# Patient Record
Sex: FEMALE | Race: WHITE | Employment: PART TIME | URBAN - METROPOLITAN AREA
[De-identification: names, ages, dates, MRNs, and addresses within clinical notes are randomized per-mention and may not be internally consistent; named-entity substitution may affect disease eponyms.]

---

## 2017-01-01 ENCOUNTER — HOSPITAL ENCOUNTER (INPATIENT)
Age: 47
LOS: 1 days | Discharge: HOME | DRG: 198 | End: 2017-01-03
Attending: EMERGENCY MEDICINE | Admitting: INTERNAL MEDICINE
Payer: COMMERCIAL

## 2017-01-01 DIAGNOSIS — R07.9 CARDIAC CHEST PAIN: Primary | ICD-10-CM

## 2017-01-01 PROCEDURE — 96375 TX/PRO/DX INJ NEW DRUG ADDON: CPT

## 2017-01-01 PROCEDURE — 93005 ELECTROCARDIOGRAM TRACING: CPT

## 2017-01-01 PROCEDURE — 96374 THER/PROPH/DIAG INJ IV PUSH: CPT

## 2017-01-01 PROCEDURE — 99285 EMERGENCY DEPT VISIT HI MDM: CPT

## 2017-01-01 ASSESSMENT — PAIN SCALES - GENERAL: PAINLEVEL_OUTOF10: 6

## 2017-01-01 ASSESSMENT — PAIN DESCRIPTION - PAIN TYPE: TYPE: ACUTE PAIN

## 2017-01-01 ASSESSMENT — PAIN DESCRIPTION - DESCRIPTORS: DESCRIPTORS: SHARP

## 2017-01-01 ASSESSMENT — PAIN DESCRIPTION - LOCATION: LOCATION: CHEST

## 2017-01-02 ENCOUNTER — APPOINTMENT (OUTPATIENT)
Dept: GENERAL RADIOLOGY | Age: 47
DRG: 198 | End: 2017-01-02
Payer: COMMERCIAL

## 2017-01-02 LAB
ALBUMIN SERPL-MCNC: 4.3 G/DL (ref 3.9–4.9)
ALP BLD-CCNC: 121 U/L (ref 40–130)
ALT SERPL-CCNC: 26 U/L (ref 0–33)
AMPHETAMINE SCREEN, URINE: NORMAL
ANION GAP SERPL CALCULATED.3IONS-SCNC: 12 MEQ/L (ref 7–13)
AST SERPL-CCNC: 15 U/L (ref 0–35)
BARBITURATE SCREEN URINE: NORMAL
BASOPHILS ABSOLUTE: 0.1 K/UL (ref 0–0.2)
BASOPHILS RELATIVE PERCENT: 0.7 %
BENZODIAZEPINE SCREEN, URINE: NORMAL
BILIRUB SERPL-MCNC: 0.2 MG/DL (ref 0–1.2)
BILIRUBIN URINE: NEGATIVE
BLOOD, URINE: NEGATIVE
BUN BLDV-MCNC: 9 MG/DL (ref 6–20)
CALCIUM SERPL-MCNC: 9.3 MG/DL (ref 8.6–10.2)
CANNABINOID SCREEN URINE: NORMAL
CHLORIDE BLD-SCNC: 104 MEQ/L (ref 98–107)
CLARITY: CLEAR
CO2: 23 MEQ/L (ref 22–29)
COCAINE METABOLITE SCREEN URINE: NORMAL
COLOR: YELLOW
CREAT SERPL-MCNC: 0.83 MG/DL (ref 0.5–0.9)
D DIMER: 0.58 MG/L FEU (ref 0–0.5)
EOSINOPHILS ABSOLUTE: 0.2 K/UL (ref 0–0.7)
EOSINOPHILS RELATIVE PERCENT: 1.6 %
GFR AFRICAN AMERICAN: >60
GFR NON-AFRICAN AMERICAN: >60
GLOBULIN: 1.8 G/DL (ref 2.3–3.5)
GLUCOSE BLD-MCNC: 133 MG/DL (ref 74–109)
GLUCOSE URINE: NEGATIVE MG/DL
HCT VFR BLD CALC: 43.2 % (ref 37–47)
HEMOGLOBIN: 14.8 G/DL (ref 12–16)
KETONES, URINE: NEGATIVE MG/DL
LEUKOCYTE ESTERASE, URINE: NEGATIVE
LYMPHOCYTES ABSOLUTE: 2.6 K/UL (ref 1–4.8)
LYMPHOCYTES RELATIVE PERCENT: 25.8 %
Lab: NORMAL
MCH RBC QN AUTO: 31.8 PG (ref 27–31.3)
MCHC RBC AUTO-ENTMCNC: 34.2 % (ref 33–37)
MCV RBC AUTO: 93.2 FL (ref 82–100)
MONOCYTES ABSOLUTE: 0.8 K/UL (ref 0.2–0.8)
MONOCYTES RELATIVE PERCENT: 8.2 %
NEUTROPHILS ABSOLUTE: 6.4 K/UL (ref 1.4–6.5)
NEUTROPHILS RELATIVE PERCENT: 63.7 %
NITRITE, URINE: NEGATIVE
OPIATE SCREEN URINE: NORMAL
PDW BLD-RTO: 14 % (ref 11.5–14.5)
PH UA: 6 (ref 5–9)
PHENCYCLIDINE SCREEN URINE: NORMAL
PLATELET # BLD: 143 K/UL (ref 130–400)
POTASSIUM SERPL-SCNC: 3.6 MEQ/L (ref 3.5–5.1)
PROTEIN UA: NEGATIVE MG/DL
RBC # BLD: 4.63 M/UL (ref 4.2–5.4)
SODIUM BLD-SCNC: 139 MEQ/L (ref 132–144)
SPECIFIC GRAVITY UA: 1.02 (ref 1–1.03)
TOTAL PROTEIN: 6.1 G/DL (ref 6.4–8.1)
TROPONIN: <0.01 NG/ML (ref 0–0.01)
URINE REFLEX TO CULTURE: NORMAL
UROBILINOGEN, URINE: 1 E.U./DL
WBC # BLD: 10 K/UL (ref 4.8–10.8)

## 2017-01-02 PROCEDURE — 2580000003 HC RX 258: Performed by: INTERNAL MEDICINE

## 2017-01-02 PROCEDURE — 71020 XR CHEST STANDARD TWO VW: CPT

## 2017-01-02 PROCEDURE — 85025 COMPLETE CBC W/AUTO DIFF WBC: CPT

## 2017-01-02 PROCEDURE — 93005 ELECTROCARDIOGRAM TRACING: CPT

## 2017-01-02 PROCEDURE — 80307 DRUG TEST PRSMV CHEM ANLYZR: CPT

## 2017-01-02 PROCEDURE — 6370000000 HC RX 637 (ALT 250 FOR IP): Performed by: INTERNAL MEDICINE

## 2017-01-02 PROCEDURE — 6360000002 HC RX W HCPCS: Performed by: EMERGENCY MEDICINE

## 2017-01-02 PROCEDURE — 94664 DEMO&/EVAL PT USE INHALER: CPT

## 2017-01-02 PROCEDURE — 85379 FIBRIN DEGRADATION QUANT: CPT

## 2017-01-02 PROCEDURE — 81003 URINALYSIS AUTO W/O SCOPE: CPT

## 2017-01-02 PROCEDURE — 36415 COLL VENOUS BLD VENIPUNCTURE: CPT

## 2017-01-02 PROCEDURE — 2060000000 HC ICU INTERMEDIATE R&B

## 2017-01-02 PROCEDURE — 6360000002 HC RX W HCPCS: Performed by: PERSONAL EMERGENCY RESPONSE ATTENDANT

## 2017-01-02 PROCEDURE — 94640 AIRWAY INHALATION TREATMENT: CPT

## 2017-01-02 PROCEDURE — 1210000000 HC MED SURG R&B

## 2017-01-02 PROCEDURE — 6370000000 HC RX 637 (ALT 250 FOR IP): Performed by: EMERGENCY MEDICINE

## 2017-01-02 PROCEDURE — 84484 ASSAY OF TROPONIN QUANT: CPT

## 2017-01-02 PROCEDURE — 80053 COMPREHEN METABOLIC PANEL: CPT

## 2017-01-02 PROCEDURE — 6360000002 HC RX W HCPCS: Performed by: INTERNAL MEDICINE

## 2017-01-02 RX ORDER — METOCLOPRAMIDE HYDROCHLORIDE 5 MG/ML
10 INJECTION INTRAMUSCULAR; INTRAVENOUS ONCE
Status: COMPLETED | OUTPATIENT
Start: 2017-01-02 | End: 2017-01-02

## 2017-01-02 RX ORDER — CLONAZEPAM 1 MG/1
1 TABLET ORAL 3 TIMES DAILY PRN
Status: DISCONTINUED | OUTPATIENT
Start: 2017-01-02 | End: 2017-01-03 | Stop reason: HOSPADM

## 2017-01-02 RX ORDER — SODIUM CHLORIDE 0.9 % (FLUSH) 0.9 %
10 SYRINGE (ML) INJECTION PRN
Status: DISCONTINUED | OUTPATIENT
Start: 2017-01-02 | End: 2017-01-03 | Stop reason: HOSPADM

## 2017-01-02 RX ORDER — LISINOPRIL 20 MG/1
40 TABLET ORAL DAILY
Status: DISCONTINUED | OUTPATIENT
Start: 2017-01-02 | End: 2017-01-03 | Stop reason: HOSPADM

## 2017-01-02 RX ORDER — BUSPIRONE HYDROCHLORIDE 5 MG/1
5 TABLET ORAL 3 TIMES DAILY
Status: DISCONTINUED | OUTPATIENT
Start: 2017-01-02 | End: 2017-01-03 | Stop reason: HOSPADM

## 2017-01-02 RX ORDER — FOLIC ACID 1 MG/1
1 TABLET ORAL DAILY
Status: DISCONTINUED | OUTPATIENT
Start: 2017-01-02 | End: 2017-01-03 | Stop reason: HOSPADM

## 2017-01-02 RX ORDER — ACETAMINOPHEN 325 MG/1
650 TABLET ORAL EVERY 4 HOURS PRN
Status: DISCONTINUED | OUTPATIENT
Start: 2017-01-02 | End: 2017-01-03 | Stop reason: HOSPADM

## 2017-01-02 RX ORDER — ALBUTEROL SULFATE 2.5 MG/3ML
2.5 SOLUTION RESPIRATORY (INHALATION) 3 TIMES DAILY
Status: DISCONTINUED | OUTPATIENT
Start: 2017-01-02 | End: 2017-01-03 | Stop reason: HOSPADM

## 2017-01-02 RX ORDER — CYCLOBENZAPRINE HCL 10 MG
10 TABLET ORAL 3 TIMES DAILY
Status: DISCONTINUED | OUTPATIENT
Start: 2017-01-02 | End: 2017-01-03 | Stop reason: HOSPADM

## 2017-01-02 RX ORDER — IBUPROFEN 800 MG/1
800 TABLET ORAL ONCE
Status: DISCONTINUED | OUTPATIENT
Start: 2017-01-02 | End: 2017-01-02

## 2017-01-02 RX ORDER — KETOROLAC TROMETHAMINE 30 MG/ML
30 INJECTION, SOLUTION INTRAMUSCULAR; INTRAVENOUS ONCE
Status: COMPLETED | OUTPATIENT
Start: 2017-01-02 | End: 2017-01-02

## 2017-01-02 RX ORDER — PANTOPRAZOLE SODIUM 40 MG/1
40 TABLET, DELAYED RELEASE ORAL
Status: DISCONTINUED | OUTPATIENT
Start: 2017-01-03 | End: 2017-01-02 | Stop reason: SDUPTHER

## 2017-01-02 RX ORDER — FOLIC ACID 1 MG/1
1 TABLET ORAL DAILY
Status: DISCONTINUED | OUTPATIENT
Start: 2017-01-02 | End: 2017-01-02 | Stop reason: SDUPTHER

## 2017-01-02 RX ORDER — GABAPENTIN 400 MG/1
800 CAPSULE ORAL 3 TIMES DAILY
Status: DISCONTINUED | OUTPATIENT
Start: 2017-01-02 | End: 2017-01-03 | Stop reason: HOSPADM

## 2017-01-02 RX ORDER — NITROGLYCERIN 0.4 MG/1
0.4 TABLET SUBLINGUAL EVERY 5 MIN PRN
Status: DISCONTINUED | OUTPATIENT
Start: 2017-01-02 | End: 2017-01-03 | Stop reason: HOSPADM

## 2017-01-02 RX ORDER — PANTOPRAZOLE SODIUM 40 MG/1
40 TABLET, DELAYED RELEASE ORAL
Status: DISCONTINUED | OUTPATIENT
Start: 2017-01-03 | End: 2017-01-03 | Stop reason: HOSPADM

## 2017-01-02 RX ORDER — ALBUTEROL SULFATE 2.5 MG/3ML
2.5 SOLUTION RESPIRATORY (INHALATION) EVERY 6 HOURS PRN
Status: DISCONTINUED | OUTPATIENT
Start: 2017-01-02 | End: 2017-01-02

## 2017-01-02 RX ORDER — ERGOCALCIFEROL 1.25 MG/1
50000 CAPSULE ORAL WEEKLY
Status: DISCONTINUED | OUTPATIENT
Start: 2017-01-02 | End: 2017-01-03 | Stop reason: HOSPADM

## 2017-01-02 RX ORDER — SODIUM CHLORIDE 0.9 % (FLUSH) 0.9 %
10 SYRINGE (ML) INJECTION EVERY 12 HOURS SCHEDULED
Status: DISCONTINUED | OUTPATIENT
Start: 2017-01-02 | End: 2017-01-03 | Stop reason: HOSPADM

## 2017-01-02 RX ORDER — RANOLAZINE 500 MG/1
500 TABLET, EXTENDED RELEASE ORAL 2 TIMES DAILY
Status: DISCONTINUED | OUTPATIENT
Start: 2017-01-02 | End: 2017-01-03

## 2017-01-02 RX ORDER — TRAMADOL HYDROCHLORIDE 50 MG/1
50 TABLET ORAL EVERY 6 HOURS PRN
Status: DISCONTINUED | OUTPATIENT
Start: 2017-01-02 | End: 2017-01-03 | Stop reason: HOSPADM

## 2017-01-02 RX ORDER — DIPHENHYDRAMINE HYDROCHLORIDE 50 MG/ML
25 INJECTION INTRAMUSCULAR; INTRAVENOUS ONCE
Status: COMPLETED | OUTPATIENT
Start: 2017-01-02 | End: 2017-01-02

## 2017-01-02 RX ORDER — ONDANSETRON 2 MG/ML
4 INJECTION INTRAMUSCULAR; INTRAVENOUS EVERY 6 HOURS PRN
Status: DISCONTINUED | OUTPATIENT
Start: 2017-01-02 | End: 2017-01-03 | Stop reason: HOSPADM

## 2017-01-02 RX ORDER — NAPROXEN SODIUM 550 MG/1
550 TABLET ORAL 2 TIMES DAILY WITH MEALS
Status: DISCONTINUED | OUTPATIENT
Start: 2017-01-02 | End: 2017-01-02

## 2017-01-02 RX ORDER — CHLORZOXAZONE 500 MG/1
500 TABLET ORAL 3 TIMES DAILY
Status: DISCONTINUED | OUTPATIENT
Start: 2017-01-02 | End: 2017-01-02

## 2017-01-02 RX ORDER — NICOTINE 21 MG/24HR
1 PATCH, TRANSDERMAL 24 HOURS TRANSDERMAL EVERY 24 HOURS
Status: DISCONTINUED | OUTPATIENT
Start: 2017-01-02 | End: 2017-01-03 | Stop reason: HOSPADM

## 2017-01-02 RX ORDER — ALBUTEROL SULFATE 90 UG/1
2 AEROSOL, METERED RESPIRATORY (INHALATION) EVERY 6 HOURS PRN
Status: DISCONTINUED | OUTPATIENT
Start: 2017-01-02 | End: 2017-01-02

## 2017-01-02 RX ORDER — ASPIRIN 81 MG/1
324 TABLET, CHEWABLE ORAL ONCE
Status: COMPLETED | OUTPATIENT
Start: 2017-01-02 | End: 2017-01-02

## 2017-01-02 RX ORDER — METHOCARBAMOL 500 MG/1
500 TABLET, FILM COATED ORAL 3 TIMES DAILY
Status: DISCONTINUED | OUTPATIENT
Start: 2017-01-02 | End: 2017-01-02

## 2017-01-02 RX ORDER — PANTOPRAZOLE SODIUM 40 MG/1
40 TABLET, DELAYED RELEASE ORAL DAILY
Status: DISCONTINUED | OUTPATIENT
Start: 2017-01-02 | End: 2017-01-02 | Stop reason: SDUPTHER

## 2017-01-02 RX ORDER — IBUPROFEN 800 MG/1
800 TABLET ORAL ONCE
Status: COMPLETED | OUTPATIENT
Start: 2017-01-02 | End: 2017-01-02

## 2017-01-02 RX ORDER — ALBUTEROL SULFATE 2.5 MG/3ML
2.5 SOLUTION RESPIRATORY (INHALATION)
Status: DISCONTINUED | OUTPATIENT
Start: 2017-01-02 | End: 2017-01-02

## 2017-01-02 RX ORDER — MELOXICAM 7.5 MG/1
7.5 TABLET ORAL DAILY
Status: DISCONTINUED | OUTPATIENT
Start: 2017-01-02 | End: 2017-01-03 | Stop reason: HOSPADM

## 2017-01-02 RX ORDER — ALBUTEROL SULFATE 2.5 MG/3ML
2.5 SOLUTION RESPIRATORY (INHALATION)
Status: DISCONTINUED | OUTPATIENT
Start: 2017-01-02 | End: 2017-01-03 | Stop reason: HOSPADM

## 2017-01-02 RX ORDER — CYANOCOBALAMIN 1000 UG/ML
1000 INJECTION INTRAMUSCULAR; SUBCUTANEOUS
Status: DISCONTINUED | OUTPATIENT
Start: 2017-01-02 | End: 2017-01-03 | Stop reason: HOSPADM

## 2017-01-02 RX ORDER — ASPIRIN 81 MG/1
81 TABLET, CHEWABLE ORAL DAILY
Status: DISCONTINUED | OUTPATIENT
Start: 2017-01-03 | End: 2017-01-03 | Stop reason: HOSPADM

## 2017-01-02 RX ORDER — METOPROLOL TARTRATE 50 MG/1
50 TABLET, FILM COATED ORAL 2 TIMES DAILY
Status: DISCONTINUED | OUTPATIENT
Start: 2017-01-02 | End: 2017-01-03 | Stop reason: HOSPADM

## 2017-01-02 RX ADMIN — METOCLOPRAMIDE 10 MG: 5 INJECTION, SOLUTION INTRAMUSCULAR; INTRAVENOUS at 05:28

## 2017-01-02 RX ADMIN — OLODATEROL RESPIMAT INHALATION SPRAY 2 PUFF: 2.5 SPRAY, METERED RESPIRATORY (INHALATION) at 16:54

## 2017-01-02 RX ADMIN — ASPIRIN 81 MG 324 MG: 81 TABLET ORAL at 01:14

## 2017-01-02 RX ADMIN — SODIUM CHLORIDE, PRESERVATIVE FREE 10 ML: 5 INJECTION INTRAVENOUS at 10:14

## 2017-01-02 RX ADMIN — TIOTROPIUM BROMIDE 18 MCG: 18 CAPSULE ORAL; RESPIRATORY (INHALATION) at 19:36

## 2017-01-02 RX ADMIN — BUSPIRONE HYDROCHLORIDE 5 MG: 5 TABLET ORAL at 13:54

## 2017-01-02 RX ADMIN — MELOXICAM 7.5 MG: 7.5 TABLET ORAL at 16:12

## 2017-01-02 RX ADMIN — ALBUTEROL SULFATE 2.5 MG: 2.5 SOLUTION RESPIRATORY (INHALATION) at 19:28

## 2017-01-02 RX ADMIN — ERGOCALCIFEROL 50000 UNITS: 1.25 CAPSULE ORAL at 13:53

## 2017-01-02 RX ADMIN — METOPROLOL TARTRATE 50 MG: 50 TABLET ORAL at 13:54

## 2017-01-02 RX ADMIN — NITROGLYCERIN 0.4 MG: 0.4 TABLET SUBLINGUAL at 00:17

## 2017-01-02 RX ADMIN — LISINOPRIL 40 MG: 20 TABLET ORAL at 13:54

## 2017-01-02 RX ADMIN — GABAPENTIN 800 MG: 400 CAPSULE ORAL at 22:44

## 2017-01-02 RX ADMIN — BUSPIRONE HYDROCHLORIDE 5 MG: 5 TABLET ORAL at 22:44

## 2017-01-02 RX ADMIN — CYCLOBENZAPRINE HYDROCHLORIDE 10 MG: 10 TABLET, FILM COATED ORAL at 16:12

## 2017-01-02 RX ADMIN — NITROGLYCERIN 0.4 MG: 0.4 TABLET SUBLINGUAL at 00:26

## 2017-01-02 RX ADMIN — CYANOCOBALAMIN 1000 MCG: 1000 INJECTION, SOLUTION INTRAMUSCULAR at 13:52

## 2017-01-02 RX ADMIN — RANOLAZINE 500 MG: 500 TABLET, FILM COATED, EXTENDED RELEASE ORAL at 22:44

## 2017-01-02 RX ADMIN — GABAPENTIN 800 MG: 400 CAPSULE ORAL at 13:53

## 2017-01-02 RX ADMIN — RANOLAZINE 500 MG: 500 TABLET, FILM COATED, EXTENDED RELEASE ORAL at 13:53

## 2017-01-02 RX ADMIN — ONDANSETRON 4 MG: 2 INJECTION INTRAMUSCULAR; INTRAVENOUS at 18:47

## 2017-01-02 RX ADMIN — FOLIC ACID 1 MG: 1 TABLET ORAL at 13:53

## 2017-01-02 RX ADMIN — CYCLOBENZAPRINE HYDROCHLORIDE 10 MG: 10 TABLET, FILM COATED ORAL at 22:44

## 2017-01-02 RX ADMIN — METOPROLOL TARTRATE 50 MG: 50 TABLET ORAL at 22:44

## 2017-01-02 RX ADMIN — KETOROLAC TROMETHAMINE 30 MG: 30 INJECTION, SOLUTION INTRAMUSCULAR at 01:14

## 2017-01-02 RX ADMIN — DIPHENHYDRAMINE HYDROCHLORIDE 25 MG: 50 INJECTION INTRAMUSCULAR; INTRAVENOUS at 05:28

## 2017-01-02 RX ADMIN — TRAMADOL HYDROCHLORIDE 50 MG: 50 TABLET, FILM COATED ORAL at 12:49

## 2017-01-02 RX ADMIN — TRAMADOL HYDROCHLORIDE 50 MG: 50 TABLET, FILM COATED ORAL at 18:48

## 2017-01-02 RX ADMIN — IBUPROFEN 800 MG: 800 TABLET, FILM COATED ORAL at 00:27

## 2017-01-02 RX ADMIN — SODIUM CHLORIDE, PRESERVATIVE FREE 10 ML: 5 INJECTION INTRAVENOUS at 22:45

## 2017-01-02 RX ADMIN — ENOXAPARIN SODIUM 40 MG: 40 INJECTION SUBCUTANEOUS at 10:14

## 2017-01-02 ASSESSMENT — ENCOUNTER SYMPTOMS
PHOTOPHOBIA: 0
NAUSEA: 0
VOMITING: 0
ANAL BLEEDING: 0
CHOKING: 0
EYE DISCHARGE: 0
BLOOD IN STOOL: 0
COLOR CHANGE: 0
SHORTNESS OF BREATH: 1
CHEST TIGHTNESS: 0
DIARRHEA: 0
FACIAL SWELLING: 0
EYE PAIN: 0
CONSTIPATION: 0
STRIDOR: 0
WHEEZING: 0
ABDOMINAL PAIN: 0
SINUS PRESSURE: 0
BACK PAIN: 0
COUGH: 0
RHINORRHEA: 0
EYE ITCHING: 0
SORE THROAT: 0
VOICE CHANGE: 0
TROUBLE SWALLOWING: 0
ABDOMINAL DISTENTION: 0
EYE REDNESS: 0

## 2017-01-02 ASSESSMENT — PAIN SCALES - GENERAL
PAINLEVEL_OUTOF10: 6
PAINLEVEL_OUTOF10: 8
PAINLEVEL_OUTOF10: 8
PAINLEVEL_OUTOF10: 6
PAINLEVEL_OUTOF10: 9
PAINLEVEL_OUTOF10: 9
PAINLEVEL_OUTOF10: 8
PAINLEVEL_OUTOF10: 0
PAINLEVEL_OUTOF10: 7
PAINLEVEL_OUTOF10: 8

## 2017-01-02 ASSESSMENT — PAIN DESCRIPTION - LOCATION
LOCATION: HEAD

## 2017-01-02 ASSESSMENT — PAIN DESCRIPTION - PAIN TYPE
TYPE: ACUTE PAIN
TYPE: CHRONIC PAIN
TYPE: ACUTE PAIN
TYPE: OTHER (COMMENT)

## 2017-01-02 ASSESSMENT — PAIN DESCRIPTION - FREQUENCY
FREQUENCY: CONTINUOUS
FREQUENCY: CONTINUOUS
FREQUENCY: INTERMITTENT

## 2017-01-02 ASSESSMENT — PAIN DESCRIPTION - DESCRIPTORS
DESCRIPTORS: HEADACHE

## 2017-01-03 VITALS
WEIGHT: 187.17 LBS | DIASTOLIC BLOOD PRESSURE: 61 MMHG | HEART RATE: 71 BPM | RESPIRATION RATE: 18 BRPM | OXYGEN SATURATION: 99 % | SYSTOLIC BLOOD PRESSURE: 124 MMHG | HEIGHT: 67 IN | BODY MASS INDEX: 29.38 KG/M2 | TEMPERATURE: 98.6 F

## 2017-01-03 LAB
LV EF: 65 %
LVEF MODALITY: NORMAL

## 2017-01-03 PROCEDURE — 93005 ELECTROCARDIOGRAM TRACING: CPT

## 2017-01-03 PROCEDURE — 6370000000 HC RX 637 (ALT 250 FOR IP): Performed by: PHYSICIAN ASSISTANT

## 2017-01-03 PROCEDURE — 6370000000 HC RX 637 (ALT 250 FOR IP): Performed by: INTERNAL MEDICINE

## 2017-01-03 PROCEDURE — 2580000003 HC RX 258: Performed by: INTERNAL MEDICINE

## 2017-01-03 PROCEDURE — 99222 1ST HOSP IP/OBS MODERATE 55: CPT | Performed by: INTERNAL MEDICINE

## 2017-01-03 PROCEDURE — 94640 AIRWAY INHALATION TREATMENT: CPT

## 2017-01-03 PROCEDURE — 6360000002 HC RX W HCPCS: Performed by: INTERNAL MEDICINE

## 2017-01-03 PROCEDURE — 94760 N-INVAS EAR/PLS OXIMETRY 1: CPT

## 2017-01-03 PROCEDURE — 6360000002 HC RX W HCPCS: Performed by: PHYSICIAN ASSISTANT

## 2017-01-03 PROCEDURE — 93306 TTE W/DOPPLER COMPLETE: CPT

## 2017-01-03 PROCEDURE — 2700000000 HC OXYGEN THERAPY PER DAY

## 2017-01-03 RX ORDER — AMLODIPINE BESYLATE 5 MG/1
5 TABLET ORAL DAILY
Status: DISCONTINUED | OUTPATIENT
Start: 2017-01-03 | End: 2017-01-03 | Stop reason: HOSPADM

## 2017-01-03 RX ORDER — ISOSORBIDE MONONITRATE 30 MG/1
30 TABLET, EXTENDED RELEASE ORAL DAILY
Status: DISCONTINUED | OUTPATIENT
Start: 2017-01-03 | End: 2017-01-03 | Stop reason: HOSPADM

## 2017-01-03 RX ORDER — RANOLAZINE 500 MG/1
1000 TABLET, EXTENDED RELEASE ORAL 2 TIMES DAILY
Status: DISCONTINUED | OUTPATIENT
Start: 2017-01-03 | End: 2017-01-03 | Stop reason: HOSPADM

## 2017-01-03 RX ORDER — MORPHINE SULFATE 2 MG/ML
2 INJECTION, SOLUTION INTRAMUSCULAR; INTRAVENOUS EVERY 8 HOURS PRN
Status: DISCONTINUED | OUTPATIENT
Start: 2017-01-03 | End: 2017-01-03 | Stop reason: HOSPADM

## 2017-01-03 RX ORDER — NITROGLYCERIN 0.4 MG/1
TABLET SUBLINGUAL
Qty: 25 TABLET | Refills: 3 | Status: ON HOLD | OUTPATIENT
Start: 2017-01-03 | End: 2018-11-12 | Stop reason: HOSPADM

## 2017-01-03 RX ORDER — RANOLAZINE 1000 MG/1
1000 TABLET, EXTENDED RELEASE ORAL 2 TIMES DAILY
Qty: 60 TABLET | Refills: 3 | Status: ON HOLD | OUTPATIENT
Start: 2017-01-03 | End: 2017-06-19 | Stop reason: HOSPADM

## 2017-01-03 RX ORDER — ISOSORBIDE MONONITRATE 30 MG/1
30 TABLET, EXTENDED RELEASE ORAL DAILY
Qty: 30 TABLET | Refills: 3 | Status: ON HOLD | OUTPATIENT
Start: 2017-01-03 | End: 2017-06-19

## 2017-01-03 RX ORDER — AMLODIPINE BESYLATE 5 MG/1
5 TABLET ORAL DAILY
Qty: 30 TABLET | Refills: 3 | Status: ON HOLD | OUTPATIENT
Start: 2017-01-03 | End: 2017-06-19 | Stop reason: HOSPADM

## 2017-01-03 RX ORDER — ASPIRIN 81 MG/1
81 TABLET, CHEWABLE ORAL DAILY
Qty: 30 TABLET | Refills: 3 | Status: ON HOLD | OUTPATIENT
Start: 2017-01-03 | End: 2017-06-19 | Stop reason: HOSPADM

## 2017-01-03 RX ADMIN — TRAMADOL HYDROCHLORIDE 50 MG: 50 TABLET, FILM COATED ORAL at 15:24

## 2017-01-03 RX ADMIN — ENOXAPARIN SODIUM 40 MG: 40 INJECTION SUBCUTANEOUS at 08:54

## 2017-01-03 RX ADMIN — AMLODIPINE BESYLATE 5 MG: 5 TABLET ORAL at 11:45

## 2017-01-03 RX ADMIN — FOLIC ACID 1 MG: 1 TABLET ORAL at 08:54

## 2017-01-03 RX ADMIN — SODIUM CHLORIDE, PRESERVATIVE FREE 10 ML: 5 INJECTION INTRAVENOUS at 08:54

## 2017-01-03 RX ADMIN — TIOTROPIUM BROMIDE 18 MCG: 18 CAPSULE ORAL; RESPIRATORY (INHALATION) at 07:02

## 2017-01-03 RX ADMIN — TRAMADOL HYDROCHLORIDE 50 MG: 50 TABLET, FILM COATED ORAL at 06:46

## 2017-01-03 RX ADMIN — PANTOPRAZOLE SODIUM 40 MG: 40 TABLET, DELAYED RELEASE ORAL at 06:42

## 2017-01-03 RX ADMIN — LISINOPRIL 40 MG: 20 TABLET ORAL at 08:54

## 2017-01-03 RX ADMIN — CYCLOBENZAPRINE HYDROCHLORIDE 10 MG: 10 TABLET, FILM COATED ORAL at 08:53

## 2017-01-03 RX ADMIN — GABAPENTIN 800 MG: 400 CAPSULE ORAL at 08:54

## 2017-01-03 RX ADMIN — GABAPENTIN 800 MG: 400 CAPSULE ORAL at 15:21

## 2017-01-03 RX ADMIN — RANOLAZINE 500 MG: 500 TABLET, FILM COATED, EXTENDED RELEASE ORAL at 08:53

## 2017-01-03 RX ADMIN — ALBUTEROL SULFATE 2.5 MG: 2.5 SOLUTION RESPIRATORY (INHALATION) at 12:43

## 2017-01-03 RX ADMIN — OLODATEROL RESPIMAT INHALATION SPRAY 2 PUFF: 2.5 SPRAY, METERED RESPIRATORY (INHALATION) at 07:02

## 2017-01-03 RX ADMIN — ASPIRIN 81 MG 81 MG: 81 TABLET ORAL at 08:54

## 2017-01-03 RX ADMIN — ISOSORBIDE MONONITRATE 30 MG: 30 TABLET, EXTENDED RELEASE ORAL at 11:45

## 2017-01-03 RX ADMIN — MORPHINE SULFATE 2 MG: 2 INJECTION, SOLUTION INTRAMUSCULAR; INTRAVENOUS at 11:45

## 2017-01-03 RX ADMIN — ALBUTEROL SULFATE 2.5 MG: 2.5 SOLUTION RESPIRATORY (INHALATION) at 07:02

## 2017-01-03 RX ADMIN — MELOXICAM 7.5 MG: 7.5 TABLET ORAL at 08:54

## 2017-01-03 RX ADMIN — METOPROLOL TARTRATE 50 MG: 50 TABLET ORAL at 08:54

## 2017-01-03 RX ADMIN — BUSPIRONE HYDROCHLORIDE 5 MG: 5 TABLET ORAL at 08:54

## 2017-01-03 RX ADMIN — BUSPIRONE HYDROCHLORIDE 5 MG: 5 TABLET ORAL at 15:21

## 2017-01-03 RX ADMIN — CYCLOBENZAPRINE HYDROCHLORIDE 10 MG: 10 TABLET, FILM COATED ORAL at 15:21

## 2017-01-03 ASSESSMENT — PAIN SCALES - GENERAL
PAINLEVEL_OUTOF10: 2
PAINLEVEL_OUTOF10: 6
PAINLEVEL_OUTOF10: 0
PAINLEVEL_OUTOF10: 2
PAINLEVEL_OUTOF10: 6
PAINLEVEL_OUTOF10: 6
PAINLEVEL_OUTOF10: 9
PAINLEVEL_OUTOF10: 8
PAINLEVEL_OUTOF10: 7

## 2017-01-03 ASSESSMENT — COPD QUESTIONNAIRES: COPD: 1

## 2017-01-03 ASSESSMENT — ENCOUNTER SYMPTOMS
COUGH: 0
WHEEZING: 0
BACK PAIN: 1
CONSTIPATION: 0
ABDOMINAL DISTENTION: 0
ABDOMINAL PAIN: 0
NAUSEA: 0
CHEST TIGHTNESS: 0
VOMITING: 0
APNEA: 0
SHORTNESS OF BREATH: 0
DIARRHEA: 0

## 2017-01-04 ENCOUNTER — OFFICE VISIT (OUTPATIENT)
Dept: PHYSICAL MEDICINE AND REHAB | Age: 47
End: 2017-01-04

## 2017-01-04 VITALS
HEIGHT: 67 IN | WEIGHT: 178 LBS | BODY MASS INDEX: 27.94 KG/M2 | SYSTOLIC BLOOD PRESSURE: 130 MMHG | DIASTOLIC BLOOD PRESSURE: 88 MMHG

## 2017-01-04 DIAGNOSIS — G32.0 NEUROMYELOPATHY DUE TO VITAMIN B12 DEFICIENCY (HCC): Primary | ICD-10-CM

## 2017-01-04 DIAGNOSIS — M54.81 BILATERAL OCCIPITAL NEURALGIA: ICD-10-CM

## 2017-01-04 DIAGNOSIS — E53.8 NEUROMYELOPATHY DUE TO VITAMIN B12 DEFICIENCY (HCC): Primary | ICD-10-CM

## 2017-01-04 DIAGNOSIS — G89.4 CHRONIC PAIN SYNDROME: ICD-10-CM

## 2017-01-04 DIAGNOSIS — E53.8 VITAMIN B12 DEFICIENCY: ICD-10-CM

## 2017-01-04 DIAGNOSIS — M25.561 ARTHRALGIA OF BOTH LOWER LEGS: ICD-10-CM

## 2017-01-04 DIAGNOSIS — G62.9 SENSORY NEUROPATHY: ICD-10-CM

## 2017-01-04 DIAGNOSIS — M25.562 ARTHRALGIA OF BOTH LOWER LEGS: ICD-10-CM

## 2017-01-04 DIAGNOSIS — M79.7 FIBROMYALGIA MUSCLE PAIN: ICD-10-CM

## 2017-01-04 DIAGNOSIS — M54.17 LUMBOSACRAL RADICULOPATHY AT S1: ICD-10-CM

## 2017-01-04 DIAGNOSIS — F31.9 BIPOLAR 1 DISORDER (HCC): ICD-10-CM

## 2017-01-04 DIAGNOSIS — E53.8 VITAMIN B 12 DEFICIENCY: ICD-10-CM

## 2017-01-04 DIAGNOSIS — H90.3 SENSORY HEARING LOSS, BILATERAL: ICD-10-CM

## 2017-01-04 DIAGNOSIS — Z78.9 IMPAIRED MOBILITY AND ACTIVITIES OF DAILY LIVING: ICD-10-CM

## 2017-01-04 DIAGNOSIS — Z74.09 IMPAIRED MOBILITY AND ACTIVITIES OF DAILY LIVING: ICD-10-CM

## 2017-01-04 PROCEDURE — 99215 OFFICE O/P EST HI 40 MIN: CPT | Performed by: PHYSICAL MEDICINE & REHABILITATION

## 2017-01-04 RX ORDER — CYANOCOBALAMIN 1000 UG/ML
1000 INJECTION INTRAMUSCULAR; SUBCUTANEOUS
Qty: 10 ML | Refills: 1 | Status: SHIPPED | OUTPATIENT
Start: 2017-01-04 | End: 2017-11-29 | Stop reason: SDUPTHER

## 2017-01-04 RX ORDER — TRAMADOL HYDROCHLORIDE 50 MG/1
50 TABLET ORAL EVERY 6 HOURS PRN
Qty: 60 TABLET | Refills: 1 | Status: SHIPPED | OUTPATIENT
Start: 2017-01-04 | End: 2017-02-07 | Stop reason: SDUPTHER

## 2017-01-04 ASSESSMENT — ENCOUNTER SYMPTOMS
ABDOMINAL PAIN: 1
DIARRHEA: 1
RHINORRHEA: 0
RECTAL PAIN: 1
ABDOMINAL DISTENTION: 1
VOICE CHANGE: 0
CHEST TIGHTNESS: 0
BOWEL INCONTINENCE: 0
CHOKING: 0
PHOTOPHOBIA: 1
SORE THROAT: 0
FACIAL SWELLING: 0
TROUBLE SWALLOWING: 0
SHORTNESS OF BREATH: 1
VOMITING: 0
COLOR CHANGE: 1
NAUSEA: 0
CONSTIPATION: 0
WHEEZING: 0
EYE REDNESS: 0
BACK PAIN: 1
EYE ITCHING: 1
APNEA: 0
CHANGE IN BOWEL HABIT: 0
STRIDOR: 0
ANAL BLEEDING: 0
SINUS PRESSURE: 0
EYE DISCHARGE: 0
COUGH: 1
BLOOD IN STOOL: 0
EYE PAIN: 1

## 2017-01-05 LAB
EKG ATRIAL RATE: 67 BPM
EKG ATRIAL RATE: 71 BPM
EKG ATRIAL RATE: 79 BPM
EKG P AXIS: 44 DEGREES
EKG P AXIS: 53 DEGREES
EKG P AXIS: 55 DEGREES
EKG P-R INTERVAL: 160 MS
EKG P-R INTERVAL: 170 MS
EKG P-R INTERVAL: 174 MS
EKG Q-T INTERVAL: 384 MS
EKG Q-T INTERVAL: 400 MS
EKG Q-T INTERVAL: 412 MS
EKG QRS DURATION: 90 MS
EKG QRS DURATION: 90 MS
EKG QRS DURATION: 94 MS
EKG QTC CALCULATION (BAZETT): 434 MS
EKG QTC CALCULATION (BAZETT): 435 MS
EKG QTC CALCULATION (BAZETT): 440 MS
EKG R AXIS: 47 DEGREES
EKG R AXIS: 53 DEGREES
EKG R AXIS: 56 DEGREES
EKG T AXIS: 15 DEGREES
EKG T AXIS: 46 DEGREES
EKG T AXIS: 48 DEGREES
EKG VENTRICULAR RATE: 67 BPM
EKG VENTRICULAR RATE: 71 BPM
EKG VENTRICULAR RATE: 79 BPM

## 2017-01-10 ENCOUNTER — PROCEDURE VISIT (OUTPATIENT)
Dept: PHYSICAL MEDICINE AND REHAB | Age: 47
End: 2017-01-10

## 2017-01-10 DIAGNOSIS — M54.81 BILATERAL OCCIPITAL NEURALGIA: Primary | ICD-10-CM

## 2017-01-10 PROCEDURE — 64405 NJX AA&/STRD GR OCPL NRV: CPT | Performed by: PHYSICAL MEDICINE & REHABILITATION

## 2017-01-13 ENCOUNTER — TELEPHONE (OUTPATIENT)
Dept: PHYSICAL MEDICINE AND REHAB | Age: 47
End: 2017-01-13

## 2017-01-20 DIAGNOSIS — K21.9 GASTROESOPHAGEAL REFLUX DISEASE WITHOUT ESOPHAGITIS: ICD-10-CM

## 2017-01-20 RX ORDER — PANTOPRAZOLE SODIUM 40 MG/1
40 TABLET, DELAYED RELEASE ORAL DAILY
Qty: 30 TABLET | Refills: 11 | Status: ON HOLD | OUTPATIENT
Start: 2017-01-20 | End: 2017-06-19 | Stop reason: HOSPADM

## 2017-01-24 ENCOUNTER — PROCEDURE VISIT (OUTPATIENT)
Dept: PHYSICAL MEDICINE AND REHAB | Age: 47
End: 2017-01-24

## 2017-01-24 DIAGNOSIS — E53.8 NEUROMYELOPATHY DUE TO VITAMIN B12 DEFICIENCY (HCC): ICD-10-CM

## 2017-01-24 DIAGNOSIS — G32.0 NEUROMYELOPATHY DUE TO VITAMIN B12 DEFICIENCY (HCC): ICD-10-CM

## 2017-01-24 DIAGNOSIS — M79.7 FIBROMYALGIA MUSCLE PAIN: ICD-10-CM

## 2017-01-24 DIAGNOSIS — M79.10 MYALGIA: Primary | ICD-10-CM

## 2017-01-24 PROCEDURE — 20553 NJX 1/MLT TRIGGER POINTS 3/>: CPT | Performed by: PHYSICAL MEDICINE & REHABILITATION

## 2017-01-26 DIAGNOSIS — J43.8 OTHER EMPHYSEMA (HCC): ICD-10-CM

## 2017-02-07 ENCOUNTER — PROCEDURE VISIT (OUTPATIENT)
Dept: PHYSICAL MEDICINE AND REHAB | Age: 47
End: 2017-02-07

## 2017-02-07 DIAGNOSIS — M54.17 LUMBOSACRAL RADICULOPATHY AT S1: ICD-10-CM

## 2017-02-07 DIAGNOSIS — Z78.9 IMPAIRED MOBILITY AND ACTIVITIES OF DAILY LIVING: ICD-10-CM

## 2017-02-07 DIAGNOSIS — G32.0 NEUROMYELOPATHY DUE TO VITAMIN B12 DEFICIENCY (HCC): ICD-10-CM

## 2017-02-07 DIAGNOSIS — G89.4 CHRONIC PAIN SYNDROME: ICD-10-CM

## 2017-02-07 DIAGNOSIS — G62.9 SENSORY NEUROPATHY: ICD-10-CM

## 2017-02-07 DIAGNOSIS — F31.9 BIPOLAR 1 DISORDER (HCC): ICD-10-CM

## 2017-02-07 DIAGNOSIS — Z74.09 IMPAIRED MOBILITY AND ACTIVITIES OF DAILY LIVING: ICD-10-CM

## 2017-02-07 DIAGNOSIS — M79.7 FIBROMYALGIA MUSCLE PAIN: ICD-10-CM

## 2017-02-07 DIAGNOSIS — E53.8 NEUROMYELOPATHY DUE TO VITAMIN B12 DEFICIENCY (HCC): ICD-10-CM

## 2017-02-07 DIAGNOSIS — M79.10 MYALGIA: Primary | ICD-10-CM

## 2017-02-07 PROCEDURE — 20553 NJX 1/MLT TRIGGER POINTS 3/>: CPT | Performed by: PHYSICAL MEDICINE & REHABILITATION

## 2017-02-07 RX ORDER — TRAMADOL HYDROCHLORIDE 50 MG/1
50 TABLET ORAL EVERY 6 HOURS PRN
Qty: 60 TABLET | Refills: 1 | Status: SHIPPED | OUTPATIENT
Start: 2017-02-07 | End: 2017-02-14 | Stop reason: SDUPTHER

## 2017-02-14 ENCOUNTER — OFFICE VISIT (OUTPATIENT)
Dept: PHYSICAL MEDICINE AND REHAB | Age: 47
End: 2017-02-14

## 2017-02-14 VITALS
BODY MASS INDEX: 28.72 KG/M2 | HEIGHT: 67 IN | WEIGHT: 183 LBS | DIASTOLIC BLOOD PRESSURE: 88 MMHG | SYSTOLIC BLOOD PRESSURE: 130 MMHG

## 2017-02-14 DIAGNOSIS — M79.7 FIBROMYALGIA MUSCLE PAIN: ICD-10-CM

## 2017-02-14 DIAGNOSIS — G89.4 CHRONIC PAIN SYNDROME: ICD-10-CM

## 2017-02-14 DIAGNOSIS — Z74.09 IMPAIRED MOBILITY AND ACTIVITIES OF DAILY LIVING: Primary | ICD-10-CM

## 2017-02-14 DIAGNOSIS — G62.9 SENSORY NEUROPATHY: ICD-10-CM

## 2017-02-14 DIAGNOSIS — G81.94 LEFT HEMIPARESIS (HCC): ICD-10-CM

## 2017-02-14 DIAGNOSIS — M54.81 BILATERAL OCCIPITAL NEURALGIA: ICD-10-CM

## 2017-02-14 DIAGNOSIS — Z78.9 IMPAIRED MOBILITY AND ACTIVITIES OF DAILY LIVING: Primary | ICD-10-CM

## 2017-02-14 DIAGNOSIS — G32.0 NEUROMYELOPATHY DUE TO VITAMIN B12 DEFICIENCY (HCC): ICD-10-CM

## 2017-02-14 DIAGNOSIS — M54.17 LUMBOSACRAL RADICULOPATHY AT S1: ICD-10-CM

## 2017-02-14 DIAGNOSIS — E53.8 VITAMIN B12 DEFICIENCY: ICD-10-CM

## 2017-02-14 DIAGNOSIS — E53.8 NEUROMYELOPATHY DUE TO VITAMIN B12 DEFICIENCY (HCC): ICD-10-CM

## 2017-02-14 DIAGNOSIS — F31.9 BIPOLAR 1 DISORDER (HCC): ICD-10-CM

## 2017-02-14 PROCEDURE — 99214 OFFICE O/P EST MOD 30 MIN: CPT | Performed by: PHYSICAL MEDICINE & REHABILITATION

## 2017-02-14 RX ORDER — TRAMADOL HYDROCHLORIDE 50 MG/1
TABLET ORAL
Qty: 90 TABLET | Refills: 0 | Status: SHIPPED | OUTPATIENT
Start: 2017-02-14 | End: 2017-03-15 | Stop reason: SDUPTHER

## 2017-02-14 ASSESSMENT — ENCOUNTER SYMPTOMS
STRIDOR: 0
BACK PAIN: 1
SORE THROAT: 0
COLOR CHANGE: 1
RECTAL PAIN: 1
BOWEL INCONTINENCE: 0
RHINORRHEA: 0
NAUSEA: 0
CHEST TIGHTNESS: 0
CHOKING: 0
DIARRHEA: 1
TROUBLE SWALLOWING: 0
ABDOMINAL DISTENTION: 1
EYE REDNESS: 0
FACIAL SWELLING: 0
COUGH: 1
PHOTOPHOBIA: 1
APNEA: 0
EYE DISCHARGE: 0
BLOOD IN STOOL: 0
EYE PAIN: 0
ANAL BLEEDING: 0
VOICE CHANGE: 0
SHORTNESS OF BREATH: 1
VOMITING: 0
CONSTIPATION: 0
SINUS PRESSURE: 0
ABDOMINAL PAIN: 0
CHANGE IN BOWEL HABIT: 0
WHEEZING: 0
EYE ITCHING: 1

## 2017-02-28 DIAGNOSIS — M25.512 CHRONIC PAIN OF BOTH SHOULDERS: Primary | ICD-10-CM

## 2017-02-28 DIAGNOSIS — M25.511 CHRONIC PAIN OF BOTH SHOULDERS: Primary | ICD-10-CM

## 2017-02-28 DIAGNOSIS — G89.29 CHRONIC PAIN OF BOTH SHOULDERS: Primary | ICD-10-CM

## 2017-03-05 ENCOUNTER — APPOINTMENT (OUTPATIENT)
Dept: CT IMAGING | Age: 47
End: 2017-03-05
Payer: COMMERCIAL

## 2017-03-05 ENCOUNTER — HOSPITAL ENCOUNTER (EMERGENCY)
Age: 47
Discharge: HOME | End: 2017-03-05
Attending: EMERGENCY MEDICINE
Payer: COMMERCIAL

## 2017-03-05 VITALS
BODY MASS INDEX: 28.98 KG/M2 | RESPIRATION RATE: 16 BRPM | HEART RATE: 71 BPM | DIASTOLIC BLOOD PRESSURE: 72 MMHG | SYSTOLIC BLOOD PRESSURE: 127 MMHG | OXYGEN SATURATION: 97 % | TEMPERATURE: 98.6 F | WEIGHT: 185 LBS

## 2017-03-05 DIAGNOSIS — N39.0 URINARY TRACT INFECTION, SITE UNSPECIFIED: ICD-10-CM

## 2017-03-05 DIAGNOSIS — R51.9 HEADACHE, UNSPECIFIED HEADACHE TYPE: Primary | ICD-10-CM

## 2017-03-05 LAB
ALBUMIN SERPL-MCNC: 4.2 G/DL (ref 3.9–4.9)
ALP BLD-CCNC: 111 U/L (ref 40–130)
ALT SERPL-CCNC: 22 U/L (ref 0–33)
AMPHETAMINE SCREEN, URINE: NORMAL
ANION GAP SERPL CALCULATED.3IONS-SCNC: 13 MEQ/L (ref 7–13)
AST SERPL-CCNC: 14 U/L (ref 0–35)
BACTERIA: ABNORMAL /HPF
BARBITURATE SCREEN URINE: NORMAL
BASOPHILS ABSOLUTE: 0.1 K/UL (ref 0–0.2)
BASOPHILS RELATIVE PERCENT: 0.9 %
BENZODIAZEPINE SCREEN, URINE: NORMAL
BILIRUB SERPL-MCNC: 0.2 MG/DL (ref 0–1.2)
BILIRUBIN URINE: NEGATIVE
BLOOD, URINE: ABNORMAL
BUN BLDV-MCNC: 7 MG/DL (ref 6–20)
CALCIUM SERPL-MCNC: 9.1 MG/DL (ref 8.6–10.2)
CANNABINOID SCREEN URINE: NORMAL
CHLORIDE BLD-SCNC: 105 MEQ/L (ref 98–107)
CLARITY: ABNORMAL
CO2: 24 MEQ/L (ref 22–29)
COCAINE METABOLITE SCREEN URINE: NORMAL
COLOR: YELLOW
CREAT SERPL-MCNC: 0.71 MG/DL (ref 0.5–0.9)
EOSINOPHILS ABSOLUTE: 0.1 K/UL (ref 0–0.7)
EOSINOPHILS RELATIVE PERCENT: 0.5 %
EPITHELIAL CELLS, UA: ABNORMAL /HPF
GFR AFRICAN AMERICAN: >60
GFR NON-AFRICAN AMERICAN: >60
GLOBULIN: 2.1 G/DL (ref 2.3–3.5)
GLUCOSE BLD-MCNC: 113 MG/DL (ref 74–109)
GLUCOSE URINE: NEGATIVE MG/DL
HCT VFR BLD CALC: 41.7 % (ref 37–47)
HEMOGLOBIN: 14.4 G/DL (ref 12–16)
KETONES, URINE: NEGATIVE MG/DL
LEUKOCYTE ESTERASE, URINE: ABNORMAL
LYMPHOCYTES ABSOLUTE: 2.2 K/UL (ref 1–4.8)
LYMPHOCYTES RELATIVE PERCENT: 20.6 %
Lab: NORMAL
MCH RBC QN AUTO: 31.6 PG (ref 27–31.3)
MCHC RBC AUTO-ENTMCNC: 34.5 % (ref 33–37)
MCV RBC AUTO: 91.8 FL (ref 82–100)
MONOCYTES ABSOLUTE: 0.6 K/UL (ref 0.2–0.8)
MONOCYTES RELATIVE PERCENT: 5.5 %
NEUTROPHILS ABSOLUTE: 7.8 K/UL (ref 1.4–6.5)
NEUTROPHILS RELATIVE PERCENT: 72.5 %
NITRITE, URINE: NEGATIVE
OPIATE SCREEN URINE: NORMAL
PDW BLD-RTO: 13.3 % (ref 11.5–14.5)
PH UA: 6 (ref 5–9)
PHENCYCLIDINE SCREEN URINE: NORMAL
PLATELET # BLD: 176 K/UL (ref 130–400)
POTASSIUM SERPL-SCNC: 3.3 MEQ/L (ref 3.5–5.1)
PROTEIN UA: NEGATIVE MG/DL
RBC # BLD: 4.55 M/UL (ref 4.2–5.4)
RBC UA: ABNORMAL /HPF (ref 0–2)
SODIUM BLD-SCNC: 142 MEQ/L (ref 132–144)
SPECIFIC GRAVITY UA: 1.01 (ref 1–1.03)
TOTAL PROTEIN: 6.3 G/DL (ref 6.4–8.1)
TROPONIN: <0.01 NG/ML (ref 0–0.01)
URINE REFLEX TO CULTURE: YES
UROBILINOGEN, URINE: 0.2 E.U./DL
WBC # BLD: 10.7 K/UL (ref 4.8–10.8)
WBC UA: ABNORMAL /HPF (ref 0–5)

## 2017-03-05 PROCEDURE — 99285 EMERGENCY DEPT VISIT HI MDM: CPT

## 2017-03-05 PROCEDURE — 84484 ASSAY OF TROPONIN QUANT: CPT

## 2017-03-05 PROCEDURE — 96374 THER/PROPH/DIAG INJ IV PUSH: CPT

## 2017-03-05 PROCEDURE — 80307 DRUG TEST PRSMV CHEM ANLYZR: CPT

## 2017-03-05 PROCEDURE — 80053 COMPREHEN METABOLIC PANEL: CPT

## 2017-03-05 PROCEDURE — 87086 URINE CULTURE/COLONY COUNT: CPT

## 2017-03-05 PROCEDURE — 36415 COLL VENOUS BLD VENIPUNCTURE: CPT

## 2017-03-05 PROCEDURE — 70450 CT HEAD/BRAIN W/O DYE: CPT

## 2017-03-05 PROCEDURE — 81001 URINALYSIS AUTO W/SCOPE: CPT

## 2017-03-05 PROCEDURE — 96375 TX/PRO/DX INJ NEW DRUG ADDON: CPT

## 2017-03-05 PROCEDURE — 6360000002 HC RX W HCPCS: Performed by: EMERGENCY MEDICINE

## 2017-03-05 PROCEDURE — 93005 ELECTROCARDIOGRAM TRACING: CPT

## 2017-03-05 PROCEDURE — 6370000000 HC RX 637 (ALT 250 FOR IP): Performed by: EMERGENCY MEDICINE

## 2017-03-05 PROCEDURE — 85025 COMPLETE CBC W/AUTO DIFF WBC: CPT

## 2017-03-05 RX ORDER — ONDANSETRON 2 MG/ML
4 INJECTION INTRAMUSCULAR; INTRAVENOUS ONCE
Status: COMPLETED | OUTPATIENT
Start: 2017-03-05 | End: 2017-03-05

## 2017-03-05 RX ORDER — SULFAMETHOXAZOLE AND TRIMETHOPRIM 800; 160 MG/1; MG/1
1 TABLET ORAL 2 TIMES DAILY
Qty: 20 TABLET | Refills: 0 | Status: SHIPPED | OUTPATIENT
Start: 2017-03-05 | End: 2017-03-15

## 2017-03-05 RX ORDER — HALOPERIDOL 5 MG/ML
2 INJECTION INTRAMUSCULAR ONCE
Status: COMPLETED | OUTPATIENT
Start: 2017-03-05 | End: 2017-03-05

## 2017-03-05 RX ORDER — KETOROLAC TROMETHAMINE 30 MG/ML
30 INJECTION, SOLUTION INTRAMUSCULAR; INTRAVENOUS ONCE
Status: COMPLETED | OUTPATIENT
Start: 2017-03-05 | End: 2017-03-05

## 2017-03-05 RX ORDER — CLONIDINE HYDROCHLORIDE 0.1 MG/1
0.2 TABLET ORAL ONCE
Status: COMPLETED | OUTPATIENT
Start: 2017-03-05 | End: 2017-03-05

## 2017-03-05 RX ORDER — BUTALBITAL, ASPIRIN, AND CAFFEINE 325; 50; 40 MG/1; MG/1; MG/1
1 CAPSULE ORAL EVERY 4 HOURS PRN
Qty: 12 CAPSULE | Refills: 0 | Status: ON HOLD | OUTPATIENT
Start: 2017-03-05 | End: 2018-11-08 | Stop reason: HOSPADM

## 2017-03-05 RX ADMIN — HALOPERIDOL LACTATE 2 MG: 5 INJECTION, SOLUTION INTRAMUSCULAR at 01:33

## 2017-03-05 RX ADMIN — ONDANSETRON 4 MG: 2 INJECTION, SOLUTION INTRAMUSCULAR; INTRAVENOUS at 02:03

## 2017-03-05 RX ADMIN — CLONIDINE HYDROCHLORIDE 0.2 MG: 0.1 TABLET ORAL at 01:30

## 2017-03-05 RX ADMIN — KETOROLAC TROMETHAMINE 30 MG: 30 INJECTION, SOLUTION INTRAMUSCULAR at 02:03

## 2017-03-05 ASSESSMENT — ENCOUNTER SYMPTOMS
EYE DISCHARGE: 0
BACK PAIN: 0
VOMITING: 0
SHORTNESS OF BREATH: 0
WHEEZING: 0
VOICE CHANGE: 0
COUGH: 0
EYE ITCHING: 0
CHEST TIGHTNESS: 0
RHINORRHEA: 0
SINUS PRESSURE: 0
NAUSEA: 0
TROUBLE SWALLOWING: 0
SORE THROAT: 0
EYE REDNESS: 0
ABDOMINAL DISTENTION: 0
STRIDOR: 0
EYE PAIN: 0
ABDOMINAL PAIN: 0
ANAL BLEEDING: 0
CONSTIPATION: 0
PHOTOPHOBIA: 0
BLOOD IN STOOL: 0
COLOR CHANGE: 0
CHOKING: 0
DIARRHEA: 0
FACIAL SWELLING: 0

## 2017-03-05 ASSESSMENT — PAIN DESCRIPTION - DESCRIPTORS
DESCRIPTORS_2: TINGLING
DESCRIPTORS: SHARP

## 2017-03-05 ASSESSMENT — PAIN DESCRIPTION - LOCATION
LOCATION_2: LEG
LOCATION: JAW;HEAD;NECK

## 2017-03-05 ASSESSMENT — PAIN SCALES - GENERAL
PAINLEVEL_OUTOF10: 7
PAINLEVEL_OUTOF10: 8
PAINLEVEL_OUTOF10: 4
PAINLEVEL_OUTOF10: 6

## 2017-03-05 ASSESSMENT — PAIN DESCRIPTION - INTENSITY
RATING_2: 8
RATING_2: 6

## 2017-03-05 ASSESSMENT — PAIN DESCRIPTION - ORIENTATION
ORIENTATION: LEFT
ORIENTATION_2: LEFT;RIGHT

## 2017-03-05 ASSESSMENT — PAIN DESCRIPTION - FREQUENCY: FREQUENCY: CONTINUOUS

## 2017-03-05 ASSESSMENT — PAIN DESCRIPTION - PAIN TYPE: TYPE: ACUTE PAIN

## 2017-03-06 LAB — URINE CULTURE, ROUTINE: NORMAL

## 2017-03-09 LAB
EKG ATRIAL RATE: 74 BPM
EKG P AXIS: 40 DEGREES
EKG P-R INTERVAL: 176 MS
EKG Q-T INTERVAL: 430 MS
EKG QRS DURATION: 92 MS
EKG QTC CALCULATION (BAZETT): 477 MS
EKG R AXIS: 41 DEGREES
EKG T AXIS: 34 DEGREES
EKG VENTRICULAR RATE: 74 BPM

## 2017-03-14 ENCOUNTER — HOSPITAL ENCOUNTER (OUTPATIENT)
Dept: INTERVENTIONAL RADIOLOGY/VASCULAR | Age: 47
Discharge: HOME | End: 2017-03-14
Payer: COMMERCIAL

## 2017-03-14 VITALS
DIASTOLIC BLOOD PRESSURE: 77 MMHG | WEIGHT: 180 LBS | RESPIRATION RATE: 18 BRPM | HEIGHT: 67 IN | OXYGEN SATURATION: 97 % | SYSTOLIC BLOOD PRESSURE: 130 MMHG | HEART RATE: 78 BPM | BODY MASS INDEX: 28.25 KG/M2

## 2017-03-14 DIAGNOSIS — M54.16 LUMBAR RADICULOPATHY: ICD-10-CM

## 2017-03-14 PROCEDURE — 6360000002 HC RX W HCPCS: Performed by: PHYSICAL MEDICINE & REHABILITATION

## 2017-03-14 PROCEDURE — 62323 NJX INTERLAMINAR LMBR/SAC: CPT | Performed by: PHYSICAL MEDICINE & REHABILITATION

## 2017-03-14 PROCEDURE — 2500000003 HC RX 250 WO HCPCS: Performed by: PHYSICAL MEDICINE & REHABILITATION

## 2017-03-14 RX ORDER — LIDOCAINE HYDROCHLORIDE 5 MG/ML
50 INJECTION, SOLUTION INFILTRATION; INTRAVENOUS ONCE
Status: COMPLETED | OUTPATIENT
Start: 2017-03-14 | End: 2017-03-14

## 2017-03-14 RX ORDER — METHYLPREDNISOLONE ACETATE 40 MG/ML
40 INJECTION, SUSPENSION INTRA-ARTICULAR; INTRALESIONAL; INTRAMUSCULAR; SOFT TISSUE ONCE
Status: COMPLETED | OUTPATIENT
Start: 2017-03-14 | End: 2017-03-14

## 2017-03-14 RX ADMIN — LIDOCAINE HYDROCHLORIDE 3 ML: 5 INJECTION, SOLUTION INFILTRATION; INTRAVENOUS at 11:42

## 2017-03-14 RX ADMIN — METHYLPREDNISOLONE ACETATE 40 MG: 40 INJECTION, SUSPENSION INTRA-ARTICULAR; INTRALESIONAL; INTRAMUSCULAR; SOFT TISSUE at 11:45

## 2017-03-15 DIAGNOSIS — G62.9 SENSORY NEUROPATHY: ICD-10-CM

## 2017-03-15 DIAGNOSIS — E53.8 NEUROMYELOPATHY DUE TO VITAMIN B12 DEFICIENCY (HCC): ICD-10-CM

## 2017-03-15 DIAGNOSIS — G89.4 CHRONIC PAIN SYNDROME: ICD-10-CM

## 2017-03-15 DIAGNOSIS — Z78.9 IMPAIRED MOBILITY AND ACTIVITIES OF DAILY LIVING: ICD-10-CM

## 2017-03-15 DIAGNOSIS — M79.7 FIBROMYALGIA MUSCLE PAIN: ICD-10-CM

## 2017-03-15 DIAGNOSIS — Z74.09 IMPAIRED MOBILITY AND ACTIVITIES OF DAILY LIVING: ICD-10-CM

## 2017-03-15 DIAGNOSIS — G32.0 NEUROMYELOPATHY DUE TO VITAMIN B12 DEFICIENCY (HCC): ICD-10-CM

## 2017-03-15 DIAGNOSIS — M54.17 LUMBOSACRAL RADICULOPATHY AT S1: ICD-10-CM

## 2017-03-15 DIAGNOSIS — F31.9 BIPOLAR 1 DISORDER (HCC): ICD-10-CM

## 2017-03-15 RX ORDER — TRAMADOL HYDROCHLORIDE 50 MG/1
TABLET ORAL
Qty: 90 TABLET | Refills: 0 | OUTPATIENT
Start: 2017-03-15 | End: 2017-06-28

## 2017-03-21 ENCOUNTER — HOSPITAL ENCOUNTER (OUTPATIENT)
Dept: INTERVENTIONAL RADIOLOGY/VASCULAR | Age: 47
Discharge: HOME | End: 2017-03-21

## 2017-03-21 RX ORDER — METHYLPREDNISOLONE ACETATE 40 MG/ML
40 INJECTION, SUSPENSION INTRA-ARTICULAR; INTRALESIONAL; INTRAMUSCULAR; SOFT TISSUE ONCE
Status: DISCONTINUED | OUTPATIENT
Start: 2017-03-21 | End: 2017-03-24 | Stop reason: HOSPADM

## 2017-03-21 RX ORDER — LIDOCAINE HYDROCHLORIDE 5 MG/ML
50 INJECTION, SOLUTION INFILTRATION; INTRAVENOUS ONCE
Status: DISCONTINUED | OUTPATIENT
Start: 2017-03-21 | End: 2017-03-24 | Stop reason: HOSPADM

## 2017-04-12 PROBLEM — Z79.899 HIGH RISK MEDICATION USE: Status: ACTIVE | Noted: 2017-04-12

## 2017-04-26 ENCOUNTER — OFFICE VISIT (OUTPATIENT)
Dept: PHYSICAL MEDICINE AND REHAB | Age: 47
End: 2017-04-26

## 2017-04-26 VITALS
SYSTOLIC BLOOD PRESSURE: 130 MMHG | WEIGHT: 180 LBS | DIASTOLIC BLOOD PRESSURE: 88 MMHG | HEIGHT: 67 IN | BODY MASS INDEX: 28.25 KG/M2

## 2017-04-26 DIAGNOSIS — Z74.09 IMPAIRED MOBILITY AND ACTIVITIES OF DAILY LIVING: ICD-10-CM

## 2017-04-26 DIAGNOSIS — M54.17 LUMBOSACRAL RADICULOPATHY AT S1: ICD-10-CM

## 2017-04-26 DIAGNOSIS — Z78.9 IMPAIRED MOBILITY AND ACTIVITIES OF DAILY LIVING: ICD-10-CM

## 2017-04-26 DIAGNOSIS — G89.4 CHRONIC PAIN SYNDROME: ICD-10-CM

## 2017-04-26 DIAGNOSIS — G32.0 NEUROMYELOPATHY DUE TO VITAMIN B12 DEFICIENCY (HCC): ICD-10-CM

## 2017-04-26 DIAGNOSIS — F31.9 BIPOLAR 1 DISORDER (HCC): ICD-10-CM

## 2017-04-26 DIAGNOSIS — E53.8 NEUROMYELOPATHY DUE TO VITAMIN B12 DEFICIENCY (HCC): ICD-10-CM

## 2017-04-26 DIAGNOSIS — G62.9 SENSORY NEUROPATHY: ICD-10-CM

## 2017-04-26 DIAGNOSIS — M79.7 FIBROMYALGIA MUSCLE PAIN: ICD-10-CM

## 2017-04-26 PROCEDURE — 99214 OFFICE O/P EST MOD 30 MIN: CPT | Performed by: PHYSICAL MEDICINE & REHABILITATION

## 2017-04-26 RX ORDER — TRAMADOL HYDROCHLORIDE 50 MG/1
TABLET ORAL
Qty: 90 TABLET | Refills: 0 | Status: CANCELLED | OUTPATIENT
Start: 2017-04-26

## 2017-04-26 RX ORDER — GABAPENTIN 800 MG/1
800 TABLET ORAL 3 TIMES DAILY
Qty: 90 TABLET | Refills: 3 | Status: SHIPPED | OUTPATIENT
Start: 2017-04-26 | End: 2017-10-31 | Stop reason: SINTOL

## 2017-04-26 RX ORDER — MELOXICAM 7.5 MG/1
7.5 TABLET ORAL DAILY
Qty: 30 TABLET | Refills: 3 | Status: SHIPPED | OUTPATIENT
Start: 2017-04-26 | End: 2017-10-31

## 2017-04-26 ASSESSMENT — ENCOUNTER SYMPTOMS
CHOKING: 0
EYE DISCHARGE: 0
VOMITING: 1
BLOOD IN STOOL: 0
CONSTIPATION: 1
EYE PAIN: 0
RECTAL PAIN: 1
ABDOMINAL PAIN: 0
ANAL BLEEDING: 0
CHANGE IN BOWEL HABIT: 0
EYE REDNESS: 0
NAUSEA: 1
WHEEZING: 0
DIARRHEA: 1
COLOR CHANGE: 1
SORE THROAT: 0
RHINORRHEA: 0
SINUS PRESSURE: 0
PHOTOPHOBIA: 1
FACIAL SWELLING: 0
BOWEL INCONTINENCE: 0
APNEA: 0
ABDOMINAL DISTENTION: 0
VOICE CHANGE: 0
COUGH: 1
CHEST TIGHTNESS: 0
STRIDOR: 0
TROUBLE SWALLOWING: 0
EYE ITCHING: 1
SHORTNESS OF BREATH: 0
BACK PAIN: 1

## 2017-04-30 ENCOUNTER — HOSPITAL ENCOUNTER (EMERGENCY)
Age: 47
Discharge: HOME | End: 2017-04-30
Payer: COMMERCIAL

## 2017-04-30 ENCOUNTER — APPOINTMENT (OUTPATIENT)
Dept: GENERAL RADIOLOGY | Age: 47
End: 2017-04-30
Payer: COMMERCIAL

## 2017-04-30 VITALS
SYSTOLIC BLOOD PRESSURE: 161 MMHG | HEIGHT: 67 IN | HEART RATE: 95 BPM | OXYGEN SATURATION: 99 % | DIASTOLIC BLOOD PRESSURE: 72 MMHG | RESPIRATION RATE: 18 BRPM | BODY MASS INDEX: 28.25 KG/M2 | TEMPERATURE: 98.2 F | WEIGHT: 180 LBS

## 2017-04-30 DIAGNOSIS — S93.422A SPRAIN OF DELTOID LIGAMENT OF LEFT ANKLE, INITIAL ENCOUNTER: Primary | ICD-10-CM

## 2017-04-30 PROCEDURE — 73610 X-RAY EXAM OF ANKLE: CPT

## 2017-04-30 PROCEDURE — 6360000002 HC RX W HCPCS: Performed by: PHYSICIAN ASSISTANT

## 2017-04-30 PROCEDURE — 99283 EMERGENCY DEPT VISIT LOW MDM: CPT

## 2017-04-30 PROCEDURE — 96372 THER/PROPH/DIAG INJ SC/IM: CPT

## 2017-04-30 RX ORDER — KETOROLAC TROMETHAMINE 30 MG/ML
30 INJECTION, SOLUTION INTRAMUSCULAR; INTRAVENOUS ONCE
Status: COMPLETED | OUTPATIENT
Start: 2017-04-30 | End: 2017-04-30

## 2017-04-30 RX ADMIN — KETOROLAC TROMETHAMINE 30 MG: 30 INJECTION, SOLUTION INTRAMUSCULAR; INTRAVENOUS at 21:34

## 2017-04-30 ASSESSMENT — PAIN DESCRIPTION - DESCRIPTORS: DESCRIPTORS: BURNING;THROBBING;SHARP

## 2017-04-30 ASSESSMENT — PAIN DESCRIPTION - FREQUENCY: FREQUENCY: CONTINUOUS

## 2017-04-30 ASSESSMENT — PAIN SCALES - GENERAL
PAINLEVEL_OUTOF10: 9
PAINLEVEL_OUTOF10: 9

## 2017-04-30 ASSESSMENT — ENCOUNTER SYMPTOMS
SHORTNESS OF BREATH: 0
APNEA: 0
ABDOMINAL PAIN: 0
TROUBLE SWALLOWING: 0
EYE PAIN: 0
ALLERGIC/IMMUNOLOGIC NEGATIVE: 1
COLOR CHANGE: 0

## 2017-04-30 ASSESSMENT — PAIN DESCRIPTION - ORIENTATION: ORIENTATION: LEFT

## 2017-04-30 ASSESSMENT — PAIN DESCRIPTION - LOCATION: LOCATION: ANKLE

## 2017-04-30 ASSESSMENT — PAIN DESCRIPTION - PAIN TYPE: TYPE: ACUTE PAIN

## 2017-05-02 ENCOUNTER — HOSPITAL ENCOUNTER (OUTPATIENT)
Dept: INTERVENTIONAL RADIOLOGY/VASCULAR | Age: 47
Discharge: HOME | End: 2017-05-02

## 2017-05-02 RX ORDER — METHYLPREDNISOLONE ACETATE 40 MG/ML
40 INJECTION, SUSPENSION INTRA-ARTICULAR; INTRALESIONAL; INTRAMUSCULAR; SOFT TISSUE ONCE
Status: DISCONTINUED | OUTPATIENT
Start: 2017-05-02 | End: 2017-05-05 | Stop reason: HOSPADM

## 2017-05-02 RX ORDER — LIDOCAINE HYDROCHLORIDE 5 MG/ML
50 INJECTION, SOLUTION INFILTRATION; INTRAVENOUS ONCE
Status: DISCONTINUED | OUTPATIENT
Start: 2017-05-02 | End: 2017-05-05 | Stop reason: HOSPADM

## 2017-06-18 ENCOUNTER — APPOINTMENT (OUTPATIENT)
Dept: GENERAL RADIOLOGY | Age: 47
End: 2017-06-18

## 2017-06-18 ENCOUNTER — HOSPITAL ENCOUNTER (OUTPATIENT)
Age: 47
Setting detail: OBSERVATION
Discharge: HOME | End: 2017-06-19
Attending: INTERNAL MEDICINE | Admitting: INTERNAL MEDICINE

## 2017-06-18 ENCOUNTER — APPOINTMENT (OUTPATIENT)
Dept: CT IMAGING | Age: 47
End: 2017-06-18

## 2017-06-18 DIAGNOSIS — G35 MULTIPLE SCLEROSIS (HCC): ICD-10-CM

## 2017-06-18 DIAGNOSIS — R07.9 CHEST PAIN, UNSPECIFIED TYPE: Primary | ICD-10-CM

## 2017-06-18 DIAGNOSIS — I10 ESSENTIAL HYPERTENSION: ICD-10-CM

## 2017-06-18 DIAGNOSIS — E87.6 HYPOKALEMIA: ICD-10-CM

## 2017-06-18 LAB
ALBUMIN SERPL-MCNC: 4.3 G/DL (ref 3.9–4.9)
ALP BLD-CCNC: 119 U/L (ref 40–130)
ALT SERPL-CCNC: 20 U/L (ref 0–33)
ANION GAP SERPL CALCULATED.3IONS-SCNC: 15 MEQ/L (ref 7–13)
AST SERPL-CCNC: 18 U/L (ref 0–35)
BASOPHILS ABSOLUTE: 0 K/UL (ref 0–0.2)
BASOPHILS RELATIVE PERCENT: 0.4 %
BILIRUB SERPL-MCNC: 0.4 MG/DL (ref 0–1.2)
BILIRUBIN URINE: ABNORMAL
BLOOD, URINE: NEGATIVE
BUN BLDV-MCNC: 7 MG/DL (ref 6–20)
CALCIUM SERPL-MCNC: 9.3 MG/DL (ref 8.6–10.2)
CHLORIDE BLD-SCNC: 100 MEQ/L (ref 98–107)
CLARITY: ABNORMAL
CO2: 23 MEQ/L (ref 22–29)
COLOR: ABNORMAL
CREAT SERPL-MCNC: 0.88 MG/DL (ref 0.5–0.9)
EOSINOPHILS ABSOLUTE: 0.1 K/UL (ref 0–0.7)
EOSINOPHILS RELATIVE PERCENT: 0.8 %
EPITHELIAL CELLS, UA: NORMAL /HPF
GFR AFRICAN AMERICAN: >60
GFR NON-AFRICAN AMERICAN: >60
GLOBULIN: 2.2 G/DL (ref 2.3–3.5)
GLUCOSE BLD-MCNC: 113 MG/DL (ref 74–109)
GLUCOSE URINE: NEGATIVE MG/DL
HCT VFR BLD CALC: 43.2 % (ref 37–47)
HEMOGLOBIN: 14.7 G/DL (ref 12–16)
KETONES, URINE: NEGATIVE MG/DL
LACTIC ACID: 1 MMOL/L (ref 0.5–2.2)
LEUKOCYTE ESTERASE, URINE: ABNORMAL
LYMPHOCYTES ABSOLUTE: 2 K/UL (ref 1–4.8)
LYMPHOCYTES RELATIVE PERCENT: 19.6 %
MCH RBC QN AUTO: 31.4 PG (ref 27–31.3)
MCHC RBC AUTO-ENTMCNC: 34.2 % (ref 33–37)
MCV RBC AUTO: 91.9 FL (ref 82–100)
MONOCYTES ABSOLUTE: 0.6 K/UL (ref 0.2–0.8)
MONOCYTES RELATIVE PERCENT: 6.3 %
MUCUS: PRESENT
NEUTROPHILS ABSOLUTE: 7.5 K/UL (ref 1.4–6.5)
NEUTROPHILS RELATIVE PERCENT: 72.9 %
NITRITE, URINE: NEGATIVE
PDW BLD-RTO: 12.9 % (ref 11.5–14.5)
PH UA: 5.5 (ref 5–9)
PLATELET # BLD: 159 K/UL (ref 130–400)
POTASSIUM SERPL-SCNC: 2.7 MEQ/L (ref 3.5–5.1)
POTASSIUM SERPL-SCNC: 3.1 MEQ/L (ref 3.5–5.1)
PRO-BNP: 148 PG/ML
PROTEIN UA: 30 MG/DL
RBC # BLD: 4.69 M/UL (ref 4.2–5.4)
RBC UA: NORMAL /HPF (ref 0–2)
SODIUM BLD-SCNC: 138 MEQ/L (ref 132–144)
SPECIFIC GRAVITY UA: 1.03 (ref 1–1.03)
TOTAL PROTEIN: 6.5 G/DL (ref 6.4–8.1)
TROPONIN: <0.01 NG/ML (ref 0–0.01)
UROBILINOGEN, URINE: 1 E.U./DL
WBC # BLD: 10.3 K/UL (ref 4.8–10.8)
WBC UA: NORMAL /HPF (ref 0–5)

## 2017-06-18 PROCEDURE — 6360000002 HC RX W HCPCS: Performed by: INTERNAL MEDICINE

## 2017-06-18 PROCEDURE — 6370000000 HC RX 637 (ALT 250 FOR IP): Performed by: PHYSICIAN ASSISTANT

## 2017-06-18 PROCEDURE — 71010 XR CHEST PORTABLE: CPT

## 2017-06-18 PROCEDURE — 83605 ASSAY OF LACTIC ACID: CPT

## 2017-06-18 PROCEDURE — 70450 CT HEAD/BRAIN W/O DYE: CPT

## 2017-06-18 PROCEDURE — 96366 THER/PROPH/DIAG IV INF ADDON: CPT

## 2017-06-18 PROCEDURE — 96365 THER/PROPH/DIAG IV INF INIT: CPT

## 2017-06-18 PROCEDURE — 6360000002 HC RX W HCPCS: Performed by: PHYSICIAN ASSISTANT

## 2017-06-18 PROCEDURE — 94664 DEMO&/EVAL PT USE INHALER: CPT

## 2017-06-18 PROCEDURE — 99285 EMERGENCY DEPT VISIT HI MDM: CPT

## 2017-06-18 PROCEDURE — 2580000003 HC RX 258: Performed by: EMERGENCY MEDICINE

## 2017-06-18 PROCEDURE — G0378 HOSPITAL OBSERVATION PER HR: HCPCS

## 2017-06-18 PROCEDURE — 96372 THER/PROPH/DIAG INJ SC/IM: CPT

## 2017-06-18 PROCEDURE — 83880 ASSAY OF NATRIURETIC PEPTIDE: CPT

## 2017-06-18 PROCEDURE — 93005 ELECTROCARDIOGRAM TRACING: CPT

## 2017-06-18 PROCEDURE — 74177 CT ABD & PELVIS W/CONTRAST: CPT

## 2017-06-18 PROCEDURE — 2580000003 HC RX 258: Performed by: INTERNAL MEDICINE

## 2017-06-18 PROCEDURE — 82105 ALPHA-FETOPROTEIN SERUM: CPT

## 2017-06-18 PROCEDURE — 96376 TX/PRO/DX INJ SAME DRUG ADON: CPT

## 2017-06-18 PROCEDURE — 81001 URINALYSIS AUTO W/SCOPE: CPT

## 2017-06-18 PROCEDURE — 84132 ASSAY OF SERUM POTASSIUM: CPT

## 2017-06-18 PROCEDURE — 96375 TX/PRO/DX INJ NEW DRUG ADDON: CPT

## 2017-06-18 PROCEDURE — 84484 ASSAY OF TROPONIN QUANT: CPT

## 2017-06-18 PROCEDURE — 85025 COMPLETE CBC W/AUTO DIFF WBC: CPT

## 2017-06-18 PROCEDURE — 2580000003 HC RX 258

## 2017-06-18 PROCEDURE — 80053 COMPREHEN METABOLIC PANEL: CPT

## 2017-06-18 PROCEDURE — 6370000000 HC RX 637 (ALT 250 FOR IP): Performed by: INTERNAL MEDICINE

## 2017-06-18 PROCEDURE — 6360000004 HC RX CONTRAST MEDICATION: Performed by: RADIOLOGY

## 2017-06-18 PROCEDURE — 36415 COLL VENOUS BLD VENIPUNCTURE: CPT

## 2017-06-18 RX ORDER — SODIUM CHLORIDE 9 MG/ML
INJECTION, SOLUTION INTRAVENOUS CONTINUOUS
Status: DISCONTINUED | OUTPATIENT
Start: 2017-06-18 | End: 2017-06-19 | Stop reason: HOSPADM

## 2017-06-18 RX ORDER — SODIUM CHLORIDE 9 MG/ML
INJECTION, SOLUTION INTRAVENOUS
Status: COMPLETED
Start: 2017-06-18 | End: 2017-06-18

## 2017-06-18 RX ORDER — 0.9 % SODIUM CHLORIDE 0.9 %
500 INTRAVENOUS SOLUTION INTRAVENOUS ONCE
Status: COMPLETED | OUTPATIENT
Start: 2017-06-18 | End: 2017-06-18

## 2017-06-18 RX ORDER — ONDANSETRON 2 MG/ML
4 INJECTION INTRAMUSCULAR; INTRAVENOUS ONCE
Status: COMPLETED | OUTPATIENT
Start: 2017-06-18 | End: 2017-06-18

## 2017-06-18 RX ORDER — ASPIRIN 81 MG/1
324 TABLET, CHEWABLE ORAL ONCE
Status: COMPLETED | OUTPATIENT
Start: 2017-06-18 | End: 2017-06-18

## 2017-06-18 RX ORDER — POTASSIUM CHLORIDE 7.45 MG/ML
10 INJECTION INTRAVENOUS
Status: DISCONTINUED | OUTPATIENT
Start: 2017-06-18 | End: 2017-06-18 | Stop reason: SDUPTHER

## 2017-06-18 RX ORDER — KETOROLAC TROMETHAMINE 30 MG/ML
30 INJECTION, SOLUTION INTRAMUSCULAR; INTRAVENOUS ONCE
Status: COMPLETED | OUTPATIENT
Start: 2017-06-18 | End: 2017-06-18

## 2017-06-18 RX ORDER — POTASSIUM CHLORIDE 7.45 MG/ML
10 INJECTION INTRAVENOUS ONCE
Status: COMPLETED | OUTPATIENT
Start: 2017-06-18 | End: 2017-06-18

## 2017-06-18 RX ORDER — ASPIRIN 81 MG/1
324 TABLET, CHEWABLE ORAL DAILY
Status: DISCONTINUED | OUTPATIENT
Start: 2017-06-18 | End: 2017-06-19

## 2017-06-18 RX ORDER — NICOTINE 21 MG/24HR
1 PATCH, TRANSDERMAL 24 HOURS TRANSDERMAL DAILY
Status: DISCONTINUED | OUTPATIENT
Start: 2017-06-18 | End: 2017-06-19 | Stop reason: HOSPADM

## 2017-06-18 RX ORDER — POTASSIUM CHLORIDE 20 MEQ/1
20 TABLET, EXTENDED RELEASE ORAL ONCE
Status: DISCONTINUED | OUTPATIENT
Start: 2017-06-18 | End: 2017-06-18

## 2017-06-18 RX ORDER — ATORVASTATIN CALCIUM 40 MG/1
40 TABLET, FILM COATED ORAL NIGHTLY
Status: DISCONTINUED | OUTPATIENT
Start: 2017-06-18 | End: 2017-06-19 | Stop reason: HOSPADM

## 2017-06-18 RX ORDER — MORPHINE SULFATE 4 MG/ML
4 INJECTION, SOLUTION INTRAMUSCULAR; INTRAVENOUS EVERY 4 HOURS PRN
Status: DISCONTINUED | OUTPATIENT
Start: 2017-06-18 | End: 2017-06-18

## 2017-06-18 RX ORDER — RANOLAZINE 500 MG/1
1000 TABLET, EXTENDED RELEASE ORAL 2 TIMES DAILY
Status: DISCONTINUED | OUTPATIENT
Start: 2017-06-18 | End: 2017-06-19

## 2017-06-18 RX ORDER — POTASSIUM CHLORIDE 7.45 MG/ML
10 INJECTION INTRAVENOUS
Status: COMPLETED | OUTPATIENT
Start: 2017-06-18 | End: 2017-06-19

## 2017-06-18 RX ORDER — NITROGLYCERIN 0.4 MG/1
0.4 TABLET SUBLINGUAL EVERY 5 MIN PRN
Status: DISCONTINUED | OUTPATIENT
Start: 2017-06-18 | End: 2017-06-19 | Stop reason: HOSPADM

## 2017-06-18 RX ORDER — POTASSIUM CHLORIDE 7.45 MG/ML
40 INJECTION INTRAVENOUS ONCE
Status: DISCONTINUED | OUTPATIENT
Start: 2017-06-18 | End: 2017-06-18

## 2017-06-18 RX ORDER — ACETAMINOPHEN 500 MG
1000 TABLET ORAL EVERY 8 HOURS PRN
Status: DISCONTINUED | OUTPATIENT
Start: 2017-06-18 | End: 2017-06-19 | Stop reason: HOSPADM

## 2017-06-18 RX ORDER — IPRATROPIUM BROMIDE AND ALBUTEROL SULFATE 2.5; .5 MG/3ML; MG/3ML
1 SOLUTION RESPIRATORY (INHALATION) EVERY 4 HOURS PRN
Status: DISCONTINUED | OUTPATIENT
Start: 2017-06-18 | End: 2017-06-19 | Stop reason: HOSPADM

## 2017-06-18 RX ORDER — NITROGLYCERIN 0.4 MG/1
0.4 TABLET SUBLINGUAL EVERY 5 MIN PRN
Status: DISCONTINUED | OUTPATIENT
Start: 2017-06-18 | End: 2017-06-18

## 2017-06-18 RX ORDER — METOPROLOL TARTRATE 50 MG/1
50 TABLET, FILM COATED ORAL 2 TIMES DAILY
Status: DISCONTINUED | OUTPATIENT
Start: 2017-06-18 | End: 2017-06-19 | Stop reason: HOSPADM

## 2017-06-18 RX ORDER — ONDANSETRON 2 MG/ML
4 INJECTION INTRAMUSCULAR; INTRAVENOUS EVERY 6 HOURS PRN
Status: DISCONTINUED | OUTPATIENT
Start: 2017-06-18 | End: 2017-06-19 | Stop reason: HOSPADM

## 2017-06-18 RX ORDER — MORPHINE SULFATE 2 MG/ML
2 INJECTION, SOLUTION INTRAMUSCULAR; INTRAVENOUS EVERY 4 HOURS PRN
Status: DISCONTINUED | OUTPATIENT
Start: 2017-06-18 | End: 2017-06-19 | Stop reason: HOSPADM

## 2017-06-18 RX ORDER — POTASSIUM CHLORIDE 7.45 MG/ML
10 INJECTION INTRAVENOUS
Status: DISCONTINUED | OUTPATIENT
Start: 2017-06-18 | End: 2017-06-18 | Stop reason: RX

## 2017-06-18 RX ADMIN — MORPHINE SULFATE 2 MG: 2 INJECTION, SOLUTION INTRAMUSCULAR; INTRAVENOUS at 20:59

## 2017-06-18 RX ADMIN — ASPIRIN 81 MG 324 MG: 81 TABLET ORAL at 11:36

## 2017-06-18 RX ADMIN — ASPIRIN 81 MG 324 MG: 81 TABLET ORAL at 03:55

## 2017-06-18 RX ADMIN — ENOXAPARIN SODIUM 40 MG: 40 INJECTION, SOLUTION INTRAVENOUS; SUBCUTANEOUS at 11:38

## 2017-06-18 RX ADMIN — NITROGLYCERIN 0.4 MG: 0.4 TABLET, ORALLY DISINTEGRATING SUBLINGUAL at 04:07

## 2017-06-18 RX ADMIN — IOPAMIDOL 100 ML: 755 INJECTION, SOLUTION INTRAVENOUS at 07:53

## 2017-06-18 RX ADMIN — KETOROLAC TROMETHAMINE 30 MG: 30 INJECTION INTRAMUSCULAR; INTRAVENOUS at 05:32

## 2017-06-18 RX ADMIN — POTASSIUM CHLORIDE 10 MEQ: 7.46 INJECTION, SOLUTION INTRAVENOUS at 23:25

## 2017-06-18 RX ADMIN — MORPHINE SULFATE 4 MG: 4 INJECTION, SOLUTION INTRAMUSCULAR; INTRAVENOUS at 15:47

## 2017-06-18 RX ADMIN — POTASSIUM CHLORIDE 10 MEQ: 7.46 INJECTION, SOLUTION INTRAVENOUS at 21:04

## 2017-06-18 RX ADMIN — POTASSIUM CHLORIDE 10 MEQ: 7.46 INJECTION, SOLUTION INTRAVENOUS at 15:03

## 2017-06-18 RX ADMIN — METOPROLOL TARTRATE 50 MG: 50 TABLET ORAL at 21:00

## 2017-06-18 RX ADMIN — POTASSIUM CHLORIDE 10 MEQ: 7.46 INJECTION, SOLUTION INTRAVENOUS at 18:27

## 2017-06-18 RX ADMIN — RANOLAZINE 1000 MG: 500 TABLET, FILM COATED, EXTENDED RELEASE ORAL at 21:13

## 2017-06-18 RX ADMIN — SODIUM CHLORIDE: 9 INJECTION, SOLUTION INTRAVENOUS at 18:32

## 2017-06-18 RX ADMIN — POTASSIUM CHLORIDE 10 MEQ: 7.46 INJECTION, SOLUTION INTRAVENOUS at 05:42

## 2017-06-18 RX ADMIN — ATORVASTATIN CALCIUM 40 MG: 40 TABLET, FILM COATED ORAL at 21:00

## 2017-06-18 RX ADMIN — POTASSIUM CHLORIDE 10 MEQ: 7.46 INJECTION, SOLUTION INTRAVENOUS at 16:03

## 2017-06-18 RX ADMIN — SODIUM CHLORIDE 500 ML: 9 INJECTION, SOLUTION INTRAVENOUS at 06:00

## 2017-06-18 RX ADMIN — RANOLAZINE 1000 MG: 500 TABLET, FILM COATED, EXTENDED RELEASE ORAL at 11:37

## 2017-06-18 RX ADMIN — MORPHINE SULFATE 4 MG: 4 INJECTION, SOLUTION INTRAMUSCULAR; INTRAVENOUS at 11:38

## 2017-06-18 RX ADMIN — POTASSIUM CHLORIDE 10 MEQ: 7.46 INJECTION, SOLUTION INTRAVENOUS at 22:19

## 2017-06-18 RX ADMIN — POTASSIUM CHLORIDE 10 MEQ: 7.46 INJECTION, SOLUTION INTRAVENOUS at 17:02

## 2017-06-18 RX ADMIN — METOPROLOL TARTRATE 50 MG: 50 TABLET ORAL at 11:36

## 2017-06-18 RX ADMIN — NITROGLYCERIN 0.4 MG: 0.4 TABLET, ORALLY DISINTEGRATING SUBLINGUAL at 03:55

## 2017-06-18 RX ADMIN — SODIUM CHLORIDE: 9 INJECTION, SOLUTION INTRAVENOUS at 08:42

## 2017-06-18 RX ADMIN — ONDANSETRON 4 MG: 2 INJECTION INTRAMUSCULAR; INTRAVENOUS at 03:55

## 2017-06-18 ASSESSMENT — ENCOUNTER SYMPTOMS
DIARRHEA: 0
HEARTBURN: 0
ABDOMINAL PAIN: 0
ORTHOPNEA: 0
COUGH: 1
ABDOMINAL PAIN: 1
BLOOD IN STOOL: 0
SHORTNESS OF BREATH: 0
NAUSEA: 1
VOMITING: 0
DIARRHEA: 1
EYES NEGATIVE: 1
RESPIRATORY NEGATIVE: 1

## 2017-06-18 ASSESSMENT — PAIN DESCRIPTION - LOCATION
LOCATION: CHEST
LOCATION: HEAD
LOCATION: CHEST
LOCATION: CHEST;HEAD

## 2017-06-18 ASSESSMENT — PAIN DESCRIPTION - PAIN TYPE
TYPE: ACUTE PAIN

## 2017-06-18 ASSESSMENT — PAIN DESCRIPTION - ORIENTATION
ORIENTATION: LEFT;ANTERIOR;UPPER
ORIENTATION: LEFT;UPPER

## 2017-06-18 ASSESSMENT — PAIN DESCRIPTION - DIRECTION: RADIATING_TOWARDS: NONRADIATING

## 2017-06-18 ASSESSMENT — PAIN DESCRIPTION - FREQUENCY
FREQUENCY: CONTINUOUS
FREQUENCY: CONTINUOUS

## 2017-06-18 ASSESSMENT — PAIN SCALES - GENERAL
PAINLEVEL_OUTOF10: 9
PAINLEVEL_OUTOF10: 5
PAINLEVEL_OUTOF10: 9
PAINLEVEL_OUTOF10: 5
PAINLEVEL_OUTOF10: 8
PAINLEVEL_OUTOF10: 0
PAINLEVEL_OUTOF10: 9
PAINLEVEL_OUTOF10: 9
PAINLEVEL_OUTOF10: 7

## 2017-06-18 ASSESSMENT — PAIN DESCRIPTION - PROGRESSION
CLINICAL_PROGRESSION: NOT CHANGED
CLINICAL_PROGRESSION: NOT CHANGED

## 2017-06-18 ASSESSMENT — PAIN DESCRIPTION - DESCRIPTORS
DESCRIPTORS: SHARP
DESCRIPTORS: CONSTANT;PINS AND NEEDLES
DESCRIPTORS: SHARP;ACHING;PINS AND NEEDLES

## 2017-06-18 ASSESSMENT — PAIN DESCRIPTION - ONSET
ONSET: ON-GOING
ONSET: ON-GOING

## 2017-06-19 VITALS
TEMPERATURE: 98.2 F | OXYGEN SATURATION: 99 % | RESPIRATION RATE: 18 BRPM | SYSTOLIC BLOOD PRESSURE: 124 MMHG | WEIGHT: 171.08 LBS | BODY MASS INDEX: 26.85 KG/M2 | HEIGHT: 67 IN | HEART RATE: 60 BPM | DIASTOLIC BLOOD PRESSURE: 60 MMHG

## 2017-06-19 LAB
ALBUMIN SERPL-MCNC: 3.3 G/DL (ref 3.9–4.9)
ALP BLD-CCNC: 93 U/L (ref 40–130)
ALT SERPL-CCNC: 32 U/L (ref 0–33)
ANION GAP SERPL CALCULATED.3IONS-SCNC: 11 MEQ/L (ref 7–13)
AST SERPL-CCNC: 37 U/L (ref 0–35)
BILIRUB SERPL-MCNC: 0.2 MG/DL (ref 0–1.2)
BUN BLDV-MCNC: 10 MG/DL (ref 6–20)
CALCIUM SERPL-MCNC: 8.2 MG/DL (ref 8.6–10.2)
CHLORIDE BLD-SCNC: 111 MEQ/L (ref 98–107)
CO2: 20 MEQ/L (ref 22–29)
CREAT SERPL-MCNC: 0.69 MG/DL (ref 0.5–0.9)
GFR AFRICAN AMERICAN: >60
GFR NON-AFRICAN AMERICAN: >60
GLOBULIN: 1.7 G/DL (ref 2.3–3.5)
GLUCOSE BLD-MCNC: 93 MG/DL (ref 74–109)
HCT VFR BLD CALC: 37.9 % (ref 37–47)
HEMOGLOBIN: 12.8 G/DL (ref 12–16)
MCH RBC QN AUTO: 31.3 PG (ref 27–31.3)
MCHC RBC AUTO-ENTMCNC: 33.6 % (ref 33–37)
MCV RBC AUTO: 93.2 FL (ref 82–100)
PDW BLD-RTO: 13.3 % (ref 11.5–14.5)
PLATELET # BLD: 122 K/UL (ref 130–400)
POTASSIUM SERPL-SCNC: 3.7 MEQ/L (ref 3.5–5.1)
RBC # BLD: 4.07 M/UL (ref 4.2–5.4)
SODIUM BLD-SCNC: 142 MEQ/L (ref 132–144)
TOTAL PROTEIN: 5 G/DL (ref 6.4–8.1)
WBC # BLD: 5.8 K/UL (ref 4.8–10.8)

## 2017-06-19 PROCEDURE — 85027 COMPLETE CBC AUTOMATED: CPT

## 2017-06-19 PROCEDURE — 36415 COLL VENOUS BLD VENIPUNCTURE: CPT

## 2017-06-19 PROCEDURE — 6370000000 HC RX 637 (ALT 250 FOR IP): Performed by: NURSE PRACTITIONER

## 2017-06-19 PROCEDURE — 80053 COMPREHEN METABOLIC PANEL: CPT

## 2017-06-19 PROCEDURE — 96366 THER/PROPH/DIAG IV INF ADDON: CPT

## 2017-06-19 PROCEDURE — 96372 THER/PROPH/DIAG INJ SC/IM: CPT

## 2017-06-19 PROCEDURE — 99231 SBSQ HOSP IP/OBS SF/LOW 25: CPT | Performed by: INTERNAL MEDICINE

## 2017-06-19 PROCEDURE — 6360000002 HC RX W HCPCS: Performed by: INTERNAL MEDICINE

## 2017-06-19 PROCEDURE — 96376 TX/PRO/DX INJ SAME DRUG ADON: CPT

## 2017-06-19 PROCEDURE — G0378 HOSPITAL OBSERVATION PER HR: HCPCS

## 2017-06-19 PROCEDURE — 6370000000 HC RX 637 (ALT 250 FOR IP): Performed by: INTERNAL MEDICINE

## 2017-06-19 PROCEDURE — 2580000003 HC RX 258: Performed by: INTERNAL MEDICINE

## 2017-06-19 RX ORDER — METOPROLOL TARTRATE 50 MG/1
50 TABLET, FILM COATED ORAL 2 TIMES DAILY
Qty: 60 TABLET | Refills: 3 | Status: ON HOLD | OUTPATIENT
Start: 2017-06-19 | End: 2017-11-17 | Stop reason: HOSPADM

## 2017-06-19 RX ORDER — ASPIRIN 81 MG/1
81 TABLET, CHEWABLE ORAL DAILY
Status: DISCONTINUED | OUTPATIENT
Start: 2017-06-19 | End: 2017-06-19 | Stop reason: HOSPADM

## 2017-06-19 RX ORDER — ASPIRIN 81 MG/1
81 TABLET, CHEWABLE ORAL DAILY
Qty: 30 TABLET | Refills: 3 | Status: ON HOLD | OUTPATIENT
Start: 2017-06-19 | End: 2017-11-17 | Stop reason: HOSPADM

## 2017-06-19 RX ORDER — PANTOPRAZOLE SODIUM 40 MG/1
40 TABLET, DELAYED RELEASE ORAL
Status: DISCONTINUED | OUTPATIENT
Start: 2017-06-19 | End: 2017-06-19 | Stop reason: HOSPADM

## 2017-06-19 RX ORDER — ATORVASTATIN CALCIUM 40 MG/1
40 TABLET, FILM COATED ORAL NIGHTLY
Qty: 30 TABLET | Refills: 3 | Status: ON HOLD | OUTPATIENT
Start: 2017-06-19 | End: 2017-11-17 | Stop reason: HOSPADM

## 2017-06-19 RX ORDER — PANTOPRAZOLE SODIUM 40 MG/1
40 TABLET, DELAYED RELEASE ORAL
Qty: 30 TABLET | Refills: 3 | Status: SHIPPED | OUTPATIENT
Start: 2017-06-19 | End: 2018-12-18 | Stop reason: SDUPTHER

## 2017-06-19 RX ORDER — LISINOPRIL 40 MG/1
TABLET ORAL
Qty: 30 TABLET | Refills: 5 | Status: ON HOLD | OUTPATIENT
Start: 2017-06-19 | End: 2017-11-17 | Stop reason: HOSPADM

## 2017-06-19 RX ORDER — ISOSORBIDE MONONITRATE 30 MG/1
30 TABLET, EXTENDED RELEASE ORAL DAILY
Qty: 30 TABLET | Refills: 3 | Status: ON HOLD | OUTPATIENT
Start: 2017-06-19 | End: 2017-11-17 | Stop reason: HOSPADM

## 2017-06-19 RX ADMIN — ONDANSETRON 4 MG: 2 INJECTION INTRAMUSCULAR; INTRAVENOUS at 12:22

## 2017-06-19 RX ADMIN — MORPHINE SULFATE 2 MG: 2 INJECTION, SOLUTION INTRAMUSCULAR; INTRAVENOUS at 08:46

## 2017-06-19 RX ADMIN — ENOXAPARIN SODIUM 40 MG: 40 INJECTION, SOLUTION INTRAVENOUS; SUBCUTANEOUS at 08:46

## 2017-06-19 RX ADMIN — POTASSIUM CHLORIDE 10 MEQ: 7.46 INJECTION, SOLUTION INTRAVENOUS at 01:21

## 2017-06-19 RX ADMIN — SODIUM CHLORIDE: 9 INJECTION, SOLUTION INTRAVENOUS at 13:13

## 2017-06-19 RX ADMIN — Medication 30 ML: at 08:53

## 2017-06-19 RX ADMIN — MORPHINE SULFATE 2 MG: 2 INJECTION, SOLUTION INTRAMUSCULAR; INTRAVENOUS at 03:15

## 2017-06-19 RX ADMIN — PANTOPRAZOLE SODIUM 40 MG: 40 TABLET, DELAYED RELEASE ORAL at 08:46

## 2017-06-19 RX ADMIN — SODIUM CHLORIDE: 9 INJECTION, SOLUTION INTRAVENOUS at 03:12

## 2017-06-19 RX ADMIN — METOPROLOL TARTRATE 50 MG: 50 TABLET ORAL at 08:46

## 2017-06-19 RX ADMIN — ASPIRIN 81 MG 81 MG: 81 TABLET ORAL at 08:46

## 2017-06-19 ASSESSMENT — PAIN DESCRIPTION - LOCATION: LOCATION: GENERALIZED

## 2017-06-19 ASSESSMENT — PAIN SCALES - GENERAL
PAINLEVEL_OUTOF10: 8
PAINLEVEL_OUTOF10: 6
PAINLEVEL_OUTOF10: 2
PAINLEVEL_OUTOF10: 6

## 2017-06-19 ASSESSMENT — ENCOUNTER SYMPTOMS
CHEST TIGHTNESS: 0
STRIDOR: 0
COUGH: 0
NAUSEA: 1
DIARRHEA: 1
ABDOMINAL PAIN: 1
ALLERGIC/IMMUNOLOGIC NEGATIVE: 1
APNEA: 0
CHOKING: 0
EYES NEGATIVE: 1
SHORTNESS OF BREATH: 0
WHEEZING: 0

## 2017-06-19 ASSESSMENT — PAIN DESCRIPTION - PAIN TYPE: TYPE: ACUTE PAIN

## 2017-06-20 ENCOUNTER — TELEPHONE (OUTPATIENT)
Dept: FAMILY MEDICINE CLINIC | Age: 47
End: 2017-06-20

## 2017-06-20 LAB
AFP-TUMOR MARKER: 6 NG/ML (ref 0–9)
EKG ATRIAL RATE: 90 BPM
EKG P AXIS: 56 DEGREES
EKG P-R INTERVAL: 170 MS
EKG Q-T INTERVAL: 388 MS
EKG QRS DURATION: 96 MS
EKG QTC CALCULATION (BAZETT): 474 MS
EKG R AXIS: 49 DEGREES
EKG T AXIS: 16 DEGREES
EKG VENTRICULAR RATE: 90 BPM

## 2017-06-21 ENCOUNTER — TELEPHONE (OUTPATIENT)
Dept: FAMILY MEDICINE CLINIC | Age: 47
End: 2017-06-21

## 2017-06-22 DIAGNOSIS — G62.9 SENSORY NEUROPATHY: ICD-10-CM

## 2017-06-22 DIAGNOSIS — M79.7 FIBROMYALGIA MUSCLE PAIN: ICD-10-CM

## 2017-06-22 DIAGNOSIS — F31.9 BIPOLAR 1 DISORDER (HCC): ICD-10-CM

## 2017-06-22 DIAGNOSIS — E53.8 NEUROMYELOPATHY DUE TO VITAMIN B12 DEFICIENCY (HCC): ICD-10-CM

## 2017-06-22 DIAGNOSIS — M54.17 LUMBOSACRAL RADICULOPATHY AT S1: ICD-10-CM

## 2017-06-22 DIAGNOSIS — Z74.09 IMPAIRED MOBILITY AND ACTIVITIES OF DAILY LIVING: ICD-10-CM

## 2017-06-22 DIAGNOSIS — Z78.9 IMPAIRED MOBILITY AND ACTIVITIES OF DAILY LIVING: ICD-10-CM

## 2017-06-22 DIAGNOSIS — G89.4 CHRONIC PAIN SYNDROME: ICD-10-CM

## 2017-06-22 DIAGNOSIS — G32.0 NEUROMYELOPATHY DUE TO VITAMIN B12 DEFICIENCY (HCC): ICD-10-CM

## 2017-06-22 RX ORDER — TRAMADOL HYDROCHLORIDE 50 MG/1
TABLET ORAL
Qty: 90 TABLET | Refills: 0 | Status: SHIPPED | OUTPATIENT
Start: 2017-06-22 | End: 2017-06-28 | Stop reason: SDDI

## 2017-06-28 PROBLEM — Z91.14 NONCOMPLIANCE WITH MEDICATIONS: Status: ACTIVE | Noted: 2017-06-28

## 2017-06-28 PROBLEM — Z91.199 PATIENT NONCOMPLIANCE: Status: ACTIVE | Noted: 2017-06-28

## 2017-06-28 PROBLEM — Z91.148 NONCOMPLIANCE WITH MEDICATIONS: Status: ACTIVE | Noted: 2017-06-28

## 2017-06-29 ENCOUNTER — TELEPHONE (OUTPATIENT)
Dept: PHYSICAL MEDICINE AND REHAB | Age: 47
End: 2017-06-29

## 2017-07-11 ENCOUNTER — OFFICE VISIT (OUTPATIENT)
Dept: PHYSICAL MEDICINE AND REHAB | Age: 47
End: 2017-07-11

## 2017-07-11 VITALS
BODY MASS INDEX: 27.31 KG/M2 | WEIGHT: 174 LBS | DIASTOLIC BLOOD PRESSURE: 94 MMHG | SYSTOLIC BLOOD PRESSURE: 150 MMHG | HEIGHT: 67 IN

## 2017-07-11 DIAGNOSIS — Z74.09 IMPAIRED MOBILITY AND ACTIVITIES OF DAILY LIVING: ICD-10-CM

## 2017-07-11 DIAGNOSIS — G81.94 LEFT HEMIPARESIS (HCC): ICD-10-CM

## 2017-07-11 DIAGNOSIS — E53.8 NEUROMYELOPATHY DUE TO VITAMIN B12 DEFICIENCY (HCC): ICD-10-CM

## 2017-07-11 DIAGNOSIS — G32.0 NEUROMYELOPATHY DUE TO VITAMIN B12 DEFICIENCY (HCC): ICD-10-CM

## 2017-07-11 DIAGNOSIS — G89.4 CHRONIC PAIN SYNDROME: ICD-10-CM

## 2017-07-11 DIAGNOSIS — Z78.9 IMPAIRED MOBILITY AND ACTIVITIES OF DAILY LIVING: ICD-10-CM

## 2017-07-11 DIAGNOSIS — M54.17 LUMBOSACRAL RADICULOPATHY AT S1: Primary | ICD-10-CM

## 2017-07-11 DIAGNOSIS — G62.9 SENSORY NEUROPATHY: ICD-10-CM

## 2017-07-11 DIAGNOSIS — M79.7 FIBROMYALGIA MUSCLE PAIN: ICD-10-CM

## 2017-07-11 DIAGNOSIS — Z91.14 NONCOMPLIANCE WITH MEDICATIONS: ICD-10-CM

## 2017-07-11 DIAGNOSIS — F31.9 BIPOLAR 1 DISORDER (HCC): ICD-10-CM

## 2017-07-11 DIAGNOSIS — Z79.899 HIGH RISK MEDICATION USE: ICD-10-CM

## 2017-07-11 DIAGNOSIS — M51.36 DDD (DEGENERATIVE DISC DISEASE), LUMBAR: ICD-10-CM

## 2017-07-11 PROCEDURE — 99214 OFFICE O/P EST MOD 30 MIN: CPT | Performed by: PHYSICAL MEDICINE & REHABILITATION

## 2017-07-11 RX ORDER — TRAMADOL HYDROCHLORIDE 50 MG/1
TABLET ORAL
Qty: 90 TABLET | Refills: 0 | Status: SHIPPED | OUTPATIENT
Start: 2017-07-11 | End: 2017-08-21 | Stop reason: SDUPTHER

## 2017-07-11 ASSESSMENT — ENCOUNTER SYMPTOMS
DIARRHEA: 1
EYE PAIN: 0
NAUSEA: 1
SINUS PRESSURE: 0
BACK PAIN: 1
BOWEL INCONTINENCE: 0
STRIDOR: 0
CHOKING: 0
COUGH: 1
WHEEZING: 0
RHINORRHEA: 0
VOMITING: 0
ABDOMINAL PAIN: 0
VOICE CHANGE: 0
TROUBLE SWALLOWING: 0
CONSTIPATION: 0
APNEA: 0
EYE DISCHARGE: 0
CHANGE IN BOWEL HABIT: 0
CHEST TIGHTNESS: 0
FACIAL SWELLING: 0
EYE REDNESS: 0
COLOR CHANGE: 1
EYE ITCHING: 1
PHOTOPHOBIA: 1
ABDOMINAL DISTENTION: 0
SORE THROAT: 0
RECTAL PAIN: 1
SHORTNESS OF BREATH: 0

## 2017-07-18 ENCOUNTER — NURSE ONLY (OUTPATIENT)
Dept: PHYSICAL MEDICINE AND REHAB | Age: 47
End: 2017-07-18

## 2017-07-18 DIAGNOSIS — M54.5 CHRONIC RIGHT-SIDED LOW BACK PAIN, WITH SCIATICA PRESENCE UNSPECIFIED: Primary | ICD-10-CM

## 2017-07-18 DIAGNOSIS — G89.29 CHRONIC RIGHT-SIDED LOW BACK PAIN, WITH SCIATICA PRESENCE UNSPECIFIED: Primary | ICD-10-CM

## 2017-07-18 PROCEDURE — 96372 THER/PROPH/DIAG INJ SC/IM: CPT | Performed by: PHYSICAL MEDICINE & REHABILITATION

## 2017-07-18 RX ORDER — KETOROLAC TROMETHAMINE 30 MG/ML
30 INJECTION, SOLUTION INTRAMUSCULAR; INTRAVENOUS ONCE
Status: COMPLETED | OUTPATIENT
Start: 2017-07-18 | End: 2017-07-18

## 2017-07-18 RX ADMIN — KETOROLAC TROMETHAMINE 30 MG: 30 INJECTION, SOLUTION INTRAMUSCULAR; INTRAVENOUS at 14:33

## 2017-08-21 DIAGNOSIS — Z74.09 IMPAIRED MOBILITY AND ACTIVITIES OF DAILY LIVING: ICD-10-CM

## 2017-08-21 DIAGNOSIS — Z78.9 IMPAIRED MOBILITY AND ACTIVITIES OF DAILY LIVING: ICD-10-CM

## 2017-08-21 DIAGNOSIS — F31.9 BIPOLAR 1 DISORDER (HCC): ICD-10-CM

## 2017-08-21 DIAGNOSIS — G32.0 NEUROMYELOPATHY DUE TO VITAMIN B12 DEFICIENCY (HCC): ICD-10-CM

## 2017-08-21 DIAGNOSIS — M79.7 FIBROMYALGIA MUSCLE PAIN: ICD-10-CM

## 2017-08-21 DIAGNOSIS — G89.4 CHRONIC PAIN SYNDROME: ICD-10-CM

## 2017-08-21 DIAGNOSIS — G62.9 SENSORY NEUROPATHY: ICD-10-CM

## 2017-08-21 DIAGNOSIS — M54.17 LUMBOSACRAL RADICULOPATHY AT S1: ICD-10-CM

## 2017-08-21 DIAGNOSIS — E53.8 NEUROMYELOPATHY DUE TO VITAMIN B12 DEFICIENCY (HCC): ICD-10-CM

## 2017-08-22 RX ORDER — TRAMADOL HYDROCHLORIDE 50 MG/1
TABLET ORAL
Qty: 90 TABLET | Refills: 0 | Status: SHIPPED | OUTPATIENT
Start: 2017-08-22 | End: 2017-10-05 | Stop reason: SDUPTHER

## 2017-09-06 ENCOUNTER — APPOINTMENT (OUTPATIENT)
Dept: CT IMAGING | Age: 47
End: 2017-09-06
Payer: MEDICAID

## 2017-09-06 ENCOUNTER — APPOINTMENT (OUTPATIENT)
Dept: GENERAL RADIOLOGY | Age: 47
End: 2017-09-06
Payer: MEDICAID

## 2017-09-06 ENCOUNTER — HOSPITAL ENCOUNTER (EMERGENCY)
Age: 47
Discharge: HOME | End: 2017-09-06
Attending: EMERGENCY MEDICINE
Payer: MEDICAID

## 2017-09-06 ENCOUNTER — OFFICE VISIT (OUTPATIENT)
Dept: PHYSICAL MEDICINE AND REHAB | Age: 47
End: 2017-09-06

## 2017-09-06 VITALS
WEIGHT: 165 LBS | DIASTOLIC BLOOD PRESSURE: 96 MMHG | BODY MASS INDEX: 25.9 KG/M2 | SYSTOLIC BLOOD PRESSURE: 162 MMHG | HEIGHT: 67 IN

## 2017-09-06 VITALS
OXYGEN SATURATION: 97 % | RESPIRATION RATE: 18 BRPM | DIASTOLIC BLOOD PRESSURE: 93 MMHG | BODY MASS INDEX: 25.9 KG/M2 | TEMPERATURE: 97.6 F | SYSTOLIC BLOOD PRESSURE: 135 MMHG | HEIGHT: 67 IN | HEART RATE: 73 BPM | WEIGHT: 165 LBS

## 2017-09-06 DIAGNOSIS — G35 MULTIPLE SCLEROSIS (HCC): ICD-10-CM

## 2017-09-06 DIAGNOSIS — M54.81 BILATERAL OCCIPITAL NEURALGIA: ICD-10-CM

## 2017-09-06 DIAGNOSIS — Z91.199 PATIENT NONCOMPLIANCE: ICD-10-CM

## 2017-09-06 DIAGNOSIS — E53.8 VITAMIN B12 DEFICIENCY: ICD-10-CM

## 2017-09-06 DIAGNOSIS — M51.26 DISPLACEMENT OF LUMBAR INTERVERTEBRAL DISC WITHOUT MYELOPATHY: ICD-10-CM

## 2017-09-06 DIAGNOSIS — F31.9 BIPOLAR 1 DISORDER (HCC): ICD-10-CM

## 2017-09-06 DIAGNOSIS — Z72.0 CURRENT TOBACCO USE: ICD-10-CM

## 2017-09-06 DIAGNOSIS — G81.94 LEFT HEMIPARESIS (HCC): Primary | ICD-10-CM

## 2017-09-06 DIAGNOSIS — E66.3 OVER WEIGHT: ICD-10-CM

## 2017-09-06 DIAGNOSIS — G43.101 MIGRAINE WITH AURA AND WITH STATUS MIGRAINOSUS, NOT INTRACTABLE: Primary | ICD-10-CM

## 2017-09-06 DIAGNOSIS — M51.36 DDD (DEGENERATIVE DISC DISEASE), LUMBAR: ICD-10-CM

## 2017-09-06 DIAGNOSIS — Z79.899 HIGH RISK MEDICATION USE: ICD-10-CM

## 2017-09-06 LAB
ALBUMIN SERPL-MCNC: 4 G/DL (ref 3.9–4.9)
ALP BLD-CCNC: 112 U/L (ref 40–130)
ALT SERPL-CCNC: 20 U/L (ref 0–33)
AMORPHOUS: NORMAL
AMPHETAMINE SCREEN, URINE: NORMAL
ANION GAP SERPL CALCULATED.3IONS-SCNC: 13 MEQ/L (ref 7–13)
APTT: 24.8 SEC (ref 21.6–35.4)
AST SERPL-CCNC: 16 U/L (ref 0–35)
BACTERIA: NORMAL /HPF
BARBITURATE SCREEN URINE: NORMAL
BASOPHILS ABSOLUTE: 0 K/UL (ref 0–0.2)
BASOPHILS RELATIVE PERCENT: 0.6 %
BENZODIAZEPINE SCREEN, URINE: NORMAL
BILIRUB SERPL-MCNC: 0.2 MG/DL (ref 0–1.2)
BILIRUBIN URINE: NEGATIVE
BLOOD, URINE: NEGATIVE
BUN BLDV-MCNC: 5 MG/DL (ref 6–20)
CALCIUM SERPL-MCNC: 9 MG/DL (ref 8.6–10.2)
CANNABINOID SCREEN URINE: NORMAL
CHLORIDE BLD-SCNC: 105 MEQ/L (ref 98–107)
CLARITY: ABNORMAL
CO2: 25 MEQ/L (ref 22–29)
COCAINE METABOLITE SCREEN URINE: NORMAL
COLOR: YELLOW
CREAT SERPL-MCNC: 0.65 MG/DL (ref 0.5–0.9)
EOSINOPHILS ABSOLUTE: 0.1 K/UL (ref 0–0.7)
EOSINOPHILS RELATIVE PERCENT: 1.3 %
ETHANOL PERCENT: NORMAL G/DL
ETHANOL: <10 MG/DL (ref 0–0.08)
GFR AFRICAN AMERICAN: >60
GFR NON-AFRICAN AMERICAN: >60
GLOBULIN: 2.3 G/DL (ref 2.3–3.5)
GLUCOSE BLD-MCNC: 102 MG/DL (ref 74–109)
GLUCOSE URINE: NEGATIVE MG/DL
HCT VFR BLD CALC: 45.3 % (ref 37–47)
HEMOGLOBIN: 15.1 G/DL (ref 12–16)
INR BLD: 0.9
KETONES, URINE: NEGATIVE MG/DL
LEUKOCYTE ESTERASE, URINE: ABNORMAL
LYMPHOCYTES ABSOLUTE: 1.6 K/UL (ref 1–4.8)
LYMPHOCYTES RELATIVE PERCENT: 20.4 %
Lab: NORMAL
MCH RBC QN AUTO: 30.6 PG (ref 27–31.3)
MCHC RBC AUTO-ENTMCNC: 33.4 % (ref 33–37)
MCV RBC AUTO: 91.6 FL (ref 82–100)
MONOCYTES ABSOLUTE: 0.5 K/UL (ref 0.2–0.8)
MONOCYTES RELATIVE PERCENT: 6.5 %
NEUTROPHILS ABSOLUTE: 5.7 K/UL (ref 1.4–6.5)
NEUTROPHILS RELATIVE PERCENT: 71.2 %
NITRITE, URINE: NEGATIVE
OPIATE SCREEN URINE: NORMAL
PDW BLD-RTO: 13.7 % (ref 11.5–14.5)
PH UA: 7 (ref 5–9)
PHENCYCLIDINE SCREEN URINE: NORMAL
PLATELET # BLD: 138 K/UL (ref 130–400)
POTASSIUM SERPL-SCNC: 4 MEQ/L (ref 3.5–5.1)
PRO-BNP: 354 PG/ML
PROTEIN UA: NEGATIVE MG/DL
PROTHROMBIN TIME: 9.5 SEC (ref 8.1–13.7)
RBC # BLD: 4.94 M/UL (ref 4.2–5.4)
RBC UA: NORMAL /HPF (ref 0–2)
SODIUM BLD-SCNC: 143 MEQ/L (ref 132–144)
SPECIFIC GRAVITY UA: 1.01 (ref 1–1.03)
TOTAL PROTEIN: 6.3 G/DL (ref 6.4–8.1)
TROPONIN: <0.01 NG/ML (ref 0–0.01)
UROBILINOGEN, URINE: 0.2 E.U./DL
WBC # BLD: 8 K/UL (ref 4.8–10.8)
WBC UA: NORMAL /HPF (ref 0–5)

## 2017-09-06 PROCEDURE — G0480 DRUG TEST DEF 1-7 CLASSES: HCPCS

## 2017-09-06 PROCEDURE — 99284 EMERGENCY DEPT VISIT MOD MDM: CPT

## 2017-09-06 PROCEDURE — 84484 ASSAY OF TROPONIN QUANT: CPT

## 2017-09-06 PROCEDURE — 85730 THROMBOPLASTIN TIME PARTIAL: CPT

## 2017-09-06 PROCEDURE — 80053 COMPREHEN METABOLIC PANEL: CPT

## 2017-09-06 PROCEDURE — 96374 THER/PROPH/DIAG INJ IV PUSH: CPT

## 2017-09-06 PROCEDURE — 70450 CT HEAD/BRAIN W/O DYE: CPT

## 2017-09-06 PROCEDURE — 93005 ELECTROCARDIOGRAM TRACING: CPT

## 2017-09-06 PROCEDURE — 6360000002 HC RX W HCPCS: Performed by: EMERGENCY MEDICINE

## 2017-09-06 PROCEDURE — 83880 ASSAY OF NATRIURETIC PEPTIDE: CPT

## 2017-09-06 PROCEDURE — 71010 XR CHEST PORTABLE: CPT

## 2017-09-06 PROCEDURE — 99214 OFFICE O/P EST MOD 30 MIN: CPT | Performed by: PHYSICAL MEDICINE & REHABILITATION

## 2017-09-06 PROCEDURE — 85610 PROTHROMBIN TIME: CPT

## 2017-09-06 PROCEDURE — 85025 COMPLETE CBC W/AUTO DIFF WBC: CPT

## 2017-09-06 PROCEDURE — 81001 URINALYSIS AUTO W/SCOPE: CPT

## 2017-09-06 PROCEDURE — 36415 COLL VENOUS BLD VENIPUNCTURE: CPT

## 2017-09-06 PROCEDURE — 80307 DRUG TEST PRSMV CHEM ANLYZR: CPT

## 2017-09-06 RX ORDER — KETOROLAC TROMETHAMINE 30 MG/ML
30 INJECTION, SOLUTION INTRAMUSCULAR; INTRAVENOUS ONCE
Status: COMPLETED | OUTPATIENT
Start: 2017-09-06 | End: 2017-09-06

## 2017-09-06 RX ADMIN — PROCHLORPERAZINE EDISYLATE 10 MG: 5 INJECTION INTRAMUSCULAR; INTRAVENOUS at 17:15

## 2017-09-06 RX ADMIN — KETOROLAC TROMETHAMINE 30 MG: 30 INJECTION, SOLUTION INTRAMUSCULAR at 17:17

## 2017-09-06 ASSESSMENT — ENCOUNTER SYMPTOMS
APNEA: 0
CONSTIPATION: 0
SHORTNESS OF BREATH: 1
CHANGE IN BOWEL HABIT: 0
DIARRHEA: 0
WHEEZING: 0
COLOR CHANGE: 0
BOWEL INCONTINENCE: 0
COLOR CHANGE: 1
SHORTNESS OF BREATH: 0
FACIAL SWELLING: 0
EYE DISCHARGE: 0
EYE DISCHARGE: 0
EYE REDNESS: 0
ABDOMINAL PAIN: 0
RHINORRHEA: 0
ABDOMINAL DISTENTION: 0
COUGH: 1
EYE PAIN: 1
PHOTOPHOBIA: 1
SINUS PRESSURE: 0
FACIAL SWELLING: 0
PHOTOPHOBIA: 0
VOMITING: 0
NAUSEA: 0
VOICE CHANGE: 0
TROUBLE SWALLOWING: 0
BACK PAIN: 1
STRIDOR: 0
ABDOMINAL PAIN: 0
SORE THROAT: 0
VOMITING: 0
EYE ITCHING: 1
WHEEZING: 0
RHINORRHEA: 0
CHEST TIGHTNESS: 1

## 2017-09-06 ASSESSMENT — PAIN SCALES - GENERAL
PAINLEVEL_OUTOF10: 5
PAINLEVEL_OUTOF10: 8
PAINLEVEL_OUTOF10: 5

## 2017-09-07 LAB
EKG ATRIAL RATE: 61 BPM
EKG P AXIS: 50 DEGREES
EKG P-R INTERVAL: 150 MS
EKG Q-T INTERVAL: 454 MS
EKG QRS DURATION: 90 MS
EKG QTC CALCULATION (BAZETT): 457 MS
EKG R AXIS: 49 DEGREES
EKG T AXIS: 32 DEGREES
EKG VENTRICULAR RATE: 61 BPM

## 2017-09-07 PROCEDURE — 93010 ELECTROCARDIOGRAM REPORT: CPT | Performed by: INTERNAL MEDICINE

## 2017-09-15 ENCOUNTER — OFFICE VISIT (OUTPATIENT)
Dept: PRIMARY CARE CLINIC | Age: 47
End: 2017-09-15

## 2017-09-15 VITALS
SYSTOLIC BLOOD PRESSURE: 136 MMHG | HEART RATE: 96 BPM | WEIGHT: 171.1 LBS | TEMPERATURE: 98.8 F | RESPIRATION RATE: 16 BRPM | OXYGEN SATURATION: 100 % | BODY MASS INDEX: 26.85 KG/M2 | HEIGHT: 67 IN | DIASTOLIC BLOOD PRESSURE: 80 MMHG

## 2017-09-15 DIAGNOSIS — M79.675 TOE PAIN, LEFT: ICD-10-CM

## 2017-09-15 DIAGNOSIS — F41.9 ANXIETY: ICD-10-CM

## 2017-09-15 DIAGNOSIS — F32.A DEPRESSION, UNSPECIFIED DEPRESSION TYPE: Primary | ICD-10-CM

## 2017-09-15 DIAGNOSIS — G47.00 INSOMNIA, UNSPECIFIED TYPE: ICD-10-CM

## 2017-09-15 PROCEDURE — 99214 OFFICE O/P EST MOD 30 MIN: CPT | Performed by: INTERNAL MEDICINE

## 2017-09-15 RX ORDER — ALPRAZOLAM 0.5 MG/1
0.5 TABLET ORAL NIGHTLY PRN
Qty: 30 TABLET | Refills: 2 | Status: SHIPPED | OUTPATIENT
Start: 2017-09-15 | End: 2017-10-15

## 2017-09-15 RX ORDER — BUPROPION HYDROCHLORIDE 150 MG/1
150 TABLET, EXTENDED RELEASE ORAL 2 TIMES DAILY
Qty: 60 TABLET | Refills: 3 | Status: ON HOLD | OUTPATIENT
Start: 2017-09-15 | End: 2017-11-23 | Stop reason: HOSPADM

## 2017-09-15 ASSESSMENT — PATIENT HEALTH QUESTIONNAIRE - PHQ9
SUM OF ALL RESPONSES TO PHQ QUESTIONS 1-9: 0
2. FEELING DOWN, DEPRESSED OR HOPELESS: 0
SUM OF ALL RESPONSES TO PHQ9 QUESTIONS 1 & 2: 0
1. LITTLE INTEREST OR PLEASURE IN DOING THINGS: 0

## 2017-09-18 ASSESSMENT — ENCOUNTER SYMPTOMS
BLOOD IN STOOL: 0
CHOKING: 0
PHOTOPHOBIA: 0
ABDOMINAL DISTENTION: 0
ABDOMINAL PAIN: 0
FACIAL SWELLING: 0
APNEA: 0

## 2017-10-05 DIAGNOSIS — Z74.09 IMPAIRED MOBILITY AND ACTIVITIES OF DAILY LIVING: ICD-10-CM

## 2017-10-05 DIAGNOSIS — M54.17 LUMBOSACRAL RADICULOPATHY AT S1: ICD-10-CM

## 2017-10-05 DIAGNOSIS — F31.9 BIPOLAR 1 DISORDER (HCC): ICD-10-CM

## 2017-10-05 DIAGNOSIS — G62.9 SENSORY NEUROPATHY: ICD-10-CM

## 2017-10-05 DIAGNOSIS — G32.0 NEUROMYELOPATHY DUE TO VITAMIN B12 DEFICIENCY (HCC): ICD-10-CM

## 2017-10-05 DIAGNOSIS — E53.8 NEUROMYELOPATHY DUE TO VITAMIN B12 DEFICIENCY (HCC): ICD-10-CM

## 2017-10-05 DIAGNOSIS — M79.7 FIBROMYALGIA MUSCLE PAIN: ICD-10-CM

## 2017-10-05 DIAGNOSIS — Z78.9 IMPAIRED MOBILITY AND ACTIVITIES OF DAILY LIVING: ICD-10-CM

## 2017-10-05 DIAGNOSIS — G89.4 CHRONIC PAIN SYNDROME: ICD-10-CM

## 2017-10-06 RX ORDER — TRAMADOL HYDROCHLORIDE 50 MG/1
TABLET ORAL
Qty: 90 TABLET | Refills: 0 | Status: SHIPPED | OUTPATIENT
Start: 2017-10-06 | End: 2017-10-31 | Stop reason: SDUPTHER

## 2017-10-31 ENCOUNTER — OFFICE VISIT (OUTPATIENT)
Dept: PHYSICAL MEDICINE AND REHAB | Age: 47
End: 2017-10-31

## 2017-10-31 VITALS
BODY MASS INDEX: 26.53 KG/M2 | WEIGHT: 169 LBS | DIASTOLIC BLOOD PRESSURE: 76 MMHG | SYSTOLIC BLOOD PRESSURE: 136 MMHG | HEIGHT: 67 IN

## 2017-10-31 DIAGNOSIS — F31.9 BIPOLAR 1 DISORDER (HCC): ICD-10-CM

## 2017-10-31 DIAGNOSIS — Z78.9 IMPAIRED MOBILITY AND ACTIVITIES OF DAILY LIVING: ICD-10-CM

## 2017-10-31 DIAGNOSIS — E53.8 NEUROMYELOPATHY DUE TO VITAMIN B12 DEFICIENCY (HCC): ICD-10-CM

## 2017-10-31 DIAGNOSIS — G35 MULTIPLE SCLEROSIS (HCC): ICD-10-CM

## 2017-10-31 DIAGNOSIS — K52.9 COLITIS: ICD-10-CM

## 2017-10-31 DIAGNOSIS — Z74.09 IMPAIRED MOBILITY AND ACTIVITIES OF DAILY LIVING: ICD-10-CM

## 2017-10-31 DIAGNOSIS — M54.17 LUMBOSACRAL RADICULOPATHY AT S1: ICD-10-CM

## 2017-10-31 DIAGNOSIS — Z79.899 HIGH RISK MEDICATION USE: ICD-10-CM

## 2017-10-31 DIAGNOSIS — M79.7 FIBROMYALGIA MUSCLE PAIN: ICD-10-CM

## 2017-10-31 DIAGNOSIS — G81.94 LEFT HEMIPARESIS (HCC): Primary | ICD-10-CM

## 2017-10-31 DIAGNOSIS — G89.4 CHRONIC PAIN SYNDROME: ICD-10-CM

## 2017-10-31 DIAGNOSIS — G32.0 NEUROMYELOPATHY DUE TO VITAMIN B12 DEFICIENCY (HCC): ICD-10-CM

## 2017-10-31 DIAGNOSIS — G62.9 SENSORY NEUROPATHY: ICD-10-CM

## 2017-10-31 PROCEDURE — 99215 OFFICE O/P EST HI 40 MIN: CPT | Performed by: PHYSICAL MEDICINE & REHABILITATION

## 2017-10-31 RX ORDER — ALPRAZOLAM 0.5 MG/1
TABLET ORAL
Refills: 2 | COMMUNITY
Start: 2017-10-17 | End: 2017-10-31

## 2017-10-31 RX ORDER — TRAMADOL HYDROCHLORIDE 50 MG/1
TABLET ORAL
Qty: 90 TABLET | Refills: 0 | Status: SHIPPED | OUTPATIENT
Start: 2017-10-31 | End: 2017-10-31

## 2017-10-31 ASSESSMENT — ENCOUNTER SYMPTOMS
SORE THROAT: 0
VOICE CHANGE: 0
BACK PAIN: 1
RHINORRHEA: 0
EYE ITCHING: 1
DIARRHEA: 1
TROUBLE SWALLOWING: 0
COUGH: 1
BOWEL INCONTINENCE: 0
SHORTNESS OF BREATH: 1
EYE PAIN: 1
CHANGE IN BOWEL HABIT: 0
ABDOMINAL PAIN: 0
CONSTIPATION: 0
SINUS PRESSURE: 0
WHEEZING: 0
PHOTOPHOBIA: 1
EYE DISCHARGE: 0
APNEA: 0
FACIAL SWELLING: 0
EYE REDNESS: 0
COLOR CHANGE: 1
NAUSEA: 0
CHEST TIGHTNESS: 0
STRIDOR: 0
VOMITING: 0

## 2017-10-31 NOTE — PROGRESS NOTES
Subjective  Philipp Campuzanoal, 41 y.o. female presents today with:       Back Pain      Hip Pain bilateral    Leg Pain bilateral    Eye Pain right eye    Blurred Vision within the last 6 months she has noticed her eyesight become blurry when she moves her head a certain way. Recap:  Cassandra Godoy is a 47-year-old female who was on disability because of pain and bipolar disorder. There are signs signs of abuse or overuse of her meds-she got Xanax from her family doctor without telling us and has been taking that with her pain medication in violation her behavior contract but has frequent no shows. Neurologic Problem   The patient's primary symptoms include an altered mental status, clumsiness, focal sensory loss, focal weakness and weakness. The patient's pertinent negatives include no syncope. Primary symptoms comment: Bipolar DO. This is a recurrent problem. The current episode started in the past 7 days. The neurological problem developed insidiously. The problem has been gradually improving since onset. There was left-sided, lower extremity and upper extremity focality noted. Associated symptoms include back pain, chest pain, dizziness, fatigue, headaches, light-headedness, neck pain, palpitations and shortness of breath. Pertinent negatives include no abdominal pain, auditory change, aura, bladder incontinence, bowel incontinence, confusion, diaphoresis, fever, nausea or vomiting. The treatment provided mild relief. There is no history of a bleeding disorder, a clotting disorder or a CVA. (MS)   Mental Health Problem   The primary symptoms include dysphoric mood, disorganized speech, negative symptoms and somatic symptoms. The primary symptoms do not include delusions, hallucinations or bizarre behavior. Primary symptoms comment: Bipolar DO. The current episode started more than 1 month ago. This is a chronic problem. The somatic symptoms began more than 1 month ago.  The somatic symptoms have been unchanged since their onset. Somatic symptoms include fatigue, headaches, back pain and myalgias. Somatic symptoms do not include abdominal pain or constipation. The onset of the illness is precipitated by a stressful event. The degree of incapacity that she is experiencing as a consequence of her illness is severe. Sequelae of the illness include an inability to work. Additional symptoms of the illness include anhedonia, insomnia, fatigue, agitation, attention impairment, flight of ideas, poor judgment and headaches. Additional symptoms of the illness do not include hypersomnia, appetite change, unexpected weight change, abdominal pain or seizures. She does not admit to suicidal ideas. She does not have a plan to commit suicide. She has not already injured self. She does not contemplate injuring another person. She has not already  injured another person. Risk factors that are present for mental illness include a history of mental illness and a family history of mental illness. Fatigue   This is a recurrent problem. The current episode started in the past 7 days. The problem occurs constantly. The problem has been gradually worsening. Associated symptoms include arthralgias, chest pain, coughing, fatigue, headaches, joint swelling, myalgias, neck pain, numbness and weakness. Pertinent negatives include no abdominal pain, anorexia, change in bowel habit, chills, congestion, diaphoresis, fever, nausea, rash, sore throat or vomiting. The symptoms are aggravated by walking and exertion. She has tried rest for the symptoms. The treatment provided mild relief. Back Pain   This is a recurrent problem. The current episode started more than 1 year ago. The problem occurs constantly. The problem has been gradually worsening since onset. The pain is present in the gluteal, lumbar spine and sacro-iliac. The pain is at a severity of 7/10. The pain is severe. The pain is worse during the day.  The symptoms are vomiting. Endocrine: Positive for cold intolerance and heat intolerance. Genitourinary: Positive for pelvic pain. Negative for bladder incontinence and dysuria. Musculoskeletal: Positive for arthralgias, back pain, joint swelling, myalgias, neck pain and neck stiffness. Skin: Positive for color change. Negative for pallor, rash and wound. Allergic/Immunologic: Positive for environmental allergies. Neurological: Positive for dizziness, tremors, focal weakness, speech difficulty, weakness, light-headedness, numbness and headaches. Negative for seizures and syncope. Hematological: Positive for adenopathy. Bruises/bleeds easily. Psychiatric/Behavioral: Positive for agitation, dysphoric mood and sleep disturbance. Negative for behavioral problems, confusion, hallucinations, self-injury and suicidal ideas. The patient is nervous/anxious and has insomnia. The patient is not hyperactive. Objective    Vitals:    10/31/17 1523   BP: 136/76   Weight: 169 lb (76.7 kg)   Height: 5' 7\" (1.702 m)     Pain Score: EIGHT     Physical Exam   Constitutional: She is oriented to person, place, and time. Vital signs are normal. She appears well-developed. Non-toxic appearance. She does not have a sickly appearance. She does not appear ill. No distress. HENT:   Head: Normocephalic and atraumatic. Right Ear: Hearing normal.   Left Ear: Hearing normal.   Nose: Nose normal.   Mouth/Throat: Oropharynx is clear and moist and mucous membranes are normal. No oral lesions. Normal dentition. No oropharyngeal exudate. No lession   Eyes: Conjunctivae and EOM are normal. Pupils are equal, round, and reactive to light. Right eye exhibits no chemosis, no discharge and no exudate. Left eye exhibits no chemosis, no discharge and no exudate. No scleral icterus. Neck: Normal range of motion. Neck supple. No JVD present. No neck rigidity. No tracheal deviation and no edema present. No thyromegaly present. Cardiovascular: Intact distal pulses. Exam reveals no decreased pulses. Pulmonary/Chest: Effort normal. No accessory muscle usage. No apnea, no tachypnea and no bradypnea. No respiratory distress. She has decreased breath sounds. She has no wheezes. She exhibits no tenderness. Abdominal: Soft. Bowel sounds are normal. She exhibits no distension and no mass. There is no tenderness. There is no rebound and no guarding. Musculoskeletal: She exhibits tenderness. She exhibits no edema. Right shoulder: She exhibits decreased range of motion, tenderness and bony tenderness. Left shoulder: She exhibits decreased range of motion, tenderness, bony tenderness, pain and spasm. Right elbow: Normal.       Left elbow: Normal.        Right wrist: Normal.        Left wrist: Normal.        Right hip: Normal.        Left hip: Normal.        Right knee: Normal.        Left knee: Normal.        Right ankle: She exhibits decreased range of motion. Achilles tendon normal.        Left ankle: Normal. Achilles tendon normal.        Cervical back: She exhibits decreased range of motion, tenderness, bony tenderness and swelling. Thoracic back: Normal.        Lumbar back: She exhibits decreased range of motion, tenderness, bony tenderness and pain. She exhibits no swelling, no edema, no deformity, no laceration and normal pulse. Back:         Right upper arm: Normal.        Left upper arm: Normal.        Right forearm: Normal.        Left forearm: Normal.        Arms:       Right hand: She exhibits decreased range of motion, tenderness and bony tenderness. Left hand: She exhibits decreased range of motion, tenderness and bony tenderness. Right upper leg: Normal.        Left upper leg: Normal.        Right lower leg: Normal.        Left lower leg: Normal.        Legs:       Right foot: There is decreased range of motion, tenderness and bony tenderness.         Left foot: There is decreased range of motion, tenderness and bony tenderness. Tender areas are indicated by numbered spot         Neurological: She is alert and oriented to person, place, and time. She displays abnormal reflex. She displays no atrophy and no tremor. A sensory deficit is present. No cranial nerve deficit. She exhibits normal muscle tone. Coordination normal. She displays no Babinski's sign on the right side. She displays no Babinski's sign on the left side. Reflex Scores:       Achilles reflexes are 0 on the right side and 0 on the left side. Skin: Skin is warm, dry and intact. No abrasion, no bruising, no ecchymosis, no laceration, no petechiae and no rash noted. Rash is not macular, not pustular and not urticarial. She is not diaphoretic. No cyanosis or erythema. No pallor. Nails show no clubbing. Psychiatric: Her behavior is normal. Judgment and thought content normal. Her mood appears not anxious. Her affect is not angry, not blunt, not labile and not inappropriate. Her speech is not rapid and/or pressured, not delayed, not tangential and not slurred. She is not agitated, not aggressive, not hyperactive, not slowed, not withdrawn, not actively hallucinating and not combative. Thought content is not paranoid and not delusional. Cognition and memory are not impaired. She does not express impulsivity or inappropriate judgment. She exhibits a depressed mood. She expresses no homicidal and no suicidal ideation. She expresses no suicidal plans and no homicidal plans. She is communicative. She exhibits normal recent memory and normal remote memory. She is attentive. Vitals reviewed. Ortho Exam  Neurologic Exam     Mental Status   Oriented to person, place, and time. Speech: not slurred     Cranial Nerves     CN III, IV, VI   Pupils are equal, round, and reactive to light.   Extraocular motions are normal.     Gait, Coordination, and Reflexes     Reflexes   Right achilles: 0  Left achilles: 0        After a thorough review and discussion of the previous medical records, patient comprehensive medical, surgical, and family and social history, Review of Systems, their OARRS, their Screener and Opioid Assessment for Patients with Pain (SOAPP®-R), recent diagnostics, and symptomatic results to previous treatment, it is my impression that the patients is suffering with progressive and severe:    1. Left hemiparesis ([facility] due to TIA, exacerbation of MS with impaired mobility, [facility] admit 4/19/16, Dr. Sharyn Chamberlain     2. High risk medication use  OARRS PM&R 4/25/17, 06/27/17 OARRS PM&R, 02/14/17 Med Contract PM&R     3. Multiple sclerosis ([facility]     4. Colitis     5. Neuromyelopathy due to vitamin B12 deficiency ([facility])  DISCONTINUED: traMADol (ULTRAM) 50 MG tablet   6. Bipolar 1 disorder ([facility])  DISCONTINUED: traMADol (ULTRAM) 50 MG tablet   7. Sensory neuropathy ([facility])  DISCONTINUED: traMADol (ULTRAM) 50 MG tablet   8. Lumbosacral radiculopathy at S1  DISCONTINUED: traMADol (ULTRAM) 50 MG tablet   9. Chronic pain syndrome OARRS PM&R 11/17/16, OARRS 1/3/17 PM&R  DISCONTINUED: traMADol (ULTRAM) 50 MG tablet   10. Impaired mobility and activities of daily living with increased Lt hemiparesis due to TIA, exacerbation of MS, [facility] admit 4/19/16, Dr. Sharyn Chamberlain  DISCONTINUED: traMADol (ULTRAM) 50 MG tablet   11.  Fibromyalgia muscle pain  DISCONTINUED: traMADol (ULTRAM) 50 MG tablet       I am also concerned by lifestyle and mood issues including:    Past Medical History:   Diagnosis Date    Anxiety     Back pain     back surgery x 4    Bipolar disorder ([facility]     CAD (coronary artery disease)     CAFL (chronic airflow limitation) ([facility]) 11/3/2016    Chronic pain     Colitis     DDD (degenerative disc disease), lumbar 12/22/2016    Depression     Endometriosis     Gastritis     GERD (gastroesophageal reflux disease)     History of myocardial infarction     HTN (hypertension), benign 2/19/2016    Hypokalemia     Impaired mobility and activities of daily living     Left hemiparesis (HCC)     Multiple sclerosis ([facility]     Neck pain     surgery x 1    Over weight     PTSD (post-traumatic stress disorder)     Sensory neuropathy ([facility] [facility] 12/22/2016    TIA (transient ischemic attack)     Tobacco abuse     Vasospastic angina ([facility] 11/11/2016           Given their medication, chronic pain and lifestyle and medications they are at risk for :    Falls, constipation, addiction  Loss of livelyhood due to severe pain, debility, weight gain and  vitamin D deficiency    The patient was educated regarding proper diet, fitness routine, and regulatory restrictions concerning pain medications. Previous notes, comprehensive past medical, surgical, family history, and diagnostics were reviewed. Patient education and councelling were provided regarding off label use,treatment options and medication and injection risks. Current and old OARRS ([place] Automated Prescription Reporting System) records reviewed, all refills reviewed since last visit,  Behavioral agreement/[place] regulations   and Toxicology screen was reviewed with patient and is up to date. There are current red flags. They are making good progress regarding pain relief, they are performing at a functional level regarding activities of daily living and psychological functioning, they're not having any adverse effects or side effects from the current medications, and I see findings of aberrant drug taking her addiction related behaviors. She got Xanax from her family doctor without clearing with transfers. She is not to do that and she will be taken off of her controlled medications for pain. Attestation: The Prescription Monitoring Report for this patient was reviewed today. (Idania Del Valle, )  Documentation: Existing medication contract. , Potential drug abuse or diversion identified, see note documentation.  (Idania Del Valle, )       Patient is currently taking:       I have discontinued Ms. Mac's clonazePAM, methocarbamol, meloxicam, gabapentin, ALPRAZolam, and traMADol. I am also having her maintain her vitamin D, nicotine, Magnesium Hydroxide (MAGNESIA PO), busPIRone, folic acid, chlorzoxazone, folic acid, naproxen sodium, albuterol, VENTOLIN HFA, nitroGLYCERIN, cyanocobalamin, SYRINGE-NEEDLE (DISP) 3 ML, Cyanocobalamin, tiotropium, Tiotropium Bromide-Olodaterol, butalbital-aspirin-caffeine, aspirin, isosorbide mononitrate, atorvastatin, metoprolol tartrate, lisinopril, pantoprazole, and buPROPion. I also recommend the following Medications:    Orders Placed This Encounter   Medications    DISCONTD: traMADol (ULTRAM) 50 MG tablet     Sig: Take one tablet TID PRN. Max #3 daily. Max #90/mthly. DO NOT GET NARCOTIC MEDICATIONS FROM ANY OTHER PROVIDER. Dispense:  90 tablet     Refill:  0        -which helps with pain and function. Otherwise, continue the current pain medications that I have prescibed. Radiologic:   Old films reviewed    CT HEAD WO CONTRAST       CLINICAL HISTORY:   CONFUSION/DELIRIUM, ALTERED LOC, UNEXPLAINED        COMPARISONS:  June 18, 2017       TECHNIQUE:  Spiral axial unenhanced images of the brain were obtained. Planar two-dimensional reformatting was performed.       FINDINGS: Rosales Dines is no mass. There is no edema. There is no hematoma. There is no hydrocephalus.           Impression   NO ACUTE PROCESS           All CT scans at this facility use dose modulation, iterative reconstruction, and/or weight based dosing when appropriate to reduce radiation dose to as low as reasonably achievable.          ,  CHEST PORTABLE VIEW       CLINICAL HISTORY: Change in mental status       COMPARISONS: June 18, 2017       FINDINGS:       Single  views of the chest is submitted.  The cardiac silhouette is of normal size configuration.  The mediastinum is unremarkable. Pulmonary vascular unremarkable. Right sided trachea.    No focal infiltrates.  No Pneumothoraces.                                                                                        Impression   NO ACUTE ACTIVE CARDIOPULMONARY PROCESS          I discussed results with patients. see Follow up plans below  For any new studies. Care Everywhere Updates:  requested and reviewed. No new issues noted. Labs:  Previous labs reviewed     Lab Results   Component Value Date     09/06/2017    K 4.0 09/06/2017     09/06/2017    CO2 25 09/06/2017    BUN 5 09/06/2017    CREATININE 0.65 09/06/2017    CALCIUM 9.0 09/06/2017    LABALBU 4.0 09/06/2017    BILITOT 0.2 09/06/2017    ALKPHOS 112 09/06/2017    AST 16 09/06/2017    ALT 20 09/06/2017     Lab Results   Component Value Date    WBC 8.0 09/06/2017    RBC 4.94 09/06/2017    HGB 15.1 09/06/2017    HCT 45.3 09/06/2017    MCV 91.6 09/06/2017    MCH 30.6 09/06/2017    MCHC 33.4 09/06/2017    RDW 13.7 09/06/2017     09/06/2017    MPV 9.6 09/20/2015       Lab Results   Component Value Date    LABAMPH Neg 09/06/2017    BARBSCNU Neg 09/06/2017    LABBENZ Neg 09/06/2017    LABBENZ NotDTCD 01/26/2013    CANSU Neg 09/06/2017    COCAIMETSCRU Neg 09/06/2017    PHENCYCLIDINESCREENURINE Neg 43/53/2726    TRICYCLIC Neg 00/84/9447    DSCOMMENT see below 09/06/2017       No results found for: CODEINE, MORPHINE, ACETYLMORPHI, OXYCODONE, NOROXYCODONE, NOROXYMU, HYDRCO, NORHYDU, HYDROMO, BUPREN, NORBUPRNOR, FENTA, NORFENT, MEPERIDINE, TAPENU, TAPOSULFUR, METHADONE, LABPROP, TRAM, AMPH, METHAMP, MDMA, ECMDA    Lab Results   Component Value Date    NAHOMY NotDTCD 01/26/2013    PCP NotDTCD 01/26/2013         , I discussed results with patient. See follow-up plans for new studies. Therapies:  HEP-gentle stretching and relaxation techniques-demonstrated with patient-they are to do them twice a day.   They are also advised to make the following lifestyle changes:  Goals      SOAPP-R GOAL LESS THAN 9            01/04/17

## 2017-11-15 ENCOUNTER — HOSPITAL ENCOUNTER (INPATIENT)
Age: 47
LOS: 8 days | Discharge: HOME | DRG: 753 | End: 2017-11-23
Attending: STUDENT IN AN ORGANIZED HEALTH CARE EDUCATION/TRAINING PROGRAM | Admitting: PSYCHIATRY & NEUROLOGY
Payer: MEDICAID

## 2017-11-15 DIAGNOSIS — F31.9 BIPOLAR 1 DISORDER (HCC): Primary | ICD-10-CM

## 2017-11-15 LAB
ALBUMIN SERPL-MCNC: 4.6 G/DL (ref 3.9–4.9)
ALP BLD-CCNC: 119 U/L (ref 40–130)
ALT SERPL-CCNC: 29 U/L (ref 0–33)
AMPHETAMINE SCREEN, URINE: NORMAL
ANION GAP SERPL CALCULATED.3IONS-SCNC: 12 MEQ/L (ref 7–13)
AST SERPL-CCNC: 21 U/L (ref 0–35)
BACTERIA: NORMAL /HPF
BARBITURATE SCREEN URINE: NORMAL
BASOPHILS ABSOLUTE: 0 K/UL (ref 0–0.2)
BASOPHILS RELATIVE PERCENT: 0.4 %
BENZODIAZEPINE SCREEN, URINE: NORMAL
BILIRUB SERPL-MCNC: 0.4 MG/DL (ref 0–1.2)
BILIRUBIN URINE: NEGATIVE
BLOOD, URINE: ABNORMAL
BUN BLDV-MCNC: 6 MG/DL (ref 6–20)
CALCIUM SERPL-MCNC: 9.2 MG/DL (ref 8.6–10.2)
CANNABINOID SCREEN URINE: NORMAL
CHLORIDE BLD-SCNC: 102 MEQ/L (ref 98–107)
CLARITY: ABNORMAL
CO2: 27 MEQ/L (ref 22–29)
COCAINE METABOLITE SCREEN URINE: NORMAL
COLOR: YELLOW
CREAT SERPL-MCNC: 0.65 MG/DL (ref 0.5–0.9)
EOSINOPHILS ABSOLUTE: 0.1 K/UL (ref 0–0.7)
EOSINOPHILS RELATIVE PERCENT: 0.7 %
EPITHELIAL CELLS, UA: NORMAL /HPF
ETHANOL PERCENT: NORMAL G/DL
ETHANOL: <10 MG/DL (ref 0–0.08)
GFR AFRICAN AMERICAN: >60
GFR NON-AFRICAN AMERICAN: >60
GLOBULIN: 2.5 G/DL (ref 2.3–3.5)
GLUCOSE BLD-MCNC: 136 MG/DL (ref 74–109)
GLUCOSE URINE: NEGATIVE MG/DL
HCG(URINE) PREGNANCY TEST: NEGATIVE
HCT VFR BLD CALC: 46.5 % (ref 37–47)
HEMOGLOBIN: 15.5 G/DL (ref 12–16)
KETONES, URINE: NEGATIVE MG/DL
LEUKOCYTE ESTERASE, URINE: ABNORMAL
LYMPHOCYTES ABSOLUTE: 1.9 K/UL (ref 1–4.8)
LYMPHOCYTES RELATIVE PERCENT: 22.3 %
Lab: NORMAL
MCH RBC QN AUTO: 31.3 PG (ref 27–31.3)
MCHC RBC AUTO-ENTMCNC: 33.3 % (ref 33–37)
MCV RBC AUTO: 94.1 FL (ref 82–100)
MONOCYTES ABSOLUTE: 0.4 K/UL (ref 0.2–0.8)
MONOCYTES RELATIVE PERCENT: 4.7 %
NEUTROPHILS ABSOLUTE: 6.2 K/UL (ref 1.4–6.5)
NEUTROPHILS RELATIVE PERCENT: 71.9 %
NITRITE, URINE: NEGATIVE
OPIATE SCREEN URINE: NORMAL
PDW BLD-RTO: 13.8 % (ref 11.5–14.5)
PH UA: 5.5 (ref 5–9)
PHENCYCLIDINE SCREEN URINE: NORMAL
PLATELET # BLD: 166 K/UL (ref 130–400)
POTASSIUM SERPL-SCNC: 3.8 MEQ/L (ref 3.5–5.1)
PROTEIN UA: NEGATIVE MG/DL
RBC # BLD: 4.95 M/UL (ref 4.2–5.4)
RBC UA: NORMAL /HPF (ref 0–2)
SODIUM BLD-SCNC: 141 MEQ/L (ref 132–144)
SPECIFIC GRAVITY UA: 1.02 (ref 1–1.03)
TOTAL CK: 87 U/L (ref 0–170)
TOTAL PROTEIN: 7.1 G/DL (ref 6.4–8.1)
TSH SERPL DL<=0.05 MIU/L-ACNC: 2.96 UIU/ML (ref 0.27–4.2)
URINE REFLEX TO CULTURE: YES
UROBILINOGEN, URINE: 0.2 E.U./DL
WBC # BLD: 8.7 K/UL (ref 4.8–10.8)
WBC UA: NORMAL /HPF (ref 0–5)

## 2017-11-15 PROCEDURE — 6370000000 HC RX 637 (ALT 250 FOR IP): Performed by: PSYCHIATRY & NEUROLOGY

## 2017-11-15 PROCEDURE — 36415 COLL VENOUS BLD VENIPUNCTURE: CPT

## 2017-11-15 PROCEDURE — 80053 COMPREHEN METABOLIC PANEL: CPT

## 2017-11-15 PROCEDURE — 85025 COMPLETE CBC W/AUTO DIFF WBC: CPT

## 2017-11-15 PROCEDURE — 1240000000 HC EMOTIONAL WELLNESS R&B

## 2017-11-15 PROCEDURE — 99285 EMERGENCY DEPT VISIT HI MDM: CPT

## 2017-11-15 PROCEDURE — 84703 CHORIONIC GONADOTROPIN ASSAY: CPT

## 2017-11-15 PROCEDURE — G0480 DRUG TEST DEF 1-7 CLASSES: HCPCS

## 2017-11-15 PROCEDURE — 87086 URINE CULTURE/COLONY COUNT: CPT

## 2017-11-15 PROCEDURE — 80307 DRUG TEST PRSMV CHEM ANLYZR: CPT

## 2017-11-15 PROCEDURE — 81001 URINALYSIS AUTO W/SCOPE: CPT

## 2017-11-15 PROCEDURE — 82550 ASSAY OF CK (CPK): CPT

## 2017-11-15 PROCEDURE — 84443 ASSAY THYROID STIM HORMONE: CPT

## 2017-11-15 RX ORDER — BENZTROPINE MESYLATE 1 MG/ML
2 INJECTION INTRAMUSCULAR; INTRAVENOUS 2 TIMES DAILY PRN
Status: DISCONTINUED | OUTPATIENT
Start: 2017-11-15 | End: 2017-11-23 | Stop reason: HOSPADM

## 2017-11-15 RX ORDER — TRAZODONE HYDROCHLORIDE 50 MG/1
50 TABLET ORAL NIGHTLY PRN
Status: DISCONTINUED | OUTPATIENT
Start: 2017-11-15 | End: 2017-11-23 | Stop reason: HOSPADM

## 2017-11-15 RX ORDER — MAGNESIUM HYDROXIDE/ALUMINUM HYDROXICE/SIMETHICONE 120; 1200; 1200 MG/30ML; MG/30ML; MG/30ML
30 SUSPENSION ORAL PRN
Status: DISCONTINUED | OUTPATIENT
Start: 2017-11-15 | End: 2017-11-23 | Stop reason: HOSPADM

## 2017-11-15 RX ORDER — ACETAMINOPHEN 325 MG/1
650 TABLET ORAL EVERY 4 HOURS PRN
Status: DISCONTINUED | OUTPATIENT
Start: 2017-11-15 | End: 2017-11-23 | Stop reason: HOSPADM

## 2017-11-15 RX ORDER — IPRATROPIUM BROMIDE AND ALBUTEROL SULFATE 2.5; .5 MG/3ML; MG/3ML
1 SOLUTION RESPIRATORY (INHALATION) EVERY 8 HOURS
Status: DISCONTINUED | OUTPATIENT
Start: 2017-11-16 | End: 2017-11-16

## 2017-11-15 RX ORDER — HALOPERIDOL 5 MG/ML
5 INJECTION INTRAMUSCULAR EVERY 4 HOURS PRN
Status: DISCONTINUED | OUTPATIENT
Start: 2017-11-15 | End: 2017-11-23 | Stop reason: HOSPADM

## 2017-11-15 RX ORDER — TRAZODONE HYDROCHLORIDE 50 MG/1
50 TABLET ORAL NIGHTLY PRN
Status: DISCONTINUED | OUTPATIENT
Start: 2017-11-16 | End: 2017-11-15

## 2017-11-15 RX ORDER — HYDROXYZINE PAMOATE 50 MG/1
50 CAPSULE ORAL EVERY 6 HOURS PRN
Status: DISCONTINUED | OUTPATIENT
Start: 2017-11-15 | End: 2017-11-23 | Stop reason: HOSPADM

## 2017-11-15 RX ADMIN — ACETAMINOPHEN 650 MG: 325 TABLET ORAL at 23:54

## 2017-11-15 RX ADMIN — TRAZODONE HYDROCHLORIDE 50 MG: 50 TABLET ORAL at 23:53

## 2017-11-15 RX ADMIN — HYDROXYZINE PAMOATE 50 MG: 50 CAPSULE ORAL at 23:22

## 2017-11-15 ASSESSMENT — SLEEP AND FATIGUE QUESTIONNAIRES
RESTFUL SLEEP: NO
AVERAGE NUMBER OF SLEEP HOURS: 3
DIFFICULTY ARISING: YES
SLEEP PATTERN: DIFFICULTY FALLING ASLEEP;DISTURBED/INTERRUPTED SLEEP;NIGHTMARES/TERRORS
DIFFICULTY FALLING ASLEEP: YES
DO YOU USE A SLEEP AID: YES
DIFFICULTY STAYING ASLEEP: YES
DO YOU HAVE DIFFICULTY SLEEPING: YES

## 2017-11-15 ASSESSMENT — ENCOUNTER SYMPTOMS
CHEST TIGHTNESS: 0
TROUBLE SWALLOWING: 0
BACK PAIN: 0
SHORTNESS OF BREATH: 0
DIARRHEA: 0
SINUS PRESSURE: 0
COUGH: 0
VOMITING: 0
ABDOMINAL PAIN: 0

## 2017-11-15 ASSESSMENT — PATIENT HEALTH QUESTIONNAIRE - PHQ9: SUM OF ALL RESPONSES TO PHQ QUESTIONS 1-9: 25

## 2017-11-15 ASSESSMENT — PAIN SCALES - GENERAL: PAINLEVEL_OUTOF10: 7

## 2017-11-15 NOTE — ED PROVIDER NOTES
[address] [facility] 3[facility]  eMERGENCY dEPARTMENT eNCOUnter      Pt Name: Ginna Harvey  MRN: [id number]  [place] 1970  Date of evaluation: 11/15/2017  Provider: Sandra Morales DO    CHIEF COMPLAINT       Chief Complaint   Patient presents with    Mental Health Problem         HISTORY OF PRESENT ILLNESS   (Location/Symptom, Timing/Onset, Context/Setting, Quality, Duration, Modifying Factors, Severity)  Note limiting factors. Ginna Harvye is a 41 y.o. female who presents to the emergency department With the complaint of extreme depression. Patient states that her mother  recently. She had lost her  4 years ago and its near the anniversary date and her son had also . Patient states that she is suicidal with plan to record her car into a bridge. HPI    Nursing Notes were reviewed. REVIEW OF SYSTEMS    (2-9 systems for level 4, 10 or more for level 5)     Review of Systems   Constitutional: Negative for activity change, appetite change, chills, fever and unexpected weight change. HENT: Negative for drooling, ear pain, nosebleeds, sinus pressure and trouble swallowing. Respiratory: Negative for cough, chest tightness and shortness of breath. Cardiovascular: Negative for chest pain and leg swelling. Gastrointestinal: Negative for abdominal pain, diarrhea and vomiting. Endocrine: Negative for polydipsia and polyphagia. Genitourinary: Negative for dysuria, flank pain and frequency. Musculoskeletal: Negative for back pain and myalgias. Skin: Negative for pallor and rash. Neurological: Negative for syncope, weakness and headaches. Hematological: Does not bruise/bleed easily. Psychiatric/Behavioral: Positive for dysphoric mood and suicidal ideas. All other systems reviewed and are negative. Except as noted above the remainder of the review of systems was reviewed and negative.        PAST MEDICAL HISTORY     Past Medical History:   Diagnosis Date    Anxiety     Back pain back surgery x 4    B[facility] disorder [facility]     CAD (coronary artery disease)     CAFL (chronic airflow limitation) ([facility]) 11/3/2016    Chronic pain     Colitis     DDD (degenerative disc disease), lumbar 2016    Depression     Endometriosis     Gastritis     GERD (gastroesophageal reflux disease)     History of myocardial infarction     HTN (hypertension), benign 2016    Hypokalemia     Impaired mobility and activities of daily living     Left hemiparesis ([facility])     Multiple sclerosis ([facility]     Neck pain     surgery x 1    Over weight     PTSD (post-traumatic stress disorder)     Sensory neuropathy ([facility] 2016    TIA (transient ischemic attack)     Tobacco abuse     Vasospastic angina ([facility] 2016         SURGICAL HISTORY       Past Surgical History:   Procedure Laterality Date    BACK SURGERY      x2     SECTION      x2    CHOLECYSTECTOMY  13    Lapchole    CORONARY ANGIOPLASTY      CYST REMOVAL  3/7/16    Dr. Traci Rios Left     UPPER GASTROINTESTINAL ENDOSCOPY  2014    ANIBAL HOUSE M.D.          CURRENT MEDICATIONS       Current Discharge Medication List      CONTINUE these medications which have NOT CHANGED    Details   buPROPion (WELLBUTRIN SR) 150 MG extended release tablet Take 1 tablet by mouth 2 times daily  Qty: 60 tablet, Refills: 3    Associated Diagnoses: Depression, unspecified depression type      pantoprazole (PROTONIX) 40 MG tablet Take 1 tablet by mouth every morning (before breakfast)  Qty: 30 tablet, Refills: 3      butalbital-aspirin-caffeine (FIORINAL) -40 MG capsule Take 1 capsule by mouth every 4 hours as needed for Headaches  Qty: 12 capsule, Refills: 0      tiotropium (SPIRIVA HANDIHALER) 18 MCG inhalation capsule Inhale 1 capsule into the lungs daily  Qty: 30 capsule, Refills: 5      Tiotropium Bromide-Olodaterol (STIOLTO Tobacco abuse; Cardiac LV ejection fraction 21-40%      Cholecalciferol (VITAMIN D) 2000 UNITS TABS Take 1 tablet by mouth daily  Qty: 30 tablet, Refills: 5       !! - Potential duplicate medications found. Please discuss with provider. ALLERGIES     Influenza vaccines; Eggs or egg-derived products; Gabapentin; Pneumococcal vaccines; Povidone iodine; and Varicella virus vaccine live    FAMILY HISTORY       Family History   Problem Relation Age of Onset    High Blood Pressure Mother     Kidney Disease Mother     Lupus Mother     Bipolar Disorder Son           SOCIAL HISTORY       Social History     Social History    Marital status:      Spouse name: N/A    Number of children: N/A    Years of education: N/A     Occupational History    unemployed      Social History Main Topics    Smoking status: Current Some Day Smoker     Packs/day: 1.50     Years: 10.00     Types: Cigarettes     Last attempt to quit: 1/3/2017    Smokeless tobacco: Never Used    Alcohol use No    Drug use:      Types: Marijuana      Comment: last used one month ago     Sexual activity: Not Asked     Other Topics Concern    None     Social History Narrative    Corina Alanis is a 57-year-old female who is on disability because of pain and bipolar disorder. She's also had a presumed diagnosis of possible multiple sclerosis. She's been seeing Dr. Buddy Cisse for that and she says that the diagnosis is sometimes in question and it's clear neuropathy vitamin B12 deficiency as well as generalized bodyaches from the severe vitamin D deficiency have caused this scenario that looks like multiple sclerosis. She recently tried to go back to work but was not able because of her pain.        SCREENINGS    Kacie Coma Scale  Eye Opening: Spontaneous  Best Verbal Response: Oriented  Best Motor Response: Obeys commands  Ensign Coma Scale Score: 15        PHYSICAL EXAM    (up to 7 for level 4, 8 or more for level 5)     ED Triage Vitals BP Temp Temp src Pulse Resp SpO2 Height Weight   -- -- -- -- -- -- -- --       Physical Exam   Constitutional: She is oriented to person, place, and time. She appears well-developed and well-nourished. No distress. HENT:   Head: Normocephalic and atraumatic. Head is without Sanchez's sign. Right Ear: External ear normal.   Left Ear: External ear normal.   Nose: Nose normal.   Mouth/Throat: Oropharynx is clear and moist. No oropharyngeal exudate. Eyes: Conjunctivae and EOM are normal. Pupils are equal, round, and reactive to light. No foreign body present in the right eye. Left eye exhibits no exudate. No scleral icterus. Neck: Normal range of motion. Neck supple. No JVD present. No neck rigidity. No tracheal deviation present. Cardiovascular: Normal rate, regular rhythm, normal heart sounds and intact distal pulses. Exam reveals no gallop, no distant heart sounds and no friction rub. No murmur heard. Pulmonary/Chest: Effort normal and breath sounds normal. No stridor. No respiratory distress. She has no wheezes. Abdominal: Soft. Bowel sounds are normal. She exhibits no distension, no pulsatile liver and no ascites. There is no hepatosplenomegaly. There is no tenderness. There is no rebound and no guarding. Musculoskeletal: Normal range of motion. She exhibits no edema or tenderness. Lymphadenopathy:        Head (right side): No submental adenopathy present. Head (left side): No submental adenopathy present. Neurological: She is alert and oriented to person, place, and time. No cranial nerve deficit. She exhibits normal muscle tone. Coordination normal.   Skin: Skin is warm and dry. No rash noted. She is not diaphoretic. No erythema. Psychiatric: Her speech is normal and behavior is normal. Judgment normal. Cognition and memory are normal. She exhibits a depressed mood. She expresses suicidal ideation. She expresses suicidal plans. Nursing note and vitals reviewed.       DIAGNOSTIC RESULTS     EKG: All EKG's are interpreted by the Emergency Department Physician who either signs or Co-signs this chart in the absence of a cardiologist.        RADIOLOGY:   Non-plain film images such as CT, Ultrasound and MRI are read by the radiologist. Plain radiographic images are visualized and preliminarily interpreted by the emergency physician with the below findings:        Interpretation per the Radiologist below, if available at the time of this note:    MRI C/ Caron 29   Final Result   NEGATIVE MRI BRAIN WITHOUT AND WITH IV CONTRAST.             ED BEDSIDE ULTRASOUND:   Performed by ED Physician - none    LABS:  Labs Reviewed   URINE RT REFLEX TO CULTURE - Abnormal; Notable for the following:        Result Value    Clarity, UA CLOUDY (*)     Blood, Urine TRACE (*)     Leukocyte Esterase, Urine TRACE (*)     All other components within normal limits   COMPREHENSIVE METABOLIC PANEL - Abnormal; Notable for the following:     Glucose 136 (*)     All other components within normal limits   LIPID PANEL - Abnormal; Notable for the following:     HDL 29 (*)     All other components within normal limits   LITHIUM LEVEL - Abnormal; Notable for the following:     Lithium Lvl 0.5 (*)     All other components within normal limits   URINE CULTURE    Narrative:     ORDER#: [id number]                          ORDERED BY: MARIA A ALBERT  SOURCE: Urine Clean Catch                  COLLECTED:  11/15/17 19:07  ANTIBIOTICS AT QUIQUE.:                      RECEIVED :  11/15/17 19:07   RAPID STREP SCREEN   URINE DRUG SCREEN   TSH WITHOUT REFLEX   PREGNANCY, URINE   CK   CBC WITH AUTO DIFFERENTIAL   ETHANOL   MICROSCOPIC URINALYSIS   CK-MB INDEX   TROPONIN   CULTURE BETA STREP CONFIRM PLATE    Narrative:     ORDER#: [id number]                          ORDERED BY: Nettie Nelson  SOURCE: Throat                             COLLECTED:  11/18/17 14:53  ANTIBIOTICS AT QUIQUE.:                      RECEIVED :  11/18/17 14:53 All other labs were within normal range or not returned as of this dictation. EMERGENCY DEPARTMENT COURSE and DIFFERENTIAL DIAGNOSIS/MDM:   Vitals:    Vitals:    11/20/17 2119 11/20/17 2144 11/21/17 0740 11/21/17 0741   BP: 132/75  137/85 121/75   Pulse: 69  64 71   Resp:   18    Temp:   97 °F (36.1 °C)    TempSrc:       SpO2:  99% 94% 94%   Weight:       Height:             MDM  Patient is medically cleared for psychiatric services. CONSULTS:  IP CONSULT TO HOSPITALIST  IP CONSULT TO SOCIAL WORK  IP CONSULT TO NEUROLOGY  IP CONSULT TO CARDIOLOGY  IP CONSULT TO PAIN MANAGEMENT    PROCEDURES:  Unless otherwise noted below, none     Procedures    FINAL IMPRESSION      1. Bipolar 1 disorder [facility])          DISPOSITION/PLAN   DISPOSITION Admitted    PATIENT REFERRED TO:  Dheeraj Lindsey MD  [address] [place] [address]          Avery Perez MD  [address]  [address] [place] [address]  [phone]    In 4 weeks        DISCHARGE MEDICATIONS:  Current Discharge Medication List      START taking these medications    Details   cyclobenzaprine (FLEXERIL) 10 MG tablet Take 1 tablet by mouth 3 times daily as needed for Muscle spasms  Qty: 30 tablet, Refills: 0      lidocaine (LIDODERM) 5 % Place 3 patches onto the skin daily 18 hours on, 6 hours off.   Qty: 90 patch, Refills: 0      meloxicam (MOBIC) 7.5 MG tablet Take 1 tablet by mouth every 12 hours  Qty: 60 tablet, Refills: 1                (Please note that portions of this note were completed with a voice recognition program.  Efforts were made to edit the dictations but occasionally words are mis-transcribed.)    Carolyne Harkins DO (electronically signed)  Attending Emergency Physician          Carolyne Harkins DO  11/21/17 1137

## 2017-11-16 LAB
CK MB: 1.1 NG/ML (ref 0–3.8)
CREATINE KINASE-MB INDEX: 1.7 % (ref 0–3.5)
EKG ATRIAL RATE: 73 BPM
EKG P AXIS: 63 DEGREES
EKG P-R INTERVAL: 174 MS
EKG Q-T INTERVAL: 410 MS
EKG QRS DURATION: 96 MS
EKG QTC CALCULATION (BAZETT): 451 MS
EKG R AXIS: 51 DEGREES
EKG T AXIS: 32 DEGREES
EKG VENTRICULAR RATE: 73 BPM
LV EF: 60 %
LVEF MODALITY: NORMAL
TOTAL CK: 63 U/L (ref 0–170)
TROPONIN: <0.01 NG/ML (ref 0–0.01)

## 2017-11-16 PROCEDURE — 93010 ELECTROCARDIOGRAM REPORT: CPT | Performed by: INTERNAL MEDICINE

## 2017-11-16 PROCEDURE — 99223 1ST HOSP IP/OBS HIGH 75: CPT | Performed by: PSYCHIATRY & NEUROLOGY

## 2017-11-16 PROCEDURE — 82550 ASSAY OF CK (CPK): CPT

## 2017-11-16 PROCEDURE — 36415 COLL VENOUS BLD VENIPUNCTURE: CPT

## 2017-11-16 PROCEDURE — 93306 TTE W/DOPPLER COMPLETE: CPT

## 2017-11-16 PROCEDURE — 1240000000 HC EMOTIONAL WELLNESS R&B

## 2017-11-16 PROCEDURE — 84484 ASSAY OF TROPONIN QUANT: CPT

## 2017-11-16 PROCEDURE — 94664 DEMO&/EVAL PT USE INHALER: CPT

## 2017-11-16 PROCEDURE — 6370000000 HC RX 637 (ALT 250 FOR IP): Performed by: PSYCHIATRY & NEUROLOGY

## 2017-11-16 PROCEDURE — 93005 ELECTROCARDIOGRAM TRACING: CPT

## 2017-11-16 PROCEDURE — 6370000000 HC RX 637 (ALT 250 FOR IP): Performed by: NURSE PRACTITIONER

## 2017-11-16 PROCEDURE — 82553 CREATINE MB FRACTION: CPT

## 2017-11-16 RX ORDER — LITHIUM CARBONATE 300 MG/1
300 CAPSULE ORAL 2 TIMES DAILY
Status: DISCONTINUED | OUTPATIENT
Start: 2017-11-16 | End: 2017-11-23 | Stop reason: HOSPADM

## 2017-11-16 RX ORDER — NICOTINE 21 MG/24HR
1 PATCH, TRANSDERMAL 24 HOURS TRANSDERMAL DAILY
Status: DISCONTINUED | OUTPATIENT
Start: 2017-11-16 | End: 2017-11-23 | Stop reason: HOSPADM

## 2017-11-16 RX ORDER — IPRATROPIUM BROMIDE AND ALBUTEROL SULFATE 2.5; .5 MG/3ML; MG/3ML
1 SOLUTION RESPIRATORY (INHALATION) EVERY 4 HOURS PRN
Status: DISCONTINUED | OUTPATIENT
Start: 2017-11-16 | End: 2017-11-19

## 2017-11-16 RX ORDER — ASPIRIN 81 MG/1
81 TABLET, CHEWABLE ORAL DAILY
Status: DISCONTINUED | OUTPATIENT
Start: 2017-11-16 | End: 2017-11-23 | Stop reason: HOSPADM

## 2017-11-16 RX ORDER — ATORVASTATIN CALCIUM 20 MG/1
20 TABLET, FILM COATED ORAL NIGHTLY
Status: DISCONTINUED | OUTPATIENT
Start: 2017-11-16 | End: 2017-11-17

## 2017-11-16 RX ORDER — AMITRIPTYLINE HYDROCHLORIDE 50 MG/1
50 TABLET, FILM COATED ORAL NIGHTLY
Status: DISCONTINUED | OUTPATIENT
Start: 2017-11-16 | End: 2017-11-23 | Stop reason: HOSPADM

## 2017-11-16 RX ADMIN — ATORVASTATIN CALCIUM 20 MG: 20 TABLET, FILM COATED ORAL at 21:56

## 2017-11-16 RX ADMIN — AMITRIPTYLINE HYDROCHLORIDE 50 MG: 50 TABLET, FILM COATED ORAL at 21:56

## 2017-11-16 RX ADMIN — METOPROLOL TARTRATE 25 MG: 25 TABLET, FILM COATED ORAL at 21:56

## 2017-11-16 RX ADMIN — TRAZODONE HYDROCHLORIDE 50 MG: 50 TABLET ORAL at 21:56

## 2017-11-16 RX ADMIN — HYDROXYZINE PAMOATE 50 MG: 50 CAPSULE ORAL at 21:56

## 2017-11-16 RX ADMIN — LITHIUM CARBONATE 300 MG: 300 CAPSULE, GELATIN COATED ORAL at 21:56

## 2017-11-16 RX ADMIN — LITHIUM CARBONATE 300 MG: 300 CAPSULE, GELATIN COATED ORAL at 12:11

## 2017-11-16 RX ADMIN — ACETAMINOPHEN 650 MG: 325 TABLET ORAL at 10:00

## 2017-11-16 RX ADMIN — ASPIRIN 81 MG 81 MG: 81 TABLET ORAL at 16:37

## 2017-11-16 ASSESSMENT — ENCOUNTER SYMPTOMS
GASTROINTESTINAL NEGATIVE: 1
APNEA: 0
STRIDOR: 0
COUGH: 0
WHEEZING: 0
CHEST TIGHTNESS: 0
EYES NEGATIVE: 1
ALLERGIC/IMMUNOLOGIC NEGATIVE: 1

## 2017-11-16 ASSESSMENT — PAIN SCALES - GENERAL
PAINLEVEL_OUTOF10: 8
PAINLEVEL_OUTOF10: 5

## 2017-11-16 NOTE — ED NOTES
Pt report given to Jaren Leal, formerly [facility]  11/15/17 8256

## 2017-11-16 NOTE — CARE COORDINATION
Pt did not attend group despite staff encouragement.    Electronically signed by Darya Conrad on 11/16/2017 at 1:48 PM

## 2017-11-16 NOTE — H&P
Emeka Alcaraz MD   650 mg at 17 1000    hydrOXYzine (VISTARIL) capsule 50 mg  50 mg Oral Q6H PRN Sasha Mayes MD   50 mg at 11/15/17 2322    haloperidol lactate (HALDOL) injection 5 mg  5 mg Intramuscular Q4H PRN Sasha Mayes MD        benztropine mesylate (COGENTIN) injection 2 mg  2 mg Intramuscular BID PRN Sasha Mayes MD        magnesium hydroxide (MILK OF MAGNESIA) 400 MG/5ML suspension 30 mL  30 mL Oral Daily PRN Emeka Alcaraz MD        aluminum & magnesium hydroxide-simethicone (MAALOX) 200-200-20 MG/5ML suspension 30 mL  30 mL Oral PRN Emeka Alcaraz MD        traZODone (DESYREL) tablet 50 mg  50 mg Oral Nightly PRN Emeka Alcaraz MD   50 mg at 11/15/17 1553     Allergies - Influenza vaccines; Eggs or egg-derived products; Gabapentin; Pneumococcal vaccines; Povidone iodine; and Varicella virus vaccine live  Past Surgical History:   Procedure Laterality Date    BACK SURGERY      x2     SECTION      x2    CHOLECYSTECTOMY  13    Lapchole    CORONARY ANGIOPLASTY      CYST REMOVAL  3/7/16    Dr. Barrie Fuentes Left     UPPER GASTROINTESTINAL ENDOSCOPY  2014    ANIBAL HOUSE M.D. Family History   Problem Relation Age of Onset    High Blood Pressure Mother     Kidney Disease Mother     Lupus Mother     Bipolar Disorder Son      Social History:  reports that she has been smoking Cigarettes. She has a 15.00 pack-year smoking history. She has never used smokeless tobacco. She reports that she uses drugs, including Marijuana. She reports that she does not drink alcohol. Review of Systems   Respiratory: Negative. Cardiovascular: Negative. Skin: Negative. Psychiatric/Behavioral: Positive for dysphoric mood and suicidal ideas. All other systems reviewed and are negative.       Objective:   /78   Pulse

## 2017-11-16 NOTE — H&P
Department of Psychiatry  History and Physical - Adult     CHIEF COMPLAINT:  Depression, SI    History obtained from:  patient    Patient was seen after discussing with the treatment team and reviewing the chart    HISTORY OF PRESENT ILLNESS:    The patient is a 41 y.o. female live alone with significant past history of bipolar disorder and medical issues  Pt has been feeling depressed since her  and son  but was able to function with her mom support  Last month her mom  which made her more sad and depressed  Patient report depressive symptoms, rating mood to be around 2/10 (10- good)  Pt has hopeless, worthless and helpless feeling  Suicidal thoughts - want to drive her car from a bridge or underpass, her body get excited - spurt of energy wanting to do. Will not do that for her son. Anxious about ongoing stressor, still ruminating about it  Pt has poor concentration, anhedonia, decrease motivation  Poor sleep and tired. Poor appetite  Psych ROS:  Manic symptoms- not recently, but h/o dylan in the past  Auditory hallucination - no  Visual hallucination - no  Paranoid thoughts- no  PTSD -no  Stressors:lost her  2013 from cancer, lost son 2014 (accidental fentanyl overdose) and her mom a month ago  Multiple medical problems - MS, neuropathy  Not taking meds for few months for bipolar disorder  Not taking any meds for MS, last saw dr Noelle Nuñez - 3 years ago  Recently placed her mom under hospice care and she is terminally ill  The patient is not currently receiving care for the above psychiatric illness.     Medications Prior to Admission:   Prescriptions Prior to Admission: buPROPion (WELLBUTRIN SR) 150 MG extended release tablet, Take 1 tablet by mouth 2 times daily  aspirin 81 MG chewable tablet, Take 1 tablet by mouth daily  isosorbide mononitrate (IMDUR) 30 MG extended release tablet, Take 1 tablet by mouth daily  atorvastatin (LIPITOR) 40 MG tablet, Take 1 tablet by mouth nightly  metoprolol tartrate (LOPRESSOR) 50 MG tablet, Take 1 tablet by mouth 2 times daily  lisinopril (PRINIVIL;ZESTRIL) 40 MG tablet, TAKE 1 TABLET BY MOUTH EVERY DAY  pantoprazole (PROTONIX) 40 MG tablet, Take 1 tablet by mouth every morning (before breakfast)  butalbital-aspirin-caffeine (FIORINAL) -40 MG capsule, Take 1 capsule by mouth every 4 hours as needed for Headaches  tiotropium (SPIRIVA HANDIHALER) 18 MCG inhalation capsule, Inhale 1 capsule into the lungs daily  Tiotropium Bromide-Olodaterol (STIOLTO RESPIMAT) 2.5-2.5 MCG/ACT AERS, Inhale 1 puff into the lungs daily  cyanocobalamin 1000 MCG/ML injection, Inject 1 mL into the muscle every 30 days 1 ml each month  SYRINGE-NEEDLE, DISP, 3 ML 23G X 1\" 3 ML MISC, Inject 1 mL into the muscle every 30 days  Cyanocobalamin (B-12 COMPLIANCE INJECTION) 1000 MCG/ML KIT, Inject 1,000 mcg as directed every 14 days  nitroGLYCERIN (NITROSTAT) 0.4 MG SL tablet, up to max of 3 total doses.  If no relief after 1 dose, call 911.  naproxen sodium (ANAPROX) 550 MG tablet, Take 1 tablet by mouth 2 times daily (with meals)  albuterol (PROVENTIL) (2.5 MG/3ML) 0.083% nebulizer solution, Take 3 mLs by nebulization every 6 hours as needed for Wheezing  VENTOLIN  (90 BASE) MCG/ACT inhaler, Inhale 2 puffs into the lungs every 6 hours as needed for Wheezing  folic acid (FOLVITE) 1 MG tablet, Take 1 tablet by mouth daily  PARAFON FORTE (PARAFON FORTE) 417 MG tablet,   folic acid (FOLVITE) 1 MG tablet, daily   busPIRone (BUSPAR) 5 MG tablet, Take 1 tablet by mouth 3 times daily  Magnesium Hydroxide (MAGNESIA PO), Take by mouth  nicotine (NICODERM CQ) 21 MG/24HR, Place 1 patch onto the skin every 24 hours  Cholecalciferol (VITAMIN D) 2000 UNITS TABS, Take 1 tablet by mouth daily    Compliance:not taking any psych meds    Substance Abuse History:  ETOH: last one month - binging once  Marijuana: last one month  Opiates: no  Other Drugs: no    Past Psychiatric single  Children: 1 alive  Current Support: romantic partner   son  Legal Hx: none  Access to weapons?:  No        REVIEW OF SYSTEMS:    ROS:  [x] All negative/unchanged except if checked. Explain positive(checked items) below:  [] Constitutional  [] Eyes  [] Ear/Nose/Mouth/Throat  [] Respiratory  [] CV  [] GI  []   [] Musculoskeletal  [] Skin/Breast  [x] Neurological - balance off, falling more, feet no sensation and painful  [] Endocrine  [] Heme/Lymph  [] Allergic/Immunologic    Explanation:       PHYSICAL EXAM:  Vitals:  /78   Pulse 87   Temp 97 °F (36.1 °C)   Resp 20   Ht 5' 7\" (1.702 m)   Wt 160 lb (72.6 kg)   SpO2 96%   BMI 25.06 kg/m²      Neurologic Exam:   Muscle Strength & Tone: full ROM, normal  Gait: normal gait   Involuntary Movements: No    Mental Status Examination:    Level of consciousness:  within normal limits   Appearance:  ill-appearing  Behavior/Motor:  psychomotor retardation  Attitude toward examiner:  cooperative  Speech:  slow   Mood: constricted, decreased range and depressed  Affect:  mood congruent  Thought processes:  linear   Thought content:  Suicidal Ideation:  passive  Delusions:  no evidence of delusions  Perceptual Disturbance:  denies any perceptual disturbance  Cognition:  oriented to person, place, and time   Concentration distractible  Memory intact  Mini Mental Status 30/30  Insight fair   Judgement poor   Fund of Knowledge limited      DIAGNOSIS:     (Axis I):        Bipolar I disorder; depressed episode and severe  Substance disorders:  Alcohol abuse  MS - last saw Dr Liza Boothe - 3 years ago    RISK ASSESSMENT:    SUICIDE: high   HOMICIDE: lwo  AGITATION/VIOLENCE: low  ELOPEMENT: low    LABS:  Recent Labs      11/15/17   1817   WBC  8.7   HGB  15.5   PLT  166     Recent Labs      11/15/17   1817   NA  141   K  3.8   CL  102   CO2  27   BUN  6   CREATININE  0.65   GLUCOSE  136*     Recent Labs      11/15/17   1817   BILITOT  0.4   ALKPHOS  119   AST  21   ALT  29 Lab Results   Component Value Date    LABAMPH Neg 11/15/2017    BARBSCNU Neg 11/15/2017    LABBENZ Neg 11/15/2017    LABBENZ NotDTCD 01/26/2013    OPIATESCREENURINE Neg 11/15/2017    PHENCYCLIDINESCREENURINE Neg 11/15/2017    ETOH <10 11/15/2017     Lab Results   Component Value Date    TSH 2.960 11/15/2017     No results found for: LITHIUM  No results found for: VALPROATE, CBMZ  No results found for: LITHIUM, VALPROATE    Radiology  No results found. TREATMENT PLAN:    Risk Management:  close watch and suicide risk    Collateral Information:  Will obtain collateral information from the family or friends. Will obtain medical records as appropriate from out patient providers  Will consult the hospitalist for a physical exam to rule out any co-morbid physical condition. Medications:    See orders  Neuro consulted  Prn Haldol 5mg and Vistaril 50mg q6hr for extreme agitation. Discussed with the patient risk, benefit, alternative and common side effects for the  proposed medication treatment. Patient is consenting to the treatment. Psychotherapy:   Encourage participation in milieu and group therapy  Individual therapy as needed      GENERAL PATIENT/FAMILY EDUCATION    Goals:    Patient will understand basic signs and symptoms  Patient will understand their role in recovery          Behavioral Services  Medicare Certification      Admission Day 1  I certify that this patient's inpatient psychiatric hospital admission is medically necessary for:     (1) treatment which could reasonably be expected to improve this patient's condition, or     (2) diagnostic study or its equivalent.        Electronically signed by Dana Marrero MD on 11/16/2017 at 10:30 AM

## 2017-11-16 NOTE — ED TRIAGE NOTES
Pt was transported by [address] [place] [address],[address] from [facility]. Per [facility] pt has suicidal ideation with plans and vague intent. Per [facility] clinician pt appetite poor lost 10 lbs, only 3 hrs of sleep last night. Pt's mother passed a month ago and it is her  and son's death anniversary.  Pt states she is not coping well
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
## 2017-11-16 NOTE — PLAN OF CARE
Problem: Depressive Behavior with or without Suicide precautions  Goal: LTG-Able to verbalize acceptance of life and situations over which he or she has no control  Outcome: Ongoing    Goal: LTG-Able to verbalize and/or display a decrease in depressive symptoms  Outcome: Ongoing    Goal: STG-Able to verbalize support system  Outcome: Ongoing    Goal: STG-Absence of Self Harm  Outcome: Met This Shift    Goal: STG-Knowledge of positive coping patterns  Outcome: Ongoing    Goal: STG-Participation in care planning  Outcome: Ongoing

## 2017-11-16 NOTE — PROGRESS NOTES
Pt. refused to attend the 1000 skills group, despite staff encouragement. Electronically signed by Crescencio Perez, [address] on 11/16/2017 at 10:56 AM

## 2017-11-16 NOTE — BH NOTE
Group Therapy Note    Date: 11/16/2017  Start Time: 1330  End Time:  4282  Number of Participants: 5    Type of Group: Psychoeducation    Wellness Binder Information  Module Name:  Stress management    Patient's Goal:  What is stress    Notes:  Identified what stress means to her. Completed stress inventory and recognizing need to balance stress in her life. Identified her stress warning signs, stressors and what she could do as a positive coping skill such as doodling. Status After Intervention:  Unchanged    Participation Level:  Active Listener and Interactive    Participation Quality: Appropriate, Attentive and Sharing      Speech:  normal      Thought Process/Content: Logical      Affective Functioning: Congruent      Mood: depressed      Level of consciousness:  Alert, Oriented x4 and Attentive      Response to Learning: Able to verbalize current knowledge/experience, Able to verbalize/acknowledge new learning and Able to retain information      Endings: None Reported    Modes of Intervention: Education, Support, Exploration, Clarifying and Problem-solving      Discipline Responsible: Registered Nurse      Signature:  Roosevelt Brooks RN

## 2017-11-16 NOTE — PROGRESS NOTES
[facility] AT [place] Respiratory Therapy Evaluation   Current Order:  Batsheva Marshall Q8      Home Regimen: PRN      Ordering Physician: Martha  Re-evaluation Date:  N/A     Diagnosis: Depression      Patient Status: Stable / Unstable + Physician notified    The following MDI Criteria must be met in order to convert aerosol to MDI with spacer. If unable to meet, MDI will be converted to aerosol:  [x]  Patient able to demonstrate the ability to use MDI effectively  [x]  Patient alert and cooperative  [x]  Patient able to take deep breath with 5-10 second hold  [x]  Medication(s) available in this delivery method   [x]  Peak flow greater than or equal to 200 ml/min            Current Order Substituted To  (same drug, same frequency)   Aerosol to MDI [] Albuterol Sulfate 0.083% unit dose by aerosol Albuterol Sulfate MDI 2 puffs by inhalation with spacer    [] Levalbuterol 1.25 mg unit dose by aerosol Levalbuterol MDI 2 puffs by inhalation with spacer    [] Levalbuterol 0.63 mg unit dose by aerosol Levalbuterol MDI 2 puffs by inhalation with spacer    [] Ipratropium Bromide 0.02% unit dose by aerosol Ipratropium Bromide MDI 2 puffs by inhalation with spacer    [x] Duoneb (Ipratropium + Albuterol) unit dose by aerosol Ipratropium MDI + Albuterol MDI 2 puffs by inhalation w/spacer   MDI to Aerosol [] Albuterol Sulfate MDI Albuterol Sulfate 0.083% unit dose by aerosol    [] Levalbuterol MDI 2 puffs by inhalation Levalbuterol 1.25 mg unit dose by aerosol    [] Ipratropium Bromide MDI by inhalation Ipratropium Bromide 0.02% unit dose by aerosol    [] Combivent (Ipratropium + Albuterol) MDI by inhalation Duoneb (Ipratropium + Albuterol) unit dose by aerosol   Treatment Assessment [Frequency/Schedule]:  Change frequency to: ____Duoneb Q4 PRn______________________________________________per Protocol, P&T, MEC      Points 0 1 2 3 4   Pulmonary Status  Non-Smoker  []   Smoking history   < 20 pack years  [x]   Smoking history  ?  20 pack years  []

## 2017-11-16 NOTE — CONSULTS
Hx:  Social History     Social History    Marital status:      Spouse name: N/A    Number of children: N/A    Years of education: N/A     Occupational History    unemployed      Social History Main Topics    Smoking status: Current Some Day Smoker     Packs/day: 1.50     Years: 10.00     Types: Cigarettes     Last attempt to quit: 1/3/2017    Smokeless tobacco: Never Used    Alcohol use No    Drug use:      Types: Marijuana      Comment: last used one month ago     Sexual activity: Not Asked     Other Topics Concern    None     Social History Narrative    Shonda Stevens is a 63-year-old female who is on disability because of pain and bipolar disorder. She's also had a presumed diagnosis of possible multiple sclerosis. She's been seeing Dr. Clemens Early for that and she says that the diagnosis is sometimes in question and it's clear neuropathy vitamin B12 deficiency as well as generalized bodyaches from the severe vitamin D deficiency have caused this scenario that looks like multiple sclerosis. She recently tried to go back to work but was not able because of her pain. Family Hx:  Family History   Problem Relation Age of Onset    High Blood Pressure Mother     Kidney Disease Mother     Lupus Mother     Bipolar Disorder Son        Review of Systems:   Review of Systems   Constitutional: Negative for appetite change, chills, diaphoresis, fatigue and fever. HENT: Negative. Eyes: Negative. Respiratory: Negative for apnea, cough, chest tightness, wheezing and stridor. Cardiovascular: Positive for chest pain. Negative for palpitations and leg swelling. Gastrointestinal: Negative. Endocrine: Negative. Genitourinary: Negative. Musculoskeletal: Negative. Allergic/Immunologic: Negative. Neurological: Negative. Hematological: Negative. Psychiatric/Behavioral: Positive for suicidal ideas.          Physical Examination:    /78   Pulse 87   Temp 97 °F (36.1 °C)

## 2017-11-16 NOTE — PROGRESS NOTES
Pt arrived to unit with [facility] AN AFFILIATE OF [facility] staff. Pt taken to room 370-02, (moved to 365) searched in presence of Tho Margaux. No contraband was found.

## 2017-11-16 NOTE — PROGRESS NOTES
Pt. declined to attend the 0900 community meeting, despite staff encouragement. Electronically signed by Case Krishnan, [address] on 11/16/2017 at 9:52 AM

## 2017-11-16 NOTE — PROGRESS NOTES
Report called by Vladimir Simons RN - KIRBY. Pt is 48y/o white female. Involuntary admit of Dr. Massiel Escobar with diagnosis  of Bipolar 1 depressed Pt reports increased depression. Pt states current SI with vague thoughts to drive into a bridge. No medications x 3 mons. Pt not open with anyone. Was assessed at [facility] who sent pinked to ER. Pt reports disturbed sleep and unintentional weight loss of 10# recently. Pt has recent loss of mother 1 mo ago, son 2 yo and  3 yo. .     Med history = Charted   Surgical History = charted  Psych History =unstated  [address] =  none

## 2017-11-17 ENCOUNTER — APPOINTMENT (OUTPATIENT)
Dept: MRI IMAGING | Age: 47
DRG: 753 | End: 2017-11-17
Payer: MEDICAID

## 2017-11-17 LAB
CHOLESTEROL, TOTAL: 136 MG/DL (ref 0–199)
EKG ATRIAL RATE: 67 BPM
EKG P AXIS: 67 DEGREES
EKG P-R INTERVAL: 182 MS
EKG Q-T INTERVAL: 416 MS
EKG QRS DURATION: 92 MS
EKG QTC CALCULATION (BAZETT): 439 MS
EKG R AXIS: 71 DEGREES
EKG T AXIS: 61 DEGREES
EKG VENTRICULAR RATE: 67 BPM
HDLC SERPL-MCNC: 29 MG/DL (ref 40–59)
LDL CHOLESTEROL CALCULATED: 87 MG/DL (ref 0–129)
TRIGL SERPL-MCNC: 101 MG/DL (ref 0–200)
URINE CULTURE, ROUTINE: NORMAL

## 2017-11-17 PROCEDURE — 97165 OT EVAL LOW COMPLEX 30 MIN: CPT

## 2017-11-17 PROCEDURE — 6370000000 HC RX 637 (ALT 250 FOR IP): Performed by: PSYCHIATRY & NEUROLOGY

## 2017-11-17 PROCEDURE — 70553 MRI BRAIN STEM W/O & W/DYE: CPT

## 2017-11-17 PROCEDURE — 93005 ELECTROCARDIOGRAM TRACING: CPT

## 2017-11-17 PROCEDURE — 36415 COLL VENOUS BLD VENIPUNCTURE: CPT

## 2017-11-17 PROCEDURE — G8988 SELF CARE GOAL STATUS: HCPCS

## 2017-11-17 PROCEDURE — G8978 MOBILITY CURRENT STATUS: HCPCS

## 2017-11-17 PROCEDURE — G8987 SELF CARE CURRENT STATUS: HCPCS

## 2017-11-17 PROCEDURE — 80061 LIPID PANEL: CPT

## 2017-11-17 PROCEDURE — A9579 GAD-BASE MR CONTRAST NOS,1ML: HCPCS | Performed by: PSYCHIATRY & NEUROLOGY

## 2017-11-17 PROCEDURE — 1240000000 HC EMOTIONAL WELLNESS R&B

## 2017-11-17 PROCEDURE — 97163 PT EVAL HIGH COMPLEX 45 MIN: CPT

## 2017-11-17 PROCEDURE — 97162 PT EVAL MOD COMPLEX 30 MIN: CPT

## 2017-11-17 PROCEDURE — 6370000000 HC RX 637 (ALT 250 FOR IP): Performed by: NURSE PRACTITIONER

## 2017-11-17 PROCEDURE — G8979 MOBILITY GOAL STATUS: HCPCS

## 2017-11-17 PROCEDURE — 6360000004 HC RX CONTRAST MEDICATION: Performed by: PSYCHIATRY & NEUROLOGY

## 2017-11-17 PROCEDURE — 6370000000 HC RX 637 (ALT 250 FOR IP): Performed by: ANESTHESIOLOGY

## 2017-11-17 PROCEDURE — 99231 SBSQ HOSP IP/OBS SF/LOW 25: CPT | Performed by: NURSE PRACTITIONER

## 2017-11-17 PROCEDURE — 99232 SBSQ HOSP IP/OBS MODERATE 35: CPT | Performed by: PSYCHIATRY & NEUROLOGY

## 2017-11-17 RX ORDER — TRAMADOL HYDROCHLORIDE 50 MG/1
50 TABLET ORAL EVERY 6 HOURS PRN
Status: DISCONTINUED | OUTPATIENT
Start: 2017-11-17 | End: 2017-11-23 | Stop reason: HOSPADM

## 2017-11-17 RX ORDER — CYCLOBENZAPRINE HCL 10 MG
10 TABLET ORAL 3 TIMES DAILY PRN
Qty: 30 TABLET | Refills: 0 | Status: SHIPPED | OUTPATIENT
Start: 2017-11-17 | End: 2017-11-27

## 2017-11-17 RX ORDER — ATORVASTATIN CALCIUM 10 MG/1
10 TABLET, FILM COATED ORAL NIGHTLY
Status: DISCONTINUED | OUTPATIENT
Start: 2017-11-17 | End: 2017-11-23 | Stop reason: HOSPADM

## 2017-11-17 RX ORDER — LIDOCAINE 50 MG/G
3 PATCH TOPICAL DAILY
Qty: 90 PATCH | Refills: 0 | Status: ON HOLD | OUTPATIENT
Start: 2017-11-17 | End: 2018-11-08

## 2017-11-17 RX ORDER — SODIUM CHLORIDE 0.9 % (FLUSH) 0.9 %
10 SYRINGE (ML) INJECTION 2 TIMES DAILY
Status: DISCONTINUED | OUTPATIENT
Start: 2017-11-17 | End: 2017-11-21

## 2017-11-17 RX ORDER — ATORVASTATIN CALCIUM 10 MG/1
10 TABLET, FILM COATED ORAL NIGHTLY
Qty: 30 TABLET | Refills: 3 | Status: SHIPPED | OUTPATIENT
Start: 2017-11-17 | End: 2018-12-18 | Stop reason: SDUPTHER

## 2017-11-17 RX ORDER — CYCLOBENZAPRINE HCL 10 MG
10 TABLET ORAL 3 TIMES DAILY PRN
Status: DISCONTINUED | OUTPATIENT
Start: 2017-11-17 | End: 2017-11-23 | Stop reason: HOSPADM

## 2017-11-17 RX ORDER — MELOXICAM 7.5 MG/1
7.5 TABLET ORAL EVERY 12 HOURS SCHEDULED
Qty: 60 TABLET | Refills: 1 | Status: ON HOLD | OUTPATIENT
Start: 2017-11-17 | End: 2018-11-08 | Stop reason: HOSPADM

## 2017-11-17 RX ORDER — DIAZEPAM 5 MG/1
5 TABLET ORAL ONCE
Status: COMPLETED | OUTPATIENT
Start: 2017-11-17 | End: 2017-11-17

## 2017-11-17 RX ORDER — LIDOCAINE 50 MG/G
3 PATCH TOPICAL DAILY
Status: DISCONTINUED | OUTPATIENT
Start: 2017-11-17 | End: 2017-11-23 | Stop reason: HOSPADM

## 2017-11-17 RX ORDER — LISINOPRIL 10 MG/1
10 TABLET ORAL DAILY
Qty: 30 TABLET | Refills: 3 | Status: SHIPPED | OUTPATIENT
Start: 2017-11-17 | End: 2018-04-06

## 2017-11-17 RX ORDER — LISINOPRIL 10 MG/1
10 TABLET ORAL DAILY
Status: DISCONTINUED | OUTPATIENT
Start: 2017-11-17 | End: 2017-11-23 | Stop reason: HOSPADM

## 2017-11-17 RX ORDER — ASPIRIN 81 MG/1
81 TABLET, CHEWABLE ORAL DAILY
Qty: 30 TABLET | Refills: 3 | Status: SHIPPED | OUTPATIENT
Start: 2017-11-18 | End: 2018-12-18 | Stop reason: SDUPTHER

## 2017-11-17 RX ORDER — MELOXICAM 7.5 MG/1
7.5 TABLET ORAL EVERY 12 HOURS SCHEDULED
Status: DISCONTINUED | OUTPATIENT
Start: 2017-11-17 | End: 2017-11-23 | Stop reason: HOSPADM

## 2017-11-17 RX ADMIN — LITHIUM CARBONATE 300 MG: 300 CAPSULE, GELATIN COATED ORAL at 08:56

## 2017-11-17 RX ADMIN — METOPROLOL TARTRATE 25 MG: 25 TABLET, FILM COATED ORAL at 23:00

## 2017-11-17 RX ADMIN — METOPROLOL TARTRATE 25 MG: 25 TABLET, FILM COATED ORAL at 08:57

## 2017-11-17 RX ADMIN — TRAMADOL HYDROCHLORIDE 50 MG: 50 TABLET, FILM COATED ORAL at 14:48

## 2017-11-17 RX ADMIN — MELOXICAM 7.5 MG: 7.5 TABLET ORAL at 23:01

## 2017-11-17 RX ADMIN — DIAZEPAM 5 MG: 5 TABLET ORAL at 20:21

## 2017-11-17 RX ADMIN — ASPIRIN 81 MG 81 MG: 81 TABLET ORAL at 08:56

## 2017-11-17 RX ADMIN — AMITRIPTYLINE HYDROCHLORIDE 50 MG: 50 TABLET, FILM COATED ORAL at 23:02

## 2017-11-17 RX ADMIN — LITHIUM CARBONATE 300 MG: 300 CAPSULE, GELATIN COATED ORAL at 23:00

## 2017-11-17 RX ADMIN — GADOTERIDOL 15 ML: 279.3 INJECTION, SOLUTION INTRAVENOUS at 22:41

## 2017-11-17 RX ADMIN — CYCLOBENZAPRINE HYDROCHLORIDE 10 MG: 10 TABLET, FILM COATED ORAL at 14:48

## 2017-11-17 RX ADMIN — LISINOPRIL 10 MG: 10 TABLET ORAL at 11:01

## 2017-11-17 RX ADMIN — ATORVASTATIN CALCIUM 10 MG: 10 TABLET, FILM COATED ORAL at 23:01

## 2017-11-17 ASSESSMENT — ENCOUNTER SYMPTOMS
APNEA: 0
STRIDOR: 0
COUGH: 0
WHEEZING: 0
EYES NEGATIVE: 1
ALLERGIC/IMMUNOLOGIC NEGATIVE: 1
GASTROINTESTINAL NEGATIVE: 1
CHEST TIGHTNESS: 0

## 2017-11-17 ASSESSMENT — PAIN SCALES - GENERAL
PAINLEVEL_OUTOF10: 8
PAINLEVEL_OUTOF10: 8
PAINLEVEL_OUTOF10: 7

## 2017-11-17 ASSESSMENT — PAIN DESCRIPTION - LOCATION: LOCATION: BACK;HIP;LEG;FOOT

## 2017-11-17 ASSESSMENT — PAIN DESCRIPTION - DESCRIPTORS: DESCRIPTORS: CONSTANT

## 2017-11-17 ASSESSMENT — PAIN DESCRIPTION - ORIENTATION: ORIENTATION: RIGHT;LEFT

## 2017-11-17 ASSESSMENT — PAIN DESCRIPTION - PAIN TYPE: TYPE: CHRONIC PAIN

## 2017-11-17 NOTE — CONSULTS
[address]                       [address], [address] [facility]                                   CONSULTATION    PATIENT NAME: Aneta Johnson                   :        1970  MED REC NO:   [id number]                            ROOM:       W365  ACCOUNT NO:   [de-identified]                           ADMIT DATE: 11/15/2017  PROVIDER:     Osito Savage MD    CONSULT DATE:  2017    ATTENDING:  Rob Chapin MD    HISTORY OF PRESENT ILLNESS:  This is a 53-year-old right-handed female who  we asked to see for multiple sclerosis. The patient reports that she  carries a diagnosis of multiple sclerosis, though this is not what we have  said in our notes. She reports that she was told by her primary and/or the  doctor that she has multiple sclerosis. We had seen her in 2016 here for  some left-sided weakness. States she has a subcentimeter small T2 signal  intensity in the segmentum. This one spot does not define MS by MRI  criteria. She has not had any other symptoms of clinical relapse. She has  some paresthesias but she does not have any focal symptoms. We had seen  her in my office on 2017, for what appeared to be a possibility of  autonomic dysfunction. Given her findings, I recommended that she be seen  by autonomic lab for autonomic testing, she has not had done this yet. She  has chronic low back pain and had _____ she had radiofrequency treatment at  that time. The patient also seen in the hospital for the symptoms. She  was recommended by her primary to see [facility], which she has not  done. She has multiple nonspecific symptoms and she calls this peripheral  neuropathy but she has paresthesia on her leg. She has some difficulty  with walking and some bladder dysfunction, but none of this appears to be  significant. She denies any recent falls, injuries, trauma, nocturnal  bleeding, bruising, choking, drooling.     PAST MEDICAL cerebral peduncle. This is only a  single spot and therefore does not appear to be within the criteria of  demyelination as seen in multiple sclerosis. The patient does not have the  modified and Otoole criteria either. She does not have any other  relapses. The patient had a lumbar puncture, which shows negative  oligoclonal bands. Her IgG index is normal.  Given these findings with the  normal CSF findings without any further symptoms of relapse, courses or  other findings, these findings are not consistent with demyelination as  seen in multiple sclerosis. The patient will have a repeat MRI and if she  shows any other white spots which means relapsing remitting multiple  sclerosis, then we may consider this diagnosis. This one lesion does not  appear to be suggestive of demyelination seen in multiple sclerosis. Thank you Dr. Danielle Corey, for asking us to assist in the care of the  patient.         Tamiko Salcedo MD    D: 11/17/2017 14:35:46       T: 11/17/2017 17:06:11     [email or web address]  Job#: [id number]     Doc#: [id number]

## 2017-11-17 NOTE — CARE COORDINATION
Group Therapy Note    Date: 11/17/2017  Start Time: 1100  End Time:  1200  Number of Participants: 6    Type of Group: Psychotherapy    Wellness Binder Information  Module Name:  x  Session Number:  x    Patient's Goal:  Stop my brain from racing    Notes:  Patient was open and sharing with staff and other members.     Status After Intervention:  Unchanged    Participation Level: Interactive    Participation Quality: Appropriate      Speech:  normal      Thought Process/Content: Logical      Affective Functioning: Blunted      Mood: depressed      Level of consciousness:  Alert      Response to Learning: Able to verbalize current knowledge/experience      Endings: None Reported    Modes of Intervention: Support      Discipline Responsible: /Counselor   Electronically signed by RED Tovar on 11/17/2017 at 12:32 PM      Signature:  Tori Barrera [facility]

## 2017-11-17 NOTE — PROGRESS NOTES
[facility] FOLLOW-UP NOTE     11/17/2017     Patient was seen and examined in person, Chart reviewed   Patient's case discussed with staff/team    Chief Complaint: depression    Interim History:     Pt slept better last night but feel the hangover in the morning  Still feeling depressed, racing thoughts   Took lithium last night and this morning  Waiting for Dr Suhas Clinton and pain management consult  Passive death wishes  Appetite:   [x] Normal/Unchanged  [] Increased  [] Decreased      Sleep:       [] Normal/Unchanged  [x] Fair       [] Poor              Energy:    [] Normal/Unchanged  [] Increased  [x] Decreased        SI [x] Present  [] Absent    HI  []Present  [x] Absent     Aggression:  [] yes  [x] no    Patient is [] able  [x] unable to CONTRACT FOR SAFETY     PAST MEDICAL/PSYCHIATRIC HISTORY:   Past Medical History:   Diagnosis Date    Anxiety     Back pain     back surgery x 4    Bipolar disorder ([address]     CAD (coronary artery disease)     CAFL (chronic airflow limitation) ([address] 11/3/2016    Chronic pain     Colitis     DDD (degenerative disc disease), lumbar 12/22/2016    Depression     Endometriosis     Gastritis     GERD (gastroesophageal reflux disease)     History of myocardial infarction     HTN (hypertension), benign 2/19/2016    Hypokalemia     Impaired mobility and activities of daily living     Left hemiparesis ([address]     Multiple sclerosis ([address]     Neck pain     surgery x 1    Over weight     PTSD (post-traumatic stress disorder)     Sensory neuropathy ([address] 12/22/2016    TIA (transient ischemic attack)     Tobacco abuse     Vasospastic angina ([address] 11/11/2016       FAMILY/SOCIAL HISTORY:  Family History   Problem Relation Age of Onset    High Blood Pressure Mother     Kidney Disease Mother     Lupus Mother     Bipolar Disorder Son      Social History     Social History    Marital status:       Spouse name: N/A    Number of children: N/A    Years of education: N/A     Occupational History    unemployed      Social History Main Topics    Smoking status: Current Some Day Smoker     Packs/day: 1.50     Years: 10.00     Types: Cigarettes     Last attempt to quit: 1/3/2017    Smokeless tobacco: Never Used    Alcohol use No    Drug use:      Types: Marijuana      Comment: last used one month ago     Sexual activity: Not on file     Other Topics Concern    Not on file     Social History Narrative    Marshall Kee is a 28-year-old female who is on disability because of pain and bipolar disorder. She's also had a presumed diagnosis of possible multiple sclerosis. She's been seeing Dr. Felipe Tate for that and she says that the diagnosis is sometimes in question and it's clear neuropathy vitamin B12 deficiency as well as generalized bodyaches from the severe vitamin D deficiency have caused this scenario that looks like multiple sclerosis. She recently tried to go back to work but was not able because of her pain. ROS:  [x] All negative/unchanged except if checked.  Explain positive(checked items) below:  [] Constitutional  [] Eyes  [] Ear/Nose/Mouth/Throat  [] Respiratory  [] CV  [] GI  []   [] Musculoskeletal  [] Skin/Breast  [] Neurological  [] Endocrine  [] Heme/Lymph  [] Allergic/Immunologic    Explanation:     MEDICATIONS:    Current Facility-Administered Medications:     atorvastatin (LIPITOR) tablet 10 mg, 10 mg, Oral, Nightly, Concha Olivera NP    lisinopril (PRINIVIL;ZESTRIL) tablet 10 mg, 10 mg, Oral, Daily, Concha Olivera NP, 10 mg at 11/17/17 1101    ipratropium-albuterol (DUONEB) nebulizer solution 1 ampule, 1 ampule, Inhalation, Q4H PRN, Shabnam Herrera MD    nicotine (NICODERM CQ) 21 MG/24HR 1 patch, 1 patch, Transdermal, Daily, Shabnam Herrera MD, 1 patch at 11/17/17 0858    lithium capsule 300 mg, 300 mg, Oral, BID, Shabnam Herrera MD, 300 mg at 11/17/17 0856    amitriptyline (ELAVIL) tablet 50 mg, 50 mg, Oral,

## 2017-11-17 NOTE — PLAN OF CARE
Problem: Depressive Behavior with or without Suicide precautions  Goal: LTG-Able to verbalize acceptance of life and situations over which he or she has no control  Outcome: Ongoing    Goal: LTG-Able to verbalize and/or display a decrease in depressive symptoms  Outcome: Ongoing    Goal: STG-Able to verbalize support system  Outcome: Ongoing    Goal: STG-Absence of Self Harm  Outcome: Ongoing    Goal: STG-Participation in care planning  Outcome: Ongoing

## 2017-11-17 NOTE — PROGRESS NOTES
Progress Note  Patient: Guillermo Medellin  Unit/Bed: T567/D289-26  YOB: 1970  MRN: [id number]  Acct: [de-identified]   Admitting Diagnosis: Bipolar 1 disorder, depressed ([facility] [F31.9]  Bipolar 1 disorder, depressed ([facility] [F31.9]  Admit Date:  11/15/2017  Hospital Day: 2    Chief Complaint:  Chest pain    Subjective  47y female well known to our cardiac service admitted to [address] [place] [address] for depression and suicidal thought. Complained of chest pain atypical in nature and is reproducible. Patient had normal heart catherization 3/2016. Pt denies  shortness of breath, nausea, vomiting, diarrhea, constipation, motor weakness, insomnia, weight loss, PND, orthopnea, or claudication. 11/17/17  Patient awake and alert. Chest pain reproducible. Pt denies  shortness of breath, nausea, vomiting, diarrhea, constipation, motor weakness, insomnia, weight loss, PND, orthopnea, or claudication. Okay to discharge from cardiology    Review of Systems:   Review of Systems   Constitutional: Negative for appetite change, chills, diaphoresis, fatigue and fever. HENT: Negative. Eyes: Negative. Respiratory: Negative for apnea, cough, chest tightness, wheezing and stridor. Cardiovascular: Positive for chest pain. Negative for palpitations and leg swelling. Gastrointestinal: Negative. Endocrine: Negative. Genitourinary: Negative. Musculoskeletal: Negative. Allergic/Immunologic: Negative. Neurological: Negative. Hematological: Negative. Psychiatric/Behavioral: Positive for suicidal ideas. Physical Examination:    /84   Pulse 80   Temp 97 °F (36.1 °C) (Oral)   Resp 18   Ht 5' 7\" (1.702 m)   Wt 160 lb (72.6 kg)   SpO2 97%   BMI 25.06 kg/m²    Physical Exam   Constitutional: She is oriented to person, place, and time. She appears well-developed and well-nourished. HENT:   Head: Normocephalic. Eyes: Pupils are equal, round, and reactive to light. Neck: No JVD present.  No tracheal deviation present. No thyromegaly present. Cardiovascular: Normal rate, regular rhythm, normal heart sounds and intact distal pulses. Exam reveals no gallop and no friction rub. No murmur heard. Pulmonary/Chest: Effort normal and breath sounds normal. No respiratory distress. She has no wheezes. She has no rales. She exhibits tenderness. Abdominal: Soft. Bowel sounds are normal.   Musculoskeletal: Normal range of motion. She exhibits no edema or tenderness. Lymphadenopathy:     She has no cervical adenopathy. Neurological: She is alert and oriented to person, place, and time. Skin: Skin is warm and dry. Psychiatric: She exhibits a depressed mood.        LABS:  CBC:   Lab Results   Component Value Date    WBC 8.7 11/15/2017    RBC 4.95 11/15/2017    HGB 15.5 11/15/2017    HCT 46.5 11/15/2017    MCV 94.1 11/15/2017    MCH 31.3 11/15/2017    MCHC 33.3 11/15/2017    RDW 13.8 11/15/2017     11/15/2017    MPV 9.6 09/20/2015     CBC with Differential:    Lab Results   Component Value Date    WBC 8.7 11/15/2017    RBC 4.95 11/15/2017    HGB 15.5 11/15/2017    HCT 46.5 11/15/2017     11/15/2017    MCV 94.1 11/15/2017    MCH 31.3 11/15/2017    MCHC 33.3 11/15/2017    RDW 13.8 11/15/2017    LYMPHOPCT 22.3 11/15/2017    MONOPCT 4.7 11/15/2017    BASOPCT 0.4 11/15/2017    MONOSABS 0.4 11/15/2017    LYMPHSABS 1.9 11/15/2017    EOSABS 0.1 11/15/2017    BASOSABS 0.0 11/15/2017     CMP:    Lab Results   Component Value Date     11/15/2017    K 3.8 11/15/2017     11/15/2017    CO2 27 11/15/2017    BUN 6 11/15/2017    CREATININE 0.65 11/15/2017    GFRAA >60.0 11/15/2017    LABGLOM >60.0 11/15/2017    GLUCOSE 136 11/15/2017    PROT 7.1 11/15/2017    LABALBU 4.6 11/15/2017    CALCIUM 9.2 11/15/2017    BILITOT 0.4 11/15/2017    ALKPHOS 119 11/15/2017    AST 21 11/15/2017    ALT 29 11/15/2017     BMP:    Lab Results   Component Value Date     11/15/2017    K 3.8 11/15/2017     11/15/2017    CO2 27 11/15/2017    BUN 6 11/15/2017    LABALBU 4.6 11/15/2017    CREATININE 0.65 11/15/2017    CALCIUM 9.2 11/15/2017    GFRAA >60.0 11/15/2017    LABGLOM >60.0 11/15/2017    GLUCOSE 136 11/15/2017     Magnesium:    Lab Results   Component Value Date    MG 2.3 04/15/2016     Troponin:    Lab Results   Component Value Date    TROPONINI <0.010 11/16/2017       EKG:  NSR no acute changes    Assessment:    Active Hospital Problems    Diagnosis Date Noted    Bipolar 1 disorder, depressed ([facility] [F31.9] 11/15/2017     Priority: Low      echo   Left Ventricle  Normal left ventricle structure and function. LVEF is normal 82%  Normal Diastolic FXN  Right Ventricle  Normal right ventricle structure and function. Normal right ventricle systolic pressure. Left Atrium  Normal left atrium. Right Atrium  Normal right atrium. Mitral Valve  Normal mitral valve structure and function. Tricuspid Valve  Normal tricuspid valve structure and function. Aortic Valve  Normal aortic valve structure and function. Mild to Mod AR  Pulmonic Valve  Normal pulmonic valve structure and function. Pericardial Effusion  No evidence of pericardial effusion. Pleural Effusion  No evidence of pleural effusion.   Aorta \ Miscellaneous  Ao root is normal  Plan:  -okay to discharge home from cardiology chest pain atypical  -normal heart cath 2016  -will follow as outpatient  -meds as prescribed  Electronically signed by Jhonatan Farrell NP on 11/17/2017 at 10:15 AM

## 2017-11-17 NOTE — PLAN OF CARE
Problem: Depressive Behavior with or without Suicide precautions  Goal: LTG-Able to verbalize acceptance of life and situations over which he or she has no control  Outcome: Ongoing    Goal: LTG-Able to verbalize and/or display a decrease in depressive symptoms  Outcome: Ongoing    Goal: STG-Able to verbalize support system  Outcome: Ongoing    Goal: STG-Absence of Self Harm  Outcome: Ongoing    Goal: STG-Knowledge of positive coping patterns  Outcome: Ongoing    Goal: STG-Participation in care planning  Outcome: Ongoing

## 2017-11-17 NOTE — CONSULTS
Family History   Problem Relation Age of Onset    High Blood Pressure Mother     Kidney Disease Mother     Lupus Mother     Bipolar Disorder Son      SOCIAL HISTORY:    Social History     Social History    Marital status:      Spouse name: N/A    Number of children: N/A    Years of education: N/A     Occupational History    unemployed      Social History Main Topics    Smoking status: Current Some Day Smoker     Packs/day: 1.50     Years: 10.00     Types: Cigarettes     Last attempt to quit: 1/3/2017    Smokeless tobacco: Never Used    Alcohol use No    Drug use:      Types: Marijuana      Comment: last used one month ago     Sexual activity: Not on file     Other Topics Concern    Not on file     Social History Narrative    Lopez Frazier is a 44-year-old female who is on disability because of pain and bipolar disorder. She's also had a presumed diagnosis of possible multiple sclerosis. She's been seeing Dr. Pritesh Hernandez for that and she says that the diagnosis is sometimes in question and it's clear neuropathy vitamin B12 deficiency as well as generalized bodyaches from the severe vitamin D deficiency have caused this scenario that looks like multiple sclerosis. She recently tried to go back to work but was not able because of her pain.      PSYCHOLOGICAL HISTORY: depression, Bipolar      MEDICATIONS:  Prescriptions Prior to Admission: buPROPion (WELLBUTRIN SR) 150 MG extended release tablet, Take 1 tablet by mouth 2 times daily  pantoprazole (PROTONIX) 40 MG tablet, Take 1 tablet by mouth every morning (before breakfast)  [DISCONTINUED] aspirin 81 MG chewable tablet, Take 1 tablet by mouth daily  [DISCONTINUED] isosorbide mononitrate (IMDUR) 30 MG extended release tablet, Take 1 tablet by mouth daily  [DISCONTINUED] atorvastatin (LIPITOR) 40 MG tablet, Take 1 tablet by mouth nightly  [DISCONTINUED] metoprolol tartrate (LOPRESSOR) 50 MG tablet, Take 1 tablet by mouth 2 times daily  [DISCONTINUED] ORDERS    Start on Mobic 7.5 mg BID   Start on Flexeril 10 mg TID PRN leg spasm   Refused Neurontin and can not afford Lyrica for her Neuropathy  PT/ OT  No imaging is indicated   Ultram PRN only in hospital         SIGNATURE: Meg Spence MD PATIENT NAME: Otilio Cleveland   DATE: November 17, 2017 MRN: [id number]   TIME: 1:31 PM PAGER/CONTACT #: [phone]

## 2017-11-17 NOTE — PROGRESS NOTES
[facility] OCCUPATIONAL THERAPY EVALUATION - ACUTE   Room: S428/F729-76  Date: 2017  Patient Name: Skip Boone        MRN: [id number]  Account: [de-identified]   : 1970  (41 y.o.)    Chart Review:  Diagnosis:  Depression, MS  Past Medical History:   Diagnosis Date    Anxiety     Back pain     back surgery x 4    Bipolar disorder ([address]     CAD (coronary artery disease)     CAFL (chronic airflow limitation) ([address] 11/3/2016    Chronic pain     Colitis     DDD (degenerative disc disease), lumbar 2016    Depression     Endometriosis     Gastritis     GERD (gastroesophageal reflux disease)     History of myocardial infarction     HTN (hypertension), benign 2016    Hypokalemia     Impaired mobility and activities of daily living     Left hemiparesis ([address]     Multiple sclerosis ([address]     Neck pain     surgery x 1    Over weight     PTSD (post-traumatic stress disorder)     Sensory neuropathy ([address] 2016    TIA (transient ischemic attack)     Tobacco abuse     Vasospastic angina ([address] 2016     Past Surgical History:   Procedure Laterality Date    BACK SURGERY      x2     SECTION      x2    CHOLECYSTECTOMY  13    Lapchole    CORONARY ANGIOPLASTY      CYST REMOVAL  3/7/16    Dr. Jenna Duke Left     UPPER GASTROINTESTINAL ENDOSCOPY  2014    ANIBAL HOUSE M.D.      Precautions:  falls  Restrictions/Precautions: Fall Risk    Evaluation and Pt. rights have been reviewed: [x]Yes   [] No   If no why not:   Falls safety interventions in place  [x]Yes   [] No    Comments:     Subjective: \"I have so much pain I just can't do things\"    Prior living arrangement:    Support contact: BF  Pt lives: [x] Alone   [] With spouse   [] Other   Comment:    Home: [x] Single level   []  Two level   []  Split level     [x]  Apartment:     Entrance:  Stairs: 17 Hand rails 2, Inside: Stairs: 0 Hand rails: 0  Bathroom: [] Bath tub   [x] Tub/Shower combo   []  Shower stall    Location: up    DME: [] W/W   [] Cane   [] Rollator   [x]  W/C   [] Grab Bars   [] Shower Chair   [] [facility]  [] Dressing  AE  [] Other:      Previous Functional Status:  Ind ADLs uses W/C or furniture for mobility, drives    Pain:   Start of session: 8/10  Location: back and LEs  Description: shoots  Action: [] No Action Necessary    [x] Patient reports pain at acceptable level for treatment  [] Nursing notified    [] Other      Objective:  Observation:  Alert, cooperative    Orientation: Oriented to  [x] Person   [x] Place  [x]Time    Vision:   []  WFL   [x] Impaired  Comments:  glasses    Hearing:  [] WFL   [x] Impaired  Comments:  Mild [place]  Sensation:   [] WFL   [x]  Impaired   Comments:    Neuropathy in B LEs  Cognition:   [x] WFL   [] Impaired  Comments:    Communication:   [x] WFL   [] Impaired  Comments:    Range of Motion:  R UE AROM/PROM: []  WFL [x] Impaired  Comments: B shld flex ~ 90 deg  L UE AROM/PROM:  []  WFL [x] Impaired  Comments:     Strength:   R UE Strength: []1    [] 2   [] 2+   [x] 3   [] 3+   [] 4   [] 4+  [] 5  Comments:   L UE Strength:  []1    [] 2   [x] 2+   [] 3   [] 3+  [] 4   [] 4+   [] 5  Comments:     Quality of Movement:  [] Good   [x] Fair   [] Poor     Coordination:L UEGross motor: [] WFL   [x] Impaired   Fine motor: [] WFL   [x] Impaired     Functional Mobility:  Toilet Transfers:  SBA with [employer] and wall grab bar  Bed Transfer:SBA   Sit to stand: SBA  Bathroom to/from Bed:  SBA with [employer]     Seated Balance:      Static: [x] Good  [] Fair   [] Poor   Dynamic: []  Good  [x] Fair+   [] Poor     Standing Balance:     Static: [] Good   [x] Fair + [] Poor   Dynamic: [] Good   [x] Fair   [] Poor     Functional Endurance: [] Good  [x] Fair  [] Poor     ADLs  Feeding:  Set up  UE Dressing:  Set up  LB Dressing:  CGA/Min A with balance pulling pants over buttocks  Bathing:  Set up Simulated Goal Status ():  At least 1 percent but less than 20 percent impaired, limited or restricted    Time in:  10:30  Time out:  11:00  Total minutes:  30  Timed treatment minutes:  30      Electronically signed by:    JODEE Resendiz  11/17/2017, 11:06 AM

## 2017-11-17 NOTE — PROGRESS NOTES
Pt. refused to attend the 0900 community meeting due to resting in room, despite staff encouragement.  Electronically signed by Lurdes Comer on 11/17/2017 at 9:40 AM

## 2017-11-17 NOTE — CONSULTS
Inpatient consult to Neurology  Consult performed by: Reggie Aragon  Consult ordered by: Wes Lopez      Abnormal MRI  One spot - tegmental , 2016  LP done - negative LP - Oligoclonal band absent  With single lesion and negative Oligoclonal bands MS very unlikely  But if repeat MRI shows more spots then will consider diagnosis  One spot could be a vascular event from her risk for CVA

## 2017-11-17 NOTE — PLAN OF CARE
Problem: IP BALANCE  Goal: LTG - patient will maintain standing balance to allow for completion of daily activities  Outcome: Ongoing  Min A

## 2017-11-17 NOTE — PROGRESS NOTES
Pt in psychotherapy today revealed that she has obsessive compulsive thinking. Clothes are color coordinated in closet, pt knows how many t-shirts she has, jeans, socks, underwear, etc.  Patient has to have everything in its place or it makes her very anxious. Pt states she drives the people around her nuts because if they leave things out she gets very upset. If  People do not put things back or clean up after themselves it drives her nuts and she gets upset. Patient also has counted the bolts, etc in her room. Patient has to keep her mind active doing something, counting, etc at all times she cannot relax. Pt also states she does not like to be around a lot of people and sits strategically in the room so that she can escape and does not like to be closed in.    Electronically signed by Leydi Mcdonnell LPN on 52/58/7649 at 1:04 PM

## 2017-11-17 NOTE — PROGRESS NOTES
Patient visible on unit. Slow to get up for breakfast, Affect is reactive. She rates her depression and anxiety both 9/10. Content of conversation if focused on pain and her sleep problems. Pt reports \"nothing\" works for her. Dr. Nuria Cardoza declining pain management consult. Will put in for different pain consult. EKG completed. Pt was compliant with all AM medications, she denies active SI.  PT reports her the problem is not her falling asleep but rather \"staying asleep\"

## 2017-11-17 NOTE — PROGRESS NOTES
Dr. Isrrael Figueroa to unit to see patient. Orders received. Prescriptions in chart and Pain Management signing off.

## 2017-11-17 NOTE — PROGRESS NOTES
2015    CAD (coronary artery disease) 2014    Hypopotassemia 2013    Syncope 2012    H/O cardiac arrest 2012    Bipolar 1 disorder ([address] 2012    Current tobacco use 2012    FH: CAD (coronary artery disease) 2012    SAMAYOA (dyspnea on exertion) 2012    Chest pain 2012    Abnormal ECG 2012    Displacement of lumbar intervertebral disc without myelopathy 2012    Fitting and adjustment of orthopedic device 2011    Sensory hearing loss, bilateral 2011    Amyotrophia 10/09/2008    Arthralgia of lower leg 10/09/2008    Acid reflux 2008    Backhand tennis elbow 2007    Arthralgia of upper arm 2007        Past Medical History:   Diagnosis Date    Anxiety     Back pain     back surgery x 4    Bipolar disorder ([address]     CAD (coronary artery disease)     [facility]FL [facility]flow limitation) [facility] 11/3/2016    Chronic pain     Colitis     DDD (degenerative disc disease), lumbar 2016    Depression     Endometriosis     Gastritis     GERD (gastroesophageal reflux disease)     History of myocardial infarction     HTN (hypertension), benign 2016    Hypokalemia     Impaired mobility and activities of daily living     Left hemiparesis ([facility])     Multiple sclerosis ([address]     Neck pain     surgery x 1    Over weight     PTSD (post-traumatic stress disorder)     Sensory neuropathy ([address] 2016    TIA (transient ischemic attack)     Tobacco abuse     Vasospastic angina ([address] 2016     Past Surgical History:   Procedure Laterality Date    BACK SURGERY      x2     SECTION      x2    CHOLECYSTECTOMY  13    Lapchole    CORONARY ANGIOPLASTY      CYST REMOVAL  3/7/16    Dr. Ina Simmons Left     UPPER GASTROINTESTINAL ENDOSCOPY  2014    ANIBAL HOUSE M.D.        Chart Reviewed: Yes  Patient assessed for rehabilitation services?: Yes  General Comment  Comments: Pt agreeable to PT evaluation. Restrictions:  Restrictions/Precautions: Fall Risk  Body mass index is 25.06 kg/m². SUBJECTIVE: Subjective: \"I have so much pain in my body making it hard to get up. \"     Post Treatment Pain Screening:   Pain Screening  Patient Currently in Pain: Yes  Pain Assessment  Pain Assessment: 0-10  Pain Level: 8  Pain Type: Chronic pain  Pain Location: Back;Hip;Leg;Foot  Pain Orientation: Right;Left  Pain Descriptors: Constant    Prior Level of Function:  Social/Functional History  Lives With: Alone  Type of Home: Apartment  Home Layout: One level  Home Access: Stairs to enter with rails  Entrance Stairs - Number of Steps: 17  Home Equipment: [employer]  ADL Assistance: Independent  Homemaking Assistance: Independent  Ambulation Assistance: Independent (without AD)  Transfer Assistance: Independent    OBJECTIVE:   Vision/Hearing:  Vision: Within Functional Limits  Hearing: Within functional limits    Cognition:  Overall Orientation Status: Within Normal Limits    Observation/Palpation  Posture: Fair (rounded shoulders)    ROM:  RLE AROM: WFL  LLE AROM : WFL  RUE AROM : WFL  LUE General AROM: limited internal rotation in shoulder on L, remainder WFL    Strength:  Strength RLE  Comment: 3+/5 hip, knee and ankle  Strength LLE  Comment: 3/5 hip, knee and ankle    Neuro:  Balance  Sitting - Static: Good  Sitting - Dynamic: Fair;+  Standing - Static: Fair;+  Standing - Dynamic: Fair        Sensation  Overall Sensation Status: Impaired  Additional Comments: neuropathy in bilateral LEs    Bed mobility  Supine to Sit: Stand by assistance  Sit to Supine: Stand by assistance    Transfers  Sit to Stand: Stand by assistance  Stand to sit: Stand by assistance    Ambulation  Ambulation?: Yes  Ambulation 1  Surface: level tile  Device: Rolling Walker  Assistance: Stand by assistance  Quality of Gait: decreased steps x 3 with supervision.     Therapy Time:   Individual   Time In 1030   Time Out 1100   Minutes [address] [place], [place], 11/17/17 at 11:06 AM

## 2017-11-18 LAB — S PYO AG THROAT QL: NEGATIVE

## 2017-11-18 PROCEDURE — 6370000000 HC RX 637 (ALT 250 FOR IP): Performed by: PSYCHIATRY & NEUROLOGY

## 2017-11-18 PROCEDURE — 94640 AIRWAY INHALATION TREATMENT: CPT

## 2017-11-18 PROCEDURE — 6370000000 HC RX 637 (ALT 250 FOR IP): Performed by: PHYSICIAN ASSISTANT

## 2017-11-18 PROCEDURE — 1240000000 HC EMOTIONAL WELLNESS R&B

## 2017-11-18 PROCEDURE — 6370000000 HC RX 637 (ALT 250 FOR IP): Performed by: NURSE PRACTITIONER

## 2017-11-18 PROCEDURE — 87880 STREP A ASSAY W/OPTIC: CPT

## 2017-11-18 PROCEDURE — 6370000000 HC RX 637 (ALT 250 FOR IP): Performed by: ANESTHESIOLOGY

## 2017-11-18 PROCEDURE — 87081 CULTURE SCREEN ONLY: CPT

## 2017-11-18 RX ORDER — ALBUTEROL SULFATE 90 UG/1
2 AEROSOL, METERED RESPIRATORY (INHALATION) EVERY 6 HOURS PRN
Status: DISCONTINUED | OUTPATIENT
Start: 2017-11-18 | End: 2017-11-23 | Stop reason: HOSPADM

## 2017-11-18 RX ORDER — FOLIC ACID 1 MG/1
1 TABLET ORAL DAILY
Status: DISCONTINUED | OUTPATIENT
Start: 2017-11-19 | End: 2017-11-23 | Stop reason: HOSPADM

## 2017-11-18 RX ORDER — PANTOPRAZOLE SODIUM 40 MG/1
40 TABLET, DELAYED RELEASE ORAL
Status: DISCONTINUED | OUTPATIENT
Start: 2017-11-19 | End: 2017-11-23 | Stop reason: HOSPADM

## 2017-11-18 RX ADMIN — TRAMADOL HYDROCHLORIDE 50 MG: 50 TABLET, FILM COATED ORAL at 00:14

## 2017-11-18 RX ADMIN — LITHIUM CARBONATE 300 MG: 300 CAPSULE, GELATIN COATED ORAL at 09:35

## 2017-11-18 RX ADMIN — CYCLOBENZAPRINE HYDROCHLORIDE 10 MG: 10 TABLET, FILM COATED ORAL at 00:14

## 2017-11-18 RX ADMIN — AMITRIPTYLINE HYDROCHLORIDE 50 MG: 50 TABLET, FILM COATED ORAL at 21:43

## 2017-11-18 RX ADMIN — METOPROLOL TARTRATE 25 MG: 25 TABLET, FILM COATED ORAL at 21:52

## 2017-11-18 RX ADMIN — ATORVASTATIN CALCIUM 10 MG: 10 TABLET, FILM COATED ORAL at 21:43

## 2017-11-18 RX ADMIN — MELOXICAM 7.5 MG: 7.5 TABLET ORAL at 09:35

## 2017-11-18 RX ADMIN — CYCLOBENZAPRINE HYDROCHLORIDE 10 MG: 10 TABLET, FILM COATED ORAL at 09:35

## 2017-11-18 RX ADMIN — HYDROXYZINE PAMOATE 50 MG: 50 CAPSULE ORAL at 21:01

## 2017-11-18 RX ADMIN — CYCLOBENZAPRINE HYDROCHLORIDE 10 MG: 10 TABLET, FILM COATED ORAL at 21:50

## 2017-11-18 RX ADMIN — ASPIRIN 81 MG 81 MG: 81 TABLET ORAL at 09:35

## 2017-11-18 RX ADMIN — LISINOPRIL 10 MG: 10 TABLET ORAL at 09:35

## 2017-11-18 RX ADMIN — LITHIUM CARBONATE 300 MG: 300 CAPSULE, GELATIN COATED ORAL at 21:45

## 2017-11-18 RX ADMIN — METOPROLOL TARTRATE 25 MG: 25 TABLET, FILM COATED ORAL at 09:35

## 2017-11-18 RX ADMIN — MELOXICAM 7.5 MG: 7.5 TABLET ORAL at 21:43

## 2017-11-18 RX ADMIN — TRAZODONE HYDROCHLORIDE 50 MG: 50 TABLET ORAL at 21:50

## 2017-11-18 RX ADMIN — TRAMADOL HYDROCHLORIDE 50 MG: 50 TABLET, FILM COATED ORAL at 09:35

## 2017-11-18 RX ADMIN — TRAMADOL HYDROCHLORIDE 50 MG: 50 TABLET, FILM COATED ORAL at 21:50

## 2017-11-18 RX ADMIN — IPRATROPIUM BROMIDE AND ALBUTEROL 1 PUFF: 20; 100 SPRAY, METERED RESPIRATORY (INHALATION) at 15:13

## 2017-11-18 ASSESSMENT — PAIN SCALES - GENERAL
PAINLEVEL_OUTOF10: 7
PAINLEVEL_OUTOF10: 8
PAINLEVEL_OUTOF10: 5
PAINLEVEL_OUTOF10: 8
PAINLEVEL_OUTOF10: 7

## 2017-11-18 ASSESSMENT — ENCOUNTER SYMPTOMS: RESPIRATORY NEGATIVE: 1

## 2017-11-18 NOTE — PROGRESS NOTES
Pt. declined to attend the 0900 community meeting, despite staff encouragement. Electronically signed by Wesley Hester, [address] on 11/18/2017 at 9:45 AM

## 2017-11-18 NOTE — PLAN OF CARE
Problem: Depressive Behavior with or without Suicide precautions  Goal: LTG-Able to verbalize acceptance of life and situations over which he or she has no control  Outcome: Ongoing    Goal: LTG-Able to verbalize and/or display a decrease in depressive symptoms  Outcome: Ongoing    Goal: STG-Able to verbalize support system  Outcome: Met This Shift    Goal: STG-Knowledge of positive coping patterns  Outcome: Ongoing      Problem: Pain:  Goal: Pain level will decrease  Pain level will decrease   Outcome: Ongoing    Goal: Control of acute pain  Control of acute pain   Outcome: Ongoing    Goal: Control of chronic pain  Control of chronic pain   Outcome: Ongoing

## 2017-11-18 NOTE — PROGRESS NOTES
Pt. refused to attend the 1000 skills group, due to resting in room despite staff encouragement. Electronically signed by Criss Davey, [address] on 11/18/2017 at 1:00 PM

## 2017-11-19 LAB — LITHIUM LEVEL: 0.5 MEQ/L (ref 0.6–1.2)

## 2017-11-19 PROCEDURE — 80178 ASSAY OF LITHIUM: CPT

## 2017-11-19 PROCEDURE — 94640 AIRWAY INHALATION TREATMENT: CPT

## 2017-11-19 PROCEDURE — 6370000000 HC RX 637 (ALT 250 FOR IP): Performed by: PSYCHIATRY & NEUROLOGY

## 2017-11-19 PROCEDURE — 6370000000 HC RX 637 (ALT 250 FOR IP): Performed by: ANESTHESIOLOGY

## 2017-11-19 PROCEDURE — 6370000000 HC RX 637 (ALT 250 FOR IP): Performed by: NURSE PRACTITIONER

## 2017-11-19 PROCEDURE — 6370000000 HC RX 637 (ALT 250 FOR IP): Performed by: PHYSICIAN ASSISTANT

## 2017-11-19 PROCEDURE — 36415 COLL VENOUS BLD VENIPUNCTURE: CPT

## 2017-11-19 PROCEDURE — 1240000000 HC EMOTIONAL WELLNESS R&B

## 2017-11-19 RX ADMIN — HYDROXYZINE PAMOATE 50 MG: 50 CAPSULE ORAL at 21:23

## 2017-11-19 RX ADMIN — LITHIUM CARBONATE 300 MG: 300 CAPSULE, GELATIN COATED ORAL at 09:41

## 2017-11-19 RX ADMIN — VITAMIN D, TAB 1000IU (100/BT) 2000 UNITS: 25 TAB at 09:41

## 2017-11-19 RX ADMIN — ASPIRIN 81 MG 81 MG: 81 TABLET ORAL at 09:41

## 2017-11-19 RX ADMIN — LITHIUM CARBONATE 300 MG: 300 CAPSULE, GELATIN COATED ORAL at 21:23

## 2017-11-19 RX ADMIN — METOPROLOL TARTRATE 25 MG: 25 TABLET, FILM COATED ORAL at 21:23

## 2017-11-19 RX ADMIN — CYCLOBENZAPRINE HYDROCHLORIDE 10 MG: 10 TABLET, FILM COATED ORAL at 09:41

## 2017-11-19 RX ADMIN — CYCLOBENZAPRINE HYDROCHLORIDE 10 MG: 10 TABLET, FILM COATED ORAL at 16:45

## 2017-11-19 RX ADMIN — TRAZODONE HYDROCHLORIDE 50 MG: 50 TABLET ORAL at 21:23

## 2017-11-19 RX ADMIN — FOLIC ACID 1 MG: 1 TABLET ORAL at 09:41

## 2017-11-19 RX ADMIN — LISINOPRIL 10 MG: 10 TABLET ORAL at 09:41

## 2017-11-19 RX ADMIN — IPRATROPIUM BROMIDE AND ALBUTEROL 1 PUFF: 20; 100 SPRAY, METERED RESPIRATORY (INHALATION) at 18:56

## 2017-11-19 RX ADMIN — CYCLOBENZAPRINE HYDROCHLORIDE 10 MG: 10 TABLET, FILM COATED ORAL at 21:25

## 2017-11-19 RX ADMIN — PANTOPRAZOLE SODIUM 40 MG: 40 TABLET, DELAYED RELEASE ORAL at 05:52

## 2017-11-19 RX ADMIN — MELOXICAM 7.5 MG: 7.5 TABLET ORAL at 09:41

## 2017-11-19 RX ADMIN — TRAMADOL HYDROCHLORIDE 50 MG: 50 TABLET, FILM COATED ORAL at 16:45

## 2017-11-19 RX ADMIN — IPRATROPIUM BROMIDE AND ALBUTEROL 1 PUFF: 20; 100 SPRAY, METERED RESPIRATORY (INHALATION) at 09:03

## 2017-11-19 RX ADMIN — ATORVASTATIN CALCIUM 10 MG: 10 TABLET, FILM COATED ORAL at 21:23

## 2017-11-19 RX ADMIN — TRAMADOL HYDROCHLORIDE 50 MG: 50 TABLET, FILM COATED ORAL at 09:41

## 2017-11-19 RX ADMIN — AMITRIPTYLINE HYDROCHLORIDE 50 MG: 50 TABLET, FILM COATED ORAL at 21:23

## 2017-11-19 RX ADMIN — METOPROLOL TARTRATE 25 MG: 25 TABLET, FILM COATED ORAL at 09:41

## 2017-11-19 RX ADMIN — MELOXICAM 7.5 MG: 7.5 TABLET ORAL at 21:23

## 2017-11-19 ASSESSMENT — PAIN SCALES - GENERAL
PAINLEVEL_OUTOF10: 7
PAINLEVEL_OUTOF10: 8
PAINLEVEL_OUTOF10: 8
PAINLEVEL_OUTOF10: 5
PAINLEVEL_OUTOF10: 6

## 2017-11-19 NOTE — BH NOTE
Group Therapy Note    Date: 11/19/2017  Start Time: 1100  End Time:  8609  Number of Participants: 8    Type of Group: Healthy Living/Wellness    Wellness Binder Information  Module Name:  Anger ca. Status After Intervention:  Unchanged    Participation Level: Active Listener    Participation Quality: Appropriate      Speech:  normal      Thought Process/Content: Logical      Affective Functioning: Congruent      Mood: angry      Level of consciousness:  Alert      Response to Learning: Able to verbalize current knowledge/experience      Endings: None Reported    Modes of Intervention: Education      Discipline Responsible: Licensed Practical Nurse      Signature:   Sharon Kirk LPN

## 2017-11-19 NOTE — PROGRESS NOTES
Neurology Follow up    SUBJECTIVE:  NO Headache, double vision,blurry vision,difficulty with speech,difficulty with swallowing,weakness,numbness,pain,nausea,vomitting,chocking,neck pain,dizziness,    PHYSICAL EXAM:    /63   Pulse 76   Temp 98 °F (36.7 °C) (Oral)   Resp 18   Ht 5' 7\" (1.702 m)   Wt 160 lb (72.6 kg)   SpO2 98%   BMI 25.06 kg/m²   General Appearance:      Mental Status Exam:             Level of Alertness:   awake            Orientation:   person, place, time            Memory:   normal            Fund of Knowledge:  normal            Attention/Concentration:  normal            Language:  normal      Funduscopic Exam:     Cranial Nerves        Cranial nerve II           Visual acuity:  normal           Visual fields:  normal      Cranial nerve III           Pupils:  equal, round, reactive to light      Cranial nerves III, IV, VI           Extraocular Movements: intact      Cranial nerve V           Facial sensation:  intact      Cranial nerve VII           Facial strength: intact      Cranial nerve VIII           Hearing:  intact      Cranial nerve IX           Palate:  intact      Cranial nerve XI         Shoulder shrug:  intact      Cranial nerve XII          Tongue movement:  normal    Motor:    Drift:  absent  Motor exam is symmetrical 5 out of 5 all extremities bilaterally  Tone:  normal  Abnormal Movements:  absent            Sensory:        Pinprick             Right Upper Extremity:  normal             Left Upper Extremity:  normal             Right Lower Extremity:  normal             Left Lower Extremity:  normal           Vibration                         Touch            Proprioception                 Coordination:           Finger/Nose   Right:  normal              Left:  normal          Heel-Knee-Shin                Right:  normal              Left:  normal          Rapid Alternating Movements              Right:  normal              Left:  normal          Gait: Casual:  normal                         Romberg:  normal            Reflexes:             Deep Tendon Reflexes:             Reflexes are 2 +             Plantar response:                Right:  downgoing               Left:  downgoing    Vascular:  Cardiac Exam:  normal         No results found. No results for input(s): WBC, HGB, PLT in the last 72 hours. No results for input(s): NA, K, CL, CO2, BUN, CREATININE, GLUCOSE in the last 72 hours. No results for input(s): BILITOT, ALKPHOS, AST, ALT in the last 72 hours.   Lab Results   Component Value Date    PROTIME 9.5 09/06/2017    INR 0.9 09/06/2017     Lab Results   Component Value Date    LITHIUM 0.5 11/19/2017       ASSESSMENT AND PLAN  Abnormal MRI in the past  New MRI normal  Had LP in the past, negative oligoclonal bands  OK d/c from neurolgy

## 2017-11-19 NOTE — PLAN OF CARE
Problem: Depressive Behavior with or without Suicide precautions  Goal: LTG-Able to verbalize acceptance of life and situations over which he or she has no control  Outcome: Ongoing

## 2017-11-19 NOTE — BH NOTE
Group Therapy Note    Date: 11/18/2017  Start Time: 1930  End Time:  2000  Number of Participants: 5    Type of Group: Recreational    Notes:  Pt participated in group activity appropriately.     Status After Intervention:  Improved    Participation Level: Interactive    Participation Quality: Appropriate      Speech:  normal      Thought Process/Content: Logical      Affective Functioning: Congruent      Mood: euthymic      Level of consciousness:  Alert      Response to Learning: Able to verbalize current knowledge/experience      Endings: None Reported    Modes of Intervention: Activity      Discipline Responsible: [address] [place] [address]    Electronically signed by Chelo Daniel on 11/18/2017 at 10:42 PM

## 2017-11-19 NOTE — PROGRESS NOTES
Pt. refused to attend the 0900 community meeting due to resting in room, despite staff encouragement. Electronically signed by Marti Ortiz [address] on 11/19/2017 at 9:45 AM

## 2017-11-19 NOTE — BH NOTE
Pt did not attend Wellness group at 1630 or Wrap Up group at 2030.     Electronically signed by Nancy Alfaro on 11/18/2017 at 10:40 PM

## 2017-11-19 NOTE — CARE COORDINATION
FAMILY COLLATERAL NOTE    Family/Support Name: Deacon Flood  Contact #: [phone]  Relationship to Pt[de-identified] best firiend        Family/Support contact aware of hospitalization: Yes    Presenting Symptoms/Current Concerns:  Per informant, patient was depressed and presenting with suicidal thinking. Patient was posting dark thoughts on social media. Informant does not believe that patient will make an attempt. She does believe that patient needs support for her bipolar and her chronic medical condition. Top 3 Life Stressors: Mother's passing in 2017  Patient was left in charge of mothers' affairs  Patient recently lost her job and health insurance    Background History Relevant to Current Hospitalization:  Patient is living with her boyfriend. Informant states that patient is not heard or respected by the boyfriend. Boyfriend does not believe patient has a mental health or medical concern. This is a worry for patient and informant. Family Mental Health/Substance Use History:   Unknown    Support Network's Goal for Hospitalization:   Informant wants patient to get back on insurance and begin the process for gaining disability benefits. Informant believes patients' safety and security needs should be met in order for patient to experience stability. Discharge Plan:   Informant has a plan to take patient into her home. She lives in [place]. Patient has to wait 3 or 4 months until her lease has . At that point, she will move in with informant. For now, patient will return to her boyfriend's home and follow the recommendations of the treatment team.    Support Network Supportive of Discharge Plan: Yes    Support can confirm Safety of Location and Security of Weapons:   Informant believes there are no weapons in boyfriend's home.      Support agreeable to Safeguard and Monitor Medications (including Prescription and OTC): Informant cannot monitor medication due to the fact she lives in [place]. Identified Barriers to Compliance with Discharge Plan:   Informant states that boyfriend believes that patient does not have a medical/mental condition and will not support patient getting better. Recommendations for Support Network:   Please call with questions.       SHERIN Prasad

## 2017-11-19 NOTE — PROGRESS NOTES
BEHAVIORAL HEALTH FOLLOW-UP NOTE     11/18/2017     Patient was seen and examined in person, Chart reviewed   Patient's case discussed with staff/team    Chief Complaint:  My mind cant stop racing  I lost too many people and I feel like I have nothing to live for    Interim History:   Patient was restrated on her medications        Appetite:  [] Normal/Unchanged  [] Increased  [x] Decreased      Sleep:       [] Normal/Unchanged  [] Fair       [x] Poor              Energy:    [] Normal/Unchanged  [] Increased  [x] Decreased        SI [x] Present  [] Absent    HI  []Present  [x] Absent    Patient is [x] able  [] unable to CONTRACT FOR SAFETY   Aggression:  [] yes  [x] no  Medication side effects(SE):  [] None(Psych. Meds.) [] Other      PAST MEDICAL/PSYCHIATRIC HISTORY:   Past Medical History:   Diagnosis Date    Anxiety     Back pain     back surgery x 4    Bipolar disorder ([address]     CAD (coronary artery disease)     [facility] 11/3/2016    Chronic pain     Colitis     DDD (degenerative disc disease), lumbar 12/22/2016    Depression     Endometriosis     Gastritis     GERD (gastroesophageal reflux disease)     History of myocardial infarction     HTN (hypertension), benign 2/19/2016    Hypokalemia     Impaired mobility and activities of daily living     Left hemiparesis (HCC)     Multiple sclerosis (HCC)     Neck pain     surgery x 1    Over weight     PTSD (post-traumatic stress disorder)     Sensory neuropathy ([address] 12/22/2016    TIA (transient ischemic attack)     Tobacco abuse     Vasospastic angina ([address] 11/11/2016       FAMILY/SOCIAL HISTORY:  Family History   Problem Relation Age of Onset    High Blood Pressure Mother     Kidney Disease Mother     Lupus Mother     Bipolar Disorder Son      Social History     Social History    Marital status:       Spouse name: N/A    Number of children: N/A    Years of education: N/A     Occupational History

## 2017-11-20 LAB — S PYO THROAT QL CULT: NORMAL

## 2017-11-20 PROCEDURE — 97116 GAIT TRAINING THERAPY: CPT

## 2017-11-20 PROCEDURE — 99232 SBSQ HOSP IP/OBS MODERATE 35: CPT | Performed by: PSYCHIATRY & NEUROLOGY

## 2017-11-20 PROCEDURE — 6370000000 HC RX 637 (ALT 250 FOR IP): Performed by: PSYCHIATRY & NEUROLOGY

## 2017-11-20 PROCEDURE — 94640 AIRWAY INHALATION TREATMENT: CPT

## 2017-11-20 PROCEDURE — 6370000000 HC RX 637 (ALT 250 FOR IP): Performed by: PHYSICIAN ASSISTANT

## 2017-11-20 PROCEDURE — 1240000000 HC EMOTIONAL WELLNESS R&B

## 2017-11-20 PROCEDURE — 6370000000 HC RX 637 (ALT 250 FOR IP): Performed by: NURSE PRACTITIONER

## 2017-11-20 PROCEDURE — 6370000000 HC RX 637 (ALT 250 FOR IP): Performed by: ANESTHESIOLOGY

## 2017-11-20 RX ADMIN — AMITRIPTYLINE HYDROCHLORIDE 50 MG: 50 TABLET, FILM COATED ORAL at 21:19

## 2017-11-20 RX ADMIN — CYCLOBENZAPRINE HYDROCHLORIDE 10 MG: 10 TABLET, FILM COATED ORAL at 09:19

## 2017-11-20 RX ADMIN — Medication 2 PUFF: at 21:44

## 2017-11-20 RX ADMIN — FOLIC ACID 1 MG: 1 TABLET ORAL at 09:19

## 2017-11-20 RX ADMIN — ASPIRIN 81 MG 81 MG: 81 TABLET ORAL at 09:19

## 2017-11-20 RX ADMIN — PANTOPRAZOLE SODIUM 40 MG: 40 TABLET, DELAYED RELEASE ORAL at 06:02

## 2017-11-20 RX ADMIN — LITHIUM CARBONATE 300 MG: 300 CAPSULE, GELATIN COATED ORAL at 21:19

## 2017-11-20 RX ADMIN — METOPROLOL TARTRATE 25 MG: 25 TABLET, FILM COATED ORAL at 21:19

## 2017-11-20 RX ADMIN — ATORVASTATIN CALCIUM 10 MG: 10 TABLET, FILM COATED ORAL at 21:20

## 2017-11-20 RX ADMIN — LISINOPRIL 10 MG: 10 TABLET ORAL at 09:19

## 2017-11-20 RX ADMIN — LITHIUM CARBONATE 300 MG: 300 CAPSULE, GELATIN COATED ORAL at 09:18

## 2017-11-20 RX ADMIN — TIOTROPIUM BROMIDE 18 MCG: 18 CAPSULE ORAL; RESPIRATORY (INHALATION) at 08:10

## 2017-11-20 RX ADMIN — LURASIDONE HYDROCHLORIDE 20 MG: 20 TABLET, FILM COATED ORAL at 09:19

## 2017-11-20 RX ADMIN — CYCLOBENZAPRINE HYDROCHLORIDE 10 MG: 10 TABLET, FILM COATED ORAL at 22:05

## 2017-11-20 RX ADMIN — MELOXICAM 7.5 MG: 7.5 TABLET ORAL at 09:19

## 2017-11-20 RX ADMIN — HYDROXYZINE PAMOATE 50 MG: 50 CAPSULE ORAL at 16:52

## 2017-11-20 RX ADMIN — METOPROLOL TARTRATE 25 MG: 25 TABLET, FILM COATED ORAL at 09:19

## 2017-11-20 RX ADMIN — TRAMADOL HYDROCHLORIDE 50 MG: 50 TABLET, FILM COATED ORAL at 09:19

## 2017-11-20 RX ADMIN — TRAZODONE HYDROCHLORIDE 50 MG: 50 TABLET ORAL at 21:19

## 2017-11-20 RX ADMIN — VITAMIN D, TAB 1000IU (100/BT) 2000 UNITS: 25 TAB at 09:19

## 2017-11-20 RX ADMIN — MELOXICAM 7.5 MG: 7.5 TABLET ORAL at 21:19

## 2017-11-20 ASSESSMENT — PAIN SCALES - GENERAL
PAINLEVEL_OUTOF10: 5
PAINLEVEL_OUTOF10: 7
PAINLEVEL_OUTOF10: 7

## 2017-11-20 NOTE — PROGRESS NOTES
Pt. refused to attend the 1000 skills group, due to resting in room despite staff encouragement.  Electronically signed by Julianna Espino on 11/20/2017 at 10:55 AM

## 2017-11-20 NOTE — PROGRESS NOTES
education: N/A     Occupational History    unemployed      Social History Main Topics    Smoking status: Current Some Day Smoker     Packs/day: 1.50     Years: 10.00     Types: Cigarettes     Last attempt to quit: 1/3/2017    Smokeless tobacco: Never Used    Alcohol use No    Drug use:      Types: Marijuana      Comment: last used one month ago     Sexual activity: Not on file     Other Topics Concern    Not on file     Social History Narrative    Victorino is a 42-year-old female who is on disability because of pain and bipolar disorder. She's also had a presumed diagnosis of possible multiple sclerosis. She's been seeing Dr. Jose Villegas for that and she says that the diagnosis is sometimes in question and it's clear neuropathy vitamin B12 deficiency as well as generalized bodyaches from the severe vitamin D deficiency have caused this scenario that looks like multiple sclerosis. She recently tried to go back to work but was not able because of her pain. ROS:  [x] All negative/unchanged except if checked.  Explain positive(checked items) below:  [] Constitutional  [] Eyes  [] Ear/Nose/Mouth/Throat  [] Respiratory  [] CV  [] GI  []   [] Musculoskeletal  [] Skin/Breast  [] Neurological  [] Endocrine  [] Heme/Lymph  [] Allergic/Immunologic    Explanation:     MEDICATIONS:    Current Facility-Administered Medications:     lurasidone (LATUDA) tablet 20 mg, 20 mg, Oral, Daily, Sasha Mayes MD, 20 mg at 11/20/17 0919    benzocaine-menthol (CEPACOL SORE THROAT) lozenge 1 lozenge, 1 lozenge, Oral, Q2H PRN, Alondra Bucio CNP    melatonin tablet 5 mg, 5 mg, Oral, Nightly PRN, Noelle Mayes MD    vitamin D (CHOLECALCIFEROL) tablet 2,000 Units, 2,000 Units, Oral, Daily, Pietro Sanchez PA-C, 2,000 Units at 33/25/61 7447    folic acid (FOLVITE) tablet 1 mg, 1 mg, Oral, Daily, Yolanda Ruelas PA-C, 1 mg at 11/20/17 0919    albuterol sulfate  (90 Base) MCG/ACT inhaler 2

## 2017-11-20 NOTE — PROGRESS NOTES
Pt complaining of constipation. Given prune juice. Pt also given medication education on newly prescribed Latuda.

## 2017-11-20 NOTE — PROGRESS NOTES
Pt asks for Ultram and flexeril with morning meds.  Patient is isolating to room this am. Electronically signed by Gilda Ayala LPN on 46/54/1277 at 11:03 AM

## 2017-11-20 NOTE — PROGRESS NOTES
education: N/A     Occupational History    unemployed      Social History Main Topics    Smoking status: Current Some Day Smoker     Packs/day: 1.50     Years: 10.00     Types: Cigarettes     Last attempt to quit: 1/3/2017    Smokeless tobacco: Never Used    Alcohol use No    Drug use:      Types: Marijuana      Comment: last used one month ago     Sexual activity: Not on file     Other Topics Concern    Not on file     Social History Narrative    Marshall Kee is a 28-year-old female who is on disability because of pain and bipolar disorder. She's also had a presumed diagnosis of possible multiple sclerosis. She's been seeing Dr. Felipe Tate for that and she says that the diagnosis is sometimes in question and it's clear neuropathy vitamin B12 deficiency as well as generalized bodyaches from the severe vitamin D deficiency have caused this scenario that looks like multiple sclerosis. She recently tried to go back to work but was not able because of her pain. ROS:  [x] All negative/unchanged except if checked/or indicated in the HPI.  Explain positive(checked items) below:  [] Constitutional  [] Eyes  [] Ear/Nose/Mouth/Throat  [] Respiratory  [] CV  [] GI  []   [] Musculoskeletal  [] Skin/Breast  [] Neurological  [] Endocrine  [] Heme/Lymph  [] Allergic/Immunologic    Explanation:     MEDICATIONS:    Current Facility-Administered Medications:     benzocaine-menthol (CEPACOL SORE THROAT) lozenge 1 lozenge, 1 lozenge, Oral, Q2H PRN, Aashish Kennedy CNP    melatonin tablet 5 mg, 5 mg, Oral, Nightly PRN, Adelina Mayes MD    vitamin D (CHOLECALCIFEROL) tablet 2,000 Units, 2,000 Units, Oral, Daily, Aroldo Rodriguez PA-C, 2,000 Units at 67/14/96 1121    folic acid (FOLVITE) tablet 1 mg, 1 mg, Oral, Daily, Yolanda Ruelas PA-C, 1 mg at 11/19/17 0941    albuterol sulfate  (90 Base) MCG/ACT inhaler 2 puff, 2 puff, Inhalation, Q6H PRN, Aroldo Rodriguez PA-C    tiotropium (SPIRIVA) inhalation capsule 18 mcg, 18 mcg, Inhalation, Daily, Yolanda Ruelas PA-C, Stopped at 11/19/17 0905    pantoprazole (PROTONIX) tablet 40 mg, 40 mg, Oral, QAM AC, Yolanda Ruelas PA-C, 40 mg at 11/19/17 3261    atorvastatin (LIPITOR) tablet 10 mg, 10 mg, Oral, Nightly, Michail Maxon, NP, 10 mg at 11/19/17 2123    lisinopril (PRINIVIL;ZESTRIL) tablet 10 mg, 10 mg, Oral, Daily, Michail Maxon, NP, 10 mg at 11/19/17 0941    lidocaine (LIDODERM) 5 % 3 patch, 3 patch, Transdermal, Daily, Jose Neville MD, 3 patch at 11/19/17 1816    cyclobenzaprine (FLEXERIL) tablet 10 mg, 10 mg, Oral, TID PRN, Jose Neville MD, 10 mg at 11/19/17 2125    meloxicam (MOBIC) tablet 7.5 mg, 7.5 mg, Oral, 2 times per day, Jose Neville MD, 7.5 mg at 11/19/17 2123    traMADol (ULTRAM) tablet 50 mg, 50 mg, Oral, Q6H PRN, Jose Neville MD, 50 mg at 11/19/17 1645    lidocaine (LIDODERM) 5 % patch removal, 3 patch, Transdermal, Daily, Jose Neville MD    sodium chloride flush 0.9 % injection 10 mL, 10 mL, Intravenous, BID, Lee Pena MD    nicotine (NICODERM CQ) 21 MG/24HR 1 patch, 1 patch, Transdermal, Daily, Mónica Squires MD, 1 patch at 11/19/17 0946    lithium capsule 300 mg, 300 mg, Oral, BID, Mónica Squires MD, 300 mg at 11/19/17 2123    amitriptyline (ELAVIL) tablet 50 mg, 50 mg, Oral, Nightly, Mónica Squires MD, 50 mg at 11/19/17 2123    aspirin chewable tablet 81 mg, 81 mg, Oral, Daily, Michail Maxon, NP, 81 mg at 11/19/17 0941    metoprolol tartrate (LOPRESSOR) tablet 25 mg, 25 mg, Oral, BID, Anamika Peguero NP, 25 mg at 11/19/17 2123    acetaminophen (TYLENOL) tablet 650 mg, 650 mg, Oral, Q4H PRN, Sasha Mayes MD, 650 mg at 11/16/17 1000    hydrOXYzine (VISTARIL) capsule 50 mg, 50 mg, Oral, Q6H PRN, Ssaha Mayes MD, 50 mg at 11/19/17 2123    haloperidol lactate (HALDOL) injection 5 mg, 5 mg, Intramuscular, Q4H PRN, Morales Vu MD    benztropine [] Flight of Ideas  [] Loose  [] Other    Thought Contents:   [x] Hopelessness  [] Worthlessness  [] Hypochondriasis  [] Delusions  [] Paranoia  [x] Ruminations  [] Obsessions/Compulsions  [] Confused [] Hopeful   [] Future Oriented [] Other    Perception:  [x] Normal  [] Hallucinations  [] Auditory  [] Visual  [] Olfactory  [] Tactile    [] Dissociation  [] Flashbacks  [] Other    Attention/Concentration:  [] Intact  [] Poor  [x] Distractible  [] Other    Cognition:  [] Intact  [x] Impaired   Insight: [] Intact  [] Fair  [x] Limited   Judgement:  [] Intact  [] Fair  [x] Limited       ASSESSMENT: dx: bipolar depressed    Patient symptoms are:  [] Well controlled  [] Improving  [] Worsening  [x] No change      Diagnosis:  Principal Problem:    Bipolar 1 disorder, depressed (HCC)  Active Problems:    Displacement of lumbar intervertebral disc without myelopathy    Multiple sclerosis (HCC)    Chronic pain syndrome     DDD (degenerative disc disease), lumbar    Sensory neuropathy (HCC)    Lumbosacral radiculopathy at S1    Neuromyelopathy due to vitamin B12 deficiency ([facility]      LABS:    No results for input(s): WBC, HGB, PLT in the last 72 hours. No results for input(s): NA, K, CL, CO2, BUN, CREATININE, GLUCOSE in the last 72 hours. No results for input(s): BILITOT, ALKPHOS, AST, ALT in the last 72 hours. Lab Results   Component Value Date    LABAMPH Neg 11/15/2017    BARBSCNU Neg 11/15/2017    LABBENZ Neg 11/15/2017    LABBENZ NotDTCD 01/26/2013    OPIATESCREENURINE Neg 11/15/2017    PHENCYCLIDINESCREENURINE Neg 11/15/2017    ETOH <10 11/15/2017     Lab Results   Component Value Date    TSH 2.960 11/15/2017     Lab Results   Component Value Date    LITHIUM 0.5 (L) 11/19/2017     No results found for: VALPROATE, CBMZ    RISK ASSESSMENT: moderate    Treatment Plan:  Reviewed current Medications with the patient.  yes  Risks, benefits, side effects, drug-to-drug interactions and alternatives to treatment were discussed.   Collateral information: still to be done  Discharge planning discussed with the patient and treatment team.    PSYCHOTHERAPY/COUNSELING:  [x] Therapeutic interview  [x] Supportive  [] CBT  [] Ongoing  [] Other      Continue medications  Anxiety remains significant  Start gypsyuda      Electronically signed by Sivan Lanier MD on 11/20/2017 at 12:52 AM

## 2017-11-20 NOTE — PROGRESS NOTES
Pts lidoderm patches were unable to be found. Patient looks on her body and she cannot explain where they are. Pt states she does weird stuff in her sleep - was her explaination. It was noted that the patches were put on at 1800.

## 2017-11-20 NOTE — PROGRESS NOTES
Ct reports increase in anxiety and depression today. Also, poor appetite. Has spent time in room in bed, low motivation. Can not attribute cause of confusion reported this morning.

## 2017-11-20 NOTE — PROGRESS NOTES
Patient remains isolative, withdrawn and irritable. Pt meeting with Dr. Becerra Hopes and does not believe she doesn't have MS. Patient reports she is going to [facility] to get a second opinion. Pt denies SI, HI, and AVH. Pt reports no change in level of depression or anxiety.

## 2017-11-20 NOTE — PROGRESS NOTES
Body Wt: 135 lb (61.2 kg), % Ideal Body > 100%   · BMI Classification: BMI 25.0 - 29.9 Overweight  · Comparative Standards (Estimated Nutrition Needs):  · Estimated Daily Total Kcal: 3011-2102  · Estimated Daily Protein (g): 58-73    Estimated Intake vs Estimated Needs: Insufficient Data    Nutrition Risk Level: Moderate    Nutrition Interventions:   Continue current diet, Start ONS (Continue with General diet , Add Clear Oral supplement ( Ensure Clear) x2, Obtain current weight)  Continued Inpatient Monitoring, Education not appropriate at this time    Nutrition Evaluation:   · Evaluation: Goals set   · Goals: po > 75%, wt ~ 160#    · Monitoring: Meal Intake, Weight    See Adult Nutrition Doc Flowsheet for more detail.      Electronically signed by Serge Rodriguez RD, LD on 11/20/17 at 4:14 PM

## 2017-11-20 NOTE — PROGRESS NOTES
disorder ([facility] 12/19/2012    Current tobacco use 12/19/2012    FH: CAD (coronary artery disease) 12/19/2012    SAMAYOA (dyspnea on exertion) 12/19/2012    Chest pain 12/19/2012    Abnormal ECG 12/19/2012    Displacement of lumbar intervertebral disc without myelopathy 06/01/2012    Fitting and adjustment of orthopedic device 09/13/2011    Sensory hearing loss, bilateral 04/19/2011    Amyotrophia 10/09/2008    Arthralgia of lower leg 10/09/2008    Acid reflux 02/12/2008    Backhand tennis elbow 05/25/2007    Arthralgia of upper arm 05/25/2007       Precautions/Weight Bearing Restrictions:   Restrictions/Precautions: Fall Risk  Falls Safety Armband Present:  [] Yes  [x] No    SUBJECTIVE: Pt resting bed upon arrival, and agreeable to tx. Pain:   Initial Pain level: mild   Location: Back, B LE   Description: aching  Action:  []  Pt declined intervention  [] Nursing notified     [x] Pain acceptable level for treatment  [] Other:      Reassessment of Pain: Same as above     OBJECTIVE:     Bed Mobility:   Rolling: - Modified Independent  Supine to Sit: -  Independent   Sit to Supine: -  Independent    Bed flat and use of bed rails   Steady    Transfers:   Sit to Stand: - Independent   Stand to Sit: - Independent   vc's for technique   Good carryover     Gait/Ambulation:   Assistive Device: None  Assist Level: Supervision  Distance: 125'  Surface: linoleum/hardwood  Gait Deviations: reciprocal gait with slow ruby, downward gaze; steady, no LOB; vc's for increased arm swing and environment scanning.      Plan   Plan  Times per week: 3-6  Current Treatment Recommendations: Strengthening, Balance Training, Functional Mobility Training, Transfer Training, Neuromuscular Re-education, Endurance Training, Gait Training, Stair training, Home Exercise Program, Safety Education & Training, Patient/Caregiver Education & Training, Equipment Evaluation, Education, & procurement    Goals  Short term goals  Short term goal 1: Patient will be independent with bed mobility. Short term goal 2: Patient will be independent with transfers. Short term goal 3: Patient will be independent with 100ft of gait using LRD. Short term goal 4: Patient will demonstrate good balance using LRD. Long term goals  Long term goal 1: Patient will ascend/descend 4-6 inchs steps x 3 with supervision. Comments: Unable to do stairs as we are in a protected area. Pt needs her balance tested. Treatment shortened d/t telephone call.      Therapy Time   Individual   Time In 1450   Time Out 1500   Minutes 10     Gait - 10 mins    Electronically signed by Araceli Jaimes PTA on 11/20/2017 at 3:00 PM

## 2017-11-21 PROCEDURE — 6370000000 HC RX 637 (ALT 250 FOR IP): Performed by: ANESTHESIOLOGY

## 2017-11-21 PROCEDURE — 6370000000 HC RX 637 (ALT 250 FOR IP): Performed by: NURSE PRACTITIONER

## 2017-11-21 PROCEDURE — 6370000000 HC RX 637 (ALT 250 FOR IP): Performed by: PHYSICIAN ASSISTANT

## 2017-11-21 PROCEDURE — 6370000000 HC RX 637 (ALT 250 FOR IP): Performed by: PSYCHIATRY & NEUROLOGY

## 2017-11-21 PROCEDURE — 97112 NEUROMUSCULAR REEDUCATION: CPT

## 2017-11-21 PROCEDURE — 94640 AIRWAY INHALATION TREATMENT: CPT

## 2017-11-21 PROCEDURE — 1240000000 HC EMOTIONAL WELLNESS R&B

## 2017-11-21 PROCEDURE — 99232 SBSQ HOSP IP/OBS MODERATE 35: CPT | Performed by: PSYCHIATRY & NEUROLOGY

## 2017-11-21 RX ADMIN — TRAMADOL HYDROCHLORIDE 50 MG: 50 TABLET, FILM COATED ORAL at 16:19

## 2017-11-21 RX ADMIN — LITHIUM CARBONATE 300 MG: 300 CAPSULE, GELATIN COATED ORAL at 09:05

## 2017-11-21 RX ADMIN — LITHIUM CARBONATE 300 MG: 300 CAPSULE, GELATIN COATED ORAL at 21:44

## 2017-11-21 RX ADMIN — METOPROLOL TARTRATE 25 MG: 25 TABLET, FILM COATED ORAL at 21:44

## 2017-11-21 RX ADMIN — CYCLOBENZAPRINE HYDROCHLORIDE 10 MG: 10 TABLET, FILM COATED ORAL at 22:23

## 2017-11-21 RX ADMIN — TRAZODONE HYDROCHLORIDE 50 MG: 50 TABLET ORAL at 21:43

## 2017-11-21 RX ADMIN — VITAMIN D, TAB 1000IU (100/BT) 2000 UNITS: 25 TAB at 09:05

## 2017-11-21 RX ADMIN — LISINOPRIL 10 MG: 10 TABLET ORAL at 09:05

## 2017-11-21 RX ADMIN — CYCLOBENZAPRINE HYDROCHLORIDE 10 MG: 10 TABLET, FILM COATED ORAL at 16:19

## 2017-11-21 RX ADMIN — PANTOPRAZOLE SODIUM 40 MG: 40 TABLET, DELAYED RELEASE ORAL at 06:24

## 2017-11-21 RX ADMIN — FOLIC ACID 1 MG: 1 TABLET ORAL at 09:05

## 2017-11-21 RX ADMIN — LURASIDONE HYDROCHLORIDE 40 MG: 40 TABLET, FILM COATED ORAL at 09:05

## 2017-11-21 RX ADMIN — CYCLOBENZAPRINE HYDROCHLORIDE 10 MG: 10 TABLET, FILM COATED ORAL at 05:29

## 2017-11-21 RX ADMIN — AMITRIPTYLINE HYDROCHLORIDE 50 MG: 50 TABLET, FILM COATED ORAL at 21:44

## 2017-11-21 RX ADMIN — Medication 2 PUFF: at 10:10

## 2017-11-21 RX ADMIN — MELOXICAM 7.5 MG: 7.5 TABLET ORAL at 09:05

## 2017-11-21 RX ADMIN — TIOTROPIUM BROMIDE 18 MCG: 18 CAPSULE ORAL; RESPIRATORY (INHALATION) at 10:08

## 2017-11-21 RX ADMIN — ATORVASTATIN CALCIUM 10 MG: 10 TABLET, FILM COATED ORAL at 21:44

## 2017-11-21 RX ADMIN — TRAMADOL HYDROCHLORIDE 50 MG: 50 TABLET, FILM COATED ORAL at 22:22

## 2017-11-21 RX ADMIN — METOPROLOL TARTRATE 25 MG: 25 TABLET, FILM COATED ORAL at 09:05

## 2017-11-21 RX ADMIN — MAGNESIUM HYDROXIDE 30 ML: 400 SUSPENSION ORAL at 22:25

## 2017-11-21 RX ADMIN — HYDROXYZINE PAMOATE 50 MG: 50 CAPSULE ORAL at 12:33

## 2017-11-21 RX ADMIN — ASPIRIN 81 MG 81 MG: 81 TABLET ORAL at 09:05

## 2017-11-21 RX ADMIN — TRAMADOL HYDROCHLORIDE 50 MG: 50 TABLET, FILM COATED ORAL at 05:29

## 2017-11-21 RX ADMIN — MELOXICAM 7.5 MG: 7.5 TABLET ORAL at 21:43

## 2017-11-21 ASSESSMENT — PAIN SCALES - GENERAL
PAINLEVEL_OUTOF10: 8
PAINLEVEL_OUTOF10: 8
PAINLEVEL_OUTOF10: 2
PAINLEVEL_OUTOF10: 7
PAINLEVEL_OUTOF10: 8
PAINLEVEL_OUTOF10: 7

## 2017-11-21 NOTE — PLAN OF CARE
Problem: Depressive Behavior with or without Suicide precautions  Goal: LTG-Able to verbalize acceptance of life and situations over which he or she has no control  Outcome: Met This Shift    Goal: LTG-Able to verbalize and/or display a decrease in depressive symptoms  Outcome: Met This Shift    Goal: STG-Able to verbalize support system  Outcome: Met This Shift    Goal: STG-Absence of Self Harm  Outcome: Met This Shift    Goal: STG-Knowledge of positive coping patterns  Outcome: Met This Shift    Goal: STG-Participation in care planning  Outcome: Ongoing      Problem: IP BALANCE  Goal: LTG - patient will maintain standing balance to allow for completion of daily activities  Outcome: Met This Shift      Problem: Pain:  Goal: Pain level will decrease  Pain level will decrease   Outcome: Ongoing    Goal: Control of acute pain  Control of acute pain   Outcome: Not Met This Shift    Goal: Control of chronic pain  Control of chronic pain   Outcome: Ongoing

## 2017-11-21 NOTE — BH NOTE
Group Therapy Note    Date: 11/20/2017  Start Time: 1600  End Time:  0612  Number of Participants: 9    Type of Group: Healthy Living/Wellness    Notes:  Patient participated appropriately in group discussion. Status After Intervention:  Improved    Participation Level:  Active Listener and Interactive    Participation Quality: Attentive and Sharing      Speech:  normal      Thought Process/Content: Logical      Affective Functioning: Congruent      Mood: euthymic      Level of consciousness:  Alert and Oriented x4      Response to Learning: Able to verbalize current knowledge/experience      Endings: None Reported    Modes of Intervention: Support and Socialization      Discipline Responsible: Behavorial Health Tech      Signature:  Candace Langford  Electronically signed by Candace Langford on 11/20/2017 at 11:12 PM

## 2017-11-21 NOTE — PROGRESS NOTES
BEHAVIORAL HEALTH FOLLOW-UP NOTE     11/21/2017     Patient was seen and examined in person, Chart reviewed   Patient's case discussed with staff/team    Chief Complaint: depression    Interim History:   Pt ruminating about the anniversary of her son and  coming up  Still feel depressed and hopeless  It varies from day to day  Pt is having racing thoughts  Arlene Matos increased today to 40 mg    Appetite:   [x] Normal/Unchanged  [] Increased  [] Decreased      Sleep:       [] Normal/Unchanged  [x] Fair       [] Poor              Energy:    [] Normal/Unchanged  [] Increased  [x] Decreased        SI [] Present  [x] Absent    HI  []Present  [x] Absent     Aggression:  [] yes  [x] no    Patient is [] able  [x] unable to CONTRACT FOR SAFETY     PAST MEDICAL/PSYCHIATRIC HISTORY:   Past Medical History:   Diagnosis Date    Anxiety     Back pain     back surgery x 4    Bipolar disorder ([address]     CAD (coronary artery disease)     CAFL (chronic airflow limitation) ([address] 11/3/2016    Chronic pain     Colitis     DDD (degenerative disc disease), lumbar 12/22/2016    Depression     Endometriosis     Gastritis     GERD (gastroesophageal reflux disease)     History of myocardial infarction     HTN (hypertension), benign 2/19/2016    Hypokalemia     Impaired mobility and activities of daily living     Left hemiparesis ([address]     Multiple sclerosis ([address]     Neck pain     surgery x 1    Over weight     PTSD (post-traumatic stress disorder)     Sensory neuropathy ([address] 12/22/2016    TIA (transient ischemic attack)     Tobacco abuse     Vasospastic angina ([address] 11/11/2016       FAMILY/SOCIAL HISTORY:  Family History   Problem Relation Age of Onset    High Blood Pressure Mother     Kidney Disease Mother     Lupus Mother     Bipolar Disorder Son      Social History     Social History    Marital status:       Spouse name: N/A    Number of children: N/A    Years of education: N/A     Occupational History    unemployed      Social History Main Topics    Smoking status: Current Some Day Smoker     Packs/day: 1.50     Years: 10.00     Types: Cigarettes     Last attempt to quit: 1/3/2017    Smokeless tobacco: Never Used    Alcohol use No    Drug use:      Types: Marijuana      Comment: last used one month ago     Sexual activity: Not on file     Other Topics Concern    Not on file     Social History Narrative    Wing Lee is a 53-year-old female who is on disability because of pain and bipolar disorder. She's also had a presumed diagnosis of possible multiple sclerosis. She's been seeing Dr. Brea Dye for that and she says that the diagnosis is sometimes in question and it's clear neuropathy vitamin B12 deficiency as well as generalized bodyaches from the severe vitamin D deficiency have caused this scenario that looks like multiple sclerosis. She recently tried to go back to work but was not able because of her pain. ROS:  [x] All negative/unchanged except if checked.  Explain positive(checked items) below:  [] Constitutional  [] Eyes  [] Ear/Nose/Mouth/Throat  [] Respiratory  [] CV  [] GI  []   [] Musculoskeletal  [] Skin/Breast  [] Neurological  [] Endocrine  [] Heme/Lymph  [] Allergic/Immunologic    Explanation:     MEDICATIONS:    Current Facility-Administered Medications:     lurasidone (LATUDA) tablet 40 mg, 40 mg, Oral, Daily, Alejandra Gardner MD, 40 mg at 11/21/17 0905    benzocaine-menthol (CEPACOL SORE THROAT) lozenge 1 lozenge, 1 lozenge, Oral, Q2H PRN, Aquilino Yousif CNP    melatonin tablet 5 mg, 5 mg, Oral, Nightly PRN, Darci Mayes MD    vitamin D (CHOLECALCIFEROL) tablet 2,000 Units, 2,000 Units, Oral, Daily, Olinda Vidal PA-C, 2,000 Units at 54/20/20 1148    folic acid (FOLVITE) tablet 1 mg, 1 mg, Oral, Daily, Yolanda Ruelas PA-C, 1 mg at 11/21/17 0905    albuterol sulfate  (90 Base) MCG/ACT inhaler 2 puff, 2 puff, Inhalation, Q6H PRN, injection 2 mg, 2 mg, Intramuscular, BID PRN, Shree Mayes MD    magnesium hydroxide (MILK OF MAGNESIA) 400 MG/5ML suspension 30 mL, 30 mL, Oral, Daily PRN, Shree Mayes MD    aluminum & magnesium hydroxide-simethicone (MAALOX) 486-032-47 MG/5ML suspension 30 mL, 30 mL, Oral, PRN, Shree Mayes MD    traZODone (DESYREL) tablet 50 mg, 50 mg, Oral, Nightly PRN, Shree Mayes MD, 50 mg at 11/20/17 2119      Examination:  /75   Pulse 71   Temp 97 °F (36.1 °C)   Resp 18   Ht 5' 7\" (1.702 m)   Wt 160 lb (72.6 kg)   SpO2 94%   BMI 25.06 kg/m²   Gait - steady  Medication side effects(SE): no    Mental Status Examination:    Level of consciousness:  within normal limits   Appearance:  fair grooming and fair hygiene  Behavior/Motor:  psychomotor retardation  Attitude toward examiner:  cooperative  Speech:  slow   Mood: depressed  Affect:  mood congruent  Thought processes:  linear   Thought content:  Suicidal Ideation:  passive  Delusions:  no evidence of delusions  Perceptual Disturbance:  denies any perceptual disturbance  Cognition:  oriented to person, place, and time   Concentration distractible  Insight fair   Judgement fair     ASSESSMENT:   Patient symptoms are:  [] Well controlled  [] Improving  [] Worsening  [x] No change      Diagnosis:   Principal Problem:    Bipolar 1 disorder, depressed ([facility]  Active Problems:    Displacement of lumbar intervertebral disc without myelopathy    Multiple sclerosis (HCC)    Chronic pain syndrome     DDD (degenerative disc disease), lumbar    Sensory neuropathy (HCC)    Lumbosacral radiculopathy at S1    Neuromyelopathy due to vitamin B12 deficiency ([facility]      LABS:    No results for input(s): WBC, HGB, PLT in the last 72 hours. No results for input(s): NA, K, CL, CO2, BUN, CREATININE, GLUCOSE in the last 72 hours. No results for input(s): BILITOT, ALKPHOS, AST, ALT in the last 72 hours.   Lab Results   Component Value Date

## 2017-11-21 NOTE — PROGRESS NOTES
Group Therapy Note    Date: 11/21/2017  Start Time: 1000  End Time:  0655  Number of Participants: 8    Type of Group: Psychoeducation    Wellness Binder Information  Module Name:    Session Number:      Patient's Goal:  \"Be more active on the unit\"    Notes:  Pt attended the group late but she work actively and independently on her project. She was more talkative and more relax. Status After Intervention:  Improved    Participation Level: Active Listener    Participation Quality: Appropriate and Attentive      Speech:  normal      Thought Process/Content: Logical  Linear      Affective Functioning: Flat      Mood: More relax.       Level of consciousness:  Alert and Oriented x4      Response to Learning: Able to verbalize current knowledge/experience      Endings: None Reported    Modes of Intervention: Education, Socialization and Activity      Discipline Responsible: Psychoeducational Specialist      Signature:  Javon Horan

## 2017-11-21 NOTE — PLAN OF CARE
Problem: Depressive Behavior with or without Suicide precautions  Goal: LTG-Able to verbalize acceptance of life and situations over which he or she has no control  Outcome: Met This Shift    Goal: LTG-Able to verbalize and/or display a decrease in depressive symptoms  Outcome: Met This Shift    Goal: STG-Able to verbalize support system  Outcome: Ongoing    Goal: STG-Absence of Self Harm  Outcome: Met This Shift    Goal: STG-Knowledge of positive coping patterns  Outcome: Ongoing    Goal: STG-Participation in care planning  Outcome: Met This Shift      Problem: Pain:  Goal: Pain level will decrease  Pain level will decrease   Outcome: Met This Shift    Goal: Control of acute pain  Control of acute pain   Outcome: Met This Shift    Goal: Control of chronic pain  Control of chronic pain   Outcome: Ongoing

## 2017-11-21 NOTE — PROGRESS NOTES
[facility] 12/19/2012    Current tobacco use 12/19/2012    FH: CAD (coronary artery disease) 12/19/2012    SAMAYOA (dyspnea on exertion) 12/19/2012    Chest pain 12/19/2012    Abnormal ECG 12/19/2012    Displacement of lumbar intervertebral disc without myelopathy 06/01/2012    Fitting and adjustment of orthopedic device 09/13/2011    Sensory hearing loss, bilateral 04/19/2011    Amyotrophia 10/09/2008    Arthralgia of lower leg 10/09/2008    Acid reflux 02/12/2008    Backhand tennis elbow 05/25/2007    Arthralgia of upper arm 05/25/2007       Precautions/Weight Bearing Restrictions:   Restrictions/Precautions: Fall Risk  Falls Safety Armband Present:  [x] Yes  [] No    SUBJECTIVE: Pt resting in bed upon arrival and agreeable to tx. Pain:   Initial Pain level: mild   Location: All over, my joints   Description: aching  Action:  []  Pt declined intervention  [] Nursing notified     [x] Pain acceptable level for treatment  [] Other:      Reassessment of Pain: Same as above       OBJECTIVE:     Bed Mobility:   Rolling: - Independent  Supine to Sit: -  Independent   Sit to Supine: -  Independent    Slow movements     Transfers:   Sit to Stand: - Independent   Stand to Sit: - Independent   Slow movements    Gait/Ambulation:   Assistive Device: None  Assist Level: Supervision  Distance: 200'  Surface: linoleum/hardwood  Gait Deviations: reciprocal gait with decreased step length; R foot everts, rigid pattern and slow ruby - vc's to increase knee flexion and increased pace.     Neuromuscular Re-Education/Balance:  6\" toe taps with close SBA  Retro walking ~10 - SBA  DGI activity - head turns - moderate gait disturbance changes pace and stops    Plan   Plan  Times per week: 3-6  Current Treatment Recommendations: Strengthening, Balance Training, Functional Mobility Training, Transfer Training, Neuromuscular Re-education, Endurance Training, Gait Training, Stair training, Home Exercise Program, Safety

## 2017-11-21 NOTE — PROGRESS NOTES
Pt. attended the 0900 community meeting.  Electronically signed by Vannesa Harding on 11/21/2017 at 10:46 AM

## 2017-11-22 PROCEDURE — 94640 AIRWAY INHALATION TREATMENT: CPT

## 2017-11-22 PROCEDURE — 6370000000 HC RX 637 (ALT 250 FOR IP): Performed by: PSYCHIATRY & NEUROLOGY

## 2017-11-22 PROCEDURE — 6370000000 HC RX 637 (ALT 250 FOR IP): Performed by: NURSE PRACTITIONER

## 2017-11-22 PROCEDURE — 6370000000 HC RX 637 (ALT 250 FOR IP): Performed by: ANESTHESIOLOGY

## 2017-11-22 PROCEDURE — 90833 PSYTX W PT W E/M 30 MIN: CPT | Performed by: PSYCHIATRY & NEUROLOGY

## 2017-11-22 PROCEDURE — 97116 GAIT TRAINING THERAPY: CPT

## 2017-11-22 PROCEDURE — 6370000000 HC RX 637 (ALT 250 FOR IP): Performed by: PHYSICIAN ASSISTANT

## 2017-11-22 PROCEDURE — 99232 SBSQ HOSP IP/OBS MODERATE 35: CPT | Performed by: PSYCHIATRY & NEUROLOGY

## 2017-11-22 PROCEDURE — 1240000000 HC EMOTIONAL WELLNESS R&B

## 2017-11-22 RX ORDER — DOCUSATE SODIUM 100 MG/1
100 CAPSULE, LIQUID FILLED ORAL DAILY
Status: DISCONTINUED | OUTPATIENT
Start: 2017-11-22 | End: 2017-11-23 | Stop reason: HOSPADM

## 2017-11-22 RX ADMIN — METOPROLOL TARTRATE 25 MG: 25 TABLET, FILM COATED ORAL at 08:39

## 2017-11-22 RX ADMIN — MELOXICAM 7.5 MG: 7.5 TABLET ORAL at 08:38

## 2017-11-22 RX ADMIN — LITHIUM CARBONATE 300 MG: 300 CAPSULE, GELATIN COATED ORAL at 08:38

## 2017-11-22 RX ADMIN — TIOTROPIUM BROMIDE 18 MCG: 18 CAPSULE ORAL; RESPIRATORY (INHALATION) at 08:51

## 2017-11-22 RX ADMIN — LITHIUM CARBONATE 300 MG: 300 CAPSULE, GELATIN COATED ORAL at 21:01

## 2017-11-22 RX ADMIN — CYCLOBENZAPRINE HYDROCHLORIDE 10 MG: 10 TABLET, FILM COATED ORAL at 17:24

## 2017-11-22 RX ADMIN — MAGNESIUM HYDROXIDE 30 ML: 400 SUSPENSION ORAL at 21:46

## 2017-11-22 RX ADMIN — VITAMIN D, TAB 1000IU (100/BT) 2000 UNITS: 25 TAB at 08:38

## 2017-11-22 RX ADMIN — BENZOCAINE AND MENTHOL 1 LOZENGE: 15; 3.6 LOZENGE ORAL at 05:46

## 2017-11-22 RX ADMIN — ASPIRIN 81 MG 81 MG: 81 TABLET ORAL at 08:39

## 2017-11-22 RX ADMIN — ATORVASTATIN CALCIUM 10 MG: 10 TABLET, FILM COATED ORAL at 21:01

## 2017-11-22 RX ADMIN — HYDROXYZINE PAMOATE 50 MG: 50 CAPSULE ORAL at 09:26

## 2017-11-22 RX ADMIN — DOCUSATE SODIUM 100 MG: 100 CAPSULE, LIQUID FILLED ORAL at 21:46

## 2017-11-22 RX ADMIN — PANTOPRAZOLE SODIUM 40 MG: 40 TABLET, DELAYED RELEASE ORAL at 06:19

## 2017-11-22 RX ADMIN — TRAMADOL HYDROCHLORIDE 50 MG: 50 TABLET, FILM COATED ORAL at 17:24

## 2017-11-22 RX ADMIN — AMITRIPTYLINE HYDROCHLORIDE 50 MG: 50 TABLET, FILM COATED ORAL at 21:01

## 2017-11-22 RX ADMIN — TRAMADOL HYDROCHLORIDE 50 MG: 50 TABLET, FILM COATED ORAL at 08:38

## 2017-11-22 RX ADMIN — CYCLOBENZAPRINE HYDROCHLORIDE 10 MG: 10 TABLET, FILM COATED ORAL at 08:38

## 2017-11-22 RX ADMIN — LISINOPRIL 10 MG: 10 TABLET ORAL at 08:39

## 2017-11-22 RX ADMIN — LURASIDONE HYDROCHLORIDE 40 MG: 40 TABLET, FILM COATED ORAL at 08:38

## 2017-11-22 RX ADMIN — MELOXICAM 7.5 MG: 7.5 TABLET ORAL at 21:01

## 2017-11-22 RX ADMIN — FOLIC ACID 1 MG: 1 TABLET ORAL at 08:39

## 2017-11-22 RX ADMIN — METOPROLOL TARTRATE 25 MG: 25 TABLET, FILM COATED ORAL at 21:01

## 2017-11-22 ASSESSMENT — PAIN SCALES - GENERAL
PAINLEVEL_OUTOF10: 8
PAINLEVEL_OUTOF10: 6

## 2017-11-22 NOTE — BH NOTE
Group Therapy Note    Date: 11/21/2017  Start Time: 7486  End Time:  5595  Number of Participants: 6    Type of Group: Healthy Living/Wellness    Notes:  Patient participated appropriately in activity. Status After Intervention:  Improved    Participation Level:  Active Listener    Participation Quality: Appropriate      Speech:  normal      Thought Process/Content: Linear      Affective Functioning: Congruent      Mood: depressed      Level of consciousness:  Attentive      Response to Learning: Able to verbalize current knowledge/experience      Endings: None Reported    Modes of Intervention: Support and Socialization      Discipline Responsible: Behavorial Health Tech      Signature:  Melanie Langford  Electronically signed by Melanie Langford on 11/21/2017 at 9:51 PM

## 2017-11-22 NOTE — PLAN OF CARE
Problem: Depressive Behavior with or without Suicide precautions  Goal: LTG-Able to verbalize acceptance of life and situations over which he or she has no control  Outcome: Met This Shift    Goal: LTG-Able to verbalize and/or display a decrease in depressive symptoms  Outcome: Met This Shift    Goal: STG-Able to verbalize support system  Outcome: Ongoing    Goal: STG-Absence of Self Harm  Outcome: Met This Shift    Goal: STG-Knowledge of positive coping patterns  Outcome: Met This Shift    Goal: STG-Participation in care planning  Outcome: Met This Shift      Problem: Pain:  Goal: Control of chronic pain  Control of chronic pain   Outcome: Ongoing

## 2017-11-22 NOTE — PROGRESS NOTES
Physical Therapy Med Surg Treatment and Discharge   Facility/Department: [facility]  [id number]       NAME: Shruti Mar  : 1970 (41 y.o.)  MRN: [id number]  CODE STATUS: Full Code    Date of Service: 2017    Patient Diagnosis(es): Bipolar 1 disorder, depressed ([facility] [F31.9]  Bipolar 1 disorder, depressed ([facility] [F31.9]   Chief Complaint   Patient presents with   [address] Problem     Patient Active Problem List    Diagnosis Date Noted    High risk medication use - 17 OARRS PM&R, 10/30/17 OARRS PM&R, 17 Med Contract PM&R 2017     Priority: High    Left hemiparesis ([facility] due to TIA, exacerbation of MS with impaired mobility, [facility] admit 16, Dr. Gris Weems      Priority: Medium    TIA (transient ischemic attack)      Priority: Medium    Bipolar 1 disorder, depressed ([facility] 11/15/2017    Noncompliance with medications 2017    Patient noncompliance, multiple appt cancellations/no shows PM&R 2017    Neuromyelopathy due to vitamin B12 deficiency ([facility] 2017    Bilateral occipital neuralgia 2017    Tobacco abuse     DDD (degenerative disc disease), lumbar 2016    Sensory neuropathy ([facility] 2016    Lumbosacral radiculopathy at S1 2016    [facility] 2016    CAFL (chronic airflow limitation) ([facility] 2016    Chronic pain syndrome  2016    Vitamin B12 deficiency 2016    MI (myocardial infarction) ([facility] 10/2016 2016    Grief at loss of child 2016    Finger sprain 10/27/2016    Multiple sclerosis ([facility]     Colitis     Over weight     Anxiety     PTSD (post-traumatic stress disorder)     Impaired mobility and activities of daily living with increased Lt hemiparesis due to TIA, exacerbation of MS, [facility] admit 16, Dr. Araceli Peterson HTN (hypertension), benign 2016    Sprain of thumb 2016    Cobalamin deficiency 2015    Vitamin D deficiency Restrictions:  Restrictions/Precautions: Fall Risk  Body mass index is 25.06 kg/m². SUBJECTIVE: Subjective: \"I am a little shakey at times. My doctors don't do anything about it. \"       Post Treatment Pain Screening:   Pain Screening  Patient Currently in Pain: Denies    Prior Level of Function:       OBJECTIVE:     Neuro:  Balance  Sitting - Static: Good  Sitting - Dynamic: Good  Standing - Static: Good  Standing - Dynamic: Good;-  Comments: alt marching in place x8 reps with no UE support to simulate safet stair negotiation       Bed mobility  Supine to Sit: Independent  Sit to Supine: Independent    Transfers  Sit to Stand: Independent  Stand to sit: Independent    Ambulation  Ambulation?: Yes  Ambulation 1  Surface: level tile  Device: No Device  Assistance: Independent  Quality of Gait: reciprocal, toe out, no LOB noted  Distance: >200ft  Comments: no LOB, occasionally reaches for wall, pt reports that she feels more safe with wall near by. VC for pacing and upward gaze and scanning the environment to improve safety with task. Pt ed in energy conservation, and gait stabilization during mobility to improve sensation of dizziness. Pt was not receptive to education. Activity Tolerance  Activity Tolerance: Patient Tolerated treatment well    Exercises  Comments: instucted in standing hip series to improve B LE strength and improve balance     ASSESSMENT:      DISCHARGE RECOMMENDATIONS:  Discharge Recommendations: home    Assessment: Patient is ambulating indep without AD. Up ad tessie. No skilled acute care PT needs identified at this time. Pt ed in HEP. Pt stated understanding. Pt d/c'd from PT POC at this time d/t indep with functional mobility  REQUIRES PT FOLLOW UP: No (d/c'd 11/22 indep with all mobility)      PLAN OF CARE:  Plan: D/C from PT POC on this date, 11/22/2017. Goals:  Short term goals  Short term goal 1: Patient will be independent with bed mobility.   Short term goal 2: Patient will be

## 2017-11-22 NOTE — CARE COORDINATION
Group Therapy Note    Date: 11/22/2017  Start Time: 1100  End Time:  7411  Number of Participants: 6    Type of Group: Psychotherapy    Wellness Binder Information  Module Name:  x  Session Number:  x    Patient's Goal:  To stay on the right track    Notes:  Patient stated she has not slept well and stayed for most of the group and then excused herself to try to get some sleep. Status After Intervention:  Improved    Participation Level:  Active Listener    Participation Quality: Appropriate      Speech:  normal      Thought Process/Content: Logical      Affective Functioning: Congruent      Mood: anxious      Level of consciousness:  Alert      Response to Learning: Able to verbalize current knowledge/experience      Endings: None Reported    Modes of Intervention: Support      Discipline Responsible: /Counselor   Electronically signed by RED Rizo on 11/22/2017 at 12:12 PM      Signature:  Joan Sanchez, [facility]

## 2017-11-22 NOTE — BH NOTE
Group Therapy Note    Date: 2017  Start Time: 1440  End Time:  1530  Number of Participants: 7    Type of Group: Cognitive Skills    Wellness Binder Information  Module Name:  feelings  Session Number:  na    Patient's Goal:  Identify feelings    Notes:  Verbal about christel she felt when her son visited, and struggles with loss and missing her son who .     Status After Intervention:  Unchanged    Participation Level: Interactive    Participation Quality: Attentive and Sharing      Speech:  normal      Thought Process/Content: Logical      Affective Functioning: Congruent      Mood: anxious and depressed      Level of consciousness:  Alert and Attentive      Response to Learning: Able to verbalize current knowledge/experience, Able to verbalize/acknowledge new learning and Able to retain information      Endings: None Reported    Modes of Intervention: Education, Support and Socialization      Discipline Responsible: Registered Nurse      Signature:  Desiree Corado NP

## 2017-11-22 NOTE — BH NOTE
Group Therapy Note    Date: 11/21/2017  Start Time: 2015  End Time:  2045  Number of Participants: 12    Type of Group: Wrap-Up    Patient's Goal:  \"to keep myself together\"    Notes:  Patient participated appropriately in goal setting exercise. Status After Intervention:  Unchanged    Participation Level:  Active Listener and Interactive    Participation Quality: Attentive and Sharing      Speech:  normal      Thought Process/Content: Linear      Affective Functioning: Congruent      Mood: depressed      Level of consciousness:  Alert and Oriented x4      Response to Learning: Able to verbalize current knowledge/experience      Endings: None Reported    Modes of Intervention: Support and Socialization      Discipline Responsible: Behavorial Health Tech      Signature:  Melanie Langford  Electronically signed by Melanie Langford on 11/21/2017 at 9:54 PM

## 2017-11-22 NOTE — PROGRESS NOTES
mesylate (COGENTIN) injection 2 mg, 2 mg, Intramuscular, BID PRN, Gracie Mayes MD    magnesium hydroxide (MILK OF MAGNESIA) 400 MG/5ML suspension 30 mL, 30 mL, Oral, Daily PRN, Sasha Mayes MD, 30 mL at 11/21/17 2225    aluminum & magnesium hydroxide-simethicone (MAALOX) 200-200-20 MG/5ML suspension 30 mL, 30 mL, Oral, PRN, Gracie Mayes MD    traZODone (DESYREL) tablet 50 mg, 50 mg, Oral, Nightly PRN, Gracie Mayes MD, 50 mg at 11/21/17 2143      Examination:  BP (!) 91/54 Comment: August Gaxiola RN notified  Pulse 65   Temp 97 °F (36.1 °C) (Oral)   Resp 16   Ht 5' 7\" (1.702 m)   Wt 160 lb (72.6 kg)   SpO2 98%   BMI 25.06 kg/m²   Gait - steady  Medication side effects(SE): no    Mental Status Examination:    Level of consciousness:  within normal limits   Appearance:  fair grooming and fair hygiene  Behavior/Motor:  psychomotor retardation  Attitude toward examiner:  cooperative  Speech:  slow   Mood: depressed  Affect:  mood congruent  Thought processes:  linear   Thought content:  Suicidal Ideation:  passive  Delusions:  no evidence of delusions  Perceptual Disturbance:  denies any perceptual disturbance  Cognition:  oriented to person, place, and time   Concentration distractible  Insight fair   Judgement fair     ASSESSMENT:   Patient symptoms are:  [] Well controlled  [] Improving  [] Worsening  [x] No change      Diagnosis:   Principal Problem:    Bipolar 1 disorder, depressed ([facility]  Active Problems:    Displacement of lumbar intervertebral disc without myelopathy    Multiple sclerosis (HCC)    Chronic pain syndrome     DDD (degenerative disc disease), lumbar    Sensory neuropathy (HCC)    Lumbosacral radiculopathy at S1    Neuromyelopathy due to vitamin B12 deficiency ([facility]      LABS:    No results for input(s): WBC, HGB, PLT in the last 72 hours. No results for input(s): NA, K, CL, CO2, BUN, CREATININE, GLUCOSE in the last 72 hours.   No results for input(s): BILITOT, ALKPHOS, AST, ALT in the last 72 hours. Lab Results   Component Value Date    LABAMPH Neg 11/15/2017    BARBSCNU Neg 11/15/2017    LABBENZ Neg 11/15/2017    LABBENZ NotDTCD 01/26/2013    OPIATESCREENURINE Neg 11/15/2017    PHENCYCLIDINESCREENURINE Neg 11/15/2017    ETOH <10 11/15/2017     Lab Results   Component Value Date    TSH 2.960 11/15/2017     Lab Results   Component Value Date    LITHIUM 0.5 (L) 11/19/2017     No results found for: VALPROATE, CBMZ    Show images for EKG 12 Lead   11/17/2017 12:16 PM - Manuel, Chpo Incoming Scans From Hospitals in [place]     Component Results     Component Value Ref Range & Units Status Collected Lab   Ventricular Rate 67  BPM Preliminary 11/17/2017  9:45 AM Unknown   Atrial Rate 67  BPM Preliminary 11/17/2017  9:45 AM Unknown   P-R Interval 182  ms Preliminary 11/17/2017  9:45 AM Unknown   QRS Duration 92  ms Preliminary 11/17/2017  9:45 AM Unknown   Q-T Interval 416  ms Preliminary 11/17/2017  9:45 AM Unknown   QTc Calculation (Bazett) 439  ms Preliminary 11/17/2017  9:45 AM Unknown   P Axis 67  degrees Preliminary 11/17/2017  9:45 AM Unknown   R Axis 71  degrees Preliminary 11/17/2017  9:45 AM Unknown   T Axis 61  degrees Preliminary 11/17/2017  9:45 AM Unknown   Narrative     Normal sinus rhythm  Nonspecific T wave abnormality  Abnormal ECG  When compared with ECG of 16-NOV-2017 10:59,  Nonspecific T wave abnormality no longer evident in Inferior leads       Treatment Plan:  Reviewed current Medications with the patient. Lithium level is WNL  Increased the dose of Latuda to 40 mg   Elavil 75 mg po qhs from tonight  Risks, benefits, side effects, drug-to-drug interactions and alternatives to treatment were discussed. Collateral information: pending  CD evaluation  Encourage patient to attend group and other milieu activities.   Discharge planning discussed with the patient and treatment team.    PSYCHOTHERAPY/COUNSELING:  [x] Therapeutic interview  [x] Supportive  [] CBT  []

## 2017-11-23 VITALS
OXYGEN SATURATION: 97 % | TEMPERATURE: 98 F | HEART RATE: 81 BPM | SYSTOLIC BLOOD PRESSURE: 94 MMHG | RESPIRATION RATE: 18 BRPM | BODY MASS INDEX: 25.11 KG/M2 | WEIGHT: 160 LBS | HEIGHT: 67 IN | DIASTOLIC BLOOD PRESSURE: 58 MMHG

## 2017-11-23 PROCEDURE — 6370000000 HC RX 637 (ALT 250 FOR IP): Performed by: NURSE PRACTITIONER

## 2017-11-23 PROCEDURE — 6370000000 HC RX 637 (ALT 250 FOR IP): Performed by: PHYSICIAN ASSISTANT

## 2017-11-23 PROCEDURE — 6370000000 HC RX 637 (ALT 250 FOR IP): Performed by: PSYCHIATRY & NEUROLOGY

## 2017-11-23 PROCEDURE — 6370000000 HC RX 637 (ALT 250 FOR IP): Performed by: ANESTHESIOLOGY

## 2017-11-23 RX ORDER — TRAZODONE HYDROCHLORIDE 50 MG/1
50 TABLET ORAL NIGHTLY PRN
Qty: 7 TABLET | Refills: 0 | Status: SHIPPED | OUTPATIENT
Start: 2017-11-23 | End: 2017-11-29 | Stop reason: SDUPTHER

## 2017-11-23 RX ORDER — LITHIUM CARBONATE 300 MG/1
300 CAPSULE ORAL 2 TIMES DAILY
Qty: 28 CAPSULE | Refills: 0 | Status: SHIPPED | OUTPATIENT
Start: 2017-11-23 | End: 2018-01-16 | Stop reason: SDUPTHER

## 2017-11-23 RX ORDER — AMITRIPTYLINE HYDROCHLORIDE 50 MG/1
50 TABLET, FILM COATED ORAL NIGHTLY
Qty: 14 TABLET | Refills: 0 | Status: SHIPPED | OUTPATIENT
Start: 2017-11-23 | End: 2018-12-18 | Stop reason: SDUPTHER

## 2017-11-23 RX ORDER — AMITRIPTYLINE HYDROCHLORIDE 50 MG/1
50 TABLET, FILM COATED ORAL NIGHTLY
Qty: 7 TABLET | Refills: 0 | Status: SHIPPED | OUTPATIENT
Start: 2017-11-23 | End: 2017-11-23

## 2017-11-23 RX ORDER — LITHIUM CARBONATE 300 MG/1
300 CAPSULE ORAL 2 TIMES DAILY
Qty: 14 CAPSULE | Refills: 0 | Status: SHIPPED | OUTPATIENT
Start: 2017-11-23 | End: 2017-11-23

## 2017-11-23 RX ADMIN — LURASIDONE HYDROCHLORIDE 40 MG: 40 TABLET, FILM COATED ORAL at 08:23

## 2017-11-23 RX ADMIN — DOCUSATE SODIUM 100 MG: 100 CAPSULE, LIQUID FILLED ORAL at 08:23

## 2017-11-23 RX ADMIN — VITAMIN D, TAB 1000IU (100/BT) 2000 UNITS: 25 TAB at 08:23

## 2017-11-23 RX ADMIN — FOLIC ACID 1 MG: 1 TABLET ORAL at 08:24

## 2017-11-23 RX ADMIN — LITHIUM CARBONATE 300 MG: 300 CAPSULE, GELATIN COATED ORAL at 08:23

## 2017-11-23 RX ADMIN — CYCLOBENZAPRINE HYDROCHLORIDE 10 MG: 10 TABLET, FILM COATED ORAL at 08:23

## 2017-11-23 RX ADMIN — ASPIRIN 81 MG 81 MG: 81 TABLET ORAL at 08:24

## 2017-11-23 RX ADMIN — MAGESIUM CITRATE 296 ML: 1.75 LIQUID ORAL at 10:59

## 2017-11-23 RX ADMIN — TRAMADOL HYDROCHLORIDE 50 MG: 50 TABLET, FILM COATED ORAL at 08:23

## 2017-11-23 RX ADMIN — METOPROLOL TARTRATE 25 MG: 25 TABLET, FILM COATED ORAL at 08:28

## 2017-11-23 RX ADMIN — PANTOPRAZOLE SODIUM 40 MG: 40 TABLET, DELAYED RELEASE ORAL at 06:24

## 2017-11-23 RX ADMIN — MELOXICAM 7.5 MG: 7.5 TABLET ORAL at 08:23

## 2017-11-23 ASSESSMENT — PAIN SCALES - GENERAL
PAINLEVEL_OUTOF10: 6
PAINLEVEL_OUTOF10: 4

## 2017-11-23 NOTE — PROGRESS NOTES
Pt. attended the 0900 community meeting. Electronically signed by Michael Vargas, [address] on 11/23/2017 at 9:43 AM

## 2017-11-23 NOTE — PROGRESS NOTES
Told rn tamilia she did not have a bm for 7 days. On admission reported hx of colitis, with frequent diarrhea stools. Pt had mom on 11-21, another dose given tonite at 2146 and new order for colace started.

## 2017-11-23 NOTE — PROGRESS NOTES
Group Therapy Note    Date: 11/23/2017  Start Time: 1000  End Time:  1100  Number of Participants: 9    Type of Group: Psychoeducation    Wellness Binder Information  Module Name:    Session Number:      Patient's Goal:  \"To go home and stay healthy\"    Notes:  Patient was talkative and shared openly on a 1:1. Patient stated she will explore grief classes and keep working on herself to stay healthy. Status After Intervention:  Improved    Participation Level:  Active Listener    Participation Quality: Appropriate, Attentive and Sharing      Speech:  normal      Thought Process/Content: Logical  Linear      Affective Functioning: Congruent      Mood: calm      Level of consciousness:  Alert, Oriented x4 and Attentive      Response to Learning: Able to verbalize current knowledge/experience      Endings: Suicidality    Modes of Intervention: Reality-testing      Discipline Responsible: Psychoeducational Specialist      Signature:  Keisha Del Rio

## 2017-11-23 NOTE — PROGRESS NOTES
Pt left unit with friend for discharge to home. Belongings given to pt. Pt denies any current suicidal ideation, homicidal ideation or hallucinations.    Mood and affect stable

## 2017-11-23 NOTE — PLAN OF CARE
Problem: Depressive Behavior with or without Suicide precautions  Goal: LTG-Able to verbalize acceptance of life and situations over which he or she has no control  Outcome: Ongoing    Goal: LTG-Able to verbalize and/or display a decrease in depressive symptoms  Outcome: Ongoing    Goal: STG-Able to verbalize support system  Outcome: Ongoing    Goal: STG-Absence of Self Harm  Outcome: Completed Date Met: 11/22/17    Goal: STG-Knowledge of positive coping patterns  Outcome: Ongoing    Goal: STG-Participation in care planning  Outcome: Ongoing

## 2017-11-23 NOTE — CARE COORDINATION
11/23/17 @ 8001 -  called best friend as directed by doctor to confirm safety plan and discharge location. Per best friend, patient will be discharged to the best friend's mother's home. Per best friend states there are no weapons in the home. Best friend will also monitor and safeguard patient's medication until 12/1/17. Best friend will return to [place] at that time and patient will remain in best friend's mothers home. Patient will move to [place] and reside with best friend when patient's lease is up in about 5 months. Best friend identified a doctor for patient to receive follow-up care, when she relocates. In addition, patient has a pending social security claim for potential income and medicaid for health insurance. Patient and best friend are working hard to make sure there is no gap in patient's mental health care when patient arrives in [place].     Vladimir PATIÑO

## 2017-11-24 NOTE — PROGRESS NOTES
Physical Therapy  Facility/Department: [facility] MED SURG [id number]  Physical Therapy Discharge      NAME: Susu Neff    : 1970 (41 y.o.)  MRN: [id number]    Account: [de-identified]  Gender: female      Patient has been discharged from acute care hospital. DC patient from current PT program.      Electronically signed by Sherita Corado PT on 17 at 3:52 PM

## 2017-11-24 NOTE — PROGRESS NOTES
BEHAVIORAL HEALTH FOLLOW-UP NOTE   This note was actually written on 11/23/2017 11/19/2017   Patient was seen and examined in person, Chart reviewed   Patient's case discussed with staff/team    Chief Complaint: depressed and suicidal after losing her son,  and mother  Interim History:   Patient denies sxs. States she is sleeping  She is not having any racing thoughts and best friend hasbeen visiting      Appetite:  [] Normal/Unchanged  [] Increased  [x] Decreased      Sleep:       [] Normal/Unchanged  [] Fair       [x] Poor              Energy:    [] Normal/Unchanged  [] Increased  [x] Decreased        SI [] Present  [x] Absent    HI  []Present  [x] Absent    Patient is [x] able  [] unable to CONTRACT FOR SAFETY   Aggression:  [] yes  [x] no  Medication side effects(SE):  [] None(Psych. Meds.) [] Other      PAST MEDICAL/PSYCHIATRIC HISTORY:   Past Medical History:   Diagnosis Date    Anxiety     Back pain     back surgery x 4    [facility]     CAD (coronary artery disease)     CAFL (chronic airflow limitation) ([facility]) 11/3/2016    Chronic pain     Colitis     DDD (degenerative disc disease), lumbar 12/22/2016    Depression     Endometriosis     Gastritis     GERD (gastroesophageal reflux disease)     History of myocardial infarction     HTN (hypertension), benign 2/19/2016    Hypokalemia     Impaired mobility and activities of daily living     Left hemiparesis ([facility])     Multiple sclerosis ([facility])     Neck pain     surgery x 1    Over weight     PTSD (post-traumatic stress disorder)     Sensory neuropathy ([facility] 12/22/2016    TIA (transient ischemic attack)     Tobacco abuse     Vasospastic angina ([facility] 11/11/2016       FAMILY/SOCIAL HISTORY:  Family History   Problem Relation Age of Onset    High Blood Pressure Mother     Kidney Disease Mother     Lupus Mother     Bipolar Disorder Son      Social History     Social History    Marital status:       Spouse name: N/A tablet, Take 1 tablet by mouth 2 times daily, Disp: 60 tablet, Rfl: 3    aspirin 81 MG chewable tablet, Take 1 tablet by mouth daily, Disp: 30 tablet, Rfl: 3    atorvastatin (LIPITOR) 10 MG tablet, Take 1 tablet by mouth nightly, Disp: 30 tablet, Rfl: 3    lisinopril (PRINIVIL;ZESTRIL) 10 MG tablet, Take 1 tablet by mouth daily, Disp: 30 tablet, Rfl: 3    cyclobenzaprine (FLEXERIL) 10 MG tablet, Take 1 tablet by mouth 3 times daily as needed for Muscle spasms, Disp: 30 tablet, Rfl: 0    lidocaine (LIDODERM) 5 %, Place 3 patches onto the skin daily 18 hours on, 6 hours off., Disp: 90 patch, Rfl: 0    meloxicam (MOBIC) 7.5 MG tablet, Take 1 tablet by mouth every 12 hours, Disp: 60 tablet, Rfl: 1    pantoprazole (PROTONIX) 40 MG tablet, Take 1 tablet by mouth every morning (before breakfast), Disp: 30 tablet, Rfl: 3    butalbital-aspirin-caffeine (FIORINAL) -40 MG capsule, Take 1 capsule by mouth every 4 hours as needed for Headaches, Disp: 12 capsule, Rfl: 0    tiotropium (SPIRIVA HANDIHALER) 18 MCG inhalation capsule, Inhale 1 capsule into the lungs daily, Disp: 30 capsule, Rfl: 5    Tiotropium Bromide-Olodaterol (STIOLTO RESPIMAT) 2.5-2.5 MCG/ACT AERS, Inhale 1 puff into the lungs daily, Disp: 1 Inhaler, Rfl: 5    cyanocobalamin 1000 MCG/ML injection, Inject 1 mL into the muscle every 30 days 1 ml each month, Disp: 10 mL, Rfl: 1    SYRINGE-NEEDLE, DISP, 3 ML 23G X 1\" 3 ML MISC, Inject 1 mL into the muscle every 30 days, Disp: 25 each, Rfl: 1    nitroGLYCERIN (NITROSTAT) 0.4 MG SL tablet, up to max of 3 total doses.  If no relief after 1 dose, call 911., Disp: 25 tablet, Rfl: 3    albuterol (PROVENTIL) (2.5 MG/3ML) 0.083% nebulizer solution, Take 3 mLs by nebulization every 6 hours as needed for Wheezing, Disp: 120 each, Rfl: 11    VENTOLIN  (90 BASE) MCG/ACT inhaler, Inhale 2 puffs into the lungs every 6 hours as needed for Wheezing, Disp: 1 Inhaler, Rfl: 5    folic acid (FOLVITE) 1 MG Disorganized  [x] Racing Thoughts  [] Flight of Ideas  [] Loose  [] Other    Thought Contents:   [] Hopelessness  [] Worthlessness  [] Hypochondriasis  [] Delusions  [] Paranoia  [] Ruminations  [] Obsessions/Compulsions  [] Confused [] Hopeful   [] Future Oriented [] Other    Perception:  [] Normal  [] Hallucinations  [] Auditory  [] Visual  [] Olfactory  [] Tactile    [] Dissociation  [] Flashbacks  [] Other    Attention/Concentration:  [] Intact  [] Poor  [x] Distractible  [] Other    Cognition:  [] Intact  [x] Impaired   Insight: [] Intact  [] Fair  [x] Limited   Judgement:  [] Intact  [] Fair  [x] Limited       ASSESSMENT: dx: bipolar depressed    Patient symptoms are:  [] Well controlled  [] Improving  [] Worsening  [x] No change      Diagnosis:  Principal Problem:    Bipolar 1 disorder, depressed (HCC)  Active Problems:    Displacement of lumbar intervertebral disc without myelopathy    Multiple sclerosis (HCC)    Chronic pain syndrome     DDD (degenerative disc disease), lumbar    Sensory neuropathy (HCC)    Lumbosacral radiculopathy at S1    Neuromyelopathy due to vitamin B12 deficiency ([facility]      LABS:    No results for input(s): WBC, HGB, PLT in the last 72 hours. No results for input(s): NA, K, CL, CO2, BUN, CREATININE, GLUCOSE in the last 72 hours. No results for input(s): BILITOT, ALKPHOS, AST, ALT in the last 72 hours. Lab Results   Component Value Date    LABAMPH Neg 11/15/2017    BARBSCNU Neg 11/15/2017    LABBENZ Neg 11/15/2017    LABBENZ NotDTCD 01/26/2013    OPIATESCREENURINE Neg 11/15/2017    PHENCYCLIDINESCREENURINE Neg 11/15/2017    ETOH <10 11/15/2017     Lab Results   Component Value Date    TSH 2.960 11/15/2017     Lab Results   Component Value Date    LITHIUM 0.5 (L) 11/19/2017     No results found for: VALPROATE, CBMZ    RISK ASSESSMENT: moderate    Treatment Plan:  Reviewed current Medications with the patient.  yes  Risks, benefits, side effects, drug-to-drug interactions and alternatives to treatment were discussed.   Collateral information: still to be done  Discharge planning discussed with the patient and treatment team.    PSYCHOTHERAPY/COUNSELING:  [x] Therapeutic interview  [x] Supportive  [] CBT  [] Ongoing  [] Other      Continue medications  Denies suicidal thoughts  Per collateral patient doing better and will be staying with her at discharge  Electronically signed by Lizandro Callejas MD on 11/23/2017 at 11:47 PM

## 2017-11-27 ENCOUNTER — TELEPHONE (OUTPATIENT)
Dept: PRIMARY CARE CLINIC | Age: 47
End: 2017-11-27

## 2017-11-27 NOTE — TELEPHONE ENCOUNTER
[address] IP Discharge Follow up Call    Date of discharge: 11-23-17  Facility: [facility]   Non-face-to-face services provided:  Scheduled appointment with PCP-Dr Pawel Ross and reviewed discharge summary and/or continuity of care documents    Reason for Hospital Visit:  Bipolar 1 disorder-depressed  Discharged with [address]?:  No    Date of first visit after discharge:  11-29-17  Dr Sofia Farmer  Status:     improved    Did you receive a discharge summary with list of medication from the hospital? Yes  Review of Instructions:     Understands what to report/when to return?:  Yes   Understands discharge instructions?:  Yes   Following discharge instructions?:  Yes   If not why? Is there any lingering symptoms? No  Are you eating and drinking OK? Yes  Any other problems i.e. Constipation, other symptoms? No  Are there any new complaints of pain? No  If you have a wound is the dressing clean, dry, and intact? N/A- none per AVS  Understands wound care regimen? N/A  Are there any other complaints/concerns that you wish to tell your provider? Patient states she is feeling better since discharge. Patient denies SI/HI. Patient states she is not letting things bother her as much as she used to and she is thinking positive thoughts. Patient also states she is \"shaky\" all over whic is new for her. She will call PCP if worsens before 11-29-17 appt. She has follow up appt with the [facility] on 11-30-17. FU appts/Provider:    Future Appointments  Date Time Provider Vangie Nuñez   11/29/2017 1:30 PM Deann Bernal  Alessio Cunningham,Second [facility]   12/15/2017 3:30 PM Deann Bernal  Alessio Cunningham,Second Floor [facility] Medications at discharge?:     Yes                      Medication Reconciliation by phone -     Each medication was reviewed (both pre and post hospitalization)  Yes    Were there discrepancies in medications? No  If YES, were discrepancies addressed?  Not

## 2017-11-29 ENCOUNTER — OFFICE VISIT (OUTPATIENT)
Dept: PRIMARY CARE CLINIC | Age: 47
End: 2017-11-29

## 2017-11-29 VITALS
OXYGEN SATURATION: 97 % | TEMPERATURE: 98.5 F | RESPIRATION RATE: 16 BRPM | DIASTOLIC BLOOD PRESSURE: 80 MMHG | WEIGHT: 176.5 LBS | HEART RATE: 74 BPM | HEIGHT: 67 IN | BODY MASS INDEX: 27.7 KG/M2 | SYSTOLIC BLOOD PRESSURE: 118 MMHG

## 2017-11-29 DIAGNOSIS — G47.09 OTHER INSOMNIA: ICD-10-CM

## 2017-11-29 DIAGNOSIS — E53.8 FOLATE DEFICIENCY: ICD-10-CM

## 2017-11-29 DIAGNOSIS — F41.9 ANXIETY: Primary | ICD-10-CM

## 2017-11-29 DIAGNOSIS — E53.8 VITAMIN B12 DEFICIENCY: ICD-10-CM

## 2017-11-29 DIAGNOSIS — E53.8 VITAMIN B 12 DEFICIENCY: ICD-10-CM

## 2017-11-29 DIAGNOSIS — E55.9 VITAMIN D DEFICIENCY: ICD-10-CM

## 2017-11-29 DIAGNOSIS — F43.21 GRIEF: ICD-10-CM

## 2017-11-29 LAB
FOLATE: >20 NG/ML (ref 7.3–26.1)
VITAMIN B-12: 208 PG/ML (ref 211–946)
VITAMIN D 25-HYDROXY: 22.9 NG/ML (ref 30–100)

## 2017-11-29 PROCEDURE — 99212 OFFICE O/P EST SF 10 MIN: CPT | Performed by: INTERNAL MEDICINE

## 2017-11-29 PROCEDURE — 96372 THER/PROPH/DIAG INJ SC/IM: CPT | Performed by: INTERNAL MEDICINE

## 2017-11-29 RX ORDER — CYANOCOBALAMIN 1000 UG/ML
1000 INJECTION INTRAMUSCULAR; SUBCUTANEOUS
Qty: 10 ML | Refills: 1 | Status: SHIPPED | OUTPATIENT
Start: 2017-11-29 | End: 2018-05-22

## 2017-11-29 RX ORDER — CYANOCOBALAMIN 1000 UG/ML
1000 INJECTION INTRAMUSCULAR; SUBCUTANEOUS ONCE
Status: COMPLETED | OUTPATIENT
Start: 2017-11-29 | End: 2017-11-29

## 2017-11-29 RX ORDER — TRAZODONE HYDROCHLORIDE 100 MG/1
100 TABLET ORAL NIGHTLY PRN
Qty: 30 TABLET | Refills: 2 | Status: SHIPPED | OUTPATIENT
Start: 2017-11-29 | End: 2018-04-04

## 2017-11-29 RX ADMIN — CYANOCOBALAMIN 1000 MCG: 1000 INJECTION INTRAMUSCULAR; SUBCUTANEOUS at 14:11

## 2017-11-29 NOTE — PROGRESS NOTES
[facility] TRANSITION OF CARE    Patient:Pooja Echevarria  : 1970    Lea Taveras is a 41 y.o. female who was recently admitted to [facility] for bi-polar, depression. Patient was admitted on 11/15/2017 and discharged on 2017 to Home. Discharge instructions have been reviewed with the patient and patient verbalized understanding of instructions: yes    Medications and Allergies have been reviewed and updated as necessary: Yes    Patient's pain level has been assessed and discussed: Yes    Symptoms have been assessed and discussed: Yes    Current symptoms include; shakiness. Patient did see a new provider during their hospitalization, Dr. Sharri Narayan. Care Team has been updated as appropriate. Follow up appointment(s) have been scheduled with miah. Lea Taveras had the following pertinent tests performed during her hospitalization labs    Pending tests include: labs      Community referrals include: psych    Follow up appointment made for  now    Next Regular Scheduled Appointment: 1 months  Bryan Rascon 41 y.o. female presents today with   Chief Complaint   Patient presents with    Depression     patient [facility] [address] [facility]2017 for depression. patient is scheduled to follow up with [facility] 2017. patient states she was prescribed latuda and was adjusted 3 times during ER visit. Mental Health Problem   The primary symptoms include dysphoric mood. The primary symptoms do not include hallucinations. The current episode started more than 1 month ago. This is a recurrent problem. The onset of the illness is precipitated by emotional stress and a stressful event. The degree of incapacity that she is experiencing as a consequence of her illness is mild. Additional symptoms of the illness include insomnia and distractible.        Past Medical History:   Diagnosis Date    Anxiety     Back pain     back surgery x 4    Bipolar disorder TempSrc: Tympanic   SpO2: 97%   Weight: 176 lb 8 oz (80.1 kg)   Height: 5' 7\" (1.702 m)       Physical Exam   Constitutional: She appears well-developed. HENT:   Head: Normocephalic. Eyes: Conjunctivae and EOM are normal.   Neck: Normal range of motion. No tracheal deviation present. Cardiovascular: Normal rate and regular rhythm. Pulmonary/Chest: Effort normal and breath sounds normal. No respiratory distress. She has no wheezes. Abdominal: She exhibits no distension. Musculoskeletal: Normal range of motion. Neurological: She is alert. Skin: Skin is warm. Psychiatric: She has a normal mood and affect. Assessment/Plan  Gisella Art was seen today for depression. Diagnoses and all orders for this visit:    Anxiety    Other insomnia  -     traZODone (DESYREL) 100 MG tablet; Take 1 tablet by mouth nightly as needed for Sleep    Grief    Vitamin B 12 deficiency  -     cyanocobalamin 1000 MCG/ML injection; Inject 1 mL into the muscle every 30 days 1 ml each month  -     Vitamin B12; Future    Vitamin B12 deficiency  -     cyanocobalamin 1000 MCG/ML injection; Inject 1 mL into the muscle every 30 days 1 ml each month  -     Vitamin B12; Future  -     cyanocobalamin injection 1,000 mcg; Inject 1 mL into the muscle once    Vitamin D deficiency  -     Vitamin D 25 Hydroxy; Future    Folate deficiency  -     Folate; Future        No Follow-up on file.     Sujatha Szymanski MD

## 2017-12-03 ASSESSMENT — ENCOUNTER SYMPTOMS
PHOTOPHOBIA: 0
FACIAL SWELLING: 0
BLOOD IN STOOL: 0
APNEA: 0
ABDOMINAL DISTENTION: 0
CHOKING: 0

## 2017-12-26 NOTE — DISCHARGE SUMMARY
Patient is a 40 yo female who was admitted on 11/15 with suicidal thoughts to drive her car into a wall or of an overapass. Stressors: lost her son,  and mother in the last 2 years.   Admitted very depressed, anhedonic, with no sleep, no motivation and suicidal.  Historical diagnosis: Bipolar disorder  Medical problems/diagnosis:  Multiple Sclerosis  Past Medical History:   Diagnosis Date    Anxiety     Back pain     back surgery x 4    [facility] [address]     CAD (coronary artery disease)     CAFL [facility]chronic airflow limitation) ([facility]) 11/3/2016    Chronic pain     Colitis     DDD (degenerative disc disease), lumbar 12/22/2016    Depression     Endometriosis     Gastritis     GERD (gastroesophageal reflux disease)     History of myocardial infarction     HTN (hypertension), benign 2/19/2016    Hypokalemia     Impaired mobility and activities of daily living     Left hemiparesis ([facility])     Multiple sclerosis ([facility] [address]     Neck pain     surgery x 1    Over weight     PTSD (post-traumatic stress disorder)     Sensory neuropathy ([facility] [address] 12/22/2016    TIA (transient ischemic attack)     Tobacco abuse     Vasospastic angina ([facility] [address] 11/11/2016     MSE on discharge: pleasant, denies racing thoughts  Denies suicidal thoughts  Mood improved  Affect stable  Future oriented  Denied psychotic symptoms and I could not elicit any  No psychomotor tone disturbance  No anxiety appreciated  Speech reflects a logical thought process  Insight and judgement fair  Cognition overall intact   Aman Fell   Home Medication Instructions [facility]:[id number]    Printed on:12/25/17 2333   Medication Information                      albuterol (PROVENTIL) (2.5 MG/3ML) 0.083% nebulizer solution  Take 3 mLs by nebulization every 6 hours as needed for Wheezing             amitriptyline (ELAVIL) 50 MG tablet  Take 1 tablet by mouth nightly             aspirin 81 MG chewable tablet  Take 1 tablet by mouth daily atorvastatin (LIPITOR) 10 MG tablet  Take 1 tablet by mouth nightly             butalbital-aspirin-caffeine (FIORINAL) -40 MG capsule  Take 1 capsule by mouth every 4 hours as needed for Headaches             Cholecalciferol (VITAMIN D) 2000 UNITS TABS  Take 1 tablet by mouth daily             folic acid (FOLVITE) 1 MG tablet  daily              folic acid (FOLVITE) 1 MG tablet  Take 1 tablet by mouth daily             lidocaine (LIDODERM) 5 %  Place 3 patches onto the skin daily 18 hours on, 6 hours off.             lisinopril (PRINIVIL;ZESTRIL) 10 MG tablet  Take 1 tablet by mouth daily             lithium 300 MG capsule  Take 1 capsule by mouth 2 times daily             lurasidone (LATUDA) 40 MG TABS tablet  Take 1 tablet by mouth daily             Magnesium Hydroxide (MAGNESIA PO)  Take by mouth             meloxicam (MOBIC) 7.5 MG tablet  Take 1 tablet by mouth every 12 hours             metoprolol tartrate (LOPRESSOR) 25 MG tablet  Take 1 tablet by mouth 2 times daily             nicotine (NICODERM CQ) 21 MG/24HR  Place 1 patch onto the skin every 24 hours             nitroGLYCERIN (NITROSTAT) 0.4 MG SL tablet  up to max of 3 total doses. If no relief after 1 dose, call 911.              pantoprazole (PROTONIX) 40 MG tablet  Take 1 tablet by mouth every morning (before breakfast)             PARAFON FORTE (PARAFON FORTE) 500 MG tablet               SYRINGE-NEEDLE, DISP, 3 ML 23G X 1\" 3 ML MISC  Inject 1 mL into the muscle every 30 days             tiotropium (SPIRIVA HANDIHALER) 18 MCG inhalation capsule  Inhale 1 capsule into the lungs daily             Tiotropium Bromide-Olodaterol (STIOLTO RESPIMAT) 2.5-2.5 MCG/ACT AERS  Inhale 1 puff into the lungs daily             VENTOLIN  (90 BASE) MCG/ACT inhaler  Inhale 2 puffs into the lungs every 6 hours as needed for Wheezing               Discharge diagnosis: bipolar depressed in remission  Discharge to home with friend  followup and medications. Les Munoz MD

## 2018-01-16 RX ORDER — LITHIUM CARBONATE 300 MG/1
300 CAPSULE ORAL 2 TIMES DAILY
Qty: 28 CAPSULE | Refills: 0 | Status: SHIPPED | OUTPATIENT
Start: 2018-01-16 | End: 2018-04-04

## 2018-01-16 RX ORDER — ALPRAZOLAM 0.5 MG/1
0.5 TABLET ORAL 3 TIMES DAILY PRN
Qty: 30 TABLET | Refills: 0 | Status: SHIPPED | OUTPATIENT
Start: 2018-01-16 | End: 2018-02-15

## 2018-01-31 ENCOUNTER — HOSPITAL ENCOUNTER (EMERGENCY)
Age: 48
Discharge: HOME | End: 2018-01-31
Payer: MEDICAID

## 2018-01-31 VITALS
TEMPERATURE: 97.6 F | WEIGHT: 180 LBS | RESPIRATION RATE: 16 BRPM | DIASTOLIC BLOOD PRESSURE: 90 MMHG | HEART RATE: 78 BPM | SYSTOLIC BLOOD PRESSURE: 172 MMHG | BODY MASS INDEX: 28.19 KG/M2 | OXYGEN SATURATION: 99 %

## 2018-01-31 DIAGNOSIS — J06.9 ACUTE UPPER RESPIRATORY INFECTION: Primary | ICD-10-CM

## 2018-01-31 LAB
RAPID INFLUENZA  B AGN: NEGATIVE
RAPID INFLUENZA A AGN: NEGATIVE

## 2018-01-31 PROCEDURE — 6370000000 HC RX 637 (ALT 250 FOR IP): Performed by: NURSE PRACTITIONER

## 2018-01-31 PROCEDURE — 96372 THER/PROPH/DIAG INJ SC/IM: CPT

## 2018-01-31 PROCEDURE — 99284 EMERGENCY DEPT VISIT MOD MDM: CPT

## 2018-01-31 PROCEDURE — 6360000002 HC RX W HCPCS: Performed by: NURSE PRACTITIONER

## 2018-01-31 PROCEDURE — 86403 PARTICLE AGGLUT ANTBDY SCRN: CPT

## 2018-01-31 RX ORDER — BENZONATATE 100 MG/1
100 CAPSULE ORAL 3 TIMES DAILY PRN
Qty: 20 CAPSULE | Refills: 0 | Status: SHIPPED | OUTPATIENT
Start: 2018-01-31 | End: 2018-05-22

## 2018-01-31 RX ORDER — METOCLOPRAMIDE HYDROCHLORIDE 5 MG/ML
10 INJECTION INTRAMUSCULAR; INTRAVENOUS ONCE
Status: COMPLETED | OUTPATIENT
Start: 2018-01-31 | End: 2018-01-31

## 2018-01-31 RX ORDER — DIPHENHYDRAMINE HYDROCHLORIDE 50 MG/ML
25 INJECTION INTRAMUSCULAR; INTRAVENOUS ONCE
Status: COMPLETED | OUTPATIENT
Start: 2018-01-31 | End: 2018-01-31

## 2018-01-31 RX ORDER — IBUPROFEN 800 MG/1
800 TABLET ORAL EVERY 8 HOURS PRN
Qty: 20 TABLET | Refills: 0 | Status: SHIPPED | OUTPATIENT
Start: 2018-01-31 | End: 2018-05-25

## 2018-01-31 RX ORDER — IBUPROFEN 800 MG/1
800 TABLET ORAL ONCE
Status: COMPLETED | OUTPATIENT
Start: 2018-01-31 | End: 2018-01-31

## 2018-01-31 RX ORDER — LORATADINE 10 MG/1
10 TABLET ORAL DAILY
Qty: 10 TABLET | Refills: 0 | Status: SHIPPED | OUTPATIENT
Start: 2018-01-31 | End: 2018-05-25

## 2018-01-31 RX ADMIN — IBUPROFEN 800 MG: 800 TABLET, FILM COATED ORAL at 08:25

## 2018-01-31 RX ADMIN — DIPHENHYDRAMINE HYDROCHLORIDE 25 MG: 50 INJECTION, SOLUTION INTRAMUSCULAR; INTRAVENOUS at 09:04

## 2018-01-31 RX ADMIN — METOCLOPRAMIDE 10 MG: 5 INJECTION, SOLUTION INTRAMUSCULAR; INTRAVENOUS at 09:04

## 2018-01-31 ASSESSMENT — PAIN DESCRIPTION - LOCATION
LOCATION: GENERALIZED
LOCATION: HEAD

## 2018-01-31 ASSESSMENT — ENCOUNTER SYMPTOMS
DIARRHEA: 1
NAUSEA: 0
ABDOMINAL PAIN: 0
WHEEZING: 0
SHORTNESS OF BREATH: 0
SORE THROAT: 0
VOMITING: 0
VOICE CHANGE: 0
COUGH: 1
RHINORRHEA: 1
TROUBLE SWALLOWING: 0

## 2018-01-31 ASSESSMENT — PAIN DESCRIPTION - PAIN TYPE: TYPE: ACUTE PAIN

## 2018-01-31 ASSESSMENT — PAIN SCALES - GENERAL
PAINLEVEL_OUTOF10: 5
PAINLEVEL_OUTOF10: 5
PAINLEVEL_OUTOF10: 7

## 2018-01-31 ASSESSMENT — PAIN DESCRIPTION - FREQUENCY: FREQUENCY: CONTINUOUS

## 2018-01-31 ASSESSMENT — PAIN DESCRIPTION - DESCRIPTORS: DESCRIPTORS: ACHING;THROBBING

## 2018-01-31 ASSESSMENT — PAIN DESCRIPTION - ONSET: ONSET: PROGRESSIVE

## 2018-01-31 NOTE — ED PROVIDER NOTES
Bipolar disorder ([facility] [address]     CAD (coronary artery disease)     CAFL (chronic airflow limitation) ([facility]) 11/3/2016    Chronic pain     Colitis     DDD (degenerative disc disease), lumbar 2016    Depression     Endometriosis     Gastritis     GERD (gastroesophageal reflux disease)     History of myocardial infarction     HTN (hypertension), benign 2016    Hypokalemia     Impaired mobility and activities of daily living     Left hemiparesis ([facility])     Multiple sclerosis ([facility] [address]     Neck pain     surgery x 1    Over weight     PTSD (post-traumatic stress disorder)     Sensory neuropathy ([facility] [address] 2016    TIA (transient ischemic attack)     Tobacco abuse     Vasospastic angina ([facility] [address] 2016     Past Surgical History:   Procedure Laterality Date    BACK SURGERY      x2     SECTION      x2    CHOLECYSTECTOMY  15    Lapchole    CORONARY ANGIOPLASTY      CYST REMOVAL  3/7/16    Dr. Falguni Chapin Left     UPPER GASTROINTESTINAL ENDOSCOPY  2014    ANIBAL HOUSE M.D. Social History     Social History    Marital status:      Spouse name: N/A    Number of children: N/A    Years of education: N/A     Occupational History    unemployed      Social History Main Topics    Smoking status: Current Some Day Smoker     Packs/day: 1.50     Years: 10.00     Types: Cigarettes     Last attempt to quit: 1/3/2017    Smokeless tobacco: Never Used    Alcohol use No    Drug use: Yes     Types: Marijuana      Comment: last used one month ago     Sexual activity: Not Asked     Other Topics Concern    None     Social History Narrative    Jocelyne Peres is a 65-year-old female who is on disability because of pain and bipolar disorder. She's also had a presumed diagnosis of possible multiple sclerosis.   She's been seeing Dr. Krystal Ellsworth for that and she says that the diagnosis is sometimes in

## 2018-03-24 ENCOUNTER — HOSPITAL ENCOUNTER (OUTPATIENT)
Age: 48
Setting detail: OBSERVATION
Discharge: HOME | End: 2018-03-25
Attending: INTERNAL MEDICINE | Admitting: INTERNAL MEDICINE
Payer: COMMERCIAL

## 2018-03-24 ENCOUNTER — APPOINTMENT (OUTPATIENT)
Dept: GENERAL RADIOLOGY | Age: 48
End: 2018-03-24
Payer: COMMERCIAL

## 2018-03-24 ENCOUNTER — APPOINTMENT (OUTPATIENT)
Dept: CT IMAGING | Age: 48
End: 2018-03-24
Payer: COMMERCIAL

## 2018-03-24 DIAGNOSIS — R77.8 ELEVATED TROPONIN: ICD-10-CM

## 2018-03-24 DIAGNOSIS — R07.9 CHEST PAIN, UNSPECIFIED TYPE: Primary | ICD-10-CM

## 2018-03-24 LAB
ALBUMIN SERPL-MCNC: 4.1 G/DL (ref 3.9–4.9)
ALP BLD-CCNC: 95 U/L (ref 40–130)
ALT SERPL-CCNC: 30 U/L (ref 0–33)
ANION GAP SERPL CALCULATED.3IONS-SCNC: 12 MEQ/L (ref 7–13)
AST SERPL-CCNC: 18 U/L (ref 0–35)
BASOPHILS ABSOLUTE: 0.1 K/UL (ref 0–0.2)
BASOPHILS RELATIVE PERCENT: 0.7 %
BILIRUB SERPL-MCNC: 0.3 MG/DL (ref 0–1.2)
BILIRUBIN URINE: NEGATIVE
BLOOD, URINE: NEGATIVE
BUN BLDV-MCNC: 16 MG/DL (ref 6–20)
CALCIUM SERPL-MCNC: 8.8 MG/DL (ref 8.6–10.2)
CHLORIDE BLD-SCNC: 100 MEQ/L (ref 98–107)
CLARITY: ABNORMAL
CO2: 25 MEQ/L (ref 22–29)
COLOR: YELLOW
CREAT SERPL-MCNC: 0.82 MG/DL (ref 0.5–0.9)
EKG ATRIAL RATE: 82 BPM
EKG P AXIS: 66 DEGREES
EKG P-R INTERVAL: 168 MS
EKG Q-T INTERVAL: 386 MS
EKG QRS DURATION: 90 MS
EKG QTC CALCULATION (BAZETT): 450 MS
EKG R AXIS: 66 DEGREES
EKG T AXIS: 81 DEGREES
EKG VENTRICULAR RATE: 82 BPM
EOSINOPHILS ABSOLUTE: 0.1 K/UL (ref 0–0.7)
EOSINOPHILS RELATIVE PERCENT: 0.8 %
ETHANOL PERCENT: NORMAL G/DL
ETHANOL: <10 MG/DL (ref 0–0.08)
GFR AFRICAN AMERICAN: >60
GFR NON-AFRICAN AMERICAN: >60
GLOBULIN: 2.1 G/DL (ref 2.3–3.5)
GLUCOSE BLD-MCNC: 98 MG/DL (ref 74–109)
GLUCOSE URINE: NEGATIVE MG/DL
HCT VFR BLD CALC: 43 % (ref 37–47)
HEMOGLOBIN: 14.9 G/DL (ref 12–16)
KETONES, URINE: NEGATIVE MG/DL
LEUKOCYTE ESTERASE, URINE: ABNORMAL
LIPASE: 52 U/L (ref 13–60)
LYMPHOCYTES ABSOLUTE: 2.9 K/UL (ref 1–4.8)
LYMPHOCYTES RELATIVE PERCENT: 29.2 %
MCH RBC QN AUTO: 32.3 PG (ref 27–31.3)
MCHC RBC AUTO-ENTMCNC: 34.7 % (ref 33–37)
MCV RBC AUTO: 93.2 FL (ref 82–100)
MONOCYTES ABSOLUTE: 0.7 K/UL (ref 0.2–0.8)
MONOCYTES RELATIVE PERCENT: 7.1 %
NEUTROPHILS ABSOLUTE: 6.1 K/UL (ref 1.4–6.5)
NEUTROPHILS RELATIVE PERCENT: 62.2 %
NITRITE, URINE: NEGATIVE
PDW BLD-RTO: 13.3 % (ref 11.5–14.5)
PH UA: 6 (ref 5–9)
PLATELET # BLD: 144 K/UL (ref 130–400)
POTASSIUM SERPL-SCNC: 3.6 MEQ/L (ref 3.5–5.1)
PROTEIN UA: NEGATIVE MG/DL
RBC # BLD: 4.61 M/UL (ref 4.2–5.4)
SODIUM BLD-SCNC: 137 MEQ/L (ref 132–144)
SPECIFIC GRAVITY UA: 1.01 (ref 1–1.03)
TOTAL PROTEIN: 6.2 G/DL (ref 6.4–8.1)
URINE REFLEX TO CULTURE: YES
UROBILINOGEN, URINE: 0.2 E.U./DL
WBC # BLD: 9.9 K/UL (ref 4.8–10.8)

## 2018-03-24 PROCEDURE — 87086 URINE CULTURE/COLONY COUNT: CPT

## 2018-03-24 PROCEDURE — 99285 EMERGENCY DEPT VISIT HI MDM: CPT

## 2018-03-24 PROCEDURE — 71045 X-RAY EXAM CHEST 1 VIEW: CPT

## 2018-03-24 PROCEDURE — 81001 URINALYSIS AUTO W/SCOPE: CPT

## 2018-03-24 PROCEDURE — 96374 THER/PROPH/DIAG INJ IV PUSH: CPT

## 2018-03-24 PROCEDURE — 85025 COMPLETE CBC W/AUTO DIFF WBC: CPT

## 2018-03-24 PROCEDURE — 84484 ASSAY OF TROPONIN QUANT: CPT

## 2018-03-24 PROCEDURE — 80307 DRUG TEST PRSMV CHEM ANLYZR: CPT

## 2018-03-24 PROCEDURE — 93005 ELECTROCARDIOGRAM TRACING: CPT

## 2018-03-24 PROCEDURE — 36415 COLL VENOUS BLD VENIPUNCTURE: CPT

## 2018-03-24 PROCEDURE — 80053 COMPREHEN METABOLIC PANEL: CPT

## 2018-03-24 PROCEDURE — 6360000002 HC RX W HCPCS: Performed by: PERSONAL EMERGENCY RESPONSE ATTENDANT

## 2018-03-24 PROCEDURE — 70450 CT HEAD/BRAIN W/O DYE: CPT

## 2018-03-24 PROCEDURE — 83690 ASSAY OF LIPASE: CPT

## 2018-03-24 PROCEDURE — 2580000003 HC RX 258: Performed by: PERSONAL EMERGENCY RESPONSE ATTENDANT

## 2018-03-24 PROCEDURE — 6370000000 HC RX 637 (ALT 250 FOR IP): Performed by: PERSONAL EMERGENCY RESPONSE ATTENDANT

## 2018-03-24 PROCEDURE — G0480 DRUG TEST DEF 1-7 CLASSES: HCPCS

## 2018-03-24 RX ORDER — NITROGLYCERIN 0.4 MG/1
0.4 TABLET SUBLINGUAL EVERY 5 MIN PRN
Status: DISCONTINUED | OUTPATIENT
Start: 2018-03-24 | End: 2018-03-25 | Stop reason: HOSPADM

## 2018-03-24 RX ORDER — 0.9 % SODIUM CHLORIDE 0.9 %
500 INTRAVENOUS SOLUTION INTRAVENOUS ONCE
Status: COMPLETED | OUTPATIENT
Start: 2018-03-24 | End: 2018-03-25

## 2018-03-24 RX ORDER — LORAZEPAM 2 MG/ML
1 INJECTION INTRAMUSCULAR ONCE
Status: COMPLETED | OUTPATIENT
Start: 2018-03-24 | End: 2018-03-24

## 2018-03-24 RX ORDER — ASPIRIN 81 MG/1
324 TABLET, CHEWABLE ORAL ONCE
Status: COMPLETED | OUTPATIENT
Start: 2018-03-24 | End: 2018-03-24

## 2018-03-24 RX ADMIN — SODIUM CHLORIDE 500 ML: 9 INJECTION, SOLUTION INTRAVENOUS at 23:38

## 2018-03-24 RX ADMIN — NITROGLYCERIN 0.4 MG: 0.4 TABLET SUBLINGUAL at 23:45

## 2018-03-24 RX ADMIN — ASPIRIN 81 MG 324 MG: 81 TABLET ORAL at 23:38

## 2018-03-24 RX ADMIN — NITROGLYCERIN 0.4 MG: 0.4 TABLET SUBLINGUAL at 23:50

## 2018-03-24 RX ADMIN — LORAZEPAM 1 MG: 2 INJECTION INTRAMUSCULAR; INTRAVENOUS at 23:38

## 2018-03-24 RX ADMIN — NITROGLYCERIN 0.4 MG: 0.4 TABLET SUBLINGUAL at 23:38

## 2018-03-24 ASSESSMENT — ENCOUNTER SYMPTOMS
SHORTNESS OF BREATH: 1
VOMITING: 0
COUGH: 0
SORE THROAT: 0
DIARRHEA: 0
COLOR CHANGE: 0
BLOOD IN STOOL: 0
NAUSEA: 1
RHINORRHEA: 0
ABDOMINAL PAIN: 0

## 2018-03-25 VITALS
HEIGHT: 67 IN | BODY MASS INDEX: 29.83 KG/M2 | TEMPERATURE: 98.8 F | RESPIRATION RATE: 16 BRPM | WEIGHT: 190.04 LBS | OXYGEN SATURATION: 100 % | SYSTOLIC BLOOD PRESSURE: 154 MMHG | DIASTOLIC BLOOD PRESSURE: 71 MMHG | HEART RATE: 69 BPM

## 2018-03-25 LAB
AMPHETAMINE SCREEN, URINE: NORMAL
BACTERIA: ABNORMAL /HPF
BARBITURATE SCREEN URINE: NORMAL
BENZODIAZEPINE SCREEN, URINE: NORMAL
CANNABINOID SCREEN URINE: NORMAL
COCAINE METABOLITE SCREEN URINE: NORMAL
EPITHELIAL CELLS, UA: ABNORMAL /HPF
Lab: NORMAL
OPIATE SCREEN URINE: NORMAL
PHENCYCLIDINE SCREEN URINE: NORMAL
RBC UA: ABNORMAL /HPF (ref 0–2)
TROPONIN: 0.02 NG/ML (ref 0–0.01)
TROPONIN: <0.01 NG/ML (ref 0–0.01)
WBC UA: ABNORMAL /HPF (ref 0–5)

## 2018-03-25 PROCEDURE — 84484 ASSAY OF TROPONIN QUANT: CPT

## 2018-03-25 PROCEDURE — 2700000000 HC OXYGEN THERAPY PER DAY

## 2018-03-25 PROCEDURE — G0378 HOSPITAL OBSERVATION PER HR: HCPCS

## 2018-03-25 PROCEDURE — 6360000002 HC RX W HCPCS: Performed by: PSYCHIATRY & NEUROLOGY

## 2018-03-25 PROCEDURE — 96372 THER/PROPH/DIAG INJ SC/IM: CPT

## 2018-03-25 PROCEDURE — 96375 TX/PRO/DX INJ NEW DRUG ADDON: CPT

## 2018-03-25 PROCEDURE — 99219 PR INITIAL OBSERVATION CARE/DAY 50 MINUTES: CPT | Performed by: INTERNAL MEDICINE

## 2018-03-25 PROCEDURE — 6370000000 HC RX 637 (ALT 250 FOR IP): Performed by: PERSONAL EMERGENCY RESPONSE ATTENDANT

## 2018-03-25 PROCEDURE — 36415 COLL VENOUS BLD VENIPUNCTURE: CPT

## 2018-03-25 PROCEDURE — 6360000002 HC RX W HCPCS: Performed by: PERSONAL EMERGENCY RESPONSE ATTENDANT

## 2018-03-25 PROCEDURE — 2580000003 HC RX 258: Performed by: PERSONAL EMERGENCY RESPONSE ATTENDANT

## 2018-03-25 RX ORDER — AMITRIPTYLINE HYDROCHLORIDE 50 MG/1
50 TABLET, FILM COATED ORAL NIGHTLY
Qty: 30 TABLET | Refills: 1 | Status: SHIPPED | OUTPATIENT
Start: 2018-03-25 | End: 2018-04-04

## 2018-03-25 RX ORDER — AMITRIPTYLINE HYDROCHLORIDE 50 MG/1
50 TABLET, FILM COATED ORAL NIGHTLY
Status: DISCONTINUED | OUTPATIENT
Start: 2018-03-25 | End: 2018-03-25 | Stop reason: HOSPADM

## 2018-03-25 RX ORDER — MORPHINE SULFATE 4 MG/ML
4 INJECTION, SOLUTION INTRAMUSCULAR; INTRAVENOUS ONCE
Status: COMPLETED | OUTPATIENT
Start: 2018-03-25 | End: 2018-03-25

## 2018-03-25 RX ORDER — KETOROLAC TROMETHAMINE 30 MG/ML
10 INJECTION, SOLUTION INTRAMUSCULAR; INTRAVENOUS EVERY 8 HOURS PRN
Status: DISCONTINUED | OUTPATIENT
Start: 2018-03-25 | End: 2018-03-25 | Stop reason: HOSPADM

## 2018-03-25 RX ORDER — ONDANSETRON 2 MG/ML
4 INJECTION INTRAMUSCULAR; INTRAVENOUS ONCE
Status: COMPLETED | OUTPATIENT
Start: 2018-03-25 | End: 2018-03-25

## 2018-03-25 RX ORDER — KETOROLAC TROMETHAMINE 15 MG/ML
10 INJECTION, SOLUTION INTRAMUSCULAR; INTRAVENOUS EVERY 8 HOURS PRN
Status: DISCONTINUED | OUTPATIENT
Start: 2018-03-25 | End: 2018-03-25 | Stop reason: RX

## 2018-03-25 RX ORDER — ACETAMINOPHEN 325 MG/1
650 TABLET ORAL EVERY 4 HOURS PRN
Status: DISCONTINUED | OUTPATIENT
Start: 2018-03-25 | End: 2018-03-25 | Stop reason: HOSPADM

## 2018-03-25 RX ORDER — SULFAMETHOXAZOLE AND TRIMETHOPRIM 800; 160 MG/1; MG/1
1 TABLET ORAL EVERY 12 HOURS SCHEDULED
Status: DISCONTINUED | OUTPATIENT
Start: 2018-03-25 | End: 2018-03-25

## 2018-03-25 RX ORDER — ASPIRIN 81 MG/1
81 TABLET, CHEWABLE ORAL DAILY
Status: DISCONTINUED | OUTPATIENT
Start: 2018-03-25 | End: 2018-03-25 | Stop reason: HOSPADM

## 2018-03-25 RX ORDER — ONDANSETRON 2 MG/ML
4 INJECTION INTRAMUSCULAR; INTRAVENOUS EVERY 6 HOURS PRN
Status: DISCONTINUED | OUTPATIENT
Start: 2018-03-25 | End: 2018-03-25 | Stop reason: HOSPADM

## 2018-03-25 RX ORDER — TOPIRAMATE 25 MG/1
25 TABLET ORAL 2 TIMES DAILY
Status: DISCONTINUED | OUTPATIENT
Start: 2018-03-25 | End: 2018-03-25 | Stop reason: HOSPADM

## 2018-03-25 RX ORDER — LISINOPRIL 10 MG/1
10 TABLET ORAL DAILY
Status: DISCONTINUED | OUTPATIENT
Start: 2018-03-25 | End: 2018-03-25 | Stop reason: HOSPADM

## 2018-03-25 RX ORDER — TOPIRAMATE 25 MG/1
25 TABLET ORAL 2 TIMES DAILY
Qty: 60 TABLET | Refills: 1 | Status: SHIPPED | OUTPATIENT
Start: 2018-03-25 | End: 2018-04-06 | Stop reason: SDUPTHER

## 2018-03-25 RX ORDER — SODIUM CHLORIDE 0.9 % (FLUSH) 0.9 %
10 SYRINGE (ML) INJECTION PRN
Status: DISCONTINUED | OUTPATIENT
Start: 2018-03-25 | End: 2018-03-25 | Stop reason: HOSPADM

## 2018-03-25 RX ORDER — SODIUM CHLORIDE 0.9 % (FLUSH) 0.9 %
10 SYRINGE (ML) INJECTION EVERY 12 HOURS SCHEDULED
Status: DISCONTINUED | OUTPATIENT
Start: 2018-03-25 | End: 2018-03-25 | Stop reason: HOSPADM

## 2018-03-25 RX ADMIN — KETOROLAC TROMETHAMINE 9.9 MG: 30 INJECTION, SOLUTION INTRAMUSCULAR; INTRAVENOUS at 13:46

## 2018-03-25 RX ADMIN — LISINOPRIL 10 MG: 10 TABLET ORAL at 07:39

## 2018-03-25 RX ADMIN — MORPHINE SULFATE 4 MG: 4 INJECTION, SOLUTION INTRAMUSCULAR; INTRAVENOUS at 01:33

## 2018-03-25 RX ADMIN — METOPROLOL TARTRATE 25 MG: 25 TABLET ORAL at 07:39

## 2018-03-25 RX ADMIN — ASPIRIN 81 MG 81 MG: 81 TABLET ORAL at 07:39

## 2018-03-25 RX ADMIN — ENOXAPARIN SODIUM 90 MG: 100 INJECTION SUBCUTANEOUS at 02:09

## 2018-03-25 RX ADMIN — Medication 10 ML: at 07:39

## 2018-03-25 RX ADMIN — ONDANSETRON 4 MG: 2 INJECTION INTRAMUSCULAR; INTRAVENOUS at 01:33

## 2018-03-25 RX ADMIN — NITROGLYCERIN 0.5 INCH: 20 OINTMENT TOPICAL at 01:33

## 2018-03-25 ASSESSMENT — ENCOUNTER SYMPTOMS
WHEEZING: 0
BACK PAIN: 1
COUGH: 0
EYES NEGATIVE: 1
SHORTNESS OF BREATH: 0
GASTROINTESTINAL NEGATIVE: 1
CHEST TIGHTNESS: 0
RESPIRATORY NEGATIVE: 1
NAUSEA: 0
STRIDOR: 0
BLOOD IN STOOL: 0

## 2018-03-25 ASSESSMENT — PAIN SCALES - GENERAL
PAINLEVEL_OUTOF10: 6
PAINLEVEL_OUTOF10: 7
PAINLEVEL_OUTOF10: 4
PAINLEVEL_OUTOF10: 8
PAINLEVEL_OUTOF10: 9

## 2018-03-25 ASSESSMENT — PAIN DESCRIPTION - PAIN TYPE
TYPE: ACUTE PAIN

## 2018-03-25 ASSESSMENT — PAIN DESCRIPTION - ONSET: ONSET: ON-GOING

## 2018-03-25 ASSESSMENT — PAIN DESCRIPTION - PROGRESSION: CLINICAL_PROGRESSION: NOT CHANGED

## 2018-03-25 ASSESSMENT — PAIN DESCRIPTION - LOCATION
LOCATION: CHEST

## 2018-03-25 ASSESSMENT — PAIN DESCRIPTION - ORIENTATION
ORIENTATION: LEFT
ORIENTATION: LEFT
ORIENTATION: MID

## 2018-03-25 ASSESSMENT — PAIN DESCRIPTION - DESCRIPTORS: DESCRIPTORS: SHARP

## 2018-03-25 ASSESSMENT — PAIN DESCRIPTION - FREQUENCY: FREQUENCY: CONTINUOUS

## 2018-03-25 NOTE — PROGRESS NOTES
Pt awakens easily. C/O nausea. . VSS. Tele SR. Respirations even and nonlabored. Call light in reach.

## 2018-03-25 NOTE — DISCHARGE INSTR - DIET

## 2018-03-25 NOTE — H&P
Priority: Low        Assessment/Plan:  1. Headache- ? Migraine- Neuro eval  2. CP- will run serial trops. Keep on tele.  Follow labs and ecg     Electronically signed by Ana Hare MD on 3/25/2018 at 8:42 AM

## 2018-03-25 NOTE — ED NOTES
Patient ambulated with steady gait to bathroom. Urine obtained, labeled, sent to lab.      Yaneth Zepeda RN  03/24/18 [id number]

## 2018-03-25 NOTE — ED PROVIDER NOTES
[address] [facility] ED  eMERGENCY dEPARTMENT eNCOUnter      Pt Name: oRhini Au  MRN: [id number]  [place] 1970  Date of evaluation: 3/24/2018  Provider: Pennelope Paget, PA      HISTORY OF PRESENT ILLNESS    Rohini Au is a 41 y.o. female with a history of MS presents to the emergency department with multiple complaints. Patient states for 1 month she has had a headache on top of her head. Patient does also have nasal congestion and thinks her sinuses are abscessed because she can smell pus. She feels like there is a numbness sensation in the left side of her face. For the past couple days patient has had intermittent chest pains. They come and go and last for 20 minutes. It eases up after she relaxes. Chest pain is located in the left anterior chest.  It is described as a pressure and sharp sensation. At times it does radiate to the jaw. It is also worse with exertion and shortness of breath is present with exertion. Today she started with heart palpitations. She has also been nauseous with no vomiting. She has been around multiple ill contacts. She denies fevers, changes in her vision, vomiting, diarrhea, urinary complaints. Dr. Gricelda Catalan is her cardiologist.  She has taken Aleve at home for her headache with no relief. She denies any anxiety. She states she is always under a large amount of stress. HPI    Nursing Notes were reviewed. REVIEW OF SYSTEMS       Review of Systems   Constitutional: Negative for appetite change, chills and fever. HENT: Positive for congestion. Negative for rhinorrhea and sore throat. Eyes: Negative for visual disturbance. Respiratory: Positive for shortness of breath. Negative for cough. Cardiovascular: Positive for chest pain. Gastrointestinal: Positive for nausea. Negative for abdominal pain, blood in stool, diarrhea and vomiting. Genitourinary: Negative for difficulty urinating. Musculoskeletal: Negative for neck stiffness. Normocephalic and atraumatic. Right Ear: External ear normal.   Left Ear: External ear normal.   Mouth/Throat: Oropharynx is clear and moist.   Eyes: Conjunctivae and EOM are normal. Pupils are equal, round, and reactive to light. Neck: Normal range of motion. Neck supple. No tracheal deviation present. Cardiovascular: Normal heart sounds and intact distal pulses. Pulmonary/Chest: Effort normal and breath sounds normal. No stridor. No respiratory distress. She exhibits tenderness. Patient is midly tender to palpate in left anterior chest.  No ecchymosis, no crepitus, no rash   Abdominal: Soft. Bowel sounds are normal. She exhibits no distension and no mass. There is no tenderness. There is no rebound and no guarding. Musculoskeletal: Normal range of motion. Neurological: She is alert and oriented to person, place, and time. She has normal reflexes. Slightly decreased sensation in trigeminal nerve distribution, V3 on left side. No other neurological deficits   Skin: Skin is warm and dry. No rash noted. Psychiatric: She has a normal mood and affect. Her behavior is normal. Judgment and thought content normal.       DIAGNOSTIC RESULTS     EKG: All EKG's are interpreted by the Emergency Department Physician who either signs or Co-signs this chart in the absence of a cardiologist.    EKG is normal sinus rhythm, heart rate 82, normal intervals, normal axis, no ST segment changes. No change from previous EKG    RADIOLOGY:   Non-plain film images such as CT, Ultrasound and MRI are read by the radiologist. Plain radiographic images are visualized and preliminarily interpreted by the emergency physician with the below findings:    Interpretation per the Radiologist below, if available at the time of this note:    XR CHEST PORTABLE    (Results Pending)   CT Head WO Contrast    (Results Pending)           LABS:  Labs Reviewed   COMPREHENSIVE METABOLIC PANEL - Abnormal; Notable for the following:        Result minutes, excluding separately reportable procedures. There was a high probability of clinically significant/life threatening deterioration in the patient's condition which required my urgent intervention. Procedures    FINAL IMPRESSION      1. Chest pain, unspecified type    2. Elevated troponin          DISPOSITION/PLAN   DISPOSITION Decision To Admit 03/25/2018 01:00:30 AM      PATIENT REFERRED TO:  No follow-up provider specified. DISCHARGE MEDICATIONS:  New Prescriptions    No medications on file          (Please note that portions of this note were completed with a voice recognition program.  Efforts were made to edit the dictations but occasionally words are mis-transcribed. )    NORBERTO Goldberg (electronically signed)  Emergency Physician [address], [place]  03/25/18 [facility] [address] [place]  03/25/18 [facility] [address] [place]  03/25/18 [id number]

## 2018-03-26 ENCOUNTER — TELEPHONE (OUTPATIENT)
Dept: PRIMARY CARE CLINIC | Age: 48
End: 2018-03-26

## 2018-03-26 LAB — URINE CULTURE, ROUTINE: NORMAL

## 2018-03-26 PROCEDURE — 93010 ELECTROCARDIOGRAM REPORT: CPT | Performed by: INTERNAL MEDICINE

## 2018-03-27 ENCOUNTER — TELEPHONE (OUTPATIENT)
Dept: PRIMARY CARE CLINIC | Age: 48
End: 2018-03-27

## 2018-04-04 ENCOUNTER — TELEPHONE (OUTPATIENT)
Dept: PRIMARY CARE CLINIC | Age: 48
End: 2018-04-04

## 2018-04-04 ENCOUNTER — OFFICE VISIT (OUTPATIENT)
Dept: SURGERY | Age: 48
End: 2018-04-04
Payer: COMMERCIAL

## 2018-04-04 VITALS
SYSTOLIC BLOOD PRESSURE: 180 MMHG | TEMPERATURE: 97.7 F | BODY MASS INDEX: 30.29 KG/M2 | HEIGHT: 67 IN | DIASTOLIC BLOOD PRESSURE: 108 MMHG | WEIGHT: 193 LBS

## 2018-04-04 DIAGNOSIS — R22.0 SCALP MASS: Primary | ICD-10-CM

## 2018-04-04 PROCEDURE — 99213 OFFICE O/P EST LOW 20 MIN: CPT | Performed by: SURGERY

## 2018-04-04 PROCEDURE — G8427 DOCREV CUR MEDS BY ELIG CLIN: HCPCS | Performed by: SURGERY

## 2018-04-04 PROCEDURE — 4004F PT TOBACCO SCREEN RCVD TLK: CPT | Performed by: SURGERY

## 2018-04-04 PROCEDURE — G8417 CALC BMI ABV UP PARAM F/U: HCPCS | Performed by: SURGERY

## 2018-04-04 PROCEDURE — G8598 ASA/ANTIPLAT THER USED: HCPCS | Performed by: SURGERY

## 2018-04-04 RX ORDER — ALPRAZOLAM 0.5 MG/1
TABLET ORAL
Refills: 0 | COMMUNITY
Start: 2018-03-02 | End: 2018-05-22 | Stop reason: SDUPTHER

## 2018-04-04 RX ORDER — DIVALPROEX SODIUM 250 MG/1
TABLET, DELAYED RELEASE ORAL
Refills: 0 | COMMUNITY
Start: 2018-03-01 | End: 2018-05-22

## 2018-04-04 RX ORDER — METOPROLOL TARTRATE 50 MG/1
50 TABLET, FILM COATED ORAL 2 TIMES DAILY
Qty: 60 TABLET | Refills: 0 | Status: SHIPPED | OUTPATIENT
Start: 2018-04-04 | End: 2018-05-04 | Stop reason: SDUPTHER

## 2018-04-06 ENCOUNTER — OFFICE VISIT (OUTPATIENT)
Dept: PRIMARY CARE CLINIC | Age: 48
End: 2018-04-06
Payer: COMMERCIAL

## 2018-04-06 VITALS
DIASTOLIC BLOOD PRESSURE: 100 MMHG | RESPIRATION RATE: 14 BRPM | TEMPERATURE: 96.3 F | OXYGEN SATURATION: 98 % | WEIGHT: 190 LBS | BODY MASS INDEX: 29.82 KG/M2 | HEART RATE: 73 BPM | HEIGHT: 67 IN | SYSTOLIC BLOOD PRESSURE: 185 MMHG

## 2018-04-06 DIAGNOSIS — G89.29 CHRONIC RIGHT-SIDED LOW BACK PAIN WITH BILATERAL SCIATICA: ICD-10-CM

## 2018-04-06 DIAGNOSIS — M54.41 CHRONIC RIGHT-SIDED LOW BACK PAIN WITH BILATERAL SCIATICA: ICD-10-CM

## 2018-04-06 DIAGNOSIS — M54.42 CHRONIC RIGHT-SIDED LOW BACK PAIN WITH BILATERAL SCIATICA: ICD-10-CM

## 2018-04-06 DIAGNOSIS — I10 ESSENTIAL HYPERTENSION: Primary | ICD-10-CM

## 2018-04-06 DIAGNOSIS — L73.9 NASAL FOLLICULITIS: ICD-10-CM

## 2018-04-06 DIAGNOSIS — G43.001 MIGRAINE WITHOUT AURA AND WITH STATUS MIGRAINOSUS, NOT INTRACTABLE: ICD-10-CM

## 2018-04-06 DIAGNOSIS — L30.9 DERMATITIS: ICD-10-CM

## 2018-04-06 PROCEDURE — 4004F PT TOBACCO SCREEN RCVD TLK: CPT | Performed by: INTERNAL MEDICINE

## 2018-04-06 PROCEDURE — G8417 CALC BMI ABV UP PARAM F/U: HCPCS | Performed by: INTERNAL MEDICINE

## 2018-04-06 PROCEDURE — G8427 DOCREV CUR MEDS BY ELIG CLIN: HCPCS | Performed by: INTERNAL MEDICINE

## 2018-04-06 PROCEDURE — 99214 OFFICE O/P EST MOD 30 MIN: CPT | Performed by: INTERNAL MEDICINE

## 2018-04-06 PROCEDURE — G8598 ASA/ANTIPLAT THER USED: HCPCS | Performed by: INTERNAL MEDICINE

## 2018-04-06 RX ORDER — TOPIRAMATE 50 MG/1
50 TABLET, FILM COATED ORAL 2 TIMES DAILY
Qty: 60 TABLET | Refills: 2 | Status: SHIPPED | OUTPATIENT
Start: 2018-04-06 | End: 2018-06-11 | Stop reason: SDUPTHER

## 2018-04-06 RX ORDER — HYDROCHLOROTHIAZIDE 25 MG/1
25 TABLET ORAL DAILY
Qty: 30 TABLET | Refills: 3 | Status: SHIPPED | OUTPATIENT
Start: 2018-04-06 | End: 2018-07-08 | Stop reason: SDUPTHER

## 2018-04-06 RX ORDER — CYCLOBENZAPRINE HCL 10 MG
10 TABLET ORAL 2 TIMES DAILY PRN
Qty: 60 TABLET | Refills: 0 | Status: SHIPPED | OUTPATIENT
Start: 2018-04-06 | End: 2018-05-04 | Stop reason: SDUPTHER

## 2018-04-06 RX ORDER — MOMETASONE FUROATE 1 MG/G
CREAM TOPICAL
Qty: 50 G | Refills: 3 | Status: SHIPPED | OUTPATIENT
Start: 2018-04-06 | End: 2018-07-08 | Stop reason: SDUPTHER

## 2018-04-06 RX ORDER — LOSARTAN POTASSIUM 100 MG/1
100 TABLET ORAL DAILY
Qty: 30 TABLET | Refills: 3 | Status: SHIPPED | OUTPATIENT
Start: 2018-04-06 | End: 2018-07-08 | Stop reason: SDUPTHER

## 2018-04-09 ENCOUNTER — TELEPHONE (OUTPATIENT)
Dept: PRIMARY CARE CLINIC | Age: 48
End: 2018-04-09

## 2018-04-09 RX ORDER — CIPROFLOXACIN 500 MG/1
500 TABLET, FILM COATED ORAL 2 TIMES DAILY
Qty: 20 TABLET | Refills: 0 | Status: SHIPPED | OUTPATIENT
Start: 2018-04-09 | End: 2018-04-19

## 2018-04-10 ASSESSMENT — ENCOUNTER SYMPTOMS
ABDOMINAL DISTENTION: 0
BLURRED VISION: 0
FACIAL SWELLING: 0
CHOKING: 0
BLOOD IN STOOL: 0
PHOTOPHOBIA: 0
BACK PAIN: 1
APNEA: 0
ABDOMINAL PAIN: 0

## 2018-04-11 ENCOUNTER — TELEPHONE (OUTPATIENT)
Dept: PRIMARY CARE CLINIC | Age: 48
End: 2018-04-11

## 2018-04-11 DIAGNOSIS — I10 ESSENTIAL HYPERTENSION: Primary | ICD-10-CM

## 2018-04-11 RX ORDER — CLONIDINE HYDROCHLORIDE 0.1 MG/1
0.1 TABLET ORAL 2 TIMES DAILY
Qty: 60 TABLET | Refills: 3 | Status: SHIPPED | OUTPATIENT
Start: 2018-04-11 | End: 2018-05-22 | Stop reason: SDUPTHER

## 2018-04-12 DIAGNOSIS — J43.0 UNILATERAL EMPHYSEMA (HCC): Primary | ICD-10-CM

## 2018-04-13 ENCOUNTER — PROCEDURE VISIT (OUTPATIENT)
Dept: SURGERY | Age: 48
End: 2018-04-13
Payer: COMMERCIAL

## 2018-04-13 VITALS
HEIGHT: 67 IN | WEIGHT: 187 LBS | BODY MASS INDEX: 29.35 KG/M2 | SYSTOLIC BLOOD PRESSURE: 124 MMHG | DIASTOLIC BLOOD PRESSURE: 80 MMHG | TEMPERATURE: 97.3 F

## 2018-04-13 DIAGNOSIS — R22.0 SCALP MASS: Primary | ICD-10-CM

## 2018-04-13 PROCEDURE — 11420 EXC H-F-NK-SP B9+MARG 0.5/<: CPT | Performed by: SURGERY

## 2018-04-13 RX ORDER — BLOOD PRESSURE TEST KIT
KIT MISCELLANEOUS
Refills: 0 | Status: ON HOLD | COMMUNITY
Start: 2018-04-09 | End: 2018-11-12 | Stop reason: HOSPADM

## 2018-04-17 ENCOUNTER — HOSPITAL ENCOUNTER (EMERGENCY)
Age: 48
Discharge: HOME | End: 2018-04-17
Attending: EMERGENCY MEDICINE
Payer: COMMERCIAL

## 2018-04-17 ENCOUNTER — APPOINTMENT (OUTPATIENT)
Dept: CT IMAGING | Age: 48
End: 2018-04-17
Payer: COMMERCIAL

## 2018-04-17 VITALS
HEART RATE: 76 BPM | BODY MASS INDEX: 29.35 KG/M2 | WEIGHT: 187 LBS | DIASTOLIC BLOOD PRESSURE: 76 MMHG | TEMPERATURE: 98.2 F | OXYGEN SATURATION: 96 % | RESPIRATION RATE: 18 BRPM | SYSTOLIC BLOOD PRESSURE: 131 MMHG | HEIGHT: 67 IN

## 2018-04-17 DIAGNOSIS — M54.12 CERVICAL RADICULITIS: Primary | ICD-10-CM

## 2018-04-17 LAB
ALBUMIN SERPL-MCNC: 4.4 G/DL (ref 3.9–4.9)
ALP BLD-CCNC: 121 U/L (ref 40–130)
ALT SERPL-CCNC: 28 U/L (ref 0–33)
ANION GAP SERPL CALCULATED.3IONS-SCNC: 13 MEQ/L (ref 7–13)
AST SERPL-CCNC: 18 U/L (ref 0–35)
BASOPHILS ABSOLUTE: 0.1 K/UL (ref 0–0.2)
BASOPHILS RELATIVE PERCENT: 0.9 %
BILIRUB SERPL-MCNC: 0.3 MG/DL (ref 0–1.2)
BUN BLDV-MCNC: 14 MG/DL (ref 6–20)
CALCIUM SERPL-MCNC: 9.3 MG/DL (ref 8.6–10.2)
CHLORIDE BLD-SCNC: 104 MEQ/L (ref 98–107)
CO2: 23 MEQ/L (ref 22–29)
CREAT SERPL-MCNC: 0.89 MG/DL (ref 0.5–0.9)
EKG ATRIAL RATE: 89 BPM
EKG P AXIS: 52 DEGREES
EKG P-R INTERVAL: 190 MS
EKG Q-T INTERVAL: 384 MS
EKG QRS DURATION: 96 MS
EKG QTC CALCULATION (BAZETT): 467 MS
EKG R AXIS: 52 DEGREES
EKG T AXIS: 46 DEGREES
EKG VENTRICULAR RATE: 89 BPM
EOSINOPHILS ABSOLUTE: 0.1 K/UL (ref 0–0.7)
EOSINOPHILS RELATIVE PERCENT: 1 %
GFR AFRICAN AMERICAN: >60
GFR NON-AFRICAN AMERICAN: >60
GLOBULIN: 2.1 G/DL (ref 2.3–3.5)
GLUCOSE BLD-MCNC: 138 MG/DL (ref 74–109)
HCT VFR BLD CALC: 44.1 % (ref 37–47)
HEMOGLOBIN: 15.7 G/DL (ref 12–16)
INR BLD: 0.9
LYMPHOCYTES ABSOLUTE: 1.9 K/UL (ref 1–4.8)
LYMPHOCYTES RELATIVE PERCENT: 21.4 %
MCH RBC QN AUTO: 32.2 PG (ref 27–31.3)
MCHC RBC AUTO-ENTMCNC: 35.6 % (ref 33–37)
MCV RBC AUTO: 90.5 FL (ref 82–100)
MONOCYTES ABSOLUTE: 0.5 K/UL (ref 0.2–0.8)
MONOCYTES RELATIVE PERCENT: 5.8 %
NEUTROPHILS ABSOLUTE: 6.4 K/UL (ref 1.4–6.5)
NEUTROPHILS RELATIVE PERCENT: 70.9 %
PDW BLD-RTO: 12.9 % (ref 11.5–14.5)
PLATELET # BLD: 154 K/UL (ref 130–400)
POTASSIUM SERPL-SCNC: 3.2 MEQ/L (ref 3.5–5.1)
PROTHROMBIN TIME: 9.4 SEC (ref 9.6–12.3)
RBC # BLD: 4.88 M/UL (ref 4.2–5.4)
SODIUM BLD-SCNC: 140 MEQ/L (ref 132–144)
TOTAL PROTEIN: 6.5 G/DL (ref 6.4–8.1)
WBC # BLD: 9.1 K/UL (ref 4.8–10.8)

## 2018-04-17 PROCEDURE — 93005 ELECTROCARDIOGRAM TRACING: CPT

## 2018-04-17 PROCEDURE — 6360000002 HC RX W HCPCS: Performed by: EMERGENCY MEDICINE

## 2018-04-17 PROCEDURE — 96375 TX/PRO/DX INJ NEW DRUG ADDON: CPT

## 2018-04-17 PROCEDURE — 85025 COMPLETE CBC W/AUTO DIFF WBC: CPT

## 2018-04-17 PROCEDURE — 36415 COLL VENOUS BLD VENIPUNCTURE: CPT

## 2018-04-17 PROCEDURE — 96374 THER/PROPH/DIAG INJ IV PUSH: CPT

## 2018-04-17 PROCEDURE — 93010 ELECTROCARDIOGRAM REPORT: CPT | Performed by: INTERNAL MEDICINE

## 2018-04-17 PROCEDURE — 99284 EMERGENCY DEPT VISIT MOD MDM: CPT

## 2018-04-17 PROCEDURE — 85610 PROTHROMBIN TIME: CPT

## 2018-04-17 PROCEDURE — 80053 COMPREHEN METABOLIC PANEL: CPT

## 2018-04-17 PROCEDURE — 70450 CT HEAD/BRAIN W/O DYE: CPT

## 2018-04-17 RX ORDER — KETOROLAC TROMETHAMINE 30 MG/ML
30 INJECTION, SOLUTION INTRAMUSCULAR; INTRAVENOUS ONCE
Status: COMPLETED | OUTPATIENT
Start: 2018-04-17 | End: 2018-04-17

## 2018-04-17 RX ORDER — PREDNISONE 10 MG/1
TABLET ORAL
Qty: 30 TABLET | Refills: 0 | Status: SHIPPED | OUTPATIENT
Start: 2018-04-17 | End: 2019-03-11 | Stop reason: SDUPTHER

## 2018-04-17 RX ORDER — METHYLPREDNISOLONE SODIUM SUCCINATE 125 MG/2ML
125 INJECTION, POWDER, LYOPHILIZED, FOR SOLUTION INTRAMUSCULAR; INTRAVENOUS ONCE
Status: COMPLETED | OUTPATIENT
Start: 2018-04-17 | End: 2018-04-17

## 2018-04-17 RX ADMIN — METHYLPREDNISOLONE SODIUM SUCCINATE 125 MG: 125 INJECTION, POWDER, FOR SOLUTION INTRAMUSCULAR; INTRAVENOUS at 05:56

## 2018-04-17 RX ADMIN — KETOROLAC TROMETHAMINE 30 MG: 30 INJECTION, SOLUTION INTRAMUSCULAR at 05:56

## 2018-04-17 ASSESSMENT — PAIN SCALES - GENERAL
PAINLEVEL_OUTOF10: 10
PAINLEVEL_OUTOF10: 10

## 2018-04-17 ASSESSMENT — ENCOUNTER SYMPTOMS
WHEEZING: 0
PHOTOPHOBIA: 0
ABDOMINAL PAIN: 0
VOMITING: 0
SHORTNESS OF BREATH: 0
EYE DISCHARGE: 0
SORE THROAT: 0
CHEST TIGHTNESS: 0
COUGH: 0
ABDOMINAL DISTENTION: 0

## 2018-04-17 ASSESSMENT — PAIN DESCRIPTION - PAIN TYPE: TYPE: ACUTE PAIN

## 2018-04-17 ASSESSMENT — PAIN DESCRIPTION - DESCRIPTORS: DESCRIPTORS: NUMBNESS

## 2018-04-17 ASSESSMENT — PAIN DESCRIPTION - ORIENTATION: ORIENTATION: LEFT

## 2018-04-17 ASSESSMENT — PAIN DESCRIPTION - LOCATION: LOCATION: ARM

## 2018-04-25 ENCOUNTER — OFFICE VISIT (OUTPATIENT)
Dept: SURGERY | Age: 48
End: 2018-04-25

## 2018-04-25 VITALS
WEIGHT: 185 LBS | SYSTOLIC BLOOD PRESSURE: 122 MMHG | OXYGEN SATURATION: 98 % | HEART RATE: 82 BPM | DIASTOLIC BLOOD PRESSURE: 90 MMHG | BODY MASS INDEX: 28.98 KG/M2

## 2018-04-25 DIAGNOSIS — R22.0 MASS OF SCALP: Primary | ICD-10-CM

## 2018-04-25 DIAGNOSIS — Z09 SURGICAL FOLLOWUP: ICD-10-CM

## 2018-04-25 PROCEDURE — 99024 POSTOP FOLLOW-UP VISIT: CPT | Performed by: SURGERY

## 2018-04-25 RX ORDER — CEPHALEXIN 500 MG/1
500 CAPSULE ORAL 3 TIMES DAILY
Qty: 21 CAPSULE | Refills: 0 | Status: SHIPPED | OUTPATIENT
Start: 2018-04-25 | End: 2018-05-02

## 2018-04-27 ENCOUNTER — APPOINTMENT (OUTPATIENT)
Dept: GENERAL RADIOLOGY | Age: 48
End: 2018-04-27
Payer: COMMERCIAL

## 2018-04-27 ENCOUNTER — HOSPITAL ENCOUNTER (EMERGENCY)
Age: 48
Discharge: HOME | End: 2018-04-27
Attending: EMERGENCY MEDICINE
Payer: COMMERCIAL

## 2018-04-27 VITALS
DIASTOLIC BLOOD PRESSURE: 76 MMHG | SYSTOLIC BLOOD PRESSURE: 125 MMHG | TEMPERATURE: 97.8 F | HEART RATE: 77 BPM | OXYGEN SATURATION: 96 % | WEIGHT: 184 LBS | RESPIRATION RATE: 18 BRPM | HEIGHT: 67 IN | BODY MASS INDEX: 28.88 KG/M2

## 2018-04-27 DIAGNOSIS — J11.1 INFLUENZA-LIKE SYNDROME: Primary | ICD-10-CM

## 2018-04-27 LAB
ANION GAP SERPL CALCULATED.3IONS-SCNC: 16 MEQ/L (ref 7–13)
BACTERIA: NORMAL /HPF
BASOPHILS ABSOLUTE: 0.1 K/UL (ref 0–0.2)
BASOPHILS RELATIVE PERCENT: 0.7 %
BILIRUBIN URINE: NEGATIVE
BLOOD, URINE: NEGATIVE
BUN BLDV-MCNC: 20 MG/DL (ref 6–20)
CALCIUM SERPL-MCNC: 10.2 MG/DL (ref 8.6–10.2)
CHLORIDE BLD-SCNC: 96 MEQ/L (ref 98–107)
CLARITY: ABNORMAL
CO2: 24 MEQ/L (ref 22–29)
COLOR: YELLOW
CREAT SERPL-MCNC: 1.12 MG/DL (ref 0.5–0.9)
EOSINOPHILS ABSOLUTE: 0.1 K/UL (ref 0–0.7)
EOSINOPHILS RELATIVE PERCENT: 0.3 %
EPITHELIAL CELLS, UA: NORMAL /HPF
GFR AFRICAN AMERICAN: >60
GFR NON-AFRICAN AMERICAN: 52.4
GLUCOSE BLD-MCNC: 83 MG/DL (ref 74–109)
GLUCOSE URINE: NEGATIVE MG/DL
HCT VFR BLD CALC: 49.4 % (ref 37–47)
HEMOGLOBIN: 17.4 G/DL (ref 12–16)
KETONES, URINE: NEGATIVE MG/DL
LEUKOCYTE ESTERASE, URINE: ABNORMAL
LYMPHOCYTES ABSOLUTE: 3.8 K/UL (ref 1–4.8)
LYMPHOCYTES RELATIVE PERCENT: 19.9 %
MAGNESIUM: 2.7 MG/DL (ref 1.7–2.3)
MCH RBC QN AUTO: 31.9 PG (ref 27–31.3)
MCHC RBC AUTO-ENTMCNC: 35.2 % (ref 33–37)
MCV RBC AUTO: 90.7 FL (ref 82–100)
MONO TEST: NEGATIVE
MONO, POC: NORMAL
MONOCYTES ABSOLUTE: 1.1 K/UL (ref 0.2–0.8)
MONOCYTES RELATIVE PERCENT: 5.6 %
NEUTROPHILS ABSOLUTE: 14.2 K/UL (ref 1.4–6.5)
NEUTROPHILS RELATIVE PERCENT: 73.5 %
NITRITE, URINE: NEGATIVE
PDW BLD-RTO: 13.4 % (ref 11.5–14.5)
PH UA: 6.5 (ref 5–9)
PLATELET # BLD: 198 K/UL (ref 130–400)
POTASSIUM SERPL-SCNC: 3.8 MEQ/L (ref 3.5–5.1)
PROTEIN UA: NEGATIVE MG/DL
RAPID INFLUENZA  B AGN: NEGATIVE
RAPID INFLUENZA A AGN: NEGATIVE
RBC # BLD: 5.44 M/UL (ref 4.2–5.4)
RBC UA: NORMAL /HPF (ref 0–2)
SODIUM BLD-SCNC: 136 MEQ/L (ref 132–144)
SPECIFIC GRAVITY UA: 1.01 (ref 1–1.03)
URINE REFLEX TO CULTURE: YES
UROBILINOGEN, URINE: 0.2 E.U./DL
WBC # BLD: 19.3 K/UL (ref 4.8–10.8)
WBC UA: NORMAL /HPF (ref 0–5)

## 2018-04-27 PROCEDURE — 83735 ASSAY OF MAGNESIUM: CPT

## 2018-04-27 PROCEDURE — 71045 X-RAY EXAM CHEST 1 VIEW: CPT

## 2018-04-27 PROCEDURE — 81001 URINALYSIS AUTO W/SCOPE: CPT

## 2018-04-27 PROCEDURE — 36415 COLL VENOUS BLD VENIPUNCTURE: CPT

## 2018-04-27 PROCEDURE — 80048 BASIC METABOLIC PNL TOTAL CA: CPT

## 2018-04-27 PROCEDURE — 96372 THER/PROPH/DIAG INJ SC/IM: CPT

## 2018-04-27 PROCEDURE — 86403 PARTICLE AGGLUT ANTBDY SCRN: CPT

## 2018-04-27 PROCEDURE — 86308 HETEROPHILE ANTIBODY SCREEN: CPT

## 2018-04-27 PROCEDURE — 6360000002 HC RX W HCPCS: Performed by: EMERGENCY MEDICINE

## 2018-04-27 PROCEDURE — 96374 THER/PROPH/DIAG INJ IV PUSH: CPT

## 2018-04-27 PROCEDURE — 85025 COMPLETE CBC W/AUTO DIFF WBC: CPT

## 2018-04-27 PROCEDURE — 87086 URINE CULTURE/COLONY COUNT: CPT

## 2018-04-27 PROCEDURE — 96375 TX/PRO/DX INJ NEW DRUG ADDON: CPT

## 2018-04-27 PROCEDURE — 2580000003 HC RX 258: Performed by: EMERGENCY MEDICINE

## 2018-04-27 PROCEDURE — 99284 EMERGENCY DEPT VISIT MOD MDM: CPT

## 2018-04-27 RX ORDER — KETOROLAC TROMETHAMINE 15 MG/ML
15 INJECTION, SOLUTION INTRAMUSCULAR; INTRAVENOUS ONCE
Status: COMPLETED | OUTPATIENT
Start: 2018-04-27 | End: 2018-04-27

## 2018-04-27 RX ORDER — DEXAMETHASONE SODIUM PHOSPHATE 10 MG/ML
8 INJECTION INTRAMUSCULAR; INTRAVENOUS ONCE
Status: COMPLETED | OUTPATIENT
Start: 2018-04-27 | End: 2018-04-27

## 2018-04-27 RX ORDER — 0.9 % SODIUM CHLORIDE 0.9 %
1000 INTRAVENOUS SOLUTION INTRAVENOUS ONCE
Status: COMPLETED | OUTPATIENT
Start: 2018-04-27 | End: 2018-04-27

## 2018-04-27 RX ORDER — LORAZEPAM 2 MG/ML
1 INJECTION INTRAMUSCULAR ONCE
Status: COMPLETED | OUTPATIENT
Start: 2018-04-27 | End: 2018-04-27

## 2018-04-27 RX ADMIN — LORAZEPAM 1 MG: 2 INJECTION INTRAMUSCULAR; INTRAVENOUS at 03:35

## 2018-04-27 RX ADMIN — KETOROLAC TROMETHAMINE 15 MG: 15 INJECTION, SOLUTION INTRAMUSCULAR; INTRAVENOUS at 03:35

## 2018-04-27 RX ADMIN — DEXAMETHASONE SODIUM PHOSPHATE 8 MG: 10 INJECTION INTRAMUSCULAR; INTRAVENOUS at 05:05

## 2018-04-27 RX ADMIN — SODIUM CHLORIDE 1000 ML: 9 INJECTION, SOLUTION INTRAVENOUS at 03:35

## 2018-04-27 ASSESSMENT — PAIN DESCRIPTION - LOCATION
LOCATION: LEG

## 2018-04-27 ASSESSMENT — PAIN SCALES - GENERAL
PAINLEVEL_OUTOF10: 5
PAINLEVEL_OUTOF10: 10
PAINLEVEL_OUTOF10: 9
PAINLEVEL_OUTOF10: 5
PAINLEVEL_OUTOF10: 3

## 2018-04-27 ASSESSMENT — PAIN DESCRIPTION - DESCRIPTORS
DESCRIPTORS: CRAMPING

## 2018-04-27 ASSESSMENT — PAIN DESCRIPTION - ORIENTATION
ORIENTATION: RIGHT;LEFT
ORIENTATION: LEFT;RIGHT

## 2018-04-28 LAB — URINE CULTURE, ROUTINE: NORMAL

## 2018-05-04 DIAGNOSIS — G89.29 CHRONIC RIGHT-SIDED LOW BACK PAIN WITH BILATERAL SCIATICA: ICD-10-CM

## 2018-05-04 DIAGNOSIS — M54.41 CHRONIC RIGHT-SIDED LOW BACK PAIN WITH BILATERAL SCIATICA: ICD-10-CM

## 2018-05-04 DIAGNOSIS — M54.42 CHRONIC RIGHT-SIDED LOW BACK PAIN WITH BILATERAL SCIATICA: ICD-10-CM

## 2018-05-06 RX ORDER — CYCLOBENZAPRINE HCL 10 MG
TABLET ORAL
Qty: 60 TABLET | Refills: 0 | Status: SHIPPED | OUTPATIENT
Start: 2018-05-06 | End: 2018-12-18 | Stop reason: SDUPTHER

## 2018-05-06 RX ORDER — METOPROLOL TARTRATE 50 MG/1
50 TABLET, FILM COATED ORAL 2 TIMES DAILY
Qty: 60 TABLET | Refills: 0 | Status: SHIPPED | OUTPATIENT
Start: 2018-05-06 | End: 2018-06-29 | Stop reason: SDUPTHER

## 2018-05-18 ENCOUNTER — PROCEDURE VISIT (OUTPATIENT)
Dept: SURGERY | Age: 48
End: 2018-05-18
Payer: COMMERCIAL

## 2018-05-18 VITALS
WEIGHT: 182 LBS | BODY MASS INDEX: 28.56 KG/M2 | TEMPERATURE: 98 F | DIASTOLIC BLOOD PRESSURE: 86 MMHG | SYSTOLIC BLOOD PRESSURE: 138 MMHG | HEIGHT: 67 IN

## 2018-05-18 DIAGNOSIS — R22.0 MASS OF SCALP: Primary | ICD-10-CM

## 2018-05-18 PROCEDURE — 11420 EXC H-F-NK-SP B9+MARG 0.5/<: CPT | Performed by: SURGERY

## 2018-05-21 ENCOUNTER — OFFICE VISIT (OUTPATIENT)
Dept: PULMONOLOGY | Age: 48
End: 2018-05-21
Payer: COMMERCIAL

## 2018-05-21 VITALS
TEMPERATURE: 97.1 F | OXYGEN SATURATION: 99 % | HEART RATE: 93 BPM | WEIGHT: 183.2 LBS | DIASTOLIC BLOOD PRESSURE: 90 MMHG | BODY MASS INDEX: 28.75 KG/M2 | SYSTOLIC BLOOD PRESSURE: 168 MMHG | HEIGHT: 67 IN

## 2018-05-21 DIAGNOSIS — Z72.0 TOBACCO ABUSE: ICD-10-CM

## 2018-05-21 DIAGNOSIS — J43.8 OTHER EMPHYSEMA (HCC): ICD-10-CM

## 2018-05-21 DIAGNOSIS — R06.09 DOE (DYSPNEA ON EXERTION): ICD-10-CM

## 2018-05-21 DIAGNOSIS — J41.0 SIMPLE CHRONIC BRONCHITIS (HCC): ICD-10-CM

## 2018-05-21 DIAGNOSIS — J44.9 CHRONIC OBSTRUCTIVE PULMONARY DISEASE, UNSPECIFIED COPD TYPE (HCC): Primary | ICD-10-CM

## 2018-05-21 PROCEDURE — G8427 DOCREV CUR MEDS BY ELIG CLIN: HCPCS | Performed by: INTERNAL MEDICINE

## 2018-05-21 PROCEDURE — 99204 OFFICE O/P NEW MOD 45 MIN: CPT | Performed by: INTERNAL MEDICINE

## 2018-05-21 PROCEDURE — G8926 SPIRO NO PERF OR DOC: HCPCS | Performed by: INTERNAL MEDICINE

## 2018-05-21 PROCEDURE — 3023F SPIROM DOC REV: CPT | Performed by: INTERNAL MEDICINE

## 2018-05-21 PROCEDURE — G8417 CALC BMI ABV UP PARAM F/U: HCPCS | Performed by: INTERNAL MEDICINE

## 2018-05-21 PROCEDURE — G8598 ASA/ANTIPLAT THER USED: HCPCS | Performed by: INTERNAL MEDICINE

## 2018-05-21 PROCEDURE — 4004F PT TOBACCO SCREEN RCVD TLK: CPT | Performed by: INTERNAL MEDICINE

## 2018-05-21 RX ORDER — BUDESONIDE AND FORMOTEROL FUMARATE DIHYDRATE 160; 4.5 UG/1; UG/1
2 AEROSOL RESPIRATORY (INHALATION) 2 TIMES DAILY
Qty: 1 INHALER | Refills: 5 | Status: SHIPPED | OUTPATIENT
Start: 2018-05-21 | End: 2018-09-28 | Stop reason: SDUPTHER

## 2018-05-21 RX ORDER — ALBUTEROL SULFATE 2.5 MG/3ML
2.5 SOLUTION RESPIRATORY (INHALATION) EVERY 6 HOURS PRN
Qty: 120 EACH | Refills: 5 | Status: SHIPPED | OUTPATIENT
Start: 2018-05-21 | End: 2018-12-18

## 2018-05-21 RX ORDER — BUDESONIDE AND FORMOTEROL FUMARATE DIHYDRATE 160; 4.5 UG/1; UG/1
2 AEROSOL RESPIRATORY (INHALATION) 2 TIMES DAILY
COMMUNITY
End: 2018-05-21 | Stop reason: SDUPTHER

## 2018-05-21 ASSESSMENT — ENCOUNTER SYMPTOMS
VOMITING: 0
EYE ITCHING: 0
EYE DISCHARGE: 0
COUGH: 1
CHEST TIGHTNESS: 0
ABDOMINAL PAIN: 0
STRIDOR: 0
SINUS PRESSURE: 0
SHORTNESS OF BREATH: 1
WHEEZING: 1
VOICE CHANGE: 0
NAUSEA: 0
DIARRHEA: 0
TROUBLE SWALLOWING: 0
SORE THROAT: 0
RHINORRHEA: 0

## 2018-05-22 ENCOUNTER — OFFICE VISIT (OUTPATIENT)
Dept: PRIMARY CARE CLINIC | Age: 48
End: 2018-05-22
Payer: COMMERCIAL

## 2018-05-22 VITALS
HEIGHT: 67 IN | RESPIRATION RATE: 14 BRPM | TEMPERATURE: 97.8 F | WEIGHT: 186.4 LBS | OXYGEN SATURATION: 97 % | DIASTOLIC BLOOD PRESSURE: 80 MMHG | BODY MASS INDEX: 29.26 KG/M2 | SYSTOLIC BLOOD PRESSURE: 138 MMHG | HEART RATE: 90 BPM

## 2018-05-22 DIAGNOSIS — R73.9 HYPERGLYCEMIA: ICD-10-CM

## 2018-05-22 DIAGNOSIS — E87.6 HYPOKALEMIA: ICD-10-CM

## 2018-05-22 DIAGNOSIS — K13.0 ANGULAR CHEILITIS: ICD-10-CM

## 2018-05-22 DIAGNOSIS — E53.8 VITAMIN B 12 DEFICIENCY: ICD-10-CM

## 2018-05-22 DIAGNOSIS — E53.8 FOLIC ACID DEFICIENCY: ICD-10-CM

## 2018-05-22 DIAGNOSIS — E55.9 VITAMIN D DEFICIENCY: ICD-10-CM

## 2018-05-22 DIAGNOSIS — F41.9 ANXIETY: ICD-10-CM

## 2018-05-22 DIAGNOSIS — I10 ESSENTIAL HYPERTENSION: Primary | ICD-10-CM

## 2018-05-22 LAB
ALBUMIN SERPL-MCNC: 3.9 G/DL (ref 3.9–4.9)
ALP BLD-CCNC: 115 U/L (ref 40–130)
ALT SERPL-CCNC: 20 U/L (ref 0–33)
ANION GAP SERPL CALCULATED.3IONS-SCNC: 12 MEQ/L (ref 7–13)
AST SERPL-CCNC: 14 U/L (ref 0–35)
BILIRUB SERPL-MCNC: <0.2 MG/DL (ref 0–1.2)
BUN BLDV-MCNC: 11 MG/DL (ref 6–20)
CALCIUM SERPL-MCNC: 9 MG/DL (ref 8.6–10.2)
CHLORIDE BLD-SCNC: 105 MEQ/L (ref 98–107)
CO2: 25 MEQ/L (ref 22–29)
CREAT SERPL-MCNC: 0.82 MG/DL (ref 0.5–0.9)
FOLATE: 5.6 NG/ML (ref 7.3–26.1)
GFR AFRICAN AMERICAN: >60
GFR NON-AFRICAN AMERICAN: >60
GLOBULIN: 2 G/DL (ref 2.3–3.5)
GLUCOSE BLD-MCNC: 105 MG/DL (ref 74–109)
HBA1C MFR BLD: 5.6 % (ref 4.8–5.9)
POTASSIUM SERPL-SCNC: 3.7 MEQ/L (ref 3.5–5.1)
SODIUM BLD-SCNC: 142 MEQ/L (ref 132–144)
TOTAL PROTEIN: 5.9 G/DL (ref 6.4–8.1)
VITAMIN B-12: 219 PG/ML (ref 232–1245)
VITAMIN D 25-HYDROXY: 19 NG/ML (ref 30–100)

## 2018-05-22 PROCEDURE — G8427 DOCREV CUR MEDS BY ELIG CLIN: HCPCS | Performed by: INTERNAL MEDICINE

## 2018-05-22 PROCEDURE — G8417 CALC BMI ABV UP PARAM F/U: HCPCS | Performed by: INTERNAL MEDICINE

## 2018-05-22 PROCEDURE — 4004F PT TOBACCO SCREEN RCVD TLK: CPT | Performed by: INTERNAL MEDICINE

## 2018-05-22 PROCEDURE — 99214 OFFICE O/P EST MOD 30 MIN: CPT | Performed by: INTERNAL MEDICINE

## 2018-05-22 PROCEDURE — G8598 ASA/ANTIPLAT THER USED: HCPCS | Performed by: INTERNAL MEDICINE

## 2018-05-22 RX ORDER — LURASIDONE HYDROCHLORIDE 40 MG/1
TABLET, FILM COATED ORAL
Refills: 0 | COMMUNITY
Start: 2018-03-06 | End: 2018-05-25

## 2018-05-22 RX ORDER — CLONIDINE HYDROCHLORIDE 0.1 MG/1
0.1 TABLET ORAL 2 TIMES DAILY
Qty: 60 TABLET | Refills: 3 | Status: ON HOLD | OUTPATIENT
Start: 2018-05-22 | End: 2018-11-12 | Stop reason: HOSPADM

## 2018-05-22 RX ORDER — CYANOCOBALAMIN 1000 UG/ML
1000 INJECTION INTRAMUSCULAR; SUBCUTANEOUS ONCE
Qty: 10 ML | Refills: 0 | Status: ON HOLD | OUTPATIENT
Start: 2018-05-22 | End: 2018-11-12 | Stop reason: HOSPADM

## 2018-05-22 RX ORDER — CHOLECALCIFEROL (VITAMIN D3) 50 MCG
2000 TABLET ORAL DAILY
Qty: 30 TABLET | Refills: 5 | Status: SHIPPED | OUTPATIENT
Start: 2018-05-22 | End: 2018-10-17 | Stop reason: SDUPTHER

## 2018-05-22 RX ORDER — CLOTRIMAZOLE 1 %
CREAM (GRAM) TOPICAL
Qty: 12 G | Refills: 1 | Status: SHIPPED | OUTPATIENT
Start: 2018-05-22 | End: 2018-07-08 | Stop reason: SDUPTHER

## 2018-05-22 RX ORDER — ALPRAZOLAM 0.5 MG/1
TABLET ORAL
Qty: 30 TABLET | Refills: 0 | Status: SHIPPED | OUTPATIENT
Start: 2018-05-22 | End: 2018-06-22

## 2018-05-22 RX ORDER — FOLIC ACID 1 MG/1
1 TABLET ORAL DAILY
Qty: 30 TABLET | Refills: 3 | Status: SHIPPED | OUTPATIENT
Start: 2018-05-22 | End: 2018-09-02 | Stop reason: SDUPTHER

## 2018-05-23 ENCOUNTER — TELEPHONE (OUTPATIENT)
Dept: PRIMARY CARE CLINIC | Age: 48
End: 2018-05-23

## 2018-05-25 ENCOUNTER — OFFICE VISIT (OUTPATIENT)
Dept: SURGERY | Age: 48
End: 2018-05-25

## 2018-05-25 VITALS
DIASTOLIC BLOOD PRESSURE: 88 MMHG | TEMPERATURE: 98.6 F | WEIGHT: 184 LBS | SYSTOLIC BLOOD PRESSURE: 138 MMHG | HEIGHT: 67 IN | BODY MASS INDEX: 28.88 KG/M2

## 2018-05-25 DIAGNOSIS — Z09 SURGICAL FOLLOWUP: Primary | ICD-10-CM

## 2018-05-25 PROCEDURE — 99024 POSTOP FOLLOW-UP VISIT: CPT | Performed by: SURGERY

## 2018-05-27 ASSESSMENT — ENCOUNTER SYMPTOMS
FACIAL SWELLING: 0
CHOKING: 0
ABDOMINAL DISTENTION: 0
APNEA: 0
PHOTOPHOBIA: 0
BLOOD IN STOOL: 0

## 2018-05-30 DIAGNOSIS — E55.9 VITAMIN D DEFICIENCY: Primary | ICD-10-CM

## 2018-05-30 RX ORDER — CHOLECALCIFEROL (VITAMIN D3) 1250 MCG
1 CAPSULE ORAL WEEKLY
Qty: 4 CAPSULE | Refills: 2 | Status: ON HOLD | OUTPATIENT
Start: 2018-05-30 | End: 2018-11-08 | Stop reason: HOSPADM

## 2018-06-01 ENCOUNTER — TELEPHONE (OUTPATIENT)
Dept: PRIMARY CARE CLINIC | Age: 48
End: 2018-06-01

## 2018-06-01 DIAGNOSIS — E83.42 HYPOMAGNESEMIA: Primary | ICD-10-CM

## 2018-06-01 DIAGNOSIS — E83.51 HYPOCALCEMIA: ICD-10-CM

## 2018-06-01 RX ORDER — ANTACID TABLETS 648 MG/1
1 TABLET, CHEWABLE ORAL 2 TIMES DAILY
Qty: 60 TABLET | Refills: 5 | Status: ON HOLD | OUTPATIENT
Start: 2018-06-01 | End: 2018-11-08 | Stop reason: HOSPADM

## 2018-06-01 RX ORDER — LANOLIN ALCOHOL/MO/W.PET/CERES
400 CREAM (GRAM) TOPICAL DAILY
Qty: 30 TABLET | Refills: 5 | Status: SHIPPED | OUTPATIENT
Start: 2018-06-01 | End: 2018-12-18 | Stop reason: SDUPTHER

## 2018-06-04 ENCOUNTER — APPOINTMENT (OUTPATIENT)
Dept: GENERAL RADIOLOGY | Age: 48
End: 2018-06-04
Payer: COMMERCIAL

## 2018-06-04 ENCOUNTER — HOSPITAL ENCOUNTER (EMERGENCY)
Age: 48
Discharge: HOME | End: 2018-06-04
Payer: COMMERCIAL

## 2018-06-04 ENCOUNTER — TELEPHONE (OUTPATIENT)
Dept: PRIMARY CARE CLINIC | Age: 48
End: 2018-06-04

## 2018-06-04 VITALS
HEART RATE: 98 BPM | HEIGHT: 67 IN | TEMPERATURE: 98 F | BODY MASS INDEX: 29.82 KG/M2 | DIASTOLIC BLOOD PRESSURE: 98 MMHG | OXYGEN SATURATION: 96 % | WEIGHT: 190 LBS | SYSTOLIC BLOOD PRESSURE: 142 MMHG | RESPIRATION RATE: 18 BRPM

## 2018-06-04 DIAGNOSIS — R07.9 CHEST PAIN, UNSPECIFIED TYPE: Primary | ICD-10-CM

## 2018-06-04 DIAGNOSIS — R11.0 NAUSEA: ICD-10-CM

## 2018-06-04 LAB
ALBUMIN SERPL-MCNC: 4.5 G/DL (ref 3.9–4.9)
ALP BLD-CCNC: 137 U/L (ref 40–130)
ALT SERPL-CCNC: 27 U/L (ref 0–33)
ANION GAP SERPL CALCULATED.3IONS-SCNC: 14 MEQ/L (ref 7–13)
AST SERPL-CCNC: 26 U/L (ref 0–35)
BASOPHILS ABSOLUTE: 0.1 K/UL (ref 0–0.2)
BASOPHILS RELATIVE PERCENT: 0.7 %
BILIRUB SERPL-MCNC: 0.4 MG/DL (ref 0–1.2)
BUN BLDV-MCNC: 16 MG/DL (ref 6–20)
CALCIUM SERPL-MCNC: 9.9 MG/DL (ref 8.6–10.2)
CHLORIDE BLD-SCNC: 98 MEQ/L (ref 98–107)
CO2: 27 MEQ/L (ref 22–29)
CREAT SERPL-MCNC: 0.92 MG/DL (ref 0.5–0.9)
EKG ATRIAL RATE: 91 BPM
EKG P AXIS: 59 DEGREES
EKG P-R INTERVAL: 158 MS
EKG Q-T INTERVAL: 374 MS
EKG QRS DURATION: 92 MS
EKG QTC CALCULATION (BAZETT): 460 MS
EKG R AXIS: 63 DEGREES
EKG T AXIS: 56 DEGREES
EKG VENTRICULAR RATE: 91 BPM
EOSINOPHILS ABSOLUTE: 0 K/UL (ref 0–0.7)
EOSINOPHILS RELATIVE PERCENT: 0.4 %
GFR AFRICAN AMERICAN: >60
GFR NON-AFRICAN AMERICAN: >60
GLOBULIN: 3.2 G/DL (ref 2.3–3.5)
GLUCOSE BLD-MCNC: 91 MG/DL (ref 74–109)
HCT VFR BLD CALC: 48.3 % (ref 37–47)
HEMOGLOBIN: 16.7 G/DL (ref 12–16)
LYMPHOCYTES ABSOLUTE: 2.2 K/UL (ref 1–4.8)
LYMPHOCYTES RELATIVE PERCENT: 18.7 %
MCH RBC QN AUTO: 32.1 PG (ref 27–31.3)
MCHC RBC AUTO-ENTMCNC: 34.5 % (ref 33–37)
MCV RBC AUTO: 93.2 FL (ref 82–100)
MONOCYTES ABSOLUTE: 0.7 K/UL (ref 0.2–0.8)
MONOCYTES RELATIVE PERCENT: 5.9 %
NEUTROPHILS ABSOLUTE: 8.6 K/UL (ref 1.4–6.5)
NEUTROPHILS RELATIVE PERCENT: 74.3 %
PDW BLD-RTO: 14.4 % (ref 11.5–14.5)
PLATELET # BLD: 174 K/UL (ref 130–400)
POTASSIUM SERPL-SCNC: 4.4 MEQ/L (ref 3.5–5.1)
RBC # BLD: 5.19 M/UL (ref 4.2–5.4)
SODIUM BLD-SCNC: 139 MEQ/L (ref 132–144)
TOTAL PROTEIN: 7.7 G/DL (ref 6.4–8.1)
TROPONIN: <0.01 NG/ML (ref 0–0.01)
WBC # BLD: 11.5 K/UL (ref 4.8–10.8)

## 2018-06-04 PROCEDURE — 6360000002 HC RX W HCPCS: Performed by: NURSE PRACTITIONER

## 2018-06-04 PROCEDURE — 6370000000 HC RX 637 (ALT 250 FOR IP): Performed by: NURSE PRACTITIONER

## 2018-06-04 PROCEDURE — 36415 COLL VENOUS BLD VENIPUNCTURE: CPT

## 2018-06-04 PROCEDURE — 99285 EMERGENCY DEPT VISIT HI MDM: CPT

## 2018-06-04 PROCEDURE — 84484 ASSAY OF TROPONIN QUANT: CPT

## 2018-06-04 PROCEDURE — 85025 COMPLETE CBC W/AUTO DIFF WBC: CPT

## 2018-06-04 PROCEDURE — 80053 COMPREHEN METABOLIC PANEL: CPT

## 2018-06-04 PROCEDURE — 96374 THER/PROPH/DIAG INJ IV PUSH: CPT

## 2018-06-04 PROCEDURE — 71046 X-RAY EXAM CHEST 2 VIEWS: CPT

## 2018-06-04 PROCEDURE — 93005 ELECTROCARDIOGRAM TRACING: CPT

## 2018-06-04 RX ORDER — THERMOMETER, ELECTRONIC,ORAL
EACH MISCELLANEOUS
Refills: 1 | COMMUNITY
Start: 2018-05-22 | End: 2018-09-28 | Stop reason: SDUPTHER

## 2018-06-04 RX ORDER — ONDANSETRON 4 MG/1
4 TABLET, ORALLY DISINTEGRATING ORAL EVERY 8 HOURS PRN
Qty: 12 TABLET | Refills: 0 | Status: SHIPPED | OUTPATIENT
Start: 2018-06-04 | End: 2018-12-18

## 2018-06-04 RX ORDER — SYRINGE WITH NEEDLE, 1 ML 25GX5/8"
SYRINGE, EMPTY DISPOSABLE MISCELLANEOUS
Refills: 1 | Status: ON HOLD | COMMUNITY
Start: 2018-05-25 | End: 2018-11-12 | Stop reason: HOSPADM

## 2018-06-04 RX ORDER — ONDANSETRON 2 MG/ML
4 INJECTION INTRAMUSCULAR; INTRAVENOUS ONCE
Status: COMPLETED | OUTPATIENT
Start: 2018-06-04 | End: 2018-06-04

## 2018-06-04 RX ORDER — IBUPROFEN 600 MG/1
600 TABLET ORAL EVERY 6 HOURS PRN
Qty: 20 TABLET | Refills: 0 | Status: SHIPPED | OUTPATIENT
Start: 2018-06-04 | End: 2018-07-06 | Stop reason: DRUGHIGH

## 2018-06-04 RX ORDER — ASPIRIN 81 MG/1
324 TABLET, CHEWABLE ORAL ONCE
Status: COMPLETED | OUTPATIENT
Start: 2018-06-04 | End: 2018-06-04

## 2018-06-04 RX ORDER — NITROGLYCERIN 0.4 MG/1
0.4 TABLET SUBLINGUAL EVERY 5 MIN PRN
Status: DISCONTINUED | OUTPATIENT
Start: 2018-06-04 | End: 2018-06-04 | Stop reason: HOSPADM

## 2018-06-04 RX ORDER — CALCIUM CARBONATE 200(500)MG
1 TABLET,CHEWABLE ORAL PRN
Status: ON HOLD | COMMUNITY
Start: 2018-06-04 | End: 2020-12-24

## 2018-06-04 RX ADMIN — ONDANSETRON 4 MG: 2 INJECTION INTRAMUSCULAR; INTRAVENOUS at 18:05

## 2018-06-04 RX ADMIN — ASPIRIN 81 MG 324 MG: 81 TABLET ORAL at 18:08

## 2018-06-04 ASSESSMENT — PAIN SCALES - GENERAL: PAINLEVEL_OUTOF10: 5

## 2018-06-04 ASSESSMENT — PAIN DESCRIPTION - LOCATION: LOCATION: CHEST

## 2018-06-04 ASSESSMENT — HEART SCORE: ECG: 0

## 2018-06-04 ASSESSMENT — PAIN DESCRIPTION - ORIENTATION: ORIENTATION: LEFT

## 2018-06-05 ENCOUNTER — HOSPITAL ENCOUNTER (OUTPATIENT)
Dept: PULMONOLOGY | Age: 48
Discharge: HOME | End: 2018-06-05
Payer: COMMERCIAL

## 2018-06-05 DIAGNOSIS — J44.9 CHRONIC OBSTRUCTIVE PULMONARY DISEASE, UNSPECIFIED COPD TYPE (HCC): ICD-10-CM

## 2018-06-05 PROCEDURE — 94060 EVALUATION OF WHEEZING: CPT | Performed by: INTERNAL MEDICINE

## 2018-06-05 PROCEDURE — 94729 DIFFUSING CAPACITY: CPT

## 2018-06-05 PROCEDURE — 94010 BREATHING CAPACITY TEST: CPT

## 2018-06-05 PROCEDURE — 94726 PLETHYSMOGRAPHY LUNG VOLUMES: CPT

## 2018-06-05 PROCEDURE — 94726 PLETHYSMOGRAPHY LUNG VOLUMES: CPT | Performed by: INTERNAL MEDICINE

## 2018-06-05 PROCEDURE — 94729 DIFFUSING CAPACITY: CPT | Performed by: INTERNAL MEDICINE

## 2018-06-05 ASSESSMENT — ENCOUNTER SYMPTOMS
TROUBLE SWALLOWING: 0
DIARRHEA: 0
ABDOMINAL PAIN: 0
NAUSEA: 1
CONSTIPATION: 0
SHORTNESS OF BREATH: 0
COLOR CHANGE: 0
BACK PAIN: 0
VOMITING: 0
COUGH: 0
SORE THROAT: 0
VOICE CHANGE: 0

## 2018-06-06 PROCEDURE — 93010 ELECTROCARDIOGRAM REPORT: CPT | Performed by: INTERNAL MEDICINE

## 2018-06-11 ENCOUNTER — TELEPHONE (OUTPATIENT)
Dept: PULMONOLOGY | Age: 48
End: 2018-06-11

## 2018-06-11 DIAGNOSIS — G43.001 MIGRAINE WITHOUT AURA AND WITH STATUS MIGRAINOSUS, NOT INTRACTABLE: ICD-10-CM

## 2018-06-12 RX ORDER — TOPIRAMATE 50 MG/1
TABLET, FILM COATED ORAL
Qty: 60 TABLET | Refills: 3 | Status: ON HOLD | OUTPATIENT
Start: 2018-06-12 | End: 2018-11-12 | Stop reason: HOSPADM

## 2018-06-25 RX ORDER — IBUPROFEN 800 MG/1
800 TABLET ORAL EVERY 6 HOURS PRN
Qty: 120 TABLET | Refills: 3 | Status: SHIPPED | OUTPATIENT
Start: 2018-06-25 | End: 2018-09-24 | Stop reason: SDUPTHER

## 2018-06-29 ENCOUNTER — PROCEDURE VISIT (OUTPATIENT)
Dept: SURGERY | Age: 48
End: 2018-06-29
Payer: COMMERCIAL

## 2018-06-29 VITALS
SYSTOLIC BLOOD PRESSURE: 160 MMHG | WEIGHT: 185.4 LBS | BODY MASS INDEX: 29.1 KG/M2 | DIASTOLIC BLOOD PRESSURE: 84 MMHG | TEMPERATURE: 97.6 F | HEIGHT: 67 IN

## 2018-06-29 DIAGNOSIS — R22.0 MASS OF SCALP: Primary | ICD-10-CM

## 2018-06-29 PROCEDURE — 11420 EXC H-F-NK-SP B9+MARG 0.5/<: CPT | Performed by: SURGERY

## 2018-07-03 RX ORDER — METOPROLOL TARTRATE 50 MG/1
50 TABLET, FILM COATED ORAL 2 TIMES DAILY
Qty: 180 TABLET | Refills: 3 | Status: SHIPPED | OUTPATIENT
Start: 2018-07-03 | End: 2018-08-03 | Stop reason: SDUPTHER

## 2018-07-04 NOTE — PROGRESS NOTES
Surgery Progress Note    She is here today for surgical follow up. She is here today for suture removal. She is s/p excision of three scalp masses one week ago. She bumped her head on metal at work and struck the middle incision. It has been sore since then. She appears well. The three suture lines are intact without cellulitis, bruising or drainage. The ethilon sutures were removed uneventfully and she tolerated this well. She will recheck with me as needed.

## 2018-07-06 ENCOUNTER — OFFICE VISIT (OUTPATIENT)
Dept: SURGERY | Age: 48
End: 2018-07-06

## 2018-07-06 ENCOUNTER — OFFICE VISIT (OUTPATIENT)
Dept: CARDIOLOGY CLINIC | Age: 48
End: 2018-07-06
Payer: COMMERCIAL

## 2018-07-06 VITALS
DIASTOLIC BLOOD PRESSURE: 74 MMHG | SYSTOLIC BLOOD PRESSURE: 122 MMHG | RESPIRATION RATE: 20 BRPM | HEIGHT: 67 IN | OXYGEN SATURATION: 92 % | TEMPERATURE: 97.5 F | BODY MASS INDEX: 29.44 KG/M2 | WEIGHT: 187.6 LBS | HEART RATE: 75 BPM

## 2018-07-06 VITALS
TEMPERATURE: 97.6 F | HEIGHT: 67 IN | WEIGHT: 187.6 LBS | DIASTOLIC BLOOD PRESSURE: 80 MMHG | SYSTOLIC BLOOD PRESSURE: 132 MMHG | BODY MASS INDEX: 29.44 KG/M2

## 2018-07-06 DIAGNOSIS — R00.0 TACHYCARDIA: Primary | ICD-10-CM

## 2018-07-06 DIAGNOSIS — I10 HTN (HYPERTENSION), BENIGN: ICD-10-CM

## 2018-07-06 DIAGNOSIS — F15.10 EXCESSIVE CAFFEINE ABUSE, CONTINUOUS (HCC): ICD-10-CM

## 2018-07-06 DIAGNOSIS — Z09 SURGICAL FOLLOWUP: Primary | ICD-10-CM

## 2018-07-06 DIAGNOSIS — I20.1 VASOSPASTIC ANGINA (HCC): ICD-10-CM

## 2018-07-06 DIAGNOSIS — Z72.0 TOBACCO ABUSE: ICD-10-CM

## 2018-07-06 PROCEDURE — G8427 DOCREV CUR MEDS BY ELIG CLIN: HCPCS | Performed by: INTERNAL MEDICINE

## 2018-07-06 PROCEDURE — 4004F PT TOBACCO SCREEN RCVD TLK: CPT | Performed by: INTERNAL MEDICINE

## 2018-07-06 PROCEDURE — 99214 OFFICE O/P EST MOD 30 MIN: CPT | Performed by: INTERNAL MEDICINE

## 2018-07-06 PROCEDURE — 93000 ELECTROCARDIOGRAM COMPLETE: CPT | Performed by: INTERNAL MEDICINE

## 2018-07-06 PROCEDURE — G8598 ASA/ANTIPLAT THER USED: HCPCS | Performed by: INTERNAL MEDICINE

## 2018-07-06 PROCEDURE — G8417 CALC BMI ABV UP PARAM F/U: HCPCS | Performed by: INTERNAL MEDICINE

## 2018-07-06 PROCEDURE — 99024 POSTOP FOLLOW-UP VISIT: CPT | Performed by: SURGERY

## 2018-07-06 RX ORDER — VARENICLINE TARTRATE 1 MG/1
1 TABLET, FILM COATED ORAL 2 TIMES DAILY
Qty: 60 TABLET | Refills: 3 | Status: SHIPPED | OUTPATIENT
Start: 2018-07-06 | End: 2018-09-28 | Stop reason: SDUPTHER

## 2018-07-06 RX ORDER — VARENICLINE TARTRATE 25 MG
KIT ORAL
Qty: 1 EACH | Refills: 0 | Status: SHIPPED | OUTPATIENT
Start: 2018-07-06 | End: 2018-09-28 | Stop reason: SDUPTHER

## 2018-07-07 ENCOUNTER — TELEPHONE (OUTPATIENT)
Dept: PRIMARY CARE CLINIC | Age: 48
End: 2018-07-07

## 2018-07-07 ENCOUNTER — HOSPITAL ENCOUNTER (EMERGENCY)
Age: 48
Discharge: HOME | End: 2018-07-08
Payer: COMMERCIAL

## 2018-07-07 DIAGNOSIS — R51.9 ACUTE NONINTRACTABLE HEADACHE, UNSPECIFIED HEADACHE TYPE: Primary | ICD-10-CM

## 2018-07-07 DIAGNOSIS — J44.1 COPD EXACERBATION (HCC): ICD-10-CM

## 2018-07-07 PROCEDURE — 99284 EMERGENCY DEPT VISIT MOD MDM: CPT

## 2018-07-08 ENCOUNTER — APPOINTMENT (OUTPATIENT)
Dept: GENERAL RADIOLOGY | Age: 48
End: 2018-07-08
Payer: COMMERCIAL

## 2018-07-08 VITALS
BODY MASS INDEX: 29.82 KG/M2 | DIASTOLIC BLOOD PRESSURE: 82 MMHG | RESPIRATION RATE: 16 BRPM | SYSTOLIC BLOOD PRESSURE: 159 MMHG | WEIGHT: 190 LBS | HEIGHT: 67 IN | HEART RATE: 70 BPM | TEMPERATURE: 98 F | OXYGEN SATURATION: 99 %

## 2018-07-08 DIAGNOSIS — L30.9 DERMATITIS: ICD-10-CM

## 2018-07-08 DIAGNOSIS — I10 ESSENTIAL HYPERTENSION: ICD-10-CM

## 2018-07-08 DIAGNOSIS — K13.0 ANGULAR CHEILITIS: ICD-10-CM

## 2018-07-08 LAB
ALBUMIN SERPL-MCNC: 4 G/DL (ref 3.9–4.9)
ALP BLD-CCNC: 125 U/L (ref 40–130)
ALT SERPL-CCNC: 37 U/L (ref 0–33)
ANION GAP SERPL CALCULATED.3IONS-SCNC: 9 MEQ/L (ref 7–13)
AST SERPL-CCNC: 23 U/L (ref 0–35)
BASOPHILS ABSOLUTE: 0.1 K/UL (ref 0–0.2)
BASOPHILS RELATIVE PERCENT: 0.5 %
BILIRUB SERPL-MCNC: 0.3 MG/DL (ref 0–1.2)
BUN BLDV-MCNC: 7 MG/DL (ref 6–20)
CALCIUM SERPL-MCNC: 8.9 MG/DL (ref 8.6–10.2)
CHLORIDE BLD-SCNC: 105 MEQ/L (ref 98–107)
CO2: 28 MEQ/L (ref 22–29)
CREAT SERPL-MCNC: 0.74 MG/DL (ref 0.5–0.9)
EOSINOPHILS ABSOLUTE: 0.1 K/UL (ref 0–0.7)
EOSINOPHILS RELATIVE PERCENT: 0.7 %
GFR AFRICAN AMERICAN: >60
GFR NON-AFRICAN AMERICAN: >60
GLOBULIN: 2.4 G/DL (ref 2.3–3.5)
GLUCOSE BLD-MCNC: 112 MG/DL (ref 74–109)
HCT VFR BLD CALC: 42.4 % (ref 37–47)
HEMOGLOBIN: 15 G/DL (ref 12–16)
LYMPHOCYTES ABSOLUTE: 1.9 K/UL (ref 1–4.8)
LYMPHOCYTES RELATIVE PERCENT: 16.9 %
MCH RBC QN AUTO: 33.5 PG (ref 27–31.3)
MCHC RBC AUTO-ENTMCNC: 35.5 % (ref 33–37)
MCV RBC AUTO: 94.4 FL (ref 82–100)
MONOCYTES ABSOLUTE: 0.7 K/UL (ref 0.2–0.8)
MONOCYTES RELATIVE PERCENT: 5.9 %
NEUTROPHILS ABSOLUTE: 8.6 K/UL (ref 1.4–6.5)
NEUTROPHILS RELATIVE PERCENT: 76 %
PDW BLD-RTO: 13.9 % (ref 11.5–14.5)
PLATELET # BLD: 157 K/UL (ref 130–400)
POTASSIUM SERPL-SCNC: 3.7 MEQ/L (ref 3.5–5.1)
RBC # BLD: 4.49 M/UL (ref 4.2–5.4)
SODIUM BLD-SCNC: 142 MEQ/L (ref 132–144)
TOTAL PROTEIN: 6.4 G/DL (ref 6.4–8.1)
WBC # BLD: 11.3 K/UL (ref 4.8–10.8)

## 2018-07-08 PROCEDURE — 6360000002 HC RX W HCPCS: Performed by: NURSE PRACTITIONER

## 2018-07-08 PROCEDURE — 80053 COMPREHEN METABOLIC PANEL: CPT

## 2018-07-08 PROCEDURE — 2580000003 HC RX 258: Performed by: NURSE PRACTITIONER

## 2018-07-08 PROCEDURE — 71046 X-RAY EXAM CHEST 2 VIEWS: CPT

## 2018-07-08 PROCEDURE — 36415 COLL VENOUS BLD VENIPUNCTURE: CPT

## 2018-07-08 PROCEDURE — 94640 AIRWAY INHALATION TREATMENT: CPT

## 2018-07-08 PROCEDURE — 85025 COMPLETE CBC W/AUTO DIFF WBC: CPT

## 2018-07-08 PROCEDURE — 96375 TX/PRO/DX INJ NEW DRUG ADDON: CPT

## 2018-07-08 PROCEDURE — 96374 THER/PROPH/DIAG INJ IV PUSH: CPT

## 2018-07-08 PROCEDURE — 6370000000 HC RX 637 (ALT 250 FOR IP): Performed by: NURSE PRACTITIONER

## 2018-07-08 RX ORDER — 0.9 % SODIUM CHLORIDE 0.9 %
1000 INTRAVENOUS SOLUTION INTRAVENOUS ONCE
Status: COMPLETED | OUTPATIENT
Start: 2018-07-08 | End: 2018-07-08

## 2018-07-08 RX ORDER — KETOROLAC TROMETHAMINE 30 MG/ML
30 INJECTION, SOLUTION INTRAMUSCULAR; INTRAVENOUS ONCE
Status: COMPLETED | OUTPATIENT
Start: 2018-07-08 | End: 2018-07-08

## 2018-07-08 RX ORDER — AZITHROMYCIN 250 MG/1
TABLET, FILM COATED ORAL
Qty: 1 PACKET | Refills: 0 | Status: SHIPPED | OUTPATIENT
Start: 2018-07-08 | End: 2018-07-12

## 2018-07-08 RX ORDER — IPRATROPIUM BROMIDE AND ALBUTEROL SULFATE 2.5; .5 MG/3ML; MG/3ML
1 SOLUTION RESPIRATORY (INHALATION) ONCE
Status: COMPLETED | OUTPATIENT
Start: 2018-07-08 | End: 2018-07-08

## 2018-07-08 RX ORDER — PREDNISONE 50 MG/1
50 TABLET ORAL DAILY
Qty: 7 TABLET | Refills: 0 | Status: SHIPPED | OUTPATIENT
Start: 2018-07-08 | End: 2018-07-15

## 2018-07-08 RX ORDER — METOCLOPRAMIDE HYDROCHLORIDE 5 MG/ML
10 INJECTION INTRAMUSCULAR; INTRAVENOUS ONCE
Status: COMPLETED | OUTPATIENT
Start: 2018-07-08 | End: 2018-07-08

## 2018-07-08 RX ORDER — DIPHENHYDRAMINE HYDROCHLORIDE 50 MG/ML
50 INJECTION INTRAMUSCULAR; INTRAVENOUS ONCE
Status: COMPLETED | OUTPATIENT
Start: 2018-07-08 | End: 2018-07-08

## 2018-07-08 RX ADMIN — DIPHENHYDRAMINE HYDROCHLORIDE 50 MG: 50 INJECTION, SOLUTION INTRAMUSCULAR; INTRAVENOUS at 00:36

## 2018-07-08 RX ADMIN — METOCLOPRAMIDE 10 MG: 5 INJECTION, SOLUTION INTRAMUSCULAR; INTRAVENOUS at 00:36

## 2018-07-08 RX ADMIN — SODIUM CHLORIDE 1000 ML: 9 INJECTION, SOLUTION INTRAVENOUS at 00:36

## 2018-07-08 RX ADMIN — KETOROLAC TROMETHAMINE 30 MG: 30 INJECTION, SOLUTION INTRAMUSCULAR; INTRAVENOUS at 00:36

## 2018-07-08 RX ADMIN — IPRATROPIUM BROMIDE AND ALBUTEROL SULFATE 1 AMPULE: .5; 3 SOLUTION RESPIRATORY (INHALATION) at 01:23

## 2018-07-08 ASSESSMENT — ENCOUNTER SYMPTOMS
SORE THROAT: 0
CONSTIPATION: 0
COUGH: 1
TROUBLE SWALLOWING: 0
VOMITING: 0
DIARRHEA: 0
ABDOMINAL PAIN: 0
PHOTOPHOBIA: 1
NAUSEA: 1
COLOR CHANGE: 0
SHORTNESS OF BREATH: 0
BACK PAIN: 0
VOICE CHANGE: 0

## 2018-07-08 ASSESSMENT — PAIN SCALES - GENERAL
PAINLEVEL_OUTOF10: 10
PAINLEVEL_OUTOF10: 10

## 2018-07-08 NOTE — ED NOTES
Patient returned from xray. Medicated per MAR. Family at bedside. Provided patient with blanket and updated with plan of care. Adjusted patient and IV in a position that she felt most comfortable. Patient denied any additional needs or questions at this time. Call light in reach. Zain Sample  Luis Diaz RN  07/08/18 [id number]

## 2018-07-08 NOTE — ED PROVIDER NOTES
(NITROSTAT) 0.4 MG SL TABLET    up to max of 3 total doses. If no relief after 1 dose, call 911. ONDANSETRON (ZOFRAN ODT) 4 MG DISINTEGRATING TABLET    Take 1 tablet by mouth every 8 hours as needed for Nausea or Vomiting    PANTOPRAZOLE (PROTONIX) 40 MG TABLET    Take 1 tablet by mouth every morning (before breakfast)    PARAFON FORTE (PARAFON FORTE) 500 MG TABLET        SYRINGE-NEEDLE, DISP, 3 ML 23G X 1\" 3 ML MISC    Inject 1 mL into the muscle every 30 days    TOPIRAMATE (TOPAMAX) 50 MG TABLET    TAKE 1 TABLET BY MOUTH TWICE DAILY    VARENICLINE (CHANTIX CONTINUING MONTH PAK) 1 MG TABLET    Take 1 tablet by mouth 2 times daily    VARENICLINE (CHANTIX STARTING MONTH PAK) 0.5 MG X 11 & 1 MG X 42 TABLET    Take by mouth. VENTOLIN  (90 BASE) MCG/ACT INHALER    Inhale 2 puffs into the lungs every 6 hours as needed for Wheezing       ALLERGIES     Latex; Influenza vaccines; Eggs or egg-derived products; Gabapentin; Pneumococcal vaccines; Povidone iodine; and Varicella virus vaccine live    FAMILY HISTORY       Family History   Problem Relation Age of Onset    High Blood Pressure Mother     Kidney Disease Mother     Lupus Mother     Bipolar Disorder Son           SOCIAL HISTORY       Social History     Social History    Marital status:      Spouse name: N/A    Number of children: N/A    Years of education: N/A     Occupational History    unemployed      Social History Main Topics    Smoking status: Current Some Day Smoker     Packs/day: 1.50     Years: 10.00     Types: Cigarettes    Smokeless tobacco: Never Used    Alcohol use No    Drug use: Yes     Types: Marijuana    Sexual activity: Not Asked     Other Topics Concern    None     Social History Narrative    Tomas Gunn is a 53-year-old female who is on disability because of pain and bipolar disorder. She's also had a presumed diagnosis of possible multiple sclerosis.   She's been seeing Dr. Aleta Pate for that and she says that the consultants, reviewing laboratory studies and images independently, arranging disposition, and speaking with patient/family    CONSULTS:  None    PROCEDURES:  Unless otherwise noted below, none     Procedures    FINAL IMPRESSION      1. Acute nonintractable headache, unspecified headache type    2. COPD exacerbation [facility])          DISPOSITION/PLAN   DISPOSITION Decision To Discharge 07/08/2018 01:39:39 AM      PATIENT REFERRED TO:  Surjit Gallardo MD  [address] [place] [address]  [phone]    Schedule an appointment as soon as possible for a visit in 1 day        DISCHARGE MEDICATIONS:  New Prescriptions    AZITHROMYCIN (ZITHROMAX Z-SANDIP) 250 MG TABLET    Take 2 tablets (500 mg) on Day 1, and then take 1 tablet (250 mg) on days 2 through 5.     PREDNISONE (DELTASONE) 50 MG TABLET    Take 1 tablet by mouth daily for 7 days          (Please note that portions of this note were completed with a voice recognition program.  Efforts were made to edit the dictations but occasionally words and phrases are mis-transcribed.)    NIKI Rothman CNP  (electronically signed)                 NIKI Rothman CNP  07/08/18 0858

## 2018-07-09 RX ORDER — THERMOMETER, ELECTRONIC,ORAL
EACH MISCELLANEOUS
Qty: 14.17 G | Refills: 3 | Status: ON HOLD | OUTPATIENT
Start: 2018-07-09 | End: 2018-11-12 | Stop reason: HOSPADM

## 2018-07-09 RX ORDER — HYDROCHLOROTHIAZIDE 25 MG/1
25 TABLET ORAL DAILY
Qty: 30 TABLET | Refills: 3 | Status: ON HOLD | OUTPATIENT
Start: 2018-07-09 | End: 2018-11-12 | Stop reason: HOSPADM

## 2018-07-09 RX ORDER — LOSARTAN POTASSIUM 100 MG/1
100 TABLET ORAL DAILY
Qty: 30 TABLET | Refills: 3 | Status: SHIPPED | OUTPATIENT
Start: 2018-07-09 | End: 2018-10-31 | Stop reason: SDUPTHER

## 2018-07-09 RX ORDER — MOMETASONE FUROATE 1 MG/G
CREAM TOPICAL
Qty: 45 G | Refills: 3 | Status: ON HOLD | OUTPATIENT
Start: 2018-07-09 | End: 2018-11-12 | Stop reason: HOSPADM

## 2018-07-11 ENCOUNTER — HOSPITAL ENCOUNTER (OUTPATIENT)
Dept: NON INVASIVE DIAGNOSTICS | Age: 48
Discharge: HOME | End: 2018-07-11
Payer: COMMERCIAL

## 2018-07-12 ENCOUNTER — OFFICE VISIT (OUTPATIENT)
Dept: PULMONOLOGY | Age: 48
End: 2018-07-12
Payer: COMMERCIAL

## 2018-07-12 VITALS
DIASTOLIC BLOOD PRESSURE: 76 MMHG | HEIGHT: 67 IN | RESPIRATION RATE: 16 BRPM | TEMPERATURE: 98.2 F | OXYGEN SATURATION: 94 % | BODY MASS INDEX: 28.72 KG/M2 | SYSTOLIC BLOOD PRESSURE: 138 MMHG | WEIGHT: 183 LBS | HEART RATE: 82 BPM

## 2018-07-12 DIAGNOSIS — R06.02 SHORTNESS OF BREATH: Primary | ICD-10-CM

## 2018-07-12 DIAGNOSIS — Z72.0 TOBACCO ABUSE: ICD-10-CM

## 2018-07-12 DIAGNOSIS — J41.0 SIMPLE CHRONIC BRONCHITIS (HCC): ICD-10-CM

## 2018-07-12 PROCEDURE — 99214 OFFICE O/P EST MOD 30 MIN: CPT | Performed by: INTERNAL MEDICINE

## 2018-07-12 PROCEDURE — G8427 DOCREV CUR MEDS BY ELIG CLIN: HCPCS | Performed by: INTERNAL MEDICINE

## 2018-07-12 PROCEDURE — G8926 SPIRO NO PERF OR DOC: HCPCS | Performed by: INTERNAL MEDICINE

## 2018-07-12 PROCEDURE — G8598 ASA/ANTIPLAT THER USED: HCPCS | Performed by: INTERNAL MEDICINE

## 2018-07-12 PROCEDURE — 3023F SPIROM DOC REV: CPT | Performed by: INTERNAL MEDICINE

## 2018-07-12 PROCEDURE — 4004F PT TOBACCO SCREEN RCVD TLK: CPT | Performed by: INTERNAL MEDICINE

## 2018-07-12 PROCEDURE — G8417 CALC BMI ABV UP PARAM F/U: HCPCS | Performed by: INTERNAL MEDICINE

## 2018-07-12 ASSESSMENT — ENCOUNTER SYMPTOMS
CHEST TIGHTNESS: 0
NAUSEA: 0
RHINORRHEA: 1
ABDOMINAL PAIN: 0
SHORTNESS OF BREATH: 1
SINUS PRESSURE: 0
DIARRHEA: 0
WHEEZING: 1
TROUBLE SWALLOWING: 0
COUGH: 0
VOICE CHANGE: 0
VOMITING: 0
SORE THROAT: 0
EYE DISCHARGE: 0
EYE ITCHING: 0

## 2018-07-12 NOTE — PROGRESS NOTES
SHOULDER SURGERY Left     UPPER GASTROINTESTINAL ENDOSCOPY  11/24/2014    ANIBAL HOUSE M.D. Family History   Problem Relation Age of Onset    High Blood Pressure Mother     Kidney Disease Mother     Lupus Mother     Bipolar Disorder Son      Social History     Social History    Marital status:      Spouse name: N/A    Number of children: N/A    Years of education: N/A     Occupational History    unemployed      Social History Main Topics    Smoking status: Current Some Day Smoker     Packs/day: 1.50     Years: 10.00     Types: Cigarettes    Smokeless tobacco: Never Used    Alcohol use No    Drug use: Yes     Types: Marijuana    Sexual activity: Not on file     Other Topics Concern    Not on file     Social History Narrative    Pascual Hayward is a 82-year-old female who is on disability because of pain and bipolar disorder. She's also had a presumed diagnosis of possible multiple sclerosis. She's been seeing Dr. Maribeth Araiza for that and she says that the diagnosis is sometimes in question and it's clear neuropathy vitamin B12 deficiency as well as generalized bodyaches from the severe vitamin D deficiency have caused this scenario that looks like multiple sclerosis. She recently tried to go back to work but was not able because of her pain. Review of Systems   Constitutional: Negative for chills, diaphoresis, fatigue and fever. HENT: Positive for postnasal drip and rhinorrhea. Negative for congestion, mouth sores, nosebleeds, sinus pressure, sneezing, sore throat, trouble swallowing and voice change. Eyes: Negative for discharge, itching and visual disturbance. Respiratory: Positive for shortness of breath and wheezing. Negative for cough and chest tightness. Cardiovascular: Negative for chest pain, palpitations and leg swelling. Gastrointestinal: Negative for abdominal pain, diarrhea, nausea and vomiting. Genitourinary: Negative for difficulty urinating and hematuria. infiltrates. No effusions. No Pneumothoraces. Impression NO ACUTE ACTIVE CARDIOPULMONARY PROCESS   ]  Results for orders placed during the hospital encounter of 18   XR CHEST PORTABLE    Narrative Chest X-ray, 1 view    Clinical information:  Weakness    Comparison:  2018, 2017. Findings     Osseous structures intact. Cardiopericardial silhouette normal. Low lung volumes. Lungs clear. Pulmonary vasculature normal.      Impression     No acute cardiopulmonary disease. ]PULMONARY FUNCTION     PATIENT NAME: Lonny Voss                   :        1970  MED REC NO:   [id number]                            ROOM:  ACCOUNT NO:   [de-identified]                           ADMIT DATE: 2018  PROVIDER:     Miko Clifford MD     DATE OF PROCEDURE:  2018     PFTs were done on this 51-year-old patient who is 5 feet 7 inches, weighs  190 pounds with 14-year smoking history, presenting with dyspnea and cough.     Spirometry showed a forced vital capacity of 3.49 L which is 87% of  predicted. FEV1 was 2.98 L which is 93% of predicted. FEV1/FVC ratio was  normal at 86%. FEF 25-75% was normal at 3.2 L per second which is 103% of  predicted. MVV was normal.     Lung volumes done by body plethysmography showed a normal total lung  capacity at 5.29 L which is 96% of predicted. Residual volume was 1.78 L  which is 96% of predicted. RV/TLC ratio was normal at 34%.    Diffusion capacity was normal at 23.4 which is 112% of predicted.     Airway resistance was normal.  Specific airway conductance was normal.     Overall, this study was completely normal.  No obstructive, restrictive, or  diffusion impairment noted on this study.        Shira Medellin MD    Assessment/Plan:     1. Shortness of breath  Patient is having short of breath with exertion and climbing stairs and when she gets anxious.   Chest x-ray shows no active disease. PFT shows normal spirometry and lung volume study and diffusion capacity. She is on Ventolin HFA and albuterol nebulizer when necessary. Continue same. 2. Tobacco abuse  Patient advised try to quit smoking. Risks related to smoking explained to the patient. Different ways to help him quit smoking were discussed today. She smoked 1-1/2-2 pack per day    3. Simple chronic bronchitis ([address]  Patient is having cough with white to yellow mucus mucus which is chronic daily in a.m. due to bronchitis due to chronic smoking. F/u with PCP  No Follow-up on file.     Lindsay De MD

## 2018-07-25 ENCOUNTER — TELEPHONE (OUTPATIENT)
Dept: CARDIOLOGY CLINIC | Age: 48
End: 2018-07-25

## 2018-07-25 NOTE — TELEPHONE ENCOUNTER
Patient is scheduled 7/27/18 to follow up on holter results.  Spoke with Yolanda Rivera in [place] and states patient has not returned device and unable to reach patient Left message on machine to call office to return monitor

## 2018-07-26 NOTE — TELEPHONE ENCOUNTER
LMOM FOR PAT[facility] TO MAKE [facility]RANG[facility] TO RETURN THE HOLTER MONITOR ASAP    APPT FOR 7/27/18 WAS CANCELED - PATIENT ADVISED THAT WHEN SHE RETURNS THE HOLTER MONITOR, CALL OFFICE TO RESCHEDULE APPOINTMENT TO DISCUSS RESULTS.

## 2018-08-03 ENCOUNTER — OFFICE VISIT (OUTPATIENT)
Dept: CARDIOLOGY CLINIC | Age: 48
End: 2018-08-03
Payer: COMMERCIAL

## 2018-08-03 VITALS
OXYGEN SATURATION: 98 % | HEART RATE: 75 BPM | BODY MASS INDEX: 29.04 KG/M2 | WEIGHT: 185.4 LBS | SYSTOLIC BLOOD PRESSURE: 178 MMHG | DIASTOLIC BLOOD PRESSURE: 106 MMHG

## 2018-08-03 DIAGNOSIS — Z72.0 TOBACCO ABUSE: ICD-10-CM

## 2018-08-03 DIAGNOSIS — R07.9 CHEST PAIN, UNSPECIFIED TYPE: Primary | ICD-10-CM

## 2018-08-03 DIAGNOSIS — R55 VASOVAGAL SYNCOPE: ICD-10-CM

## 2018-08-03 DIAGNOSIS — I20.1 VASOSPASTIC ANGINA (HCC): ICD-10-CM

## 2018-08-03 DIAGNOSIS — I10 HTN (HYPERTENSION), BENIGN: ICD-10-CM

## 2018-08-03 DIAGNOSIS — I25.10 CORONARY ARTERY DISEASE INVOLVING NATIVE CORONARY ARTERY OF NATIVE HEART WITHOUT ANGINA PECTORIS: ICD-10-CM

## 2018-08-03 PROCEDURE — 4004F PT TOBACCO SCREEN RCVD TLK: CPT | Performed by: INTERNAL MEDICINE

## 2018-08-03 PROCEDURE — G8427 DOCREV CUR MEDS BY ELIG CLIN: HCPCS | Performed by: INTERNAL MEDICINE

## 2018-08-03 PROCEDURE — G8417 CALC BMI ABV UP PARAM F/U: HCPCS | Performed by: INTERNAL MEDICINE

## 2018-08-03 PROCEDURE — 99214 OFFICE O/P EST MOD 30 MIN: CPT | Performed by: INTERNAL MEDICINE

## 2018-08-03 PROCEDURE — G8598 ASA/ANTIPLAT THER USED: HCPCS | Performed by: INTERNAL MEDICINE

## 2018-08-03 RX ORDER — METOPROLOL TARTRATE 100 MG/1
100 TABLET ORAL 2 TIMES DAILY
Qty: 60 TABLET | Refills: 6 | Status: ON HOLD | OUTPATIENT
Start: 2018-08-03 | End: 2018-11-12 | Stop reason: HOSPADM

## 2018-08-03 NOTE — PROGRESS NOTES
blood pressure at home or at a pharmacy, maintain a logbook, and also call us back if blood pressure are above the target ranges or if it is low. Patient clearly understands and agrees to the instructions. We will need to continue to monitor muscle and liver enzymes, BUN, CR, and electrolytes. Thank you for allowing me to participate in the care of your patient, please don't hesitate to contact me if you have any further questions.

## 2018-08-07 ENCOUNTER — HOSPITAL ENCOUNTER (EMERGENCY)
Age: 48
Discharge: HOME HEALTH | End: 2018-08-07
Payer: COMMERCIAL

## 2018-08-07 ENCOUNTER — APPOINTMENT (OUTPATIENT)
Dept: GENERAL RADIOLOGY | Age: 48
End: 2018-08-07
Payer: COMMERCIAL

## 2018-08-07 VITALS
TEMPERATURE: 98.2 F | WEIGHT: 190 LBS | OXYGEN SATURATION: 100 % | SYSTOLIC BLOOD PRESSURE: 169 MMHG | HEART RATE: 70 BPM | BODY MASS INDEX: 29.82 KG/M2 | DIASTOLIC BLOOD PRESSURE: 99 MMHG | RESPIRATION RATE: 14 BRPM | HEIGHT: 67 IN

## 2018-08-07 DIAGNOSIS — E87.6 HYPOKALEMIA: ICD-10-CM

## 2018-08-07 DIAGNOSIS — R07.9 CHEST PAIN, UNSPECIFIED TYPE: Primary | ICD-10-CM

## 2018-08-07 LAB
ALBUMIN SERPL-MCNC: 4 G/DL (ref 3.9–4.9)
ALP BLD-CCNC: 117 U/L (ref 40–130)
ALT SERPL-CCNC: 48 U/L (ref 0–33)
ANION GAP SERPL CALCULATED.3IONS-SCNC: 11 MEQ/L (ref 7–13)
AST SERPL-CCNC: 28 U/L (ref 0–35)
BASOPHILS ABSOLUTE: 0.1 K/UL (ref 0–0.2)
BASOPHILS RELATIVE PERCENT: 0.7 %
BILIRUB SERPL-MCNC: 0.4 MG/DL (ref 0–1.2)
BUN BLDV-MCNC: 8 MG/DL (ref 6–20)
CALCIUM SERPL-MCNC: 8.7 MG/DL (ref 8.6–10.2)
CHLORIDE BLD-SCNC: 90 MEQ/L (ref 98–107)
CO2: 25 MEQ/L (ref 22–29)
CREAT SERPL-MCNC: 0.75 MG/DL (ref 0.5–0.9)
EKG ATRIAL RATE: 89 BPM
EKG P AXIS: 55 DEGREES
EKG P-R INTERVAL: 178 MS
EKG Q-T INTERVAL: 368 MS
EKG QRS DURATION: 88 MS
EKG QTC CALCULATION (BAZETT): 447 MS
EKG R AXIS: 58 DEGREES
EKG T AXIS: 52 DEGREES
EKG VENTRICULAR RATE: 89 BPM
EOSINOPHILS ABSOLUTE: 0.1 K/UL (ref 0–0.7)
EOSINOPHILS RELATIVE PERCENT: 0.6 %
GFR AFRICAN AMERICAN: >60
GFR NON-AFRICAN AMERICAN: >60
GLOBULIN: 2.3 G/DL (ref 2.3–3.5)
GLUCOSE BLD-MCNC: 140 MG/DL (ref 74–109)
HCT VFR BLD CALC: 42.9 % (ref 37–47)
HEMOGLOBIN: 15 G/DL (ref 12–16)
LYMPHOCYTES ABSOLUTE: 1.9 K/UL (ref 1–4.8)
LYMPHOCYTES RELATIVE PERCENT: 18.6 %
MCH RBC QN AUTO: 33.6 PG (ref 27–31.3)
MCHC RBC AUTO-ENTMCNC: 35 % (ref 33–37)
MCV RBC AUTO: 96 FL (ref 82–100)
MONOCYTES ABSOLUTE: 0.7 K/UL (ref 0.2–0.8)
MONOCYTES RELATIVE PERCENT: 6.7 %
NEUTROPHILS ABSOLUTE: 7.4 K/UL (ref 1.4–6.5)
NEUTROPHILS RELATIVE PERCENT: 73.4 %
PDW BLD-RTO: 13.4 % (ref 11.5–14.5)
PLATELET # BLD: 137 K/UL (ref 130–400)
POTASSIUM SERPL-SCNC: 2.9 MEQ/L (ref 3.5–5.1)
RBC # BLD: 4.47 M/UL (ref 4.2–5.4)
SODIUM BLD-SCNC: 126 MEQ/L (ref 132–144)
TOTAL CK: 60 U/L (ref 0–170)
TOTAL PROTEIN: 6.3 G/DL (ref 6.4–8.1)
TROPONIN: <0.01 NG/ML (ref 0–0.01)
WBC # BLD: 10.1 K/UL (ref 4.8–10.8)

## 2018-08-07 PROCEDURE — 82550 ASSAY OF CK (CPK): CPT

## 2018-08-07 PROCEDURE — 99285 EMERGENCY DEPT VISIT HI MDM: CPT

## 2018-08-07 PROCEDURE — 96374 THER/PROPH/DIAG INJ IV PUSH: CPT

## 2018-08-07 PROCEDURE — 71045 X-RAY EXAM CHEST 1 VIEW: CPT

## 2018-08-07 PROCEDURE — 6370000000 HC RX 637 (ALT 250 FOR IP): Performed by: NURSE PRACTITIONER

## 2018-08-07 PROCEDURE — 6360000002 HC RX W HCPCS: Performed by: NURSE PRACTITIONER

## 2018-08-07 PROCEDURE — 84484 ASSAY OF TROPONIN QUANT: CPT

## 2018-08-07 PROCEDURE — 85025 COMPLETE CBC W/AUTO DIFF WBC: CPT

## 2018-08-07 PROCEDURE — 2580000003 HC RX 258: Performed by: NURSE PRACTITIONER

## 2018-08-07 PROCEDURE — 93005 ELECTROCARDIOGRAM TRACING: CPT

## 2018-08-07 PROCEDURE — 36415 COLL VENOUS BLD VENIPUNCTURE: CPT

## 2018-08-07 PROCEDURE — 80053 COMPREHEN METABOLIC PANEL: CPT

## 2018-08-07 RX ORDER — KETOROLAC TROMETHAMINE 30 MG/ML
30 INJECTION, SOLUTION INTRAMUSCULAR; INTRAVENOUS ONCE
Status: COMPLETED | OUTPATIENT
Start: 2018-08-07 | End: 2018-08-07

## 2018-08-07 RX ORDER — 0.9 % SODIUM CHLORIDE 0.9 %
1000 INTRAVENOUS SOLUTION INTRAVENOUS ONCE
Status: COMPLETED | OUTPATIENT
Start: 2018-08-07 | End: 2018-08-07

## 2018-08-07 RX ORDER — POTASSIUM BICARBONATE 25 MEQ/1
50 TABLET, EFFERVESCENT ORAL ONCE
Status: COMPLETED | OUTPATIENT
Start: 2018-08-07 | End: 2018-08-07

## 2018-08-07 RX ADMIN — POTASSIUM BICARBONATE 50 MEQ: 25 TABLET, EFFERVESCENT ORAL at 18:08

## 2018-08-07 RX ADMIN — KETOROLAC TROMETHAMINE 30 MG: 30 INJECTION, SOLUTION INTRAMUSCULAR at 16:54

## 2018-08-07 RX ADMIN — SODIUM CHLORIDE 1000 ML: 9 INJECTION, SOLUTION INTRAVENOUS at 18:08

## 2018-08-07 ASSESSMENT — ENCOUNTER SYMPTOMS
SORE THROAT: 0
ABDOMINAL PAIN: 0
VOMITING: 0
RHINORRHEA: 0
EYE PAIN: 0
SHORTNESS OF BREATH: 0
NAUSEA: 0
PHOTOPHOBIA: 0
COUGH: 0
DIARRHEA: 0
BACK PAIN: 0

## 2018-08-07 ASSESSMENT — PAIN SCALES - GENERAL
PAINLEVEL_OUTOF10: 8
PAINLEVEL_OUTOF10: 8

## 2018-08-07 ASSESSMENT — PAIN DESCRIPTION - LOCATION
LOCATION: CHEST
LOCATION: CHEST

## 2018-08-07 ASSESSMENT — PAIN DESCRIPTION - FREQUENCY
FREQUENCY: CONTINUOUS
FREQUENCY: CONTINUOUS

## 2018-08-07 ASSESSMENT — PAIN DESCRIPTION - PAIN TYPE: TYPE: ACUTE PAIN

## 2018-08-07 ASSESSMENT — PAIN DESCRIPTION - DESCRIPTORS: DESCRIPTORS: SHARP

## 2018-08-07 ASSESSMENT — PAIN DESCRIPTION - ORIENTATION: ORIENTATION: RIGHT;LEFT;ANTERIOR

## 2018-08-07 ASSESSMENT — PAIN DESCRIPTION - DIRECTION: RADIATING_TOWARDS: BACK

## 2018-08-07 ASSESSMENT — PAIN DESCRIPTION - PROGRESSION: CLINICAL_PROGRESSION: NOT CHANGED

## 2018-08-07 ASSESSMENT — HEART SCORE: ECG: 0

## 2018-08-07 ASSESSMENT — PAIN DESCRIPTION - ONSET: ONSET: ON-GOING

## 2018-08-07 NOTE — ED PROVIDER NOTES
cough and shortness of breath. Cardiovascular: Positive for chest pain. Negative for palpitations. Gastrointestinal: Negative for abdominal pain, diarrhea, nausea and vomiting. Genitourinary: Negative for dysuria and flank pain. Musculoskeletal: Positive for arthralgias (resolved) and neck pain (resolved). Negative for back pain. Skin: Negative for rash. Neurological: Negative for dizziness, light-headedness and headaches. Except as noted above the remainder of the review of systems was reviewed and negative. PAST MEDICAL HISTORY     Past Medical History:   Diagnosis Date    Anxiety     Back pain     back surgery x 4    Bipolar disorder ([address]     CAD (coronary artery disease)     [facility]FL [facility]ch[facility]flow limitation) [facility] 11/3/2016    Chronic bronchitis (HCC)     Chronic pain     Colitis     DDD (degenerative disc disease), lumbar 2016    Depression     Endometriosis     Excessive caffeine abuse, continuous 2018    Gastritis     GERD (gastroesophageal reflux disease)     History of myocardial infarction     HTN (hypertension), benign 2016    Hypokalemia     Impaired mobility and activities of daily living     Left hemiparesis ([address]     Multiple sclerosis ([address]     Neck pain     surgery x 1    Over weight     PTSD (post-traumatic stress disorder)     Sensory neuropathy 2016    TIA (transient ischemic attack)     Tobacco abuse     Vasospastic angina ([address] 2016     Past Surgical History:   Procedure Laterality Date    BACK SURGERY      x2     SECTION      x2    CHOLECYSTECTOMY  13    Lapchole    CORONARY ANGIOPLASTY      CYST REMOVAL  3/7/16    Dr. Alex Jewell Left     UPPER GASTROINTESTINAL ENDOSCOPY  2014    ANIBAL HOUSE M.D. Social History     Social History    Marital status:       Spouse name: N/A    Number of Abdominal: Soft. Normal appearance and bowel sounds are normal. There is no tenderness. Neurological: She is alert and oriented to person, place, and time. She has normal strength. Skin: Skin is warm, dry and intact. No rash noted. She is not diaphoretic. Nursing note and vitals reviewed. DIAGNOSTIC RESULTS     EKG: All EKG's are interpreted by the Emergency Department Physician who either signs or Co-signs this chart in the absence of a cardiologist.    Sinus rhythm at 89. No acute ST segment changes. RADIOLOGY:   Non-plain film images such as CT, Ultrasound and MRI are read by the radiologist. Plain radiographic images are visualized and preliminarily interpreted by the emergency physician with the below findings:    Chest X-Ray demonstrates no acute infiltrate, effusion or pneumothorax. Interpretation per the Radiologist below, if available at the time of this note:    XR CHEST PORTABLE    (Results Pending)         ED BEDSIDE ULTRASOUND:   Performed by ED Physician - none    LABS:  Labs Reviewed   CBC WITH AUTO DIFFERENTIAL - Abnormal; Notable for the following:        Result Value    MCH 33.6 (*)     Neutrophils # 7.4 (*)     All other components within normal limits   TROPONIN   COMPREHENSIVE METABOLIC PANEL   CK       All other labs were within normal range or not returned as of this dictation. EMERGENCY DEPARTMENT COURSE and DIFFERENTIAL DIAGNOSIS/MDM:   Vitals:    Vitals:    08/07/18 1621   BP: (!) 188/94   Pulse: 92   Resp: 18   Temp: 98.2 °F (36.8 °C)   TempSrc: Oral   SpO2: 97%   Weight: 190 lb (86.2 kg)   Height: 5' 7\" (1.702 m)            MDM on exam patient is nontoxic in no distress. Recent cardiology note was reviewed. She has normal coronaries and a good EF at 65%. Last echo was normal.  I will obtain laboratory and radiology studies for evaluation of acute pathology. She provided Toradol as her pain is reproducible currently on exam.  She will be reevaluated.   She is

## 2018-08-08 PROCEDURE — 93010 ELECTROCARDIOGRAM REPORT: CPT | Performed by: INTERNAL MEDICINE

## 2018-08-08 NOTE — ED NOTES
Per NORBERTO Angela 900 ml is plenty for NS infusion, pt may be D/C home. Upon entering room pt states, \"this is why I don't come here, you never find anything\" Pt requesting to speak with provider. NORBERTO Angela aware and to bedside. Pt IV removed, dressing applied, D/C instructions reviewed and phone number for [employer] given to pt.       Idalia Delgado RN  08/07/18 [id number]

## 2018-08-29 LAB
ANION GAP SERPL CALCULATED.3IONS-SCNC: 17 MEQ/L (ref 7–13)
BUN BLDV-MCNC: 6 MG/DL (ref 6–20)
CALCIUM SERPL-MCNC: 8.9 MG/DL (ref 8.6–10.2)
CHLORIDE BLD-SCNC: 104 MEQ/L (ref 98–107)
CO2: 22 MEQ/L (ref 22–29)
CREAT SERPL-MCNC: 0.78 MG/DL (ref 0.5–0.9)
GFR AFRICAN AMERICAN: >60
GFR NON-AFRICAN AMERICAN: >60
GLUCOSE BLD-MCNC: 95 MG/DL (ref 74–109)
POTASSIUM SERPL-SCNC: 3.7 MEQ/L (ref 3.5–5.1)
SODIUM BLD-SCNC: 143 MEQ/L (ref 132–144)

## 2018-09-02 DIAGNOSIS — E53.8 FOLIC ACID DEFICIENCY: ICD-10-CM

## 2018-09-04 RX ORDER — FOLIC ACID 1 MG/1
1 TABLET ORAL DAILY
Qty: 30 TABLET | Refills: 5 | Status: SHIPPED | OUTPATIENT
Start: 2018-09-04 | End: 2018-12-18 | Stop reason: SDUPTHER

## 2018-09-04 NOTE — TELEPHONE ENCOUNTER
Patient was last seen on 5-22-18  Last Prescribed 5-22-18    Medication is pending  Please approve or deny this request

## 2018-09-17 ENCOUNTER — TELEPHONE (OUTPATIENT)
Dept: CARDIOLOGY CLINIC | Age: 48
End: 2018-09-17

## 2018-09-24 RX ORDER — IBUPROFEN 800 MG/1
800 TABLET ORAL 2 TIMES DAILY PRN
Qty: 60 TABLET | Refills: 5 | Status: SHIPPED | OUTPATIENT
Start: 2018-09-24 | End: 2018-12-18 | Stop reason: SDUPTHER

## 2018-09-24 NOTE — TELEPHONE ENCOUNTER
Ibuprofen 800 MG    Patient was last seen on 05/22/2018  Last Prescribed 06/25/2018    Medication is pending  Please approve or deny this request

## 2018-09-25 ENCOUNTER — TELEPHONE (OUTPATIENT)
Dept: PRIMARY CARE CLINIC | Age: 48
End: 2018-09-25

## 2018-09-25 NOTE — TELEPHONE ENCOUNTER
She said she felt dizzy and lightheaded so she checked her sugars. It was 213. She had eaten mashed potatoes appox 45mins to 1hr prior to testing.

## 2018-09-27 ENCOUNTER — OFFICE VISIT (OUTPATIENT)
Dept: PRIMARY CARE CLINIC | Age: 48
End: 2018-09-27
Payer: COMMERCIAL

## 2018-09-27 VITALS
TEMPERATURE: 98 F | BODY MASS INDEX: 30.61 KG/M2 | DIASTOLIC BLOOD PRESSURE: 100 MMHG | SYSTOLIC BLOOD PRESSURE: 158 MMHG | RESPIRATION RATE: 14 BRPM | HEART RATE: 86 BPM | OXYGEN SATURATION: 95 % | WEIGHT: 195 LBS | HEIGHT: 67 IN

## 2018-09-27 DIAGNOSIS — E78.00 PURE HYPERCHOLESTEROLEMIA: ICD-10-CM

## 2018-09-27 DIAGNOSIS — R60.1 GENERALIZED EDEMA: ICD-10-CM

## 2018-09-27 DIAGNOSIS — E03.8 OTHER SPECIFIED HYPOTHYROIDISM: ICD-10-CM

## 2018-09-27 DIAGNOSIS — E11.9 DIABETES MELLITUS, NEW ONSET (HCC): ICD-10-CM

## 2018-09-27 DIAGNOSIS — E78.1 PURE HYPERGLYCERIDEMIA: ICD-10-CM

## 2018-09-27 DIAGNOSIS — E53.8 VITAMIN B 12 DEFICIENCY: ICD-10-CM

## 2018-09-27 DIAGNOSIS — E53.8 FOLIC ACID DEFICIENCY: ICD-10-CM

## 2018-09-27 DIAGNOSIS — K13.0 ANGULAR CHEILITIS: ICD-10-CM

## 2018-09-27 DIAGNOSIS — E11.9 DIABETES MELLITUS, NEW ONSET (HCC): Primary | ICD-10-CM

## 2018-09-27 LAB
ALBUMIN SERPL-MCNC: 4.3 G/DL (ref 3.9–4.9)
ALP BLD-CCNC: 110 U/L (ref 40–130)
ALT SERPL-CCNC: 54 U/L (ref 0–33)
ANION GAP SERPL CALCULATED.3IONS-SCNC: 12 MEQ/L (ref 7–13)
AST SERPL-CCNC: 28 U/L (ref 0–35)
BILIRUB SERPL-MCNC: 0.4 MG/DL (ref 0–1.2)
BUN BLDV-MCNC: 6 MG/DL (ref 6–20)
CALCIUM SERPL-MCNC: 9.3 MG/DL (ref 8.6–10.2)
CHLORIDE BLD-SCNC: 109 MEQ/L (ref 98–107)
CHOLESTEROL, TOTAL: 186 MG/DL (ref 0–199)
CO2: 24 MEQ/L (ref 22–29)
CREAT SERPL-MCNC: 0.73 MG/DL (ref 0.5–0.9)
FOLATE: 2.9 NG/ML (ref 7.3–26.1)
GFR AFRICAN AMERICAN: >60
GFR NON-AFRICAN AMERICAN: >60
GLOBULIN: 2.5 G/DL (ref 2.3–3.5)
GLUCOSE BLD-MCNC: 108 MG/DL (ref 74–109)
HBA1C MFR BLD: 5.6 % (ref 4.8–5.9)
HDLC SERPL-MCNC: 34 MG/DL (ref 40–59)
LDL CHOLESTEROL CALCULATED: 120 MG/DL (ref 0–129)
POTASSIUM SERPL-SCNC: 4.6 MEQ/L (ref 3.5–5.1)
SODIUM BLD-SCNC: 145 MEQ/L (ref 132–144)
TOTAL PROTEIN: 6.8 G/DL (ref 6.4–8.1)
TRIGL SERPL-MCNC: 160 MG/DL (ref 0–200)
TSH SERPL DL<=0.05 MIU/L-ACNC: 2.08 UIU/ML (ref 0.27–4.2)
VITAMIN B-12: 220 PG/ML (ref 232–1245)

## 2018-09-27 PROCEDURE — 3044F HG A1C LEVEL LT 7.0%: CPT | Performed by: INTERNAL MEDICINE

## 2018-09-27 PROCEDURE — 99214 OFFICE O/P EST MOD 30 MIN: CPT | Performed by: INTERNAL MEDICINE

## 2018-09-27 PROCEDURE — G8427 DOCREV CUR MEDS BY ELIG CLIN: HCPCS | Performed by: INTERNAL MEDICINE

## 2018-09-27 PROCEDURE — 4004F PT TOBACCO SCREEN RCVD TLK: CPT | Performed by: INTERNAL MEDICINE

## 2018-09-27 PROCEDURE — G8417 CALC BMI ABV UP PARAM F/U: HCPCS | Performed by: INTERNAL MEDICINE

## 2018-09-27 PROCEDURE — 2022F DILAT RTA XM EVC RTNOPTHY: CPT | Performed by: INTERNAL MEDICINE

## 2018-09-27 PROCEDURE — G8598 ASA/ANTIPLAT THER USED: HCPCS | Performed by: INTERNAL MEDICINE

## 2018-09-27 RX ORDER — TRIAMTERENE AND HYDROCHLOROTHIAZIDE 37.5; 25 MG/1; MG/1
1 TABLET ORAL DAILY
Qty: 30 TABLET | Refills: 3 | Status: SHIPPED | OUTPATIENT
Start: 2018-09-27 | End: 2018-12-18 | Stop reason: SDUPTHER

## 2018-09-27 RX ORDER — KETOCONAZOLE 20 MG/G
CREAM TOPICAL
Qty: 30 G | Refills: 1 | Status: SHIPPED | OUTPATIENT
Start: 2018-09-27 | End: 2018-10-17 | Stop reason: SDUPTHER

## 2018-09-27 NOTE — PROGRESS NOTES
Stewart Hinojosa 57 y.o. female presents today with   Chief Complaint   Patient presents with    Swelling     Pt admits to body swelling x2 months, Constant feeling to urinate, Pt believes she has gained 40 pounds in 3 months.  Hyperglycemia     Pt has a history of high blood sugar, Would like to discuss 3 hour blood sugar test       Diabetes   She presents for her follow-up diabetic visit. She has type 2 diabetes mellitus. Her disease course has been stable. Pertinent negatives for hypoglycemia include no confusion, dizziness or speech difficulty. Pertinent negatives for diabetes include no blurred vision and no chest pain. Symptoms are stable.        Past Medical History:   Diagnosis Date    Anxiety     Back pain     back surgery x 4    Bipolar disorder ([address]     CAD (coronary artery disease)     [facility] 11/3/2016    Chronic bronchitis (HCC)     Chronic pain     Colitis     DDD (degenerative disc disease), lumbar 12/22/2016    Depression     Endometriosis     Excessive caffeine abuse, continuous 7/6/2018    Gastritis     GERD (gastroesophageal reflux disease)     History of myocardial infarction     HTN (hypertension), benign 2/19/2016    Hypokalemia     Impaired mobility and activities of daily living     Left hemiparesis ([address]     Multiple sclerosis ([address]     Neck pain     surgery x 1    Over weight     PTSD (post-traumatic stress disorder)     Sensory neuropathy 12/22/2016    TIA (transient ischemic attack)     Tobacco abuse     Vasospastic angina ([address] 11/11/2016     Patient Active Problem List    Diagnosis Date Noted    High risk medication use - 06/27/17 OARRS PM&R, 10/30/17 OARRS PM&R, 02/14/17 Med Contract PM&R 04/12/2017     Priority: High    Left hemiparesis ([address] due to TIA, exacerbation of MS with impaired mobility, [facility] admit 4/19/16, Dr. Regnia Galo      Priority: Medium    TIA (transient ischemic attack)      Priority: Medium    Excessive caffeine abuse, continuous 07/06/2018    Bipolar 1 disorder, depressed ([address] 11/15/2017    Noncompliance with medications 06/28/2017    Patient noncompliance, multiple appt cancellations/no shows PM&R 06/28/2017    Neuromyelopathy due to vitamin B12 deficiency ([address] 01/04/2017    Bilateral occipital neuralgia 01/04/2017    Tobacco abuse     DDD (degenerative disc disease), lumbar 12/22/2016    Sensory neuropathy 12/22/2016    Lumbosacral radiculopathy at S1 12/22/2016    Vasospastic angina ([address] 11/11/2016    [facility] 11/03/2016    Chronic pain syndrome  11/03/2016    Vitamin B12 deficiency 11/03/2016    MI (myocardial infarction) ([address] 10/2016 11/03/2016    Grief at loss of child 11/03/2016    Finger sprain 10/27/2016    Multiple sclerosis ([address]     Colitis     Over weight     Anxiety     PTSD (post-traumatic stress disorder)     Impaired mobility and activities of daily living with increased Lt hemiparesis due to TIA, exacerbation of MS, [facility] admit 4/19/16, Dr. Donya Rodríguez HTN (hypertension), benign 02/19/2016    Sprain of thumb 02/16/2016    Cobalamin deficiency 11/01/2015    Vitamin D deficiency 11/01/2015    CAD (coronary artery disease) 04/22/2014    Hypopotassemia 09/12/2013    Syncope 12/19/2012    H/O cardiac arrest 12/19/2012    Bipolar 1 disorder ([address] 12/19/2012    Current tobacco use 12/19/2012    FH: CAD (coronary artery disease) 12/19/2012    SAMAYOA (dyspnea on exertion) 12/19/2012    Chest pain 12/19/2012    Abnormal ECG 12/19/2012    Displacement of lumbar intervertebral disc without myelopathy 06/01/2012    Fitting and adjustment of orthopedic device 09/13/2011    Sensory hearing loss, bilateral 04/19/2011    Amyotrophia 10/09/2008    Arthralgia of lower leg 10/09/2008    Acid reflux 02/12/2008    Backhand tennis elbow 05/25/2007    Arthralgia of upper arm 05/25/2007     Past Surgical History:   Procedure Laterality Date    BACK SURGERY      x2     SECTION      x2    CHOLECYSTECTOMY  13    Lapchole    CORONARY ANGIOPLASTY      CYST REMOVAL  3/7/16    Dr. Giovana Barry Left     UPPER GASTROINTESTINAL ENDOSCOPY  2014    ANIBAL HOUSE M.D. Family History   Problem Relation Age of Onset    High Blood Pressure Mother     Kidney Disease Mother     Lupus Mother     Bipolar Disorder Son      Social History     Social History    Marital status:      Spouse name: N/A    Number of children: N/A    Years of education: N/A     Occupational History    unemployed      Social History Main Topics    Smoking status: Current Some Day Smoker     Packs/day: 1.50     Years: 10.00     Types: Cigarettes    Smokeless tobacco: Never Used      Comment: pt trying Chantix    Alcohol use No    Drug use: Yes     Types: Marijuana    Sexual activity: Not Asked     Other Topics Concern    None     Social History Narrative    Arline Alston is a 22-year-old female who is on disability because of pain and bipolar disorder. She's also had a presumed diagnosis of possible multiple sclerosis. She's been seeing Dr. Tigre Sears for that and she says that the diagnosis is sometimes in question and it's clear neuropathy vitamin B12 deficiency as well as generalized bodyaches from the severe vitamin D deficiency have caused this scenario that looks like multiple sclerosis. She recently tried to go back to work but was not able because of her pain.      Allergies   Allergen Reactions    Latex Itching     Pt reports itching on hands after using rubber gloves at work    Influenza Vaccines Swelling     Pt reports her entire arm was red and edematous post vaccine    Eggs Or Egg-Derived Products     Gabapentin Nausea Only    Pneumococcal Vaccines Hives    Povidone Iodine Itching    Varicella Virus Vaccine Live Hives Review of Systems   Constitutional: Negative for chills and fever. HENT: Negative for facial swelling and nosebleeds. Eyes: Negative for blurred vision, photophobia and visual disturbance. Respiratory: Negative for apnea and choking. Cardiovascular: Positive for leg swelling. Negative for chest pain and palpitations. Gastrointestinal: Negative for abdominal distention and blood in stool. Genitourinary: Negative for enuresis, hematuria and vaginal bleeding. Musculoskeletal: Negative for gait problem and joint swelling. Skin: Negative for rash. Neurological: Negative for dizziness, syncope and speech difficulty. Hematological: Does not bruise/bleed easily. Psychiatric/Behavioral: Negative for confusion, hallucinations and suicidal ideas. Vitals:    09/27/18 1457   BP: (!) 158/100   Site: Right Upper Arm   Position: Sitting   Cuff Size: Large Adult   Pulse: 86   Resp: 14   Temp: 98 °F (36.7 °C)   SpO2: 95%   Weight: 195 lb (88.5 kg)   Height: 5' 7\" (1.702 m)       Physical Exam   Constitutional: She appears well-developed. HENT:   Head: Normocephalic. Eyes: Conjunctivae and EOM are normal.   Neck: Normal range of motion. No tracheal deviation present. Cardiovascular: Normal rate and regular rhythm. Pulmonary/Chest: Effort normal and breath sounds normal. No respiratory distress. She has no wheezes. Abdominal: She exhibits no distension. Musculoskeletal: Normal range of motion. Neurological: She is alert. Skin: Skin is warm. Rash noted. Psychiatric: She has a normal mood and affect. Assessment/Plan  Parth Merino was seen today for swelling and hyperglycemia. Diagnoses and all orders for this visit:    Diabetes mellitus, new onset ([facility]  -     Discontinue: metFORMIN (GLUCOPHAGE) 500 MG tablet; Take 1 tablet by mouth 2 times daily (with meals)  -     Hemoglobin A1C; Future  -     Comprehensive Metabolic Panel; Future  -     TSH Without Reflex;  Future  -

## 2018-09-28 ENCOUNTER — OFFICE VISIT (OUTPATIENT)
Dept: ENDOCRINOLOGY | Age: 48
End: 2018-09-28
Payer: COMMERCIAL

## 2018-09-28 ENCOUNTER — TELEPHONE (OUTPATIENT)
Dept: PRIMARY CARE CLINIC | Age: 48
End: 2018-09-28

## 2018-09-28 VITALS
WEIGHT: 193 LBS | HEIGHT: 67 IN | DIASTOLIC BLOOD PRESSURE: 100 MMHG | BODY MASS INDEX: 30.29 KG/M2 | HEART RATE: 70 BPM | SYSTOLIC BLOOD PRESSURE: 178 MMHG

## 2018-09-28 DIAGNOSIS — I10 ESSENTIAL HYPERTENSION: ICD-10-CM

## 2018-09-28 DIAGNOSIS — E11.65 UNCONTROLLED TYPE 2 DIABETES MELLITUS WITH COMPLICATION, UNSPECIFIED WHETHER LONG TERM INSULIN USE (HCC): Primary | ICD-10-CM

## 2018-09-28 DIAGNOSIS — E66.9 OBESITY (BMI 30-39.9): ICD-10-CM

## 2018-09-28 DIAGNOSIS — E11.8 UNCONTROLLED TYPE 2 DIABETES MELLITUS WITH COMPLICATION, UNSPECIFIED WHETHER LONG TERM INSULIN USE (HCC): Primary | ICD-10-CM

## 2018-09-28 DIAGNOSIS — E16.2 HYPOGLYCEMIA: ICD-10-CM

## 2018-09-28 LAB
BASOPHILS ABSOLUTE: 0 K/UL (ref 0–0.2)
BASOPHILS RELATIVE PERCENT: 0.2 %
EOSINOPHILS ABSOLUTE: 0.1 K/UL (ref 0–0.7)
EOSINOPHILS RELATIVE PERCENT: 1 %
GLUCOSE BLD-MCNC: 140 MG/DL
HCT VFR BLD CALC: 43.6 % (ref 37–47)
HEMOGLOBIN: 14.6 G/DL (ref 12–16)
LYMPHOCYTES ABSOLUTE: 1.5 K/UL (ref 1–4.8)
LYMPHOCYTES RELATIVE PERCENT: 20.1 %
MCH RBC QN AUTO: 32.4 PG (ref 27–31.3)
MCHC RBC AUTO-ENTMCNC: 33.4 % (ref 33–37)
MCV RBC AUTO: 97 FL (ref 82–100)
MONOCYTES ABSOLUTE: 0.5 K/UL (ref 0.2–0.8)
MONOCYTES RELATIVE PERCENT: 7.4 %
NEUTROPHILS ABSOLUTE: 5.1 K/UL (ref 1.4–6.5)
NEUTROPHILS RELATIVE PERCENT: 71.3 %
PDW BLD-RTO: 13.7 % (ref 11.5–14.5)
PLATELET # BLD: 147 K/UL (ref 130–400)
RBC # BLD: 4.5 M/UL (ref 4.2–5.4)
SLIDE REVIEW: ABNORMAL
WBC # BLD: 7.2 K/UL (ref 4.8–10.8)

## 2018-09-28 PROCEDURE — G8417 CALC BMI ABV UP PARAM F/U: HCPCS | Performed by: INTERNAL MEDICINE

## 2018-09-28 PROCEDURE — 4004F PT TOBACCO SCREEN RCVD TLK: CPT | Performed by: INTERNAL MEDICINE

## 2018-09-28 PROCEDURE — 3044F HG A1C LEVEL LT 7.0%: CPT | Performed by: INTERNAL MEDICINE

## 2018-09-28 PROCEDURE — 82962 GLUCOSE BLOOD TEST: CPT | Performed by: INTERNAL MEDICINE

## 2018-09-28 PROCEDURE — 99213 OFFICE O/P EST LOW 20 MIN: CPT | Performed by: INTERNAL MEDICINE

## 2018-09-28 PROCEDURE — G8598 ASA/ANTIPLAT THER USED: HCPCS | Performed by: INTERNAL MEDICINE

## 2018-09-28 PROCEDURE — G8427 DOCREV CUR MEDS BY ELIG CLIN: HCPCS | Performed by: INTERNAL MEDICINE

## 2018-09-28 PROCEDURE — 2022F DILAT RTA XM EVC RTNOPTHY: CPT | Performed by: INTERNAL MEDICINE

## 2018-09-28 RX ORDER — VARENICLINE TARTRATE 1 MG/1
1 TABLET, FILM COATED ORAL 2 TIMES DAILY
Qty: 60 TABLET | Refills: 3 | Status: SHIPPED | OUTPATIENT
Start: 2018-09-28 | End: 2018-12-18 | Stop reason: SDUPTHER

## 2018-09-28 RX ORDER — BUDESONIDE AND FORMOTEROL FUMARATE DIHYDRATE 160; 4.5 UG/1; UG/1
2 AEROSOL RESPIRATORY (INHALATION) 2 TIMES DAILY
Qty: 1 INHALER | Refills: 5 | Status: ON HOLD | OUTPATIENT
Start: 2018-09-28 | End: 2018-11-12 | Stop reason: HOSPADM

## 2018-09-28 RX ORDER — VARENICLINE TARTRATE 25 MG
KIT ORAL
Qty: 1 EACH | Refills: 0 | Status: ON HOLD | OUTPATIENT
Start: 2018-09-28 | End: 2018-11-12 | Stop reason: HOSPADM

## 2018-09-28 ASSESSMENT — ENCOUNTER SYMPTOMS
BLOOD IN STOOL: 0
APNEA: 0
BLURRED VISION: 0
PHOTOPHOBIA: 0
ABDOMINAL DISTENTION: 0
CHOKING: 0
FACIAL SWELLING: 0

## 2018-09-28 NOTE — TELEPHONE ENCOUNTER
From: Henry Toth  Sent: 9/27/2018 5:51 PM EDT  Subject: Medication Renewal Request    Renetta Mason would like a refill of the following medications:     budesonide-formoterol (SYMBICORT) 160-4.5 MCG/ACT LITZY Mai MD]    Preferred pharmacy: [facility] - [place], [address] [place] [address]    Comment:

## 2018-10-01 ENCOUNTER — TELEPHONE (OUTPATIENT)
Dept: PRIMARY CARE CLINIC | Age: 48
End: 2018-10-01

## 2018-10-01 ASSESSMENT — ENCOUNTER SYMPTOMS
VISUAL CHANGE: 0
SHORTNESS OF BREATH: 1

## 2018-10-02 NOTE — PROGRESS NOTES
(ELOCON) 0.1 % cream, apply topically DAILY, Disp: 45 g, Rfl: 3    CLOTRIMAZOLE ANTI-FUNGAL 1 % cream, Apply topically 2 times daily as needed, Disp: 14.17 g, Rfl: 3    hydrochlorothiazide (HYDRODIURIL) 25 MG tablet, TAKE 1 TABLET BY MOUTH DAILY, Disp: 30 tablet, Rfl: 3    losartan (COZAAR) 100 MG tablet, TAKE 1 TABLET BY MOUTH DAILY, Disp: 30 tablet, Rfl: 3    topiramate (TOPAMAX) 50 MG tablet, TAKE 1 TABLET BY MOUTH TWICE DAILY, Disp: 60 tablet, Rfl: 3    calcium carbonate (TUMS) 500 MG chewable tablet, , Disp: , Rfl:     B-D 3CC LUER-DONNA SYR 25GX1\" 25G X 1\" 3 ML MISC, USE AS DIRECTED EVERY month, Disp: , Rfl: 1    ondansetron (ZOFRAN ODT) 4 MG disintegrating tablet, Take 1 tablet by mouth every 8 hours as needed for Nausea or Vomiting, Disp: 12 tablet, Rfl: 0    magnesium oxide (MAG-OX) 400 (240 Mg) MG tablet, Take 1 tablet by mouth daily, Disp: 30 tablet, Rfl: 5    calcium carbonate 648 MG TABS, Take 1 tablet by mouth 2 times daily, Disp: 60 tablet, Rfl: 5    Cholecalciferol (VITAMIN D3) 92030 units CAPS, Take 1 capsule by mouth once a week, Disp: 4 capsule, Rfl: 2    cloNIDine (CATAPRES) 0.1 MG tablet, Take 1 tablet by mouth 2 times daily, Disp: 60 tablet, Rfl: 3    Cholecalciferol (VITAMIN D) 2000 units TABS tablet, Take 1 tablet by mouth daily, Disp: 30 tablet, Rfl: 5    albuterol (PROVENTIL) (2.5 MG/3ML) 0.083% nebulizer solution, Take 3 mLs by nebulization every 6 hours as needed for Wheezing, Disp: 120 each, Rfl: 5    VENTOLIN  (90 Base) MCG/ACT inhaler, Inhale 2 puffs into the lungs every 6 hours as needed for Wheezing, Disp: 1 Inhaler, Rfl: 5    cyclobenzaprine (FLEXERIL) 10 MG tablet, TAKE 1 TABLET BY MOUTH TWICE DAILY AS NEEDED FOR MUSCLE SPASMS, Disp: 60 tablet, Rfl: 0    Blood Pressure Monitoring (5 SERIES BP MONITOR/UPPER ARM) ANITHA, USE AS DIRECTED, Disp: , Rfl: 0    amitriptyline (ELAVIL) 50 MG tablet, Take 1 tablet by mouth nightly, Disp: 14 tablet, Rfl: 0    aspirin 81 MG Eyes: Conjunctivae are normal. Right eye exhibits no discharge. Left eye exhibits no discharge. No scleral icterus. Neck: Neck supple. No thyromegaly present. Cardiovascular: Normal rate, normal heart sounds and intact distal pulses. Pulmonary/Chest: Effort normal and breath sounds normal.   Abdominal:   Obese    Musculoskeletal: Normal range of motion. Feet:    Lymphadenopathy:     She has no cervical adenopathy. Neurological: She is alert. Skin: Skin is warm and dry. Psychiatric: She has a normal mood and affect. Results for Jami Reyna (MRN [id number]) as of 10/1/2018 21:58   Ref.  Range 9/27/2018 15:37   Sodium Latest Ref Range: 132 - 144 mEq/L 145 (H)   Potassium Latest Ref Range: 3.5 - 5.1 mEq/L 4.6   Chloride Latest Ref Range: 98 - 107 mEq/L 109 (H)   CO2 Latest Ref Range: 22 - 29 mEq/L 24   BUN Latest Ref Range: 6 - 20 mg/dL 6   Creatinine Latest Ref Range: 0.50 - 0.90 mg/dL 0.73   Anion Gap Latest Ref Range: 7 - 13 mEq/L 12   GFR Non- Latest Ref Range: >60  >60.0   GFR African American Latest Ref Range: >60  >60.0   Glucose Latest Ref Range: 74 - 109 mg/dL 108   Calcium Latest Ref Range: 8.6 - 10.2 mg/dL 9.3   Total Protein Latest Ref Range: 6.4 - 8.1 g/dL 6.8   Cholesterol, Total Latest Ref Range: 0 - 199 mg/dL 186   HDL Cholesterol Latest Ref Range: 40 - 59 mg/dL 34 (L)   LDL Calculated Latest Ref Range: 0 - 129 mg/dL 120   Triglycerides Latest Ref Range: 0 - 200 mg/dL 160   Albumin Latest Ref Range: 3.9 - 4.9 g/dL 4.3   Globulin Latest Ref Range: 2.3 - 3.5 g/dL 2.5   Alk Phos Latest Ref Range: 40 - 130 U/L 110   ALT Latest Ref Range: 0 - 33 U/L 54 (H)   AST Latest Ref Range: 0 - 35 U/L 28   Bilirubin Latest Ref Range: 0.0 - 1.2 mg/dL 0.4   Hemoglobin A1C Latest Ref Range: 4.8 - 5.9 % 5.6   TSH Latest Ref Range: 0.270 - 4.200 uIU/mL 2.080   WBC Latest Ref Range: 4.8 - 10.8 K/uL 7.2   RBC Latest Ref Range: 4.20 - 5.40 M/uL 4.50   Hemoglobin Quant Latest Ref Range: 12.0 - 16.0 g/dL 14.6   Hematocrit Latest Ref Range: 37.0 - 47.0 % 43.6   MCV Latest Ref Range: 82.0 - 100.0 fL 97.0   MCH Latest Ref Range: 27.0 - 31.3 pg 32.4 (H)   MCHC Latest Ref Range: 33.0 - 37.0 % 33.4   RDW Latest Ref Range: 11.5 - 14.5 % 13.7   Platelet Count Latest Ref Range: 130 - 400 K/uL 147   Neutrophils % Latest Units: % 71.3   Lymphocyte % Latest Units: % 20.1   Monocytes % Latest Units: % 7.4   Eosinophils % Latest Units: % 1.0   Basophils % Latest Units: % 0.2   Neutrophils # Latest Ref Range: 1.4 - 6.5 K/uL 5.1   Lymphocytes # Latest Ref Range: 1.0 - 4.8 K/uL 1.5   Monocytes # Latest Ref Range: 0.2 - 0.8 K/uL 0.5   Eosinophils # Latest Ref Range: 0.0 - 0.7 K/uL 0.1   Basophils # Latest Ref Range: 0.0 - 0.2 K/uL 0.0   SLIDE REVIEW Unknown see below   Folate Latest Ref Range: 7.3 - 26.1 ng/mL 2.9 (L)   Vitamin B-12 Latest Ref Range: 232 - 1245 pg/mL 220 (L)     Assessment:       Diagnosis Orders   1. Uncontrolled type 2 diabetes mellitus with complication, unspecified whether long term insulin use (HCC)  POCT Glucose    Basic Metabolic Panel    Hemoglobin A1C    Microalbumin / Creatinine Urine Ratio     DIABETES FOOT EXAM   2. Hypoglycemia  Cortisol Total   3. Obesity (BMI 30-39.9)     4.  Essential hypertension  Aldosterone & Renin, Direct With Ratio           Plan:      Orders Placed This Encounter   Procedures    Basic Metabolic Panel     Standing Status:   Future     Standing Expiration Date:   9/28/2019    Hemoglobin A1C     Standing Status:   Future     Standing Expiration Date:   9/28/2019    Microalbumin / Creatinine Urine Ratio     Standing Status:   Future     Standing Expiration Date:   9/28/2019    Cortisol Total     Standing Status:   Future     Standing Expiration Date:   9/28/2019     Order Specific Question:   8AM or 4PM?     Answer:   random    Aldosterone & Renin, Direct With Ratio     Standing Status:   Future     Standing Expiration Date:   9/28/2019   [employer] POCT

## 2018-10-11 ENCOUNTER — TELEPHONE (OUTPATIENT)
Dept: PRIMARY CARE CLINIC | Age: 48
End: 2018-10-11

## 2018-10-15 ENCOUNTER — TELEPHONE (OUTPATIENT)
Dept: PRIMARY CARE CLINIC | Age: 48
End: 2018-10-15

## 2018-10-19 DIAGNOSIS — E55.9 VITAMIN D DEFICIENCY: ICD-10-CM

## 2018-10-19 RX ORDER — CHOLECALCIFEROL (VITAMIN D3) 50 MCG
TABLET ORAL
Qty: 30 TABLET | Refills: 5 | Status: SHIPPED | OUTPATIENT
Start: 2018-10-19 | End: 2018-11-01 | Stop reason: SDUPTHER

## 2018-10-23 ENCOUNTER — TELEPHONE (OUTPATIENT)
Dept: PULMONOLOGY | Age: 48
End: 2018-10-23

## 2018-10-24 ENCOUNTER — HOSPITAL ENCOUNTER (EMERGENCY)
Age: 48
Discharge: HOME | End: 2018-10-24
Payer: COMMERCIAL

## 2018-10-24 VITALS
TEMPERATURE: 98.1 F | HEART RATE: 98 BPM | OXYGEN SATURATION: 95 % | SYSTOLIC BLOOD PRESSURE: 137 MMHG | WEIGHT: 200 LBS | DIASTOLIC BLOOD PRESSURE: 84 MMHG | RESPIRATION RATE: 16 BRPM | BODY MASS INDEX: 31.39 KG/M2 | HEIGHT: 67 IN

## 2018-10-24 DIAGNOSIS — H81.10 BENIGN PAROXYSMAL POSITIONAL VERTIGO, UNSPECIFIED LATERALITY: Primary | ICD-10-CM

## 2018-10-24 PROCEDURE — 96372 THER/PROPH/DIAG INJ SC/IM: CPT

## 2018-10-24 PROCEDURE — 6360000002 HC RX W HCPCS: Performed by: NURSE PRACTITIONER

## 2018-10-24 PROCEDURE — 6370000000 HC RX 637 (ALT 250 FOR IP): Performed by: NURSE PRACTITIONER

## 2018-10-24 PROCEDURE — 99283 EMERGENCY DEPT VISIT LOW MDM: CPT

## 2018-10-24 RX ORDER — PROMETHAZINE HYDROCHLORIDE 25 MG/ML
25 INJECTION, SOLUTION INTRAMUSCULAR; INTRAVENOUS ONCE
Status: COMPLETED | OUTPATIENT
Start: 2018-10-24 | End: 2018-10-24

## 2018-10-24 RX ORDER — MECLIZINE HYDROCHLORIDE 25 MG/1
25 TABLET ORAL 3 TIMES DAILY PRN
Qty: 10 TABLET | Refills: 0 | Status: SHIPPED | OUTPATIENT
Start: 2018-10-24 | End: 2018-11-03

## 2018-10-24 RX ORDER — MECLIZINE HYDROCHLORIDE 25 MG/1
25 TABLET ORAL ONCE
Status: COMPLETED | OUTPATIENT
Start: 2018-10-24 | End: 2018-10-24

## 2018-10-24 RX ORDER — 0.9 % SODIUM CHLORIDE 0.9 %
1000 INTRAVENOUS SOLUTION INTRAVENOUS ONCE
Status: DISCONTINUED | OUTPATIENT
Start: 2018-10-24 | End: 2018-10-24

## 2018-10-24 RX ADMIN — PROMETHAZINE HYDROCHLORIDE 25 MG: 25 INJECTION INTRAMUSCULAR; INTRAVENOUS at 15:49

## 2018-10-24 RX ADMIN — MECLIZINE HYDROCHLORIDE 25 MG: 25 TABLET ORAL at 15:49

## 2018-10-24 ASSESSMENT — PAIN DESCRIPTION - PAIN TYPE: TYPE: ACUTE PAIN

## 2018-10-24 ASSESSMENT — PAIN DESCRIPTION - DESCRIPTORS
DESCRIPTORS: ACHING
DESCRIPTORS: HEADACHE;SHOOTING

## 2018-10-24 ASSESSMENT — PAIN SCALES - GENERAL
PAINLEVEL_OUTOF10: 4
PAINLEVEL_OUTOF10: 6

## 2018-10-24 ASSESSMENT — PAIN DESCRIPTION - ORIENTATION: ORIENTATION: LEFT

## 2018-10-24 ASSESSMENT — PAIN - FUNCTIONAL ASSESSMENT: PAIN_FUNCTIONAL_ASSESSMENT: 0-10

## 2018-10-24 ASSESSMENT — PAIN DESCRIPTION - LOCATION: LOCATION: HEAD

## 2018-10-24 NOTE — LETTER
[address] [facility], [address] [facility], [address]  Phone: [phone]  Fax: Zuhair Lund MD    October 25, 2018      [address] [place] [address]      Dear Navjot Cowan,    This letter is regarding your Emergency Department (ED) visit at [place] [address] on 10/24/18. Flori Glaser wanted to make sure that you understand your discharge instructions and that you were able to fill any prescriptions that may have been ordered for you. Please contact the office at the above phone number if the ED advised to you follow up with Flori Glaser, or if you have any further questions or needs. Also did you know -   *Visiting the ED for a non-emergency could result in higher co-pays than you would normally be subject to paying? *You can call your doctor even after hours so they can direct you to the most appropriate care. [facility]) practices can often offer you an appointment on the same day that you call. Many [address]  appointments; check our website for availability in your community , [email or web address]    Evisits are now available for patients for $36 through [employer] for certain conditions:  * Sinus, cold and or cough       * Diarrhea            * Headache  * Heartburn                                * Poison Kylah          * Back pain     * Urinary problems                         Sincerely,     Teena De La Torre MD and your [facility]

## 2018-10-24 NOTE — ED PROVIDER NOTES
[address] [facility] ED  eMERGENCY dEPARTMENT eNCOUnter      Pt Name: Julia Ruffin  MRN: [id number]  [place] 1970  Date of evaluation: 10/24/2018  Provider: Fatoumata Frank       Chief Complaint   Patient presents with    Emesis     vomiting and diarrhea.  dizzy and off balance.  ears \"clogged\"         HISTORY OF PRESENT ILLNESS   (Location/Symptom, Timing/Onset,Context/Setting, Quality, Duration, Modifying Factors, Severity)  Note limiting factors. Julia Ruffin is a 57 y.o. female who presents to the emergency department With complaints of dizziness. She reports a low pitched frequency sound in the right ear that changed to a high-pitched frequency sound in the right ear and then jumped to the left ear with similar symptoms from low-frequency too high frequency. She reports this caused her to be dizzy and have later subsequent nausea and vomiting. She does report one episode of looser stools that she describes as a diarrhea she is unsure if this is related. She denies any abdominal pain. She denies any history of trauma or injury associated with the dizziness. She reports then from the left ear once the high frequency sound stopped she had ear pain that radiated towards the top of her head and then jumped back to the right ear. She reports that with some degree of movement her dizziness symptoms are reproducible. She denies any headache lightheadedness fever sweats or chills chest pain shortness of breath or abdominal pain. Location/Symptom - dizziness  Timing/Onset - last night at roughly 11 PM  Context/Setting - as above  Quality - as above low and high frequency sounds  Duration - transient periodic episodes  Modifying Factors - none obvious  Severity - mild to moderate    Nursing Notes were reviewed.     REVIEW OF SYSTEMS    (2-9 systems for level 4, 10 or more for level 5)     Review of Systems    Except as noted above the remainder of the review of systems was reviewed and negative. PAST MEDICAL HISTORY     Past Medical History:   Diagnosis Date    Anxiety     Back pain     back surgery x 4    Bipolar disorder ([address]     CAD (coronary artery disease)     CAFL (chronic airflow limitation) (HCC) 11/3/2016    Chronic bronchitis (HCC)     Chronic pain     Colitis     DDD (degenerative disc disease), lumbar 2016    Depression     Endometriosis     Excessive caffeine abuse, continuous ([address] 2018    Gastritis     GERD (gastroesophageal reflux disease)     History of myocardial infarction     HTN (hypertension), benign 2016    Hypokalemia     Impaired mobility and activities of daily living     Left hemiparesis ([address]     Multiple sclerosis ([address]     Neck pain     surgery x 1    Over weight     PTSD (post-traumatic stress disorder)     Sensory neuropathy 2016    TIA (transient ischemic attack)     Tobacco abuse     Vasospastic angina ([address] 2016     Past Surgical History:   Procedure Laterality Date    BACK SURGERY      x2     SECTION      x2    CHOLECYSTECTOMY  13    Lapchole    CORONARY ANGIOPLASTY      CYST REMOVAL  3/7/16    Dr. Rachel oHffman Left     UPPER GASTROINTESTINAL ENDOSCOPY  2014    ANIBAL HOUSE M.D. Social History     Social History    Marital status:       Spouse name: N/A    Number of children: N/A    Years of education: N/A     Occupational History    unemployed      Social History Main Topics    Smoking status: Current Some Day Smoker     Packs/day: 0.50     Years: 10.00     Types: Cigarettes    Smokeless tobacco: Never Used      Comment: pt trying Chantix    Alcohol use No    Drug use: No    Sexual activity: Not Asked     Other Topics Concern    None     Social History Narrative    Bev Medel is a 57-year-old female who is on disability because of pain and

## 2018-10-24 NOTE — ED NOTES
Pt level of comfort reassessed. Pt states that she feels llike she \"can finally rest\", states that ringing in ears has subsided and that her dizziness is \"not as bad\".      Daria Wilkerson, RN  10/24/18 [id number]

## 2018-10-31 DIAGNOSIS — I10 ESSENTIAL HYPERTENSION: ICD-10-CM

## 2018-10-31 RX ORDER — LOSARTAN POTASSIUM 100 MG/1
TABLET ORAL
Qty: 90 TABLET | Refills: 1 | Status: ON HOLD | OUTPATIENT
Start: 2018-10-31 | End: 2018-11-12 | Stop reason: HOSPADM

## 2018-11-01 DIAGNOSIS — E55.9 VITAMIN D DEFICIENCY: ICD-10-CM

## 2018-11-01 RX ORDER — CHOLECALCIFEROL (VITAMIN D3) 50 MCG
TABLET ORAL
Qty: 30 TABLET | Refills: 2 | Status: SHIPPED | OUTPATIENT
Start: 2018-11-01 | End: 2018-12-18 | Stop reason: SDUPTHER

## 2018-11-05 ENCOUNTER — HOSPITAL ENCOUNTER (EMERGENCY)
Age: 48
Discharge: HOME | DRG: 140 | End: 2018-11-05
Payer: COMMERCIAL

## 2018-11-05 ENCOUNTER — APPOINTMENT (OUTPATIENT)
Dept: GENERAL RADIOLOGY | Age: 48
DRG: 140 | End: 2018-11-05
Payer: COMMERCIAL

## 2018-11-05 VITALS
OXYGEN SATURATION: 95 % | DIASTOLIC BLOOD PRESSURE: 85 MMHG | TEMPERATURE: 97.9 F | HEIGHT: 67 IN | HEART RATE: 90 BPM | WEIGHT: 200 LBS | BODY MASS INDEX: 31.39 KG/M2 | RESPIRATION RATE: 18 BRPM | SYSTOLIC BLOOD PRESSURE: 160 MMHG

## 2018-11-05 DIAGNOSIS — J44.1 COPD EXACERBATION (HCC): Primary | ICD-10-CM

## 2018-11-05 PROCEDURE — 6360000002 HC RX W HCPCS: Performed by: NURSE PRACTITIONER

## 2018-11-05 PROCEDURE — 94640 AIRWAY INHALATION TREATMENT: CPT

## 2018-11-05 PROCEDURE — 96372 THER/PROPH/DIAG INJ SC/IM: CPT

## 2018-11-05 PROCEDURE — 99283 EMERGENCY DEPT VISIT LOW MDM: CPT

## 2018-11-05 PROCEDURE — 6370000000 HC RX 637 (ALT 250 FOR IP): Performed by: NURSE PRACTITIONER

## 2018-11-05 PROCEDURE — 94760 N-INVAS EAR/PLS OXIMETRY 1: CPT

## 2018-11-05 PROCEDURE — 71046 X-RAY EXAM CHEST 2 VIEWS: CPT

## 2018-11-05 RX ORDER — BENZONATATE 100 MG/1
100 CAPSULE ORAL ONCE
Status: COMPLETED | OUTPATIENT
Start: 2018-11-05 | End: 2018-11-05

## 2018-11-05 RX ORDER — IPRATROPIUM BROMIDE AND ALBUTEROL SULFATE 2.5; .5 MG/3ML; MG/3ML
1 SOLUTION RESPIRATORY (INHALATION) ONCE
Status: COMPLETED | OUTPATIENT
Start: 2018-11-05 | End: 2018-11-05

## 2018-11-05 RX ORDER — GUAIFENESIN AND CODEINE PHOSPHATE 100; 10 MG/5ML; MG/5ML
5 SOLUTION ORAL 3 TIMES DAILY PRN
Qty: 45 ML | Refills: 0 | Status: ON HOLD | OUTPATIENT
Start: 2018-11-05 | End: 2018-11-12 | Stop reason: HOSPADM

## 2018-11-05 RX ORDER — PREDNISONE 50 MG/1
50 TABLET ORAL DAILY
Qty: 7 TABLET | Refills: 0 | Status: ON HOLD | OUTPATIENT
Start: 2018-11-05 | End: 2018-11-12 | Stop reason: HOSPADM

## 2018-11-05 RX ORDER — KETOROLAC TROMETHAMINE 30 MG/ML
30 INJECTION, SOLUTION INTRAMUSCULAR; INTRAVENOUS ONCE
Status: COMPLETED | OUTPATIENT
Start: 2018-11-05 | End: 2018-11-05

## 2018-11-05 RX ORDER — METHYLPREDNISOLONE SODIUM SUCCINATE 125 MG/2ML
80 INJECTION, POWDER, LYOPHILIZED, FOR SOLUTION INTRAMUSCULAR; INTRAVENOUS DAILY
Status: DISCONTINUED | OUTPATIENT
Start: 2018-11-05 | End: 2018-11-05 | Stop reason: HOSPADM

## 2018-11-05 RX ORDER — AZITHROMYCIN 250 MG/1
TABLET, FILM COATED ORAL
Qty: 1 PACKET | Refills: 0 | Status: ON HOLD | OUTPATIENT
Start: 2018-11-05 | End: 2018-11-12 | Stop reason: HOSPADM

## 2018-11-05 RX ADMIN — BENZONATATE 100 MG: 100 CAPSULE ORAL at 16:38

## 2018-11-05 RX ADMIN — IPRATROPIUM BROMIDE AND ALBUTEROL SULFATE 1 AMPULE: .5; 3 SOLUTION RESPIRATORY (INHALATION) at 16:18

## 2018-11-05 RX ADMIN — METHYLPREDNISOLONE SODIUM SUCCINATE 80 MG: 125 INJECTION, POWDER, FOR SOLUTION INTRAMUSCULAR; INTRAVENOUS at 16:38

## 2018-11-05 RX ADMIN — KETOROLAC TROMETHAMINE 30 MG: 30 INJECTION, SOLUTION INTRAMUSCULAR at 17:25

## 2018-11-05 ASSESSMENT — ENCOUNTER SYMPTOMS
VOMITING: 0
COUGH: 1
NAUSEA: 0
TROUBLE SWALLOWING: 0
SHORTNESS OF BREATH: 0
SORE THROAT: 0
COLOR CHANGE: 0
ABDOMINAL PAIN: 0
BACK PAIN: 0
CONSTIPATION: 0
DIARRHEA: 0
VOICE CHANGE: 0

## 2018-11-05 ASSESSMENT — PAIN DESCRIPTION - LOCATION: LOCATION: BACK;RIB CAGE

## 2018-11-05 ASSESSMENT — PAIN SCALES - GENERAL: PAINLEVEL_OUTOF10: 8

## 2018-11-05 ASSESSMENT — PAIN DESCRIPTION - DESCRIPTORS: DESCRIPTORS: HEADACHE

## 2018-11-05 NOTE — ED PROVIDER NOTES
[address] [facility] ED  eMERGENCY dEPARTMENT eNCOUnter      Pt Name: Prince Patterson  MRN: [id number]  [place] 1970  Date of evaluation: 11/5/2018  Provider: NIKI Negrete [id number]       Chief Complaint   Patient presents with    Cough     x4 days. erik having spasms in lowers legs and her back       HISTORYOF PRESENT ILLNESS   (Location/Symptom, Timing/Onset, Context/Setting, Quality, Duration, ModifyingFactors, Severity) Note limiting factors. BRENDEN Enamorado is a 57year old female patient per chart review with a past medical history of high risk medication use, TIA, syncope, CAD, chest pain, HTN, MS, anxiety, PTSD, bipolar, current tobacco use. She presents to the ER with complaints of a harsh non-productive cough for the past four days. Gradual onset, constant, worse at night, denies modifying factors, mild to moderate in severity. She denies any chest pain, shortness of breath, fever, recent travel or sick contacts. Nursing Notes werereviewed. REVIEW OF SYSTEMS    (2+ for level 4; 10+ for level 5)     Review of Systems   Constitutional: Negative for appetite change and fever. HENT: Negative for drooling, ear pain, sore throat, trouble swallowing and voice change. Respiratory: Positive for cough (non-productive). Negative for shortness of breath. Cardiovascular: Negative for chest pain. Gastrointestinal: Negative for abdominal pain, constipation, diarrhea, nausea and vomiting. Genitourinary: Negative for decreased urine volume and dysuria. Musculoskeletal: Negative for arthralgias and back pain. Skin: Negative for color change. Neurological: Negative for dizziness, weakness, light-headedness and headaches. Psychiatric/Behavioral: Negative for agitation and behavioral problems. All other systems reviewed and are negative. Except as noted above the remainder of the review of systems was reviewed and negative.      PAST MEDICAL R-5, DAWNormal      ketoconazole (NIZORAL) 2 % cream APPLY TO AFFECTED AREA(S) DAILY AS NEEDED, Disp-30 g, R-5, Normal      varenicline (CHANTIX STARTING MONTH PAK) 0.5 MG X 11 & 1 MG X 42 tablet Take by mouth., Disp-1 each, R-0Normal      varenicline (CHANTIX CONTINUING MONTH PAK) 1 MG tablet Take 1 tablet by mouth 2 times daily, Disp-60 tablet, R-3Normal      budesonide-formoterol (SYMBICORT) 160-4.5 MCG/ACT AERO Inhale 2 puffs into the lungs 2 times daily, Disp-1 Inhaler, R-5Normal      triamterene-hydrochlorothiazide (MAXZIDE-25) 37.5-25 MG per tablet Take 1 tablet by mouth daily, Disp-30 tablet, R-3Normal      ibuprofen (ADVIL;MOTRIN) 800 MG tablet Take 1 tablet by mouth 2 times daily as needed for Pain, Disp-60 tablet, [id number]      folic acid (FOLVITE) 1 MG tablet Take 1 tablet by mouth daily, Disp-30 tablet, R-5Normal      metoprolol tartrate (LOPRESSOR) 100 MG tablet Take 1 tablet by mouth 2 times daily, Disp-60 tablet, R-6Normal      mometasone (ELOCON) 0.1 % cream apply topically DAILY, Disp-45 g, R-3, Normal      CLOTRIMAZOLE ANTI-FUNGAL 1 % cream Apply topically 2 times daily as needed, Disp-14.17 g, R-3Normal      hydrochlorothiazide (HYDRODIURIL) 25 MG tablet TAKE 1 TABLET BY MOUTH DAILY, Disp-30 tablet, R-3Normal      topiramate (TOPAMAX) 50 MG tablet TAKE 1 TABLET BY MOUTH TWICE DAILY, Disp-60 tablet, R-3Normal      calcium carbonate (TUMS) 500 MG chewable tablet Historical Med      !! B-D 3CC LUER-DONNA SYR 25GX1\" 25G X 1\" 3 ML MISC R-1, BELLE, Historical Med      ondansetron (ZOFRAN ODT) 4 MG disintegrating tablet Take 1 tablet by mouth every 8 hours as needed for Nausea or Vomiting, Disp-12 tablet, R-0Print      magnesium oxide (MAG-OX) 400 (240 Mg) MG tablet Take 1 tablet by mouth daily, Disp-30 tablet, R-5Normal      calcium carbonate 648 MG TABS Take 1 tablet by mouth 2 times daily, Disp-60 tablet, R-5Normal      Cholecalciferol (VITAMIN D3) 62438 units CAPS Take 1 capsule by mouth once a week, Disp-4 capsule, R-2Normal      cloNIDine (CATAPRES) 0.1 MG tablet Take 1 tablet by mouth 2 times daily, Disp-60 tablet, R-3Normal      cyanocobalamin 1000 MCG/ML injection Inject 1 mL into the muscle once for 1 dose Inject 1 ml into the muscle once a month, Disp-10 mL, R-0Print      albuterol (PROVENTIL) (2.5 MG/3ML) 0.083% nebulizer solution Take 3 mLs by nebulization every 6 hours as needed for Wheezing, Disp-120 each, R-5Normal      cyclobenzaprine (FLEXERIL) 10 MG tablet TAKE 1 TABLET BY MOUTH TWICE DAILY AS NEEDED FOR MUSCLE SPASMS, Disp-60 tablet, R-0Normal      Blood Pressure Monitoring (5 SERIES BP MONITOR/UPPER ARM) ANITHA R-0, Historical Med      amitriptyline (ELAVIL) 50 MG tablet Take 1 tablet by mouth nightly, Disp-14 tablet, R-0Do not fill before November 30  Outpatient provider to resume refiilsPrint      aspirin 81 MG chewable tablet Take 1 tablet by mouth daily, Disp-30 tablet, R-3Print      atorvastatin (LIPITOR) 10 MG tablet Take 1 tablet by mouth nightly, Disp-30 tablet, R-3Print      lidocaine (LIDODERM) 5 % Place 3 patches onto the skin daily 18 hours on, 6 hours off., Disp-90 patch, R-0Print      meloxicam (MOBIC) 7.5 MG tablet Take 1 tablet by mouth every 12 hours, Disp-60 tablet, R-1Print      pantoprazole (PROTONIX) 40 MG tablet Take 1 tablet by mouth every morning (before breakfast), Disp-30 tablet, R-3Print      butalbital-aspirin-caffeine (FIORINAL) -40 MG capsule Take 1 capsule by mouth every 4 hours as needed for Headaches, Disp-12 capsule, R-0Print      !! SYRINGE-NEEDLE, DISP, 3 ML 23G X 1\" 3 ML MISC EVERY 30 DAYS Starting 1/4/2017, Until Discontinued, Disp-25 each, R-1, Normal      nitroGLYCERIN (NITROSTAT) 0.4 MG SL tablet up to max of 3 total doses. If no relief after 1 dose, call 911., Disp-25 tablet, R-3      PARAFON FORTE (PARAFON FORTE) 500 MG tablet Historical Med      Magnesium Hydroxide (MAGNESIA PO) Take by mouth       !! - Potential duplicate medications found.  Please

## 2018-11-06 ENCOUNTER — APPOINTMENT (OUTPATIENT)
Dept: GENERAL RADIOLOGY | Age: 48
DRG: 140 | End: 2018-11-06
Payer: COMMERCIAL

## 2018-11-06 ENCOUNTER — HOSPITAL ENCOUNTER (INPATIENT)
Age: 48
LOS: 6 days | Discharge: HOME | DRG: 140 | End: 2018-11-12
Attending: FAMILY MEDICINE | Admitting: INTERNAL MEDICINE
Payer: COMMERCIAL

## 2018-11-06 DIAGNOSIS — J44.1 COPD WITH ACUTE EXACERBATION (HCC): Primary | ICD-10-CM

## 2018-11-06 DIAGNOSIS — M50.320 DEGENERATION OF INTERVERTEBRAL DISC OF MID-CERVICAL REGION, UNSPECIFIED SPINAL LEVEL: ICD-10-CM

## 2018-11-06 DIAGNOSIS — M50.120 CERVICAL DISC DISORDER WITH RADICULOPATHY OF MID-CERVICAL REGION: ICD-10-CM

## 2018-11-06 DIAGNOSIS — J44.1 COPD EXACERBATION (HCC): ICD-10-CM

## 2018-11-06 DIAGNOSIS — M54.12 RADICULOPATHY, CERVICAL REGION: ICD-10-CM

## 2018-11-06 DIAGNOSIS — M50.220 HERNIATION OF INTERVERTEBRAL DISC OF MID-CERVICAL REGION, UNSPECIFIED SPINAL LEVEL: ICD-10-CM

## 2018-11-06 LAB
ALBUMIN SERPL-MCNC: 4.6 G/DL (ref 3.9–4.9)
ALP BLD-CCNC: 126 U/L (ref 40–130)
ALT SERPL-CCNC: 55 U/L (ref 0–33)
ANION GAP SERPL CALCULATED.3IONS-SCNC: 15 MEQ/L (ref 7–13)
AST SERPL-CCNC: 29 U/L (ref 0–35)
BILIRUB SERPL-MCNC: 0.3 MG/DL (ref 0–1.2)
BUN BLDV-MCNC: 7 MG/DL (ref 6–20)
CALCIUM SERPL-MCNC: 10 MG/DL (ref 8.6–10.2)
CHLORIDE BLD-SCNC: 105 MEQ/L (ref 98–107)
CO2: 20 MEQ/L (ref 22–29)
CREAT SERPL-MCNC: 0.9 MG/DL (ref 0.5–0.9)
GFR AFRICAN AMERICAN: >60
GFR NON-AFRICAN AMERICAN: >60
GLOBULIN: 2.8 G/DL (ref 2.3–3.5)
GLUCOSE BLD-MCNC: 235 MG/DL (ref 74–109)
HBA1C MFR BLD: 5.4 % (ref 4.8–5.9)
HCT VFR BLD CALC: 42.2 % (ref 37–47)
HEMOGLOBIN: 14.6 G/DL (ref 12–16)
LACTIC ACID, SEPSIS: 3.4 MMOL/L (ref 0.5–1.9)
MCH RBC QN AUTO: 32.7 PG (ref 27–31.3)
MCHC RBC AUTO-ENTMCNC: 34.5 % (ref 33–37)
MCV RBC AUTO: 94.7 FL (ref 82–100)
PDW BLD-RTO: 14 % (ref 11.5–14.5)
PLATELET # BLD: 178 K/UL (ref 130–400)
POTASSIUM SERPL-SCNC: 3.7 MEQ/L (ref 3.5–5.1)
RBC # BLD: 4.45 M/UL (ref 4.2–5.4)
SODIUM BLD-SCNC: 140 MEQ/L (ref 132–144)
TOTAL PROTEIN: 7.4 G/DL (ref 6.4–8.1)
WBC # BLD: 23.1 K/UL (ref 4.8–10.8)

## 2018-11-06 PROCEDURE — 94761 N-INVAS EAR/PLS OXIMETRY MLT: CPT

## 2018-11-06 PROCEDURE — 99285 EMERGENCY DEPT VISIT HI MDM: CPT

## 2018-11-06 PROCEDURE — 85027 COMPLETE CBC AUTOMATED: CPT

## 2018-11-06 PROCEDURE — 36415 COLL VENOUS BLD VENIPUNCTURE: CPT

## 2018-11-06 PROCEDURE — 2580000003 HC RX 258: Performed by: FAMILY MEDICINE

## 2018-11-06 PROCEDURE — 6370000000 HC RX 637 (ALT 250 FOR IP): Performed by: INTERNAL MEDICINE

## 2018-11-06 PROCEDURE — 6370000000 HC RX 637 (ALT 250 FOR IP): Performed by: FAMILY MEDICINE

## 2018-11-06 PROCEDURE — 6360000002 HC RX W HCPCS: Performed by: PHYSICIAN ASSISTANT

## 2018-11-06 PROCEDURE — 80053 COMPREHEN METABOLIC PANEL: CPT

## 2018-11-06 PROCEDURE — 71046 X-RAY EXAM CHEST 2 VIEWS: CPT

## 2018-11-06 PROCEDURE — 96368 THER/DIAG CONCURRENT INF: CPT

## 2018-11-06 PROCEDURE — 6370000000 HC RX 637 (ALT 250 FOR IP): Performed by: PHYSICIAN ASSISTANT

## 2018-11-06 PROCEDURE — 94640 AIRWAY INHALATION TREATMENT: CPT

## 2018-11-06 PROCEDURE — 2580000003 HC RX 258: Performed by: PHYSICIAN ASSISTANT

## 2018-11-06 PROCEDURE — 94664 DEMO&/EVAL PT USE INHALER: CPT

## 2018-11-06 PROCEDURE — 83036 HEMOGLOBIN GLYCOSYLATED A1C: CPT

## 2018-11-06 PROCEDURE — 6360000002 HC RX W HCPCS: Performed by: FAMILY MEDICINE

## 2018-11-06 PROCEDURE — 1210000000 HC MED SURG R&B

## 2018-11-06 PROCEDURE — 87040 BLOOD CULTURE FOR BACTERIA: CPT

## 2018-11-06 PROCEDURE — 83605 ASSAY OF LACTIC ACID: CPT

## 2018-11-06 PROCEDURE — 6360000002 HC RX W HCPCS: Performed by: INTERNAL MEDICINE

## 2018-11-06 PROCEDURE — 96365 THER/PROPH/DIAG IV INF INIT: CPT

## 2018-11-06 PROCEDURE — 96367 TX/PROPH/DG ADDL SEQ IV INF: CPT

## 2018-11-06 PROCEDURE — 96375 TX/PRO/DX INJ NEW DRUG ADDON: CPT

## 2018-11-06 RX ORDER — LEVOFLOXACIN 5 MG/ML
750 INJECTION, SOLUTION INTRAVENOUS ONCE
Status: COMPLETED | OUTPATIENT
Start: 2018-11-06 | End: 2018-11-06

## 2018-11-06 RX ORDER — IPRATROPIUM BROMIDE AND ALBUTEROL SULFATE 2.5; .5 MG/3ML; MG/3ML
1 SOLUTION RESPIRATORY (INHALATION)
Status: DISCONTINUED | OUTPATIENT
Start: 2018-11-06 | End: 2018-11-06

## 2018-11-06 RX ORDER — ONDANSETRON 2 MG/ML
4 INJECTION INTRAMUSCULAR; INTRAVENOUS EVERY 6 HOURS PRN
Status: DISCONTINUED | OUTPATIENT
Start: 2018-11-06 | End: 2018-11-12 | Stop reason: HOSPADM

## 2018-11-06 RX ORDER — ALBUTEROL SULFATE 2.5 MG/3ML
2.5 SOLUTION RESPIRATORY (INHALATION)
Status: DISCONTINUED | OUTPATIENT
Start: 2018-11-26 | End: 2018-11-06

## 2018-11-06 RX ORDER — ALBUTEROL SULFATE 2.5 MG/3ML
2.5 SOLUTION RESPIRATORY (INHALATION) ONCE
Status: COMPLETED | OUTPATIENT
Start: 2018-11-06 | End: 2018-11-06

## 2018-11-06 RX ORDER — SODIUM CHLORIDE 0.9 % (FLUSH) 0.9 %
10 SYRINGE (ML) INJECTION EVERY 12 HOURS SCHEDULED
Status: DISCONTINUED | OUTPATIENT
Start: 2018-11-06 | End: 2018-11-12 | Stop reason: HOSPADM

## 2018-11-06 RX ORDER — SODIUM CHLORIDE 0.9 % (FLUSH) 0.9 %
10 SYRINGE (ML) INJECTION PRN
Status: DISCONTINUED | OUTPATIENT
Start: 2018-11-06 | End: 2018-11-08

## 2018-11-06 RX ORDER — SODIUM CHLORIDE 0.9 % (FLUSH) 0.9 %
10 SYRINGE (ML) INJECTION EVERY 12 HOURS SCHEDULED
Status: DISCONTINUED | OUTPATIENT
Start: 2018-11-06 | End: 2018-11-08

## 2018-11-06 RX ORDER — SODIUM CHLORIDE 9 MG/ML
INJECTION, SOLUTION INTRAVENOUS CONTINUOUS
Status: DISCONTINUED | OUTPATIENT
Start: 2018-11-06 | End: 2018-11-07

## 2018-11-06 RX ORDER — SODIUM CHLORIDE 0.9 % (FLUSH) 0.9 %
10 SYRINGE (ML) INJECTION PRN
Status: DISCONTINUED | OUTPATIENT
Start: 2018-11-06 | End: 2018-11-12 | Stop reason: HOSPADM

## 2018-11-06 RX ORDER — ACETAMINOPHEN 325 MG/1
650 TABLET ORAL EVERY 4 HOURS PRN
Status: DISCONTINUED | OUTPATIENT
Start: 2018-11-06 | End: 2018-11-12 | Stop reason: HOSPADM

## 2018-11-06 RX ORDER — METHYLPREDNISOLONE SODIUM SUCCINATE 125 MG/2ML
125 INJECTION, POWDER, LYOPHILIZED, FOR SOLUTION INTRAMUSCULAR; INTRAVENOUS ONCE
Status: COMPLETED | OUTPATIENT
Start: 2018-11-06 | End: 2018-11-06

## 2018-11-06 RX ORDER — MAGNESIUM SULFATE IN WATER 40 MG/ML
2 INJECTION, SOLUTION INTRAVENOUS ONCE
Status: COMPLETED | OUTPATIENT
Start: 2018-11-06 | End: 2018-11-06

## 2018-11-06 RX ORDER — ALBUTEROL SULFATE 2.5 MG/3ML
2.5 SOLUTION RESPIRATORY (INHALATION)
Status: DISCONTINUED | OUTPATIENT
Start: 2018-11-06 | End: 2018-11-12 | Stop reason: HOSPADM

## 2018-11-06 RX ORDER — IPRATROPIUM BROMIDE AND ALBUTEROL SULFATE 2.5; .5 MG/3ML; MG/3ML
1 SOLUTION RESPIRATORY (INHALATION) ONCE
Status: COMPLETED | OUTPATIENT
Start: 2018-11-06 | End: 2018-11-06

## 2018-11-06 RX ORDER — METHYLPREDNISOLONE SODIUM SUCCINATE 40 MG/ML
40 INJECTION, POWDER, LYOPHILIZED, FOR SOLUTION INTRAMUSCULAR; INTRAVENOUS EVERY 8 HOURS
Status: DISCONTINUED | OUTPATIENT
Start: 2018-11-06 | End: 2018-11-07

## 2018-11-06 RX ORDER — KETOROLAC TROMETHAMINE 15 MG/ML
15 INJECTION, SOLUTION INTRAMUSCULAR; INTRAVENOUS EVERY 6 HOURS PRN
Status: DISCONTINUED | OUTPATIENT
Start: 2018-11-06 | End: 2018-11-09

## 2018-11-06 RX ORDER — IPRATROPIUM BROMIDE AND ALBUTEROL SULFATE 2.5; .5 MG/3ML; MG/3ML
1 SOLUTION RESPIRATORY (INHALATION) 3 TIMES DAILY
Status: DISCONTINUED | OUTPATIENT
Start: 2018-11-07 | End: 2018-11-07

## 2018-11-06 RX ADMIN — LEVOFLOXACIN 750 MG: 5 INJECTION, SOLUTION INTRAVENOUS at 17:27

## 2018-11-06 RX ADMIN — KETOROLAC TROMETHAMINE 15 MG: 15 INJECTION, SOLUTION INTRAMUSCULAR; INTRAVENOUS at 22:00

## 2018-11-06 RX ADMIN — IPRATROPIUM BROMIDE AND ALBUTEROL SULFATE 1 AMPULE: .5; 3 SOLUTION RESPIRATORY (INHALATION) at 14:15

## 2018-11-06 RX ADMIN — METHYLPREDNISOLONE SODIUM SUCCINATE 125 MG: 125 INJECTION, POWDER, FOR SOLUTION INTRAMUSCULAR; INTRAVENOUS at 14:27

## 2018-11-06 RX ADMIN — METHYLPREDNISOLONE SODIUM SUCCINATE 40 MG: 40 INJECTION, POWDER, FOR SOLUTION INTRAMUSCULAR; INTRAVENOUS at 20:54

## 2018-11-06 RX ADMIN — Medication 10 ML: at 20:55

## 2018-11-06 RX ADMIN — ALBUTEROL SULFATE 2.5 MG: 2.5 SOLUTION RESPIRATORY (INHALATION) at 14:15

## 2018-11-06 RX ADMIN — IPRATROPIUM BROMIDE AND ALBUTEROL SULFATE 1 AMPULE: .5; 3 SOLUTION RESPIRATORY (INHALATION) at 21:07

## 2018-11-06 RX ADMIN — CEFTRIAXONE SODIUM 1 G: 1 INJECTION, POWDER, FOR SOLUTION INTRAMUSCULAR; INTRAVENOUS at 15:57

## 2018-11-06 RX ADMIN — Medication 10 ML: at 20:54

## 2018-11-06 RX ADMIN — ACETAMINOPHEN 650 MG: 325 TABLET ORAL at 22:00

## 2018-11-06 RX ADMIN — MAGNESIUM SULFATE HEPTAHYDRATE 2 G: 40 INJECTION, SOLUTION INTRAVENOUS at 14:40

## 2018-11-06 RX ADMIN — AZITHROMYCIN MONOHYDRATE 500 MG: 500 INJECTION, POWDER, LYOPHILIZED, FOR SOLUTION INTRAVENOUS at 15:09

## 2018-11-06 RX ADMIN — SODIUM CHLORIDE: 9 INJECTION, SOLUTION INTRAVENOUS at 20:54

## 2018-11-06 RX ADMIN — ENOXAPARIN SODIUM 40 MG: 40 INJECTION SUBCUTANEOUS at 20:54

## 2018-11-06 RX ADMIN — ALBUTEROL SULFATE 2.5 MG: 2.5 SOLUTION RESPIRATORY (INHALATION) at 14:33

## 2018-11-06 ASSESSMENT — PAIN DESCRIPTION - DESCRIPTORS: DESCRIPTORS: PRESSURE;PINS AND NEEDLES

## 2018-11-06 ASSESSMENT — PAIN DESCRIPTION - PAIN TYPE: TYPE: ACUTE PAIN

## 2018-11-06 ASSESSMENT — ENCOUNTER SYMPTOMS
SHORTNESS OF BREATH: 1
GASTROINTESTINAL NEGATIVE: 1
EYES NEGATIVE: 1
ALLERGIC/IMMUNOLOGIC NEGATIVE: 1

## 2018-11-06 ASSESSMENT — PAIN DESCRIPTION - ORIENTATION: ORIENTATION: RIGHT;LEFT

## 2018-11-06 ASSESSMENT — PAIN SCALES - GENERAL
PAINLEVEL_OUTOF10: 9
PAINLEVEL_OUTOF10: 8

## 2018-11-06 ASSESSMENT — PAIN DESCRIPTION - LOCATION: LOCATION: CHEST

## 2018-11-06 NOTE — ED TRIAGE NOTES
A & Ox4. Skin pink warm and dry. States has a nonproductive cough with clear drainage from sinuses. Pt unable to speak complete sentences for triage. Trying to find patient a bed. Pt has harsh barky cough. Lungs with expiratory wheezing bilat.

## 2018-11-06 NOTE — ED PROVIDER NOTES
SYR 25GX1\" 25G X 1\" 3 ML MISC    USE AS DIRECTED EVERY month    BLOOD PRESSURE MONITORING (5 SERIES BP MONITOR/UPPER ARM) ANITHA    USE AS DIRECTED    BUDESONIDE-FORMOTEROL (SYMBICORT) 160-4.5 MCG/ACT AERO    Inhale 2 puffs into the lungs 2 times daily    BUTALBITAL-ASPIRIN-CAFFEINE (FIORINAL) -40 MG CAPSULE    Take 1 capsule by mouth every 4 hours as needed for Headaches    CALCIUM CARBONATE (TUMS) 500 MG CHEWABLE TABLET        CALCIUM CARBONATE 648 MG TABS    Take 1 tablet by mouth 2 times daily    CHOLECALCIFEROL (VITAMIN D) 2000 UNITS TABS TABLET    TAKE 1 TABLET BY MOUTH ONCE DAILY    CHOLECALCIFEROL (VITAMIN D3) 72899 UNITS CAPS    Take 1 capsule by mouth once a week    CLONIDINE (CATAPRES) 0.1 MG TABLET    Take 1 tablet by mouth 2 times daily    CLOTRIMAZOLE ANTI-FUNGAL 1 % CREAM    Apply topically 2 times daily as needed    CYANOCOBALAMIN 1000 MCG/ML INJECTION    Inject 1 mL into the muscle once for 1 dose Inject 1 ml into the muscle once a month    CYCLOBENZAPRINE (FLEXERIL) 10 MG TABLET    TAKE 1 TABLET BY MOUTH TWICE DAILY AS NEEDED FOR MUSCLE SPASMS    FOLIC ACID (FOLVITE) 1 MG TABLET    Take 1 tablet by mouth daily    GUAIFENESIN-CODEINE (TUSSI-ORGANIDIN NR) 100-10 MG/5ML SYRUP    Take 5 mLs by mouth 3 times daily as needed for Cough for up to 3 days. Bee Fofana HYDROCHLOROTHIAZIDE (HYDRODIURIL) 25 MG TABLET    TAKE 1 TABLET BY MOUTH DAILY    IBUPROFEN (ADVIL;MOTRIN) 800 MG TABLET    Take 1 tablet by mouth 2 times daily as needed for Pain    KETOCONAZOLE (NIZORAL) 2 % CREAM    APPLY TO AFFECTED AREA(S) DAILY AS NEEDED    LIDOCAINE (LIDODERM) 5 %    Place 3 patches onto the skin daily 18 hours on, 6 hours off.     LOSARTAN (COZAAR) 100 MG TABLET    TAKE 1 TABLET BY MOUTH ONCE DAILY    MAGNESIUM HYDROXIDE (MAGNESIA PO)    Take by mouth    MAGNESIUM OXIDE (MAG-OX) 400 (240 MG) MG TABLET    Take 1 tablet by mouth daily    MELOXICAM (MOBIC) 7.5 MG TABLET    Take 1 tablet by mouth every 12 hours    METOPROLOL Procedures    FINAL IMPRESSION      1. COPD with acute exacerbation ([facility]          DISPOSITION/PLAN   DISPOSITION Decision To Admit 11/06/2018 04:21:27 PM      PATIENT REFERRED TO:  No follow-up provider specified.     DISCHARGE MEDICATIONS:  New Prescriptions    No medications on file          (Please note thatportions of this note were completed with a voice recognition program.  Efforts were made to edit the dictations but occasionally words are mis-transcribed.)    Bethany Valladares MD (electronically signed)  Attending Emergency Physician          Marcus Murphy MD  11/06/18 5759

## 2018-11-06 NOTE — H&P
Medications Prior to Admission:      Prior to Admission medications    Medication Sig Start Date End Date Taking? Authorizing Provider   predniSONE (DELTASONE) 50 MG tablet Take 1 tablet by mouth daily for 7 days 11/5/18 11/12/18 Yes NIKI Solis CNP   guaiFENesin-codeine (TUSSI-ORGANIDIN NR) 100-10 MG/5ML syrup Take 5 mLs by mouth 3 times daily as needed for Cough for up to 3 days. . 11/5/18 11/8/18 Yes NIKI Solis CNP   Cholecalciferol (VITAMIN D) 2000 units TABS tablet TAKE 1 TABLET BY MOUTH ONCE DAILY 11/1/18  Yes Alayna Brower MD   losartan (COZAAR) 100 MG tablet TAKE 1 TABLET BY MOUTH ONCE DAILY 10/31/18  Yes Alayna Brower MD   ketoconazole (NIZORAL) 2 % cream APPLY TO AFFECTED AREA(S) DAILY AS NEEDED 10/17/18  Yes Alayna Brower MD   triamterene-hydrochlorothiazide [facility]) 37.5-25 MG per tablet Take 1 tablet by mouth daily 9/27/18  Yes Alayna Brower MD   ibuprofen (ADVIL;MOTRIN) 800 MG tablet Take 1 tablet by mouth 2 times daily as needed for Pain 9/24/18  Yes Alayna Brower MD   folic acid (FOLVITE) 1 MG tablet Take 1 tablet by mouth daily 9/4/18  Yes Alayna Brower MD   metoprolol tartrate (LOPRESSOR) 100 MG tablet Take 1 tablet by mouth 2 times daily 8/3/18  Yes Anand Sherman,    mometasone (ELOCON) 0.1 % cream apply topically DAILY 7/9/18  Yes Alayna Brower MD   CLOTRIMAZOLE ANTI-FUNGAL 1 % cream Apply topically 2 times daily as needed 7/9/18  Yes Alayna Brower MD   hydrochlorothiazide (HYDRODIURIL) 25 MG tablet TAKE 1 TABLET BY MOUTH DAILY 7/9/18  Yes Alayna Brower MD   topiramate (TOPAMAX) 50 MG tablet TAKE 1 TABLET BY MOUTH TWICE DAILY 6/12/18  Yes Alayna Brower MD   calcium carbonate (TUMS) 500 MG chewable tablet  6/4/18  Yes Historical Provider, MD   magnesium oxide (MAG-OX) 400 (240 Mg) MG tablet Take 1 tablet by mouth daily 6/1/18  Yes Alayna Brower MD   Cholecalciferol (VITAMIN D3) 53852 units CAPS Take 1 capsule by mouth once a week 5/30/18  Yes Lina Alonzo 42 tablet Take by mouth. 9/28/18   Anand Sherman DO   varenicline (CHANTIX CONTINUING MONTH SANDIP) 1 MG tablet Take 1 tablet by mouth 2 times daily 9/28/18   Anand Sherman DO   budesonide-formoterol (SYMBICORT) 160-4.5 MCG/ACT AERO Inhale 2 puffs into the lungs 2 times daily 9/28/18   Chavez Sweeney MD   BJosé MiguelD 3CC LUER-DONNA SYR 25GX1\" 25G X 1\" 3 ML MISC USE AS DIRECTED EVERY month 5/25/18   Historical Provider, MD   ondansetron (ZOFRAN ODT) 4 MG disintegrating tablet Take 1 tablet by mouth every 8 hours as needed for Nausea or Vomiting 6/4/18   NIKI Hall CNP   calcium carbonate 648 MG TABS Take 1 tablet by mouth 2 times daily 6/1/18   Gabe Pagan MD   cyanocobalamin 1000 MCG/ML injection Inject 1 mL into the muscle once for 1 dose Inject 1 ml into the muscle once a month 5/22/18 7/6/18  Gabe Pagan MD   Blood Pressure Monitoring (5 SERIES BP MONITOR/UPPER ARM) ANITHA USE AS DIRECTED 4/9/18   Historical Provider, MD   pantoprazole (PROTONIX) 40 MG tablet Take 1 tablet by mouth every morning (before breakfast) 6/19/17   NIKI Almanza CNP   SYRINGE-NEEDLE, DISP, 3 ML 23G X 1\" 3 ML MISC Inject 1 mL into the muscle every 30 days 1/4/17   Silva Justin DO       Allergies:  Latex; Influenza vaccines; Eggs or egg-derived products; Gabapentin; Pneumococcal vaccines; Povidone iodine; and Varicella virus vaccine live    Social History:      The patient currently lives home    TOBACCO:   reports that she has been smoking Cigarettes. She has a 5.00 pack-year smoking history. She has never used smokeless tobacco.  ETOH:   reports that she does not drink alcohol. Family History:       Positive as follows:    Family History   Problem Relation Age of Onset    High Blood Pressure Mother     Kidney Disease Mother     Lupus Mother     Bipolar Disorder Son        REVIEW OF SYSTEMS:   Pertinent positives as noted in the HPI. All other systems reviewed and negative.     PHYSICAL EXAM:    BP

## 2018-11-07 ENCOUNTER — TELEPHONE (OUTPATIENT)
Dept: PRIMARY CARE CLINIC | Age: 48
End: 2018-11-07

## 2018-11-07 ENCOUNTER — APPOINTMENT (OUTPATIENT)
Dept: MRI IMAGING | Age: 48
DRG: 140 | End: 2018-11-07
Payer: COMMERCIAL

## 2018-11-07 PROBLEM — M50.320 DEGENERATION OF INTERVERTEBRAL DISC OF MID-CERVICAL REGION: Status: ACTIVE | Noted: 2018-11-07

## 2018-11-07 PROBLEM — M50.220 DISPLACEMENT OF INTERVERTEBRAL DISC OF MID-CERVICAL REGION: Status: ACTIVE | Noted: 2018-11-07

## 2018-11-07 PROBLEM — M54.12 RADICULOPATHY, CERVICAL REGION: Status: ACTIVE | Noted: 2018-11-07

## 2018-11-07 PROBLEM — M50.120 CERVICAL DISC DISORDER WITH RADICULOPATHY OF MID-CERVICAL REGION: Status: ACTIVE | Noted: 2018-11-07

## 2018-11-07 LAB
ANION GAP SERPL CALCULATED.3IONS-SCNC: 11 MEQ/L (ref 7–13)
BASOPHILS ABSOLUTE: 0 K/UL (ref 0–0.2)
BASOPHILS RELATIVE PERCENT: 0.1 %
BUN BLDV-MCNC: 12 MG/DL (ref 6–20)
CALCIUM SERPL-MCNC: 8.9 MG/DL (ref 8.6–10.2)
CHLORIDE BLD-SCNC: 112 MEQ/L (ref 98–107)
CO2: 21 MEQ/L (ref 22–29)
CREAT SERPL-MCNC: 0.87 MG/DL (ref 0.5–0.9)
EOSINOPHILS ABSOLUTE: 0 K/UL (ref 0–0.7)
EOSINOPHILS RELATIVE PERCENT: 0 %
GFR AFRICAN AMERICAN: >60
GFR NON-AFRICAN AMERICAN: >60
GLUCOSE BLD-MCNC: 196 MG/DL (ref 74–109)
HCT VFR BLD CALC: 37.4 % (ref 37–47)
HEMOGLOBIN: 12.9 G/DL (ref 12–16)
LACTIC ACID: 2 MMOL/L (ref 0.5–2.2)
LYMPHOCYTES ABSOLUTE: 0.7 K/UL (ref 1–4.8)
LYMPHOCYTES RELATIVE PERCENT: 3.6 %
MCH RBC QN AUTO: 32.8 PG (ref 27–31.3)
MCHC RBC AUTO-ENTMCNC: 34.4 % (ref 33–37)
MCV RBC AUTO: 95.2 FL (ref 82–100)
MONOCYTES ABSOLUTE: 0.8 K/UL (ref 0.2–0.8)
MONOCYTES RELATIVE PERCENT: 4.3 %
NEUTROPHILS ABSOLUTE: 16.9 K/UL (ref 1.4–6.5)
NEUTROPHILS RELATIVE PERCENT: 92 %
PDW BLD-RTO: 14.1 % (ref 11.5–14.5)
PLATELET # BLD: 155 K/UL (ref 130–400)
POTASSIUM REFLEX MAGNESIUM: 3.9 MEQ/L (ref 3.5–5.1)
RBC # BLD: 3.93 M/UL (ref 4.2–5.4)
SODIUM BLD-SCNC: 144 MEQ/L (ref 132–144)
WBC # BLD: 18.3 K/UL (ref 4.8–10.8)

## 2018-11-07 PROCEDURE — 2580000003 HC RX 258: Performed by: INTERNAL MEDICINE

## 2018-11-07 PROCEDURE — 80048 BASIC METABOLIC PNL TOTAL CA: CPT

## 2018-11-07 PROCEDURE — 6360000002 HC RX W HCPCS: Performed by: INTERNAL MEDICINE

## 2018-11-07 PROCEDURE — 6370000000 HC RX 637 (ALT 250 FOR IP): Performed by: ANESTHESIOLOGY

## 2018-11-07 PROCEDURE — 72141 MRI NECK SPINE W/O DYE: CPT

## 2018-11-07 PROCEDURE — 99223 1ST HOSP IP/OBS HIGH 75: CPT | Performed by: INTERNAL MEDICINE

## 2018-11-07 PROCEDURE — 2580000003 HC RX 258: Performed by: PHYSICIAN ASSISTANT

## 2018-11-07 PROCEDURE — 83605 ASSAY OF LACTIC ACID: CPT

## 2018-11-07 PROCEDURE — 85025 COMPLETE CBC W/AUTO DIFF WBC: CPT

## 2018-11-07 PROCEDURE — 6370000000 HC RX 637 (ALT 250 FOR IP): Performed by: INTERNAL MEDICINE

## 2018-11-07 PROCEDURE — 94761 N-INVAS EAR/PLS OXIMETRY MLT: CPT

## 2018-11-07 PROCEDURE — 36415 COLL VENOUS BLD VENIPUNCTURE: CPT

## 2018-11-07 PROCEDURE — 94640 AIRWAY INHALATION TREATMENT: CPT

## 2018-11-07 PROCEDURE — 1210000000 HC MED SURG R&B

## 2018-11-07 PROCEDURE — 6360000002 HC RX W HCPCS: Performed by: PHYSICIAN ASSISTANT

## 2018-11-07 PROCEDURE — 2580000003 HC RX 258: Performed by: FAMILY MEDICINE

## 2018-11-07 PROCEDURE — 70551 MRI BRAIN STEM W/O DYE: CPT

## 2018-11-07 RX ORDER — FOLIC ACID 1 MG/1
1 TABLET ORAL DAILY
Status: DISCONTINUED | OUTPATIENT
Start: 2018-11-07 | End: 2018-11-12 | Stop reason: HOSPADM

## 2018-11-07 RX ORDER — FLUTICASONE PROPIONATE 50 MCG
2 SPRAY, SUSPENSION (ML) NASAL DAILY
Status: DISCONTINUED | OUTPATIENT
Start: 2018-11-07 | End: 2018-11-12 | Stop reason: HOSPADM

## 2018-11-07 RX ORDER — AMITRIPTYLINE HYDROCHLORIDE 50 MG/1
50 TABLET, FILM COATED ORAL NIGHTLY
Status: DISCONTINUED | OUTPATIENT
Start: 2018-11-07 | End: 2018-11-12 | Stop reason: HOSPADM

## 2018-11-07 RX ORDER — CYCLOBENZAPRINE HCL 5 MG
10 TABLET ORAL 3 TIMES DAILY PRN
Status: DISCONTINUED | OUTPATIENT
Start: 2018-11-07 | End: 2018-11-12 | Stop reason: HOSPADM

## 2018-11-07 RX ORDER — ONDANSETRON 4 MG/1
4 TABLET, ORALLY DISINTEGRATING ORAL EVERY 8 HOURS PRN
Status: DISCONTINUED | OUTPATIENT
Start: 2018-11-07 | End: 2018-11-12 | Stop reason: HOSPADM

## 2018-11-07 RX ORDER — ATORVASTATIN CALCIUM 10 MG/1
10 TABLET, FILM COATED ORAL NIGHTLY
Status: DISCONTINUED | OUTPATIENT
Start: 2018-11-07 | End: 2018-11-12 | Stop reason: HOSPADM

## 2018-11-07 RX ORDER — DEXAMETHASONE SODIUM PHOSPHATE 4 MG/ML
4 INJECTION, SOLUTION INTRA-ARTICULAR; INTRALESIONAL; INTRAMUSCULAR; INTRAVENOUS; SOFT TISSUE ONCE
Status: DISCONTINUED | OUTPATIENT
Start: 2018-11-07 | End: 2018-11-07

## 2018-11-07 RX ORDER — BENZONATATE 100 MG/1
100 CAPSULE ORAL 3 TIMES DAILY
Status: DISCONTINUED | OUTPATIENT
Start: 2018-11-07 | End: 2018-11-12 | Stop reason: HOSPADM

## 2018-11-07 RX ORDER — IPRATROPIUM BROMIDE AND ALBUTEROL SULFATE 2.5; .5 MG/3ML; MG/3ML
1 SOLUTION RESPIRATORY (INHALATION) 4 TIMES DAILY
Status: DISCONTINUED | OUTPATIENT
Start: 2018-11-07 | End: 2018-11-12

## 2018-11-07 RX ORDER — CLONIDINE HYDROCHLORIDE 0.1 MG/1
0.1 TABLET ORAL 2 TIMES DAILY
Status: DISCONTINUED | OUTPATIENT
Start: 2018-11-07 | End: 2018-11-08

## 2018-11-07 RX ORDER — LIDOCAINE 4 G/G
2 PATCH TOPICAL DAILY
Status: DISCONTINUED | OUTPATIENT
Start: 2018-11-07 | End: 2018-11-12 | Stop reason: HOSPADM

## 2018-11-07 RX ORDER — HYDROCHLOROTHIAZIDE 25 MG/1
1 TABLET ORAL DAILY
Status: DISCONTINUED | OUTPATIENT
Start: 2018-11-07 | End: 2018-11-07

## 2018-11-07 RX ORDER — ASPIRIN 81 MG/1
81 TABLET, CHEWABLE ORAL DAILY
Status: DISCONTINUED | OUTPATIENT
Start: 2018-11-07 | End: 2018-11-12 | Stop reason: HOSPADM

## 2018-11-07 RX ORDER — METOPROLOL TARTRATE 50 MG/1
100 TABLET, FILM COATED ORAL 2 TIMES DAILY
Status: DISCONTINUED | OUTPATIENT
Start: 2018-11-07 | End: 2018-11-10

## 2018-11-07 RX ORDER — VARENICLINE TARTRATE 1 MG/1
1 TABLET, FILM COATED ORAL 2 TIMES DAILY
Status: DISCONTINUED | OUTPATIENT
Start: 2018-11-07 | End: 2018-11-12 | Stop reason: HOSPADM

## 2018-11-07 RX ORDER — BUTALBITAL, ASPIRIN, AND CAFFEINE 325; 50; 40 MG/1; MG/1; MG/1
1 CAPSULE ORAL EVERY 4 HOURS PRN
Status: DISCONTINUED | OUTPATIENT
Start: 2018-11-07 | End: 2018-11-12 | Stop reason: HOSPADM

## 2018-11-07 RX ORDER — PREGABALIN 50 MG/1
50 CAPSULE ORAL 2 TIMES DAILY
Status: DISCONTINUED | OUTPATIENT
Start: 2018-11-07 | End: 2018-11-12 | Stop reason: HOSPADM

## 2018-11-07 RX ORDER — CODEINE PHOSPHATE AND GUAIFENESIN 10; 100 MG/5ML; MG/5ML
5 SOLUTION ORAL EVERY 4 HOURS PRN
Status: DISCONTINUED | OUTPATIENT
Start: 2018-11-07 | End: 2018-11-12 | Stop reason: HOSPADM

## 2018-11-07 RX ORDER — IBUPROFEN 600 MG/1
600 TABLET ORAL EVERY 8 HOURS PRN
Status: DISCONTINUED | OUTPATIENT
Start: 2018-11-07 | End: 2018-11-12 | Stop reason: HOSPADM

## 2018-11-07 RX ORDER — METHYLPREDNISOLONE SODIUM SUCCINATE 40 MG/ML
40 INJECTION, POWDER, LYOPHILIZED, FOR SOLUTION INTRAMUSCULAR; INTRAVENOUS 2 TIMES DAILY
Status: DISCONTINUED | OUTPATIENT
Start: 2018-11-07 | End: 2018-11-10

## 2018-11-07 RX ORDER — TRIAMTERENE AND HYDROCHLOROTHIAZIDE 37.5; 25 MG/1; MG/1
1 TABLET ORAL DAILY
Status: DISCONTINUED | OUTPATIENT
Start: 2018-11-07 | End: 2018-11-12 | Stop reason: HOSPADM

## 2018-11-07 RX ORDER — PANTOPRAZOLE SODIUM 40 MG/1
40 TABLET, DELAYED RELEASE ORAL
Status: DISCONTINUED | OUTPATIENT
Start: 2018-11-08 | End: 2018-11-12 | Stop reason: HOSPADM

## 2018-11-07 RX ORDER — LOSARTAN POTASSIUM 50 MG/1
100 TABLET ORAL DAILY
Status: DISCONTINUED | OUTPATIENT
Start: 2018-11-07 | End: 2018-11-12 | Stop reason: HOSPADM

## 2018-11-07 RX ORDER — TRAMADOL HYDROCHLORIDE 50 MG/1
50 TABLET ORAL EVERY 6 HOURS PRN
Status: DISCONTINUED | OUTPATIENT
Start: 2018-11-07 | End: 2018-11-12 | Stop reason: HOSPADM

## 2018-11-07 RX ADMIN — METOPROLOL TARTRATE 100 MG: 50 TABLET ORAL at 20:48

## 2018-11-07 RX ADMIN — TRAMADOL HYDROCHLORIDE 50 MG: 50 TABLET, FILM COATED ORAL at 20:48

## 2018-11-07 RX ADMIN — Medication 10 ML: at 08:28

## 2018-11-07 RX ADMIN — METHYLPREDNISOLONE SODIUM SUCCINATE 40 MG: 40 INJECTION, POWDER, FOR SOLUTION INTRAMUSCULAR; INTRAVENOUS at 04:47

## 2018-11-07 RX ADMIN — KETOROLAC TROMETHAMINE 15 MG: 15 INJECTION, SOLUTION INTRAMUSCULAR; INTRAVENOUS at 04:46

## 2018-11-07 RX ADMIN — ENOXAPARIN SODIUM 40 MG: 40 INJECTION SUBCUTANEOUS at 20:47

## 2018-11-07 RX ADMIN — FLUTICASONE PROPIONATE 2 SPRAY: 50 SPRAY, METERED NASAL at 12:38

## 2018-11-07 RX ADMIN — METHYLPREDNISOLONE SODIUM SUCCINATE 40 MG: 40 INJECTION, POWDER, FOR SOLUTION INTRAMUSCULAR; INTRAVENOUS at 20:47

## 2018-11-07 RX ADMIN — ATORVASTATIN CALCIUM 10 MG: 10 TABLET, FILM COATED ORAL at 20:48

## 2018-11-07 RX ADMIN — BENZONATATE 100 MG: 100 CAPSULE ORAL at 16:48

## 2018-11-07 RX ADMIN — VARENICLINE TARTRATE 1 MG: 1 TABLET, FILM COATED ORAL at 21:28

## 2018-11-07 RX ADMIN — IPRATROPIUM BROMIDE AND ALBUTEROL SULFATE 1 AMPULE: .5; 3 SOLUTION RESPIRATORY (INHALATION) at 20:15

## 2018-11-07 RX ADMIN — CLONIDINE HYDROCHLORIDE 0.1 MG: 0.1 TABLET ORAL at 20:48

## 2018-11-07 RX ADMIN — BENZONATATE 100 MG: 100 CAPSULE ORAL at 20:48

## 2018-11-07 RX ADMIN — IPRATROPIUM BROMIDE AND ALBUTEROL SULFATE 1 AMPULE: .5; 3 SOLUTION RESPIRATORY (INHALATION) at 16:04

## 2018-11-07 RX ADMIN — AZITHROMYCIN MONOHYDRATE 500 MG: 500 INJECTION, POWDER, LYOPHILIZED, FOR SOLUTION INTRAVENOUS at 10:55

## 2018-11-07 RX ADMIN — ACETAMINOPHEN 650 MG: 325 TABLET ORAL at 04:46

## 2018-11-07 RX ADMIN — ASPIRIN 81 MG: 81 TABLET, CHEWABLE ORAL at 16:48

## 2018-11-07 RX ADMIN — IPRATROPIUM BROMIDE AND ALBUTEROL SULFATE 1 AMPULE: .5; 3 SOLUTION RESPIRATORY (INHALATION) at 11:30

## 2018-11-07 RX ADMIN — AMITRIPTYLINE HYDROCHLORIDE 50 MG: 50 TABLET, FILM COATED ORAL at 20:48

## 2018-11-07 RX ADMIN — IPRATROPIUM BROMIDE AND ALBUTEROL SULFATE 1 AMPULE: .5; 3 SOLUTION RESPIRATORY (INHALATION) at 07:30

## 2018-11-07 RX ADMIN — FOLIC ACID 1 MG: 1 TABLET ORAL at 20:47

## 2018-11-07 RX ADMIN — GUAIFENESIN AND CODEINE PHOSPHATE 5 ML: 10; 100 LIQUID ORAL at 12:38

## 2018-11-07 RX ADMIN — SODIUM CHLORIDE: 9 INJECTION, SOLUTION INTRAVENOUS at 04:47

## 2018-11-07 RX ADMIN — GUAIFENESIN AND CODEINE PHOSPHATE 5 ML: 10; 100 LIQUID ORAL at 21:01

## 2018-11-07 RX ADMIN — PREGABALIN 50 MG: 50 CAPSULE ORAL at 20:48

## 2018-11-07 ASSESSMENT — PAIN SCALES - GENERAL
PAINLEVEL_OUTOF10: 9
PAINLEVEL_OUTOF10: 8

## 2018-11-07 NOTE — CARE COORDINATION
MET WITH PATIENT, FREEDOM OF CHOICE OFFERED, STATES INDEPENDENT , FROM HOME , HAS NEBULIZER AND MEDICATION, PLAN IS TO RETURN HOME , AGREES TO PULMONARY REHAB IF APPROPRIATE. WILL NEED ORDER.

## 2018-11-07 NOTE — PROGRESS NOTES
Hospitalist  Follow-up      Date of Service: 11/7/2018    Subjective:    Still complaining of hacking cough, still wheezing. No productive cough. Having nasal congestion with yellow thick secretions. Afebrile today. Complains of chest pain when she has severe cough. Still short of breath. Past Medical History:   Diagnosis Date    Anxiety     Back pain     back surgery x 4    B[facility] disorder ([facility]     CAD (coronary artery disease)     CAFL [facility]ch[facility]) [facility] 11/3/2016    Chronic bronchitis ([facility]     Chronic pain     Colitis     DDD (degenerative disc disease), lumbar 12/22/2016    Depression     Endometriosis     Excessive caffeine abuse, continuous ([facility]) 7/6/2018    Gastritis     GERD (gastroesophageal reflux disease)     History of myocardial infarction     HTN (hypertension), benign 2/19/2016    Hypokalemia     Impaired mobility and activities of daily living     Left hemiparesis ([facility]     Multiple sclerosis ([facility]     Neck pain     surgery x 1    Over weight     PTSD (post-traumatic stress disorder)     Sensory neuropathy 12/22/2016    TIA (transient ischemic attack)     Tobacco abuse     [facility] 11/11/2016     family history includes Bipolar Disorder in her son; High Blood Pressure in her mother; Kidney Disease in her mother; Lupus in her mother. reports that she has been smoking Cigarettes. She has a 5.00 pack-year smoking history. She has never used smokeless tobacco. She reports that she does not drink alcohol or use drugs. Case DW RN       Objective:  Exam:  BP (!) 162/90   Pulse 95   Temp 98.1 °F (36.7 °C) (Oral)   Resp 18   Ht 5' 7\" (1.702 m)   Wt 199 lb (90.3 kg)   SpO2 95%   BMI 31.17 kg/m²     Physical Exam:  General appearance:  mild distress  HEENT:  Normal cephalic, atraumatic without obvious deformity. Pupils equal, round, and reactive to light. Extra ocular muscles intact.  Conjunctivae/corneas 30 mL Oral Daily PRN Sanjiv Beebe        ondansetron [facility]) injection 4 mg  4 mg Intravenous Q6H PRN Sanjiv Love        enoxaparin (LOVENOX) injection 40 mg  40 mg Subcutaneous Daily Annabel Jones [place]   40 mg at 11/06/18 2054    0.9 % sodium chloride infusion   Intravenous Continuous Akhil Sanjiv Hartmann 75 mL/hr at 11/07/18 0447      albuterol (PROVENTIL) nebulizer solution 2.5 mg  2.5 mg Nebulization Q2H PRN Manjula Mead MD        acetaminophen (TYLENOL) tablet 650 mg  650 mg Oral Q4H PRN Batsheva Kim MD   650 mg at 11/07/18 0446    ketorolac (TORADOL) injection 15 mg  15 mg Intravenous Q6H PRN Batsheva Kim MD   15 mg at 11/07/18 0446       Data:  LABS:   Lab Results   Component Value Date     11/07/2018    K 3.9 11/07/2018     11/07/2018    CO2 21 11/07/2018    BUN 12 11/07/2018    CREATININE 0.87 11/07/2018    GFRAA >60.0 11/07/2018    LABGLOM >60.0 11/07/2018    GLUCOSE 196 11/07/2018    MG 2.1 08/29/2018    CALCIUM 8.9 11/07/2018     Lab Results   Component Value Date    WBC 18.3 11/07/2018    RBC 3.93 11/07/2018    HGB 12.9 11/07/2018    HCT 37.4 11/07/2018    MCV 95.2 11/07/2018    RDW 14.1 11/07/2018     11/07/2018     Lab Results   Component Value Date    INR 0.9 04/17/2018    PROTIME 9.4 (L) 04/17/2018     No results found for: ORG    RADIOLOGY:  Xr Chest Standard (2 Vw)    Result Date: 11/7/2018  XR CHEST (2 VW): 11/6/2018 CLINICAL HISTORY:  sob . COMPARISON: 11/5/2018 and 4/15/2016. Upright PA and lateral radiographs of the chest were obtained. FINDINGS: Mild diffuse coarsening of the bronchovascular structures may be bronchitis and/or COPD, which is best evaluated clinically. There are no focal infiltrates, pleural effusion, cardiomegaly, vascular congestion, pneumothorax, or displaced fractures identified. Lower cervical fusion hardware is noted.      FINDINGS SUGGESTIVE OF BRONCHITIS AND/OR COPD, WHICH IS BEST EVALUATED CLINICALLY. NO FOCAL PNEUMONIA, OR OTHER SIGNIFICANT CHANGE FROM PRIOR STUDIES IDENTIFIED. Asesment and Plan :  . Severe copd with acute exacerbation and acute bronchitis  . Acute laryngitis  Acute sinutsitis  . Tobacco use  . Recommendations:  C/w systematic steroids, c/w azithromycin  Add cough syrup  acapella valve  Counseled smoking cessation  May need 1-2 days to improve clinically    Update:  Was called by the nurse because the patient is complaining of right upper extremity numbness that started this morning but the patient did not tell me about it. She said she had history of TIAs before and she also had cervical disc disease and she had cervical surgery. There is noted tongue diffusion. On clinical exam, the speech is fluent. No facial droop. No tongue diffusion. Muscle strength is weaker in the left upper and left lower extremities but it's fluctuating when I also patient to increase her muscle strength so I don't think there is objective neuromuscular weakness. She stated that this sensation to light skin touch on the right arm right face and right leg is less than the left side. She had MRI of the brain in November 2017 which was normal.  She also had echo and carotid Doppler in 2016 which was negative    We get MRI of the brain and cervical MRI. She complains of cervicalgia and posterior headache. I think this is more cervical radiculopathy.   We get cervical MRI as well as an consult with her pain management doctor    Giacomo Condon MD    Electronically signed by Giacomo Condon MD on 11/7/18 at 10:49 AM

## 2018-11-07 NOTE — CONSULTS
moderately painful lumbar spine range of motion  LUNGS:  Slight wheezingtachycardic with regular rhythm and tachypneic  CARDIOVASCULAR:  tachycardic with regular rhythm  ABDOMEN:  No scars, normal bowel sounds, soft, non-distended, non-tender, no masses palpated, no hepatosplenomegally  MUSCULOSKELETAL:  Normal muscle tone, muscle strength appears to be symmetrical bilateral upper and lower extremity   NEUROLOGIC:  Mental Status Exam:  Level of Alertness:   awake  Orientation:   person, place, time  Memory:   normal  Cranial Nerves:  cranial nerves II-XII are grossly intact  Motor Exam:  Motor exam is symmetrical 5 out of 5 all extremities bilaterally  Sensory:  Sensory intact  SKIN:  no bruising or bleeding    DATA:   Diagnostic tests reviewed for today's visit:    All labs and imaging results reviewed. Lab Results   Component Value Date    WBC 18.3 11/07/2018    HGB 12.9 11/07/2018    HCT 37.4 11/07/2018    MCV 95.2 11/07/2018     11/07/2018     11/07/2018    K 3.9 11/07/2018     11/07/2018    CO2 21 11/07/2018    BUN 12 11/07/2018    CREATININE 0.87 11/07/2018    CALCIUM 8.9 11/07/2018    BNP 72 12/07/2013    ALKPHOS 126 11/06/2018    ALT 55 11/06/2018    AST 29 11/06/2018    BILITOT 0.3 11/06/2018    LABALBU 4.6 11/06/2018       Xr Chest Standard (2 Vw)    Result Date: 11/7/2018  XR CHEST (2 VW): 11/6/2018 CLINICAL HISTORY:  sob . COMPARISON: 11/5/2018 and 4/15/2016. Upright PA and lateral radiographs of the chest were obtained. FINDINGS: Mild diffuse coarsening of the bronchovascular structures may be bronchitis and/or COPD, which is best evaluated clinically. There are no focal infiltrates, pleural effusion, cardiomegaly, vascular congestion, pneumothorax, or displaced fractures identified. Lower cervical fusion hardware is noted. FINDINGS SUGGESTIVE OF BRONCHITIS AND/OR COPD, WHICH IS BEST EVALUATED CLINICALLY.  NO FOCAL PNEUMONIA, OR OTHER SIGNIFICANT CHANGE FROM PRIOR STUDIES IDENTIFIED. Xr Chest Standard (2 Vw)    Result Date: 11/5/2018  EXAMINATION: XR CHEST (2 VW)  CLINICAL HISTORY:  cough COMPARISONS: August 7, 2018  FINDINGS: Two views of the chest are submitted. The cardiac silhouette is of normal size configuration. The mediastinum is unremarkable. Pulmonary vascular unremarkable. Right sided trachea. No focal infiltrates. No effusions. No Pneumothoraces. NO ACUTE ACTIVE CARDIOPULMONARY PROCESS        ASSESSMENT & PLAN  Active Hospital Problems    Diagnosis Date Noted    Radiculopathy, cervical region [M54.12] 11/07/2018    Degeneration of intervertebral disc of mid-cervical region [M50.320] 11/07/2018    Cervical disc disorder with radiculopathy of mid-cervical region [M50.120] 11/07/2018    Displacement of intervertebral disc of mid-cervical region [M50.220] 11/07/2018    COPD exacerbation ([facility] [address] [J44.1] 11/06/2018    Lumbosacral radiculopathy at S1 [M54.17] 12/22/2016    DDD (degenerative disc disease), lumbar [M51.36] 12/22/2016       Giving the patient progressive increasing pain and symptoms involving upper extremities I recommended for MRI of the cervical spine to evaluate for any nerve compression or other soft tissue issues. RECOMMENDATION:  SEE ORDERS    Await final MRI report  Start Ultram 50 mg q 6 hrs for pain  Start Lyrica 50 mg BID   Decadron 4 mg x 1 IV     Will monitor, after MRI Neuro-check.     SIGNATURE: Hill Craig MD PATIENT NAME: Cuca Duckworth   DATE: November 7, 2018 MRN: [id number]   TIME: 6:59 PM PAGER/CONTACT #: [phone]

## 2018-11-07 NOTE — PROGRESS NOTES
pack years  []   Pulmonary Disorder  (acute or chronic)  []   Severe or Chronic w/ Exacerbation  [x]     Surgical Status No [x]   Surgeries     General []   Surgery Lower []   Abdominal Thoracic or []   Upper Abdominal Thoracic with  PulmonaryDisorder  []     Chest X-ray Clear/Not  Ordered     []  Chronic Changes  Results Pending  [x]  Infiltrates, atelectasis, pleural effusion, or edema  []  Infiltrates in more than one lobe []  Infiltrate + Atelectasis, &/or pleural effusion  []    Respiratory Pattern Regular,  RR = 12-20 [x]  Increased,  RR = 21-25 []  SAMAYOA, irregular,  or RR = 26-30 []  Decreased FEV1  or RR = 31-35 []  Severe SOB, use  of accessory muscles, or RR ? 35  []    Mental Status Alert, oriented,  Cooperative [x]  Confused but Follows commands []  Lethargic or unable to follow commands []  Obtunded  []  Comatose  []    Breath Sounds Clear to  auscultation  []  Decreased unilaterally or  in bases only []  Decreased  bilaterally  []  Crackles or intermittent wheezes []  Wheezes []    Cough Strong, Spontan., & nonproductive [x]  Strong,  spontaneous, &  productive []  Weak,  Nonproductive []  Weak, productive or  with wheezes []  No spontaneous  cough or may require suctioning []    Level of Activity Ambulatory []  Ambulatory w/ Assist  [x]  Non-ambulatory []  Paraplegic []  Quadriplegic []    Total    Score:___10____     Triage Score:_4_______      Tri       Triage:     1. (>20) Freq: Q3    2. (16-20) Freq: Q4   3. (11-15) Freq: QID & Albuterol Q2 PRN    4. (6-10) Freq: TID & Albuterol Q2 PRN    5. (0-5) Freq Q4prn

## 2018-11-07 NOTE — PLAN OF CARE
Problem: Falls - Risk of:  Goal: Will remain free from falls  Will remain free from falls   Outcome: Ongoing  Will use bed alarm and hourly rounding

## 2018-11-08 ENCOUNTER — APPOINTMENT (OUTPATIENT)
Dept: ULTRASOUND IMAGING | Age: 48
DRG: 140 | End: 2018-11-08
Payer: COMMERCIAL

## 2018-11-08 LAB
ANION GAP SERPL CALCULATED.3IONS-SCNC: 12 MEQ/L (ref 7–13)
BASOPHILS ABSOLUTE: 0 K/UL (ref 0–0.2)
BASOPHILS RELATIVE PERCENT: 0.2 %
BUN BLDV-MCNC: 14 MG/DL (ref 6–20)
CALCIUM SERPL-MCNC: 8.3 MG/DL (ref 8.6–10.2)
CHLORIDE BLD-SCNC: 112 MEQ/L (ref 98–107)
CO2: 22 MEQ/L (ref 22–29)
CREAT SERPL-MCNC: 0.84 MG/DL (ref 0.5–0.9)
EOSINOPHILS ABSOLUTE: 0 K/UL (ref 0–0.7)
EOSINOPHILS RELATIVE PERCENT: 0 %
GFR AFRICAN AMERICAN: >60
GFR NON-AFRICAN AMERICAN: >60
GLUCOSE BLD-MCNC: 138 MG/DL (ref 74–109)
HCT VFR BLD CALC: 36.4 % (ref 37–47)
HEMOGLOBIN: 12.3 G/DL (ref 12–16)
LACTIC ACID: 1.4 MMOL/L (ref 0.5–2.2)
LYMPHOCYTES ABSOLUTE: 1.1 K/UL (ref 1–4.8)
LYMPHOCYTES RELATIVE PERCENT: 11.1 %
MCH RBC QN AUTO: 32.3 PG (ref 27–31.3)
MCHC RBC AUTO-ENTMCNC: 33.7 % (ref 33–37)
MCV RBC AUTO: 95.9 FL (ref 82–100)
MONOCYTES ABSOLUTE: 0.4 K/UL (ref 0.2–0.8)
MONOCYTES RELATIVE PERCENT: 4.1 %
NEUTROPHILS ABSOLUTE: 8.4 K/UL (ref 1.4–6.5)
NEUTROPHILS RELATIVE PERCENT: 84.6 %
PDW BLD-RTO: 14.1 % (ref 11.5–14.5)
PLATELET # BLD: 127 K/UL (ref 130–400)
POTASSIUM REFLEX MAGNESIUM: 4.1 MEQ/L (ref 3.5–5.1)
RBC # BLD: 3.8 M/UL (ref 4.2–5.4)
SODIUM BLD-SCNC: 146 MEQ/L (ref 132–144)
WBC # BLD: 10 K/UL (ref 4.8–10.8)

## 2018-11-08 PROCEDURE — 80048 BASIC METABOLIC PNL TOTAL CA: CPT

## 2018-11-08 PROCEDURE — 2580000003 HC RX 258: Performed by: PHYSICIAN ASSISTANT

## 2018-11-08 PROCEDURE — 6360000002 HC RX W HCPCS: Performed by: INTERNAL MEDICINE

## 2018-11-08 PROCEDURE — 6370000000 HC RX 637 (ALT 250 FOR IP): Performed by: INTERNAL MEDICINE

## 2018-11-08 PROCEDURE — 36415 COLL VENOUS BLD VENIPUNCTURE: CPT

## 2018-11-08 PROCEDURE — 94667 MNPJ CHEST WALL 1ST: CPT

## 2018-11-08 PROCEDURE — 94640 AIRWAY INHALATION TREATMENT: CPT

## 2018-11-08 PROCEDURE — 85025 COMPLETE CBC W/AUTO DIFF WBC: CPT

## 2018-11-08 PROCEDURE — 99232 SBSQ HOSP IP/OBS MODERATE 35: CPT | Performed by: INTERNAL MEDICINE

## 2018-11-08 PROCEDURE — 83605 ASSAY OF LACTIC ACID: CPT

## 2018-11-08 PROCEDURE — 2580000003 HC RX 258: Performed by: INTERNAL MEDICINE

## 2018-11-08 PROCEDURE — 6370000000 HC RX 637 (ALT 250 FOR IP): Performed by: ANESTHESIOLOGY

## 2018-11-08 PROCEDURE — 1210000000 HC MED SURG R&B

## 2018-11-08 PROCEDURE — 6360000002 HC RX W HCPCS: Performed by: PHYSICIAN ASSISTANT

## 2018-11-08 PROCEDURE — 94669 MECHANICAL CHEST WALL OSCILL: CPT

## 2018-11-08 PROCEDURE — 93880 EXTRACRANIAL BILAT STUDY: CPT

## 2018-11-08 PROCEDURE — APPSS15 APP SPLIT SHARED TIME 0-15 MINUTES: Performed by: PHYSICIAN ASSISTANT

## 2018-11-08 PROCEDURE — 94668 MNPJ CHEST WALL SBSQ: CPT

## 2018-11-08 RX ORDER — PREGABALIN 50 MG/1
50 CAPSULE ORAL 2 TIMES DAILY
Qty: 30 CAPSULE | Refills: 1 | Status: ON HOLD | OUTPATIENT
Start: 2018-11-08 | End: 2020-12-24

## 2018-11-08 RX ORDER — AMLODIPINE BESYLATE 5 MG/1
5 TABLET ORAL DAILY
Status: DISCONTINUED | OUTPATIENT
Start: 2018-11-08 | End: 2018-11-09

## 2018-11-08 RX ORDER — BUDESONIDE 0.5 MG/2ML
0.5 INHALANT ORAL 2 TIMES DAILY
Status: DISCONTINUED | OUTPATIENT
Start: 2018-11-08 | End: 2018-11-09

## 2018-11-08 RX ORDER — TRAMADOL HYDROCHLORIDE 50 MG/1
50 TABLET ORAL EVERY 6 HOURS PRN
Qty: 10 TABLET | Refills: 0 | Status: SHIPPED | OUTPATIENT
Start: 2018-11-08 | End: 2018-11-13

## 2018-11-08 RX ORDER — LIDOCAINE 50 MG/G
3 PATCH TOPICAL DAILY
Qty: 90 PATCH | Refills: 1 | Status: SHIPPED | OUTPATIENT
Start: 2018-11-08 | End: 2018-12-18 | Stop reason: SDUPTHER

## 2018-11-08 RX ADMIN — LOSARTAN POTASSIUM 100 MG: 50 TABLET ORAL at 10:09

## 2018-11-08 RX ADMIN — GUAIFENESIN AND CODEINE PHOSPHATE 5 ML: 10; 100 LIQUID ORAL at 10:10

## 2018-11-08 RX ADMIN — TRIAMTERENE AND HYDROCHLOROTHIAZIDE 1 TABLET: 37.5; 25 TABLET ORAL at 09:57

## 2018-11-08 RX ADMIN — GUAIFENESIN AND CODEINE PHOSPHATE 5 ML: 10; 100 LIQUID ORAL at 18:49

## 2018-11-08 RX ADMIN — AZITHROMYCIN MONOHYDRATE 500 MG: 500 INJECTION, POWDER, LYOPHILIZED, FOR SOLUTION INTRAVENOUS at 09:55

## 2018-11-08 RX ADMIN — AMITRIPTYLINE HYDROCHLORIDE 50 MG: 50 TABLET, FILM COATED ORAL at 20:48

## 2018-11-08 RX ADMIN — Medication 10 ML: at 23:31

## 2018-11-08 RX ADMIN — METHYLPREDNISOLONE SODIUM SUCCINATE 40 MG: 40 INJECTION, POWDER, FOR SOLUTION INTRAMUSCULAR; INTRAVENOUS at 20:48

## 2018-11-08 RX ADMIN — PREGABALIN 50 MG: 50 CAPSULE ORAL at 20:48

## 2018-11-08 RX ADMIN — VARENICLINE TARTRATE 1 MG: 1 TABLET, FILM COATED ORAL at 20:48

## 2018-11-08 RX ADMIN — FLUTICASONE PROPIONATE 2 SPRAY: 50 SPRAY, METERED NASAL at 10:03

## 2018-11-08 RX ADMIN — ENOXAPARIN SODIUM 40 MG: 40 INJECTION SUBCUTANEOUS at 09:57

## 2018-11-08 RX ADMIN — BUDESONIDE 500 MCG: 0.5 SUSPENSION RESPIRATORY (INHALATION) at 19:00

## 2018-11-08 RX ADMIN — PREGABALIN 50 MG: 50 CAPSULE ORAL at 09:57

## 2018-11-08 RX ADMIN — ASPIRIN 81 MG: 81 TABLET, CHEWABLE ORAL at 09:57

## 2018-11-08 RX ADMIN — IPRATROPIUM BROMIDE AND ALBUTEROL SULFATE 1 AMPULE: .5; 3 SOLUTION RESPIRATORY (INHALATION) at 11:17

## 2018-11-08 RX ADMIN — METOPROLOL TARTRATE 100 MG: 50 TABLET ORAL at 20:48

## 2018-11-08 RX ADMIN — IPRATROPIUM BROMIDE AND ALBUTEROL SULFATE 1 AMPULE: .5; 3 SOLUTION RESPIRATORY (INHALATION) at 19:00

## 2018-11-08 RX ADMIN — METOPROLOL TARTRATE 100 MG: 50 TABLET ORAL at 09:57

## 2018-11-08 RX ADMIN — PANTOPRAZOLE SODIUM 40 MG: 40 TABLET, DELAYED RELEASE ORAL at 05:47

## 2018-11-08 RX ADMIN — AMLODIPINE BESYLATE 5 MG: 5 TABLET ORAL at 12:12

## 2018-11-08 RX ADMIN — KETOROLAC TROMETHAMINE 15 MG: 15 INJECTION, SOLUTION INTRAMUSCULAR; INTRAVENOUS at 21:03

## 2018-11-08 RX ADMIN — TRAMADOL HYDROCHLORIDE 50 MG: 50 TABLET, FILM COATED ORAL at 17:31

## 2018-11-08 RX ADMIN — BENZONATATE 100 MG: 100 CAPSULE ORAL at 15:36

## 2018-11-08 RX ADMIN — IPRATROPIUM BROMIDE AND ALBUTEROL SULFATE 1 AMPULE: .5; 3 SOLUTION RESPIRATORY (INHALATION) at 15:45

## 2018-11-08 RX ADMIN — ATORVASTATIN CALCIUM 10 MG: 10 TABLET, FILM COATED ORAL at 20:48

## 2018-11-08 RX ADMIN — VARENICLINE TARTRATE 1 MG: 1 TABLET, FILM COATED ORAL at 09:57

## 2018-11-08 RX ADMIN — BENZONATATE 100 MG: 100 CAPSULE ORAL at 09:57

## 2018-11-08 RX ADMIN — IPRATROPIUM BROMIDE AND ALBUTEROL SULFATE 1 AMPULE: .5; 3 SOLUTION RESPIRATORY (INHALATION) at 07:20

## 2018-11-08 RX ADMIN — METHYLPREDNISOLONE SODIUM SUCCINATE 40 MG: 40 INJECTION, POWDER, FOR SOLUTION INTRAMUSCULAR; INTRAVENOUS at 09:56

## 2018-11-08 RX ADMIN — ACETAMINOPHEN 650 MG: 325 TABLET ORAL at 21:02

## 2018-11-08 RX ADMIN — BENZONATATE 100 MG: 100 CAPSULE ORAL at 20:48

## 2018-11-08 RX ADMIN — TRAMADOL HYDROCHLORIDE 50 MG: 50 TABLET, FILM COATED ORAL at 05:47

## 2018-11-08 RX ADMIN — CLONIDINE HYDROCHLORIDE 0.1 MG: 0.1 TABLET ORAL at 09:57

## 2018-11-08 RX ADMIN — FOLIC ACID 1 MG: 1 TABLET ORAL at 09:57

## 2018-11-08 ASSESSMENT — PAIN SCALES - GENERAL
PAINLEVEL_OUTOF10: 8
PAINLEVEL_OUTOF10: 8
PAINLEVEL_OUTOF10: 9
PAINLEVEL_OUTOF10: 4
PAINLEVEL_OUTOF10: 8

## 2018-11-08 ASSESSMENT — PAIN DESCRIPTION - DESCRIPTORS: DESCRIPTORS: CONSTANT

## 2018-11-08 ASSESSMENT — PAIN DESCRIPTION - FREQUENCY: FREQUENCY: CONTINUOUS

## 2018-11-08 ASSESSMENT — PAIN DESCRIPTION - LOCATION
LOCATION: BACK;NECK
LOCATION: GENERALIZED

## 2018-11-08 ASSESSMENT — PAIN DESCRIPTION - PAIN TYPE
TYPE: CHRONIC PAIN
TYPE: ACUTE PAIN

## 2018-11-08 ASSESSMENT — PAIN DESCRIPTION - PROGRESSION: CLINICAL_PROGRESSION: NOT CHANGED

## 2018-11-08 ASSESSMENT — PAIN DESCRIPTION - ONSET: ONSET: ON-GOING

## 2018-11-08 NOTE — CARE COORDINATION
Discharge plan remains home. Note left requesting pulmonary rehab if the physician feels she is appropriate. No other discharge needs noted. Will follow.

## 2018-11-08 NOTE — PROGRESS NOTES
Keisha Wilkerson MD   50 mg at 11/07/18 2048    aspirin chewable tablet 81 mg  81 mg Oral Daily Keisha Wilkerson MD   81 mg at 11/08/18 0957    atorvastatin (LIPITOR) tablet 10 mg  10 mg Oral Nightly Keisha Wilkerson MD   10 mg at 11/07/18 2048    butalbital-aspirin-caffeine (FIORINAL) capsule 1 capsule  1 capsule Oral Q4H PRN Keisha Wilkerson MD        cyclobenzaprine (FLEXERIL) tablet 10 mg  10 mg Oral TID PRN Keisha Wilkerson MD        folic acid (FOLVITE) tablet 1 mg  1 mg Oral Daily Keisha Wilkerson MD   1 mg at 11/08/18 0957    ibuprofen (ADVIL;MOTRIN) tablet 600 mg  600 mg Oral Q8H PRN Keisha Wilkerson MD        losartan (COZAAR) tablet 100 mg  100 mg Oral Daily Keisha Wilkerson MD   100 mg at 11/08/18 1009    metoprolol tartrate (LOPRESSOR) tablet 100 mg  100 mg Oral BID Keisha Wilkerson MD   100 mg at 11/08/18 0957    triamterene-hydrochlorothiazide (MAXZIDE-25) 37.5-25 MG per tablet 1 tablet  1 tablet Oral Daily Keisha Wilkerson MD   1 tablet at 11/08/18 0957    traMADol (ULTRAM) tablet 50 mg  50 mg Oral Q6H PRN Camila Floyd MD   50 mg at 11/08/18 1731    pregabalin (LYRICA) capsule 50 mg  50 mg Oral BID Camila Floyd MD   50 mg at 11/08/18 0957    lidocaine 4 % external patch 2 patch  2 patch Transdermal Daily Keisha Wilkerson MD   2 patch at 11/08/18 9133    sodium chloride flush 0.9 % injection 10 mL  10 mL Intravenous 2 times per day NORBERTO Herrera   10 mL at 11/07/18 0828    sodium chloride flush 0.9 % injection 10 mL  10 mL Intravenous PRN NORBERTO Rowland        magnesium hydroxide (MILK OF MAGNESIA) 400 MG/5ML suspension 30 mL  30 mL Oral Daily PRN NORBERTO Rowland        ondansetron [facility]) injection 4 mg  4 mg Intravenous Q6H PRN NORBERTO Rowland        enoxaparin (LOVENOX) injection 40 mg  40 mg Subcutaneous Daily NORBERTO Rowland   40 mg at 11/08/18 0957    albuterol (PROVENTIL) nebulizer solution 2.5 mg  2.5 mg Nebulization unremarkable. There is no disc herniation. There is no significant spinal canal stenosis. There is no myelomalacia. There is no spinal cord edema. Vertebral alignment is normal. Pedicles and posterior elements are intact. There is no paravertebral abnormality. There is no sign of intrinsic spinal cord disease. CONCLUSION: UNREMARKABLE OPERATIVE SITE AT C4-5. NO SPINAL CANAL STENOSIS. NO DISC HERNIATION. NO INTRINSIC SPINAL CORD DISEASE. NO PARAVERTEBRAL ABNORMALITY. NO RECENT INJURIES. NO UNDERLYING PATHOLOGY.          [address] Problems    Diagnosis Date Noted    Radiculopathy, cervical region [M54.12] 11/07/2018    Degeneration of intervertebral disc of mid-cervical region [M50.320] 11/07/2018    Cervical disc disorder with radiculopathy of mid-cervical region [M50.120] 11/07/2018    Displacement of intervertebral disc of mid-cervical region [M50.220] 11/07/2018    COPD with acute exacerbation ([facility] [address] [J44.1] 11/06/2018    Lumbosacral radiculopathy at S1 [M54.17] 12/22/2016    DDD (degenerative disc disease), lumbar [M51.36] 12/22/2016      Reviewed imaging as above there is no signs of nerve compression, findings similar to prior imaging was cervical degenerative changes suspect more muscular skeletal component to her pain      RECOMMENDATION:  SEE ORDERS    Recommend to continue on physical therapy  I will continue on non-opioid alternative including Tylenol and nonsteroidal anti-inflammatory was low-dose muscle relaxant  I would prescribed short course of tramadol to manage patient severe pain while limiting the use of nonsteroidal anti-inflammatory which causes a patient some GI discomfort and to help patient progress with therapy      Assurance provided as it is no neurological red flags of concerns      Onset to follow-up as an outpatient to continue pain management if necessary    SIGNATURE: Mally Posada MD PATIENT NAME: Ortiz Pavon   DATE: November 8, 2018 MRN:

## 2018-11-09 LAB
ANION GAP SERPL CALCULATED.3IONS-SCNC: 10 MEQ/L (ref 7–13)
BASOPHILS ABSOLUTE: 0 K/UL (ref 0–0.2)
BASOPHILS RELATIVE PERCENT: 0.2 %
BUN BLDV-MCNC: 17 MG/DL (ref 6–20)
CALCIUM SERPL-MCNC: 8.8 MG/DL (ref 8.6–10.2)
CHLORIDE BLD-SCNC: 105 MEQ/L (ref 98–107)
CO2: 26 MEQ/L (ref 22–29)
CREAT SERPL-MCNC: 0.81 MG/DL (ref 0.5–0.9)
EOSINOPHILS ABSOLUTE: 0 K/UL (ref 0–0.7)
EOSINOPHILS RELATIVE PERCENT: 0 %
GFR AFRICAN AMERICAN: >60
GFR NON-AFRICAN AMERICAN: >60
GLUCOSE BLD-MCNC: 153 MG/DL (ref 74–109)
HCT VFR BLD CALC: 37.4 % (ref 37–47)
HEMOGLOBIN: 12.8 G/DL (ref 12–16)
LACTIC ACID: 1.5 MMOL/L (ref 0.5–2.2)
LYMPHOCYTES ABSOLUTE: 1.3 K/UL (ref 1–4.8)
LYMPHOCYTES RELATIVE PERCENT: 12 %
MCH RBC QN AUTO: 32.2 PG (ref 27–31.3)
MCHC RBC AUTO-ENTMCNC: 34.3 % (ref 33–37)
MCV RBC AUTO: 94 FL (ref 82–100)
MONOCYTES ABSOLUTE: 0.2 K/UL (ref 0.2–0.8)
MONOCYTES RELATIVE PERCENT: 1.9 %
NEUTROPHILS ABSOLUTE: 9.7 K/UL (ref 1.4–6.5)
NEUTROPHILS RELATIVE PERCENT: 87 %
NUCLEATED RED BLOOD CELLS: 1 /100 WBC
PDW BLD-RTO: 13.5 % (ref 11.5–14.5)
PLATELET # BLD: 132 K/UL (ref 130–400)
PLATELET SLIDE REVIEW: ABNORMAL
POTASSIUM REFLEX MAGNESIUM: 3.6 MEQ/L (ref 3.5–5.1)
RBC # BLD: 3.98 M/UL (ref 4.2–5.4)
SODIUM BLD-SCNC: 141 MEQ/L (ref 132–144)
WBC # BLD: 11.2 K/UL (ref 4.8–10.8)

## 2018-11-09 PROCEDURE — 36415 COLL VENOUS BLD VENIPUNCTURE: CPT

## 2018-11-09 PROCEDURE — 94669 MECHANICAL CHEST WALL OSCILL: CPT

## 2018-11-09 PROCEDURE — 94760 N-INVAS EAR/PLS OXIMETRY 1: CPT

## 2018-11-09 PROCEDURE — 6360000002 HC RX W HCPCS: Performed by: INTERNAL MEDICINE

## 2018-11-09 PROCEDURE — 85025 COMPLETE CBC W/AUTO DIFF WBC: CPT

## 2018-11-09 PROCEDURE — 2580000003 HC RX 258: Performed by: INTERNAL MEDICINE

## 2018-11-09 PROCEDURE — 6370000000 HC RX 637 (ALT 250 FOR IP): Performed by: ANESTHESIOLOGY

## 2018-11-09 PROCEDURE — 94664 DEMO&/EVAL PT USE INHALER: CPT

## 2018-11-09 PROCEDURE — 99232 SBSQ HOSP IP/OBS MODERATE 35: CPT | Performed by: INTERNAL MEDICINE

## 2018-11-09 PROCEDURE — 2580000003 HC RX 258: Performed by: PHYSICIAN ASSISTANT

## 2018-11-09 PROCEDURE — 80048 BASIC METABOLIC PNL TOTAL CA: CPT

## 2018-11-09 PROCEDURE — 6360000002 HC RX W HCPCS: Performed by: PHYSICIAN ASSISTANT

## 2018-11-09 PROCEDURE — 6370000000 HC RX 637 (ALT 250 FOR IP): Performed by: INTERNAL MEDICINE

## 2018-11-09 PROCEDURE — 94640 AIRWAY INHALATION TREATMENT: CPT

## 2018-11-09 PROCEDURE — 1210000000 HC MED SURG R&B

## 2018-11-09 PROCEDURE — 83605 ASSAY OF LACTIC ACID: CPT

## 2018-11-09 PROCEDURE — 94762 N-INVAS EAR/PLS OXIMTRY CONT: CPT

## 2018-11-09 RX ORDER — NICOTINE 21 MG/24HR
1 PATCH, TRANSDERMAL 24 HOURS TRANSDERMAL DAILY
Status: DISCONTINUED | OUTPATIENT
Start: 2018-11-09 | End: 2018-11-12 | Stop reason: HOSPADM

## 2018-11-09 RX ORDER — AMLODIPINE BESYLATE 5 MG/1
5 TABLET ORAL 2 TIMES DAILY
Status: DISCONTINUED | OUTPATIENT
Start: 2018-11-09 | End: 2018-11-10

## 2018-11-09 RX ORDER — BUDESONIDE 0.5 MG/2ML
0.5 INHALANT ORAL 3 TIMES DAILY
Status: DISCONTINUED | OUTPATIENT
Start: 2018-11-09 | End: 2018-11-09

## 2018-11-09 RX ORDER — BUDESONIDE 0.5 MG/2ML
0.5 INHALANT ORAL 2 TIMES DAILY
Status: DISCONTINUED | OUTPATIENT
Start: 2018-11-09 | End: 2018-11-12 | Stop reason: HOSPADM

## 2018-11-09 RX ADMIN — ASPIRIN 81 MG: 81 TABLET, CHEWABLE ORAL at 10:13

## 2018-11-09 RX ADMIN — IPRATROPIUM BROMIDE AND ALBUTEROL SULFATE 1 AMPULE: .5; 3 SOLUTION RESPIRATORY (INHALATION) at 07:42

## 2018-11-09 RX ADMIN — TRAMADOL HYDROCHLORIDE 50 MG: 50 TABLET, FILM COATED ORAL at 06:16

## 2018-11-09 RX ADMIN — IPRATROPIUM BROMIDE AND ALBUTEROL SULFATE 1 AMPULE: .5; 3 SOLUTION RESPIRATORY (INHALATION) at 19:24

## 2018-11-09 RX ADMIN — BUDESONIDE 500 MCG: 0.5 SUSPENSION RESPIRATORY (INHALATION) at 19:24

## 2018-11-09 RX ADMIN — AMITRIPTYLINE HYDROCHLORIDE 50 MG: 50 TABLET, FILM COATED ORAL at 20:17

## 2018-11-09 RX ADMIN — AMLODIPINE BESYLATE 5 MG: 5 TABLET ORAL at 10:13

## 2018-11-09 RX ADMIN — ATORVASTATIN CALCIUM 10 MG: 10 TABLET, FILM COATED ORAL at 20:16

## 2018-11-09 RX ADMIN — BUDESONIDE 500 MCG: 0.5 SUSPENSION RESPIRATORY (INHALATION) at 07:42

## 2018-11-09 RX ADMIN — IPRATROPIUM BROMIDE AND ALBUTEROL SULFATE 1 AMPULE: .5; 3 SOLUTION RESPIRATORY (INHALATION) at 13:04

## 2018-11-09 RX ADMIN — VARENICLINE TARTRATE 1 MG: 1 TABLET, FILM COATED ORAL at 20:16

## 2018-11-09 RX ADMIN — BENZONATATE 100 MG: 100 CAPSULE ORAL at 20:16

## 2018-11-09 RX ADMIN — FLUTICASONE PROPIONATE 2 SPRAY: 50 SPRAY, METERED NASAL at 10:12

## 2018-11-09 RX ADMIN — TRIAMTERENE AND HYDROCHLOROTHIAZIDE 1 TABLET: 37.5; 25 TABLET ORAL at 10:12

## 2018-11-09 RX ADMIN — METOPROLOL TARTRATE 100 MG: 50 TABLET ORAL at 10:12

## 2018-11-09 RX ADMIN — NYSTATIN 500000 UNITS: 500000 SUSPENSION ORAL at 17:28

## 2018-11-09 RX ADMIN — PREGABALIN 50 MG: 50 CAPSULE ORAL at 20:16

## 2018-11-09 RX ADMIN — TRAMADOL HYDROCHLORIDE 50 MG: 50 TABLET, FILM COATED ORAL at 21:19

## 2018-11-09 RX ADMIN — BENZOCAINE, MENTHOL 1 LOZENGE: 15; 3.6 LOZENGE ORAL at 16:13

## 2018-11-09 RX ADMIN — NYSTATIN 500000 UNITS: 500000 SUSPENSION ORAL at 20:16

## 2018-11-09 RX ADMIN — FOLIC ACID 1 MG: 1 TABLET ORAL at 10:18

## 2018-11-09 RX ADMIN — Medication 10 ML: at 20:17

## 2018-11-09 RX ADMIN — PREGABALIN 50 MG: 50 CAPSULE ORAL at 10:13

## 2018-11-09 RX ADMIN — Medication 10 ML: at 10:17

## 2018-11-09 RX ADMIN — METHYLPREDNISOLONE SODIUM SUCCINATE 40 MG: 40 INJECTION, POWDER, FOR SOLUTION INTRAMUSCULAR; INTRAVENOUS at 20:16

## 2018-11-09 RX ADMIN — BENZONATATE 100 MG: 100 CAPSULE ORAL at 14:28

## 2018-11-09 RX ADMIN — LOSARTAN POTASSIUM 100 MG: 50 TABLET ORAL at 10:13

## 2018-11-09 RX ADMIN — BENZONATATE 100 MG: 100 CAPSULE ORAL at 10:13

## 2018-11-09 RX ADMIN — VARENICLINE TARTRATE 1 MG: 1 TABLET, FILM COATED ORAL at 10:13

## 2018-11-09 RX ADMIN — GUAIFENESIN AND CODEINE PHOSPHATE 5 ML: 10; 100 LIQUID ORAL at 16:13

## 2018-11-09 RX ADMIN — METOPROLOL TARTRATE 100 MG: 50 TABLET ORAL at 20:16

## 2018-11-09 RX ADMIN — PANTOPRAZOLE SODIUM 40 MG: 40 TABLET, DELAYED RELEASE ORAL at 06:16

## 2018-11-09 RX ADMIN — ENOXAPARIN SODIUM 40 MG: 40 INJECTION SUBCUTANEOUS at 10:13

## 2018-11-09 RX ADMIN — AMLODIPINE BESYLATE 5 MG: 5 TABLET ORAL at 20:16

## 2018-11-09 RX ADMIN — GUAIFENESIN AND CODEINE PHOSPHATE 5 ML: 10; 100 LIQUID ORAL at 06:16

## 2018-11-09 RX ADMIN — METHYLPREDNISOLONE SODIUM SUCCINATE 40 MG: 40 INJECTION, POWDER, FOR SOLUTION INTRAMUSCULAR; INTRAVENOUS at 10:15

## 2018-11-09 RX ADMIN — AZITHROMYCIN MONOHYDRATE 500 MG: 500 INJECTION, POWDER, LYOPHILIZED, FOR SOLUTION INTRAVENOUS at 11:30

## 2018-11-09 ASSESSMENT — PAIN DESCRIPTION - DESCRIPTORS: DESCRIPTORS: CONSTANT;DISCOMFORT;HEADACHE

## 2018-11-09 ASSESSMENT — PAIN DESCRIPTION - LOCATION: LOCATION: GENERALIZED

## 2018-11-09 ASSESSMENT — PAIN SCALES - GENERAL
PAINLEVEL_OUTOF10: 10
PAINLEVEL_OUTOF10: 9

## 2018-11-09 ASSESSMENT — PAIN DESCRIPTION - ONSET: ONSET: ON-GOING

## 2018-11-09 ASSESSMENT — PAIN DESCRIPTION - PAIN TYPE: TYPE: CHRONIC PAIN

## 2018-11-09 ASSESSMENT — PAIN DESCRIPTION - PROGRESSION: CLINICAL_PROGRESSION: NOT CHANGED

## 2018-11-09 ASSESSMENT — PAIN DESCRIPTION - FREQUENCY: FREQUENCY: CONTINUOUS

## 2018-11-09 NOTE — PROGRESS NOTES
DISC HERNIATION. NO INTRINSIC SPINAL CORD DISEASE. NO PARAVERTEBRAL ABNORMALITY. NO RECENT INJURIES. NO UNDERLYING PATHOLOGY. Mri Brain Wo Contrast    Result Date: 11/7/2018  EXAMINATION: MRI BRAIN WO CONTRAST CLINICAL HISTORY:  rt sided numbness and left sided weakness Patient c/o weakness rt sided numbness sob COMPARISONS: CT brain without contrast enhancement, April 17, 2018 FINDINGS: Multisequence, multiplanar MRI of the brain was obtained without contrast enhancement. There is no mass. There is no edema. There is no hematoma. There is no hydrocephalus. There is subtle T2 hyperintense, approximately 3 mm white matter lesion  in the right brachium pontis, best shown on axial image 11 of series 9. Age of this lesion is uncertain. There is no sign of associated abnormal restricted diffusion. This would be an unusual location for a solitary demyelinating lesion. There are no worrisome supratentorial T2 hyperintense white matter lesions. There is no abnormal restricted diffusion. There is no abnormal paramagnetic signal. The brainstem, cerebellum and proximal cervical spinal cord are morphologically normal. There is no cerebellar ectopia. The sella and its contents are unremarkable. Orbits are grossly normal. Paranasal sinuses are unremarkable. Temporal bones are grossly normal. Intrahepatic axial and extra-axial CSF spaces are unremarkable. CONCLUSION: SOLITARY, APPROXIMATELY 3 MM NONSPECIFIC T2 HYPERINTENSE LESION IN THE RIGHT BRACHIUM PONTIS. OTHERWISE NO WORRISOME T2 HYPERINTENSE WHITE MATTER LESIONS. THERE IS NO SIGN OF ACUTE ISCHEMIC INFARCTION. THERE IS NO EVIDENCE OF HEMORRHAGE. THERE IS NO MASS. Us Carotid Artery Bilateral    Result Date: 11/8/2018  BILATERAL DUPLEX CAROTID ULTRASOUND CLINICAL INFORMATION: COMPARISON:  NONE AVAILABLE FINDINGS:  The bilateral carotid duplex ultrasound study demonstrates the following:  No significant plaque. RIGHT            LEFT Proximal common carotid artery           96 cm/s            109 cm/s  Mid common carotid artery                   88 cm/s            100 cm/s  Distal common carotid artery                62 cm/s           87 cm/s Proximal internal carotid artery             70 cm/s            84 cm/s Mid internal carotid artery                      82 cm/s           71 cm/s Distal internal carotid artery                  76 cm/s             87 cm/s External carotid artery                            83 cm/s           100 cm/s    ICA/CCA ratio                                         0.9                0.9   Vertebral arteries antegrade flow         Yes                     Yes      LESS THAN 50% STENOSIS OF THE BILATERAL INTERNAL CAROTID ARTERIES. Validated velocity measurements with angiographic measurements, velocity criteria are extrapolated from diameter data as defined by the Society of Radiologist in [address] [place] [address] Drive Radiology 2003; [id number]. IMPRESSION AND SUGGESTION:  1. COPD exacerbation secondary to bronchitis with retained airway secretions, patient continuing with active symptoms and very little response to aggressive therapy so far. Will continue same and continue observation  2. Hypoxia secondary to bronchospasm  3.  Tobacco abuse

## 2018-11-09 NOTE — CONSULTS
Pulmonary Rehab info given and discussed. Patient would like to start outpatient program, to call and schedule after discharge.

## 2018-11-09 NOTE — PROGRESS NOTES
midline. Respiratory:   bilateral wheezing with fewcrackles  Cardiovascular:  Regular rate and rhythm with normal S1/S2 without murmurs, rubs or gallops. Abdomen: Soft, non-tender, non-distended with normal bowel sounds. Musculoskeletal:  No clubbing, cyanosis or edema bilaterally.  Full range of motion without deformity. Skin: Skin color, texture, turgor normal.  No rashes or lesions. Neurologic:  Neurovascularly intact without any focal sensory/motor deficits.  Cranial nerves: II-XII intact, grossly non-focal.  Psychiatric:  Alert and oriented, thought content appropriate, normal insight  Capillary Refill: Brisk,< 3 seconds   Peripheral Pulses: +2 palpable, equal bilaterally     Medications:  Current Facility-Administered Medications   Medication Dose Route Frequency Provider Last Rate Last Dose    budesonide (PULMICORT) nebulizer suspension 500 mcg  0.5 mg Nebulization TID Marylu Hernandez MD        amLODIPine (NORVASC) tablet 5 mg  5 mg Oral BID Marylu Hernandez MD        nicotine (NICODERM CQ) 21 MG/24HR 1 patch  1 patch Transdermal Daily Marylu Hernandez MD        nystatin (MYCOSTATIN) 835736 UNIT/ML suspension 500,000 Units  5 mL Oral 4x Daily Marylu Hernandez MD        benzocaine-menthol (CEPACOL SORE THROAT) lozenge 1 lozenge  1 lozenge Oral Q2H PRN Marylu Hernandez MD        azithromycin (ZITHROMAX) 500 mg in D5W 250ml addavial  500 mg Intravenous Q24H Marylu Hernandez MD   Stopped at 11/09/18 1300    guaiFENesin-codeine (GUAIFENESIN AC) 100-10 MG/5ML liquid 5 mL  5 mL Oral Q4H PRN Marylu Hernandez MD   5 mL at 11/09/18 0616    benzonatate (TESSALON) capsule 100 mg  100 mg Oral TID Marylu Hernandez MD   100 mg at 11/09/18 1428    methylPREDNISolone sodium (SOLU-MEDROL) injection 40 mg  40 mg Intravenous BID Marylu Hernandez MD   40 mg at 11/09/18 1015    ipratropium-albuterol (DUONEB) nebulizer solution 1 ampule  1 ampule Inhalation 4x Daily Marylu Hernandez MD   1 ampule at 11/09/18 1304   

## 2018-11-10 PROBLEM — G89.4 CHRONIC PAIN DISORDER: Status: ACTIVE | Noted: 2018-11-10

## 2018-11-10 PROBLEM — F41.9 ANXIETY: Status: ACTIVE | Noted: 2018-11-10

## 2018-11-10 LAB
EKG ATRIAL RATE: 64 BPM
EKG P AXIS: 45 DEGREES
EKG P-R INTERVAL: 142 MS
EKG Q-T INTERVAL: 494 MS
EKG QRS DURATION: 100 MS
EKG QTC CALCULATION (BAZETT): 509 MS
EKG R AXIS: 49 DEGREES
EKG T AXIS: 67 DEGREES
EKG VENTRICULAR RATE: 64 BPM

## 2018-11-10 PROCEDURE — 2580000003 HC RX 258: Performed by: INTERNAL MEDICINE

## 2018-11-10 PROCEDURE — 94760 N-INVAS EAR/PLS OXIMETRY 1: CPT

## 2018-11-10 PROCEDURE — 6370000000 HC RX 637 (ALT 250 FOR IP): Performed by: PSYCHIATRY & NEUROLOGY

## 2018-11-10 PROCEDURE — 2580000003 HC RX 258: Performed by: PHYSICIAN ASSISTANT

## 2018-11-10 PROCEDURE — 6360000002 HC RX W HCPCS: Performed by: INTERNAL MEDICINE

## 2018-11-10 PROCEDURE — 99232 SBSQ HOSP IP/OBS MODERATE 35: CPT | Performed by: INTERNAL MEDICINE

## 2018-11-10 PROCEDURE — 1210000000 HC MED SURG R&B

## 2018-11-10 PROCEDURE — 93005 ELECTROCARDIOGRAM TRACING: CPT

## 2018-11-10 PROCEDURE — 6370000000 HC RX 637 (ALT 250 FOR IP): Performed by: INTERNAL MEDICINE

## 2018-11-10 PROCEDURE — 94761 N-INVAS EAR/PLS OXIMETRY MLT: CPT

## 2018-11-10 PROCEDURE — 94640 AIRWAY INHALATION TREATMENT: CPT

## 2018-11-10 PROCEDURE — 6370000000 HC RX 637 (ALT 250 FOR IP): Performed by: ANESTHESIOLOGY

## 2018-11-10 PROCEDURE — 94669 MECHANICAL CHEST WALL OSCILL: CPT

## 2018-11-10 PROCEDURE — 6360000002 HC RX W HCPCS: Performed by: PHYSICIAN ASSISTANT

## 2018-11-10 RX ORDER — METHYLPREDNISOLONE SODIUM SUCCINATE 40 MG/ML
40 INJECTION, POWDER, LYOPHILIZED, FOR SOLUTION INTRAMUSCULAR; INTRAVENOUS DAILY
Status: DISCONTINUED | OUTPATIENT
Start: 2018-11-11 | End: 2018-11-11

## 2018-11-10 RX ORDER — DILTIAZEM HYDROCHLORIDE 120 MG/1
120 CAPSULE, COATED, EXTENDED RELEASE ORAL 2 TIMES DAILY
Status: DISCONTINUED | OUTPATIENT
Start: 2018-11-10 | End: 2018-11-11

## 2018-11-10 RX ORDER — AZITHROMYCIN 250 MG/1
250 TABLET, FILM COATED ORAL DAILY
Status: COMPLETED | OUTPATIENT
Start: 2018-11-11 | End: 2018-11-12

## 2018-11-10 RX ORDER — LITHIUM CARBONATE 150 MG/1
300 CAPSULE ORAL DAILY
Status: DISCONTINUED | OUTPATIENT
Start: 2018-11-10 | End: 2018-11-12 | Stop reason: HOSPADM

## 2018-11-10 RX ADMIN — ASPIRIN 81 MG: 81 TABLET, CHEWABLE ORAL at 08:53

## 2018-11-10 RX ADMIN — ATORVASTATIN CALCIUM 10 MG: 10 TABLET, FILM COATED ORAL at 20:53

## 2018-11-10 RX ADMIN — BENZONATATE 100 MG: 100 CAPSULE ORAL at 08:53

## 2018-11-10 RX ADMIN — PREGABALIN 50 MG: 50 CAPSULE ORAL at 21:43

## 2018-11-10 RX ADMIN — NYSTATIN 500000 UNITS: 500000 SUSPENSION ORAL at 16:55

## 2018-11-10 RX ADMIN — AMITRIPTYLINE HYDROCHLORIDE 50 MG: 50 TABLET, FILM COATED ORAL at 20:53

## 2018-11-10 RX ADMIN — Medication 10 ML: at 08:31

## 2018-11-10 RX ADMIN — LURASIDONE HYDROCHLORIDE 20 MG: 20 TABLET, FILM COATED ORAL at 20:53

## 2018-11-10 RX ADMIN — BUDESONIDE 500 MCG: 0.5 SUSPENSION RESPIRATORY (INHALATION) at 21:28

## 2018-11-10 RX ADMIN — IPRATROPIUM BROMIDE AND ALBUTEROL SULFATE 1 AMPULE: .5; 3 SOLUTION RESPIRATORY (INHALATION) at 17:08

## 2018-11-10 RX ADMIN — FOLIC ACID 1 MG: 1 TABLET ORAL at 08:53

## 2018-11-10 RX ADMIN — VARENICLINE TARTRATE 1 MG: 1 TABLET, FILM COATED ORAL at 20:53

## 2018-11-10 RX ADMIN — METOPROLOL TARTRATE 25 MG: 25 TABLET ORAL at 20:53

## 2018-11-10 RX ADMIN — AZITHROMYCIN MONOHYDRATE 500 MG: 500 INJECTION, POWDER, LYOPHILIZED, FOR SOLUTION INTRAVENOUS at 09:40

## 2018-11-10 RX ADMIN — IPRATROPIUM BROMIDE AND ALBUTEROL SULFATE 1 AMPULE: .5; 3 SOLUTION RESPIRATORY (INHALATION) at 07:10

## 2018-11-10 RX ADMIN — IBUPROFEN 600 MG: 600 TABLET ORAL at 03:05

## 2018-11-10 RX ADMIN — VARENICLINE TARTRATE 1 MG: 1 TABLET, FILM COATED ORAL at 08:53

## 2018-11-10 RX ADMIN — Medication 10 ML: at 21:43

## 2018-11-10 RX ADMIN — DILTIAZEM HYDROCHLORIDE 120 MG: 120 CAPSULE, COATED, EXTENDED RELEASE ORAL at 20:53

## 2018-11-10 RX ADMIN — LITHIUM CARBONATE 300 MG: 150 CAPSULE, GELATIN COATED ORAL at 20:53

## 2018-11-10 RX ADMIN — AMLODIPINE BESYLATE 5 MG: 5 TABLET ORAL at 08:53

## 2018-11-10 RX ADMIN — DILTIAZEM HYDROCHLORIDE 120 MG: 120 CAPSULE, COATED, EXTENDED RELEASE ORAL at 14:00

## 2018-11-10 RX ADMIN — LOSARTAN POTASSIUM 100 MG: 50 TABLET ORAL at 08:53

## 2018-11-10 RX ADMIN — BUDESONIDE 500 MCG: 0.5 SUSPENSION RESPIRATORY (INHALATION) at 07:10

## 2018-11-10 RX ADMIN — TRAMADOL HYDROCHLORIDE 50 MG: 50 TABLET, FILM COATED ORAL at 16:55

## 2018-11-10 RX ADMIN — PANTOPRAZOLE SODIUM 40 MG: 40 TABLET, DELAYED RELEASE ORAL at 05:34

## 2018-11-10 RX ADMIN — TRIAMTERENE AND HYDROCHLOROTHIAZIDE 1 TABLET: 37.5; 25 TABLET ORAL at 08:53

## 2018-11-10 RX ADMIN — METHYLPREDNISOLONE SODIUM SUCCINATE 40 MG: 40 INJECTION, POWDER, FOR SOLUTION INTRAMUSCULAR; INTRAVENOUS at 08:31

## 2018-11-10 RX ADMIN — PREGABALIN 50 MG: 50 CAPSULE ORAL at 08:53

## 2018-11-10 RX ADMIN — NYSTATIN 500000 UNITS: 500000 SUSPENSION ORAL at 20:53

## 2018-11-10 RX ADMIN — BENZONATATE 100 MG: 100 CAPSULE ORAL at 20:53

## 2018-11-10 RX ADMIN — NYSTATIN 500000 UNITS: 500000 SUSPENSION ORAL at 08:53

## 2018-11-10 RX ADMIN — IPRATROPIUM BROMIDE AND ALBUTEROL SULFATE 1 AMPULE: .5; 3 SOLUTION RESPIRATORY (INHALATION) at 12:16

## 2018-11-10 RX ADMIN — BENZONATATE 100 MG: 100 CAPSULE ORAL at 14:00

## 2018-11-10 RX ADMIN — ENOXAPARIN SODIUM 40 MG: 40 INJECTION SUBCUTANEOUS at 08:53

## 2018-11-10 RX ADMIN — IPRATROPIUM BROMIDE AND ALBUTEROL SULFATE 1 AMPULE: .5; 3 SOLUTION RESPIRATORY (INHALATION) at 21:28

## 2018-11-10 RX ADMIN — METOPROLOL TARTRATE 100 MG: 50 TABLET ORAL at 08:53

## 2018-11-10 RX ADMIN — TRAMADOL HYDROCHLORIDE 50 MG: 50 TABLET, FILM COATED ORAL at 05:34

## 2018-11-10 RX ADMIN — GUAIFENESIN AND CODEINE PHOSPHATE 5 ML: 10; 100 LIQUID ORAL at 16:54

## 2018-11-10 RX ADMIN — NYSTATIN 500000 UNITS: 500000 SUSPENSION ORAL at 14:00

## 2018-11-10 ASSESSMENT — PAIN DESCRIPTION - FREQUENCY: FREQUENCY: CONTINUOUS

## 2018-11-10 ASSESSMENT — PAIN DESCRIPTION - PAIN TYPE: TYPE: CHRONIC PAIN

## 2018-11-10 ASSESSMENT — PAIN SCALES - GENERAL
PAINLEVEL_OUTOF10: 8

## 2018-11-10 ASSESSMENT — PAIN DESCRIPTION - ONSET: ONSET: ON-GOING

## 2018-11-10 ASSESSMENT — PAIN DESCRIPTION - LOCATION: LOCATION: GENERALIZED

## 2018-11-10 ASSESSMENT — PAIN DESCRIPTION - DESCRIPTORS: DESCRIPTORS: CONSTANT;DISCOMFORT;HEADACHE

## 2018-11-10 ASSESSMENT — PAIN DESCRIPTION - PROGRESSION: CLINICAL_PROGRESSION: NOT CHANGED

## 2018-11-10 NOTE — PROGRESS NOTES
Hospitalist  Follow-up      Date of Service: 11/10/2018    Subjective:   She is still complaining of shortness of breath and cough. Deny chest pain. She gets dizzy sometimes when she goes to the bathroom along with shortness of breath. Deny chest pain. No productive cough. Afebrile. Past Medical History:   Diagnosis Date    Anxiety     Back pain     back surgery x 4    [facility] [address]     CAD (coronary artery disease)     [facility]FL [facility]ch[facility]flow limitation) [facility] 11/3/2016    Chronic bronchitis ([facility])     Chronic pain     Colitis     DDD (degenerative disc disease), lumbar 12/22/2016    Depression     Endometriosis     Excessive caffeine abuse, continuous ([facility]) 7/6/2018    Gastritis     GERD (gastroesophageal reflux disease)     History of myocardial infarction     HTN (hypertension), benign 2/19/2016    Hypokalemia     Impaired mobility and activities of daily living     Left hemiparesis ([facility])     Multiple sclerosis ([facility] [address]     Neck pain     surgery x 1    Over weight     PTSD (post-traumatic stress disorder)     Sensory neuropathy 12/22/2016    TIA (transient ischemic attack)     Tobacco abuse     Vasospastic angina ([facility] [address] 11/11/2016     family history includes Bipolar Disorder in her son; High Blood Pressure in her mother; Kidney Disease in her mother; Lupus in her mother. reports that she has been smoking Cigarettes. She has a 5.00 pack-year smoking history. She has never used smokeless tobacco. She reports that she does not drink alcohol or use drugs. Case DW RN       Objective:  Exam:  BP (!) 163/79   Pulse 67   Temp 97.7 °F (36.5 °C) (Oral)   Resp 18   Ht 5' 7\" (1.702 m)   Wt 199 lb (90.3 kg)   SpO2 95%   BMI 31.17 kg/m²     Physical Exam:  General appearance:  No distress. HEENT:  Normal cephalic, atraumatic without obvious deformity. Pupils equal, round, and reactive to light.  Extra ocular muscles intact.  Conjunctivae/corneas Daily Rubi Hamilton MD   2 patch at 11/10/18 7789    sodium chloride flush 0.9 % injection 10 mL  10 mL Intravenous 2 times per day NORBERTO Roman   10 mL at 11/10/18 0831    sodium chloride flush 0.9 % injection 10 mL  10 mL Intravenous PRN Sanjiv Roman        magnesium hydroxide (MILK OF MAGNESIA) 400 MG/5ML suspension 30 mL  30 mL Oral Daily PRN NORBERTO Roman        [facility] injection 4 mg  4 mg Intravenous Q6H PRN NORBERTO Roman        enoxaparin (LOVENOX) injection 40 mg  40 mg Subcutaneous Daily Sanjiv Downs   40 mg at 11/10/18 6843    albuterol (PROVENTIL) nebulizer solution 2.5 mg  2.5 mg Nebulization Q2H PRN Ammy Rae MD        acetaminophen (TYLENOL) tablet 650 mg  650 mg Oral Q4H PRN Lico Ulrich MD   650 mg at 11/08/18 2102       Data:  LABS:   Lab Results   Component Value Date     11/09/2018    K 3.6 11/09/2018     11/09/2018    CO2 26 11/09/2018    BUN 17 11/09/2018    CREATININE 0.81 11/09/2018    GFRAA >60.0 11/09/2018    LABGLOM >60.0 11/09/2018    GLUCOSE 153 11/09/2018    MG 2.1 08/29/2018    CALCIUM 8.8 11/09/2018     Lab Results   Component Value Date    WBC 11.2 11/09/2018    RBC 3.98 11/09/2018    HGB 12.8 11/09/2018    HCT 37.4 11/09/2018    MCV 94.0 11/09/2018    RDW 13.5 11/09/2018     11/09/2018     Lab Results   Component Value Date    INR 0.9 04/17/2018    PROTIME 9.4 (L) 04/17/2018     No results found for: ORG    RADIOLOGY:  Xr Chest Standard (2 Vw)    Result Date: 11/7/2018  XR CHEST (2 VW): 11/6/2018 CLINICAL HISTORY:  sob . COMPARISON: 11/5/2018 and 4/15/2016. Upright PA and lateral radiographs of the chest were obtained. FINDINGS: Mild diffuse coarsening of the bronchovascular structures may be bronchitis and/or COPD, which is best evaluated clinically.  There are no focal infiltrates, pleural effusion, cardiomegaly, vascular congestion, pneumothorax, or displaced

## 2018-11-10 NOTE — PROGRESS NOTES
There is no hematoma. There is no hydrocephalus. There is subtle T2 hyperintense, approximately 3 mm white matter lesion  in the right brachium pontis, best shown on axial image 11 of series 9. Age of this lesion is uncertain. There is no sign of associated abnormal restricted diffusion. This would be an unusual location for a solitary demyelinating lesion. There are no worrisome supratentorial T2 hyperintense white matter lesions. There is no abnormal restricted diffusion. There is no abnormal paramagnetic signal. The brainstem, cerebellum and proximal cervical spinal cord are morphologically normal. There is no cerebellar ectopia. The sella and its contents are unremarkable. Orbits are grossly normal. Paranasal sinuses are unremarkable. Temporal bones are grossly normal. Intrahepatic axial and extra-axial CSF spaces are unremarkable. CONCLUSION: SOLITARY, APPROXIMATELY 3 MM NONSPECIFIC T2 HYPERINTENSE LESION IN THE RIGHT BRACHIUM PONTIS. OTHERWISE NO WORRISOME T2 HYPERINTENSE WHITE MATTER LESIONS. THERE IS NO SIGN OF ACUTE ISCHEMIC INFARCTION. THERE IS NO EVIDENCE OF HEMORRHAGE. THERE IS NO MASS. Recent Labs      11/07/18   0643  11/08/18   0643  11/09/18   0707   WBC  18.3*  10.0  11.2*   HGB  12.9  12.3  12.8   PLT  155  127*  132     Recent Labs      11/07/18   0643  11/08/18   0643  11/09/18   0707   NA  144  146*  141   K  3.9  4.1  3.6   CL  112*  112*  105   CO2  21*  22  26   BUN  12  14  17   CREATININE  0.87  0.84  0.81   GLUCOSE  196*  138*  153*     No results for input(s): BILITOT, ALKPHOS, AST, ALT in the last 72 hours. Lab Results   Component Value Date    PROTIME 9.4 04/17/2018    INR 0.9 04/17/2018     Lab Results   Component Value Date    LITHIUM 0.5 11/19/2017       ASSESSMENT AND PLAN  Intractable chronic daily headaches  Not responsive to any medications  Need f/u op for botox or aimovig  SOB better  Ok d/ cfrom neuro    Steven MAIKOL Gómez MD, [address], [employer] &

## 2018-11-10 NOTE — PROGRESS NOTES
CL  112*  105   CO2  22  26   BUN  14  17   CREATININE  0.84  0.81   GLUCOSE  138*  153*   CALCIUM  8.3*  8.8   LABGLOM  >60.0  >60.0   GFRAA  >60.0  >60.0       MV Settings:          No results for input(s): PHART, GZW4OVL, PO2ART, KHL7KLC, BEART, G8AOICNL in the last 72 hours. O2 Device: None (Room air)  O2 Flow Rate (L/min): 2 L/min    DIET CARB CONTROL;     MEDICATIONS during current hospitalization:    Continuous Infusions:    Scheduled Meds:   amLODIPine  5 mg Oral BID    nicotine  1 patch Transdermal Daily    nystatin  5 mL Oral 4x Daily    azithromycin  500 mg Intravenous Q24H    budesonide  0.5 mg Nebulization BID    benzonatate  100 mg Oral TID    methylPREDNISolone  40 mg Intravenous BID    ipratropium-albuterol  1 ampule Inhalation 4x Daily    fluticasone  2 spray Each Nare Daily    pantoprazole  40 mg Oral QAM AC    varenicline  1 mg Oral BID    amitriptyline  50 mg Oral Nightly    aspirin  81 mg Oral Daily    atorvastatin  10 mg Oral Nightly    folic acid  1 mg Oral Daily    losartan  100 mg Oral Daily    metoprolol  100 mg Oral BID    triamterene-hydrochlorothiazide  1 tablet Oral Daily    pregabalin  50 mg Oral BID    lidocaine  2 patch Transdermal Daily    sodium chloride flush  10 mL Intravenous 2 times per day    enoxaparin  40 mg Subcutaneous Daily       PRN Meds:benzocaine-menthol, guaiFENesin-codeine, ondansetron, butalbital-aspirin-caffeine, cyclobenzaprine, ibuprofen, traMADol, sodium chloride flush, magnesium hydroxide, ondansetron, albuterol, acetaminophen    Radiology  Xr Chest Standard (2 Vw)    Result Date: 11/7/2018  XR CHEST (2 VW): 11/6/2018 CLINICAL HISTORY:  sob . COMPARISON: 11/5/2018 and 4/15/2016. Upright PA and lateral radiographs of the chest were obtained. FINDINGS: Mild diffuse coarsening of the bronchovascular structures may be bronchitis and/or COPD, which is best evaluated clinically.  There are no focal infiltrates, pleural effusion,

## 2018-11-11 LAB
BLOOD CULTURE, ROUTINE: NORMAL
CULTURE, BLOOD 2: NORMAL

## 2018-11-11 PROCEDURE — 6370000000 HC RX 637 (ALT 250 FOR IP): Performed by: INTERNAL MEDICINE

## 2018-11-11 PROCEDURE — 94640 AIRWAY INHALATION TREATMENT: CPT

## 2018-11-11 PROCEDURE — 6370000000 HC RX 637 (ALT 250 FOR IP): Performed by: ANESTHESIOLOGY

## 2018-11-11 PROCEDURE — 94669 MECHANICAL CHEST WALL OSCILL: CPT

## 2018-11-11 PROCEDURE — 1210000000 HC MED SURG R&B

## 2018-11-11 PROCEDURE — 6360000002 HC RX W HCPCS: Performed by: INTERNAL MEDICINE

## 2018-11-11 PROCEDURE — 2580000003 HC RX 258: Performed by: PHYSICIAN ASSISTANT

## 2018-11-11 PROCEDURE — 6360000002 HC RX W HCPCS: Performed by: PHYSICIAN ASSISTANT

## 2018-11-11 PROCEDURE — 94761 N-INVAS EAR/PLS OXIMETRY MLT: CPT

## 2018-11-11 PROCEDURE — 6370000000 HC RX 637 (ALT 250 FOR IP): Performed by: PSYCHIATRY & NEUROLOGY

## 2018-11-11 PROCEDURE — 99232 SBSQ HOSP IP/OBS MODERATE 35: CPT | Performed by: INTERNAL MEDICINE

## 2018-11-11 RX ORDER — DILTIAZEM HYDROCHLORIDE 180 MG/1
180 CAPSULE, COATED, EXTENDED RELEASE ORAL 2 TIMES DAILY
Status: DISCONTINUED | OUTPATIENT
Start: 2018-11-12 | End: 2018-11-12

## 2018-11-11 RX ORDER — PREDNISONE 20 MG/1
40 TABLET ORAL DAILY
Status: DISCONTINUED | OUTPATIENT
Start: 2018-11-12 | End: 2018-11-12 | Stop reason: HOSPADM

## 2018-11-11 RX ADMIN — IPRATROPIUM BROMIDE AND ALBUTEROL SULFATE 1 AMPULE: .5; 3 SOLUTION RESPIRATORY (INHALATION) at 10:54

## 2018-11-11 RX ADMIN — ENOXAPARIN SODIUM 40 MG: 40 INJECTION SUBCUTANEOUS at 09:12

## 2018-11-11 RX ADMIN — LURASIDONE HYDROCHLORIDE 20 MG: 20 TABLET, FILM COATED ORAL at 09:13

## 2018-11-11 RX ADMIN — PREGABALIN 50 MG: 50 CAPSULE ORAL at 09:14

## 2018-11-11 RX ADMIN — BUDESONIDE 500 MCG: 0.5 SUSPENSION RESPIRATORY (INHALATION) at 10:50

## 2018-11-11 RX ADMIN — PANTOPRAZOLE SODIUM 40 MG: 40 TABLET, DELAYED RELEASE ORAL at 06:22

## 2018-11-11 RX ADMIN — ATORVASTATIN CALCIUM 10 MG: 10 TABLET, FILM COATED ORAL at 21:23

## 2018-11-11 RX ADMIN — LOSARTAN POTASSIUM 100 MG: 50 TABLET ORAL at 09:13

## 2018-11-11 RX ADMIN — TRAMADOL HYDROCHLORIDE 50 MG: 50 TABLET, FILM COATED ORAL at 17:08

## 2018-11-11 RX ADMIN — BENZONATATE 100 MG: 100 CAPSULE ORAL at 21:22

## 2018-11-11 RX ADMIN — AMITRIPTYLINE HYDROCHLORIDE 50 MG: 50 TABLET, FILM COATED ORAL at 21:22

## 2018-11-11 RX ADMIN — AZITHROMYCIN 250 MG: 250 TABLET, FILM COATED ORAL at 09:13

## 2018-11-11 RX ADMIN — GUAIFENESIN AND CODEINE PHOSPHATE 5 ML: 10; 100 LIQUID ORAL at 17:49

## 2018-11-11 RX ADMIN — LITHIUM CARBONATE 300 MG: 150 CAPSULE, GELATIN COATED ORAL at 09:13

## 2018-11-11 RX ADMIN — IPRATROPIUM BROMIDE AND ALBUTEROL SULFATE 1 AMPULE: .5; 3 SOLUTION RESPIRATORY (INHALATION) at 20:03

## 2018-11-11 RX ADMIN — Medication 10 ML: at 21:30

## 2018-11-11 RX ADMIN — VARENICLINE TARTRATE 1 MG: 1 TABLET, FILM COATED ORAL at 09:13

## 2018-11-11 RX ADMIN — ASPIRIN 81 MG: 81 TABLET, CHEWABLE ORAL at 09:13

## 2018-11-11 RX ADMIN — NYSTATIN 500000 UNITS: 500000 SUSPENSION ORAL at 21:22

## 2018-11-11 RX ADMIN — METOPROLOL TARTRATE 25 MG: 25 TABLET ORAL at 09:13

## 2018-11-11 RX ADMIN — TRAMADOL HYDROCHLORIDE 50 MG: 50 TABLET, FILM COATED ORAL at 06:21

## 2018-11-11 RX ADMIN — TRIAMTERENE AND HYDROCHLOROTHIAZIDE 1 TABLET: 37.5; 25 TABLET ORAL at 09:13

## 2018-11-11 RX ADMIN — TRAMADOL HYDROCHLORIDE 50 MG: 50 TABLET, FILM COATED ORAL at 00:19

## 2018-11-11 RX ADMIN — BENZONATATE 100 MG: 100 CAPSULE ORAL at 12:50

## 2018-11-11 RX ADMIN — FOLIC ACID 1 MG: 1 TABLET ORAL at 09:13

## 2018-11-11 RX ADMIN — IPRATROPIUM BROMIDE AND ALBUTEROL SULFATE 1 AMPULE: .5; 3 SOLUTION RESPIRATORY (INHALATION) at 15:05

## 2018-11-11 RX ADMIN — METHYLPREDNISOLONE SODIUM SUCCINATE 40 MG: 40 INJECTION, POWDER, FOR SOLUTION INTRAMUSCULAR; INTRAVENOUS at 09:27

## 2018-11-11 RX ADMIN — BUDESONIDE 500 MCG: 0.5 SUSPENSION RESPIRATORY (INHALATION) at 20:03

## 2018-11-11 RX ADMIN — BENZONATATE 100 MG: 100 CAPSULE ORAL at 09:13

## 2018-11-11 RX ADMIN — NYSTATIN 500000 UNITS: 500000 SUSPENSION ORAL at 12:49

## 2018-11-11 RX ADMIN — DILTIAZEM HYDROCHLORIDE 120 MG: 120 CAPSULE, COATED, EXTENDED RELEASE ORAL at 09:13

## 2018-11-11 RX ADMIN — VARENICLINE TARTRATE 1 MG: 1 TABLET, FILM COATED ORAL at 21:22

## 2018-11-11 RX ADMIN — Medication 10 ML: at 09:19

## 2018-11-11 RX ADMIN — NYSTATIN 500000 UNITS: 500000 SUSPENSION ORAL at 09:12

## 2018-11-11 RX ADMIN — NYSTATIN 500000 UNITS: 500000 SUSPENSION ORAL at 17:10

## 2018-11-11 RX ADMIN — PREGABALIN 50 MG: 50 CAPSULE ORAL at 21:23

## 2018-11-11 ASSESSMENT — PAIN SCALES - GENERAL
PAINLEVEL_OUTOF10: 9

## 2018-11-11 NOTE — PROGRESS NOTES
cervical fusion hardware is noted. FINDINGS SUGGESTIVE OF BRONCHITIS AND/OR COPD, WHICH IS BEST EVALUATED CLINICALLY. NO FOCAL PNEUMONIA, OR OTHER SIGNIFICANT CHANGE FROM PRIOR STUDIES IDENTIFIED. Xr Chest Standard (2 Vw)    Result Date: 11/5/2018  EXAMINATION: XR CHEST (2 VW)  CLINICAL HISTORY:  cough COMPARISONS: August 7, 2018  FINDINGS: Two views of the chest are submitted. The cardiac silhouette is of normal size configuration. The mediastinum is unremarkable. Pulmonary vascular unremarkable. Right sided trachea. No focal infiltrates. No effusions. No Pneumothoraces. NO ACUTE ACTIVE CARDIOPULMONARY PROCESS    Mri Cervical Spine Wo Contrast    Result Date: 11/7/2018  EXAMINATION: MRI CERVICAL SPINE WO CONTRAST CLINICAL HISTORY:  cervicalgia and rt hand numbness COMPARISONS: None available. FINDINGS: Multisequence, multiplanar MRI of the cervical spine was obtained. There is a small amount of metal artifact originating from an anterior plate and vertebral body screws at the site of anterior intervertebral fusion at C4-5. The intervertebral fusion site is unremarkable. Intervertebral discs above C4 and below C5 are unremarkable. There is no disc herniation. There is no significant spinal canal stenosis. There is no myelomalacia. There is no spinal cord edema. Vertebral alignment is normal. Pedicles and posterior elements are intact. There is no paravertebral abnormality. There is no sign of intrinsic spinal cord disease. CONCLUSION: UNREMARKABLE OPERATIVE SITE AT C4-5. NO SPINAL CANAL STENOSIS. NO DISC HERNIATION. NO INTRINSIC SPINAL CORD DISEASE. NO PARAVERTEBRAL ABNORMALITY. NO RECENT INJURIES. NO UNDERLYING PATHOLOGY.     Mri Brain Wo Contrast    Result Date: 11/7/2018  EXAMINATION: MRI BRAIN WO CONTRAST CLINICAL HISTORY:  rt sided numbness and left sided weakness Patient c/o weakness rt sided numbness sob COMPARISONS: artery                      82 cm/s           71 cm/s Distal internal carotid artery                  76 cm/s             87 cm/s External carotid artery                            83 cm/s           100 cm/s    ICA/CCA ratio                                         0.9                0.9   Vertebral arteries antegrade flow         Yes                     Yes      LESS THAN 50% STENOSIS OF THE BILATERAL INTERNAL CAROTID ARTERIES. Validated velocity measurements with angiographic measurements, velocity criteria are extrapolated from diameter data as defined by the Society of Radiologist in [address] [place] [address] Drive Radiology 2003; 463;962-140. CXR: Increased bronchial marking, no infiltrate.       IMPRESSION AND SUGGESTION:  Patient is at risk due to   · COPD exacerbation with   · Acute bronchitis   · Smoking   · Obesity   · Dizziness, management per primary team    Plan   · Continue prednisone  · DuoNeb's every 6 hours  · Budesonide nebs  · Azithromycin complete  5 days  · Vest   · IS and flutter   · Target sat 88-90%   · Possible home tomorrwo    DW [facility]     Electronically signed by Ashia Campbell MD,  FCCP   on 11/11/2018 at 1:21 PM

## 2018-11-12 VITALS
RESPIRATION RATE: 18 BRPM | SYSTOLIC BLOOD PRESSURE: 145 MMHG | HEIGHT: 67 IN | OXYGEN SATURATION: 98 % | DIASTOLIC BLOOD PRESSURE: 81 MMHG | BODY MASS INDEX: 31.23 KG/M2 | TEMPERATURE: 97.5 F | WEIGHT: 199 LBS | HEART RATE: 75 BPM

## 2018-11-12 PROBLEM — F31.9 BIPOLAR 1 DISORDER (HCC): Status: ACTIVE | Noted: 2018-11-12

## 2018-11-12 PROCEDURE — 94640 AIRWAY INHALATION TREATMENT: CPT

## 2018-11-12 PROCEDURE — 6370000000 HC RX 637 (ALT 250 FOR IP): Performed by: INTERNAL MEDICINE

## 2018-11-12 PROCEDURE — 94669 MECHANICAL CHEST WALL OSCILL: CPT

## 2018-11-12 PROCEDURE — 94664 DEMO&/EVAL PT USE INHALER: CPT

## 2018-11-12 PROCEDURE — 6370000000 HC RX 637 (ALT 250 FOR IP): Performed by: ANESTHESIOLOGY

## 2018-11-12 PROCEDURE — 93010 ELECTROCARDIOGRAM REPORT: CPT | Performed by: INTERNAL MEDICINE

## 2018-11-12 PROCEDURE — 6370000000 HC RX 637 (ALT 250 FOR IP): Performed by: PSYCHIATRY & NEUROLOGY

## 2018-11-12 PROCEDURE — 6360000002 HC RX W HCPCS: Performed by: INTERNAL MEDICINE

## 2018-11-12 PROCEDURE — 94618 PULMONARY STRESS TESTING: CPT

## 2018-11-12 PROCEDURE — 6360000002 HC RX W HCPCS: Performed by: PHYSICIAN ASSISTANT

## 2018-11-12 PROCEDURE — 99232 SBSQ HOSP IP/OBS MODERATE 35: CPT | Performed by: INTERNAL MEDICINE

## 2018-11-12 PROCEDURE — 2580000003 HC RX 258: Performed by: PHYSICIAN ASSISTANT

## 2018-11-12 RX ORDER — IPRATROPIUM BROMIDE AND ALBUTEROL SULFATE 2.5; .5 MG/3ML; MG/3ML
1 SOLUTION RESPIRATORY (INHALATION) 3 TIMES DAILY
Status: DISCONTINUED | OUTPATIENT
Start: 2018-11-12 | End: 2018-11-12 | Stop reason: HOSPADM

## 2018-11-12 RX ORDER — NICOTINE 21 MG/24HR
1 PATCH, TRANSDERMAL 24 HOURS TRANSDERMAL SEE ADMIN INSTRUCTIONS
Qty: 30 PATCH | Refills: 3 | Status: SHIPPED | OUTPATIENT
Start: 2018-11-12 | End: 2018-12-18 | Stop reason: SDUPTHER

## 2018-11-12 RX ORDER — DILTIAZEM HYDROCHLORIDE 240 MG/1
240 CAPSULE, COATED, EXTENDED RELEASE ORAL DAILY
Status: DISCONTINUED | OUTPATIENT
Start: 2018-11-13 | End: 2018-11-12 | Stop reason: HOSPADM

## 2018-11-12 RX ORDER — FLUTICASONE PROPIONATE 50 MCG
2 SPRAY, SUSPENSION (ML) NASAL DAILY
Qty: 1 BOTTLE | Refills: 3 | Status: SHIPPED | OUTPATIENT
Start: 2018-11-13 | End: 2019-03-08 | Stop reason: ALTCHOICE

## 2018-11-12 RX ORDER — LITHIUM CARBONATE 300 MG/1
300 CAPSULE ORAL DAILY
Qty: 15 CAPSULE | Refills: 2 | Status: SHIPPED | OUTPATIENT
Start: 2018-11-13 | End: 2018-12-18 | Stop reason: SDUPTHER

## 2018-11-12 RX ORDER — PREDNISONE 10 MG/1
10 TABLET ORAL SEE ADMIN INSTRUCTIONS
Qty: 30 TABLET | Refills: 0 | Status: SHIPPED | OUTPATIENT
Start: 2018-11-12 | End: 2018-11-22

## 2018-11-12 RX ORDER — LOSARTAN POTASSIUM 100 MG/1
100 TABLET ORAL DAILY
Qty: 30 TABLET | Refills: 3 | Status: SHIPPED | OUTPATIENT
Start: 2018-11-13 | End: 2018-12-18 | Stop reason: SDUPTHER

## 2018-11-12 RX ORDER — IPRATROPIUM BROMIDE AND ALBUTEROL SULFATE 2.5; .5 MG/3ML; MG/3ML
3 SOLUTION RESPIRATORY (INHALATION) 3 TIMES DAILY
Qty: 360 ML | Refills: 3 | Status: SHIPPED | OUTPATIENT
Start: 2018-11-12 | End: 2018-12-18 | Stop reason: SDUPTHER

## 2018-11-12 RX ORDER — DILTIAZEM HYDROCHLORIDE 240 MG/1
240 CAPSULE, COATED, EXTENDED RELEASE ORAL DAILY
Qty: 30 CAPSULE | Refills: 3 | Status: SHIPPED | OUTPATIENT
Start: 2018-11-12 | End: 2018-12-18 | Stop reason: SDUPTHER

## 2018-11-12 RX ORDER — BUDESONIDE 0.5 MG/2ML
0.5 INHALANT ORAL 2 TIMES DAILY
Qty: 60 AMPULE | Refills: 3 | Status: SHIPPED | OUTPATIENT
Start: 2018-11-12 | End: 2018-12-18 | Stop reason: SDUPTHER

## 2018-11-12 RX ORDER — CODEINE PHOSPHATE AND GUAIFENESIN 10; 100 MG/5ML; MG/5ML
5 SOLUTION ORAL 4 TIMES DAILY PRN
Qty: 250 ML | Refills: 0 | Status: SHIPPED | OUTPATIENT
Start: 2018-11-12 | End: 2018-11-17

## 2018-11-12 RX ADMIN — LITHIUM CARBONATE 300 MG: 150 CAPSULE, GELATIN COATED ORAL at 08:25

## 2018-11-12 RX ADMIN — DILTIAZEM HYDROCHLORIDE 180 MG: 180 CAPSULE, COATED, EXTENDED RELEASE ORAL at 08:25

## 2018-11-12 RX ADMIN — PREGABALIN 50 MG: 50 CAPSULE ORAL at 08:25

## 2018-11-12 RX ADMIN — TRAMADOL HYDROCHLORIDE 50 MG: 50 TABLET, FILM COATED ORAL at 05:50

## 2018-11-12 RX ADMIN — LURASIDONE HYDROCHLORIDE 20 MG: 20 TABLET, FILM COATED ORAL at 08:24

## 2018-11-12 RX ADMIN — BENZONATATE 100 MG: 100 CAPSULE ORAL at 08:25

## 2018-11-12 RX ADMIN — IPRATROPIUM BROMIDE AND ALBUTEROL SULFATE 1 AMPULE: .5; 3 SOLUTION RESPIRATORY (INHALATION) at 11:15

## 2018-11-12 RX ADMIN — VARENICLINE TARTRATE 1 MG: 1 TABLET, FILM COATED ORAL at 08:24

## 2018-11-12 RX ADMIN — TRAMADOL HYDROCHLORIDE 50 MG: 50 TABLET, FILM COATED ORAL at 13:20

## 2018-11-12 RX ADMIN — PANTOPRAZOLE SODIUM 40 MG: 40 TABLET, DELAYED RELEASE ORAL at 05:50

## 2018-11-12 RX ADMIN — ENOXAPARIN SODIUM 40 MG: 40 INJECTION SUBCUTANEOUS at 08:26

## 2018-11-12 RX ADMIN — LOSARTAN POTASSIUM 100 MG: 50 TABLET ORAL at 08:26

## 2018-11-12 RX ADMIN — FOLIC ACID 1 MG: 1 TABLET ORAL at 08:25

## 2018-11-12 RX ADMIN — Medication 10 ML: at 08:27

## 2018-11-12 RX ADMIN — BENZONATATE 100 MG: 100 CAPSULE ORAL at 13:19

## 2018-11-12 RX ADMIN — PREDNISONE 40 MG: 20 TABLET ORAL at 08:25

## 2018-11-12 RX ADMIN — IPRATROPIUM BROMIDE AND ALBUTEROL SULFATE 1 AMPULE: .5; 3 SOLUTION RESPIRATORY (INHALATION) at 07:13

## 2018-11-12 RX ADMIN — BUDESONIDE 500 MCG: 0.5 SUSPENSION RESPIRATORY (INHALATION) at 07:13

## 2018-11-12 RX ADMIN — ASPIRIN 81 MG: 81 TABLET, CHEWABLE ORAL at 08:24

## 2018-11-12 RX ADMIN — TRIAMTERENE AND HYDROCHLOROTHIAZIDE 1 TABLET: 37.5; 25 TABLET ORAL at 08:24

## 2018-11-12 RX ADMIN — NYSTATIN 500000 UNITS: 500000 SUSPENSION ORAL at 08:26

## 2018-11-12 RX ADMIN — NYSTATIN 500000 UNITS: 500000 SUSPENSION ORAL at 13:19

## 2018-11-12 RX ADMIN — AZITHROMYCIN 250 MG: 250 TABLET, FILM COATED ORAL at 08:24

## 2018-11-12 ASSESSMENT — PAIN SCALES - GENERAL
PAINLEVEL_OUTOF10: 8
PAINLEVEL_OUTOF10: 6
PAINLEVEL_OUTOF10: 9

## 2018-11-12 ASSESSMENT — ENCOUNTER SYMPTOMS
EYES NEGATIVE: 1
COUGH: 0
CHEST TIGHTNESS: 1
WHEEZING: 1
ALLERGIC/IMMUNOLOGIC NEGATIVE: 1
CHOKING: 0
STRIDOR: 0
BACK PAIN: 1
APNEA: 0
GASTROINTESTINAL NEGATIVE: 1
RESPIRATORY NEGATIVE: 1
SHORTNESS OF BREATH: 1

## 2018-11-12 ASSESSMENT — PAIN DESCRIPTION - PAIN TYPE: TYPE: CHRONIC PAIN

## 2018-11-12 ASSESSMENT — PAIN DESCRIPTION - ORIENTATION: ORIENTATION: RIGHT;LEFT

## 2018-11-12 ASSESSMENT — PAIN DESCRIPTION - LOCATION: LOCATION: GENERALIZED

## 2018-11-12 NOTE — PROGRESS NOTES
INPATIENT PROGRESS NOTES    PATIENT NAME: Jalil Pop  MRN: [id number]  SERVICE DATE:  November 12, 2018   SERVICE TIME:  2:59 PM      PRIMARY SERVICE: Pulmonary Disease    CHIEF COMPLAIN: shortness of breath       INTERVAL HPI: Patient seen and examined at bedside, Interval Notes, orders reviewed. Nursing notes noted  Patient having much less shortness of breath and cough. She is feeling better. She says no fever or chills no chest pain. No nausea vomiting or diarrhea. She is going home. OBJECTIVE    Body mass index is 31.17 kg/m². PHYSICAL EXAM:  Vitals:  BP (!) 145/81   Pulse 75   Temp 97.5 °F (36.4 °C) (Oral)   Resp 18   Ht 5' 7\" (1.702 m)   Wt 199 lb (90.3 kg)   SpO2 98%   BMI 31.17 kg/m²   General: Alert, awake . comfortable in bed, No distress. appears stated age and cooperative  Head: Atraumatic , Normocephalic   Eyes: PERRL. No sclera icterus. No conjunctival injection. No discharge   ENT: No nasal  discharge. Pharynx clear. lips, teeth, mucosa and gums are normal, tongue protrudes in the midline  Neck:  Trachea midline. No thyromegaly, no JVD, No cervical adenopathy. Chest : Bilaterally symmetrical ,Normal effort,  No accessory muscle use  Lung : . Fair BS bilateral, decreased BS at bases. No Rales. Few bilateral scattered wheezing. No rhonchi. No dullness on percussion. Heart[de-identified] Normal  rate. Regular rhythm. No mumur ,  Rub or gallop  ABD: Non-tender. Non-distended. No masses. No organmegaly. Normal bowel sounds. No hernia. Ext : No Pitting both leg , No Cyanosis No clubbing  Neuro: no focal weakness          DATA:   No results for input(s): WBC, HGB, HCT, MCV, PLT in the last 72 hours. No results for input(s): NA, K, CL, CO2, BUN, CREATININE, GLUCOSE, CALCIUM, PROT, LABALBU, BILITOT, ALKPHOS, AST, ALT, LABGLOM, GFRAA, AGRATIO, GLOB in the last 72 hours. MV Settings:          No results for input(s): PHART, QWC1TEL, PO2ART, FOM5KXH, BEART, V4DTNWJY in the last 72 hours.     O2 Device: None (Room air)  O2 Flow Rate (L/min): 2 L/min    DIET CARB CONTROL;     MEDICATIONS during current hospitalization:    Continuous Infusions:    Scheduled Meds:   ipratropium-albuterol  1 ampule Inhalation TID    [START ON 11/13/2018] diltiazem  240 mg Oral Daily    predniSONE  40 mg Oral Daily    lurasidone  20 mg Oral Daily    lithium  300 mg Oral Daily    nicotine  1 patch Transdermal Daily    nystatin  5 mL Oral 4x Daily    budesonide  0.5 mg Nebulization BID    benzonatate  100 mg Oral TID    fluticasone  2 spray Each Nare Daily    pantoprazole  40 mg Oral QAM AC    varenicline  1 mg Oral BID    amitriptyline  50 mg Oral Nightly    aspirin  81 mg Oral Daily    atorvastatin  10 mg Oral Nightly    folic acid  1 mg Oral Daily    losartan  100 mg Oral Daily    triamterene-hydrochlorothiazide  1 tablet Oral Daily    pregabalin  50 mg Oral BID    lidocaine  2 patch Transdermal Daily    sodium chloride flush  10 mL Intravenous 2 times per day    enoxaparin  40 mg Subcutaneous Daily       PRN Meds:benzocaine-menthol, guaiFENesin-codeine, ondansetron, butalbital-aspirin-caffeine, cyclobenzaprine, ibuprofen, traMADol, sodium chloride flush, magnesium hydroxide, ondansetron, albuterol, acetaminophen    Radiology  Xr Chest Standard (2 Vw)    Result Date: 11/7/2018  XR CHEST (2 VW): 11/6/2018 CLINICAL HISTORY:  sob . COMPARISON: 11/5/2018 and 4/15/2016. Upright PA and lateral radiographs of the chest were obtained. FINDINGS: Mild diffuse coarsening of the bronchovascular structures may be bronchitis and/or COPD, which is best evaluated clinically. There are no focal infiltrates, pleural effusion, cardiomegaly, vascular congestion, pneumothorax, or displaced fractures identified. Lower cervical fusion hardware is noted. FINDINGS SUGGESTIVE OF BRONCHITIS AND/OR COPD, WHICH IS BEST EVALUATED CLINICALLY. NO FOCAL PNEUMONIA, OR OTHER SIGNIFICANT CHANGE FROM PRIOR STUDIES IDENTIFIED.      Xr Chest

## 2018-11-12 NOTE — DISCHARGE SUMMARY
11/8/2018  BILATERAL DUPLEX CAROTID ULTRASOUND CLINICAL INFORMATION: COMPARISON:  NONE AVAILABLE FINDINGS:  The bilateral carotid duplex ultrasound study demonstrates the following:  No significant plaque. RIGHT            LEFT Proximal common carotid artery           96 cm/s            109 cm/s  Mid common carotid artery                   88 cm/s            100 cm/s  Distal common carotid artery                62 cm/s           87 cm/s Proximal internal carotid artery             70 cm/s            84 cm/s Mid internal carotid artery                      82 cm/s           71 cm/s Distal internal carotid artery                  76 cm/s             87 cm/s External carotid artery                            83 cm/s           100 cm/s    ICA/CCA ratio                                         0.9                0.9   Vertebral arteries antegrade flow         Yes                     Yes      LESS THAN 50% STENOSIS OF THE BILATERAL INTERNAL CAROTID ARTERIES. Validated velocity measurements with angiographic measurements, velocity criteria are extrapolated from diameter data as defined by the Society of Radiologist in [address] [place] [address] Drive Radiology 2003; 901;[phone]. Consults:     IP CONSULT TO PULMONOLOGY  IP CONSULT TO PAIN MANAGEMENT  IP CONSULT TO NEUROLOGY  PULMONARY REHAB EVALUATION  IP CONSULT TO PSYCHIATRY    Labs:  For convenience and continuity at follow-up the following most recent labs are provided:    Lab Results   Component Value Date    WBC 11.2 11/09/2018    HGB 12.8 11/09/2018    HCT 37.4 11/09/2018    MCV 94.0 11/09/2018     11/09/2018     11/09/2018    K 3.6 11/09/2018     11/09/2018    CO2 26 11/09/2018    BUN 17 11/09/2018    CREATININE 0.81 11/09/2018    CALCIUM 8.8 11/09/2018    BNP 72 12/07/2013    ALKPHOS 126 11/06/2018    ALT 55 11/06/2018    AST 29 11/06/2018    BILITOT 0.3 11/06/2018    LABALBU 4.6

## 2018-11-13 ENCOUNTER — CARE COORDINATION (OUTPATIENT)
Dept: CASE MANAGEMENT | Age: 48
End: 2018-11-13

## 2018-11-13 RX ORDER — CLONIDINE HYDROCHLORIDE 0.1 MG/1
TABLET ORAL
Qty: 60 TABLET | Refills: 5 | Status: SHIPPED | OUTPATIENT
Start: 2018-11-13 | End: 2018-12-18 | Stop reason: SDUPTHER

## 2018-11-13 NOTE — CARE COORDINATION
Reconciliation by phone -    Yes    Were there discrepancies in medications? No  If YES, were discrepancies addressed? Not Applicable    Understands Medications? Yes   Questions about medications from pt or caregiver? No   Questions addressed? Not Applicable                Has all of medication and/or prescriptions   No, see above note  Taking Medications? Yes  Can you swallow your pills?   Yes    Disease specific zones sheet reviewed with patient/caregiver- Yes  Education mailed to patient's home- No

## 2018-11-14 ENCOUNTER — TELEPHONE (OUTPATIENT)
Dept: PULMONOLOGY | Age: 48
End: 2018-11-14

## 2018-11-14 ENCOUNTER — TELEPHONE (OUTPATIENT)
Dept: PRIMARY CARE CLINIC | Age: 48
End: 2018-11-14

## 2018-11-30 RX ORDER — LANCETS 28 GAUGE
EACH MISCELLANEOUS
Qty: 100 EACH | Refills: 5 | Status: SHIPPED | OUTPATIENT
Start: 2018-11-30 | End: 2018-12-18 | Stop reason: SDUPTHER

## 2018-12-07 PROBLEM — M54.41 CHRONIC BILATERAL LOW BACK PAIN WITH BILATERAL SCIATICA: Status: ACTIVE | Noted: 2018-08-02

## 2018-12-07 PROBLEM — M48.061 LUMBAR SPINAL STENOSIS: Status: ACTIVE | Noted: 2018-08-01

## 2018-12-07 PROBLEM — M54.42 CHRONIC BILATERAL LOW BACK PAIN WITH BILATERAL SCIATICA: Status: ACTIVE | Noted: 2018-08-02

## 2018-12-07 PROBLEM — M54.16 LUMBAR RADICULOPATHY: Status: ACTIVE | Noted: 2018-08-01

## 2018-12-07 PROBLEM — G89.29 CHRONIC BILATERAL LOW BACK PAIN WITH BILATERAL SCIATICA: Status: ACTIVE | Noted: 2018-08-02

## 2018-12-12 ENCOUNTER — TELEPHONE (OUTPATIENT)
Dept: PRIMARY CARE CLINIC | Age: 48
End: 2018-12-12

## 2018-12-12 NOTE — TELEPHONE ENCOUNTER
Left message on machine for patient to return our call in regards to a fax received from [facility] in [facility]. [facility] for Fluocinonide cream.    Does the patient want this?

## 2018-12-18 ENCOUNTER — OFFICE VISIT (OUTPATIENT)
Dept: PRIMARY CARE CLINIC | Age: 48
End: 2018-12-18
Payer: COMMERCIAL

## 2018-12-18 VITALS
SYSTOLIC BLOOD PRESSURE: 136 MMHG | HEART RATE: 74 BPM | DIASTOLIC BLOOD PRESSURE: 88 MMHG | RESPIRATION RATE: 16 BRPM | TEMPERATURE: 97.9 F | WEIGHT: 200 LBS | OXYGEN SATURATION: 98 % | HEIGHT: 67 IN | BODY MASS INDEX: 31.39 KG/M2

## 2018-12-18 DIAGNOSIS — E55.9 VITAMIN D DEFICIENCY: ICD-10-CM

## 2018-12-18 DIAGNOSIS — G89.29 CHRONIC RIGHT-SIDED LOW BACK PAIN WITH BILATERAL SCIATICA: ICD-10-CM

## 2018-12-18 DIAGNOSIS — J41.0 SIMPLE CHRONIC BRONCHITIS (HCC): Primary | ICD-10-CM

## 2018-12-18 DIAGNOSIS — R73.9 HYPERGLYCEMIA: ICD-10-CM

## 2018-12-18 DIAGNOSIS — M50.320 DEGENERATION OF INTERVERTEBRAL DISC OF MID-CERVICAL REGION, UNSPECIFIED SPINAL LEVEL: ICD-10-CM

## 2018-12-18 DIAGNOSIS — M54.42 CHRONIC RIGHT-SIDED LOW BACK PAIN WITH BILATERAL SCIATICA: ICD-10-CM

## 2018-12-18 DIAGNOSIS — Z00.00 HEALTH CARE MAINTENANCE: ICD-10-CM

## 2018-12-18 DIAGNOSIS — K21.9 GASTROESOPHAGEAL REFLUX DISEASE WITHOUT ESOPHAGITIS: ICD-10-CM

## 2018-12-18 DIAGNOSIS — M50.220 HERNIATION OF INTERVERTEBRAL DISC OF MID-CERVICAL REGION, UNSPECIFIED SPINAL LEVEL: ICD-10-CM

## 2018-12-18 DIAGNOSIS — R63.5 WEIGHT GAIN: ICD-10-CM

## 2018-12-18 DIAGNOSIS — I10 ESSENTIAL HYPERTENSION: ICD-10-CM

## 2018-12-18 DIAGNOSIS — M54.41 CHRONIC RIGHT-SIDED LOW BACK PAIN WITH BILATERAL SCIATICA: ICD-10-CM

## 2018-12-18 DIAGNOSIS — R60.1 GENERALIZED EDEMA: ICD-10-CM

## 2018-12-18 DIAGNOSIS — F31.32 BIPOLAR 1 DISORDER, DEPRESSED, MODERATE (HCC): ICD-10-CM

## 2018-12-18 DIAGNOSIS — E53.8 FOLIC ACID DEFICIENCY: ICD-10-CM

## 2018-12-18 DIAGNOSIS — Z00.00 PREVENTATIVE HEALTH CARE: ICD-10-CM

## 2018-12-18 DIAGNOSIS — F17.200 SMOKER: ICD-10-CM

## 2018-12-18 DIAGNOSIS — E78.00 PURE HYPERCHOLESTEROLEMIA: ICD-10-CM

## 2018-12-18 DIAGNOSIS — G47.00 INSOMNIA, UNSPECIFIED TYPE: ICD-10-CM

## 2018-12-18 DIAGNOSIS — E66.09 CLASS 1 OBESITY DUE TO EXCESS CALORIES WITH SERIOUS COMORBIDITY AND BODY MASS INDEX (BMI) OF 31.0 TO 31.9 IN ADULT: ICD-10-CM

## 2018-12-18 DIAGNOSIS — E83.42 HYPOMAGNESEMIA: ICD-10-CM

## 2018-12-18 LAB
CREATININE URINE: 50.6 MG/DL
MICROALBUMIN UR-MCNC: <1.2 MG/DL
MICROALBUMIN/CREAT UR-RTO: NORMAL MG/G (ref 0–30)

## 2018-12-18 PROCEDURE — 4004F PT TOBACCO SCREEN RCVD TLK: CPT | Performed by: INTERNAL MEDICINE

## 2018-12-18 PROCEDURE — 3023F SPIROM DOC REV: CPT | Performed by: INTERNAL MEDICINE

## 2018-12-18 PROCEDURE — G8598 ASA/ANTIPLAT THER USED: HCPCS | Performed by: INTERNAL MEDICINE

## 2018-12-18 PROCEDURE — G8484 FLU IMMUNIZE NO ADMIN: HCPCS | Performed by: INTERNAL MEDICINE

## 2018-12-18 PROCEDURE — G8427 DOCREV CUR MEDS BY ELIG CLIN: HCPCS | Performed by: INTERNAL MEDICINE

## 2018-12-18 PROCEDURE — 99214 OFFICE O/P EST MOD 30 MIN: CPT | Performed by: INTERNAL MEDICINE

## 2018-12-18 PROCEDURE — G8417 CALC BMI ABV UP PARAM F/U: HCPCS | Performed by: INTERNAL MEDICINE

## 2018-12-18 PROCEDURE — G8926 SPIRO NO PERF OR DOC: HCPCS | Performed by: INTERNAL MEDICINE

## 2018-12-18 RX ORDER — CLONIDINE HYDROCHLORIDE 0.1 MG/1
TABLET ORAL
Qty: 60 TABLET | Refills: 5 | Status: SHIPPED | OUTPATIENT
Start: 2018-12-18 | End: 2019-03-08

## 2018-12-18 RX ORDER — IBUPROFEN 800 MG/1
800 TABLET ORAL 2 TIMES DAILY PRN
Qty: 60 TABLET | Refills: 5 | Status: SHIPPED | OUTPATIENT
Start: 2018-12-18 | End: 2019-04-10 | Stop reason: ALTCHOICE

## 2018-12-18 RX ORDER — PREGABALIN 50 MG/1
50 CAPSULE ORAL 2 TIMES DAILY
Qty: 30 CAPSULE | Refills: 1 | Status: CANCELLED | OUTPATIENT
Start: 2018-12-18 | End: 2019-01-02

## 2018-12-18 RX ORDER — NICOTINE 21 MG/24HR
1 PATCH, TRANSDERMAL 24 HOURS TRANSDERMAL SEE ADMIN INSTRUCTIONS
Qty: 30 PATCH | Refills: 0 | Status: SHIPPED | OUTPATIENT
Start: 2018-12-18 | End: 2018-12-27 | Stop reason: SDUPTHER

## 2018-12-18 RX ORDER — PANTOPRAZOLE SODIUM 40 MG/1
40 TABLET, DELAYED RELEASE ORAL
Qty: 30 TABLET | Refills: 11 | Status: SHIPPED | OUTPATIENT
Start: 2018-12-18 | End: 2019-04-10 | Stop reason: SDUPTHER

## 2018-12-18 RX ORDER — ASPIRIN 81 MG/1
81 TABLET, CHEWABLE ORAL DAILY
Qty: 30 TABLET | Refills: 5 | Status: SHIPPED | OUTPATIENT
Start: 2018-12-18 | End: 2019-04-10 | Stop reason: SDUPTHER

## 2018-12-18 RX ORDER — LANOLIN ALCOHOL/MO/W.PET/CERES
400 CREAM (GRAM) TOPICAL DAILY
Qty: 30 TABLET | Refills: 5 | Status: SHIPPED | OUTPATIENT
Start: 2018-12-18 | End: 2019-04-10 | Stop reason: SDUPTHER

## 2018-12-18 RX ORDER — LIDOCAINE 50 MG/G
3 PATCH TOPICAL DAILY
Qty: 90 PATCH | Refills: 1 | Status: SHIPPED | OUTPATIENT
Start: 2018-12-18 | End: 2019-03-08 | Stop reason: ALTCHOICE

## 2018-12-18 RX ORDER — AMITRIPTYLINE HYDROCHLORIDE 50 MG/1
50 TABLET, FILM COATED ORAL NIGHTLY
Qty: 30 TABLET | Refills: 5 | Status: SHIPPED | OUTPATIENT
Start: 2018-12-18 | End: 2019-04-10 | Stop reason: SDUPTHER

## 2018-12-18 RX ORDER — TRIAMTERENE AND HYDROCHLOROTHIAZIDE 37.5; 25 MG/1; MG/1
1 TABLET ORAL DAILY
Qty: 30 TABLET | Refills: 3 | Status: SHIPPED | OUTPATIENT
Start: 2018-12-18 | End: 2019-04-10 | Stop reason: SDUPTHER

## 2018-12-18 RX ORDER — LITHIUM CARBONATE 300 MG/1
300 CAPSULE ORAL DAILY
Qty: 30 CAPSULE | Refills: 1 | Status: SHIPPED | OUTPATIENT
Start: 2018-12-18 | End: 2019-03-05 | Stop reason: SDUPTHER

## 2018-12-18 RX ORDER — ATORVASTATIN CALCIUM 10 MG/1
10 TABLET, FILM COATED ORAL NIGHTLY
Qty: 30 TABLET | Refills: 5 | Status: SHIPPED | OUTPATIENT
Start: 2018-12-18 | End: 2019-03-08 | Stop reason: SDUPTHER

## 2018-12-18 RX ORDER — FLUTICASONE PROPIONATE 50 MCG
2 SPRAY, SUSPENSION (ML) NASAL DAILY
Qty: 1 BOTTLE | Refills: 3 | Status: CANCELLED | OUTPATIENT
Start: 2018-12-18

## 2018-12-18 RX ORDER — BUDESONIDE 0.5 MG/2ML
0.5 INHALANT ORAL 2 TIMES DAILY
Qty: 60 AMPULE | Refills: 5 | Status: SHIPPED | OUTPATIENT
Start: 2018-12-18 | End: 2019-01-22 | Stop reason: CLARIF

## 2018-12-18 RX ORDER — IPRATROPIUM BROMIDE AND ALBUTEROL SULFATE 2.5; .5 MG/3ML; MG/3ML
3 SOLUTION RESPIRATORY (INHALATION) 3 TIMES DAILY
Qty: 360 ML | Refills: 5 | Status: SHIPPED | OUTPATIENT
Start: 2018-12-18

## 2018-12-18 RX ORDER — CHOLECALCIFEROL (VITAMIN D3) 50 MCG
2000 TABLET ORAL DAILY
Qty: 30 TABLET | Refills: 5 | Status: SHIPPED | OUTPATIENT
Start: 2018-12-18 | End: 2019-04-10 | Stop reason: SDUPTHER

## 2018-12-18 RX ORDER — FOLIC ACID 1 MG/1
1 TABLET ORAL DAILY
Qty: 30 TABLET | Refills: 5 | Status: SHIPPED | OUTPATIENT
Start: 2018-12-18 | End: 2019-04-10 | Stop reason: SDUPTHER

## 2018-12-18 RX ORDER — VARENICLINE TARTRATE 1 MG/1
1 TABLET, FILM COATED ORAL 2 TIMES DAILY
Qty: 60 TABLET | Refills: 3 | Status: SHIPPED | OUTPATIENT
Start: 2018-12-18 | End: 2019-03-08

## 2018-12-18 RX ORDER — LANCETS 28 GAUGE
1 EACH MISCELLANEOUS DAILY
Qty: 100 EACH | Refills: 5 | Status: SHIPPED | OUTPATIENT
Start: 2018-12-18 | End: 2019-08-21 | Stop reason: SDUPTHER

## 2018-12-18 RX ORDER — CALCIUM CARBONATE 200(500)MG
TABLET,CHEWABLE ORAL
Status: CANCELLED | OUTPATIENT
Start: 2018-12-18

## 2018-12-18 RX ORDER — DILTIAZEM HYDROCHLORIDE 240 MG/1
240 CAPSULE, COATED, EXTENDED RELEASE ORAL DAILY
Qty: 30 CAPSULE | Refills: 5 | Status: SHIPPED | OUTPATIENT
Start: 2018-12-18 | End: 2019-03-08 | Stop reason: SDUPTHER

## 2018-12-18 RX ORDER — LOSARTAN POTASSIUM 100 MG/1
100 TABLET ORAL DAILY
Qty: 30 TABLET | Refills: 5 | Status: SHIPPED | OUTPATIENT
Start: 2018-12-18 | End: 2019-03-08 | Stop reason: SDUPTHER

## 2018-12-18 RX ORDER — CYCLOBENZAPRINE HCL 10 MG
10 TABLET ORAL 2 TIMES DAILY PRN
Qty: 60 TABLET | Refills: 2 | Status: SHIPPED | OUTPATIENT
Start: 2018-12-18 | End: 2019-03-11 | Stop reason: SDUPTHER

## 2018-12-18 NOTE — PROGRESS NOTES
H/O cardiac arrest 2012    Bipolar 1 disorder ([address] 2012    Current tobacco use 2012    FH: CAD (coronary artery disease) 2012    SAMAYOA (dyspnea on exertion) 2012    Chest pain 2012    Abnormal ECG 2012    Displacement of lumbar intervertebral disc without myelopathy 2012    Fitting and adjustment of orthopedic device 2011    Sensory hearing loss, bilateral 2011    Amyotrophia 10/09/2008    Arthralgia of lower leg 10/09/2008    Acid reflux 2008    Backhand tennis elbow 2007    Arthralgia of upper arm 2007     Past Surgical History:   Procedure Laterality Date    BACK SURGERY      x2     SECTION      x2    CHOLECYSTECTOMY  13    Lapchole    CORONARY ANGIOPLASTY      CYST REMOVAL  3/7/16    Dr. Daquan Golden Left     UPPER GASTROINTESTINAL ENDOSCOPY  2014    ANIBAL HOUSE M.D. Family History   Problem Relation Age of Onset    High Blood Pressure Mother     Kidney Disease Mother     Lupus Mother     Bipolar Disorder Son      Social History     Social History    Marital status:      Spouse name: N/A    Number of children: N/A    Years of education: N/A     Occupational History    unemployed      Social History Main Topics    Smoking status: Current Some Day Smoker     Packs/day: 0.50     Years: 10.00     Types: Cigarettes    Smokeless tobacco: Never Used      Comment: pt trying Chantix    Alcohol use No    Drug use: No    Sexual activity: Not Asked     Other Topics Concern    None     Social History Narrative    Lonnie Buckner is a 82-year-old female who is on disability because of pain and bipolar disorder. She's also had a presumed diagnosis of possible multiple sclerosis.   She's been seeing Dr. Arleen Banks for that and she says that the diagnosis is sometimes in question and it's clear neuropathy vitamin B12 deficiency as well as generalized bodyaches from the severe vitamin D deficiency have caused this scenario that looks like multiple sclerosis. She recently tried to go back to work but was not able because of her pain. Allergies   Allergen Reactions    Latex Itching     Pt reports itching on hands after using rubber gloves at work    Influenza Vaccines Swelling     Pt reports her entire arm was red and edematous post vaccine    Eggs Or Egg-Derived Products     Gabapentin Nausea Only    Pneumococcal Vaccines Hives    Povidone Iodine Itching    Varicella Virus Vaccine Live Hives       Review of Systems   Constitutional: Positive for unexpected weight change. Negative for appetite change, chills and fever. HENT: Negative for facial swelling and nosebleeds. Eyes: Negative for photophobia and visual disturbance. Respiratory: Positive for wheezing. Negative for apnea and choking. Cardiovascular: Negative for chest pain and palpitations. Gastrointestinal: Positive for heartburn. Negative for abdominal distention and blood in stool. Genitourinary: Negative for enuresis, hematuria and vaginal bleeding. Musculoskeletal: Positive for back pain and neck pain. Negative for gait problem and joint swelling. Skin: Negative for rash. Neurological: Negative for syncope and speech difficulty. Hematological: Does not bruise/bleed easily. Psychiatric/Behavioral: Positive for agitation and dysphoric mood. Negative for hallucinations and suicidal ideas. The patient has insomnia. Vitals:    12/18/18 1001   BP: 136/88   Site: Left Upper Arm   Position: Sitting   Cuff Size: Large Adult   Pulse: 74   Resp: 16   Temp: 97.9 °F (36.6 °C)   SpO2: 98%   Weight: 200 lb (90.7 kg)   Height: 5' 7\" (1.702 m)       Physical Exam   Constitutional: She is oriented to person, place, and time. She appears well-developed and well-nourished. HENT:   Head: Normocephalic and atraumatic.    Eyes: Pupils Take 1 capsule by mouth daily  -     lurasidone (LATUDA) 20 MG TABS tablet; Take 1 tablet by mouth daily    Generalized edema  -     triamterene-hydrochlorothiazide (MAXZIDE-25) 37.5-25 MG per tablet; Take 1 tablet by mouth daily    Gastroesophageal reflux disease without esophagitis  -     pantoprazole (PROTONIX) 40 MG tablet; Take 1 tablet by mouth every morning (before breakfast)    Pure hypercholesterolemia  -     atorvastatin (LIPITOR) 10 MG tablet; Take 1 tablet by mouth nightly    Chronic right-sided low back pain with bilateral sciatica  -     cyclobenzaprine (FLEXERIL) 10 MG tablet; Take 1 tablet by mouth 2 times daily as needed for Muscle spasms    Insomnia, unspecified type  -     amitriptyline (ELAVIL) 50 MG tablet; Take 1 tablet by mouth nightly    Vitamin D deficiency  -     Cholecalciferol (VITAMIN D) 2000 units TABS tablet; Take 1 tablet by mouth daily    Folic acid deficiency  -     folic acid (FOLVITE) 1 MG tablet; Take 1 tablet by mouth daily    Hypomagnesemia  -     magnesium oxide (MAG-OX) 400 (240 Mg) MG tablet; Take 1 tablet by mouth daily    Hyperglycemia  -     FREESTYLE LANCETS MISC; Inject 1 each into the skin daily    Smoker  -     nicotine (NICODERM CQ) 21 MG/24HR; Place 1 patch onto the skin See Admin Instructions Follow instructions on the kit  -     varenicline (CHANTIX CONTINUING MONTH SANDIP) 1 MG tablet; Take 1 tablet by mouth 2 times daily    Preventative health care  -     aspirin 81 MG chewable tablet; Take 1 tablet by mouth daily    Weight gain  -     Amb Referral to Nutrition Services    Class 1 obesity due to excess calories with serious comorbidity and body mass index (BMI) of 31.0 to 31.9 in adult  -     Amb Referral to Nutrition Services    Health care maintenance  -     Microalbumin / Creatinine Urine Ratio;  Future    Other orders  -     Cancel: fluticasone (FLONASE) 50 MCG/ACT nasal spray; 2 sprays by Each Nare route daily  -     Cancel: pregabalin (LYRICA) 50 MG capsule; Take 1 capsule by mouth 2 times daily for 15 days. .  -     Cancel: calcium carbonate (TUMS) 500 MG chewable tablet;         Return in about 3 months (around 3/18/2019), or if symptoms worsen or fail to improve.     Teena De La Torre MD

## 2018-12-20 ENCOUNTER — TELEPHONE (OUTPATIENT)
Dept: PRIMARY CARE CLINIC | Age: 48
End: 2018-12-20

## 2018-12-20 NOTE — TELEPHONE ENCOUNTER
Called patient and left message for her to return call to make her aware of results. Albumin was good. Please advise when she returns call.

## 2018-12-23 ASSESSMENT — ENCOUNTER SYMPTOMS
PHOTOPHOBIA: 0
CHOKING: 0
FACIAL SWELLING: 0
BACK PAIN: 1
HEARTBURN: 1
CHEST TIGHTNESS: 1
ABDOMINAL DISTENTION: 0
APNEA: 0
BLOOD IN STOOL: 0
WHEEZING: 1

## 2018-12-23 ASSESSMENT — COPD QUESTIONNAIRES: COPD: 1

## 2018-12-28 ENCOUNTER — HOSPITAL ENCOUNTER (EMERGENCY)
Age: 48
Discharge: HOME | End: 2018-12-28
Attending: EMERGENCY MEDICINE
Payer: COMMERCIAL

## 2018-12-28 ENCOUNTER — APPOINTMENT (OUTPATIENT)
Dept: CT IMAGING | Age: 48
End: 2018-12-28
Payer: COMMERCIAL

## 2018-12-28 VITALS
WEIGHT: 210 LBS | RESPIRATION RATE: 18 BRPM | DIASTOLIC BLOOD PRESSURE: 68 MMHG | HEIGHT: 67 IN | SYSTOLIC BLOOD PRESSURE: 134 MMHG | OXYGEN SATURATION: 100 % | BODY MASS INDEX: 32.96 KG/M2 | TEMPERATURE: 98 F | HEART RATE: 71 BPM

## 2018-12-28 DIAGNOSIS — R11.2 NON-INTRACTABLE VOMITING WITH NAUSEA, UNSPECIFIED VOMITING TYPE: Primary | ICD-10-CM

## 2018-12-28 DIAGNOSIS — H81.10 BENIGN PAROXYSMAL POSITIONAL VERTIGO, UNSPECIFIED LATERALITY: ICD-10-CM

## 2018-12-28 LAB
ALBUMIN SERPL-MCNC: 4.3 G/DL (ref 3.9–4.9)
ALP BLD-CCNC: 105 U/L (ref 40–130)
ALT SERPL-CCNC: 100 U/L (ref 0–33)
ANION GAP SERPL CALCULATED.3IONS-SCNC: 13 MEQ/L (ref 7–13)
AST SERPL-CCNC: 72 U/L (ref 0–35)
BASOPHILS ABSOLUTE: 0 K/UL (ref 0–0.2)
BASOPHILS RELATIVE PERCENT: 0.4 %
BILIRUB SERPL-MCNC: 0.5 MG/DL (ref 0–1.2)
BUN BLDV-MCNC: 11 MG/DL (ref 6–20)
CALCIUM SERPL-MCNC: 9.2 MG/DL (ref 8.6–10.2)
CHLORIDE BLD-SCNC: 106 MEQ/L (ref 98–107)
CO2: 23 MEQ/L (ref 22–29)
CREAT SERPL-MCNC: 0.64 MG/DL (ref 0.5–0.9)
EOSINOPHILS ABSOLUTE: 0 K/UL (ref 0–0.7)
EOSINOPHILS RELATIVE PERCENT: 0.1 %
GFR AFRICAN AMERICAN: >60
GFR NON-AFRICAN AMERICAN: >60
GLOBULIN: 2.6 G/DL (ref 2.3–3.5)
GLUCOSE BLD-MCNC: 143 MG/DL (ref 74–109)
HCT VFR BLD CALC: 43.6 % (ref 37–47)
HEMOGLOBIN: 14.6 G/DL (ref 12–16)
LACTIC ACID: 1.3 MMOL/L (ref 0.5–2.2)
LIPASE: 31 U/L (ref 13–60)
LYMPHOCYTES ABSOLUTE: 1 K/UL (ref 1–4.8)
LYMPHOCYTES RELATIVE PERCENT: 11.1 %
MCH RBC QN AUTO: 31.5 PG (ref 27–31.3)
MCHC RBC AUTO-ENTMCNC: 33.4 % (ref 33–37)
MCV RBC AUTO: 94.3 FL (ref 82–100)
MONOCYTES ABSOLUTE: 0.4 K/UL (ref 0.2–0.8)
MONOCYTES RELATIVE PERCENT: 4.3 %
NEUTROPHILS ABSOLUTE: 7.6 K/UL (ref 1.4–6.5)
NEUTROPHILS RELATIVE PERCENT: 84.1 %
PDW BLD-RTO: 13.8 % (ref 11.5–14.5)
PLATELET # BLD: 158 K/UL (ref 130–400)
POTASSIUM SERPL-SCNC: 3.7 MEQ/L (ref 3.5–5.1)
RBC # BLD: 4.63 M/UL (ref 4.2–5.4)
SODIUM BLD-SCNC: 142 MEQ/L (ref 132–144)
TOTAL PROTEIN: 6.9 G/DL (ref 6.4–8.1)
WBC # BLD: 9.1 K/UL (ref 4.8–10.8)

## 2018-12-28 PROCEDURE — 99284 EMERGENCY DEPT VISIT MOD MDM: CPT

## 2018-12-28 PROCEDURE — 6370000000 HC RX 637 (ALT 250 FOR IP): Performed by: EMERGENCY MEDICINE

## 2018-12-28 PROCEDURE — 96375 TX/PRO/DX INJ NEW DRUG ADDON: CPT

## 2018-12-28 PROCEDURE — 83605 ASSAY OF LACTIC ACID: CPT

## 2018-12-28 PROCEDURE — 85025 COMPLETE CBC W/AUTO DIFF WBC: CPT

## 2018-12-28 PROCEDURE — 83690 ASSAY OF LIPASE: CPT

## 2018-12-28 PROCEDURE — 70450 CT HEAD/BRAIN W/O DYE: CPT

## 2018-12-28 PROCEDURE — 80053 COMPREHEN METABOLIC PANEL: CPT

## 2018-12-28 PROCEDURE — 2580000003 HC RX 258: Performed by: EMERGENCY MEDICINE

## 2018-12-28 PROCEDURE — 6360000002 HC RX W HCPCS: Performed by: EMERGENCY MEDICINE

## 2018-12-28 PROCEDURE — 96374 THER/PROPH/DIAG INJ IV PUSH: CPT

## 2018-12-28 RX ORDER — ONDANSETRON 4 MG/1
4 TABLET, ORALLY DISINTEGRATING ORAL EVERY 8 HOURS PRN
Qty: 20 TABLET | Refills: 0 | Status: SHIPPED | OUTPATIENT
Start: 2018-12-28 | End: 2019-03-08 | Stop reason: ALTCHOICE

## 2018-12-28 RX ORDER — ONDANSETRON 2 MG/ML
4 INJECTION INTRAMUSCULAR; INTRAVENOUS ONCE
Status: COMPLETED | OUTPATIENT
Start: 2018-12-28 | End: 2018-12-28

## 2018-12-28 RX ORDER — SODIUM CHLORIDE 9 MG/ML
INJECTION, SOLUTION INTRAVENOUS CONTINUOUS
Status: DISCONTINUED | OUTPATIENT
Start: 2018-12-28 | End: 2018-12-28 | Stop reason: HOSPADM

## 2018-12-28 RX ORDER — MECLIZINE HYDROCHLORIDE 25 MG/1
25 TABLET ORAL 3 TIMES DAILY PRN
Qty: 30 TABLET | Refills: 0 | Status: SHIPPED | OUTPATIENT
Start: 2018-12-28 | End: 2019-01-07

## 2018-12-28 RX ORDER — LORAZEPAM 2 MG/ML
0.5 INJECTION INTRAMUSCULAR ONCE
Status: COMPLETED | OUTPATIENT
Start: 2018-12-28 | End: 2018-12-28

## 2018-12-28 RX ORDER — MECLIZINE HYDROCHLORIDE 25 MG/1
25 TABLET ORAL ONCE
Status: COMPLETED | OUTPATIENT
Start: 2018-12-28 | End: 2018-12-28

## 2018-12-28 RX ORDER — 0.9 % SODIUM CHLORIDE 0.9 %
1000 INTRAVENOUS SOLUTION INTRAVENOUS ONCE
Status: COMPLETED | OUTPATIENT
Start: 2018-12-28 | End: 2018-12-28

## 2018-12-28 RX ADMIN — LORAZEPAM 0.5 MG: 2 INJECTION, SOLUTION INTRAMUSCULAR; INTRAVENOUS at 07:49

## 2018-12-28 RX ADMIN — ONDANSETRON 4 MG: 2 INJECTION INTRAMUSCULAR; INTRAVENOUS at 07:50

## 2018-12-28 RX ADMIN — MECLIZINE HYDROCHLORIDE 25 MG: 25 TABLET ORAL at 08:15

## 2018-12-28 RX ADMIN — SODIUM CHLORIDE 1000 ML: 9 INJECTION, SOLUTION INTRAVENOUS at 07:49

## 2018-12-28 ASSESSMENT — ENCOUNTER SYMPTOMS
WHEEZING: 0
SHORTNESS OF BREATH: 0
EYES NEGATIVE: 1
ABDOMINAL PAIN: 0
ALLERGIC/IMMUNOLOGIC NEGATIVE: 1
VOMITING: 1
TROUBLE SWALLOWING: 0
RHINORRHEA: 0
NAUSEA: 1

## 2018-12-28 ASSESSMENT — PAIN DESCRIPTION - LOCATION: LOCATION: BACK;ABDOMEN

## 2018-12-28 ASSESSMENT — PAIN SCALES - GENERAL: PAINLEVEL_OUTOF10: 7

## 2018-12-29 ENCOUNTER — HOSPITAL ENCOUNTER (EMERGENCY)
Age: 48
Discharge: HOME | End: 2018-12-29
Attending: EMERGENCY MEDICINE
Payer: COMMERCIAL

## 2018-12-29 VITALS
OXYGEN SATURATION: 96 % | TEMPERATURE: 97.9 F | DIASTOLIC BLOOD PRESSURE: 78 MMHG | SYSTOLIC BLOOD PRESSURE: 167 MMHG | RESPIRATION RATE: 18 BRPM | HEART RATE: 72 BPM

## 2018-12-29 DIAGNOSIS — R09.81 SINUS CONGESTION: Primary | ICD-10-CM

## 2018-12-29 DIAGNOSIS — H81.10 BENIGN PAROXYSMAL POSITIONAL VERTIGO, UNSPECIFIED LATERALITY: ICD-10-CM

## 2018-12-29 PROCEDURE — 6360000002 HC RX W HCPCS: Performed by: EMERGENCY MEDICINE

## 2018-12-29 PROCEDURE — 96374 THER/PROPH/DIAG INJ IV PUSH: CPT

## 2018-12-29 PROCEDURE — 99283 EMERGENCY DEPT VISIT LOW MDM: CPT

## 2018-12-29 RX ORDER — CETIRIZINE HYDROCHLORIDE, PSEUDOEPHEDRINE HYDROCHLORIDE 5; 120 MG/1; MG/1
1 TABLET, FILM COATED, EXTENDED RELEASE ORAL 2 TIMES DAILY
Qty: 180 TABLET | Refills: 1 | Status: SHIPPED | OUTPATIENT
Start: 2018-12-29 | End: 2019-01-28

## 2018-12-29 RX ORDER — LORAZEPAM 2 MG/ML
1 INJECTION INTRAMUSCULAR ONCE
Status: COMPLETED | OUTPATIENT
Start: 2018-12-29 | End: 2018-12-29

## 2018-12-29 RX ADMIN — PROCHLORPERAZINE EDISYLATE 10 MG: 5 INJECTION INTRAMUSCULAR; INTRAVENOUS at 15:52

## 2018-12-29 RX ADMIN — LORAZEPAM 1 MG: 2 INJECTION, SOLUTION INTRAMUSCULAR; INTRAVENOUS at 15:52

## 2018-12-29 ASSESSMENT — ENCOUNTER SYMPTOMS
ABDOMINAL PAIN: 0
COLOR CHANGE: 0
VOMITING: 0
PHOTOPHOBIA: 0
FACIAL SWELLING: 0
WHEEZING: 0
EYE DISCHARGE: 0
ABDOMINAL DISTENTION: 0
RHINORRHEA: 0
SHORTNESS OF BREATH: 0

## 2019-01-01 NOTE — TELEPHONE ENCOUNTER
[facility] Trans[facility] Initial Follow Up Call    Outreach made within 2 business days of discharge: Yes    Patient: Casey De La Cruz Patient : 1970   MRN: [id number]  Reason for Admission: severe COPD exacerbation and acute bronchitis  Discharge Date: 18       Spoke with: patient    Discharge department/facility: [facility] Interactive Patient Contact:  Was patient able to fill all prescriptions: Yes  Was patient instructed to bring all medications to the follow-up visit: Yes  Is patient taking all medications as directed in the discharge summary?  Yes  Does patient understand their discharge instructions: Yes  Does patient have questions or concerns that need addressed prior to 7-14 day follow up office visit: no    Scheduled appointment with PCP within 7-14 days    Follow Up  Future Appointments  Date Time Provider Vangie Nuñez   11/15/2018 10:45 AM Jeniffer Rivas   2018 2:30 PM [id number] [employer]   2019 11:30 AM DO ESTEVAN Brown CARD [address] [facility], [facility] Statement Selected

## 2019-01-10 ENCOUNTER — OFFICE VISIT (OUTPATIENT)
Dept: PRIMARY CARE CLINIC | Age: 49
End: 2019-01-10
Payer: COMMERCIAL

## 2019-01-10 VITALS
HEIGHT: 67 IN | HEART RATE: 85 BPM | WEIGHT: 200 LBS | BODY MASS INDEX: 31.39 KG/M2 | SYSTOLIC BLOOD PRESSURE: 138 MMHG | TEMPERATURE: 97 F | DIASTOLIC BLOOD PRESSURE: 88 MMHG | RESPIRATION RATE: 16 BRPM | OXYGEN SATURATION: 98 %

## 2019-01-10 DIAGNOSIS — H91.93 HEARING DECREASED, BILATERAL: Primary | ICD-10-CM

## 2019-01-10 DIAGNOSIS — R42 VERTIGO: ICD-10-CM

## 2019-01-10 DIAGNOSIS — F31.9 BIPOLAR 1 DISORDER, DEPRESSED (HCC): ICD-10-CM

## 2019-01-10 DIAGNOSIS — J44.9 CAFL (CHRONIC AIRFLOW LIMITATION) (HCC): ICD-10-CM

## 2019-01-10 DIAGNOSIS — G35 MULTIPLE SCLEROSIS (HCC): ICD-10-CM

## 2019-01-10 DIAGNOSIS — G32.0 NEUROMYELOPATHY DUE TO VITAMIN B12 DEFICIENCY (HCC): ICD-10-CM

## 2019-01-10 DIAGNOSIS — F31.9 BIPOLAR 1 DISORDER (HCC): ICD-10-CM

## 2019-01-10 DIAGNOSIS — G81.94 LEFT HEMIPARESIS (HCC): ICD-10-CM

## 2019-01-10 DIAGNOSIS — E53.8 NEUROMYELOPATHY DUE TO VITAMIN B12 DEFICIENCY (HCC): ICD-10-CM

## 2019-01-10 PROCEDURE — G8484 FLU IMMUNIZE NO ADMIN: HCPCS | Performed by: INTERNAL MEDICINE

## 2019-01-10 PROCEDURE — 3023F SPIROM DOC REV: CPT | Performed by: INTERNAL MEDICINE

## 2019-01-10 PROCEDURE — G8427 DOCREV CUR MEDS BY ELIG CLIN: HCPCS | Performed by: INTERNAL MEDICINE

## 2019-01-10 PROCEDURE — 99214 OFFICE O/P EST MOD 30 MIN: CPT | Performed by: INTERNAL MEDICINE

## 2019-01-10 PROCEDURE — G8926 SPIRO NO PERF OR DOC: HCPCS | Performed by: INTERNAL MEDICINE

## 2019-01-10 PROCEDURE — 4004F PT TOBACCO SCREEN RCVD TLK: CPT | Performed by: INTERNAL MEDICINE

## 2019-01-10 PROCEDURE — G8598 ASA/ANTIPLAT THER USED: HCPCS | Performed by: INTERNAL MEDICINE

## 2019-01-10 PROCEDURE — G8417 CALC BMI ABV UP PARAM F/U: HCPCS | Performed by: INTERNAL MEDICINE

## 2019-01-10 RX ORDER — MECLIZINE HYDROCHLORIDE 25 MG/1
25 TABLET ORAL 3 TIMES DAILY PRN
Status: ON HOLD | COMMUNITY
End: 2020-12-24

## 2019-01-15 PROBLEM — G89.4 CHRONIC PAIN DISORDER: Status: RESOLVED | Noted: 2018-11-10 | Resolved: 2019-01-15

## 2019-01-15 PROBLEM — M48.061 LUMBAR SPINAL STENOSIS: Status: RESOLVED | Noted: 2018-08-01 | Resolved: 2019-01-15

## 2019-01-15 PROBLEM — Z79.899 HIGH RISK MEDICATION USE: Status: RESOLVED | Noted: 2017-04-12 | Resolved: 2019-01-15

## 2019-01-15 PROBLEM — J44.1 COPD WITH ACUTE EXACERBATION (HCC): Status: RESOLVED | Noted: 2018-11-06 | Resolved: 2019-01-15

## 2019-01-15 PROBLEM — M54.42 CHRONIC BILATERAL LOW BACK PAIN WITH BILATERAL SCIATICA: Status: RESOLVED | Noted: 2018-08-02 | Resolved: 2019-01-15

## 2019-01-15 PROBLEM — M50.220 DISPLACEMENT OF INTERVERTEBRAL DISC OF MID-CERVICAL REGION: Status: RESOLVED | Noted: 2018-11-07 | Resolved: 2019-01-15

## 2019-01-15 PROBLEM — Z91.14 NONCOMPLIANCE WITH MEDICATIONS: Status: RESOLVED | Noted: 2017-06-28 | Resolved: 2019-01-15

## 2019-01-15 PROBLEM — M50.320 DEGENERATION OF INTERVERTEBRAL DISC OF MID-CERVICAL REGION: Status: RESOLVED | Noted: 2018-11-07 | Resolved: 2019-01-15

## 2019-01-15 PROBLEM — M54.41 CHRONIC BILATERAL LOW BACK PAIN WITH BILATERAL SCIATICA: Status: RESOLVED | Noted: 2018-08-02 | Resolved: 2019-01-15

## 2019-01-15 PROBLEM — F41.9 ANXIETY: Status: RESOLVED | Noted: 2018-11-10 | Resolved: 2019-01-15

## 2019-01-15 PROBLEM — F15.10 EXCESSIVE CAFFEINE ABUSE, CONTINUOUS (HCC): Status: RESOLVED | Noted: 2018-07-06 | Resolved: 2019-01-15

## 2019-01-15 PROBLEM — G89.29 CHRONIC BILATERAL LOW BACK PAIN WITH BILATERAL SCIATICA: Status: RESOLVED | Noted: 2018-08-02 | Resolved: 2019-01-15

## 2019-01-15 PROBLEM — M54.81 BILATERAL OCCIPITAL NEURALGIA: Status: RESOLVED | Noted: 2017-01-04 | Resolved: 2019-01-15

## 2019-01-15 PROBLEM — Z91.199 PATIENT NONCOMPLIANCE: Status: RESOLVED | Noted: 2017-06-28 | Resolved: 2019-01-15

## 2019-01-15 PROBLEM — M54.12 RADICULOPATHY, CERVICAL REGION: Status: RESOLVED | Noted: 2018-11-07 | Resolved: 2019-01-15

## 2019-01-15 PROBLEM — M54.16 LUMBAR RADICULOPATHY: Status: RESOLVED | Noted: 2018-08-01 | Resolved: 2019-01-15

## 2019-01-15 PROBLEM — M50.120 CERVICAL DISC DISORDER WITH RADICULOPATHY OF MID-CERVICAL REGION: Status: RESOLVED | Noted: 2018-11-07 | Resolved: 2019-01-15

## 2019-01-15 PROBLEM — F31.9 BIPOLAR 1 DISORDER, DEPRESSED (HCC): Status: RESOLVED | Noted: 2017-11-15 | Resolved: 2019-01-15

## 2019-01-15 PROBLEM — Z91.148 NONCOMPLIANCE WITH MEDICATIONS: Status: RESOLVED | Noted: 2017-06-28 | Resolved: 2019-01-15

## 2019-01-15 PROBLEM — F31.9 BIPOLAR 1 DISORDER (HCC): Status: RESOLVED | Noted: 2018-11-12 | Resolved: 2019-01-15

## 2019-01-15 ASSESSMENT — ENCOUNTER SYMPTOMS
APNEA: 0
PHOTOPHOBIA: 0
CHOKING: 0
BLOOD IN STOOL: 0
FACIAL SWELLING: 0
ABDOMINAL DISTENTION: 0

## 2019-01-18 ENCOUNTER — HOSPITAL ENCOUNTER (EMERGENCY)
Age: 49
Discharge: HOME | End: 2019-01-19
Attending: EMERGENCY MEDICINE
Payer: COMMERCIAL

## 2019-01-18 DIAGNOSIS — J44.1 COPD EXACERBATION (HCC): Primary | ICD-10-CM

## 2019-01-18 DIAGNOSIS — J20.9 ACUTE BRONCHITIS, UNSPECIFIED ORGANISM: ICD-10-CM

## 2019-01-18 PROCEDURE — 6370000000 HC RX 637 (ALT 250 FOR IP): Performed by: EMERGENCY MEDICINE

## 2019-01-18 PROCEDURE — 6360000002 HC RX W HCPCS: Performed by: EMERGENCY MEDICINE

## 2019-01-18 PROCEDURE — 6370000000 HC RX 637 (ALT 250 FOR IP): Performed by: PERSONAL EMERGENCY RESPONSE ATTENDANT

## 2019-01-18 PROCEDURE — 99284 EMERGENCY DEPT VISIT MOD MDM: CPT

## 2019-01-18 PROCEDURE — 94640 AIRWAY INHALATION TREATMENT: CPT

## 2019-01-18 PROCEDURE — 96374 THER/PROPH/DIAG INJ IV PUSH: CPT

## 2019-01-18 RX ORDER — IPRATROPIUM BROMIDE AND ALBUTEROL SULFATE 2.5; .5 MG/3ML; MG/3ML
1 SOLUTION RESPIRATORY (INHALATION) CONTINUOUS PRN
Status: DISCONTINUED | OUTPATIENT
Start: 2019-01-18 | End: 2019-01-19 | Stop reason: HOSPADM

## 2019-01-18 RX ORDER — AZITHROMYCIN 250 MG/1
TABLET, FILM COATED ORAL
Qty: 1 PACKET | Refills: 0 | Status: SHIPPED | OUTPATIENT
Start: 2019-01-18 | End: 2019-03-08 | Stop reason: ALTCHOICE

## 2019-01-18 RX ORDER — AZITHROMYCIN 500 MG/1
500 TABLET, FILM COATED ORAL ONCE
Status: COMPLETED | OUTPATIENT
Start: 2019-01-18 | End: 2019-01-18

## 2019-01-18 RX ORDER — SODIUM CHLORIDE 0.9 % (FLUSH) 0.9 %
3 SYRINGE (ML) INJECTION EVERY 8 HOURS
Status: DISCONTINUED | OUTPATIENT
Start: 2019-01-18 | End: 2019-01-19 | Stop reason: HOSPADM

## 2019-01-18 RX ORDER — METHYLPREDNISOLONE SODIUM SUCCINATE 125 MG/2ML
125 INJECTION, POWDER, LYOPHILIZED, FOR SOLUTION INTRAMUSCULAR; INTRAVENOUS ONCE
Status: COMPLETED | OUTPATIENT
Start: 2019-01-18 | End: 2019-01-18

## 2019-01-18 RX ORDER — METHYLPREDNISOLONE 4 MG/1
TABLET ORAL
Qty: 1 KIT | Refills: 0 | Status: SHIPPED | OUTPATIENT
Start: 2019-01-18 | End: 2019-03-08 | Stop reason: ALTCHOICE

## 2019-01-18 RX ORDER — ALBUTEROL SULFATE 90 UG/1
2 AEROSOL, METERED RESPIRATORY (INHALATION) EVERY 6 HOURS PRN
Qty: 1 INHALER | Refills: 3 | Status: ON HOLD | OUTPATIENT
Start: 2019-01-18 | End: 2019-08-10 | Stop reason: HOSPADM

## 2019-01-18 RX ORDER — CLONIDINE HYDROCHLORIDE 0.1 MG/1
0.2 TABLET ORAL ONCE
Status: COMPLETED | OUTPATIENT
Start: 2019-01-18 | End: 2019-01-18

## 2019-01-18 RX ADMIN — IPRATROPIUM BROMIDE AND ALBUTEROL SULFATE 1 AMPULE: .5; 3 SOLUTION RESPIRATORY (INHALATION) at 22:51

## 2019-01-18 RX ADMIN — METHYLPREDNISOLONE SODIUM SUCCINATE 125 MG: 125 INJECTION, POWDER, FOR SOLUTION INTRAMUSCULAR; INTRAVENOUS at 23:03

## 2019-01-18 RX ADMIN — ALBUTEROL SULFATE 2.5 MG: 2.5 SOLUTION RESPIRATORY (INHALATION) at 23:16

## 2019-01-18 RX ADMIN — ALBUTEROL SULFATE 2.5 MG: 2.5 SOLUTION RESPIRATORY (INHALATION) at 23:18

## 2019-01-18 RX ADMIN — CLONIDINE HYDROCHLORIDE 0.2 MG: 0.1 TABLET ORAL at 23:14

## 2019-01-18 RX ADMIN — AZITHROMYCIN MONOHYDRATE 500 MG: 500 TABLET ORAL at 23:03

## 2019-01-18 ASSESSMENT — PAIN DESCRIPTION - LOCATION: LOCATION: CHEST

## 2019-01-18 ASSESSMENT — PAIN DESCRIPTION - DESCRIPTORS: DESCRIPTORS: SHARP;CONSTANT

## 2019-01-19 VITALS
DIASTOLIC BLOOD PRESSURE: 81 MMHG | BODY MASS INDEX: 31.39 KG/M2 | SYSTOLIC BLOOD PRESSURE: 164 MMHG | WEIGHT: 200 LBS | TEMPERATURE: 98.4 F | HEART RATE: 98 BPM | RESPIRATION RATE: 20 BRPM | HEIGHT: 67 IN | OXYGEN SATURATION: 96 %

## 2019-01-22 ENCOUNTER — OFFICE VISIT (OUTPATIENT)
Dept: PULMONOLOGY | Age: 49
End: 2019-01-22
Payer: COMMERCIAL

## 2019-01-22 VITALS
OXYGEN SATURATION: 97 % | HEIGHT: 67 IN | BODY MASS INDEX: 30.76 KG/M2 | TEMPERATURE: 96.4 F | WEIGHT: 196 LBS | DIASTOLIC BLOOD PRESSURE: 64 MMHG | HEART RATE: 78 BPM | SYSTOLIC BLOOD PRESSURE: 136 MMHG | RESPIRATION RATE: 16 BRPM

## 2019-01-22 DIAGNOSIS — F17.200 SMOKING: ICD-10-CM

## 2019-01-22 DIAGNOSIS — J44.1 COPD EXACERBATION (HCC): ICD-10-CM

## 2019-01-22 DIAGNOSIS — J20.9 ACUTE BRONCHITIS, UNSPECIFIED ORGANISM: Primary | ICD-10-CM

## 2019-01-22 PROCEDURE — 3023F SPIROM DOC REV: CPT | Performed by: INTERNAL MEDICINE

## 2019-01-22 PROCEDURE — 99214 OFFICE O/P EST MOD 30 MIN: CPT | Performed by: INTERNAL MEDICINE

## 2019-01-22 PROCEDURE — G8417 CALC BMI ABV UP PARAM F/U: HCPCS | Performed by: INTERNAL MEDICINE

## 2019-01-22 PROCEDURE — G8926 SPIRO NO PERF OR DOC: HCPCS | Performed by: INTERNAL MEDICINE

## 2019-01-22 PROCEDURE — 4004F PT TOBACCO SCREEN RCVD TLK: CPT | Performed by: INTERNAL MEDICINE

## 2019-01-22 PROCEDURE — G8427 DOCREV CUR MEDS BY ELIG CLIN: HCPCS | Performed by: INTERNAL MEDICINE

## 2019-01-22 PROCEDURE — G8598 ASA/ANTIPLAT THER USED: HCPCS | Performed by: INTERNAL MEDICINE

## 2019-01-22 PROCEDURE — G8484 FLU IMMUNIZE NO ADMIN: HCPCS | Performed by: INTERNAL MEDICINE

## 2019-01-22 RX ORDER — FLUTICASONE FUROATE AND VILANTEROL 200; 25 UG/1; UG/1
POWDER RESPIRATORY (INHALATION)
Qty: 30 EACH | Refills: 3 | Status: SHIPPED | OUTPATIENT
Start: 2019-01-22 | End: 2019-06-20 | Stop reason: SDUPTHER

## 2019-01-22 RX ORDER — AMOXICILLIN AND CLAVULANATE POTASSIUM 875; 125 MG/1; MG/1
1 TABLET, FILM COATED ORAL 2 TIMES DAILY
Qty: 20 TABLET | Refills: 0 | Status: SHIPPED | OUTPATIENT
Start: 2019-01-22 | End: 2019-02-01

## 2019-01-22 RX ORDER — PREDNISONE 10 MG/1
TABLET ORAL
Qty: 40 TABLET | Refills: 0 | Status: SHIPPED | OUTPATIENT
Start: 2019-01-22 | End: 2019-03-08 | Stop reason: ALTCHOICE

## 2019-01-22 ASSESSMENT — ENCOUNTER SYMPTOMS
ABDOMINAL PAIN: 0
SINUS PRESSURE: 0
NAUSEA: 0
RHINORRHEA: 0
DIARRHEA: 0
EYE ITCHING: 0
CHEST TIGHTNESS: 0
WHEEZING: 1
SORE THROAT: 0
VOMITING: 0
COUGH: 1
TROUBLE SWALLOWING: 0
VOICE CHANGE: 0
SHORTNESS OF BREATH: 1
EYE DISCHARGE: 0

## 2019-01-29 DIAGNOSIS — F17.200 SMOKER: ICD-10-CM

## 2019-01-30 RX ORDER — NICOTINE 21 MG/24HR
PATCH, TRANSDERMAL 24 HOURS TRANSDERMAL
Qty: 30 PATCH | Refills: 2 | Status: SHIPPED | OUTPATIENT
Start: 2019-01-30 | End: 2019-03-08

## 2019-02-12 DIAGNOSIS — R73.9 HYPERGLYCEMIA: ICD-10-CM

## 2019-02-12 DIAGNOSIS — E53.8 VITAMIN B 12 DEFICIENCY: ICD-10-CM

## 2019-02-12 RX ORDER — CYANOCOBALAMIN 1000 UG/ML
1000 INJECTION INTRAMUSCULAR; SUBCUTANEOUS ONCE
Qty: 10 ML | Refills: 0 | Status: SHIPPED | OUTPATIENT
Start: 2019-02-12 | End: 2019-04-10 | Stop reason: ALTCHOICE

## 2019-02-12 RX ORDER — METOPROLOL TARTRATE 100 MG/1
TABLET ORAL
Qty: 60 TABLET | Refills: 11 | Status: SHIPPED | OUTPATIENT
Start: 2019-02-12 | End: 2019-03-08 | Stop reason: ALTCHOICE

## 2019-02-14 RX ORDER — BLOOD-GLUCOSE METER
KIT MISCELLANEOUS
Qty: 50 STRIP | Refills: 11 | Status: SHIPPED | OUTPATIENT
Start: 2019-02-14

## 2019-02-14 RX ORDER — CYANOCOBALAMIN 1000 UG/ML
INJECTION INTRAMUSCULAR; SUBCUTANEOUS
Qty: 1 ML | Refills: 11 | Status: ON HOLD | OUTPATIENT
Start: 2019-02-14 | End: 2019-11-18

## 2019-02-19 ENCOUNTER — HOSPITAL ENCOUNTER (EMERGENCY)
Age: 49
Discharge: HOME | End: 2019-02-19
Payer: COMMERCIAL

## 2019-02-19 ENCOUNTER — APPOINTMENT (OUTPATIENT)
Dept: GENERAL RADIOLOGY | Age: 49
End: 2019-02-19
Payer: COMMERCIAL

## 2019-02-19 ENCOUNTER — APPOINTMENT (OUTPATIENT)
Dept: CT IMAGING | Age: 49
End: 2019-02-19
Payer: COMMERCIAL

## 2019-02-19 VITALS
SYSTOLIC BLOOD PRESSURE: 181 MMHG | WEIGHT: 197 LBS | DIASTOLIC BLOOD PRESSURE: 93 MMHG | TEMPERATURE: 98.1 F | BODY MASS INDEX: 30.92 KG/M2 | RESPIRATION RATE: 18 BRPM | HEIGHT: 67 IN | HEART RATE: 79 BPM | OXYGEN SATURATION: 97 %

## 2019-02-19 DIAGNOSIS — S16.1XXA NECK STRAIN, INITIAL ENCOUNTER: ICD-10-CM

## 2019-02-19 DIAGNOSIS — S09.90XA CLOSED HEAD INJURY, INITIAL ENCOUNTER: Primary | ICD-10-CM

## 2019-02-19 DIAGNOSIS — S50.02XA CONTUSION OF LEFT ELBOW, INITIAL ENCOUNTER: ICD-10-CM

## 2019-02-19 PROCEDURE — 73080 X-RAY EXAM OF ELBOW: CPT

## 2019-02-19 PROCEDURE — 99284 EMERGENCY DEPT VISIT MOD MDM: CPT

## 2019-02-19 PROCEDURE — 96372 THER/PROPH/DIAG INJ SC/IM: CPT

## 2019-02-19 PROCEDURE — 70450 CT HEAD/BRAIN W/O DYE: CPT

## 2019-02-19 PROCEDURE — 72125 CT NECK SPINE W/O DYE: CPT

## 2019-02-19 PROCEDURE — 6360000002 HC RX W HCPCS: Performed by: PHYSICIAN ASSISTANT

## 2019-02-19 RX ORDER — CYCLOBENZAPRINE HCL 10 MG
10 TABLET ORAL 3 TIMES DAILY PRN
Qty: 21 TABLET | Refills: 0 | Status: SHIPPED | OUTPATIENT
Start: 2019-02-19 | End: 2019-02-26

## 2019-02-19 RX ORDER — NAPROXEN 500 MG/1
500 TABLET ORAL 2 TIMES DAILY WITH MEALS
Qty: 14 TABLET | Refills: 0 | Status: SHIPPED | OUTPATIENT
Start: 2019-02-19 | End: 2019-03-08 | Stop reason: ALTCHOICE

## 2019-02-19 RX ORDER — KETOROLAC TROMETHAMINE 30 MG/ML
30 INJECTION, SOLUTION INTRAMUSCULAR; INTRAVENOUS ONCE
Status: COMPLETED | OUTPATIENT
Start: 2019-02-19 | End: 2019-02-19

## 2019-02-19 RX ADMIN — KETOROLAC TROMETHAMINE 30 MG: 30 INJECTION, SOLUTION INTRAMUSCULAR; INTRAVENOUS at 09:41

## 2019-02-19 ASSESSMENT — ENCOUNTER SYMPTOMS
ABDOMINAL DISTENTION: 0
EYE DISCHARGE: 0
RHINORRHEA: 0
CONSTIPATION: 0
SORE THROAT: 0
COLOR CHANGE: 0
COUGH: 0
SHORTNESS OF BREATH: 0
ABDOMINAL PAIN: 0

## 2019-02-19 ASSESSMENT — PAIN DESCRIPTION - LOCATION
LOCATION_2: NECK
LOCATION: HEAD

## 2019-02-19 ASSESSMENT — PAIN DESCRIPTION - FREQUENCY: FREQUENCY: CONTINUOUS

## 2019-02-19 ASSESSMENT — PAIN SCALES - GENERAL
PAINLEVEL_OUTOF10: 7
PAINLEVEL_OUTOF10: 8

## 2019-02-19 ASSESSMENT — PAIN DESCRIPTION - INTENSITY: RATING_2: 7

## 2019-02-19 ASSESSMENT — PAIN DESCRIPTION - DESCRIPTORS
DESCRIPTORS: THROBBING
DESCRIPTORS_2: DISCOMFORT

## 2019-02-22 ENCOUNTER — CARE COORDINATION (OUTPATIENT)
Dept: CARE COORDINATION | Age: 49
End: 2019-02-22

## 2019-02-25 ENCOUNTER — CARE COORDINATION (OUTPATIENT)
Dept: CARE COORDINATION | Age: 49
End: 2019-02-25

## 2019-03-05 DIAGNOSIS — F31.32 BIPOLAR 1 DISORDER, DEPRESSED, MODERATE (HCC): ICD-10-CM

## 2019-03-06 ENCOUNTER — TELEPHONE (OUTPATIENT)
Dept: PHARMACY | Facility: CLINIC | Age: 49
End: 2019-03-06

## 2019-03-07 RX ORDER — LURASIDONE HYDROCHLORIDE 20 MG/1
TABLET, FILM COATED ORAL
Qty: 30 TABLET | Refills: 5 | Status: SHIPPED | OUTPATIENT
Start: 2019-03-07 | End: 2019-04-10 | Stop reason: SDUPTHER

## 2019-03-07 RX ORDER — LITHIUM CARBONATE 300 MG/1
300 CAPSULE ORAL DAILY
Qty: 30 CAPSULE | Refills: 5 | Status: SHIPPED | OUTPATIENT
Start: 2019-03-07 | End: 2019-04-10 | Stop reason: SDUPTHER

## 2019-03-08 ENCOUNTER — OFFICE VISIT (OUTPATIENT)
Dept: CARDIOLOGY CLINIC | Age: 49
End: 2019-03-08
Payer: COMMERCIAL

## 2019-03-08 VITALS
DIASTOLIC BLOOD PRESSURE: 100 MMHG | BODY MASS INDEX: 30.54 KG/M2 | SYSTOLIC BLOOD PRESSURE: 160 MMHG | WEIGHT: 194.6 LBS | HEIGHT: 67 IN | OXYGEN SATURATION: 98 % | HEART RATE: 74 BPM

## 2019-03-08 DIAGNOSIS — F17.200 SMOKING: ICD-10-CM

## 2019-03-08 DIAGNOSIS — Z72.0 CURRENT TOBACCO USE: ICD-10-CM

## 2019-03-08 DIAGNOSIS — Z86.79 HISTORY OF AORTIC REGURGITATION: ICD-10-CM

## 2019-03-08 DIAGNOSIS — I10 ESSENTIAL HYPERTENSION: ICD-10-CM

## 2019-03-08 DIAGNOSIS — I10 HTN (HYPERTENSION), BENIGN: ICD-10-CM

## 2019-03-08 DIAGNOSIS — Z86.74 H/O CARDIAC ARREST: Primary | ICD-10-CM

## 2019-03-08 DIAGNOSIS — E78.00 PURE HYPERCHOLESTEROLEMIA: ICD-10-CM

## 2019-03-08 PROCEDURE — G8598 ASA/ANTIPLAT THER USED: HCPCS | Performed by: INTERNAL MEDICINE

## 2019-03-08 PROCEDURE — G8484 FLU IMMUNIZE NO ADMIN: HCPCS | Performed by: INTERNAL MEDICINE

## 2019-03-08 PROCEDURE — G8417 CALC BMI ABV UP PARAM F/U: HCPCS | Performed by: INTERNAL MEDICINE

## 2019-03-08 PROCEDURE — 99214 OFFICE O/P EST MOD 30 MIN: CPT | Performed by: INTERNAL MEDICINE

## 2019-03-08 PROCEDURE — 4004F PT TOBACCO SCREEN RCVD TLK: CPT | Performed by: INTERNAL MEDICINE

## 2019-03-08 PROCEDURE — G8427 DOCREV CUR MEDS BY ELIG CLIN: HCPCS | Performed by: INTERNAL MEDICINE

## 2019-03-08 RX ORDER — ISOSORBIDE MONONITRATE 30 MG/1
30 TABLET, EXTENDED RELEASE ORAL DAILY
Qty: 30 TABLET | Refills: 3 | Status: SHIPPED | OUTPATIENT
Start: 2019-03-08 | End: 2019-04-10 | Stop reason: SDUPTHER

## 2019-03-08 RX ORDER — DILTIAZEM HYDROCHLORIDE 240 MG/1
240 CAPSULE, COATED, EXTENDED RELEASE ORAL DAILY
Qty: 30 CAPSULE | Refills: 5 | Status: SHIPPED | OUTPATIENT
Start: 2019-03-08 | End: 2019-04-10 | Stop reason: SDUPTHER

## 2019-03-08 RX ORDER — ATORVASTATIN CALCIUM 10 MG/1
10 TABLET, FILM COATED ORAL NIGHTLY
Qty: 30 TABLET | Refills: 5 | Status: SHIPPED | OUTPATIENT
Start: 2019-03-08 | End: 2019-04-10 | Stop reason: SDUPTHER

## 2019-03-08 RX ORDER — LOSARTAN POTASSIUM 100 MG/1
100 TABLET ORAL DAILY
Qty: 30 TABLET | Refills: 5 | Status: SHIPPED | OUTPATIENT
Start: 2019-03-08 | End: 2019-04-10 | Stop reason: SDUPTHER

## 2019-03-11 DIAGNOSIS — M54.42 CHRONIC RIGHT-SIDED LOW BACK PAIN WITH BILATERAL SCIATICA: ICD-10-CM

## 2019-03-11 DIAGNOSIS — G89.29 CHRONIC RIGHT-SIDED LOW BACK PAIN WITH BILATERAL SCIATICA: ICD-10-CM

## 2019-03-11 DIAGNOSIS — M54.41 CHRONIC RIGHT-SIDED LOW BACK PAIN WITH BILATERAL SCIATICA: ICD-10-CM

## 2019-03-11 RX ORDER — PREDNISONE 10 MG/1
TABLET ORAL
Qty: 30 TABLET | Refills: 0 | Status: SHIPPED | OUTPATIENT
Start: 2019-03-11 | End: 2019-03-21

## 2019-03-11 RX ORDER — CYCLOBENZAPRINE HCL 10 MG
10 TABLET ORAL 2 TIMES DAILY PRN
Qty: 60 TABLET | Refills: 0 | Status: SHIPPED | OUTPATIENT
Start: 2019-03-11 | End: 2019-04-10 | Stop reason: SDUPTHER

## 2019-03-14 ENCOUNTER — TELEPHONE (OUTPATIENT)
Dept: CARDIOLOGY CLINIC | Age: 49
End: 2019-03-14

## 2019-03-14 ENCOUNTER — SCHEDULED TELEPHONE ENCOUNTER (OUTPATIENT)
Dept: PHARMACY | Facility: CLINIC | Age: 49
End: 2019-03-14

## 2019-03-14 NOTE — TELEPHONE ENCOUNTER
CLINICAL PHARMACY NOTE - Medication Review  Patient outreach to review medications - Spoke with patient. SUBJECTIVE/OBJECTIVE:   Crystal Santoro is a 57 y.o. female referred to a clinical pharmacy specialist given their history of COPD. Medications:  Current Outpatient Medications   Medication Sig Dispense Refill    cyclobenzaprine (FLEXERIL) 10 MG tablet Take 1 tablet by mouth 2 times daily as needed for Muscle spasms 60 tablet 0    predniSONE (DELTASONE) 10 MG tablet 5 tabs po qam for 2 days then 4,3,2,1 tabs qam for 2 days each total of 10 days 30 tablet 0    diltiazem (CARDIZEM CD) 240 MG extended release capsule Take 1 capsule by mouth daily 30 capsule 5    losartan (COZAAR) 100 MG tablet Take 1 tablet by mouth daily 30 tablet 5    atorvastatin (LIPITOR) 10 MG tablet Take 1 tablet by mouth nightly 30 tablet 5    isosorbide mononitrate (IMDUR) 30 MG extended release tablet Take 1 tablet by mouth daily 30 tablet 3    lithium 300 MG capsule TAKE 1 CAPSULE BY MOUTH DAILY 30 capsule 5    LATUDA 20 MG TABS tablet TAKE 1 TABLET BY MOUTH EVERY DAY 30 tablet 5    FREESTYLE LITE strip USE DAILY AS DIRECTED 50 strip 11    cyanocobalamin 1000 MCG/ML injection INJECT 1ML INTO THE MUSCLE ONCE A MONTH 1 mL 11    cyanocobalamin 1000 MCG/ML injection Inject 1 mL into the muscle once for 1 dose Inject 1 ml into the muscle once a month 10 mL 0    Fluticasone Furoate-Vilanterol (BREO ELLIPTA) 200-25 MCG/INH AEPB 1 puff in AM daily.  30 each 3    albuterol sulfate HFA (PROVENTIL HFA) 108 (90 Base) MCG/ACT inhaler Inhale 2 puffs into the lungs every 6 hours as needed for Wheezing 1 Inhaler 3    albuterol (PROVENTIL) (5 MG/ML) 0.5% nebulizer solution Take 0.5 mLs by nebulization every 6 hours as needed for Wheezing 120 each 0    meclizine (ANTIVERT) 25 MG tablet Take 25 mg by mouth 3 times daily as needed      FREESTYLE LANCETS MISC Inject 1 each into the skin daily 100 each 5    ipratropium-albuterol (DUONEB) 0.5-2.5 (3) MG/3ML SOLN nebulizer solution Inhale 3 mLs into the lungs three times daily 360 mL 5    VENTOLIN  (90 Base) MCG/ACT inhaler Inhale 2 puffs into the lungs every 6 hours as needed for Wheezing 1 Inhaler 5    Cholecalciferol (VITAMIN D) 2000 units TABS tablet Take 1 tablet by mouth daily 30 tablet 5    triamterene-hydrochlorothiazide (MAXZIDE-25) 37.5-25 MG per tablet Take 1 tablet by mouth daily 30 tablet 3    ibuprofen (ADVIL;MOTRIN) 800 MG tablet Take 1 tablet by mouth 2 times daily as needed for Pain 60 tablet 5    folic acid (FOLVITE) 1 MG tablet Take 1 tablet by mouth daily 30 tablet 5    magnesium oxide (MAG-OX) 400 (240 Mg) MG tablet Take 1 tablet by mouth daily 30 tablet 5    amitriptyline (ELAVIL) 50 MG tablet Take 1 tablet by mouth nightly 30 tablet 5    aspirin 81 MG chewable tablet Take 1 tablet by mouth daily 30 tablet 5    pantoprazole (PROTONIX) 40 MG tablet Take 1 tablet by mouth every morning (before breakfast) 30 tablet 11    pregabalin (LYRICA) 50 MG capsule Take 1 capsule by mouth 2 times daily for 15 days. . 30 capsule 1    calcium carbonate (TUMS) 500 MG chewable tablet        No current facility-administered medications for this visit. Allergies:    Allergen Reactions    Latex Itching     Pt reports itching on hands after using rubber gloves at work    Influenza Vaccines Swelling     Pt reports her entire arm was red and edematous post vaccine    Eggs Or Egg-Derived Products     Gabapentin Nausea Only    Pneumococcal Vaccines Hives    Povidone Iodine Itching    Varicella Virus Vaccine Live Hives     Pertinent Labs/Vitals:  BP Readings from Last 3 Encounters:   03/08/19 (!) 160/100   02/19/19 (!) 181/93   01/22/19 136/64     Lipids   Component Value Date    CHOL 186 09/27/2018    TRIG 160 09/27/2018    HDL 34 (L) 09/27/2018    LDLCALC 120 09/27/2018     ALT   Date Value Ref Range Status   12/28/2018 100 (H) 0 - 33 U/L Final     AST   Date Value Ref Range Status   12/28/2018 72 (H) 0 - 35 U/L Final     The 10-year ASCVD risk score (Tres Diaz, et al., 2013) is: 10.9%    Values used to calculate the score:      Age: 57 years      Sex: Female      Is Non- : No      Diabetic: No      Tobacco smoker: Yes      Systolic Blood Pressure: 347 mmHg      Is BP treated: Yes      HDL Cholesterol: 34 mg/dL      Total Cholesterol: 186 mg/dL     SCr   Component Value Date    CREATININE 0.64 12/28/2018     CrCL ~127 mL/min (calculated using sCr 0.64 mg/dL, Ht 1.702 m, Adj. BW 72.3 kg, using C-G equation)   eGFR: >60 mL/min/1.73 m^2    Social History:   Tobacco Use    Smoking status: Current Some Day Smoker     Packs/day: 0.50     Years: 10.00     Pack years: 5.00     Types: Cigarettes    Smokeless tobacco: Never Used    Tobacco comment: pt trying Chantix   Substance Use Topics    Alcohol use: No     Immunizations:   Administered Date(s) Administered    Influenza Vaccine, unspecified formulation 11/18/2011    Pneumococcal Polysaccharide ([id number]) 11/18/2011     Spirometry/PFT results:  06/05/2018: \"Overall, this study was completely normal. No obstructive, restrictive, or diffusion impairment noted on this study. \"    ASSESSMENT/PLAN:   - General Assessment:   · Adherence/cost: no concerns  · Immunizations: recommend flu vaccine  · Smoking status: Did not discuss    - COPD:  · Spirometry on file: Normal  · Pulmonologist: Carissa Winn MD    - Primary Care 6 Gaps:  · A1c not <9 (18-76yo; A1c in current year): n/a (11/06/2018 5.4%)  · BP not <140/90; if >/=59yo and not diabetic, BP <150/90 (18-76yo; most recent BP recording): 03/08/2019 160/100  · Pneumonia vaccination (>/= 64yo with any PPSV23 or PCV13 documented in Health Maintenance tab):  Up-to-date (not needed again until 64yo)  · Breast cancer screening (43-67IS with mammogram within 27 months of end of current year): n/a (09BT)  · Colorectal cancer screening (50-76yo with FOBT in past 12 months, flex sig in past 5 years or colonoscopy in last 10 years): n/a (79HQ)  · Nephropathy screening (18-76yo with diabetes; either on ACE-I/ARB or annual microalbumin): n/a (no DM dx)    - Upcoming appointments:   Date Time Provider Vangie Nuñez   3/20/2019  7:45 AM MLOZ ECHO  S. Hortensia   5/23/2019  2:45 PM Sobia Forde MD [facility]   9/13/2019  9:30 AM Anand Hand SenderDO BARRETO [facility] [place]     Josue Guthrie, PharmD, [facility], [address]  Clinical Pharmacy Specialist  O: [phone]  C: [address]  [phone], [address]

## 2019-04-08 ENCOUNTER — HOSPITAL ENCOUNTER (EMERGENCY)
Age: 49
Discharge: HOME | End: 2019-04-08
Attending: EMERGENCY MEDICINE
Payer: COMMERCIAL

## 2019-04-08 VITALS
BODY MASS INDEX: 31.39 KG/M2 | HEART RATE: 79 BPM | OXYGEN SATURATION: 96 % | DIASTOLIC BLOOD PRESSURE: 85 MMHG | HEIGHT: 67 IN | WEIGHT: 200 LBS | SYSTOLIC BLOOD PRESSURE: 172 MMHG | RESPIRATION RATE: 16 BRPM | TEMPERATURE: 98.5 F

## 2019-04-08 DIAGNOSIS — I10 ESSENTIAL HYPERTENSION: Primary | ICD-10-CM

## 2019-04-08 PROCEDURE — 6370000000 HC RX 637 (ALT 250 FOR IP): Performed by: EMERGENCY MEDICINE

## 2019-04-08 PROCEDURE — 99282 EMERGENCY DEPT VISIT SF MDM: CPT

## 2019-04-08 RX ORDER — DILTIAZEM HYDROCHLORIDE 240 MG/1
240 CAPSULE, COATED, EXTENDED RELEASE ORAL ONCE
Status: COMPLETED | OUTPATIENT
Start: 2019-04-08 | End: 2019-04-08

## 2019-04-08 RX ORDER — LOSARTAN POTASSIUM 50 MG/1
100 TABLET ORAL ONCE
Status: COMPLETED | OUTPATIENT
Start: 2019-04-08 | End: 2019-04-08

## 2019-04-08 RX ADMIN — LOSARTAN POTASSIUM 100 MG: 50 TABLET ORAL at 19:34

## 2019-04-08 RX ADMIN — DILTIAZEM HYDROCHLORIDE 240 MG: 240 CAPSULE, COATED, EXTENDED RELEASE ORAL at 19:34

## 2019-04-08 ASSESSMENT — PAIN DESCRIPTION - DESCRIPTORS: DESCRIPTORS: SHARP

## 2019-04-08 ASSESSMENT — ENCOUNTER SYMPTOMS
PHOTOPHOBIA: 0
EYE DISCHARGE: 0
ABDOMINAL DISTENTION: 0
VOMITING: 0
ABDOMINAL PAIN: 0
RHINORRHEA: 0
WHEEZING: 0
FACIAL SWELLING: 0
SHORTNESS OF BREATH: 0
COLOR CHANGE: 0

## 2019-04-08 ASSESSMENT — PAIN DESCRIPTION - LOCATION: LOCATION: HEAD

## 2019-04-08 ASSESSMENT — PAIN DESCRIPTION - FREQUENCY: FREQUENCY: CONTINUOUS

## 2019-04-08 ASSESSMENT — PAIN DESCRIPTION - PAIN TYPE: TYPE: ACUTE PAIN

## 2019-04-08 ASSESSMENT — PAIN SCALES - GENERAL: PAINLEVEL_OUTOF10: 7

## 2019-04-08 ASSESSMENT — PAIN DESCRIPTION - ORIENTATION: ORIENTATION: POSTERIOR

## 2019-04-08 NOTE — ED NOTES
Patient medicated per orders  Patient tolerated well  Family at bedside        Dom Peterson [place]  04/08/19 1936

## 2019-04-08 NOTE — ED TRIAGE NOTES
Pt to ER from [facility] due to high blood pressure and sharp pain in the back of head. Pt currently not taking any medications. Pt states \"everything is screwed up\" through [facility] due to meds being added/discontinued/changed. Pt rates pain in head 6/10. Pt resting in bed with no signs of distress.

## 2019-04-08 NOTE — ED PROVIDER NOTES
[address] [facility] ED  eMERGENCY dEPARTMENT eNCOUnter      Pt Name: Matteo Souza  MRN: [id number]  Pamgfjustin 1970  Date of evaluation: 4/8/2019  Provider: Bud [address]       Chief Complaint   Patient presents with    Hypertension     180/90 at [facility] appt about [phone] today. Has HA         HISTORY OF PRESENT ILLNESS   (Location/Symptom, Timing/Onset,Context/Setting, Quality, Duration, Modifying Factors, Severity)  Note limiting factors. Matteo Souza is a 57 y.o. female who presents to the emergency department with a chief complaint of being told that her blood pressure is elevated. Patient had just pulled a double shift and has been up for longer than a day and is caffeinating. She had a hospitalization in November during which her medications were adjusted and she has had problems since as she thought they had been sent to her send away pharmacy and was waiting on them and then they didn't show up. The meds she has are in packages and not bottles and she doesn't know what is what. She had been working on sorting this out with a care coordinator through [employer]. She has some medicines but as previously stated she didn't know what was what and therefore hasn't been taking most things since January. She is complaining of a mild all over headache but feels they have more to do with her working so much and being up for so long. She otherwise denies any chest pain, shortness of breath, vision changes, or concerns regarding symptoms related to her blood pressure elevation. She had gone in tonight for a routine physical.      HPI    NursingNotes were reviewed. REVIEW OF SYSTEMS    (2-9 systems for level 4, 10 or more for level 5)     Review of Systems   Constitutional: Negative for activity change and appetite change. HENT: Negative for congestion, facial swelling and rhinorrhea. Eyes: Negative for photophobia and discharge.    Respiratory: Negative for shortness of breath and wheezing. Cardiovascular: Negative for chest pain. Gastrointestinal: Negative for abdominal distention, abdominal pain and vomiting. Endocrine: Negative for polydipsia and polyphagia. Genitourinary: Negative for difficulty urinating, frequency, vaginal bleeding and vaginal discharge. Musculoskeletal: Negative for gait problem. Skin: Negative for color change. Allergic/Immunologic: Negative for immunocompromised state. Neurological: Positive for headaches. Negative for dizziness, weakness and light-headedness. Hematological: Negative for adenopathy. Psychiatric/Behavioral: Negative for behavioral problems. Except as noted above the remainder of the review of systems was reviewed and negative.        PAST MEDICAL HISTORY     Past Medical History:   Diagnosis Date    Anxiety     Back pain     back surgery x 4    Bipolar disorder ([address]     CAD (coronary artery disease)     CAFL (chronic airflow limitation) ([address] 11/3/2016    Cerebral artery occlusion with cerebral infarction (HCC)     tia    Chronic bronchitis (HCC)     Chronic pain     Colitis     DDD (degenerative disc disease), lumbar 2016    Depression     Endometriosis     Excessive caffeine abuse, continuous ([address] 2018    Gastritis     GERD (gastroesophageal reflux disease)     History of myocardial infarction     HTN (hypertension), benign 2016    Hypokalemia     Impaired mobility and activities of daily living     Left hemiparesis ([address]     Multiple sclerosis ([address]     Neck pain     surgery x 1    Over weight     PTSD (post-traumatic stress disorder)     Sensory neuropathy 2016    TIA (transient ischemic attack)     Tobacco abuse     Vasospastic angina ([address] 2016         SURGICALHISTORY       Past Surgical History:   Procedure Laterality Date    BACK SURGERY      x2     SECTION      x2    CHOLECYSTECTOMY  13    Lapchole    CORONARY ANGIOPLASTY      CYST REMOVAL 3/7/16    Dr. Jailene Kam GASTROINTESTINAL ENDOSCOPY  11/24/2014    ANIBAL HOUSE M.D. CURRENT MEDICATIONS       Previous Medications    ALBUTEROL (PROVENTIL) (5 MG/ML) 0.5% NEBULIZER SOLUTION    Take 0.5 mLs by nebulization every 6 hours as needed for Wheezing    ALBUTEROL SULFATE HFA (PROVENTIL HFA) 108 (90 BASE) MCG/ACT INHALER    Inhale 2 puffs into the lungs every 6 hours as needed for Wheezing    AMITRIPTYLINE (ELAVIL) 50 MG TABLET    Take 1 tablet by mouth nightly    ASPIRIN 81 MG CHEWABLE TABLET    Take 1 tablet by mouth daily    ATORVASTATIN (LIPITOR) 10 MG TABLET    Take 1 tablet by mouth nightly    CALCIUM CARBONATE (TUMS) 500 MG CHEWABLE TABLET        CHOLECALCIFEROL (VITAMIN D) 2000 UNITS TABS TABLET    Take 1 tablet by mouth daily    CYANOCOBALAMIN 1000 MCG/ML INJECTION    INJECT 1ML INTO THE MUSCLE ONCE A MONTH    CYANOCOBALAMIN 1000 MCG/ML INJECTION    Inject 1 mL into the muscle once for 1 dose Inject 1 ml into the muscle once a month    CYCLOBENZAPRINE (FLEXERIL) 10 MG TABLET    Take 1 tablet by mouth 2 times daily as needed for Muscle spasms    DILTIAZEM (CARDIZEM CD) 240 MG EXTENDED RELEASE CAPSULE    Take 1 capsule by mouth daily    FLUTICASONE FUROATE-VILANTEROL (BREO ELLIPTA) 200-25 MCG/INH AEPB    1 puff in AM daily.     FOLIC ACID (FOLVITE) 1 MG TABLET    Take 1 tablet by mouth daily    FREESTYLE LANCETS MISC    Inject 1 each into the skin daily    FREESTYLE LITE STRIP    USE DAILY AS DIRECTED    IBUPROFEN (ADVIL;MOTRIN) 800 MG TABLET    Take 1 tablet by mouth 2 times daily as needed for Pain    IPRATROPIUM-ALBUTEROL (DUONEB) 0.5-2.5 (3) MG/3ML SOLN NEBULIZER SOLUTION    Inhale 3 mLs into the lungs three times daily    ISOSORBIDE MONONITRATE (IMDUR) 30 MG EXTENDED RELEASE TABLET    Take 1 tablet by mouth daily    LATUDA 20 MG TABS TABLET    TAKE 1 TABLET BY MOUTH EVERY DAY LITHIUM 300 MG CAPSULE    TAKE 1 CAPSULE BY MOUTH DAILY    LOSARTAN (COZAAR) 100 MG TABLET    Take 1 tablet by mouth daily    MAGNESIUM OXIDE (MAG-OX) 400 (240 MG) MG TABLET    Take 1 tablet by mouth daily    MECLIZINE (ANTIVERT) 25 MG TABLET    Take 25 mg by mouth 3 times daily as needed    PANTOPRAZOLE (PROTONIX) 40 MG TABLET    Take 1 tablet by mouth every morning (before breakfast)    PREGABALIN (LYRICA) 50 MG CAPSULE    Take 1 capsule by mouth 2 times daily for 15 days. .    TRIAMTERENE-HYDROCHLOROTHIAZIDE (MAXZIDE-25) 37.5-25 MG PER TABLET    Take 1 tablet by mouth daily    VENTOLIN  (90 BASE) MCG/ACT INHALER    Inhale 2 puffs into the lungs every 6 hours as needed for Wheezing       ALLERGIES     Latex; Influenza vaccines; Eggs or egg-derived products; Gabapentin; Pneumococcal vaccines; Povidone iodine; and Varicella virus vaccine live    FAMILY HISTORY       Family History   Problem Relation Age of Onset    High Blood Pressure Mother     Kidney Disease Mother     Lupus Mother     Bipolar Disorder Son           SOCIAL HISTORY       Social History     Socioeconomic History    Marital status:       Spouse name: Not on file    Number of children: Not on file    Years of education: Not on file    Highest education level: Not on file   Occupational History    Occupation: unemployed   Social Needs    Financial resource strain: Not on file    Food insecurity:     Worry: Not on file     Inability: Not on file   [employer] needs:     Medical: Not on file     Non-medical: Not on file   Tobacco Use    Smoking status: Current Some Day Smoker     Packs/day: 0.50     Years: 10.00     Pack years: 5.00     Types: Cigarettes    Smokeless tobacco: Never Used    Tobacco comment: pt trying Chantix   Substance and Sexual Activity    Alcohol use: No    Drug use: No    Sexual activity: Not on file   Lifestyle    Physical activity:     Days per week: Not on file     Minutes per session: Not on is warm and dry. Psychiatric: She has a normal mood and affect. DIAGNOSTIC RESULTS     EKG: All EKG's are interpreted by the Emergency Department Physician who either signs or Co-signsthis chart in the absence of a cardiologist.        RADIOLOGY:   Yoanna Round such as CT, Ultrasound and MRI are read by the radiologist. Plain radiographic images are visualized and preliminarily interpreted by the emergency physician with the below findings:        Interpretation per the Radiologist below, if available at the time ofthis note:    No orders to display         ED BEDSIDE ULTRASOUND:   Performed by ED Physician - none    LABS:  Labs Reviewed - No data to display    All other labs were within normal range or not returned as of this dictation. EMERGENCY DEPARTMENT COURSE and DIFFERENTIAL DIAGNOSIS/MDM:   Vitals:    Vitals:    04/08/19 1823   BP: (!) 172/85   Pulse: 79   Resp: 16   Temp: 98.5 °F (36.9 °C)   TempSrc: Oral   SpO2: 96%   Weight: 200 lb (90.7 kg)   Height: 5' 7\" (1.702 m)       Patient is symptom free and comfortable here in the emergency department. She is shown computer images for each of her high blood pressure pills that she may have at home and is able to take a picture of these on her phone so that she can check at home for the tablets that she has and be able to take them. He continues to work on sorting out getting her prescriptions and feels that this will happen soon. She is given her blood pressure medicines that she should be on her last follow-up with Dr. carrera according to his office from March 8 in the computer. She is discharged home in stable condition. MDM    CRITICAL CARE TIME   Total Critical Care time was 0 minutes, excluding separately reportableprocedures. There was a high probability of clinicallysignificant/life threatening deterioration in the patient's condition which required my urgent intervention.       CONSULTS:  None    PROCEDURES:  Unless otherwise noted below, none     Procedures    FINAL IMPRESSION      1.  Essential hypertension          DISPOSITION/PLAN   DISPOSITION Discharge - Pending Orders Complete 04/08/2019 07:37:16 PM      PATIENT REFERRED TO:  Goldy Barnhart MD  [address] [address] [phone]      for blood pressure recheck in three days      DISCHARGE MEDICATIONS:  New Prescriptions    No medications on file          (Please note that portions of this note were completed with a voice recognition program.  Efforts were made to edit the dictations but occasionally words are mis-transcribed.)    Karl Huerta DO (electronically signed)  Attending Emergency Physician          Karl Huerta DO  04/08/19 194

## 2019-04-09 NOTE — CARE COORDINATION
RECEIVED A CALL FROM PT. SHE STATES HER MEDICATIONS ARE NOT CORRECT AND WAS PLACED ON HOLD AT [facility] PER ? \"GANESH RODRIGUEZ\" A PHARMACIST [phone]. C3 CALLED ? GANESH'S PHONE NUMBER AND GOES STRAIGHT TO VOICE MAIL. VOICE MAIL STATES SHE IS WITH [facility]. C3 SPOKE WITH [facility]PT PHARMACY AND THEY STATED NO ONE BY THAT NAME WORKS THERE. C3 CALLED [facility] AND SPOKE WITH CLEMENTE. HE STATES THERE IS A PROGRAM THROUGH [facility] WITH [facility] FOR FOLLOW WITH PATIENTS AFTER DISCHARGE. HE STATES HE WILL DO SOME DIGGING AND GET BACK TO ME. CLEMENTE CALLED BACK STATING HE SPOKE WITH ALIE AT [facility]. SHE STATES THAT THE GANESH RODRIGUEZ DID CALL WITH PT AND PT IS THE ONE THAT PUT MEDS ON HOLD UNTIL THE CORRECT MEDS WERE GIVEN. PER CLEMENTE THE PT CAN CALL ALIE AND RESUME ANY MEDS THEY CURRENTLY HAD AND STATED MOST OF THE MEDS WERE MANAGED BY DR HASSAN. C3 CALLED PT BACK [phone]. PT STILL STATES SHE IS NOT THE ONE WHO PUT THE MEDS ON HOLD. PT INSTRUCTED TO CALL [facility] [phone] AND ASK FOR ALIE, EXPLAINED TO HER THAT SHE CAN RESUME ANY MED SHE NEEDED. PT STATES SHE IS NOT SURE WHAT MEDS TO TAKE. PT INSTRUCTED TO CALL [facility]'S OFFICE AND MAKE AN APPOINTMENT TO CLARIFY MEDICATIONS.

## 2019-04-10 ENCOUNTER — TELEPHONE (OUTPATIENT)
Dept: FAMILY MEDICINE CLINIC | Age: 49
End: 2019-04-10

## 2019-04-10 ENCOUNTER — OFFICE VISIT (OUTPATIENT)
Dept: FAMILY MEDICINE CLINIC | Age: 49
End: 2019-04-10
Payer: COMMERCIAL

## 2019-04-10 VITALS
SYSTOLIC BLOOD PRESSURE: 188 MMHG | HEART RATE: 82 BPM | DIASTOLIC BLOOD PRESSURE: 98 MMHG | HEIGHT: 67 IN | TEMPERATURE: 97.3 F | BODY MASS INDEX: 30.64 KG/M2 | OXYGEN SATURATION: 97 % | WEIGHT: 195.2 LBS

## 2019-04-10 DIAGNOSIS — I10 HTN (HYPERTENSION), BENIGN: Primary | ICD-10-CM

## 2019-04-10 DIAGNOSIS — G35 MULTIPLE SCLEROSIS (HCC): ICD-10-CM

## 2019-04-10 DIAGNOSIS — F31.32 BIPOLAR 1 DISORDER, DEPRESSED, MODERATE (HCC): ICD-10-CM

## 2019-04-10 DIAGNOSIS — K21.9 GASTROESOPHAGEAL REFLUX DISEASE WITHOUT ESOPHAGITIS: ICD-10-CM

## 2019-04-10 DIAGNOSIS — I10 ESSENTIAL HYPERTENSION: ICD-10-CM

## 2019-04-10 DIAGNOSIS — E55.9 VITAMIN D DEFICIENCY: ICD-10-CM

## 2019-04-10 DIAGNOSIS — M50.120 CERVICAL DISC DISORDER WITH RADICULOPATHY OF MID-CERVICAL REGION: ICD-10-CM

## 2019-04-10 DIAGNOSIS — Z00.00 PREVENTATIVE HEALTH CARE: ICD-10-CM

## 2019-04-10 DIAGNOSIS — E53.8 VITAMIN B12 DEFICIENCY: ICD-10-CM

## 2019-04-10 DIAGNOSIS — G47.00 INSOMNIA, UNSPECIFIED TYPE: ICD-10-CM

## 2019-04-10 DIAGNOSIS — M50.220 HERNIATION OF INTERVERTEBRAL DISC OF MID-CERVICAL REGION, UNSPECIFIED SPINAL LEVEL: ICD-10-CM

## 2019-04-10 DIAGNOSIS — M50.320 DEGENERATION OF INTERVERTEBRAL DISC OF MID-CERVICAL REGION, UNSPECIFIED SPINAL LEVEL: ICD-10-CM

## 2019-04-10 DIAGNOSIS — R73.9 HYPERGLYCEMIA: ICD-10-CM

## 2019-04-10 DIAGNOSIS — I10 HTN (HYPERTENSION), BENIGN: ICD-10-CM

## 2019-04-10 DIAGNOSIS — R60.1 GENERALIZED EDEMA: ICD-10-CM

## 2019-04-10 DIAGNOSIS — E83.42 HYPOMAGNESEMIA: ICD-10-CM

## 2019-04-10 DIAGNOSIS — Z91.14 NONCOMPLIANCE WITH MEDICATIONS: ICD-10-CM

## 2019-04-10 DIAGNOSIS — G89.29 CHRONIC RIGHT-SIDED LOW BACK PAIN WITH BILATERAL SCIATICA: ICD-10-CM

## 2019-04-10 DIAGNOSIS — E78.00 PURE HYPERCHOLESTEROLEMIA: ICD-10-CM

## 2019-04-10 DIAGNOSIS — M51.26 DISPLACEMENT OF LUMBAR INTERVERTEBRAL DISC WITHOUT MYELOPATHY: ICD-10-CM

## 2019-04-10 DIAGNOSIS — M54.41 CHRONIC RIGHT-SIDED LOW BACK PAIN WITH BILATERAL SCIATICA: ICD-10-CM

## 2019-04-10 DIAGNOSIS — M54.12 RADICULOPATHY, CERVICAL REGION: ICD-10-CM

## 2019-04-10 DIAGNOSIS — M54.42 CHRONIC RIGHT-SIDED LOW BACK PAIN WITH BILATERAL SCIATICA: ICD-10-CM

## 2019-04-10 DIAGNOSIS — E53.8 FOLIC ACID DEFICIENCY: ICD-10-CM

## 2019-04-10 LAB
ALBUMIN SERPL-MCNC: 4.4 G/DL (ref 3.5–4.6)
ALP BLD-CCNC: 137 U/L (ref 40–130)
ALT SERPL-CCNC: 61 U/L (ref 0–33)
ANION GAP SERPL CALCULATED.3IONS-SCNC: 13 MEQ/L (ref 9–15)
AST SERPL-CCNC: 37 U/L (ref 0–35)
BASOPHILS ABSOLUTE: 0 K/UL (ref 0–0.2)
BASOPHILS RELATIVE PERCENT: 0.2 %
BILIRUB SERPL-MCNC: <0.2 MG/DL (ref 0.2–0.7)
BUN BLDV-MCNC: 6 MG/DL (ref 6–20)
CALCIUM SERPL-MCNC: 9.9 MG/DL (ref 8.5–9.9)
CHLORIDE BLD-SCNC: 102 MEQ/L (ref 95–107)
CHOLESTEROL, TOTAL: 148 MG/DL (ref 0–199)
CO2: 26 MEQ/L (ref 20–31)
CREAT SERPL-MCNC: 0.86 MG/DL (ref 0.5–0.9)
EOSINOPHILS ABSOLUTE: 0.1 K/UL (ref 0–0.7)
EOSINOPHILS RELATIVE PERCENT: 0.6 %
GFR AFRICAN AMERICAN: >60
GFR NON-AFRICAN AMERICAN: >60
GLOBULIN: 2.7 G/DL (ref 2.3–3.5)
GLUCOSE BLD-MCNC: 97 MG/DL (ref 70–99)
HBA1C MFR BLD: 5.5 % (ref 4.8–5.9)
HCT VFR BLD CALC: 45.2 % (ref 37–47)
HDLC SERPL-MCNC: 31 MG/DL (ref 40–59)
HEMOGLOBIN: 15.5 G/DL (ref 12–16)
LDL CHOLESTEROL CALCULATED: 68 MG/DL (ref 0–129)
LITHIUM LEVEL: <0.1 MEQ/L (ref 0.6–1.2)
LYMPHOCYTES ABSOLUTE: 2.1 K/UL (ref 1–4.8)
LYMPHOCYTES RELATIVE PERCENT: 21.7 %
MAGNESIUM: 2.4 MG/DL (ref 1.7–2.4)
MCH RBC QN AUTO: 32 PG (ref 27–31.3)
MCHC RBC AUTO-ENTMCNC: 34.3 % (ref 33–37)
MCV RBC AUTO: 93.1 FL (ref 82–100)
MONOCYTES ABSOLUTE: 0.7 K/UL (ref 0.2–0.8)
MONOCYTES RELATIVE PERCENT: 6.9 %
NEUTROPHILS ABSOLUTE: 6.7 K/UL (ref 1.4–6.5)
NEUTROPHILS RELATIVE PERCENT: 70.6 %
PDW BLD-RTO: 13.7 % (ref 11.5–14.5)
PLATELET # BLD: 159 K/UL (ref 130–400)
POTASSIUM SERPL-SCNC: 4.6 MEQ/L (ref 3.4–4.9)
RBC # BLD: 4.86 M/UL (ref 4.2–5.4)
SODIUM BLD-SCNC: 141 MEQ/L (ref 135–144)
TOTAL PROTEIN: 7.1 G/DL (ref 6.3–8)
TRIGL SERPL-MCNC: 245 MG/DL (ref 0–150)
TSH SERPL DL<=0.05 MIU/L-ACNC: 4.07 UIU/ML (ref 0.44–3.86)
VITAMIN D 25-HYDROXY: 18.5 NG/ML (ref 30–100)
WBC # BLD: 9.5 K/UL (ref 4.8–10.8)

## 2019-04-10 PROCEDURE — 4004F PT TOBACCO SCREEN RCVD TLK: CPT | Performed by: NURSE PRACTITIONER

## 2019-04-10 PROCEDURE — G8598 ASA/ANTIPLAT THER USED: HCPCS | Performed by: NURSE PRACTITIONER

## 2019-04-10 PROCEDURE — G8427 DOCREV CUR MEDS BY ELIG CLIN: HCPCS | Performed by: NURSE PRACTITIONER

## 2019-04-10 PROCEDURE — 99214 OFFICE O/P EST MOD 30 MIN: CPT | Performed by: NURSE PRACTITIONER

## 2019-04-10 PROCEDURE — G8417 CALC BMI ABV UP PARAM F/U: HCPCS | Performed by: NURSE PRACTITIONER

## 2019-04-10 RX ORDER — ASPIRIN 81 MG/1
81 TABLET, CHEWABLE ORAL DAILY
Qty: 30 TABLET | Refills: 5 | Status: SHIPPED | OUTPATIENT
Start: 2019-04-10

## 2019-04-10 RX ORDER — LOSARTAN POTASSIUM 100 MG/1
100 TABLET ORAL DAILY
Qty: 30 TABLET | Refills: 5 | Status: ON HOLD | OUTPATIENT
Start: 2019-04-10 | End: 2020-01-09 | Stop reason: SDUPTHER

## 2019-04-10 RX ORDER — ISOSORBIDE MONONITRATE 30 MG/1
30 TABLET, EXTENDED RELEASE ORAL DAILY
Qty: 30 TABLET | Refills: 3 | Status: ON HOLD | OUTPATIENT
Start: 2019-04-10 | End: 2020-01-09 | Stop reason: SDUPTHER

## 2019-04-10 RX ORDER — CYCLOBENZAPRINE HCL 10 MG
10 TABLET ORAL 2 TIMES DAILY PRN
Qty: 60 TABLET | Refills: 0 | Status: SHIPPED | OUTPATIENT
Start: 2019-04-10 | End: 2019-11-26 | Stop reason: SDUPTHER

## 2019-04-10 RX ORDER — ATORVASTATIN CALCIUM 10 MG/1
10 TABLET, FILM COATED ORAL NIGHTLY
Qty: 30 TABLET | Refills: 5 | Status: SHIPPED | OUTPATIENT
Start: 2019-04-10 | End: 2019-08-06 | Stop reason: ALTCHOICE

## 2019-04-10 RX ORDER — CHOLECALCIFEROL (VITAMIN D3) 50 MCG
2000 TABLET ORAL DAILY
Qty: 30 TABLET | Refills: 5 | Status: SHIPPED | OUTPATIENT
Start: 2019-04-10 | End: 2019-08-28 | Stop reason: SDUPTHER

## 2019-04-10 RX ORDER — LITHIUM CARBONATE 300 MG/1
300 CAPSULE ORAL DAILY
Qty: 30 CAPSULE | Refills: 5 | Status: ON HOLD | OUTPATIENT
Start: 2019-04-10 | End: 2021-01-08 | Stop reason: HOSPADM

## 2019-04-10 RX ORDER — DILTIAZEM HYDROCHLORIDE 240 MG/1
240 CAPSULE, COATED, EXTENDED RELEASE ORAL DAILY
Qty: 30 CAPSULE | Refills: 5 | Status: SHIPPED | OUTPATIENT
Start: 2019-04-10 | End: 2019-08-06

## 2019-04-10 RX ORDER — CYANOCOBALAMIN 1000 UG/ML
1000 INJECTION INTRAMUSCULAR; SUBCUTANEOUS ONCE
Status: DISCONTINUED | OUTPATIENT
Start: 2019-04-10 | End: 2019-04-10

## 2019-04-10 RX ORDER — FOLIC ACID 1 MG/1
1 TABLET ORAL DAILY
Qty: 30 TABLET | Refills: 5 | Status: ON HOLD | OUTPATIENT
Start: 2019-04-10 | End: 2020-12-24

## 2019-04-10 RX ORDER — AMITRIPTYLINE HYDROCHLORIDE 50 MG/1
50 TABLET, FILM COATED ORAL NIGHTLY
Qty: 30 TABLET | Refills: 5 | Status: ON HOLD | OUTPATIENT
Start: 2019-04-10 | End: 2020-12-24

## 2019-04-10 RX ORDER — PANTOPRAZOLE SODIUM 40 MG/1
40 TABLET, DELAYED RELEASE ORAL
Qty: 30 TABLET | Refills: 11 | Status: ON HOLD | OUTPATIENT
Start: 2019-04-10 | End: 2021-01-06 | Stop reason: HOSPADM

## 2019-04-10 RX ORDER — TRIAMTERENE AND HYDROCHLOROTHIAZIDE 37.5; 25 MG/1; MG/1
1 TABLET ORAL DAILY
Qty: 30 TABLET | Refills: 3 | Status: ON HOLD | OUTPATIENT
Start: 2019-04-10 | End: 2020-01-09 | Stop reason: HOSPADM

## 2019-04-10 RX ORDER — LANOLIN ALCOHOL/MO/W.PET/CERES
400 CREAM (GRAM) TOPICAL DAILY
Qty: 30 TABLET | Refills: 5 | Status: ON HOLD | OUTPATIENT
Start: 2019-04-10 | End: 2019-11-18

## 2019-04-10 ASSESSMENT — ENCOUNTER SYMPTOMS
ORTHOPNEA: 0
SINUS PRESSURE: 0
SORE THROAT: 0
WHEEZING: 0
SHORTNESS OF BREATH: 0
BLURRED VISION: 0

## 2019-04-10 NOTE — PROGRESS NOTES
Subjective:      Patient ID: Joao Willingham is a 57 y.o. female who presents today for:     Chief Complaint   Patient presents with    Follow-Up from Hospital     Pt presents today to f/u on BP. Pt was seen 04/08/2019 at [facility] [facility].  Discuss Medications     Pt would like to discuss mediations. Pt not sure what she should be taking. Hypertension   This is a chronic problem. The current episode started more than 1 year ago. The problem has been waxing and waning since onset. The problem is uncontrolled. Associated symptoms include chest pain (intermittent pressure over the last few months). Pertinent negatives include no anxiety, blurred vision, headaches, malaise/fatigue, neck pain, orthopnea, palpitations, peripheral edema, PND, shortness of breath or sweats. Risk factors for coronary artery disease include obesity. Past treatments include nothing. Compliance problems: not following up to address discrepency. Pt was in the hospital in November and some medication had been changed. Since then her meds were being sent from [facility] and she wasn't sure if they were correct so around December/January she stopped taking everything. She is currently only taking inhalers (breo, ventolin, and duoneb)  She has continued to get lyrica from pain management doctor. Pt did have a visit with Dr. Vince Trevino last month. She has still not started any of the medication that he ordered.        Past Medical History:   Diagnosis Date    Anxiety     Back pain     back surgery x 4    Bipolar disorder ([address]     CAD (coronary artery disease)     CAFL (chronic airflow limitation) [address] 11/3/2016    Cerebral artery occlusion with cerebral infarction (HCC)     tia    Chronic bronchitis (HCC)     Chronic pain     Colitis     DDD (degenerative disc disease), lumbar 12/22/2016    Depression     Endometriosis     Excessive caffeine abuse, continuous ([address] 7/6/2018    Gastritis     GERD (gastroesophageal Talks on phone: Not on file     Gets together: Not on file     Attends [employer] service: Not on file     Active member of club or organization: Not on file     Attends meetings of clubs or organizations: Not on file     Relationship status: Not on file    Intimate partner violence:     Fear of current or ex partner: Not on file     Emotionally abused: Not on file     Physically abused: Not on file     Forced sexual activity: Not on file   Other Topics Concern    Not on file   Social History Narrative    Brittney Davis is a 53-year-old female who is on disability because of pain and bipolar disorder. She's also had a presumed diagnosis of possible multiple sclerosis. She's been seeing Dr. Kayla Mehta for that and she says that the diagnosis is sometimes in question and it's clear neuropathy vitamin B12 deficiency as well as generalized bodyaches from the severe vitamin D deficiency have caused this scenario that looks like multiple sclerosis. She recently tried to go back to work but was not able because of her pain. Current Outpatient Medications on File Prior to Visit   Medication Sig Dispense Refill    FREESTYLE LITE strip USE DAILY AS DIRECTED 50 strip 11    cyanocobalamin 1000 MCG/ML injection INJECT 1ML INTO THE MUSCLE ONCE A MONTH 1 mL 11    Fluticasone Furoate-Vilanterol (BREO ELLIPTA) 200-25 MCG/INH AEPB 1 puff in AM daily.  30 each 3    albuterol sulfate HFA (PROVENTIL HFA) 108 (90 Base) MCG/ACT inhaler Inhale 2 puffs into the lungs every 6 hours as needed for Wheezing 1 Inhaler 3    albuterol (PROVENTIL) (5 MG/ML) 0.5% nebulizer solution Take 0.5 mLs by nebulization every 6 hours as needed for Wheezing 120 each 0    meclizine (ANTIVERT) 25 MG tablet Take 25 mg by mouth 3 times daily as needed      FREESTYLE LANCETS MISC Inject 1 each into the skin daily 100 each 5    ipratropium-albuterol (DUONEB) 0.5-2.5 (3) MG/3ML SOLN nebulizer solution Inhale 3 mLs into the lungs three times daily 360 mL 5  VENTOLIN  (90 Base) MCG/ACT inhaler Inhale 2 puffs into the lungs every 6 hours as needed for Wheezing 1 Inhaler 5    calcium carbonate (TUMS) 500 MG chewable tablet       pregabalin (LYRICA) 50 MG capsule Take 1 capsule by mouth 2 times daily for 15 days. . 30 capsule 1     No current facility-administered medications on file prior to visit. Allergies:  Latex; Influenza vaccines; Eggs or egg-derived products; Gabapentin; Pneumococcal vaccines; Povidone iodine; and Varicella virus vaccine live    Review of Systems   Constitutional: Negative for chills, fatigue, fever and malaise/fatigue. HENT: Negative for congestion, ear pain, sinus pressure and sore throat. Eyes: Negative for blurred vision. Respiratory: Negative for shortness of breath and wheezing. Cardiovascular: Positive for chest pain (intermittent pressure over the last few months). Negative for palpitations, orthopnea and PND. Musculoskeletal: Negative for neck pain. Neurological: Positive for dizziness (intermittent she is having testing doen for vertigo). Negative for syncope, weakness, numbness and headaches. Objective:   BP (!) 188/98 (Site: Right Upper Arm, Position: Sitting, Cuff Size: Medium Adult)   Pulse 82   Temp 97.3 °F (36.3 °C) (Temporal)   Ht 5' 7\" (1.702 m)   Wt 195 lb 3.2 oz (88.5 kg)   SpO2 97%   Breastfeeding? No   BMI 30.57 kg/m²     Physical Exam   Constitutional: She is oriented to person, place, and time. She appears well-developed and well-nourished. HENT:   Head: Normocephalic. Right Ear: Tympanic membrane, external ear and ear canal normal.   Left Ear: Tympanic membrane, external ear and ear canal normal.   Nose: Nose normal.   Mouth/Throat: Uvula is midline, oropharynx is clear and moist and mucous membranes are normal.   Eyes: Conjunctivae are normal. Right eye exhibits no discharge. Left eye exhibits no discharge. Neck: Normal range of motion.    Cardiovascular: Normal rate, regular rhythm and normal heart sounds. Pulmonary/Chest: Effort normal and breath sounds normal. No respiratory distress. Lymphadenopathy:     She has no cervical adenopathy. Neurological: She is alert and oriented to person, place, and time. Skin: Skin is warm and dry. Psychiatric: She has a normal mood and affect. Her behavior is normal.       Assessment:          Diagnosis Orders   1. HTN (hypertension), benign  Lipid Panel    Comprehensive Metabolic Panel    CBC Auto Differential   2. Vitamin B12 deficiency  cyanocobalamin injection 1,000 mcg   3. Gastroesophageal reflux disease without esophagitis  Comprehensive Metabolic Panel    CBC Auto Differential    pantoprazole (PROTONIX) 40 MG tablet   4. Multiple sclerosis (HCC)  Comprehensive Metabolic Panel   5. Displacement of lumbar intervertebral disc without myelopathy  Comprehensive Metabolic Panel   6. Insomnia, unspecified type  Comprehensive Metabolic Panel    amitriptyline (ELAVIL) 50 MG tablet   7. Preventative health care  Comprehensive Metabolic Panel    aspirin 81 MG chewable tablet    Magnesium    TSH without Reflex   8. Pure hypercholesterolemia  Lipid Panel    Comprehensive Metabolic Panel    CBC Auto Differential    atorvastatin (LIPITOR) 10 MG tablet   9. Vitamin D deficiency  Cholecalciferol (VITAMIN D) 2000 units TABS tablet    Vitamin D 25 Hydroxy   10. Chronic right-sided low back pain with bilateral sciatica  cyclobenzaprine (FLEXERIL) 10 MG tablet   11. Essential hypertension  diltiazem (CARDIZEM CD) 240 MG extended release capsule    losartan (COZAAR) 100 MG tablet    TSH without Reflex   12. Folic acid deficiency  folic acid (FOLVITE) 1 MG tablet   13. Bipolar 1 disorder, depressed, moderate (HCC)  Lithium Level    lithium 300 MG capsule    lurasidone (LATUDA) 20 MG TABS tablet   14. Hypomagnesemia  magnesium oxide (MAG-OX) 400 (240 Mg) MG tablet   15. Radiculopathy, cervical region     16.  Degeneration of intervertebral disc of mid-cervical region, unspecified spinal level     17. Cervical disc disorder with radiculopathy of mid-cervical region     18. Herniation of intervertebral disc of mid-cervical region, unspecified spinal level     19. Generalized edema  triamterene-hydrochlorothiazide (MAXZIDE-25) 37.5-25 MG per tablet   20. Hyperglycemia  Hemoglobin A1C       Plan:      Orders Placed This Encounter   Procedures    Lipid Panel     Standing Status:   Future     Standing Expiration Date:   4/9/2020     Order Specific Question:   Is Patient Fasting?/# of Hours     Answer:   10    Comprehensive Metabolic Panel     Standing Status:   Future     Standing Expiration Date:   4/9/2020    CBC Auto Differential     Standing Status:   Future     Standing Expiration Date:   4/9/2020    Lithium Level     Standing Status:   Future     Standing Expiration Date:   4/10/2020     Order Specific Question:   Time of Last Dose? Answer:   3 months?     Hemoglobin A1C     Standing Status:   Future     Standing Expiration Date:   4/9/2020    Vitamin D 25 Hydroxy     Standing Status:   Future     Standing Expiration Date:   4/9/2020    Magnesium     Standing Status:   Future     Standing Expiration Date:   4/9/2020    TSH without Reflex     Standing Status:   Future     Standing Expiration Date:   4/9/2020          Orders Placed This Encounter   Medications    cyanocobalamin injection 1,000 mcg    amitriptyline (ELAVIL) 50 MG tablet     Sig: Take 1 tablet by mouth nightly     Dispense:  30 tablet     Refill:  5     Do not fill before November 30  Outpatient provider to resume refiils    aspirin 81 MG chewable tablet     Sig: Take 1 tablet by mouth daily     Dispense:  30 tablet     Refill:  5    atorvastatin (LIPITOR) 10 MG tablet     Sig: Take 1 tablet by mouth nightly     Dispense:  30 tablet     Refill:  5    Cholecalciferol (VITAMIN D) 2000 units TABS tablet     Sig: Take 1 tablet by mouth daily     Dispense:  30 tablet     Refill:  5    cyclobenzaprine (FLEXERIL) 10 MG tablet     Sig: Take 1 tablet by mouth 2 times daily as needed for Muscle spasms     Dispense:  60 tablet     Refill:  0    diltiazem (CARDIZEM CD) 240 MG extended release capsule     Sig: Take 1 capsule by mouth daily     Dispense:  30 capsule     Refill:  5    folic acid (FOLVITE) 1 MG tablet     Sig: Take 1 tablet by mouth daily     Dispense:  30 tablet     Refill:  5    lithium 300 MG capsule     Sig: Take 1 capsule by mouth daily     Dispense:  30 capsule     Refill:  5    isosorbide mononitrate (IMDUR) 30 MG extended release tablet     Sig: Take 1 tablet by mouth daily     Dispense:  30 tablet     Refill:  3    lurasidone (LATUDA) 20 MG TABS tablet     Sig: TAKE 1 TABLET BY MOUTH EVERY DAY     Dispense:  30 tablet     Refill:  5    losartan (COZAAR) 100 MG tablet     Sig: Take 1 tablet by mouth daily     Dispense:  30 tablet     Refill:  5    magnesium oxide (MAG-OX) 400 (240 Mg) MG tablet     Sig: Take 1 tablet by mouth daily     Dispense:  30 tablet     Refill:  5    pantoprazole (PROTONIX) 40 MG tablet     Sig: Take 1 tablet by mouth every morning (before breakfast)     Dispense:  30 tablet     Refill:  11    triamterene-hydrochlorothiazide (MAXZIDE-25) 37.5-25 MG per tablet     Sig: Take 1 tablet by mouth daily     Dispense:  30 tablet     Refill:  3       Return in about 2 weeks (around 4/24/2019). Discussed with Dr. Marietta Peres who was agreeable that she should restart all medications. Reviewed notes from Dr. Fernando Epp office visit. Will send in losartan and cardizem that he wanted her to start. Will also send over refills on all other medications. Will obtain lab work although previously lab work in December was stable. Pt appears noncompliant with treatment plan and inconsistent with follow up. Plan to follow up in 2 weeks to recheck BP or sooner if needed. Any severe symptoms pt should go to ER. Pt was agreeable.      Continue regular follow up with current

## 2019-04-10 NOTE — PATIENT INSTRUCTIONS
Patient Education        Low Sodium Diet (2,000 Milligram): Care Instructions  Your Care Instructions    Too much sodium causes your body to hold on to extra water. This can raise your blood pressure and force your heart and kidneys to work harder. In very serious cases, this could cause you to be put in the hospital. It might even be life-threatening. By limiting sodium, you will feel better and lower your risk of serious problems. The most common source of sodium is salt. People get most of the salt in their diet from canned, prepared, and packaged foods. Fast food and restaurant meals also are very high in sodium. Your doctor will probably limit your sodium to less than 2,000 milligrams (mg) a day. This limit counts all the sodium in prepared and packaged foods and any salt you add to your food. Follow-up care is a key part of your treatment and safety. Be sure to make and go to all appointments, and call your doctor if you are having problems. It's also a good idea to know your test results and keep a list of the medicines you take. How can you care for yourself at home? Read food labels  · Read labels on cans and food packages. The labels tell you how much sodium is in each serving. Make sure that you look at the serving size. If you eat more than the serving size, you have eaten more sodium. · Food labels also tell you the Percent Daily Value for sodium. Choose products with low Percent Daily Values for sodium. · Be aware that sodium can come in forms other than salt, including monosodium glutamate (MSG), sodium citrate, and sodium bicarbonate (baking soda). MSG is often added to Asian food. When you eat out, you can sometimes ask for food without MSG or added salt. Buy low-sodium foods  · Buy foods that are labeled \"unsalted\" (no salt added), \"sodium-free\" (less than 5 mg of sodium per serving), or \"low-sodium\" (less than 140 mg of sodium per serving).  Foods labeled \"reduced-sodium\" and \"light sodium\" may still have too much sodium. Be sure to read the label to see how much sodium you are getting. · Buy fresh vegetables, or frozen vegetables without added sauces. Buy low-sodium versions of canned vegetables, soups, and other canned goods. Prepare low-sodium meals  · Cut back on the amount of salt you use in cooking. This will help you adjust to the taste. Do not add salt after cooking. One teaspoon of salt has about 2,300 mg of sodium. · Take the salt shaker off the table. · Flavor your food with garlic, lemon juice, onion, vinegar, herbs, and spices. Do not use soy sauce, lite soy sauce, steak sauce, onion salt, garlic salt, celery salt, mustard, or ketchup on your food. · Use low-sodium salad dressings, sauces, and ketchup. Or make your own salad dressings and sauces without adding salt. · Use less salt (or none) when recipes call for it. You can often use half the salt a recipe calls for without losing flavor. Other foods such as rice, pasta, and grains do not need added salt. · Rinse canned vegetables, and cook them in fresh water. This removes some--but not all--of the salt. · Avoid water that is naturally high in sodium or that has been treated with water softeners, which add sodium. Call your local water company to find out the sodium content of your water supply. If you buy bottled water, read the label and choose a sodium-free brand. Avoid high-sodium foods  · Avoid eating:  ? Smoked, cured, salted, and canned meat, fish, and poultry. ? Ham, tamayo, hot dogs, and luncheon meats. ? Regular, hard, and processed cheese and regular peanut butter. ? Crackers with salted tops, and other salted snack foods such as pretzels, chips, and salted popcorn. ? Frozen prepared meals, unless labeled low-sodium. ? Canned and dried soups, broths, and bouillon, unless labeled sodium-free or low-sodium. ? Canned vegetables, unless labeled sodium-free or low-sodium. ?  [place] fries, pizza, tacos, and other fast foods. ? Pickles, olives, ketchup, and other condiments, especially soy sauce, unless labeled sodium-free or low-sodium. Where can you learn more? Go to https://[employer]. org and sign in to your [facility] account. Enter X883 in the [facility] box to learn more about \"Low Sodium Diet (2,000 Milligram): Care Instructions. \"     If you do not have an account, please click on the \"Sign Up Now\" link. Current as of: March 28, 2018  Content Version: 11.9  © 1533-5017 [employer]. Care instructions adapted under license by [facility] ([facility]). If you have questions about a medical condition or this instruction, always ask your healthcare professional. [address] any warranty or liability for your use of this information.

## 2019-04-16 ENCOUNTER — HOSPITAL ENCOUNTER (OUTPATIENT)
Dept: GENERAL RADIOLOGY | Age: 49
Discharge: HOME | End: 2019-04-18
Payer: COMMERCIAL

## 2019-04-16 ENCOUNTER — TELEPHONE (OUTPATIENT)
Dept: FAMILY MEDICINE CLINIC | Age: 49
End: 2019-04-16

## 2019-04-16 ENCOUNTER — HOSPITAL ENCOUNTER (OUTPATIENT)
Dept: NON INVASIVE DIAGNOSTICS | Age: 49
Discharge: HOME | End: 2019-04-16
Payer: COMMERCIAL

## 2019-04-16 DIAGNOSIS — E03.9 HYPOTHYROIDISM, UNSPECIFIED TYPE: Primary | ICD-10-CM

## 2019-04-16 DIAGNOSIS — G35 MULTIPLE SCLEROSIS (HCC): ICD-10-CM

## 2019-04-16 DIAGNOSIS — Z86.79 HISTORY OF AORTIC REGURGITATION: ICD-10-CM

## 2019-04-16 DIAGNOSIS — R52 PAIN: ICD-10-CM

## 2019-04-16 LAB
LV EF: 55 %
LVEF MODALITY: NORMAL

## 2019-04-16 PROCEDURE — 93306 TTE W/DOPPLER COMPLETE: CPT

## 2019-04-16 PROCEDURE — 73030 X-RAY EXAM OF SHOULDER: CPT

## 2019-04-16 PROCEDURE — 72050 X-RAY EXAM NECK SPINE 4/5VWS: CPT

## 2019-04-16 PROCEDURE — 72110 X-RAY EXAM L-2 SPINE 4/>VWS: CPT

## 2019-04-16 PROCEDURE — 73564 X-RAY EXAM KNEE 4 OR MORE: CPT

## 2019-04-16 RX ORDER — DICLOFENAC SODIUM 75 MG/1
75 TABLET, DELAYED RELEASE ORAL 2 TIMES DAILY
Qty: 60 TABLET | Refills: 3 | Status: SHIPPED | OUTPATIENT
Start: 2019-04-16 | End: 2019-04-24 | Stop reason: SDUPTHER

## 2019-04-16 RX ORDER — LEVOTHYROXINE SODIUM 25 MCG
25 TABLET ORAL DAILY
Qty: 30 TABLET | Refills: 1 | Status: SHIPPED | OUTPATIENT
Start: 2019-04-16 | End: 2019-04-24 | Stop reason: SDUPTHER

## 2019-04-16 NOTE — TELEPHONE ENCOUNTER
Pt states she was taking motrin 800 mg (unable to find on med list), and is out of refills, but she is requesting either a stronger dose of motrin or something else that is stronger for pain. Pt states she was also told that she would be getting a prescription for synthroid, but there is nothing at her pharmacy yet. Pt would like it to go to [facility]. Please advise.

## 2019-04-18 DIAGNOSIS — E03.9 HYPOTHYROIDISM, UNSPECIFIED TYPE: ICD-10-CM

## 2019-04-19 ENCOUNTER — TELEPHONE (OUTPATIENT)
Dept: FAMILY MEDICINE CLINIC | Age: 49
End: 2019-04-19

## 2019-04-24 DIAGNOSIS — G35 MULTIPLE SCLEROSIS (HCC): ICD-10-CM

## 2019-04-24 DIAGNOSIS — E03.9 HYPOTHYROIDISM, UNSPECIFIED TYPE: ICD-10-CM

## 2019-04-24 RX ORDER — DICLOFENAC SODIUM 75 MG/1
75 TABLET, DELAYED RELEASE ORAL 2 TIMES DAILY
Qty: 60 TABLET | Refills: 3 | Status: ON HOLD | OUTPATIENT
Start: 2019-04-24 | End: 2020-12-24

## 2019-04-24 RX ORDER — LEVOTHYROXINE SODIUM 25 MCG
25 TABLET ORAL DAILY
Qty: 30 TABLET | Refills: 1 | Status: SHIPPED | OUTPATIENT
Start: 2019-04-24 | End: 2019-07-22 | Stop reason: SDUPTHER

## 2019-04-25 ENCOUNTER — TELEPHONE (OUTPATIENT)
Dept: FAMILY MEDICINE CLINIC | Age: 49
End: 2019-04-25

## 2019-04-25 NOTE — TELEPHONE ENCOUNTER
Pt called back, states her prescriptions need to be sent to [facility] - , [place], [place].

## 2019-05-10 NOTE — TELEPHONE ENCOUNTER
Patient was last seen on 5/1/2019  Last Prescribed 2/12/2019    Medication is pending  Please approve or deny this request

## 2019-05-16 DIAGNOSIS — R60.1 GENERALIZED EDEMA: ICD-10-CM

## 2019-05-16 RX ORDER — TRIAMTERENE AND HYDROCHLOROTHIAZIDE 37.5; 25 MG/1; MG/1
1 TABLET ORAL DAILY
Qty: 30 TABLET | Refills: 10 | Status: ON HOLD | OUTPATIENT
Start: 2019-05-16 | End: 2019-08-10 | Stop reason: HOSPADM

## 2019-05-16 NOTE — TELEPHONE ENCOUNTER
Pharmacy is requesting medication refill.  Please approve or deny this request.    Rx requested:  Requested Prescriptions     Pending Prescriptions Disp Refills    triamterene-hydrochlorothiazide (WNNUFFR-21) 37.5-25 MG per tablet [Pharmacy Med Name: Casandra Chávez 37.5/25MG 37.5-25 TAB] 30 tablet 10     Sig: TAKE 1 TABLET BY MOUTH DAILY         Last Office Visit:   1/10/2019      Next Visit Date:  Future Appointments   Date Time Provider Vangie Nuñez   5/23/2019  2:45 PM Janene Lopez MD [address]   9/13/2019  9:30 AM Anand Levine, [address]

## 2019-05-23 ENCOUNTER — OFFICE VISIT (OUTPATIENT)
Dept: PULMONOLOGY | Age: 49
End: 2019-05-23
Payer: COMMERCIAL

## 2019-05-23 VITALS
HEIGHT: 67 IN | WEIGHT: 201 LBS | TEMPERATURE: 97.2 F | BODY MASS INDEX: 31.55 KG/M2 | RESPIRATION RATE: 16 BRPM | HEART RATE: 79 BPM | SYSTOLIC BLOOD PRESSURE: 136 MMHG | DIASTOLIC BLOOD PRESSURE: 82 MMHG | OXYGEN SATURATION: 95 %

## 2019-05-23 DIAGNOSIS — J44.9 CHRONIC OBSTRUCTIVE PULMONARY DISEASE, UNSPECIFIED COPD TYPE (HCC): Primary | ICD-10-CM

## 2019-05-23 DIAGNOSIS — E66.9 OBESITY (BMI 30-39.9): ICD-10-CM

## 2019-05-23 DIAGNOSIS — G47.30 SLEEP APNEA, UNSPECIFIED TYPE: ICD-10-CM

## 2019-05-23 DIAGNOSIS — R06.09 DOE (DYSPNEA ON EXERTION): ICD-10-CM

## 2019-05-23 DIAGNOSIS — F17.200 SMOKING: ICD-10-CM

## 2019-05-23 DIAGNOSIS — Z72.0 TOBACCO ABUSE: ICD-10-CM

## 2019-05-23 PROCEDURE — 99214 OFFICE O/P EST MOD 30 MIN: CPT | Performed by: INTERNAL MEDICINE

## 2019-05-23 PROCEDURE — 3023F SPIROM DOC REV: CPT | Performed by: INTERNAL MEDICINE

## 2019-05-23 PROCEDURE — G8598 ASA/ANTIPLAT THER USED: HCPCS | Performed by: INTERNAL MEDICINE

## 2019-05-23 PROCEDURE — G8926 SPIRO NO PERF OR DOC: HCPCS | Performed by: INTERNAL MEDICINE

## 2019-05-23 PROCEDURE — G8417 CALC BMI ABV UP PARAM F/U: HCPCS | Performed by: INTERNAL MEDICINE

## 2019-05-23 PROCEDURE — 4004F PT TOBACCO SCREEN RCVD TLK: CPT | Performed by: INTERNAL MEDICINE

## 2019-05-23 PROCEDURE — G8427 DOCREV CUR MEDS BY ELIG CLIN: HCPCS | Performed by: INTERNAL MEDICINE

## 2019-05-23 ASSESSMENT — ENCOUNTER SYMPTOMS
VOICE CHANGE: 0
SHORTNESS OF BREATH: 1
DIARRHEA: 0
NAUSEA: 0
SORE THROAT: 0
VOMITING: 0
EYE ITCHING: 0
CHEST TIGHTNESS: 0
EYE DISCHARGE: 0
WHEEZING: 1
COUGH: 1
SINUS PRESSURE: 0
RHINORRHEA: 0
ABDOMINAL PAIN: 0
TROUBLE SWALLOWING: 0

## 2019-05-23 NOTE — PROGRESS NOTES
Subjective:     Danielle De Leon is a 57 y.o. female who complains today of:     Chief Complaint   Patient presents with    Follow-up     four month f/u for COPD. HPI  Patient is using Breo ellipta, nebulizer  With duo neb and albuterol HFA  C/o shortness of breath , worse with exertion,climbing stairs. Occasional Wheezing   C/o Cough with white to yellow  Sputum  No Hemoptysis  No Chest tightness   No Chest pain with radiation  or pleuritic pain  No Fever or chills. No Rhinorrhea and postnasal drip. She is smoking 8-10 cigarette a day. She said she is not sleeping well , she is also snoring and always feels tired and going to have sleep study done     Allergies:  Latex;  Influenza vaccines; Eggs or egg-derived products; Gabapentin; Pneumococcal vaccines; Povidone iodine; and Varicella virus vaccine live  Past Medical History:   Diagnosis Date    Anxiety     Back pain     back surgery x 4    Bipolar disorder ([address]     CAD (coronary artery disease)     CAFL (chronic airflow limitation) ([address] 11/3/2016    Cerebral artery occlusion with cerebral infarction (HCC)     tia    Chronic bronchitis (HCC)     Chronic pain     Colitis     DDD (degenerative disc disease), lumbar 2016    Depression     Endometriosis     Excessive caffeine abuse, continuous (HCC) 2018    Gastritis     GERD (gastroesophageal reflux disease)     History of myocardial infarction     HTN (hypertension), benign 2016    Hypokalemia     Impaired mobility and activities of daily living     Left hemiparesis ([address]     Multiple sclerosis ([address]     Neck pain     surgery x 1    Over weight     PTSD (post-traumatic stress disorder)     Sensory neuropathy 2016    TIA (transient ischemic attack)     Tobacco abuse     Vasospastic angina ([address] 2016     Past Surgical History:   Procedure Laterality Date    BACK SURGERY      x2     SECTION      x2    CHOLECYSTECTOMY  15 bipolar disorder. She's also had a presumed diagnosis of possible multiple sclerosis. She's been seeing Dr. Burke Bernheim for that and she says that the diagnosis is sometimes in question and it's clear neuropathy vitamin B12 deficiency as well as generalized bodyaches from the severe vitamin D deficiency have caused this scenario that looks like multiple sclerosis. She recently tried to go back to work but was not able because of her pain. Review of Systems   Constitutional: Negative for chills, diaphoresis, fatigue and fever. HENT: Negative for congestion, mouth sores, nosebleeds, postnasal drip, rhinorrhea, sinus pressure, sneezing, sore throat, trouble swallowing and voice change. Eyes: Negative for discharge, itching and visual disturbance. Respiratory: Positive for cough, shortness of breath and wheezing. Negative for chest tightness. Cardiovascular: Negative for chest pain, palpitations and leg swelling. Gastrointestinal: Negative for abdominal pain, diarrhea, nausea and vomiting. Genitourinary: Negative for difficulty urinating and hematuria. Musculoskeletal: Negative for arthralgias, joint swelling and myalgias. Skin: Negative for rash. Allergic/Immunologic: Negative for environmental allergies and food allergies. Neurological: Negative for dizziness, tremors, weakness and headaches. Psychiatric/Behavioral: Positive for sleep disturbance. Negative for behavioral problems. Objective:     Vitals:    05/23/19 1444   BP: 136/82   Pulse: 79   Resp: 16   Temp: 97.2 °F (36.2 °C)   TempSrc: Tympanic   SpO2: 95%   Weight: 201 lb (91.2 kg)   Height: 5' 7\" (1.702 m)         Physical Exam   Constitutional: She is oriented to person, place, and time. She appears well-developed and well-nourished. No distress. HENT:   Head: Normocephalic and atraumatic. Nose: Nose normal.   Mouth/Throat: Oropharynx is clear and moist.   Eyes: Pupils are equal, round, and reactive to light.  EOM isosorbide mononitrate (IMDUR) 30 MG extended release tablet Take 1 tablet by mouth daily 30 tablet 3    lurasidone (LATUDA) 20 MG TABS tablet TAKE 1 TABLET BY MOUTH EVERY DAY 30 tablet 5    losartan (COZAAR) 100 MG tablet Take 1 tablet by mouth daily 30 tablet 5    magnesium oxide (MAG-OX) 400 (240 Mg) MG tablet Take 1 tablet by mouth daily 30 tablet 5    pantoprazole (PROTONIX) 40 MG tablet Take 1 tablet by mouth every morning (before breakfast) 30 tablet 11    triamterene-hydrochlorothiazide (MAXZIDE-25) 37.5-25 MG per tablet Take 1 tablet by mouth daily 30 tablet 3    FREESTYLE LITE strip USE DAILY AS DIRECTED 50 strip 11    cyanocobalamin 1000 MCG/ML injection INJECT 1ML INTO THE MUSCLE ONCE A MONTH 1 mL 11    Fluticasone Furoate-Vilanterol (BREO ELLIPTA) 200-25 MCG/INH AEPB 1 puff in AM daily. 30 each 3    albuterol sulfate HFA (PROVENTIL HFA) 108 (90 Base) MCG/ACT inhaler Inhale 2 puffs into the lungs every 6 hours as needed for Wheezing 1 Inhaler 3    albuterol (PROVENTIL) (5 MG/ML) 0.5% nebulizer solution Take 0.5 mLs by nebulization every 6 hours as needed for Wheezing 120 each 0    meclizine (ANTIVERT) 25 MG tablet Take 25 mg by mouth 3 times daily as needed      FREESTYLE LANCETS MISC Inject 1 each into the skin daily 100 each 5    ipratropium-albuterol (DUONEB) 0.5-2.5 (3) MG/3ML SOLN nebulizer solution Inhale 3 mLs into the lungs three times daily 360 mL 5    VENTOLIN  (90 Base) MCG/ACT inhaler Inhale 2 puffs into the lungs every 6 hours as needed for Wheezing 1 Inhaler 5    calcium carbonate (TUMS) 500 MG chewable tablet       pregabalin (LYRICA) 50 MG capsule Take 1 capsule by mouth 2 times daily for 15 days. . 30 capsule 1     No current facility-administered medications for this visit. Results for orders placed during the hospital encounter of 11/06/18   XR CHEST STANDARD (2 VW)    Narrative XR CHEST (2 VW): 11/6/2018    CLINICAL HISTORY:  sob .     COMPARISON: 11/5/2018 and 4/15/2016. Upright PA and lateral radiographs of the chest were obtained. FINDINGS:    Mild diffuse coarsening of the bronchovascular structures may be bronchitis and/or COPD, which is best evaluated clinically. There are no focal infiltrates, pleural effusion, cardiomegaly, vascular congestion, pneumothorax, or displaced fractures identified. Lower cervical fusion hardware is noted. Impression FINDINGS SUGGESTIVE OF BRONCHITIS AND/OR COPD, WHICH IS BEST EVALUATED CLINICALLY. NO FOCAL PNEUMONIA, OR OTHER SIGNIFICANT CHANGE FROM PRIOR STUDIES IDENTIFIED.           ]  Results for orders placed during the hospital encounter of 08/07/18   XR CHEST PORTABLE    Narrative EXAMINATION: Portable AP ERECT view of the chest.    CLINICAL HISTORY:  cardiopulmonary evaluation . Chest pain, worse on right side. DATE: 8/7/2018    COMPARISONS: 7/8/2018    FINDINGS: Normal cardiac silhouette. Lungs are clear without consolidation. No pleural effusion or pneumothorax. There are mild degenerative changes in spine. Impression NO ACUTE CARDIOPULMONARY ABNORMALITY. NO CHANGE. Assessment/Plan:     1. Chronic obstructive pulmonary disease, unspecified COPD type ([address]  Patient is using Breo ellipta, nebulizer  With duo neb and albuterol HFAC/o shortness of breath , worse with exertion,climbing stairs. Occasional Wheezing . Continue bronchodilator as before. 2. Tobacco abuse  Patient advised try to quit smoking. Risks related to smoking explained to the patient. Different ways to help him quit smoking were discussed today. 3. Obesity (BMI 30-39. 9)  Patient patient is advised try to lose weight. obesity related risk explained to the patient ,  Current weight:  201 lb (91.2 kg) Lbs. BMI:  Body mass index is 31.48 kg/m².     4. SAMAYOA (dyspnea on exertion)  Patient is having  Chronic shortness of breath which is likely due to COPD, continue  Bronchodilator, O2 as before. keep  Spo2 90% or above.    5. Smoking  Patient advised try to quit smoking. Risks related to smoking explained to the patient. Different ways to help him quit smoking were discussed today. 6.  Sleep apnea, unspecified type  she is not sleeping well and going to have sleep study done , she is snoring and always feels tired. She is going for sleep study. Return in about 4 months (around 9/23/2019) for COPD.       Manuel Lucas MD

## 2019-05-28 RX ORDER — METOPROLOL TARTRATE 100 MG/1
TABLET ORAL
Qty: 60 TABLET | Refills: 11 | Status: SHIPPED | OUTPATIENT
Start: 2019-05-28 | End: 2019-08-06 | Stop reason: ALTCHOICE

## 2019-05-28 RX ORDER — NEEDLES, FILTER 19GX1 1/2"
NEEDLE, DISPOSABLE MISCELLANEOUS
Qty: 25 EACH | Refills: 5 | Status: SHIPPED | OUTPATIENT
Start: 2019-05-28

## 2019-05-28 NOTE — TELEPHONE ENCOUNTER
Pharmacy is requesting medication refill.  Please approve or deny this request.    Rx requested:  Requested Prescriptions     Pending Prescriptions Disp Refills    BD INTEGRA SYRINGE 25G X 1\" 3 ML MISC [Pharmacy Med Name: [address] 3 ML 25GX1\" 25GX1\" SYRG] 25 each 5     Sig: USE AS DIRECTED EVERY MONTH         Last Office Visit:   1/10/2019      Next Visit Date:  Future Appointments   Date Time Provider Vangie Nuñez   9/13/2019  9:30 AM DO ESTEVAN Yanez [facility] S[address] Bocanegra   9/26/2019  3:45 PM [id number] Jennifer Zapien MD [facility]

## 2019-06-30 RX ORDER — FLUTICASONE FUROATE AND VILANTEROL 200; 25 UG/1; UG/1
POWDER RESPIRATORY (INHALATION)
Qty: 30 EACH | Refills: 10 | Status: SHIPPED | OUTPATIENT
Start: 2019-06-30 | End: 2020-01-09 | Stop reason: ALTCHOICE

## 2019-07-12 RX ORDER — FLUTICASONE FUROATE AND VILANTEROL 200; 25 UG/1; UG/1
POWDER RESPIRATORY (INHALATION)
Qty: 30 EACH | Refills: 3 | Status: ON HOLD | OUTPATIENT
Start: 2019-07-12 | End: 2019-08-10 | Stop reason: HOSPADM

## 2019-07-22 DIAGNOSIS — E03.9 HYPOTHYROIDISM, UNSPECIFIED TYPE: ICD-10-CM

## 2019-07-22 RX ORDER — LEVOTHYROXINE SODIUM 0.03 MG/1
TABLET ORAL
Qty: 30 TABLET | Refills: 10 | Status: ON HOLD | OUTPATIENT
Start: 2019-07-22 | End: 2020-12-24

## 2019-07-26 RX ORDER — ISOSORBIDE MONONITRATE 30 MG/1
TABLET, EXTENDED RELEASE ORAL
Qty: 30 TABLET | Refills: 10 | Status: ON HOLD | OUTPATIENT
Start: 2019-07-26 | End: 2019-08-10 | Stop reason: HOSPADM

## 2019-07-31 ENCOUNTER — APPOINTMENT (OUTPATIENT)
Dept: CT IMAGING | Age: 49
End: 2019-07-31
Payer: COMMERCIAL

## 2019-07-31 ENCOUNTER — HOSPITAL ENCOUNTER (EMERGENCY)
Age: 49
Discharge: HOME | End: 2019-07-31
Payer: COMMERCIAL

## 2019-07-31 VITALS
DIASTOLIC BLOOD PRESSURE: 77 MMHG | HEART RATE: 76 BPM | OXYGEN SATURATION: 96 % | TEMPERATURE: 97.7 F | RESPIRATION RATE: 18 BRPM | BODY MASS INDEX: 32.14 KG/M2 | HEIGHT: 66 IN | WEIGHT: 200 LBS | SYSTOLIC BLOOD PRESSURE: 136 MMHG

## 2019-07-31 DIAGNOSIS — R11.2 NAUSEA VOMITING AND DIARRHEA: ICD-10-CM

## 2019-07-31 DIAGNOSIS — R19.7 NAUSEA VOMITING AND DIARRHEA: ICD-10-CM

## 2019-07-31 DIAGNOSIS — N30.00 ACUTE CYSTITIS WITHOUT HEMATURIA: Primary | ICD-10-CM

## 2019-07-31 LAB
ALBUMIN SERPL-MCNC: 4.2 G/DL (ref 3.5–4.6)
ALP BLD-CCNC: 124 U/L (ref 40–130)
ALT SERPL-CCNC: 51 U/L (ref 0–33)
ANION GAP SERPL CALCULATED.3IONS-SCNC: 13 MEQ/L (ref 9–15)
AST SERPL-CCNC: 28 U/L (ref 0–35)
BACTERIA: ABNORMAL /HPF
BASOPHILS ABSOLUTE: 0 K/UL (ref 0–0.2)
BASOPHILS RELATIVE PERCENT: 0.4 %
BILIRUB SERPL-MCNC: 0.5 MG/DL (ref 0.2–0.7)
BILIRUBIN URINE: NEGATIVE
BLOOD, URINE: NEGATIVE
BUN BLDV-MCNC: 7 MG/DL (ref 6–20)
CALCIUM SERPL-MCNC: 9.1 MG/DL (ref 8.5–9.9)
CHLORIDE BLD-SCNC: 107 MEQ/L (ref 95–107)
CLARITY: ABNORMAL
CO2: 22 MEQ/L (ref 20–31)
COLOR: YELLOW
CREAT SERPL-MCNC: 1 MG/DL (ref 0.5–0.9)
EOSINOPHILS ABSOLUTE: 0.1 K/UL (ref 0–0.7)
EOSINOPHILS RELATIVE PERCENT: 0.8 %
EPITHELIAL CELLS, UA: ABNORMAL /HPF (ref 0–5)
GFR AFRICAN AMERICAN: >60
GFR NON-AFRICAN AMERICAN: 59.4
GLOBULIN: 2.8 G/DL (ref 2.3–3.5)
GLUCOSE BLD-MCNC: 167 MG/DL (ref 70–99)
GLUCOSE URINE: NEGATIVE MG/DL
HCT VFR BLD CALC: 42.9 % (ref 37–47)
HEMOGLOBIN: 15.4 G/DL (ref 12–16)
HYALINE CASTS: ABNORMAL /HPF (ref 0–5)
KETONES, URINE: NEGATIVE MG/DL
LACTIC ACID: 2.3 MMOL/L (ref 0.5–2.2)
LEUKOCYTE ESTERASE, URINE: ABNORMAL
LIPASE: 39 U/L (ref 12–95)
LYMPHOCYTES ABSOLUTE: 1.6 K/UL (ref 1–4.8)
LYMPHOCYTES RELATIVE PERCENT: 17 %
MCH RBC QN AUTO: 33.2 PG (ref 27–31.3)
MCHC RBC AUTO-ENTMCNC: 35.8 % (ref 33–37)
MCV RBC AUTO: 92.8 FL (ref 82–100)
MONOCYTES ABSOLUTE: 0.6 K/UL (ref 0.2–0.8)
MONOCYTES RELATIVE PERCENT: 6.3 %
NEUTROPHILS ABSOLUTE: 7 K/UL (ref 1.4–6.5)
NEUTROPHILS RELATIVE PERCENT: 75.5 %
NITRITE, URINE: POSITIVE
PDW BLD-RTO: 13.7 % (ref 11.5–14.5)
PH UA: 5.5 (ref 5–9)
PLATELET # BLD: 161 K/UL (ref 130–400)
POTASSIUM SERPL-SCNC: 3 MEQ/L (ref 3.4–4.9)
PROTEIN UA: NEGATIVE MG/DL
RBC # BLD: 4.62 M/UL (ref 4.2–5.4)
RBC UA: ABNORMAL /HPF (ref 0–5)
SODIUM BLD-SCNC: 142 MEQ/L (ref 135–144)
SPECIFIC GRAVITY UA: 1.05 (ref 1–1.03)
TOTAL PROTEIN: 7 G/DL (ref 6.3–8)
URINE REFLEX TO CULTURE: YES
UROBILINOGEN, URINE: 0.2 E.U./DL
WBC # BLD: 9.3 K/UL (ref 4.8–10.8)
WBC UA: ABNORMAL /HPF (ref 0–5)

## 2019-07-31 PROCEDURE — 96365 THER/PROPH/DIAG IV INF INIT: CPT

## 2019-07-31 PROCEDURE — 85025 COMPLETE CBC W/AUTO DIFF WBC: CPT

## 2019-07-31 PROCEDURE — 6360000004 HC RX CONTRAST MEDICATION: Performed by: PHYSICIAN ASSISTANT

## 2019-07-31 PROCEDURE — 81001 URINALYSIS AUTO W/SCOPE: CPT

## 2019-07-31 PROCEDURE — 99284 EMERGENCY DEPT VISIT MOD MDM: CPT

## 2019-07-31 PROCEDURE — 6360000002 HC RX W HCPCS: Performed by: PHYSICIAN ASSISTANT

## 2019-07-31 PROCEDURE — 6370000000 HC RX 637 (ALT 250 FOR IP): Performed by: PHYSICIAN ASSISTANT

## 2019-07-31 PROCEDURE — 87086 URINE CULTURE/COLONY COUNT: CPT

## 2019-07-31 PROCEDURE — 96375 TX/PRO/DX INJ NEW DRUG ADDON: CPT

## 2019-07-31 PROCEDURE — 74177 CT ABD & PELVIS W/CONTRAST: CPT

## 2019-07-31 PROCEDURE — 80053 COMPREHEN METABOLIC PANEL: CPT

## 2019-07-31 PROCEDURE — 36415 COLL VENOUS BLD VENIPUNCTURE: CPT

## 2019-07-31 PROCEDURE — 83605 ASSAY OF LACTIC ACID: CPT

## 2019-07-31 PROCEDURE — 2580000003 HC RX 258: Performed by: PHYSICIAN ASSISTANT

## 2019-07-31 PROCEDURE — 83690 ASSAY OF LIPASE: CPT

## 2019-07-31 PROCEDURE — 96372 THER/PROPH/DIAG INJ SC/IM: CPT

## 2019-07-31 PROCEDURE — 2500000003 HC RX 250 WO HCPCS: Performed by: PHYSICIAN ASSISTANT

## 2019-07-31 RX ORDER — CEPHALEXIN 500 MG/1
500 CAPSULE ORAL 3 TIMES DAILY
Qty: 21 CAPSULE | Refills: 0 | Status: ON HOLD | OUTPATIENT
Start: 2019-07-31 | End: 2019-08-10 | Stop reason: HOSPADM

## 2019-07-31 RX ORDER — METOCLOPRAMIDE HYDROCHLORIDE 5 MG/ML
10 INJECTION INTRAMUSCULAR; INTRAVENOUS ONCE
Status: COMPLETED | OUTPATIENT
Start: 2019-07-31 | End: 2019-07-31

## 2019-07-31 RX ORDER — ONDANSETRON 4 MG/1
4 TABLET, ORALLY DISINTEGRATING ORAL EVERY 8 HOURS PRN
Qty: 20 TABLET | Refills: 0 | Status: SHIPPED | OUTPATIENT
Start: 2019-07-31 | End: 2020-03-01

## 2019-07-31 RX ORDER — DIPHENHYDRAMINE HYDROCHLORIDE 50 MG/ML
25 INJECTION INTRAMUSCULAR; INTRAVENOUS ONCE
Status: COMPLETED | OUTPATIENT
Start: 2019-07-31 | End: 2019-07-31

## 2019-07-31 RX ORDER — DICYCLOMINE HYDROCHLORIDE 10 MG/1
10 CAPSULE ORAL EVERY 6 HOURS PRN
Qty: 20 CAPSULE | Refills: 0 | Status: SHIPPED | OUTPATIENT
Start: 2019-07-31 | End: 2020-03-01

## 2019-07-31 RX ORDER — DICYCLOMINE HYDROCHLORIDE 10 MG/ML
20 INJECTION INTRAMUSCULAR ONCE
Status: COMPLETED | OUTPATIENT
Start: 2019-07-31 | End: 2019-07-31

## 2019-07-31 RX ORDER — 0.9 % SODIUM CHLORIDE 0.9 %
1000 INTRAVENOUS SOLUTION INTRAVENOUS ONCE
Status: COMPLETED | OUTPATIENT
Start: 2019-07-31 | End: 2019-07-31

## 2019-07-31 RX ORDER — POTASSIUM BICARBONATE 25 MEQ/1
50 TABLET, EFFERVESCENT ORAL ONCE
Status: COMPLETED | OUTPATIENT
Start: 2019-07-31 | End: 2019-07-31

## 2019-07-31 RX ORDER — SODIUM CHLORIDE 0.9 % (FLUSH) 0.9 %
10 SYRINGE (ML) INJECTION ONCE
Status: DISCONTINUED | OUTPATIENT
Start: 2019-07-31 | End: 2019-07-31 | Stop reason: HOSPADM

## 2019-07-31 RX ADMIN — POTASSIUM BICARBONATE 50 MEQ: 978 TABLET, EFFERVESCENT ORAL at 19:24

## 2019-07-31 RX ADMIN — METOCLOPRAMIDE 10 MG: 5 INJECTION, SOLUTION INTRAMUSCULAR; INTRAVENOUS at 16:29

## 2019-07-31 RX ADMIN — DIPHENHYDRAMINE HYDROCHLORIDE 25 MG: 50 INJECTION, SOLUTION INTRAMUSCULAR; INTRAVENOUS at 16:28

## 2019-07-31 RX ADMIN — SODIUM CHLORIDE 1000 ML: 9 INJECTION, SOLUTION INTRAVENOUS at 16:27

## 2019-07-31 RX ADMIN — FAMOTIDINE 20 MG: 10 INJECTION, SOLUTION INTRAVENOUS at 16:28

## 2019-07-31 RX ADMIN — DICYCLOMINE HYDROCHLORIDE 20 MG: 20 INJECTION, SOLUTION INTRAMUSCULAR at 16:27

## 2019-07-31 RX ADMIN — IOPAMIDOL 100 ML: 612 INJECTION, SOLUTION INTRAVENOUS at 17:33

## 2019-07-31 RX ADMIN — CEFTRIAXONE SODIUM 1 G: 1 INJECTION, POWDER, FOR SOLUTION INTRAMUSCULAR; INTRAVENOUS at 19:24

## 2019-07-31 ASSESSMENT — PAIN DESCRIPTION - ORIENTATION: ORIENTATION: RIGHT;LEFT

## 2019-07-31 ASSESSMENT — ENCOUNTER SYMPTOMS
ALLERGIC/IMMUNOLOGIC NEGATIVE: 1
COLOR CHANGE: 0
EYE PAIN: 0
APNEA: 0
DIARRHEA: 1
VOMITING: 1
ABDOMINAL PAIN: 1
NAUSEA: 1
TROUBLE SWALLOWING: 0
SHORTNESS OF BREATH: 0

## 2019-07-31 ASSESSMENT — PAIN DESCRIPTION - PAIN TYPE: TYPE: ACUTE PAIN

## 2019-07-31 ASSESSMENT — PAIN DESCRIPTION - LOCATION: LOCATION: ABDOMEN;HEAD

## 2019-07-31 ASSESSMENT — PAIN DESCRIPTION - ONSET: ONSET: ON-GOING

## 2019-07-31 ASSESSMENT — PAIN DESCRIPTION - DESCRIPTORS: DESCRIPTORS: ACHING

## 2019-07-31 ASSESSMENT — PAIN DESCRIPTION - FREQUENCY: FREQUENCY: INTERMITTENT

## 2019-07-31 ASSESSMENT — PAIN SCALES - GENERAL: PAINLEVEL_OUTOF10: 8

## 2019-07-31 ASSESSMENT — PAIN DESCRIPTION - PROGRESSION: CLINICAL_PROGRESSION: GRADUALLY WORSENING

## 2019-07-31 NOTE — ED PROVIDER NOTES
strain: None    Food insecurity:     Worry: None     Inability: None    Transportation needs:     Medical: None     Non-medical: None   Tobacco Use    Smoking status: Current Some Day Smoker     Packs/day: 0.50     Years: 10.00     Pack years: 5.00     Types: Cigarettes    Smokeless tobacco: Never Used    Tobacco comment: pt trying Chantix   Substance and Sexual Activity    Alcohol use: No    Drug use: No    Sexual activity: None   Lifestyle    Physical activity:     Days per week: None     Minutes per session: None    Stress: None   Relationships    Social connections:     Talks on phone: None     Gets together: None     Attends [employer] service: None     Active member of club or organization: None     Attends meetings of clubs or organizations: None     Relationship status: None    Intimate partner violence:     Fear of current or ex partner: None     Emotionally abused: None     Physically abused: None     Forced sexual activity: None   Other Topics Concern    None   Social History Narrative    Tuan Miller is a 68-year-old female who is on disability because of pain and bipolar disorder. She's also had a presumed diagnosis of possible multiple sclerosis. She's been seeing Dr. Alf Moise for that and she says that the diagnosis is sometimes in question and it's clear neuropathy vitamin B12 deficiency as well as generalized bodyaches from the severe vitamin D deficiency have caused this scenario that looks like multiple sclerosis. She recently tried to go back to work but was not able because of her pain. SCREENINGS           PHYSICAL EXAM    (up to 7 forlevel 4, 8 or more for level 5)     ED Triage Vitals [07/31/19 1543]   BP Temp Temp Source Pulse Resp SpO2 Height Weight   (!) 157/83 98.5 °F (36.9 °C) Oral 103 20 97 % 5' 6\" (1.676 m) 200 lb (90.7 kg)       Physical Exam   Constitutional: She is oriented to person, place, and time. She appears well-developed and well-nourished. No distress.

## 2019-08-01 LAB
GFR AFRICAN AMERICAN: >60
GFR NON-AFRICAN AMERICAN: >60
PERFORMED ON: NORMAL
POC CREATININE: 0.9 MG/DL (ref 0.6–1.1)
POC SAMPLE TYPE: NORMAL

## 2019-08-02 LAB — URINE CULTURE, ROUTINE: NORMAL

## 2019-08-06 ENCOUNTER — APPOINTMENT (OUTPATIENT)
Dept: CT IMAGING | Age: 49
DRG: 054 | End: 2019-08-06
Payer: COMMERCIAL

## 2019-08-06 ENCOUNTER — APPOINTMENT (OUTPATIENT)
Dept: GENERAL RADIOLOGY | Age: 49
DRG: 054 | End: 2019-08-06
Payer: COMMERCIAL

## 2019-08-06 ENCOUNTER — HOSPITAL ENCOUNTER (INPATIENT)
Age: 49
LOS: 4 days | Discharge: HOME HEALTH | DRG: 054 | End: 2019-08-10
Attending: INTERNAL MEDICINE | Admitting: INTERNAL MEDICINE
Payer: COMMERCIAL

## 2019-08-06 DIAGNOSIS — I63.9 CEREBROVASCULAR ACCIDENT (CVA), UNSPECIFIED MECHANISM (HCC): ICD-10-CM

## 2019-08-06 DIAGNOSIS — R07.9 CHEST PAIN, UNSPECIFIED TYPE: Primary | ICD-10-CM

## 2019-08-06 LAB
ALBUMIN SERPL-MCNC: 4.1 G/DL (ref 3.5–4.6)
ALP BLD-CCNC: 129 U/L (ref 40–130)
ALT SERPL-CCNC: 58 U/L (ref 0–33)
AMPHETAMINE SCREEN, URINE: NORMAL
ANION GAP SERPL CALCULATED.3IONS-SCNC: 15 MEQ/L (ref 9–15)
AST SERPL-CCNC: 41 U/L (ref 0–35)
BACTERIA: ABNORMAL /HPF
BARBITURATE SCREEN URINE: NORMAL
BASOPHILS ABSOLUTE: 0.1 K/UL (ref 0–0.2)
BASOPHILS RELATIVE PERCENT: 1.1 %
BENZODIAZEPINE SCREEN, URINE: NORMAL
BILIRUB SERPL-MCNC: 0.4 MG/DL (ref 0.2–0.7)
BILIRUBIN URINE: NEGATIVE
BLOOD, URINE: NEGATIVE
BUN BLDV-MCNC: 7 MG/DL (ref 6–20)
CALCIUM SERPL-MCNC: 9 MG/DL (ref 8.5–9.9)
CANNABINOID SCREEN URINE: NORMAL
CHLORIDE BLD-SCNC: 102 MEQ/L (ref 95–107)
CLARITY: ABNORMAL
CO2: 25 MEQ/L (ref 20–31)
COCAINE METABOLITE SCREEN URINE: NORMAL
COLOR: YELLOW
CREAT SERPL-MCNC: 0.99 MG/DL (ref 0.5–0.9)
EKG ATRIAL RATE: 89 BPM
EKG P AXIS: 68 DEGREES
EKG P-R INTERVAL: 154 MS
EKG Q-T INTERVAL: 362 MS
EKG QRS DURATION: 96 MS
EKG QTC CALCULATION (BAZETT): 440 MS
EKG R AXIS: 63 DEGREES
EKG T AXIS: 268 DEGREES
EKG VENTRICULAR RATE: 89 BPM
EOSINOPHILS ABSOLUTE: 0.1 K/UL (ref 0–0.7)
EOSINOPHILS RELATIVE PERCENT: 1.1 %
EPITHELIAL CELLS, UA: ABNORMAL /HPF (ref 0–5)
GFR AFRICAN AMERICAN: >60
GFR NON-AFRICAN AMERICAN: >60
GLOBULIN: 2.7 G/DL (ref 2.3–3.5)
GLUCOSE BLD-MCNC: 128 MG/DL (ref 70–99)
GLUCOSE URINE: NEGATIVE MG/DL
HCT VFR BLD CALC: 42.1 % (ref 37–47)
HEMOGLOBIN: 15.1 G/DL (ref 12–16)
HYALINE CASTS: ABNORMAL /HPF (ref 0–5)
KETONES, URINE: NEGATIVE MG/DL
LEUKOCYTE ESTERASE, URINE: ABNORMAL
LIPASE: 54 U/L (ref 12–95)
LYMPHOCYTES ABSOLUTE: 1.7 K/UL (ref 1–4.8)
LYMPHOCYTES RELATIVE PERCENT: 18.2 %
Lab: NORMAL
MAGNESIUM: 2.2 MG/DL (ref 1.7–2.4)
MCH RBC QN AUTO: 33.1 PG (ref 27–31.3)
MCHC RBC AUTO-ENTMCNC: 35.9 % (ref 33–37)
MCV RBC AUTO: 92.3 FL (ref 82–100)
MONOCYTES ABSOLUTE: 0.6 K/UL (ref 0.2–0.8)
MONOCYTES RELATIVE PERCENT: 6.4 %
NEUTROPHILS ABSOLUTE: 6.9 K/UL (ref 1.4–6.5)
NEUTROPHILS RELATIVE PERCENT: 73.2 %
NITRITE, URINE: NEGATIVE
OPIATE SCREEN URINE: NORMAL
PDW BLD-RTO: 13.3 % (ref 11.5–14.5)
PH UA: 6 (ref 5–9)
PHENCYCLIDINE SCREEN URINE: NORMAL
PLATELET # BLD: 169 K/UL (ref 130–400)
POTASSIUM SERPL-SCNC: 3.5 MEQ/L (ref 3.4–4.9)
PROTEIN UA: NEGATIVE MG/DL
RBC # BLD: 4.56 M/UL (ref 4.2–5.4)
RBC UA: ABNORMAL /HPF (ref 0–5)
SODIUM BLD-SCNC: 142 MEQ/L (ref 135–144)
SPECIFIC GRAVITY UA: 1.01 (ref 1–1.03)
TOTAL CK: 79 U/L (ref 0–170)
TOTAL PROTEIN: 6.8 G/DL (ref 6.3–8)
TROPONIN: <0.01 NG/ML (ref 0–0.01)
URINE REFLEX TO CULTURE: YES
UROBILINOGEN, URINE: 0.2 E.U./DL
WBC # BLD: 9.4 K/UL (ref 4.8–10.8)
WBC UA: ABNORMAL /HPF (ref 0–5)

## 2019-08-06 PROCEDURE — 6360000002 HC RX W HCPCS: Performed by: PHYSICIAN ASSISTANT

## 2019-08-06 PROCEDURE — 71275 CT ANGIOGRAPHY CHEST: CPT

## 2019-08-06 PROCEDURE — 99285 EMERGENCY DEPT VISIT HI MDM: CPT

## 2019-08-06 PROCEDURE — 6360000004 HC RX CONTRAST MEDICATION: Performed by: PHYSICIAN ASSISTANT

## 2019-08-06 PROCEDURE — 80053 COMPREHEN METABOLIC PANEL: CPT

## 2019-08-06 PROCEDURE — 1210000000 HC MED SURG R&B

## 2019-08-06 PROCEDURE — 6370000000 HC RX 637 (ALT 250 FOR IP): Performed by: PHYSICIAN ASSISTANT

## 2019-08-06 PROCEDURE — 80307 DRUG TEST PRSMV CHEM ANLYZR: CPT

## 2019-08-06 PROCEDURE — 85025 COMPLETE CBC W/AUTO DIFF WBC: CPT

## 2019-08-06 PROCEDURE — 71045 X-RAY EXAM CHEST 1 VIEW: CPT

## 2019-08-06 PROCEDURE — 87086 URINE CULTURE/COLONY COUNT: CPT

## 2019-08-06 PROCEDURE — 83690 ASSAY OF LIPASE: CPT

## 2019-08-06 PROCEDURE — 36415 COLL VENOUS BLD VENIPUNCTURE: CPT

## 2019-08-06 PROCEDURE — 70450 CT HEAD/BRAIN W/O DYE: CPT

## 2019-08-06 PROCEDURE — 96375 TX/PRO/DX INJ NEW DRUG ADDON: CPT

## 2019-08-06 PROCEDURE — 84484 ASSAY OF TROPONIN QUANT: CPT

## 2019-08-06 PROCEDURE — 82550 ASSAY OF CK (CPK): CPT

## 2019-08-06 PROCEDURE — 81001 URINALYSIS AUTO W/SCOPE: CPT

## 2019-08-06 PROCEDURE — G0378 HOSPITAL OBSERVATION PER HR: HCPCS

## 2019-08-06 PROCEDURE — 93005 ELECTROCARDIOGRAM TRACING: CPT | Performed by: PHYSICIAN ASSISTANT

## 2019-08-06 PROCEDURE — 96374 THER/PROPH/DIAG INJ IV PUSH: CPT

## 2019-08-06 PROCEDURE — 83735 ASSAY OF MAGNESIUM: CPT

## 2019-08-06 RX ORDER — ALPRAZOLAM 0.5 MG/1
0.5 TABLET ORAL 2 TIMES DAILY
Status: ON HOLD | COMMUNITY
Start: 2019-04-09 | End: 2021-01-06 | Stop reason: HOSPADM

## 2019-08-06 RX ORDER — MORPHINE SULFATE 2 MG/ML
2 INJECTION, SOLUTION INTRAMUSCULAR; INTRAVENOUS ONCE
Status: COMPLETED | OUTPATIENT
Start: 2019-08-06 | End: 2019-08-06

## 2019-08-06 RX ORDER — ASPIRIN 81 MG/1
324 TABLET, CHEWABLE ORAL ONCE
Status: COMPLETED | OUTPATIENT
Start: 2019-08-06 | End: 2019-08-06

## 2019-08-06 RX ORDER — ONDANSETRON 2 MG/ML
4 INJECTION INTRAMUSCULAR; INTRAVENOUS ONCE
Status: COMPLETED | OUTPATIENT
Start: 2019-08-06 | End: 2019-08-06

## 2019-08-06 RX ORDER — NITROGLYCERIN 0.4 MG/1
0.4 TABLET SUBLINGUAL EVERY 5 MIN PRN
Status: DISCONTINUED | OUTPATIENT
Start: 2019-08-06 | End: 2019-08-07 | Stop reason: SDUPTHER

## 2019-08-06 RX ADMIN — NITROGLYCERIN 0.4 MG: 0.4 TABLET, ORALLY DISINTEGRATING SUBLINGUAL at 22:15

## 2019-08-06 RX ADMIN — MORPHINE SULFATE 2 MG: 2 INJECTION, SOLUTION INTRAMUSCULAR; INTRAVENOUS at 20:52

## 2019-08-06 RX ADMIN — IOPAMIDOL 100 ML: 612 INJECTION, SOLUTION INTRAVENOUS at 21:12

## 2019-08-06 RX ADMIN — ASPIRIN 81 MG 324 MG: 81 TABLET ORAL at 21:53

## 2019-08-06 RX ADMIN — NITROGLYCERIN 0.4 MG: 0.4 TABLET, ORALLY DISINTEGRATING SUBLINGUAL at 21:53

## 2019-08-06 RX ADMIN — ONDANSETRON 4 MG: 2 INJECTION INTRAMUSCULAR; INTRAVENOUS at 20:52

## 2019-08-06 RX ADMIN — NITROGLYCERIN 0.4 MG: 0.4 TABLET, ORALLY DISINTEGRATING SUBLINGUAL at 22:21

## 2019-08-06 ASSESSMENT — PAIN DESCRIPTION - FREQUENCY
FREQUENCY: CONTINUOUS

## 2019-08-06 ASSESSMENT — ENCOUNTER SYMPTOMS
EYE DISCHARGE: 0
ABDOMINAL DISTENTION: 0
COUGH: 0
PHOTOPHOBIA: 0
SHORTNESS OF BREATH: 0
APNEA: 0
VOMITING: 1
TROUBLE SWALLOWING: 0
NAUSEA: 1
ABDOMINAL PAIN: 1
VOICE CHANGE: 0
BLOOD IN STOOL: 0
ANAL BLEEDING: 0

## 2019-08-06 ASSESSMENT — PAIN SCALES - GENERAL
PAINLEVEL_OUTOF10: 8
PAINLEVEL_OUTOF10: 5
PAINLEVEL_OUTOF10: 2

## 2019-08-06 ASSESSMENT — PAIN DESCRIPTION - ORIENTATION
ORIENTATION: LEFT
ORIENTATION: MID

## 2019-08-06 ASSESSMENT — PAIN DESCRIPTION - PAIN TYPE
TYPE: ACUTE PAIN

## 2019-08-06 ASSESSMENT — PAIN DESCRIPTION - DESCRIPTORS
DESCRIPTORS: ACHING
DESCRIPTORS: CONSTANT;ACHING
DESCRIPTORS: ACHING

## 2019-08-06 ASSESSMENT — PAIN DESCRIPTION - LOCATION
LOCATION: CHEST

## 2019-08-07 ENCOUNTER — APPOINTMENT (OUTPATIENT)
Dept: ULTRASOUND IMAGING | Age: 49
DRG: 054 | End: 2019-08-07
Payer: COMMERCIAL

## 2019-08-07 ENCOUNTER — APPOINTMENT (OUTPATIENT)
Dept: MRI IMAGING | Age: 49
DRG: 054 | End: 2019-08-07
Payer: COMMERCIAL

## 2019-08-07 LAB
ANION GAP SERPL CALCULATED.3IONS-SCNC: 12 MEQ/L (ref 9–15)
BUN BLDV-MCNC: 10 MG/DL (ref 6–20)
CALCIUM SERPL-MCNC: 8.9 MG/DL (ref 8.5–9.9)
CHLORIDE BLD-SCNC: 107 MEQ/L (ref 95–107)
CHOLESTEROL, TOTAL: 139 MG/DL (ref 0–199)
CO2: 26 MEQ/L (ref 20–31)
CREAT SERPL-MCNC: 0.96 MG/DL (ref 0.5–0.9)
GFR AFRICAN AMERICAN: >60
GFR NON-AFRICAN AMERICAN: >60
GLUCOSE BLD-MCNC: 148 MG/DL (ref 70–99)
HBA1C MFR BLD: 5.9 % (ref 4.8–5.9)
HCT VFR BLD CALC: 41.1 % (ref 37–47)
HDLC SERPL-MCNC: 27 MG/DL (ref 40–59)
HEMOGLOBIN: 14.7 G/DL (ref 12–16)
LDL CHOLESTEROL CALCULATED: 72 MG/DL (ref 0–129)
LITHIUM LEVEL: <0.1 MEQ/L (ref 0.6–1.2)
MAGNESIUM: 2.4 MG/DL (ref 1.7–2.4)
MCH RBC QN AUTO: 33.3 PG (ref 27–31.3)
MCHC RBC AUTO-ENTMCNC: 35.7 % (ref 33–37)
MCV RBC AUTO: 93.4 FL (ref 82–100)
PDW BLD-RTO: 13.3 % (ref 11.5–14.5)
PLATELET # BLD: 156 K/UL (ref 130–400)
POTASSIUM REFLEX MAGNESIUM: 3.2 MEQ/L (ref 3.4–4.9)
RBC # BLD: 4.4 M/UL (ref 4.2–5.4)
SODIUM BLD-SCNC: 145 MEQ/L (ref 135–144)
TRIGL SERPL-MCNC: 199 MG/DL (ref 0–150)
TROPONIN: <0.01 NG/ML (ref 0–0.01)
WBC # BLD: 6.6 K/UL (ref 4.8–10.8)

## 2019-08-07 PROCEDURE — 92523 SPEECH SOUND LANG COMPREHEN: CPT

## 2019-08-07 PROCEDURE — 93010 ELECTROCARDIOGRAM REPORT: CPT | Performed by: INTERNAL MEDICINE

## 2019-08-07 PROCEDURE — 6360000002 HC RX W HCPCS

## 2019-08-07 PROCEDURE — 6360000002 HC RX W HCPCS: Performed by: INTERNAL MEDICINE

## 2019-08-07 PROCEDURE — 82607 VITAMIN B-12: CPT

## 2019-08-07 PROCEDURE — 97166 OT EVAL MOD COMPLEX 45 MIN: CPT

## 2019-08-07 PROCEDURE — 70551 MRI BRAIN STEM W/O DYE: CPT

## 2019-08-07 PROCEDURE — 1210000000 HC MED SURG R&B

## 2019-08-07 PROCEDURE — 96372 THER/PROPH/DIAG INJ SC/IM: CPT

## 2019-08-07 PROCEDURE — 36415 COLL VENOUS BLD VENIPUNCTURE: CPT

## 2019-08-07 PROCEDURE — 6370000000 HC RX 637 (ALT 250 FOR IP): Performed by: INTERNAL MEDICINE

## 2019-08-07 PROCEDURE — 70544 MR ANGIOGRAPHY HEAD W/O DYE: CPT

## 2019-08-07 PROCEDURE — 97162 PT EVAL MOD COMPLEX 30 MIN: CPT

## 2019-08-07 PROCEDURE — 83735 ASSAY OF MAGNESIUM: CPT

## 2019-08-07 PROCEDURE — 80061 LIPID PANEL: CPT

## 2019-08-07 PROCEDURE — 80178 ASSAY OF LITHIUM: CPT

## 2019-08-07 PROCEDURE — 83036 HEMOGLOBIN GLYCOSYLATED A1C: CPT

## 2019-08-07 PROCEDURE — 82746 ASSAY OF FOLIC ACID SERUM: CPT

## 2019-08-07 PROCEDURE — 93880 EXTRACRANIAL BILAT STUDY: CPT

## 2019-08-07 PROCEDURE — 94664 DEMO&/EVAL PT USE INHALER: CPT

## 2019-08-07 PROCEDURE — G0378 HOSPITAL OBSERVATION PER HR: HCPCS

## 2019-08-07 PROCEDURE — 92610 EVALUATE SWALLOWING FUNCTION: CPT

## 2019-08-07 PROCEDURE — 94640 AIRWAY INHALATION TREATMENT: CPT

## 2019-08-07 PROCEDURE — 85027 COMPLETE CBC AUTOMATED: CPT

## 2019-08-07 PROCEDURE — 94760 N-INVAS EAR/PLS OXIMETRY 1: CPT

## 2019-08-07 PROCEDURE — 93308 TTE F-UP OR LMTD: CPT

## 2019-08-07 PROCEDURE — 99219 PR INITIAL OBSERVATION CARE/DAY 50 MINUTES: CPT | Performed by: INTERNAL MEDICINE

## 2019-08-07 PROCEDURE — 84484 ASSAY OF TROPONIN QUANT: CPT

## 2019-08-07 PROCEDURE — 80048 BASIC METABOLIC PNL TOTAL CA: CPT

## 2019-08-07 RX ORDER — LABETALOL 20 MG/4 ML (5 MG/ML) INTRAVENOUS SYRINGE
10 EVERY 10 MIN PRN
Status: DISCONTINUED | OUTPATIENT
Start: 2019-08-07 | End: 2019-08-10 | Stop reason: HOSPADM

## 2019-08-07 RX ORDER — ALBUTEROL SULFATE 2.5 MG/3ML
2.5 SOLUTION RESPIRATORY (INHALATION) 3 TIMES DAILY
Status: DISCONTINUED | OUTPATIENT
Start: 2019-08-07 | End: 2019-08-10 | Stop reason: HOSPADM

## 2019-08-07 RX ORDER — DICYCLOMINE HYDROCHLORIDE 10 MG/1
10 CAPSULE ORAL EVERY 6 HOURS PRN
Status: DISCONTINUED | OUTPATIENT
Start: 2019-08-07 | End: 2019-08-10 | Stop reason: HOSPADM

## 2019-08-07 RX ORDER — ONDANSETRON 2 MG/ML
4 INJECTION INTRAMUSCULAR; INTRAVENOUS EVERY 6 HOURS PRN
Status: DISCONTINUED | OUTPATIENT
Start: 2019-08-07 | End: 2019-08-10 | Stop reason: HOSPADM

## 2019-08-07 RX ORDER — ALBUTEROL SULFATE 2.5 MG/3ML
SOLUTION RESPIRATORY (INHALATION)
Status: COMPLETED
Start: 2019-08-07 | End: 2019-08-07

## 2019-08-07 RX ORDER — ATORVASTATIN CALCIUM 40 MG/1
40 TABLET, FILM COATED ORAL NIGHTLY
Status: DISCONTINUED | OUTPATIENT
Start: 2019-08-07 | End: 2019-08-10 | Stop reason: HOSPADM

## 2019-08-07 RX ORDER — ISOSORBIDE MONONITRATE 30 MG/1
30 TABLET, EXTENDED RELEASE ORAL DAILY
Status: DISCONTINUED | OUTPATIENT
Start: 2019-08-07 | End: 2019-08-10 | Stop reason: HOSPADM

## 2019-08-07 RX ORDER — ACETAMINOPHEN 325 MG/1
650 TABLET ORAL EVERY 6 HOURS PRN
Status: DISCONTINUED | OUTPATIENT
Start: 2019-08-07 | End: 2019-08-10 | Stop reason: HOSPADM

## 2019-08-07 RX ORDER — BUDESONIDE 0.5 MG/2ML
0.5 INHALANT ORAL 2 TIMES DAILY
Status: DISCONTINUED | OUTPATIENT
Start: 2019-08-07 | End: 2019-08-10 | Stop reason: HOSPADM

## 2019-08-07 RX ORDER — LANOLIN ALCOHOL/MO/W.PET/CERES
400 CREAM (GRAM) TOPICAL DAILY
Status: DISCONTINUED | OUTPATIENT
Start: 2019-08-07 | End: 2019-08-07 | Stop reason: CLARIF

## 2019-08-07 RX ORDER — CYCLOBENZAPRINE HCL 10 MG
10 TABLET ORAL 2 TIMES DAILY PRN
Status: DISCONTINUED | OUTPATIENT
Start: 2019-08-07 | End: 2019-08-10 | Stop reason: HOSPADM

## 2019-08-07 RX ORDER — ASPIRIN 81 MG/1
81 TABLET ORAL DAILY
Status: DISCONTINUED | OUTPATIENT
Start: 2019-08-07 | End: 2019-08-07 | Stop reason: ALTCHOICE

## 2019-08-07 RX ORDER — NICOTINE 21 MG/24HR
1 PATCH, TRANSDERMAL 24 HOURS TRANSDERMAL DAILY
Status: DISCONTINUED | OUTPATIENT
Start: 2019-08-08 | End: 2019-08-10 | Stop reason: HOSPADM

## 2019-08-07 RX ORDER — ASPIRIN 81 MG/1
81 TABLET, CHEWABLE ORAL DAILY
Status: DISCONTINUED | OUTPATIENT
Start: 2019-08-07 | End: 2019-08-10 | Stop reason: HOSPADM

## 2019-08-07 RX ORDER — POTASSIUM CHLORIDE 20 MEQ/1
40 TABLET, EXTENDED RELEASE ORAL ONCE
Status: COMPLETED | OUTPATIENT
Start: 2019-08-07 | End: 2019-08-07

## 2019-08-07 RX ORDER — ASPIRIN 300 MG/1
300 SUPPOSITORY RECTAL DAILY
Status: DISCONTINUED | OUTPATIENT
Start: 2019-08-07 | End: 2019-08-07

## 2019-08-07 RX ORDER — LEVOTHYROXINE SODIUM 0.03 MG/1
25 TABLET ORAL DAILY
Status: DISCONTINUED | OUTPATIENT
Start: 2019-08-07 | End: 2019-08-10 | Stop reason: HOSPADM

## 2019-08-07 RX ORDER — PANTOPRAZOLE SODIUM 40 MG/1
40 TABLET, DELAYED RELEASE ORAL
Status: DISCONTINUED | OUTPATIENT
Start: 2019-08-07 | End: 2019-08-10 | Stop reason: HOSPADM

## 2019-08-07 RX ORDER — NITROGLYCERIN 0.4 MG/1
0.4 TABLET SUBLINGUAL EVERY 5 MIN PRN
Status: DISCONTINUED | OUTPATIENT
Start: 2019-08-07 | End: 2019-08-10 | Stop reason: HOSPADM

## 2019-08-07 RX ORDER — ALBUTEROL SULFATE 2.5 MG/3ML
2.5 SOLUTION RESPIRATORY (INHALATION)
Status: DISCONTINUED | OUTPATIENT
Start: 2019-08-07 | End: 2019-08-10 | Stop reason: HOSPADM

## 2019-08-07 RX ORDER — PREGABALIN 50 MG/1
50 CAPSULE ORAL 2 TIMES DAILY
Status: DISCONTINUED | OUTPATIENT
Start: 2019-08-07 | End: 2019-08-10 | Stop reason: HOSPADM

## 2019-08-07 RX ORDER — ALPRAZOLAM 0.5 MG/1
0.5 TABLET ORAL 2 TIMES DAILY
Status: DISCONTINUED | OUTPATIENT
Start: 2019-08-07 | End: 2019-08-10 | Stop reason: HOSPADM

## 2019-08-07 RX ORDER — LOSARTAN POTASSIUM 50 MG/1
100 TABLET ORAL DAILY
Status: DISCONTINUED | OUTPATIENT
Start: 2019-08-07 | End: 2019-08-10 | Stop reason: HOSPADM

## 2019-08-07 RX ORDER — FAMOTIDINE 20 MG/1
20 TABLET, FILM COATED ORAL 2 TIMES DAILY
Status: DISCONTINUED | OUTPATIENT
Start: 2019-08-07 | End: 2019-08-07

## 2019-08-07 RX ORDER — AMITRIPTYLINE HYDROCHLORIDE 50 MG/1
50 TABLET, FILM COATED ORAL NIGHTLY
Status: DISCONTINUED | OUTPATIENT
Start: 2019-08-07 | End: 2019-08-10 | Stop reason: HOSPADM

## 2019-08-07 RX ORDER — LITHIUM CARBONATE 300 MG/1
600 CAPSULE ORAL 2 TIMES DAILY WITH MEALS
Status: DISCONTINUED | OUTPATIENT
Start: 2019-08-07 | End: 2019-08-10 | Stop reason: HOSPADM

## 2019-08-07 RX ORDER — DILTIAZEM HYDROCHLORIDE 240 MG/1
240 CAPSULE, COATED, EXTENDED RELEASE ORAL DAILY
Status: DISCONTINUED | OUTPATIENT
Start: 2019-08-07 | End: 2019-08-10 | Stop reason: HOSPADM

## 2019-08-07 RX ORDER — ASPIRIN 81 MG/1
81 TABLET ORAL DAILY
Status: DISCONTINUED | OUTPATIENT
Start: 2019-08-08 | End: 2019-08-07 | Stop reason: SDUPTHER

## 2019-08-07 RX ORDER — BUTALBITAL, ACETAMINOPHEN AND CAFFEINE 300; 40; 50 MG/1; MG/1; MG/1
1 CAPSULE ORAL EVERY 4 HOURS PRN
Status: DISCONTINUED | OUTPATIENT
Start: 2019-08-07 | End: 2019-08-10 | Stop reason: HOSPADM

## 2019-08-07 RX ADMIN — ALBUTEROL SULFATE 2.5 MG: 2.5 SOLUTION RESPIRATORY (INHALATION) at 21:22

## 2019-08-07 RX ADMIN — LEVOTHYROXINE SODIUM 25 MCG: 25 TABLET ORAL at 06:19

## 2019-08-07 RX ADMIN — BUDESONIDE 500 MCG: 0.5 INHALANT RESPIRATORY (INHALATION) at 08:04

## 2019-08-07 RX ADMIN — ACETAMINOPHEN 650 MG: 325 TABLET ORAL at 01:05

## 2019-08-07 RX ADMIN — ALPRAZOLAM 0.5 MG: 0.5 TABLET ORAL at 20:36

## 2019-08-07 RX ADMIN — ASPIRIN 81 MG 81 MG: 81 TABLET ORAL at 08:26

## 2019-08-07 RX ADMIN — BUDESONIDE 500 MCG: 0.5 INHALANT RESPIRATORY (INHALATION) at 21:22

## 2019-08-07 RX ADMIN — LURASIDONE HYDROCHLORIDE 20 MG: 20 TABLET, FILM COATED ORAL at 08:26

## 2019-08-07 RX ADMIN — ALBUTEROL SULFATE 2.5 MG: 2.5 SOLUTION RESPIRATORY (INHALATION) at 13:01

## 2019-08-07 RX ADMIN — POTASSIUM CHLORIDE 40 MEQ: 20 TABLET, EXTENDED RELEASE ORAL at 11:03

## 2019-08-07 RX ADMIN — BUTALBITAL, ACETAMINOPHEN AND CAFFEINE 1 CAPSULE: 300; 40; 50 CAPSULE ORAL at 21:10

## 2019-08-07 RX ADMIN — BUTALBITAL, ACETAMINOPHEN AND CAFFEINE 1 CAPSULE: 300; 40; 50 CAPSULE ORAL at 16:55

## 2019-08-07 RX ADMIN — DILTIAZEM HYDROCHLORIDE 240 MG: 240 CAPSULE, COATED, EXTENDED RELEASE ORAL at 08:26

## 2019-08-07 RX ADMIN — ALBUTEROL SULFATE 2.5 MG: 2.5 SOLUTION RESPIRATORY (INHALATION) at 08:06

## 2019-08-07 RX ADMIN — ALBUTEROL SULFATE 2.5 MG: 2.5 SOLUTION RESPIRATORY (INHALATION) at 04:55

## 2019-08-07 RX ADMIN — ATORVASTATIN CALCIUM 40 MG: 40 TABLET, FILM COATED ORAL at 20:36

## 2019-08-07 RX ADMIN — MAGNESIUM OXIDE TAB 400 MG (241.3 MG ELEMENTAL MG) 400 MG: 400 (241.3 MG) TAB at 08:26

## 2019-08-07 RX ADMIN — LITHIUM CARBONATE 600 MG: 300 CAPSULE, GELATIN COATED ORAL at 08:26

## 2019-08-07 RX ADMIN — PREGABALIN 50 MG: 50 CAPSULE ORAL at 08:26

## 2019-08-07 RX ADMIN — PANTOPRAZOLE SODIUM 40 MG: 40 TABLET, DELAYED RELEASE ORAL at 06:19

## 2019-08-07 RX ADMIN — ENOXAPARIN SODIUM 40 MG: 40 INJECTION SUBCUTANEOUS at 08:29

## 2019-08-07 RX ADMIN — LITHIUM CARBONATE 600 MG: 300 CAPSULE, GELATIN COATED ORAL at 16:03

## 2019-08-07 RX ADMIN — MOMETASONE FUROATE AND FORMOTEROL FUMARATE DIHYDRATE 2 PUFF: 200; 5 AEROSOL RESPIRATORY (INHALATION) at 21:26

## 2019-08-07 RX ADMIN — PREGABALIN 50 MG: 50 CAPSULE ORAL at 20:36

## 2019-08-07 RX ADMIN — ATORVASTATIN CALCIUM 40 MG: 40 TABLET, FILM COATED ORAL at 00:34

## 2019-08-07 RX ADMIN — BUTALBITAL, ACETAMINOPHEN AND CAFFEINE 1 CAPSULE: 300; 40; 50 CAPSULE ORAL at 06:19

## 2019-08-07 RX ADMIN — ALPRAZOLAM 0.5 MG: 0.5 TABLET ORAL at 08:26

## 2019-08-07 RX ADMIN — LOSARTAN POTASSIUM 100 MG: 50 TABLET ORAL at 08:26

## 2019-08-07 RX ADMIN — ISOSORBIDE MONONITRATE 30 MG: 30 TABLET, EXTENDED RELEASE ORAL at 08:26

## 2019-08-07 RX ADMIN — AMITRIPTYLINE HYDROCHLORIDE 50 MG: 50 TABLET, FILM COATED ORAL at 20:36

## 2019-08-07 ASSESSMENT — PAIN SCALES - GENERAL
PAINLEVEL_OUTOF10: 5
PAINLEVEL_OUTOF10: 4
PAINLEVEL_OUTOF10: 3
PAINLEVEL_OUTOF10: 5
PAINLEVEL_OUTOF10: 4
PAINLEVEL_OUTOF10: 3
PAINLEVEL_OUTOF10: 3

## 2019-08-07 ASSESSMENT — ENCOUNTER SYMPTOMS
ABDOMINAL DISTENTION: 0
CHEST TIGHTNESS: 1
BACK PAIN: 0
COLOR CHANGE: 0
ABDOMINAL PAIN: 0
APNEA: 0
CHOKING: 0
SHORTNESS OF BREATH: 1

## 2019-08-07 ASSESSMENT — PAIN DESCRIPTION - LOCATION: LOCATION: CHEST

## 2019-08-07 ASSESSMENT — PAIN DESCRIPTION - PAIN TYPE: TYPE: ACUTE PAIN

## 2019-08-07 ASSESSMENT — PAIN DESCRIPTION - ORIENTATION: ORIENTATION: MID

## 2019-08-07 NOTE — PROGRESS NOTES
Facility/Department: [facility]  Initial Speech/Language/Cognitive Assessment    NAME: Cuco Schultz  : 1970   MRN: [id number]  ADMISSION DATE: 2019  ADMITTING DIAGNOSIS: has H/O cardiac arrest; Bipolar 1 disorder ([address] Tobacco abuse; SAMAYOA (dyspnea on exertion); Abnormal ECG; Displacement of lumbar intervertebral disc without myelopathy; Acid reflux; HTN (hypertension), benign; Multiple sclerosis ([address] Over weight; Anxiety; Chronic pain syndrome ; Vitamin B12 deficiency; Grief at loss of child; Smoking; Neuromyelopathy due to vitamin B12 deficiency ([address] Chronic obstructive pulmonary disease ([facility] Acute bronchitis; Sleep apnea; Obesity (BMI 30-39.9); and Stroke determined by clinical assessment [facility]) on their problem list.  DATE ONSET: 2019    Date of Eval: 2019   Evaluating Therapist: ROSA MARIA Valverde    Assessment:      Diagnosis: Pt presents with functional speech, language, and cognition. Recommendations:  Requires SLP Intervention: No         Subjective:   Previous level of function and limitations: Independent  General  Chart Reviewed: Yes  Subjective  Subjective: Alert, Cooperative, Pleasant     Vision  Vision: Impaired  Vision Exceptions: Wears glasses at all times  Hearing  Hearing: Within functional limits           Objective:     Oral/Motor  Oral Motor: Within functional limits    Auditory Comprehension  Comprehension: Within Functional Limits         Expression  Primary Mode of Expression: Verbal    Verbal Expression  Verbal Expression: Within functional limits    Written Expression  Dominant Hand: Right    Motor Speech  Motor Speech:  Within Functional Limits(slight distortions at times secondary to no upper dentition)    Pragmatics/Social Functioning  Pragmatics: Within functional limits    Cognition:      Orientation  Overall Orientation Status: Within Normal Limits  Attention  Attention: Within Functional Limits  Memory  Memory: Within Funtional Limits  Problem Solving  Problem Solving: Exceptions to [facility]  Complex Functional Tasks: Mild(Clock Drawing test: incorrect time and lengths of hands)  Managing Medications: (Pt reported she receives her medications in pill box from [employer].)  Numeric Reasoning  Numeric Reasoning: Exceptions to [facility]   Time: Mild  Abstract Reasoning  Abstract Reasoning: Within Functional Limits  Safety/Judgement  Safety/Judgement: Within Functional Limits      Prognosis:  Individuals consulted  Consulted and agree with results and recommendations: Patient    Education:  Patient Education: Educated pt on results. Patient Education Response: Verbalizes understanding  Safety Devices in place: Yes  Type of devices: Bed alarm in place;Call light within reach    Pain:  Pain Assessment  Patient Currently in Pain: Yes  Pain Assessment: 0-10  Pain Level: 4  Pain Type: Acute pain  Pain Location: Chest  Pain Orientation: Mid  Pain Descriptors: Aching  Pain Frequency: Continuous  Patient's Stated Pain Goal: No pain             Therapy Time:   Individual Concurrent Group Co-treatment   Time In 0955         Time Out 1010         Minutes 15                   Christopher Baires Standing, Date: 8/7/2019, Time: 11:10 AM

## 2019-08-07 NOTE — PROGRESS NOTES
intact; smooth pursuits intact)  Hearing: Within functional limits    Cognition:  Overall Orientation Status: Within Functional Limits  Follows Commands: Within Functional Limits    Observation/Palpation  Observation: Pt with slow speech and flat affect. ROM:  RLE PROM: WFL  LLE PROM: WFL    Strength:  Strength RLE  Comment: Grossly 3+/5 at knee and 4/5 at hip  Strength LLE  Comment: Grossly 3/5 throughout from proximal to distal  Strength Other  Other: trunk strength WFL    Neuro:  Balance  Sitting - Static: Good  Sitting - Dynamic: Good  Standing - Static: Fair  Standing - Dynamic: Fair     Motor Control  Gross Motor?: (delayed motor response L LE)  Sensation  Overall Sensation Status: (denies numbness/tingling)    Bed mobility  Rolling to Right: Modified independent  Sit to Supine: Modified independent  Comment: Increased time to complete full transition    Transfers  Sit to Stand: Stand by assistance  Stand to sit: Stand by assistance  Bed to Chair: Stand by assistance  Comment: Slow to complete    Ambulation 1  Device: Rolling Walker  Assistance: Contact guard assistance  Quality of Gait: step to pattern; decreased cocontraction L knee during stance phase with mild instability  Gait Deviations: Slow Radha  Distance: 25ft  Stairs/Curb  Stairs?: No    Activity Tolerance  Activity Tolerance: Patient Tolerated treatment well          ASSESSMENT:   Body structures, Functions, Activity limitations: Decreased functional mobility ; Decreased strength;Decreased endurance;Decreased coordination;Decreased safe awareness;Decreased balance  Decision Making: Medium Complexity  History: Med  Exam: Med  Clinical Presentation: Med    Prognosis: Good  Patient Education: PT POC; DC recs; role of PT in the hosp  Barriers to Learning: insight    DISCHARGE RECOMMENDATIONS:  Discharge Recommendations: Continue to assess pending progress, Patient would benefit from continued therapy after discharge    Assessment: Continued PT indicated to progress mobility, and facilitate DC at highest level of indep and safety. REQUIRES PT FOLLOW UP: Yes      PLAN OF CARE:  Plan  Times per week: 3-6  Current Treatment Recommendations: Functional Mobility Training, Strengthening, ROM, Balance Training, Transfer Training, Endurance Training, Stair training, Gait Training, Neuromuscular Re-education, Manual Therapy - Joint Manipulation, Equipment Evaluation, Education, & procurement, Home Exercise Program, Safety Education & Training, Patient/Caregiver Education & Training  Safety Devices  Type of devices:  All fall risk precautions in place    Goals:  Short term goals  Short term goal 1: Pt to complete HEP with indep  Long term goals  Long term goal 1: Pt to complete all bed mobility with indep  Long term goal 2: Pt to complete all transfers with indep  Long term goal 3: Pt to ambulate 150ft with LRD indep  Long term goal 4: Pt to manage 12 steps with HR indep    AMPAC (6 CLICK) BASIC MOBILITY  AM-PAC Inpatient Mobility Raw Score : 22     Therapy Time:   Individual   Time In 1455   Time Out 1507   Minutes Piedad [address] [place] [address], 08/07/19 at 5:01 PM

## 2019-08-07 NOTE — PROGRESS NOTES
level. Patient Education Response: Verbalizes understanding  Safety Devices in place: Yes  Type of devices: Bed alarm in place;Call light within reach    Pain:  Pain Assessment  Patient Currently in Pain: Yes  Pain Assessment: 0-10  Pain Level: 4  Pain Type: Acute pain  Pain Location: Chest  Pain Orientation: Mid  Pain Descriptors: Aching  Pain Frequency: Continuous  Patient's Stated Pain Goal: No pain       Therapy Time  SLP Individual Minutes  Time In: 0940  Time Out: 7720  Minutes: 15              Christopher Garcia, Date: 8/7/2019, Time: 10:26 AM

## 2019-08-07 NOTE — CONSULTS
 NECK SURGERY      SHOULDER SURGERY Left     UPPER GASTROINTESTINAL ENDOSCOPY  11/24/2014    ANIBAL HOUSE M.D. Social Hx:  Social History     Socioeconomic History    Marital status:      Spouse name: None    Number of children: None    Years of education: None    Highest education level: None   Occupational History    Occupation: unemployed   Social Needs    Financial resource strain: None    Food insecurity:     Worry: None     Inability: None    Transportation needs:     Medical: None     Non-medical: None   Tobacco Use    Smoking status: Current Some Day Smoker     Packs/day: 0.50     Years: 10.00     Pack years: 5.00     Types: Cigarettes    Smokeless tobacco: Never Used    Tobacco comment: pt trying Chantix   Substance and Sexual Activity    Alcohol use: No    Drug use: No    Sexual activity: Not Currently   Lifestyle    Physical activity:     Days per week: None     Minutes per session: None    Stress: None   Relationships    Social connections:     Talks on phone: None     Gets together: None     Attends Spiritism service: None     Active member of club or organization: None     Attends meetings of clubs or organizations: None     Relationship status: None    Intimate partner violence:     Fear of current or ex partner: None     Emotionally abused: None     Physically abused: None     Forced sexual activity: None   Other Topics Concern    None   Social History Narrative    Kassy Gordon is a 73-year-old female who is on disability because of pain and bipolar disorder. She's also had a presumed diagnosis of possible multiple sclerosis. She's been seeing Dr. Danielle Pinto for that and she says that the diagnosis is sometimes in question and it's clear neuropathy vitamin B12 deficiency as well as generalized bodyaches from the severe vitamin D deficiency have caused this scenario that looks like multiple sclerosis.         She recently tried to go back to work but was not able because of her pain. Family Hx:  Family History   Problem Relation Age of Onset    High Blood Pressure Mother     Kidney Disease Mother     Lupus Mother     Bipolar Disorder Son        Review of Systems:   Review of Systems   Constitutional: Negative for activity change and appetite change. HENT: Negative for congestion. Respiratory: Positive for chest tightness and shortness of breath. Negative for apnea and choking. Cardiovascular: Positive for chest pain. Negative for palpitations and leg swelling. Gastrointestinal: Negative for abdominal distention and abdominal pain. Endocrine: Negative for cold intolerance and heat intolerance. Genitourinary: Negative for dysuria and enuresis. Musculoskeletal: Negative for arthralgias and back pain. Skin: Negative for color change. Neurological: Positive for weakness and numbness. Negative for dizziness, seizures, syncope and light-headedness. Psychiatric/Behavioral: Negative for agitation, behavioral problems and confusion. Allergies:   Allergies   Allergen Reactions    Latex Itching     Pt reports itching on hands after using rubber gloves at work    Influenza Vaccines Swelling     Pt reports her entire arm was red and edematous post vaccine    Eggs Or Egg-Derived Products     Gabapentin Nausea Only    Pneumococcal Vaccines Hives    Povidone Iodine Itching    Varicella Virus Vaccine Live Hives       Current Medications:    Current Facility-Administered Medications:     ondansetron (ZOFRAN) injection 4 mg, 4 mg, Intravenous, Q6H PRN, Jimena العلي Sedar, DO    atorvastatin (LIPITOR) tablet 40 mg, 40 mg, Oral, Nightly, Haseeb FARRIS Sedar, DO, 40 mg at 08/07/19 0034    enoxaparin (LOVENOX) injection 40 mg, 40 mg, Subcutaneous, Daily, Sheryl FARRIS Sedar, DO    nitroGLYCERIN (NITROSTAT) SL tablet 0.4 mg, 0.4 mg, Sublingual, Q5 Min PRN, Sheryl FARRIS Sedar, DO    labetalol (NORMODYNE;TRANDATE) injection syringe 10 mg, 10 mg, Intravenous, Q10 Min PRN, Art Brake,

## 2019-08-07 NOTE — CONSULTS
dysfunction. At this time, her complaints are those of a headache and left-sided  weakness. She complains of general weakness. She denies any chest pain  or shortness of breath. Not complaining of any bleeding, bruising,  choking, drooling, falls, injuries, or trauma. PAST MEDICAL HISTORY:  As listed. She has extensive medical history of  a cardiac arrest, bipolar disorder, displacement of lumbar spine, lumbar  radiculopathy, hypertension, anxiety, chronic pain syndrome, B12  deficiency, COPD, and sleep apnea. CURRENT MEDICATIONS:  List is reviewed. She is on polypharmacy. She is  on albuterol, alprazolam, amitriptyline, atorvastatin, butalbital,  dicyclomine, Lovenox, lithium, losartan, nitroglycerin, ondansetron. She is also on Lyrica. PHYSICAL EXAMINATION:  GENERAL:  She is in no distress. She is afebrile. NECK:  Supple. There are no meningeal signs. CARDIOVASCULAR SYSTEM:  S1 and S2 are normal.  No murmurs appreciated. No carotid bruits. RESPIRATORY SYSTEM:  Clear to auscultation. CNS:  She is awake, alert, and oriented to self, month, and year. Pupils are equal and reactive. There is no facial asymmetry. Speech is  normal.  Tongue is midline. Palate moves symmetrically. A giveaway  weakness on the left side is notable. Does not appear to be sustained. Reflexes are symmetrical.  There are no sensory levels. The drift  appears to be not sustained either. We did not attempt to walk her as  yet. Lab examination is therefore reviewed. Her lithium level is less than  0.1. She is not taking her medication. Hemoglobin A1c is pending. Her  CPK is 79 with a lipase of 54 with sodium 142, potassium 3.5, BUN of 7,  creatinine 0.99. Her WBC count is 9.4, hemoglobin is 15.1, hematocrit  42.1, and platelets of 578,153. IMPRESSION:  Complicated migraine with a headache with a giveaway  weakness on the left. I truly do not feel that this is a  cerebrovascular event.   She does

## 2019-08-07 NOTE — PROGRESS NOTES
Impression: Type 2 diab w mild nonprlf diabetic rtnop w/o macular edema, left eye: [id number]. Plan: Diabetes type II: mild background diabetic retinopathy, no signs of neovascularization noted. No treatment necessary at this time. Patient was instructed to monitor vision for sudden changes and to call if visual changes noted. Discussed ocular and systemic benefits of blood sugar control. Physician Progress Note    2019   10:49 AM    Name:  Aileen Elder  MRN:    [id number]      Day: 1     Admit Date: 2019  7:27 PM  PCP: Santy Neville MD    Code Status:  Full Code    Subjective:      no new events. Physical Examination:      Vitals:  BP (!) 147/79   Pulse 56   Temp 97.5 °F (36.4 °C) (Oral)   Resp 16   Ht 5' 7\" (1.702 m)   Wt 200 lb (90.7 kg)   SpO2 95%   BMI 31.32 kg/m²   Temp (24hrs), Av.8 °F (36.6 °C), Min:97.5 °F (36.4 °C), Max:98.1 °F (36.7 °C)      General appearance: alert, cooperative and no distress  Mental Status: oriented to person, place and time and normal affect  Lungs: clear to auscultation bilaterally, normal effort  Heart: regular rate and rhythm, no murmur  Abdomen: soft, nontender, nondistended, bowel sounds present, no masses  Extremities: no edema, redness, tenderness in the calves  Skin: no gross lesions, rashes  Neuro: left sided weakness 4/5 in upper and lower ext, AOx3, sensation intact.      Data:     Labs:  Recent Labs     19  0554   WBC 9.4 6.6   HGB 15.1 14.7    156     Recent Labs     19  0554    145*   K 3.5 3.2*    107   CO2 25 26   BUN 7 10   CREATININE 0.99* 0.96*   GLUCOSE 128* 148*     Recent Labs     19   AST 41*   ALT 58*   BILITOT 0.4   ALKPHOS 129       Current Facility-Administered Medications   Medication Dose Route Frequency Provider Last Rate Last Dose    ondansetron (ZOFRAN) injection 4 mg  4 mg Intravenous Q6H PRN Hayder Llamas Sedar, DO        atorvastatin (LIPITOR) tablet 40 mg  40 mg Oral Nightly Haseeb FARRIS Sedar, DO   40 mg at 19 0034    enoxaparin (LOVENOX) injection 40 mg  40 mg Subcutaneous Daily Ailyn FARRIS Sedar, DO   40 mg at 19 0829    nitroGLYCERIN (NITROSTAT) SL tablet 0.4 mg  0.4 mg Sublingual Q5 Min PRN Karolee Dimmer Sedar, DO        labetalol (NORMODYNE;TRANDATE) injection syringe 10 mg  10 mg Intravenous Q10 Min PRN Karolee Dimmer Sedar, DO       

## 2019-08-07 NOTE — H&P
Hospital Medicine  History and Physical    Patient:  Barry Workman  MRN: [id number]    CHIEF COMPLAINT:    Chief Complaint   Patient presents with    Chest Pain       History Obtained From:  patient, electronic medical record  Primary Care Physician: Clay Rodriguez MD    HISTORY OF PRESENT ILLNESS:   The patient is a 54 y.o. female with a history of nonobstructive cardiomyopathy, no stents or CABG following with dr carrera, TIA, MS, HTN, GERD, Chronic Pain, CAD, asthma, Bipolar D/O, Anxiety Depression presenting to the ED with chest pain pain was left breast, stabbing/pulling. Pain started while sleeping, woke from sleep, pain about 10 hours. Pain worse with activity, improves by not moving, pain is not reproducible. Last stress testing was 5 years ago. Last cath was several years ago. Chest pain associated with diaphoresis, shortness of breath. She presented to the ED with above symptoms. EKG in ED shows inferior and lateral t wave inversions, no st elevations. No st depressions. Trop is negative. BMP, LFT, CBC WNL, UDS, negative. During evaluation by ED PA, she was noted to have left arm numbness and tingling. Facial droop. Due to concern for CVA, neurology was contacted and recommended monitoring in 2N for acute CVA.      Past Medical History:      Diagnosis Date    Anxiety     Back pain     back surgery x 4    Bipolar disorder ([facility]     CAD (coronary artery disease)     CAFL (chronic airflow limitation) ([facility] 11/3/2016    Cerebral artery occlusion with cerebral infarction ([facility])     tia    Chronic bronchitis ([facility])     Chronic pain     Colitis     DDD (degenerative disc disease), lumbar 12/22/2016    Depression     Endometriosis     Excessive caffeine abuse, continuous ([facility]) 7/6/2018    Gastritis     GERD (gastroesophageal reflux disease)     History of myocardial infarction     HTN (hypertension), benign 2/19/2016    Hypokalemia     Impaired mobility and activities of daily living    [facility]

## 2019-08-08 PROBLEM — H93.292 ABNORMAL AUDITORY PERCEPTION OF LEFT EAR: Status: ACTIVE | Noted: 2019-04-12

## 2019-08-08 PROBLEM — H90.3 SENSORINEURAL HEARING LOSS (SNHL) OF BOTH EARS: Status: ACTIVE | Noted: 2019-03-08

## 2019-08-08 PROBLEM — H81.393 VERTIGO, PERIPHERAL, BILATERAL: Status: ACTIVE | Noted: 2019-03-08

## 2019-08-08 PROBLEM — H93.13 TINNITUS, BILATERAL: Status: ACTIVE | Noted: 2019-04-12

## 2019-08-08 PROBLEM — R42 DIZZINESS AND GIDDINESS: Status: ACTIVE | Noted: 2019-04-30

## 2019-08-08 LAB
ANION GAP SERPL CALCULATED.3IONS-SCNC: 10 MEQ/L (ref 9–15)
BUN BLDV-MCNC: 7 MG/DL (ref 6–20)
CALCIUM SERPL-MCNC: 8.9 MG/DL (ref 8.5–9.9)
CHLORIDE BLD-SCNC: 109 MEQ/L (ref 95–107)
CO2: 23 MEQ/L (ref 20–31)
CREAT SERPL-MCNC: 0.99 MG/DL (ref 0.5–0.9)
FOLATE: 4 NG/ML (ref 7.3–26.1)
GFR AFRICAN AMERICAN: >60
GFR NON-AFRICAN AMERICAN: >60
GLUCOSE BLD-MCNC: 149 MG/DL (ref 70–99)
HCT VFR BLD CALC: 39.1 % (ref 37–47)
HEMOGLOBIN: 13.8 G/DL (ref 12–16)
MAGNESIUM: 2.2 MG/DL (ref 1.7–2.4)
MCH RBC QN AUTO: 33.3 PG (ref 27–31.3)
MCHC RBC AUTO-ENTMCNC: 35.4 % (ref 33–37)
MCV RBC AUTO: 94.2 FL (ref 82–100)
PDW BLD-RTO: 13.2 % (ref 11.5–14.5)
PLATELET # BLD: 154 K/UL (ref 130–400)
POTASSIUM REFLEX MAGNESIUM: 3.5 MEQ/L (ref 3.4–4.9)
RBC # BLD: 4.15 M/UL (ref 4.2–5.4)
SODIUM BLD-SCNC: 142 MEQ/L (ref 135–144)
TSH REFLEX: 2.82 UIU/ML (ref 0.44–3.86)
URINE CULTURE, ROUTINE: NORMAL
VITAMIN B-12: 256 PG/ML (ref 232–1245)
WBC # BLD: 8.9 K/UL (ref 4.8–10.8)

## 2019-08-08 PROCEDURE — 83735 ASSAY OF MAGNESIUM: CPT

## 2019-08-08 PROCEDURE — 97116 GAIT TRAINING THERAPY: CPT

## 2019-08-08 PROCEDURE — 94760 N-INVAS EAR/PLS OXIMETRY 1: CPT

## 2019-08-08 PROCEDURE — 6360000002 HC RX W HCPCS: Performed by: INTERNAL MEDICINE

## 2019-08-08 PROCEDURE — 6370000000 HC RX 637 (ALT 250 FOR IP): Performed by: FAMILY MEDICINE

## 2019-08-08 PROCEDURE — 36415 COLL VENOUS BLD VENIPUNCTURE: CPT

## 2019-08-08 PROCEDURE — 96372 THER/PROPH/DIAG INJ SC/IM: CPT

## 2019-08-08 PROCEDURE — 84443 ASSAY THYROID STIM HORMONE: CPT

## 2019-08-08 PROCEDURE — 96375 TX/PRO/DX INJ NEW DRUG ADDON: CPT

## 2019-08-08 PROCEDURE — 1210000000 HC MED SURG R&B

## 2019-08-08 PROCEDURE — 97535 SELF CARE MNGMENT TRAINING: CPT

## 2019-08-08 PROCEDURE — 6370000000 HC RX 637 (ALT 250 FOR IP): Performed by: INTERNAL MEDICINE

## 2019-08-08 PROCEDURE — 99224 PR SBSQ OBSERVATION CARE/DAY 15 MINUTES: CPT | Performed by: INTERNAL MEDICINE

## 2019-08-08 PROCEDURE — 6360000002 HC RX W HCPCS: Performed by: NURSE PRACTITIONER

## 2019-08-08 PROCEDURE — 85027 COMPLETE CBC AUTOMATED: CPT

## 2019-08-08 PROCEDURE — 80048 BASIC METABOLIC PNL TOTAL CA: CPT

## 2019-08-08 PROCEDURE — G0378 HOSPITAL OBSERVATION PER HR: HCPCS

## 2019-08-08 PROCEDURE — 99220 PR INITIAL OBSERVATION CARE/DAY 70 MINUTES: CPT | Performed by: PSYCHIATRY & NEUROLOGY

## 2019-08-08 PROCEDURE — 94640 AIRWAY INHALATION TREATMENT: CPT

## 2019-08-08 RX ORDER — KETOROLAC TROMETHAMINE 30 MG/ML
30 INJECTION, SOLUTION INTRAMUSCULAR; INTRAVENOUS ONCE
Status: COMPLETED | OUTPATIENT
Start: 2019-08-08 | End: 2019-08-08

## 2019-08-08 RX ORDER — POTASSIUM CHLORIDE 20 MEQ/1
40 TABLET, EXTENDED RELEASE ORAL ONCE
Status: COMPLETED | OUTPATIENT
Start: 2019-08-08 | End: 2019-08-08

## 2019-08-08 RX ADMIN — BUDESONIDE 500 MCG: 0.5 INHALANT RESPIRATORY (INHALATION) at 18:11

## 2019-08-08 RX ADMIN — PREGABALIN 50 MG: 50 CAPSULE ORAL at 20:07

## 2019-08-08 RX ADMIN — LEVOTHYROXINE SODIUM 25 MCG: 25 TABLET ORAL at 05:26

## 2019-08-08 RX ADMIN — ALPRAZOLAM 0.5 MG: 0.5 TABLET ORAL at 20:06

## 2019-08-08 RX ADMIN — PREGABALIN 50 MG: 50 CAPSULE ORAL at 08:24

## 2019-08-08 RX ADMIN — ATORVASTATIN CALCIUM 40 MG: 40 TABLET, FILM COATED ORAL at 20:07

## 2019-08-08 RX ADMIN — LITHIUM CARBONATE 600 MG: 300 CAPSULE, GELATIN COATED ORAL at 08:24

## 2019-08-08 RX ADMIN — ASPIRIN 81 MG 81 MG: 81 TABLET ORAL at 08:24

## 2019-08-08 RX ADMIN — ALBUTEROL SULFATE 2.5 MG: 2.5 SOLUTION RESPIRATORY (INHALATION) at 18:11

## 2019-08-08 RX ADMIN — ENOXAPARIN SODIUM 40 MG: 40 INJECTION SUBCUTANEOUS at 08:25

## 2019-08-08 RX ADMIN — LITHIUM CARBONATE 600 MG: 300 CAPSULE, GELATIN COATED ORAL at 16:11

## 2019-08-08 RX ADMIN — PANTOPRAZOLE SODIUM 40 MG: 40 TABLET, DELAYED RELEASE ORAL at 05:26

## 2019-08-08 RX ADMIN — ISOSORBIDE MONONITRATE 30 MG: 30 TABLET, EXTENDED RELEASE ORAL at 08:24

## 2019-08-08 RX ADMIN — ALBUTEROL SULFATE 2.5 MG: 2.5 SOLUTION RESPIRATORY (INHALATION) at 13:13

## 2019-08-08 RX ADMIN — POTASSIUM CHLORIDE 40 MEQ: 20 TABLET, EXTENDED RELEASE ORAL at 14:13

## 2019-08-08 RX ADMIN — ACETAMINOPHEN 650 MG: 325 TABLET ORAL at 20:52

## 2019-08-08 RX ADMIN — DILTIAZEM HYDROCHLORIDE 240 MG: 240 CAPSULE, COATED, EXTENDED RELEASE ORAL at 08:24

## 2019-08-08 RX ADMIN — AMITRIPTYLINE HYDROCHLORIDE 50 MG: 50 TABLET, FILM COATED ORAL at 20:07

## 2019-08-08 RX ADMIN — ACETAMINOPHEN 650 MG: 325 TABLET ORAL at 14:01

## 2019-08-08 RX ADMIN — ALBUTEROL SULFATE 2.5 MG: 2.5 SOLUTION RESPIRATORY (INHALATION) at 07:22

## 2019-08-08 RX ADMIN — ALPRAZOLAM 0.5 MG: 0.5 TABLET ORAL at 08:25

## 2019-08-08 RX ADMIN — BUDESONIDE 500 MCG: 0.5 INHALANT RESPIRATORY (INHALATION) at 07:22

## 2019-08-08 RX ADMIN — MAGNESIUM OXIDE TAB 400 MG (241.3 MG ELEMENTAL MG) 400 MG: 400 (241.3 MG) TAB at 08:24

## 2019-08-08 RX ADMIN — LURASIDONE HYDROCHLORIDE 20 MG: 20 TABLET, FILM COATED ORAL at 08:24

## 2019-08-08 RX ADMIN — MOMETASONE FUROATE AND FORMOTEROL FUMARATE DIHYDRATE 2 PUFF: 200; 5 AEROSOL RESPIRATORY (INHALATION) at 18:24

## 2019-08-08 RX ADMIN — MOMETASONE FUROATE AND FORMOTEROL FUMARATE DIHYDRATE 2 PUFF: 200; 5 AEROSOL RESPIRATORY (INHALATION) at 07:22

## 2019-08-08 RX ADMIN — KETOROLAC TROMETHAMINE 30 MG: 30 INJECTION, SOLUTION INTRAMUSCULAR at 09:53

## 2019-08-08 ASSESSMENT — ENCOUNTER SYMPTOMS
VOMITING: 0
NAUSEA: 0
CHEST TIGHTNESS: 0
COLOR CHANGE: 0
WHEEZING: 0
SHORTNESS OF BREATH: 0
DIARRHEA: 0
COUGH: 0
ABDOMINAL DISTENTION: 0
ABDOMINAL PAIN: 0
TROUBLE SWALLOWING: 0
CONSTIPATION: 0

## 2019-08-08 ASSESSMENT — PAIN SCALES - GENERAL
PAINLEVEL_OUTOF10: 3
PAINLEVEL_OUTOF10: 4
PAINLEVEL_OUTOF10: 2
PAINLEVEL_OUTOF10: 4
PAINLEVEL_OUTOF10: 0
PAINLEVEL_OUTOF10: 6
PAINLEVEL_OUTOF10: 2

## 2019-08-08 NOTE — PROGRESS NOTES
Nebulization TID    magnesium oxide  400 mg Oral Daily    nicotine  1 patch Transdermal Daily     PRN Meds: ondansetron, nitroGLYCERIN, labetalol, acetaminophen, cyclobenzaprine, dicyclomine, butalbital-APAP-caffeine, albuterol    Labs:   Recent Labs     08/06/19 1943 08/07/19  0554 08/08/19  0547   WBC 9.4 6.6 8.9   HGB 15.1 14.7 13.8   HCT 42.1 41.1 39.1    156 154     Recent Labs     08/06/19 1944 08/07/19  0554 08/08/19  0547    145* 142   K 3.5 3.2* 3.5    107 109*   CO2 25 26 23   BUN 7 10 7   CREATININE 0.99* 0.96* 0.99*   CALCIUM 9.0 8.9 8.9     Recent Labs     08/06/19 1944   AST 41*   ALT 58*   BILITOT 0.4   ALKPHOS 129     No results for input(s): INR in the last 72 hours. Recent Labs     08/06/19 1944 08/07/19  0554 08/07/19  1214 08/07/19  1759   CKTOTAL 79  --   --   --    TROPONINI <0.010 <0.010 <0.010 <0.010       Urinalysis:   Lab Results   Component Value Date    NITRU Negative 08/06/2019    WBCUA 6-10 08/06/2019    BACTERIA FEW 08/06/2019    RBCUA 0-2 08/06/2019    BLOODU Negative 08/06/2019    SPECGRAV 1.009 08/06/2019    GLUCOSEU Negative 08/06/2019       Radiology:   Most recent    Chest CT      WITH CONTRAST:No results found for this or any previous visit. WITHOUT CONTRAST: No results found for this or any previous visit. CXR      2-view:   Results for orders placed during the hospital encounter of 11/06/18   XR CHEST STANDARD (2 VW)    Narrative XR CHEST (2 VW): 11/6/2018    CLINICAL HISTORY:  sob . COMPARISON: 11/5/2018 and 4/15/2016. Upright PA and lateral radiographs of the chest were obtained. FINDINGS:    Mild diffuse coarsening of the bronchovascular structures may be bronchitis and/or COPD, which is best evaluated clinically. There are no focal infiltrates, pleural effusion, cardiomegaly, vascular congestion, pneumothorax, or displaced fractures identified. Lower cervical fusion hardware is noted.           Impression FINDINGS SUGGESTIVE

## 2019-08-08 NOTE — PROGRESS NOTES
Physical Therapy Med Surg Daily Treatment Note  Facility/Department: [facility]  Room: N224/N224-01       NAME: Katherine Martinez  : 1970 (54 y.o.)  MRN: [id number]  CODE STATUS: Full Code    Date of Service: 2019    Patient Diagnosis(es): Stroke determined by clinical assessment [facility]) [I63.9]   Chief Complaint   Patient presents with    Chest Pain     Patient Active Problem List    Diagnosis Date Noted    Stroke determined by clinical assessment ([facility] 2019    Sleep apnea 2019    Obesity (BMI 30-39.9) 2019    Acute bronchitis 2019    Chronic obstructive pulmonary disease ([facility] [address] [facility] 2018    Neuromyelopathy due to vitamin B12 deficiency ([facility] [facility] 2017    Smoking     Chronic pain syndrome  2016    Vitamin B12 deficiency 2016    Grief at loss of child 2016    Multiple sclerosis ([facility] [address] [facility]     Over weight     Anxiety     HTN (hypertension), benign 2016    H/O cardiac arrest 2012    Bipolar 1 disorder ([address] 2012    Tobacco abuse 2012    SAMAYOA (dyspnea on exertion) 2012    Abnormal ECG 2012    Displacement of lumbar intervertebral disc without myelopathy 2012    Acid reflux 2008        Past Medical History:   Diagnosis Date    Anxiety     Back pain     back surgery x 4    Bipolar disorder ([facility] [address] [facility]     CAD (coronary artery disease)     CAFL (chronic airflow limitation) ([facility] [address] [facility] 11/3/2016    Cerebral artery occlusion with cerebral infarction (H[facility])     tia    Chronic bronchitis (H[facility])     Chronic pain     Colitis     DDD (degenerative disc disease), lumbar 2016    Depression     Endometriosis     Excessive caffeine abuse, continuous ([address] 2018    Gastritis     GERD (gastroesophageal reflux disease)     History of myocardial infarction     HTN (hypertension), benign 2016    Hypokalemia     Impaired mobility and activities of daily living     Left hemiparesis ([address]     Multiple B knees. unable to complete reps, returned to bed. Exercises  Knee Long Arc Quad: LLE emphasis on smooth pursuits, x 10 limited ROM                     ASSESSMENT:  Body structures, Functions, Activity limitations: Decreased functional mobility ; Decreased strength;Decreased endurance;Decreased coordination;Decreased safe awareness;Decreased balance    Assessment: pt fatgiues quickly with ambulation, LLE leading to knee anisa and LOB's unsafe to attempt stairs at this time. Activity Tolerance  Activity Tolerance: Patient Tolerated treatment well       Discharge Recommendations:  Continue to assess pending progress, Patient would benefit from continued therapy after discharge    Goals:  Short term goals  Short term goal 1: Pt to complete HEP with indep  Long term goals  Long term goal 1: Pt to complete all bed mobility with indep  Long term goal 2: Pt to complete all transfers with indep  Long term goal 3: Pt to ambulate 150ft with LRD indep  Long term goal 4: Pt to manage 12 steps with HR indep    PLAN:   Plan  Times per week: 3-6  Current Treatment Recommendations: Functional Mobility Training, Strengthening, ROM, Balance Training, Transfer Training, Endurance Training, Stair training, Gait Training, Neuromuscular Re-education, Manual Therapy - Joint Manipulation, Equipment Evaluation, Education, & procurement, Home Exercise Program, Safety Education & Training, Patient/Caregiver Education & Training  Safety Devices  Type of devices:  All fall risk precautions in place     [facility] (6 CLICK) [address] Inpatient Mobility Raw Score : 19      Therapy Time   Individual   Time In 1025   Time Out 1048   Minutes 23      Gait: 11  NM: 2  Therex: 2    BM/Trsf: 8    Cami Kelly PTA, 08/08/19 at 10:52 AM

## 2019-08-08 NOTE — CONSULTS
Department of Psychiatry  Behavioral Health Consult    REASON FOR CONSULT: bipolar eval    CONSULTING PHYSICIAN:     History obtained from: Patient    HISTORY OF PRESENT ILLNESS:    The patient is a 54 y.o. female with significant past psychiatric history of Bipolar depression who presents with Lethargy   Pt report that she has been stable mentally with lithium and latuda  Lithium level was 0.1 on admission  Pt lithium was recently increased to 600 mg po bid  Has been on Latuda for a long time  Pt is feeling stressed out sec to her current physical symptoms        The patient is currently receiving care for the above psychiatric illness.       Psychiatric Review of Systems       Depression: yes sec to her physical problems     Michelle or Hypomania:  no     Panic Attacks:  no     Phobias:  no     Obsessions and Compulsions:  no     PTSD : no     Hallucinations:  no     Delusions:  no      Substance Abuse History:  ETOH: no  Marijuana: no  Opiates: no  Other Drugs: no      Past Psychiatric History:  Prior Diagnosis: Bipolar depression, PTSD  Psychiatrist: Jenaro  Therapist:yes  Hospitalization: yes  Hx of Suicidal Attempts: yes  Hx of violence:  no  ECT: no    Past Medical History:        Diagnosis Date    Anxiety     Back pain     back surgery x 4    [facility]     CAD (coronary artery disease)     CAFL (chronic airflow limitation) ([facility] 11/3/2016    Cerebral artery occlusion with cerebral infarction (HCC)     tia    Chronic bronchitis (HCC)     Chronic pain     Colitis     DDD (degenerative disc disease), lumbar 12/22/2016    Depression     Endometriosis     Excessive caffeine abuse, continuous (HCC) 7/6/2018    Gastritis     GERD (gastroesophageal reflux disease)     History of myocardial infarction     HTN (hypertension), benign 2/19/2016    Hypokalemia     Impaired mobility and activities of daily living     Left hemiparesis ([facility]     Multiple sclerosis ([facility]     Neck pain     surgery tablet, Take 25 mg by mouth 3 times daily as needed  FREESTYLE LANCETS MISC, Inject 1 each into the skin daily  pregabalin (LYRICA) 50 MG capsule, Take 1 capsule by mouth 2 times daily for 15 days. .  calcium carbonate (TUMS) 500 MG chewable tablet,     Allergies:  Latex; Influenza vaccines; Eggs or egg-derived products; Gabapentin; Pneumococcal vaccines; Povidone iodine; and Varicella virus vaccine live    FAMILY/SOCIAL HISTORY:  Family History   Problem Relation Age of Onset    High Blood Pressure Mother     Kidney Disease Mother     Lupus Mother     Bipolar Disorder Son      Social History     Socioeconomic History    Marital status:       Spouse name: Not on file    Number of children: Not on file    Years of education: Not on file    Highest education level: Not on file   Occupational History    Occupation: unemployed   Social Needs    Financial resource strain: Not on file    Food insecurity:     Worry: Not on file     Inability: Not on file   C[place] needs:     Medical: Not on file     Non-medical: Not on file   Tobacco Use    Smoking status: Current Some Day Smoker     Packs/day: 0.50     Years: 10.00     Pack years: 5.00     Types: Cigarettes    Smokeless tobacco: Never Used    Tobacco comment: pt trying Chantix   Substance and Sexual Activity    Alcohol use: No    Drug use: No    Sexual activity: Not Currently   Lifestyle    Physical activity:     Days per week: Not on file     Minutes per session: Not on file    Stress: Not on file   Relationships    Social connections:     Talks on phone: Not on file     Gets together: Not on file     Attends [employer] service: Not on file     Active member of club or organization: Not on file     Attends meetings of clubs or organizations: Not on file     Relationship status: Not on file    Intimate partner violence:     Fear of current or ex partner: Not on file     Emotionally abused: Not on file     Physically abused: Not on file the superior cerebellar and posterior cerebral arteries are patent. Posterior communicating arteries are patent. The bilateral anterior cerebral arteries and anterior communicating artery are patent. No aneurysm or high-grade stenosis of the visualized cerebral vasculature. MRI brain: No acute ischemia, acute intracranial process, or significant interval change when compared to MRI of the brain from November 7, 2018 MRA brain: No aneurysm or high-grade stenosis of the visualized cerebral vasculature. Ct Abdomen Pelvis W Iv Contrast Additional Contrast? None    Result Date: 7/31/2019  Examination: CT ABDOMEN PELVIS W IV CONTRAST Indication:   epigastric abd pain r/o pancreatitis Technique: Multiple serial axial images was performed through the abdomen and pelvis utilizing 100cc of Optiray 370. Images were reconstructed in the axial and coronal and sagittal planes. Comparison: June 18, 2017 Findings: The visualized basal lungs show small areas atelectasis, scarring in the bases. Left greater than right. The liver shows diffuse decrease in attenuation. This is a nonspecific finding that can be seen with fatty infiltration. No focal parenchymal abnormalities. No intrahepatic blade dilatation. The, , spleen, pancreas, adrenals, kidneys are unremarkable. The gallbladder surgically absent. Large and small bowel show no sign of obstruction. The appendix is visualized. No periappendiceal stranding. No diverticulitis. Uterus is surgically absent. No free air. No free fluid. The visualized abdominal aorta is of normal size and caliber. No significant retroperitoneal adenopathy. Visualized osseous structures again show prior interbody fusion at the L5-S1 level. IMPRESSION: 1. HEPATIC STEATOSIS. 2. REMAINDER THE CT SCAN OF THE ABDOMEN PELVIS OTHERWISE UNREMARKABLE.  All CT scans at this facility use dose modulation, iterative reconstruction, and/or weight based dosing when appropriate to reduce radiation dose base are patent. The bilateral middle cerebral arteries through the trifurcation and opercular branches are patent. The vertebrobasilar system including the superior cerebellar and posterior cerebral arteries are patent. Posterior communicating arteries are patent. The bilateral anterior cerebral arteries and anterior communicating artery are patent. No aneurysm or high-grade stenosis of the visualized cerebral vasculature. MRI brain: No acute ischemia, acute intracranial process, or significant interval change when compared to MRI of the brain from November 7, 2018 MRA brain: No aneurysm or high-grade stenosis of the visualized cerebral vasculature. Us Carotid Artery Bilateral    Result Date: 8/7/2019  BILATERAL DUPLEX CAROTID ULTRASOUND CLINICAL INFORMATION:  SMOKING HISTORY, HYPERTENSION, TIA WITH LEFT ARM NUMBNESS AND TINGLING, FACIAL DROOP, EVALUATE FOR POSSIBLE CAROTID ARTERY STENOSIS IN THE NECK. COMPARISON:  NONE AVAILABLE FINDINGS:  The bilateral carotid duplex ultrasound study demonstrates the following:                                                             RIGHT            LEFT Carotid plaque burden                         Mild               Mild Proximal common carotid artery           171 cm/s            177 cm/s  Mid common carotid artery                   151 cm/s            152 cm/s  Distal common carotid artery                90 cm/s           118 cm/s Proximal internal carotid artery             78 cm/s            130 cm/s Mid internal carotid artery                      80 cm/s           82 cm/s Distal internal carotid artery                  65 cm/s             85 cm/s External carotid artery                            172 cm/s           169 cm/s    ICA/CCA ratio                                         0.5                0.9   Vertebral arteries antegrade flow         Yes                     Yes     1. MILD CAROTID PLAQUE BURDEN IN BOTH CAROTID BIFURCATIONS IN THE NECK.  2. RIGHT

## 2019-08-08 NOTE — FLOWSHEET NOTE
Shift assessment complete. There are new visual and audio hallucinations. Patient states that she has been seeing \"two children running back and forth laughing all night. \" States, \"I asked their mother to tell them to be quiet. \" Attempted to redirect patient. LACIE Castillo with Dr. Alf Moise notified.  Electronically signed by Gilberto Álvarez RN on 8/8/2019 at 8:23 AM

## 2019-08-08 NOTE — PROGRESS NOTES
MRA of the Head and Neck: No results found for this or any previous visit. No results found for this or any previous visit. Results for orders placed during the hospital encounter of 08/06/19   mra head w/o contrast    Narrative EXAM:     MRI of the brain without contrast    MRA of the brain without contrast    History: TIA. Bilateral arm numbness. Headache. Blurry vision. Weakness of the left arm and leg. Technique: Multiplanar multisequence MRI of the brain was performed without contrast. Axial 3-D time-of-flight images of the brain were obtained without contrast. 3-D volume rendered images were performed for evaluation of the cerebral vasculature. Comparison: CT brain from August 6, 2019 and MRI brain from November 7, 2018    Findings:    MRI brain:    Unchanged nonspecific 3 mm hyperintense T2 focus within the right brachium pontis. Unchanged nonspecific 5 mm hyperintense T2/FLAIR focus within the cortex of the right frontal lobe, not significant changed from prior examination. Otherwise no suspicious   white matter lesions. Brain volume is age-appropriate. Ventricular morphology is within normal limits. No acute hemorrhage, mass, mass effect, midline shift, or abnormal extra-axial fluid collection. The posterior fossa is within normal limits. There is no diffusion restriction. No susceptibility artifact is identified on the gradient echo sequence. Cranial nerves 7/8 complexes appear grossly unremarkable. The visualized paranasal sinuses and bilateral mastoid air cells are clear. MRA brain:    The bilateral distal cervical internal carotid arteries through the skull base are patent. The bilateral middle cerebral arteries through the trifurcation and opercular branches are patent. The vertebrobasilar system including the superior cerebellar and posterior cerebral arteries are patent. Posterior communicating arteries are patent.     The bilateral anterior cerebral arteries and

## 2019-08-08 NOTE — PROGRESS NOTES
4.15 08/08/2019    HGB 13.8 08/08/2019    HCT 39.1 08/08/2019     08/08/2019    MCV 94.2 08/08/2019    MCH 33.3 08/08/2019    MCHC 35.4 08/08/2019    RDW 13.2 08/08/2019    NRBC 1 11/09/2018    LYMPHOPCT 18.2 08/06/2019    MONOPCT 6.4 08/06/2019    BASOPCT 1.1 08/06/2019    MONOSABS 0.6 08/06/2019    LYMPHSABS 1.7 08/06/2019    EOSABS 0.1 08/06/2019    BASOSABS 0.1 08/06/2019     CMP:    Lab Results   Component Value Date     08/08/2019    K 3.5 08/08/2019     08/08/2019    CO2 23 08/08/2019    BUN 7 08/08/2019    CREATININE 0.99 08/08/2019    GFRAA >60.0 08/08/2019    LABGLOM >60.0 08/08/2019    GLUCOSE 149 08/08/2019    PROT 6.8 08/06/2019    LABALBU 4.1 08/06/2019    CALCIUM 8.9 08/08/2019    BILITOT 0.4 08/06/2019    ALKPHOS 129 08/06/2019    AST 41 08/06/2019    ALT 58 08/06/2019     BMP:    Lab Results   Component Value Date     08/08/2019    K 3.5 08/08/2019     08/08/2019    CO2 23 08/08/2019    BUN 7 08/08/2019    LABALBU 4.1 08/06/2019    CREATININE 0.99 08/08/2019    CALCIUM 8.9 08/08/2019    GFRAA >60.0 08/08/2019    LABGLOM >60.0 08/08/2019    GLUCOSE 149 08/08/2019     Magnesium:    Lab Results   Component Value Date    MG 2.2 08/08/2019     Troponin:    Lab Results   Component Value Date    TROPONINI <0.010 08/07/2019         Assessment/Plan:    Active Hospital Problems    Diagnosis Date Noted    Stroke determined by clinical assessment ([facility] [I63.9] 08/06/2019           Atypical chest pain: This patient has atypical chest pain with negative remote cath and no current high risk characteristics. Recommend continued observation and follow-up as outpatient with no further non-invasive or invasive workup needed. Electronically signed by Romario Warren.  Carmella Fall MD [facility] Director of Cardiology Services and Cardiac Catheterization Laboratory  [facility], [place]   on 8/8/2019 at 8:27 AM

## 2019-08-09 LAB
ANION GAP SERPL CALCULATED.3IONS-SCNC: 12 MEQ/L (ref 9–15)
BACTERIA: NEGATIVE /HPF
BILIRUBIN URINE: NEGATIVE
BLOOD, URINE: NEGATIVE
BUN BLDV-MCNC: 8 MG/DL (ref 6–20)
CALCIUM SERPL-MCNC: 8.8 MG/DL (ref 8.5–9.9)
CHLORIDE BLD-SCNC: 111 MEQ/L (ref 95–107)
CLARITY: CLEAR
CO2: 23 MEQ/L (ref 20–31)
COLOR: YELLOW
CREAT SERPL-MCNC: 1.03 MG/DL (ref 0.5–0.9)
EPITHELIAL CELLS, UA: ABNORMAL /HPF (ref 0–5)
GFR AFRICAN AMERICAN: >60
GFR NON-AFRICAN AMERICAN: 57.4
GLUCOSE BLD-MCNC: 131 MG/DL (ref 70–99)
GLUCOSE URINE: NEGATIVE MG/DL
HCT VFR BLD CALC: 38.6 % (ref 37–47)
HEMOGLOBIN: 13.5 G/DL (ref 12–16)
HYALINE CASTS: ABNORMAL /HPF (ref 0–5)
KETONES, URINE: NEGATIVE MG/DL
LEUKOCYTE ESTERASE, URINE: ABNORMAL
MCH RBC QN AUTO: 32.8 PG (ref 27–31.3)
MCHC RBC AUTO-ENTMCNC: 34.9 % (ref 33–37)
MCV RBC AUTO: 94 FL (ref 82–100)
NITRITE, URINE: NEGATIVE
PDW BLD-RTO: 13.5 % (ref 11.5–14.5)
PH UA: 8 (ref 5–9)
PLATELET # BLD: 148 K/UL (ref 130–400)
POTASSIUM REFLEX MAGNESIUM: 3.8 MEQ/L (ref 3.4–4.9)
PROTEIN UA: NEGATIVE MG/DL
RBC # BLD: 4.11 M/UL (ref 4.2–5.4)
RBC UA: ABNORMAL /HPF (ref 0–5)
SODIUM BLD-SCNC: 146 MEQ/L (ref 135–144)
SPECIFIC GRAVITY UA: 1.02 (ref 1–1.03)
UROBILINOGEN, URINE: 0.2 E.U./DL
WBC # BLD: 9.1 K/UL (ref 4.8–10.8)
WBC UA: ABNORMAL /HPF (ref 0–5)

## 2019-08-09 PROCEDURE — 99232 SBSQ HOSP IP/OBS MODERATE 35: CPT | Performed by: INTERNAL MEDICINE

## 2019-08-09 PROCEDURE — 80048 BASIC METABOLIC PNL TOTAL CA: CPT

## 2019-08-09 PROCEDURE — 6360000002 HC RX W HCPCS: Performed by: NURSE PRACTITIONER

## 2019-08-09 PROCEDURE — 1210000000 HC MED SURG R&B

## 2019-08-09 PROCEDURE — 85027 COMPLETE CBC AUTOMATED: CPT

## 2019-08-09 PROCEDURE — 6360000002 HC RX W HCPCS: Performed by: INTERNAL MEDICINE

## 2019-08-09 PROCEDURE — 6370000000 HC RX 637 (ALT 250 FOR IP): Performed by: INTERNAL MEDICINE

## 2019-08-09 PROCEDURE — 87086 URINE CULTURE/COLONY COUNT: CPT

## 2019-08-09 PROCEDURE — 97535 SELF CARE MNGMENT TRAINING: CPT

## 2019-08-09 PROCEDURE — 94640 AIRWAY INHALATION TREATMENT: CPT

## 2019-08-09 PROCEDURE — G0378 HOSPITAL OBSERVATION PER HR: HCPCS

## 2019-08-09 PROCEDURE — 81001 URINALYSIS AUTO W/SCOPE: CPT

## 2019-08-09 PROCEDURE — 6370000000 HC RX 637 (ALT 250 FOR IP): Performed by: NURSE PRACTITIONER

## 2019-08-09 PROCEDURE — 36415 COLL VENOUS BLD VENIPUNCTURE: CPT

## 2019-08-09 PROCEDURE — 96376 TX/PRO/DX INJ SAME DRUG ADON: CPT

## 2019-08-09 PROCEDURE — 96372 THER/PROPH/DIAG INJ SC/IM: CPT

## 2019-08-09 RX ORDER — FOLIC ACID 1 MG/1
1 TABLET ORAL DAILY
Status: DISCONTINUED | OUTPATIENT
Start: 2019-08-09 | End: 2019-08-10 | Stop reason: HOSPADM

## 2019-08-09 RX ORDER — KETOROLAC TROMETHAMINE 30 MG/ML
30 INJECTION, SOLUTION INTRAMUSCULAR; INTRAVENOUS ONCE
Status: COMPLETED | OUTPATIENT
Start: 2019-08-09 | End: 2019-08-09

## 2019-08-09 RX ORDER — TRAMADOL HYDROCHLORIDE 50 MG/1
25 TABLET ORAL ONCE
Status: COMPLETED | OUTPATIENT
Start: 2019-08-09 | End: 2019-08-09

## 2019-08-09 RX ORDER — ACETAMINOPHEN 80 MG
TABLET,CHEWABLE ORAL ONCE
Status: COMPLETED | OUTPATIENT
Start: 2019-08-09 | End: 2019-08-09

## 2019-08-09 RX ADMIN — PREGABALIN 50 MG: 50 CAPSULE ORAL at 20:05

## 2019-08-09 RX ADMIN — ALBUTEROL SULFATE 2.5 MG: 2.5 SOLUTION RESPIRATORY (INHALATION) at 08:31

## 2019-08-09 RX ADMIN — ASPIRIN 81 MG 81 MG: 81 TABLET ORAL at 08:04

## 2019-08-09 RX ADMIN — DILTIAZEM HYDROCHLORIDE 240 MG: 240 CAPSULE, COATED, EXTENDED RELEASE ORAL at 08:03

## 2019-08-09 RX ADMIN — BUDESONIDE 500 MCG: 0.5 INHALANT RESPIRATORY (INHALATION) at 08:31

## 2019-08-09 RX ADMIN — Medication: at 06:38

## 2019-08-09 RX ADMIN — BUTALBITAL, ACETAMINOPHEN AND CAFFEINE 1 CAPSULE: 300; 40; 50 CAPSULE ORAL at 08:04

## 2019-08-09 RX ADMIN — ALBUTEROL SULFATE 2.5 MG: 2.5 SOLUTION RESPIRATORY (INHALATION) at 14:32

## 2019-08-09 RX ADMIN — ALPRAZOLAM 0.5 MG: 0.5 TABLET ORAL at 08:04

## 2019-08-09 RX ADMIN — PANTOPRAZOLE SODIUM 40 MG: 40 TABLET, DELAYED RELEASE ORAL at 05:40

## 2019-08-09 RX ADMIN — ALPRAZOLAM 0.5 MG: 0.5 TABLET ORAL at 20:05

## 2019-08-09 RX ADMIN — BUTALBITAL, ACETAMINOPHEN AND CAFFEINE 1 CAPSULE: 300; 40; 50 CAPSULE ORAL at 23:44

## 2019-08-09 RX ADMIN — LITHIUM CARBONATE 600 MG: 300 CAPSULE, GELATIN COATED ORAL at 08:04

## 2019-08-09 RX ADMIN — MAGNESIUM OXIDE TAB 400 MG (241.3 MG ELEMENTAL MG) 400 MG: 400 (241.3 MG) TAB at 08:04

## 2019-08-09 RX ADMIN — ENOXAPARIN SODIUM 40 MG: 40 INJECTION SUBCUTANEOUS at 08:04

## 2019-08-09 RX ADMIN — KETOROLAC TROMETHAMINE 30 MG: 30 INJECTION, SOLUTION INTRAMUSCULAR at 12:21

## 2019-08-09 RX ADMIN — LOSARTAN POTASSIUM 100 MG: 50 TABLET ORAL at 08:03

## 2019-08-09 RX ADMIN — LITHIUM CARBONATE 600 MG: 300 CAPSULE, GELATIN COATED ORAL at 17:52

## 2019-08-09 RX ADMIN — ALBUTEROL SULFATE 2.5 MG: 2.5 SOLUTION RESPIRATORY (INHALATION) at 20:20

## 2019-08-09 RX ADMIN — TRAMADOL HYDROCHLORIDE 25 MG: 50 TABLET, FILM COATED ORAL at 06:36

## 2019-08-09 RX ADMIN — LURASIDONE HYDROCHLORIDE 20 MG: 20 TABLET, FILM COATED ORAL at 08:03

## 2019-08-09 RX ADMIN — LEVOTHYROXINE SODIUM 25 MCG: 25 TABLET ORAL at 05:41

## 2019-08-09 RX ADMIN — FOLIC ACID 1 MG: 1 TABLET ORAL at 12:20

## 2019-08-09 RX ADMIN — MOMETASONE FUROATE AND FORMOTEROL FUMARATE DIHYDRATE 2 PUFF: 200; 5 AEROSOL RESPIRATORY (INHALATION) at 20:20

## 2019-08-09 RX ADMIN — ISOSORBIDE MONONITRATE 30 MG: 30 TABLET, EXTENDED RELEASE ORAL at 08:03

## 2019-08-09 RX ADMIN — AMITRIPTYLINE HYDROCHLORIDE 50 MG: 50 TABLET, FILM COATED ORAL at 20:05

## 2019-08-09 RX ADMIN — PREGABALIN 50 MG: 50 CAPSULE ORAL at 08:04

## 2019-08-09 RX ADMIN — BUDESONIDE 500 MCG: 0.5 INHALANT RESPIRATORY (INHALATION) at 20:20

## 2019-08-09 RX ADMIN — ATORVASTATIN CALCIUM 40 MG: 40 TABLET, FILM COATED ORAL at 20:05

## 2019-08-09 RX ADMIN — BUTALBITAL, ACETAMINOPHEN AND CAFFEINE 1 CAPSULE: 300; 40; 50 CAPSULE ORAL at 17:52

## 2019-08-09 RX ADMIN — MOMETASONE FUROATE AND FORMOTEROL FUMARATE DIHYDRATE 2 PUFF: 200; 5 AEROSOL RESPIRATORY (INHALATION) at 08:31

## 2019-08-09 ASSESSMENT — PAIN SCALES - GENERAL
PAINLEVEL_OUTOF10: 8
PAINLEVEL_OUTOF10: 0
PAINLEVEL_OUTOF10: 6
PAINLEVEL_OUTOF10: 8
PAINLEVEL_OUTOF10: 5
PAINLEVEL_OUTOF10: 7
PAINLEVEL_OUTOF10: 7

## 2019-08-09 ASSESSMENT — ENCOUNTER SYMPTOMS
TROUBLE SWALLOWING: 0
CHEST TIGHTNESS: 0
DIARRHEA: 0
SHORTNESS OF BREATH: 0
ABDOMINAL DISTENTION: 0
NAUSEA: 0
WHEEZING: 0
ABDOMINAL PAIN: 0
VOMITING: 0
CONSTIPATION: 0
COLOR CHANGE: 0
COUGH: 0

## 2019-08-09 ASSESSMENT — PAIN DESCRIPTION - LOCATION
LOCATION: HEAD
LOCATION: HEAD;LEG
LOCATION: HEAD

## 2019-08-09 ASSESSMENT — PAIN DESCRIPTION - ORIENTATION
ORIENTATION: RIGHT;LEFT
ORIENTATION: LEFT

## 2019-08-09 ASSESSMENT — PAIN DESCRIPTION - PAIN TYPE
TYPE: ACUTE PAIN
TYPE: ACUTE PAIN

## 2019-08-09 NOTE — PROGRESS NOTES
cm/s            177 cm/s    Mid common carotid artery                   151 cm/s            152 cm/s    Distal common carotid artery                90 cm/s           118 cm/s  Proximal internal carotid artery             78 cm/s            130 cm/s  Mid internal carotid artery                      80 cm/s           82 cm/s  Distal internal carotid artery                  65 cm/s             85 cm/s  External carotid artery                            172 cm/s           169 cm/s      ICA/CCA ratio                                         0.5                0.9       Vertebral arteries antegrade flow         Yes                     Yes       Impression 1. MILD CAROTID PLAQUE BURDEN IN BOTH CAROTID BIFURCATIONS IN THE NECK. 2. RIGHT INTERNAL CAROTID ARTERY: <50% STENOSIS. 3. LEFT INTERNAL CAROTID ARTERY: <50% STENOSIS. 4. BILATERALLY PATENT VERTEBRAL ARTERIES WITH ANTEGRADE BLOOD FLOW. Validated velocity measurements with angiographic measurements, velocity criteria are extrapolated from diameter data as defined by the Society of Radiologist in [address] [place] [address] Radiology 2003; 358;397-199. Echo No results found for this or any previous visit. Assessment/Plan:    Complicated Migraine  Left weakness, intermittent  Does not have MS ( extensively investigated with LP as well)  Somatization disorder  Lumbar and cervical radiculopathy  MRI Brain and MRA head neg acute findings  If negative to d/c   Carotid Duplex <50% bilat  Echo with bubble study neg  Cont ASA 81mg daily  (see my extensive notes in Blue chart )  Hx bipolar with recent hallucinations, Psych consulted, cont same meds  Folate deficiency, start folic acid 1mg daily  Will give toradol 30mg x1 again and then ibuprofen prn x 5 days. Cont prn fioricet. OK to DC rehab, eval pending    Steven Singh MD, Tobi Bellamy, American Board of Psychiatry & Neurology  Board Certified in Vascular Neurology  Board Certified in

## 2019-08-09 NOTE — PROGRESS NOTES
attempt ambulation at this time. )          Exercises  Hip Flexion: x 10   Knee Long Arc Quad: x 10   Comments: limited ability with LLE. ASSESSMENT:  Body structures, Functions, Activity limitations: Decreased functional mobility ; Decreased strength;Decreased endurance;Decreased coordination;Decreased safe awareness;Decreased balance    Assessment: pt emotional throughout tx, at times crying. Pt unsteady and unsafe to ambulate during this session. Activity Tolerance  Activity Tolerance: Patient Tolerated treatment well       Discharge Recommendations:  Continue to assess pending progress, Patient would benefit from continued therapy after discharge    Goals:  Short term goals  Short term goal 1: Pt to complete HEP with indep  Long term goals  Long term goal 1: Pt to complete all bed mobility with indep  Long term goal 2: Pt to complete all transfers with indep  Long term goal 3: Pt to ambulate 150ft with LRD indep  Long term goal 4: Pt to manage 12 steps with HR indep    PLAN:   Plan  Times per week: 3-6  Current Treatment Recommendations: Functional Mobility Training, Strengthening, ROM, Balance Training, Transfer Training, Endurance Training, Stair training, Gait Training, Neuromuscular Re-education, Manual Therapy - Joint Manipulation, Equipment Evaluation, Education, & procurement, Home Exercise Program, Safety Education & Training, Patient/Caregiver Education & Training  Safety Devices  Type of devices:  All fall risk precautions in place, Call light within reach, Chair alarm in place, Left in chair, Nurse notified     [facility] (6 CLICK) [id number] Mobility Raw Score : 18      Therapy Time   Individual   Time In 1338   Time Out 1401   Minutes 23      BM/trsf: [address] PTA, 08/09/19 at 2:11 PM

## 2019-08-09 NOTE — PROGRESS NOTES
Date    WBC 9.1 08/09/2019    RBC 4.11 08/09/2019    HGB 13.5 08/09/2019    HCT 38.6 08/09/2019     08/09/2019    MCV 94.0 08/09/2019    MCH 32.8 08/09/2019    MCHC 34.9 08/09/2019    RDW 13.5 08/09/2019    NRBC 1 11/09/2018    LYMPHOPCT 18.2 08/06/2019    MONOPCT 6.4 08/06/2019    BASOPCT 1.1 08/06/2019    MONOSABS 0.6 08/06/2019    LYMPHSABS 1.7 08/06/2019    EOSABS 0.1 08/06/2019    BASOSABS 0.1 08/06/2019     CMP:    Lab Results   Component Value Date     08/09/2019    K 3.8 08/09/2019     08/09/2019    CO2 23 08/09/2019    BUN 8 08/09/2019    CREATININE 1.03 08/09/2019    GFRAA >60.0 08/09/2019    LABGLOM 57.4 08/09/2019    GLUCOSE 131 08/09/2019    PROT 6.8 08/06/2019    LABALBU 4.1 08/06/2019    CALCIUM 8.8 08/09/2019    BILITOT 0.4 08/06/2019    ALKPHOS 129 08/06/2019    AST 41 08/06/2019    ALT 58 08/06/2019     BMP:    Lab Results   Component Value Date     08/09/2019    K 3.8 08/09/2019     08/09/2019    CO2 23 08/09/2019    BUN 8 08/09/2019    LABALBU 4.1 08/06/2019    CREATININE 1.03 08/09/2019    CALCIUM 8.8 08/09/2019    GFRAA >60.0 08/09/2019    LABGLOM 57.4 08/09/2019    GLUCOSE 131 08/09/2019     Magnesium:    Lab Results   Component Value Date    MG 2.2 08/08/2019     Troponin:    Lab Results   Component Value Date    TROPONINI <0.010 08/07/2019         Assessment/Plan:    Active Hospital Problems    Diagnosis Date Noted    Stroke determined by clinical assessment ([facility] [I63.9] 08/06/2019    Dizziness and giddiness [R42] 04/30/2019    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   Recommend continued observation and follow-up as outpatient with no further non-invasive or invasive workup needed. No changes today. Electronically signed by Chitra Keating MD [facility] Director of Cardiology Services and Cardiac Catheterization Laboratory  [facility], [place]   on 8/9/2019 at 12:39 PM

## 2019-08-09 NOTE — PROGRESS NOTES
Hospitalist Daily Progress Note  Name: Maddie Tinoco  Age: 54 y.o. Gender: female  CodeStatus: Full Code  Allergies: Latex  Influenza Vaccines  Eggs Or Egg-Derived Products  Gabapentin  Pneumococcal Vaccines  Povidone Iodine  Varicella Virus Vaccine Live    Chief Complaint:Chest Pain      Primary Care Provider: Melanie Merrill MD    InpatientTreatment Team: Treatment Team: Attending Provider: Mark Granger MD; Consulting Physician: Harika Lewis MD; Consulting Physician: Keesha Rea DO; Consulting Physician: Malinda Araya MD; Consulting Physician: Maxine Michelle MD; Consulting Physician: Arin Solitario DO; Registered Nurse: Paty Walker RN    Admission Date: 8/6/2019      Subjective: No chest pain, sob, nausea. Patient states fatigued. Denies SI, HI. Physical Exam   Constitutional: She is oriented to person, place, and time. She appears well-developed and well-nourished. Cardiovascular: Normal rate and regular rhythm. Pulmonary/Chest: Effort normal and breath sounds normal.   Abdominal: Soft. Bowel sounds are normal.   Musculoskeletal: Normal range of motion. Neurological: She is alert and oriented to person, place, and time. Skin: Skin is warm and dry. Nursing note and vitals reviewed.       Medications:  Reviewed    Infusion Medications:   Scheduled Medications:    folic acid  1 mg Oral Daily    atorvastatin  40 mg Oral Nightly    enoxaparin  40 mg Subcutaneous Daily    ALPRAZolam  0.5 mg Oral BID    amitriptyline  50 mg Oral Nightly    aspirin  81 mg Oral Daily    budesonide  0.5 mg Nebulization BID    diltiazem  240 mg Oral Daily    mometasone-formoterol  2 puff Inhalation BID    isosorbide mononitrate  30 mg Oral Daily    levothyroxine  25 mcg Oral Daily    lithium  600 mg Oral BID WC    losartan  100 mg Oral Daily    lurasidone  20 mg Oral Daily    pantoprazole  40 mg Oral QAM AC    pregabalin  50 mg Oral BID    albuterol  2.5 mg Nebulization TID

## 2019-08-10 VITALS
SYSTOLIC BLOOD PRESSURE: 146 MMHG | HEART RATE: 75 BPM | BODY MASS INDEX: 31.39 KG/M2 | OXYGEN SATURATION: 97 % | WEIGHT: 200 LBS | RESPIRATION RATE: 16 BRPM | DIASTOLIC BLOOD PRESSURE: 72 MMHG | HEIGHT: 67 IN | TEMPERATURE: 97.7 F

## 2019-08-10 PROBLEM — R29.898 RIGHT ARM WEAKNESS: Status: ACTIVE | Noted: 2019-08-10

## 2019-08-10 LAB
ANION GAP SERPL CALCULATED.3IONS-SCNC: 11 MEQ/L (ref 9–15)
BUN BLDV-MCNC: 9 MG/DL (ref 6–20)
CALCIUM SERPL-MCNC: 9.1 MG/DL (ref 8.5–9.9)
CHLORIDE BLD-SCNC: 107 MEQ/L (ref 95–107)
CO2: 23 MEQ/L (ref 20–31)
CREAT SERPL-MCNC: 1.06 MG/DL (ref 0.5–0.9)
GFR AFRICAN AMERICAN: >60
GFR NON-AFRICAN AMERICAN: 55.5
GLUCOSE BLD-MCNC: 140 MG/DL (ref 70–99)
HCT VFR BLD CALC: 38 % (ref 37–47)
HEMOGLOBIN: 13.2 G/DL (ref 12–16)
MCH RBC QN AUTO: 33 PG (ref 27–31.3)
MCHC RBC AUTO-ENTMCNC: 34.8 % (ref 33–37)
MCV RBC AUTO: 94.8 FL (ref 82–100)
PDW BLD-RTO: 13.1 % (ref 11.5–14.5)
PLATELET # BLD: 142 K/UL (ref 130–400)
POTASSIUM REFLEX MAGNESIUM: 3.6 MEQ/L (ref 3.4–4.9)
RBC # BLD: 4.01 M/UL (ref 4.2–5.4)
SODIUM BLD-SCNC: 141 MEQ/L (ref 135–144)
WBC # BLD: 8.4 K/UL (ref 4.8–10.8)

## 2019-08-10 PROCEDURE — 94640 AIRWAY INHALATION TREATMENT: CPT

## 2019-08-10 PROCEDURE — G0378 HOSPITAL OBSERVATION PER HR: HCPCS

## 2019-08-10 PROCEDURE — 6370000000 HC RX 637 (ALT 250 FOR IP): Performed by: NURSE PRACTITIONER

## 2019-08-10 PROCEDURE — 6370000000 HC RX 637 (ALT 250 FOR IP): Performed by: INTERNAL MEDICINE

## 2019-08-10 PROCEDURE — 6360000002 HC RX W HCPCS: Performed by: INTERNAL MEDICINE

## 2019-08-10 PROCEDURE — 36415 COLL VENOUS BLD VENIPUNCTURE: CPT

## 2019-08-10 PROCEDURE — 85027 COMPLETE CBC AUTOMATED: CPT

## 2019-08-10 PROCEDURE — 96372 THER/PROPH/DIAG INJ SC/IM: CPT

## 2019-08-10 PROCEDURE — 80048 BASIC METABOLIC PNL TOTAL CA: CPT

## 2019-08-10 PROCEDURE — 94664 DEMO&/EVAL PT USE INHALER: CPT

## 2019-08-10 PROCEDURE — 99224 PR SBSQ OBSERVATION CARE/DAY 15 MINUTES: CPT | Performed by: INTERNAL MEDICINE

## 2019-08-10 RX ORDER — BUTALBITAL, ACETAMINOPHEN AND CAFFEINE 300; 40; 50 MG/1; MG/1; MG/1
1 CAPSULE ORAL EVERY 4 HOURS PRN
Qty: 45 CAPSULE | Refills: 0 | Status: ON HOLD | OUTPATIENT
Start: 2019-08-10 | End: 2020-12-24

## 2019-08-10 RX ADMIN — LEVOTHYROXINE SODIUM 25 MCG: 25 TABLET ORAL at 06:04

## 2019-08-10 RX ADMIN — LITHIUM CARBONATE 600 MG: 300 CAPSULE, GELATIN COATED ORAL at 08:01

## 2019-08-10 RX ADMIN — CYCLOBENZAPRINE HYDROCHLORIDE 10 MG: 10 TABLET, FILM COATED ORAL at 06:03

## 2019-08-10 RX ADMIN — ALPRAZOLAM 0.5 MG: 0.5 TABLET ORAL at 11:21

## 2019-08-10 RX ADMIN — LURASIDONE HYDROCHLORIDE 20 MG: 20 TABLET, FILM COATED ORAL at 08:02

## 2019-08-10 RX ADMIN — FOLIC ACID 1 MG: 1 TABLET ORAL at 08:02

## 2019-08-10 RX ADMIN — ISOSORBIDE MONONITRATE 30 MG: 30 TABLET, EXTENDED RELEASE ORAL at 08:01

## 2019-08-10 RX ADMIN — CYCLOBENZAPRINE HYDROCHLORIDE 10 MG: 10 TABLET, FILM COATED ORAL at 00:59

## 2019-08-10 RX ADMIN — PANTOPRAZOLE SODIUM 40 MG: 40 TABLET, DELAYED RELEASE ORAL at 06:04

## 2019-08-10 RX ADMIN — MOMETASONE FUROATE AND FORMOTEROL FUMARATE DIHYDRATE 2 PUFF: 200; 5 AEROSOL RESPIRATORY (INHALATION) at 07:09

## 2019-08-10 RX ADMIN — ALBUTEROL SULFATE 2.5 MG: 2.5 SOLUTION RESPIRATORY (INHALATION) at 07:09

## 2019-08-10 RX ADMIN — ALBUTEROL SULFATE 2.5 MG: 2.5 SOLUTION RESPIRATORY (INHALATION) at 13:15

## 2019-08-10 RX ADMIN — PREGABALIN 50 MG: 50 CAPSULE ORAL at 11:21

## 2019-08-10 RX ADMIN — MAGNESIUM OXIDE TAB 400 MG (241.3 MG ELEMENTAL MG) 400 MG: 400 (241.3 MG) TAB at 08:03

## 2019-08-10 RX ADMIN — DILTIAZEM HYDROCHLORIDE 240 MG: 240 CAPSULE, COATED, EXTENDED RELEASE ORAL at 08:02

## 2019-08-10 RX ADMIN — ASPIRIN 81 MG 81 MG: 81 TABLET ORAL at 08:01

## 2019-08-10 RX ADMIN — BUDESONIDE 500 MCG: 0.5 INHALANT RESPIRATORY (INHALATION) at 07:09

## 2019-08-10 RX ADMIN — DICYCLOMINE HYDROCHLORIDE 10 MG: 10 CAPSULE ORAL at 06:06

## 2019-08-10 RX ADMIN — BUTALBITAL, ACETAMINOPHEN AND CAFFEINE 1 CAPSULE: 300; 40; 50 CAPSULE ORAL at 06:06

## 2019-08-10 RX ADMIN — ENOXAPARIN SODIUM 40 MG: 40 INJECTION SUBCUTANEOUS at 08:02

## 2019-08-10 ASSESSMENT — PAIN SCALES - GENERAL: PAINLEVEL_OUTOF10: 0

## 2019-08-10 NOTE — CARE COORDINATION
Patient was denied [place]. I spoke with Dr Mallika Boothe and made him aware. I also made him aware that her stay at [address] was denied. He stated that he had a conversation with her 2 days ago and she was aware that if rehab was denied that she would need to go home. He stated he will order home care and will be up soon to discuss with the patient. I went in and spoke with the patient and made her aware that she was denied rehab and that I had spoken with Dr Mallika Boothe. She is aware that she will be discharged to home with Cristian De La Garza today. I gave her freedom of choice and she would like [facility]. Andrei verified. Still need [facility] order, RN aware. I asked the patient how she would get home and she stated she will find a ride. I made her aware we can provide a taxi voucher if needed and she verbalized understanding. Electronically signed by Ciarra Reddy RN on 8/10/19 at 1:01 PM    C[facility]ian De La [facility]za order in computer. I called Olga Lidia from [facility] and gave her the referral.  She is aware patient is leaving today.   Electronically signed by Ciarra Reddy RN on 8/10/19 at 2:28 PM

## 2019-08-10 NOTE — PROGRESS NOTES
Primary    Cleaning Responsibility: Primary    Bill Paying/Finance Responsibility: Primary    Shopping Responsibility: Primary    Ambulation Assistance: Independent    Transfer Assistance: Independent    Active : Yes    Occupation: Full time employment    Type of occupation: disabled-  - in charges of [employer]       Subjective/HPI    EKG:        Review of Systems:   Review of Systems      Physical Examination:    BP (!) 146/72   Pulse 75   Temp 97.7 °F (36.5 °C) (Oral)   Resp 16   Ht 5' 7\" (1.702 m)   Wt 200 lb (90.7 kg)   SpO2 97%   BMI 31.32 kg/m²    Physical Exam    LABS:  CBC:   Lab Results   Component Value Date    WBC 8.4 08/10/2019    RBC 4.01 08/10/2019    HGB 13.2 08/10/2019    HCT 38.0 08/10/2019    MCV 94.8 08/10/2019    MCH 33.0 08/10/2019    MCHC 34.8 08/10/2019    RDW 13.1 08/10/2019     08/10/2019    MPV 9.6 09/20/2015     CBC with Differential:    Lab Results   Component Value Date    WBC 8.4 08/10/2019    RBC 4.01 08/10/2019    HGB 13.2 08/10/2019    HCT 38.0 08/10/2019     08/10/2019    MCV 94.8 08/10/2019    MCH 33.0 08/10/2019    MCHC 34.8 08/10/2019    RDW 13.1 08/10/2019    NRBC 1 11/09/2018    LYMPHOPCT 18.2 08/06/2019    MONOPCT 6.4 08/06/2019    BASOPCT 1.1 08/06/2019    MONOSABS 0.6 08/06/2019    LYMPHSABS 1.7 08/06/2019    EOSABS 0.1 08/06/2019    BASOSABS 0.1 08/06/2019     CMP:    Lab Results   Component Value Date     08/10/2019    K 3.6 08/10/2019     08/10/2019    CO2 23 08/10/2019    BUN 9 08/10/2019    CREATININE 1.06 08/10/2019    GFRAA >60.0 08/10/2019    LABGLOM 55.5 08/10/2019    GLUCOSE 140 08/10/2019    PROT 6.8 08/06/2019    LABALBU 4.1 08/06/2019    CALCIUM 9.1 08/10/2019    BILITOT 0.4 08/06/2019    ALKPHOS 129 08/06/2019    AST 41 08/06/2019    ALT 58 08/06/2019     BMP:    Lab Results   Component Value Date     08/10/2019    K 3.6 08/10/2019     08/10/2019    CO2 23 08/10/2019    BUN 9

## 2019-08-11 LAB — URINE CULTURE, ROUTINE: NORMAL

## 2019-08-12 ENCOUNTER — TELEPHONE (OUTPATIENT)
Dept: FAMILY MEDICINE CLINIC | Age: 49
End: 2019-08-12

## 2019-08-12 NOTE — PROGRESS NOTES
Physical Therapy  Facility/Department: [facility] OYSL [id number]  Physical Therapy Discharge      NAME: Rakesh Mcguire    : 1970 (54 y.o.)  MRN: [id number]    Account: [de-identified]  Gender: female      Patient has been discharged from acute care hospital. DC patient from current PT program.      Electronically signed by Wayne Steen PT on 19 at 9:21 AM

## 2019-08-21 DIAGNOSIS — R73.9 HYPERGLYCEMIA: ICD-10-CM

## 2019-08-21 RX ORDER — LANCETS 28 GAUGE
EACH MISCELLANEOUS
Qty: 100 EACH | Refills: 10 | Status: SHIPPED | OUTPATIENT
Start: 2019-08-21

## 2019-08-28 DIAGNOSIS — E55.9 VITAMIN D DEFICIENCY: ICD-10-CM

## 2019-08-28 RX ORDER — CHOLECALCIFEROL (VITAMIN D3) 50 MCG
2000 TABLET ORAL DAILY
Qty: 30 TABLET | Refills: 5 | Status: ON HOLD | OUTPATIENT
Start: 2019-08-28 | End: 2019-11-18

## 2019-08-30 ENCOUNTER — CARE COORDINATION (OUTPATIENT)
Dept: CARE COORDINATION | Age: 49
End: 2019-08-30

## 2019-10-12 ENCOUNTER — APPOINTMENT (OUTPATIENT)
Dept: GENERAL RADIOLOGY | Age: 49
End: 2019-10-12
Payer: COMMERCIAL

## 2019-10-12 ENCOUNTER — HOSPITAL ENCOUNTER (EMERGENCY)
Age: 49
Discharge: HOME | End: 2019-10-13
Payer: COMMERCIAL

## 2019-10-12 DIAGNOSIS — T74.91XA DOMESTIC VIOLENCE OF ADULT, INITIAL ENCOUNTER: ICD-10-CM

## 2019-10-12 DIAGNOSIS — J40 BRONCHITIS: Primary | ICD-10-CM

## 2019-10-12 LAB
BASOPHILS ABSOLUTE: 0.1 K/UL (ref 0–0.2)
BASOPHILS RELATIVE PERCENT: 0.5 %
EKG ATRIAL RATE: 112 BPM
EKG P AXIS: 65 DEGREES
EKG P-R INTERVAL: 156 MS
EKG Q-T INTERVAL: 352 MS
EKG QRS DURATION: 88 MS
EKG QTC CALCULATION (BAZETT): 480 MS
EKG R AXIS: 58 DEGREES
EKG T AXIS: 41 DEGREES
EKG VENTRICULAR RATE: 112 BPM
EOSINOPHILS ABSOLUTE: 0.1 K/UL (ref 0–0.7)
EOSINOPHILS RELATIVE PERCENT: 0.5 %
HCT VFR BLD CALC: 44.5 % (ref 37–47)
HEMOGLOBIN: 15.5 G/DL (ref 12–16)
LYMPHOCYTES ABSOLUTE: 1.6 K/UL (ref 1–4.8)
LYMPHOCYTES RELATIVE PERCENT: 14.3 %
MCH RBC QN AUTO: 32.2 PG (ref 27–31.3)
MCHC RBC AUTO-ENTMCNC: 35 % (ref 33–37)
MCV RBC AUTO: 92 FL (ref 82–100)
MONOCYTES ABSOLUTE: 0.7 K/UL (ref 0.2–0.8)
MONOCYTES RELATIVE PERCENT: 6.4 %
NEUTROPHILS ABSOLUTE: 8.7 K/UL (ref 1.4–6.5)
NEUTROPHILS RELATIVE PERCENT: 78.3 %
PDW BLD-RTO: 13.5 % (ref 11.5–14.5)
PLATELET # BLD: 159 K/UL (ref 130–400)
RBC # BLD: 4.83 M/UL (ref 4.2–5.4)
WBC # BLD: 11.1 K/UL (ref 4.8–10.8)

## 2019-10-12 PROCEDURE — 96374 THER/PROPH/DIAG INJ IV PUSH: CPT

## 2019-10-12 PROCEDURE — 6360000002 HC RX W HCPCS: Performed by: PERSONAL EMERGENCY RESPONSE ATTENDANT

## 2019-10-12 PROCEDURE — 80053 COMPREHEN METABOLIC PANEL: CPT

## 2019-10-12 PROCEDURE — 71046 X-RAY EXAM CHEST 2 VIEWS: CPT

## 2019-10-12 PROCEDURE — 99284 EMERGENCY DEPT VISIT MOD MDM: CPT

## 2019-10-12 PROCEDURE — 93005 ELECTROCARDIOGRAM TRACING: CPT

## 2019-10-12 PROCEDURE — 36415 COLL VENOUS BLD VENIPUNCTURE: CPT

## 2019-10-12 PROCEDURE — 84484 ASSAY OF TROPONIN QUANT: CPT

## 2019-10-12 PROCEDURE — 85025 COMPLETE CBC W/AUTO DIFF WBC: CPT

## 2019-10-12 RX ORDER — LORAZEPAM 2 MG/ML
1 INJECTION INTRAMUSCULAR ONCE
Status: COMPLETED | OUTPATIENT
Start: 2019-10-12 | End: 2019-10-12

## 2019-10-12 RX ORDER — METHYLPREDNISOLONE SODIUM SUCCINATE 125 MG/2ML
125 INJECTION, POWDER, LYOPHILIZED, FOR SOLUTION INTRAMUSCULAR; INTRAVENOUS ONCE
Status: COMPLETED | OUTPATIENT
Start: 2019-10-12 | End: 2019-10-12

## 2019-10-12 RX ADMIN — LORAZEPAM 1 MG: 2 INJECTION INTRAMUSCULAR; INTRAVENOUS at 23:07

## 2019-10-12 RX ADMIN — METHYLPREDNISOLONE SODIUM SUCCINATE 125 MG: 125 INJECTION, POWDER, FOR SOLUTION INTRAMUSCULAR; INTRAVENOUS at 23:06

## 2019-10-12 ASSESSMENT — ENCOUNTER SYMPTOMS
DIARRHEA: 0
RHINORRHEA: 0
BLOOD IN STOOL: 0
SORE THROAT: 0
SHORTNESS OF BREATH: 1
COLOR CHANGE: 0
ABDOMINAL PAIN: 0
VOMITING: 0
NAUSEA: 0
COUGH: 1

## 2019-10-12 ASSESSMENT — PAIN DESCRIPTION - LOCATION
LOCATION: HEAD
LOCATION: CHEST

## 2019-10-12 ASSESSMENT — PAIN SCALES - GENERAL: PAINLEVEL_OUTOF10: 5

## 2019-10-12 ASSESSMENT — PAIN DESCRIPTION - PAIN TYPE: TYPE: ACUTE PAIN

## 2019-10-13 VITALS
SYSTOLIC BLOOD PRESSURE: 133 MMHG | HEIGHT: 67 IN | TEMPERATURE: 98.2 F | DIASTOLIC BLOOD PRESSURE: 73 MMHG | HEART RATE: 99 BPM | RESPIRATION RATE: 18 BRPM | WEIGHT: 200 LBS | BODY MASS INDEX: 31.39 KG/M2 | OXYGEN SATURATION: 100 %

## 2019-10-13 LAB
ALBUMIN SERPL-MCNC: 4.4 G/DL (ref 3.5–4.6)
ALP BLD-CCNC: 130 U/L (ref 40–130)
ALT SERPL-CCNC: 66 U/L (ref 0–33)
ANION GAP SERPL CALCULATED.3IONS-SCNC: 18 MEQ/L (ref 9–15)
AST SERPL-CCNC: 60 U/L (ref 0–35)
BILIRUB SERPL-MCNC: 0.3 MG/DL (ref 0.2–0.7)
BUN BLDV-MCNC: 9 MG/DL (ref 6–20)
CALCIUM SERPL-MCNC: 8.9 MG/DL (ref 8.5–9.9)
CHLORIDE BLD-SCNC: 104 MEQ/L (ref 95–107)
CO2: 17 MEQ/L (ref 20–31)
CREAT SERPL-MCNC: 1.12 MG/DL (ref 0.5–0.9)
GFR AFRICAN AMERICAN: >60
GFR NON-AFRICAN AMERICAN: 52.1
GLOBULIN: 3 G/DL (ref 2.3–3.5)
GLUCOSE BLD-MCNC: 179 MG/DL (ref 70–99)
POTASSIUM SERPL-SCNC: 5.4 MEQ/L (ref 3.4–4.9)
SODIUM BLD-SCNC: 139 MEQ/L (ref 135–144)
TOTAL PROTEIN: 7.4 G/DL (ref 6.3–8)
TROPONIN: <0.01 NG/ML (ref 0–0.01)

## 2019-10-13 RX ORDER — AZITHROMYCIN 250 MG/1
TABLET, FILM COATED ORAL
Qty: 1 PACKET | Refills: 0 | Status: SHIPPED | OUTPATIENT
Start: 2019-10-13 | End: 2019-10-23

## 2019-10-13 RX ORDER — TIZANIDINE 4 MG/1
4 TABLET ORAL EVERY 6 HOURS PRN
Qty: 10 TABLET | Refills: 0 | Status: ON HOLD | OUTPATIENT
Start: 2019-10-13 | End: 2020-12-24

## 2019-10-23 RX ORDER — ALBUTEROL SULFATE 90 UG/1
2 AEROSOL, METERED RESPIRATORY (INHALATION) EVERY 6 HOURS PRN
Qty: 1 INHALER | Refills: 3 | Status: SHIPPED | OUTPATIENT
Start: 2019-10-23

## 2019-11-12 DIAGNOSIS — I10 ESSENTIAL HYPERTENSION: ICD-10-CM

## 2019-11-15 RX ORDER — LOSARTAN POTASSIUM 100 MG/1
100 TABLET ORAL DAILY
Qty: 30 TABLET | Refills: 6 | Status: ON HOLD | OUTPATIENT
Start: 2019-11-15 | End: 2019-11-18

## 2019-11-18 ENCOUNTER — APPOINTMENT (OUTPATIENT)
Dept: GENERAL RADIOLOGY | Age: 49
End: 2019-11-18

## 2019-11-18 ENCOUNTER — HOSPITAL ENCOUNTER (OUTPATIENT)
Age: 49
Setting detail: OBSERVATION
Discharge: HOME | End: 2019-11-20
Attending: EMERGENCY MEDICINE | Admitting: INTERNAL MEDICINE

## 2019-11-18 DIAGNOSIS — R05.9 COUGH: ICD-10-CM

## 2019-11-18 DIAGNOSIS — R06.89 DYSPNEA AND RESPIRATORY ABNORMALITIES: ICD-10-CM

## 2019-11-18 DIAGNOSIS — J40 BRONCHITIS: Primary | ICD-10-CM

## 2019-11-18 DIAGNOSIS — R06.00 DYSPNEA AND RESPIRATORY ABNORMALITIES: ICD-10-CM

## 2019-11-18 DIAGNOSIS — J44.1 ACUTE EXACERBATION OF CHRONIC OBSTRUCTIVE PULMONARY DISEASE (COPD) (HCC): ICD-10-CM

## 2019-11-18 LAB
ALBUMIN SERPL-MCNC: 4.6 G/DL (ref 3.5–4.6)
ALP BLD-CCNC: 152 U/L (ref 40–130)
ALT SERPL-CCNC: 96 U/L (ref 0–33)
ANION GAP SERPL CALCULATED.3IONS-SCNC: 13 MEQ/L (ref 9–15)
AST SERPL-CCNC: 53 U/L (ref 0–35)
BILIRUB SERPL-MCNC: <0.2 MG/DL (ref 0.2–0.7)
BUN BLDV-MCNC: 11 MG/DL (ref 6–20)
CALCIUM SERPL-MCNC: 9.8 MG/DL (ref 8.5–9.9)
CHLORIDE BLD-SCNC: 106 MEQ/L (ref 95–107)
CO2: 23 MEQ/L (ref 20–31)
CREAT SERPL-MCNC: 0.82 MG/DL (ref 0.5–0.9)
EKG ATRIAL RATE: 79 BPM
EKG P AXIS: 49 DEGREES
EKG P-R INTERVAL: 164 MS
EKG Q-T INTERVAL: 422 MS
EKG QRS DURATION: 96 MS
EKG QTC CALCULATION (BAZETT): 483 MS
EKG R AXIS: 55 DEGREES
EKG T AXIS: 82 DEGREES
EKG VENTRICULAR RATE: 79 BPM
GFR AFRICAN AMERICAN: >60
GFR NON-AFRICAN AMERICAN: >60
GLOBULIN: 2.6 G/DL (ref 2.3–3.5)
GLUCOSE BLD-MCNC: 134 MG/DL (ref 70–99)
HCT VFR BLD CALC: 45.5 % (ref 37–47)
HEMOGLOBIN: 15.8 G/DL (ref 12–16)
INFLUENZA A BY PCR: NEGATIVE
INFLUENZA B BY PCR: NEGATIVE
MCH RBC QN AUTO: 32 PG (ref 27–31.3)
MCHC RBC AUTO-ENTMCNC: 34.7 % (ref 33–37)
MCV RBC AUTO: 92.2 FL (ref 82–100)
PDW BLD-RTO: 13.4 % (ref 11.5–14.5)
PLATELET # BLD: 163 K/UL (ref 130–400)
POTASSIUM SERPL-SCNC: 4 MEQ/L (ref 3.4–4.9)
PROCALCITONIN: 0.11 NG/ML (ref 0–0.15)
RBC # BLD: 4.93 M/UL (ref 4.2–5.4)
SODIUM BLD-SCNC: 142 MEQ/L (ref 135–144)
TOTAL PROTEIN: 7.2 G/DL (ref 6.3–8)
WBC # BLD: 8.1 K/UL (ref 4.8–10.8)

## 2019-11-18 PROCEDURE — 94640 AIRWAY INHALATION TREATMENT: CPT

## 2019-11-18 PROCEDURE — 87502 INFLUENZA DNA AMP PROBE: CPT

## 2019-11-18 PROCEDURE — G0378 HOSPITAL OBSERVATION PER HR: HCPCS

## 2019-11-18 PROCEDURE — 87040 BLOOD CULTURE FOR BACTERIA: CPT

## 2019-11-18 PROCEDURE — 96374 THER/PROPH/DIAG INJ IV PUSH: CPT

## 2019-11-18 PROCEDURE — 84145 PROCALCITONIN (PCT): CPT

## 2019-11-18 PROCEDURE — 71045 X-RAY EXAM CHEST 1 VIEW: CPT

## 2019-11-18 PROCEDURE — 6370000000 HC RX 637 (ALT 250 FOR IP): Performed by: EMERGENCY MEDICINE

## 2019-11-18 PROCEDURE — 85027 COMPLETE CBC AUTOMATED: CPT

## 2019-11-18 PROCEDURE — 94664 DEMO&/EVAL PT USE INHALER: CPT

## 2019-11-18 PROCEDURE — 6360000002 HC RX W HCPCS: Performed by: PHYSICIAN ASSISTANT

## 2019-11-18 PROCEDURE — 2580000003 HC RX 258: Performed by: PHYSICIAN ASSISTANT

## 2019-11-18 PROCEDURE — 93005 ELECTROCARDIOGRAM TRACING: CPT

## 2019-11-18 PROCEDURE — 36415 COLL VENOUS BLD VENIPUNCTURE: CPT

## 2019-11-18 PROCEDURE — 2580000003 HC RX 258: Performed by: EMERGENCY MEDICINE

## 2019-11-18 PROCEDURE — 96375 TX/PRO/DX INJ NEW DRUG ADDON: CPT

## 2019-11-18 PROCEDURE — 96376 TX/PRO/DX INJ SAME DRUG ADON: CPT

## 2019-11-18 PROCEDURE — 6360000002 HC RX W HCPCS: Performed by: EMERGENCY MEDICINE

## 2019-11-18 PROCEDURE — 80053 COMPREHEN METABOLIC PANEL: CPT

## 2019-11-18 PROCEDURE — 99284 EMERGENCY DEPT VISIT MOD MDM: CPT

## 2019-11-18 RX ORDER — CYANOCOBALAMIN 1000 UG/ML
1 INJECTION INTRAMUSCULAR; SUBCUTANEOUS
Status: ON HOLD | COMMUNITY
Start: 2018-07-25 | End: 2020-12-24

## 2019-11-18 RX ORDER — METHYLPREDNISOLONE SODIUM SUCCINATE 40 MG/ML
40 INJECTION, POWDER, LYOPHILIZED, FOR SOLUTION INTRAMUSCULAR; INTRAVENOUS EVERY 6 HOURS
Status: DISCONTINUED | OUTPATIENT
Start: 2019-11-18 | End: 2019-11-20 | Stop reason: HOSPADM

## 2019-11-18 RX ORDER — METHYLPREDNISOLONE SODIUM SUCCINATE 125 MG/2ML
125 INJECTION, POWDER, LYOPHILIZED, FOR SOLUTION INTRAMUSCULAR; INTRAVENOUS ONCE
Status: COMPLETED | OUTPATIENT
Start: 2019-11-18 | End: 2019-11-18

## 2019-11-18 RX ORDER — UREA 10 %
1 LOTION (ML) TOPICAL DAILY
Status: ON HOLD | COMMUNITY
Start: 2018-06-04 | End: 2019-11-18

## 2019-11-18 RX ORDER — ALBUTEROL SULFATE 2.5 MG/3ML
2.5 SOLUTION RESPIRATORY (INHALATION)
Status: DISCONTINUED | OUTPATIENT
Start: 2019-11-18 | End: 2019-11-20 | Stop reason: HOSPADM

## 2019-11-18 RX ORDER — IBUPROFEN 800 MG/1
800 TABLET ORAL EVERY 6 HOURS PRN
Status: ON HOLD | COMMUNITY
Start: 2018-07-26 | End: 2020-01-09 | Stop reason: HOSPADM

## 2019-11-18 RX ORDER — SODIUM CHLORIDE 0.9 % (FLUSH) 0.9 %
10 SYRINGE (ML) INJECTION PRN
Status: DISCONTINUED | OUTPATIENT
Start: 2019-11-18 | End: 2019-11-20 | Stop reason: HOSPADM

## 2019-11-18 RX ORDER — IPRATROPIUM BROMIDE AND ALBUTEROL SULFATE 2.5; .5 MG/3ML; MG/3ML
1 SOLUTION RESPIRATORY (INHALATION)
Status: DISCONTINUED | OUTPATIENT
Start: 2019-11-19 | End: 2019-11-18

## 2019-11-18 RX ORDER — MAGNESIUM OXIDE 400 MG/1
1 TABLET ORAL DAILY
Refills: 5 | COMMUNITY
Start: 2019-09-30 | End: 2021-04-06 | Stop reason: ALTCHOICE

## 2019-11-18 RX ORDER — ONDANSETRON 2 MG/ML
4 INJECTION INTRAMUSCULAR; INTRAVENOUS EVERY 6 HOURS PRN
Status: DISCONTINUED | OUTPATIENT
Start: 2019-11-18 | End: 2019-11-20 | Stop reason: HOSPADM

## 2019-11-18 RX ORDER — HYDROCHLOROTHIAZIDE 25 MG/1
25 TABLET ORAL DAILY
Status: ON HOLD | COMMUNITY
Start: 2018-07-29 | End: 2020-12-24

## 2019-11-18 RX ORDER — NICOTINE 21 MG/24HR
1 PATCH, TRANSDERMAL 24 HOURS TRANSDERMAL DAILY
Status: DISCONTINUED | OUTPATIENT
Start: 2019-11-18 | End: 2019-11-20 | Stop reason: HOSPADM

## 2019-11-18 RX ORDER — ONDANSETRON 2 MG/ML
4 INJECTION INTRAMUSCULAR; INTRAVENOUS ONCE
Status: COMPLETED | OUTPATIENT
Start: 2019-11-18 | End: 2019-11-18

## 2019-11-18 RX ORDER — MORPHINE SULFATE 2 MG/ML
4 INJECTION, SOLUTION INTRAMUSCULAR; INTRAVENOUS ONCE
Status: COMPLETED | OUTPATIENT
Start: 2019-11-18 | End: 2019-11-18

## 2019-11-18 RX ORDER — ATORVASTATIN CALCIUM 10 MG/1
1 TABLET, FILM COATED ORAL DAILY
Refills: 5 | Status: ON HOLD | COMMUNITY
Start: 2019-10-31 | End: 2020-01-09 | Stop reason: SDUPTHER

## 2019-11-18 RX ORDER — BUDESONIDE AND FORMOTEROL FUMARATE DIHYDRATE 160; 4.5 UG/1; UG/1
2 AEROSOL RESPIRATORY (INHALATION)
COMMUNITY
Start: 2018-05-21 | End: 2020-01-09 | Stop reason: ALTCHOICE

## 2019-11-18 RX ORDER — SODIUM CHLORIDE 0.9 % (FLUSH) 0.9 %
10 SYRINGE (ML) INJECTION EVERY 12 HOURS SCHEDULED
Status: DISCONTINUED | OUTPATIENT
Start: 2019-11-18 | End: 2019-11-20 | Stop reason: HOSPADM

## 2019-11-18 RX ORDER — PREDNISONE 20 MG/1
40 TABLET ORAL DAILY
Status: DISCONTINUED | OUTPATIENT
Start: 2019-11-25 | End: 2019-11-20 | Stop reason: HOSPADM

## 2019-11-18 RX ORDER — IPRATROPIUM BROMIDE AND ALBUTEROL SULFATE 2.5; .5 MG/3ML; MG/3ML
1 SOLUTION RESPIRATORY (INHALATION) PRN
Status: DISCONTINUED | OUTPATIENT
Start: 2019-11-18 | End: 2019-11-18

## 2019-11-18 RX ORDER — SODIUM CHLORIDE 9 MG/ML
INJECTION, SOLUTION INTRAVENOUS CONTINUOUS
Status: DISCONTINUED | OUTPATIENT
Start: 2019-11-18 | End: 2019-11-19

## 2019-11-18 RX ORDER — 0.9 % SODIUM CHLORIDE 0.9 %
500 INTRAVENOUS SOLUTION INTRAVENOUS ONCE
Status: COMPLETED | OUTPATIENT
Start: 2019-11-18 | End: 2019-11-18

## 2019-11-18 RX ORDER — IPRATROPIUM BROMIDE AND ALBUTEROL SULFATE 2.5; .5 MG/3ML; MG/3ML
1 SOLUTION RESPIRATORY (INHALATION) 3 TIMES DAILY
Status: DISCONTINUED | OUTPATIENT
Start: 2019-11-19 | End: 2019-11-20 | Stop reason: HOSPADM

## 2019-11-18 RX ORDER — FLUTICASONE PROPIONATE 50 MCG
1 SPRAY, SUSPENSION (ML) NASAL 2 TIMES DAILY
Status: ON HOLD | COMMUNITY
Start: 2019-04-12 | End: 2021-01-06 | Stop reason: HOSPADM

## 2019-11-18 RX ADMIN — SODIUM CHLORIDE 500 ML: 9 INJECTION, SOLUTION INTRAVENOUS at 15:47

## 2019-11-18 RX ADMIN — IPRATROPIUM BROMIDE AND ALBUTEROL SULFATE 1 AMPULE: .5; 3 SOLUTION RESPIRATORY (INHALATION) at 18:08

## 2019-11-18 RX ADMIN — SODIUM CHLORIDE: 9 INJECTION, SOLUTION INTRAVENOUS at 23:02

## 2019-11-18 RX ADMIN — ONDANSETRON 4 MG: 2 INJECTION INTRAMUSCULAR; INTRAVENOUS at 15:48

## 2019-11-18 RX ADMIN — IPRATROPIUM BROMIDE AND ALBUTEROL SULFATE 1 AMPULE: .5; 3 SOLUTION RESPIRATORY (INHALATION) at 15:36

## 2019-11-18 RX ADMIN — MORPHINE SULFATE 4 MG: 2 INJECTION, SOLUTION INTRAMUSCULAR; INTRAVENOUS at 15:48

## 2019-11-18 RX ADMIN — METHYLPREDNISOLONE SODIUM SUCCINATE 40 MG: 40 INJECTION, POWDER, FOR SOLUTION INTRAMUSCULAR; INTRAVENOUS at 23:02

## 2019-11-18 RX ADMIN — METHYLPREDNISOLONE SODIUM SUCCINATE 125 MG: 125 INJECTION, POWDER, FOR SOLUTION INTRAMUSCULAR; INTRAVENOUS at 15:49

## 2019-11-18 ASSESSMENT — PAIN SCALES - GENERAL
PAINLEVEL_OUTOF10: 6
PAINLEVEL_OUTOF10: 7

## 2019-11-18 ASSESSMENT — ENCOUNTER SYMPTOMS
VOMITING: 0
RHINORRHEA: 0
WHEEZING: 0
CHEST TIGHTNESS: 1
COLOR CHANGE: 0
PHOTOPHOBIA: 0
SHORTNESS OF BREATH: 1
EYE DISCHARGE: 0
FACIAL SWELLING: 0
ABDOMINAL DISTENTION: 0
COUGH: 1
ABDOMINAL PAIN: 0

## 2019-11-18 ASSESSMENT — PAIN DESCRIPTION - PAIN TYPE
TYPE: ACUTE PAIN
TYPE: ACUTE PAIN

## 2019-11-18 ASSESSMENT — PAIN DESCRIPTION - FREQUENCY: FREQUENCY: INTERMITTENT

## 2019-11-18 ASSESSMENT — PAIN DESCRIPTION - DESCRIPTORS: DESCRIPTORS: SHARP

## 2019-11-18 ASSESSMENT — PAIN DESCRIPTION - LOCATION
LOCATION: HEAD;CHEST
LOCATION: HEAD;CHEST

## 2019-11-18 ASSESSMENT — PAIN DESCRIPTION - ONSET: ONSET: ON-GOING

## 2019-11-19 LAB
ALBUMIN SERPL-MCNC: 4 G/DL (ref 3.5–4.6)
ALP BLD-CCNC: 117 U/L (ref 40–130)
ALT SERPL-CCNC: 73 U/L (ref 0–33)
ANION GAP SERPL CALCULATED.3IONS-SCNC: 17 MEQ/L (ref 9–15)
AST SERPL-CCNC: 27 U/L (ref 0–35)
BASOPHILS ABSOLUTE: 0.1 K/UL (ref 0–0.2)
BASOPHILS RELATIVE PERCENT: 0.5 %
BILIRUB SERPL-MCNC: <0.2 MG/DL (ref 0.2–0.7)
BILIRUBIN DIRECT: <0.2 MG/DL (ref 0–0.4)
BILIRUBIN, INDIRECT: ABNORMAL MG/DL (ref 0–0.6)
BUN BLDV-MCNC: 14 MG/DL (ref 6–20)
CALCIUM SERPL-MCNC: 9.6 MG/DL (ref 8.5–9.9)
CHLORIDE BLD-SCNC: 108 MEQ/L (ref 95–107)
CO2: 20 MEQ/L (ref 20–31)
CREAT SERPL-MCNC: 0.94 MG/DL (ref 0.5–0.9)
EOSINOPHILS ABSOLUTE: 0 K/UL (ref 0–0.7)
EOSINOPHILS RELATIVE PERCENT: 0 %
GFR AFRICAN AMERICAN: >60
GFR NON-AFRICAN AMERICAN: >60
GLUCOSE BLD-MCNC: 197 MG/DL (ref 70–99)
HCT VFR BLD CALC: 46.6 % (ref 37–47)
HEMOGLOBIN: 15.7 G/DL (ref 12–16)
LITHIUM LEVEL: 0.2 MEQ/L (ref 0.6–1.2)
LYMPHOCYTES ABSOLUTE: 1 K/UL (ref 1–4.8)
LYMPHOCYTES RELATIVE PERCENT: 7.8 %
MCH RBC QN AUTO: 32 PG (ref 27–31.3)
MCHC RBC AUTO-ENTMCNC: 33.8 % (ref 33–37)
MCV RBC AUTO: 94.7 FL (ref 82–100)
MONOCYTES ABSOLUTE: 0.2 K/UL (ref 0.2–0.8)
MONOCYTES RELATIVE PERCENT: 1.5 %
NEUTROPHILS ABSOLUTE: 11.9 K/UL (ref 1.4–6.5)
NEUTROPHILS RELATIVE PERCENT: 90.2 %
PDW BLD-RTO: 13.6 % (ref 11.5–14.5)
PLATELET # BLD: 167 K/UL (ref 130–400)
POTASSIUM REFLEX MAGNESIUM: 4 MEQ/L (ref 3.4–4.9)
RBC # BLD: 4.92 M/UL (ref 4.2–5.4)
SODIUM BLD-SCNC: 145 MEQ/L (ref 135–144)
TOTAL PROTEIN: 6.6 G/DL (ref 6.3–8)
WBC # BLD: 13.1 K/UL (ref 4.8–10.8)

## 2019-11-19 PROCEDURE — 6370000000 HC RX 637 (ALT 250 FOR IP): Performed by: INTERNAL MEDICINE

## 2019-11-19 PROCEDURE — 94761 N-INVAS EAR/PLS OXIMETRY MLT: CPT

## 2019-11-19 PROCEDURE — 85025 COMPLETE CBC W/AUTO DIFF WBC: CPT

## 2019-11-19 PROCEDURE — 96375 TX/PRO/DX INJ NEW DRUG ADDON: CPT

## 2019-11-19 PROCEDURE — 6360000002 HC RX W HCPCS: Performed by: INTERNAL MEDICINE

## 2019-11-19 PROCEDURE — 80048 BASIC METABOLIC PNL TOTAL CA: CPT

## 2019-11-19 PROCEDURE — 6370000000 HC RX 637 (ALT 250 FOR IP): Performed by: PHYSICIAN ASSISTANT

## 2019-11-19 PROCEDURE — 94760 N-INVAS EAR/PLS OXIMETRY 1: CPT

## 2019-11-19 PROCEDURE — 6360000002 HC RX W HCPCS: Performed by: PHYSICIAN ASSISTANT

## 2019-11-19 PROCEDURE — 2580000003 HC RX 258: Performed by: PHYSICIAN ASSISTANT

## 2019-11-19 PROCEDURE — 94640 AIRWAY INHALATION TREATMENT: CPT

## 2019-11-19 PROCEDURE — G0378 HOSPITAL OBSERVATION PER HR: HCPCS

## 2019-11-19 PROCEDURE — 96376 TX/PRO/DX INJ SAME DRUG ADON: CPT

## 2019-11-19 PROCEDURE — 80076 HEPATIC FUNCTION PANEL: CPT

## 2019-11-19 PROCEDURE — 80178 ASSAY OF LITHIUM: CPT

## 2019-11-19 PROCEDURE — 36415 COLL VENOUS BLD VENIPUNCTURE: CPT

## 2019-11-19 RX ORDER — TRIAMTERENE AND HYDROCHLOROTHIAZIDE 37.5; 25 MG/1; MG/1
1 TABLET ORAL DAILY
Status: DISCONTINUED | OUTPATIENT
Start: 2019-11-19 | End: 2019-11-19

## 2019-11-19 RX ORDER — HYDROCHLOROTHIAZIDE 25 MG/1
25 TABLET ORAL DAILY
Status: DISCONTINUED | OUTPATIENT
Start: 2019-11-19 | End: 2019-11-20 | Stop reason: HOSPADM

## 2019-11-19 RX ORDER — ASPIRIN 81 MG/1
81 TABLET, CHEWABLE ORAL DAILY
Status: DISCONTINUED | OUTPATIENT
Start: 2019-11-19 | End: 2019-11-20 | Stop reason: HOSPADM

## 2019-11-19 RX ORDER — PANTOPRAZOLE SODIUM 40 MG/1
40 TABLET, DELAYED RELEASE ORAL
Status: DISCONTINUED | OUTPATIENT
Start: 2019-11-20 | End: 2019-11-20 | Stop reason: HOSPADM

## 2019-11-19 RX ORDER — IBUPROFEN 400 MG/1
400 TABLET ORAL
Status: DISCONTINUED | OUTPATIENT
Start: 2019-11-19 | End: 2019-11-20 | Stop reason: HOSPADM

## 2019-11-19 RX ORDER — ALPRAZOLAM 0.5 MG/1
0.5 TABLET ORAL 2 TIMES DAILY
Status: DISCONTINUED | OUTPATIENT
Start: 2019-11-19 | End: 2019-11-20 | Stop reason: HOSPADM

## 2019-11-19 RX ORDER — ACETAMINOPHEN 325 MG/1
650 TABLET ORAL EVERY 4 HOURS PRN
Status: DISCONTINUED | OUTPATIENT
Start: 2019-11-19 | End: 2019-11-20 | Stop reason: HOSPADM

## 2019-11-19 RX ORDER — ATORVASTATIN CALCIUM 10 MG/1
10 TABLET, FILM COATED ORAL DAILY
Status: DISCONTINUED | OUTPATIENT
Start: 2019-11-19 | End: 2019-11-20 | Stop reason: HOSPADM

## 2019-11-19 RX ORDER — LITHIUM CARBONATE 300 MG/1
600 CAPSULE ORAL 2 TIMES DAILY WITH MEALS
Status: DISCONTINUED | OUTPATIENT
Start: 2019-11-19 | End: 2019-11-20 | Stop reason: HOSPADM

## 2019-11-19 RX ORDER — CYCLOBENZAPRINE HCL 10 MG
10 TABLET ORAL 2 TIMES DAILY PRN
Status: DISCONTINUED | OUTPATIENT
Start: 2019-11-19 | End: 2019-11-20 | Stop reason: HOSPADM

## 2019-11-19 RX ORDER — ISOSORBIDE MONONITRATE 30 MG/1
30 TABLET, EXTENDED RELEASE ORAL DAILY
Status: DISCONTINUED | OUTPATIENT
Start: 2019-11-19 | End: 2019-11-20 | Stop reason: HOSPADM

## 2019-11-19 RX ORDER — LEVOTHYROXINE SODIUM 0.03 MG/1
25 TABLET ORAL DAILY
Status: DISCONTINUED | OUTPATIENT
Start: 2019-11-19 | End: 2019-11-20 | Stop reason: HOSPADM

## 2019-11-19 RX ORDER — KETOROLAC TROMETHAMINE 15 MG/ML
15 INJECTION, SOLUTION INTRAMUSCULAR; INTRAVENOUS ONCE
Status: COMPLETED | OUTPATIENT
Start: 2019-11-19 | End: 2019-11-19

## 2019-11-19 RX ORDER — DICYCLOMINE HYDROCHLORIDE 10 MG/1
10 CAPSULE ORAL EVERY 6 HOURS PRN
Status: DISCONTINUED | OUTPATIENT
Start: 2019-11-19 | End: 2019-11-20 | Stop reason: HOSPADM

## 2019-11-19 RX ORDER — AMITRIPTYLINE HYDROCHLORIDE 50 MG/1
50 TABLET, FILM COATED ORAL NIGHTLY
Status: DISCONTINUED | OUTPATIENT
Start: 2019-11-19 | End: 2019-11-20 | Stop reason: HOSPADM

## 2019-11-19 RX ORDER — TIZANIDINE 4 MG/1
4 TABLET ORAL EVERY 6 HOURS PRN
Status: DISCONTINUED | OUTPATIENT
Start: 2019-11-19 | End: 2019-11-20 | Stop reason: HOSPADM

## 2019-11-19 RX ORDER — LANOLIN ALCOHOL/MO/W.PET/CERES
400 CREAM (GRAM) TOPICAL DAILY
Status: DISCONTINUED | OUTPATIENT
Start: 2019-11-19 | End: 2019-11-20 | Stop reason: HOSPADM

## 2019-11-19 RX ADMIN — ISOSORBIDE MONONITRATE 30 MG: 30 TABLET, EXTENDED RELEASE ORAL at 11:53

## 2019-11-19 RX ADMIN — METHYLPREDNISOLONE SODIUM SUCCINATE 40 MG: 40 INJECTION, POWDER, FOR SOLUTION INTRAMUSCULAR; INTRAVENOUS at 21:26

## 2019-11-19 RX ADMIN — ATORVASTATIN CALCIUM 10 MG: 10 TABLET, FILM COATED ORAL at 11:52

## 2019-11-19 RX ADMIN — MOMETASONE FUROATE AND FORMOTEROL FUMARATE DIHYDRATE 2 PUFF: 100; 5 AEROSOL RESPIRATORY (INHALATION) at 19:19

## 2019-11-19 RX ADMIN — ASPIRIN 81 MG 81 MG: 81 TABLET ORAL at 11:53

## 2019-11-19 RX ADMIN — ALPRAZOLAM 0.5 MG: 0.5 TABLET ORAL at 21:26

## 2019-11-19 RX ADMIN — LITHIUM CARBONATE 600 MG: 300 CAPSULE, GELATIN COATED ORAL at 11:53

## 2019-11-19 RX ADMIN — CYCLOBENZAPRINE 10 MG: 10 TABLET, FILM COATED ORAL at 11:52

## 2019-11-19 RX ADMIN — AMITRIPTYLINE HYDROCHLORIDE 50 MG: 50 TABLET, FILM COATED ORAL at 21:26

## 2019-11-19 RX ADMIN — METHYLPREDNISOLONE SODIUM SUCCINATE 40 MG: 40 INJECTION, POWDER, FOR SOLUTION INTRAMUSCULAR; INTRAVENOUS at 04:57

## 2019-11-19 RX ADMIN — ALPRAZOLAM 0.5 MG: 0.5 TABLET ORAL at 11:52

## 2019-11-19 RX ADMIN — IPRATROPIUM BROMIDE AND ALBUTEROL SULFATE 1 AMPULE: .5; 3 SOLUTION RESPIRATORY (INHALATION) at 15:35

## 2019-11-19 RX ADMIN — HYDROCHLOROTHIAZIDE 25 MG: 25 TABLET ORAL at 11:52

## 2019-11-19 RX ADMIN — LEVOTHYROXINE SODIUM 25 MCG: 25 TABLET ORAL at 11:53

## 2019-11-19 RX ADMIN — IPRATROPIUM BROMIDE AND ALBUTEROL SULFATE 1 AMPULE: .5; 3 SOLUTION RESPIRATORY (INHALATION) at 07:25

## 2019-11-19 RX ADMIN — KETOROLAC TROMETHAMINE 15 MG: 15 INJECTION, SOLUTION INTRAMUSCULAR; INTRAVENOUS at 01:20

## 2019-11-19 RX ADMIN — METHYLPREDNISOLONE SODIUM SUCCINATE 40 MG: 40 INJECTION, POWDER, FOR SOLUTION INTRAMUSCULAR; INTRAVENOUS at 09:19

## 2019-11-19 RX ADMIN — IBUPROFEN 400 MG: 400 TABLET, FILM COATED ORAL at 18:04

## 2019-11-19 RX ADMIN — ALBUTEROL SULFATE 2.5 MG: 2.5 SOLUTION RESPIRATORY (INHALATION) at 05:24

## 2019-11-19 RX ADMIN — METHYLPREDNISOLONE SODIUM SUCCINATE 40 MG: 40 INJECTION, POWDER, FOR SOLUTION INTRAMUSCULAR; INTRAVENOUS at 18:04

## 2019-11-19 RX ADMIN — Medication 10 ML: at 09:20

## 2019-11-19 RX ADMIN — ALBUTEROL SULFATE 2.5 MG: 2.5 SOLUTION RESPIRATORY (INHALATION) at 11:13

## 2019-11-19 RX ADMIN — ACETAMINOPHEN 650 MG: 325 TABLET ORAL at 05:41

## 2019-11-19 RX ADMIN — IPRATROPIUM BROMIDE AND ALBUTEROL SULFATE 1 AMPULE: .5; 3 SOLUTION RESPIRATORY (INHALATION) at 19:19

## 2019-11-19 RX ADMIN — Medication 10 ML: at 21:27

## 2019-11-19 RX ADMIN — ACETAMINOPHEN 650 MG: 325 TABLET ORAL at 18:41

## 2019-11-19 RX ADMIN — IBUPROFEN 400 MG: 400 TABLET, FILM COATED ORAL at 11:53

## 2019-11-19 ASSESSMENT — PAIN SCALES - GENERAL
PAINLEVEL_OUTOF10: 6
PAINLEVEL_OUTOF10: 7
PAINLEVEL_OUTOF10: 5
PAINLEVEL_OUTOF10: 8
PAINLEVEL_OUTOF10: 6
PAINLEVEL_OUTOF10: 7
PAINLEVEL_OUTOF10: 5

## 2019-11-20 VITALS
HEART RATE: 69 BPM | HEIGHT: 67 IN | TEMPERATURE: 97.3 F | BODY MASS INDEX: 31.38 KG/M2 | DIASTOLIC BLOOD PRESSURE: 77 MMHG | RESPIRATION RATE: 16 BRPM | WEIGHT: 199.96 LBS | OXYGEN SATURATION: 94 % | SYSTOLIC BLOOD PRESSURE: 153 MMHG

## 2019-11-20 LAB
ALBUMIN SERPL-MCNC: 4 G/DL (ref 3.5–4.6)
ALP BLD-CCNC: 117 U/L (ref 40–130)
ALT SERPL-CCNC: 62 U/L (ref 0–33)
ANION GAP SERPL CALCULATED.3IONS-SCNC: 10 MEQ/L (ref 9–15)
AST SERPL-CCNC: 18 U/L (ref 0–35)
BASOPHILS ABSOLUTE: 0 K/UL (ref 0–0.2)
BASOPHILS RELATIVE PERCENT: 0.1 %
BILIRUB SERPL-MCNC: <0.2 MG/DL (ref 0.2–0.7)
BILIRUBIN DIRECT: <0.2 MG/DL (ref 0–0.4)
BILIRUBIN, INDIRECT: ABNORMAL MG/DL (ref 0–0.6)
BUN BLDV-MCNC: 19 MG/DL (ref 6–20)
CALCIUM SERPL-MCNC: 9.7 MG/DL (ref 8.5–9.9)
CHLORIDE BLD-SCNC: 106 MEQ/L (ref 95–107)
CO2: 22 MEQ/L (ref 20–31)
CREAT SERPL-MCNC: 1.04 MG/DL (ref 0.5–0.9)
EOSINOPHILS ABSOLUTE: 0 K/UL (ref 0–0.7)
EOSINOPHILS RELATIVE PERCENT: 0 %
GFR AFRICAN AMERICAN: >60
GFR NON-AFRICAN AMERICAN: 56.7
GLUCOSE BLD-MCNC: 269 MG/DL (ref 70–99)
HCT VFR BLD CALC: 40.9 % (ref 37–47)
HEMOGLOBIN: 13.6 G/DL (ref 12–16)
LYMPHOCYTES ABSOLUTE: 1 K/UL (ref 1–4.8)
LYMPHOCYTES RELATIVE PERCENT: 5.1 %
MCH RBC QN AUTO: 31.6 PG (ref 27–31.3)
MCHC RBC AUTO-ENTMCNC: 33.4 % (ref 33–37)
MCV RBC AUTO: 94.7 FL (ref 82–100)
MONOCYTES ABSOLUTE: 0.5 K/UL (ref 0.2–0.8)
MONOCYTES RELATIVE PERCENT: 2.7 %
NEUTROPHILS ABSOLUTE: 17.9 K/UL (ref 1.4–6.5)
NEUTROPHILS RELATIVE PERCENT: 92.1 %
PDW BLD-RTO: 13.6 % (ref 11.5–14.5)
PLATELET # BLD: 161 K/UL (ref 130–400)
POTASSIUM REFLEX MAGNESIUM: 4.3 MEQ/L (ref 3.4–4.9)
RBC # BLD: 4.32 M/UL (ref 4.2–5.4)
SODIUM BLD-SCNC: 138 MEQ/L (ref 135–144)
TOTAL PROTEIN: 6.4 G/DL (ref 6.3–8)
WBC # BLD: 19.4 K/UL (ref 4.8–10.8)

## 2019-11-20 PROCEDURE — 96376 TX/PRO/DX INJ SAME DRUG ADON: CPT

## 2019-11-20 PROCEDURE — G0378 HOSPITAL OBSERVATION PER HR: HCPCS

## 2019-11-20 PROCEDURE — 6370000000 HC RX 637 (ALT 250 FOR IP): Performed by: INTERNAL MEDICINE

## 2019-11-20 PROCEDURE — 2580000003 HC RX 258: Performed by: PHYSICIAN ASSISTANT

## 2019-11-20 PROCEDURE — 94761 N-INVAS EAR/PLS OXIMETRY MLT: CPT

## 2019-11-20 PROCEDURE — 94640 AIRWAY INHALATION TREATMENT: CPT

## 2019-11-20 PROCEDURE — 6370000000 HC RX 637 (ALT 250 FOR IP): Performed by: PHYSICIAN ASSISTANT

## 2019-11-20 PROCEDURE — 80048 BASIC METABOLIC PNL TOTAL CA: CPT

## 2019-11-20 PROCEDURE — 6360000002 HC RX W HCPCS: Performed by: PHYSICIAN ASSISTANT

## 2019-11-20 PROCEDURE — 85025 COMPLETE CBC W/AUTO DIFF WBC: CPT

## 2019-11-20 PROCEDURE — 80076 HEPATIC FUNCTION PANEL: CPT

## 2019-11-20 PROCEDURE — 36415 COLL VENOUS BLD VENIPUNCTURE: CPT

## 2019-11-20 RX ORDER — PREDNISONE 20 MG/1
40 TABLET ORAL DAILY
Qty: 20 TABLET | Refills: 0 | Status: SHIPPED | OUTPATIENT
Start: 2019-11-25 | End: 2019-12-05

## 2019-11-20 RX ADMIN — LITHIUM CARBONATE 600 MG: 300 CAPSULE, GELATIN COATED ORAL at 08:54

## 2019-11-20 RX ADMIN — MOMETASONE FUROATE AND FORMOTEROL FUMARATE DIHYDRATE 2 PUFF: 100; 5 AEROSOL RESPIRATORY (INHALATION) at 07:31

## 2019-11-20 RX ADMIN — IPRATROPIUM BROMIDE AND ALBUTEROL SULFATE 1 AMPULE: .5; 3 SOLUTION RESPIRATORY (INHALATION) at 13:37

## 2019-11-20 RX ADMIN — METHYLPREDNISOLONE SODIUM SUCCINATE 40 MG: 40 INJECTION, POWDER, FOR SOLUTION INTRAMUSCULAR; INTRAVENOUS at 11:36

## 2019-11-20 RX ADMIN — ALPRAZOLAM 0.5 MG: 0.5 TABLET ORAL at 08:53

## 2019-11-20 RX ADMIN — ISOSORBIDE MONONITRATE 30 MG: 30 TABLET, EXTENDED RELEASE ORAL at 08:53

## 2019-11-20 RX ADMIN — Medication 400 MG: at 11:36

## 2019-11-20 RX ADMIN — HYDROCHLOROTHIAZIDE 25 MG: 25 TABLET ORAL at 08:53

## 2019-11-20 RX ADMIN — Medication 10 ML: at 08:58

## 2019-11-20 RX ADMIN — IBUPROFEN 400 MG: 400 TABLET, FILM COATED ORAL at 08:54

## 2019-11-20 RX ADMIN — IPRATROPIUM BROMIDE AND ALBUTEROL SULFATE 1 AMPULE: .5; 3 SOLUTION RESPIRATORY (INHALATION) at 07:30

## 2019-11-20 RX ADMIN — LURASIDONE HYDROCHLORIDE 20 MG: 20 TABLET, FILM COATED ORAL at 08:52

## 2019-11-20 RX ADMIN — LEVOTHYROXINE SODIUM 25 MCG: 25 TABLET ORAL at 08:53

## 2019-11-20 RX ADMIN — CYCLOBENZAPRINE 10 MG: 10 TABLET, FILM COATED ORAL at 06:29

## 2019-11-20 RX ADMIN — ASPIRIN 81 MG 81 MG: 81 TABLET ORAL at 08:53

## 2019-11-20 RX ADMIN — PANTOPRAZOLE SODIUM 40 MG: 40 TABLET, DELAYED RELEASE ORAL at 06:23

## 2019-11-20 RX ADMIN — ATORVASTATIN CALCIUM 10 MG: 10 TABLET, FILM COATED ORAL at 08:54

## 2019-11-20 RX ADMIN — IBUPROFEN 400 MG: 400 TABLET, FILM COATED ORAL at 13:52

## 2019-11-20 RX ADMIN — METHYLPREDNISOLONE SODIUM SUCCINATE 40 MG: 40 INJECTION, POWDER, FOR SOLUTION INTRAMUSCULAR; INTRAVENOUS at 06:22

## 2019-11-20 ASSESSMENT — PAIN DESCRIPTION - LOCATION: LOCATION: HEAD;NECK;BACK

## 2019-11-20 ASSESSMENT — PAIN SCALES - GENERAL
PAINLEVEL_OUTOF10: 5
PAINLEVEL_OUTOF10: 7

## 2019-11-20 ASSESSMENT — PAIN DESCRIPTION - DESCRIPTORS: DESCRIPTORS: ACHING

## 2019-11-20 ASSESSMENT — PAIN DESCRIPTION - PAIN TYPE: TYPE: ACUTE PAIN

## 2019-11-20 ASSESSMENT — PAIN DESCRIPTION - FREQUENCY: FREQUENCY: CONTINUOUS

## 2019-11-23 LAB
BLOOD CULTURE, ROUTINE: NORMAL
CULTURE, BLOOD 2: NORMAL

## 2019-11-26 DIAGNOSIS — M54.42 CHRONIC RIGHT-SIDED LOW BACK PAIN WITH BILATERAL SCIATICA: ICD-10-CM

## 2019-11-26 DIAGNOSIS — M54.41 CHRONIC RIGHT-SIDED LOW BACK PAIN WITH BILATERAL SCIATICA: ICD-10-CM

## 2019-11-26 DIAGNOSIS — G89.29 CHRONIC RIGHT-SIDED LOW BACK PAIN WITH BILATERAL SCIATICA: ICD-10-CM

## 2019-11-26 RX ORDER — CYCLOBENZAPRINE HCL 10 MG
10 TABLET ORAL 2 TIMES DAILY PRN
Qty: 60 TABLET | Refills: 0 | Status: SHIPPED | OUTPATIENT
Start: 2019-11-26 | End: 2021-02-17

## 2019-12-24 ENCOUNTER — HOSPITAL ENCOUNTER (EMERGENCY)
Age: 49
Discharge: HOME | End: 2019-12-24
Attending: NEUROMUSCULOSKELETAL MEDICINE, SPORTS MEDICINE

## 2019-12-24 VITALS
HEART RATE: 84 BPM | WEIGHT: 200 LBS | TEMPERATURE: 98.3 F | SYSTOLIC BLOOD PRESSURE: 148 MMHG | RESPIRATION RATE: 20 BRPM | HEIGHT: 67 IN | OXYGEN SATURATION: 100 % | DIASTOLIC BLOOD PRESSURE: 93 MMHG | BODY MASS INDEX: 31.39 KG/M2

## 2019-12-24 DIAGNOSIS — G43.009 MIGRAINE WITHOUT AURA AND WITHOUT STATUS MIGRAINOSUS, NOT INTRACTABLE: Primary | ICD-10-CM

## 2019-12-24 LAB
ALBUMIN SERPL-MCNC: 4.2 G/DL (ref 3.5–4.6)
ALP BLD-CCNC: 134 U/L (ref 40–130)
ALT SERPL-CCNC: 99 U/L (ref 0–33)
ANION GAP SERPL CALCULATED.3IONS-SCNC: 11 MEQ/L (ref 9–15)
AST SERPL-CCNC: 60 U/L (ref 0–35)
BASOPHILS ABSOLUTE: 0.1 K/UL (ref 0–0.2)
BASOPHILS RELATIVE PERCENT: 0.8 %
BILIRUB SERPL-MCNC: <0.2 MG/DL (ref 0.2–0.7)
BUN BLDV-MCNC: 8 MG/DL (ref 6–20)
CALCIUM SERPL-MCNC: 9.3 MG/DL (ref 8.5–9.9)
CHLORIDE BLD-SCNC: 105 MEQ/L (ref 95–107)
CO2: 25 MEQ/L (ref 20–31)
CREAT SERPL-MCNC: 1.04 MG/DL (ref 0.5–0.9)
EOSINOPHILS ABSOLUTE: 0.1 K/UL (ref 0–0.7)
EOSINOPHILS RELATIVE PERCENT: 1 %
GFR AFRICAN AMERICAN: >60
GFR NON-AFRICAN AMERICAN: 56.7
GLOBULIN: 2.4 G/DL (ref 2.3–3.5)
GLUCOSE BLD-MCNC: 132 MG/DL (ref 70–99)
HCT VFR BLD CALC: 43.4 % (ref 37–47)
HEMOGLOBIN: 15 G/DL (ref 12–16)
LYMPHOCYTES ABSOLUTE: 1.8 K/UL (ref 1–4.8)
LYMPHOCYTES RELATIVE PERCENT: 22.8 %
MCH RBC QN AUTO: 32.1 PG (ref 27–31.3)
MCHC RBC AUTO-ENTMCNC: 34.6 % (ref 33–37)
MCV RBC AUTO: 92.9 FL (ref 82–100)
MONOCYTES ABSOLUTE: 0.5 K/UL (ref 0.2–0.8)
MONOCYTES RELATIVE PERCENT: 6.8 %
NEUTROPHILS ABSOLUTE: 5.4 K/UL (ref 1.4–6.5)
NEUTROPHILS RELATIVE PERCENT: 68.6 %
PDW BLD-RTO: 13.7 % (ref 11.5–14.5)
PLATELET # BLD: 146 K/UL (ref 130–400)
POTASSIUM SERPL-SCNC: 3.8 MEQ/L (ref 3.4–4.9)
RBC # BLD: 4.67 M/UL (ref 4.2–5.4)
SODIUM BLD-SCNC: 141 MEQ/L (ref 135–144)
TOTAL PROTEIN: 6.6 G/DL (ref 6.3–8)
WBC # BLD: 7.8 K/UL (ref 4.8–10.8)

## 2019-12-24 PROCEDURE — 99283 EMERGENCY DEPT VISIT LOW MDM: CPT

## 2019-12-24 PROCEDURE — 36415 COLL VENOUS BLD VENIPUNCTURE: CPT

## 2019-12-24 PROCEDURE — 6370000000 HC RX 637 (ALT 250 FOR IP): Performed by: NEUROMUSCULOSKELETAL MEDICINE, SPORTS MEDICINE

## 2019-12-24 PROCEDURE — 96375 TX/PRO/DX INJ NEW DRUG ADDON: CPT

## 2019-12-24 PROCEDURE — 6360000002 HC RX W HCPCS: Performed by: NEUROMUSCULOSKELETAL MEDICINE, SPORTS MEDICINE

## 2019-12-24 PROCEDURE — 96374 THER/PROPH/DIAG INJ IV PUSH: CPT

## 2019-12-24 PROCEDURE — 80053 COMPREHEN METABOLIC PANEL: CPT

## 2019-12-24 PROCEDURE — 85025 COMPLETE CBC W/AUTO DIFF WBC: CPT

## 2019-12-24 RX ORDER — DEXAMETHASONE SODIUM PHOSPHATE 10 MG/ML
10 INJECTION INTRAMUSCULAR; INTRAVENOUS ONCE
Status: DISCONTINUED | OUTPATIENT
Start: 2019-12-24 | End: 2019-12-24 | Stop reason: RX

## 2019-12-24 RX ORDER — PROMETHAZINE HYDROCHLORIDE 25 MG/1
25 TABLET ORAL ONCE
Status: COMPLETED | OUTPATIENT
Start: 2019-12-24 | End: 2019-12-24

## 2019-12-24 RX ORDER — KETOROLAC TROMETHAMINE 30 MG/ML
30 INJECTION, SOLUTION INTRAMUSCULAR; INTRAVENOUS ONCE
Status: COMPLETED | OUTPATIENT
Start: 2019-12-24 | End: 2019-12-24

## 2019-12-24 RX ORDER — DEXAMETHASONE SODIUM PHOSPHATE 4 MG/ML
10 INJECTION, SOLUTION INTRA-ARTICULAR; INTRALESIONAL; INTRAMUSCULAR; INTRAVENOUS; SOFT TISSUE ONCE
Status: COMPLETED | OUTPATIENT
Start: 2019-12-24 | End: 2019-12-24

## 2019-12-24 RX ORDER — NALBUPHINE HCL 10 MG/ML
10 AMPUL (ML) INJECTION ONCE
Status: COMPLETED | OUTPATIENT
Start: 2019-12-24 | End: 2019-12-24

## 2019-12-24 RX ADMIN — PROMETHAZINE HYDROCHLORIDE 25 MG: 25 TABLET ORAL at 21:52

## 2019-12-24 RX ADMIN — NALBUPHINE HYDROCHLORIDE 10 MG: 10 INJECTION, SOLUTION INTRAMUSCULAR; INTRAVENOUS; SUBCUTANEOUS at 21:52

## 2019-12-24 RX ADMIN — DEXAMETHASONE SODIUM PHOSPHATE 10 MG: 4 INJECTION, SOLUTION INTRAMUSCULAR; INTRAVENOUS at 21:52

## 2019-12-24 RX ADMIN — KETOROLAC TROMETHAMINE 30 MG: 30 INJECTION, SOLUTION INTRAMUSCULAR; INTRAVENOUS at 20:54

## 2019-12-24 ASSESSMENT — ENCOUNTER SYMPTOMS
SHORTNESS OF BREATH: 0
ABDOMINAL PAIN: 0
VOMITING: 0
EYE PAIN: 0
EYE REDNESS: 0
WHEEZING: 0
COUGH: 0
DIARRHEA: 0
SORE THROAT: 0
SINUS PAIN: 0
EYE DISCHARGE: 0
NAUSEA: 0

## 2019-12-24 ASSESSMENT — PAIN DESCRIPTION - DESCRIPTORS: DESCRIPTORS: CONSTANT

## 2019-12-24 ASSESSMENT — PAIN SCALES - GENERAL
PAINLEVEL_OUTOF10: 1
PAINLEVEL_OUTOF10: 7
PAINLEVEL_OUTOF10: 2
PAINLEVEL_OUTOF10: 5

## 2019-12-24 ASSESSMENT — PAIN DESCRIPTION - PAIN TYPE
TYPE: ACUTE PAIN
TYPE: ACUTE PAIN

## 2019-12-24 ASSESSMENT — PAIN DESCRIPTION - FREQUENCY: FREQUENCY: CONTINUOUS

## 2020-01-08 ENCOUNTER — APPOINTMENT (OUTPATIENT)
Dept: GENERAL RADIOLOGY | Age: 50
End: 2020-01-08
Payer: COMMERCIAL

## 2020-01-08 ENCOUNTER — HOSPITAL ENCOUNTER (OUTPATIENT)
Age: 50
Setting detail: OBSERVATION
Discharge: HOME | End: 2020-01-09
Attending: INTERNAL MEDICINE | Admitting: INTERNAL MEDICINE
Payer: COMMERCIAL

## 2020-01-08 LAB
ALBUMIN SERPL-MCNC: 4.1 G/DL (ref 3.5–4.6)
ALP BLD-CCNC: 122 U/L (ref 40–130)
ALT SERPL-CCNC: 68 U/L (ref 0–33)
ANION GAP SERPL CALCULATED.3IONS-SCNC: 10 MEQ/L (ref 9–15)
AST SERPL-CCNC: 34 U/L (ref 0–35)
BASOPHILS ABSOLUTE: 0.1 K/UL (ref 0–0.2)
BASOPHILS RELATIVE PERCENT: 0.7 %
BILIRUB SERPL-MCNC: 0.5 MG/DL (ref 0.2–0.7)
BUN BLDV-MCNC: 6 MG/DL (ref 6–20)
CALCIUM SERPL-MCNC: 9.3 MG/DL (ref 8.5–9.9)
CHLORIDE BLD-SCNC: 102 MEQ/L (ref 95–107)
CO2: 28 MEQ/L (ref 20–31)
CREAT SERPL-MCNC: 0.83 MG/DL (ref 0.5–0.9)
EOSINOPHILS ABSOLUTE: 0.1 K/UL (ref 0–0.7)
EOSINOPHILS RELATIVE PERCENT: 1 %
GFR AFRICAN AMERICAN: >60
GFR NON-AFRICAN AMERICAN: >60
GLOBULIN: 2.4 G/DL (ref 2.3–3.5)
GLUCOSE BLD-MCNC: 143 MG/DL (ref 70–99)
HCT VFR BLD CALC: 42.5 % (ref 37–47)
HEMOGLOBIN: 14.7 G/DL (ref 12–16)
LYMPHOCYTES ABSOLUTE: 1.7 K/UL (ref 1–4.8)
LYMPHOCYTES RELATIVE PERCENT: 18.3 %
MCH RBC QN AUTO: 31.8 PG (ref 27–31.3)
MCHC RBC AUTO-ENTMCNC: 34.5 % (ref 33–37)
MCV RBC AUTO: 92.1 FL (ref 82–100)
MONOCYTES ABSOLUTE: 0.6 K/UL (ref 0.2–0.8)
MONOCYTES RELATIVE PERCENT: 6.8 %
NEUTROPHILS ABSOLUTE: 7 K/UL (ref 1.4–6.5)
NEUTROPHILS RELATIVE PERCENT: 73.2 %
PDW BLD-RTO: 13.6 % (ref 11.5–14.5)
PLATELET # BLD: 146 K/UL (ref 130–400)
POTASSIUM SERPL-SCNC: 3.8 MEQ/L (ref 3.4–4.9)
RBC # BLD: 4.62 M/UL (ref 4.2–5.4)
SODIUM BLD-SCNC: 140 MEQ/L (ref 135–144)
TOTAL CK: 53 U/L (ref 0–170)
TOTAL PROTEIN: 6.5 G/DL (ref 6.3–8)
TROPONIN: <0.01 NG/ML (ref 0–0.01)
WBC # BLD: 9.5 K/UL (ref 4.8–10.8)

## 2020-01-08 PROCEDURE — 85025 COMPLETE CBC W/AUTO DIFF WBC: CPT

## 2020-01-08 PROCEDURE — 84484 ASSAY OF TROPONIN QUANT: CPT

## 2020-01-08 PROCEDURE — 71045 X-RAY EXAM CHEST 1 VIEW: CPT

## 2020-01-08 PROCEDURE — 82550 ASSAY OF CK (CPK): CPT

## 2020-01-08 PROCEDURE — 36415 COLL VENOUS BLD VENIPUNCTURE: CPT

## 2020-01-08 PROCEDURE — 2580000003 HC RX 258: Performed by: PHYSICIAN ASSISTANT

## 2020-01-08 PROCEDURE — 80053 COMPREHEN METABOLIC PANEL: CPT

## 2020-01-08 PROCEDURE — 6370000000 HC RX 637 (ALT 250 FOR IP): Performed by: PHYSICIAN ASSISTANT

## 2020-01-08 PROCEDURE — 85379 FIBRIN DEGRADATION QUANT: CPT

## 2020-01-08 PROCEDURE — 93005 ELECTROCARDIOGRAM TRACING: CPT | Performed by: PHYSICIAN ASSISTANT

## 2020-01-08 PROCEDURE — 99285 EMERGENCY DEPT VISIT HI MDM: CPT

## 2020-01-08 RX ORDER — NITROGLYCERIN 0.4 MG/1
0.4 TABLET SUBLINGUAL EVERY 5 MIN PRN
Status: DISCONTINUED | OUTPATIENT
Start: 2020-01-08 | End: 2020-01-09 | Stop reason: HOSPADM

## 2020-01-08 RX ORDER — ASPIRIN 81 MG/1
324 TABLET, CHEWABLE ORAL ONCE
Status: COMPLETED | OUTPATIENT
Start: 2020-01-08 | End: 2020-01-08

## 2020-01-08 RX ORDER — 0.9 % SODIUM CHLORIDE 0.9 %
1000 INTRAVENOUS SOLUTION INTRAVENOUS ONCE
Status: COMPLETED | OUTPATIENT
Start: 2020-01-08 | End: 2020-01-09

## 2020-01-08 RX ADMIN — ASPIRIN 81 MG 324 MG: 81 TABLET ORAL at 22:56

## 2020-01-08 RX ADMIN — NITROGLYCERIN 0.4 MG: 0.4 TABLET, ORALLY DISINTEGRATING SUBLINGUAL at 22:56

## 2020-01-08 RX ADMIN — SODIUM CHLORIDE 1000 ML: 9 INJECTION, SOLUTION INTRAVENOUS at 22:56

## 2020-01-08 ASSESSMENT — PAIN DESCRIPTION - DESCRIPTORS: DESCRIPTORS: SHARP;TINGLING;NUMBNESS

## 2020-01-08 ASSESSMENT — PAIN DESCRIPTION - LOCATION: LOCATION: CHEST;ARM

## 2020-01-08 ASSESSMENT — PAIN SCALES - GENERAL: PAINLEVEL_OUTOF10: 7

## 2020-01-08 ASSESSMENT — PAIN DESCRIPTION - PAIN TYPE: TYPE: ACUTE PAIN

## 2020-01-08 ASSESSMENT — PAIN DESCRIPTION - FREQUENCY: FREQUENCY: CONTINUOUS

## 2020-01-08 ASSESSMENT — PAIN DESCRIPTION - ORIENTATION: ORIENTATION: LEFT

## 2020-01-09 ENCOUNTER — APPOINTMENT (OUTPATIENT)
Dept: CT IMAGING | Age: 50
End: 2020-01-09
Payer: COMMERCIAL

## 2020-01-09 VITALS
BODY MASS INDEX: 31.39 KG/M2 | DIASTOLIC BLOOD PRESSURE: 73 MMHG | OXYGEN SATURATION: 97 % | HEART RATE: 75 BPM | RESPIRATION RATE: 16 BRPM | WEIGHT: 200 LBS | TEMPERATURE: 100.1 F | SYSTOLIC BLOOD PRESSURE: 123 MMHG | HEIGHT: 67 IN

## 2020-01-09 PROBLEM — R07.9 CHEST PAIN: Status: ACTIVE | Noted: 2020-01-09

## 2020-01-09 LAB
D DIMER: 0.68 MG/L FEU (ref 0–0.5)
EKG ATRIAL RATE: 70 BPM
EKG ATRIAL RATE: 78 BPM
EKG P AXIS: 45 DEGREES
EKG P AXIS: 62 DEGREES
EKG P-R INTERVAL: 160 MS
EKG P-R INTERVAL: 166 MS
EKG Q-T INTERVAL: 414 MS
EKG Q-T INTERVAL: 422 MS
EKG QRS DURATION: 102 MS
EKG QRS DURATION: 92 MS
EKG QTC CALCULATION (BAZETT): 447 MS
EKG QTC CALCULATION (BAZETT): 481 MS
EKG R AXIS: 56 DEGREES
EKG R AXIS: 62 DEGREES
EKG T AXIS: 49 DEGREES
EKG T AXIS: 56 DEGREES
EKG VENTRICULAR RATE: 70 BPM
EKG VENTRICULAR RATE: 78 BPM
PRO-BNP: 294 PG/ML
TROPONIN: <0.01 NG/ML (ref 0–0.01)
TROPONIN: <0.01 NG/ML (ref 0–0.01)

## 2020-01-09 PROCEDURE — 96374 THER/PROPH/DIAG INJ IV PUSH: CPT

## 2020-01-09 PROCEDURE — G0378 HOSPITAL OBSERVATION PER HR: HCPCS

## 2020-01-09 PROCEDURE — 6360000002 HC RX W HCPCS: Performed by: PHYSICIAN ASSISTANT

## 2020-01-09 PROCEDURE — 6370000000 HC RX 637 (ALT 250 FOR IP): Performed by: INTERNAL MEDICINE

## 2020-01-09 PROCEDURE — 93005 ELECTROCARDIOGRAM TRACING: CPT | Performed by: INTERNAL MEDICINE

## 2020-01-09 PROCEDURE — 96372 THER/PROPH/DIAG INJ SC/IM: CPT

## 2020-01-09 PROCEDURE — 96376 TX/PRO/DX INJ SAME DRUG ADON: CPT

## 2020-01-09 PROCEDURE — 93005 ELECTROCARDIOGRAM TRACING: CPT | Performed by: PHYSICIAN ASSISTANT

## 2020-01-09 PROCEDURE — 6370000000 HC RX 637 (ALT 250 FOR IP): Performed by: PHYSICIAN ASSISTANT

## 2020-01-09 PROCEDURE — APPNB45 APP NON BILLABLE 31-45 MINUTES: Performed by: PHYSICIAN ASSISTANT

## 2020-01-09 PROCEDURE — 83880 ASSAY OF NATRIURETIC PEPTIDE: CPT

## 2020-01-09 PROCEDURE — 36415 COLL VENOUS BLD VENIPUNCTURE: CPT

## 2020-01-09 PROCEDURE — 84484 ASSAY OF TROPONIN QUANT: CPT

## 2020-01-09 PROCEDURE — 99235 HOSP IP/OBS SAME DATE MOD 70: CPT | Performed by: INTERNAL MEDICINE

## 2020-01-09 PROCEDURE — 6360000004 HC RX CONTRAST MEDICATION: Performed by: PHYSICIAN ASSISTANT

## 2020-01-09 PROCEDURE — 71275 CT ANGIOGRAPHY CHEST: CPT

## 2020-01-09 PROCEDURE — 6360000002 HC RX W HCPCS: Performed by: INTERNAL MEDICINE

## 2020-01-09 PROCEDURE — 2580000003 HC RX 258: Performed by: INTERNAL MEDICINE

## 2020-01-09 PROCEDURE — 96375 TX/PRO/DX INJ NEW DRUG ADDON: CPT

## 2020-01-09 RX ORDER — AMITRIPTYLINE HYDROCHLORIDE 50 MG/1
50 TABLET, FILM COATED ORAL NIGHTLY
Status: DISCONTINUED | OUTPATIENT
Start: 2020-01-09 | End: 2020-01-09 | Stop reason: HOSPADM

## 2020-01-09 RX ORDER — DILTIAZEM HYDROCHLORIDE 240 MG/1
240 CAPSULE, COATED, EXTENDED RELEASE ORAL DAILY
Qty: 30 CAPSULE | Refills: 3 | Status: ON HOLD | OUTPATIENT
Start: 2020-01-09 | End: 2020-12-24

## 2020-01-09 RX ORDER — ONDANSETRON 2 MG/ML
4 INJECTION INTRAMUSCULAR; INTRAVENOUS ONCE
Status: COMPLETED | OUTPATIENT
Start: 2020-01-09 | End: 2020-01-09

## 2020-01-09 RX ORDER — LITHIUM CARBONATE 300 MG/1
600 CAPSULE ORAL 2 TIMES DAILY WITH MEALS
Status: DISCONTINUED | OUTPATIENT
Start: 2020-01-09 | End: 2020-01-09 | Stop reason: HOSPADM

## 2020-01-09 RX ORDER — ASPIRIN 81 MG/1
81 TABLET, CHEWABLE ORAL DAILY
Status: DISCONTINUED | OUTPATIENT
Start: 2020-01-10 | End: 2020-01-09 | Stop reason: HOSPADM

## 2020-01-09 RX ORDER — ISOSORBIDE MONONITRATE 30 MG/1
30 TABLET, EXTENDED RELEASE ORAL DAILY
Status: DISCONTINUED | OUTPATIENT
Start: 2020-01-09 | End: 2020-01-09 | Stop reason: HOSPADM

## 2020-01-09 RX ORDER — MORPHINE SULFATE 2 MG/ML
1 INJECTION, SOLUTION INTRAMUSCULAR; INTRAVENOUS EVERY 4 HOURS PRN
Status: DISCONTINUED | OUTPATIENT
Start: 2020-01-09 | End: 2020-01-09 | Stop reason: HOSPADM

## 2020-01-09 RX ORDER — ATORVASTATIN CALCIUM 20 MG/1
20 TABLET, FILM COATED ORAL NIGHTLY
Status: DISCONTINUED | OUTPATIENT
Start: 2020-01-09 | End: 2020-01-09 | Stop reason: HOSPADM

## 2020-01-09 RX ORDER — ATORVASTATIN CALCIUM 10 MG/1
10 TABLET, FILM COATED ORAL DAILY
Qty: 30 TABLET | Refills: 5 | Status: ON HOLD | OUTPATIENT
Start: 2020-01-09 | End: 2021-01-06 | Stop reason: HOSPADM

## 2020-01-09 RX ORDER — SODIUM CHLORIDE 0.9 % (FLUSH) 0.9 %
10 SYRINGE (ML) INJECTION EVERY 12 HOURS SCHEDULED
Status: DISCONTINUED | OUTPATIENT
Start: 2020-01-09 | End: 2020-01-09 | Stop reason: HOSPADM

## 2020-01-09 RX ORDER — RANOLAZINE 500 MG/1
500 TABLET, EXTENDED RELEASE ORAL 2 TIMES DAILY
Status: DISCONTINUED | OUTPATIENT
Start: 2020-01-09 | End: 2020-01-09 | Stop reason: HOSPADM

## 2020-01-09 RX ORDER — NITROGLYCERIN 0.4 MG/1
0.4 TABLET SUBLINGUAL EVERY 5 MIN PRN
Status: DISCONTINUED | OUTPATIENT
Start: 2020-01-09 | End: 2020-01-09 | Stop reason: HOSPADM

## 2020-01-09 RX ORDER — ONDANSETRON 2 MG/ML
4 INJECTION INTRAMUSCULAR; INTRAVENOUS EVERY 6 HOURS PRN
Status: DISCONTINUED | OUTPATIENT
Start: 2020-01-09 | End: 2020-01-09 | Stop reason: HOSPADM

## 2020-01-09 RX ORDER — DICYCLOMINE HYDROCHLORIDE 10 MG/1
10 CAPSULE ORAL EVERY 6 HOURS PRN
Status: DISCONTINUED | OUTPATIENT
Start: 2020-01-09 | End: 2020-01-09 | Stop reason: HOSPADM

## 2020-01-09 RX ORDER — LOSARTAN POTASSIUM 50 MG/1
100 TABLET ORAL DAILY
Status: DISCONTINUED | OUTPATIENT
Start: 2020-01-09 | End: 2020-01-09 | Stop reason: HOSPADM

## 2020-01-09 RX ORDER — BUDESONIDE 0.5 MG/2ML
1 INHALANT ORAL 2 TIMES DAILY
Status: DISCONTINUED | OUTPATIENT
Start: 2020-01-09 | End: 2020-01-09 | Stop reason: HOSPADM

## 2020-01-09 RX ORDER — ATORVASTATIN CALCIUM 10 MG/1
10 TABLET, FILM COATED ORAL DAILY
Status: DISCONTINUED | OUTPATIENT
Start: 2020-01-09 | End: 2020-01-09 | Stop reason: HOSPADM

## 2020-01-09 RX ORDER — MORPHINE SULFATE 2 MG/ML
2 INJECTION, SOLUTION INTRAMUSCULAR; INTRAVENOUS ONCE
Status: COMPLETED | OUTPATIENT
Start: 2020-01-09 | End: 2020-01-09

## 2020-01-09 RX ORDER — NITROGLYCERIN 0.4 MG/1
TABLET SUBLINGUAL
Qty: 25 TABLET | Refills: 3 | Status: SHIPPED | OUTPATIENT
Start: 2020-01-09

## 2020-01-09 RX ORDER — SODIUM CHLORIDE 0.9 % (FLUSH) 0.9 %
10 SYRINGE (ML) INJECTION PRN
Status: DISCONTINUED | OUTPATIENT
Start: 2020-01-09 | End: 2020-01-09 | Stop reason: HOSPADM

## 2020-01-09 RX ORDER — DILTIAZEM HYDROCHLORIDE 120 MG/1
120 CAPSULE, COATED, EXTENDED RELEASE ORAL DAILY
Status: DISCONTINUED | OUTPATIENT
Start: 2020-01-09 | End: 2020-01-09 | Stop reason: HOSPADM

## 2020-01-09 RX ORDER — ALPRAZOLAM 0.5 MG/1
0.5 TABLET ORAL 2 TIMES DAILY
Status: DISCONTINUED | OUTPATIENT
Start: 2020-01-09 | End: 2020-01-09 | Stop reason: HOSPADM

## 2020-01-09 RX ORDER — ACETAMINOPHEN 325 MG/1
650 TABLET ORAL EVERY 4 HOURS PRN
Status: DISCONTINUED | OUTPATIENT
Start: 2020-01-09 | End: 2020-01-09 | Stop reason: HOSPADM

## 2020-01-09 RX ORDER — MORPHINE SULFATE 2 MG/ML
2 INJECTION, SOLUTION INTRAMUSCULAR; INTRAVENOUS EVERY 6 HOURS PRN
Status: DISCONTINUED | OUTPATIENT
Start: 2020-01-09 | End: 2020-01-09 | Stop reason: HOSPADM

## 2020-01-09 RX ORDER — ISOSORBIDE MONONITRATE 30 MG/1
30 TABLET, EXTENDED RELEASE ORAL DAILY
Qty: 30 TABLET | Refills: 3 | Status: SHIPPED | OUTPATIENT
Start: 2020-01-09 | End: 2021-01-25

## 2020-01-09 RX ORDER — ALBUTEROL SULFATE 90 UG/1
2 AEROSOL, METERED RESPIRATORY (INHALATION) EVERY 6 HOURS PRN
Status: DISCONTINUED | OUTPATIENT
Start: 2020-01-09 | End: 2020-01-09 | Stop reason: HOSPADM

## 2020-01-09 RX ORDER — LEVOTHYROXINE SODIUM 0.03 MG/1
25 TABLET ORAL DAILY
Status: DISCONTINUED | OUTPATIENT
Start: 2020-01-09 | End: 2020-01-09 | Stop reason: HOSPADM

## 2020-01-09 RX ORDER — ASPIRIN 81 MG/1
81 TABLET, CHEWABLE ORAL DAILY
Status: DISCONTINUED | OUTPATIENT
Start: 2020-01-09 | End: 2020-01-09 | Stop reason: HOSPADM

## 2020-01-09 RX ORDER — HYDROCHLOROTHIAZIDE 25 MG/1
25 TABLET ORAL DAILY
Status: DISCONTINUED | OUTPATIENT
Start: 2020-01-09 | End: 2020-01-09 | Stop reason: HOSPADM

## 2020-01-09 RX ORDER — LOSARTAN POTASSIUM 100 MG/1
100 TABLET ORAL DAILY
Qty: 30 TABLET | Refills: 3 | Status: ON HOLD | OUTPATIENT
Start: 2020-01-09 | End: 2021-01-08 | Stop reason: HOSPADM

## 2020-01-09 RX ORDER — PANTOPRAZOLE SODIUM 40 MG/1
40 TABLET, DELAYED RELEASE ORAL
Status: DISCONTINUED | OUTPATIENT
Start: 2020-01-10 | End: 2020-01-09 | Stop reason: HOSPADM

## 2020-01-09 RX ADMIN — ENOXAPARIN SODIUM 40 MG: 40 INJECTION SUBCUTANEOUS at 07:56

## 2020-01-09 RX ADMIN — IOPAMIDOL 100 ML: 755 INJECTION, SOLUTION INTRAVENOUS at 01:07

## 2020-01-09 RX ADMIN — MORPHINE SULFATE 2 MG: 2 INJECTION, SOLUTION INTRAMUSCULAR; INTRAVENOUS at 02:37

## 2020-01-09 RX ADMIN — MORPHINE SULFATE 2 MG: 2 INJECTION, SOLUTION INTRAMUSCULAR; INTRAVENOUS at 00:44

## 2020-01-09 RX ADMIN — MORPHINE SULFATE 1 MG: 2 INJECTION, SOLUTION INTRAMUSCULAR; INTRAVENOUS at 13:14

## 2020-01-09 RX ADMIN — RANOLAZINE 500 MG: 500 TABLET, FILM COATED, EXTENDED RELEASE ORAL at 12:57

## 2020-01-09 RX ADMIN — NITROGLYCERIN 0.5 INCH: 20 OINTMENT TOPICAL at 02:37

## 2020-01-09 RX ADMIN — ONDANSETRON 4 MG: 2 INJECTION INTRAMUSCULAR; INTRAVENOUS at 00:44

## 2020-01-09 RX ADMIN — Medication 10 ML: at 07:56

## 2020-01-09 ASSESSMENT — PAIN DESCRIPTION - PAIN TYPE
TYPE: ACUTE PAIN
TYPE: ACUTE PAIN

## 2020-01-09 ASSESSMENT — PAIN SCALES - GENERAL
PAINLEVEL_OUTOF10: 6
PAINLEVEL_OUTOF10: 7
PAINLEVEL_OUTOF10: 7
PAINLEVEL_OUTOF10: 4

## 2020-01-09 ASSESSMENT — PAIN DESCRIPTION - ORIENTATION
ORIENTATION: POSTERIOR
ORIENTATION: LEFT;MID

## 2020-01-09 ASSESSMENT — ENCOUNTER SYMPTOMS
VOMITING: 0
ABDOMINAL PAIN: 0
APNEA: 0
ALLERGIC/IMMUNOLOGIC NEGATIVE: 1
SHORTNESS OF BREATH: 1
TROUBLE SWALLOWING: 0
CHEST TIGHTNESS: 0
COLOR CHANGE: 0
NAUSEA: 1
EYE PAIN: 0

## 2020-01-09 ASSESSMENT — PAIN DESCRIPTION - LOCATION
LOCATION: HEAD
LOCATION: CHEST

## 2020-01-09 ASSESSMENT — PAIN DESCRIPTION - FREQUENCY: FREQUENCY: CONTINUOUS

## 2020-01-09 ASSESSMENT — PAIN DESCRIPTION - DESCRIPTORS: DESCRIPTORS: SHARP;SHOOTING

## 2020-01-09 NOTE — ED PROVIDER NOTES
[address] [facility] ED  eMERGENCY dEPARTMENTeNCOUnter      Pt Name: Shayy Saravia  MRN: [id number]  Armsjesigfjustin 1970  Date ofevaluation: 1/8/2020  Provider: Miguel A Gonzalez PA-C    CHIEF COMPLAINT       Chief Complaint   Patient presents with    Chest Pain     pt c/o chest pain, left arm numbness/tingling for the past hour PTA         HISTORY OF PRESENT ILLNESS   (Location/Symptom, Timing/Onset,Context/Setting, Quality, Duration, Modifying Factors, Severity)  Note limiting factors. Shayy Saravia is a 54 y.o. female who presents to the emergency department with complaints of left-sided chest pain that radiates into the left shoulder and causes tingling to the left arm, onset approximately 1 hour prior to arrival.  Patient rates her pain at a 7 out of 10. Pain starts in the left anterior chest region and radiates into the back. Patient denies any recent or changing cough or congestion. There is been no fevers. Patient does state that the pain causes her to feel mildly short of breath as well. No abdominal pain, nausea, vomiting or diarrhea. No headaches or dizziness. Nothing was taken for the symptoms. HPI    NursingNotes were reviewed. REVIEW OF SYSTEMS    (2-9 systems for level 4, 10 or more for level 5)     Review of Systems   Constitutional: Negative for diaphoresis and fever. HENT: Negative for hearing loss and trouble swallowing. Eyes: Negative for pain. Respiratory: Positive for shortness of breath. Negative for apnea. Cardiovascular: Positive for chest pain. Gastrointestinal: Negative for abdominal pain. Endocrine: Negative. Genitourinary: Negative for hematuria. Musculoskeletal: Positive for myalgias. Negative for neck pain and neck stiffness. Skin: Negative for color change. Allergic/Immunologic: Negative. Neurological: Negative for dizziness and numbness. Hematological: Negative. Psychiatric/Behavioral: Negative.     All other systems reviewed and are negative. Except as noted above the remainder of the review of systems was reviewed and negative. PAST MEDICAL HISTORY     Past Medical History:   Diagnosis Date    Anxiety     Back pain     back surgery x 4    B[facility] disorder [facility] [address] [facility]     CAD (coronary artery disease)     CAFL [facility] airflow limitation) [facility] 11/3/2016    Chronic bronchitis ([facility])     Chronic pain     Colitis     Complicated migraine     DDD (degenerative disc disease), lumbar 2016    Depression     Endometriosis     Excessive caffeine abuse, continuous ([facility]) 2018    Gastritis     GERD (gastroesophageal reflux disease)     History of myocardial infarction     HTN (hypertension), benign 2016    Impaired mobility and activities of daily living     Over weight     PTSD (post-traumatic stress disorder)     Sensory neuropathy 2016    Somatization disorder     TIA (transient ischemic attack)     Tobacco abuse     Vasospastic angina ([facility] [address] 2016         SURGICALHISTORY       Past Surgical History:   Procedure Laterality Date    BACK SURGERY      x2     SECTION      x2    CHOLECYSTECTOMY  13    Lapchole    CORONARY ANGIOPLASTY      CYST REMOVAL  3/7/16    Dr. Ivy Chaudhry Left     UPPER GASTROINTESTINAL ENDOSCOPY  2014    ANIBAL HOUSE M.D. CURRENT MEDICATIONS       Previous Medications    ALBUTEROL SULFATE HFA (PROVENTIL HFA) 108 (90 BASE) MCG/ACT INHALER    Inhale 2 puffs into the lungs every 6 hours as needed for Wheezing    ALPRAZOLAM (XANAX) 0.5 MG TABLET    Take 0.5 mg by mouth 2 times daily.      AMITRIPTYLINE (ELAVIL) 50 MG TABLET    Take 1 tablet by mouth nightly    ASPIRIN 81 MG CHEWABLE TABLET    Take 1 tablet by mouth daily    ATORVASTATIN (LIPITOR) 10 MG TABLET    Take 1 tablet by mouth daily    BD INTEGRA SYRINGE 25G X 1\" 3 ML MISC    USE AS DIRECTED EVERY MONTH use: No    Sexual activity: Not Currently   Lifestyle    Physical activity:     Days per week: None     Minutes per session: None    Stress: None   Relationships    Social connections:     Talks on phone: None     Gets together: None     Attends Evangelical service: None     Active member of club or organization: None     Attends meetings of clubs or organizations: None     Relationship status: None    Intimate partner violence:     Fear of current or ex partner: None     Emotionally abused: None     Physically abused: None     Forced sexual activity: None   Other Topics Concern    None   Social History Narrative    Victorino is a 51-year-old female who is on disability because of pain and bipolar disorder. She's also had a presumed diagnosis of possible multiple sclerosis. She's been seeing Dr. Jm Aden for that and she says that the diagnosis is sometimes in question and it's clear neuropathy vitamin B12 deficiency as well as generalized bodyaches from the severe vitamin D deficiency have caused this scenario that looks like multiple sclerosis.               Lives With: Significant other    Type of Home: House Two level    Home Access: Stairs to enter with rails  - Number of Steps: 5    Bathroom Shower/Tub: Tub only    ADL Assistance: Independent    Homemaking Assistance: Independent    Homemaking Responsibilities: Yes    Meal Prep Responsibility: Primary    Laundry Responsibility: Primary    Cleaning Responsibility: Primary    Bill Paying/Finance Responsibility: Primary    Shopping Responsibility: Primary    Ambulation Assistance: Independent    Transfer Assistance: Independent    Active : Yes    Occupation: Full time employment    Type of occupation: disabled-  - in charges of [facility] Opening: Spontaneous  Best Verbal Response: Oriented  Best Motor Response: Obeys commands  Kacie Coma Scale Score: 15         PHYSICAL EXAM    (up neg    Interpretation per the Radiologist below, if available at the time ofthis note:    XR CHEST PORTABLE    (Results Pending)   CTA CHEST W WO CONTRAST    (Results Pending)         ED BEDSIDE ULTRASOUND:   Performed by ED Physician - none    LABS:  Labs Reviewed   COMPREHENSIVE METABOLIC PANEL - Abnormal; Notable for the following components:       Result Value    Glucose 143 (*)     ALT 68 (*)     All other components within normal limits   CBC WITH AUTO DIFFERENTIAL - Abnormal; Notable for the following components:    MCH 31.8 (*)     Neutrophils Absolute 7.0 (*)     All other components within normal limits   D-DIMER, QUANTITATIVE - Abnormal; Notable for the following components:    D-Dimer, Quant 0.68 (*)     All other components within normal limits    Narrative:     Pamela José tel. [id number],  Coag results called to and read back by fredi, 01/09/2020 00:35, by DANYELLE   TROPONIN   CK       All other labs were within normal range or not returned as of this dictation. EMERGENCY DEPARTMENT COURSE and DIFFERENTIAL DIAGNOSIS/MDM:   Vitals:    Vitals:    01/08/20 2330 01/09/20 0000 01/09/20 0030 01/09/20 0202   BP: 121/89 (!) 155/84 (!) 145/82 (!) 156/87   Pulse: 74 68 67 68   Resp: 17 15 11 14   Temp:       TempSrc:       SpO2:   96% 98%   Weight:       Height:               MDM      REASSESSMENT      Patient presents emergency department with sudden onset of left-sided chest pain. Patient had moderate improvement in symptoms with nitroglycerin. First set of cardiac enzymes and EKG are unremarkable. Patient states that she has a history of MI in the past and last heart catheterization was 6 to 7 years ago. Patient will be admitted in the hospital for cardiology evaluation and care    CONSULTS:  None    PROCEDURES:  Unless otherwise noted below, none     Procedures    FINAL IMPRESSION      1.  Chest pain, unspecified type          DISPOSITION/PLAN   DISPOSITION Admitted 01/09/2020 02:04:22

## 2020-01-09 NOTE — FLOWSHEET NOTE
Call received from dr carrera, he gave orders for ranexa 500mg bid and morphine 1-2mg q 4-6hr prn for pain
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
## 2020-01-09 NOTE — H&P
History and Physical  Patient: Dawit Drew  Unit/Bed:W179/W179-01  YOB: 1970  MRN: [id number]  Acct: [de-identified]   Admitting Diagnosis: Chest pain [R07.9]  Admit Date:  1/8/2020  Hospital Day: 0      Chief Complaint:   Chest pain    History of Present Illness: This is a 26-year-old  female with past medical history significant for hypertension, borderline diabetes, history of dyslipidemia, COPD, tobacco abuse, excessive caffeine consumption, history of TIA and multiple medical problems who presented to the emergency room yesterday with chief complaint of chest pain. Patient states that she works nights and was sleeping during the day yesterday but awoke from sleep around 6 PM with severe left-sided chest pain which she described as sharp in quality. Her chest pain episode was brief in duration but continued to recur intermittently. It was associated with mild shortness of breath and nausea. She complains of intermittent palpitations over the past couple of weeks, chronic shortness of breath, frequent nausea, intermittent dizziness and lightheadedness, orthopnea with occasional PND and multiple other somatic complaints. She denies syncope, fever, chills or recent upper respiratory infection. As her chest pain continued to recur, she decided to present to the emergency room for further evaluation. On presentation to the emergency room, blood pressure elevated 175/87, heart rate 79, respiratory rate 18, pulse ox 98%, temperature 36.8 °C.  Sodium 140, potassium 3.8, chloride 102, total CO2 of 28, BUN 6, creatinine 0.83, GFR greater than 60, glucose 143. proBNP 294. Initial troponin of less than 0.010. WBC 9.5, hemoglobin 14.7, hematocrit 42.5, platelets 748.   D-dimer elevated at 0.68 so CT of the chest was performed to rule out PE.  CTA of the chest revealed no evidence of pulmonary embolism, reflux of contrast medium into the hepatic veins suggestive of right heart failure, cholecystectomy, left adrenal nodule which is noncalcified and measuring 13 mm. Chest X-ray revealed no acute active cardiopulmonary process. She was admitted for further evaluation. At time of evaluation today, she is resting comfortably and in no acute distress. She is hemodynamically stable. On telemetry she is maintaining sinus rhythm with heart rate in the 70s. Her serial troponins have been negative x3 at less than 0.010. Serial EKGs have been reviewed and are unchanged from prior. Patient status post cardiac catheterization in March 2016 which revealed normal coronary arteries. She has had no recent stress test.  She states she has been under significant amount of stress recently. She smokes approximately 1 pack/day, drinks approximately 2 to 3 L of soda per day in addition to 1-2 pots of coffee per day. She denies any alcohol abuse or illicit drug use.     Allergies   Allergen Reactions    Latex Itching     Pt reports itching on hands after using rubber gloves at work    Influenza Vaccines Swelling     Pt reports her entire arm was red and edematous post vaccine    Eggs Or Egg-Derived [employer] based products only    Gabapentin Nausea Only    Pneumococcal Vaccines Hives    Povidone Iodine Itching    Varicella Virus Vaccine Live Hives       Current Facility-Administered Medications   Medication Dose Route Frequency Provider Last Rate Last Dose    sodium chloride flush 0.9 % injection 10 mL  10 mL Intravenous 2 times per day Anand Sherman, DO   10 mL at 01/09/20 0756    sodium chloride flush 0.9 % injection 10 mL  10 mL Intravenous PRN Anand Pinoniday, DO        magnesium hydroxide (MILK OF MAGNESIA) 400 MG/5ML suspension 30 mL  30 mL Oral Daily PRN Anand Pinoniday, DO        ondansetron (ZOFRAN) injection 4 mg  4 mg Intravenous Q6H PRN Anand Pinoniday, DO        atorvastatin (LIPITOR) tablet 20 mg  20 mg Oral Nightly Anand Sherman, DO        [START ON 1/10/2020] aspirin chewable tablet 81 mg  81 mg Oral Daily Anand J Holiday, DO        enoxaparin (LOVENOX) injection 40 mg  40 mg Subcutaneous Daily Anand J Holiday, DO   40 mg at 20 0756    nitroGLYCERIN (NITROSTAT) SL tablet 0.4 mg  0.4 mg Sublingual Q5 Min PRN Anand J Holiday, DO        ranolazine (RANEXA) extended release tablet 500 mg  500 mg Oral BID Anand J Holiday, DO   500 mg at 20 1257    morphine (PF) injection 2 mg  2 mg Intravenous Q6H PRN Anand J Holiday, DO        Or    morphine (PF) injection 1 mg  1 mg Intravenous Q4H PRN Anand J Holiday, DO   1 mg at 20 1314       PMHx:  Past Medical History:   Diagnosis Date    Anxiety     Back pain     back surgery x 4    B[facility] disorder [facility]     CAD (coronary artery disease)     CAFL [facility]chronic airflow limitation) [facility] 11/3/2016    Chronic bronchitis ([facility])     Chronic pain     Colitis     Complicated migraine     DDD (degenerative disc disease), lumbar 2016    Depression     Endometriosis     Excessive caffeine abuse, continuous ([facility] 2018    Gastritis     GERD (gastroesophageal reflux disease)     History of myocardial infarction     HTN (hypertension), benign 2016    Impaired mobility and activities of daily living     Over weight     PTSD (post-traumatic stress disorder)     Sensory neuropathy 2016    Somatization disorder     TIA (transient ischemic attack)     Tobacco abuse     Vasospastic angina ([facility] 2016       PSHx:  Past Surgical History:   Procedure Laterality Date    BACK SURGERY      x2     SECTION      x2    CHOLECYSTECTOMY  13    Lapchole    CORONARY ANGIOPLASTY      CYST REMOVAL  3/7/16    Dr. Zuleika Obregon Left     UPPER GASTROINTESTINAL ENDOSCOPY  2014    ANIBAL HOUSE M.D. Social Hx:  Social History     Socioeconomic History    Marital status:       Spouse name: None    Number of children: None    Years of education: None    Highest education level: None   Occupational History    Occupation: unemployed   Social Needs    Financial resource strain: None    Food insecurity:     Worry: None     Inability: None    Transportation needs:     Medical: None     Non-medical: None   Tobacco Use    Smoking status: Current Every Day Smoker     Packs/day: 1.00     Years: 17.00     Pack years: 17.00     Types: Cigarettes    Smokeless tobacco: Never Used   Substance and Sexual Activity    Alcohol use: No    Drug use: No    Sexual activity: Not Currently   Lifestyle    Physical activity:     Days per week: None     Minutes per session: None    Stress: None   Relationships    Social connections:     Talks on phone: None     Gets together: None     Attends Restorationist service: None     Active member of club or organization: None     Attends meetings of clubs or organizations: None     Relationship status: None    Intimate partner violence:     Fear of current or ex partner: None     Emotionally abused: None     Physically abused: None     Forced sexual activity: None   Other Topics Concern    None   Social History Narrative    Ata Matthew is a 61-year-old female who is on disability because of pain and bipolar disorder. She's also had a presumed diagnosis of possible multiple sclerosis. She's been seeing Dr. Deejay Bhatia for that and she says that the diagnosis is sometimes in question and it's clear neuropathy vitamin B12 deficiency as well as generalized bodyaches from the severe vitamin D deficiency have caused this scenario that looks like multiple sclerosis.               Lives With: Significant other    Type of Home: House Two level    Home Access: Stairs to enter with rails  - Number of Steps: 5    Bathroom Shower/Tub: Tub only    ADL Assistance: [address] [place] [address] Avenue: Independent    Homemaking Responsibilities: Yes    Meal Prep Responsibility: Primary    Laundry Responsibility: Primary    Cleaning Responsibility: Primary    Bill Paying/Finance Responsibility: Primary    Shopping Responsibility: Primary    Ambulation Assistance: Independent    Transfer Assistance: Independent    Active : Yes    Occupation: Full time employment    Type of occupation: disabled-  - in charges of [address]       Family Hx:  Family History   Problem Relation Age of Onset    High Blood Pressure Mother     Kidney Disease Mother     Lupus Mother     Coronary Art Dis Mother         Had stents in her 62s    Bipolar Disorder Son        Review ofSystems:   Review of Systems   Constitutional: Negative for activity change, fatigue and unexpected weight change. HENT: Negative for congestion. Respiratory: Positive for shortness of breath (at baseline). Negative for chest tightness. Cardiovascular: Positive for chest pain and palpitations (intermittent). Negative for leg swelling. Gastrointestinal: Positive for nausea. Negative for abdominal pain and vomiting. Genitourinary: Negative for difficulty urinating. Complains of left breast pain with palpation   Musculoskeletal: Negative for arthralgias. Skin: Negative for color change, pallor and rash. Neurological: Positive for dizziness (intermittent). Negative for syncope. Psychiatric/Behavioral: Negative for agitation and behavioral problems. Physical Examination:    /73   Pulse 75   Temp 97.9 °F (36.6 °C) (Oral)   Resp 16   Ht 5' 7\" (1.702 m)   Wt 200 lb (90.7 kg)   SpO2 97%   BMI 31.32 kg/m²    Physical Exam  Constitutional:       Appearance: Normal appearance. She is obese. HENT:      Head: Normocephalic and atraumatic. Neck:      Musculoskeletal: Normal range of motion and neck supple. Cardiovascular:      Rate and Rhythm: Normal rate and regular rhythm. Heart sounds: No murmur. Pulmonary:      Effort: Pulmonary effort is normal. No respiratory distress.       Breath sounds: Normal breath sounds. No wheezing or rales. Abdominal:      General: Bowel sounds are normal. There is no distension. Palpations: Abdomen is soft. Genitourinary:     Comments: Left breast pain/tenderness with palpation, no obvious mass/lump noted on exam, no rashes  Musculoskeletal: Normal range of motion. General: No swelling. Skin:     General: Skin is warm and dry. Coloration: Skin is not pale. Neurological:      General: No focal deficit present. Mental Status: She is alert and oriented to person, place, and time.    Psychiatric:         Mood and Affect: Mood normal.         Behavior: Behavior normal.          LABS:  CBC:   Lab Results   Component Value Date    WBC 9.5 01/08/2020    RBC 4.62 01/08/2020    HGB 14.7 01/08/2020    HCT 42.5 01/08/2020    MCV 92.1 01/08/2020    MCH 31.8 01/08/2020    MCHC 34.5 01/08/2020    RDW 13.6 01/08/2020     01/08/2020    MPV 9.6 09/20/2015     CBC with Differential:   Lab Results   Component Value Date    WBC 9.5 01/08/2020    RBC 4.62 01/08/2020    HGB 14.7 01/08/2020    HCT 42.5 01/08/2020     01/08/2020    MCV 92.1 01/08/2020    MCH 31.8 01/08/2020    MCHC 34.5 01/08/2020    RDW 13.6 01/08/2020    NRBC 1 11/09/2018    LYMPHOPCT 18.3 01/08/2020    MONOPCT 6.8 01/08/2020    BASOPCT 0.7 01/08/2020    MONOSABS 0.6 01/08/2020    LYMPHSABS 1.7 01/08/2020    EOSABS 0.1 01/08/2020    BASOSABS 0.1 01/08/2020     CMP:    Lab Results   Component Value Date     01/08/2020    K 3.8 01/08/2020    K 4.3 11/20/2019     01/08/2020    CO2 28 01/08/2020    BUN 6 01/08/2020    CREATININE 0.83 01/08/2020    GFRAA >60.0 01/08/2020    LABGLOM >60.0 01/08/2020    GLUCOSE 143 01/08/2020    PROT 6.5 01/08/2020    LABALBU 4.1 01/08/2020    CALCIUM 9.3 01/08/2020    BILITOT 0.5 01/08/2020    ALKPHOS 122 01/08/2020    AST 34 01/08/2020    ALT 68 01/08/2020     BMP:    Lab Results   Component Value Date     01/08/2020    K 3.8 01/08/2020    K 4.3 11/20/2019     01/08/2020    CO2 28 01/08/2020    BUN 6 01/08/2020    LABALBU 4.1 01/08/2020    CREATININE 0.83 01/08/2020    CALCIUM 9.3 01/08/2020    GFRAA >60.0 01/08/2020    LABGLOM >60.0 01/08/2020    GLUCOSE 143 01/08/2020     Magnesium:    Lab Results   Component Value Date    MG 2.2 08/08/2019     Troponin:    Lab Results   Component Value Date    TROPONINI <0.010 01/09/2020     Recent Labs     01/09/20  0351   PROBNP 294     No results for input(s): INR in the last 72 hours. RADIOLOGY:  Cta Chest W Wo Contrast    Result Date: 1/9/2020  EXAM: CT SCAN OF THE THORAX WITH CONTRAST/PE PROTOCOL/CTA CHEST COMPARISON: CHEST RADIOGRAPH JANUARY 2020, NOVEMBER 18, 2018. CT CHEST, 6, 2019 REASON FOR EXAMINATION:  LEFT ARM NUMBNESS, SHORTNESS OF BREATH TECHNIQUE: Helical CTA was performed through the chest utilizing 100 mL of Isovue 370 intravenous contrast.  Images were obtained with bolus tracking in order to opacify the pulmonary arteries. Thick section coronal MIP 3D reconstructions were performed  on a separate workstation. FINDINGS:  No intraluminal filling defects, pulmonary arterial tree. Thoracic aorta normal in course and caliber. Cardiac size normal. No pericardial effusion. Reflux contrast medium into hepatic veins. Right and left lung show no nodules, masses, consolidation, pneumothorax, pleural effusion. No hilar, mediastinal, or axillary lymph node enlargement. Limited imaging upper abdomen shows gallbladder surgically absent. 13 mm noncalcified left adrenal nodule (series 2, image 115). No osteoblastic or osteolytic lesions. No fractures. No CT evidence pulmonary embolism. Reflux contrast medium into hepatic veins, suggestive of right heart failure. Cholecystectomy. Left adrenal nodule, most likely representing.  All CT scans at this facility use dose modulation, iterative reconstruction, and/or weight based dosing when appropriate to reduce radiation dose to as low as reasonably achievable. Xr Chest Portable    Result Date: 1/9/2020  EXAMINATION: CHEST PORTABLE VIEW  CLINICAL HISTORY: Midsternal chest pain. Left arm numbness. COMPARISONS: November 18, 2018  FINDINGS: Single  views of the chest is submitted. The cardiac silhouette is of normal size configuration. The mediastinum is unremarkable. Pulmonary vascular unremarkable. Right sided trachea. No focal infiltrates. No Pneumothoraces. NO ACUTE ACTIVE CARDIOPULMONARY PROCESS         Limited echo for bubble study 8/7/19:  Conclusions   Summary   Normal intact intra-atrial septum was noted with no evidence of   significant intra-atrial communications neither by colour flow Doppler   imaging nor by aggitated saline study. Signature   ----------------------------------------------------------------   Electronically signed by Juan Hartley(Interpreting physician)   on 08/07/2019 05:40 PM   ----------------------------------------------------------------   Findings  Left Atrium  Normal intact intra-atrial septum was noted with no evidence of significant  intra-atrial communications neither by colour flow Doppler imaging nor by  aggitated saline study. Echocardiogram 4/16/19:  Conclusions   Summary   Normal mitral valve structure and function. Mild (1+) mitral regurgitation is present. Normal aortic valve structure and function. Mild aortic regurgitation is noted. Mildly dilated left atrium. Normal left ventricle structure and function. Left ventricular ejection fraction is visually estimated at 55%. [facility] 3/8/16 by Dr. Juan Strauss: Normal coronaries, EF 65%      EKG 1/9/20: SR 70, nonspecific ST changes in V5-V6, QTc 447ms--no significant change from prior    Telemetry 1/9/20: SR 70s     Assessment:    Active Hospital Problems    Diagnosis Date Noted    Chest pain [R07.9] 01/09/2020     1. Chest pain  2. Abnormal EKG  3. HTN  4.  Hx of normal coronaries per [facility] 3/8/16  5. Mild MR and mild aortic regur per echo 4/16/19  6. Normal LVF EF 55% per echo 4/16/19  7. Hx of frequent PVCs, bigeminy, brief runs NSVT per 48 hour holter 7/11/18  8. Tobacco abuse  9. 13mm noncalcified left adrenal nodule  10. Excessive caffeine intake--2-3L of soda per day and 2 pots of coffee per day    Plan:  ACS orders initiated  Maximize medical therapy--aspirin 81 mg p.o. daily, Cardizem  mg p.o. daily, Lipitor 10 mg p.o. daily, hydrochlorothiazide 25 mg p.o. daily, losartan 100 mg p.o. daily  Cardiac diet recommended  Tobacco cessation strongly recommended  Patient educated on the importance of limiting caffeine consumption  Monitor on telemetry for any tachycardia or bradyarrhythmias  Maintain potassium >4, magnesium >2. GI/DVT prophylaxis  Ischemic evaluation per Dr. Terrell Samuel. Patient is status post cardiac catheterization in March 2016 which revealed normal coronaries. As serial troponins are negative x3 and EKG is benign with no acute ischemic changes, patient will likely be discharged home later today. May consider outpatient noninvasive ischemic evaluation in future as warranted by symptoms  Recommend outpatient follow-up with PCP for evaluation of left breast pain and tenderness complaints  Outpatient follow-up for noncalcified left adrenal nodule measuring 13 mm--recommend follow-up evaluation by PCP  Further recommendations to follow          Electronically signed by Shann Cooks, PA on 1/9/2020 at 2:09 PM      Attending Supervising [phone] Attestation Statement  The patient is a 54 y.o. female. I have performed a history and physical examination of the patient. I discussed the case with the physician assistant. I reviewed the patient's Past Medical History, Past Surgical History, Medications, and Allergies.      Physical Exam:  Vitals:    01/09/20 0202 01/09/20 0327 01/09/20 0734 01/09/20 1406   BP: (!) 156/87 (!) 156/73 123/73    Pulse: 68 68 75    Resp: 14 16 16    Temp:  98 °F (36.7 °C) 97.9 °F (36.6 °C) 100.1 °F (37.8 °C)   TempSrc:  Oral Oral    SpO2: 98% 100% 97%    Weight:       Height:           Review of Systems - Respiratory ROS: no cough, shortness of breath, or wheezing  Cardiovascular ROS: positive for - chest pain  Gastrointestinal ROS: no abdominal pain, change in bowel habits, or black or bloody stools    Pulmonary/Chest: clear to auscultation bilaterally- no wheezes, rales or rhonchi, normal air movement, no respiratory distress  Cardiovascular: normal rate, normal S1 and S2, no gallops, intact distal pulses and no carotid bruits  Abdomen: soft, non-tender, non-distended, normal bowel sounds, no masses or organomegaly    Active Hospital Problems    Diagnosis Date Noted    Chest pain [R07.9] 01/09/2020     Priority: High        I reviewed and agree with the findings and plan documented in her note . Impression    Atypical chest pain  History of normal coronaries, per cardiac catheterization in March 2016  Essential hypertension  Excessive caffeine use/abuse  Tobacco abuse  Mild MI and AI. Obesity. Abnormal EKG    Plan     1. Okay to discharge home, cardiac enzymes been negative, patient chest pain-free. No significant acute ST changes. 2. Plan for outpatient stress testing. 3. Smoking cessation  4. Caffeine cessation  5. Sublingual nitro  6. Continue with Imdur and Cardizem for antianginal treatment.   7. Follow-up with PCP       Electronically signed by Arpan Busby DO on 1/9/20 at 5:47 PM

## 2020-01-09 NOTE — ED NOTES
Patient states she has not been taking her prescribed medications \"for months\" due to not having insurance to pay for her prescriptions.       Padmini Martinez RN  01/09/20 0571

## 2020-01-09 NOTE — FLOWSHEET NOTE
Patient c/o heart burn and generalized pain , I sent a message to dr carrera, awaiting response at this time

## 2020-01-10 PROCEDURE — 93010 ELECTROCARDIOGRAM REPORT: CPT | Performed by: INTERNAL MEDICINE

## 2020-03-01 ENCOUNTER — APPOINTMENT (OUTPATIENT)
Dept: CT IMAGING | Age: 50
End: 2020-03-01
Payer: COMMERCIAL

## 2020-03-01 ENCOUNTER — HOSPITAL ENCOUNTER (EMERGENCY)
Age: 50
Discharge: HOME | End: 2020-03-01
Payer: COMMERCIAL

## 2020-03-01 VITALS
HEIGHT: 67 IN | RESPIRATION RATE: 20 BRPM | WEIGHT: 210 LBS | SYSTOLIC BLOOD PRESSURE: 132 MMHG | TEMPERATURE: 98.2 F | HEART RATE: 68 BPM | BODY MASS INDEX: 32.96 KG/M2 | DIASTOLIC BLOOD PRESSURE: 68 MMHG | OXYGEN SATURATION: 100 %

## 2020-03-01 LAB
ALBUMIN SERPL-MCNC: 4.1 G/DL (ref 3.5–4.6)
ALP BLD-CCNC: 141 U/L (ref 40–130)
ALT SERPL-CCNC: 70 U/L (ref 0–33)
AMPHETAMINE SCREEN, URINE: NORMAL
ANION GAP SERPL CALCULATED.3IONS-SCNC: 12 MEQ/L (ref 9–15)
AST SERPL-CCNC: 50 U/L (ref 0–35)
BACTERIA: ABNORMAL /HPF
BARBITURATE SCREEN URINE: NORMAL
BASOPHILS ABSOLUTE: 0.2 K/UL (ref 0–0.2)
BASOPHILS RELATIVE PERCENT: 1.6 %
BENZODIAZEPINE SCREEN, URINE: NORMAL
BILIRUB SERPL-MCNC: 0.4 MG/DL (ref 0.2–0.7)
BILIRUBIN URINE: NEGATIVE
BLOOD, URINE: NEGATIVE
BUN BLDV-MCNC: 7 MG/DL (ref 6–20)
CALCIUM SERPL-MCNC: 9.3 MG/DL (ref 8.5–9.9)
CANNABINOID SCREEN URINE: NORMAL
CHLORIDE BLD-SCNC: 103 MEQ/L (ref 95–107)
CLARITY: ABNORMAL
CO2: 24 MEQ/L (ref 20–31)
COCAINE METABOLITE SCREEN URINE: NORMAL
COLOR: YELLOW
CREAT SERPL-MCNC: 0.78 MG/DL (ref 0.5–0.9)
EOSINOPHILS ABSOLUTE: 0.1 K/UL (ref 0–0.7)
EOSINOPHILS RELATIVE PERCENT: 1 %
EPITHELIAL CELLS, UA: ABNORMAL /HPF (ref 0–5)
ETHANOL PERCENT: NORMAL G/DL
ETHANOL: <10 MG/DL (ref 0–0.08)
GFR AFRICAN AMERICAN: >60
GFR NON-AFRICAN AMERICAN: >60
GLOBULIN: 2.6 G/DL (ref 2.3–3.5)
GLUCOSE BLD-MCNC: 120 MG/DL (ref 70–99)
GLUCOSE URINE: NEGATIVE MG/DL
HCT VFR BLD CALC: 43.8 % (ref 37–47)
HEMOGLOBIN: 15.3 G/DL (ref 12–16)
HYALINE CASTS: ABNORMAL /HPF (ref 0–5)
KETONES, URINE: NEGATIVE MG/DL
LEUKOCYTE ESTERASE, URINE: ABNORMAL
LIPASE: 42 U/L (ref 12–95)
LYMPHOCYTES ABSOLUTE: 2 K/UL (ref 1–4.8)
LYMPHOCYTES RELATIVE PERCENT: 21 %
Lab: NORMAL
MCH RBC QN AUTO: 31.8 PG (ref 27–31.3)
MCHC RBC AUTO-ENTMCNC: 34.9 % (ref 33–37)
MCV RBC AUTO: 91.3 FL (ref 82–100)
METHADONE SCREEN, URINE: NORMAL
MONOCYTES ABSOLUTE: 0.7 K/UL (ref 0.2–0.8)
MONOCYTES RELATIVE PERCENT: 7.6 %
NEUTROPHILS ABSOLUTE: 6.5 K/UL (ref 1.4–6.5)
NEUTROPHILS RELATIVE PERCENT: 68.8 %
NITRITE, URINE: NEGATIVE
OPIATE SCREEN URINE: NORMAL
OXYCODONE URINE: NORMAL
PDW BLD-RTO: 12.9 % (ref 11.5–14.5)
PH UA: 5 (ref 5–9)
PHENCYCLIDINE SCREEN URINE: NORMAL
PLATELET # BLD: 146 K/UL (ref 130–400)
POC CREATININE WHOLE BLOOD: 0.9
POTASSIUM SERPL-SCNC: 3.6 MEQ/L (ref 3.4–4.9)
PROPOXYPHENE SCREEN: NORMAL
PROTEIN UA: NEGATIVE MG/DL
RBC # BLD: 4.8 M/UL (ref 4.2–5.4)
RBC UA: ABNORMAL /HPF (ref 0–5)
SODIUM BLD-SCNC: 139 MEQ/L (ref 135–144)
SPECIFIC GRAVITY UA: 1.02 (ref 1–1.03)
TOTAL PROTEIN: 6.7 G/DL (ref 6.3–8)
URINE REFLEX TO CULTURE: YES
UROBILINOGEN, URINE: 0.2 E.U./DL
WBC # BLD: 9.4 K/UL (ref 4.8–10.8)
WBC UA: ABNORMAL /HPF (ref 0–5)

## 2020-03-01 PROCEDURE — G0480 DRUG TEST DEF 1-7 CLASSES: HCPCS

## 2020-03-01 PROCEDURE — 96375 TX/PRO/DX INJ NEW DRUG ADDON: CPT

## 2020-03-01 PROCEDURE — 80307 DRUG TEST PRSMV CHEM ANLYZR: CPT

## 2020-03-01 PROCEDURE — 81001 URINALYSIS AUTO W/SCOPE: CPT

## 2020-03-01 PROCEDURE — 99284 EMERGENCY DEPT VISIT MOD MDM: CPT

## 2020-03-01 PROCEDURE — 80053 COMPREHEN METABOLIC PANEL: CPT

## 2020-03-01 PROCEDURE — 87086 URINE CULTURE/COLONY COUNT: CPT

## 2020-03-01 PROCEDURE — 36415 COLL VENOUS BLD VENIPUNCTURE: CPT

## 2020-03-01 PROCEDURE — 74177 CT ABD & PELVIS W/CONTRAST: CPT

## 2020-03-01 PROCEDURE — 2580000003 HC RX 258: Performed by: PHYSICIAN ASSISTANT

## 2020-03-01 PROCEDURE — 6360000004 HC RX CONTRAST MEDICATION: Performed by: PHYSICIAN ASSISTANT

## 2020-03-01 PROCEDURE — 6360000002 HC RX W HCPCS: Performed by: PHYSICIAN ASSISTANT

## 2020-03-01 PROCEDURE — 85025 COMPLETE CBC W/AUTO DIFF WBC: CPT

## 2020-03-01 PROCEDURE — 96374 THER/PROPH/DIAG INJ IV PUSH: CPT

## 2020-03-01 PROCEDURE — 83690 ASSAY OF LIPASE: CPT

## 2020-03-01 RX ORDER — ONDANSETRON 4 MG/1
4 TABLET, ORALLY DISINTEGRATING ORAL EVERY 8 HOURS PRN
Qty: 20 TABLET | Refills: 0 | Status: ON HOLD | OUTPATIENT
Start: 2020-03-01 | End: 2020-12-24

## 2020-03-01 RX ORDER — KETOROLAC TROMETHAMINE 15 MG/ML
15 INJECTION, SOLUTION INTRAMUSCULAR; INTRAVENOUS ONCE
Status: COMPLETED | OUTPATIENT
Start: 2020-03-01 | End: 2020-03-01

## 2020-03-01 RX ORDER — METOCLOPRAMIDE HYDROCHLORIDE 5 MG/ML
10 INJECTION INTRAMUSCULAR; INTRAVENOUS ONCE
Status: COMPLETED | OUTPATIENT
Start: 2020-03-01 | End: 2020-03-01

## 2020-03-01 RX ORDER — 0.9 % SODIUM CHLORIDE 0.9 %
1000 INTRAVENOUS SOLUTION INTRAVENOUS ONCE
Status: COMPLETED | OUTPATIENT
Start: 2020-03-01 | End: 2020-03-01

## 2020-03-01 RX ORDER — ONDANSETRON 2 MG/ML
4 INJECTION INTRAMUSCULAR; INTRAVENOUS ONCE
Status: COMPLETED | OUTPATIENT
Start: 2020-03-01 | End: 2020-03-01

## 2020-03-01 RX ORDER — NITROFURANTOIN 25; 75 MG/1; MG/1
100 CAPSULE ORAL 2 TIMES DAILY
Qty: 10 CAPSULE | Refills: 0 | Status: SHIPPED | OUTPATIENT
Start: 2020-03-01 | End: 2020-03-06

## 2020-03-01 RX ORDER — DICYCLOMINE HYDROCHLORIDE 10 MG/1
10 CAPSULE ORAL EVERY 6 HOURS PRN
Qty: 20 CAPSULE | Refills: 0 | Status: ON HOLD | OUTPATIENT
Start: 2020-03-01 | End: 2020-12-24

## 2020-03-01 RX ADMIN — METOCLOPRAMIDE HYDROCHLORIDE 10 MG: 5 INJECTION INTRAMUSCULAR; INTRAVENOUS at 17:36

## 2020-03-01 RX ADMIN — ONDANSETRON 4 MG: 2 INJECTION INTRAMUSCULAR; INTRAVENOUS at 15:35

## 2020-03-01 RX ADMIN — KETOROLAC TROMETHAMINE 15 MG: 15 INJECTION, SOLUTION INTRAMUSCULAR; INTRAVENOUS at 15:35

## 2020-03-01 RX ADMIN — IOPAMIDOL 100 ML: 755 INJECTION, SOLUTION INTRAVENOUS at 16:39

## 2020-03-01 RX ADMIN — SODIUM CHLORIDE 1000 ML: 9 INJECTION, SOLUTION INTRAVENOUS at 15:35

## 2020-03-01 ASSESSMENT — ENCOUNTER SYMPTOMS
SORE THROAT: 0
ABDOMINAL DISTENTION: 0
SHORTNESS OF BREATH: 0
NAUSEA: 1
COLOR CHANGE: 0
BACK PAIN: 0
CONSTIPATION: 0
EYE DISCHARGE: 0
VOMITING: 1
DIARRHEA: 1
ABDOMINAL PAIN: 1
RHINORRHEA: 0

## 2020-03-01 ASSESSMENT — PAIN DESCRIPTION - FREQUENCY: FREQUENCY: INTERMITTENT

## 2020-03-01 ASSESSMENT — PAIN DESCRIPTION - ORIENTATION: ORIENTATION: RIGHT;LEFT

## 2020-03-01 ASSESSMENT — PAIN DESCRIPTION - DESCRIPTORS: DESCRIPTORS: ACHING

## 2020-03-01 ASSESSMENT — PAIN SCALES - GENERAL
PAINLEVEL_OUTOF10: 9
PAINLEVEL_OUTOF10: 6
PAINLEVEL_OUTOF10: 8

## 2020-03-01 ASSESSMENT — PAIN DESCRIPTION - PAIN TYPE
TYPE: ACUTE PAIN
TYPE: ACUTE PAIN

## 2020-03-01 ASSESSMENT — PAIN DESCRIPTION - LOCATION
LOCATION: ABDOMEN
LOCATION: ABDOMEN

## 2020-03-01 ASSESSMENT — PAIN DESCRIPTION - ONSET: ONSET: ON-GOING

## 2020-03-01 ASSESSMENT — PAIN DESCRIPTION - PROGRESSION: CLINICAL_PROGRESSION: GRADUALLY IMPROVING

## 2020-03-01 NOTE — ED PROVIDER NOTES
[address] [facility] ED  eMERGENCY dEPARTMENT eNCOUnter      Pt Name: Chelsea Gates  MRN: [id number]  [place] 1970  Date of evaluation: 3/1/2020  Provider: Alisa Davey PA-C    CHIEF COMPLAINT       Chief Complaint   Patient presents with    Abdominal Pain     onset 1.5 days ago assoc with N/V/D. Pt has hx of appendicitis          HISTORY OF PRESENT ILLNESS   (Location/Symptom, Timing/Onset,Context/Setting, Quality, Duration, Modifying Factors, Severity)  Note limiting factors. Chelsea Gates is a 54 y.o. female who presents to the emergency department with complaint of left upper quadrant abdominal pain which patient states been ongoing for the last 1-1/2 days, along with nausea and vomiting. She denies any fevers. She states she is been able to eat and drink without any difficulty. Denies any urinary complaints, she is rating her current pain at this time as a 9 out of 10 though she looks extremely comfortable. Patient states she has a past history of appendicitis 10 years ago with very similar pain. She states her appendix was not removed. She has patch medical history for hypertension, anxiety, chronic back pain, bipolar disorder, colitis, depression, gastritis, PTSD, TIAs, tobacco abuse. HPI    NursingNotes were reviewed. REVIEW OF SYSTEMS    (2-9 systems for level 4, 10 or more for level 5)     Review of Systems   Constitutional: Negative for activity change and appetite change. HENT: Negative for congestion, ear discharge, ear pain, nosebleeds, rhinorrhea and sore throat. Eyes: Negative for discharge. Respiratory: Negative for shortness of breath. Cardiovascular: Negative for chest pain, palpitations and leg swelling. Gastrointestinal: Positive for abdominal pain, diarrhea, nausea and vomiting. Negative for abdominal distention and constipation. Genitourinary: Negative for difficulty urinating, dysuria, frequency and urgency.    Musculoskeletal: Negative for arthralgias, back pain and myalgias. Skin: Negative for color change, pallor, rash and wound. Neurological: Negative for dizziness, tremors, syncope, weakness, numbness and headaches. Psychiatric/Behavioral: Negative for agitation and confusion. Except as noted above the remainder of the review of systems was reviewed and negative. PAST MEDICAL HISTORY     Past Medical History:   Diagnosis Date    Anxiety     Back pain     back surgery x 4    [facility] [address]     CAD (coronary artery disease)     [facility]FL [facility]ronic airflow limitation) ([facility]) 11/3/2016    Chronic bronchitis ([facility])     Chronic pain     Colitis     Complicated migraine     DDD (degenerative disc disease), lumbar 2016    Depression     Endometriosis     Excessive caffeine abuse, continuous ([facility]) 2018    Gastritis     GERD (gastroesophageal reflux disease)     History of myocardial infarction     HTN (hypertension), benign 2016    Impaired mobility and activities of daily living     Over weight     PTSD (post-traumatic stress disorder)     Sensory neuropathy 2016    Somatization disorder     TIA (transient ischemic attack)     Tobacco abuse     Vasospastic angina ([facility] 2016         SURGICALHISTORY       Past Surgical History:   Procedure Laterality Date    BACK SURGERY      x2     SECTION      x2    CHOLECYSTECTOMY  13    Lapchole    CORONARY ANGIOPLASTY      CYST REMOVAL  3/7/16    Dr. Merlin Gay Left     UPPER GASTROINTESTINAL ENDOSCOPY  2014    ANIBAL HOUSE M.D. CURRENT MEDICATIONS       Previous Medications    ALBUTEROL SULFATE HFA (PROVENTIL HFA) 108 (90 BASE) MCG/ACT INHALER    Inhale 2 puffs into the lungs every 6 hours as needed for Wheezing    ALPRAZOLAM (XANAX) 0.5 MG TABLET    Take 0.5 mg by mouth 2 times daily.      AMITRIPTYLINE (ELAVIL) 50 MG TABLET Take 1 tablet by mouth nightly    ASPIRIN 81 MG CHEWABLE TABLET    Take 1 tablet by mouth daily    ATORVASTATIN (LIPITOR) 10 MG TABLET    Take 1 tablet by mouth daily    BD INTEGRA SYRINGE 25G X 1\" 3 ML MISC    USE AS DIRECTED EVERY MONTH    BUDESONIDE (PULMICORT) 0.5 MG/2ML NEBULIZER SUSPENSION    INHALE 1 VIAL VIA NEBULIZER TWICE DAILY    BUTALBITAL-APAP-CAFFEINE -40 MG CAPS PER CAPSULE    Take 1 capsule by mouth every 4 hours as needed for Migraine    CALCIUM CARBONATE (TUMS) 500 MG CHEWABLE TABLET    1 tablet as needed     CHOLECALCIFEROL 50 MCG (2000 UT) TABS    Take 1 tablet by mouth daily    CYANOCOBALAMIN 1000 MCG/ML INJECTION    Inject 1 mL into the skin every 30 days    CYCLOBENZAPRINE (FLEXERIL) 10 MG TABLET    Take 1 tablet by mouth 2 times daily as needed for Muscle spasms    DICLOFENAC (VOLTAREN) 75 MG EC TABLET    Take 1 tablet by mouth 2 times daily    DILTIAZEM (CARDIZEM CD) 240 MG EXTENDED RELEASE CAPSULE    Take 1 capsule by mouth daily HOLD for SBP<100 or HR<60    FLUTICASONE (FLONASE) 50 MCG/ACT NASAL SPRAY    1 spray by NOT APPLICABLE route 2 times daily    FOLIC ACID (FOLVITE) 1 MG TABLET    Take 1 tablet by mouth daily    FREESTYLE LANCETS MISC    USE DAILY AS DIRECTED    FREESTYLE LITE STRIP    USE DAILY AS DIRECTED    HYDROCHLOROTHIAZIDE (HYDRODIURIL) 25 MG TABLET    Take 25 mg by mouth daily    IPRATROPIUM-ALBUTEROL (DUONEB) 0.5-2.5 (3) MG/3ML SOLN NEBULIZER SOLUTION    Inhale 3 mLs into the lungs three times daily    ISOSORBIDE MONONITRATE (IMDUR) 30 MG EXTENDED RELEASE TABLET    Take 1 tablet by mouth daily    LEVOTHYROXINE (SYNTHROID) 25 MCG TABLET    TAKE 1 TABLET BY MOUTH DAILY    LITHIUM 300 MG CAPSULE    Take 1 capsule by mouth daily    LOSARTAN (COZAAR) 100 MG TABLET    Take 1 tablet by mouth daily    LURASIDONE (LATUDA) 20 MG TABS TABLET    TAKE 1 TABLET BY MOUTH EVERY DAY    MAGNESIUM OXIDE (MAG-OX) 400 MG TABLET    Take 1 tablet by mouth daily    MECLIZINE (ANTIVERT) 25 together: None     Attends Mandaeism service: None     Active member of club or organization: None     Attends meetings of clubs or organizations: None     Relationship status: None    Intimate partner violence:     Fear of current or ex partner: None     Emotionally abused: None     Physically abused: None     Forced sexual activity: None   Other Topics Concern    None   Social History Narrative    Helen Palomo is a 51-year-old female who is on disability because of pain and bipolar disorder. She's also had a presumed diagnosis of possible multiple sclerosis. She's been seeing Dr. Shirlene Jewell for that and she says that the diagnosis is sometimes in question and it's clear neuropathy vitamin B12 deficiency as well as generalized bodyaches from the severe vitamin D deficiency have caused this scenario that looks like multiple sclerosis. Lives With: Significant other    Type of Home: House Two level    Home Access: Stairs to enter with rails  - Number of Steps: 5    Bathroom Shower/Tub: Tub only    ADL Assistance: Independent    Homemaking Assistance: Independent    Homemaking Responsibilities: Yes    Meal Prep Responsibility: Primary    Laundry Responsibility: Primary    Cleaning Responsibility: Primary    Bill Paying/Finance Responsibility: Primary    Shopping Responsibility: Primary    Ambulation Assistance: Independent    Transfer Assistance: Independent    Active : Yes    Occupation: Full time employment    Type of occupation: disabled-  - in charges of [address] \[facility]\""    (up to 7 for level 4, 8 or more for level 5)     ED Triage Vitals [03/01/20 1426]   BP Temp Temp Source Pulse Resp SpO2 Height Weight   (!) 214/96 98.2 °F (36.8 °C) Oral 82 18 98 % 5' 7\" (1.702 m) 210 lb (95.3 kg)       Physical Exam  Constitutional:       General: She is not in acute distress. Appearance: She is well-developed.  She is not ill-appearing, toxic-appearing or diaphoretic. HENT:      Head: Normocephalic. Right Ear: Tympanic membrane normal.      Left Ear: Tympanic membrane normal.   Eyes:      Pupils: Pupils are equal, round, and reactive to light. Neck:      Musculoskeletal: Neck supple. Vascular: No JVD. Trachea: No tracheal deviation. Cardiovascular:      Rate and Rhythm: Normal rate. Pulmonary:      Effort: Pulmonary effort is normal. No respiratory distress. Breath sounds: No wheezing or rales. Chest:      Chest wall: No tenderness. Abdominal:      General: There is no distension. Palpations: There is no mass. Tenderness: There is no abdominal tenderness. There is no right CVA tenderness, left CVA tenderness, guarding or rebound. Musculoskeletal: Normal range of motion. General: No deformity. Right lower leg: No edema. Left lower leg: No edema. Neurological:      General: No focal deficit present. Mental Status: She is alert and oriented to person, place, and time. Coordination: Coordination normal.   Psychiatric:         Mood and Affect: Mood normal.         DIAGNOSTIC RESULTS     EKG: All EKG's are interpreted by the Emergency Department Physician who either signs or Co-signsthis chart in the absence of a cardiologist.        RADIOLOGY:   Carney Hashimoto such as CT, Ultrasound and MRI are read by the radiologist. [facility] radiographic images are visualized and preliminarily interpreted by the emergency physician with the below findings:    CT scan of the abdomen pelvis shows no acute findings. Interpretation per the Radiologist below, if available at the time ofthis note:    CT ABDOMEN PELVIS W IV CONTRAST Additional Contrast? None   Final Result      No acute findings. Hepatic steatosis. Cholecystectomy. Hysterectomy. Other findings discussed.          All CT scans at this facility use dose modulation, iterative reconstruction, and/or weight based dosing when appropriate to reduce radiation dose to as low as reasonably achievable. ED BEDSIDE ULTRASOUND:   Performed by ED Physician - none    LABS:  Labs Reviewed   COMPREHENSIVE METABOLIC PANEL - Abnormal; Notable for the following components:       Result Value    Glucose 120 (*)     Alkaline Phosphatase 141 (*)     ALT 70 (*)     AST 50 (*)     All other components within normal limits   URINE RT REFLEX TO CULTURE - Abnormal; Notable for the following components:    Clarity, UA CLOUDY (*)     Leukocyte Esterase, Urine SMALL (*)     All other components within normal limits   CBC WITH AUTO DIFFERENTIAL - Abnormal; Notable for the following components:    MCH 31.8 (*)     All other components within normal limits   MICROSCOPIC URINALYSIS - Abnormal; Notable for the following components:    Bacteria, UA RARE (*)     WBC, UA 10-20 (*)     RBC, UA 3-5 (*)     All other components within normal limits   POCT CREATININE - URINE - Normal   CULTURE, URINE   URINE DRUG SCREEN   ETHANOL   LIPASE       All other labs were within normal range or not returned as of this dictation. EMERGENCY DEPARTMENT COURSE and DIFFERENTIAL DIAGNOSIS/MDM:   Vitals:    Vitals:    03/01/20 1426 03/01/20 1530   BP: (!) 214/96 (!) 171/94   Pulse: 82 73   Resp: 18 18   Temp: 98.2 °F (36.8 °C)    TempSrc: Oral Oral   SpO2: 98% 98%   Weight: 210 lb (95.3 kg)    Height: 5' 7\" (1.702 m)           MDM  Number of Diagnoses or Management Options  Left upper quadrant pain:   Urinary tract infection without hematuria, site unspecified:   Diagnosis management comments: Patient presents emergency department with complaint of left upper quadrant abdominal pain which she states been ongoing for 1-1/2 days, long with nausea and vomiting. She states she is had similar pain before in the past when she was diagnosed with appendicitis 10 years ago she states she never had her appendix removed. She states it got better.

## 2020-03-03 LAB
GFR AFRICAN AMERICAN: >60
GFR NON-AFRICAN AMERICAN: >60
PERFORMED ON: NORMAL
POC CREATININE: 0.9 MG/DL (ref 0.6–1.1)
POC SAMPLE TYPE: NORMAL
URINE CULTURE, ROUTINE: NORMAL

## 2020-03-12 ENCOUNTER — OFFICE VISIT (OUTPATIENT)
Dept: CARDIOLOGY CLINIC | Age: 50
End: 2020-03-12
Payer: COMMERCIAL

## 2020-03-12 VITALS
DIASTOLIC BLOOD PRESSURE: 88 MMHG | WEIGHT: 206 LBS | OXYGEN SATURATION: 98 % | HEART RATE: 74 BPM | BODY MASS INDEX: 32.26 KG/M2 | RESPIRATION RATE: 14 BRPM | SYSTOLIC BLOOD PRESSURE: 156 MMHG

## 2020-03-12 PROCEDURE — 99214 OFFICE O/P EST MOD 30 MIN: CPT | Performed by: INTERNAL MEDICINE

## 2020-03-12 NOTE — PROGRESS NOTES
good. CAD is stable. No LE discoloration or ulcers. No LE edema. No CHF type symptoms. Lipid profile is normal. No recent hospitalization. No change in meds. 3-8-19: Dealing with Vertigo and following with [facility]. Having sharp type of CP with right arm radiation. Continues to smoke. Pt denies chest pain, dyspnea, dyspnea on exertion, change in exercise capacity, fatigue,  nausea, vomiting, diarrhea, constipation, motor weakness, insomnia, weight loss, syncope,  palpitations, PND, orthopnea, or claudication. No nitro use. BP is high. CAD is stable. No LE discoloration or ulcers. No LE edema. No CHF type symptoms. Lipid profile is normal. Had lorepssor changed to Cardizem. 3-12-20: was at [facility] for UTI and noted to have elevated BP in 190-200 range/over a 100. Does have SURESH at times. She is compliant with her BP meds now, was not taken them. Pt denies chest pain, dyspnea, dyspnea on exertion, change in exercise capacity, fatigue,  nausea, vomiting, diarrhea, constipation, motor weakness, insomnia, weight loss, syncope, dizziness, lightheadedness, palpitations, PND, orthopnea, or claudication. No nitro use. BP is mildly elevated today. CAD is stable. No LE discoloration or ulcers. No LE edema. No CHF type symptoms. Lipid profile is normal. No recent hospitalization. No change in meds. Continues to smoke. Drinks a lot of caffeine. Was taken off of her BBlocker due to wheezing. S/p echo on 8-6-2019. Conclusions   Summary   Normal intact intra-atrial septum was noted with no evidence of   significant intra-atrial communications neither by colour flow Doppler   imaging nor by aggitated saline study. S/p echo in 4/2019  Other allergy:(Flu vaccine, Pneumovax). Conclusions   Summary   Normal mitral valve structure and function. Mild (1+) mitral regurgitation is present. Normal aortic valve structure and function. Mild aortic regurgitation is noted. Mildly dilated left atrium.    Normal left ventricle structure and function. Left ventricular ejection fraction is visually estimated at 55%. Patient Active Problem List   Diagnosis    H/O cardiac arrest    Bipolar 1 disorder ([facility] [address]    Tobacco abuse    SAMAYOA (dyspnea on exertion)    Abnormal ECG    Displacement of lumbar intervertebral disc without myelopathy    Acid reflux    HTN (hypertension), benign    Multiple sclerosis (HCC)    Over weight    Anxiety    Chronic pain syndrome     Vitamin B12 deficiency    Grief at loss of child    Smoking    Neuromyelopathy due to vitamin B12 deficiency ([facility]    Chronic obstructive pulmonary disease (HCC)    Acute bronchitis    Sleep apnea    Obesity (BMI 30-39. 9)    Stroke determined by clinical assessment ([facility] [address] [facility]    Abnormal auditory perception of left ear    Dizziness and giddiness    Sensorineural hearing loss (SNHL) of both ears    Tinnitus, bilateral    Vertigo, peripheral, bilateral    Right arm weakness    COPD exacerbation (HCC)    Chest pain       Past Surgical History:   Procedure Laterality Date    BACK SURGERY      x2     SECTION      x2    CHOLECYSTECTOMY  13    Lapchole    CORONARY ANGIOPLASTY      CYST REMOVAL  3/7/16    Dr. Zeke Vera Left     UPPER GASTROINTESTINAL ENDOSCOPY  2014    ANIBAL HOUSE M.D. Social History     Socioeconomic History    Marital status:       Spouse name: None    Number of children: None    Years of education: None    Highest education level: None   Occupational History    Occupation: unemployed   Social Needs    Financial resource strain: None    Food insecurity     Worry: None     Inability: None    Transportation needs     Medical: None     Non-medical: None   Tobacco Use    Smoking status: Current Every Day Smoker     Packs/day: 1.00     Years: 17.00     Pack years: 17.00     Types: Cigarettes    Smokeless tobacco: Mother     Coronary Art Dis Mother         Had stents in her 62s   Leyda Francisca Bipolar Disorder Son        Current Outpatient Medications   Medication Sig Dispense Refill    dicyclomine (BENTYL) 10 MG capsule Take 1 capsule by mouth every 6 hours as needed (cramps) 20 capsule 0    ondansetron (ZOFRAN ODT) 4 MG disintegrating tablet Take 1 tablet by mouth every 8 hours as needed for Nausea 20 tablet 0    nitroGLYCERIN (NITROSTAT) 0.4 MG SL tablet up to max of 3 total doses.  If no relief after 1 dose, call 911. 25 tablet 3    diltiazem (CARDIZEM CD) 240 MG extended release capsule Take 1 capsule by mouth daily HOLD for SBP<100 or HR<60 30 capsule 3    isosorbide mononitrate (IMDUR) 30 MG extended release tablet Take 1 tablet by mouth daily 30 tablet 3    losartan (COZAAR) 100 MG tablet Take 1 tablet by mouth daily 30 tablet 3    atorvastatin (LIPITOR) 10 MG tablet Take 1 tablet by mouth daily 30 tablet 5    cyclobenzaprine (FLEXERIL) 10 MG tablet Take 1 tablet by mouth 2 times daily as needed for Muscle spasms 60 tablet 0    Cholecalciferol 50 MCG (2000 UT) TABS Take 1 tablet by mouth daily      cyanocobalamin 1000 MCG/ML injection Inject 1 mL into the skin every 30 days      fluticasone (FLONASE) 50 MCG/ACT nasal spray 1 spray by NOT APPLICABLE route 2 times daily      hydrochlorothiazide (HYDRODIURIL) 25 MG tablet Take 25 mg by mouth daily      magnesium oxide (MAG-OX) 400 MG tablet Take 1 tablet by mouth daily  5    albuterol sulfate HFA (PROVENTIL HFA) 108 (90 Base) MCG/ACT inhaler Inhale 2 puffs into the lungs every 6 hours as needed for Wheezing 1 Inhaler 3    tiZANidine (ZANAFLEX) 4 MG tablet Take 1 tablet by mouth every 6 hours as needed (pain) 10 tablet 0    FREESTYLE LANCETS MISC USE DAILY AS DIRECTED 100 each 10    butalbital-APAP-caffeine -40 MG CAPS per capsule Take 1 capsule by mouth every 4 hours as needed for Migraine 45 capsule 0    ALPRAZolam (XANAX) 0.5 MG tablet Take 0.5 mg by mouth ROS: No history of blood clots or bleeding disorder. Respiratory ROS: positive for - shortness of breath  Cardiovascular ROS: positive for - chest pain, dyspnea on exertion and shortness of breath  Gastrointestinal ROS: negative  Genito-Urinary ROS: no dysuria, trouble voiding, or hematuria  Musculoskeletal ROS: negative  Neurological ROS: no TIA or stroke symptoms  Dermatological ROS: negative    VITALS:  Blood pressure (!) 156/88, pulse 74, resp. rate 14, weight 206 lb (93.4 kg), SpO2 98 %, not currently breastfeeding. Body mass index is 32.26 kg/m². Physical Examination:  General appearance - alert, well appearing, and in no distress  Mental status - alert, oriented to person, place, and time  Neck - Neck is supple, no JVD or carotid bruits. No thyromegaly or adenopathy. Chest - clear to auscultation, no wheezes, rales or rhonchi, symmetric air entry  Heart - normal rate, regular rhythm, normal S1, S2, no murmurs, rubs, clicks or gallops  Abdomen - soft, nontender, nondistended, no masses or organomegaly  Neurological - alert, oriented, normal speech, no focal findings or movement disorder noted  Extremities - peripheral pulses normal, no pedal edema, no clubbing or cyanosis  Skin - normal coloration and turgor, no rashes, no suspicious skin lesions noted    }    No orders of the defined types were placed in this encounter. ASSESSMENT:     Diagnosis Orders   1. Essential hypertension     2. H/O cardiac arrest           PLAN:     Patient will need to continue to follow up with you for their general medical care     As always, aggressive risk factor modification is strongly recommended. We should adhere to the [address] VII guidelines for HTN management and the NCEP ATP III guidelines for LDL-C management. Cardiac diet is always recommended with low fat, cholesterol, calories and sodium. Continue medications at current doses. Check EKG next office visit. Take Imdur, HCTZ, Losartan, Cardizem.

## 2020-04-08 ENCOUNTER — TELEPHONE (OUTPATIENT)
Dept: CARDIOLOGY CLINIC | Age: 50
End: 2020-04-08

## 2020-04-12 ENCOUNTER — HOSPITAL ENCOUNTER (EMERGENCY)
Age: 50
Discharge: HOME | End: 2020-04-12
Attending: NEUROMUSCULOSKELETAL MEDICINE, SPORTS MEDICINE
Payer: COMMERCIAL

## 2020-04-12 ENCOUNTER — APPOINTMENT (OUTPATIENT)
Dept: GENERAL RADIOLOGY | Age: 50
End: 2020-04-12
Payer: COMMERCIAL

## 2020-04-12 VITALS
HEART RATE: 80 BPM | DIASTOLIC BLOOD PRESSURE: 66 MMHG | WEIGHT: 210 LBS | BODY MASS INDEX: 32.96 KG/M2 | HEIGHT: 67 IN | OXYGEN SATURATION: 97 % | SYSTOLIC BLOOD PRESSURE: 160 MMHG | RESPIRATION RATE: 14 BRPM | TEMPERATURE: 98.2 F

## 2020-04-12 LAB
ALBUMIN SERPL-MCNC: 3.9 G/DL (ref 3.5–4.6)
ALP BLD-CCNC: 126 U/L (ref 40–130)
ALT SERPL-CCNC: 46 U/L (ref 0–33)
ANION GAP SERPL CALCULATED.3IONS-SCNC: 14 MEQ/L (ref 9–15)
AST SERPL-CCNC: 35 U/L (ref 0–35)
BASOPHILS ABSOLUTE: 0.1 K/UL (ref 0–0.2)
BASOPHILS RELATIVE PERCENT: 0.7 %
BILIRUB SERPL-MCNC: 0.4 MG/DL (ref 0.2–0.7)
BUN BLDV-MCNC: 8 MG/DL (ref 6–20)
CALCIUM SERPL-MCNC: 9.2 MG/DL (ref 8.5–9.9)
CHLORIDE BLD-SCNC: 105 MEQ/L (ref 95–107)
CO2: 24 MEQ/L (ref 20–31)
CREAT SERPL-MCNC: 0.9 MG/DL (ref 0.5–0.9)
EKG ATRIAL RATE: 75 BPM
EKG ATRIAL RATE: 84 BPM
EKG P AXIS: 55 DEGREES
EKG P AXIS: 60 DEGREES
EKG P-R INTERVAL: 170 MS
EKG P-R INTERVAL: 170 MS
EKG Q-T INTERVAL: 392 MS
EKG Q-T INTERVAL: 438 MS
EKG QRS DURATION: 94 MS
EKG QRS DURATION: 96 MS
EKG QTC CALCULATION (BAZETT): 463 MS
EKG QTC CALCULATION (BAZETT): 489 MS
EKG R AXIS: 61 DEGREES
EKG R AXIS: 62 DEGREES
EKG T AXIS: 72 DEGREES
EKG T AXIS: 86 DEGREES
EKG VENTRICULAR RATE: 75 BPM
EKG VENTRICULAR RATE: 84 BPM
EOSINOPHILS ABSOLUTE: 0.1 K/UL (ref 0–0.7)
EOSINOPHILS RELATIVE PERCENT: 1.5 %
GFR AFRICAN AMERICAN: >60
GFR NON-AFRICAN AMERICAN: >60
GLOBULIN: 2.5 G/DL (ref 2.3–3.5)
GLUCOSE BLD-MCNC: 142 MG/DL (ref 70–99)
HCT VFR BLD CALC: 43.3 % (ref 37–47)
HEMOGLOBIN: 14.9 G/DL (ref 12–16)
LYMPHOCYTES ABSOLUTE: 2.1 K/UL (ref 1–4.8)
LYMPHOCYTES RELATIVE PERCENT: 25.1 %
MCH RBC QN AUTO: 31.2 PG (ref 27–31.3)
MCHC RBC AUTO-ENTMCNC: 34.5 % (ref 33–37)
MCV RBC AUTO: 90.6 FL (ref 82–100)
MONOCYTES ABSOLUTE: 0.5 K/UL (ref 0.2–0.8)
MONOCYTES RELATIVE PERCENT: 6.1 %
NEUTROPHILS ABSOLUTE: 5.6 K/UL (ref 1.4–6.5)
NEUTROPHILS RELATIVE PERCENT: 66.6 %
PDW BLD-RTO: 12.9 % (ref 11.5–14.5)
PLATELET # BLD: 152 K/UL (ref 130–400)
POTASSIUM SERPL-SCNC: 3.6 MEQ/L (ref 3.4–4.9)
RBC # BLD: 4.78 M/UL (ref 4.2–5.4)
SODIUM BLD-SCNC: 143 MEQ/L (ref 135–144)
TOTAL PROTEIN: 6.4 G/DL (ref 6.3–8)
TROPONIN: <0.01 NG/ML (ref 0–0.01)
WBC # BLD: 8.4 K/UL (ref 4.8–10.8)

## 2020-04-12 PROCEDURE — 71045 X-RAY EXAM CHEST 1 VIEW: CPT

## 2020-04-12 PROCEDURE — 99285 EMERGENCY DEPT VISIT HI MDM: CPT

## 2020-04-12 PROCEDURE — 93005 ELECTROCARDIOGRAM TRACING: CPT | Performed by: NEUROMUSCULOSKELETAL MEDICINE, SPORTS MEDICINE

## 2020-04-12 PROCEDURE — 85025 COMPLETE CBC W/AUTO DIFF WBC: CPT

## 2020-04-12 PROCEDURE — 80053 COMPREHEN METABOLIC PANEL: CPT

## 2020-04-12 PROCEDURE — 2500000003 HC RX 250 WO HCPCS: Performed by: NEUROMUSCULOSKELETAL MEDICINE, SPORTS MEDICINE

## 2020-04-12 PROCEDURE — 84484 ASSAY OF TROPONIN QUANT: CPT

## 2020-04-12 PROCEDURE — 96376 TX/PRO/DX INJ SAME DRUG ADON: CPT

## 2020-04-12 PROCEDURE — 96374 THER/PROPH/DIAG INJ IV PUSH: CPT

## 2020-04-12 PROCEDURE — 96375 TX/PRO/DX INJ NEW DRUG ADDON: CPT

## 2020-04-12 PROCEDURE — 6370000000 HC RX 637 (ALT 250 FOR IP): Performed by: NEUROMUSCULOSKELETAL MEDICINE, SPORTS MEDICINE

## 2020-04-12 PROCEDURE — 36415 COLL VENOUS BLD VENIPUNCTURE: CPT

## 2020-04-12 PROCEDURE — 6360000002 HC RX W HCPCS: Performed by: NEUROMUSCULOSKELETAL MEDICINE, SPORTS MEDICINE

## 2020-04-12 RX ORDER — PROMETHAZINE HYDROCHLORIDE 25 MG/1
25 TABLET ORAL 4 TIMES DAILY PRN
Qty: 20 TABLET | Refills: 0 | Status: SHIPPED | OUTPATIENT
Start: 2020-04-12 | End: 2020-04-19

## 2020-04-12 RX ORDER — LABETALOL 20 MG/4 ML (5 MG/ML) INTRAVENOUS SYRINGE
10 ONCE
Status: COMPLETED | OUTPATIENT
Start: 2020-04-12 | End: 2020-04-12

## 2020-04-12 RX ORDER — HYDRALAZINE HYDROCHLORIDE 10 MG/1
10 TABLET, FILM COATED ORAL ONCE
Status: DISCONTINUED | OUTPATIENT
Start: 2020-04-12 | End: 2020-04-12

## 2020-04-12 RX ORDER — OMEPRAZOLE 20 MG/1
20 CAPSULE, DELAYED RELEASE ORAL
Qty: 60 CAPSULE | Refills: 5 | Status: ON HOLD | OUTPATIENT
Start: 2020-04-12 | End: 2020-07-28 | Stop reason: ALTCHOICE

## 2020-04-12 RX ORDER — PROMETHAZINE HYDROCHLORIDE 25 MG/1
25 TABLET ORAL ONCE
Status: COMPLETED | OUTPATIENT
Start: 2020-04-12 | End: 2020-04-12

## 2020-04-12 RX ORDER — LABETALOL 200 MG/1
200 TABLET, FILM COATED ORAL ONCE
Status: COMPLETED | OUTPATIENT
Start: 2020-04-12 | End: 2020-04-12

## 2020-04-12 RX ORDER — LORAZEPAM 2 MG/ML
0.5 INJECTION INTRAMUSCULAR ONCE
Status: COMPLETED | OUTPATIENT
Start: 2020-04-12 | End: 2020-04-12

## 2020-04-12 RX ADMIN — LORAZEPAM 0.5 MG: 2 INJECTION, SOLUTION INTRAMUSCULAR; INTRAVENOUS at 21:16

## 2020-04-12 RX ADMIN — LABETALOL 20 MG/4 ML (5 MG/ML) INTRAVENOUS SYRINGE 10 MG: at 21:16

## 2020-04-12 RX ADMIN — PROMETHAZINE HYDROCHLORIDE 25 MG: 25 TABLET ORAL at 21:15

## 2020-04-12 RX ADMIN — LORAZEPAM 0.5 MG: 2 INJECTION, SOLUTION INTRAMUSCULAR; INTRAVENOUS at 19:25

## 2020-04-12 RX ADMIN — LABETALOL HYDROCHLORIDE 200 MG: 200 TABLET, FILM COATED ORAL at 19:17

## 2020-04-12 ASSESSMENT — ENCOUNTER SYMPTOMS
ABDOMINAL PAIN: 0
WHEEZING: 0
VOMITING: 1
EYE PAIN: 0
EYE REDNESS: 0
DIARRHEA: 0
EYE DISCHARGE: 0
COUGH: 1
SINUS PAIN: 0
SORE THROAT: 0
SHORTNESS OF BREATH: 1
NAUSEA: 1

## 2020-04-12 ASSESSMENT — PAIN DESCRIPTION - LOCATION: LOCATION: CHEST

## 2020-04-12 ASSESSMENT — PAIN DESCRIPTION - DIRECTION: RADIATING_TOWARDS: RADIATES DOWN LEFT ARM

## 2020-04-12 ASSESSMENT — PAIN SCALES - GENERAL: PAINLEVEL_OUTOF10: 8

## 2020-04-12 NOTE — ED PROVIDER NOTES
[address] [facility] ED  eMERGENCYdEPARTMENT eNCOUnter      Pt Name: Albina Cotter  MRN: [id number]  [place] 1970  Date of evaluation: 4/12/2020  Provider:AZUCENA HALL MD    CHIEF COMPLAINT           HPI  Albina Cotter is a 54 y.o. female per chart review has a h/o chest pain which she has had on and off for the past 4 days. Describes the pain is sharp but associated with diaphoresis and radiation of the pain to the left shoulder and down the left arm. History of CAD. Complains of chronic shortness of breath which she attributes to smoking. Denies fever chills cough, other than what she attributes to a smoker's cough. History of hypertension. history of coronary artery disease. history of COPD. Still smoking. Had nausea vomiting of her medications this morning. ROS  Review of Systems   Constitutional: Negative for appetite change, chills, diaphoresis, fatigue and fever. HENT: Negative for congestion, ear pain, sinus pain and sore throat. Eyes: Negative for pain, discharge and redness. Respiratory: Positive for cough and shortness of breath. Negative for wheezing. Cardiovascular: Positive for chest pain. Negative for palpitations. Gastrointestinal: Positive for nausea and vomiting. Negative for abdominal pain and diarrhea. Genitourinary: Negative for dysuria, flank pain and hematuria. Musculoskeletal: Negative for arthralgias and myalgias. Skin: Negative for rash and wound. Neurological: Negative for dizziness, syncope, light-headedness and headaches. All other systems reviewed and are negative. Except as noted above the remainder of the review of systems was reviewed and negative.        PAST MEDICAL HISTORY     Past Medical History:   Diagnosis Date    Anxiety     Back pain     back surgery x 4    Bipolar disorder ([facility] [address]     CAD (coronary artery disease)     CAFL [facility]chronic airflow limitation) ([facility]) 11/3/2016    Chronic bronchitis ([facility])     Chronic pain     MCG/ML INJECTION    Inject 1 mL into the skin every 30 days    CYCLOBENZAPRINE (FLEXERIL) 10 MG TABLET    Take 1 tablet by mouth 2 times daily as needed for Muscle spasms    DICLOFENAC (VOLTAREN) 75 MG EC TABLET    Take 1 tablet by mouth 2 times daily    DICYCLOMINE (BENTYL) 10 MG CAPSULE    Take 1 capsule by mouth every 6 hours as needed (cramps)    DILTIAZEM (CARDIZEM CD) 240 MG EXTENDED RELEASE CAPSULE    Take 1 capsule by mouth daily HOLD for SBP<100 or HR<60    FLUTICASONE (FLONASE) 50 MCG/ACT NASAL SPRAY    1 spray by NOT APPLICABLE route 2 times daily    FOLIC ACID (FOLVITE) 1 MG TABLET    Take 1 tablet by mouth daily    FREESTYLE LANCETS MISC    USE DAILY AS DIRECTED    FREESTYLE LITE STRIP    USE DAILY AS DIRECTED    HYDROCHLOROTHIAZIDE (HYDRODIURIL) 25 MG TABLET    Take 25 mg by mouth daily    IPRATROPIUM-ALBUTEROL (DUONEB) 0.5-2.5 (3) MG/3ML SOLN NEBULIZER SOLUTION    Inhale 3 mLs into the lungs three times daily    ISOSORBIDE MONONITRATE (IMDUR) 30 MG EXTENDED RELEASE TABLET    Take 1 tablet by mouth daily    LEVOTHYROXINE (SYNTHROID) 25 MCG TABLET    TAKE 1 TABLET BY MOUTH DAILY    LITHIUM 300 MG CAPSULE    Take 1 capsule by mouth daily    LOSARTAN (COZAAR) 100 MG TABLET    Take 1 tablet by mouth daily    LURASIDONE (LATUDA) 20 MG TABS TABLET    TAKE 1 TABLET BY MOUTH EVERY DAY    MAGNESIUM OXIDE (MAG-OX) 400 MG TABLET    Take 1 tablet by mouth daily    MECLIZINE (ANTIVERT) 25 MG TABLET    Take 25 mg by mouth 3 times daily as needed    NITROGLYCERIN (NITROSTAT) 0.4 MG SL TABLET    up to max of 3 total doses. If no relief after 1 dose, call 911. ONDANSETRON (ZOFRAN ODT) 4 MG DISINTEGRATING TABLET    Take 1 tablet by mouth every 8 hours as needed for Nausea    PANTOPRAZOLE (PROTONIX) 40 MG TABLET    Take 1 tablet by mouth every morning (before breakfast)    PREGABALIN (LYRICA) 50 MG CAPSULE    Take 1 capsule by mouth 2 times daily for 15 days. Emmanuel Burrell     TIZANIDINE (ZANAFLEX) 4 MG TABLET    Take 1 tablet by pulses. Heart sounds: Normal heart sounds. Pulmonary:      Effort: Pulmonary effort is normal. No respiratory distress. Breath sounds: Normal breath sounds. Abdominal:      General: Abdomen is flat. Bowel sounds are normal.      Palpations: Abdomen is soft. Skin:     General: Skin is warm and dry. Neurological:      General: No focal deficit present. Mental Status: She is alert. Results for Carlie Moore (MRN [id number]) as of 4/12/2020 20:14   Ref.  Range 4/12/2020 19:00 4/12/2020 19:10   Sodium Latest Ref Range: 135 - 144 mEq/L 143    Potassium Latest Ref Range: 3.4 - 4.9 mEq/L 3.6    Chloride Latest Ref Range: 95 - 107 mEq/L 105    CO2 Latest Ref Range: 20 - 31 mEq/L 24    BUN Latest Ref Range: 6 - 20 mg/dL 8    Creatinine Latest Ref Range: 0.50 - 0.90 mg/dL 0.90    Anion Gap Latest Ref Range: 9 - 15 mEq/L 14    GFR Non- Latest Ref Range: >60  >60.0    GFR African American Latest Ref Range: >60  >60.0    Glucose Latest Ref Range: 70 - 99 mg/dL 142 (H)    Calcium Latest Ref Range: 8.5 - 9.9 mg/dL 9.2    Total Protein Latest Ref Range: 6.3 - 8.0 g/dL 6.4    Troponin Latest Ref Range: 0.000 - 0.010 ng/mL <0.010    Albumin Latest Ref Range: 3.5 - 4.6 g/dL 3.9    Globulin Latest Ref Range: 2.3 - 3.5 g/dL 2.5    Alk Phos Latest Ref Range: 40 - 130 U/L 126    ALT Latest Ref Range: 0 - 33 U/L 46 (H)    AST Latest Ref Range: 0 - 35 U/L 35    Bilirubin Latest Ref Range: 0.2 - 0.7 mg/dL 0.4    WBC Latest Ref Range: 4.8 - 10.8 K/uL 8.4    RBC Latest Ref Range: 4.20 - 5.40 M/uL 4.78    Hemoglobin Quant Latest Ref Range: 12.0 - 16.0 g/dL 14.9    Hematocrit Latest Ref Range: 37.0 - 47.0 % 43.3    MCV Latest Ref Range: 82.0 - 100.0 fL 90.6    MCH Latest Ref Range: 27.0 - 31.3 pg 31.2    MCHC Latest Ref Range: 33.0 - 37.0 % 34.5    RDW Latest Ref Range: 11.5 - 14.5 % 12.9    Platelet Count Latest Ref Range: 130 - 400 K/uL 152    Neutrophils % Latest Units: % 66.6    Lymphocyte %

## 2020-04-12 NOTE — ED NOTES
IV placed and blood obtained. Blood sent to lab. Apresoline held per Dr Hamilton Kelley verbal direction. Dr Hamilton Kelley in to speak with patient.      Tripp Goodman RN  04/12/20 1921

## 2020-04-13 ENCOUNTER — TELEPHONE (OUTPATIENT)
Dept: CARDIOLOGY CLINIC | Age: 50
End: 2020-04-13

## 2020-04-13 ENCOUNTER — CARE COORDINATION (OUTPATIENT)
Dept: CARE COORDINATION | Age: 50
End: 2020-04-13

## 2020-04-13 PROCEDURE — 93010 ELECTROCARDIOGRAM REPORT: CPT | Performed by: INTERNAL MEDICINE

## 2020-04-13 RX ORDER — ATENOLOL 50 MG/1
50 TABLET ORAL DAILY
Qty: 1 TABLET | Refills: 0 | Status: ON HOLD | OUTPATIENT
Start: 2020-04-13 | End: 2020-12-24

## 2020-04-13 NOTE — TELEPHONE ENCOUNTER
Patient called back to state ER told her she needs a stress test.    She also states they took her off work thru the 18th and would like to know if you are agreeable with that.

## 2020-04-15 ENCOUNTER — CARE COORDINATION (OUTPATIENT)
Dept: CARE COORDINATION | Age: 50
End: 2020-04-15

## 2020-04-16 ENCOUNTER — CARE COORDINATION (OUTPATIENT)
Dept: CARE COORDINATION | Age: 50
End: 2020-04-16

## 2020-04-16 ENCOUNTER — TELEPHONE (OUTPATIENT)
Dept: CARDIOLOGY CLINIC | Age: 50
End: 2020-04-16

## 2020-04-20 ENCOUNTER — CARE COORDINATION (OUTPATIENT)
Dept: CARE COORDINATION | Age: 50
End: 2020-04-20

## 2020-04-20 NOTE — CARE COORDINATION
Patient contacted regarding COVID-19 risk and screening. Care Transition Nurse/ Ambulatory Care Manager contacted the patient by telephone to perform follow-up assessment. Verified name and  with patient as identifiers. Patient has following risk factors of: COPD. Symptoms reviewed with patient who verbalized the following symptoms: shortness of breath, fast heart rate and fast breathing. Due to not [facility] pcp encounter was not routed to provider for escalation. Education provided regarding infection prevention, and signs and symptoms of COVID-19 and when to seek medical attention with patient who verbalized understanding. Discussed exposure protocols and quarantine from [address] you at higher risk for severe illness  and given an opportunity for questions and concerns. The patient agrees to contact the COVID-19 hotline [phone] or PCP office for questions related to their healthcare. CTN/ACM provided contact information for future reference. From CDC: Are you at higher risk for severe illness?  Wash your hands often.  Avoid close contact (6 feet, which is about two arm lengths) with people who are sick.  Put distance between yourself and other people if COVID-19 is spreading in your community.  Clean and disinfect frequently touched surfaces.  Avoid all cruise travel and non-essential air travel.  Call your healthcare professional if you have concerns about COVID-19 and your underlying condition or if you are sick. For more information on steps you can take to protect yourself, see CDC's How to [place] for follow-up call in 5-7 days based on severity of symptoms and risk factors. Call to [employer]. She does have ct scheduled for . She has appt with dr carrera  and will see family  tomorrow. She continues with shortness of breath with activity and heart rate 100-130. Cardiology notif.  She was not aware of dr note for extra atenolol for heart rate above 100.

## 2020-04-21 ENCOUNTER — VIRTUAL VISIT (OUTPATIENT)
Dept: CARDIOLOGY CLINIC | Age: 50
End: 2020-04-21
Payer: COMMERCIAL

## 2020-04-21 PROCEDURE — 99213 OFFICE O/P EST LOW 20 MIN: CPT | Performed by: INTERNAL MEDICINE

## 2020-04-21 ASSESSMENT — ENCOUNTER SYMPTOMS
GASTROINTESTINAL NEGATIVE: 1
SHORTNESS OF BREATH: 0
NAUSEA: 0
WHEEZING: 0
ABDOMINAL PAIN: 0
EYES NEGATIVE: 1
VOMITING: 0
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
## 2020-04-27 ENCOUNTER — CARE COORDINATION (OUTPATIENT)
Dept: CARE COORDINATION | Age: 50
End: 2020-04-27

## 2020-06-14 ENCOUNTER — HOSPITAL ENCOUNTER (EMERGENCY)
Age: 50
Discharge: HOME | End: 2020-06-14
Payer: COMMERCIAL

## 2020-06-14 ENCOUNTER — APPOINTMENT (OUTPATIENT)
Dept: CT IMAGING | Age: 50
End: 2020-06-14
Payer: COMMERCIAL

## 2020-06-14 VITALS
WEIGHT: 200 LBS | SYSTOLIC BLOOD PRESSURE: 154 MMHG | OXYGEN SATURATION: 97 % | DIASTOLIC BLOOD PRESSURE: 91 MMHG | HEART RATE: 81 BPM | RESPIRATION RATE: 18 BRPM | TEMPERATURE: 98.5 F | HEIGHT: 67 IN | BODY MASS INDEX: 31.39 KG/M2

## 2020-06-14 LAB
ALBUMIN SERPL-MCNC: 4.2 G/DL (ref 3.5–4.6)
ALP BLD-CCNC: 137 U/L (ref 40–130)
ALT SERPL-CCNC: 41 U/L (ref 0–33)
AMPHETAMINE SCREEN, URINE: NORMAL
ANION GAP SERPL CALCULATED.3IONS-SCNC: 11 MEQ/L (ref 9–15)
AST SERPL-CCNC: 28 U/L (ref 0–35)
BARBITURATE SCREEN URINE: NORMAL
BENZODIAZEPINE SCREEN, URINE: NORMAL
BILIRUB SERPL-MCNC: 0.3 MG/DL (ref 0.2–0.7)
BILIRUBIN URINE: NEGATIVE
BLOOD, URINE: NEGATIVE
BUN BLDV-MCNC: 8 MG/DL (ref 6–20)
CALCIUM SERPL-MCNC: 9.4 MG/DL (ref 8.5–9.9)
CANNABINOID SCREEN URINE: NORMAL
CHLORIDE BLD-SCNC: 103 MEQ/L (ref 95–107)
CLARITY: CLEAR
CO2: 24 MEQ/L (ref 20–31)
COCAINE METABOLITE SCREEN URINE: NORMAL
COLOR: YELLOW
CREAT SERPL-MCNC: 0.89 MG/DL (ref 0.5–0.9)
EKG ATRIAL RATE: 73 BPM
EKG P AXIS: 53 DEGREES
EKG P-R INTERVAL: 168 MS
EKG Q-T INTERVAL: 396 MS
EKG QRS DURATION: 94 MS
EKG QTC CALCULATION (BAZETT): 436 MS
EKG R AXIS: 52 DEGREES
EKG T AXIS: 101 DEGREES
EKG VENTRICULAR RATE: 73 BPM
ETHANOL PERCENT: NORMAL G/DL
ETHANOL: <10 MG/DL (ref 0–0.08)
GFR AFRICAN AMERICAN: >60
GFR NON-AFRICAN AMERICAN: >60
GLOBULIN: 2.5 G/DL (ref 2.3–3.5)
GLUCOSE BLD-MCNC: 131 MG/DL (ref 70–99)
GLUCOSE URINE: NEGATIVE MG/DL
KETONES, URINE: NEGATIVE MG/DL
LACTIC ACID: 1.3 MMOL/L (ref 0.5–2.2)
LEUKOCYTE ESTERASE, URINE: NEGATIVE
Lab: NORMAL
METHADONE SCREEN, URINE: NORMAL
METHAMPHETAMINE, URINE: NORMAL
NITRITE, URINE: NEGATIVE
OPIATE SCREEN URINE: NORMAL
PH UA: 6 (ref 5–9)
PHENCYCLIDINE SCREEN URINE: NORMAL
POTASSIUM SERPL-SCNC: 3.4 MEQ/L (ref 3.4–4.9)
PROPOXYPHENE SCREEN, URINE: NORMAL
PROTEIN UA: NEGATIVE MG/DL
REASON FOR REJECTION: NORMAL
REJECTED TEST: NORMAL
SODIUM BLD-SCNC: 138 MEQ/L (ref 135–144)
SPECIFIC GRAVITY UA: 1.01 (ref 1–1.03)
TOTAL PROTEIN: 6.7 G/DL (ref 6.3–8)
TRICYCLIC, URINE: NORMAL
TROPONIN: <0.01 NG/ML (ref 0–0.01)
UR OXYCODONE RAPID SCREEN: NORMAL
URINE REFLEX TO CULTURE: NORMAL
UROBILINOGEN, URINE: 0.2 E.U./DL

## 2020-06-14 PROCEDURE — 80053 COMPREHEN METABOLIC PANEL: CPT

## 2020-06-14 PROCEDURE — 83605 ASSAY OF LACTIC ACID: CPT

## 2020-06-14 PROCEDURE — 6360000004 HC RX CONTRAST MEDICATION: Performed by: PHYSICIAN ASSISTANT

## 2020-06-14 PROCEDURE — 96375 TX/PRO/DX INJ NEW DRUG ADDON: CPT

## 2020-06-14 PROCEDURE — 6360000002 HC RX W HCPCS: Performed by: PHYSICIAN ASSISTANT

## 2020-06-14 PROCEDURE — 96374 THER/PROPH/DIAG INJ IV PUSH: CPT

## 2020-06-14 PROCEDURE — 84484 ASSAY OF TROPONIN QUANT: CPT

## 2020-06-14 PROCEDURE — G0480 DRUG TEST DEF 1-7 CLASSES: HCPCS

## 2020-06-14 PROCEDURE — 2580000003 HC RX 258: Performed by: PHYSICIAN ASSISTANT

## 2020-06-14 PROCEDURE — 81003 URINALYSIS AUTO W/O SCOPE: CPT

## 2020-06-14 PROCEDURE — 80306 DRUG TEST PRSMV INSTRMNT: CPT

## 2020-06-14 PROCEDURE — 93005 ELECTROCARDIOGRAM TRACING: CPT | Performed by: PHYSICIAN ASSISTANT

## 2020-06-14 PROCEDURE — 74177 CT ABD & PELVIS W/CONTRAST: CPT

## 2020-06-14 PROCEDURE — 36415 COLL VENOUS BLD VENIPUNCTURE: CPT

## 2020-06-14 PROCEDURE — 99284 EMERGENCY DEPT VISIT MOD MDM: CPT

## 2020-06-14 RX ORDER — ONDANSETRON 2 MG/ML
4 INJECTION INTRAMUSCULAR; INTRAVENOUS ONCE
Status: COMPLETED | OUTPATIENT
Start: 2020-06-14 | End: 2020-06-14

## 2020-06-14 RX ORDER — FENTANYL CITRATE 50 UG/ML
25 INJECTION, SOLUTION INTRAMUSCULAR; INTRAVENOUS ONCE
Status: COMPLETED | OUTPATIENT
Start: 2020-06-14 | End: 2020-06-14

## 2020-06-14 RX ORDER — ONDANSETRON 4 MG/1
4-8 TABLET, ORALLY DISINTEGRATING ORAL EVERY 12 HOURS PRN
Qty: 12 TABLET | Refills: 0 | Status: ON HOLD | OUTPATIENT
Start: 2020-06-14 | End: 2020-12-24

## 2020-06-14 RX ORDER — KETOROLAC TROMETHAMINE 30 MG/ML
30 INJECTION, SOLUTION INTRAMUSCULAR; INTRAVENOUS ONCE
Status: COMPLETED | OUTPATIENT
Start: 2020-06-14 | End: 2020-06-14

## 2020-06-14 RX ORDER — DICYCLOMINE HYDROCHLORIDE 10 MG/1
10 CAPSULE ORAL
Qty: 20 CAPSULE | Refills: 0 | Status: ON HOLD | OUTPATIENT
Start: 2020-06-14 | End: 2020-12-24

## 2020-06-14 RX ORDER — 0.9 % SODIUM CHLORIDE 0.9 %
1000 INTRAVENOUS SOLUTION INTRAVENOUS ONCE
Status: COMPLETED | OUTPATIENT
Start: 2020-06-14 | End: 2020-06-14

## 2020-06-14 RX ADMIN — ONDANSETRON 4 MG: 2 INJECTION INTRAMUSCULAR; INTRAVENOUS at 03:10

## 2020-06-14 RX ADMIN — SODIUM CHLORIDE 1000 ML: 9 INJECTION, SOLUTION INTRAVENOUS at 03:10

## 2020-06-14 RX ADMIN — KETOROLAC TROMETHAMINE 30 MG: 30 INJECTION, SOLUTION INTRAMUSCULAR at 03:10

## 2020-06-14 RX ADMIN — IOPAMIDOL 100 ML: 612 INJECTION, SOLUTION INTRAVENOUS at 04:32

## 2020-06-14 RX ADMIN — FENTANYL CITRATE 25 MCG: 50 INJECTION, SOLUTION INTRAMUSCULAR; INTRAVENOUS at 05:39

## 2020-06-14 ASSESSMENT — ENCOUNTER SYMPTOMS
COUGH: 0
RHINORRHEA: 0
SHORTNESS OF BREATH: 0
BACK PAIN: 0
DIARRHEA: 1
EYE PAIN: 0
VOMITING: 1
ABDOMINAL PAIN: 1
PHOTOPHOBIA: 0
NAUSEA: 1
SORE THROAT: 0

## 2020-06-14 ASSESSMENT — PAIN SCALES - GENERAL
PAINLEVEL_OUTOF10: 4
PAINLEVEL_OUTOF10: 8
PAINLEVEL_OUTOF10: 7
PAINLEVEL_OUTOF10: 5

## 2020-06-14 ASSESSMENT — PAIN DESCRIPTION - PAIN TYPE
TYPE: ACUTE PAIN
TYPE: ACUTE PAIN

## 2020-06-14 ASSESSMENT — PAIN DESCRIPTION - LOCATION
LOCATION: ABDOMEN
LOCATION: ABDOMEN

## 2020-06-14 NOTE — ED PROVIDER NOTES
[address] [facility] ED  EMERGENCY DEPARTMENT ENCOUNTER      Pt Name: Melony Marie  MRN: [id number]  [place] 1970  Date of evaluation: 6/14/2020  Provider: Erum De La Torre PA-C      HISTORY OF PRESENT ILLNESS    Melony Marie is a 54 y.o. female who presents to the Emergency Department with abdominal pain, nausea vomiting and diarrhea that started 12hrs ago. Shes had 8 episodes of diarrhea. 4 episodes of nonbloody emesis. Pain is cramping. Has dysuria. Denies fevers cp sob. H/o diverticulitis. REVIEW OF SYSTEMS       Review of Systems   Constitutional: Negative for chills, diaphoresis, fatigue and fever. HENT: Negative for congestion, rhinorrhea and sore throat. Eyes: Negative for photophobia and pain. Respiratory: Negative for cough and shortness of breath. Cardiovascular: Negative for chest pain and palpitations. Gastrointestinal: Positive for abdominal pain, diarrhea, nausea and vomiting. Genitourinary: Positive for dysuria. Negative for flank pain. Musculoskeletal: Negative for back pain. Skin: Negative for rash. Neurological: Negative for dizziness, light-headedness and headaches. Psychiatric/Behavioral: Negative. All other systems reviewed and are negative.         PAST MEDICAL HISTORY     Past Medical History:   Diagnosis Date    Anxiety     Back pain     back surgery x 4    [facility] [address]     CAD (coronary artery disease)     CAFL [facility]chronic airflow limitation) ([facility] 11/3/2016    Chronic bronchitis ([facility])     Chronic pain     Colitis     Complicated migraine     DDD (degenerative disc disease), lumbar 12/22/2016    Depression     Endometriosis     Excessive caffeine abuse, continuous ([facility]) 7/6/2018    Gastritis     GERD (gastroesophageal reflux disease)     History of myocardial infarction     HTN (hypertension), benign 2/19/2016    Impaired mobility and activities of daily living     Over weight     PTSD (post-traumatic stress disorder)  Sensory neuropathy 2016    Somatization disorder     TIA (transient ischemic attack)     Tobacco abuse     Vasospastic angina ([facility] [address] 2016         SURGICAL HISTORY       Past Surgical History:   Procedure Laterality Date    BACK SURGERY      x2     SECTION      x2    CHOLECYSTECTOMY  13    Lapchole    CORONARY ANGIOPLASTY      CYST REMOVAL  3/7/16    Dr. Olga Shane Left     UPPER GASTROINTESTINAL ENDOSCOPY  2014    ANIBAL HOUSE M.D. CURRENT MEDICATIONS       Discharge Medication List as of 2020  5:17 AM      CONTINUE these medications which have NOT CHANGED    Details   atenolol (TENORMIN) 50 MG tablet Take 1 tablet by mouth daily, Disp-1 tablet, R-0TAKE 1 TABLET 2 HOURS PRIOR TO TESTINGNormal      omeprazole (PRILOSEC) 20 MG delayed release capsule Take 1 capsule by mouth 2 times daily (before meals), Disp-60 capsule, R-5Print      !! dicyclomine (BENTYL) 10 MG capsule Take 1 capsule by mouth every 6 hours as needed (cramps), Disp-20 capsule, R-0Print      !! ondansetron (ZOFRAN ODT) 4 MG disintegrating tablet Take 1 tablet by mouth every 8 hours as needed for Nausea, Disp-20 tablet, R-0Print      nitroGLYCERIN (NITROSTAT) 0.4 MG SL tablet up to max of 3 total doses.  If no relief after 1 dose, call 911., Disp-25 tablet, R-3Normal      diltiazem (CARDIZEM CD) 240 MG extended release capsule Take 1 capsule by mouth daily HOLD for SBP<100 or HR<60, Disp-30 capsule, R-3Normal      isosorbide mononitrate (IMDUR) 30 MG extended release tablet Take 1 tablet by mouth daily, Disp-30 tablet, R-3Normal      losartan (COZAAR) 100 MG tablet Take 1 tablet by mouth daily, Disp-30 tablet, R-3Normal      atorvastatin (LIPITOR) 10 MG tablet Take 1 tablet by mouth daily, Disp-30 tablet, R-5Normal      cyclobenzaprine (FLEXERIL) 10 MG tablet Take 1 tablet by mouth 2 times daily as needed for Muscle spasms, Disp-60 tablet, R-0Normal      Cholecalciferol 50 MCG (2000 UT) TABS Take 1 tablet by mouth dailyHistorical Med      vitamin D (CHOLECALCIFEROL) 11330 UNIT CAPS Take 50,000 Units by mouth once a weekHistorical Med      cyanocobalamin 1000 MCG/ML injection Inject 1 mL into the skin every 30 daysHistorical Med      fluticasone (FLONASE) 50 MCG/ACT nasal spray 1 spray by NOT APPLICABLE route 2 times dailyHistorical Med      hydrochlorothiazide (HYDRODIURIL) 25 MG tablet Take 25 mg by mouth dailyHistorical Med      magnesium oxide (MAG-OX) 400 MG tablet Take 1 tablet by mouth daily, R-5Historical Med      !! albuterol sulfate HFA (PROVENTIL HFA) 108 (90 Base) MCG/ACT inhaler Inhale 2 puffs into the lungs every 6 hours as needed for Wheezing, Disp-1 Inhaler, R-3Normal      tiZANidine (ZANAFLEX) 4 MG tablet Take 1 tablet by mouth every 6 hours as needed (pain), Disp-10 tablet, R-0Print      FREESTYLE LANCETS MISC Disp-100 each, R-10, Normal      butalbital-APAP-caffeine -40 MG CAPS per capsule Take 1 capsule by mouth every 4 hours as needed for Migraine, Disp-45 capsule, R-0Normal      ALPRAZolam (XANAX) 0.5 MG tablet Take 0.5 mg by mouth 2 times daily.  Historical Med      levothyroxine (SYNTHROID) 25 MCG tablet TAKE 1 TABLET BY MOUTH DAILY, Disp-30 tablet, R-10Normal      budesonide (PULMICORT) 0.5 MG/2ML nebulizer suspension INHALE 1 VIAL VIA NEBULIZER TWICE DAILY, Disp-120 mL, R-5Normal      BD INTEGRA SYRINGE 25G X 1\" 3 ML MISC Disp-25 each, R-5, Normal      diclofenac (VOLTAREN) 75 MG EC tablet Take 1 tablet by mouth 2 times daily, Disp-60 tablet, R-3Print      amitriptyline (ELAVIL) 50 MG tablet Take 1 tablet by mouth nightly, Disp-30 tablet, R-5Do not fill before November 30  Outpatient provider to resume refiilsNormal      aspirin 81 MG chewable tablet Take 1 tablet by mouth daily, Disp-30 tablet, [id number]      folic acid (FOLVITE) 1 MG tablet Take 1 tablet by mouth daily, Disp-30 tablet, R-5Normal      lithium 300 MG capsule Take 1 capsule by mouth daily, Disp-30 capsule, R-5Normal      lurasidone (LATUDA) 20 MG TABS tablet TAKE 1 TABLET BY MOUTH EVERY DAY, Disp-30 tablet, R-5Normal      pantoprazole (PROTONIX) 40 MG tablet Take 1 tablet by mouth every morning (before breakfast), Disp-30 tablet, R-11Normal      FREESTYLE LITE strip USE DAILY AS DIRECTED, Disp-50 strip, R-11Normal      meclizine (ANTIVERT) 25 MG tablet Take 25 mg by mouth 3 times daily as neededHistorical Med      ipratropium-albuterol (DUONEB) 0.5-2.5 (3) MG/3ML SOLN nebulizer solution Inhale 3 mLs into the lungs three times daily, Disp-360 mL, R-5Normal      !! VENTOLIN  (90 Base) MCG/ACT inhaler Inhale 2 puffs into the lungs every 6 hours as needed for Wheezing, Disp-1 Inhaler, R-5, DAWNormal      pregabalin (LYRICA) 50 MG capsule Take 1 capsule by mouth 2 times daily for 15 days. ., Disp-30 capsule, R-1Print      calcium carbonate (TUMS) 500 MG chewable tablet 1 tablet as needed Historical Med       !! - Potential duplicate medications found. Please discuss with provider. ALLERGIES     Latex; Influenza vaccines; Eggs or egg-derived products; Gabapentin; Pneumococcal vaccines; Povidone iodine; and Varicella virus vaccine live    FAMILY HISTORY       Family History   Problem Relation Age of Onset    High Blood Pressure Mother     Kidney Disease Mother     Lupus Mother     Coronary Art Dis Mother         Had stents in her 62s   Milinda Broom Bipolar Disorder Son           SOCIAL HISTORY       Social History     Socioeconomic History    Marital status:       Spouse name: None    Number of children: None    Years of education: None    Highest education level: None   Occupational History    Occupation: unemployed   Social Needs    Financial resource strain: None    Food insecurity     Worry: None     Inability: None    Transportation needs     Medical: None     Non-medical: None   Tobacco Use    Smoking status: monitors       SCREENINGS             PHYSICAL EXAM    (up to 7 for level 4, 8 or more for level 5)     ED Triage Vitals [06/14/20 0101]   BP Temp Temp Source Pulse Resp SpO2 Height Weight   (!) 188/90 98.5 °F (36.9 °C) Oral 81 18 97 % 5' 7\" (1.702 m) 200 lb (90.7 kg)       Physical Exam  Vitals signs and nursing note reviewed. Constitutional:       General: She is not in acute distress. Appearance: Normal appearance. She is well-developed. She is not diaphoretic. HENT:      Head: Normocephalic and atraumatic. Eyes:      General: Lids are normal.      Conjunctiva/sclera: Conjunctivae normal.   Neck:      Musculoskeletal: Normal range of motion and neck supple. Cardiovascular:      Rate and Rhythm: Normal rate and regular rhythm. Pulses: Normal pulses. Heart sounds: Normal heart sounds. Pulmonary:      Effort: Pulmonary effort is normal.      Breath sounds: Normal breath sounds. Abdominal:      General: Bowel sounds are normal.      Palpations: Abdomen is soft. Tenderness: There is abdominal tenderness in the epigastric area and left lower quadrant. There is no guarding or rebound. Lymphadenopathy:      Cervical: No cervical adenopathy. Skin:     General: Skin is warm and dry. Capillary Refill: Capillary refill takes less than 2 seconds. Findings: No rash. Neurological:      Mental Status: She is alert and oriented to person, place, and time. Psychiatric:         Thought Content: Thought content normal.         Judgment: Judgment normal.           All other labs were within normal range or not returned as of this dictation.     EMERGENCY DEPARTMENT COURSE and DIFFERENTIALDIAGNOSIS/MDM:   Vitals:    Vitals:    06/14/20 0204 06/14/20 0300 06/14/20 0400 06/14/20 0500   BP: (!) 185/97 (!) 183/95 (!) 155/82 (!) 154/91   Pulse: 78 81     Resp: 18 18 18 18   Temp:       TempSrc:       SpO2: 97% 97% 99% 97%   Weight:       Height:                ekg nsr 73bpm no ectopy no ST

## 2020-06-15 ENCOUNTER — CARE COORDINATION (OUTPATIENT)
Dept: CARE COORDINATION | Age: 50
End: 2020-06-15

## 2020-06-15 PROCEDURE — 93010 ELECTROCARDIOGRAM REPORT: CPT | Performed by: INTERNAL MEDICINE

## 2020-06-29 ENCOUNTER — CARE COORDINATION (OUTPATIENT)
Dept: CARE COORDINATION | Age: 50
End: 2020-06-29

## 2020-07-17 ENCOUNTER — VIRTUAL VISIT (OUTPATIENT)
Dept: GASTROENTEROLOGY | Age: 50
End: 2020-07-17
Payer: COMMERCIAL

## 2020-07-17 PROCEDURE — 99203 OFFICE O/P NEW LOW 30 MIN: CPT | Performed by: SPECIALIST

## 2020-07-17 RX ORDER — POLYETHYLENE GLYCOL 3350, SODIUM CHLORIDE, SODIUM BICARBONATE, POTASSIUM CHLORIDE 420; 11.2; 5.72; 1.48 G/4L; G/4L; G/4L; G/4L
4000 POWDER, FOR SOLUTION ORAL ONCE
Qty: 1 BOTTLE | Refills: 0 | Status: SHIPPED | OUTPATIENT
Start: 2020-07-17 | End: 2020-07-17

## 2020-07-17 ASSESSMENT — ENCOUNTER SYMPTOMS
ABDOMINAL PAIN: 1
DIARRHEA: 0
NAUSEA: 1
EYES NEGATIVE: 1
ANAL BLEEDING: 0
VOMITING: 1
RESPIRATORY NEGATIVE: 1
RECTAL PAIN: 0
BLOOD IN STOOL: 0
CONSTIPATION: 0
ABDOMINAL DISTENTION: 0

## 2020-07-17 NOTE — PROGRESS NOTES
Gastroenterology Clinic Visit    Lola Lomas  [id number]  Chief Complaint   Patient presents with    Follow-up     F/U from ED. States everytime she eats, she will get diarrhea. This has been an issue on and off for awhile, but the past 4-5 months its been worse. Mucus in stool. HPI: 52 y.o. female presents to the clinic with history of abdominal pain and rectal discharge, this was a video encounter. Patient states that she had these symptoms for the last 4 to 5 months, experience some discomfort during a bowel movement and also has noticed mucousy rectal discharge, no history of any rectal bleeding. Also experience intermittent upper abdominal pain associate with vomiting and had one episode of dark stool few months ago, she of any weight loss, no history of any heartburn. She is on low-dose aspirin and NSAID, no history of any dysphagia. Patient was seen in the emergency room where she had a CT of the abdomen done that showed left adnexal mass for which she had GYN evaluation and she was told that it was not significant, patient had cholecystectomy hysterectomy and multiple laparoscopies, social history includes history of smoking for many years does not drink alcohol, previous blood test shows mildly elevated ALT and alk phos and CT scan reports steatosis  Review of Systems   Constitutional: Negative. HENT: Negative. Eyes: Negative. Respiratory: Negative. Cardiovascular: Negative. Gastrointestinal: Positive for abdominal pain, nausea and vomiting. Negative for abdominal distention, anal bleeding, blood in stool, constipation, diarrhea and rectal pain. Mucousy discharge from the rectum   Endocrine: Negative. Genitourinary: Negative. Musculoskeletal: Negative. Skin: Negative. Allergic/Immunologic: Negative for food allergies. Neurological: Negative. Hematological: Negative. Psychiatric/Behavioral: Negative.          Past Medical History:   Diagnosis Date    Anxiety     Back pain     back surgery x 4    Bipolar disorder ([facility])     CAD (coronary artery disease)     CAFL (chronic airflow limitation) ([facility]) 11/3/2016    Chronic bronchitis ([facility])     Chronic pain     Colitis     Complicated migraine     DDD (degenerative disc disease), lumbar 2016    Depression     Endometriosis     Excessive caffeine abuse, continuous ([facility]) 2018    Gastritis     GERD (gastroesophageal reflux disease)     History of myocardial infarction     HTN (hypertension), benign 2016    Impaired mobility and activities of daily living     Over weight     PTSD (post-traumatic stress disorder)     Sensory neuropathy 2016    Somatization disorder     TIA (transient ischemic attack)     Tobacco abuse     Vasospastic angina ([facility] [address] 2016      Past Surgical History:   Procedure Laterality Date    BACK SURGERY      x2     SECTION      x2    CHOLECYSTECTOMY  13    Lapchole    CORONARY ANGIOPLASTY      CYST REMOVAL  3/7/16    Dr. Domingo Huerta Left     UPPER GASTROINTESTINAL ENDOSCOPY  2014    ANIBAL HOUSE M.D. Current Outpatient Medications on File Prior to Visit   Medication Sig Dispense Refill    ondansetron (ZOFRAN ODT) 4 MG disintegrating tablet Take 1-2 tablets by mouth every 12 hours as needed for Nausea May Sub regular tablet (non-ODT) if insurance does not cover ODT.  12 tablet 0    dicyclomine (BENTYL) 10 MG capsule Take 1 capsule by mouth 4 times daily (before meals and nightly) 20 capsule 0    atenolol (TENORMIN) 50 MG tablet Take 1 tablet by mouth daily 1 tablet 0    omeprazole (PRILOSEC) 20 MG delayed release capsule Take 1 capsule by mouth 2 times daily (before meals) 60 capsule 5    dicyclomine (BENTYL) 10 MG capsule Take 1 capsule by mouth every 6 hours as needed (cramps) 20 capsule 0    ondansetron (ZOFRAN ODT) 4 MG disintegrating tablet Take 1 tablet by mouth every 8 hours as needed for Nausea 20 tablet 0    nitroGLYCERIN (NITROSTAT) 0.4 MG SL tablet up to max of 3 total doses. If no relief after 1 dose, call 911. 25 tablet 3    diltiazem (CARDIZEM CD) 240 MG extended release capsule Take 1 capsule by mouth daily HOLD for SBP<100 or HR<60 30 capsule 3    isosorbide mononitrate (IMDUR) 30 MG extended release tablet Take 1 tablet by mouth daily 30 tablet 3    losartan (COZAAR) 100 MG tablet Take 1 tablet by mouth daily 30 tablet 3    atorvastatin (LIPITOR) 10 MG tablet Take 1 tablet by mouth daily 30 tablet 5    cyclobenzaprine (FLEXERIL) 10 MG tablet Take 1 tablet by mouth 2 times daily as needed for Muscle spasms 60 tablet 0    Cholecalciferol 50 MCG (2000 UT) TABS Take 1 tablet by mouth daily      cyanocobalamin 1000 MCG/ML injection Inject 1 mL into the skin every 30 days      fluticasone (FLONASE) 50 MCG/ACT nasal spray 1 spray by NOT APPLICABLE route 2 times daily      hydrochlorothiazide (HYDRODIURIL) 25 MG tablet Take 25 mg by mouth daily      magnesium oxide (MAG-OX) 400 MG tablet Take 1 tablet by mouth daily  5    albuterol sulfate HFA (PROVENTIL HFA) 108 (90 Base) MCG/ACT inhaler Inhale 2 puffs into the lungs every 6 hours as needed for Wheezing 1 Inhaler 3    tiZANidine (ZANAFLEX) 4 MG tablet Take 1 tablet by mouth every 6 hours as needed (pain) 10 tablet 0    FREESTYLE LANCETS MISC USE DAILY AS DIRECTED 100 each 10    butalbital-APAP-caffeine -40 MG CAPS per capsule Take 1 capsule by mouth every 4 hours as needed for Migraine 45 capsule 0    ALPRAZolam (XANAX) 0.5 MG tablet Take 0.5 mg by mouth 2 times daily.        levothyroxine (SYNTHROID) 25 MCG tablet TAKE 1 TABLET BY MOUTH DAILY 30 tablet 10    budesonide (PULMICORT) 0.5 MG/2ML nebulizer suspension INHALE 1 VIAL VIA NEBULIZER TWICE DAILY 120 mL 5    BD INTEGRA SYRINGE 25G X 1\" 3 ML MISC USE AS DIRECTED EVERY MONTH 25 each 5    diclofenac (VOLTAREN) 75 MG EC tablet Take 1 tablet by mouth 2 times daily 60 tablet 3    amitriptyline (ELAVIL) 50 MG tablet Take 1 tablet by mouth nightly 30 tablet 5    aspirin 81 MG chewable tablet Take 1 tablet by mouth daily 30 tablet 5    folic acid (FOLVITE) 1 MG tablet Take 1 tablet by mouth daily 30 tablet 5    lithium 300 MG capsule Take 1 capsule by mouth daily (Patient taking differently: Take 600 mg by mouth 2 times daily (with meals) ) 30 capsule 5    lurasidone (LATUDA) 20 MG TABS tablet TAKE 1 TABLET BY MOUTH EVERY DAY 30 tablet 5    pantoprazole (PROTONIX) 40 MG tablet Take 1 tablet by mouth every morning (before breakfast) 30 tablet 11    FREESTYLE LITE strip USE DAILY AS DIRECTED 50 strip 11    meclizine (ANTIVERT) 25 MG tablet Take 25 mg by mouth 3 times daily as needed      ipratropium-albuterol (DUONEB) 0.5-2.5 (3) MG/3ML SOLN nebulizer solution Inhale 3 mLs into the lungs three times daily 360 mL 5    VENTOLIN  (90 Base) MCG/ACT inhaler Inhale 2 puffs into the lungs every 6 hours as needed for Wheezing 1 Inhaler 5    calcium carbonate (TUMS) 500 MG chewable tablet 1 tablet as needed       vitamin D (CHOLECALCIFEROL) 91661 UNIT CAPS Take 50,000 Units by mouth once a week      pregabalin (LYRICA) 50 MG capsule Take 1 capsule by mouth 2 times daily for 15 days. . 30 capsule 1     No current facility-administered medications on file prior to visit. Family History   Problem Relation Age of Onset    High Blood Pressure Mother     Kidney Disease Mother     Lupus Mother     Coronary Art Dis Mother         Had stents in her 62s   Jameel Das Bipolar Disorder Son       Social History     Socioeconomic History    Marital status:       Spouse name: Not on file    Number of children: Not on file    Years of education: Not on file    Highest education level: Not on file   Occupational History    Occupation: unemployed   Social Needs    Financial resource strain: Not on file  Food insecurity     Worry: Not on file     Inability: Not on file    Transportation needs     Medical: Not on file     Non-medical: Not on file   Tobacco Use    Smoking status: Current Every Day Smoker     Packs/day: 1.00     Years: 17.00     Pack years: 17.00     Types: Cigarettes    Smokeless tobacco: Never Used   Substance and Sexual Activity    Alcohol use: No    Drug use: No    Sexual activity: Not Currently   Lifestyle    Physical activity     Days per week: Not on file     Minutes per session: Not on file    Stress: Not on file   Relationships    Social connections     Talks on phone: Not on file     Gets together: Not on file     Attends [employer] service: Not on file     Active member of club or organization: Not on file     Attends meetings of clubs or organizations: Not on file     Relationship status: Not on file    Intimate partner violence     Fear of current or ex partner: Not on file     Emotionally abused: Not on file     Physically abused: Not on file     Forced sexual activity: Not on file   Other Topics Concern    Not on file   Social History Narrative    Patience Brush is a 65-year-old female who is on disability because of pain and bipolar disorder. She's also had a presumed diagnosis of possible multiple sclerosis. She's been seeing Dr. Surekha Douglas for that and she says that the diagnosis is sometimes in question and it's clear neuropathy vitamin B12 deficiency as well as generalized bodyaches from the severe vitamin D deficiency have caused this scenario that looks like multiple sclerosis.               Lives With: Significant other    Type of Home: House Two level    Home Access: Stairs to enter with rails  - Number of Steps: 5    Bathroom Shower/Tub: Tub only    ADL Assistance: [address] [place] [address] Avenue: [place] [address] Responsibilities: Yes    Meal Prep Responsibility: Primary    Laundry Responsibility: Primary    Cleaning Responsibility: Primary    Bill

## 2020-07-22 ENCOUNTER — HOSPITAL ENCOUNTER (OUTPATIENT)
Age: 50
Setting detail: SPECIMEN
Discharge: HOME | End: 2020-07-22
Payer: COMMERCIAL

## 2020-07-22 ENCOUNTER — NURSE ONLY (OUTPATIENT)
Dept: PRIMARY CARE CLINIC | Age: 50
End: 2020-07-22

## 2020-07-22 PROCEDURE — U0003 INFECTIOUS AGENT DETECTION BY NUCLEIC ACID (DNA OR RNA); SEVERE ACUTE RESPIRATORY SYNDROME CORONAVIRUS 2 (SARS-COV-2) (CORONAVIRUS DISEASE [COVID-19]), AMPLIFIED PROBE TECHNIQUE, MAKING USE OF HIGH THROUGHPUT TECHNOLOGIES AS DESCRIBED BY CMS-2020-01-R: HCPCS

## 2020-07-24 LAB
SARS-COV-2: NOT DETECTED
SOURCE: NORMAL

## 2020-07-28 ENCOUNTER — ANESTHESIA EVENT (OUTPATIENT)
Dept: ENDOSCOPY | Age: 50
End: 2020-07-28
Payer: COMMERCIAL

## 2020-07-28 ENCOUNTER — HOSPITAL ENCOUNTER (OUTPATIENT)
Age: 50
Setting detail: OUTPATIENT SURGERY
Discharge: HOME | End: 2020-07-28
Attending: SPECIALIST | Admitting: SPECIALIST
Payer: COMMERCIAL

## 2020-07-28 ENCOUNTER — ANESTHESIA (OUTPATIENT)
Dept: ENDOSCOPY | Age: 50
End: 2020-07-28
Payer: COMMERCIAL

## 2020-07-28 ENCOUNTER — ANCILLARY PROCEDURE (OUTPATIENT)
Dept: ENDOSCOPY | Age: 50
End: 2020-07-28
Attending: SPECIALIST
Payer: COMMERCIAL

## 2020-07-28 VITALS
HEART RATE: 84 BPM | HEIGHT: 67 IN | DIASTOLIC BLOOD PRESSURE: 72 MMHG | SYSTOLIC BLOOD PRESSURE: 170 MMHG | TEMPERATURE: 97.8 F | OXYGEN SATURATION: 98 % | BODY MASS INDEX: 31.39 KG/M2 | RESPIRATION RATE: 20 BRPM | WEIGHT: 200 LBS

## 2020-07-28 VITALS
RESPIRATION RATE: 24 BRPM | SYSTOLIC BLOOD PRESSURE: 192 MMHG | DIASTOLIC BLOOD PRESSURE: 90 MMHG | OXYGEN SATURATION: 97 %

## 2020-07-28 PROCEDURE — 2580000003 HC RX 258

## 2020-07-28 PROCEDURE — 45385 COLONOSCOPY W/LESION REMOVAL: CPT | Performed by: SPECIALIST

## 2020-07-28 PROCEDURE — 43235 EGD DIAGNOSTIC BRUSH WASH: CPT | Performed by: SPECIALIST

## 2020-07-28 PROCEDURE — 7100000010 HC PHASE II RECOVERY - FIRST 15 MIN: Performed by: SPECIALIST

## 2020-07-28 PROCEDURE — 45380 COLONOSCOPY AND BIOPSY: CPT | Performed by: SPECIALIST

## 2020-07-28 PROCEDURE — 88305 TISSUE EXAM BY PATHOLOGIST: CPT

## 2020-07-28 PROCEDURE — 2500000003 HC RX 250 WO HCPCS

## 2020-07-28 PROCEDURE — 3700000000 HC ANESTHESIA ATTENDED CARE: Performed by: SPECIALIST

## 2020-07-28 PROCEDURE — 6360000002 HC RX W HCPCS

## 2020-07-28 PROCEDURE — 6370000000 HC RX 637 (ALT 250 FOR IP): Performed by: SPECIALIST

## 2020-07-28 PROCEDURE — 2580000003 HC RX 258: Performed by: SPECIALIST

## 2020-07-28 PROCEDURE — 3609027000 HC COLONOSCOPY: Performed by: SPECIALIST

## 2020-07-28 PROCEDURE — 45390 COLONOSCOPY W/RESECTION: CPT | Performed by: SPECIALIST

## 2020-07-28 PROCEDURE — 2709999900 HC NON-CHARGEABLE SUPPLY: Performed by: SPECIALIST

## 2020-07-28 PROCEDURE — 7100000011 HC PHASE II RECOVERY - ADDTL 15 MIN: Performed by: SPECIALIST

## 2020-07-28 PROCEDURE — 3700000001 HC ADD 15 MINUTES (ANESTHESIA): Performed by: SPECIALIST

## 2020-07-28 PROCEDURE — 3609017100 HC EGD: Performed by: SPECIALIST

## 2020-07-28 RX ORDER — SODIUM CHLORIDE 9 MG/ML
INJECTION, SOLUTION INTRAVENOUS
Status: COMPLETED
Start: 2020-07-28 | End: 2020-07-28

## 2020-07-28 RX ORDER — MAGNESIUM HYDROXIDE 1200 MG/15ML
LIQUID ORAL PRN
Status: DISCONTINUED | OUTPATIENT
Start: 2020-07-28 | End: 2020-07-28 | Stop reason: ALTCHOICE

## 2020-07-28 RX ORDER — HYDRALAZINE HYDROCHLORIDE 20 MG/ML
INJECTION INTRAMUSCULAR; INTRAVENOUS PRN
Status: DISCONTINUED | OUTPATIENT
Start: 2020-07-28 | End: 2020-07-28 | Stop reason: SDUPTHER

## 2020-07-28 RX ORDER — LIDOCAINE HYDROCHLORIDE 20 MG/ML
INJECTION, SOLUTION INFILTRATION; PERINEURAL PRN
Status: DISCONTINUED | OUTPATIENT
Start: 2020-07-28 | End: 2020-07-28 | Stop reason: SDUPTHER

## 2020-07-28 RX ORDER — PROPOFOL 10 MG/ML
INJECTION, EMULSION INTRAVENOUS PRN
Status: DISCONTINUED | OUTPATIENT
Start: 2020-07-28 | End: 2020-07-28 | Stop reason: SDUPTHER

## 2020-07-28 RX ORDER — SODIUM CHLORIDE 9 MG/ML
INJECTION, SOLUTION INTRAVENOUS CONTINUOUS PRN
Status: DISCONTINUED | OUTPATIENT
Start: 2020-07-28 | End: 2020-07-28 | Stop reason: SDUPTHER

## 2020-07-28 RX ORDER — SIMETHICONE 20 MG/.3ML
40 EMULSION ORAL EVERY 6 HOURS PRN
Status: DISCONTINUED | OUTPATIENT
Start: 2020-07-28 | End: 2020-07-28 | Stop reason: HOSPADM

## 2020-07-28 RX ADMIN — LIDOCAINE HYDROCHLORIDE 40 MG: 20 INJECTION, SOLUTION INFILTRATION; PERINEURAL at 10:12

## 2020-07-28 RX ADMIN — SODIUM CHLORIDE: 9 INJECTION, SOLUTION INTRAVENOUS at 10:12

## 2020-07-28 RX ADMIN — HYDRALAZINE HYDROCHLORIDE 10 MG: 20 INJECTION INTRAMUSCULAR; INTRAVENOUS at 10:11

## 2020-07-28 RX ADMIN — SODIUM CHLORIDE 1000 ML: 9 INJECTION, SOLUTION INTRAVENOUS at 09:47

## 2020-07-28 RX ADMIN — PROPOFOL 660 MG: 10 INJECTION, EMULSION INTRAVENOUS at 10:12

## 2020-07-28 ASSESSMENT — LIFESTYLE VARIABLES: SMOKING_STATUS: 1

## 2020-07-28 ASSESSMENT — PAIN - FUNCTIONAL ASSESSMENT: PAIN_FUNCTIONAL_ASSESSMENT: 0-10

## 2020-07-28 ASSESSMENT — ENCOUNTER SYMPTOMS: SHORTNESS OF BREATH: 1

## 2020-07-28 NOTE — ANESTHESIA PRE PROCEDURE
Department of Anesthesiology  Preprocedure Note       Name:  Lola Lomas   Age:  52 y.o.  :  1970                                          MRN:  [id number]         Date:  2020      Surgeon: Gem Yuan):  Mohinder Bahena MD    Procedure: Procedure(s):  EGD ESOPHAGOGASTRODUODENOSCOPY  COLONOSCOPY DIAGNOSTIC    Medications prior to admission:   Prior to Admission medications    Medication Sig Start Date End Date Taking? Authorizing Provider   ondansetron (ZOFRAN ODT) 4 MG disintegrating tablet Take 1-2 tablets by mouth every 12 hours as needed for Nausea May Sub regular tablet (non-ODT) if insurance does not cover ODT. 20   Tessa Alexander PA-C   dicyclomine (BENTYL) 10 MG capsule Take 1 capsule by mouth 4 times daily (before meals and nightly) 20   Tessa Alexander PA-C   atenolol (TENORMIN) 50 MG tablet Take 1 tablet by mouth daily 20   Anand Sherman DO   omeprazole (PRILOSEC) 20 MG delayed release capsule Take 1 capsule by mouth 2 times daily (before meals) 20   Milagros Chand MD   dicyclomine (BENTYL) 10 MG capsule Take 1 capsule by mouth every 6 hours as needed (cramps) 3/1/20   Estrellita Seals PA-C   ondansetron (ZOFRAN ODT) 4 MG disintegrating tablet Take 1 tablet by mouth every 8 hours as needed for Nausea 3/1/20   Dg Calvillo PA-C   nitroGLYCERIN (NITROSTAT) 0.4 MG SL tablet up to max of 3 total doses.  If no relief after 1 dose, call 911. 20   NORBERTO Kurtz   diltiazem (CARDIZEM CD) 240 MG extended release capsule Take 1 capsule by mouth daily HOLD for SBP<100 or HR<60 20   NORBERTO Kurtz   isosorbide mononitrate (IMDUR) 30 MG extended release tablet Take 1 tablet by mouth daily 20   NORBERTO Kurtz   losartan (COZAAR) 100 MG tablet Take 1 tablet by mouth daily 20   NORBERTO Kurtz   atorvastatin (LIPITOR) 10 MG tablet Take 1 tablet by mouth daily 20   NORBERTO Kurtz   cyclobenzaprine (FLEXERIL) 10 MG tablet Take 1 tablet by mouth 2 times daily as needed for Muscle spasms 11/26/19   Lexis Louie MD   Cholecalciferol 50 MCG (2000 UT) TABS Take 1 tablet by mouth daily 5/22/18   Historical Provider, MD   vitamin D (CHOLECALCIFEROL) 52566 UNIT CAPS Take 50,000 Units by mouth once a week 10/23/19 1/9/20  Historical Provider, MD   cyanocobalamin 1000 MCG/ML injection Inject 1 mL into the skin every 30 days 7/25/18   Historical Provider, MD   fluticasone (FLONASE) 50 MCG/ACT nasal spray 1 spray by NOT APPLICABLE route 2 times daily 4/12/19   Historical Provider, MD   hydrochlorothiazide (HYDRODIURIL) 25 MG tablet Take 25 mg by mouth daily 7/29/18   Historical Provider, MD   magnesium oxide (MAG-OX) 400 MG tablet Take 1 tablet by mouth daily 9/30/19   Historical Provider, MD   albuterol sulfate HFA (PROVENTIL HFA) 108 (90 Base) MCG/ACT inhaler Inhale 2 puffs into the lungs every 6 hours as needed for Wheezing 10/23/19   Lexis Louie MD   tiZANidine (ZANAFLEX) 4 MG tablet Take 1 tablet by mouth every 6 hours as needed (pain) 10/13/19   NORBERTO Mckeon LANCETS MISC USE DAILY AS DIRECTED 8/21/19   Lexis Louie MD   butalbital-APAP-caffeine -40 MG CAPS per capsule Take 1 capsule by mouth every 4 hours as needed for Migraine 8/10/19   Carley Lemus MD   ALPRAZolam Albesa Duster) 0.5 MG tablet Take 0.5 mg by mouth 2 times daily.   4/9/19   Historical Provider, MD   levothyroxine (SYNTHROID) 25 MCG tablet TAKE 1 TABLET BY MOUTH DAILY 7/22/19   Lexis Louie MD   budesonide (PULMICORT) 0.5 MG/2ML nebulizer suspension INHALE 1 VIAL VIA NEBULIZER TWICE DAILY 6/16/19   Lexis Louie MD   BD INTEGRA SYRINGE 25G X 1\" 3 ML MISC USE AS DIRECTED EVERY MONTH 5/28/19   Lexis Louie MD   diclofenac (VOLTAREN) 75 MG EC tablet Take 1 tablet by mouth 2 times daily 4/24/19   Lexis Louie MD   amitriptyline (ELAVIL) 50 MG tablet Take 1 tablet by mouth nightly 4/10/19   NIKI Salguero - CNP   aspirin 81 MG chewable tablet Take 1 tablet by mouth daily 4/10/19   NIKI Messer CNP   folic acid (FOLVITE) 1 MG tablet Take 1 tablet by mouth daily 4/10/19   NIKI Messer CNP   lithium 300 MG capsule Take 1 capsule by mouth daily  Patient taking differently: Take 600 mg by mouth 2 times daily (with meals)  4/10/19   NIKI Messer CNP   lurasidone (LATUDA) 20 MG TABS tablet TAKE 1 TABLET BY MOUTH EVERY DAY 4/10/19   Radha StanfordNIKI tijerina CNP   pantoprazole (PROTONIX) 40 MG tablet Take 1 tablet by mouth every morning (before breakfast) 4/10/19   NIKI Messer CNP   FREESTYLE LITE strip USE DAILY AS DIRECTED 2/14/19   Олег Bhatt MD   meclizine (ANTIVERT) 25 MG tablet Take 25 mg by mouth 3 times daily as needed    Historical Provider, MD   ipratropium-albuterol (DUONEB) 0.5-2.5 (3) MG/3ML SOLN nebulizer solution Inhale 3 mLs into the lungs three times daily 12/18/18   Олег Bhatt MD   VENTOLIN  (34 Base) MCG/ACT inhaler Inhale 2 puffs into the lungs every 6 hours as needed for Wheezing 12/18/18   Олег Bhatt MD   pregabalin (LYRICA) 50 MG capsule Take 1 capsule by mouth 2 times daily for 15 days. . 11/8/18 1/9/20  Meredith Field MD   calcium carbonate (TUMS) 500 MG chewable tablet 1 tablet as needed  6/4/18   Historical Provider, MD       Current medications:    Current Facility-Administered Medications   Medication Dose Route Frequency Provider Last Rate Last Dose    simethicone (MYLICON) 40 TZ/1.5PN drops 40 mg  40 mg Oral Q6H PRN Carli Espino MD           Allergies:     Allergies   Allergen Reactions    Latex Itching     Pt reports itching on hands after using rubber gloves at work    Influenza Vaccines Swelling     Pt reports her entire arm was red and edematous post vaccine    Eggs Or Egg-Derived [employer] based products only    Gabapentin Nausea Only    Pneumococcal Vaccines Hives    Povidone Iodine Itching    Varicella Virus Vaccine Live Vasospastic angina ([facility] [address] 2016       Past Surgical History:        Procedure Laterality Date    BACK SURGERY      x2     SECTION      x2    CHOLECYSTECTOMY  13    Lapchole    CORONARY ANGIOPLASTY      CYST REMOVAL  3/7/16    Dr. Sachi León Left     UPPER GASTROINTESTINAL ENDOSCOPY  2014    ANIBAL HOUSE M.D. Social History:    Social History     Tobacco Use    Smoking status: Current Every Day Smoker     Packs/day: 1.00     Years: 17.00     Pack years: 17.00     Types: Cigarettes    Smokeless tobacco: Never Used   Substance Use Topics    Alcohol use: No                                Ready to quit: Not Answered  Counseling given: Not Answered      Vital Signs (Current): There were no vitals filed for this visit.                                            BP Readings from Last 3 Encounters:   20 (!) 154/91   20 (!) 160/66   20 (!) 156/88       NPO Status:                                                                                 BMI:   Wt Readings from Last 3 Encounters:   20 200 lb (90.7 kg)   20 210 lb (95.3 kg)   20 206 lb (93.4 kg)     There is no height or weight on file to calculate BMI.    CBC:   Lab Results   Component Value Date    WBC 8.4 2020    RBC 4.78 2020    HGB 14.9 2020    HCT 43.3 2020    MCV 90.6 2020    RDW 12.9 2020     2020       CMP:   Lab Results   Component Value Date     2020    K 3.4 2020    K 4.3 2019     2020    CO2 24 2020    BUN 8 2020    CREATININE 0.89 2020    GFRAA >60.0 2020    LABGLOM >60.0 2020    GLUCOSE 131 2020    PROT 6.7 2020    CALCIUM 9.4 2020    BILITOT 0.3 2020    ALKPHOS 137 2020    AST 28 2020    ALT 41 2020       POC Tests: No results for input(s): POCGLU, POCNA, POCK, POCCL, POCBUN, POCHEMO, POCHCT in the last 72 hours. Coags:   Lab Results   Component Value Date    PROTIME 9.4 04/17/2018    INR 0.9 04/17/2018    APTT 24.8 09/06/2017       HCG (If Applicable):   Lab Results   Component Value Date    PREGTESTUR Negative 11/15/2017        ABGs: No results found for: PHART, PO2ART, VNY3WAI, QSY4HNN, BEART, O2PKHODO     Type & Screen (If Applicable):  No results found for: LABABO, LABRH    Drug/Infectious Status (If Applicable):  No results found for: HIV, HEPCAB    COVID-19 Screening (If Applicable):   Lab Results   Component Value Date    COVID19 Not Detected 07/22/2020         Anesthesia Evaluation  Patient summary reviewed and Nursing notes reviewed no history of anesthetic complications:   Airway: Mallampati: II  TM distance: >3 FB   Neck ROM: full  Mouth opening: > = 3 FB Dental:          Pulmonary:   (+) COPD:  shortness of breath:  sleep apnea:  current smoker                           Cardiovascular:    (+) hypertension:, angina:, CAD:, SAMAYOA:,       ECG reviewed      Echocardiogram reviewed  Stress test reviewed             ROS comment: H/o cardiac arrest     Neuro/Psych:   (+) CVA:, neuromuscular disease:, TIA, headaches:, psychiatric history:            GI/Hepatic/Renal:   (+) GERD:, bowel prep,           Endo/Other:                     Abdominal:           Vascular:                                      Anesthesia Plan      MAC     ASA 3       Induction: intravenous. Anesthetic plan and risks discussed with patient. Plan discussed with attending.                   Estevan Seaman, APRN - CRNA   7/28/2020

## 2020-07-28 NOTE — H&P
Patient Name: Bishnu Currie  : 1970  MRN: [id number]  DATE: 20      ENDOSCOPY  History and Physical    Procedure:    [x] Diagnostic Colonoscopy       [] Screening Colonoscopy  [x] EGD      [] ERCP      [] EUS       [] Other    [x] Previous office notes/History and Physical reviewed from the patients chart. Please see EMR for further details of HPI. I have examined the patient's status immediately prior to the procedure and:      Indications/HPI:    []Abdominal Pain  []Cancer- GI/Lung  []Fhx of colon CA/polyps  []History of Polyps  []Doniss   []Melena  []Abnormal Imaging  []Dysphagia    []Persistent Pneumonia  []Anemia  []Food Impaction  []History of Polyps  [x]GI Bleed  []Pulmonary nodule/Mass  []Change in bowel habits []Heartburn/Reflux  []Rectal Bleed (BRBPR)  []Chest Pain - Non Cardiac []Heme (+) Stoo  l[]Ulcers  []Constipation  []Hemoptysis   []Varices  []Diarrhea  []Hypoxemia  []Nausea/Vomiting  []Screening   []Crohns/Colitis  []Other: mucosy discharge per rectum. Anesthesia:   [x] MAC [] Moderate Sedation   [] General   [] None     ROS: 12 pt Review of Symptoms was negative unless mentioned above    Medications:   Prior to Admission medications    Medication Sig Start Date End Date Taking?  Authorizing Provider   dicyclomine (BENTYL) 10 MG capsule Take 1 capsule by mouth 4 times daily (before meals and nightly) 20  Yes Tessa Womack PA-C   dicyclomine (BENTYL) 10 MG capsule Take 1 capsule by mouth every 6 hours as needed (cramps) 3/1/20  Yes Manuel Hamilton PA-C   isosorbide mononitrate (IMDUR) 30 MG extended release tablet Take 1 tablet by mouth daily 20  Yes NORBERTO Arthur   cyanocobalamin 1000 MCG/ML injection Inject 1 mL into the skin every 30 days 18  Yes Historical Provider, MD   magnesium oxide (MAG-OX) 400 MG tablet Take 1 tablet by mouth daily 19  Yes Historical Provider, MD   albuterol sulfate HFA (PROVENTIL HFA) 108 (90 Base) MCG/ACT inhaler Inhale 2 mouth once a week 10/23/19 1/9/20  Historical Provider, MD   fluticasone (FLONASE) 50 MCG/ACT nasal spray 1 spray by NOT APPLICABLE route 2 times daily 4/12/19   Historical Provider, MD   hydrochlorothiazide (HYDRODIURIL) 25 MG tablet Take 25 mg by mouth daily 7/29/18   Historical Provider, MD   tiZANidine (ZANAFLEX) 4 MG tablet Take 1 tablet by mouth every 6 hours as needed (pain) 10/13/19   NORBERTO RuggieroYLE LANCETS MISC USE DAILY AS DIRECTED 8/21/19   Antonia Gould MD   levothyroxine (SYNTHROID) 25 MCG tablet TAKE 1 TABLET BY MOUTH DAILY 7/22/19   Antonia Gould MD   budesonide (PULMICORT) 0.5 MG/2ML nebulizer suspension INHALE 1 VIAL VIA NEBULIZER TWICE DAILY 6/16/19   Antonia Gould MD   BD INTEGRA SYRINGE 25G X 1\" 3 ML MISC USE AS DIRECTED EVERY MONTH 5/28/19   Antonia Gould MD   amitriptyline (ELAVIL) 50 MG tablet Take 1 tablet by mouth nightly 4/10/19   Lacretia Goldberg, APRN - CNP   aspirin 81 MG chewable tablet Take 1 tablet by mouth daily 4/10/19   Lacretia Goldberg, APRN - CNP   folic acid (FOLVITE) 1 MG tablet Take 1 tablet by mouth daily 4/10/19   Lacretia Goldberg, APRN - CNP   lithium 300 MG capsule Take 1 capsule by mouth daily  Patient taking differently: Take 600 mg by mouth 2 times daily (with meals)  4/10/19   Lacretia Goldberg, APRN - CNP   lurasidone (LATUDA) 20 MG TABS tablet TAKE 1 TABLET BY MOUTH EVERY DAY 4/10/19   NIKI Rushing CNP   pantoprazole (PROTONIX) 40 MG tablet Take 1 tablet by mouth every morning (before breakfast) 4/10/19   Lacretia Goldberg, APRN - CNP   FREESTYLE LITE strip USE DAILY AS DIRECTED 2/14/19   Antonia Gould MD   ipratropium-albuterol (DUONEB) 0.5-2.5 (3) MG/3ML SOLN nebulizer solution Inhale 3 mLs into the lungs three times daily 12/18/18   Antonia Gould MD   pregabalin (LYRICA) 50 MG capsule Take 1 capsule by mouth 2 times daily for 15 days. . 11/8/18 1/9/20  Tati Montero MD   calcium carbonate (TUMS) 500 MG chewable tablet 1 tablet as needed  18   Historical Provider, MD       Allergies: Allergies   Allergen Reactions    Latex Itching     Pt reports itching on hands after using rubber gloves at work    Influenza Vaccines Swelling     Pt reports her entire arm was red and edematous post vaccine    Eggs Or Egg-Derived [employer] based products only    Gabapentin Nausea Only    Pneumococcal Vaccines Hives    Povidone Iodine Itching    Varicella Virus Vaccine Live Hives        History of allergic reaction to anesthesia:  No    Past Medical History:  Past Medical History:   Diagnosis Date    Anxiety     Back pain     back surgery x 4    Bipolar disorder ([address]     CAD (coronary artery disease)     [facility]FL [facility]flow limitation) [facility] 11/3/2016    Chronic bronchitis ([facility])     Chronic pain     Colitis     Complicated migraine     DDD (degenerative disc disease), lumbar 2016    Depression     Endometriosis     Excessive caffeine abuse, continuous ([address] 2018    Gastritis     GERD (gastroesophageal reflux disease)     History of myocardial infarction     HTN (hypertension), benign 2016    Impaired mobility and activities of daily living     Over weight     PTSD (post-traumatic stress disorder)     Sensory neuropathy 2016    Somatization disorder     TIA (transient ischemic attack)     Tobacco abuse     Vasospastic angina ([facility] 2016       Past Surgical History:  Past Surgical History:   Procedure Laterality Date    BACK SURGERY      x2     SECTION      x2    CHOLECYSTECTOMY  13    Lapchole    CORONARY ANGIOPLASTY      CYST REMOVAL  3/7/16    Dr. Alivia Garcia Left     UPPER GASTROINTESTINAL ENDOSCOPY  2014    ANIBAL HOUSE M.D.        Social History:  Social History     Tobacco Use    Smoking status: Current Every Day Smoker     Packs/day: 1.00     Years: 17.00     Pack years: 17.00     Types: Cigarettes    Smokeless tobacco: Never Used   Substance Use Topics    Alcohol use: No    Drug use: No       Vital Signs:   Vitals:    07/28/20 0943   BP: (!) 204/95   Pulse: 79   Resp: 18   Temp: 97.8 °F (36.6 °C)   SpO2: 97%        Physical Exam:  Cardiac:  [x]WNL  []Comments:  Pulmonary:  [x]WNL   []Comments:   Neuro/Mental Status:  [x]WNL  []Comments:  Abdominal:  [x]WNL    []Comments:  Other:   []WNL  []Comments:    Informed Consent:  The risks and benefits of the procedure have been discussed with either the patient or if they cannot consent, their representative. Assessment:  Patient examined and appropriate for planned sedation and procedure. Plan:  Proceed with planned sedation and procedure as above.     Shiva Kam MD  9:51 AM

## 2020-07-28 NOTE — PROGRESS NOTES
Dr. Harika Ge spoke with the patient. Dr. Harika Ge is aware the patient is having some abdominal discomfort. He stated it's normal.  Patient informed if the pain gets worse or doesn't let up to call Dr. Harika Ge.

## 2020-07-29 ENCOUNTER — TELEPHONE (OUTPATIENT)
Dept: GASTROENTEROLOGY | Age: 50
End: 2020-07-29

## 2020-08-21 ENCOUNTER — HOSPITAL ENCOUNTER (EMERGENCY)
Age: 50
Discharge: HOME | End: 2020-08-21
Payer: COMMERCIAL

## 2020-08-21 ENCOUNTER — APPOINTMENT (OUTPATIENT)
Dept: CT IMAGING | Age: 50
End: 2020-08-21
Payer: COMMERCIAL

## 2020-08-21 VITALS
RESPIRATION RATE: 16 BRPM | WEIGHT: 200 LBS | TEMPERATURE: 98.5 F | HEART RATE: 76 BPM | OXYGEN SATURATION: 97 % | HEIGHT: 67 IN | BODY MASS INDEX: 31.39 KG/M2 | DIASTOLIC BLOOD PRESSURE: 82 MMHG | SYSTOLIC BLOOD PRESSURE: 169 MMHG

## 2020-08-21 LAB
ALBUMIN SERPL-MCNC: 4.2 G/DL (ref 3.5–4.6)
ALP BLD-CCNC: 135 U/L (ref 40–130)
ALT SERPL-CCNC: 120 U/L (ref 0–33)
ANION GAP SERPL CALCULATED.3IONS-SCNC: 7 MEQ/L (ref 9–15)
AST SERPL-CCNC: 61 U/L (ref 0–35)
BASOPHILS ABSOLUTE: 0.1 K/UL (ref 0–0.2)
BASOPHILS RELATIVE PERCENT: 0.9 %
BILIRUB SERPL-MCNC: 0.3 MG/DL (ref 0.2–0.7)
BUN BLDV-MCNC: 20 MG/DL (ref 6–20)
CALCIUM SERPL-MCNC: 9.1 MG/DL (ref 8.5–9.9)
CHLORIDE BLD-SCNC: 101 MEQ/L (ref 95–107)
CO2: 25 MEQ/L (ref 20–31)
CREAT SERPL-MCNC: 1.01 MG/DL (ref 0.5–0.9)
EKG ATRIAL RATE: 82 BPM
EKG P AXIS: 59 DEGREES
EKG P-R INTERVAL: 172 MS
EKG Q-T INTERVAL: 396 MS
EKG QRS DURATION: 90 MS
EKG QTC CALCULATION (BAZETT): 462 MS
EKG R AXIS: 55 DEGREES
EKG T AXIS: 94 DEGREES
EKG VENTRICULAR RATE: 82 BPM
EOSINOPHILS ABSOLUTE: 0.1 K/UL (ref 0–0.7)
EOSINOPHILS RELATIVE PERCENT: 1 %
GFR AFRICAN AMERICAN: >60
GFR NON-AFRICAN AMERICAN: 58.5
GLOBULIN: 2.3 G/DL (ref 2.3–3.5)
GLUCOSE BLD-MCNC: 174 MG/DL (ref 70–99)
HCT VFR BLD CALC: 46.4 % (ref 37–47)
HEMOGLOBIN: 16 G/DL (ref 12–16)
LYMPHOCYTES ABSOLUTE: 3 K/UL (ref 1–4.8)
LYMPHOCYTES RELATIVE PERCENT: 22.9 %
MAGNESIUM: 2 MG/DL (ref 1.7–2.4)
MCH RBC QN AUTO: 31.6 PG (ref 27–31.3)
MCHC RBC AUTO-ENTMCNC: 34.6 % (ref 33–37)
MCV RBC AUTO: 91.3 FL (ref 82–100)
MONOCYTES ABSOLUTE: 0.8 K/UL (ref 0.2–0.8)
MONOCYTES RELATIVE PERCENT: 6 %
NEUTROPHILS ABSOLUTE: 9.2 K/UL (ref 1.4–6.5)
NEUTROPHILS RELATIVE PERCENT: 69.2 %
PDW BLD-RTO: 13.7 % (ref 11.5–14.5)
PLATELET # BLD: 161 K/UL (ref 130–400)
POTASSIUM SERPL-SCNC: 4.2 MEQ/L (ref 3.4–4.9)
RBC # BLD: 5.08 M/UL (ref 4.2–5.4)
SODIUM BLD-SCNC: 133 MEQ/L (ref 135–144)
TOTAL PROTEIN: 6.5 G/DL (ref 6.3–8)
WBC # BLD: 13.3 K/UL (ref 4.8–10.8)

## 2020-08-21 PROCEDURE — 96361 HYDRATE IV INFUSION ADD-ON: CPT

## 2020-08-21 PROCEDURE — 99284 EMERGENCY DEPT VISIT MOD MDM: CPT

## 2020-08-21 PROCEDURE — 6360000002 HC RX W HCPCS: Performed by: PERSONAL EMERGENCY RESPONSE ATTENDANT

## 2020-08-21 PROCEDURE — 2580000003 HC RX 258: Performed by: PERSONAL EMERGENCY RESPONSE ATTENDANT

## 2020-08-21 PROCEDURE — 70450 CT HEAD/BRAIN W/O DYE: CPT

## 2020-08-21 PROCEDURE — 36415 COLL VENOUS BLD VENIPUNCTURE: CPT

## 2020-08-21 PROCEDURE — 2500000003 HC RX 250 WO HCPCS: Performed by: PERSONAL EMERGENCY RESPONSE ATTENDANT

## 2020-08-21 PROCEDURE — 80053 COMPREHEN METABOLIC PANEL: CPT

## 2020-08-21 PROCEDURE — 96365 THER/PROPH/DIAG IV INF INIT: CPT

## 2020-08-21 PROCEDURE — 96375 TX/PRO/DX INJ NEW DRUG ADDON: CPT

## 2020-08-21 PROCEDURE — 93005 ELECTROCARDIOGRAM TRACING: CPT | Performed by: PERSONAL EMERGENCY RESPONSE ATTENDANT

## 2020-08-21 PROCEDURE — 83735 ASSAY OF MAGNESIUM: CPT

## 2020-08-21 PROCEDURE — 85025 COMPLETE CBC W/AUTO DIFF WBC: CPT

## 2020-08-21 RX ORDER — KETOROLAC TROMETHAMINE 30 MG/ML
30 INJECTION, SOLUTION INTRAMUSCULAR; INTRAVENOUS ONCE
Status: COMPLETED | OUTPATIENT
Start: 2020-08-21 | End: 2020-08-21

## 2020-08-21 RX ORDER — HYDRALAZINE HYDROCHLORIDE 20 MG/ML
10 INJECTION INTRAMUSCULAR; INTRAVENOUS ONCE
Status: DISCONTINUED | OUTPATIENT
Start: 2020-08-21 | End: 2020-08-21

## 2020-08-21 RX ORDER — LABETALOL 20 MG/4 ML (5 MG/ML) INTRAVENOUS SYRINGE
20 ONCE
Status: COMPLETED | OUTPATIENT
Start: 2020-08-21 | End: 2020-08-21

## 2020-08-21 RX ORDER — BUTALBITAL, ASPIRIN, AND CAFFEINE 325; 50; 40 MG/1; MG/1; MG/1
1 CAPSULE ORAL EVERY 4 HOURS PRN
Qty: 20 CAPSULE | Refills: 0 | Status: SHIPPED | OUTPATIENT
Start: 2020-08-21

## 2020-08-21 RX ORDER — MAGNESIUM SULFATE IN WATER 40 MG/ML
2 INJECTION, SOLUTION INTRAVENOUS ONCE
Status: COMPLETED | OUTPATIENT
Start: 2020-08-21 | End: 2020-08-21

## 2020-08-21 RX ORDER — 0.9 % SODIUM CHLORIDE 0.9 %
1000 INTRAVENOUS SOLUTION INTRAVENOUS ONCE
Status: COMPLETED | OUTPATIENT
Start: 2020-08-21 | End: 2020-08-21

## 2020-08-21 RX ADMIN — LABETALOL 20 MG/4 ML (5 MG/ML) INTRAVENOUS SYRINGE 20 MG: at 04:55

## 2020-08-21 RX ADMIN — MAGNESIUM SULFATE 2 G: 2 INJECTION INTRAVENOUS at 03:14

## 2020-08-21 RX ADMIN — SODIUM CHLORIDE 1000 ML: 9 INJECTION, SOLUTION INTRAVENOUS at 03:14

## 2020-08-21 RX ADMIN — KETOROLAC TROMETHAMINE 30 MG: 30 INJECTION, SOLUTION INTRAMUSCULAR at 03:14

## 2020-08-21 ASSESSMENT — ENCOUNTER SYMPTOMS
NAUSEA: 0
BACK PAIN: 1
DIARRHEA: 0
SHORTNESS OF BREATH: 0
COUGH: 0
SORE THROAT: 0
RHINORRHEA: 0
BLOOD IN STOOL: 0
VOMITING: 0
ABDOMINAL PAIN: 0
COLOR CHANGE: 0

## 2020-08-21 ASSESSMENT — PAIN DESCRIPTION - FREQUENCY: FREQUENCY: CONTINUOUS

## 2020-08-21 ASSESSMENT — PAIN SCALES - GENERAL
PAINLEVEL_OUTOF10: 6
PAINLEVEL_OUTOF10: 3
PAINLEVEL_OUTOF10: 6

## 2020-08-21 ASSESSMENT — PAIN DESCRIPTION - PAIN TYPE: TYPE: ACUTE PAIN

## 2020-08-21 ASSESSMENT — PAIN DESCRIPTION - DESCRIPTORS: DESCRIPTORS: CRAMPING

## 2020-08-21 ASSESSMENT — PAIN DESCRIPTION - LOCATION: LOCATION: BACK;HEAD

## 2020-08-21 NOTE — ED TRIAGE NOTES
Pt states that she was at work when she began having a pain in her head on the left side. Pt states that she then became dizzy. Pt states she has a hx of tia. Pt states that she has also been having muscle cramps for the last 3 days. Pt has no weakness at this time. pts speech is clear, no facial droop.

## 2020-08-21 NOTE — ED PROVIDER NOTES
[address] [facility] ED  eMERGENCY dEPARTMENT eNCOUnter      Pt Name: Kenneth Meredith  MRN: [id number]  [place] 1970  Date of evaluation: 8/21/2020  Provider: NORBERTO Dee      HISTORY OF PRESENT ILLNESS    Kenneth Meredith is a 52 y.o. female with PMHx of DDD, vasospastic angina, anxiety, back pain, bipolar, CAD, chronic pain, complicated migraine, depression, endometriosis, gastritis, GERD, MI, PTSD, somatizations disorder, TIA presents to the emergency department with headache. Pt says 1-2 hours ago while at work she started with left sided headache and dizziness, feeling unbalanced. Symptoms are slightly improved now. She does complain of back spasms between her shoulder blades. She also states for the past couple days she has had cramping in her left ankle radiating to her toes. She denies fevers, change in her vision, neck pain, chest pain, cough, shortness of breath, nausea, vomiting, diarrhea, abdominal pain, urinary symptoms. She denies drugs or alcohol. HPI    Nursing Notes were reviewed. REVIEW OF SYSTEMS       Review of Systems   Constitutional: Negative for appetite change, chills and fever. HENT: Negative for congestion, rhinorrhea and sore throat. Respiratory: Negative for cough and shortness of breath. Cardiovascular: Negative for chest pain. Gastrointestinal: Negative for abdominal pain, blood in stool, diarrhea, nausea and vomiting. Genitourinary: Negative for difficulty urinating. Musculoskeletal: Positive for back pain. Negative for neck stiffness. Skin: Negative for color change and rash. Neurological: Positive for dizziness and headaches. Negative for syncope, weakness, light-headedness and numbness. All other systems reviewed and are negative.             PAST MEDICAL HISTORY     Past Medical History:   Diagnosis Date    Anxiety     Back pain     back surgery x 4    [facility]     CAD (coronary artery disease)     [facility]FL [facility] ([facility] 11/3/2016    Chronic bronchitis (HCC)     Chronic pain     Colitis     Complicated migraine     DDD (degenerative disc disease), lumbar 2016    Depression     Endometriosis     Excessive caffeine abuse, continuous (HCC) 2018    Gastritis     GERD (gastroesophageal reflux disease)     History of myocardial infarction     HTN (hypertension), benign 2016    Impaired mobility and activities of daily living     Over weight     PTSD (post-traumatic stress disorder)     Sensory neuropathy 2016    Somatization disorder     TIA (transient ischemic attack)     Tobacco abuse     Vasospastic angina ([facility] 2016         SURGICAL HISTORY       Past Surgical History:   Procedure Laterality Date    BACK SURGERY      x2     SECTION      x2    CHOLECYSTECTOMY  13    Lapchole    COLONOSCOPY N/A 2020    COLONOSCOPY DIAGNOSTIC performed by Jaden Powell MD at [address] [place] [address]  3/7/16    Dr. Luis Sanz Left     UPPER GASTROINTESTINAL ENDOSCOPY  2014    JORDAN HAMEED UPPER GASTROINTESTINAL ENDOSCOPY N/A 2020    EGD ESOPHAGOGASTRODUODENOSCOPY performed by Jaden Powell MD at [address] [place] [address]       Previous Medications    ALBUTEROL SULFATE HFA (PROVENTIL HFA) 108 (90 BASE) MCG/ACT INHALER    Inhale 2 puffs into the lungs every 6 hours as needed for Wheezing    ALPRAZOLAM (XANAX) 0.5 MG TABLET    Take 0.5 mg by mouth 2 times daily.      AMITRIPTYLINE (ELAVIL) 50 MG TABLET    Take 1 tablet by mouth nightly    ASPIRIN 81 MG CHEWABLE TABLET    Take 1 tablet by mouth daily    ATENOLOL (TENORMIN) 50 MG TABLET    Take 1 tablet by mouth daily    ATORVASTATIN (LIPITOR) 10 MG TABLET    Take 1 tablet by mouth daily    BD INTEGRA SYRINGE 25G X 1\" 3 ML MISC    USE AS DIRECTED EVERY MONTH    BUDESONIDE (PULMICORT) 0.5 MG/2ML NEBULIZER SUSPENSION    INHALE 1 VIAL VIA NEBULIZER TWICE DAILY    BUTALBITAL-APAP-CAFFEINE -40 MG CAPS PER CAPSULE    Take 1 capsule by mouth every 4 hours as needed for Migraine    CALCIUM CARBONATE (TUMS) 500 MG CHEWABLE TABLET    1 tablet as needed     CHOLECALCIFEROL 50 MCG (2000 UT) TABS    Take 1 tablet by mouth daily    CYANOCOBALAMIN 1000 MCG/ML INJECTION    Inject 1 mL into the skin every 30 days    CYCLOBENZAPRINE (FLEXERIL) 10 MG TABLET    Take 1 tablet by mouth 2 times daily as needed for Muscle spasms    DICLOFENAC (VOLTAREN) 75 MG EC TABLET    Take 1 tablet by mouth 2 times daily    DICYCLOMINE (BENTYL) 10 MG CAPSULE    Take 1 capsule by mouth every 6 hours as needed (cramps)    DICYCLOMINE (BENTYL) 10 MG CAPSULE    Take 1 capsule by mouth 4 times daily (before meals and nightly)    DILTIAZEM (CARDIZEM CD) 240 MG EXTENDED RELEASE CAPSULE    Take 1 capsule by mouth daily HOLD for SBP<100 or HR<60    FLUTICASONE (FLONASE) 50 MCG/ACT NASAL SPRAY    1 spray by NOT APPLICABLE route 2 times daily    FOLIC ACID (FOLVITE) 1 MG TABLET    Take 1 tablet by mouth daily    FREESTYLE LANCETS MISC    USE DAILY AS DIRECTED    FREESTYLE LITE STRIP    USE DAILY AS DIRECTED    HYDROCHLOROTHIAZIDE (HYDRODIURIL) 25 MG TABLET    Take 25 mg by mouth daily    IPRATROPIUM-ALBUTEROL (DUONEB) 0.5-2.5 (3) MG/3ML SOLN NEBULIZER SOLUTION    Inhale 3 mLs into the lungs three times daily    ISOSORBIDE MONONITRATE (IMDUR) 30 MG EXTENDED RELEASE TABLET    Take 1 tablet by mouth daily    LEVOTHYROXINE (SYNTHROID) 25 MCG TABLET    TAKE 1 TABLET BY MOUTH DAILY    LITHIUM 300 MG CAPSULE    Take 1 capsule by mouth daily    LOSARTAN (COZAAR) 100 MG TABLET    Take 1 tablet by mouth daily    LURASIDONE (LATUDA) 20 MG TABS TABLET    TAKE 1 TABLET BY MOUTH EVERY DAY    MAGNESIUM OXIDE (MAG-OX) 400 MG TABLET    Take 1 tablet by mouth daily    MECLIZINE (ANTIVERT) 25 MG TABLET Take 25 mg by mouth 3 times daily as needed    NITROGLYCERIN (NITROSTAT) 0.4 MG SL TABLET    up to max of 3 total doses. If no relief after 1 dose, call 911. ONDANSETRON (ZOFRAN ODT) 4 MG DISINTEGRATING TABLET    Take 1 tablet by mouth every 8 hours as needed for Nausea    ONDANSETRON (ZOFRAN ODT) 4 MG DISINTEGRATING TABLET    Take 1-2 tablets by mouth every 12 hours as needed for Nausea May Sub regular tablet (non-ODT) if insurance does not cover ODT. PANTOPRAZOLE (PROTONIX) 40 MG TABLET    Take 1 tablet by mouth every morning (before breakfast)    PREGABALIN (LYRICA) 50 MG CAPSULE    Take 1 capsule by mouth 2 times daily for 15 days. Delwyn Scarce TIZANIDINE (ZANAFLEX) 4 MG TABLET    Take 1 tablet by mouth every 6 hours as needed (pain)    VENTOLIN  (90 BASE) MCG/ACT INHALER    Inhale 2 puffs into the lungs every 6 hours as needed for Wheezing    VITAMIN D (CHOLECALCIFEROL) 05819 UNIT CAPS    Take 50,000 Units by mouth once a week       ALLERGIES     Latex; Influenza vaccines; Eggs or egg-derived products; Gabapentin; Pneumococcal vaccines; Povidone iodine; and Varicella virus vaccine live    FAMILY HISTORY       Family History   Problem Relation Age of Onset    High Blood Pressure Mother     Kidney Disease Mother     Lupus Mother     Coronary Art Dis Mother         Had stents in her 62s   Jeffrey Gondsharona Bipolar Disorder Son           SOCIAL HISTORY       Social History     Socioeconomic History    Marital status:       Spouse name: None    Number of children: None    Years of education: None    Highest education level: None   Occupational History    Occupation: unemployed   Social Needs    Financial resource strain: None    Food insecurity     Worry: None     Inability: None    Transportation needs     Medical: None     Non-medical: None   Tobacco Use    Smoking status: Current Every Day Smoker     Packs/day: 1.00     Years: 17.00     Pack years: 17.00     Types: Cigarettes    Smokeless tobacco: Never 98.5 °F (36.9 °C) Oral 86 18 96 % 5' 7\" (1.702 m) 200 lb (90.7 kg)       Physical Exam  Constitutional:       Appearance: She is well-developed. HENT:      Head: Normocephalic and atraumatic. Right Ear: Tympanic membrane normal.      Left Ear: Tympanic membrane normal.      Mouth/Throat:      Mouth: Mucous membranes are moist.   Eyes:      Conjunctiva/sclera: Conjunctivae normal.      Pupils: Pupils are equal, round, and reactive to light. Neck:      Musculoskeletal: Normal range of motion and neck supple. Trachea: No tracheal deviation. Cardiovascular:      Heart sounds: Normal heart sounds. Pulmonary:      Effort: Pulmonary effort is normal. No respiratory distress. Breath sounds: Normal breath sounds. No stridor. Abdominal:      General: Bowel sounds are normal. There is no distension. Palpations: Abdomen is soft. There is no mass. Tenderness: There is no abdominal tenderness. There is no guarding or rebound. Musculoskeletal: Normal range of motion. General: Tenderness present. Back:       Comments: Patient mildly tender to palpate, inconsistently, medial to right scapula and in right trapezius muscle. No signs of trauma, no midline tenderness. Skin:     General: Skin is warm and dry. Capillary Refill: Capillary refill takes less than 2 seconds. Findings: No rash. Neurological:      Mental Status: She is alert and oriented to person, place, and time. Deep Tendon Reflexes: Reflexes are normal and symmetric. Psychiatric:         Behavior: Behavior normal.         Thought Content:  Thought content normal.         Judgment: Judgment normal.         DIAGNOSTIC RESULTS     EKG:All EKG's are interpreted by the Emergency Department Physician who either signs or Co-signs this chart in the absence of a cardiologist.    Sinus rhythm with PAC and PVC, rate of 82, normal axis, no ST segment changes    RADIOLOGY:   Non-plain film images such as CT, Ultrasound and MRI are read by theradiologist. Plain radiographic images are visualized and preliminarily interpreted by the emergency physician with the below findings:    Interpretation per theRadiologist below, if available at the time of this note:    CT Head WO Contrast    (Results Pending)           LABS:  Labs Reviewed   COMPREHENSIVE METABOLIC PANEL - Abnormal; Notable for the following components:       Result Value    Sodium 133 (*)     Anion Gap 7 (*)     Glucose 174 (*)     CREATININE 1.01 (*)     GFR Non- 58.5 (*)     Alkaline Phosphatase 135 (*)      (*)     AST 61 (*)     All other components within normal limits   CBC WITH AUTO DIFFERENTIAL - Abnormal; Notable for the following components:    WBC 13.3 (*)     MCH 31.6 (*)     Neutrophils Absolute 9.2 (*)     All other components within normal limits   MAGNESIUM       All other labs were within normal range or not returned as of this dictation. EMERGENCY DEPARTMENT COURSE and DIFFERENTIAL DIAGNOSIS/MDM:   Vitals:    Vitals:    08/21/20 0228   BP: (!) 195/93   Pulse: 86   Resp: 18   Temp: 98.5 °F (36.9 °C)   TempSrc: Oral   SpO2: 96%   Weight: 200 lb (90.7 kg)   Height: 5' 7\" (1.702 m)         MDM    Sodium 133, creatinine 1.01, alk phos 135, , AST 61, WBC 13.3. Patient states she was told she has fatty liver which is contributing to her elevated liver enzymes. Her liver increase from previous. She is on medications that can contribute to elevated liver enzymes. Patient was given 1 L IV fluids, Toradol, magnesium, on reassessment headache is much improved and she is eating at bedside. No neurological deficits, no CVA symptoms. Standard anticipatory guidance given to patient upon discharge. Have given them a specific time frame in which to follow-up and who to follow-up with.  I have also advised them that they should return to the emergency department if they get worse, or not getting better or develop any new or concerning symptoms. Patient demonstrates understanding. CRITICAL CARE TIME   Total Critical Caretime was 0 minutes, excluding separately reportable procedures. There was a high probability of clinically significant/life threatening deterioration in the patient's condition which required my urgent intervention. Procedures    FINAL IMPRESSION      1. Acute nonintractable headache, unspecified headache type    2. Elevated liver enzymes          DISPOSITION/PLAN   DISPOSITION Decision To Discharge 08/21/2020 04:29:16 AM      PATIENT REFERRED TO:  Evert Castelan MD  [place] [id number] [phone]    In 3 days        DISCHARGE MEDICATIONS:  New Prescriptions    No medications on file          (Please notethat portions of this note were completed with a voice recognition program.  Efforts were made to edit the dictations but occasionally words are mis-transcribed. )    NORBERTO De Leon (electronically signed)  Emergency Physician Assistant         Marcellus Babinski, PA  08/21/20 6057

## 2020-08-23 PROCEDURE — 93010 ELECTROCARDIOGRAM REPORT: CPT | Performed by: INTERNAL MEDICINE

## 2020-09-02 RX ORDER — SODIUM, POTASSIUM,MAG SULFATES 17.5-3.13G
SOLUTION, RECONSTITUTED, ORAL ORAL
Qty: 1 BOTTLE | Refills: 0 | Status: ON HOLD | OUTPATIENT
Start: 2020-09-02 | End: 2020-12-24

## 2020-11-05 ENCOUNTER — TELEPHONE (OUTPATIENT)
Dept: GASTROENTEROLOGY | Age: 50
End: 2020-11-05

## 2020-12-09 ENCOUNTER — VIRTUAL VISIT (OUTPATIENT)
Dept: GASTROENTEROLOGY | Age: 50
End: 2020-12-09
Payer: COMMERCIAL

## 2020-12-09 PROCEDURE — 99443 PR PHYS/QHP TELEPHONE EVALUATION 21-30 MIN: CPT | Performed by: SPECIALIST

## 2020-12-09 ASSESSMENT — ENCOUNTER SYMPTOMS
ABDOMINAL PAIN: 0
VOMITING: 0
ABDOMINAL DISTENTION: 0
GASTROINTESTINAL NEGATIVE: 1
ANAL BLEEDING: 0
RESPIRATORY NEGATIVE: 1
EYES NEGATIVE: 1
CONSTIPATION: 0
BLOOD IN STOOL: 0
DIARRHEA: 0
RECTAL PAIN: 0
NAUSEA: 0

## 2020-12-09 NOTE — PROGRESS NOTES
Gastroenterology Clinic Follow up Visit    Jaleesa Coburn  [id number]  Chief Complaint   Patient presents with    Follow-up       HPI and A/P at last visit summarized below: This was a telephone encounter patient was at home and I was in our office. ,  Patient had EGD which was unremarkable colonoscopy showed benign polyps. There were tubular adenoma.,  One of the polyp in the ileocecal valve area could not be removed entirely since because of the difficulty with the procedures of biopsies were taken that was reported as adenomatous polyp, patient had shoulder surgery recently and she is not sure that she would be able to lie on her right lateral side for a repeat colonoscopy. Patient is going to check with her orthopedic surgeon and will reschedule the colonoscopy once we get the approval from the orthopedic surgeon. She also has abnormal LFTs and I had ordered some test to investigate this earlier but it was not done, patient has no symptoms of any decompensated liver disease.,  Her medications were reviewed but I do not have the most recent list of her medications and patient will bring her list of medication next time and she comes here. She is not on statins or NSAIDs according to the patient even though it is mentioned in the list.  CT of the abdomen done earlier part of this year showed no focal liver lesions, duration of phone call was 21 minutes. Review of Systems   Constitutional: Negative. HENT: Negative. Eyes: Negative. Respiratory: Negative. Cardiovascular: Negative. Gastrointestinal: Negative. Negative for abdominal distention, abdominal pain, anal bleeding, blood in stool, constipation, diarrhea, nausea, rectal pain and vomiting. Vague abdominal discomfort   Endocrine: Negative. Genitourinary: Negative. Musculoskeletal: Negative. Skin: Negative. Allergic/Immunologic: Negative for food allergies. Neurological: Negative. Hematological: Negative.

## 2020-12-11 DIAGNOSIS — R79.89 ABNORMAL LFTS: ICD-10-CM

## 2020-12-11 LAB
FERRITIN: 282 NG/ML (ref 13–150)
HEPATITIS C ANTIBODY INTERPRETATION: NORMAL
IRON SATURATION: 19 % (ref 11–46)
IRON: 51 UG/DL (ref 37–145)
TOTAL IRON BINDING CAPACITY: 272 UG/DL (ref 178–450)

## 2020-12-15 LAB
ALPHA-1 ANTITRYPSIN: 151 MG/DL (ref 90–200)
CERULOPLASMIN: 29 MG/DL (ref 16–45)
COPPER: 130.6 UG/DL (ref 80–155)
MITOCHONDRIAL M2 AB, IGG: 9.3 UNITS (ref 0–24.9)

## 2020-12-16 LAB
ANTINUCLEAR AB INTERPRETIVE COMMENT: NORMAL
ANTINUCLEAR ANTIBODY, HEP-2, IGG: NORMAL
HEPATITIS B SURFACE ANTIGEN INTERPRETATION: NORMAL

## 2020-12-17 LAB — LIVER-KIDNEY MICROSOME-1 AB IGG: 1.3 U (ref 0–24.9)

## 2020-12-18 ENCOUNTER — TELEPHONE (OUTPATIENT)
Dept: GASTROENTEROLOGY | Age: 50
End: 2020-12-18

## 2020-12-18 NOTE — TELEPHONE ENCOUNTER
The patient is scheduled for a f/u in January, but would like to know if she can get her results over the phone.

## 2020-12-22 ENCOUNTER — APPOINTMENT (OUTPATIENT)
Dept: CT IMAGING | Age: 50
DRG: 065 | End: 2020-12-22
Payer: COMMERCIAL

## 2020-12-22 ENCOUNTER — HOSPITAL ENCOUNTER (INPATIENT)
Age: 50
LOS: 5 days | Discharge: NURSING, REHAB OR LONG-TERM CARE | DRG: 065 | End: 2020-12-28
Attending: INTERNAL MEDICINE | Admitting: INTERNAL MEDICINE
Payer: COMMERCIAL

## 2020-12-22 LAB
ALBUMIN SERPL-MCNC: 4.6 G/DL (ref 3.5–4.6)
ALP BLD-CCNC: 160 U/L (ref 40–130)
ALT SERPL-CCNC: 113 U/L (ref 0–33)
ANION GAP SERPL CALCULATED.3IONS-SCNC: 11 MEQ/L (ref 9–15)
APTT: 31 SEC (ref 24.4–36.8)
AST SERPL-CCNC: 73 U/L (ref 0–35)
BASOPHILS ABSOLUTE: 0.1 K/UL (ref 0–0.2)
BASOPHILS RELATIVE PERCENT: 0.7 %
BILIRUB SERPL-MCNC: 0.5 MG/DL (ref 0.2–0.7)
BUN BLDV-MCNC: 9 MG/DL (ref 6–20)
CALCIUM SERPL-MCNC: 9.6 MG/DL (ref 8.5–9.9)
CHLORIDE BLD-SCNC: 100 MEQ/L (ref 95–107)
CO2: 24 MEQ/L (ref 20–31)
CREAT SERPL-MCNC: 0.78 MG/DL (ref 0.5–0.9)
EKG ATRIAL RATE: 76 BPM
EKG ATRIAL RATE: 76 BPM
EKG P AXIS: 52 DEGREES
EKG P AXIS: 54 DEGREES
EKG P-R INTERVAL: 172 MS
EKG P-R INTERVAL: 176 MS
EKG Q-T INTERVAL: 408 MS
EKG Q-T INTERVAL: 416 MS
EKG QRS DURATION: 94 MS
EKG QRS DURATION: 96 MS
EKG QTC CALCULATION (BAZETT): 459 MS
EKG QTC CALCULATION (BAZETT): 468 MS
EKG R AXIS: 48 DEGREES
EKG R AXIS: 52 DEGREES
EKG T AXIS: 82 DEGREES
EKG T AXIS: 82 DEGREES
EKG VENTRICULAR RATE: 76 BPM
EKG VENTRICULAR RATE: 76 BPM
EOSINOPHILS ABSOLUTE: 0.1 K/UL (ref 0–0.7)
EOSINOPHILS RELATIVE PERCENT: 1.1 %
ETHANOL PERCENT: NORMAL G/DL
ETHANOL: <10 MG/DL (ref 0–0.08)
F-ACTIN AB IGA: 5.6 UNITS (ref 0–24.9)
GFR AFRICAN AMERICAN: >60
GFR NON-AFRICAN AMERICAN: >60
GLOBULIN: 2.1 G/DL (ref 2.3–3.5)
GLUCOSE BLD-MCNC: 246 MG/DL (ref 60–115)
GLUCOSE BLD-MCNC: 249 MG/DL (ref 70–99)
HCT VFR BLD CALC: 45.4 % (ref 37–47)
HEMOGLOBIN: 15.7 G/DL (ref 12–16)
INR BLD: 0.9
LYMPHOCYTES ABSOLUTE: 2.2 K/UL (ref 1–4.8)
LYMPHOCYTES RELATIVE PERCENT: 21.1 %
MAGNESIUM: 1.9 MG/DL (ref 1.7–2.4)
MCH RBC QN AUTO: 33 PG (ref 27–31.3)
MCHC RBC AUTO-ENTMCNC: 34.7 % (ref 33–37)
MCV RBC AUTO: 95.1 FL (ref 82–100)
MONOCYTES ABSOLUTE: 0.8 K/UL (ref 0.2–0.8)
MONOCYTES RELATIVE PERCENT: 7.5 %
NEUTROPHILS ABSOLUTE: 7.2 K/UL (ref 1.4–6.5)
NEUTROPHILS RELATIVE PERCENT: 69.6 %
PDW BLD-RTO: 13.5 % (ref 11.5–14.5)
PERFORMED ON: ABNORMAL
PLATELET # BLD: 153 K/UL (ref 130–400)
POC CREATININE WHOLE BLOOD: 0.78
POTASSIUM SERPL-SCNC: 3.7 MEQ/L (ref 3.4–4.9)
PROTHROMBIN TIME: 12.3 SEC (ref 12.3–14.9)
RBC # BLD: 4.77 M/UL (ref 4.2–5.4)
SODIUM BLD-SCNC: 135 MEQ/L (ref 135–144)
TOTAL PROTEIN: 6.7 G/DL (ref 6.3–8)
TROPONIN: <0.01 NG/ML (ref 0–0.01)
WBC # BLD: 10.4 K/UL (ref 4.8–10.8)

## 2020-12-22 PROCEDURE — 96375 TX/PRO/DX INJ NEW DRUG ADDON: CPT

## 2020-12-22 PROCEDURE — 6360000002 HC RX W HCPCS: Performed by: PHYSICIAN ASSISTANT

## 2020-12-22 PROCEDURE — 85025 COMPLETE CBC W/AUTO DIFF WBC: CPT

## 2020-12-22 PROCEDURE — 85610 PROTHROMBIN TIME: CPT

## 2020-12-22 PROCEDURE — 2500000003 HC RX 250 WO HCPCS: Performed by: PHYSICIAN ASSISTANT

## 2020-12-22 PROCEDURE — 70450 CT HEAD/BRAIN W/O DYE: CPT

## 2020-12-22 PROCEDURE — 85730 THROMBOPLASTIN TIME PARTIAL: CPT

## 2020-12-22 PROCEDURE — 99285 EMERGENCY DEPT VISIT HI MDM: CPT

## 2020-12-22 PROCEDURE — 96374 THER/PROPH/DIAG INJ IV PUSH: CPT

## 2020-12-22 PROCEDURE — 93005 ELECTROCARDIOGRAM TRACING: CPT | Performed by: PHYSICIAN ASSISTANT

## 2020-12-22 PROCEDURE — 84484 ASSAY OF TROPONIN QUANT: CPT

## 2020-12-22 PROCEDURE — 36415 COLL VENOUS BLD VENIPUNCTURE: CPT

## 2020-12-22 PROCEDURE — G0480 DRUG TEST DEF 1-7 CLASSES: HCPCS

## 2020-12-22 PROCEDURE — 80053 COMPREHEN METABOLIC PANEL: CPT

## 2020-12-22 PROCEDURE — 83735 ASSAY OF MAGNESIUM: CPT

## 2020-12-22 RX ORDER — HYDRALAZINE HYDROCHLORIDE 20 MG/ML
10 INJECTION INTRAMUSCULAR; INTRAVENOUS ONCE
Status: DISCONTINUED | OUTPATIENT
Start: 2020-12-22 | End: 2020-12-28 | Stop reason: HOSPADM

## 2020-12-22 RX ORDER — NALOXONE HYDROCHLORIDE 0.4 MG/ML
0.4 INJECTION, SOLUTION INTRAMUSCULAR; INTRAVENOUS; SUBCUTANEOUS ONCE
Status: COMPLETED | OUTPATIENT
Start: 2020-12-22 | End: 2020-12-22

## 2020-12-22 RX ORDER — LABETALOL HYDROCHLORIDE 5 MG/ML
10 INJECTION, SOLUTION INTRAVENOUS ONCE
Status: COMPLETED | OUTPATIENT
Start: 2020-12-22 | End: 2020-12-22

## 2020-12-22 RX ADMIN — NALOXONE HYDROCHLORIDE 0.4 MG: 0.4 INJECTION, SOLUTION INTRAMUSCULAR; INTRAVENOUS; SUBCUTANEOUS at 23:19

## 2020-12-22 RX ADMIN — LABETALOL HYDROCHLORIDE 10 MG: 5 INJECTION, SOLUTION INTRAVENOUS at 23:33

## 2020-12-23 ENCOUNTER — APPOINTMENT (OUTPATIENT)
Dept: CT IMAGING | Age: 50
DRG: 065 | End: 2020-12-23
Payer: COMMERCIAL

## 2020-12-23 ENCOUNTER — APPOINTMENT (OUTPATIENT)
Dept: MRI IMAGING | Age: 50
DRG: 065 | End: 2020-12-23
Payer: COMMERCIAL

## 2020-12-23 PROBLEM — M75.81 TENDINITIS OF RIGHT ROTATOR CUFF: Status: ACTIVE | Noted: 2020-12-23

## 2020-12-23 LAB
ALBUMIN SERPL-MCNC: 3.8 G/DL (ref 3.5–4.6)
ALP BLD-CCNC: 138 U/L (ref 40–130)
ALT SERPL-CCNC: 104 U/L (ref 0–33)
ANION GAP SERPL CALCULATED.3IONS-SCNC: 12 MEQ/L (ref 9–15)
AST SERPL-CCNC: 57 U/L (ref 0–35)
BILIRUB SERPL-MCNC: 0.4 MG/DL (ref 0.2–0.7)
BUN BLDV-MCNC: 9 MG/DL (ref 6–20)
CALCIUM SERPL-MCNC: 9.1 MG/DL (ref 8.5–9.9)
CHLORIDE BLD-SCNC: 101 MEQ/L (ref 95–107)
CO2: 24 MEQ/L (ref 20–31)
CREAT SERPL-MCNC: 0.74 MG/DL (ref 0.5–0.9)
GFR AFRICAN AMERICAN: >60
GFR AFRICAN AMERICAN: >60
GFR NON-AFRICAN AMERICAN: >60
GFR NON-AFRICAN AMERICAN: >60
GLOBULIN: 2.5 G/DL (ref 2.3–3.5)
GLUCOSE BLD-MCNC: 167 MG/DL (ref 70–99)
HOMOCYSTEINE: 25.8 UMOL/L (ref 0–15)
MAGNESIUM: 2 MG/DL (ref 1.7–2.4)
PERFORMED ON: NORMAL
PLATELET AGGREGATION: 562 ARU (ref 350–549)
POC CREATININE: 0.7 MG/DL (ref 0.6–1.1)
POC SAMPLE TYPE: NORMAL
POTASSIUM REFLEX MAGNESIUM: 3.4 MEQ/L (ref 3.4–4.9)
SARS-COV-2, NAAT: NOT DETECTED
SODIUM BLD-SCNC: 137 MEQ/L (ref 135–144)
TOTAL PROTEIN: 6.3 G/DL (ref 6.3–8)

## 2020-12-23 PROCEDURE — 36415 COLL VENOUS BLD VENIPUNCTURE: CPT

## 2020-12-23 PROCEDURE — 83516 IMMUNOASSAY NONANTIBODY: CPT

## 2020-12-23 PROCEDURE — 6370000000 HC RX 637 (ALT 250 FOR IP): Performed by: NURSE PRACTITIONER

## 2020-12-23 PROCEDURE — 86225 DNA ANTIBODY NATIVE: CPT

## 2020-12-23 PROCEDURE — 97166 OT EVAL MOD COMPLEX 45 MIN: CPT

## 2020-12-23 PROCEDURE — 83735 ASSAY OF MAGNESIUM: CPT

## 2020-12-23 PROCEDURE — 85302 CLOT INHIBIT PROT C ANTIGEN: CPT

## 2020-12-23 PROCEDURE — 6370000000 HC RX 637 (ALT 250 FOR IP): Performed by: PHYSICIAN ASSISTANT

## 2020-12-23 PROCEDURE — 83090 ASSAY OF HOMOCYSTEINE: CPT

## 2020-12-23 PROCEDURE — 85576 BLOOD PLATELET AGGREGATION: CPT

## 2020-12-23 PROCEDURE — 2580000003 HC RX 258: Performed by: NURSE PRACTITIONER

## 2020-12-23 PROCEDURE — U0002 COVID-19 LAB TEST NON-CDC: HCPCS

## 2020-12-23 PROCEDURE — 85730 THROMBOPLASTIN TIME PARTIAL: CPT

## 2020-12-23 PROCEDURE — 97162 PT EVAL MOD COMPLEX 30 MIN: CPT

## 2020-12-23 PROCEDURE — 80053 COMPREHEN METABOLIC PANEL: CPT

## 2020-12-23 PROCEDURE — 6370000000 HC RX 637 (ALT 250 FOR IP): Performed by: PSYCHIATRY & NEUROLOGY

## 2020-12-23 PROCEDURE — 1210000000 HC MED SURG R&B

## 2020-12-23 PROCEDURE — 85305 CLOT INHIBIT PROT S TOTAL: CPT

## 2020-12-23 PROCEDURE — 6360000004 HC RX CONTRAST MEDICATION: Performed by: PHYSICIAN ASSISTANT

## 2020-12-23 PROCEDURE — 70496 CT ANGIOGRAPHY HEAD: CPT

## 2020-12-23 PROCEDURE — 70498 CT ANGIOGRAPHY NECK: CPT

## 2020-12-23 PROCEDURE — 99254 IP/OBS CNSLTJ NEW/EST MOD 60: CPT | Performed by: PSYCHIATRY & NEUROLOGY

## 2020-12-23 PROCEDURE — 70551 MRI BRAIN STEM W/O DYE: CPT

## 2020-12-23 PROCEDURE — 6360000002 HC RX W HCPCS

## 2020-12-23 PROCEDURE — 92610 EVALUATE SWALLOWING FUNCTION: CPT

## 2020-12-23 PROCEDURE — 99222 1ST HOSP IP/OBS MODERATE 55: CPT | Performed by: PHYSICAL MEDICINE & REHABILITATION

## 2020-12-23 PROCEDURE — 85613 RUSSELL VIPER VENOM DILUTED: CPT

## 2020-12-23 PROCEDURE — 6360000002 HC RX W HCPCS: Performed by: NURSE PRACTITIONER

## 2020-12-23 PROCEDURE — 85610 PROTHROMBIN TIME: CPT

## 2020-12-23 RX ORDER — CLOPIDOGREL BISULFATE 75 MG/1
75 TABLET ORAL DAILY
Status: DISCONTINUED | OUTPATIENT
Start: 2020-12-23 | End: 2020-12-28 | Stop reason: HOSPADM

## 2020-12-23 RX ORDER — SODIUM CHLORIDE 0.9 % (FLUSH) 0.9 %
10 SYRINGE (ML) INJECTION PRN
Status: DISCONTINUED | OUTPATIENT
Start: 2020-12-23 | End: 2020-12-28 | Stop reason: HOSPADM

## 2020-12-23 RX ORDER — HYDRALAZINE HYDROCHLORIDE 20 MG/ML
5 INJECTION INTRAMUSCULAR; INTRAVENOUS EVERY 6 HOURS PRN
Status: DISCONTINUED | OUTPATIENT
Start: 2020-12-23 | End: 2020-12-28 | Stop reason: HOSPADM

## 2020-12-23 RX ORDER — ONDANSETRON 2 MG/ML
4 INJECTION INTRAMUSCULAR; INTRAVENOUS EVERY 6 HOURS PRN
Status: DISCONTINUED | OUTPATIENT
Start: 2020-12-23 | End: 2020-12-28 | Stop reason: HOSPADM

## 2020-12-23 RX ORDER — SODIUM CHLORIDE 9 MG/ML
INJECTION, SOLUTION INTRAVENOUS CONTINUOUS
Status: DISCONTINUED | OUTPATIENT
Start: 2020-12-23 | End: 2020-12-28

## 2020-12-23 RX ORDER — PROMETHAZINE HYDROCHLORIDE 12.5 MG/1
12.5 TABLET ORAL EVERY 6 HOURS PRN
Status: DISCONTINUED | OUTPATIENT
Start: 2020-12-23 | End: 2020-12-28 | Stop reason: HOSPADM

## 2020-12-23 RX ORDER — LORAZEPAM 2 MG/ML
INJECTION INTRAMUSCULAR
Status: COMPLETED
Start: 2020-12-23 | End: 2020-12-23

## 2020-12-23 RX ORDER — OXYCODONE HYDROCHLORIDE AND ACETAMINOPHEN 5; 325 MG/1; MG/1
1 TABLET ORAL ONCE
Status: COMPLETED | OUTPATIENT
Start: 2020-12-23 | End: 2020-12-23

## 2020-12-23 RX ORDER — POLYETHYLENE GLYCOL 3350 17 G/17G
17 POWDER, FOR SOLUTION ORAL DAILY PRN
Status: DISCONTINUED | OUTPATIENT
Start: 2020-12-23 | End: 2020-12-28 | Stop reason: HOSPADM

## 2020-12-23 RX ORDER — ASPIRIN 300 MG/1
300 SUPPOSITORY RECTAL DAILY
Status: DISCONTINUED | OUTPATIENT
Start: 2020-12-23 | End: 2020-12-28 | Stop reason: HOSPADM

## 2020-12-23 RX ORDER — SODIUM CHLORIDE 0.9 % (FLUSH) 0.9 %
10 SYRINGE (ML) INJECTION EVERY 12 HOURS SCHEDULED
Status: DISCONTINUED | OUTPATIENT
Start: 2020-12-23 | End: 2020-12-28 | Stop reason: HOSPADM

## 2020-12-23 RX ORDER — ATORVASTATIN CALCIUM 20 MG/1
20 TABLET, FILM COATED ORAL NIGHTLY
Status: DISCONTINUED | OUTPATIENT
Start: 2020-12-23 | End: 2020-12-28 | Stop reason: HOSPADM

## 2020-12-23 RX ORDER — ASPIRIN 81 MG/1
81 TABLET ORAL DAILY
Status: DISCONTINUED | OUTPATIENT
Start: 2020-12-23 | End: 2020-12-28 | Stop reason: HOSPADM

## 2020-12-23 RX ORDER — ASPIRIN 81 MG/1
324 TABLET, CHEWABLE ORAL ONCE
Status: COMPLETED | OUTPATIENT
Start: 2020-12-23 | End: 2020-12-23

## 2020-12-23 RX ORDER — LORAZEPAM 2 MG/ML
0.5 INJECTION INTRAMUSCULAR ONCE
Status: COMPLETED | OUTPATIENT
Start: 2020-12-23 | End: 2020-12-23

## 2020-12-23 RX ADMIN — ENOXAPARIN SODIUM 40 MG: 40 INJECTION SUBCUTANEOUS at 10:42

## 2020-12-23 RX ADMIN — SODIUM CHLORIDE: 9 INJECTION, SOLUTION INTRAVENOUS at 21:51

## 2020-12-23 RX ADMIN — OXYCODONE HYDROCHLORIDE AND ACETAMINOPHEN 1 TABLET: 5; 325 TABLET ORAL at 01:50

## 2020-12-23 RX ADMIN — LORAZEPAM 0.5 MG: 2 INJECTION INTRAMUSCULAR at 09:24

## 2020-12-23 RX ADMIN — CLOPIDOGREL BISULFATE 75 MG: 75 TABLET, FILM COATED ORAL at 12:11

## 2020-12-23 RX ADMIN — Medication 10 ML: at 21:45

## 2020-12-23 RX ADMIN — LORAZEPAM 0.5 MG: 2 INJECTION INTRAMUSCULAR; INTRAVENOUS at 09:24

## 2020-12-23 RX ADMIN — ASPIRIN 81 MG: 81 TABLET, COATED ORAL at 10:42

## 2020-12-23 RX ADMIN — ATORVASTATIN CALCIUM 20 MG: 20 TABLET, FILM COATED ORAL at 21:45

## 2020-12-23 RX ADMIN — Medication 10 ML: at 10:42

## 2020-12-23 RX ADMIN — IOPAMIDOL 100 ML: 612 INJECTION, SOLUTION INTRAVENOUS at 00:45

## 2020-12-23 RX ADMIN — ASPIRIN 324 MG: 81 TABLET, CHEWABLE ORAL at 01:50

## 2020-12-23 RX ADMIN — SODIUM CHLORIDE: 9 INJECTION, SOLUTION INTRAVENOUS at 05:07

## 2020-12-23 SDOH — HEALTH STABILITY: PHYSICAL HEALTH: ON AVERAGE, HOW MANY DAYS PER WEEK DO YOU ENGAGE IN MODERATE TO STRENUOUS EXERCISE (LIKE A BRISK WALK)?: 0 DAYS

## 2020-12-23 SDOH — HEALTH STABILITY: PHYSICAL HEALTH: ON AVERAGE, HOW MANY MINUTES DO YOU ENGAGE IN EXERCISE AT THIS LEVEL?: 0 MIN

## 2020-12-23 SDOH — HEALTH STABILITY: MENTAL HEALTH
STRESS IS WHEN SOMEONE FEELS TENSE, NERVOUS, ANXIOUS, OR CAN'T SLEEP AT NIGHT BECAUSE THEIR MIND IS TROUBLED. HOW STRESSED ARE YOU?: TO SOME EXTENT

## 2020-12-23 ASSESSMENT — ENCOUNTER SYMPTOMS
WHEEZING: 0
SHORTNESS OF BREATH: 1
VOMITING: 0
EYE REDNESS: 0
ABDOMINAL PAIN: 0
EYE ITCHING: 1
BACK PAIN: 1
STRIDOR: 0
CONSTIPATION: 0
SORE THROAT: 0
COUGH: 1
COLOR CHANGE: 1
PHOTOPHOBIA: 0
FACIAL SWELLING: 0
EYE PAIN: 1
EYE DISCHARGE: 0
PHOTOPHOBIA: 1
SINUS PRESSURE: 0
APNEA: 0
COLOR CHANGE: 0
DIARRHEA: 1
BACK PAIN: 0
VOICE CHANGE: 0
CHOKING: 0
TROUBLE SWALLOWING: 0
CHEST TIGHTNESS: 0
RHINORRHEA: 0
SHORTNESS OF BREATH: 0
NAUSEA: 0

## 2020-12-23 ASSESSMENT — PAIN SCALES - GENERAL
PAINLEVEL_OUTOF10: 0
PAINLEVEL_OUTOF10: 0
PAINLEVEL_OUTOF10: 10
PAINLEVEL_OUTOF10: 0

## 2020-12-23 NOTE — PROGRESS NOTES
Physician Progress Note      Marina Newby  CSN #:                  [id number]  :                       1970  ADMIT DATE:       2020 10:44 PM  [address] [place] DATE:  RESPONDING  PROVIDER #:        Peter Leija MD          QUERY TEXT:    Pt admitted with left thalamic stroke. Pt noted to have AMS/confusion/lethagy. If possible, please document in the progress notes and discharge summary if   you are evaluating and / or treating any of the following: The medical record reflects the following:  Risk Factors: CVA  Clinical Indicators: H&P-Pt is sleepy but arousable    Dr. Teresa Gillespie is alert, disoriented  Nursing -Pt AAO to self only, Lethargic but responds to voice and follows   commands, baseline AAO x4 per boyfriend  CT head-Interval development of acute/subacute left thalamic infarct. MRI   brain-There is a 7 x 15 mm area of subacute ischemia in the left thalamus. Treatment: Plavix, ASA, Lipitor, CT head, MRI brain, neurology consult,   monitoring    Thank  you, Rafa Laurent RN BSN [facility]  [phone]  Options provided:  -- Encephalopathy due to stroke  -- Encephalopathy due to##Please specify, Please specify  -- Other - I will add my own diagnosis  -- Disagree - Not applicable / Not valid  -- Disagree - Clinically unable to determine / Unknown  -- Refer to Clinical Documentation Reviewer    PROVIDER RESPONSE TEXT:    This patient has encephalopathy due to stroke.     Query created by: Onofre Serrano on 2020 1:51 PM      Electronically signed by:  Peter Leija MD 2020 1:54 PM

## 2020-12-23 NOTE — PROGRESS NOTES
[facility]   Facility/Department: [address] [place] [address] [address]  Speech Language Pathology    Ej Sorenson  1970  [id number]    Date: 12/23/2020      Speech Therapy attempted to see Ej Sorenson on this date for a/an:    Clinical Bedside Swallow Evaluation    Pt was unable to be seen due to:   Patient is currently off unit: MRI        Electronically signed by Adeline Austin SLP on 12/23/20 at 9:05 AM EST

## 2020-12-23 NOTE — PROGRESS NOTES
[facility] Occupational Therapy      Date: 2020  Patient Name: Phoebe Pascal        MRN: [id number]  Account: [de-identified]   : 1970  (52 y.o.)  Room: [place] [id number]    Chart reviewed, attempted OT at [id number] for eval. Patient not seen 2° to:    Pt. off floor for test/procedure    Spoke to PARAS [place] RN aware. Will attempt again when able.     Electronically signed by JODEE Gonzales on 2020 at 8:59 AM

## 2020-12-23 NOTE — PLAN OF CARE
Problem: Skin Integrity:  Goal: Will show no infection signs and symptoms  Description: Will show no infection signs and symptoms  Outcome: Ongoing  Goal: Absence of new skin breakdown  Description: Absence of new skin breakdown  Outcome: Ongoing     Problem: Falls - Risk of:  Goal: Will remain free from falls  Description: Will remain free from falls  Outcome: Ongoing  Goal: Absence of physical injury  Description: Absence of physical injury  Outcome: Ongoing     Problem: HEMODYNAMIC STATUS  Goal: Patient has stable vital signs and fluid balance  Outcome: Ongoing     Problem: ACTIVITY INTOLERANCE/IMPAIRED MOBILITY  Goal: Mobility/activity is maintained at optimum level for patient  Outcome: Ongoing     Problem: COMMUNICATION IMPAIRMENT  Goal: Ability to express needs and understand communication  Outcome: Ongoing

## 2020-12-23 NOTE — CONSULTS
The major intracranial arterial structures are patent without high-grade stenosis, large vessel cut off, or aneurysm. EXAMINATION: CTA HEAD W WO CONTRAST, CTA NECK W WO CONTRAST DATE AND TIME:12/23/2020 12:42 AM CLINICAL HISTORY: Stroke symptoms   cva  COMPARISON: None TECHNIQUE: Helical CTA of the neck was performed from the aortic arch to the foramen magnum following the uneventful intravenous administration of 100 cc of nonionic contrast without incident. 2-D images were reconstructed in the sagittal and coronal planes. Three Dimensional Maximum Intensity Projection (3D-MIP) images were created. All images were reviewed and primarily archived to PACS workstation. All CT scans at this facility use dose modulation, iterative reconstruction, and/or weight based dosing when appropriate to reduce radiation dose to as low as reasonably achievable. NASCET Criteria were utilized FINDINGS CTA NECK: Aortic Arch: The visualized portions of the aortic arch and proximal arch vessels demonstrates no focal stenosis aneurysm or dissection. Carotids: The common carotid arteries, carotid bifurcations, internal and external carotid arteries are normal in course and caliber. Right  Proximal Internal Carotid Stenosis (% by NASCET Criteria): There is no hemodynamically significant stenosis. Left  Proximal Internal Carotid Stenosis (% by NASCET Criteria): There is no hemodynamically significant stenosis. Vertebral Arteries: Patency: The vertebral arteries are well visualized to the level of the basilar artery. There is no focal stenosis aneurysm or dissection. Vertebral arteries are codominant. IMPRESSION: Negative CTA of the neck.      Ct Head   12/23/2020 CT Brain Contrast medium:  Not utilized. History:  Right facial droop. Altered mental status Comparison:  August 21, 2020 Findings: Extra-axial spaces:  Normal. Intracranial hemorrhage:  None. Ventricular system: In the interval, an ill-defined 4 x 12 mm area decreased attenuation exerting no mass effect is found medially within the left thalamus (series 2, image 16). Basal Cisterns:  Normal. Cerebral Parenchyma:  Normal. Midline Shift:  None. Cerebellum:  Normal. Paranasal sinuses and mastoid air cells:  Normal. Visualized Orbits:  Normal.     Impression: Interval development of acute/subacute left thalamic infarct. Cta Neck   12/23/2020   Intracranial ICAs: Flow is visualized within the precavernous, cavernous, clinoid and supraclinoid segments of the internal carotid arteries bilaterally    Anterior Cerebral Arteries: The bilaterals  A1 and A2 segments are patent. Middle Cerebral Arteries: Bilateral horizontal, insular, opercular, and cortical segments of the right and left middle cerebral cerebral arteries are patent. Vertebral Arteries And Basilar Artery: There is adequate flow in the intracranial portions of the vertebral arteries and in the basilar artery. Posterior Cerebral Arteries: Bilateral posterior cerebral arteries are patent. Aneurysm No aneurysm or dissection in the anterior or posterior circulations. . Neurocranium The visualized neurocranium is intact. Dural Sinus: As visualized the opacified dural venous sinuses are unremarkable.  12/23/2020     Lab Results   Component Value Date    WBC 10.4 12/22/2020    RBC 4.77 12/22/2020    HGB 15.7 12/22/2020    HCT 45.4 12/22/2020    MCV 95.1 12/22/2020    MCH 33.0 12/22/2020    MCHC 34.7 12/22/2020    RDW 13.5 12/22/2020     12/22/2020    MPV 9.6 09/20/2015     Lab Results   Component Value Date    VITD25 18.5 04/10/2019     Lab Results   Component Value Date    APPEARANCE CLOUDY 01/26/2013    COLORU Yellow 06/14/2020    NITRU Negative 06/14/2020    GLUCOSEU Negative 06/14/2020    KETUA Negative 06/14/2020    UROBILINOGEN 0.2 06/14/2020    BILIRUBINUR Negative 06/14/2020     Lab Results   Component Value Date    PROTIME 12.3 12/22/2020     Lab Results   Component Value Date    INR 0.9 12/22/2020         I discussed results with patient. Current Rehabilitation Assessments:    Rehabilitation:  Physical therapy: FIMS:  BedMobility:      Transfers: Sit to Stand: Minimal Assistance  Stand to sit: Minimal Assistance, Ambulation 1  Surface: level tile  Device: No Device  Assistance: 2 Person assistance, Moderate assistance  Quality of Gait: multiple lateral and posterior LOB during gait  Gait Deviations: Increased RIDDHI, Staggers, Shuffles, Deviated path, Decreased step length, Decreased step height  Distance: 30ft,      FIMS: ,  , Assessment: Pt exhibits poor safety, decreased orientation, lethargy, decreased strength, decreased balance, activity tolerance, sensation and currently requires increased assistance compared to PLOF. Continued therapy is indicated. Occupational therapy: FIMS:   ,  , Assessment: Pt. is a 52year old woman from home alone who presents to [address] [place] [address] with the above deficits which impact her ability to perform ADLs and IADLs.  Pt. would benefit from continued OT to maximize independence and safety with transfers and mobility      ADL  Feeding: Setup (12/23/20 1040)  Grooming: Minimal assistance (12/23/20 1040)  UE Bathing: Maximum assistance (12/23/20 1040) LE Bathing: Dependent/Total (12/23/20 1040)  UE Dressing: Dependent/Total (12/23/20 1040)  LE Dressing: Dependent/Total (12/23/20 1040)  Toileting: Maximum assistance (12/23/20 1040)  Additional Comments: Simulated ADLs as above Pt with functional movement of LUE but decreased sequencing and safety awareness. Also limited by shoulder immobilizer (12/23/20 1040)  OCCUPATIONAL THERAPY  Hand Dominance: Right(Pt. somewhat inconsistent with answers; this may not be accurate)  ADL  Feeding: Setup (12/23/20 1040)  Grooming: Minimal assistance (12/23/20 1040)  UE Bathing: Maximum assistance (12/23/20 1040)  LE Bathing: Dependent/Total (12/23/20 1040)  UE Dressing: Dependent/Total (12/23/20 1040)  LE Dressing: Dependent/Total (12/23/20 1040)  Toileting: Maximum assistance (12/23/20 1040)  Additional Comments: Simulated ADLs as above Pt with functional movement of LUE but decreased sequencing and safety awareness.  Also limited by shoulder immobilizer (12/23/20 1040)  Toilet Transfers  Toilet - Technique: Ambulating (12/23/20 1045)  Equipment Used: Grab bars (12/23/20 1045)  Toilet Transfer: Contact guard assistance;2 Person assistance (12/23/20 1045)  Toilet Transfers Comments: Verbal cues for safety and sequencing, tactile cues for LUE hand placement (12/23/20 1045)            LTG:                 Speech therapy: FIMS:          Past Medical History:          Diagnosis Date    Anxiety     Back pain     back surgery x 4    Bipolar disorder ([facility] [address]     CAD (coronary artery disease)     [facility]FL [facility]chronic airflow limitation) ([facility] 11/3/2016    Chronic bronchitis ([facility])     Chronic pain     Colitis     Complicated migraine     DDD (degenerative disc disease), lumbar 12/22/2016    Depression     Endometriosis     Excessive caffeine abuse, continuous ([facility]) 7/6/2018    Gastritis     GERD (gastroesophageal reflux disease)     History of myocardial infarction     HTN (hypertension), benign 2/19/2016  Impaired mobility and activities of daily living     Over weight     PTSD (post-traumatic stress disorder)     Sensory neuropathy 2016    Somatization disorder     TIA (transient ischemic attack)     Tobacco abuse     Vasospastic angina ([facility] [address] 2016         PastSurgical History:          Procedure Laterality Date    BACK SURGERY      x2     SECTION      x2    CHOLECYSTECTOMY  13    Lapchole    COLONOSCOPY N/A 2020    COLONOSCOPY DIAGNOSTIC performed by Demar Jackson MD at [address] [place] [address]  3/7/16    Dr. Honorio Fleming ENDOSCOPY  2014    ANIBAL HOUSE M.D.   [facility] [place] UPPER GASTROINTESTINAL ENDOSCOPY N/A 2020    EGD ESOPHAGOGASTRODUODENOSCOPY performed by Demar Jackson MD at [facility]         Allergies:      Allergies   Allergen Reactions    Latex Itching     Pt reports itching on hands after using rubber gloves at work    Influenza Vaccines Swelling     Pt reports her entire arm was red and edematous post vaccine    Eggs Or Egg-Derived [employer] based products only    Gabapentin Nausea Only    Pneumococcal Vaccines Hives    Povidone Iodine Itching    Varicella Virus Vaccine Live Hives        CurrentMedications:     Current Facility-Administered Medications: sodium chloride flush 0.9 % injection 10 mL, 10 mL, Intravenous, 2 times per day  sodium chloride flush 0.9 % injection 10 mL, 10 mL, Intravenous, PRN  promethazine (PHENERGAN) tablet 12.5 mg, 12.5 mg, Oral, Q6H PRN **OR** ondansetron (ZOFRAN) injection 4 mg, 4 mg, Intravenous, Q6H PRN  polyethylene glycol (GLYCOLAX) packet 17 g, 17 g, Oral, Daily PRN  enoxaparin (LOVENOX) injection 40 mg, 40 mg, Subcutaneous, Daily  aspirin EC tablet 81 mg, 81 mg, Oral, Daily **OR** aspirin suppository 300 mg, 300 mg, Rectal, Daily 0.9 % sodium chloride infusion, , Intravenous, Continuous  atorvastatin (LIPITOR) tablet 20 mg, 20 mg, Oral, Nightly  hydrALAZINE (APRESOLINE) injection 5 mg, 5 mg, Intravenous, Q6H PRN  clopidogrel (PLAVIX) tablet 75 mg, 75 mg, Oral, Daily  hydrALAZINE (APRESOLINE) injection 10 mg, 10 mg, Intravenous, Once      Social History:    Social History     Socioeconomic History    Marital status:      Spouse name: Not on file    Number of children: Not on file    Years of education: Not on file    Highest education level: Not on file   Occupational History    Occupation: unemployed   Social Needs    Financial resource strain: Not on file    Food insecurity     Worry: Not on file     Inability: Not on file   [employer] needs     Medical: Not on file     Non-medical: Not on file   Tobacco Use    Smoking status: Current Every Day Smoker     Packs/day: 1.00     Years: 17.00     Pack years: 17.00     Types: Cigarettes    Smokeless tobacco: Never Used   Substance and Sexual Activity    Alcohol use: No    Drug use: No    Sexual activity: Not Currently     Partners: Male   Lifestyle    Physical activity     Days per week: 0 days     Minutes per session: 0 min    Stress:  To some extent   Relationships    Social connections     Talks on phone: Not on file     Gets together: Not on file     Attends [employer]al service: Not on file     Active member of club or organization: Not on file     Attends meetings of clubs or organizations: Not on file     Relationship status: Not on file    Intimate partner violence     Fear of current or ex partner: Not on file     Emotionally abused: Not on file     Physically abused: Not on file     Forced sexual activity: Not on file   Other Topics Concern    Not on file   Social History Narrative Charlene Fernandes is a 53-year-old female who is on disability because of pain and bipolar disorder. She's also had a presumed diagnosis of possible multiple sclerosis. She's been seeing Dr. Asiya Flanagan for that and she says that the diagnosis is sometimes in question and it's clear neuropathy vitamin B12 deficiency as well as generalized bodyaches from the severe vitamin D deficiency have caused this scenario that looks like multiple sclerosis. lives With: Significant other    Type of Home: House [address] in [facility] Access: Stairs to enter with rails  - Number of Steps: 5    Bathroom Shower/Tub: Tub only    ADL Assistance: Independent    Homemaking Assistance: Independent, Homemaking Responsibilities: Yes, Meal Prep Responsibility: Primary    Laundry Responsibility: Primary, Cleaning Responsibility: Primary    Bill Paying/Finance Responsibility: Primary    Shopping Responsibility: Primary, Ambulation Assistance: Independent, Transfer Assistance: Independent    Active : Yes    Occupation: Full time employment--Type of occupation: disabled-  - in charges of [employer]          Family History:         Problem Relation Age of Onset    High Blood Pressure Mother     Kidney Disease Mother     Lupus Mother     Coronary Art Dis Mother         Had stents in her 62s    Bipolar Disorder Son        Review of Systems:    Review of Systems   Constitutional: Positive for activity change and fatigue. Negative for appetite change, chills, diaphoresis, fever and unexpected weight change. HENT: Positive for dental problem and hearing loss. Negative for congestion, drooling, ear discharge, ear pain, facial swelling, mouth sores, nosebleeds, postnasal drip, rhinorrhea, sinus pressure, sneezing, sore throat, tinnitus, trouble swallowing and voice change. Eyes: Positive for photophobia, pain, itching and visual disturbance. Negative for discharge and redness. Respiratory: Positive for cough and shortness of breath. Negative for apnea, chest tightness, wheezing and stridor. Cardiovascular: Positive for chest pain and palpitations. Negative for leg swelling. Gastrointestinal: Positive for diarrhea. Negative for abdominal pain, constipation, nausea and vomiting. Endocrine: Positive for cold intolerance and heat intolerance. Genitourinary: Positive for pelvic pain. Negative for dysuria. Musculoskeletal: Positive for arthralgias, back pain, joint swelling, myalgias, neck pain and neck stiffness. Skin: Positive for color change. Negative for pallor, rash and wound. Allergic/Immunologic: Positive for environmental allergies. Neurological: Positive for dizziness, tremors, speech difficulty, weakness, light-headedness, numbness and headaches. Negative for seizures and syncope. Hematological: Positive for adenopathy. Bruises/bleeds easily. Psychiatric/Behavioral: Positive for agitation, dysphoric mood and sleep disturbance. Negative for behavioral problems, confusion, hallucinations, self-injury and suicidal ideas. The patient is nervous/anxious. The patient is not hyperactive. Physical Exam:    BP (!) 159/78   Pulse 72   Temp 98.4 °F (36.9 °C) (Oral)   Resp 16   Ht 5' 7\" (1.702 m)   Wt 192 lb (87.1 kg)   SpO2 100%   BMI 30.07 kg/m²      Physical Exam  Vitals signs reviewed. Constitutional:       General: She is not in acute distress. Appearance: She is well-developed. She is not ill-appearing, toxic-appearing or diaphoretic. Comments:         HENT:      Head: Normocephalic and atraumatic. Right Ear: Hearing normal.      Left Ear: Hearing normal.      Nose: Nose normal.      Mouth/Throat:      Mouth: No oral lesions. Dentition: Normal dentition. Pharynx: No oropharyngeal exudate. Eyes:      General: No scleral icterus. Right eye: No discharge. Left eye: No discharge. Conjunctiva/sclera: Conjunctivae normal.      Right eye: No chemosis or exudate. Left eye: No chemosis or exudate. Pupils: Pupils are equal, round, and reactive to light. Neck:      Musculoskeletal: Normal range of motion and neck supple. No edema or neck rigidity. Thyroid: No thyromegaly. Vascular: No JVD. Trachea: No tracheal deviation. Cardiovascular:      Pulses: No decreased pulses. Pulmonary:      Effort: Pulmonary effort is normal. No tachypnea, bradypnea, accessory muscle usage or respiratory distress. Breath sounds: Decreased breath sounds present. No wheezing. Chest:      Chest wall: No tenderness. Abdominal:      General: Bowel sounds are normal. There is no distension. Palpations: Abdomen is soft. There is no mass. Tenderness: There is no abdominal tenderness. There is no guarding or rebound. Musculoskeletal:         General: Tenderness present. Right shoulder: She exhibits decreased range of motion, tenderness and bony tenderness. Left shoulder: She exhibits decreased range of motion, tenderness, bony tenderness, pain and spasm. Right elbow: Normal.     Left elbow: Normal.      Right wrist: Normal.      Left wrist: Normal.      Right hip: Normal.      Left hip: Normal.      Right knee: Normal.      Left knee: Normal.      Right ankle: She exhibits decreased range of motion. Achilles tendon normal.      Left ankle: Normal. Achilles tendon normal.      Cervical back: She exhibits decreased range of motion, tenderness, bony tenderness and swelling. Thoracic back: Normal.      Lumbar back: She exhibits decreased range of motion, tenderness, bony tenderness and pain. She exhibits no swelling, no edema, no deformity, no laceration and normal pulse.         Back:       Right upper arm: Normal.      Left upper arm: Normal.      Right forearm: Normal.      Left forearm: Normal.        Arms: Right hand: She exhibits decreased range of motion, tenderness and bony tenderness. Left hand: She exhibits decreased range of motion, tenderness and bony tenderness. Right upper leg: Normal.      Left upper leg: Normal.      Right lower leg: Normal.      Left lower leg: Normal.        Legs:       Right foot: Decreased range of motion. Tenderness and bony tenderness present. Left foot: Decreased range of motion. Tenderness and bony tenderness present. Comments: Tender areas are indicated by numbered spot         Skin:     General: Skin is warm and dry. Coloration: Skin is not pale. Findings: No abrasion, bruising, ecchymosis, erythema, laceration, petechiae or rash. Rash is not macular, pustular or urticarial.      Nails: There is no clubbing. Neurological:      Mental Status: She is alert and oriented to person, place, and time. Cranial Nerves: No cranial nerve deficit. Sensory: Sensory deficit present. Motor: No tremor, atrophy or abnormal muscle tone. Coordination: Coordination normal.      Deep Tendon Reflexes: Reflexes abnormal. Babinski sign absent on the right side. Babinski sign absent on the left side. Reflex Scores:       Achilles reflexes are 0 on the right side and 0 on the left side. Psychiatric:         Attention and Perception: She is attentive. Mood and Affect: Mood is depressed. Mood is not anxious. Affect is not labile, blunt, angry or inappropriate. Speech: She is communicative. Speech is slurred. Speech is not rapid and pressured, delayed or tangential.         Behavior: Behavior normal. Behavior is not agitated, slowed, aggressive, withdrawn, hyperactive or combative. Thought Content: Thought content normal. Thought content is not paranoid or delusional. Thought content does not include homicidal or suicidal ideation. Thought content does not include homicidal or suicidal plan. Cognition and Memory: Memory is not impaired. She does not exhibit impaired recent memory or impaired remote memory. Judgment: Judgment normal. Judgment is not impulsive or inappropriate. Ortho Exam  Neurologic Exam     Mental Status   Oriented to person, place, and time. Attention: decreased. Concentration: decreased. Speech: slurred   Level of consciousness: drowsy  Knowledge: inconsistent with education. Able to name object. Unable to read. Able to repeat. Unable to write. Abnormal comprehension. Cranial Nerves     CN III, IV, VI   Pupils are equal, round, and reactive to light.     Gait, Coordination, and Reflexes     Reflexes   Right achilles: 0  Left achilles: 0            Diagnostics:    Recent Results (from the past 24 hour(s))   POCT Glucose    Collection Time: 12/22/20 10:49 PM   Result Value Ref Range    POC Glucose 246 (H) 60 - 115 mg/dl    Performed on ACCU-CHEK    Comprehensive Metabolic Panel    Collection Time: 12/22/20 11:00 PM   Result Value Ref Range    Sodium 135 135 - 144 mEq/L    Potassium 3.7 3.4 - 4.9 mEq/L    Chloride 100 95 - 107 mEq/L    CO2 24 20 - 31 mEq/L    Anion Gap 11 9 - 15 mEq/L    Glucose 249 (H) 70 - 99 mg/dL    BUN 9 6 - 20 mg/dL    CREATININE 0.78 0.50 - 0.90 mg/dL    GFR Non-African American >60.0 >60    GFR  >60.0 >60    Calcium 9.6 8.5 - 9.9 mg/dL    Total Protein 6.7 6.3 - 8.0 g/dL    Alb 4.6 3.5 - 4.6 g/dL    Total Bilirubin 0.5 0.2 - 0.7 mg/dL    Alkaline Phosphatase 160 (H) 40 - 130 U/L     (H) 0 - 33 U/L    AST 73 (H) 0 - 35 U/L    Globulin 2.1 (L) 2.3 - 3.5 g/dL   CBC Auto Differential    Collection Time: 12/22/20 11:00 PM   Result Value Ref Range    WBC 10.4 4.8 - 10.8 K/uL    RBC 4.77 4.20 - 5.40 M/uL    Hemoglobin 15.7 12.0 - 16.0 g/dL    Hematocrit 45.4 37.0 - 47.0 %    MCV 95.1 82.0 - 100.0 fL    MCH 33.0 (H) 27.0 - 31.3 pg    MCHC 34.7 33.0 - 37.0 %    RDW 13.5 11.5 - 14.5 %    Platelets 089 212 - 714 K/uL Neutrophils % 69.6 %    Lymphocytes % 21.1 %    Monocytes % 7.5 %    Eosinophils % 1.1 %    Basophils % 0.7 %    Neutrophils Absolute 7.2 (H) 1.4 - 6.5 K/uL    Lymphocytes Absolute 2.2 1.0 - 4.8 K/uL    Monocytes Absolute 0.8 0.2 - 0.8 K/uL    Eosinophils Absolute 0.1 0.0 - 0.7 K/uL    Basophils Absolute 0.1 0.0 - 0.2 K/uL   Magnesium    Collection Time: 12/22/20 11:00 PM   Result Value Ref Range    Magnesium 1.9 1.7 - 2.4 mg/dL   Protime-INR    Collection Time: 12/22/20 11:00 PM   Result Value Ref Range    Protime 12.3 12.3 - 14.9 sec    INR 0.9    APTT    Collection Time: 12/22/20 11:00 PM   Result Value Ref Range    aPTT 31.0 24.4 - 36.8 sec   Troponin    Collection Time: 12/22/20 11:00 PM   Result Value Ref Range    Troponin <0.010 0.000 - 0.010 ng/mL   Ethanol    Collection Time: 12/22/20 11:00 PM   Result Value Ref Range    Ethanol Lvl <10 mg/dL    Ethanol percent Not indicated G/dL   EKG 12 Lead - Chest Pain    Collection Time: 12/22/20 11:24 PM   Result Value Ref Range    Ventricular Rate 76 BPM    Atrial Rate 76 BPM    P-R Interval 172 ms    QRS Duration 96 ms    Q-T Interval 408 ms    QTc Calculation (Bazett) 459 ms    P Axis 54 degrees    R Axis 48 degrees    T Axis 82 degrees   POCT Venous    Collection Time: 12/22/20 11:50 PM   Result Value Ref Range    POC Creatinine 0.7 0.6 - 1.1 mg/dL    GFR Non-African American >60 >60    GFR  >60 >60    Sample Type OLIVER     Performed on SEE BELOW    POCT CREATININE    Collection Time: 12/22/20 11:56 PM   Result Value Ref Range    POC CREATININE WHOLE BLOOD 0.78    PAGG ASP    Collection Time: 12/23/20 12:04 AM   Result Value Ref Range    Platelet Aggregation 562 (H) 350 - 549 ARU   COVID-19    Collection Time: 12/23/20  2:48 AM   Result Value Ref Range    SARS-CoV-2, NAAT Not Detected Not Detected   Comprehensive Metabolic Panel w/ Reflex to MG    Collection Time: 12/23/20  5:07 AM   Result Value Ref Range    Sodium 137 135 - 144 mEq/L Potassium reflex Magnesium 3.4 3.4 - 4.9 mEq/L    Chloride 101 95 - 107 mEq/L    CO2 24 20 - 31 mEq/L    Anion Gap 12 9 - 15 mEq/L    Glucose 167 (H) 70 - 99 mg/dL    BUN 9 6 - 20 mg/dL    CREATININE 0.74 0.50 - 0.90 mg/dL    GFR Non-African American >60.0 >60    GFR  >60.0 >60    Calcium 9.1 8.5 - 9.9 mg/dL    Total Protein 6.3 6.3 - 8.0 g/dL    Alb 3.8 3.5 - 4.6 g/dL    Total Bilirubin 0.4 0.2 - 0.7 mg/dL    Alkaline Phosphatase 138 (H) 40 - 130 U/L     (H) 0 - 33 U/L    AST 57 (H) 0 - 35 U/L    Globulin 2.5 2.3 - 3.5 g/dL   Magnesium    Collection Time: 12/23/20  5:07 AM   Result Value Ref Range    Magnesium 2.0 1.7 - 2.4 mg/dL   HOMOCYSTEINE, SERUM    Collection Time: 12/23/20 11:44 AM   Result Value Ref Range    Homocysteine 25.8 (H) 0.0 - 15.0 umol/L              Impression:    1. Impaired mobility and ADLs due to  Left thalamic stroke which was already apparent on the CT scan and an MRI. 2. Fatigue and Malaise      Complex Active General Medical Issues thatcomplicate care Assess & Plan:     1. Principal Problem:    Stroke determined by clinical assessment [facility])  Active Problems:    Bipolar 1 disorder ([facility] [address]    CAD (coronary artery disease)    HTN (hypertension)    Vitamin B12 deficiency    Neuromyelopathy due to vitamin B12 deficiency (HCC)    Sleep apnea    Obesity (BMI 30-39. 9)    Right arm weakness    COPD exacerbation (HCC)  Resolved Problems:    * No resolved hospital problems. *            Recommendations:    1. Considering all of the factors aboveincluding the patient's current medical status, social status/home envirnment, their functional needs and their ability to participate in a therapy program, I feel that they would best be served at a acute   Rehabilitationlevel of care. It is my opinion that they will be able to tolerate and benefit from 3 hours of therapy a day. I reviewed the varous local options re these levels of care with the patient and family. 2. Nursing care to focus on bowel and bladder issues. 3. Vitamin B12 shot times one  4. Improve hydration and Nutrition by adding Proteinex and push PO fluids       It was my pleasure to evaluate Queen Ophelia today. Please call [phone] with questions.     Slick Bey, DO

## 2020-12-23 NOTE — PROGRESS NOTES
Spoke with boyfriend, Анна Finney. He states that patient was confused and not making any sense today so he brought her to the emergency room. States she is normally alert and oriented x4. States she does not take any of her meds, sometimes takes her psych meds/xanax as needed. MRI form completed.

## 2020-12-23 NOTE — DISCHARGE INSTR - COC
Continuity of Care Form    Patient Name: Brice Tobin   :  1970  MRN:  [id number]    Admit date:  2020  Discharge date:  ***    Code Status Order: Full Code   Advance Directives:   Advance Care Flowsheet Documentation       Date/Time Healthcare Directive Type of Healthcare Directive Copy in [address] Agent's Name Healthcare Agent's Phone Number    [phone]  No, patient does not have an advance directive for healthcare treatment -- -- -- -- --            Admitting Physician:  Marty Quigley DO  PCP: Shelli Yu MD    Discharging Nurse: [facility] Unit/Room#: W655/T453-24  Discharging Unit Phone Number: ***    Emergency Contact:   Extended Emergency Contact Information  Primary Emergency Contact: Mo Lancaster [address] Phone: [phone]  Relation: Child  Secondary Emergency Contact: [address] Phone: [phone]  Relation: Other    Past Surgical History:  Past Surgical History:   Procedure Laterality Date    BACK SURGERY      x2     SECTION      x2    CHOLECYSTECTOMY  13    Lapchole    COLONOSCOPY N/A 2020    COLONOSCOPY DIAGNOSTIC performed by Demar Jackson MD at [facility] [address]  3/7/16    Dr. Brigido Goldberg ENDOSCOPY  2014    ANIBAL HOUSE M.D.     UPPER GASTROINTESTINAL ENDOSCOPY N/A 2020    EGD ESOPHAGOGASTRODUODENOSCOPY performed by Demar Jackson MD at [facility]       Immunization History:   Immunization History   Administered Date(s) Administered    Influenza Vaccine, unspecified formulation 2011    Pneumococcal Polysaccharide ([id number]) 2011       Active Problems:  Patient Active Problem List   Diagnosis Code    H/O cardiac arrest Z86.74    [facility] ([facility] F31.9 Tobacco abuse Z72.0    SAMAYOA (dyspnea on exertion) R06.00    Abnormal ECG R94.31    CAD (coronary artery disease) I25.10    Displacement of lumbar intervertebral disc without myelopathy M51.26    Acid reflux K21.9    HTN (hypertension) I10    Multiple sclerosis (HCC) G35    Colitis K52.9    Over weight E66.3    Anxiety F41.9    Chronic pain syndrome  G89.4    Vitamin B12 deficiency E53.8    Grief at loss of child F43.21, Z63.4    Smoking F17.200    Neuromyelopathy due to vitamin B12 deficiency (HCC) E53.8, G32.0    Chronic obstructive pulmonary disease (HCC) J44.9    Acute bronchitis J20.9    Sleep apnea G47.30    Obesity (BMI 30-39. 9) E66.9    Stroke determined by clinical assessment ([facility] [facility] I63.9    Abnormal auditory perception of left ear H93.292    Dizziness and giddiness R42    Sensorineural hearing loss (SNHL) of both ears H90.3    Tinnitus, bilateral H93.13    Vertigo, peripheral, bilateral H81.393    Right arm weakness R29.898    COPD exacerbation (HCC) J44.1    Chest pain R07.9    Adenomatous polyp of colon D12.6    Adenomatous polyp of sigmoid colon D12.5    Melena K92.1    Tendinitis of right rotator cuff M75.81       Isolation/Infection:   Isolation            No Isolation          Patient Infection Status       Infection Onset Added Last Indicated Last Indicated By Review Planned Expiration Resolved Resolved By    None active    Resolved    COVID-19 Rule Out 12/23/20 12/23/20 12/23/20 COVID-19 (Ordered)   12/23/20 Rule-Out Test Resulted            Nurse Assessment:  Last Vital Signs: BP (!) 159/78   Pulse 72   Temp 98.4 °F (36.9 °C) (Oral)   Resp 16   Ht 5' 7\" (1.702 m)   Wt 192 lb (87.1 kg)   SpO2 100%   BMI 30.07 kg/m²     Last documented pain score (0-10 scale): Pain Level: 0  Last Weight:   Wt Readings from Last 1 Encounters:   12/22/20 192 lb (87.1 kg)     Mental Status:  {IP PT MENTAL STATUS:20030}    IV Access:  [facility] IV ACCESS:[id number]}    Nursing Mobility/ADLs:

## 2020-12-23 NOTE — ED PROVIDER NOTES
PAST MEDICAL HISTORY     Past Medical History:   Diagnosis Date    Anxiety     Back pain     back surgery x 4    Bipolar disorder ([facility]     CAD (coronary artery disease)     CAFL [facility]chronic airflow limitation) ([facility] 11/3/2016    Chronic bronchitis ([facility])     Chronic pain     Colitis     Complicated migraine     DDD (degenerative disc disease), lumbar 2016    Depression     Endometriosis     Excessive caffeine abuse, continuous ([facility]) 2018    Gastritis     GERD (gastroesophageal reflux disease)     History of myocardial infarction     HTN (hypertension), benign 2016    Impaired mobility and activities of daily living     Over weight     PTSD (post-traumatic stress disorder)     Sensory neuropathy 2016    Somatization disorder     TIA (transient ischemic attack)     Tobacco abuse     [facility]         SURGICALHISTORY       Past Surgical History:   Procedure Laterality Date    BACK SURGERY      x2     SECTION      x2    CHOLECYSTECTOMY  13    Lapchole    COLONOSCOPY N/A 2020    COLONOSCOPY DIAGNOSTIC performed by Demar Jackson MD at [address] [place] [address]  3/7/16    Dr. Jamie Hutchins Left     UPPER GASTROINTESTINAL ENDOSCOPY  2014    JORDAN HAMEEDin UPPER GASTROINTESTINAL ENDOSCOPY N/A 2020    EGD ESOPHAGOGASTRODUODENOSCOPY performed by Demar Jackson MD at [address] [place] [address]       Discharge Medication List as of 2020  7:19 PM      CONTINUE these medications which have NOT CHANGED    Details   prazosin (MINIPRESS) 1 MG capsule Take 1 mg by mouth nightlyHistorical Med      !! isosorbide mononitrate (IMDUR) 30 MG extended release tablet Take 30 mg by mouth dailyHistorical Med cariprazine hcl (VRAYLAR) 1.5 MG capsule Take 3 mg by mouth dailyHistorical Med      nitroGLYCERIN (NITROSTAT) 0.4 MG SL tablet up to max of 3 total doses. If no relief after 1 dose, call 911., Disp-25 tablet, R-3Normal      !! isosorbide mononitrate (IMDUR) 30 MG extended release tablet Take 1 tablet by mouth daily, Disp-30 tablet, R-3Normal      losartan (COZAAR) 100 MG tablet Take 1 tablet by mouth daily, Disp-30 tablet, R-3Normal      atorvastatin (LIPITOR) 10 MG tablet Take 1 tablet by mouth daily, Disp-30 tablet, R-5Normal      cyclobenzaprine (FLEXERIL) 10 MG tablet Take 1 tablet by mouth 2 times daily as needed for Muscle spasms, Disp-60 tablet, R-0Normal      !! albuterol sulfate HFA (PROVENTIL HFA) 108 (90 Base) MCG/ACT inhaler Inhale 2 puffs into the lungs every 6 hours as needed for Wheezing, Disp-1 Inhaler, R-3Normal      ALPRAZolam (XANAX) 0.5 MG tablet Take 0.5 mg by mouth 2 times daily. Historical Med      budesonide (PULMICORT) 0.5 MG/2ML nebulizer suspension INHALE 1 VIAL VIA NEBULIZER TWICE DAILY, Disp-120 mL, R-5Normal      aspirin 81 MG chewable tablet Take 1 tablet by mouth daily, Disp-30 tablet, R-5Normal      lithium 300 MG capsule Take 1 capsule by mouth daily, Disp-30 capsule, R-5Normal      pantoprazole (PROTONIX) 40 MG tablet Take 1 tablet by mouth every morning (before breakfast), Disp-30 tablet, R-11Normal      ipratropium-albuterol (DUONEB) 0.5-2.5 (3) MG/3ML SOLN nebulizer solution Inhale 3 mLs into the lungs three times daily, Disp-360 mL, R-5Normal      !! VENTOLIN  (90 Base) MCG/ACT inhaler Inhale 2 puffs into the lungs every 6 hours as needed for Wheezing, Disp-1 Inhaler, R-5, DAWNormal      butalbital-aspirin-caffeine (FIORINAL) -40 MG capsule Take 1 capsule by mouth every 4 hours as needed for Headaches for up to 10 days. , Disp-20 capsule,R-0Print      magnesium oxide (MAG-OX) 400 MG tablet Take 1 tablet by mouth daily, R-5Historical Med fluticasone (FLONASE) 50 MCG/ACT nasal spray 1 spray by NOT APPLICABLE route 2 times dailyHistorical Med      FREESTYLE LANCETS MISC Disp-100 each, R-10, Normal      BD INTEGRA SYRINGE 25G X 1\" 3 ML MISC Disp-25 each, R-5, Normal      FREESTYLE LITE strip USE DAILY AS DIRECTED, Disp-50 strip, R-11Normal       !! - Potential duplicate medications found. Please discuss with provider. ALLERGIES     Latex, Influenza vaccines, Eggs or egg-derived products, Gabapentin, Pneumococcal vaccines, Povidone iodine, and Varicella virus vaccine live    FAMILY HISTORY       Family History   Problem Relation Age of Onset    High Blood Pressure Mother     Kidney Disease Mother     Lupus Mother     Coronary Art Dis Mother         Had stents in her 62s   [place] Favorite Bipolar Disorder Son           SOCIAL HISTORY       Social History     Socioeconomic History    Marital status:      Spouse name: None    Number of children: None    Years of education: None    Highest education level: None   Occupational History    Occupation: unemployed   Social Needs    Financial resource strain: None    Food insecurity     Worry: None     Inability: None    Transportation needs     Medical: None     Non-medical: None   Tobacco Use    Smoking status: Current Every Day Smoker     Packs/day: 1.00     Years: 17.00     Pack years: 17.00     Types: Cigarettes    Smokeless tobacco: Never Used   Substance and Sexual Activity    Alcohol use: No    Drug use: No    Sexual activity: Not Currently     Partners: Male   Lifestyle    Physical activity     Days per week: 0 days     Minutes per session: 0 min    Stress:  To some extent   Relationships    Social connections     Talks on phone: None     Gets together: None     Attends Mandaeism service: None     Active member of club or organization: None     Attends meetings of clubs or organizations: None     Relationship status: None    Intimate partner violence Fear of current or ex partner: None     Emotionally abused: None     Physically abused: None     Forced sexual activity: None   Other Topics Concern    None   Social History Narrative    Shonda Arana is a 51-year-old female who is on disability because of pain and bipolar disorder. She's also had a presumed diagnosis of possible multiple sclerosis. She's been seeing Dr. Dorothy Guillen for that and she says that the diagnosis is sometimes in question and it's clear neuropathy vitamin B12 deficiency as well as generalized bodyaches from the severe vitamin D deficiency have caused this scenario that looks like multiple sclerosis. lives With: Significant other    Type of Home: House [facility] in [address] with rails  - Number of Steps: 5    Bathroom Shower/Tub: Tub only    ADL Assistance: Independent    Homemaking Assistance: Independent, Homemaking Responsibilities: Yes, Meal Prep Responsibility: Primary    Laundry Responsibility: Primary, Cleaning Responsibility: Primary    Bill Paying/Finance Responsibility: Primary    Shopping Responsibility: Primary, Ambulation Assistance: Independent, Transfer Assistance: Independent    Active : Yes    Occupation: Full time employment--Type of occupation: disabled-  - in charges of [facility] [address] Stroke Scale  NIH Stroke Scale Assessed: Yes  Interval: Reassessment  Level of Consciousness (1a. ): Alert  LOC Questions (1b. ):  Answers both correctly  LOC Commands (1c. ): Performs both tasks correctly  Best Gaze (2. ): Normal  Visual (3. ): No visual loss  Facial Palsy (4. ): Normal symmetrical movement  Motor Arm, Left (5a. ): No drift  Motor Arm, Right (5b. ): No drift(in immobilzer )  Motor Leg, Left (6a. ): No drift  Motor Leg, Right (6b. ): Drift, but does not hit bed  Limb Ataxia (7. ): Absent  Sensory (8. ): Normal  Best Language (9. ): No aphasia  Dysarthria (10. ): Normal Non-plain filmimages such as CT, Ultrasound and MRI are read by the radiologist. Plain radiographic images are visualized and preliminarily interpreted by the emergency physician with the below findings:    See prelim report for ct scan     Interpretation per the Radiologist below, if available at the time ofthis note:    RADIOLOGY REPORT   Final Result      MRI BRAIN WO CONTRAST   Final Result      Recent infarction involving the left thalamus, overall unchanged from MRI 12/23/2020. No new infarcts from the prior MRI. MRI BRAIN WO CONTRAST   Final Result   There is a 7 x 15 mm area of subacute ischemia in the left thalamus. CTA HEAD W WO CONTRAST   Final Result   The major intracranial arterial structures are patent without high-grade stenosis, large vessel cut off, or aneurysm. EXAMINATION: CTA HEAD W WO CONTRAST, CTA NECK W WO CONTRAST      DATE AND TIME:12/23/2020 12:42 AM      CLINICAL HISTORY: Stroke symptoms   cva        COMPARISON: None      TECHNIQUE: Helical CTA of the neck was performed from the aortic arch to the foramen magnum following the uneventful intravenous administration of 100 cc of nonionic contrast without incident. 2-D images were reconstructed in the sagittal and coronal    planes. Three Dimensional Maximum Intensity Projection (3D-MIP) images were created. All images were reviewed and primarily archived to PACS workstation. All CT scans at this facility use dose modulation, iterative reconstruction, and/or weight based    dosing when appropriate to reduce radiation dose to as low as reasonably achievable. NASCET Criteria were utilized      FINDINGS CTA NECK:      Aortic Arch: The visualized portions of the aortic arch and proximal arch vessels demonstrates no focal stenosis aneurysm or dissection. Carotids: The common carotid arteries, carotid bifurcations, internal and external carotid arteries are normal in course and caliber. Right  Proximal Internal Carotid Stenosis (% by NASCET Criteria): There is no hemodynamically significant stenosis. Left  Proximal Internal Carotid Stenosis (% by NASCET Criteria): There is no hemodynamically significant stenosis. Vertebral Arteries:      Patency: The vertebral arteries are well visualized to the level of the basilar artery. There is no focal stenosis aneurysm or dissection. Vertebral arteries are codominant. IMPRESSION: Negative CTA of the neck. CTA NECK W WO CONTRAST   Final Result   The major intracranial arterial structures are patent without high-grade stenosis, large vessel cut off, or aneurysm. EXAMINATION: CTA HEAD W WO CONTRAST, CTA NECK W WO CONTRAST      DATE AND TIME:12/23/2020 12:42 AM      CLINICAL HISTORY: Stroke symptoms   cva        COMPARISON: None      TECHNIQUE: Helical CTA of the neck was performed from the aortic arch to the foramen magnum following the uneventful intravenous administration of 100 cc of nonionic contrast without incident. 2-D images were reconstructed in the sagittal and coronal    planes. Three Dimensional Maximum Intensity Projection (3D-MIP) images were created. All images were reviewed and primarily archived to PACS workstation. All CT scans at this facility use dose modulation, iterative reconstruction, and/or weight based    dosing when appropriate to reduce radiation dose to as low as reasonably achievable. NASCET Criteria were utilized      FINDINGS CTA NECK:      Aortic Arch: The visualized portions of the aortic arch and proximal arch vessels demonstrates no focal stenosis aneurysm or dissection. Carotids: The common carotid arteries, carotid bifurcations, internal and external carotid arteries are normal in course and caliber. Right  Proximal Internal Carotid Stenosis (% by NASCET Criteria): There is no hemodynamically significant stenosis. Left  Proximal Internal Carotid Stenosis (% by NASCET Criteria): There is no hemodynamically significant stenosis. Vertebral Arteries:      Patency: The vertebral arteries are well visualized to the level of the basilar artery. There is no focal stenosis aneurysm or dissection. Vertebral arteries are codominant. IMPRESSION: Negative CTA of the neck. CT Head WO Contrast   Final Result   Impression:      Interval development of acute/subacute left thalamic infarct. All CT scans at this facility use dose modulation, iterative reconstruction, and/or weight based dosing when appropriate to reduce radiation dose to as low as reasonably achievable.             ED BEDSIDE ULTRASOUND:   Performed by ED Physician - none    LABS:  Labs Reviewed   COMPREHENSIVE METABOLIC PANEL - Abnormal; Notable for the following components:       Result Value    Glucose 249 (*)     Alkaline Phosphatase 160 (*)      (*)     AST 73 (*)     Globulin 2.1 (*)     All other components within normal limits   CBC WITH AUTO DIFFERENTIAL - Abnormal; Notable for the following components:    MCH 33.0 (*)     Neutrophils Absolute 7.2 (*)     All other components within normal limits   PAGG ASP - Abnormal; Notable for the following components:    Platelet Aggregation 562 (*)     All other components within normal limits   COMPREHENSIVE METABOLIC PANEL W/ REFLEX TO MG FOR LOW K - Abnormal; Notable for the following components:    Glucose 167 (*)     Alkaline Phosphatase 138 (*)      (*)     AST 57 (*)     All other components within normal limits   PROTEIN S ANTIGEN, TOTAL - Abnormal; Notable for the following components:    Protein S Ag, Total 143 (*)     All other components within normal limits   ANTIPHOSPHOLIPID ANTIBODY PANEL - Abnormal; Notable for the following components:    Antiphospholipid Ab IgM 16 (*)     All other components within normal limits LUPUS ANTICOAGULANT - Abnormal; Notable for the following components:    dRVVT Screen 28 (*)     All other components within normal limits   HOMOCYSTEINE, SERUM - Abnormal; Notable for the following components:    Homocysteine 25.8 (*)     All other components within normal limits   HEMOGLOBIN A1C - Abnormal; Notable for the following components:    Hemoglobin A1C 8.0 (*)     All other components within normal limits   LIPID PANEL - Abnormal; Notable for the following components:    Triglycerides 251 (*)     HDL 27 (*)     All other components within normal limits   CBC WITH AUTO DIFFERENTIAL - Abnormal; Notable for the following components:    MCH 32.2 (*)     All other components within normal limits   COMPREHENSIVE METABOLIC PANEL W/ REFLEX TO MG FOR LOW K - Abnormal; Notable for the following components:    Chloride 108 (*)     Glucose 165 (*)     Total Protein 6.2 (*)     Alkaline Phosphatase 132 (*)      (*)     AST 56 (*)     All other components within normal limits   CBC WITH AUTO DIFFERENTIAL - Abnormal; Notable for the following components:    RBC 4.06 (*)     MCH 32.7 (*)     All other components within normal limits   COMPREHENSIVE METABOLIC PANEL W/ REFLEX TO MG FOR LOW K - Abnormal; Notable for the following components:    Potassium reflex Magnesium 3.2 (*)     Glucose 222 (*)     Total Protein 5.8 (*)     ALT 81 (*)     AST 39 (*)     All other components within normal limits   CBC WITH AUTO DIFFERENTIAL - Abnormal; Notable for the following components:    MCH 32.9 (*)     All other components within normal limits   COMPREHENSIVE METABOLIC PANEL W/ REFLEX TO MG FOR LOW K - Abnormal; Notable for the following components:    Glucose 196 (*)     Total Protein 6.0 (*)     Alkaline Phosphatase 132 (*)     ALT 72 (*)     All other components within normal limits   CBC WITH AUTO DIFFERENTIAL - Abnormal; Notable for the following components:    RBC 4.17 (*)     MCH 32.7 (*) Diagnosis management comments: She gives us permission to speak with her significant other, at the bedside. He relates that she is not taking her blood pressure medications only her pain medicines from her surgery. He is not taking her aspirin per family. .  Discussed with Dr. Kun Haney will add platelet aggregation test add aspirin obtain CV CTAs of head and neck. If negative will admit to hospital as per our facility if positive will send to [facility]  Negative CTA head and neck will admit. Amount and/or Complexity of Data Reviewed  Clinical lab tests: reviewed and ordered  Tests in the radiology section of CPT®: reviewed and ordered  Discuss the patient with other providers: yes        CRITICAL CARE TIME   Total Critical Care time was 67 minutes, excluding separately reportableprocedures. There was a high probability of clinicallysignificant/life threatening deterioration in the patient's condition which required my urgent intervention. CONSULTS:  IP CONSULT TO NEUROLOGY  IP CONSULT TO REHAB/TCU ADMISSION COORDINATOR  IP CONSULT TO CARDIOLOGY    PROCEDURES:  Unless otherwise noted below, none     Procedures    FINAL IMPRESSION      1. Cerebrovascular accident (CVA), unspecified mechanism ([address]    2.  Uncontrolled hypertension          DISPOSITION/PLAN   DISPOSITION Admitted 12/23/2020 02:08:24 AM      PATIENT REFERRED TO:  Ryne Riojas MD  [address] [facility]    In 1 week      Juan Francisco Gordon MD  [address] [place] [address]  [phone]    In 6 weeks      [place] [address] [phone]          DISCHARGE MEDICATIONS:  Discharge Medication List as of 12/28/2020  7:19 PM             (Please note that portions of this note were completed with a voice recognition program.  Efforts were made to edit the dictations but occasionally words are mis-transcribed.) Osorio Sales PA-C (electronically signed)  Attending Emergency Physician       Osorio Sales PA-C  12/29/20 0038       Osorio Sales PA-C  02/04/21 0006

## 2020-12-23 NOTE — FLOWSHEET NOTE
Patient safely able to take meds with sips of water. Perfectserve sent to Dr. Johnnie Wallis.  Electronically signed by Tanika Inman RN on 12/23/20 at 3:08 PM EST

## 2020-12-23 NOTE — CONSULTS
Subjective:      Patient ID: Melia Ortiz is a 52 y.o. female who presents today for stroke. HPI 51-year-old right-handed female admitted with confusion and right-sided facial droop. Patient last seen 5:30 in the morning her  and left for work. When her  arrived from work he noticed that patient was not behaving appropriately. He was moving all extremities but had difficulty following commands. We have contacted the same time. She was supposed to be on aspirin but she has not been taking her medication as I am told. We will further obtain PGS studies and she does not appear to be having a positive platelet aggregation test.  Patient also recently had injury to her right arm after a fall. She has underlying psychiatric history. We had seen her few months ago for a complicated migraine with left-sided findings with extensive relations. She does have white matter changes which are consistent with underlying ischemic changes and not demyelination seen in multiple sclerosis. Patient has been extensively evaluated by me including a lumbar puncture in the past with a negative oligoclonal bands and IgG. In the emergency room we had obtained a CT angiogram there was no large clot that we could do any intervention the timing of the onset of this was not clear and she was not a candidate for TPA she was past 6 hours. This time patient is still encephalopathic with some speech issues which are subcortical with some neologisms and she does complain of a headache. Review of Systems   Constitutional: Negative for fever. HENT: Negative for ear pain, tinnitus and trouble swallowing. Eyes: Negative for photophobia and visual disturbance. Respiratory: Negative for choking and shortness of breath. Cardiovascular: Negative for chest pain and palpitations. Gastrointestinal: Negative for nausea and vomiting. Musculoskeletal: Negative for back pain, gait problem, joint swelling, myalgias, neck pain and neck stiffness. Skin: Negative for color change. Allergic/Immunologic: Negative for food allergies. Neurological: Negative for dizziness, tremors, seizures, syncope, facial asymmetry, speech difficulty, weakness, light-headedness, numbness and headaches. Psychiatric/Behavioral: Negative for behavioral problems, confusion, hallucinations and sleep disturbance.        Past Medical History:   Diagnosis Date    Anxiety     Back pain     back surgery x 4    Bipolar disorder [facility] [address]     CAD (coronary artery disease)     CAFL (chronic airflow limitation) ([facility]) 11/3/2016    Chronic bronchitis ([facility])     Chronic pain     Colitis     Complicated migraine     DDD (degenerative disc disease), lumbar 2016    Depression     Endometriosis     Excessive caffeine abuse, continuous ([facility]) 2018    Gastritis     GERD (gastroesophageal reflux disease)     History of myocardial infarction     HTN (hypertension), benign 2016    Impaired mobility and activities of daily living     Over weight     PTSD (post-traumatic stress disorder)     Sensory neuropathy 2016    Somatization disorder     TIA (transient ischemic attack)     Tobacco abuse     Vasospastic angina ([facility] 2016     Past Surgical History:   Procedure Laterality Date    BACK SURGERY      x2     SECTION      x2    CHOLECYSTECTOMY  13    Lapchole    COLONOSCOPY N/A 2020    COLONOSCOPY DIAGNOSTIC performed by Be Delong MD at [address]  3/7/16    Dr. Shayy Schroeder Left     UPPER GASTROINTESTINAL ENDOSCOPY  2014    ANIBAL HOUSE M.D.   Reagan Swanson UPPER GASTROINTESTINAL ENDOSCOPY N/A 2020 EGD ESOPHAGOGASTRODUODENOSCOPY performed by Kenia Shahid MD at [facility] Marital status:       Spouse name: Not on file    Number of children: Not on file    Years of education: Not on file    Highest education level: Not on file   Occupational History    Occupation: unemployed   Social Needs    Financial resource strain: Not on file    Food insecurity     Worry: Not on file     Inability: Not on file   [employer] needs     Medical: Not on file     Non-medical: Not on file   Tobacco Use    Smoking status: Current Every Day Smoker     Packs/day: 1.00     Years: 17.00     Pack years: 17.00     Types: Cigarettes    Smokeless tobacco: Never Used   Substance and Sexual Activity    Alcohol use: No    Drug use: No    Sexual activity: Not Currently   Lifestyle    Physical activity     Days per week: Not on file     Minutes per session: Not on file    Stress: Not on file   Relationships    Social connections     Talks on phone: Not on file     Gets together: Not on file     Attends Restorationist service: Not on file     Active member of club or organization: Not on file     Attends meetings of clubs or organizations: Not on file     Relationship status: Not on file    Intimate partner violence     Fear of current or ex partner: Not on file     Emotionally abused: Not on file     Physically abused: Not on file     Forced sexual activity: Not on file   Other Topics Concern    Not on file   Social History Narrative Alexis Rasheed is a 68-year-old female who is on disability because of pain and bipolar disorder. She's also had a presumed diagnosis of possible multiple sclerosis. She's been seeing Dr. Magdalene Taylor for that and she says that the diagnosis is sometimes in question and it's clear neuropathy vitamin B12 deficiency as well as generalized bodyaches from the severe vitamin D deficiency have caused this scenario that looks like multiple sclerosis.               Lives With: Significant other    Type of Home: House Two level    Home Access: Stairs to enter with rails  - Number of Steps: 5    Bathroom Shower/Tub: Tub only    ADL Assistance: Independent    Homemaking Assistance: Independent    Homemaking Responsibilities: Yes    Meal Prep Responsibility: Primary    Laundry Responsibility: Primary    Cleaning Responsibility: Primary    Bill Paying/Finance Responsibility: Primary    Shopping Responsibility: Primary    Ambulation Assistance: Independent    Transfer Assistance: Independent    Active : Yes    Occupation: Full time employment    Type of occupation: disabled-  - in charges of [address]     Family History   Problem Relation Age of Onset    High Blood Pressure Mother     Kidney Disease Mother     Lupus Mother     Coronary Art Dis Mother         Had stents in her 62s   [facility] Bipolar Disorder Son      Allergies   Allergen Reactions    Latex Itching     Pt reports itching on hands after using rubber gloves at work    Influenza Vaccines Swelling     Pt reports her entire arm was red and edematous post vaccine    Eggs Or Egg-Derived [employer] based products only    Gabapentin Nausea Only    Pneumococcal Vaccines Hives    Povidone Iodine Itching    Varicella Virus Vaccine Live Hives     Current Facility-Administered Medications   Medication Dose Route Frequency Provider Last Rate Last Admin  sodium chloride flush 0.9 % injection 10 mL  10 mL Intravenous 2 times per day Threasa Locks, APRN - CNP   10 mL at 12/23/20 1042    sodium chloride flush 0.9 % injection 10 mL  10 mL Intravenous PRN Threasa Locks, APRN - CNP        promethazine (PHENERGAN) tablet 12.5 mg  12.5 mg Oral Q6H PRN Threasa Locks, APRN - CNP        Or    ondansetron [facility]) injection 4 mg  4 mg Intravenous Q6H PRN Threasa Locks, APRN - CNP        polyethylene glycol (GLYCOLAX) packet 17 g  17 g Oral Daily PRN Threasa Locks, APRN - CNP        enoxaparin (LOVENOX) injection 40 mg  40 mg Subcutaneous Daily Threasa Locks, APRN - CNP   40 mg at 12/23/20 1042    aspirin EC tablet 81 mg  81 mg Oral Daily Threasa Locks, APRN - CNP   81 mg at 12/23/20 1042    Or    aspirin suppository 300 mg  300 mg Rectal Daily Threasa Locks, APRN - CNP        0.9 % sodium chloride infusion   Intravenous Continuous Threasa Locks, APRN - CNP 75 mL/hr at 12/23/20 0507 New Bag at 12/23/20 0507    atorvastatin (LIPITOR) tablet 20 mg  20 mg Oral Nightly Threasa Locks, APRN - CNP        hydrALAZINE (APRESOLINE) injection 5 mg  5 mg Intravenous Q6H PRN Threasa Locks, APRN - CNP        clopidogrel (PLAVIX) tablet 75 mg  75 mg Oral Daily Yvonne Terry MD        hydrALAZINE (APRESOLINE) injection 10 mg  10 mg Intravenous Once Cecilio Levin PA-C            Objective:   BP (!) 159/78   Pulse 72   Temp 98.4 °F (36.9 °C) (Oral)   Resp 16   Ht 5' 7\" (1.702 m)   Wt 192 lb (87.1 kg)   SpO2 100%   BMI 30.07 kg/m²     Physical Exam  Vitals signs reviewed. Eyes:      Pupils: Pupils are equal, round, and reactive to light. Neck:      Musculoskeletal: Normal range of motion. Cardiovascular:      Rate and Rhythm: Normal rate and regular rhythm. Heart sounds: No murmur.    Pulmonary:      Effort: Pulmonary effort is normal. Breath sounds: Normal breath sounds. Abdominal:      General: Bowel sounds are normal.   Musculoskeletal: Normal range of motion. Skin:     General: Skin is warm. Neurological:      Mental Status: She is alert. She is disoriented. Cranial Nerves: No cranial nerve deficit. Sensory: No sensory deficit. Motor: No abnormal muscle tone. Coordination: Coordination normal.      Deep Tendon Reflexes: Reflexes are normal and symmetric. Babinski sign absent on the right side. Babinski sign absent on the left side. Psychiatric:         Mood and Affect: Mood normal.     Mild right facial droop is noted. She has her right arm in the sling and therefore a complete motor examination is difficult with certain. Patient may have some weakness on the right side. Her main issues appears to be her speech which is interesting as the subcortical and thalamic. Patient was not ambulated. Her speech is garbled and she has dysarthria.   Cta Head W Wo Contrast    Result Date: 12/23/2020 EXAMINATION: CTA HEAD W WO CONTRAST, CTA NECK W WO CONTRAST DATE AND TIME:12/23/2020 12:42 AM CLINICAL HISTORY: Stroke  cva  COMPARISON: None TECHNIQUE:Helical CTA of the head was performed from the vertex to the foramen magnum following the uneventful intravenous administration of 100 cc of nonionic contrast without incident. 2-D images were reconstructed in the sagittal and coronal planes. Three Dimensional Maximum Intensity Projection (3D-MIP) images were created. All images were reviewed and primarily archived to PACS workstation. All CT scans at this facility use dose modulation, iterative reconstruction, and/or weight based dosing when appropriate to reduce radiation dose to as low as reasonably achievable. FINDINGS CTA HEAD: Intracranial ICAs: Flow is visualized within the precavernous, cavernous, clinoid and supraclinoid segments of the internal carotid arteries bilaterally    Anterior Cerebral Arteries: The bilaterals  A1 and A2 segments are patent. Middle Cerebral Arteries: Bilateral horizontal, insular, opercular, and cortical segments of the right and left middle cerebral cerebral arteries are patent. Vertebral Arteries And Basilar Artery: There is adequate flow in the intracranial portions of the vertebral arteries and in the basilar artery. Posterior Cerebral Arteries: Bilateral posterior cerebral arteries are patent. Aneurysm No aneurysm or dissection in the anterior or posterior circulations. . Neurocranium The visualized neurocranium is intact. Dural Sinus: As visualized the opacified dural venous sinuses are unremarkable. The major intracranial arterial structures are patent without high-grade stenosis, large vessel cut off, or aneurysm. EXAMINATION: CTA HEAD W WO CONTRAST, CTA NECK W WO CONTRAST DATE AND TIME:12/23/2020 12:42 AM CLINICAL HISTORY: Stroke symptoms   cva  COMPARISON: None TECHNIQUE: Helical CTA of the neck was performed from the aortic arch to the foramen magnum following the uneventful intravenous administration of 100 cc of nonionic contrast without incident. 2-D images were reconstructed in the sagittal and coronal planes. Three Dimensional Maximum Intensity Projection (3D-MIP) images were created. All images were reviewed and primarily archived to PACS workstation. All CT scans at this facility use dose modulation, iterative reconstruction, and/or weight based dosing when appropriate to reduce radiation dose to as low as reasonably achievable. NASCET Criteria were utilized FINDINGS CTA NECK: Aortic Arch: The visualized portions of the aortic arch and proximal arch vessels demonstrates no focal stenosis aneurysm or dissection. Carotids: The common carotid arteries, carotid bifurcations, internal and external carotid arteries are normal in course and caliber. Right  Proximal Internal Carotid Stenosis (% by NASCET Criteria): There is no hemodynamically significant stenosis. Left  Proximal Internal Carotid Stenosis (% by NASCET Criteria): There is no hemodynamically significant stenosis. Vertebral Arteries: Patency: The vertebral arteries are well visualized to the level of the basilar artery. There is no focal stenosis aneurysm or dissection. Vertebral arteries are codominant. IMPRESSION: Negative CTA of the neck.      Ct Head Wo Contrast    Result Date: 12/23/2020 CT Brain Contrast medium:  Not utilized. History:  Right facial droop. Altered mental status Comparison:  August 21, 2020 Findings: Extra-axial spaces:  Normal. Intracranial hemorrhage:  None. Ventricular system: In the interval, an ill-defined 4 x 12 mm area decreased attenuation exerting no mass effect is found medially within the left thalamus (series 2, image 16). Basal Cisterns:  Normal. Cerebral Parenchyma:  Normal. Midline Shift:  None. Cerebellum:  Normal. Paranasal sinuses and mastoid air cells:  Normal. Visualized Orbits:  Normal.     Impression: Interval development of acute/subacute left thalamic infarct. All CT scans at this facility use dose modulation, iterative reconstruction, and/or weight based dosing when appropriate to reduce radiation dose to as low as reasonably achievable.     Cta Neck W Wo Contrast    Result Date: 12/23/2020 The major intracranial arterial structures are patent without high-grade stenosis, large vessel cut off, or aneurysm. EXAMINATION: CTA HEAD W WO CONTRAST, CTA NECK W WO CONTRAST DATE AND TIME:12/23/2020 12:42 AM CLINICAL HISTORY: Stroke symptoms   cva  COMPARISON: None TECHNIQUE: Helical CTA of the neck was performed from the aortic arch to the foramen magnum following the uneventful intravenous administration of 100 cc of nonionic contrast without incident. 2-D images were reconstructed in the sagittal and coronal planes. Three Dimensional Maximum Intensity Projection (3D-MIP) images were created. All images were reviewed and primarily archived to PACS workstation. All CT scans at this facility use dose modulation, iterative reconstruction, and/or weight based dosing when appropriate to reduce radiation dose to as low as reasonably achievable. NASCET Criteria were utilized FINDINGS CTA NECK: Aortic Arch: The visualized portions of the aortic arch and proximal arch vessels demonstrates no focal stenosis aneurysm or dissection. Carotids: The common carotid arteries, carotid bifurcations, internal and external carotid arteries are normal in course and caliber. Right  Proximal Internal Carotid Stenosis (% by NASCET Criteria): There is no hemodynamically significant stenosis. Left  Proximal Internal Carotid Stenosis (% by NASCET Criteria): There is no hemodynamically significant stenosis. Vertebral Arteries: Patency: The vertebral arteries are well visualized to the level of the basilar artery. There is no focal stenosis aneurysm or dissection. Vertebral arteries are codominant. IMPRESSION: Negative CTA of the neck.        Lab Results   Component Value Date    WBC 10.4 12/22/2020    RBC 4.77 12/22/2020    HGB 15.7 12/22/2020    HCT 45.4 12/22/2020    MCV 95.1 12/22/2020    MCH 33.0 12/22/2020    MCHC 34.7 12/22/2020    RDW 13.5 12/22/2020     12/22/2020    MPV 9.6 09/20/2015     Lab Results Component Value Date     12/23/2020    K 3.4 12/23/2020     12/23/2020    CO2 24 12/23/2020    BUN 9 12/23/2020    CREATININE 0.74 12/23/2020    GFRAA >60.0 12/23/2020    LABGLOM >60.0 12/23/2020    GLUCOSE 167 12/23/2020    PROT 6.3 12/23/2020    LABALBU 3.8 12/23/2020    CALCIUM 9.1 12/23/2020    BILITOT 0.4 12/23/2020    ALKPHOS 138 12/23/2020    AST 57 12/23/2020     12/23/2020     Lab Results   Component Value Date    PROTIME 12.3 12/22/2020    INR 0.9 12/22/2020     Lab Results   Component Value Date    TSH 4.070 04/10/2019    UJTFRQZG36 256 08/07/2019    FOLATE 4.0 08/07/2019    FERRITIN 282.0 12/11/2020    IRON 51 12/11/2020    TIBC 272 12/11/2020     Lab Results   Component Value Date    TRIG 199 08/07/2019    HDL 27 08/07/2019    LDLCALC 72 08/07/2019     Lab Results   Component Value Date    LABAMPH Neg 06/14/2020    BARBSCNU Neg 06/14/2020    LABBENZ Neg 06/14/2020    LABBENZ NotDTCD 01/26/2013    LABMETH Neg 06/14/2020    OPIATESCREENURINE Neg 06/14/2020    PHENCYCLIDINESCREENURINE Neg 06/14/2020    ETOH <10 12/22/2020     Lab Results   Component Value Date    LITHIUM 0.2 11/19/2019       Assessment: Left thalamic stroke which was already apparent on the CT scan and an MRI. Patient timing of the onset of the stroke is very difficult ascertain she was not a candidate for TPA. Large vessel evaluation was obtained with a CT angiogram which did not show a large clot. We were consulted for the same for further management. Compliance is a question as her PGS studies were reviewed and she does not have platelet aggregation therefore she is not taking her antiplatelet agents. I recommended dual antiplatelet therapy. She has multiple risk factors for cerebrovascular disease including smoking and hypertension and carotid disease. Patient was seen for complicated migraines in the past and therefore recommended a transesophageal echo and a complete hypercoagulable work-up for now. We will arrange for a speech evaluation for her dysarthria and aphasia. Pending on the results of the same will further advise    This consultation includes my consultation of the emergency  Steven Lopez MD, Jemima Pathak, American Board of Psychiatry & Neurology  Board Certified in Vascular Neurology  Board Certified in Neuromuscular Medicine  Certified in [facility]:

## 2020-12-23 NOTE — PROGRESS NOTES
Patient arrived to room. Transferred self from cart to bed. Pt is A&Ox self only. PERRLA. Lethargic but responds to voice and follows commands. Grasps equal. Lungs clear. No edema. Right arm in sling from previous rotator cuff surgery. Skin is intact. IV fluids infusing. Bed alarm on. Call light within reach. Will monitor.

## 2020-12-23 NOTE — PROGRESS NOTES
Physical Therapy Med Surg Initial Assessment  Facility/Department: [facility]  Room: [facility]/[id number]       NAME: Ramirez Mckeon  : 1970 (52 y.o.)  MRN: [id number]  CODE STATUS: Full Code    Date of Service: 2020    Patient Diagnosis(es): Stroke determined by clinical assessment [facility]) [I63.9]   Chief Complaint   Patient presents with    Altered Mental Status     Patient Active Problem List    Diagnosis Date Noted    Adenomatous polyp of colon     Adenomatous polyp of sigmoid colon     Melena     Chest pain 2020    COPD exacerbation ([address] 2019    Right arm weakness 08/10/2019    Stroke determined by clinical assessment ([facility] 2019    Sleep apnea 2019    Obesity (BMI 30-39.9) 2019    Dizziness and giddiness 2019    Abnormal auditory perception of left ear 2019    Tinnitus, bilateral 2019    Sensorineural hearing loss (SNHL) of both ears 2019    Vertigo, peripheral, bilateral 2019    Acute bronchitis 2019    Chronic obstructive pulmonary disease ([address] 2018    Neuromyelopathy due to vitamin B12 deficiency ([address] 2017    Smoking     Chronic pain syndrome  2016    Vitamin B12 deficiency 2016    Grief at loss of child 2016    Multiple sclerosis ([address]     Colitis     Over weight     Anxiety     HTN (hypertension) 2016    CAD (coronary artery disease) 2014    H/O cardiac arrest 2012    Bipolar 1 disorder ([address] 2012    Tobacco abuse 2012    SAMAYOA (dyspnea on exertion) 2012    Abnormal ECG 2012    Displacement of lumbar intervertebral disc without myelopathy 2012    Acid reflux 2008        Past Medical History:   Diagnosis Date    Anxiety     Back pain     back surgery x 4    Bipolar disorder ([address]     CAD (coronary artery disease)     CAFL (chronic airflow limitation) ([address] 11/3/2016    Chronic bronchitis ([address] Pain Assessment  Pain Level: 0    Prior Level of Function:  Social/Functional History  Lives With: Alone(has a boyfriend that works)  Type of Home: House  Home Layout: Two level  Home Access: Stairs to enter without rails(5 steps)  Bathroom Shower/Tub: Tub/Shower unit  ADL Assistance: Independent  Homemaking Assistance: Independent  Homemaking Responsibilities: Yes  Ambulation Assistance: Independent(no AD)  Transfer Assistance: Independent  Active : Yes  Type of occupation: pt stated yes to work; however unable to clarify where- dysarthria    OBJECTIVE:   Vision Exceptions: Wears glasses at all times    Cognition:  Overall Orientation Status: Impaired  Orientation Level: Oriented to person, Disoriented to place, Disoriented to time, Disoriented to situation  Follows Commands: Impaired(inconsistent ability to follow 1 step commands due to lethargic)    Observation/Palpation  Observation: R shoulder incision healing    ROM:  RLE AROM: WFL  LLE AROM : WFL    Strength:  Strength RLE  Comment: grossly 4/5  Strength LLE  Comment: grossly 4/5    Neuro:  Balance  Posture: Fair(slouched posture; pt demonstrating falling asleep edge of bed and educated in poor safety with this)  Sitting - Static: Fair(when awake)  Sitting - Dynamic: Fair(when awake)  Standing - Static: Fair;-  Standing - Dynamic: Fair;-(requires B UE support to maintain steadiness during gait and assistance during gait due to multiple posterior and lateral LOB with poor balance reactions exhibited)     Tone RLE  RLE Tone: Normotonic  Tone LLE  LLE Tone: Normotonic  Sensation  Overall Sensation Status: Impaired(facial numbness)    Bed mobility  Supine to Sit: Stand by assistance  Sit to Supine: Stand by assistance  Comment: pt exhibiting quick movements and decreased safety awareness during mobility    Transfers  Sit to Stand: Minimal Assistance  Stand to sit: Minimal Assistance Comment: pt exhibiting quick speed of descend to commode and bed surface; cues for safety; pt impulsive;  assistance required for steadiness, not lifting assistance    Ambulation  Ambulation?: Yes  Ambulation 1  Surface: level tile  Device: No Device  Assistance: 2 Person assistance; Moderate assistance  Quality of Gait: multiple lateral and posterior LOB during gait  Gait Deviations: Increased RIDDHI;Staggers; Shuffles; Deviated path;Decreased step length;Decreased step height  Distance: 30ft              Activity Tolerance  Activity Tolerance: Patient limited by fatigue;Patient limited by endurance          PT Education  PT Education: Goals;PT Role;Transfer Training;Plan of Care;General Safety    ASSESSMENT:   Body structures, Functions, Activity limitations: Decreased functional mobility ; Decreased strength;Decreased endurance;Decreased coordination;Decreased ADL status; Decreased safe awareness;Decreased sensation;Decreased cognition;Decreased balance; Increased pain  Decision Making: Medium Complexity  History: high  Exam: medium  Clinical Presentation: medium    Prognosis: Good    DISCHARGE RECOMMENDATIONS:  Discharge Recommendations: Continue to assess pending progress, Patient would benefit from continued therapy after discharge    Assessment: Pt exhibits poor safety, decreased orientation, lethargy, decreased strength, decreased balance, activity tolerance, sensation and currently requires increased assistance compared to PLOF. Continued therapy is indicated.       REQUIRES PT FOLLOW UP: Yes      PLAN OF CARE:  Plan  Times per week: 3-6  Current Treatment Recommendations: Strengthening, Functional Mobility Training, Home Exercise Program, Equipment Evaluation, Education, & procurement, ROM, Transfer Training, Gait Training, Manual Therapy - Soft Tissue Mobilization, Safety Education & Training, Modalities, Balance Training, Endurance Training, Stair training, Patient/Caregiver Education & Training  Safety Devices Type of devices: Nurse notified, Bed alarm in place, Call light within reach, Left in bed, All fall risk precautions in place    Goals:  Patient goals : \"go home\"  Long term goals  Long term goal 1: Pt will demonstrate bed mobility indep. Long term goal 2: Pt will demonstrate transfers indep without AD with safety awareness 100% of the time. Long term goal 3: Pt will demonstrate amb > or = indep without AD with safe awareness 100% of the time  Long term goal 4: Pt will exhibit improved dynamic balance evidenced of 20/24 DGI for decreased risk for falls and safe d/c home alone. Long term goal 5: Pt will demonstrate stair negotiation 5 steps without rails indep to allow pt to enter and exit her home safely. [facility] (6 CLICK) BASIC MOBILITY  AM-PAC Inpatient Mobility Raw Score : 14    Therapy Time:   Individual   Time In 1015   Time Out 1030   Minutes [address] [place] [address] Emy Rankin, 12/23/20 at 10:55 AM         Definitions for assistance levels  Independent = pt does not require any physical supervision or assistance from another person for activity completion. Device may be needed.   Stand by assistance = pt requires verbal cues or instructions from another person, close to but not touching, to perform the activity  Minimal assistance= pt performs 75% or more of the activity; assistance is required to complete the activity  Moderate assistance= pt performs 50% of the activity; assistance is required to complete the activity  Maximal assistance = pt performs 25% of the activity; assistance is required to complete the activity  Dependent = pt requires total physical assistance to accomplish the task

## 2020-12-23 NOTE — PROGRESS NOTES
[facility]   Facility/Department: [facility]  Speech Language Pathology  Clinical Bedside Swallow Evaluation    Ene Hastings  : 1970 (52 y.o.)   MRN: [id number]  [id number]  ADMISSION DATE: 2020  PATIENT DIAGNOSIS(ES): Stroke determined by clinical assessment [facility]) [I63.9]  Chief Complaint   Patient presents with    Altered Mental Status     Patient Active Problem List    Diagnosis Date Noted    Tendinitis of right rotator cuff 2020    Adenomatous polyp of colon     Adenomatous polyp of sigmoid colon     Melena     Chest pain 2020    COPD exacerbation ([address] 2019    Right arm weakness 08/10/2019    Stroke determined by clinical assessment ([facility] [facility] 2019    Sleep apnea 2019    Obesity (BMI 30-39.9) 2019    Dizziness and giddiness 2019    Abnormal auditory perception of left ear 2019    Tinnitus, bilateral 2019    Sensorineural hearing loss (SNHL) of both ears 2019    Vertigo, peripheral, bilateral 2019    Acute bronchitis 2019    Chronic obstructive pulmonary disease ([address] 2018    Neuromyelopathy due to vitamin B12 deficiency ([address] 2017    Smoking     Chronic pain syndrome  2016    Vitamin B12 deficiency 2016    Grief at loss of child 2016    Multiple sclerosis ([address]     Colitis     Over weight     Anxiety     HTN (hypertension) 2016    CAD (coronary artery disease) 2014    H/O cardiac arrest 2012    Bipolar 1 disorder ([address] 2012    Tobacco abuse 2012    SAMAYOA (dyspnea on exertion) 2012    Abnormal ECG 2012    Displacement of lumbar intervertebral disc without myelopathy 2012    Acid reflux 2008     Past Medical History:   Diagnosis Date    Anxiety     Back pain     back surgery x 4    Bipolar disorder ([address]     CAD (coronary artery disease)  CAFL (chronic airflow limitation) ([facility]) 11/3/2016    Chronic bronchitis ([facility])     Chronic pain     Colitis     Complicated migraine     DDD (degenerative disc disease), lumbar 2016    Depression     Endometriosis     Excessive caffeine abuse, continuous ([facility]) 2018    Gastritis     GERD (gastroesophageal reflux disease)     History of myocardial infarction     HTN (hypertension), benign 2016    Impaired mobility and activities of daily living     Over weight     PTSD (post-traumatic stress disorder)     Sensory neuropathy 2016    Somatization disorder     TIA (transient ischemic attack)     Tobacco abuse     Vasospastic angina ([facility] 2016     Past Surgical History:   Procedure Laterality Date    BACK SURGERY      x2     SECTION      x2    CHOLECYSTECTOMY  13    Lapchole    COLONOSCOPY N/A 2020    COLONOSCOPY DIAGNOSTIC performed by Cole Steen MD at [address] [place] [address]  3/7/16    Dr. Maria Teresa Dimas Left     UPPER GASTROINTESTINAL ENDOSCOPY  2014    ANIBAL HOUSE M.D.   [facility] UPPER GASTROINTESTINAL ENDOSCOPY N/A 2020    EGD ESOPHAGOGASTRODUODENOSCOPY performed by Cole Steen MD at [facility]     Allergies   Allergen Reactions    Latex Itching     Pt reports itching on hands after using rubber gloves at work    Influenza Vaccines Swelling     Pt reports her entire arm was red and edematous post vaccine    Eggs Or Egg-Derived Products      Egg based products only    Gabapentin Nausea Only    Pneumococcal Vaccines Hives    Povidone Iodine Itching    Varicella Virus Vaccine Live Hives       DATE ONSET: 2020    Date of Evaluation: 2020   Evaluating Therapist: ROSA MARIA Browne    Recommended Diet and Intervention Diet Solids Recommendation: Dysphagia Soft and Bite-Sized (Dysphagia III)  Liquid Consistency Recommendation: Thin  Recommended Form of Meds: PO     Therapeutic Interventions: Diet tolerance monitoring, Oral motor exercises    Compensatory Swallowing Strategies  Compensatory Swallowing Strategies: Small bites/sips;Eat/Feed slowly    Reason for Referral  Jaleesa Coburn was referred for a bedside swallow evaluation to assess the efficiency of her swallow function, identify signs and symptoms of aspiration and make recommendations regarding safe dietary consistencies, effective compensatory strategies, and safe eating environment. General  Chart Reviewed: Yes  Behavior/Cognition: Alert; Cooperative;Lethargic  Respiratory Status: Room air  O2 Device: None (Room air)  Communication Observation: Dysarthria  Follows Directions: Simple  Dentition: Poor dental/oral hygeine; Some missing teeth  Patient Positioning: Upright in bed  Baseline Vocal Quality: Normal  Prior Dysphagia History: Pt was admitted to hospital in Aug 2019 with diet rec thin and soft diet  Consistencies Administered: Reg solid; Dysphagia Pureed (Dysphagia I); Thin - cup; Thin - straw    Current Diet level:  Current Diet : NPO  Current Liquid Diet : NPO    Oral Motor Deficits  Oral/Motor  Oral Motor: Exceptions to [facility]  Labial Strength: Reduced  Labial Coordination: Reduced  Lingual Strength: Reduced  Lingual Coordination: Reduced    Oral Phase Dysfunction  Oral Phase  Oral Phase: Exceptions  Oral Phase Dysfunction  Impaired Mastication: Reg Solid  Lingual/Palatal Residue: Reg solid  Oral Phase  Oral Phase - Comment: Pt presents with mild oral dysphagia characterized by reduced lingual/labial strength and coordination which resulted in prolonged mastication, delayed AP transfer, and min residue following trials of reg solids.  Pt tolerated trials of soft/bite sized, puree, and thin liquids with adequate mastication, timely propulsion and good oral clearance Indicators of Pharyngeal Phase Dysfunction   Pharyngeal Phase  Pharyngeal Phase: WFL  Pharyngeal Phase   Pharyngeal: No overt s/s of apsiration on all trials via cup and straw. Good laryngeal elevation observed via palpation. Impression  Dysphagia Diagnosis: Mild oral stage dysphagia  Dysphagia Outcome Severity Scale: Level 5: Mild dysphagia- Distant supervision. May need one diet consistency restricted     Treatment Plan  Requires SLP Intervention: Yes  Duration/Frequency of Treatment: 1-3x/ week          Treatment/Goals  Short-term Goals  Timeframe for Short-term Goals: 1-2 weeks  Goal 1: Pt will complete labial coordination/strength exercises 10x/each to improve oral phase of swallow. Goal 2: Pt will complete lingual coordination/strength exercises 10x/each to improve oral phase of swallow. Goal 3: Pt will complete lingual pull back and tongue press x10 each to improve tongue base strength and bolus control. Goal 4: Pt will tolerate trials of regular solids with no s/s of impaired mastication or aspiration in 100% of opportunities  Long-term Goals  Timeframe for Long-term Goals: 2 weeks  Goal 1: Pt will improve oral motor strentgh to tolerate least restrictive diet with no overt s/s of aspiration and difficulty. Prognosis  Individuals consulted  Consulted and agree with results and recommendations: Patient;MD;RN(PARAS Max [place] Serve sent to MD)    Education  Patient Education: results and diet recommendations  Patient Education Response: Demonstrated understanding  Safety Devices in place: Yes  Type of devices: All fall risk precautions in place    Pain Assessment:  Initial Assessment:  Patient denies pain. Re-assessment:  Patient denies pain.     [x]  Perfect Serve sent to ordering physician  [x]  Evaluation routed to ordering physician inbox         Therapy Time  SLP Individual Minutes  Time In: 9666  Time Out: 0786  Minutes: 14 Signature: Electronically signed by ROSA MARIA Gonsales on 12/23/2020 at 3:55 PM

## 2020-12-23 NOTE — FLOWSHEET NOTE
Shift assessment complete. Patient able to state her name, but states I dont know to all other questions. Lethargic. Non interactive. PERRLA. Able to follow commands. Grasps/push/pulls equal. Right arm in sling from rotator cuff surgery. Appears painful. Able to help scoot over to cart to go to MRI. Will continue with plan of care.  Electronically signed by Flower Lui RN on 12/23/20 at 8:42 AM EST

## 2020-12-23 NOTE — PROGRESS NOTES
[facility] OCCUPATIONAL THERAPY EVALUATION - ACUTE     NAME: Giorgi Whitaker  : 1970 (52 y.o.)  MRN: [id number]  CODE STATUS: Full Code  [facility]    Date of Service: 2020    Patient Diagnosis(es): Stroke determined by clinical assessment [facility]) [I63.9]   Chief Complaint   Patient presents with    Altered Mental Status     Patient Active Problem List    Diagnosis Date Noted    Adenomatous polyp of colon     Adenomatous polyp of sigmoid colon     Melena     Chest pain 2020    COPD exacerbation ([address] 2019    Right arm weakness 08/10/2019    [facility] clinical assessment [facility] 2019    Sleep apnea 2019    Obesity (BMI 30-39.9) 2019    Dizziness and giddiness 2019    Abnormal auditory perception of left ear 2019    Tinnitus, bilateral 2019    Sensorineural hearing loss (SNHL) of both ears 2019    Vertigo, peripheral, bilateral 2019    Acute bronchitis 2019    Chronic obstructive pulmonary disease ([address] 2018    Neuromyelopathy due to vitamin B12 deficiency ([facility] 2017    Smoking     Chronic pain syndrome  2016    Vitamin B12 deficiency 2016    Grief at loss of child 2016    Multiple sclerosis ([facility]     Colitis     Over weight     Anxiety     HTN (hypertension) 2016    CAD (coronary artery disease) 2014    H/O cardiac arrest 2012    Bipolar 1 disorder ([address] 2012    Tobacco abuse 2012    SAMAYOA (dyspnea on exertion) 2012    Abnormal ECG 2012    Displacement of lumbar intervertebral disc without myelopathy 2012    Acid reflux 2008        Past Medical History:   Diagnosis Date    Anxiety     Back pain     back surgery x 4    Bipolar disorder ([address]     CAD (coronary artery disease)     CAFL (chronic airflow limitation) (HCC) 11/3/2016    Chronic bronchitis (HCC)     Chronic pain     Colitis  Complicated migraine     DDD (degenerative disc disease), lumbar 2016    Depression     Endometriosis     Excessive caffeine abuse, continuous (HCC) 2018    Gastritis     GERD (gastroesophageal reflux disease)     History of myocardial infarction     HTN (hypertension), benign 2016    Impaired mobility and activities of daily living     Over weight     PTSD (post-traumatic stress disorder)     Sensory neuropathy 2016    Somatization disorder     TIA (transient ischemic attack)     Tobacco abuse     [facility]ospastic angina ([facility] 2016     Past Surgical History:   Procedure Laterality Date    BACK SURGERY      x2     SECTION      x2    CHOLECYSTECTOMY  13    Lapchole    COLONOSCOPY N/A 2020    COLONOSCOPY DIAGNOSTIC performed by Ashia Longo MD at [address] [place] [address]  3/7/16    Dr. Louis Ford Left     UPPER GASTROINTESTINAL ENDOSCOPY  2014    ANIBAL HOUSE M.D.   [facility] UPPER GASTROINTESTINAL ENDOSCOPY N/A 2020    EGD ESOPHAGOGASTRODUODENOSCOPY performed by Ashia Longo MD at [facility]        Restrictions  Restrictions/Precautions: Fall Risk(high webster score; R UE immobilizer on at all times- per RN)     Safety Devices: Safety Devices  Safety Devices in place: Yes  Type of devices:  All fall risk precautions in place        Subjective  Pre Treatment Pain Screening  Pain at present: 0  Scale Used: Numeric Score  Intervention List: Patient able to continue with treatment    Pain Reassessment:   Pain Assessment  Patient Currently in Pain: No  Pain Assessment: 0-10  Pain Level: 0       Prior Level of Function:  Social/Functional History  Lives With: Alone(has a boyfriend that works)  Type of Home: House  Home Layout: Two level  Home Access: Stairs to enter without rails(5 steps) Entrance Stairs - Number of Steps: 3-5 to enter front door  Bathroom Shower/Tub: Tub/Shower unit  ADL Assistance: Independent  Homemaking Assistance: Independent  Homemaking Responsibilities: Yes  Ambulation Assistance: Independent(no AD)  Transfer Assistance: Independent  Active : Yes  Type of occupation: pt stated yes to work; however unable to clarify where- dysarthria  Additional Comments: Pt. inconsistent historian but attempts to answer questions    OBJECTIVE:     Orientation Status:  Orientation  Overall Orientation Status: Impaired  Orientation Level: Oriented to time, Oriented to person, Oriented to place, Disoriented to situation    Observation:  Observation/Palpation  Posture: Fair  Observation: R shoulder incision healing    Cognition Status:  Cognition  Overall Cognitive Status: Exceptions  Arousal/Alertness: Delayed responses to stimuli  Following Commands: Inconsistently follows commands  Attention Span: Difficulty attending to directions, Difficulty dividing attention  Memory: Decreased recall of precautions, Decreased recall of recent events, Decreased short term memory, Decreased long term memory, Decreased recall of biographical Information  Safety Judgement: Decreased awareness of need for assistance, Decreased awareness of need for safety  Problem Solving: Assistance required to generate solutions, Assistance required to identify errors made, Assistance required to correct errors made, Assistance required to implement solutions, Decreased awareness of errors  Insights: Not aware of deficits  Initiation: Requires cues for all  Sequencing: Requires cues for all  Cognition Comment: Due to impulsivity requires significant cuing and redirection.     Perception Status:  Perception  Overall Perceptual Status: WFL    Sensation Status:  Sensation  Overall Sensation Status: Impaired(facial numbness)    Vision and Hearing Status:  Vision  Vision: Impaired(Pt. reports blurry and double vision) Vision Exceptions: Wears glasses at all times  Hearing  Hearing: Within functional limits     ROM:   LUE AROM (degrees)  LUE AROM : WFL  Left Hand AROM (degrees)  Left Hand AROM: WFL  RUE AROM (degrees)  RUE General AROM: NT; shoulder immobilizer. WFL elbow when donning sling  Right Hand AROM (degrees)  Right Hand AROM: WFL    Strength:  LUE Strength  Gross LUE Strength: Exceptions to [facility]  L Hand General: 3+/5  LUE Strength Comment: 3+/5 all planes  RUE Strength  R Hand General: NT  RUE Strength Comment: NT; shoulder immobilizer    Coordination, Tone, Quality of Movement: Tone RUE  RUE Tone: Normotonic  Tone LUE  LUE Tone: Normotonic  Coordination  Movements Are Fluid And Coordinated: No  Coordination and Movement description: Fine motor impairments, Decreased speed, Decreased accuracy, Left UE  Quality of Movement Other  Comment: RUE NT; per RN pt. removed her shoulder immobilizer. Immobilizer replaced. Hand Dominance:  Hand Dominance  Hand Dominance: Right(Pt. somewhat inconsistent with answers; this may not be accurate)    ADL Status:  ADL  Feeding: Setup  Grooming: Minimal assistance  UE Bathing: Maximum assistance  LE Bathing: Dependent/Total  UE Dressing: Dependent/Total  LE Dressing: Dependent/Total  Toileting: Maximum assistance  Additional Comments: Simulated ADLs as above Pt with functional movement of LUE but decreased sequencing and safety awareness.  Also limited by shoulder immobilizer  Toilet Transfers  Toilet - Technique: Ambulating  Equipment Used: Grab bars  Toilet Transfer: Contact guard assistance, 2 Person assistance  Toilet Transfers Comments: Verbal cues for safety and sequencing, tactile cues for LUE hand placement       Therapy key for assistance levels    Independent = Pt. is able to perform task with no assistance but may require a device   Stand by assistance = Pt. does not perform task at an independent level but does not need physical assistance, requires verbal cues Minimal, Moderate, Maximal Assistance = Pt. requires physical assistance (25%, 50%, 75% assist from helper) for task but is able to actively participate in task   Dependent = Pt. requires total assistance with task and is not able to actively participate with task completion     Functional Mobility:  Functional Mobility  Functional - Mobility Device: (HHA x 2)  Activity: To/from bathroom  Assist Level: Moderate assistance  Functional Mobility Comments: Assist of 2, verbal cues for safety, max increased time. Decreased gait quality noted. Transfers  Sit to stand: Minimal assistance, 2 Person assistance  Stand to sit: Minimal assistance, 2 Person assistance  Transfer Comments: Impulsive and poor safety awareness    Bed Mobility  Bed mobility  Supine to Sit: Stand by assistance  Sit to Supine: Stand by assistance  Comment: Pt. with decreased safety awareness and quick movements throughout mobility    Seated and Standing Balance:  Balance  Sitting Balance: Supervision  Standing Balance: Minimal assistance    Functional Endurance:  Activity Tolerance  Activity Tolerance: Patient limited by fatigue, Treatment limited secondary to decreased cognition  Activity Tolerance: Pt. dozing off at EOB after ambulation to bathroom    D/C Recommendations:  OT D/C RECOMMENDATIONS  REQUIRES OT FOLLOW UP: Yes    Equipment Recommendations:  OT Equipment Recommendations  Other: Continue to assess    OT Education:   OT Education  OT Education: OT Role, Plan of Care  Patient Education: Educated pt. on role of acute care OT  Barriers to Learning: None    OT Follow Up:  OT D/C RECOMMENDATIONS  REQUIRES OT FOLLOW UP: Yes       Assessment/Discharge Disposition:  Assessment: Pt. is a 52year old woman from home alone who presents to [facility] with the above deficits which impact her ability to perform ADLs and IADLs.  Pt. would benefit from continued OT to maximize independence and safety with transfers and mobility - Improve L UE strength and endurance to 4/5 in order to participate in self-care activities as projected. - Access appropriate D/C site with as few architectural barriers as possible. - Sequence self-care tasks with 10% verbal cues for safety    Patient Goal: Patient goals : \"I want to get home\"     Discussed and agreed upon: Yes Comments:     Therapy Time:   OT Individual Minutes  Time In: 1015  Time Out: 1030  Minutes: 15    Eval: 15 minutes     Electronically signed by:     JODEE Peters  12/23/2020, 10:55 AM

## 2020-12-23 NOTE — ED NOTES
Facial droop not observed by this nurse and Sandra Harmon RN.  Face symmetrical.      Rakesh Loo RN  12/22/20 1352

## 2020-12-23 NOTE — ED TRIAGE NOTES
Patient presents with her SO for complaints of confusion and right sided facial droop. Patient was last seen well at 0530 today when her SO left for work. When he arrived home from work, he noticed she was not behaving appropriately. Patient arrives to room via wheelchair, is able to stand and move all extremities, speech is clear, and patient follows commands. Patient alert and oriented to person. Skin is pink, warm, and dry. Respirations equal and unlabored.

## 2020-12-23 NOTE — H&P
 CYST REMOVAL  3/7/16    Dr. Chase Mcguire Left     UPPER GASTROINTESTINAL ENDOSCOPY  11/24/2014    ANIBAL HOUSE M.D.   [place] UPPER GASTROINTESTINAL ENDOSCOPY N/A 7/28/2020    EGD ESOPHAGOGASTRODUODENOSCOPY performed by Felisa Morse MD at [address]:    Family History   Problem Relation Age of Onset    High Blood Pressure Mother     Kidney Disease Mother     Lupus Mother     Coronary Art Dis Mother         Had stents in her 62s   Jam[place] Self Bipolar Disorder Son      SOCIAL HISTORY:    Social History     Socioeconomic History    Marital status:       Spouse name: Not on file    Number of children: Not on file    Years of education: Not on file    Highest education level: Not on file   Occupational History    Occupation: unemployed   Social Needs    Financial resource strain: Not on file    Food insecurity     Worry: Not on file     Inability: Not on file   [employer] needs     Medical: Not on file     Non-medical: Not on file   Tobacco Use    Smoking status: Current Every Day Smoker     Packs/day: 1.00     Years: 17.00     Pack years: 17.00     Types: Cigarettes    Smokeless tobacco: Never Used   Substance and Sexual Activity    Alcohol use: No    Drug use: No    Sexual activity: Not Currently   Lifestyle    Physical activity     Days per week: Not on file     Minutes per session: Not on file    Stress: Not on file   Relationships    Social connections     Talks on phone: Not on file     Gets together: Not on file     Attends Anabaptist service: Not on file     Active member of club or organization: Not on file     Attends meetings of clubs or organizations: Not on file     Relationship status: Not on file    Intimate partner violence     Fear of current or ex partner: Not on file     Emotionally abused: Not on file     Physically abused: Not on file Forced sexual activity: Not on file   Other Topics Concern    Not on file   Social History Narrative    Keyanna Alfredo is a 68-year-old female who is on disability because of pain and bipolar disorder. She's also had a presumed diagnosis of possible multiple sclerosis. She's been seeing Dr. Angelito Quinteros for that and she says that the diagnosis is sometimes in question and it's clear neuropathy vitamin B12 deficiency as well as generalized bodyaches from the severe vitamin D deficiency have caused this scenario that looks like multiple sclerosis. Lives With: Significant other    Type of Home: House Two level    Home Access: Stairs to enter with rails  - Number of Steps: 5    Bathroom Shower/Tub: Tub only    ADL Assistance: Independent    Homemaking Assistance: Independent    Homemaking Responsibilities: Yes    Meal Prep Responsibility: Primary    Laundry Responsibility: Primary    Cleaning Responsibility: Primary    Bill Paying/Finance Responsibility: Primary    Shopping Responsibility: Primary    Ambulation Assistance: Independent    Transfer Assistance: Independent    Active : Yes    Occupation: Full time employment    Type of occupation: disabled-  - in charges of [address]:   Prior to Admission medications    Medication Sig Start Date End Date Taking? Authorizing Provider   Na Sulfate-K Sulfate-Mg Sulf 17.5-3.13-1.6 GM/177ML SOLN As directed 9/2/20   Lurdes Paul MD   butalbital-aspirin-caffeine [facility]) -05 MG capsule Take 1 capsule by mouth every 4 hours as needed for Headaches for up to 10 days. 8/21/20 8/31/20  NORBERTO Subramanian   ondansetron (ZOFRAN ODT) 4 MG disintegrating tablet Take 1-2 tablets by mouth every 12 hours as needed for Nausea May Sub regular tablet (non-ODT) if insurance does not cover ODT.  6/14/20   Ana Gillis PA-C dicyclomine (BENTYL) 10 MG capsule Take 1 capsule by mouth 4 times daily (before meals and nightly) 6/14/20   Tessa Fajardo PA-C   atenolol (TENORMIN) 50 MG tablet Take 1 tablet by mouth daily 4/13/20   Anand Sherman,    dicyclomine (BENTYL) 10 MG capsule Take 1 capsule by mouth every 6 hours as needed (cramps) 3/1/20   Shaila Hassan PA-C   ondansetron (ZOFRAN ODT) 4 MG disintegrating tablet Take 1 tablet by mouth every 8 hours as needed for Nausea 3/1/20   Wali Carreon PA-C   nitroGLYCERIN (NITROSTAT) 0.4 MG SL tablet up to max of 3 total doses.  If no relief after 1 dose, call 911. 1/9/20   NORBERTO Coello   diltiazem (CARDIZEM CD) 240 MG extended release capsule Take 1 capsule by mouth daily HOLD for SBP<100 or HR<60 1/9/20   NORBERTO Coello   isosorbide mononitrate (IMDUR) 30 MG extended release tablet Take 1 tablet by mouth daily 1/9/20   NORBERTO Coello   losartan (COZAAR) 100 MG tablet Take 1 tablet by mouth daily 1/9/20   NORBERTO Coello   atorvastatin (LIPITOR) 10 MG tablet Take 1 tablet by mouth daily 1/9/20   NORBERTO Coello   cyclobenzaprine (FLEXERIL) 10 MG tablet Take 1 tablet by mouth 2 times daily as needed for Muscle spasms 11/26/19   Ruchi De MD   Cholecalciferol 50 MCG (2000 UT) TABS Take 1 tablet by mouth daily 5/22/18   Historical Provider, MD   vitamin D (CHOLECALCIFEROL) 09982 UNIT CAPS Take 50,000 Units by mouth once a week 10/23/19 1/9/20  Historical Provider, MD   cyanocobalamin 1000 MCG/ML injection Inject 1 mL into the skin every 30 days 7/25/18   Historical Provider, MD   fluticasone (FLONASE) 50 MCG/ACT nasal spray 1 spray by NOT APPLICABLE route 2 times daily 4/12/19   Historical Provider, MD   hydrochlorothiazide (HYDRODIURIL) 25 MG tablet Take 25 mg by mouth daily 7/29/18   Historical Provider, MD   magnesium oxide (MAG-OX) 400 MG tablet Take 1 tablet by mouth daily 9/30/19   Historical Provider, MD albuterol sulfate HFA (PROVENTIL HFA) 108 (90 Base) MCG/ACT inhaler Inhale 2 puffs into the lungs every 6 hours as needed for Wheezing 10/23/19   Phoebe Leggett MD   tiZANidine (ZANAFLEX) 4 MG tablet Take 1 tablet by mouth every 6 hours as needed (pain) 10/13/19   Mariano Chang, PA   FREESTYLE LANCETS MISC USE DAILY AS DIRECTED 8/21/19   Phoebe Leggett MD   butalbital-APAP-caffeine -40 MG CAPS per capsule Take 1 capsule by mouth every 4 hours as needed for Migraine 8/10/19   Saba Gómez MD   ALPRAZolam Yessica Heys) 0.5 MG tablet Take 0.5 mg by mouth 2 times daily.   4/9/19   Historical Provider, MD   levothyroxine (SYNTHROID) 25 MCG tablet TAKE 1 TABLET BY MOUTH DAILY 7/22/19   Phoebe Leggett MD   budesonide (PULMICORT) 0.5 MG/2ML nebulizer suspension INHALE 1 VIAL VIA NEBULIZER TWICE DAILY 6/16/19   Phoebe Leggett MD   BD INTEGRA SYRINGE 25G X 1\" 3 ML MISC USE AS DIRECTED EVERY MONTH 5/28/19   Phoebe Leggett MD   diclofenac (VOLTAREN) 75 MG EC tablet Take 1 tablet by mouth 2 times daily 4/24/19   Phoebe Leggett MD   amitriptyline (ELAVIL) 50 MG tablet Take 1 tablet by mouth nightly 4/10/19   NIKI Liang CNP   aspirin 81 MG chewable tablet Take 1 tablet by mouth daily 4/10/19   NIKI Liang CNP   folic acid (FOLVITE) 1 MG tablet Take 1 tablet by mouth daily 4/10/19   NIKI Liang CNP   lithium 300 MG capsule Take 1 capsule by mouth daily  Patient taking differently: Take 600 mg by mouth 2 times daily (with meals)  4/10/19   NIKI Liang CNP   lurasidone (LATUDA) 20 MG TABS tablet TAKE 1 TABLET BY MOUTH EVERY DAY 4/10/19   NIKI Almanzar CNP   pantoprazole (PROTONIX) 40 MG tablet Take 1 tablet by mouth every morning (before breakfast) 4/10/19   Renate Manzano, APRN - CNP   FREESTYLE LITE strip USE DAILY AS DIRECTED 2/14/19   Phoebe Leggett MD   meclizine (ANTIVERT) 25 MG tablet Take 25 mg by mouth 3 times daily as needed    Historical Provider, MD ipratropium-albuterol (DUONEB) 0.5-2.5 (3) MG/3ML SOLN nebulizer solution Inhale 3 mLs into the lungs three times daily 12/18/18   Srinivasan Santoro MD   VENTOLIN  (83 Base) MCG/ACT inhaler Inhale 2 puffs into the lungs every 6 hours as needed for Wheezing 12/18/18   Srinivasan Santoro MD   pregabalin (LYRICA) 50 MG capsule Take 1 capsule by mouth 2 times daily for 15 days. . 11/8/18 1/9/20  Kristina Tam MD   calcium carbonate (TUMS) 500 MG chewable tablet 1 tablet as needed  6/4/18   Historical Provider, MD       ALLERGIES: Latex, Influenza vaccines, Eggs or egg-derived products, Gabapentin, Pneumococcal vaccines, Povidone iodine, and Varicella virus vaccine live    REVIEW OF SYSTEM:   Review of Systems   Unable to perform ROS: Mental status change   Constitutional: Negative for chills and fever. Cardiovascular: Negative for chest pain. Gastrointestinal: Negative for abdominal pain. Neurological: Positive for headaches. Negative for seizures. OBJECTIVE  PHYSICAL EXAM: BP (!) 157/96   Pulse 77   Temp 99 °F (37.2 °C) (Oral)   Resp 17   Ht 5' 7\" (1.702 m)   Wt 192 lb (87.1 kg)   SpO2 95%   BMI 30.07 kg/m²     Physical Exam  Constitutional:       General: She is not in acute distress. Appearance: She is ill-appearing. Comments: Sleepy but arousable. Follows simple commands  Slurred speech   HENT:      Head: Normocephalic. Nose: Nose normal.      Mouth/Throat:      Mouth: Mucous membranes are moist.   Eyes:      Conjunctiva/sclera: Conjunctivae normal.      Pupils: Pupils are equal, round, and reactive to light. Neck:      Musculoskeletal: No muscular tenderness. Vascular: No carotid bruit. Cardiovascular:      Rate and Rhythm: Normal rate. Rhythm irregular. Heart sounds: No murmur. Friction rub present. No gallop. Pulmonary:      Effort: Pulmonary effort is normal.      Breath sounds: No wheezing or rhonchi.    Abdominal: Tenderness: There is no abdominal tenderness. Musculoskeletal:      Right lower leg: No edema. Left lower leg: No edema. Lymphadenopathy:      Cervical: No cervical adenopathy. Skin:     General: Skin is warm and dry. Capillary Refill: Capillary refill takes less than 2 seconds. Neurological:      Comments: Slurred speech. sleepy         DATA:     Diagnostic tests reviewed for today's visit:    Most recent labs and imaging results reviewed.      LABS:    Recent Results (from the past 24 hour(s))   POCT Glucose    Collection Time: 12/22/20 10:49 PM   Result Value Ref Range    POC Glucose 246 (H) 60 - 115 mg/dl    Performed on ACCU-CHEK    Comprehensive Metabolic Panel    Collection Time: 12/22/20 11:00 PM   Result Value Ref Range    Sodium 135 135 - 144 mEq/L    Potassium 3.7 3.4 - 4.9 mEq/L    Chloride 100 95 - 107 mEq/L    CO2 24 20 - 31 mEq/L    Anion Gap 11 9 - 15 mEq/L    Glucose 249 (H) 70 - 99 mg/dL    BUN 9 6 - 20 mg/dL    CREATININE 0.78 0.50 - 0.90 mg/dL    GFR Non-African American >60.0 >60    GFR  >60.0 >60    Calcium 9.6 8.5 - 9.9 mg/dL    Total Protein 6.7 6.3 - 8.0 g/dL    Alb 4.6 3.5 - 4.6 g/dL    Total Bilirubin 0.5 0.2 - 0.7 mg/dL    Alkaline Phosphatase 160 (H) 40 - 130 U/L     (H) 0 - 33 U/L    AST 73 (H) 0 - 35 U/L    Globulin 2.1 (L) 2.3 - 3.5 g/dL   CBC Auto Differential    Collection Time: 12/22/20 11:00 PM   Result Value Ref Range    WBC 10.4 4.8 - 10.8 K/uL    RBC 4.77 4.20 - 5.40 M/uL    Hemoglobin 15.7 12.0 - 16.0 g/dL    Hematocrit 45.4 37.0 - 47.0 %    MCV 95.1 82.0 - 100.0 fL    MCH 33.0 (H) 27.0 - 31.3 pg    MCHC 34.7 33.0 - 37.0 %    RDW 13.5 11.5 - 14.5 %    Platelets 491 464 - 865 K/uL    Neutrophils % 69.6 %    Lymphocytes % 21.1 %    Monocytes % 7.5 %    Eosinophils % 1.1 %    Basophils % 0.7 %    Neutrophils Absolute 7.2 (H) 1.4 - 6.5 K/uL    Lymphocytes Absolute 2.2 1.0 - 4.8 K/uL    Monocytes Absolute 0.8 0.2 - 0.8 K/uL Eosinophils Absolute 0.1 0.0 - 0.7 K/uL    Basophils Absolute 0.1 0.0 - 0.2 K/uL   Magnesium    Collection Time: 12/22/20 11:00 PM   Result Value Ref Range    Magnesium 1.9 1.7 - 2.4 mg/dL   Protime-INR    Collection Time: 12/22/20 11:00 PM   Result Value Ref Range    Protime 12.3 12.3 - 14.9 sec    INR 0.9    APTT    Collection Time: 12/22/20 11:00 PM   Result Value Ref Range    aPTT 31.0 24.4 - 36.8 sec   Troponin    Collection Time: 12/22/20 11:00 PM   Result Value Ref Range    Troponin <0.010 0.000 - 0.010 ng/mL   Ethanol    Collection Time: 12/22/20 11:00 PM   Result Value Ref Range    Ethanol Lvl <10 mg/dL    Ethanol percent Not indicated G/dL   EKG 12 Lead - Chest Pain    Collection Time: 12/22/20 11:24 PM   Result Value Ref Range    Ventricular Rate 76 BPM    Atrial Rate 76 BPM    P-R Interval 172 ms    QRS Duration 96 ms    Q-T Interval 408 ms    QTc Calculation (Bazett) 459 ms    P Axis 54 degrees    R Axis 48 degrees    T Axis 82 degrees   POCT Venous    Collection Time: 12/22/20 11:50 PM   Result Value Ref Range    POC Creatinine 0.7 0.6 - 1.1 mg/dL    GFR Non-African American >60 >60    GFR  >60 >60    Sample Type OLIVER     Performed on SEE BELOW    POCT CREATININE    Collection Time: 12/22/20 11:56 PM   Result Value Ref Range    POC CREATININE WHOLE BLOOD 0.78    PAGG ASP    Collection Time: 12/23/20 12:04 AM   Result Value Ref Range    Platelet Aggregation 562 (H) 350 - 549 ARU       IMAGING:  No results found. VTE Prophylaxis: asa   lovenox    ASSESSMENT AND PLAN      Stroke determined by clinical assessment    CT suspicious   Left eye droops. difficulty walking   She can answer questions simply and follow simple commands. MAD x 4   Dr. Zuhair Durand requests CTA had and neck   Done --  awaiting read. She has not been taking her meds including BP meds at home according to her . She is still running Hypertensive. She has HA.

## 2020-12-24 LAB
ALBUMIN SERPL-MCNC: 3.8 G/DL (ref 3.5–4.6)
ALP BLD-CCNC: 132 U/L (ref 40–130)
ALT SERPL-CCNC: 101 U/L (ref 0–33)
ANION GAP SERPL CALCULATED.3IONS-SCNC: 10 MEQ/L (ref 9–15)
AST SERPL-CCNC: 56 U/L (ref 0–35)
BASOPHILS ABSOLUTE: 0 K/UL (ref 0–0.2)
BASOPHILS RELATIVE PERCENT: 0.4 %
BILIRUB SERPL-MCNC: 0.6 MG/DL (ref 0.2–0.7)
BUN BLDV-MCNC: 9 MG/DL (ref 6–20)
CALCIUM SERPL-MCNC: 9 MG/DL (ref 8.5–9.9)
CHLORIDE BLD-SCNC: 108 MEQ/L (ref 95–107)
CHOLESTEROL, TOTAL: 163 MG/DL (ref 0–199)
CO2: 23 MEQ/L (ref 20–31)
CREAT SERPL-MCNC: 0.7 MG/DL (ref 0.5–0.9)
EOSINOPHILS ABSOLUTE: 0.1 K/UL (ref 0–0.7)
EOSINOPHILS RELATIVE PERCENT: 1 %
GFR AFRICAN AMERICAN: >60
GFR NON-AFRICAN AMERICAN: >60
GLOBULIN: 2.4 G/DL (ref 2.3–3.5)
GLUCOSE BLD-MCNC: 165 MG/DL (ref 70–99)
HBA1C MFR BLD: 8 % (ref 4.8–5.9)
HCT VFR BLD CALC: 39.9 % (ref 37–47)
HDLC SERPL-MCNC: 27 MG/DL (ref 40–59)
HEMOGLOBIN: 13.6 G/DL (ref 12–16)
LDL CHOLESTEROL CALCULATED: 86 MG/DL (ref 0–129)
LYMPHOCYTES ABSOLUTE: 1.7 K/UL (ref 1–4.8)
LYMPHOCYTES RELATIVE PERCENT: 23.2 %
MCH RBC QN AUTO: 32.2 PG (ref 27–31.3)
MCHC RBC AUTO-ENTMCNC: 34.1 % (ref 33–37)
MCV RBC AUTO: 94.5 FL (ref 82–100)
MONOCYTES ABSOLUTE: 0.5 K/UL (ref 0.2–0.8)
MONOCYTES RELATIVE PERCENT: 7.1 %
NEUTROPHILS ABSOLUTE: 4.9 K/UL (ref 1.4–6.5)
NEUTROPHILS RELATIVE PERCENT: 68.3 %
PDW BLD-RTO: 13 % (ref 11.5–14.5)
PLATELET # BLD: 141 K/UL (ref 130–400)
POTASSIUM REFLEX MAGNESIUM: 3.6 MEQ/L (ref 3.4–4.9)
RBC # BLD: 4.23 M/UL (ref 4.2–5.4)
SODIUM BLD-SCNC: 141 MEQ/L (ref 135–144)
TOTAL PROTEIN: 6.2 G/DL (ref 6.3–8)
TRIGL SERPL-MCNC: 251 MG/DL (ref 0–150)
WBC # BLD: 7.1 K/UL (ref 4.8–10.8)

## 2020-12-24 PROCEDURE — 99231 SBSQ HOSP IP/OBS SF/LOW 25: CPT | Performed by: PHYSICAL MEDICINE & REHABILITATION

## 2020-12-24 PROCEDURE — 6370000000 HC RX 637 (ALT 250 FOR IP): Performed by: NURSE PRACTITIONER

## 2020-12-24 PROCEDURE — 2580000003 HC RX 258: Performed by: NURSE PRACTITIONER

## 2020-12-24 PROCEDURE — 1210000000 HC MED SURG R&B

## 2020-12-24 PROCEDURE — 6370000000 HC RX 637 (ALT 250 FOR IP): Performed by: PSYCHIATRY & NEUROLOGY

## 2020-12-24 PROCEDURE — 92523 SPEECH SOUND LANG COMPREHEN: CPT

## 2020-12-24 PROCEDURE — 83036 HEMOGLOBIN GLYCOSYLATED A1C: CPT

## 2020-12-24 PROCEDURE — 6360000002 HC RX W HCPCS: Performed by: NURSE PRACTITIONER

## 2020-12-24 PROCEDURE — 85025 COMPLETE CBC W/AUTO DIFF WBC: CPT

## 2020-12-24 PROCEDURE — 36415 COLL VENOUS BLD VENIPUNCTURE: CPT

## 2020-12-24 PROCEDURE — 99233 SBSQ HOSP IP/OBS HIGH 50: CPT | Performed by: PSYCHIATRY & NEUROLOGY

## 2020-12-24 PROCEDURE — 80053 COMPREHEN METABOLIC PANEL: CPT

## 2020-12-24 PROCEDURE — 80061 LIPID PANEL: CPT

## 2020-12-24 RX ORDER — VARENICLINE TARTRATE 1 MG/1
1 TABLET, FILM COATED ORAL 2 TIMES DAILY
Status: ON HOLD | COMMUNITY
End: 2020-12-24

## 2020-12-24 RX ORDER — ISOSORBIDE MONONITRATE 30 MG/1
30 TABLET, EXTENDED RELEASE ORAL DAILY
Status: ON HOLD | COMMUNITY
End: 2021-01-09 | Stop reason: HOSPADM

## 2020-12-24 RX ORDER — DILTIAZEM HYDROCHLORIDE 240 MG/1
240 CAPSULE, EXTENDED RELEASE ORAL DAILY
Status: ON HOLD | COMMUNITY
End: 2020-12-24

## 2020-12-24 RX ORDER — PRAZOSIN HYDROCHLORIDE 1 MG/1
1 CAPSULE ORAL NIGHTLY
COMMUNITY
End: 2021-04-06 | Stop reason: ALTCHOICE

## 2020-12-24 RX ADMIN — SODIUM CHLORIDE: 9 INJECTION, SOLUTION INTRAVENOUS at 12:26

## 2020-12-24 RX ADMIN — Medication 10 ML: at 20:38

## 2020-12-24 RX ADMIN — ASPIRIN 81 MG: 81 TABLET, COATED ORAL at 12:25

## 2020-12-24 RX ADMIN — ATORVASTATIN CALCIUM 20 MG: 20 TABLET, FILM COATED ORAL at 20:38

## 2020-12-24 RX ADMIN — CLOPIDOGREL BISULFATE 75 MG: 75 TABLET, FILM COATED ORAL at 12:25

## 2020-12-24 RX ADMIN — ENOXAPARIN SODIUM 40 MG: 40 INJECTION SUBCUTANEOUS at 12:25

## 2020-12-24 ASSESSMENT — ENCOUNTER SYMPTOMS
NAUSEA: 0
VOMITING: 0
CHOKING: 0
TROUBLE SWALLOWING: 0
COLOR CHANGE: 0
BACK PAIN: 0
SHORTNESS OF BREATH: 0
PHOTOPHOBIA: 0

## 2020-12-24 ASSESSMENT — PAIN SCALES - GENERAL: PAINLEVEL_OUTOF10: 0

## 2020-12-24 NOTE — PROGRESS NOTES
PHYSICAL EXAM:    BP (!) 159/78   Pulse 72   Temp 98.4 °F (36.9 °C) (Oral)   Resp 16   Ht 5' 7\" (1.702 m)   Wt 192 lb (87.1 kg)   SpO2 100%   BMI 30.07 kg/m²    General Appearance:      Skin:  normal  CVS - Normal sounds, No murmurs , No carotid Bruits  RS -CTA  Abdomen Soft, BS present  Review of Systems   Constitutional: Negative for fever. HENT: Negative for ear pain, tinnitus and trouble swallowing. Eyes: Negative for photophobia and visual disturbance. Respiratory: Negative for choking and shortness of breath. Cardiovascular: Negative for chest pain and palpitations. Gastrointestinal: Negative for nausea and vomiting. Musculoskeletal: Negative for back pain, gait problem, joint swelling, myalgias, neck pain and neck stiffness. Skin: Negative for color change. Allergic/Immunologic: Negative for food allergies. Neurological: Negative for dizziness, tremors, seizures, syncope, facial asymmetry, speech difficulty, weakness, light-headedness, numbness and headaches. Psychiatric/Behavioral: Negative for behavioral problems, confusion, hallucinations and sleep disturbance. She is crying and is not able to tell me why she is crying.   When asked she was very upset with me  Mental Status Exam:             Level of Alertness:   awake            Orientation:   person, place, time                      Attention/Concentration:  normal            Language:  normal      Funduscopic Exam:     Cranial Nerves            Cranial nerve III           Pupils:  equal, round, reactive to light      Cranial nerves III, IV, VI           Extraocular Movements: intact      Cranial nerve V           Facial sensation:  intact      Cranial nerve VII           Facial strength: intact      Cranial nerve VIII           Hearing:  intact      Cranial nerve IX           Palate:  intact      Cranial nerve XI         Shoulder shrug:  intact      Cranial nerve XII          Tongue movement:  normal Motor: There is mild weakness on the right though this is very difficult to certain given that she has a sling in the right arm from shoulder injury.   Drift:  absent  Motor exam is symmetrical 5 out of 5 all extremities bilaterally  Tone:  normal  Abnormal Movements:  absent            Sensory:        Pinprick             Right Upper Extremity:  normal             Left Upper Extremity:  normal             Right Lower Extremity:  normal             Left Lower Extremity:  normal           Vibration                         Touch            Proprioception                 Coordination:           Finger/Nose   Right:  normal              Left:  normal          Heel-Knee-Shin                Right:  normal              Left:  normal          Rapid Alternating Movements              Right:  normal              Left:  normal          Gait:                       Casual: Central gait ataxia is notable                      Romberg:  normal            Reflexes:             Deep Tendon Reflexes:             Reflexes are 2 +             Plantar response:                Right:  downgoing               Left:  downgoing    Vascular:  Cardiac Exam:  normal         Cta Head W Wo Contrast    Result Date: 12/23/2020 EXAMINATION: CTA HEAD W WO CONTRAST, CTA NECK W WO CONTRAST DATE AND TIME:12/23/2020 12:42 AM CLINICAL HISTORY: Stroke  cva  COMPARISON: None TECHNIQUE:Helical CTA of the head was performed from the vertex to the foramen magnum following the uneventful intravenous administration of 100 cc of nonionic contrast without incident. 2-D images were reconstructed in the sagittal and coronal planes. Three Dimensional Maximum Intensity Projection (3D-MIP) images were created. All images were reviewed and primarily archived to PACS workstation. All CT scans at this facility use dose modulation, iterative reconstruction, and/or weight based dosing when appropriate to reduce radiation dose to as low as reasonably achievable. FINDINGS CTA HEAD: Intracranial ICAs: Flow is visualized within the precavernous, cavernous, clinoid and supraclinoid segments of the internal carotid arteries bilaterally    Anterior Cerebral Arteries: The bilaterals  A1 and A2 segments are patent. Middle Cerebral Arteries: Bilateral horizontal, insular, opercular, and cortical segments of the right and left middle cerebral cerebral arteries are patent. Vertebral Arteries And Basilar Artery: There is adequate flow in the intracranial portions of the vertebral arteries and in the basilar artery. Posterior Cerebral Arteries: Bilateral posterior cerebral arteries are patent. Aneurysm No aneurysm or dissection in the anterior or posterior circulations. . Neurocranium The visualized neurocranium is intact. Dural Sinus: As visualized the opacified dural venous sinuses are unremarkable. The major intracranial arterial structures are patent without high-grade stenosis, large vessel cut off, or aneurysm. EXAMINATION: CTA HEAD W WO CONTRAST, CTA NECK W WO CONTRAST DATE AND TIME:12/23/2020 12:42 AM CLINICAL HISTORY: Stroke symptoms   cva  COMPARISON: None TECHNIQUE: Helical CTA of the neck was performed from the aortic arch to the foramen magnum following the uneventful intravenous administration of 100 cc of nonionic contrast without incident. 2-D images were reconstructed in the sagittal and coronal planes. Three Dimensional Maximum Intensity Projection (3D-MIP) images were created. All images were reviewed and primarily archived to PACS workstation. All CT scans at this facility use dose modulation, iterative reconstruction, and/or weight based dosing when appropriate to reduce radiation dose to as low as reasonably achievable. NASCET Criteria were utilized FINDINGS CTA NECK: Aortic Arch: The visualized portions of the aortic arch and proximal arch vessels demonstrates no focal stenosis aneurysm or dissection. Carotids: The common carotid arteries, carotid bifurcations, internal and external carotid arteries are normal in course and caliber. Right  Proximal Internal Carotid Stenosis (% by NASCET Criteria): There is no hemodynamically significant stenosis. Left  Proximal Internal Carotid Stenosis (% by NASCET Criteria): There is no hemodynamically significant stenosis. Vertebral Arteries: Patency: The vertebral arteries are well visualized to the level of the basilar artery. There is no focal stenosis aneurysm or dissection. Vertebral arteries are codominant. IMPRESSION: Negative CTA of the neck.      Ct Head Wo Contrast    Result Date: 12/23/2020 CT Brain Contrast medium:  Not utilized. History:  Right facial droop. Altered mental status Comparison:  August 21, 2020 Findings: Extra-axial spaces:  Normal. Intracranial hemorrhage:  None. Ventricular system: In the interval, an ill-defined 4 x 12 mm area decreased attenuation exerting no mass effect is found medially within the left thalamus (series 2, image 16). Basal Cisterns:  Normal. Cerebral Parenchyma:  Normal. Midline Shift:  None. Cerebellum:  Normal. Paranasal sinuses and mastoid air cells:  Normal. Visualized Orbits:  Normal.     Impression: Interval development of acute/subacute left thalamic infarct. All CT scans at this facility use dose modulation, iterative reconstruction, and/or weight based dosing when appropriate to reduce radiation dose to as low as reasonably achievable.     Cta Neck W Wo Contrast    Result Date: 12/23/2020 The major intracranial arterial structures are patent without high-grade stenosis, large vessel cut off, or aneurysm. EXAMINATION: CTA HEAD W WO CONTRAST, CTA NECK W WO CONTRAST DATE AND TIME:12/23/2020 12:42 AM CLINICAL HISTORY: Stroke symptoms   cva  COMPARISON: None TECHNIQUE: Helical CTA of the neck was performed from the aortic arch to the foramen magnum following the uneventful intravenous administration of 100 cc of nonionic contrast without incident. 2-D images were reconstructed in the sagittal and coronal planes. Three Dimensional Maximum Intensity Projection (3D-MIP) images were created. All images were reviewed and primarily archived to PACS workstation. All CT scans at this facility use dose modulation, iterative reconstruction, and/or weight based dosing when appropriate to reduce radiation dose to as low as reasonably achievable. NASCET Criteria were utilized FINDINGS CTA NECK: Aortic Arch: The visualized portions of the aortic arch and proximal arch vessels demonstrates no focal stenosis aneurysm or dissection. Carotids: The common carotid arteries, carotid bifurcations, internal and external carotid arteries are normal in course and caliber. Right  Proximal Internal Carotid Stenosis (% by NASCET Criteria): There is no hemodynamically significant stenosis. Left  Proximal Internal Carotid Stenosis (% by NASCET Criteria): There is no hemodynamically significant stenosis. Vertebral Arteries: Patency: The vertebral arteries are well visualized to the level of the basilar artery. There is no focal stenosis aneurysm or dissection. Vertebral arteries are codominant. IMPRESSION: Negative CTA of the neck.      Mri Brain Wo Contrast    Result Date: 12/23/2020 EXAMINATION: MRI BRAIN WO CONTRAST CLINICAL HISTORY:  stroke COMPARISONS: NONE AVAILABLE TECHNIQUE: Multiplanar multisequence images of the brain were obtained without contrast. Diffusion perfusion imaging was obtained. FINDINGS:  There are no extra-axial collections. There is no evidence of hemorrhage. There is specific association with signal dropout on ADC map in the left thalamus and region measuring 7 x15 mm indicating subacute ischemia. There is corresponding signal abnormality on T2/FLAIR series. The susceptibility images do not demonstrate evidence of hemosiderin deposition within the brain parenchyma or the leptomeninges. There is preservation of the gray-white matter differentiation. There are a few scattered foci of T2/FLAIR increased signal in the subcortical and periventricular white matter without associated edema or mass effect. The sulci and ventricles are within normal limits without evidence of hydrocephalus. The midline structures are intact, the corpus callosum is within normal limits. The region of the pineal gland and the sella turcica are unremarkable. There are no space-occupying lesions in the posterior fossa. The basilar cisterns are patent. The craniocervical junction is unremarkable. The visualized portions of the orbits are within normal limits, the globes are intact. The visualized portions of the paranasal sinuses are within normal limits. The calvarium and soft tissues are unremarkable. There is a 7 x 15 mm area of subacute ischemia in the left thalamus.       Recent Labs     12/22/20 2300 12/24/20  0531   WBC 10.4 7.1   HGB 15.7 13.6    141     Recent Labs     12/22/20 2300 12/22/20  2350 12/23/20  0507 12/24/20  0531     --  137 141   K 3.7  --  3.4 3.6     --  101 108*   CO2 24  --  24 23   BUN 9  --  9 9   CREATININE 0.78 0.7 0.74 0.70   GLUCOSE 249*  --  167* 165*     Recent Labs     12/22/20  2300 12/23/20  0507 12/24/20  0531   BILITOT 0.5 0.4 0.6 ALKPHOS 160* 138* 132*   AST 73* 57* 56*   * 104* 101*     Lab Results   Component Value Date    PROTIME 12.3 12/22/2020    INR 0.9 12/22/2020     Lab Results   Component Value Date    LITHIUM 0.2 11/19/2019       ASSESSMENT AND PLAN    Left thalamic stroke with initial encephalopathy related to the left thalamic stroke the patient is showing some improvement in her encephalopathy from the stroke is now left with some right-sided findings and a subcortical aphasia and dysphagia. Patient is crying but cannot tell me why she is crying and could have pseudobulbar affect seen acutely with strokes or she has underlying major depression. Patient's CHAO is pending. We will continue her dual antiplatelet therapy given this findings. Patient had a complete assessment with a modified barium swallow as well. She will likely require therapy. We are working with  for the same. Findings were relayed to the patient. Steven Lopez MD, Jemima Pathak, American Board of Psychiatry & Neurology  Board Certified in Vascular Neurology  Board Certified in Neuromuscular Medicine  Certified in . [address]

## 2020-12-24 NOTE — PROGRESS NOTES
Subjective: The patient complains of severe acute on chronic progressive weakness poor vision and left facial droop partially relieved by rest, PT, OT and meds and exacerbated by exertion and recent illness. I am concerned about patients medical complexities. Reviewed recent nursing note, \"  Attempted to call pts Madyson byrd. No answer. Phone number is [phone]. \".    ROS x10: The patient also complains of severely impaired mobility and activities of daily living. Otherwise no new problems with vision, hearing, nose, mouth, throat, dermal, cardiovascular, GI, , pulmonary, musculoskeletal, psychiatric or neurological. See Rehab consult on Rehab chart . Vital signs:  BP (!) 159/78   Pulse 72   Temp 98.4 °F (36.9 °C) (Oral)   Resp 16   Ht 5' 7\" (1.702 m)   Wt 192 lb (87.1 kg)   SpO2 100%   BMI 30.07 kg/m²   I/O:   PO/Intake:  NPO    Bowel/Bladder:   incontinent,    General:  Patient is well developed, adequately nourished, non-obese and     well kempt. HEENT:    PERRLA, hearing intact to loud voice, external inspection of ear     and nose benign. Inspection of lips, tongue and gums benign  Musculoskeletal: No significant change in strength or tone. All joints stable. Inspection and palpation of digits and nails show no clubbing,       cyanosis or inflammatory conditions. Neuro/Psychiatric: Affect: flat-  Alert and oriented to self and     Situation mod cues-doubled speech. No significant change in deep tendon reflexes or     sensation  Lungs:  Diminished, CTA-B. Respiration effort is normal at rest.     Heart:   S1 = S2,  RRR. No loud murmurs. Abdomen:  Soft, non-tender, no enlargement of liver or spleen. Extremities:  No significant lower extremity edema or tenderness. Skin:    BUE bruises dt blood draws, no visualized or palpated problems.     Rehabilitation:  Physical therapy: FIMS:  Bed Mobility:      Transfers: Sit to Stand: Minimal Assistance Eosinophils % 1.0 %    Basophils % 0.4 %    Neutrophils Absolute 4.9 1.4 - 6.5 K/uL    Lymphocytes Absolute 1.7 1.0 - 4.8 K/uL    Monocytes Absolute 0.5 0.2 - 0.8 K/uL    Eosinophils Absolute 0.1 0.0 - 0.7 K/uL    Basophils Absolute 0.0 0.0 - 0.2 K/uL   Comprehensive Metabolic Panel w/ Reflex to MG    Collection Time: 12/24/20  5:31 AM   Result Value Ref Range    Sodium 141 135 - 144 mEq/L    Potassium reflex Magnesium 3.6 3.4 - 4.9 mEq/L    Chloride 108 (H) 95 - 107 mEq/L    CO2 23 20 - 31 mEq/L    Anion Gap 10 9 - 15 mEq/L    Glucose 165 (H) 70 - 99 mg/dL    BUN 9 6 - 20 mg/dL    CREATININE 0.70 0.50 - 0.90 mg/dL    GFR Non-African American >60.0 >60    GFR  >60.0 >60    Calcium 9.0 8.5 - 9.9 mg/dL    Total Protein 6.2 (L) 6.3 - 8.0 g/dL    Alb 3.8 3.5 - 4.6 g/dL    Total Bilirubin 0.6 0.2 - 0.7 mg/dL    Alkaline Phosphatase 132 (H) 40 - 130 U/L     (H) 0 - 33 U/L    AST 56 (H) 0 - 35 U/L    Globulin 2.4 2.3 - 3.5 g/dL       Previous extensive, complex labs, notes and diagnostics reviewed and analyzed.      ALLERGIES:    Allergies as of 12/22/2020 - Review Complete 12/22/2020   Allergen Reaction Noted    Latex Itching 03/25/2018    Influenza vaccines Swelling 10/08/2015    Eggs or egg-derived products  10/08/2015    Gabapentin Nausea Only 10/31/2017    Pneumococcal vaccines Hives 01/28/2016    Povidone iodine Itching 10/08/2015    Varicella virus vaccine live Hives 01/28/2016      (please also verify by checking [facility])     Complex Physical Medicine & Rehab Issues Assess & Plan: 1. Severe abnormality of gait and mobility and impaired self-care and ADL's secondary to progressive and recurrent cerebrovascular disease with clinically diagnosed CVA. Functional and medical status reassessed regarding patients ability to participate in therapies and patient found to be able to participate in skilled versus acute intensive comprehensive inpatient rehabilitation program including PT/OT to improve balance, ambulation, ADLs, and to improve the P/AROM. It is my opinion that they will be able to tolerate 3 hours of therapy a day and benefit from it at an acute level. 2. Bowel constipation and Bladder dysfunction overactive bladder:  frequent toileting, ambulate to bathroom with assistance, check post void residuals. Check for C.difficile x1 if >2 loose stools in 24 hours, continue bowel & bladder program.  Monitor for UTI symptoms including lethargy and confusion  3. Moderate to severe neuropathic and low back pain and generalized OA pain: reassess pain every shift and prior to and after each therapy session, give prn  Tylenol, modalities prn in therapy, consider Lidoderm, K-pad prn.   4. Skin breakdown risk:  continue pressure relief program.  Daily skin exams and reports from nursing. 5. Severe fatigue due to immobility and nutritional deficits: Add vitamin B12 vitamin D and CoQ10 titrate dosing and add protein supplementation with low carb content. 6. Complex discharge planning:   Await speech and language pathology evaluation of swallow. Complex Active General Medical Issues that complicate care Assess & Plan:     1. Principal Problem:    Stroke determined by clinical assessment [facility])  Active Problems:    Bipolar 1 disorder ([facility] [address]    CAD (coronary artery disease)    HTN (hypertension)    Vitamin B12 deficiency    Neuromyelopathy due to vitamin B12 deficiency (HCC)    Sleep apnea    Obesity (BMI 30-39. 9)    Right arm weakness    COPD exacerbation (HCC)  Resolved Problems: * No resolved hospital problems.  Jacinta Branch D.O., PM&R     Attending    [address]

## 2020-12-24 NOTE — PROGRESS NOTES
Physician Progress Note      Farrah Cali  CSN #:                  [id number]  :                       1970  ADMIT DATE:       2020 10:44 PM  [address] [place] DATE:  RESPONDING  PROVIDER #:        Daysi Hood MD          QUERY TEXT:    Pt admitted with AMS, Lt eye droop and garbled speech, possible CVA. Pt noted   to have elevated BP, c/o h/a. If possible, please document in progress notes   and discharge summary if you are evaluating and/or treating any of the   following: The medical record reflects the following:  Risk Factors: HTN, possible CVA  Clinical Indicators: BP on admission 189/98x2 192/96 196/111. Pt with possible   CVA, AMS, c/o HA  Treatment: Hydralazine 10mg IV, Labetalol 10 mg IV, On atenolol, imdur,   losartan, Cardizem at home, monitoring    Thank you, Haroldine Duverney RN BSN  CDS  [phone]    Hypertensive Crisis, unspecified: at least 2 consecutive readings of SBP > 180   mmHg or DBP > 110 mmHg  - Hypertensive Urgency: Hypertensive crisis w/o associated organ dysfunction. S/s may or may not be present, but can include severe headache, SOB,   epistaxis, severe anxiety. Tx: adjustment of oral antihypertensives; IV meds   not usually required. - Hypertensive Emergency: Hypertensive crisis w/ associated organ damage   (stroke, encephalopathy, ALIX, MI, angina, acute or decompensated CHF, acute   pulmonary edema, HELLP, etc.). Requires immediate treatment (usually IV meds)   & possible ICU admission. Associated organ dysfunction needs documented.   [email or web address]  Options provided:  -- Hypertensive Crisis  -- Hypertensive Emergency  -- Hypertensive Urgency  -- Other - I will add my own diagnosis  -- Disagree - Not applicable / Not valid  -- Disagree - Clinically unable to determine / Unknown  -- Refer to Clinical Documentation Reviewer PROVIDER RESPONSE TEXT:    This patient has hypertensive urgency.     Query created by: Raciel Morse on 12/23/2020 8:08 AM      Electronically signed by:  Kishor Downing MD 12/23/2020 10:12 PM

## 2020-12-24 NOTE — FLOWSHEET NOTE
Notified Dr. Montaño Cargo of Cardiology consult for CHAO via [address] [place] [address]

## 2020-12-24 NOTE — PROGRESS NOTES
Physical Therapy Med Surg Daily Treatment Note  Facility/Department: [facility]  Room: B794/T328-54       NAME: Jam Kirk  : 1970 (52 y.o.)  MRN: [id number]  CODE STATUS: Full Code    Date of Service: 2020    Patient Diagnosis(es): Stroke determined by clinical assessment [facility]) [I63.9]   Chief Complaint   Patient presents with    Altered Mental Status     Patient Active Problem List    Diagnosis Date Noted    Tendinitis of right rotator cuff 2020    Adenomatous polyp of colon     Adenomatous polyp of sigmoid colon     Melena     Chest pain 2020    COPD exacerbation ([address] 2019    Right arm weakness 08/10/2019    [facility] 2019    Sleep apnea 2019    Obesity (BMI 30-39.9) 2019    Dizziness and giddiness 2019    Abnormal auditory perception of left ear 2019    Tinnitus, bilateral 2019    Sensorineural hearing loss (SNHL) of both ears 2019    Vertigo, peripheral, bilateral 2019    Acute bronchitis 2019    Chronic obstructive pulmonary disease ([address] 2018    Neuromyelopathy due to vitamin B12 deficiency ([facility] 2017    Smoking     Chronic pain syndrome  2016    Vitamin B12 deficiency 2016    Grief at loss of child 2016    Multiple sclerosis ([facility]     Colitis     Over weight     Anxiety     HTN (hypertension) 2016    CAD (coronary artery disease) 2014    H/O cardiac arrest 2012    Bipolar 1 disorder ([address] 2012    Tobacco abuse 2012    SAMAYOA (dyspnea on exertion) 2012    Abnormal ECG 2012    Displacement of lumbar intervertebral disc without myelopathy 2012    Acid reflux 2008        Past Medical History:   Diagnosis Date    Anxiety     Back pain     back surgery x 4    Bipolar disorder ([facility]     CAD (coronary artery disease)  [facility] limitation[facility] 11/3/2016    Chronic bronchitis ([facility])     Chronic pain     Colitis     Complicated migraine     DDD (degenerative disc disease), lumbar 2016    Depression     Endometriosis     Excessive caffeine abuse, continuous ([facility]) 2018    Gastritis     GERD (gastroesophageal reflux disease)     History of myocardial infarction     HTN (hypertension), benign 2016    Impaired mobility and activities of daily living     Over weight     PTSD (post-traumatic stress disorder)     Sensory neuropathy 2016    Somatization disorder     TIA (transient ischemic attack)     Tobacco abuse     Vasospastic angina ([facility] 2016     Past Surgical History:   Procedure Laterality Date    BACK SURGERY      x2     SECTION      x2    CHOLECYSTECTOMY  13    Lapchole    COLONOSCOPY N/A 2020    COLONOSCOPY DIAGNOSTIC performed by Ricci Kolb MD at [address] [place] [address]  3/7/16    Dr. Siri Velazquez Left     UPPER GASTROINTESTINAL ENDOSCOPY  2014    ANIBAL HOUSE M.D.   Lani Patel UPPER GASTROINTESTINAL ENDOSCOPY N/A 2020    EGD ESOPHAGOGASTRODUODENOSCOPY performed by Ricci oKlb MD at [facility]     Restrictions  Restrictions/Precautions: Fall Risk(high webster score; R UE immobilizer on at all times- per RN)    SUBJECTIVE   General  Chart Reviewed: Yes  Family / Caregiver Present: No  Subjective  Subjective: Pt answers with 1 word answers.  Denies pain    Pre-Session Pain Report  Pre Treatment Pain Screening  Pain at present: 0  Pain Screening  Patient Currently in Pain: No     Post-Session Pain Report  Pain Assessment  Pain Level: 0     OBJECTIVE   Orientation  Orientation Level: Oriented to place;Oriented to person;Disoriented to situation;Disoriented to time    Bed mobility Supine to Sit: Stand by assistance  Sit to Supine: Unable to assess(pt up to bedside chair)    Transfers  Sit to Stand: Contact guard assistance  Stand to sit: Contact guard assistance  Comment: VC and TC for L hand placement to push up from surface. VC for safety awareness with IV site    Ambulation  Ambulation?: Yes  Ambulation 1  Surface: level tile  Device: Hand-Held Assist  Other Apparatus: Left  Quality of Gait: step to, R LE toed out  Gait Deviations: Shuffles; Slow Radha  Distance: 30ft x2  Comments: Pt holds eye shut throughout, c/p double vision. Appears to have visual field deficit with difficulty scanning the environment noted. tactile cues facilating wt shifting and reciprocal        Neuromuscular Education  NDT Treatment: Standing  Neuromuscular Comments: functional standing tasks, using sink, washing hands, reaching for soap, towel. pt reactive balance intact but mild delay noted  Balance  Posture: Fair  Sitting - Static: Fair;+  Sitting - Dynamic: +;Fair  Standing - Static: Fair(no UE support x 30 seconds, SBA)  Standing - Dynamic: Fair;-(Min A to ambulate with L UE hand held support)    Activity Tolerance  Activity Tolerance: Patient Tolerated treatment well       ASSESSMENT   Body structures, Functions, Activity limitations: Decreased functional mobility ; Decreased strength;Decreased endurance;Decreased coordination;Decreased ADL status; Decreased safe awareness;Decreased sensation;Decreased cognition;Decreased balance; Increased pain  Assessment: Pt requires physical assistance to ambulate short distances in room. Pt appears to have visual field deficit as well as c/o double vision. Pt would benefit from continued PT to facilate safe return home at highest functional indep level. Discharge Recommendations:  Continue to assess pending progress, Patient would benefit from continued therapy after discharge    Goals  Long term goals  Long term goal 1: Pt will demonstrate bed mobility indep. Long term goal 2: Pt will demonstrate transfers indep without AD with safety awareness 100% of the time. Long term goal 3: Pt will demonstrate amb > or = indep without AD with safe awareness 100% of the time  Long term goal 4: Pt will exhibit improved dynamic balance evidenced of 20/24 DGI for decreased risk for falls and safe d/c home alone. Long term goal 5: Pt will demonstrate stair negotiation 5 steps without rails indep to allow pt to enter and exit her home safely. Patient Goals   Patient goals : \"go home\"    PLAN    Times per week: 3-6  Plan Comment: continue per PT POC     [facility] (6 CLICK) T[address] Inpatient Mobility Raw Score : 14     Therapy Time   Individual   Time In 0942   Time Out 1006   Minutes 24   Bed mob/transfers 10 min   Gait 10 min  NMR 4 min        Alysha Mccarthy, PT, 12/24/20 at 10:16 AM     Definitions for assistance levels  Independent = pt does not require any physical supervision or assistance from another person for activity completion. Device may be needed.   Stand by assistance = pt requires verbal cues or instructions from another person, close to but not touching, to perform the activity  Minimal assistance= pt performs 75% or more of the activity; assistance is required to complete the activity  Moderate assistance= pt performs 50% of the activity; assistance is required to complete the activity  Maximal assistance = pt performs 25% of the activity; assistance is required to complete the activity  Dependent = pt requires total physical assistance to accomplish the task

## 2020-12-24 NOTE — PROGRESS NOTES
[facility]   [facility] [address] [facility]  Speech Language Pathology  Initial Speech/Language/Cognitive Assessment    Christelle Abdi  : 1970 (52 y.o.)   MRN: [id number]  ROOM: [id number]  ADMISSION DATE: 2020  PATIENT DIAGNOSIS(ES): Stroke determined by clinical assessment [facility]) [I63.9]  Chief Complaint   Patient presents with    Altered Mental Status     Patient Active Problem List    Diagnosis Date Noted    Tendinitis of right rotator cuff 2020    Adenomatous polyp of colon     Adenomatous polyp of sigmoid colon     Melena     Chest pain 2020    COPD exacerbation ([address] 2019    Right arm weakness 08/10/2019    Stroke determined by clinical assessment ([facility] 2019    Sleep apnea 2019    Obesity (BMI 30-39.9) 2019    Dizziness and giddiness 2019    Abnormal auditory perception of left ear 2019    Tinnitus, bilateral 2019    Sensorineural hearing loss (SNHL) of both ears 2019    Vertigo, peripheral, bilateral 2019    Acute bronchitis 2019    Chronic obstructive pulmonary disease ([address] 2018    Neuromyelopathy due to vitamin B12 deficiency ([address] 2017    Smoking     Chronic pain syndrome  2016    Vitamin B12 deficiency 2016    Grief at loss of child 2016    Multiple sclerosis ([address]     Colitis     Over weight     Anxiety     HTN (hypertension) 2016    CAD (coronary artery disease) 2014    H/O cardiac arrest 2012    Bipolar 1 disorder ([address] 2012    Tobacco abuse 2012    SAMAYOA (dyspnea on exertion) 2012    Abnormal ECG 2012    Displacement of lumbar intervertebral disc without myelopathy 2012    Acid reflux 2008     Past Medical History:   Diagnosis Date    Anxiety     Back pain     back surgery x 4    Bipolar disorder ([address]     CAD (coronary artery disease)  [facility]FL [facility]flow limitation) [facility] 11/3/2016    Chronic bronchitis ([facility])     Chronic pain     Colitis     Complicated migraine     DDD (degenerative disc disease), lumbar 2016    Depression     Endometriosis     Excessive caffeine abuse, continuous ([facility] 2018    Gastritis     GERD (gastroesophageal reflux disease)     History of myocardial infarction     HTN (hypertension), benign 2016    Impaired mobility and activities of daily living     Over weight     PTSD (post-traumatic stress disorder)     Sensory neuropathy 2016    Somatization disorder     TIA (transient ischemic attack)     Tobacco abuse     [facility]ospastic angina ([facility] 2016     Past Surgical History:   Procedure Laterality Date    BACK SURGERY      x2     SECTION      x2    CHOLECYSTECTOMY  13    Lapchole    COLONOSCOPY N/A 2020    COLONOSCOPY DIAGNOSTIC performed by Laurie Hammond MD at [address] [place] [address]  3/7/16    Dr. Reyna Ha[facility]     UPPER GASTROINTESTINAL ENDOSCOPY  2014    ANIBAL HOUSE M.D.   [facility] UPPER GASTROINTESTINAL ENDOSCOPY N/A 2020    EGD ESOPHAGOGASTRODUODENOSCOPY performed by Laurie Hammond MD at [address] [place] [address] Road: 2020    Date of Evaluation: 2020   Evaluating Therapist: Batool Guzman SLP      Assessment: Diagnosis: Pt presents with mild-moderate expressive and receptive aphasia and mild dysarthria. Aphasia characterized by  impaired confrontation naming, responsive naming, decreased ability to asnwer abstract yes/no question and [facility] questions, decreased ability to imitate phrases and automatic naming, with overall reduced naming skills in terms of category identification, and divergent/convergent naming. Pt was able to follow 1 step commands with extra processing time provided, inconsistent with 2 step. Pt able to answer simple questions about self. Pt was lethargic during exam and may have impacted her performance on various sections of speech/language evaluation. Recommendations:  Requires SLP Intervention: Yes  Duration/Frequency of Treatment: 2-4x/week        Goals:  Short-term Goals  Timeframe for Short-term Goals: 1-2 weeks  Goal 1: Pt will complete confrontational naming tasks with 100% accuracy independently to help the patient express his/her basic wants and needs. Goal 2: Pt will name 10/10 items in a concrete and abstract category with min verbal cues to promote semantic organization, neuroplasticity, and the efficiency of word finding for expression of the patient's wants, needs, feelings, and ideas. Goal 3: Pt will complete automatic naming with 100% acc independently to improve verbal expression. Goal 4: Pt will complete oral motor drills at phrase level given model with 100% acc to improve articulator movement and coordination to improve speech intelligibilty. Goal 5: Pt will follow 2 step directions given orally with 80% accuracy with min cues to increase the pt's ability to follow directions provided by caregivers for safe follow through with ADLs. Goal 6: Pt will answer mid level yes/no and Wh- questions with 80% accuracy with min cues to assist the caregiver in obtaining important information regarding the patient's personal, medical, and safety needs.   Long-term Goals Timeframe for Long-term Goals: 2 weeks  Goal 1: Pt will improve language and speech production abilities to clear express wants/needs and improve understanding during daily life. Patient's goals: \"go home\"     Subjective:   Previous level of function and limitations: WFL   General  Chart Reviewed: Yes  Patient assessed for rehabilitation services?: Yes  Family / Caregiver Present: No  Subjective  Subjective: Pt was cooperative but lethargic throughout exam.     Vision  Vision: Impaired  Vision Exceptions: Wears glasses at all times  Hearing  Hearing: Within functional limits       Objective:     Oral/Motor  Oral Motor: Exceptions to [facility]  Labial Strength: Reduced  Labial Coordination: Reduced  Lingual Strength: Reduced  Lingual Coordination: Reduced    Auditory Comprehension  Comprehension: Exceptions  Yes/No Questions: Mild  Basic Questions: Mild(Mild-Mod)  One Step Basic Commands: Mild  Two Step Basic Commands: Moderate  Common Objects: Mild  Conversation: Mild  Interfering Components: Processing speed  Effective Techniques: Extra processing time;Repetition    Reading Comprehension  Reading Status: Unable to assess    Expression  Primary Mode of Expression: Verbal    Verbal Expression  Verbal Expression: Exceptions to functional limits  Initiation: Mild   Repetition: Mild  Automatic Speech: Mild  Confrontation: Mild  Convergent: Moderate  Divergent: Moderate  Responsive: Mild    Written Expression  Written Expression: Unable to assess    Motor Speech  Motor Speech: Exceptions to [facility]  Dysarthria : Mild    Pragmatics/Social Functioning  Pragmatics: Within functional limits      Prognosis:  Individuals consulted  Consulted and agree with results and recommendations: Patient;RN(RN Tanika)    Education:  Patient Education: results and goal recommendations  Patient Education Response: Demonstrated understanding  Safety Devices in place: Yes  Type of devices:  All fall risk precautions in place    Pain Assessment: Initial Assessment:  Patient c/o: left eye discomfort     Re-assessment:  Patient c/o: eft eye discomfort   RN informed.       Therapy Time  SLP Individual Minutes  Time In: 0920  Time Out: 7860  Minutes: 15                 Signature: Electronically signed by ROSA MARIA Mireles on 12/24/2020 at 9:50 AM

## 2020-12-25 ENCOUNTER — APPOINTMENT (OUTPATIENT)
Dept: MRI IMAGING | Age: 50
DRG: 065 | End: 2020-12-25
Payer: COMMERCIAL

## 2020-12-25 LAB
ALBUMIN SERPL-MCNC: 3.5 G/DL (ref 3.5–4.6)
ALP BLD-CCNC: 123 U/L (ref 40–130)
ALT SERPL-CCNC: 81 U/L (ref 0–33)
ANION GAP SERPL CALCULATED.3IONS-SCNC: 11 MEQ/L (ref 9–15)
AST SERPL-CCNC: 39 U/L (ref 0–35)
BASOPHILS ABSOLUTE: 0 K/UL (ref 0–0.2)
BASOPHILS RELATIVE PERCENT: 0.3 %
BILIRUB SERPL-MCNC: 0.4 MG/DL (ref 0.2–0.7)
BUN BLDV-MCNC: 8 MG/DL (ref 6–20)
CALCIUM SERPL-MCNC: 8.9 MG/DL (ref 8.5–9.9)
CHLORIDE BLD-SCNC: 107 MEQ/L (ref 95–107)
CO2: 20 MEQ/L (ref 20–31)
CREAT SERPL-MCNC: 0.79 MG/DL (ref 0.5–0.9)
EOSINOPHILS ABSOLUTE: 0.1 K/UL (ref 0–0.7)
EOSINOPHILS RELATIVE PERCENT: 1.1 %
GFR AFRICAN AMERICAN: >60
GFR NON-AFRICAN AMERICAN: >60
GLOBULIN: 2.3 G/DL (ref 2.3–3.5)
GLUCOSE BLD-MCNC: 222 MG/DL (ref 70–99)
HCT VFR BLD CALC: 38 % (ref 37–47)
HEMOGLOBIN: 13.3 G/DL (ref 12–16)
LYMPHOCYTES ABSOLUTE: 1.7 K/UL (ref 1–4.8)
LYMPHOCYTES RELATIVE PERCENT: 23.3 %
MAGNESIUM: 1.9 MG/DL (ref 1.7–2.4)
MCH RBC QN AUTO: 32.7 PG (ref 27–31.3)
MCHC RBC AUTO-ENTMCNC: 34.9 % (ref 33–37)
MCV RBC AUTO: 93.6 FL (ref 82–100)
MONOCYTES ABSOLUTE: 0.6 K/UL (ref 0.2–0.8)
MONOCYTES RELATIVE PERCENT: 7.9 %
NEUTROPHILS ABSOLUTE: 4.9 K/UL (ref 1.4–6.5)
NEUTROPHILS RELATIVE PERCENT: 67.4 %
PDW BLD-RTO: 12.9 % (ref 11.5–14.5)
PLATELET # BLD: 131 K/UL (ref 130–400)
POTASSIUM REFLEX MAGNESIUM: 3.2 MEQ/L (ref 3.4–4.9)
RBC # BLD: 4.06 M/UL (ref 4.2–5.4)
SODIUM BLD-SCNC: 138 MEQ/L (ref 135–144)
TOTAL PROTEIN: 5.8 G/DL (ref 6.3–8)
WBC # BLD: 7.3 K/UL (ref 4.8–10.8)

## 2020-12-25 PROCEDURE — 70551 MRI BRAIN STEM W/O DYE: CPT

## 2020-12-25 PROCEDURE — 99255 IP/OBS CONSLTJ NEW/EST HI 80: CPT | Performed by: INTERNAL MEDICINE

## 2020-12-25 PROCEDURE — 83735 ASSAY OF MAGNESIUM: CPT

## 2020-12-25 PROCEDURE — 6370000000 HC RX 637 (ALT 250 FOR IP): Performed by: NURSE PRACTITIONER

## 2020-12-25 PROCEDURE — 93010 ELECTROCARDIOGRAM REPORT: CPT | Performed by: INTERNAL MEDICINE

## 2020-12-25 PROCEDURE — 36415 COLL VENOUS BLD VENIPUNCTURE: CPT

## 2020-12-25 PROCEDURE — 6370000000 HC RX 637 (ALT 250 FOR IP): Performed by: INTERNAL MEDICINE

## 2020-12-25 PROCEDURE — 1210000000 HC MED SURG R&B

## 2020-12-25 PROCEDURE — 6360000002 HC RX W HCPCS: Performed by: NURSE PRACTITIONER

## 2020-12-25 PROCEDURE — 80053 COMPREHEN METABOLIC PANEL: CPT

## 2020-12-25 PROCEDURE — 6370000000 HC RX 637 (ALT 250 FOR IP): Performed by: PSYCHIATRY & NEUROLOGY

## 2020-12-25 PROCEDURE — 99233 SBSQ HOSP IP/OBS HIGH 50: CPT | Performed by: PSYCHIATRY & NEUROLOGY

## 2020-12-25 PROCEDURE — 85025 COMPLETE CBC W/AUTO DIFF WBC: CPT

## 2020-12-25 PROCEDURE — 2580000003 HC RX 258: Performed by: NURSE PRACTITIONER

## 2020-12-25 RX ORDER — LORAZEPAM 2 MG/ML
1 INJECTION INTRAMUSCULAR
Status: ACTIVE | OUTPATIENT
Start: 2020-12-25 | End: 2020-12-25

## 2020-12-25 RX ORDER — ACETAMINOPHEN 325 MG/1
650 TABLET ORAL EVERY 4 HOURS PRN
Status: DISCONTINUED | OUTPATIENT
Start: 2020-12-25 | End: 2020-12-28 | Stop reason: HOSPADM

## 2020-12-25 RX ADMIN — Medication 10 ML: at 10:12

## 2020-12-25 RX ADMIN — Medication 10 ML: at 22:23

## 2020-12-25 RX ADMIN — ACETAMINOPHEN 650 MG: 325 TABLET, FILM COATED ORAL at 17:17

## 2020-12-25 RX ADMIN — CLOPIDOGREL BISULFATE 75 MG: 75 TABLET, FILM COATED ORAL at 10:12

## 2020-12-25 RX ADMIN — ACETAMINOPHEN 650 MG: 325 TABLET, FILM COATED ORAL at 22:24

## 2020-12-25 RX ADMIN — ENOXAPARIN SODIUM 40 MG: 40 INJECTION SUBCUTANEOUS at 10:12

## 2020-12-25 RX ADMIN — ASPIRIN 81 MG: 81 TABLET, COATED ORAL at 10:12

## 2020-12-25 RX ADMIN — ATORVASTATIN CALCIUM 20 MG: 20 TABLET, FILM COATED ORAL at 22:24

## 2020-12-25 ASSESSMENT — ENCOUNTER SYMPTOMS
BACK PAIN: 0
ABDOMINAL PAIN: 0
TROUBLE SWALLOWING: 0
NAUSEA: 0
SHORTNESS OF BREATH: 0
WHEEZING: 0
GASTROINTESTINAL NEGATIVE: 1
VOMITING: 0
COLOR CHANGE: 0
PHOTOPHOBIA: 0
EYES NEGATIVE: 1
CHOKING: 0

## 2020-12-25 ASSESSMENT — PAIN SCALES - GENERAL
PAINLEVEL_OUTOF10: 6
PAINLEVEL_OUTOF10: 5

## 2020-12-25 NOTE — PROGRESS NOTES
Hospitalist Progress Note      Date of Admission: 12/22/2020  Chief Complaint:    Chief Complaint   Patient presents with    Altered Mental Status     Subjective:  No new complaints. No nausea, vomiting, chest pain, or headache      Medications:    Infusion Medications    sodium chloride 75 mL/hr at 12/24/20 1226     Scheduled Medications    sodium chloride flush  10 mL Intravenous 2 times per day    enoxaparin  40 mg Subcutaneous Daily    aspirin  81 mg Oral Daily    Or    aspirin  300 mg Rectal Daily    atorvastatin  20 mg Oral Nightly    clopidogrel  75 mg Oral Daily    hydrALAZINE  10 mg Intravenous Once     PRN Meds: LORazepam, sodium chloride flush, promethazine **OR** ondansetron, polyethylene glycol, hydrALAZINE    Intake/Output Summary (Last 24 hours) at 12/25/2020 1650  Last data filed at 12/25/2020 1012  Gross per 24 hour   Intake 10 ml   Output    Net 10 ml     Exam:  BP (!) 156/79   Pulse 72   Temp 98 °F (36.7 °C) (Oral)   Resp 17   Ht 5' 7\" (1.702 m)   Wt 192 lb (87.1 kg)   SpO2 99%   BMI 30.07 kg/m²   Head: Normocephalic, atraumatic  Sclera clear  Neck JVD flat  Lungs: normal effort of breathing    Labs:   Recent Labs     12/22/20  2300 12/24/20  0531 12/25/20  0517   WBC 10.4 7.1 7.3   HGB 15.7 13.6 13.3   HCT 45.4 39.9 38.0    141 131     Recent Labs     12/23/20  0507 12/24/20  0531 12/25/20  0517    141 138   K 3.4 3.6 3.2*    108* 107   CO2 24 23 20   BUN 9 9 8   CREATININE 0.74 0.70 0.79   CALCIUM 9.1 9.0 8.9   AST 57* 56* 39*   * 101* 81*   BILITOT 0.4 0.6 0.4   ALKPHOS 138* 132* 123     Recent Labs     12/22/20  2300   INR 0.9     Recent Labs     12/22/20 2300   TROPONINI <0.010     Radiology:  MRI BRAIN WO CONTRAST   Final Result      Recent infarction involving the left thalamus, overall unchanged from MRI 12/23/2020. No new infarcts from the prior MRI.             RADIOLOGY REPORT   Final Result      MRI BRAIN WO CONTRAST   Final Result There is a 7 x 15 mm area of subacute ischemia in the left thalamus. CTA HEAD W WO CONTRAST   Final Result   The major intracranial arterial structures are patent without high-grade stenosis, large vessel cut off, or aneurysm. EXAMINATION: CTA HEAD W WO CONTRAST, CTA NECK W WO CONTRAST      DATE AND TIME:12/23/2020 12:42 AM      CLINICAL HISTORY: Stroke symptoms   cva        COMPARISON: None      TECHNIQUE: Helical CTA of the neck was performed from the aortic arch to the foramen magnum following the uneventful intravenous administration of 100 cc of nonionic contrast without incident. 2-D images were reconstructed in the sagittal and coronal    planes. Three Dimensional Maximum Intensity Projection (3D-MIP) images were created. All images were reviewed and primarily archived to PACS workstation. All CT scans at this facility use dose modulation, iterative reconstruction, and/or weight based    dosing when appropriate to reduce radiation dose to as low as reasonably achievable. NASCET Criteria were utilized      FINDINGS CTA NECK:      Aortic Arch: The visualized portions of the aortic arch and proximal arch vessels demonstrates no focal stenosis aneurysm or dissection. Carotids: The common carotid arteries, carotid bifurcations, internal and external carotid arteries are normal in course and caliber. Right  Proximal Internal Carotid Stenosis (% by NASCET Criteria): There is no hemodynamically significant stenosis. Left  Proximal Internal Carotid Stenosis (% by NASCET Criteria): There is no hemodynamically significant stenosis. Vertebral Arteries:      Patency: The vertebral arteries are well visualized to the level of the basilar artery. There is no focal stenosis aneurysm or dissection. Vertebral arteries are codominant. IMPRESSION: Negative CTA of the neck.                   CTA NECK W WO CONTRAST   Final Result The major intracranial arterial structures are patent without high-grade stenosis, large vessel cut off, or aneurysm. EXAMINATION: CTA HEAD W WO CONTRAST, CTA NECK W WO CONTRAST      DATE AND TIME:12/23/2020 12:42 AM      CLINICAL HISTORY: Stroke symptoms   cva        COMPARISON: None      TECHNIQUE: Helical CTA of the neck was performed from the aortic arch to the foramen magnum following the uneventful intravenous administration of 100 cc of nonionic contrast without incident. 2-D images were reconstructed in the sagittal and coronal    planes. Three Dimensional Maximum Intensity Projection (3D-MIP) images were created. All images were reviewed and primarily archived to PACS workstation. All CT scans at this facility use dose modulation, iterative reconstruction, and/or weight based    dosing when appropriate to reduce radiation dose to as low as reasonably achievable. NASCET Criteria were utilized      FINDINGS CTA NECK:      Aortic Arch: The visualized portions of the aortic arch and proximal arch vessels demonstrates no focal stenosis aneurysm or dissection. Carotids: The common carotid arteries, carotid bifurcations, internal and external carotid arteries are normal in course and caliber. Right  Proximal Internal Carotid Stenosis (% by NASCET Criteria): There is no hemodynamically significant stenosis. Left  Proximal Internal Carotid Stenosis (% by NASCET Criteria): There is no hemodynamically significant stenosis. Vertebral Arteries:      Patency: The vertebral arteries are well visualized to the level of the basilar artery. There is no focal stenosis aneurysm or dissection. Vertebral arteries are codominant. IMPRESSION: Negative CTA of the neck. CT Head WO Contrast   Final Result   Impression:      Interval development of acute/subacute left thalamic infarct. All CT scans at this facility use dose modulation, iterative reconstruction, and/or weight based dosing when appropriate to reduce radiation dose to as low as reasonably achievable. Assessment/Plan:    Acute stroke: Aspirin, statin. Following with neurology. Hypertension: Monitor blood pressure, continue home medications    CAD History    Bipolar disorder: Continue medications    Dc plan; awaiting placement.  Pt/ot rec - acute rehab    35 minutes total care time, >1/2 in unit/floor time and care coordination   Anisha Ibarra MD

## 2020-12-25 NOTE — PROGRESS NOTES
 atorvastatin (LIPITOR) tablet 20 mg  20 mg Oral Nightly NIKI Chaparro - CNP   20 mg at 12/24/20 2038    hydrALAZINE (APRESOLINE) injection 5 mg  5 mg Intravenous Q6H PRN NIKI Chaparro CNP        clopidogrel (PLAVIX) tablet 75 mg  75 mg Oral Daily Sharen Kawasaki, MD   75 mg at 12/25/20 1012    hydrALAZINE (APRESOLINE) injection 10 mg  10 mg Intravenous Once Jennifer Curtis PA-C           PHYSICAL EXAM:    BP (!) 156/79   Pulse 72   Temp 98 °F (36.7 °C) (Oral)   Resp 17   Ht 5' 7\" (1.702 m)   Wt 192 lb (87.1 kg)   SpO2 99%   BMI 30.07 kg/m²    General Appearance:      Skin:  normal  CVS - Normal sounds, No murmurs , No carotid Bruits  RS -CTA  Abdomen Soft, BS present  Review of Systems   Constitutional: Negative for fever. HENT: Negative for ear pain, tinnitus and trouble swallowing. Eyes: Negative for photophobia and visual disturbance. Respiratory: Negative for choking and shortness of breath. Cardiovascular: Negative for chest pain and palpitations. Gastrointestinal: Negative for nausea and vomiting. Musculoskeletal: Negative for back pain, gait problem, joint swelling, myalgias, neck pain and neck stiffness. Skin: Negative for color change. Allergic/Immunologic: Negative for food allergies. Neurological: Negative for dizziness, tremors, seizures, syncope, facial asymmetry, speech difficulty, weakness, light-headedness, numbness and headaches. Psychiatric/Behavioral: Negative for behavioral problems, confusion, hallucinations and sleep disturbance.    Mental Status Exam:             Level of Alertness:   awake            Orientation:   person, place, time                      Attention/Concentration:  normal            Language:  normal    Patient is complains of double vision laterally on the left  Funduscopic Exam:     Cranial Nerves            Cranial nerve III           Pupils:  equal, round, reactive to light      Cranial nerves III, IV, VI Extraocular Movements: intact      Cranial nerve V           Facial sensation:  intact      Cranial nerve VII           Facial strength: intact      Cranial nerve VIII           Hearing:  intact      Cranial nerve IX           Palate:  intact      Cranial nerve XI         Shoulder shrug:  intact      Cranial nerve XII          Tongue movement:  normal    Motor: There is mild weakness on the right though this is very difficult to certain given that she has a sling in the right arm from shoulder injury.   Drift:  absent  Motor exam is symmetrical 5 out of 5 all extremities bilaterally  Tone:  normal  Abnormal Movements:  absent            Sensory:        Pinprick             Right Upper Extremity:  normal             Left Upper Extremity:  normal             Right Lower Extremity:  normal             Left Lower Extremity:  normal           Vibration                         Touch            Proprioception                 Coordination:           Finger/Nose   Right:  normal              Left:  normal          Heel-Knee-Shin                Right:  normal              Left:  normal          Rapid Alternating Movements              Right:  normal              Left:  normal          Gait:                       Casual: Central gait ataxia is notable                      Romberg:  normal            Reflexes:             Deep Tendon Reflexes:             Reflexes are 2 +             Plantar response:                Right:  downgoing               Left:  downgoing    Vascular:  Cardiac Exam:  normal         Cta Head W Wo Contrast    Result Date: 12/23/2020 The major intracranial arterial structures are patent without high-grade stenosis, large vessel cut off, or aneurysm. EXAMINATION: CTA HEAD W WO CONTRAST, CTA NECK W WO CONTRAST DATE AND TIME:12/23/2020 12:42 AM CLINICAL HISTORY: Stroke symptoms   cva  COMPARISON: None TECHNIQUE: Helical CTA of the neck was performed from the aortic arch to the foramen magnum following the uneventful intravenous administration of 100 cc of nonionic contrast without incident. 2-D images were reconstructed in the sagittal and coronal planes. Three Dimensional Maximum Intensity Projection (3D-MIP) images were created. All images were reviewed and primarily archived to PACS workstation. All CT scans at this facility use dose modulation, iterative reconstruction, and/or weight based dosing when appropriate to reduce radiation dose to as low as reasonably achievable. NASCET Criteria were utilized FINDINGS CTA NECK: Aortic Arch: The visualized portions of the aortic arch and proximal arch vessels demonstrates no focal stenosis aneurysm or dissection. Carotids: The common carotid arteries, carotid bifurcations, internal and external carotid arteries are normal in course and caliber. Right  Proximal Internal Carotid Stenosis (% by NASCET Criteria): There is no hemodynamically significant stenosis. Left  Proximal Internal Carotid Stenosis (% by NASCET Criteria): There is no hemodynamically significant stenosis. Vertebral Arteries: Patency: The vertebral arteries are well visualized to the level of the basilar artery. There is no focal stenosis aneurysm or dissection. Vertebral arteries are codominant. IMPRESSION: Negative CTA of the neck.      Ct Head Wo Contrast    Result Date: 12/23/2020 CT Brain Contrast medium:  Not utilized. History:  Right facial droop. Altered mental status Comparison:  August 21, 2020 Findings: Extra-axial spaces:  Normal. Intracranial hemorrhage:  None. Ventricular system: In the interval, an ill-defined 4 x 12 mm area decreased attenuation exerting no mass effect is found medially within the left thalamus (series 2, image 16). Basal Cisterns:  Normal. Cerebral Parenchyma:  Normal. Midline Shift:  None. Cerebellum:  Normal. Paranasal sinuses and mastoid air cells:  Normal. Visualized Orbits:  Normal.     Impression: Interval development of acute/subacute left thalamic infarct. All CT scans at this facility use dose modulation, iterative reconstruction, and/or weight based dosing when appropriate to reduce radiation dose to as low as reasonably achievable.     Cta Neck W Wo Contrast    Result Date: 12/23/2020 The major intracranial arterial structures are patent without high-grade stenosis, large vessel cut off, or aneurysm. EXAMINATION: CTA HEAD W WO CONTRAST, CTA NECK W WO CONTRAST DATE AND TIME:12/23/2020 12:42 AM CLINICAL HISTORY: Stroke symptoms   cva  COMPARISON: None TECHNIQUE: Helical CTA of the neck was performed from the aortic arch to the foramen magnum following the uneventful intravenous administration of 100 cc of nonionic contrast without incident. 2-D images were reconstructed in the sagittal and coronal planes. Three Dimensional Maximum Intensity Projection (3D-MIP) images were created. All images were reviewed and primarily archived to PACS workstation. All CT scans at this facility use dose modulation, iterative reconstruction, and/or weight based dosing when appropriate to reduce radiation dose to as low as reasonably achievable. NASCET Criteria were utilized FINDINGS CTA NECK: Aortic Arch: The visualized portions of the aortic arch and proximal arch vessels demonstrates no focal stenosis aneurysm or dissection. Carotids: The common carotid arteries, carotid bifurcations, internal and external carotid arteries are normal in course and caliber. Right  Proximal Internal Carotid Stenosis (% by NASCET Criteria): There is no hemodynamically significant stenosis. Left  Proximal Internal Carotid Stenosis (% by NASCET Criteria): There is no hemodynamically significant stenosis. Vertebral Arteries: Patency: The vertebral arteries are well visualized to the level of the basilar artery. There is no focal stenosis aneurysm or dissection. Vertebral arteries are codominant. IMPRESSION: Negative CTA of the neck.      Mri Brain Wo Contrast    Result Date: 12/23/2020 EXAMINATION: MRI BRAIN WO CONTRAST CLINICAL HISTORY:  stroke COMPARISONS: NONE AVAILABLE TECHNIQUE: Multiplanar multisequence images of the brain were obtained without contrast. Diffusion perfusion imaging was obtained. FINDINGS:  There are no extra-axial collections. There is no evidence of hemorrhage. There is specific association with signal dropout on ADC map in the left thalamus and region measuring 7 x15 mm indicating subacute ischemia. There is corresponding signal abnormality on T2/FLAIR series. The susceptibility images do not demonstrate evidence of hemosiderin deposition within the brain parenchyma or the leptomeninges. There is preservation of the gray-white matter differentiation. There are a few scattered foci of T2/FLAIR increased signal in the subcortical and periventricular white matter without associated edema or mass effect. The sulci and ventricles are within normal limits without evidence of hydrocephalus. The midline structures are intact, the corpus callosum is within normal limits. The region of the pineal gland and the sella turcica are unremarkable. There are no space-occupying lesions in the posterior fossa. The basilar cisterns are patent. The craniocervical junction is unremarkable. The visualized portions of the orbits are within normal limits, the globes are intact. The visualized portions of the paranasal sinuses are within normal limits. The calvarium and soft tissues are unremarkable. There is a 7 x 15 mm area of subacute ischemia in the left thalamus.       Recent Labs     12/22/20  2300 12/24/20  0531 12/25/20  0517   WBC 10.4 7.1 7.3   HGB 15.7 13.6 13.3    141 131     Recent Labs     12/23/20  0507 12/24/20  0531 12/25/20  0517    141 138   K 3.4 3.6 3.2*    108* 107   CO2 24 23 20   BUN 9 9 8   CREATININE 0.74 0.70 0.79   GLUCOSE 167* 165* 222*     Recent Labs     12/23/20  0507 12/24/20  0531 12/25/20  0517   BILITOT 0.4 0.6 0.4   ALKPHOS 138* 132* 123 AST 57* 56* 39*   * 101* 81*     Lab Results   Component Value Date    PROTIME 12.3 12/22/2020    INR 0.9 12/22/2020     Lab Results   Component Value Date    LITHIUM 0.2 11/19/2019       ASSESSMENT AND PLAN    Left thalamic stroke with initial encephalopathy related to the left thalamic stroke the patient is showing some improvement in her encephalopathy from the stroke is now left with some right-sided findings and a subcortical aphasia and dysphagia. Patient is crying but cannot tell me why she is crying and could have pseudobulbar affect seen acutely with strokes or she has underlying major depression. Patient's CHAO is pending. We will continue her dual antiplatelet therapy given this findings. Patient had a complete assessment with a modified barium swallow as well. She will likely require therapy. We are working with  for the same. Findings were relayed to the patient. Change reported yesterday none ambulation. This is of concern she is also complains of double vision in the left eye which could be a thalamic double vision as there is a connection of thalamus to the ocular motor nerve. I recommended repeating her MRI given that she she has had some new symptoms of concern        Steven Anthony MD, [place], American Board of Psychiatry & Neurology  Board Certified in Vascular Neurology  Board Certified in Neuromuscular Medicine  Certified in [place]err[place]

## 2020-12-25 NOTE — PROGRESS NOTES
EXAMINATION: CTA HEAD W WO CONTRAST, CTA NECK W WO CONTRAST      DATE AND TIME:12/23/2020 12:42 AM      CLINICAL HISTORY: Stroke symptoms   cva        COMPARISON: None      TECHNIQUE: Helical CTA of the neck was performed from the aortic arch to the foramen magnum following the uneventful intravenous administration of 100 cc of nonionic contrast without incident. 2-D images were reconstructed in the sagittal and coronal    planes. Three Dimensional Maximum Intensity Projection (3D-MIP) images were created. All images were reviewed and primarily archived to PACS workstation. All CT scans at this facility use dose modulation, iterative reconstruction, and/or weight based    dosing when appropriate to reduce radiation dose to as low as reasonably achievable. NASCET Criteria were utilized      FINDINGS CTA NECK:      Aortic Arch: The visualized portions of the aortic arch and proximal arch vessels demonstrates no focal stenosis aneurysm or dissection. Carotids: The common carotid arteries, carotid bifurcations, internal and external carotid arteries are normal in course and caliber. Right  Proximal Internal Carotid Stenosis (% by NASCET Criteria): There is no hemodynamically significant stenosis. Left  Proximal Internal Carotid Stenosis (% by NASCET Criteria): There is no hemodynamically significant stenosis. Vertebral Arteries:      Patency: The vertebral arteries are well visualized to the level of the basilar artery. There is no focal stenosis aneurysm or dissection. Vertebral arteries are codominant. IMPRESSION: Negative CTA of the neck. CTA NECK W WO CONTRAST   Final Result   The major intracranial arterial structures are patent without high-grade stenosis, large vessel cut off, or aneurysm.          EXAMINATION: CTA HEAD W WO CONTRAST, CTA NECK W WO CONTRAST      DATE AND TIME:12/23/2020 12:42 AM      CLINICAL HISTORY: Stroke symptoms   cva COMPARISON: None      TECHNIQUE: Helical CTA of the neck was performed from the aortic arch to the foramen magnum following the uneventful intravenous administration of 100 cc of nonionic contrast without incident. 2-D images were reconstructed in the sagittal and coronal    planes. Three Dimensional Maximum Intensity Projection (3D-MIP) images were created. All images were reviewed and primarily archived to PACS workstation. All CT scans at this facility use dose modulation, iterative reconstruction, and/or weight based    dosing when appropriate to reduce radiation dose to as low as reasonably achievable. NASCET Criteria were utilized      FINDINGS CTA NECK:      Aortic Arch: The visualized portions of the aortic arch and proximal arch vessels demonstrates no focal stenosis aneurysm or dissection. Carotids: The common carotid arteries, carotid bifurcations, internal and external carotid arteries are normal in course and caliber. Right  Proximal Internal Carotid Stenosis (% by NASCET Criteria): There is no hemodynamically significant stenosis. Left  Proximal Internal Carotid Stenosis (% by NASCET Criteria): There is no hemodynamically significant stenosis. Vertebral Arteries:      Patency: The vertebral arteries are well visualized to the level of the basilar artery. There is no focal stenosis aneurysm or dissection. Vertebral arteries are codominant. IMPRESSION: Negative CTA of the neck. CT Head WO Contrast   Final Result   Impression:      Interval development of acute/subacute left thalamic infarct. All CT scans at this facility use dose modulation, iterative reconstruction, and/or weight based dosing when appropriate to reduce radiation dose to as low as reasonably achievable. Assessment/Plan:    Acute stroke: Aspirin, statin. Following with neurology.     Hypertension: Monitor blood pressure, continue home medications CAD History    Bipolar disorder: Continue medications    35 minutes total care time, >1/2 in unit/floor time and care coordination   Anisha Ibarra MD

## 2020-12-26 LAB
ALBUMIN SERPL-MCNC: 3.5 G/DL (ref 3.5–4.6)
ALP BLD-CCNC: 132 U/L (ref 40–130)
ALT SERPL-CCNC: 72 U/L (ref 0–33)
ANION GAP SERPL CALCULATED.3IONS-SCNC: 10 MEQ/L (ref 9–15)
AST SERPL-CCNC: 35 U/L (ref 0–35)
BASOPHILS ABSOLUTE: 0 K/UL (ref 0–0.2)
BASOPHILS RELATIVE PERCENT: 0.4 %
BILIRUB SERPL-MCNC: 0.3 MG/DL (ref 0.2–0.7)
BUN BLDV-MCNC: 7 MG/DL (ref 6–20)
CALCIUM SERPL-MCNC: 9.1 MG/DL (ref 8.5–9.9)
CHLORIDE BLD-SCNC: 107 MEQ/L (ref 95–107)
CO2: 23 MEQ/L (ref 20–31)
CREAT SERPL-MCNC: 0.78 MG/DL (ref 0.5–0.9)
DOUBLE STRANDED DNA AB, IGG: NORMAL
DRVVT CONFIRMATION TEST: ABNORMAL RATIO
DRVVT SCREEN: 28 SEC (ref 33–44)
DRVVT,DIL: ABNORMAL SEC (ref 33–44)
EOSINOPHILS ABSOLUTE: 0.1 K/UL (ref 0–0.7)
EOSINOPHILS RELATIVE PERCENT: 1.3 %
FOLATE: 8.1 NG/ML (ref 7.3–26.1)
GFR AFRICAN AMERICAN: >60
GFR NON-AFRICAN AMERICAN: >60
GLOBULIN: 2.5 G/DL (ref 2.3–3.5)
GLUCOSE BLD-MCNC: 196 MG/DL (ref 70–99)
HCT VFR BLD CALC: 40.1 % (ref 37–47)
HEMOGLOBIN: 13.8 G/DL (ref 12–16)
HEXAGONAL PHOSPHOLIPID NEUTRALIZAT TEST: ABNORMAL
LUPUS ANTICOAG INTERP: ABNORMAL
LYMPHOCYTES ABSOLUTE: 2 K/UL (ref 1–4.8)
LYMPHOCYTES RELATIVE PERCENT: 29.1 %
MAGNESIUM: 2 MG/DL (ref 1.7–2.4)
MCH RBC QN AUTO: 32.9 PG (ref 27–31.3)
MCHC RBC AUTO-ENTMCNC: 34.5 % (ref 33–37)
MCV RBC AUTO: 95.2 FL (ref 82–100)
MONOCYTES ABSOLUTE: 0.5 K/UL (ref 0.2–0.8)
MONOCYTES RELATIVE PERCENT: 7.8 %
NEUTROPHILS ABSOLUTE: 4.3 K/UL (ref 1.4–6.5)
NEUTROPHILS RELATIVE PERCENT: 61.4 %
PDW BLD-RTO: 13.1 % (ref 11.5–14.5)
PLATELET # BLD: 140 K/UL (ref 130–400)
PLT NEUTA: ABNORMAL
POTASSIUM REFLEX MAGNESIUM: 3.5 MEQ/L (ref 3.4–4.9)
PROTEIN C ANTIGEN: >95 % (ref 63–153)
PROTEIN S ANTIGEN, TOTAL: 143 % (ref 63–126)
PT D: 12 SEC (ref 12–15.5)
PTT D: 35 SEC (ref 32–48)
PTT-D CORR REFLEX: ABNORMAL SEC (ref 32–48)
PTT-HEPARIN NEUTRALIZED: ABNORMAL SEC (ref 32–48)
RBC # BLD: 4.21 M/UL (ref 4.2–5.4)
REPTILASE TIME: ABNORMAL SEC
SODIUM BLD-SCNC: 140 MEQ/L (ref 135–144)
THROMBIN TIME: ABNORMAL SEC (ref 14.7–19.5)
TOTAL PROTEIN: 6 G/DL (ref 6.3–8)
VITAMIN B-12: 305 PG/ML (ref 232–1245)
WBC # BLD: 6.9 K/UL (ref 4.8–10.8)

## 2020-12-26 PROCEDURE — 82746 ASSAY OF FOLIC ACID SERUM: CPT

## 2020-12-26 PROCEDURE — 6360000002 HC RX W HCPCS: Performed by: NURSE PRACTITIONER

## 2020-12-26 PROCEDURE — 80053 COMPREHEN METABOLIC PANEL: CPT

## 2020-12-26 PROCEDURE — APPSS30 APP SPLIT SHARED TIME 16-30 MINUTES: Performed by: STUDENT IN AN ORGANIZED HEALTH CARE EDUCATION/TRAINING PROGRAM

## 2020-12-26 PROCEDURE — 85025 COMPLETE CBC W/AUTO DIFF WBC: CPT

## 2020-12-26 PROCEDURE — 2580000003 HC RX 258: Performed by: NURSE PRACTITIONER

## 2020-12-26 PROCEDURE — 6370000000 HC RX 637 (ALT 250 FOR IP): Performed by: NURSE PRACTITIONER

## 2020-12-26 PROCEDURE — 6370000000 HC RX 637 (ALT 250 FOR IP): Performed by: PSYCHIATRY & NEUROLOGY

## 2020-12-26 PROCEDURE — 99232 SBSQ HOSP IP/OBS MODERATE 35: CPT | Performed by: INTERNAL MEDICINE

## 2020-12-26 PROCEDURE — 6370000000 HC RX 637 (ALT 250 FOR IP): Performed by: INTERNAL MEDICINE

## 2020-12-26 PROCEDURE — 1210000000 HC MED SURG R&B

## 2020-12-26 PROCEDURE — 36415 COLL VENOUS BLD VENIPUNCTURE: CPT

## 2020-12-26 PROCEDURE — 99233 SBSQ HOSP IP/OBS HIGH 50: CPT | Performed by: PSYCHIATRY & NEUROLOGY

## 2020-12-26 PROCEDURE — 83735 ASSAY OF MAGNESIUM: CPT

## 2020-12-26 PROCEDURE — 82607 VITAMIN B-12: CPT

## 2020-12-26 RX ORDER — NICOTINE 21 MG/24HR
1 PATCH, TRANSDERMAL 24 HOURS TRANSDERMAL DAILY
Status: DISCONTINUED | OUTPATIENT
Start: 2020-12-27 | End: 2020-12-26

## 2020-12-26 RX ORDER — NICOTINE 21 MG/24HR
1 PATCH, TRANSDERMAL 24 HOURS TRANSDERMAL DAILY
Status: DISCONTINUED | OUTPATIENT
Start: 2020-12-26 | End: 2020-12-28 | Stop reason: HOSPADM

## 2020-12-26 RX ORDER — BUTALBITAL, ASPIRIN, AND CAFFEINE 325; 50; 40 MG/1; MG/1; MG/1
1 CAPSULE ORAL EVERY 6 HOURS PRN
Status: DISCONTINUED | OUTPATIENT
Start: 2020-12-26 | End: 2020-12-28 | Stop reason: HOSPADM

## 2020-12-26 RX ADMIN — ENOXAPARIN SODIUM 40 MG: 40 INJECTION SUBCUTANEOUS at 10:35

## 2020-12-26 RX ADMIN — SODIUM CHLORIDE: 9 INJECTION, SOLUTION INTRAVENOUS at 22:48

## 2020-12-26 RX ADMIN — ACETAMINOPHEN 650 MG: 325 TABLET, FILM COATED ORAL at 10:35

## 2020-12-26 RX ADMIN — ATORVASTATIN CALCIUM 20 MG: 20 TABLET, FILM COATED ORAL at 22:45

## 2020-12-26 RX ADMIN — ASPIRIN 81 MG: 81 TABLET, COATED ORAL at 10:35

## 2020-12-26 RX ADMIN — Medication 10 ML: at 22:45

## 2020-12-26 RX ADMIN — CLOPIDOGREL BISULFATE 75 MG: 75 TABLET, FILM COATED ORAL at 10:35

## 2020-12-26 RX ADMIN — Medication 10 ML: at 10:36

## 2020-12-26 ASSESSMENT — ENCOUNTER SYMPTOMS
SHORTNESS OF BREATH: 0
COUGH: 0
DIARRHEA: 0
NAUSEA: 0
PHOTOPHOBIA: 1
TROUBLE SWALLOWING: 1
VOMITING: 0
VOICE CHANGE: 1

## 2020-12-26 ASSESSMENT — PAIN SCALES - GENERAL: PAINLEVEL_OUTOF10: 7

## 2020-12-26 NOTE — PROGRESS NOTES
[facility] Neurology Daily Progress Note  Name: Marlyn Lanes  Age: 52 y.o. Gender: female  CodeStatus: Full Code  Allergies: Latex  Influenza Vaccines  Eggs Or Egg-Derived Products  Gabapentin  Pneumococcal Vaccines  Povidone Iodine  Varicella Virus Vaccine Live    Chief Complaint:Altered Mental Status    Primary Care Provider: Nette Benavidez MD  InpatientTreatment Team: Treatment Team: Attending Provider: Bridget Gannon MD; Consulting Physician: Renetta Benton MD; Consulting Physician: Lukasz Uriarte DO; : Nadia Araujo RN; Patient Care Tech: Amarilis Bajwa;  Registered Nurse: Shania Cole RN; : Tresa Yates RN; Utilization Reviewer: Galina Wright RN  Admission Date: 12/22/2020      HPI 12/25/20 0731   BP: (!) 156/79   Pulse: 72   Resp: 17   Temp: 98 °F (36.7 °C)   SpO2: 99%      Review of Systems   Constitutional: Negative for chills and fever. HENT: Positive for trouble swallowing and voice change. Negative for congestion and hearing loss. Eyes: Positive for photophobia. Respiratory: Negative for cough and shortness of breath. Cardiovascular: Negative for chest pain. Gastrointestinal: Negative for diarrhea, nausea and vomiting. Neurological: Positive for speech difficulty and headaches. Negative for tremors, seizures, syncope, facial asymmetry, light-headedness and numbness. Psychiatric/Behavioral: Negative. Patient still has some double vision  Physical Exam  Vitals signs and nursing note reviewed. Constitutional:       Appearance: Normal appearance. HENT:      Head: Normocephalic and atraumatic. Eyes:      Extraocular Movements: Extraocular movements intact. Conjunctiva/sclera: Conjunctivae normal.   Cardiovascular:      Rate and Rhythm: Normal rate and regular rhythm. Pulses: Normal pulses. Heart sounds: Normal heart sounds. Comments: Pedal pulses 2+ bilaterally   No edema, erythema, or swelling in BLE  Pulmonary:      Effort: Pulmonary effort is normal.      Breath sounds: Normal breath sounds. Musculoskeletal:         General: Signs of injury present. Right lower leg: No edema. Left lower leg: No edema. Comments: Strength could not be tested in right upper extremity due to injury    Skin:     General: Skin is warm and dry. Neurological:      Mental Status: She is alert and oriented to person, place, and time. Coordination: Finger-Nose-Finger Test (Normal in left upper extremity but cannot be tested in right upper extremity due to injury) normal.   Psychiatric:         Mood and Affect: Mood normal.         Behavior: Behavior normal.       Neurologic Exam     Mental Status   Oriented to person, place, and time. Speech: (Dysarthria and dysphagia)    Gait, Coordination, and Reflexes     Coordination   Finger to nose coordination: normal (Normal in left upper extremity but cannot be tested in right upper extremity due to injury)    Tremor   Resting tremor: absent    Exam shows mild right-sided findings 2 with a sling it is very difficult to a certain. Medications:  Reviewed    Infusion Medications:    sodium chloride 75 mL/hr at 12/24/20 1226     Scheduled Medications:    sodium chloride flush  10 mL Intravenous 2 times per day    enoxaparin  40 mg Subcutaneous Daily    aspirin  81 mg Oral Daily    Or    aspirin  300 mg Rectal Daily    atorvastatin  20 mg Oral Nightly    clopidogrel  75 mg Oral Daily    hydrALAZINE  10 mg Intravenous Once     PRN Meds: acetaminophen, sodium chloride flush, promethazine **OR** ondansetron, polyethylene glycol, hydrALAZINE    Labs:   Recent Labs     12/24/20  0531 12/25/20  0517 12/26/20  0512   WBC 7.1 7.3 6.9   HGB 13.6 13.3 13.8   HCT 39.9 38.0 40.1    131 140     Recent Labs     12/24/20  0531 12/25/20  0517 12/26/20  0512    138 140   K 3.6 3.2* 3.5   * 107 107   CO2 23 20 23   BUN 9 8 7   CREATININE 0.70 0.79 0.78   CALCIUM 9.0 8.9 9.1     Recent Labs     12/24/20  0531 12/25/20  0517 12/26/20  0512   AST 56* 39* 35   * 81* 72*   BILITOT 0.6 0.4 0.3   ALKPHOS 132* 123 132*     No results for input(s): INR in the last 72 hours. No results for input(s): Manuel Favre in the last 72 hours. Urinalysis:   Lab Results   Component Value Date    NITRU Negative 06/14/2020    WBCUA 10-20 03/01/2020    BACTERIA RARE 03/01/2020    RBCUA 3-5 03/01/2020    BLOODU Negative 06/14/2020    SPECGRAV 1.009 06/14/2020    GLUCOSEU Negative 06/14/2020       Radiology:   Most recent    EEG No procedure found.     MRI of Brain   Results for orders placed during the hospital encounter of 04/15/16   MRI Brain W WO Contrast    Narrative EXAMINATION MRI BRAIN CLINICAL HISTORY Left-sided weakness and numbness, headache,  dizziness, history of multiple sclerosis     COMPARISONS 8/31/15     FINDINGS Multiplanar, multisequence images were obtained without  contrast followed by IV contrast consisting of 15 cc OptiMARK. Sinuses  and mastoid air cells are clear. Orbital contents are normal. There is  no shift of the midline structures. No abnormal contrast-enhancing  lesions are identified. No intracranial mass is seen. There is no  evidence of acute infarction or hemorrhage. Ventricular system appears  normal. FLAIR images demonstrate a single focus of bright signal  intensity in the right cerebral peduncle similar to the prior study. No  associated contrast enhancement is seen. IMPRESSION NO ACUTE INTRACRANIAL PROCESS. A SINGLE SUBCENTIMETER FOCUS  OF BRIGHT SIGNAL INTENSITY IN THE RIGHT CEREBRAL PEDUNCLE IS AGAIN  NOTED. THERE IS NO EVIDENCE OF CONTRAST ENHANCEMENT. FINDINGS ARE  SUSPICIOUS FOR A SMALL DEMYELINATING PLAQUE SECONDARY TO MULTIPLE  SCLEROSIS. NO OTHER DEFINITE PLAQUES ARE IDENTIFIED AT THIS TIME. OVERALL APPEARANCE OF THE BRAIN IS UNCHANGED FROM THE PRIOR STUDY. Reg Cardoza M.D. Released Chito Cardoza M.D. Released Date Time- 04/18/16 4104   This document has been electronically signed. ------------------------------------------------------------------------------     Results for orders placed during the hospital encounter of 12/22/20   MRI BRAIN WO CONTRAST    Narrative EXAMINATION:  MRI BRAIN WO CONTRAST    HISTORY:   new CVA . I63.9 Stroke determined by clinical assessment ([facility] [address] ICD10    TECHNIQUE:  Routine noncontrast MRI protocol including diffusion and gradient echo images. COMPARISON: MRI brain 12/23/2020.       RESULT: Acute Change:   Restricted diffusion involving the left thalamus, with corresponding increased T2/FLAIR signal at this site, measuring approximately 1.6 x 0.8 cm, similar to recent prior MRI. No new areas of restricted diffusion       Hemorrhage:    Small rounded area of susceptibility left occipital lobe, unchanged. Mass Lesion/ Mass Effect:    No evidence of an intracranial mass or extra-axial fluid collection. No significant mass effect. Chronic Change:  Scattered punctate foci of increased T2 and FLAIR signal are noted in the supratentorial white matter which is a nonspecific finding, but likely represents minimal chronic microvascular ischemia. Parenchyma:   No significant volume loss for age. The brain parenchyma is otherwise within normal limits of signal intensity and morphology. Ventricles:     Normal caliber and morphology. Skull Base:    Hypothalamic and pituitary region are grossly normal.   Craniocervical junction is normal. No significant marrow replacement process. Vasculature:    Major intracranial arterial structures, and dural venous sinuses show typical flow void, suggesting patency by spin echo criteria. Other:  The visualized paranasal sinuses are clear. Mastoid air cells clear. The orbits are unremarkable. Soft tissues are unremarkable. Impression Recent infarction involving the left thalamus, overall unchanged from MRI 12/23/2020. No new infarcts from the prior MRI. MRA of the Head and Neck: No results found for this or any previous visit. No results found for this or any previous visit. Results for orders placed during the hospital encounter of 08/06/19   mra head w/o contrast    Narrative EXAM:     MRI of the brain without contrast    MRA of the brain without contrast    History: TIA. Bilateral arm numbness. Headache. Blurry vision. Weakness of the left arm and leg. Technique: Multiplanar multisequence MRI of the brain was performed without contrast. Axial 3-D time-of-flight images of the brain were obtained without contrast. 3-D volume rendered images were performed for evaluation of the cerebral vasculature. Comparison: CT brain from August 6, 2019 and MRI brain from November 7, 2018    Findings:    MRI brain:    Unchanged nonspecific 3 mm hyperintense T2 focus within the right brachium pontis. Unchanged nonspecific 5 mm hyperintense T2/FLAIR focus within the cortex of the right frontal lobe, not significant changed from prior examination. Otherwise no suspicious   white matter lesions. Brain volume is age-appropriate. Ventricular morphology is within normal limits. No acute hemorrhage, mass, mass effect, midline shift, or abnormal extra-axial fluid collection. The posterior fossa is within normal limits. There is no diffusion restriction. No susceptibility artifact is identified on the gradient echo sequence. Cranial nerves 7/8 complexes appear grossly unremarkable. The visualized paranasal sinuses and bilateral mastoid air cells are clear. MRA brain:    The bilateral distal cervical internal carotid arteries through the skull base are patent. The bilateral middle cerebral arteries through the trifurcation and opercular branches are patent. The vertebrobasilar system including the superior cerebellar and posterior cerebral arteries are patent. Posterior communicating arteries are patent. The bilateral anterior cerebral arteries and anterior communicating artery are patent. No aneurysm or high-grade stenosis of the visualized cerebral vasculature. Impression MRI brain:    No acute ischemia, acute intracranial process, or significant interval change when compared to MRI of the brain from November 7, 2018    MRA brain:    No aneurysm or high-grade stenosis of the visualized cerebral vasculature. CT of the Head:   Results for orders placed during the hospital encounter of 12/22/20   CT Head WO Contrast    Narrative CT Brain    Contrast medium:  Not utilized. History:  Right facial droop. Altered mental status    Comparison:  August 21, 2020    Findings:    Extra-axial spaces:  Normal.     Intracranial hemorrhage:  None. Ventricular system: In the interval, an ill-defined 4 x 12 mm area decreased attenuation exerting no mass effect is found medially within the left thalamus (series 2, image 16). Basal Cisterns:  Normal.    Cerebral Parenchyma:  Normal.    Midline Shift:  None. Cerebellum:  Normal.     Paranasal sinuses and mastoid air cells:  Normal.    Visualized Orbits:  Normal.      Impression Impression:    Interval development of acute/subacute left thalamic infarct. All CT scans at this facility use dose modulation, iterative reconstruction, and/or weight based dosing when appropriate to reduce radiation dose to as low as reasonably achievable. No results found for this or any previous visit. No results found for this or any previous visit. Carotid duplex: No results found for this or any previous visit. No results found for this or any previous visit. Results for orders placed during the hospital encounter of 08/06/19   US CAROTID ARTERY BILATERAL    Narrative BILATERAL DUPLEX CAROTID ULTRASOUND    CLINICAL INFORMATION:  SMOKING HISTORY, HYPERTENSION, TIA WITH LEFT ARM NUMBNESS AND TINGLING, FACIAL DROOP, EVALUATE FOR POSSIBLE CAROTID ARTERY STENOSIS IN THE NECK.     COMPARISON:  NONE AVAILABLE     FINDINGS:  The bilateral carotid duplex ultrasound study demonstrates the following:                                                                 RIGHT            LEFT    Carotid plaque burden                         Mild               Mild  Proximal common carotid artery           171 cm/s            177 cm/s Mid common carotid artery                   151 cm/s            152 cm/s    Distal common carotid artery                90 cm/s           118 cm/s  Proximal internal carotid artery             78 cm/s            130 cm/s  Mid internal carotid artery                      80 cm/s           82 cm/s  Distal internal carotid artery                  65 cm/s             85 cm/s  External carotid artery                            172 cm/s           169 cm/s      ICA/CCA ratio                                         0.5                0.9       Vertebral arteries antegrade flow         Yes                     Yes       Impression 1. MILD CAROTID PLAQUE BURDEN IN BOTH CAROTID BIFURCATIONS IN THE NECK. 2. RIGHT INTERNAL CAROTID ARTERY: <50% STENOSIS. 3. LEFT INTERNAL CAROTID ARTERY: <50% STENOSIS. 4. BILATERALLY PATENT VERTEBRAL ARTERIES WITH ANTEGRADE BLOOD FLOW. Validated velocity measurements with angiographic measurements, velocity criteria are extrapolated from diameter data as defined by the Society of Radiologist in [address] [place] [address] Drive Radiology 2003; [phone]. Echo No results found for this or any previous visit.           Assessment/Plan: Left thalamic stroke which was already apparent on the CT scan and an MRI. Patient timing of the onset of the stroke is very difficult ascertain she was not a candidate for TPA. Large vessel evaluation was obtained with a CT angiogram which did not show a large clot. We were consulted for the same for further management. Compliance is a question as her PGS studies were reviewed and she does not have platelet aggregation therefore she is not taking her antiplatelet agents. I recommended dual antiplatelet therapy. She has multiple risk factors for cerebrovascular disease including smoking and hypertension and carotid disease. Patient was seen for complicated migraines in the past and therefore recommended a transesophageal echo and a complete hypercoagulable work-up for now. We will arrange for a speech evaluation for her dysarthria and aphasia. Pending on the results of the same will further advise    LDL 86  A1c 8.0  Homocysteine 25.8  All other labs for hypercoagulable work-up still pending  Transesophageal echo still pending. Patient to continue on dual antiplatelet therapy and Lipitor. Still awaiting echo. Homocysteine is elevated and will obtain B12 and folate. Depending on results of these labs, we will consider a methylmalonic acid level. Patient may benefit from MTHFR genetic testing as an outpatient. Patient has migraine and will order Fioricet. Will continue to monitor closely. I have personally performed a face to face diagnostic evaluation on this patient, reviewed all data and investigations, and am the sole provider of all clinical decisions on the neurological status of this patient. Examination reveals mild right-sided findings with double vision given that this is a thalamic stroke. Repeat MRI was done shows stable thalamic infarct. Will arrange for MTHFR as she has elevated homocysteine's. Steven Funes MD, [id number], American Board of Psychiatry & Neurology Board Certified in Vascular Neurology  Board Certified in Neuromuscular Medicine  Certified in Neurorehabilitation       Collaborating physicians: Dr Feliciano Ga    Electronically signed by Sonja Ventura PA-C on 12/26/2020 at 11:13 AM

## 2020-12-26 NOTE — PROGRESS NOTES
2222: Shift assessment complete, see flowsheet. A/Ox4. Delayed response with slurred speech but passed bedside swallow eval. She is in bed, watching TV, needs provided.

## 2020-12-26 NOTE — CONSULTS
Chief Complaint   Patient presents with    Altered Mental Status        Patient is a 52 y.o. female who presents with a chief complaint of mental status change. Patient is followed on a regular basis by Dr. Umu Hitchcock MD.  Patient presented with strokelike symptoms. Cardiology asked to perform transesophageal echocardiogram.  Patient with history of hypertension, hyperlipidemia, tobacco abuse. Status post normal cardiac catheterization 2016. Echo in August 2019 showed ejection fraction of EF of 55%, no significant valve abnormalities.             Past Medical History:   Diagnosis Date    Anxiety     Back pain     back surgery x 4    Bipolar disorder ([address]     CAD (coronary artery disease)     CAFL (chronic airflow limitation) ([facility]) 11/3/2016    Chronic bronchitis ([facility])     Chronic pain     Colitis     Complicated migraine     DDD (degenerative disc disease), lumbar 12/22/2016    Depression     Endometriosis     Excessive caffeine abuse, continuous ([facility]) 7/6/2018    Gastritis     GERD (gastroesophageal reflux disease)     History of myocardial infarction     HTN (hypertension), benign 2/19/2016    Impaired mobility and activities of daily living     Over weight     PTSD (post-traumatic stress disorder)     Sensory neuropathy 12/22/2016    Somatization disorder     TIA (transient ischemic attack)     Tobacco abuse     Vasospastic angina ([address] 11/11/2016      Patient Active Problem List   Diagnosis    H/O cardiac arrest    Bipolar 1 disorder ([address]    Tobacco abuse    SAMAYOA (dyspnea on exertion)    Abnormal ECG    CAD (coronary artery disease)    Displacement of lumbar intervertebral disc without myelopathy    Acid reflux    HTN (hypertension)    Multiple sclerosis ([facility])    Colitis    Over weight    Anxiety    Chronic pain syndrome     Vitamin B12 deficiency    Grief at loss of child    Smoking    Neuromyelopathy due to vitamin B12 deficiency ([address]  Chronic obstructive pulmonary disease (HCC)    Acute bronchitis    Sleep apnea    Obesity (BMI 30-39. 9)    Stroke determined by clinical assessment ([facility]    Abnormal auditory perception of left ear    Dizziness and giddiness    Sensorineural hearing loss (SNHL) of both ears    Tinnitus, bilateral    Vertigo, peripheral, bilateral    Right arm weakness    COPD exacerbation (HCC)    Chest pain    Adenomatous polyp of colon    Adenomatous polyp of sigmoid colon    Melena    Tendinitis of right rotator cuff       Past Surgical History:   Procedure Laterality Date    BACK SURGERY      x2     SECTION      x2    CHOLECYSTECTOMY  13    Lapchole    COLONOSCOPY N/A 2020    COLONOSCOPY DIAGNOSTIC performed by Savanah Kessler MD at [address] [facility]  3/7/16    Dr. Kendrick Farrell Left     UPPER GASTROINTESTINAL ENDOSCOPY  2014    ANIBAL HOUSE M.D.   [facility] UPPER GASTROINTESTINAL ENDOSCOPY N/A 2020    EGD ESOPHAGOGASTRODUODENOSCOPY performed by Savanah Kessler MD at [address] [facility] Marital status:       Spouse name: None    Number of children: None    Years of education: None    Highest education level: None   Occupational History    Occupation: unemployed   Social Needs    Financial resource strain: None    Food insecurity     Worry: None     Inability: None    Transportation needs     Medical: None     Non-medical: None   Tobacco Use    Smoking status: Current Every Day Smoker     Packs/day: 1.00     Years: 17.00     Pack years: 17.00     Types: Cigarettes    Smokeless tobacco: Never Used   Substance and Sexual Activity    Alcohol use: No    Drug use: No    Sexual activity: Not Currently     Partners: Male   Lifestyle    Physical activity     Days per week: 0 days Minutes per session: 0 min    Stress: To some extent   Relationships    Social connections     Talks on phone: None     Gets together: None     Attends [employer] service: None     Active member of club or organization: None     Attends meetings of clubs or organizations: None     Relationship status: None    Intimate partner violence     Fear of current or ex partner: None     Emotionally abused: None     Physically abused: None     Forced sexual activity: None   Other Topics Concern    None   Social History Narrative    John Schofield is a 44-year-old female who is on disability because of pain and bipolar disorder. She's also had a presumed diagnosis of possible multiple sclerosis. She's been seeing Dr. Mauricio West for that and she says that the diagnosis is sometimes in question and it's clear neuropathy vitamin B12 deficiency as well as generalized bodyaches from the severe vitamin D deficiency have caused this scenario that looks like multiple sclerosis.      lives With: Significant other    Type of Home: House [facility] in [address] to enter with rails  - Number of Steps: 5    Bathroom Shower/Tub: Tub only    ADL Assistance: Independent    Homemaking Assistance: Independent, Homemaking Responsibilities: Yes, Meal Prep Responsibility: Primary    Laundry Responsibility: Primary, Cleaning Responsibility: Primary    Bill Paying/Finance Responsibility: Primary    Shopping Responsibility: Primary, Ambulation Assistance: Independent, Transfer Assistance: Independent    Active : Yes    Occupation: Full time employment--Type of occupation: disabled-  - in charges of [employer]       Family History   Problem Relation Age of Onset    High Blood Pressure Mother     Kidney Disease Mother     Lupus Mother     Coronary Art Dis Mother         Had stents in her 62s    Bipolar Disorder Son        Current Facility-Administered Medications Medication Dose Route Frequency Provider Last Rate Last Admin    LORazepam (ATIVAN) injection 1 mg  1 mg Intravenous Once PRN Mica Nash MD        acetaminophen (TYLENOL) tablet 650 mg  650 mg Oral Q4H PRN Minnie Camejo MD   650 mg at 12/25/20 1717    sodium chloride flush 0.9 % injection 10 mL  10 mL Intravenous 2 times per day Misty Ribeiro APRN - CNP   10 mL at 12/25/20 1012    sodium chloride flush 0.9 % injection 10 mL  10 mL Intravenous PRN Misty Ribeiro APRN - CNP        promethazine (PHENERGAN) tablet 12.5 mg  12.5 mg Oral Q6H PRN Misty Ribeiro, APRN - CNP        Or    [facility] injection 4 mg  4 mg Intravenous Q6H PRN Misty Gimenezr, APRN - CNP        polyethylene glycol (GLYCOLAX) packet 17 g  17 g Oral Daily PRN Misty Ribeiro, APRN - CNP        enoxaparin (LOVENOX) injection 40 mg  40 mg Subcutaneous Daily Misty Ribeiro APRN - CNP   40 mg at 12/25/20 1012    aspirin EC tablet 81 mg  81 mg Oral Daily Misty Ribeiro, APRN - CNP   81 mg at 12/25/20 1012    Or    aspirin suppository 300 mg  300 mg Rectal Daily Jayson Frost APRN - CNP        0.9 % sodium chloride infusion   Intravenous Continuous Misty Ribeiro APRN - CNP 75 mL/hr at 12/24/20 1226 New Bag at 12/24/20 1226    atorvastatin (LIPITOR) tablet 20 mg  20 mg Oral Nightly Misty Ribeiro, APRN - CNP   20 mg at 12/24/20 2038    hydrALAZINE (APRESOLINE) injection 5 mg  5 mg Intravenous Q6H PRN Misty Ribeiro APRN - CNP        clopidogrel (PLAVIX) tablet 75 mg  75 mg Oral Daily Mica Nash MD   75 mg at 12/25/20 1012    hydrALAZINE (APRESOLINE) injection 10 mg  10 mg Intravenous Once Dasha Hendricks PA-C           ALLERGIES: Latex, Influenza vaccines, Eggs or egg-derived products, Gabapentin, Pneumococcal vaccines, Povidone iodine, and Varicella virus vaccine live    Review of Systems Constitutional: Negative. Negative for chills and fever. HENT: Negative. Eyes: Negative. Respiratory: Negative for shortness of breath and wheezing. Cardiovascular: Negative for chest pain, palpitations and leg swelling. Gastrointestinal: Negative. Negative for abdominal pain, nausea and vomiting. Genitourinary: Negative. Musculoskeletal: Negative. Skin: Negative. Negative for rash. Neurological: Negative for dizziness, weakness and headaches. Psychiatric/Behavioral: Negative. VITALS:  Blood pressure (!) 156/79, pulse 72, temperature 98 °F (36.7 °C), temperature source Oral, resp. rate 17, height 5' 7\" (1.702 m), weight 192 lb (87.1 kg), SpO2 99 %, not currently breastfeeding. Body mass index is 30.07 kg/m². Physical Exam   Constitutional: She is oriented to person, place, and time. She appears well-developed and well-nourished. HENT:   Head: Normocephalic and atraumatic. Eyes: Pupils are equal, round, and reactive to light. Neck: Normal range of motion. Neck supple. No JVD present. No tracheal deviation present. No thyromegaly present. Cardiovascular: Normal rate, regular rhythm, normal heart sounds and intact distal pulses. PMI is not displaced. Exam reveals no gallop, no S3, no distant heart sounds and no friction rub. No murmur heard. Pulmonary/Chest: No respiratory distress. She has no wheezes. She has no rales. She exhibits no tenderness. Abdominal: Soft. Bowel sounds are normal. She exhibits no distension and no mass. There is no abdominal tenderness. There is no rebound and no guarding. Musculoskeletal:         General: No edema. Neurological: She is alert and oriented to person, place, and time. No cranial nerve deficit. Skin: Skin is warm and dry. No rash noted. She is not diaphoretic. No erythema. No pallor. Psychiatric: She has a normal mood and affect.  Her behavior is normal. Judgment and thought content normal.       LABS: Recent Results (from the past 24 hour(s))   CBC auto differential    Collection Time: 12/25/20  5:17 AM   Result Value Ref Range    WBC 7.3 4.8 - 10.8 K/uL    RBC 4.06 (L) 4.20 - 5.40 M/uL    Hemoglobin 13.3 12.0 - 16.0 g/dL    Hematocrit 38.0 37.0 - 47.0 %    MCV 93.6 82.0 - 100.0 fL    MCH 32.7 (H) 27.0 - 31.3 pg    MCHC 34.9 33.0 - 37.0 %    RDW 12.9 11.5 - 14.5 %    Platelets 727 748 - 178 K/uL    Neutrophils % 67.4 %    Lymphocytes % 23.3 %    Monocytes % 7.9 %    Eosinophils % 1.1 %    Basophils % 0.3 %    Neutrophils Absolute 4.9 1.4 - 6.5 K/uL    Lymphocytes Absolute 1.7 1.0 - 4.8 K/uL    Monocytes Absolute 0.6 0.2 - 0.8 K/uL    Eosinophils Absolute 0.1 0.0 - 0.7 K/uL    Basophils Absolute 0.0 0.0 - 0.2 K/uL   Comprehensive Metabolic Panel w/ Reflex to MG    Collection Time: 12/25/20  5:17 AM   Result Value Ref Range    Sodium 138 135 - 144 mEq/L    Potassium reflex Magnesium 3.2 (L) 3.4 - 4.9 mEq/L    Chloride 107 95 - 107 mEq/L    CO2 20 20 - 31 mEq/L    Anion Gap 11 9 - 15 mEq/L    Glucose 222 (H) 70 - 99 mg/dL    BUN 8 6 - 20 mg/dL    CREATININE 0.79 0.50 - 0.90 mg/dL    GFR Non-African American >60.0 >60    GFR  >60.0 >60    Calcium 8.9 8.5 - 9.9 mg/dL    Total Protein 5.8 (L) 6.3 - 8.0 g/dL    Alb 3.5 3.5 - 4.6 g/dL    Total Bilirubin 0.4 0.2 - 0.7 mg/dL    Alkaline Phosphatase 123 40 - 130 U/L    ALT 81 (H) 0 - 33 U/L    AST 39 (H) 0 - 35 U/L    Globulin 2.3 2.3 - 3.5 g/dL   Magnesium    Collection Time: 12/25/20  5:17 AM   Result Value Ref Range    Magnesium 1.9 1.7 - 2.4 mg/dL     Troponin:   Lab Results   Component Value Date    TROPONINI <0.010 12/22/2020       EKG: normal sinus rhythm, nonspecific ST and T waves changes      ASSESSMENT:    Active Hospital Problems    Diagnosis Date Noted    CAD (coronary artery disease) [I25.10] 04/22/2014     Priority: High    COPD exacerbation (HCC) [J44.1] 11/18/2019     Priority: Low    Right arm weakness [R29.898] 08/10/2019

## 2020-12-27 LAB
ALBUMIN SERPL-MCNC: 3.7 G/DL (ref 3.5–4.6)
ALP BLD-CCNC: 127 U/L (ref 40–130)
ALT SERPL-CCNC: 71 U/L (ref 0–33)
ANION GAP SERPL CALCULATED.3IONS-SCNC: 13 MEQ/L (ref 9–15)
AST SERPL-CCNC: 33 U/L (ref 0–35)
BASOPHILS ABSOLUTE: 0 K/UL (ref 0–0.2)
BASOPHILS RELATIVE PERCENT: 0.5 %
BILIRUB SERPL-MCNC: 0.4 MG/DL (ref 0.2–0.7)
BUN BLDV-MCNC: 7 MG/DL (ref 6–20)
CALCIUM SERPL-MCNC: 9 MG/DL (ref 8.5–9.9)
CHLORIDE BLD-SCNC: 109 MEQ/L (ref 95–107)
CO2: 21 MEQ/L (ref 20–31)
CREAT SERPL-MCNC: 0.81 MG/DL (ref 0.5–0.9)
EOSINOPHILS ABSOLUTE: 0.1 K/UL (ref 0–0.7)
EOSINOPHILS RELATIVE PERCENT: 1.3 %
GFR AFRICAN AMERICAN: >60
GFR NON-AFRICAN AMERICAN: >60
GLOBULIN: 2.1 G/DL (ref 2.3–3.5)
GLUCOSE BLD-MCNC: 155 MG/DL (ref 70–99)
HCT VFR BLD CALC: 39.3 % (ref 37–47)
HEMOGLOBIN: 13.6 G/DL (ref 12–16)
INR BLD: 0.9
LYMPHOCYTES ABSOLUTE: 2.3 K/UL (ref 1–4.8)
LYMPHOCYTES RELATIVE PERCENT: 31.3 %
MAGNESIUM: 1.8 MG/DL (ref 1.7–2.4)
MCH RBC QN AUTO: 32.7 PG (ref 27–31.3)
MCHC RBC AUTO-ENTMCNC: 34.6 % (ref 33–37)
MCV RBC AUTO: 94.3 FL (ref 82–100)
MONOCYTES ABSOLUTE: 0.5 K/UL (ref 0.2–0.8)
MONOCYTES RELATIVE PERCENT: 7.1 %
NEUTROPHILS ABSOLUTE: 4.4 K/UL (ref 1.4–6.5)
NEUTROPHILS RELATIVE PERCENT: 59.8 %
PDW BLD-RTO: 13.3 % (ref 11.5–14.5)
PLATELET # BLD: 133 K/UL (ref 130–400)
POTASSIUM REFLEX MAGNESIUM: 3.4 MEQ/L (ref 3.4–4.9)
PROTHROMBIN TIME: 12.4 SEC (ref 12.3–14.9)
RBC # BLD: 4.17 M/UL (ref 4.2–5.4)
SODIUM BLD-SCNC: 143 MEQ/L (ref 135–144)
TOTAL PROTEIN: 5.8 G/DL (ref 6.3–8)
WBC # BLD: 7.4 K/UL (ref 4.8–10.8)

## 2020-12-27 PROCEDURE — 97116 GAIT TRAINING THERAPY: CPT

## 2020-12-27 PROCEDURE — 6360000002 HC RX W HCPCS: Performed by: NURSE PRACTITIONER

## 2020-12-27 PROCEDURE — 81291 MTHFR GENE: CPT

## 2020-12-27 PROCEDURE — 1210000000 HC MED SURG R&B

## 2020-12-27 PROCEDURE — 6370000000 HC RX 637 (ALT 250 FOR IP): Performed by: STUDENT IN AN ORGANIZED HEALTH CARE EDUCATION/TRAINING PROGRAM

## 2020-12-27 PROCEDURE — 6370000000 HC RX 637 (ALT 250 FOR IP): Performed by: INTERNAL MEDICINE

## 2020-12-27 PROCEDURE — APPSS30 APP SPLIT SHARED TIME 16-30 MINUTES: Performed by: STUDENT IN AN ORGANIZED HEALTH CARE EDUCATION/TRAINING PROGRAM

## 2020-12-27 PROCEDURE — 99232 SBSQ HOSP IP/OBS MODERATE 35: CPT | Performed by: INTERNAL MEDICINE

## 2020-12-27 PROCEDURE — 85610 PROTHROMBIN TIME: CPT

## 2020-12-27 PROCEDURE — 99233 SBSQ HOSP IP/OBS HIGH 50: CPT | Performed by: PSYCHIATRY & NEUROLOGY

## 2020-12-27 PROCEDURE — 83735 ASSAY OF MAGNESIUM: CPT

## 2020-12-27 PROCEDURE — 85025 COMPLETE CBC W/AUTO DIFF WBC: CPT

## 2020-12-27 PROCEDURE — 6370000000 HC RX 637 (ALT 250 FOR IP): Performed by: NURSE PRACTITIONER

## 2020-12-27 PROCEDURE — 6370000000 HC RX 637 (ALT 250 FOR IP): Performed by: PSYCHIATRY & NEUROLOGY

## 2020-12-27 PROCEDURE — 2580000003 HC RX 258: Performed by: NURSE PRACTITIONER

## 2020-12-27 PROCEDURE — 36415 COLL VENOUS BLD VENIPUNCTURE: CPT

## 2020-12-27 PROCEDURE — 80053 COMPREHEN METABOLIC PANEL: CPT

## 2020-12-27 RX ORDER — ISOSORBIDE MONONITRATE 60 MG/1
30 TABLET, EXTENDED RELEASE ORAL DAILY
Status: DISCONTINUED | OUTPATIENT
Start: 2020-12-27 | End: 2020-12-28 | Stop reason: HOSPADM

## 2020-12-27 RX ORDER — LOSARTAN POTASSIUM 25 MG/1
25 TABLET ORAL DAILY
Status: DISCONTINUED | OUTPATIENT
Start: 2020-12-27 | End: 2020-12-28 | Stop reason: HOSPADM

## 2020-12-27 RX ADMIN — Medication 10 ML: at 10:19

## 2020-12-27 RX ADMIN — ISOSORBIDE MONONITRATE 30 MG: 60 TABLET, EXTENDED RELEASE ORAL at 21:33

## 2020-12-27 RX ADMIN — ENOXAPARIN SODIUM 40 MG: 40 INJECTION SUBCUTANEOUS at 10:18

## 2020-12-27 RX ADMIN — ASPIRIN 81 MG: 81 TABLET, COATED ORAL at 10:18

## 2020-12-27 RX ADMIN — ACETAMINOPHEN 650 MG: 325 TABLET, FILM COATED ORAL at 10:18

## 2020-12-27 RX ADMIN — SODIUM CHLORIDE: 9 INJECTION, SOLUTION INTRAVENOUS at 13:20

## 2020-12-27 RX ADMIN — HYDRALAZINE HYDROCHLORIDE 5 MG: 20 INJECTION INTRAMUSCULAR; INTRAVENOUS at 00:49

## 2020-12-27 RX ADMIN — BUTALBITAL, ASPIRIN, AND CAFFEINE 1 CAPSULE: 50; 325; 40 CAPSULE ORAL at 00:48

## 2020-12-27 RX ADMIN — CLOPIDOGREL BISULFATE 75 MG: 75 TABLET, FILM COATED ORAL at 10:18

## 2020-12-27 RX ADMIN — LOSARTAN POTASSIUM 25 MG: 25 TABLET, FILM COATED ORAL at 21:32

## 2020-12-27 RX ADMIN — ATORVASTATIN CALCIUM 20 MG: 20 TABLET, FILM COATED ORAL at 21:32

## 2020-12-27 ASSESSMENT — ENCOUNTER SYMPTOMS
CONSTIPATION: 0
DIARRHEA: 0
VOICE CHANGE: 1
COUGH: 0
PHOTOPHOBIA: 1
SHORTNESS OF BREATH: 0
NAUSEA: 1
VOMITING: 0

## 2020-12-27 ASSESSMENT — PAIN SCALES - GENERAL
PAINLEVEL_OUTOF10: 8

## 2020-12-27 NOTE — FLOWSHEET NOTE
Patient A&Ox4,VSS. Patient has clear speech but has delayed responses. LS clear, even and unlabored. BS active in all 4 quadrants. Patient has R arm in sling d/t surgery for rotator cuff on 12/1/2020. Patient c/o numbness and tingling in RUE and RLE. Patient has no further needs at this time. Bed alarm on. Call light within reach . Will continue to monitor.

## 2020-12-27 NOTE — PROGRESS NOTES
Collection Time: 12/27/20  5:36 AM   Result Value Ref Range    Sodium 143 135 - 144 mEq/L    Potassium reflex Magnesium 3.4 3.4 - 4.9 mEq/L    Chloride 109 (H) 95 - 107 mEq/L    CO2 21 20 - 31 mEq/L    Anion Gap 13 9 - 15 mEq/L    Glucose 155 (H) 70 - 99 mg/dL    BUN 7 6 - 20 mg/dL    CREATININE 0.81 0.50 - 0.90 mg/dL    GFR Non-African American >60.0 >60    GFR  >60.0 >60    Calcium 9.0 8.5 - 9.9 mg/dL    Total Protein 5.8 (L) 6.3 - 8.0 g/dL    Alb 3.7 3.5 - 4.6 g/dL    Total Bilirubin 0.4 0.2 - 0.7 mg/dL    Alkaline Phosphatase 127 40 - 130 U/L    ALT 71 (H) 0 - 33 U/L    AST 33 0 - 35 U/L    Globulin 2.1 (L) 2.3 - 3.5 g/dL   Magnesium    Collection Time: 12/27/20  5:36 AM   Result Value Ref Range    Magnesium 1.8 1.7 - 2.4 mg/dL       Previous extensive, complex labs, notes and diagnostics reviewed and analyzed. ALLERGIES:    Allergies as of 12/22/2020 - Review Complete 12/22/2020   Allergen Reaction Noted    Latex Itching 03/25/2018    Influenza vaccines Swelling 10/08/2015    Eggs or egg-derived products  10/08/2015    Gabapentin Nausea Only 10/31/2017    Pneumococcal vaccines Hives 01/28/2016    Povidone iodine Itching 10/08/2015    Varicella virus vaccine live Hives 01/28/2016      (please also verify by checking [facility])     Complex Physical Medicine & Rehab Issues Assess & Plan:   1. Severe abnormality of gait and mobility and impaired self-care and ADL's secondary to progressive and recurrent cerebrovascular disease with clinically diagnosed CVA. Functional and medical status reassessed regarding patients ability to participate in therapies and patient found to be able to participate in skilled versus acute intensive comprehensive inpatient rehabilitation program including PT/OT to improve balance, ambulation, ADLs, and to improve the P/AROM. It is my opinion that they will be able to tolerate 3 hours of therapy a day and benefit from it at an acute level.   2. Bowel constipation

## 2020-12-27 NOTE — FLOWSHEET NOTE
Pt. A&Ox3-4. Pt. C/o generalized pain all over body. Pt. rec'd Tylenol. Pt. Took a shower this afternoon. Pt. Stated that she does not like her food options and does not like her food chopped. Pt. Ate very little for lunch.

## 2020-12-27 NOTE — PROGRESS NOTES
MRI BRAIN WO CONTRAST   Final Result   There is a 7 x 15 mm area of subacute ischemia in the left thalamus. CTA HEAD W WO CONTRAST   Final Result   The major intracranial arterial structures are patent without high-grade stenosis, large vessel cut off, or aneurysm. EXAMINATION: CTA HEAD W WO CONTRAST, CTA NECK W WO CONTRAST      DATE AND TIME:12/23/2020 12:42 AM      CLINICAL HISTORY: Stroke symptoms   cva        COMPARISON: None      TECHNIQUE: Helical CTA of the neck was performed from the aortic arch to the foramen magnum following the uneventful intravenous administration of 100 cc of nonionic contrast without incident. 2-D images were reconstructed in the sagittal and coronal    planes. Three Dimensional Maximum Intensity Projection (3D-MIP) images were created. All images were reviewed and primarily archived to PACS workstation. All CT scans at this facility use dose modulation, iterative reconstruction, and/or weight based    dosing when appropriate to reduce radiation dose to as low as reasonably achievable. NASCET Criteria were utilized      FINDINGS CTA NECK:      Aortic Arch: The visualized portions of the aortic arch and proximal arch vessels demonstrates no focal stenosis aneurysm or dissection. Carotids: The common carotid arteries, carotid bifurcations, internal and external carotid arteries are normal in course and caliber. Right  Proximal Internal Carotid Stenosis (% by NASCET Criteria): There is no hemodynamically significant stenosis. Left  Proximal Internal Carotid Stenosis (% by NASCET Criteria): There is no hemodynamically significant stenosis. Vertebral Arteries:      Patency: The vertebral arteries are well visualized to the level of the basilar artery. There is no focal stenosis aneurysm or dissection. Vertebral arteries are codominant. IMPRESSION: Negative CTA of the neck.                   CTA NECK W WO CONTRAST   Final Result The major intracranial arterial structures are patent without high-grade stenosis, large vessel cut off, or aneurysm. EXAMINATION: CTA HEAD W WO CONTRAST, CTA NECK W WO CONTRAST      DATE AND TIME:12/23/2020 12:42 AM      CLINICAL HISTORY: Stroke symptoms   cva        COMPARISON: None      TECHNIQUE: Helical CTA of the neck was performed from the aortic arch to the foramen magnum following the uneventful intravenous administration of 100 cc of nonionic contrast without incident. 2-D images were reconstructed in the sagittal and coronal    planes. Three Dimensional Maximum Intensity Projection (3D-MIP) images were created. All images were reviewed and primarily archived to PACS workstation. All CT scans at this facility use dose modulation, iterative reconstruction, and/or weight based    dosing when appropriate to reduce radiation dose to as low as reasonably achievable. NASCET Criteria were utilized      FINDINGS CTA NECK:      Aortic Arch: The visualized portions of the aortic arch and proximal arch vessels demonstrates no focal stenosis aneurysm or dissection. Carotids: The common carotid arteries, carotid bifurcations, internal and external carotid arteries are normal in course and caliber. Right  Proximal Internal Carotid Stenosis (% by NASCET Criteria): There is no hemodynamically significant stenosis. Left  Proximal Internal Carotid Stenosis (% by NASCET Criteria): There is no hemodynamically significant stenosis. Vertebral Arteries:      Patency: The vertebral arteries are well visualized to the level of the basilar artery. There is no focal stenosis aneurysm or dissection. Vertebral arteries are codominant. IMPRESSION: Negative CTA of the neck. CT Head WO Contrast   Final Result   Impression:      Interval development of acute/subacute left thalamic infarct. All CT scans at this facility use dose modulation, iterative reconstruction, and/or weight based dosing when appropriate to reduce radiation dose to as low as reasonably achievable. Assessment/Plan:    Acute stroke: Aspirin, statin. Following with neurology. Hypertension: Monitor blood pressure, continue home medications    CAD History    Bipolar disorder: Continue medications    Dc plan; awaiting placement.  Pt/ot rec - acute rehab    25 minutes total care time, >1/2 in unit/floor time and care coordination   Patricio Reyes MD

## 2020-12-27 NOTE — PROGRESS NOTES
Chief Complaint   Patient presents with    Altered Mental Status     Patient is a 52 y.o. female who presents with a chief complaint of mental status change. Patient is followed on a regular basis by Dr. Gale Trevino MD. Patient presented with strokelike symptoms. Cardiology asked to perform transesophageal echocardiogram.   Patient with history of hypertension, hyperlipidemia, tobacco abuse. Status post normal cardiac catheterization 2016. Echo in August 2019 showed ejection fraction of EF of 55%, no significant valve abnormalities. 12/26/2020: Resting comfortably. Denies any chest pain or shortness of breath. Sinus rhythm on telemetry.     Past Medical History        Past Medical History:   Diagnosis Date    Anxiety     Back pain     back surgery x 4    Bipolar disorder ([address]     CAD (coronary artery disease)     [facility]FL [facility]flow limitation) [facility]) 11/3/2016    Chronic bronchitis ([facility])     Chronic pain     Colitis     Complicated migraine     DDD (degenerative disc disease), lumbar 12/22/2016    Depression     Endometriosis     Excessive caffeine abuse, continuous ([facility]) 7/6/2018    Gastritis     GERD (gastroesophageal reflux disease)     History of myocardial infarction     HTN (hypertension), benign 2/19/2016    Impaired mobility and activities of daily living     Over weight     PTSD (post-traumatic stress disorder)     Sensory neuropathy 12/22/2016    Somatization disorder     TIA (transient ischemic attack)     Tobacco abuse     Vasospastic angina ([address] 11/11/2016         Patient Active Problem List   Diagnosis    H/O cardiac arrest    Bipolar 1 disorder ([address]    Tobacco abuse    SAMAYOA (dyspnea on exertion)    Abnormal ECG    CAD (coronary artery disease)    Displacement of lumbar intervertebral disc without myelopathy    Acid reflux    HTN (hypertension)    Multiple sclerosis (HCC)    Colitis    Over weight    Anxiety    Chronic pain syndrome Substance and Sexual Activity    Alcohol use: No    Drug use: No    Sexual activity: Not Currently     Partners: Male   Lifestyle    Physical activity     Days per week: 0 days     Minutes per session: 0 min    Stress: To some extent   Relationships    Social connections     Talks on phone: None     Gets together: None     Attends Gnosticist service: None     Active member of club or organization: None     Attends meetings of clubs or organizations: None     Relationship status: None    Intimate partner violence     Fear of current or ex partner: None     Emotionally abused: None     Physically abused: None     Forced sexual activity: None   Other Topics Concern    None   Social History Narrative    Lianna Cartagena is a 57-year-old female who is on disability because of pain and bipolar disorder. She's also had a presumed diagnosis of possible multiple sclerosis. She's been seeing Dr. Mitchell Staff for that and she says that the diagnosis is sometimes in question and it's clear neuropathy vitamin B12 deficiency as well as generalized bodyaches from the severe vitamin D deficiency have caused this scenario that looks like multiple sclerosis.     lives With: Significant other    Type of Home: House [facility] in [facility] Access: Stairs to enter with rails - Number of Steps: 5    Bathroom Shower/Tub: Tub only    ADL Assistance: Independent    Homemaking Assistance: Independent, Homemaking Responsibilities: Yes, Meal Prep Responsibility: Primary    Laundry Responsibility: Primary, Cleaning Responsibility: Primary    Bill Paying/Finance Responsibility: Primary    Shopping Responsibility: Primary, Ambulation Assistance: Independent, Transfer Assistance: Independent    Active : Yes    Occupation: Full time employment--Type of occupation: 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     Family History         Family History   Problem Relation Age of Onset  High Blood Pressure Mother     Kidney Disease Mother     Lupus Mother     Coronary Art Dis Mother     Had stents in her 62s   Seth Arnold Bipolar Disorder Son      Current Facility-Administered Medications             Current Facility-Administered Medications   Medication Dose Route Frequency Provider Last Rate Last Admin    LORazepam (ATIVAN) injection 1 mg 1 mg Intravenous Once PRN Mekhi Mason MD      acetaminophen (TYLENOL) tablet 650 mg 650 mg Oral Q4H PRN Sandra Kauffman MD  650 mg at 12/25/20 1717    sodium chloride flush 0.9 % injection 10 mL 10 mL Intravenous 2 times per day Evans Ko, APRN - CNP  10 mL at 12/25/20 1012    sodium chloride flush 0.9 % injection 10 mL 10 mL Intravenous PRN Evansdarvin Alvarez APRN - CNP      promethazine (PHENERGAN) tablet 12.5 mg 12.5 mg Oral Q6H PRN Evans Alvarez APRN - CNP      Or    ondansetron (ZOFRAN) injection 4 mg 4 mg Intravenous Q6H PRN Evans Alvarez APRN - CNP      polyethylene glycol (GLYCOLAX) packet 17 g 17 g Oral Daily PRN Evans Alvarez APRN - CNP      enoxaparin (LOVENOX) injection 40 mg 40 mg Subcutaneous Daily Samuel Frost APRN - CNP  40 mg at 12/25/20 1012    aspirin EC tablet 81 mg 81 mg Oral Daily Samuel Frost APRN - CNP  81 mg at 12/25/20 1012    Or    aspirin suppository 300 mg 300 mg Rectal Daily NIKI Pena - CNP      0.9 % sodium chloride infusion  Intravenous Continuous Evansdarvin Alvarez APRN - CNP 75 mL/hr at 12/24/20 1226 New Bag at 12/24/20 1226    atorvastatin (LIPITOR) tablet 20 mg 20 mg Oral Nightly Evans Ko, APRN - CNP  20 mg at 12/24/20 2038    hydrALAZINE (APRESOLINE) injection 5 mg 5 mg Intravenous Q6H PRN Evans Alvarez APRN - CNP      clopidogrel (PLAVIX) tablet 75 mg 75 mg Oral Daily Mekhi Mason MD  75 mg at 12/25/20 1012  hydrALAZINE (APRESOLINE) injection 10 mg 10 mg Intravenous Once Jennifer Curtis PA-C     ALLERGIES: Latex, Influenza vaccines, Eggs or egg-derived products, Gabapentin, Pneumococcal vaccines, Povidone iodine, and Varicella virus vaccine live   Review of Systems   Constitutional: Negative. Negative for chills and fever. HENT: Negative. Eyes: Negative. Respiratory: Negative for shortness of breath and wheezing. Cardiovascular: Negative for chest pain, palpitations and leg swelling. Gastrointestinal: Negative. Negative for abdominal pain, nausea and vomiting. Genitourinary: Negative. Musculoskeletal: Negative. Skin: Negative. Negative for rash. Neurological: Negative for dizziness, weakness and headaches. Psychiatric/Behavioral: Negative. VITALS:   Blood pressure (!) 156/79, pulse 72, temperature 98 °F (36.7 °C), temperature source Oral, resp. rate 17, height 5' 7\" (1.702 m), weight 192 lb (87.1 kg), SpO2 99 %, not currently breastfeeding. Body mass index is 30.07 kg/m². Physical Exam   Constitutional: She is oriented to person, place, and time. She appears well-developed and well-nourished. HENT:   Head: Normocephalic and atraumatic. Eyes: Pupils are equal, round, and reactive to light. Neck: Normal range of motion. Neck supple. No JVD present. No tracheal deviation present. No thyromegaly present. Cardiovascular: Normal rate, regular rhythm, normal heart sounds and intact distal pulses. PMI is not displaced. Exam reveals no gallop, no S3, no distant heart sounds and no friction rub. No murmur heard. Pulmonary/Chest: No respiratory distress. She has no wheezes. She has no rales. She exhibits no tenderness. Abdominal: Soft. Bowel sounds are normal. She exhibits no distension and no mass. There is no abdominal tenderness. There is no rebound and no guarding. Musculoskeletal:   General: No edema. Neurological: She is alert and oriented to person, place, and time. No cranial nerve deficit. Skin: Skin is warm and dry. No rash noted. She is not diaphoretic. No erythema. No pallor. Psychiatric: She has a normal mood and affect.  Her behavior is normal. Judgment and thought content normal.     LABS:   Recent Results         Recent Results (from the past 24 hour(s))   CBC auto differential    Collection Time: 12/25/20 5:17 AM   Result Value Ref Range    WBC 7.3 4.8 - 10.8 K/uL    RBC 4.06 (L) 4.20 - 5.40 M/uL    Hemoglobin 13.3 12.0 - 16.0 g/dL    Hematocrit 38.0 37.0 - 47.0 %    MCV 93.6 82.0 - 100.0 fL    MCH 32.7 (H) 27.0 - 31.3 pg    MCHC 34.9 33.0 - 37.0 %    RDW 12.9 11.5 - 14.5 %    Platelets 560 865 - 518 K/uL    Neutrophils % 67.4 %    Lymphocytes % 23.3 %    Monocytes % 7.9 %    Eosinophils % 1.1 %    Basophils % 0.3 %    Neutrophils Absolute 4.9 1.4 - 6.5 K/uL    Lymphocytes Absolute 1.7 1.0 - 4.8 K/uL    Monocytes Absolute 0.6 0.2 - 0.8 K/uL    Eosinophils Absolute 0.1 0.0 - 0.7 K/uL    Basophils Absolute 0.0 0.0 - 0.2 K/uL   Comprehensive Metabolic Panel w/ Reflex to MG    Collection Time: 12/25/20 5:17 AM   Result Value Ref Range    Sodium 138 135 - 144 mEq/L    Potassium reflex Magnesium 3.2 (L) 3.4 - 4.9 mEq/L    Chloride 107 95 - 107 mEq/L    CO2 20 20 - 31 mEq/L    Anion Gap 11 9 - 15 mEq/L    Glucose 222 (H) 70 - 99 mg/dL    BUN 8 6 - 20 mg/dL    CREATININE 0.79 0.50 - 0.90 mg/dL    GFR Non-African American >60.0 >60    GFR  >60.0 >60    Calcium 8.9 8.5 - 9.9 mg/dL    Total Protein 5.8 (L) 6.3 - 8.0 g/dL    Alb 3.5 3.5 - 4.6 g/dL    Total Bilirubin 0.4 0.2 - 0.7 mg/dL    Alkaline Phosphatase 123 40 - 130 U/L    ALT 81 (H) 0 - 33 U/L    AST 39 (H) 0 - 35 U/L    Globulin 2.3 2.3 - 3.5 g/dL   Magnesium    Collection Time: 12/25/20 5:17 AM   Result Value Ref Range    Magnesium 1.9 1.7 - 2.4 mg/dL   Troponin:         Lab Results   Component Value Date Sharif Moya <0.010 12/22/2020     EKG: normal sinus rhythm, nonspecific ST and T waves changes   ASSESSMENT:         Active Hospital Problems    Diagnosis Date Noted    CAD (coronary artery disease) [I25.10] 04/22/2014     Priority: High    COPD exacerbation [facility] [J44.1] 11/18/2019     Priority: Low    Right arm weakness [R29.898] 08/10/2019     Priority: Low    Stroke determined by clinical assessment ([facility] [I63.9] 08/06/2019     Priority: Low    Obesity (BMI 30-39. 9) [E66.9] 05/23/2019     Priority: Low    Sleep apnea [G47.30] 05/23/2019     Priority: Low    Neuromyelopathy due to vitamin B12 deficiency ([facility] [E53.8, G32.0] 01/04/2017     Priority: Low    Vitamin B12 deficiency [E53.8] 11/03/2016     Priority: Low    HTN (hypertension) [I10] 02/19/2016     Priority: Low    Bipolar 1 disorder ([facility] [F31.9] 12/19/2012     Priority: Low       Assessment:    CVA rule out cardiac source of emboli   Essential pretension   Hyperlipidemia   Tobacco abuse   Morbid obesity   History of normal coronaries in 2016   Normal LV function     PLAN:   1. As always, aggressive risk factor modification is strongly recommended. We should adhere to the JNC VIII guidelines for HTN management and the NCEPATP III guidelines for LDL-C management. 2. CHAO to be performed on Monday  3. Maximize cardiac medications  4. Continue to monitor on telemetry  5. Consider outpatient event monitor  6. Neuro recommendations  7. GI/VTE prophylaxis  8.  Smoking cessation was strongly recommended      Thank you for allowing me to participate in the care of your patient, please don't hesitate to contact me if you have any further question

## 2020-12-27 NOTE — PROGRESS NOTES
Physical Therapy Med Surg Daily Treatment Note  Facility/Department: [facility]  Room: [facility]/W961-15       NAME: Melia Ortiz  : 1970 (52 y.o.)  MRN: [id number]  CODE STATUS: Full Code    Date of Service: 2020    Patient Diagnosis(es): Stroke determined by clinical assessment [facility]) [I63.9]   Chief Complaint   Patient presents with    Altered Mental Status     Patient Active Problem List    Diagnosis Date Noted    Tendinitis of right rotator cuff 2020    Adenomatous polyp of colon     Adenomatous polyp of sigmoid colon     Melena     Chest pain 2020    COPD exacerbation ([address] 2019    Right arm weakness 08/10/2019    Stroke determined by clinical assessment ([facility] 2019    Sleep apnea 2019    Obesity (BMI 30-39.9) 2019    Dizziness and giddiness 2019    Abnormal auditory perception of left ear 2019    Tinnitus, bilateral 2019    Sensorineural hearing loss (SNHL) of both ears 2019    Vertigo, peripheral, bilateral 2019    Acute bronchitis 2019    Chronic obstructive pulmonary disease ([address] 2018    Neuromyelopathy due to vitamin B12 deficiency ([facility] 2017    Smoking     Chronic pain syndrome  2016    Vitamin B12 deficiency 2016    Grief at loss of child 2016    Multiple sclerosis ([address]     Colitis     Over weight     Anxiety     HTN (hypertension) 2016    CAD (coronary artery disease) 2014    H/O cardiac arrest 2012    Bipolar 1 disorder ([address] 2012    Tobacco abuse 2012    SAMAYOA (dyspnea on exertion) 2012    Abnormal ECG 2012    Displacement of lumbar intervertebral disc without myelopathy 2012    Acid reflux 2008        Past Medical History:   Diagnosis Date    Anxiety     Back pain     back surgery x 4    Bipolar disorder ([address]     CAD (coronary artery disease)  CAFL [facility]chronic airflow limitation) ([facility]) 11/3/2016    Chronic bronchitis ([facility])     Chronic pain     Colitis     Complicated migraine     DDD (degenerative disc disease), lumbar 2016    Depression     Endometriosis     Excessive caffeine abuse, continuous ([facility]) 2018    Gastritis     GERD (gastroesophageal reflux disease)     History of myocardial infarction     HTN (hypertension), benign 2016    Impaired mobility and activities of daily living     Over weight     PTSD (post-traumatic stress disorder)     Sensory neuropathy 2016    Somatization disorder     TIA (transient ischemic attack)     Tobacco abuse     [facility]ina [facility] 2016     Past Surgical History:   Procedure Laterality Date    BACK SURGERY      x2     SECTION      x2    CHOLECYSTECTOMY  13    Lapchole    COLONOSCOPY N/A 2020    COLONOSCOPY DIAGNOSTIC performed by Wali Flowers MD at [address] [place] [address]  3/7/16    Dr. Carlota Ibrahim Left     UPPER GASTROINTESTINAL ENDOSCOPY  2014    ANIBAL HOUSE M.D.   [facility] UPPER GASTROINTESTINAL ENDOSCOPY N/A 2020    EGD ESOPHAGOGASTRODUODENOSCOPY performed by Wali Flowers MD at [facility]       Restrictions  Restrictions/Precautions: Fall Risk(high webster score; R UE immobilizer on at all times- per RN)    SUBJECTIVE   General  Chart Reviewed: Yes  Subjective  Subjective: Pt reports double vision. Feels weak due to laying in bed alot more. 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 Pre-Session Pain Report  Pre Treatment Pain Screening  Intervention List: Patient able to continue with treatment  Pain Screening  Patient Currently in Pain: Yes       Post-Session Pain Report  Pain Assessment  Pain Level:  8(Rt UE; sore)         OBJECTIVE        Bed mobility Rolling to Left: Stand by assistance  Supine to Sit: Stand by assistance  Scooting: Contact guard assistance  Comment: HOB elevated with use of HR    Transfers  Sit to Stand: Contact guard assistance  Stand to sit: Contact guard assistance    Ambulation  Ambulation?: Yes  Ambulation 1  Surface: level tile  Device: Hand-Held Assist  Assistance: Minimal assistance; Moderate assistance  Quality of Gait: Wide RIDDHI with increased SLS, Mod A to correct LOB with pt using intermittent UE support such as external objects or therapist's hand  Distance: 30ft    Stairs/Curb  Stairs?: No         Neuromuscular Education  Neuromuscular Comments: Static standing balance unsupported 20sec SBA     Exercises  Hip Flexion: x10 in seated  Knee Long Arc Quad: x10 in seated  Comments: STS x5 from chair- increased time to complete                               ASSESSMENT    Post-Pain  Pain rating (0-10 scale):9/10  Location and pain description same as pre-treatment unless indicated. Action: [] N/A  [] Nursing notified  [x] Pt declined intervention    Body structures, Functions, Activity limitations: Decreased functional mobility ; Decreased strength;Decreased endurance;Decreased coordination;Decreased ADL status; Decreased safe awareness;Decreased sensation;Decreased cognition;Decreased balance; Increased pain  Assessment: Pt reporting double vision throughout session. Pt requiring Min to Mod A to steady throughout session due to unsteadiness. Increased fatiue with standing activities. Pt would benefit from continuing with PT to further improve strength, balance and mobility to return to highest level of function. Prognosis: Good  REQUIRES PT FOLLOW UP: Yes     Discharge Recommendations:  Continue to assess pending progress, Patient would benefit from continued therapy after discharge    Goals  Long term goals  Long term goal 1: Pt will demonstrate bed mobility indep. Long term goal 2: Pt will demonstrate transfers indep without AD with safety awareness 100% of the time. Long term goal 3: Pt will demonstrate amb > or = indep without AD with safe awareness 100% of the time  Long term goal 4: Pt will exhibit improved dynamic balance evidenced of 20/24 DGI for decreased risk for falls and safe d/c home alone. Long term goal 5: Pt will demonstrate stair negotiation 5 steps without rails indep to allow pt to enter and exit her home safely. Patient Goals   Patient goals : \"go home\"    PLAN    Times per week: 3-6  Safety Devices  Type of devices: Call light within reach, Chair alarm in place     [facility] (6 CLICK) [id number] Mobility Raw Score : 14     Therapy Time   Individual   Time In 0818   Time Out 0833   Minutes 15     Timed Code Treatment Minutes: 15 Minutes   Gait: 10  There ex: [id number] [address], PTA, 12/27/20 at 8:38 AM    Electronically signed by Mallory Sheriff PTA on 12/27/2020 at 8:39 AM      Definitions for assistance levels  Independent = pt does not require any physical supervision or assistance from another person for activity completion. Device may be needed.   Stand by assistance = pt requires verbal cues or instructions from another person, close to but not touching, to perform the activity  Minimal assistance= pt performs 75% or more of the activity; assistance is required to complete the activity  Moderate assistance= pt performs 50% of the activity; assistance is required to complete the activity  Maximal assistance = pt performs 25% of the activity; assistance is required to complete the activity  Dependent = pt requires total physical assistance to accomplish the task

## 2020-12-27 NOTE — PROGRESS NOTES
[facility] Neurology Daily Progress Note  Name: Mandy Cabello  Age: 52 y.o.   Gender: female  CodeStatus: Full Code  Allergies: Latex  Influenza Vaccines  Eggs Or Egg-Derived Products  Gabapentin  Pneumococcal Vaccines  Povidone Iodine  Varicella Virus Vaccine Live    Chief Complaint:Altered Mental Status    Primary Care Provider: Edgar Kovacs MD  InpatientTreatment Team: Treatment Team: Attending Provider: Donna Hudson MD; Consulting Physician: Sharen Kawasaki, MD; Consulting Physician: Gina Montero DO; Utilization Reviewer: Sandra Hahn RN; : Oren Alcaraz RN; Patient Care Tech: Andrea Diallo; Registered Nurse: Blanche Choe RN; : Alex Regalado RN  Admission Date: 12/22/2020      HPI HPI 52year-old right-handed female admitted with confusion and right-sided facial droop.  Patient last seen 5:30 in the morning her  and left for work. Augusta Harrison her  arrived from work he noticed that patient was not behaving appropriately. [facility] was moving all extremities but had difficulty following commands.  We have contacted the same time.  She was supposed to be on aspirin but she has not been taking her medication as I am told. Saima Bonilla will further obtain PGS studies and she does not appear to be having a positive platelet aggregation test.  Patient also recently had injury to her right arm after a fall.  She has underlying psychiatric history. Saima Bonilla had seen her few months ago for a complicated migraine with left-sided findings with extensive relations.  She does have white matter changes which are consistent with underlying ischemic changes and not demyelination seen in multiple sclerosis.  Patient has been extensively evaluated by me including a lumbar puncture in the past with a negative oligoclonal bands and IgG.  In the emergency room we had obtained a CT angiogram there was no large clot that we could do any intervention the timing of the onset of this was not clear and she was not a candidate for TPA she was past 6 hours.  This time patient is still encephalopathic with some speech issues which are subcortical with some neologisms and she does complain of a headache.     Patient is alert and oriented x3, continues to have binocular diplopia, headache, nausea, and photophobia. States that Fioricet yesterday helps a little but she, \"Needs something stronger. \"  Denies any field of vision loss,    Continues with double vision  Vitals:    12/27/20 0716   BP: (!) 167/80   Pulse: 63   Resp: 18   Temp:    SpO2: 99%      Review of Systems   Constitutional: Negative for chills and fever. HENT: Positive for voice change. Negative for congestion and hearing loss. Eyes: Positive for photophobia and visual disturbance. Binocular diplopia   Respiratory: Negative for cough and shortness of breath. Cardiovascular: Negative for chest pain. Gastrointestinal: Positive for nausea. Negative for constipation, diarrhea and vomiting. Musculoskeletal: Positive for arthralgias. Negative for neck pain and neck stiffness. Neurological: Positive for speech difficulty, weakness and headaches. Negative for dizziness, tremors, seizures, syncope, light-headedness and numbness. Psychiatric/Behavioral: Negative. left eye bothering her and headache  Physical Exam  Vitals signs and nursing note reviewed. Constitutional:       Appearance: Normal appearance. HENT:      Head: Normocephalic and atraumatic. Eyes:      Extraocular Movements: Extraocular movements intact. Conjunctiva/sclera: Conjunctivae normal.      Pupils: Pupils are equal, round, and reactive to light. Cardiovascular:      Rate and Rhythm: Normal rate and regular rhythm. Pulmonary:      Effort: Pulmonary effort is normal.      Breath sounds: Normal breath sounds. Musculoskeletal:      Comments: Injury to right upper extremity. Immobilized in cast   Skin:     General: Skin is warm and dry. Neurological:      Mental Status: She is alert. Psychiatric:         Mood and Affect: Mood normal.         Behavior: Behavior normal.       Neurologic Exam     Cranial Nerves     CN III, IV, VI   Pupils are equal, round, and reactive to light.     Was troubled with left eye    Medications:  Reviewed    Infusion Medications:    sodium chloride 75 mL/hr at 12/26/20 6762     Scheduled Medications:    nicotine  1 patch Transdermal Daily    sodium chloride flush  10 mL Intravenous 2 times per day    enoxaparin  40 mg Subcutaneous Daily    aspirin  81 mg Oral Daily    Or    aspirin  300 mg Rectal Daily    atorvastatin  20 mg Oral Nightly    clopidogrel  75 mg Oral Daily associated contrast enhancement is seen. IMPRESSION NO ACUTE INTRACRANIAL PROCESS. A SINGLE SUBCENTIMETER FOCUS  OF BRIGHT SIGNAL INTENSITY IN THE RIGHT CEREBRAL PEDUNCLE IS AGAIN  NOTED. THERE IS NO EVIDENCE OF CONTRAST ENHANCEMENT. FINDINGS ARE  SUSPICIOUS FOR A SMALL DEMYELINATING PLAQUE SECONDARY TO MULTIPLE  SCLEROSIS. NO OTHER DEFINITE PLAQUES ARE IDENTIFIED AT THIS TIME. OVERALL APPEARANCE OF THE BRAIN IS UNCHANGED FROM THE PRIOR STUDY. Andria Garcia M.D. Released Leida Garcia M.D. Released Date Time- 04/18/16 5966   This document has been electronically signed. ------------------------------------------------------------------------------     Results for orders placed during the hospital encounter of 12/22/20   MRI BRAIN WO CONTRAST    Narrative EXAMINATION:  MRI BRAIN WO CONTRAST    HISTORY:   new CVA . I63.9 Stroke determined by clinical assessment ([facility] [address] ICD10    TECHNIQUE:  Routine noncontrast MRI protocol including diffusion and gradient echo images. COMPARISON: MRI brain 12/23/2020. RESULT:    Acute Change:   Restricted diffusion involving the left thalamus, with corresponding increased T2/FLAIR signal at this site, measuring approximately 1.6 x 0.8 cm, similar to recent prior MRI. No new areas of restricted diffusion       Hemorrhage:    Small rounded area of susceptibility left occipital lobe, unchanged. Mass Lesion/ Mass Effect:    No evidence of an intracranial mass or extra-axial fluid collection. No significant mass effect. Chronic Change:  Scattered punctate foci of increased T2 and FLAIR signal are noted in the supratentorial white matter which is a nonspecific finding, but likely represents minimal chronic microvascular ischemia. Parenchyma:   No significant volume loss for age. The brain parenchyma is otherwise within normal limits of signal intensity and morphology. Ventricles:     Normal caliber and morphology. Skull Base:    Hypothalamic and pituitary region are grossly normal.   Craniocervical junction is normal. No significant marrow replacement process. Vasculature:    Major intracranial arterial structures, and dural venous sinuses show typical flow void, suggesting patency by spin echo criteria. Other:  The visualized paranasal sinuses are clear. Mastoid air cells clear. The orbits are unremarkable. Soft tissues are unremarkable. Impression Recent infarction involving the left thalamus, overall unchanged from MRI 12/23/2020. No new infarcts from the prior MRI. MRA of the Head and Neck: No results found for this or any previous visit. No results found for this or any previous visit. Results for orders placed during the hospital encounter of 08/06/19   mra head w/o contrast    Narrative EXAM:     MRI of the brain without contrast    MRA of the brain without contrast    History: TIA. Bilateral arm numbness. Headache. Blurry vision. Weakness of the left arm and leg. Technique: Multiplanar multisequence MRI of the brain was performed without contrast. Axial 3-D time-of-flight images of the brain were obtained without contrast. 3-D volume rendered images were performed for evaluation of the cerebral vasculature. Comparison: CT brain from August 6, 2019 and MRI brain from November 7, 2018    Findings:    MRI brain:    Unchanged nonspecific 3 mm hyperintense T2 focus within the right brachium pontis. Unchanged nonspecific 5 mm hyperintense T2/FLAIR focus within the cortex of the right frontal lobe, not significant changed from prior examination. Otherwise no suspicious   white matter lesions. Paranasal sinuses and mastoid air cells:  Normal.    Visualized Orbits:  Normal.      Impression Impression:    Interval development of acute/subacute left thalamic infarct. All CT scans at this facility use dose modulation, iterative reconstruction, and/or weight based dosing when appropriate to reduce radiation dose to as low as reasonably achievable. No results found for this or any previous visit. No results found for this or any previous visit. Carotid duplex: No results found for this or any previous visit. No results found for this or any previous visit. Results for orders placed during the hospital encounter of 08/06/19   US CAROTID ARTERY BILATERAL    Narrative BILATERAL DUPLEX CAROTID ULTRASOUND    CLINICAL INFORMATION:  SMOKING HISTORY, HYPERTENSION, TIA WITH LEFT ARM NUMBNESS AND TINGLING, FACIAL DROOP, EVALUATE FOR POSSIBLE CAROTID ARTERY STENOSIS IN THE NECK. COMPARISON:  NONE AVAILABLE     FINDINGS:  The bilateral carotid duplex ultrasound study demonstrates the following:                                                                 RIGHT            LEFT    Carotid plaque burden                         Mild               Mild  Proximal common carotid artery           171 cm/s            177 cm/s    Mid common carotid artery                   151 cm/s            152 cm/s    Distal common carotid artery                90 cm/s           118 cm/s  Proximal internal carotid artery             78 cm/s            130 cm/s  Mid internal carotid artery                      80 cm/s           82 cm/s  Distal internal carotid artery                  65 cm/s             85 cm/s  External carotid artery                            172 cm/s           169 cm/s      ICA/CCA ratio                                         0.5                0.9       Vertebral arteries antegrade flow         Yes                     Yes       Impression 1. MILD CAROTID PLAQUE BURDEN IN BOTH CAROTID BIFURCATIONS IN THE NECK. 2. RIGHT INTERNAL CAROTID ARTERY: <50% STENOSIS. 3. LEFT INTERNAL CAROTID ARTERY: <50% STENOSIS. 4. BILATERALLY PATENT VERTEBRAL ARTERIES WITH ANTEGRADE BLOOD FLOW. Validated velocity measurements with angiographic measurements, velocity criteria are extrapolated from diameter data as defined by the Society of Radiologist in [address] [place] [address] Drive Radiology 2003; 716;848-650. Echo No results found for this or any previous visit. Assessment/Plan:  Left thalamic stroke which was already apparent on the CT scan and an MRI.  Patient timing of the onset of the stroke is very difficult ascertain she was not a candidate for TPA.  Large vessel evaluation was obtained with a CT angiogram which did not show a large clot.  We were consulted for the same for further management.  Compliance is a question as her PGS studies were reviewed and she does not have platelet aggregation therefore she is not taking her antiplatelet agents.  I recommended dual antiplatelet therapy. Dickson Pop has multiple risk factors for cerebrovascular disease including smoking and hypertension and carotid disease.  Patient was seen for complicated migraines in the past and therefore recommended a transesophageal echo and a complete hypercoagulable work-up for now.  We will arrange for a speech evaluation for her dysarthria and aphasia.  Pending on the results of the same will further advise     LDL 86  A1c 8.0  Homocysteine 25.8  All other labs for hypercoagulable work-up still pending  Transesophageal echo still pending.     Homocystine was elevated and B12 and folate within normal limits MTHFR ordered. Repeat MRI was done shows stable thalamic infarct. Patient continues to have headache despite Fioricet yesterday. Liver enzymes were initially elevated but trending down. Continue on Lipitor dual antiplatelet therapy. Inpatient rehab referral placed. I have personally performed a face to face diagnostic evaluation on this patient, reviewed all data and investigations, and am the sole provider of all clinical decisions on the neurological status of this patient. pts exam shows right weakness, and left eye bothering her, has migraine,given fioricet, no response  Will patch eye      Steven Quinteros MD, Lilo Wu, American Board of Psychiatry & Neurology  Board Certified in Vascular Neurology  Board Certified in Neuromuscular Medicine  Certified in Neurorehabilitation       Collaborating physicians: Dr Angelito Quinteros    Electronically signed by Balwinder Merrill PA-C on 12/27/2020 at 11:48 AM

## 2020-12-27 NOTE — PROGRESS NOTES
assistance, Moderate assistance  Quality of Gait: Wide RIDDHI with increased SLS, Mod A to correct LOB with pt using intermittent UE support such as external objects or therapist's hand  Gait Deviations: Shuffles, Slow Radha  Distance: 30ft  Comments: Pt holds eye shut throughout, c/p double vision. Appears to have visual field deficit with difficulty scanning the environment noted. tactile cues facilating wt shifting and reciprocal,      FIMS:  ,  , Assessment: Pt reporting double vision throughout session. Pt requiring Min to Mod A to steady throughout session due to unsteadiness. Increased fatiue with standing activities. Pt would benefit from continuing with PT to further improve strength, balance and mobility to return to highest level of function. Occupational therapy: FIMS:   ,  , Assessment: Pt. is a 52year old woman from home alone who presents to [facility] with the above deficits which impact her ability to perform ADLs and IADLs.  Pt. would benefit from continued OT to maximize independence and safety with transfers and mobility    Speech therapy: FIMS:        Lab/X-ray studies reviewed, analyzed and discussed with patient and staff:   Recent Results (from the past 24 hour(s))   CBC auto differential    Collection Time: 12/27/20  5:36 AM   Result Value Ref Range    WBC 7.4 4.8 - 10.8 K/uL    RBC 4.17 (L) 4.20 - 5.40 M/uL    Hemoglobin 13.6 12.0 - 16.0 g/dL    Hematocrit 39.3 37.0 - 47.0 %    MCV 94.3 82.0 - 100.0 fL    MCH 32.7 (H) 27.0 - 31.3 pg    MCHC 34.6 33.0 - 37.0 %    RDW 13.3 11.5 - 14.5 %    Platelets 165 033 - 136 K/uL    Neutrophils % 59.8 %    Lymphocytes % 31.3 %    Monocytes % 7.1 %    Eosinophils % 1.3 %    Basophils % 0.5 %    Neutrophils Absolute 4.4 1.4 - 6.5 K/uL    Lymphocytes Absolute 2.3 1.0 - 4.8 K/uL    Monocytes Absolute 0.5 0.2 - 0.8 K/uL    Eosinophils Absolute 0.1 0.0 - 0.7 K/uL    Basophils Absolute 0.0 0.0 - 0.2 K/uL   Comprehensive Metabolic Panel w/ Reflex to MG Collection Time: 12/27/20  5:36 AM   Result Value Ref Range    Sodium 143 135 - 144 mEq/L    Potassium reflex Magnesium 3.4 3.4 - 4.9 mEq/L    Chloride 109 (H) 95 - 107 mEq/L    CO2 21 20 - 31 mEq/L    Anion Gap 13 9 - 15 mEq/L    Glucose 155 (H) 70 - 99 mg/dL    BUN 7 6 - 20 mg/dL    CREATININE 0.81 0.50 - 0.90 mg/dL    GFR Non-African American >60.0 >60    GFR  >60.0 >60    Calcium 9.0 8.5 - 9.9 mg/dL    Total Protein 5.8 (L) 6.3 - 8.0 g/dL    Alb 3.7 3.5 - 4.6 g/dL    Total Bilirubin 0.4 0.2 - 0.7 mg/dL    Alkaline Phosphatase 127 40 - 130 U/L    ALT 71 (H) 0 - 33 U/L    AST 33 0 - 35 U/L    Globulin 2.1 (L) 2.3 - 3.5 g/dL   Magnesium    Collection Time: 12/27/20  5:36 AM   Result Value Ref Range    Magnesium 1.8 1.7 - 2.4 mg/dL       Previous extensive, complex labs, notes and diagnostics reviewed and analyzed. ALLERGIES:    Allergies as of 12/22/2020 - Review Complete 12/22/2020   Allergen Reaction Noted    Latex Itching 03/25/2018    Influenza vaccines Swelling 10/08/2015    Eggs or egg-derived products  10/08/2015    Gabapentin Nausea Only 10/31/2017    Pneumococcal vaccines Hives 01/28/2016    Povidone iodine Itching 10/08/2015    Varicella virus vaccine live Hives 01/28/2016      (please also verify by checking [facility])     Complex Physical Medicine & Rehab Issues Assess & Plan:   1. Severe abnormality of gait and mobility and impaired self-care and ADL's secondary to progressive and recurrent cerebrovascular disease with clinically diagnosed CVA. Functional and medical status reassessed regarding patients ability to participate in therapies and patient found to be able to participate in skilled versus acute intensive comprehensive inpatient rehabilitation program including PT/OT to improve balance, ambulation, ADLs, and to improve the P/AROM. It is my opinion that they will be able to tolerate 3 hours of therapy a day and benefit from it at an acute level.   2. Bowel constipation

## 2020-12-28 ENCOUNTER — HOSPITAL ENCOUNTER (INPATIENT)
Age: 50
LOS: 12 days | Discharge: HOME | DRG: 057 | End: 2021-01-09
Attending: PHYSICAL MEDICINE & REHABILITATION | Admitting: PHYSICAL MEDICINE & REHABILITATION
Payer: COMMERCIAL

## 2020-12-28 VITALS
HEIGHT: 67 IN | RESPIRATION RATE: 18 BRPM | WEIGHT: 192 LBS | DIASTOLIC BLOOD PRESSURE: 69 MMHG | HEART RATE: 70 BPM | SYSTOLIC BLOOD PRESSURE: 142 MMHG | OXYGEN SATURATION: 97 % | BODY MASS INDEX: 30.13 KG/M2 | TEMPERATURE: 98.1 F

## 2020-12-28 DIAGNOSIS — Z78.9 DECREASED ACTIVITIES OF DAILY LIVING (ADL): ICD-10-CM

## 2020-12-28 DIAGNOSIS — R26.9 ABNORMALITY OF GAIT AND MOBILITY: ICD-10-CM

## 2020-12-28 DIAGNOSIS — G32.0 NEUROMYELOPATHY DUE TO VITAMIN B12 DEFICIENCY (HCC): ICD-10-CM

## 2020-12-28 DIAGNOSIS — M75.81 TENDINITIS OF RIGHT ROTATOR CUFF: ICD-10-CM

## 2020-12-28 DIAGNOSIS — E53.8 NEUROMYELOPATHY DUE TO VITAMIN B12 DEFICIENCY (HCC): ICD-10-CM

## 2020-12-28 DIAGNOSIS — G35 MULTIPLE SCLEROSIS (HCC): Primary | ICD-10-CM

## 2020-12-28 DIAGNOSIS — H81.393 VERTIGO, PERIPHERAL, BILATERAL: ICD-10-CM

## 2020-12-28 LAB
ALBUMIN SERPL-MCNC: 3.5 G/DL (ref 3.5–4.6)
ALP BLD-CCNC: 118 U/L (ref 40–130)
ALT SERPL-CCNC: 65 U/L (ref 0–33)
ANION GAP SERPL CALCULATED.3IONS-SCNC: 10 MEQ/L (ref 9–15)
AST SERPL-CCNC: 32 U/L (ref 0–35)
BASOPHILS ABSOLUTE: 0 K/UL (ref 0–0.2)
BASOPHILS RELATIVE PERCENT: 0.4 %
BILIRUB SERPL-MCNC: 0.4 MG/DL (ref 0.2–0.7)
BUN BLDV-MCNC: 9 MG/DL (ref 6–20)
CALCIUM SERPL-MCNC: 9 MG/DL (ref 8.5–9.9)
CHLORIDE BLD-SCNC: 111 MEQ/L (ref 95–107)
CO2: 24 MEQ/L (ref 20–31)
CREAT SERPL-MCNC: 0.76 MG/DL (ref 0.5–0.9)
EOSINOPHILS ABSOLUTE: 0.1 K/UL (ref 0–0.7)
EOSINOPHILS RELATIVE PERCENT: 1.1 %
GFR AFRICAN AMERICAN: >60
GFR NON-AFRICAN AMERICAN: >60
GLOBULIN: 1.9 G/DL (ref 2.3–3.5)
GLUCOSE BLD-MCNC: 154 MG/DL (ref 70–99)
HCT VFR BLD CALC: 38 % (ref 37–47)
HEMOGLOBIN: 13.1 G/DL (ref 12–16)
LYMPHOCYTES ABSOLUTE: 2 K/UL (ref 1–4.8)
LYMPHOCYTES RELATIVE PERCENT: 29.7 %
MAGNESIUM: 1.8 MG/DL (ref 1.7–2.4)
MCH RBC QN AUTO: 32.1 PG (ref 27–31.3)
MCHC RBC AUTO-ENTMCNC: 34.4 % (ref 33–37)
MCV RBC AUTO: 93.5 FL (ref 82–100)
MONOCYTES ABSOLUTE: 0.5 K/UL (ref 0.2–0.8)
MONOCYTES RELATIVE PERCENT: 6.7 %
NEUTROPHILS ABSOLUTE: 4.2 K/UL (ref 1.4–6.5)
NEUTROPHILS RELATIVE PERCENT: 62.1 %
PDW BLD-RTO: 13 % (ref 11.5–14.5)
PLATELET # BLD: 135 K/UL (ref 130–400)
POTASSIUM REFLEX MAGNESIUM: 3.4 MEQ/L (ref 3.4–4.9)
RBC # BLD: 4.07 M/UL (ref 4.2–5.4)
SODIUM BLD-SCNC: 145 MEQ/L (ref 135–144)
TOTAL PROTEIN: 5.4 G/DL (ref 6.3–8)
WBC # BLD: 6.8 K/UL (ref 4.8–10.8)

## 2020-12-28 PROCEDURE — 80053 COMPREHEN METABOLIC PANEL: CPT

## 2020-12-28 PROCEDURE — 93312 ECHO TRANSESOPHAGEAL: CPT

## 2020-12-28 PROCEDURE — 1180000000 HC REHAB R&B

## 2020-12-28 PROCEDURE — 36415 COLL VENOUS BLD VENIPUNCTURE: CPT

## 2020-12-28 PROCEDURE — 93321 DOPPLER ECHO F-UP/LMTD STD: CPT

## 2020-12-28 PROCEDURE — 85025 COMPLETE CBC W/AUTO DIFF WBC: CPT

## 2020-12-28 PROCEDURE — B24BZZ4 ULTRASONOGRAPHY OF HEART WITH AORTA, TRANSESOPHAGEAL: ICD-10-PCS | Performed by: INTERNAL MEDICINE

## 2020-12-28 PROCEDURE — 6360000002 HC RX W HCPCS

## 2020-12-28 PROCEDURE — 99232 SBSQ HOSP IP/OBS MODERATE 35: CPT | Performed by: PHYSICAL MEDICINE & REHABILITATION

## 2020-12-28 PROCEDURE — APPSS30 APP SPLIT SHARED TIME 16-30 MINUTES: Performed by: NURSE PRACTITIONER

## 2020-12-28 PROCEDURE — 99233 SBSQ HOSP IP/OBS HIGH 50: CPT | Performed by: PSYCHIATRY & NEUROLOGY

## 2020-12-28 PROCEDURE — 93325 DOPPLER ECHO COLOR FLOW MAPG: CPT

## 2020-12-28 PROCEDURE — 6370000000 HC RX 637 (ALT 250 FOR IP): Performed by: INTERNAL MEDICINE

## 2020-12-28 PROCEDURE — 83735 ASSAY OF MAGNESIUM: CPT

## 2020-12-28 RX ORDER — ASPIRIN 81 MG/1
81 TABLET ORAL DAILY
Status: CANCELLED | OUTPATIENT
Start: 2020-12-29

## 2020-12-28 RX ORDER — ASPIRIN 300 MG/1
300 SUPPOSITORY RECTAL DAILY
Status: CANCELLED | OUTPATIENT
Start: 2020-12-29

## 2020-12-28 RX ORDER — HYDRALAZINE HYDROCHLORIDE 20 MG/ML
5 INJECTION INTRAMUSCULAR; INTRAVENOUS EVERY 6 HOURS PRN
Status: CANCELLED | OUTPATIENT
Start: 2020-12-28

## 2020-12-28 RX ORDER — PROMETHAZINE HYDROCHLORIDE 12.5 MG/1
12.5 TABLET ORAL EVERY 6 HOURS PRN
Status: DISCONTINUED | OUTPATIENT
Start: 2020-12-28 | End: 2021-01-05

## 2020-12-28 RX ORDER — ONDANSETRON 2 MG/ML
4 INJECTION INTRAMUSCULAR; INTRAVENOUS EVERY 6 HOURS PRN
Status: DISCONTINUED | OUTPATIENT
Start: 2020-12-28 | End: 2021-01-09 | Stop reason: HOSPADM

## 2020-12-28 RX ORDER — ATORVASTATIN CALCIUM 20 MG/1
20 TABLET, FILM COATED ORAL NIGHTLY
Status: CANCELLED | OUTPATIENT
Start: 2020-12-28

## 2020-12-28 RX ORDER — POLYETHYLENE GLYCOL 3350 17 G/17G
17 POWDER, FOR SOLUTION ORAL DAILY PRN
Status: DISCONTINUED | OUTPATIENT
Start: 2020-12-28 | End: 2021-01-09 | Stop reason: HOSPADM

## 2020-12-28 RX ORDER — NICOTINE 21 MG/24HR
1 PATCH, TRANSDERMAL 24 HOURS TRANSDERMAL DAILY
Status: CANCELLED | OUTPATIENT
Start: 2020-12-29

## 2020-12-28 RX ORDER — ACETAMINOPHEN 325 MG/1
650 TABLET ORAL EVERY 4 HOURS PRN
Status: CANCELLED | OUTPATIENT
Start: 2020-12-28

## 2020-12-28 RX ORDER — CLOPIDOGREL BISULFATE 75 MG/1
75 TABLET ORAL DAILY
Status: DISCONTINUED | OUTPATIENT
Start: 2020-12-29 | End: 2021-01-03

## 2020-12-28 RX ORDER — ISOSORBIDE MONONITRATE 30 MG/1
30 TABLET, EXTENDED RELEASE ORAL DAILY
Status: DISCONTINUED | OUTPATIENT
Start: 2020-12-29 | End: 2021-01-09 | Stop reason: HOSPADM

## 2020-12-28 RX ORDER — BUTALBITAL, ASPIRIN, AND CAFFEINE 325; 50; 40 MG/1; MG/1; MG/1
1 CAPSULE ORAL EVERY 6 HOURS PRN
Status: DISCONTINUED | OUTPATIENT
Start: 2020-12-28 | End: 2021-01-09 | Stop reason: HOSPADM

## 2020-12-28 RX ORDER — BUTALBITAL, ASPIRIN, AND CAFFEINE 325; 50; 40 MG/1; MG/1; MG/1
1 CAPSULE ORAL EVERY 6 HOURS PRN
Status: CANCELLED | OUTPATIENT
Start: 2020-12-28

## 2020-12-28 RX ORDER — ONDANSETRON 2 MG/ML
4 INJECTION INTRAMUSCULAR; INTRAVENOUS EVERY 6 HOURS PRN
Status: CANCELLED | OUTPATIENT
Start: 2020-12-28

## 2020-12-28 RX ORDER — PROMETHAZINE HYDROCHLORIDE 12.5 MG/1
12.5 TABLET ORAL EVERY 6 HOURS PRN
Status: CANCELLED | OUTPATIENT
Start: 2020-12-28

## 2020-12-28 RX ORDER — ISOSORBIDE MONONITRATE 60 MG/1
30 TABLET, EXTENDED RELEASE ORAL DAILY
Status: CANCELLED | OUTPATIENT
Start: 2020-12-29

## 2020-12-28 RX ORDER — ASPIRIN 81 MG/1
81 TABLET ORAL DAILY
Status: DISCONTINUED | OUTPATIENT
Start: 2020-12-29 | End: 2021-01-09 | Stop reason: HOSPADM

## 2020-12-28 RX ORDER — LOSARTAN POTASSIUM 25 MG/1
25 TABLET ORAL DAILY
Status: DISCONTINUED | OUTPATIENT
Start: 2020-12-29 | End: 2021-01-06

## 2020-12-28 RX ORDER — CLOPIDOGREL BISULFATE 75 MG/1
75 TABLET ORAL DAILY
Status: CANCELLED | OUTPATIENT
Start: 2020-12-29

## 2020-12-28 RX ORDER — ACETAMINOPHEN 325 MG/1
650 TABLET ORAL EVERY 4 HOURS PRN
Status: DISCONTINUED | OUTPATIENT
Start: 2020-12-28 | End: 2021-01-09 | Stop reason: HOSPADM

## 2020-12-28 RX ORDER — LOSARTAN POTASSIUM 25 MG/1
25 TABLET ORAL DAILY
Status: CANCELLED | OUTPATIENT
Start: 2020-12-29

## 2020-12-28 RX ORDER — NICOTINE 21 MG/24HR
1 PATCH, TRANSDERMAL 24 HOURS TRANSDERMAL DAILY
Status: DISCONTINUED | OUTPATIENT
Start: 2020-12-29 | End: 2021-01-09 | Stop reason: HOSPADM

## 2020-12-28 RX ORDER — ASPIRIN 300 MG/1
300 SUPPOSITORY RECTAL DAILY
Status: DISCONTINUED | OUTPATIENT
Start: 2020-12-29 | End: 2021-01-09 | Stop reason: HOSPADM

## 2020-12-28 RX ORDER — ATORVASTATIN CALCIUM 20 MG/1
20 TABLET, FILM COATED ORAL NIGHTLY
Status: DISCONTINUED | OUTPATIENT
Start: 2020-12-28 | End: 2021-01-09 | Stop reason: HOSPADM

## 2020-12-28 RX ORDER — HYDRALAZINE HYDROCHLORIDE 20 MG/ML
5 INJECTION INTRAMUSCULAR; INTRAVENOUS EVERY 6 HOURS PRN
Status: DISCONTINUED | OUTPATIENT
Start: 2020-12-28 | End: 2021-01-09 | Stop reason: HOSPADM

## 2020-12-28 RX ORDER — POLYETHYLENE GLYCOL 3350 17 G/17G
17 POWDER, FOR SOLUTION ORAL DAILY PRN
Status: CANCELLED | OUTPATIENT
Start: 2020-12-28

## 2020-12-28 RX ADMIN — ATORVASTATIN CALCIUM 20 MG: 20 TABLET, FILM COATED ORAL at 21:35

## 2020-12-28 ASSESSMENT — ENCOUNTER SYMPTOMS
WHEEZING: 0
PHOTOPHOBIA: 1
CHEST TIGHTNESS: 0
COUGH: 0
COLOR CHANGE: 0
TROUBLE SWALLOWING: 0
NAUSEA: 1
SHORTNESS OF BREATH: 0
VOMITING: 0

## 2020-12-28 NOTE — CARE COORDINATION
LSW spoke with Evelyn Zafar in rehab and they have precert approval for pt to transfer to rehab today after the CHAO if medically cleared for transfer.
Precert received for Acute Rehab but patient on hold for CHAO. Call placed to Ashly Traore with [facility] at [phone] regarding [facility], [id number]. Per Ashly Traore, call back once patient is medically cleared for transfer to provide updates and she will adjust authorization based on clinical needs at that time.  Electronically signed by Moreno Hare RN on 12/24/20 at 3:40 PM EST
We have precert approval for the [facility] if medically cleared. Pt will need a CHAO prior to her transfer to [facility].
The plan for Transition of Care is related to the following treatment goals: TO 67 [address]     Initial Discharge Plan? (Note: please see concurrent daily documentation for any updates after initial note). FROM HOME WITH SIGNIFICANT OTHER.  VERY INDEPENDENT PRIOR TO THIS EPISODE. NON COMPLIANT WITH HER MEDICATIONS. RECENT SHOULDER SURGERY X 2.       The Patient and/or patient representative: PT was provided with choice of any post-acute providers for care and equipment and agrees with discharge plan  Yes    Electronically signed by KINGSTON Duron on 12/23/2020 at 3:33 PM

## 2020-12-28 NOTE — PROGRESS NOTES
Chief Complaint   Patient presents with    Altered Mental Status     Patient is a 52 y.o. female who presents with a chief complaint of mental status change. Patient is followed on a regular basis by Dr. Gale Trevino MD. Patient presented with strokelike symptoms. Cardiology asked to perform transesophageal echocardiogram.   Patient with history of hypertension, hyperlipidemia, tobacco abuse. Status post normal cardiac catheterization 2016. Echo in August 2019 showed ejection fraction of EF of 55%, no significant valve abnormalities. 12/26/2020: Resting comfortably. Denies any chest pain or shortness of breath. Sinus rhythm on telemetry. 12/27/2020: Patient is resting comfortable. Denies any chest pain or shortness of breath. Normal sinus rhythm on telemetry heart rate of 64 bpm.  Blood pressure is uncontrolled at 167/80.   Heart rate of 63 bpm    Past Medical History        Past Medical History:   Diagnosis Date    Anxiety     Back pain     back surgery x 4    Bipolar disorder ([address]     CAD (coronary artery disease)     [facility]FL [facility] airflow limitation) ([facility]) 11/3/2016    Chronic bronchitis ([facility])     Chronic pain     Colitis     Complicated migraine     DDD (degenerative disc disease), lumbar 12/22/2016    Depression     Endometriosis     Excessive caffeine abuse, continuous ([facility]) 7/6/2018    Gastritis     GERD (gastroesophageal reflux disease)     History of myocardial infarction     HTN (hypertension), benign 2/19/2016    Impaired mobility and activities of daily living     Over weight     PTSD (post-traumatic stress disorder)     Sensory neuropathy 12/22/2016    Somatization disorder     TIA (transient ischemic attack)     Tobacco abuse     Vasospastic angina ([address] 11/11/2016         Patient Active Problem List   Diagnosis    H/O cardiac arrest    Bipolar 1 disorder ([address]    Tobacco abuse    SAMAYOA (dyspnea on exertion)    Abnormal ECG  CAD (coronary artery disease)    Displacement of lumbar intervertebral disc without myelopathy    Acid reflux    HTN (hypertension)    Multiple sclerosis (HCC)    Colitis    Over weight    Anxiety    Chronic pain syndrome     Vitamin B12 deficiency    Grief at loss of child    Smoking    Neuromyelopathy due to vitamin B12 deficiency (HCC)    Chronic obstructive pulmonary disease (HCC)    Acute bronchitis    Sleep apnea    Obesity (BMI 30-39. 9)    Stroke determined by clinical assessment [facility]    Abnormal auditory perception of left ear    Dizziness and giddiness    Sensorineural hearing loss (SNHL) of both ears    Tinnitus, bilateral    Vertigo, peripheral, bilateral    Right arm weakness    COPD exacerbation (HCC)    Chest pain    Adenomatous polyp of colon    Adenomatous polyp of sigmoid colon    Melena    Tendinitis of right rotator cuff     Past Surgical History         Past Surgical History:   Procedure Laterality Date    BACK SURGERY      x2     SECTION      x2    CHOLECYSTECTOMY  13    Lapchole    COLONOSCOPY N/A 2020    COLONOSCOPY DIAGNOSTIC performed by Ashia Longo MD at [address] [facility]  3/7/16    Dr. Louis Ford Left     UPPER GASTROINTESTINAL ENDOSCOPY  2014    [facility] UPPER GASTROINTESTINAL ENDOSCOPY N/A 2020    EGD ESOPHAGOGASTRODUODENOSCOPY performed by Ashia Longo MD at [address] Marital status:       Spouse name: None    Number of children: None    Years of education: None    Highest education level: None   Occupational History    Occupation: unemployed   Social Needs    Financial resource strain: None    Food insecurity     Worry: None     Inability: None    Transportation needs Medical: None     Non-medical: None   Tobacco Use    Smoking status: Current Every Day Smoker     Packs/day: 1.00     Years: 17.00     Pack years: 17.00     Types: Cigarettes    Smokeless tobacco: Never Used   Substance and Sexual Activity    Alcohol use: No    Drug use: No    Sexual activity: Not Currently     Partners: Male   Lifestyle    Physical activity     Days per week: 0 days     Minutes per session: 0 min    Stress: To some extent   Relationships    Social connections     Talks on phone: None     Gets together: None     Attends Confucianism service: None     Active member of club or organization: None     Attends meetings of clubs or organizations: None     Relationship status: None    Intimate partner violence     Fear of current or ex partner: None     Emotionally abused: None     Physically abused: None     Forced sexual activity: None   Other Topics Concern    None   Social History Narrative    Charlene Fernandes is a 57-year-old female who is on disability because of pain and bipolar disorder. She's also had a presumed diagnosis of possible multiple sclerosis. She's been seeing Dr. Asiya Flanagan for that and she says that the diagnosis is sometimes in question and it's clear neuropathy vitamin B12 deficiency as well as generalized bodyaches from the severe vitamin D deficiency have caused this scenario that looks like multiple sclerosis. lives With: Significant other    Type of Home: House [place] in [address] to enter with rails - Number of Steps: 5    Bathroom Shower/Tub: Tub only    ADL Assistance: Independent    Homemaking Assistance: Independent, Homemaking Responsibilities: Yes, Meal Prep Responsibility: Primary    Laundry Responsibility: Primary, Cleaning Responsibility: Primary    Bill Paying/Finance Responsibility: Primary    Shopping Responsibility: Primary, Ambulation Assistance: Independent, Transfer Assistance: Independent    Active :  Yes Occupation: Full time employment--Type of occupation: disabled-  - in charges of [address]     Family History         Family History   Problem Relation Age of Onset    High Blood Pressure Mother     Kidney Disease Mother     Lupus Mother     Coronary Art Dis Mother     Had stents in her 62s   Simona Oliver Bipolar Disorder Son      Current Facility-Administered Medications             Current Facility-Administered Medications   Medication Dose Route Frequency Provider Last Rate Last Admin    LORazepam (ATIVAN) injection 1 mg 1 mg Intravenous Once PRN Jacky Morse MD      acetaminophen (TYLENOL) tablet 650 mg 650 mg Oral Q4H PRN Yudy Lomax MD  650 mg at 12/25/20 1717    sodium chloride flush 0.9 % injection 10 mL 10 mL Intravenous 2 times per day Nu Night, APRN - CNP  10 mL at 12/25/20 1012    sodium chloride flush 0.9 % injection 10 mL 10 mL Intravenous PRN Nu Night, APRN - CNP      promethazine (PHENERGAN) tablet 12.5 mg 12.5 mg Oral Q6H PRN Nu Night, APRN - CNP      Or    ondansetron (ZOFRAN) injection 4 mg 4 mg Intravenous Q6H PRN Nu Night, APRN - CNP      polyethylene glycol (GLYCOLAX) packet 17 g 17 g Oral Daily PRN Nu Night, APRN - CNP      enoxaparin (LOVENOX) injection 40 mg 40 mg Subcutaneous Daily Milagro Frost, APRN - CNP  40 mg at 12/25/20 1012    aspirin EC tablet 81 mg 81 mg Oral Daily Milagro Frost, APRN - CNP  81 mg at 12/25/20 1012    Or    aspirin suppository 300 mg 300 mg Rectal Daily Milagro Frost APRN - CNP      0.9 % sodium chloride infusion  Intravenous Continuous Nu Night, APRN - CNP 75 mL/hr at 12/24/20 1226 New Bag at 12/24/20 1226    atorvastatin (LIPITOR) tablet 20 mg 20 mg Oral Nightly Nu Night, APRN - CNP  20 mg at 12/24/20 2038  hydrALAZINE (APRESOLINE) injection 5 mg 5 mg Intravenous Q6H PRN NIKI Griffin - CNP      clopidogrel (PLAVIX) tablet 75 mg 75 mg Oral Daily Diana Lujan MD  75 mg at 12/25/20 1012    hydrALAZINE (APRESOLINE) injection 10 mg 10 mg Intravenous Once Jannette Huggins PA-C     ALLERGIES: Latex, Influenza vaccines, Eggs or egg-derived products, Gabapentin, Pneumococcal vaccines, Povidone iodine, and Varicella virus vaccine live   Review of Systems   Constitutional: Negative. Negative for chills and fever. HENT: Negative. Eyes: Negative. Respiratory: Negative for shortness of breath and wheezing. Cardiovascular: Negative for chest pain, palpitations and leg swelling. Gastrointestinal: Negative. Negative for abdominal pain, nausea and vomiting. Genitourinary: Negative. Musculoskeletal: Negative. Skin: Negative. Negative for rash. Neurological: Negative for dizziness, weakness and headaches. Psychiatric/Behavioral: Negative. VITALS:   Blood pressure (!) 156/79, pulse 72, temperature 98 °F (36.7 °C), temperature source Oral, resp. rate 17, height 5' 7\" (1.702 m), weight 192 lb (87.1 kg), SpO2 99 %, not currently breastfeeding. Body mass index is 30.07 kg/m². Physical Exam   Constitutional: She is oriented to person, place, and time. She appears well-developed and well-nourished. HENT:   Head: Normocephalic and atraumatic. Eyes: Pupils are equal, round, and reactive to light. Neck: Normal range of motion. Neck supple. No JVD present. No tracheal deviation present. No thyromegaly present. Cardiovascular: Normal rate, regular rhythm, normal heart sounds and intact distal pulses. PMI is not displaced. Exam reveals no gallop, no S3, no distant heart sounds and no friction rub. No murmur heard. Pulmonary/Chest: No respiratory distress. She has no wheezes. She has no rales. She exhibits no tenderness. Abdominal: Soft. Bowel sounds are normal. She exhibits no distension and no mass. There is no abdominal tenderness. There is no rebound and no guarding. Musculoskeletal:   General: No edema. Neurological: She is alert and oriented to person, place, and time. No cranial nerve deficit. Skin: Skin is warm and dry. No rash noted. She is not diaphoretic. No erythema. No pallor. Psychiatric: She has a normal mood and affect.  Her behavior is normal. Judgment and thought content normal.     LABS:   Recent Results         Recent Results (from the past 24 hour(s))   CBC auto differential    Collection Time: 12/25/20 5:17 AM   Result Value Ref Range    WBC 7.3 4.8 - 10.8 K/uL    RBC 4.06 (L) 4.20 - 5.40 M/uL    Hemoglobin 13.3 12.0 - 16.0 g/dL    Hematocrit 38.0 37.0 - 47.0 %    MCV 93.6 82.0 - 100.0 fL    MCH 32.7 (H) 27.0 - 31.3 pg    MCHC 34.9 33.0 - 37.0 %    RDW 12.9 11.5 - 14.5 %    Platelets 795 695 - 477 K/uL    Neutrophils % 67.4 %    Lymphocytes % 23.3 %    Monocytes % 7.9 %    Eosinophils % 1.1 %    Basophils % 0.3 %    Neutrophils Absolute 4.9 1.4 - 6.5 K/uL    Lymphocytes Absolute 1.7 1.0 - 4.8 K/uL    Monocytes Absolute 0.6 0.2 - 0.8 K/uL    Eosinophils Absolute 0.1 0.0 - 0.7 K/uL    Basophils Absolute 0.0 0.0 - 0.2 K/uL   Comprehensive Metabolic Panel w/ Reflex to MG    Collection Time: 12/25/20 5:17 AM   Result Value Ref Range    Sodium 138 135 - 144 mEq/L    Potassium reflex Magnesium 3.2 (L) 3.4 - 4.9 mEq/L    Chloride 107 95 - 107 mEq/L    CO2 20 20 - 31 mEq/L    Anion Gap 11 9 - 15 mEq/L    Glucose 222 (H) 70 - 99 mg/dL    BUN 8 6 - 20 mg/dL    CREATININE 0.79 0.50 - 0.90 mg/dL    GFR Non-African American >60.0 >60    GFR  >60.0 >60    Calcium 8.9 8.5 - 9.9 mg/dL    Total Protein 5.8 (L) 6.3 - 8.0 g/dL    Alb 3.5 3.5 - 4.6 g/dL    Total Bilirubin 0.4 0.2 - 0.7 mg/dL    Alkaline Phosphatase 123 40 - 130 U/L    ALT 81 (H) 0 - 33 U/L    AST 39 (H) 0 - 35 U/L

## 2020-12-28 NOTE — PROGRESS NOTES
Hospitalist Progress Note      Date of Admission: 12/22/2020  Chief Complaint:    Chief Complaint   Patient presents with    Altered Mental Status     Subjective:  No new complaints. No nausea, vomiting, chest pain, or headache      Medications:    Infusion Medications    sodium chloride 75 mL/hr at 12/27/20 1320     Scheduled Medications    isosorbide mononitrate  30 mg Oral Daily    losartan  25 mg Oral Daily    nicotine  1 patch Transdermal Daily    sodium chloride flush  10 mL Intravenous 2 times per day    enoxaparin  40 mg Subcutaneous Daily    aspirin  81 mg Oral Daily    Or    aspirin  300 mg Rectal Daily    atorvastatin  20 mg Oral Nightly    clopidogrel  75 mg Oral Daily    hydrALAZINE  10 mg Intravenous Once     PRN Meds: butalbital-aspirin-caffeine, acetaminophen, sodium chloride flush, promethazine **OR** ondansetron, polyethylene glycol, hydrALAZINE    Intake/Output Summary (Last 24 hours) at 12/27/2020 2337  Last data filed at 12/27/2020 0600  Gross per 24 hour   Intake 825 ml   Output    Net 825 ml     Exam:  BP (!) 167/80   Pulse 63   Temp 98.2 °F (36.8 °C) (Oral)   Resp 18   Ht 5' 7\" (1.702 m)   Wt 192 lb (87.1 kg)   SpO2 99%   BMI 30.07 kg/m²   Head: Normocephalic, atraumatic  Sclera clear  Neck JVD flat  Lungs: normal effort of breathing    Labs:   Recent Labs     12/25/20  0517 12/26/20  0512 12/27/20  0536   WBC 7.3 6.9 7.4   HGB 13.3 13.8 13.6   HCT 38.0 40.1 39.3    140 133     Recent Labs     12/25/20  0517 12/26/20  0512 12/27/20  0536    140 143   K 3.2* 3.5 3.4    107 109*   CO2 20 23 21   BUN 8 7 7   CREATININE 0.79 0.78 0.81   CALCIUM 8.9 9.1 9.0   AST 39* 35 33   ALT 81* 72* 71*   BILITOT 0.4 0.3 0.4   ALKPHOS 123 132* 127     Recent Labs     12/27/20  2047   INR 0.9     No results for input(s): CKTOTAL, TROPONINI in the last 72 hours.   Radiology:  MRI BRAIN WO CONTRAST   Final Result Recent infarction involving the left thalamus, overall unchanged from MRI 12/23/2020. No new infarcts from the prior MRI. RADIOLOGY REPORT   Final Result      MRI BRAIN WO CONTRAST   Final Result   There is a 7 x 15 mm area of subacute ischemia in the left thalamus. CTA HEAD W WO CONTRAST   Final Result   The major intracranial arterial structures are patent without high-grade stenosis, large vessel cut off, or aneurysm. EXAMINATION: CTA HEAD W WO CONTRAST, CTA NECK W WO CONTRAST      DATE AND TIME:12/23/2020 12:42 AM      CLINICAL HISTORY: Stroke symptoms   cva        COMPARISON: None      TECHNIQUE: Helical CTA of the neck was performed from the aortic arch to the foramen magnum following the uneventful intravenous administration of 100 cc of nonionic contrast without incident. 2-D images were reconstructed in the sagittal and coronal    planes. Three Dimensional Maximum Intensity Projection (3D-MIP) images were created. All images were reviewed and primarily archived to PACS workstation. All CT scans at this facility use dose modulation, iterative reconstruction, and/or weight based    dosing when appropriate to reduce radiation dose to as low as reasonably achievable. NASCET Criteria were utilized      FINDINGS CTA NECK:      Aortic Arch: The visualized portions of the aortic arch and proximal arch vessels demonstrates no focal stenosis aneurysm or dissection. Carotids: The common carotid arteries, carotid bifurcations, internal and external carotid arteries are normal in course and caliber. Right  Proximal Internal Carotid Stenosis (% by NASCET Criteria): There is no hemodynamically significant stenosis. Left  Proximal Internal Carotid Stenosis (% by NASCET Criteria): There is no hemodynamically significant stenosis.               Vertebral Arteries: Patency: The vertebral arteries are well visualized to the level of the basilar artery. There is no focal stenosis aneurysm or dissection. Vertebral arteries are codominant. IMPRESSION: Negative CTA of the neck. CTA NECK W WO CONTRAST   Final Result   The major intracranial arterial structures are patent without high-grade stenosis, large vessel cut off, or aneurysm. EXAMINATION: CTA HEAD W WO CONTRAST, CTA NECK W WO CONTRAST      DATE AND TIME:12/23/2020 12:42 AM      CLINICAL HISTORY: Stroke symptoms   cva        COMPARISON: None      TECHNIQUE: Helical CTA of the neck was performed from the aortic arch to the foramen magnum following the uneventful intravenous administration of 100 cc of nonionic contrast without incident. 2-D images were reconstructed in the sagittal and coronal    planes. Three Dimensional Maximum Intensity Projection (3D-MIP) images were created. All images were reviewed and primarily archived to PACS workstation. All CT scans at this facility use dose modulation, iterative reconstruction, and/or weight based    dosing when appropriate to reduce radiation dose to as low as reasonably achievable. NASCET Criteria were utilized      FINDINGS CTA NECK:      Aortic Arch: The visualized portions of the aortic arch and proximal arch vessels demonstrates no focal stenosis aneurysm or dissection. Carotids: The common carotid arteries, carotid bifurcations, internal and external carotid arteries are normal in course and caliber. Right  Proximal Internal Carotid Stenosis (% by NASCET Criteria): There is no hemodynamically significant stenosis. Left  Proximal Internal Carotid Stenosis (% by NASCET Criteria): There is no hemodynamically significant stenosis.               Vertebral Arteries:

## 2020-12-28 NOTE — DISCHARGE SUMMARY
Physician Discharge Summary     Patient ID:  Ej Sorenson  [id number]  66 y.o.  1970    Admit date: 12/22/2020    Discharge date : 12/28/20     Admitting Physician: Elaine Felton DO     Discharge Physician: Barbie Laughlin DO     Admission Diagnoses: Stroke determined by clinical assessment [facility]) [I63.9]    Discharge Diagnoses: L thalamic CVA    Admission Condition: fair    Discharged Condition: fair    Hospital Course: 53 y/o F hx htn, noncompliant with BP meds or asa, admitted with cva, aphasia. CT head negative, but MRI confirmed acute L thalamic CVA. Neurology consulted and stressed importance of compliance with medications. PT/OT. Pt chose [facility]. Neurology recommended [facility] to evaluated for cardiac embolic cause which was done on 12/28. Pt medically stable and accepted to rehab and discharged to rehab after [facility] in stable and improved condition. Consults: cardiology and neurology      Discharge Exam:  Constitutional: Lying in bed comfortably  Head: Normocephalic, atraumatic  Eyes: EOMI, PERRLA  ENT: moist mucous membranes  Neck: neck supple, trachea midline  Lungs: Good inspiratory effort, CTABL, no wheeze, no rhonchi, no rales  Heart: RRR, normal S1 and S2, no murmurs  GI: Soft, non-distended, +BS  Skin: warm, dry  Psych: appropriate affect       Labs:   Recent Labs     12/26/20  0512 12/27/20  0536 12/28/20  0509   WBC 6.9 7.4 6.8   HGB 13.8 13.6 13.1   HCT 40.1 39.3 38.0    133 135     Recent Labs     12/26/20  0512 12/27/20  0536 12/28/20  0509    143 145*   K 3.5 3.4 3.4    109* 111*   CO2 23 21 24   BUN 7 7 9   CREATININE 0.78 0.81 0.76   CALCIUM 9.1 9.0 9.0     Recent Labs     12/26/20  0512 12/27/20  0536 12/28/20  0509   AST 35 33 32   ALT 72* 71* 65*   BILITOT 0.3 0.4 0.4   ALKPHOS 132* 127 118     Recent Labs     12/27/20  2047   INR 0.9     No results for input(s): CKTOTAL, TROPONINI in the last 72 hours.     Urinalysis:   Lab Results   Component Value Date NITRU Negative 06/14/2020    WBCUA 10-20 03/01/2020    BACTERIA RARE 03/01/2020    RBCUA 3-5 03/01/2020    BLOODU Negative 06/14/2020    SPECGRAV 1.009 06/14/2020    GLUCOSEU Negative 06/14/2020       Radiology:   Most recent    Chest CT      WITH CONTRAST:No results found for this or any previous visit. WITHOUT CONTRAST: No results found for this or any previous visit. CXR      2-view:   Results for orders placed during the hospital encounter of 10/12/19   XR CHEST STANDARD (2 VW)    Narrative EXAMINATION: XR CHEST (2 VW). DATE AND TIME:10/12/2019 11:00 PM     CLINICAL HISTORY: Shortness of breath   cough      COMPARISONS: August 6, 2019     FINDINGS: The heart, mediastinum and pulmonary vasculature are within normal limits. Visualized lung fields are clear. Bones unremarkable. Impression NO  ACTIVE LUNG DISEASE. Portable:   Results for orders placed during the hospital encounter of 01/08/20   XR CHEST PORTABLE    Narrative EXAMINATION: CHEST PORTABLE VIEW     CLINICAL HISTORY: Midsternal chest pain. Left arm numbness. COMPARISONS: November 18, 2018     FINDINGS:    Single  views of the chest is submitted. The cardiac silhouette is of normal size configuration. The mediastinum is unremarkable. Pulmonary vascular unremarkable. Right sided trachea. No focal infiltrates. No Pneumothoraces. Impression NO ACUTE ACTIVE CARDIOPULMONARY PROCESS       Echo No results found for this or any previous visit.     Disposition: [facility]    In process/preliminary results:  Outstanding Order Results     Date and Time Order Name Status Description    12/27/2020 1139 MTHFR MUTATION In process     12/23/2020 1145 ANTI-DNA ANTIBODY, DOUBLE-STRANDED In process     12/23/2020 1145 ANTIPHOSPHOLIPID ANTIBODY PANEL In process           Patient Instructions:   Current Discharge Medication List Associated Diagnoses: Gastroesophageal reflux disease without esophagitis      ipratropium-albuterol (DUONEB) 0.5-2.5 (3) MG/3ML SOLN nebulizer solution Inhale 3 mLs into the lungs three times daily  Qty: 360 mL, Refills: 5    Associated Diagnoses: Simple chronic bronchitis (HCC)      ! ! VENTOLIN  (90 Base) MCG/ACT inhaler Inhale 2 puffs into the lungs every 6 hours as needed for Wheezing  Qty: 1 Inhaler, Refills: 5    Associated Diagnoses: Simple chronic bronchitis (HCC)      butalbital-aspirin-caffeine (FIORINAL) -40 MG capsule Take 1 capsule by mouth every 4 hours as needed for Headaches for up to 10 days. Qty: 20 capsule, Refills: 0    Associated Diagnoses: Acute nonintractable headache, unspecified headache type      fluticasone (FLONASE) 50 MCG/ACT nasal spray 1 spray by NOT APPLICABLE route 2 times daily      magnesium oxide (MAG-OX) 400 MG tablet Take 1 tablet by mouth daily  Refills: 5      FREESTYLE LANCETS MISC USE DAILY AS DIRECTED  Qty: 100 each, Refills: 10    Associated Diagnoses: Hyperglycemia      BD INTEGRA SYRINGE 25G X 1\" 3 ML MISC USE AS DIRECTED EVERY MONTH  Qty: 25 each, Refills: 5      FREESTYLE LITE strip USE DAILY AS DIRECTED  Qty: 50 strip, Refills: 11       !! - Potential duplicate medications found. Please discuss with provider.       STOP taking these medications       varenicline (CHANTIX) 1 MG tablet Comments:   Reason for Stopping:         dilTIAZem (DILACOR XR) 240 MG extended release capsule Comments:   Reason for Stopping:         Na Sulfate-K Sulfate-Mg Sulf 17.5-3.13-1.6 GM/177ML SOLN Comments:   Reason for Stopping:         ondansetron (ZOFRAN ODT) 4 MG disintegrating tablet Comments:   Reason for Stopping:         dicyclomine (BENTYL) 10 MG capsule Comments:   Reason for Stopping:         atenolol (TENORMIN) 50 MG tablet Comments:   Reason for Stopping:         dicyclomine (BENTYL) 10 MG capsule Comments:   Reason for Stopping: ondansetron (ZOFRAN ODT) 4 MG disintegrating tablet Comments:   Reason for Stopping:         diltiazem (CARDIZEM CD) 240 MG extended release capsule Comments:   Reason for Stopping:         Cholecalciferol 50 MCG (2000 UT) TABS Comments:   Reason for Stopping:         vitamin D (CHOLECALCIFEROL) 83646 UNIT CAPS Comments:   Reason for Stopping:         cyanocobalamin 1000 MCG/ML injection Comments:   Reason for Stopping:         hydrochlorothiazide (HYDRODIURIL) 25 MG tablet Comments:   Reason for Stopping:         tiZANidine (ZANAFLEX) 4 MG tablet Comments:   Reason for Stopping:         butalbital-APAP-caffeine -40 MG CAPS per capsule Comments:   Reason for Stopping:         levothyroxine (SYNTHROID) 25 MCG tablet Comments:   Reason for Stopping:         diclofenac (VOLTAREN) 75 MG EC tablet Comments:   Reason for Stopping:         amitriptyline (ELAVIL) 50 MG tablet Comments:   Reason for Stopping:         folic acid (FOLVITE) 1 MG tablet Comments:   Reason for Stopping:         lurasidone (LATUDA) 20 MG TABS tablet Comments:   Reason for Stopping:         meclizine (ANTIVERT) 25 MG tablet Comments:   Reason for Stopping:         pregabalin (LYRICA) 50 MG capsule Comments:   Reason for Stopping:         calcium carbonate (TUMS) 500 MG chewable tablet Comments:   Reason for Stopping:             Activity: activity as tolerated  Diet: regular diet  Wound Care: none needed    Follow-up with Femi Barndt MD  in 2 weeks.     DC time 35 minutes    Signed:  Electronically signed by Bucky Montoya DO on 12/28/2020 at 4:16 PM

## 2020-12-28 NOTE — PROGRESS NOTES
[facility] Neurology Daily Progress Note  Name: Ellie Piña  Age: 52 y.o. Gender: female  CodeStatus: Full Code  Allergies: Latex  Influenza Vaccines  Eggs Or Egg-Derived Products  Gabapentin  Pneumococcal Vaccines  Povidone Iodine  Varicella Virus Vaccine Live    Chief Complaint:Altered Mental Status    Primary Care Provider: Nitesh Gay MD  InpatientTreatment Team: Treatment Team: Attending Provider: Lore Lemus DO; Consulting Physician: Homero Woodward MD; Consulting Physician: Christnie Mckenzie DO; : Bosie Epley, RN; Registered Nurse: Gina Perales RN; Utilization Reviewer: Lorrie Heimlich, RN; Patient Care Tech: Germanmandy Singer; : Wali Bains RN  Admission Date: 12/22/2020      HPI   Pt seen and examined for neuro follow up for acute thalamic CVA. Patient currently alert and oriented x3, no acute distress, cooperative. She continues to have issues with expressive aphasia. She complains of diplopia to right eye. No disconjugate gaze noted. No other focal neurological deficits appreciated. Patient with history of migraine headache. She continues to have headache, nausea, photophobia. Fioricet not effective. CHAO pending for today. .  Still continues with right weakness  Vitals:    12/28/20 0734   BP: (!) 142/69   Pulse: 70   Resp: 18   Temp: 98.1 °F (36.7 °C)   SpO2: 97%      Review of Systems   Constitutional: Negative for appetite change, fatigue and fever. HENT: Negative for hearing loss and trouble swallowing. Eyes: Positive for photophobia and visual disturbance. Respiratory: Negative for cough, chest tightness, shortness of breath and wheezing. Cardiovascular: Negative for chest pain, palpitations and leg swelling. Gastrointestinal: Positive for nausea. Negative for vomiting. Genitourinary: Negative for difficulty urinating. Musculoskeletal: Negative for gait problem. Skin: Negative for color change and rash. Neurological: Positive for speech difficulty and headaches. Negative for dizziness, tremors, seizures, syncope, facial asymmetry, weakness, light-headedness and numbness. Psychiatric/Behavioral: Negative for agitation, confusion and hallucinations. The patient is not nervous/anxious. double vision  Physical Exam  Vitals signs and nursing note reviewed. Constitutional:       General: She is not in acute distress. Appearance: She is obese. She is not ill-appearing or diaphoretic. HENT:      Head: Normocephalic and atraumatic. Eyes:      General: No visual field deficit. Extraocular Movements: Extraocular movements intact. Pupils: Pupils are equal, round, and reactive to light. Cardiovascular:      Rate and Rhythm: Normal rate and regular rhythm. Pulmonary:      Effort: Pulmonary effort is normal. No respiratory distress. Breath sounds: Normal breath sounds. Musculoskeletal:      Comments: Right arm in sling/immobilizer   Skin:     General: Skin is warm and dry. Neurological:      Mental Status: She is alert and oriented to person, place, and time. Cranial Nerves: No dysarthria or facial asymmetry. Motor: No weakness, tremor or seizure activity.       Comments: Expressive aphasia     right weakness        Medications:  Reviewed    Infusion Medications:    sodium chloride 75 mL/hr at 12/27/20 1320     Scheduled Medications:    isosorbide mononitrate  30 mg Oral Daily    losartan  25 mg Oral Daily    nicotine  1 patch Transdermal Daily    sodium chloride flush  10 mL Intravenous 2 times per day    enoxaparin  40 mg Subcutaneous Daily    aspirin  81 mg Oral Daily    Or    aspirin  300 mg Rectal Daily    atorvastatin  20 mg Oral Nightly    clopidogrel  75 mg Oral Daily    hydrALAZINE  10 mg Intravenous Once     PRN Meds: butalbital-aspirin-caffeine, acetaminophen, sodium chloride flush, promethazine **OR** ondansetron, polyethylene glycol, hydrALAZINE    Labs: Recent Labs     12/26/20  0512 12/27/20  0536 12/28/20  0509   WBC 6.9 7.4 6.8   HGB 13.8 13.6 13.1   HCT 40.1 39.3 38.0    133 135     Recent Labs     12/26/20  0512 12/27/20  0536 12/28/20  0509    143 145*   K 3.5 3.4 3.4    109* 111*   CO2 23 21 24   BUN 7 7 9   CREATININE 0.78 0.81 0.76   CALCIUM 9.1 9.0 9.0     Recent Labs     12/26/20  0512 12/27/20  0536 12/28/20  0509   AST 35 33 32   ALT 72* 71* 65*   BILITOT 0.3 0.4 0.4   ALKPHOS 132* 127 118     Recent Labs     12/27/20  2047   INR 0.9     No results for input(s): Crissy Basques in the last 72 hours. Urinalysis:   Lab Results   Component Value Date    NITRU Negative 06/14/2020    WBCUA 10-20 03/01/2020    BACTERIA RARE 03/01/2020    RBCUA 3-5 03/01/2020    BLOODU Negative 06/14/2020    SPECGRAV 1.009 06/14/2020    GLUCOSEU Negative 06/14/2020       Radiology:   Most recent    EEG No procedure found. MRI of Brain   Results for orders placed during the hospital encounter of 04/15/16   MRI Brain W WO Contrast    Narrative EXAMINATION MRI BRAIN     CLINICAL HISTORY Left-sided weakness and numbness, headache,  dizziness, history of multiple sclerosis     COMPARISONS 8/31/15     FINDINGS Multiplanar, multisequence images were obtained without  contrast followed by IV contrast consisting of 15 cc OptiMARK. Sinuses  and mastoid air cells are clear. Orbital contents are normal. There is  no shift of the midline structures. No abnormal contrast-enhancing  lesions are identified. No intracranial mass is seen. There is no  evidence of acute infarction or hemorrhage. Ventricular system appears  normal. FLAIR images demonstrate a single focus of bright signal  intensity in the right cerebral peduncle similar to the prior study. No  associated contrast enhancement is seen. IMPRESSION NO ACUTE INTRACRANIAL PROCESS.  A SINGLE SUBCENTIMETER FOCUS  OF BRIGHT SIGNAL INTENSITY IN THE RIGHT CEREBRAL PEDUNCLE IS AGAIN NOTED. THERE IS NO EVIDENCE OF CONTRAST ENHANCEMENT. FINDINGS ARE  SUSPICIOUS FOR A SMALL DEMYELINATING PLAQUE SECONDARY TO MULTIPLE  SCLEROSIS. NO OTHER DEFINITE PLAQUES ARE IDENTIFIED AT THIS TIME. OVERALL APPEARANCE OF THE BRAIN IS UNCHANGED FROM THE PRIOR STUDY. Mili Valladares M.D. Released Simone Valladares M.D. Released Date Time- 04/18/16 2666   This document has been electronically signed. ------------------------------------------------------------------------------     Results for orders placed during the hospital encounter of 12/22/20   MRI BRAIN WO CONTRAST    Narrative EXAMINATION:  MRI BRAIN WO CONTRAST    HISTORY:   new CVA . I63.9 Stroke determined by clinical assessment ([facility] [address] ICD10    TECHNIQUE:  Routine noncontrast MRI protocol including diffusion and gradient echo images. COMPARISON: MRI brain 12/23/2020. RESULT:    Acute Change:   Restricted diffusion involving the left thalamus, with corresponding increased T2/FLAIR signal at this site, measuring approximately 1.6 x 0.8 cm, similar to recent prior MRI. No new areas of restricted diffusion       Hemorrhage:    Small rounded area of susceptibility left occipital lobe, unchanged. Mass Lesion/ Mass Effect:    No evidence of an intracranial mass or extra-axial fluid collection. No significant mass effect. Chronic Change:  Scattered punctate foci of increased T2 and FLAIR signal are noted in the supratentorial white matter which is a nonspecific finding, but likely represents minimal chronic microvascular ischemia. Parenchyma:   No significant volume loss for age. The brain parenchyma is otherwise within normal limits of signal intensity and morphology. Ventricles:     Normal caliber and morphology. Skull Base:    Hypothalamic and pituitary region are grossly normal.   Craniocervical junction is normal. No significant marrow replacement process. Vasculature:    Major intracranial arterial structures, and dural venous sinuses show typical flow void, suggesting patency by spin echo criteria. Other:  The visualized paranasal sinuses are clear. Mastoid air cells clear. The orbits are unremarkable. Soft tissues are unremarkable. Impression Recent infarction involving the left thalamus, overall unchanged from MRI 12/23/2020. No new infarcts from the prior MRI. MRA of the Head and Neck: No results found for this or any previous visit. No results found for this or any previous visit. Results for orders placed during the hospital encounter of 08/06/19   mra head w/o contrast    Narrative EXAM:     MRI of the brain without contrast    MRA of the brain without contrast    History: TIA. Bilateral arm numbness. Headache. Blurry vision. Weakness of the left arm and leg. Technique: Multiplanar multisequence MRI of the brain was performed without contrast. Axial 3-D time-of-flight images of the brain were obtained without contrast. 3-D volume rendered images were performed for evaluation of the cerebral vasculature. Comparison: CT brain from August 6, 2019 and MRI brain from November 7, 2018    Findings:    MRI brain:    Unchanged nonspecific 3 mm hyperintense T2 focus within the right brachium pontis. Unchanged nonspecific 5 mm hyperintense T2/FLAIR focus within the cortex of the right frontal lobe, not significant changed from prior examination. Otherwise no suspicious   white matter lesions. Brain volume is age-appropriate. Ventricular morphology is within normal limits. No acute hemorrhage, mass, mass effect, midline shift, or abnormal extra-axial fluid collection. The posterior fossa is within normal limits. There is no diffusion restriction. No susceptibility artifact is identified on the gradient echo sequence. Cranial nerves 7/8 complexes appear grossly unremarkable. The visualized paranasal sinuses and bilateral mastoid air cells are clear. MRA brain:    The bilateral distal cervical internal carotid arteries through the skull base are patent. The bilateral middle cerebral arteries through the trifurcation and opercular branches are patent. The vertebrobasilar system including the superior cerebellar and posterior cerebral arteries are patent. Posterior communicating arteries are patent. The bilateral anterior cerebral arteries and anterior communicating artery are patent. No aneurysm or high-grade stenosis of the visualized cerebral vasculature. Impression MRI brain:    No acute ischemia, acute intracranial process, or significant interval change when compared to MRI of the brain from November 7, 2018    MRA brain:    No aneurysm or high-grade stenosis of the visualized cerebral vasculature. CT of the Head:   Results for orders placed during the hospital encounter of 12/22/20   CT Head WO Contrast    Narrative CT Brain    Contrast medium:  Not utilized. History:  Right facial droop. Altered mental status    Comparison:  August 21, 2020    Findings:    Extra-axial spaces:  Normal.     Intracranial hemorrhage:  None. Ventricular system: In the interval, an ill-defined 4 x 12 mm area decreased attenuation exerting no mass effect is found medially within the left thalamus (series 2, image 16). Basal Cisterns:  Normal.    Cerebral Parenchyma:  Normal.    Midline Shift:  None. Cerebellum:  Normal.     Paranasal sinuses and mastoid air cells:  Normal.    Visualized Orbits:  Normal.      Impression Impression:    Interval development of acute/subacute left thalamic infarct. All CT scans at this facility use dose modulation, iterative reconstruction, and/or weight based dosing when appropriate to reduce radiation dose to as low as reasonably achievable. No results found for this or any previous visit. No results found for this or any previous visit. Carotid duplex: No results found for this or any previous visit. No results found for this or any previous visit. Results for orders placed during the hospital encounter of 08/06/19   US CAROTID ARTERY BILATERAL    Narrative BILATERAL DUPLEX CAROTID ULTRASOUND    CLINICAL INFORMATION:  SMOKING HISTORY, HYPERTENSION, TIA WITH LEFT ARM NUMBNESS AND TINGLING, FACIAL DROOP, EVALUATE FOR POSSIBLE CAROTID ARTERY STENOSIS IN THE NECK. COMPARISON:  NONE AVAILABLE     FINDINGS:  The bilateral carotid duplex ultrasound study demonstrates the following:                                                                 RIGHT            LEFT    Carotid plaque burden                         Mild               Mild  Proximal common carotid artery           171 cm/s            177 cm/s    Mid common carotid artery                   151 cm/s            152 cm/s    Distal common carotid artery                90 cm/s           118 cm/s  Proximal internal carotid artery             78 cm/s            130 cm/s  Mid internal carotid artery                      80 cm/s           82 cm/s  Distal internal carotid artery                  65 cm/s             85 cm/s  External carotid artery                            172 cm/s           169 cm/s      ICA/CCA ratio                                         0.5                0.9       Vertebral arteries antegrade flow         Yes                     Yes       Impression 1. MILD CAROTID PLAQUE BURDEN IN BOTH CAROTID BIFURCATIONS IN THE NECK. 2. RIGHT INTERNAL CAROTID ARTERY: <50% STENOSIS. 3. LEFT INTERNAL CAROTID ARTERY: <50% STENOSIS. 4. BILATERALLY PATENT VERTEBRAL ARTERIES WITH ANTEGRADE BLOOD FLOW. Validated velocity measurements with angiographic measurements, velocity criteria are extrapolated from diameter data as defined by the Society of Radiologist in [address] [place] [address] Radiology 2003; 336;985-961. Echo No results found for this or any previous visit. Assessment/Plan:    Left thalamic stroke which was already apparent on the CT scan and an MRI.  Patient timing of the onset of the stroke is very difficult ascertain she was not a candidate for TPA.  Large vessel evaluation was obtained with a CT angiogram which did not show a large clot.  We were consulted for the same for further management.  Compliance is a question as her PGS studies were reviewed and she does not have platelet aggregation therefore she is not taking her antiplatelet agents.  I recommended dual antiplatelet therapy. Nolvia Riojas has multiple risk factors for cerebrovascular disease including smoking and hypertension and carotid disease.  Patient was seen for complicated migraines in the past and therefore recommended a transesophageal echo and a complete hypercoagulable work-up for now.  We will arrange for a speech evaluation for her dysarthria and aphasia.  Pending on the results of the same will further advise     LDL 86  A1c 8.0  Homocysteine 25.8  All other labs for hypercoagulable work-up still pending  Transesophageal echo still pending.     Homocystine was elevated and B12 and folate within normal limits MTHFR ordered. Repeat MRI was done shows stable thalamic infarct. Patient continues to have headache despite Fioricet yesterday.     Liver enzymes were initially elevated but trending down. Continue on Lipitor dual antiplatelet therapy. Inpatient rehab referral placed.     Patient scheduled for CHAO today. Headache persists. We will give one-time dose of Toradol. Continue dual antiplatelet therapy and statin. Liver enzymes improved. MTHFR pending. I have personally performed a face to face diagnostic evaluation on this patient, reviewed all data and investigations, and am the sole provider of all clinical decisions on the neurological status of this patient. mild right weakness, aphasia  CHAO pending      Steven R.  Vera Ellsworth MD, [id number], American Board of Psychiatry & Neurology  Board Certified in Vascular Neurology  Board Certified in Neuromuscular Medicine  Certified in Neurorehabilitation '      Collaborating physicians: Dr Vera Ellsworth    Electronically signed by NIKI Torres CNP on 12/28/2020 at 10:20 AM

## 2020-12-28 NOTE — PROGRESS NOTES
Subjective: The patient complains of severe acute on chronic progressive weakness poor vision and left facial droop partially relieved by rest, PT, OT and meds and exacerbated by exertion and recent illness. I am concerned about patients medical complexities. I am concerned that she is n.p.o. again status post CHAO she should be trialed back on what ever her usual diet is per speech and language pathology recommendation    Reviewed recent nursing note, \"   Pt. A&Ox3-4. Pt. C/o generalized pain all over body. Pt. rec'd Tylenol. Pt. Took a shower this afternoon. Pt. Stated that she does not like her food options and does not like her food chopped. Pt. Ate very little for lunch \". ROS x10: The patient also complains of severely impaired mobility and activities of daily living. Otherwise no new problems with vision, hearing, nose, mouth, throat, dermal, cardiovascular, GI, , pulmonary, musculoskeletal, psychiatric or neurological. See Rehab consult on Rehab chart . Vital signs:  BP (!) 142/69   Pulse 70   Temp 98.1 °F (36.7 °C) (Oral)   Resp 18   Ht 5' 7\" (1.702 m)   Wt 192 lb (87.1 kg)   SpO2 97%   BMI 30.07 kg/m²   I/O:   PO/Intake:  NPO pending CHAO    Bowel/Bladder:   incontinent,    General:  Patient is well developed, adequately nourished, non-obese and     well kempt. HEENT:    PERRLA, hearing intact to loud voice, external inspection of ear     and nose benign. Inspection of lips, tongue and gums benign  Musculoskeletal: No significant change in strength or tone. All joints stable. Inspection and palpation of digits and nails show no clubbing,       cyanosis or inflammatory conditions. Neuro/Psychiatric: Affect: flat-  Alert and oriented to self and     Situation mod cues-doubled speech. No significant change in deep tendon reflexes or     sensation  Lungs:  Diminished, CTA-B. Respiration effort is normal at rest.     Heart:   S1 = S2,  RRR. No loud murmurs. RBC 4.07 (L) 4.20 - 5.40 M/uL    Hemoglobin 13.1 12.0 - 16.0 g/dL    Hematocrit 38.0 37.0 - 47.0 %    MCV 93.5 82.0 - 100.0 fL    MCH 32.1 (H) 27.0 - 31.3 pg    MCHC 34.4 33.0 - 37.0 %    RDW 13.0 11.5 - 14.5 %    Platelets 581 815 - 310 K/uL    Neutrophils % 62.1 %    Lymphocytes % 29.7 %    Monocytes % 6.7 %    Eosinophils % 1.1 %    Basophils % 0.4 %    Neutrophils Absolute 4.2 1.4 - 6.5 K/uL    Lymphocytes Absolute 2.0 1.0 - 4.8 K/uL    Monocytes Absolute 0.5 0.2 - 0.8 K/uL    Eosinophils Absolute 0.1 0.0 - 0.7 K/uL    Basophils Absolute 0.0 0.0 - 0.2 K/uL   Comprehensive Metabolic Panel w/ Reflex to MG    Collection Time: 12/28/20  5:09 AM   Result Value Ref Range    Sodium 145 (H) 135 - 144 mEq/L    Potassium reflex Magnesium 3.4 3.4 - 4.9 mEq/L    Chloride 111 (H) 95 - 107 mEq/L    CO2 24 20 - 31 mEq/L    Anion Gap 10 9 - 15 mEq/L    Glucose 154 (H) 70 - 99 mg/dL    BUN 9 6 - 20 mg/dL    CREATININE 0.76 0.50 - 0.90 mg/dL    GFR Non-African American >60.0 >60    GFR  >60.0 >60    Calcium 9.0 8.5 - 9.9 mg/dL    Total Protein 5.4 (L) 6.3 - 8.0 g/dL    Alb 3.5 3.5 - 4.6 g/dL    Total Bilirubin 0.4 0.2 - 0.7 mg/dL    Alkaline Phosphatase 118 40 - 130 U/L    ALT 65 (H) 0 - 33 U/L    AST 32 0 - 35 U/L    Globulin 1.9 (L) 2.3 - 3.5 g/dL   Magnesium    Collection Time: 12/28/20  5:09 AM   Result Value Ref Range    Magnesium 1.8 1.7 - 2.4 mg/dL       Previous extensive, complex labs, notes and diagnostics reviewed and analyzed.      ALLERGIES:    Allergies as of 12/22/2020 - Review Complete 12/22/2020   Allergen Reaction Noted    Latex Itching 03/25/2018    Influenza vaccines Swelling 10/08/2015    Eggs or egg-derived products  10/08/2015    Gabapentin Nausea Only 10/31/2017    Pneumococcal vaccines Hives 01/28/2016    Povidone iodine Itching 10/08/2015    Varicella virus vaccine live Hives 01/28/2016      (please also verify by checking MAR) Complex Physical Medicine & Rehab Issues Assess & Plan:   1. Severe abnormality of gait and mobility and impaired self-care and ADL's secondary to progressive and recurrent cerebrovascular disease with clinically diagnosed CVA. Functional and medical status reassessed regarding patients ability to participate in therapies and patient found to be able to participate in skilled versus acute intensive comprehensive inpatient rehabilitation program including PT/OT to improve balance, ambulation, ADLs, and to improve the P/AROM. It is my opinion that they will be able to tolerate 3 hours of therapy a day and benefit from it at an acute level. 2. Bowel constipation and Bladder dysfunction overactive bladder:  frequent toileting, ambulate to bathroom with assistance, check post void residuals. Check for C.difficile x1 if >2 loose stools in 24 hours, continue bowel & bladder program.  Monitor for UTI symptoms including lethargy and confusion  3. Moderate to severe neuropathic and low back pain and generalized OA pain: reassess pain every shift and prior to and after each therapy session, give prn  Tylenol, modalities prn in therapy, consider Lidoderm, K-pad prn.   4. Skin breakdown risk:  continue pressure relief program.  Daily skin exams and reports from nursing. 5. Severe fatigue due to immobility and nutritional deficits: Add vitamin B12 vitamin D and CoQ10 titrate dosing and add protein supplementation with low carb content. 6. Complex discharge planning:   Await CHAO and then patient is to come to acute rehab. Complex Active General Medical Issues that complicate care Assess & Plan:     1. Principal Problem:    Stroke determined by clinical assessment [facility])  Active Problems:    Bipolar 1 disorder ([facility] [address]    CAD (coronary artery disease)    HTN (hypertension)    Vitamin B12 deficiency    Neuromyelopathy due to vitamin B12 deficiency (HCC)    Sleep apnea    Obesity (BMI 30-39. 9)    Right arm weakness COPD exacerbation ([facility] [address]  Resolved Problems:    * No resolved hospital problems.  Ilsa Lundborg, D.O., PM&R     Attending    [address]

## 2020-12-29 PROBLEM — Z74.09 IMPAIRED MOBILITY: Status: ACTIVE | Noted: 2020-12-29

## 2020-12-29 LAB
GLUCOSE BLD-MCNC: 133 MG/DL (ref 60–115)
GLUCOSE BLD-MCNC: 227 MG/DL (ref 60–115)
LITHIUM LEVEL: <0.1 MEQ/L (ref 0.6–1.2)
PERFORMED ON: ABNORMAL
PERFORMED ON: ABNORMAL

## 2020-12-29 PROCEDURE — 99223 1ST HOSP IP/OBS HIGH 75: CPT | Performed by: PHYSICAL MEDICINE & REHABILITATION

## 2020-12-29 PROCEDURE — 92523 SPEECH SOUND LANG COMPREHEN: CPT

## 2020-12-29 PROCEDURE — 96125 COGNITIVE TEST BY HC PRO: CPT

## 2020-12-29 PROCEDURE — 97162 PT EVAL MOD COMPLEX 30 MIN: CPT

## 2020-12-29 PROCEDURE — 1180000000 HC REHAB R&B

## 2020-12-29 PROCEDURE — 6360000002 HC RX W HCPCS: Performed by: INTERNAL MEDICINE

## 2020-12-29 PROCEDURE — 6370000000 HC RX 637 (ALT 250 FOR IP): Performed by: INTERNAL MEDICINE

## 2020-12-29 PROCEDURE — 97112 NEUROMUSCULAR REEDUCATION: CPT

## 2020-12-29 PROCEDURE — 97116 GAIT TRAINING THERAPY: CPT

## 2020-12-29 PROCEDURE — 80178 ASSAY OF LITHIUM: CPT

## 2020-12-29 PROCEDURE — 97166 OT EVAL MOD COMPLEX 45 MIN: CPT

## 2020-12-29 PROCEDURE — 97535 SELF CARE MNGMENT TRAINING: CPT

## 2020-12-29 PROCEDURE — 97530 THERAPEUTIC ACTIVITIES: CPT

## 2020-12-29 PROCEDURE — 92610 EVALUATE SWALLOWING FUNCTION: CPT

## 2020-12-29 PROCEDURE — 97129 THER IVNTJ 1ST 15 MIN: CPT

## 2020-12-29 PROCEDURE — 36415 COLL VENOUS BLD VENIPUNCTURE: CPT

## 2020-12-29 PROCEDURE — 6370000000 HC RX 637 (ALT 250 FOR IP): Performed by: NURSE PRACTITIONER

## 2020-12-29 RX ORDER — INSULIN GLARGINE 100 [IU]/ML
0.25 INJECTION, SOLUTION SUBCUTANEOUS NIGHTLY
Status: DISCONTINUED | OUTPATIENT
Start: 2020-12-29 | End: 2021-01-09 | Stop reason: HOSPADM

## 2020-12-29 RX ORDER — SUMATRIPTAN 50 MG/1
50 TABLET, FILM COATED ORAL ONCE
Status: COMPLETED | OUTPATIENT
Start: 2020-12-29 | End: 2020-12-29

## 2020-12-29 RX ORDER — NICOTINE POLACRILEX 4 MG
15 LOZENGE BUCCAL PRN
Status: DISCONTINUED | OUTPATIENT
Start: 2020-12-29 | End: 2021-01-09 | Stop reason: HOSPADM

## 2020-12-29 RX ORDER — LITHIUM CARBONATE 300 MG/1
300 CAPSULE ORAL
Status: DISCONTINUED | OUTPATIENT
Start: 2020-12-29 | End: 2021-01-09 | Stop reason: HOSPADM

## 2020-12-29 RX ORDER — DEXTROSE MONOHYDRATE 50 MG/ML
100 INJECTION, SOLUTION INTRAVENOUS PRN
Status: DISCONTINUED | OUTPATIENT
Start: 2020-12-29 | End: 2021-01-09 | Stop reason: HOSPADM

## 2020-12-29 RX ORDER — DEXTROSE MONOHYDRATE 25 G/50ML
12.5 INJECTION, SOLUTION INTRAVENOUS PRN
Status: DISCONTINUED | OUTPATIENT
Start: 2020-12-29 | End: 2021-01-09 | Stop reason: HOSPADM

## 2020-12-29 RX ORDER — ALOGLIPTIN 6.25 MG/1
6.25 TABLET, FILM COATED ORAL DAILY
Status: DISCONTINUED | OUTPATIENT
Start: 2020-12-29 | End: 2021-01-09 | Stop reason: HOSPADM

## 2020-12-29 RX ADMIN — BUTALBITAL, ASPIRIN, AND CAFFEINE 1 CAPSULE: 50; 325; 40 CAPSULE ORAL at 12:31

## 2020-12-29 RX ADMIN — ASPIRIN 81 MG: 81 TABLET, COATED ORAL at 09:49

## 2020-12-29 RX ADMIN — INSULIN GLARGINE 22 UNITS: 100 INJECTION, SOLUTION SUBCUTANEOUS at 20:23

## 2020-12-29 RX ADMIN — LOSARTAN POTASSIUM 25 MG: 25 TABLET, FILM COATED ORAL at 09:49

## 2020-12-29 RX ADMIN — ATORVASTATIN CALCIUM 20 MG: 20 TABLET, FILM COATED ORAL at 20:21

## 2020-12-29 RX ADMIN — ENOXAPARIN SODIUM 40 MG: 100 INJECTION SUBCUTANEOUS at 09:48

## 2020-12-29 RX ADMIN — CLOPIDOGREL BISULFATE 75 MG: 75 TABLET ORAL at 09:49

## 2020-12-29 RX ADMIN — ISOSORBIDE MONONITRATE 30 MG: 30 TABLET, EXTENDED RELEASE ORAL at 09:49

## 2020-12-29 RX ADMIN — SUMATRIPTAN SUCCINATE 50 MG: 50 TABLET ORAL at 15:27

## 2020-12-29 RX ADMIN — METFORMIN HYDROCHLORIDE 500 MG: 500 TABLET ORAL at 17:18

## 2020-12-29 RX ADMIN — LITHIUM CARBONATE 300 MG: 300 CAPSULE, GELATIN COATED ORAL at 18:34

## 2020-12-29 RX ADMIN — CARIPRAZINE 3 MG: 1.5 CAPSULE, GELATIN COATED ORAL at 15:27

## 2020-12-29 RX ADMIN — ALOGLIPTIN 6.25 MG: 6.25 TABLET, FILM COATED ORAL at 15:27

## 2020-12-29 ASSESSMENT — ENCOUNTER SYMPTOMS
SHORTNESS OF BREATH: 1
COLOR CHANGE: 1
EYE ITCHING: 1
BACK PAIN: 1
RHINORRHEA: 0
BOWEL INCONTINENCE: 0
EYE REDNESS: 0
WHEEZING: 0
NAUSEA: 0
APNEA: 0
EYE PAIN: 1
TROUBLE SWALLOWING: 0
CHANGE IN BOWEL HABIT: 0
DIARRHEA: 1
SORE THROAT: 0
VOMITING: 0
SINUS PRESSURE: 0
PHOTOPHOBIA: 1
ABDOMINAL PAIN: 0
FACIAL SWELLING: 0
VOICE CHANGE: 0
STRIDOR: 0
COUGH: 1
CHEST TIGHTNESS: 0
CONSTIPATION: 0
EYE DISCHARGE: 0

## 2020-12-29 ASSESSMENT — PAIN DESCRIPTION - LOCATION
LOCATION: HEAD
LOCATION: BACK
LOCATION: BACK

## 2020-12-29 ASSESSMENT — PAIN SCALES - GENERAL
PAINLEVEL_OUTOF10: 9
PAINLEVEL_OUTOF10: 4
PAINLEVEL_OUTOF10: 9

## 2020-12-29 ASSESSMENT — PAIN DESCRIPTION - PAIN TYPE: TYPE: ACUTE PAIN

## 2020-12-29 ASSESSMENT — PAIN DESCRIPTION - ORIENTATION: ORIENTATION: LOWER;MID

## 2020-12-29 NOTE — PROGRESS NOTES
[facility]   Facility/Department: [facility]  Speech Language Pathology  Initial Speech/Language/Cognitive Assessment    NAME:Pooja Keys  : 1970 (52 y.o.)   MRN: [id number]  [id number]  ADMISSION DATE: 2020  PATIENT DIAGNOSIS(ES): Impaired mobility and ADLs [Z74.09, Z78.9]  Impaired mobility [Z74.09]  No chief complaint on file. Patient Active Problem List    Diagnosis Date Noted    Impaired mobility 2020    Abnormality of gait and mobility due to recent infarct left thalamus.   [facility] admit 20. 2020    Tendinitis of right rotator cuff 2020    Adenomatous polyp of colon     Adenomatous polyp of sigmoid colon     Melena     Chest pain 2020    COPD exacerbation ([address] 2019    Right arm weakness 08/10/2019    Stroke determined by clinical assessment ([address] 2019    Sleep apnea 2019    Obesity (BMI 30-39.9) 2019    Dizziness and giddiness 2019    Abnormal auditory perception of left ear 2019    Tinnitus, bilateral 2019    Sensorineural hearing loss (SNHL) of both ears 2019    Vertigo, peripheral, bilateral 2019    Acute bronchitis 2019    Chronic obstructive pulmonary disease ([address] 2018    Neuromyelopathy due to vitamin B12 deficiency ([address] 2017    Smoking     Chronic pain syndrome  2016    Vitamin B12 deficiency 2016    Grief at loss of child 2016    Multiple sclerosis ([address]     Colitis     Over weight     Anxiety     HTN (hypertension) 2016    CAD (coronary artery disease) 2014    H/O cardiac arrest 2012    Bipolar 1 disorder ([address] 2012    Tobacco abuse 2012    SAMAYOA (dyspnea on exertion) 2012    Abnormal ECG 2012    Displacement of lumbar intervertebral disc without myelopathy 2012    Acid reflux 2008     Past Medical History:   Diagnosis Date    Anxiety     Back pain     back surgery x 4    Bipolar disorder ([facility]     CAD (coronary artery disease)     CAFL (chronic airflow limitation) ([facility] 11/3/2016    Chronic bronchitis ([facility])     Chronic pain     Colitis     Complicated migraine     DDD (degenerative disc disease), lumbar 2016    Depression     Endometriosis     Excessive caffeine abuse, continuous ([facility]) 2018    Gastritis     GERD (gastroesophageal reflux disease)     History of myocardial infarction     HTN (hypertension), benign 2016    Impaired mobility and activities of daily living     Over weight     PTSD (post-traumatic stress disorder)     Sensory neuropathy 2016    Somatization disorder     TIA (transient ischemic attack)     Tobacco abuse     Vasospastic angina ([facility] 2016     Past Surgical History:   Procedure Laterality Date    BACK SURGERY      x2     SECTION      x2    CHOLECYSTECTOMY  13    Lapchole    COLONOSCOPY N/A 2020    COLONOSCOPY DIAGNOSTIC performed by Rober Barakat MD at [address] [place] [address]  3/7/16    Dr. Melia Lino Left     UPPER GASTROINTESTINAL ENDOSCOPY  2014    ANIBAL HOUSE M.D.   [employer] UPPER GASTROINTESTINAL ENDOSCOPY N/A 2020    EGD ESOPHAGOGASTRODUODENOSCOPY performed by Rober Barakat MD at [facility]: 2020    Date of Evaluation: 2020   Evaluating Therapist: MEERA Gaston-SLP    Assessment:      Diagnosis: Pt presents with mild-moderate cognitive-linguistic impairment characterized by decreased STM, problem solving, abstract verbal reasoning, orientation, and decreased ability to follow multistep commands negatively impacting her ability to safely perform ADLs and effectively communicate wants and needs with caregivers upon discharge.  Pt also presents with midl dysarthria characterized by imprecise articulation and decreased intelligibility negatively impacting her ability to communicate wants and needs with caregivers upon discharge. It should be noted that pt's  level of participation/motivation could have impacted results of SLE. Pt was quick to state \"I don't know\". Recommendations:  Requires SLP Intervention: Yes  Duration/Frequency of Treatment: 2-3x/wk for LOS or until all goals met           Goals:  Short-term Goals  Timeframe for Short-term Goals: 1-2 weeks  Goal 1: Pt will complete verbal reasoning tasks (similarities/differences, word deduction, divergent naming) with 80% acc given cues as needed in order to promote effective communication of wants and needs. Goal 2: Pt will be educated on 3 memory strategies and will state their use in functional activities of daily living with 80% acc given cues as needed in order to promote safety with the completion of ADLs. Goal 3: Pt will complete mid level problem solving tasks related to ADLs with 80% acc given cues as needed in order to promote safety with the completion of ADLs. Goal 4: Pt will complete oral motor drills at phrase level given model with 100% acc to improve articulator movement and coordination to improve speech intelligibilty. Goal 5: Pt will follow 2+ step directions given orally with 80% accuracy with min cues to increase the pt's ability to follow directions provided by caregivers for safe follow through with ADLs. Goal 6: Pt will state the PIPPA, month, location, situation, and year with 90% acc with use of external aid in order to promote orientation secondary to cognitive deficits. Long-term Goals  Timeframe for Long-term Goals: 2 weeks  Goal 1: Pt will improve cognitive-linguistic and speech production abilities to clear express wants/needs and improve understanding during daily life.    Patient's goals: Pt was involved with the creation of POC    Subjective:   Previous level of function and limitations: See below  General  Chart Reviewed: Yes  Patient assessed for rehabilitation services?: Yes  Family / Caregiver Present: No  Subjective  Subjective: Pt was alert and cooperative for SLE     Vision  Vision: Within Functional Limits  Vision Exceptions: Wears glasses at all times  Hearing  Hearing: Within functional limits        Objective:     Oral/Motor  Oral Motor: Exceptions to [facility]  Labial Strength: Reduced  Labial Coordination: Reduced  Lingual Strength: Reduced  Lingual Coordination: Reduced    Auditory Comprehension  Comprehension: Exceptions  Basic Questions: Mild  Two Step Basic Commands: (WFL)  Multistep Basic Commands: (WFL)  Complex/Abstract Commands: Mild  Common Objects: (WFL)  Conversation: Mild  Interfering Components: Processing speed  Effective Techniques: Repetition    Reading Comprehension  Reading Status: Unable to assess    Expression  Primary Mode of Expression: Verbal    Verbal Expression  Verbal Expression: Exceptions to functional limits  Initiation: Mild   Confrontation: (WFL)  Convergent: (WFL)  Divergent: Moderate  Responsive: (DNT)  Conversation: Mild  Effective Techniques: Provide extra time    Written Expression  Written Expression: Unable to assess    Motor Speech  Motor Speech: Exceptions to [facility]  Dysarthria : Mild    Pragmatics/Social Functioning  Pragmatics: Within functional limits    Cognition:      Orientation  Overall Orientation Status: Impaired  Orientation Level: Disoriented to time;Oriented to place;Oriented to situation;Oriented to person  Attention  Attention: Exceptions to [facility]  Selective Attention: [facility])  Sustained Attention: Mild  Memory  Memory: Exceptions to [facility]  Short-term Memory: Moderate(Unable to recall 3 words after 5-minute delay)  Problem Solving  Problem Solving: Exceptions to [facility]  Complex Functional Tasks: Moderate  Managing Finances: To be assessed in therapy  Managing Medications: To be assessed in therapy  Simple Functional Tasks:  Moderate  Verbal Reasoning Skills: Mild  Numeric Reasoning  Numeric Reasoning: Unable to assess  Abstract Reasoning  Abstract Reasoning: Exceptions to [facility]  Convergent Thinking: [facility])  Divergent Thinking: Mild  Safety/Judgement  Safety/Judgement: Exceptions to [facility]  Complex Functional Tasks: Moderate  Novel Situations: Mild  Insight: Moderate      Additional Assessments:    Portions of the RIPA-2 [facility] Information Processing Assessment-2nd Edition ) were administered. Scores are as follows:  Subtest: Raw Score Standard Score   I. Immediate Memory 16 7   II. Recent Memory 24 10   III. Temporal Orientation 24 10   VIII. Problem Solving and          Abstract Reasoning 22 10            Prognosis:  Speech Therapy Prognosis  Prognosis: Good  Prognosis Considerations: Age;Participation Level;Previous Level of Function  Individuals consulted  Consulted and agree with results and recommendations: Patient    Education:  Patient Education: results and goal recommendations  Patient Education Response: Verbalizes understanding  Safety Devices in place: Yes  Type of devices: All fall risk precautions in place;Call light within reach; Bed alarm in place    Pain Assessment:  Initial Assessment:  Patient denies pain. Re-assessment:  Patient c/o: 9 pain in back and body  RN informed.     NATIONAL OUTCOMES MEASUREMENT SYSTEM (NOMS):  SPOKEN LANGUAGE COMPREHENSION  Ratin    SPOKEN LANGUAGE EXPRESSION  Ratin    MOTOR SPEECH  Ratin    PROBLEM SOLVING  Ratin    MEMORY  Ratin             Therapy Time  SLP Individual Minutes  Time In: 1174  Time Out: 9276  Minutes: 22        Signature: Electronically signed by COLLIN MontagueSLP on 2020 at 3:07 PM

## 2020-12-29 NOTE — FLOWSHEET NOTE
Patient assessment complete earlier this shift. Patient admitted from 2wt to rehab. No acute distress is notes. SL flushed. Patient has a sling on her right arm she wears at all times. Denies constipation. Has remained continent. She reviewed her meds with me and admits that she is noncompliant with them and does not take them at home. She is concerned to not be on her bipolar meds. She states she is borderline diabetic no one touches. Perfect serve to medical about blood sugar checks and pshyc meds.

## 2020-12-29 NOTE — PROGRESS NOTES
Occupational Therapy   Occupational Therapy Initial Assessment  Date: 2020   Patient Name: Ana Ceja  MRN: [id number]     : 1970    Date of Service: 2020    Discharge Recommendations:  Continue to assess pending progress  OT Equipment Recommendations  Other: Continue to assess    Assessment   Performance deficits / Impairments: Decreased functional mobility ; Decreased ADL status; Decreased ROM; Decreased strength;Decreased endurance;Decreased cognition;Decreased safe awareness;Decreased balance;Decreased high-level IADLs;Decreased fine motor control;Decreased coordination;Decreased posture  Assessment: Pt is a 52year old woman from home alone who presents to [facility] with the above deficits which impact her ability to perform ADL and IADL tasks. Pt. would benefit from continued OT to maximize independence and safety with transfers and mobility. Prognosis: Good  Decision Making: Medium Complexity  History: Pt's medical history is moderately complex  Exam: Pt. has 12 performance deficits  Assistance / Modification: Pt. requires mod A  OT Education: OT Role;Plan of Care  Patient Education: Educated pt. on role of OT  Barriers to Learning: Memory deficits  REQUIRES OT FOLLOW UP: Yes  Activity Tolerance  Activity Tolerance: Patient limited by fatigue;Treatment limited secondary to decreased cognition  Activity Tolerance: Pt. c/o fatigue during ambulating/transferring  Safety Devices  Safety Devices in place: Yes  Type of devices: All fall risk precautions in place           Patient Diagnosis(es): There were no encounter diagnoses.    has a past medical history of Anxiety, Back pain, Bipolar disorder ([facility] CAD (coronary artery disease), CAFL (chronic airflow limitation) ([facility]), Chronic bronchitis ([facility] Chronic pain, Colitis, Complicated migraine, DDD (degenerative disc disease), lumbar, Depression, Endometriosis, Excessive caffeine abuse, continuous ([facility] Gastritis, GERD (gastroesophageal reflux disease), History of myocardial infarction, HTN (hypertension), benign, Impaired mobility and activities of daily living, Over weight, PTSD (post-traumatic stress disorder), Sensory neuropathy, Somatization disorder, TIA (transient ischemic attack), Tobacco abuse, and Vasospastic angina ([facility] has a past surgical history that includes Hysterectomy; Dilation and curettage of uterus; back surgery; Neck surgery; shoulder surgery (Left);  section; Cholecystectomy (13); Upper gastrointestinal endoscopy (2014); cyst removal (3/7/16); Coronary angioplasty; Upper gastrointestinal endoscopy (N/A, 2020); and Colonoscopy (N/A, 2020). Restrictions  Restrictions/Precautions  Restrictions/Precautions: Fall Risk  Required Braces or Orthoses?: Yes  Required Braces or Orthoses  Right Upper Extremity Brace/Splint: Sling  Right Upper Extremity Brace/Splint: RUE immobilizer in place - pt reports rotator cuff repair done Dec 1    Subjective   General  Chart Reviewed: Yes  Patient assessed for rehabilitation services?: Yes  Pain Assessment  Pain Level: 9  Social/Functional History  Social/Functional History  Lives With: Other (comment)(Boyfriend)  Type of Home: House  Home Layout: Two level  Home Access: Stairs to enter with rails  Entrance Stairs - Number of Steps: 3-5 to enter front door, 1 railing on R side  Entrance Stairs - Rails: Right  Bathroom Shower/Tub: Tub only  ADL Assistance: Independent  Homemaking Assistance: Independent  Homemaking Responsibilities: Yes  Ambulation Assistance: Independent  Transfer Assistance: Independent  Active : Yes  Occupation: Full time employment  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   Additional Comments: Pt. inconsistent historian but attempts to answer questions       Objective   Vision: Impaired  Vision Exceptions: Wears glasses at all times  Hearing: Within functional limits    Orientation  Overall Orientation Status: Impaired  Orientation Level: Oriented to place;Oriented to person;Oriented to situation  Observation/Palpation  Posture: Fair  Observation: R shoulder incision healing  Balance  Sitting Balance: Supervision  Standing Balance: Contact guard assistance  Functional Mobility  Activity: To/from bathroom  Assist Level: Minimal assistance  Functional Mobility Comments: Verbal cues for safety, mod increased time, decreased gait quality noted. Toilet Transfers  Toilet Transfer: Minimal assistance  Toilet Transfers Comments: Pt decined need to use restroom. Anticipate Min A  Shower Transfers  Shower - Transfer From: Abhishek & Renate - Transfer Type: To  Shower - Transfer To: Shower seat with back  Shower - Technique: Ambulating  Shower Transfers: Minimal assistance  Shower Transfers Comments: Verbal cues for safety and sequencing  ADL  Feeding: Setup  Grooming: Minimal assistance  UE Bathing: Maximum assistance  LE Bathing: Moderate assistance  UE Dressing: Moderate assistance  LE Dressing: Minimal assistance  Toileting: Moderate assistance  Additional Comments: Pt. with functional movement of LUE but decreased sequencing and safety awareness. Limited by R shoulder immobilizer. Tone RUE  RUE Tone: Normotonic  Tone LUE  LUE Tone: Normotonic  Coordination  Movements Are Fluid And Coordinated: No  Coordination and Movement description: Fine motor impairments;Decreased speed;Decreased accuracy; Left UE     Bed mobility  Rolling to Left: Stand by assistance  Supine to Sit: Stand by assistance  Sit to Supine: Minimal assistance  Scooting: Contact guard assistance  Transfers  Sit to stand: Contact guard assistance  Stand to sit: Minimal assistance  Transfer Comments: Impulsive and poor safety awareness     Cognition  Overall Cognitive Status: Exceptions  Arousal/Alertness: Delayed responses to stimuli  Following Commands: Follows multistep commands with increased time; Follows multistep commands with repitition  Attention Span: Difficulty attending to directions; Difficulty dividing attention  Memory: Decreased recall of precautions;Decreased recall of recent events;Decreased short term memory;Decreased long term memory;Decreased recall of biographical Information  Safety Judgement: Decreased awareness of need for assistance;Decreased awareness of need for safety  Problem Solving: Assistance required to generate solutions;Assistance required to identify errors made;Assistance required to correct errors made;Assistance required to implement solutions;Decreased awareness of errors  Insights: Decreased awareness of deficits  Initiation: Requires cues for some  Sequencing: Requires cues for some  Perception  Overall Perceptual Status: WFL     The patient completed the [address] [place] [address] (UMS) Examination on this date, which is a useful screening tool for detecting mild cognitive impairment and signs of dementia. Range of impairments based on score are indicated in the chart below:      High school education Scoring Less than High School Education   27-30 Normal 25-30   21-26 Mild Neurocognitive disorder 20-24   1-20 Dementia 1-19     Patient's level of education: high school  Patient scored 10/30 on this date, which indicates a possible dementia or other significant neurocognitive disorder. Pt. with cognitive deficits which will be addressed in OT  Pt's scores indicate need for further cognitive assessment; physician notified and POC to be assessed as appropriate    Results of [facility] assessment will be shared in team meeting to assess need for further action. Sensation  Overall Sensation Status: WFL        LUE AROM (degrees)  LUE AROM : WFL  Left Hand AROM (degrees)  Left Hand AROM: WFL  RUE AROM (degrees)  RUE General AROM: NT; shoulder immobilizer.  WFL elbow when donning sling  Right Hand AROM (degrees)  Right Hand AROM: WFL  LUE Strength  L Hand General: 4/5  LUE Strength Comment: 4/5 all planes  RUE Strength  R Hand General: NT  RUE Strength Comment: NT d/t RUE shoulder immobilizer     Hand Dominance  Hand Dominance: Right        Plan   Plan  Times per week: 5-7x/week  Plan weeks: 1-1 1/2 weeks  Current Treatment Recommendations: Strengthening, Balance Training, Functional Mobility Training, Endurance Training, Safety Education & Training, Pain Management, Self-Care / ADL, Equipment Evaluation, Education, & procurement, Patient/Caregiver Education & Training, Home Management Training, Cognitive/Perceptual Training, Cognitive Reorientation, Neuromuscular Re-education, Positioning, ROM      Goals  Patient Goals   Patient goals : \"I can't believe I feel this weak, I want to get stronger\"     [x]  Patient will complete self care as followed using the recommended adaptive equipment and/or adaptive techniques as instructed:  Feeding: Mod I   Grooming: Setup   Bathing: Min A   UE Dressing: Min A   LE Dressing: Mod I   Toileting: Min A   Toilet Transfers: Mod I  Tub Transfers: Mod I      [x]  Patient will sequence self-care routine with min difficulty and 1 verbal/tactile cues. [x]  Patient will improve static and dynamic standing balance to complete pants management at Mod I level     [x]  Patient will improve L UE Function (AROM, strength, motor control, tone normalization) to complete ADLs as projected. [x]  Patient will improve functional endurance to tolerate/complete 60 minutes of ADLs. [x]  Patient will improve L UE strength and endurance to 4+/5 in order to participate in self-care activities as projected. [x]  Patient will perform kitchen mobility at device level without episodes of LOB and good safety awareness      [x]  Patient will perform basic room mobility at CGA level. [x]  Patient will access appropriate D/C site with as few architectural barriers as possible.          [x]  Patient and/or caregiver will demonstrate understanding of energy conservation

## 2020-12-29 NOTE — PROGRESS NOTES
[facility]   Facility/Department: [facility]  Speech Language Pathology  Clinical Bedside Swallow Evaluation    Ettie Cap  : 1970 (52 y.o.)   MRN: [id number]  ROOM: [id number]  ADMISSION DATE: 2020  PATIENT DIAGNOSIS(ES): Impaired mobility and ADLs [Z74.09, Z78.9]  Impaired mobility [Z74.09]  No chief complaint on file. Patient Active Problem List    Diagnosis Date Noted    Impaired mobility 2020    Abnormality of gait and mobility due to recent infarct left thalamus.   [facility] admit 20. 2020    Tendinitis of right rotator cuff 2020    Adenomatous polyp of colon     Adenomatous polyp of sigmoid colon     Melena     Chest pain 2020    COPD exacerbation ([address] 2019    Right arm weakness 08/10/2019    Stroke determined by clinical assessment ([address] 2019    Sleep apnea 2019    Obesity (BMI 30-39.9) 2019    Dizziness and giddiness 2019    Abnormal auditory perception of left ear 2019    Tinnitus, bilateral 2019    Sensorineural hearing loss (SNHL) of both ears 2019    Vertigo, peripheral, bilateral 2019    Acute bronchitis 2019    Chronic obstructive pulmonary disease ([address] 2018    Neuromyelopathy due to vitamin B12 deficiency ([address] 2017    Smoking     Chronic pain syndrome  2016    Vitamin B12 deficiency 2016    Grief at loss of child 2016    Multiple sclerosis ([address]     Colitis     Over weight     Anxiety     HTN (hypertension) 2016    CAD (coronary artery disease) 2014    H/O cardiac arrest 2012    Bipolar 1 disorder ([address] 2012    Tobacco abuse 2012    SAMAYOA (dyspnea on exertion) 2012    Abnormal ECG 2012    Displacement of lumbar intervertebral disc without myelopathy 2012    Acid reflux 2008     Past Medical History:   Diagnosis Date    Anxiety     Back pain     back surgery x 4  Bipolar disorder ([facility]     CAD (coronary artery disease)     CAFL [facility]flow limitation[facility] 11/3/2016    Chronic bronchitis ([facility])     Chronic pain     Colitis     Complicated migraine     DDD (degenerative disc disease), lumbar 2016    Depression     Endometriosis     Excessive caffeine abuse, continuous ([facility]) 2018    Gastritis     GERD (gastroesophageal reflux disease)     History of myocardial infarction     HTN (hypertension), benign 2016    Impaired mobility and activities of daily living     Over weight     PTSD (post-traumatic stress disorder)     Sensory neuropathy 2016    Somatization disorder     TIA (transient ischemic attack)     Tobacco abuse     [facility] 2016     Past Surgical History:   Procedure Laterality Date    BACK SURGERY      x2     SECTION      x2    CHOLECYSTECTOMY  13    Lapchole    COLONOSCOPY N/A 2020    COLONOSCOPY DIAGNOSTIC performed by Michael Larson MD at [address] [place] [address]  3/7/16    Dr. Apolinar Park Left     UPPER GASTROINTESTINAL ENDOSCOPY  2014    ANIBAL HOUSE M.D.   [facility] UPPER GASTROINTESTINAL ENDOSCOPY N/A 2020    EGD ESOPHAGOGASTRODUODENOSCOPY performed by Michael Larson MD at [facility]     Allergies   Allergen Reactions    Latex Itching     Pt reports itching on hands after using rubber gloves at work    Influenza Vaccines Swelling     Pt reports her entire arm was red and edematous post vaccine    Eggs Or Egg-Derived Products      Egg based products only    Gabapentin Nausea Only    Pneumococcal Vaccines Hives    Povidone Iodine Itching    Varicella Virus Vaccine Live Hives       DATE ONSET: 2020    Date of Evaluation: 2020   Evaluating Therapist: Jerilynn Opitz, CF-SLP      Recommended Diet and Intervention  Diet Solids Recommendation: Regular  Liquid Consistency Recommendation: Thin  Recommended Form of Meds: PO     Therapeutic Interventions: Diet tolerance monitoring, Oral motor exercises    Compensatory Swallowing Strategies  Compensatory Swallowing Strategies: Small bites/sips;Eat/Feed slowly    Reason for Referral  Ana Ceja was referred for a bedside swallow evaluation to assess the efficiency of her swallow function, identify signs and symptoms of aspiration and make recommendations regarding safe dietary consistencies, effective compensatory strategies, and safe eating environment. General  Chart Reviewed: Yes  Subjective  Subjective: Pt was alert and cooperative for BSE  Behavior/Cognition: Alert; Cooperative  Respiratory Status: Room air  O2 Device: None (Room air)  Communication Observation: Dysarthria  Follows Directions: Simple  Dentition: Poor dental/oral hygeine; Some missing teeth(teeth only on bottom. Pt does not have dentures)  Patient Positioning: Upright in bed  Baseline Vocal Quality: Normal  Prior Dysphagia History: Pt was admitted to hospital in Aug 2019 with diet rec thin and soft diet  Consistencies Administered: Reg solid; Dysphagia Pureed (Dysphagia I); Thin - cup;Dysphagia Soft and Bite-Sized (Dysphagia III)    Current Diet level:  Current Diet : Dysphagia Soft and Bite-Sized (Dysphagia III)  Current Liquid Diet : Thin    Oral Motor Deficits  Oral/Motor  Oral Motor: Exceptions to [facility]  Labial Strength: Reduced  Labial Coordination: Reduced  Lingual Strength: Reduced  Lingual Coordination: Reduced    Oral Phase Dysfunction  Oral Phase  Oral Phase: Exceptions  Oral Phase Dysfunction  Impaired Mastication: Reg Solid  Lingual/Palatal Residue: Reg solid  Oral Phase  Oral Phase - Comment: Pt presents with mild oral phase dysphagia characterized by reduced lingual/labial strength, impaired mastication of regular solid resulting in mild lingual residue.  Pt was able to clear residue with use of liquid wash. It should be noted that pt reports that she is not going to eat soft and bite size consistencies secondary to it being \"too mushy\" and not apetizing. Pt stated that she eats whatever she wants at home. Indicators of Pharyngeal Phase Dysfunction   Pharyngeal Phase  Pharyngeal Phase: WFL  Pharyngeal Phase   Pharyngeal: Pharyngeal phase WFL. No overt s/s of aspiration observed following any of the presented consistencies. Laryngeal elevation was present during evaluation, and noted through observation. Impression  Dysphagia Diagnosis: Mild oral stage dysphagia  Dysphagia Impression : Mild oral phase dysphagia; Pharyngeal phase WFL  Dysphagia Outcome Severity Scale: Level 5: Mild dysphagia- Distant supervision. May need one diet consistency restricted     Treatment Plan  Requires SLP Intervention: Yes  Duration/Frequency of Treatment: 1-3x/wk        Treatment/Goals  Short-term Goals  Timeframe for Short-term Goals: 1-2 weeks  Goal 1: Pt will tolerate regular consistencies and thin liquids with no overt s/s of aspiration and adequate oral clearance of bolus in all given opportunities. Goal 2: Pt will complete oral motor strengthening exercises with 90% acc given cues as needed in order to increase lingual/labial musuclature and decrease risk of pocketing. Goal 3: Pt will complete tongue press exercise x10 in order to promote anterior to posterior transit of bolus and decrease risk of aspiration. Goal 4: Pt will implement safe swallow strategies (small bites/sips, slow rate) with 90% acc given cues as needed in order to decrease risk of aspiration. Long-term Goals  Timeframe for Long-term Goals: 2 weeks  Goal 1: Pt will improve oral motor strentgh to tolerate least restrictive diet with no overt s/s of aspiration and difficulty.     Prognosis  Prognosis  Prognosis for safe diet advancement: good  Individuals consulted  Consulted and agree with results and recommendations: Patient;RN(RN - Marshal Casey)    Education  Patient Education: results and goal recommendations  Patient Education Response: Verbalizes understanding  Safety Devices in place: Yes  Type of devices: All fall risk precautions in place;Call light within reach; Bed alarm in place    [x]  Marshal Casey - RN notified   [x]  Dr. Rand Benton notified via voicemail    Pain Assessment:  Initial Assessment:  Patient denies pain. Re-assessment:  Patient c/o: 9 pain in back and body  RN informed.        NATIONAL OUTCOMES MEASUREMENT SYSTEM (NOMS):    SWALLOWING  Ratin    Therapy Time  SLP Individual Minutes  Time In: 4756  Time Out: 5614  Minutes: 10        Signature: Electronically signed by MEERA Lafleur-SLP on 2020 at 12:53 PM

## 2020-12-29 NOTE — PROGRESS NOTES
Occupational Therapy  Facility/Department: [facility]  Daily Treatment Note  NAME: Nabor Peres  : 1970  MRN: [id number]    Date of Service: 2020    Discharge Recommendations:  Continue to assess pending progress       Assessment   Activity Tolerance  Activity Tolerance: Patient limited by fatigue;Patient limited by pain  Activity Tolerance: Pt worked slowly with frequent RBs to close eyes d/t complaints of light sensitivity and blurry vision. Safety Devices  Safety Devices in place: Yes  Type of devices: All fall risk precautions in place         Patient Diagnosis(es): There were no encounter diagnoses. has a past medical history of Anxiety, Back pain, Bipolar disorder ([address] CAD (coronary artery disease), [facility]FL [facility]chronic airflow limitation) ([facility]), Chronic bronchitis ([address] Chronic pain, Colitis, Complicated migraine, DDD (degenerative disc disease), lumbar, Depression, Endometriosis, Excessive caffeine abuse, continuous ([address] Gastritis, GERD (gastroesophageal reflux disease), History of myocardial infarction, HTN (hypertension), benign, Impaired mobility and activities of daily living, Over weight, PTSD (post-traumatic stress disorder), Sensory neuropathy, Somatization disorder, TIA (transient ischemic attack), Tobacco abuse, and Vasospastic angina ([address] has a past surgical history that includes Hysterectomy; Dilation and curettage of uterus; back surgery; Neck surgery; shoulder surgery (Left);  section; Cholecystectomy (13); Upper gastrointestinal endoscopy (2014); cyst removal (3/7/16); Coronary angioplasty; Upper gastrointestinal endoscopy (N/A, 2020); and Colonoscopy (N/A, 2020).     Restrictions  Restrictions/Precautions  Restrictions/Precautions: Fall Risk  Required Braces or Orthoses?: Yes  Required Braces or Orthoses  Right Upper Extremity Brace/Splint: Sling  Right Upper Extremity Brace/Splint: RUE immobilizer in place - pt reports rotator cuff repair done Dec 1  Subjective   General  Pain Assessment  Pain Assessment: 0-10  Pain Level: 9  Pain Type: Acute pain  Pain Location: Head  Non-Pharmaceutical Pain Intervention(s): Environmental changes(Pt moved to dimly lit room)  Pre Treatment Pain Screening  Pain at present: 9  Scale Used: Numeric Score  Intervention List: Patient able to continue with treatment;Patient declined any intervention  Comments / Details: Pt declined calling nurse but requested lights be dimmed. .  Vital Signs  Patient Currently in Pain: Yes   Orientation     Objective    Pt engaged in [facility] task to increase IND with self care fasteners and containers. Pt worked slowly to place small pegs on pegboard according to visual pattern of moderate difficulty. Pt completed 2 patterns. VCs to identify and correct 3 errors. Pt placed small pegs back in container with 0 drops. Assistance to stabilize container d/t precautions with L UE. Pt states she is supposed to be doing some AROM of L elbow and hand. Pt reported that she does not have her HEPs r/t her shoulder surgery and she most likely threw them away. Pt engaged in problem solving task to promote safety with ADLs/IADLs. Pt answered \"what would you do\" prompts with 100% accuracy; needed extended time for processing and word finding. ADL  LE Bathing: Maximum assistance  UE Dressing: (to doff full zip jacket. Able to remove R sleeve without assist)  Additional Comments: Pt doffed full zip jacket and doffed/donned R arm sling during sessions. Able to manage sling with assist to place trunk strap. Balance  Sitting Balance: Supervision  Standing Balance: Contact guard assistance  Functional Mobility  Functional - Mobility Device: No device  Activity: (in room w/c>EOB)  Assist Level: Contact guard assistance  Functional Mobility Comments: VCs for safety and to watch speed.   Bed mobility  Sit to Supine: Stand by assistance  Transfers  Sit to stand: Contact guard assistance  Stand to sit: Stand by assistance        Plan   Plan  Times per week: 5-7x/week  Plan weeks: 1-1 1/2 weeks  Current Treatment Recommendations: Strengthening, Balance Training, Functional Mobility Training, Endurance Training, Safety Education & Training, Pain Management, Self-Care / ADL, Equipment Evaluation, Education, & procurement, Patient/Caregiver Education & Training, Home Management Training, Cognitive/Perceptual Training, Cognitive Reorientation, Neuromuscular Re-education, Positioning, ROM  Plan Comment: Continue OT per POC       Goals  Patient Goals   Patient goals : \"I can't believe I feel this weak, I want to get stronger\"       Therapy Time   Individual Concurrent Group Co-treatment   Time In 1350         Time Out 1450         Minutes 60              ADL training: 15 minutes  Therapeutic activities: 35 minutes  Cognitive Retraining: 10 minutes      Huan Grover OT    Electronically signed by Huan Grover OT on 12/29/2020 at 4:31 PM

## 2020-12-29 NOTE — PROGRESS NOTES
Facility/Department: [facility] Initial Assessment: Physical Therapy  Room: R249/R249-01    NAME: Beto Ventura  : 1970  MRN: [id number]    Date of Service: 2020    Rehab Diagnosis(es):Abnormality of gait and mobility due to recent infarct left thalamus. [facility] admit 20. Pt reports right rotator cuff repair 20 - shoulder plint in place  Patient Active Problem List    Diagnosis Date Noted    Impaired mobility 2020    Abnormality of gait and mobility due to recent infarct left thalamus.   [facility] admit 20. 2020    Tendinitis of right rotator cuff 2020    Adenomatous polyp of colon     Adenomatous polyp of sigmoid colon     Melena     Chest pain 2020    COPD exacerbation ([address] 2019    Right arm weakness 08/10/2019    Stroke determined by clinical assessment ([address] 2019    Sleep apnea 2019    Obesity (BMI 30-39.9) 2019    Dizziness and giddiness 2019    Abnormal auditory perception of left ear 2019    Tinnitus, bilateral 2019    Sensorineural hearing loss (SNHL) of both ears 2019    Vertigo, peripheral, bilateral 2019    Acute bronchitis 2019    Chronic obstructive pulmonary disease ([address] 2018    Neuromyelopathy due to vitamin B12 deficiency ([address] 2017    Smoking     Chronic pain syndrome  2016    Vitamin B12 deficiency 2016    Grief at loss of child 2016    Multiple sclerosis ([address]     Colitis     Over weight     Anxiety     HTN (hypertension) 2016    CAD (coronary artery disease) 2014    H/O cardiac arrest 2012    Bipolar 1 disorder ([address] 2012    Tobacco abuse 2012    SAMAYOA (dyspnea on exertion) 2012    Abnormal ECG 2012    Displacement of lumbar intervertebral disc without myelopathy 2012    Acid reflux 2008       Past Medical History:   Diagnosis Date    Anxiety  Back pain     back surgery x 4    [facility]     CAD (coronary artery disease)     CAFL (chronic airflow limitation) [facility] 11/3/2016    Chronic bronchitis ([facility])     Chronic pain     Colitis     Complicated migraine     DDD (degenerative disc disease), lumbar 2016    Depression     Endometriosis     Excessive caffeine abuse, continuous ([facility] 2018    Gastritis     GERD (gastroesophageal reflux disease)     History of myocardial infarction     HTN (hypertension), benign 2016    Impaired mobility and activities of daily living     Over weight     PTSD (post-traumatic stress disorder)     Sensory neuropathy 2016    Somatization disorder     TIA (transient ischemic attack)     Tobacco abuse     Vasospastic angina ([facility] 2016     Past Surgical History:   Procedure Laterality Date    BACK SURGERY      x2     SECTION      x2    CHOLECYSTECTOMY  13    Lapchole    COLONOSCOPY N/A 2020    COLONOSCOPY DIAGNOSTIC performed by Tanvi Dietz MD at [address] [place] [address]  3/7/16    Dr. Elliott Marrero Left     UPPER GASTROINTESTINAL ENDOSCOPY  2014    ANIBAL HOUSE M.D.   [facility] UPPER GASTROINTESTINAL ENDOSCOPY N/A 2020    EGD ESOPHAGOGASTRODUODENOSCOPY performed by Tanvi Dietz MD at [facility]       Chart Reviewed: Yes  Patient assessed for rehabilitation services?: Yes  Family / Caregiver Present: No  Diagnosis: Abnormality of gait and mobility due to recent infarct left thalamus. [facility] admit 20.     Restrictions:  Restrictions/Precautions: Fall Risk  Required Braces or Orthoses  Right Upper Extremity Brace/Splint: Sling  Right Upper Extremity Brace/Splint: RUE immobilizer in place - pt reports rotator cuff repair done Dec 1       SUBJECTIVE:      Pre Treatment Pain Screening  Pain at present: 8  Intervention List: Patient able to continue with treatment  Comments / Details: 8/10 back pain - pain medication requested at end of session    Post Treatment Pain Screening:  Pain Assessment  Pain Level: 9  Pain Location: Back    Prior Level of Function:  Social/Functional History  Lives With: Alone(has a boyfriend that works)  Type of Home: [employer] Layout: Two level, Bed/Bath upstairs  Home Access: Stairs to enter without rails(5 steps)  Bathroom Shower/Tub: Tub/Shower unit  ADL Assistance: [address]: Independent  Homemaking Responsibilities: Yes  Ambulation Assistance: Independent(no AD)  Transfer Assistance: Independent  Active : Yes  Type of occupation: pt stated yes to work; however unable to clarify where- dysarthria    OBJECTIVE:   Vision/Hearing:  Vision: Impaired(pt reports intermittent double vision)  Vision Exceptions: Wears glasses at all times  Hearing: Within functional limits    Cognition:  Follows Commands: Impaired(intermittetnt slow processing of question and directions)       ROM:  RLE PROM: WFL  LLE PROM: WFL    Strength:  Strength RLE  Comment: 4-/5  Strength LLE  Comment: 4/5    Neuro:        Balance  Sitting - Static: Good  Sitting - Dynamic: Good  Standing - Static: Fair(able to stand one minute with no UE support with SBA - increased sway noted)  Standing - Dynamic: Fair;-  Tone RLE  RLE Tone: Normotonic  Tone LLE  LLE Tone: Normotonic  Motor Control  Gross Motor?: (mild decreased control of RLE movement patterns with fatigue)    Bed mobility  Rolling to Left: Modified independent  Rolling to Right: Unable to assess(right shouder repair recent)  Supine to Sit: Stand by assistance(to left and right)  Sit to Supine: Stand by assistance  Comment: reports she has been sleepng on couch since surgery    Transfers  Sit to Stand: Contact guard assistance;Stand by assistance  Stand to sit: Contact guard assistance;Stand by assistance  Bed to Chair: Contact guard assistance;Stand by assistance  Comment: cues for technique and safety required    Ambulation  Ambulation?: Yes  Ambulation 1  Surface: level tile  Device: No Device  Other Apparatus: (right shoulder sling)  Assistance: Minimal assistance  Quality of Gait: wide RIDDHI, decreased ability to scan environment, short step length, decreased trunk rotation, decreased foot clearance, reaches for walls for UE support  Distance: 30 feet    Stairs/Curb  Stairs?: No                    Quality Indicators (IRF-FLASH):  Rolling L and R: Supervision or Touching Assistance - 4  Sit>Supine: Supervision or Touching Assistance - 4  Supine>Sit: Supervision or Touching Assistance - 4  Sit>Stand: Supervision or Touching Assistance - 4  Chair/Bed>Chair Transfer: Supervision or Touching Assistance - 4  Car Transfers: Not attempted due to Medical Condition or Safety Concerns (I.e. unsafe or physician orders) - 80  Walk 10 ft: Supervision or Touching Assistance - 4  Walk 50 ft with two 90 degree turns: Not attempted due to Medical Condition or Safety Concerns (I.e. unsafe or physician orders) - 80  Walk 150 ft in [address]: Not attempted due to Medical Condition or Safety Concerns (I.e. unsafe or physician orders) - 80  Walking 10 ft on Unlevel Surface: Not attempted due to Medical Condition or Safety Concerns (I.e. unsafe or physician orders) - 80  Picking up Objects from Standing Position: Not attempted due to Medical Condition or Safety Concerns (I.e. unsafe or physician orders) - 80  Stairs: No Not attempted due to medical condition or safety concerns (i.e. unsafe or physician order) - 88  WC Mobility: No Not Applicable (pt did not complete item prior to admission) - 9      ASSESSMENT:  Body structures, Functions, Activity limitations: Decreased functional mobility ; Decreased strength;Decreased endurance;Decreased coordination;Decreased ADL status; Decreased safe awareness;Decreased sensation;Decreased cognition;Decreased balance; Increased pain  Decision Making: Medium Complexity  History: high  Exam: medium  Clinical Presentation: medium    Prognosis: Good  PT Education: Goals;PT Role;Transfer Training;Plan of Care;General Safety  Barriers to Learning: none    CLINICAL IMPRESSION: Pt demonstrates deficits as listed. She is requiring assistance for mobility since CVA and has complication of right shoulder surgery 12/1/20 with spint in place. Pt was previously at an indep level of function with splint on right shoulder. Pt would benefit from follow  up PT at this time    PLAN OF CARE:  Frequency: 1-2 treatment sessions per day, 5-7 days per week     Current Treatment Recommendations: Strengthening, Functional Mobility Training, Home Exercise Program, Equipment Evaluation, Education, & procurement, ROM, Transfer Training, Gait Training, Manual Therapy - Soft Tissue Mobilization, Safety Education & Training, Balance Training, Endurance Training, Stair training, Patient/Caregiver Education & Training    Patient's Goal:  to get moving and be able to move right    GOALS:  Long term goals  Long term goal 1: Pt will demonstrate bed mobility indep. Long term goal 2: indep bed and car transfers  Long term goal 3: Pt will demonstrate amb  indep with or without 50 feet - supervision 150 feet  Long term goal 4: Pt will exhibit improved dynamic balance evidenced of 20/24 DGI for decreased risk for falls and safe d/c home alone. Long term goal 5: Pt will demonstrate stair negotiation 5 steps without rails indep to allow pt to enter and exit her home safely.     ELOS:   Plan weeks: 1 - 1 1 /2    Therapy Time:    Individual   Time In 1000   Time Out 1030   Minutes [address] [place], [place], 12/29/20 at 11:56 AM

## 2020-12-29 NOTE — CONSULTS
Hospitalist Progress Note  12/29/2020 3:34 PM    Assessment and Plan:   1. Generalized weakness, Gait instability and Decreased Functional Status secondary to acute left thalamic CVA: Neurology following. Continue Lipitor and dual antiplatelet therapy. Fall precautions. PT OT to evaluate. Maximize nutrition status. Assessing if needs DME at home. SW on board. 2. DMII with hyperglycemia: Hemoglobin A1c on admission was 8. Patient started on Metformin Nesina, 22 units Lantus nightly and 7 units of Humalog 3 times daily with meals,  ISS, hypoglycemia protocol POCT Glucose TIDAC & QHS  3. Noncompliance with medications: Counseled on importance adherence to medications and need for control of chronic illnesses to decrease morbidity and mortality   4. Elevated liver enzymes: Improved. MTHFR pending  5. headaches: Patient continues to have headaches despite fiercest, Imitrex ordered. 6. Uncontrolled HTN:  resume home dose of Imdur 30 mg daily and lower dose of losartan at 25 mg daily   7. Dysphagia: Speech evaluation for dysphagia assessment and recommendations ordered. Aspiration precautions. 8. bipolar disorder: Continue lithium and Cariprazine  9. nicotine abuse and dependence: Counseled for greater than 3 minutes on importance of smoking cessation to reduce risk of associated morbidity and mortality. Nicotine patch. 10. Bowel Regimen and GI PPx: stool softners PRN ordered with hold parameters for loose stools or diarrhea. On antiacid  11. Diet: DIET GENERAL; Carb Control: 4 carb choices (60 gms)/meal  12. Advance Directive: Full Code   13. Nutrition status: Supplemental Vitamins ordered. Dietitian assessment  14. Vaccinations: Immunization records reviewed. If has not received appropriate vaccinations, will order to be given prior to discharge. 15. DVT prophylaxis: Chemical prophylaxis SQ Lovenox  16. Discharge planning: HEATHER on board. 17. High Risk Readmission Screening Tool Score Noted. Additionally, the following hospital problems were addressed:  Principal Problem:    Abnormality of gait and mobility due to recent infarct left thalamus. [facility] admit 12/28/20. Active Problems:    Bipolar 1 disorder (HCC)    CAD (coronary artery disease)    HTN (hypertension)    Multiple sclerosis (HCC)    Colitis    Anxiety    Chronic pain syndrome     Vitamin B12 deficiency    Chronic obstructive pulmonary disease (HCC)    Sleep apnea    Dizziness and giddiness    Sensorineural hearing loss (SNHL) of both ears    Tinnitus, bilateral    Vertigo, peripheral, bilateral    Right arm weakness    COPD exacerbation (HCC)    Chest pain    Impaired mobility  Resolved Problems:    * No resolved hospital problems. *      ** Total time spent reviewing medical records, evaluating patient, speaking with RN's and consultants where I was focused exclusively on this patient: 35 minutes. This time is excluding time spent performing procedures or significant events occurring earlier or later in the day requiring my attention and focus. Subjective:   Admit Date: 12/28/2020  PCP: Maegan Botello MD    No acute events overnight. Afebrile  Telemetry reviewed. No new complaints. Pt denies chest pain, SOB, N/V, fevers or chills. Objective:     Vitals:    12/28/20 1945 12/29/20 0811   BP: (!) 166/88 (!) 166/72   Pulse: 76 62   Resp: 18 18   Temp: 98 °F (36.7 °C) 97 °F (36.1 °C)   TempSrc: Oral Oral   SpO2:  95%   Weight: 192 lb (87.1 kg)    Height: 5' 7\" (1.702 m)      General appearance: no acute distress, Patient currently alert and oriented x3, no acute distress, cooperative. She continues to have expressive aphasia and diplopia to right eye. Dentition intact. Lungs: CTAB  No exp wheezes, No rales No retractions; No use of accessory muscles  Heart:  S1, S2 normal, RRR, no MRG appreciated  Abdomen: (+) BS, soft, non-tender; non distended no guarding or rigidity.    Extremities:  no cyanosis,  no edema bilat lower exts, no calf tenderness bilaterally. Dry skin noted       Medications:      dextrose        insulin glargine  0.25 Units/kg Subcutaneous Nightly    insulin lispro  0.08 Units/kg Subcutaneous TID WC    cariprazine hcl  3 mg Oral Daily    metFORMIN  500 mg Oral BID WC    alogliptin  6.25 mg Oral Daily    lithium  300 mg Oral TID WC    aspirin  81 mg Oral Daily    Or    aspirin  300 mg Rectal Daily    atorvastatin  20 mg Oral Nightly    enoxaparin  40 mg Subcutaneous Daily    clopidogrel  75 mg Oral Daily    isosorbide mononitrate  30 mg Oral Daily    losartan  25 mg Oral Daily    nicotine  1 patch Transdermal Daily       LABS Reviewed    IMAGING Reviewed    Ankush Sawyer CNP  [facility] Hospitalist    I personally obtained the key and critical portions of the history and physical exam and made additions where appropriate in the documentation.  I reviewed the mid level documentation and agree with assessment and plan that we come up with together    Marilou Monzon DO  Internal Medicine

## 2020-12-29 NOTE — PROGRESS NOTES
Physical Therapy Rehab Treatment Note  [facility]Department[facility]  Room: R260/R260-01       NAME: Debbie Arauz  : 1970 (52 y.o.)  MRN: [id number]  CODE STATUS: Full Code    Date of Service: 2020  Chart Reviewed: Yes  Family / Caregiver Present: No  Diagnosis: Abnormality of gait and mobility due to recent infarct left thalamus. [facility] admit 20. Restrictions:  Restrictions/Precautions: Fall Risk  Required Braces or Orthoses  Right Upper Extremity Brace/Splint: RUE immobilizer in place - pt reports rotator cuff repair done Dec 1       SUBJECTIVE:    Pain Screening  Patient Currently in Pain: Yes  Pre Treatment Pain Screening  Pain at present: 8  Intervention List: Patient able to continue with treatment  Comments / Details: 8/10 back pain - pain medication requested at end of session    Post Treatment Pain Screening:  Pain Assessment  Pain Level: 9  Pain Location: Back    OBJECTIVE:     PNF: gaze stability ex in sitting; standing and ambulatory balance facilitation with challenges including gait in varying directions - // bars utlized for support  Neuromuscular Comments: Pt with poor gaze stability in sitting - poor tolerance to attempts to improve ability.  Standing balance more impaired this session as a result    Bed mobility  Rolling to Left: Modified independent  Rolling to Right: Unable to assess(right shouder repair recent)  Supine to Sit: Stand by assistance(to left and right)  Sit to Supine: Stand by assistance  Comment: reports she has been sleepng on couch since surgery    Transfers  Sit to Stand: Minimal Assistance;Contact guard assistance(greater instability noted this session)  Stand to sit: Minimal Assistance;Contact guard assistance  Bed to Chair: Contact guard assistance;Stand by assistance  Comment: cues for technique and safety required    Ambulation  Ambulation?: Yes  Ambulation 1  Surface: carpet  Device: Single point cane  Other Apparatus: (right shoulder sling)  Assistance: Minimal assistance  Quality of Gait: wide RIDDHI, decreased ability to scan environment, short step length, decreased trunk rotation, decreased foot clearance, reaches for walls for UE support  Distance: 30 feet  Comments: cane did not improve pt balance this session. Pt reported dizziness and double vision increased this date    Stairs/Curb  Stairs?: No         Activity Tolerance  Activity Tolerance: Patient limited by endurance;Treatment limited secondary to medical complications (free text)  Activity Tolerance: poor gaze stability and increased balance deficits limited pts tolerance overall          ASSESSMENT/COMMENTS:  Assessment: Pt demonstrates increased vestibular and balance deficits this session. Poor tolerance to gaze stability ex noted      PLAN OF CARE/Safety:   Safety Devices  Type of devices: Call light within reach; Chair alarm in place; Left in chair      Therapy Time:   Individual   Time In 1300   Time Out 1330   Minutes 30     Minutes:      Transfer/Bed mobility trainin      Gait trainin      Neuro re education:20          Shahnaz Lopez, PT, 20 at 2:49 PM

## 2020-12-29 NOTE — PROGRESS NOTES
Physical Therapy  Facility/Department: [facility] MED SURG [id number]  Physical Therapy Discharge      NAME: Zaida Aranda    : 1970 (52 y.o.)  MRN: [id number]    Account: [de-identified]  Gender: female      Patient has been discharged from acute care hospital. DC patient from current PT program.      Electronically signed by Ronal Barron PT on 20 at 12:24 PM EST

## 2020-12-29 NOTE — H&P
HISTORY & PHYSICAL       DATE OF ADMISSION:  12/28/2020    DATE OF SERVICE:  12/29/20    Subjective:    Ana Ceja, 52 y.o. female presents today with:     CHIEF COMPLAINT:  48-year old female with a past medical history of CAD, hypertension, COPD, anxiety, bipolar I, neuropathy, MS and chronic pain syndrome who presented to the ED with altered mental status. Boyfriend found her on floor, glasses broken, right facial droop and garbled speech. Due to her history of multiple sclerosis and MS flareup needed to be ruled out. She was eventually found to have had a subacute left thalamic CVA. STYLES included CT Head 12/23/20 with interval development of acute/subacute left thalamic infarct. CTA Neck 12/23/20 negative. CTA Head 12/23/20 with no high-grade stenosis, large vessel cut off or aneurysm. MRI 12/23/20 with 7 x 15 mm area of subacute ischemia in the left thalamus. EKG 12/23/20 with normal sinus rhythm. MRI Brain 12/25/20 with recent infarct involving left thalamus. The patient has been found to have severe abnormality of gait and mobility with impaired self care due to recent infarct left thalamus and is admitted to the acute inpatient rehab program.                Neurologic Problem  The patient's primary symptoms include an altered mental status, clumsiness, focal sensory loss, focal weakness and weakness. The patient's pertinent negatives include no syncope. Primary symptoms comment: Bipolar DO. This is a recurrent problem. The current episode started in the past 7 days. The neurological problem developed insidiously. The problem has been gradually improving since onset. There was lower extremity, upper extremity and right-sided focality noted. Associated symptoms include back pain, chest pain, dizziness, fatigue, headaches, light-headedness, neck pain, palpitations and shortness of breath.  Pertinent negatives include no abdominal pain, auditory change, aura, bladder incontinence, bowel incontinence, confusion, diaphoresis, fever, nausea or vomiting. Past treatments include medication, walking, position change and aspirin. The treatment provided mild relief. Her past medical history is significant for mood changes. There is no history of a bleeding disorder, a clotting disorder, a CVA, dementia, head trauma, liver disease or seizures. (MS)   Mental Health Problem  The primary symptoms include dysphoric mood, disorganized speech, negative symptoms and somatic symptoms. The primary symptoms do not include delusions, hallucinations or bizarre behavior. Primary symptoms comment: Bipolar DO. The current episode started more than 1 month ago. This is a chronic problem. The somatic symptoms began more than 1 month ago. The somatic symptoms have been unchanged since their onset. Somatic symptoms include fatigue, headaches, back pain and myalgias. Somatic symptoms do not include abdominal pain or constipation. The onset of the illness is precipitated by a stressful event. The degree of incapacity that she is experiencing as a consequence of her illness is severe. Sequelae of the illness include an inability to work. Additional symptoms of the illness include anhedonia, insomnia, fatigue, agitation, attention impairment, flight of ideas, poor judgment and headaches. Additional symptoms of the illness do not include hypersomnia, appetite change, unexpected weight change, abdominal pain or seizures. She does not admit to suicidal ideas. She does not have a plan to attempt suicide. She has not already injured self. She does not contemplate injuring another person. She has not already  injured another person. Risk factors that are present for mental illness include a history of mental illness and a family history of mental illness. Fatigue  This is a recurrent problem. The current episode started in the past 7 days. The problem occurs constantly. The problem has been gradually worsening.  Associated symptoms include arthralgias, chest pain, coughing, fatigue, headaches, joint swelling, myalgias, neck pain, numbness and weakness. Pertinent negatives include no abdominal pain, anorexia, change in bowel habit, chills, congestion, diaphoresis, fever, nausea, rash, sore throat or vomiting. The symptoms are aggravated by walking and exertion. She has tried rest and walking for the symptoms. The treatment provided mild relief. Back Pain  This is a recurrent problem. The current episode started more than 1 year ago. The problem occurs constantly. The problem has been gradually worsening since onset. The pain is present in the gluteal, lumbar spine and sacro-iliac. The pain is at a severity of 9/10. The pain is severe. The pain is worse during the day. The symptoms are aggravated by bending. Associated symptoms include chest pain, headaches, numbness, pelvic pain and weakness. Pertinent negatives include no abdominal pain, bladder incontinence, bowel incontinence, dysuria or fever. Risk factors include lack of exercise, obesity, history of osteoporosis and sedentary lifestyle. She has tried home exercises, heat, analgesics, bed rest, ice, muscle relaxant, NSAIDs and walking for the symptoms. The treatment provided mild relief. I reviewed recent nursing note, \"Patient assessment complete earlier this shift. Patient admitted from 2wt to rehab. No acute distress is notes. SL flushed. Patient has a sling on her right arm she wears at all times. Denies constipation. Has remained continent. She reviewed her meds with me and admits that she is noncompliant with them and does not take them at home. She is concerned to not be on her bipolar meds. She states she is borderline diabetic no one touches. \".         The patient has stabilized medically andis able to participate at acute level rehab but is too medically complex for SNF due to need for therapy at the acute level with at least 15 hours a week of PT OT and cognitive and recreational therapy at an acute level with daily medical monitoring. Imaging:    Imaging and other studies reviewed and discussed with patient and staff    Cta Head   12/23/2020  CTA HEAD: Intracranial ICAs: Flow is visualized within the precavernous, cavernous, clinoid and supraclinoid segments of the internal carotid arteries bilaterally    Anterior Cerebral Arteries: The bilaterals  A1 and A2 segments are patent. Middle Cerebral Arteries: Bilateral horizontal, insular, opercular, and cortical segments of the right and left middle cerebral cerebral arteries are patent. Vertebral Arteries And Basilar Artery: There is adequate flow in the intracranial portions of the vertebral arteries and in the basilar artery. Posterior Cerebral Arteries: Bilateral posterior cerebral arteries are patent. Aneurysm No aneurysm or dissection in the anterior or posterior circulations. . Neurocranium The visualized neurocranium is intact. Dural Sinus: As visualized the opacified dural venous sinuses are unremarkable. The major intracranial arterial structures are patent without high-grade stenosis, large vessel cut off, or aneurysm. FINDINGS CTA NECK: Aortic Arch: The visualized portions of the aortic arch and proximal arch vessels demonstrates no focal stenosis aneurysm or dissection. Carotids: The common carotid arteries, carotid bifurcations, internal and external carotid arteries are normal in course and caliber. Right  Proximal Internal Carotid Stenosis (% by NASCET Criteria): There is no hemodynamically significant stenosis. Left  Proximal Internal Carotid Stenosis (% by NASCET Criteria): There is no hemodynamically significant stenosis. Vertebral Arteries: Patency: The vertebral arteries are well visualized to the level of the basilar artery. There is no focal stenosis aneurysm or dissection. Vertebral arteries are codominant. IMPRESSION: Negative CTA of the neck.         Mri Brain 12/25/2020  Restricted diffusion involving the left thalamus, with corresponding increased T2/FLAIR signal at this site, measuring approximately 1.6 x 0.8 cm, similar to recent prior MRI. No new areas of restricted diffusion   Hemorrhage:    Small rounded area of susceptibility left occipital lobe, unchanged. Mass Lesion/ Mass Effect:    No evidence of an intracranial mass or extra-axial fluid collection. No significant mass effect. Chronic Change:  Scattered punctate foci of increased T2 and FLAIR signal are noted in the supratentorial white matter which is a nonspecific finding, but likely represents minimal chronic microvascular ischemia. Parenchyma:   No significant volume loss for age. The brain parenchyma is otherwise within normal limits of signal intensity and morphology. Ventricles:     Normal caliber and morphology. Skull Base:    Hypothalamic and pituitary region are grossly normal.   Craniocervical junction is normal. No significant marrow replacement process. Vasculature:    Major intracranial arterial structures, and dural venous sinuses show typical flow void, suggesting patency by spin echo criteria. Other:  The visualized paranasal sinuses are clear. Mastoid air cells clear. The orbits are unremarkable. Soft tissues are unremarkable. Recent infarction involving the left thalamus, overall unchanged from MRI 12/23/2020. No new infarcts from the prior MRI. Mri Brain  12/23/2020 There are no extra-axial collections. There is no evidence of hemorrhage. There is specific association with signal dropout on ADC map in the left thalamus and region measuring 7 x15 mm indicating subacute ischemia. There is corresponding signal abnormality on T2/FLAIR series. The susceptibility images do not demonstrate evidence of hemosiderin deposition within the brain parenchyma or the leptomeninges. There is preservation of the gray-white matter differentiation.  There are a few scattered foci of T2/FLAIR increased signal in the subcortical and periventricular white matter without associated edema or mass effect. The sulci and ventricles are within normal limits without evidence of hydrocephalus. The midline structures are intact, the corpus callosum is within normal limits. The region of the pineal gland and the sella turcica are unremarkable. There are no space-occupying lesions in the posterior fossa. The basilar cisterns are patent. The craniocervical junction is unremarkable. The visualized portions of the orbits are within normal limits, the globes are intact. The visualized portions of the paranasal sinuses are within normal limits. The calvarium and soft tissues are unremarkable. There is a 7 x 15 mm area of subacute ischemia in the left thalamus.              Labs:     labs reviewed and discussed with patient and staff    Lab Results   Component Value Date    POCGLU 164 12/30/2020    POCGLU 227 12/29/2020    POCGLU 133 12/29/2020    POCGLU 246 12/22/2020    POCGLU 110 04/23/2016     Lab Results   Component Value Date     12/28/2020    K 3.4 12/28/2020     12/28/2020    CO2 24 12/28/2020    BUN 9 12/28/2020    CREATININE 0.76 12/28/2020    CALCIUM 9.0 12/28/2020    LABALBU 3.5 12/28/2020    BILITOT 0.4 12/28/2020    ALKPHOS 118 12/28/2020    AST 32 12/28/2020    ALT 65 12/28/2020     Lab Results   Component Value Date    WBC 6.8 12/28/2020    RBC 4.07 12/28/2020    HGB 13.1 12/28/2020    HCT 38.0 12/28/2020    MCV 93.5 12/28/2020    MCH 32.1 12/28/2020    MCHC 34.4 12/28/2020    RDW 13.0 12/28/2020     12/28/2020    MPV 9.6 09/20/2015     Lab Results   Component Value Date    VITD25 18.5 04/10/2019     Lab Results   Component Value Date    APPEARANCE CLOUDY 01/26/2013    COLORU Yellow 12/30/2020    NITRU Negative 12/30/2020    GLUCOSEU Negative 12/30/2020    KETUA Negative 12/30/2020    UROBILINOGEN 0.2 12/30/2020    BILIRUBINUR Negative 12/30/2020     Lab Results   Component Value Date PROTIME 12.4 12/27/2020     Lab Results   Component Value Date    INR 0.9 12/27/2020         I discussed results with patient. The patient remains highly medically complex and continues to have severe problems with activities of daily living and mobility. The patient was assessed to be able to tolerate intensive rehabilitation and therefore was admitted to Rehabilitation to address these needs. Prior Function; everyday activities:     Social History     Socioeconomic History    Marital status:      Spouse name: Not on file    Number of children: Not on file    Years of education: Not on file    Highest education level: Not on file   Occupational History    Occupation: unemployed   Social Needs    Financial resource strain: Not on file    Food insecurity     Worry: Not on file     Inability: Not on file   [employer] needs     Medical: Not on file     Non-medical: Not on file   Tobacco Use    Smoking status: Current Every Day Smoker     Packs/day: 1.00     Years: 17.00     Pack years: 17.00     Types: Cigarettes    Smokeless tobacco: Never Used   Substance and Sexual Activity    Alcohol use: No    Drug use: No    Sexual activity: Not Currently     Partners: Male   Lifestyle    Physical activity     Days per week: 0 days     Minutes per session: 0 min    Stress:  To some extent   Relationships    Social connections     Talks on phone: Not on file     Gets together: Not on file     Attends Zoroastrian service: Not on file     Active member of club or organization: Not on file     Attends meetings of clubs or organizations: Not on file     Relationship status: Not on file    Intimate partner violence     Fear of current or ex partner: Not on file     Emotionally abused: Not on file     Physically abused: Not on file     Forced sexual activity: Not on file   Other Topics Concern    Not on file   Social History Narrative    Richard Keyes is a 57-year-old female who is on disability improve pt balance this session. Pt reported dizziness and double vision increased this date,      FIMS:  , , Assessment: Pt demonstrates increased vestibular and balance deficits this session. Poor tolerance to gaze stability ex noted    Occupational therapy: FIMS:   ,  , Assessment: Pt is a 52year old woman from home alone who presents to [id number] [place] [address] with the above deficits which impact her ability to perform ADL and IADL tasks. Pt. would benefit from continued OT to maximize independence and safety with transfers and mobility. OCCUPATIONAL THERAPY  Hand Dominance: Right  ADL  Feeding: Setup (12/29/20 0948)  Grooming: Minimal assistance (12/29/20 0948)  UE Bathing: Maximum assistance (12/29/20 0948)  LE Bathing: Maximum assistance (12/29/20 1618)  UE Dressing: (to doff full zip jacket. Able to remove R sleeve without assist) (12/29/20 1618)  LE Dressing: Minimal assistance (12/29/20 8436)  Toileting: Moderate assistance (12/29/20 0948)  Additional Comments: Pt doffed full zip jacket and doffed/donned R arm sling during sessions. Able to manage sling with assist to place trunk strap. (12/29/20 1618)  Toilet Transfers  Toilet Transfer: Minimal assistance (12/29/20 1072)  Toilet Transfers Comments: Pt decined need to use restroom. Anticipate Min A (12/29/20 8326)     Shower Transfers  Shower - Transfer From: Bed (12/29/20 0810)  Shower - Transfer Type: To (12/29/20 0142)  Shower - Transfer To: Shower seat with back (12/29/20 4002)  Shower - Technique: Ambulating (12/29/20 1950)  Shower Transfers: Minimal assistance (12/29/20 1424)  Shower Transfers Comments: Verbal cues for safety and sequencing (12/29/20 9784)    Speech therapy: FIMS:       Prior to admission patient was independent with all ADLs and mobilityand did not require any outside services.        Past Medical History:   Diagnosis Date    Anxiety     Back pain     back surgery x 4    [facility] [address]     CAD (coronary artery disease)     CAFL [facility]chronic airflow limitation) ([facility] 11/3/2016    Chronic bronchitis ([facility])     Chronic pain     Colitis     Complicated migraine     DDD (degenerative disc disease), lumbar 2016    Depression     Endometriosis     Excessive caffeine abuse, continuous ([facility]) 2018    Gastritis     GERD (gastroesophageal reflux disease)     History of myocardial infarction     HTN (hypertension), benign 2016    Impaired mobility and activities of daily living     Over weight     PTSD (post-traumatic stress disorder)     Sensory neuropathy 2016    Somatization disorder     TIA (transient ischemic attack)     Tobacco abuse     [facility]ospastic angina [facility] [address] 2016       Past Surgical History:   Procedure Laterality Date    BACK SURGERY      x2     SECTION      x2    CHOLECYSTECTOMY  13    Lapchole    COLONOSCOPY N/A 2020    COLONOSCOPY DIAGNOSTIC performed by Sasha Hung MD at [address] [place] [address]  3/7/16    Dr. Hali Aguiar Left     UPPER GASTROINTESTINAL ENDOSCOPY  2014    ANIBAL HOUSE M.D.   Gemini Lee UPPER GASTROINTESTINAL ENDOSCOPY N/A 2020    EGD ESOPHAGOGASTRODUODENOSCOPY [facility] S[facility] at [facility]       Current Facility-Administered Medications   Medication Dose Route Frequency Provider Last Rate Last Admin    glucose (GLUTOSE) 40 % oral gel 15 g  15 g Oral PRN Gabriele Anis, APRN - CNP        dextrose 50 % IV solution  12.5 g Intravenous PRN Gabriele Anis, APRN - CNP        glucagon (rDNA) injection 1 mg  1 mg Intramuscular PRN Gabriele Jomar, APRN - CNP        dextrose 5 % solution  100 mL/hr Intravenous PRN Gabriele Jomar, APRN - CNP        insulin glargine (LANTUS) injection vial 22 Units  0.25 Units/kg Subcutaneous Nightly Gabriele Anis, APRN - CNP   22 Units at 20    cariprazine hcl Sal[place], DO   25 mg at 12/29/20 2231    nicotine (NICODERM CQ) 14 MG/24HR 1 patch  1 patch Transdermal Daily Mohinder Cristina, DO   1 patch at 12/29/20 7602       Allergies   Allergen Reactions    Latex Itching     Pt reports itching on hands after using rubber gloves at work    Influenza Vaccines Swelling     Pt reports her entire arm was red and edematous post vaccine    Eggs Or Egg-Derived [employer] based products only    Gabapentin Nausea Only    Pneumococcal Vaccines Hives    Povidone Iodine Itching    Varicella Virus Vaccine Live Hives                      FAMILY HISTORY:  Does not pertain tochief complaint. Review of Systems   Constitutional: Positive for activity change and fatigue. Negative for appetite change, chills, diaphoresis, fever and unexpected weight change. HENT: Positive for dental problem and hearing loss. Negative for congestion, drooling, ear discharge, ear pain, facial swelling, mouth sores, nosebleeds, postnasal drip, rhinorrhea, sinus pressure, sneezing, sore throat, tinnitus, trouble swallowing and voice change. Eyes: Positive for photophobia, pain, itching and visual disturbance. Negative for discharge and redness. Respiratory: Positive for cough and shortness of breath. Negative for apnea, chest tightness, wheezing and stridor. Cardiovascular: Positive for chest pain and palpitations. Negative for leg swelling. Gastrointestinal: Positive for diarrhea. Negative for abdominal pain, anorexia, bowel incontinence, change in bowel habit, constipation, nausea and vomiting. Endocrine: Positive for cold intolerance and heat intolerance. Negative for polydipsia, polyphagia and polyuria. Genitourinary: Positive for difficulty urinating, flank pain, pelvic pain and urgency. Negative for bladder incontinence, decreased urine volume, dysuria, vaginal bleeding, vaginal discharge and vaginal pain.    Musculoskeletal: Positive for arthralgias, back pain, joint swelling, myalgias, neck pain and neck stiffness. Skin: Positive for color change. Negative for pallor, rash and wound. Allergic/Immunologic: Positive for environmental allergies and immunocompromised state. Neurological: Positive for dizziness, tremors, focal weakness, speech difficulty, weakness, light-headedness, numbness and headaches. Negative for seizures and syncope. Hematological: Positive for adenopathy. Bruises/bleeds easily. Psychiatric/Behavioral: Positive for agitation, decreased concentration, dysphoric mood and sleep disturbance. Negative for behavioral problems, confusion, hallucinations, self-injury and suicidal ideas. The patient is nervous/anxious and has insomnia. The patient is not hyperactive. Objective  BP (!) 170/80   Pulse 60   Temp 98 °F (36.7 °C) (Oral)   Resp 18   Ht 5' 7\" (1.702 m)   Wt 192 lb (87.1 kg)   SpO2 93%   BMI 30.07 kg/m² *    Physical Exam  Vitals signs reviewed. Constitutional:       General: She is not in acute distress. Appearance: She is well-developed. She is not ill-appearing, toxic-appearing or diaphoretic. Comments:         HENT:      Head: Normocephalic and atraumatic. Right Ear: Hearing normal.      Left Ear: Hearing normal.      Nose: Nose normal.      Mouth/Throat:      Mouth: No oral lesions. Dentition: Normal dentition. Pharynx: No oropharyngeal exudate. Eyes:      General: No scleral icterus. Right eye: No discharge. Left eye: No discharge. Conjunctiva/sclera: Conjunctivae normal.      Right eye: No chemosis or exudate. Left eye: No chemosis or exudate. Pupils: Pupils are equal, round, and reactive to light. Neck:      Musculoskeletal: Normal range of motion and neck supple. No edema or neck rigidity. Thyroid: No thyromegaly. Vascular: No JVD. Trachea: No tracheal deviation. Cardiovascular:      Pulses: No decreased pulses.    Pulmonary:      Effort: Pulmonary effort is normal. No tachypnea, bradypnea, accessory muscle usage or respiratory distress. Breath sounds: Decreased breath sounds present. No wheezing. Chest:      Chest wall: No tenderness. Abdominal:      General: Bowel sounds are normal. There is no distension. Palpations: Abdomen is soft. There is no mass. Tenderness: There is no abdominal tenderness. There is no guarding or rebound. Musculoskeletal:         General: Tenderness present. Right shoulder: She exhibits decreased range of motion, tenderness and bony tenderness. Left shoulder: She exhibits decreased range of motion, tenderness, bony tenderness, pain and spasm. Right elbow: Normal.     Left elbow: Normal.      Right wrist: Normal.      Left wrist: Normal.      Right hip: Normal.      Left hip: Normal.      Right knee: Normal.      Left knee: Normal.      Right ankle: She exhibits decreased range of motion. Achilles tendon normal.      Left ankle: Normal. Achilles tendon normal.      Cervical back: She exhibits decreased range of motion, tenderness, bony tenderness and swelling. Thoracic back: Normal.      Lumbar back: She exhibits decreased range of motion, tenderness, bony tenderness and pain. She exhibits no swelling, no edema, no deformity, no laceration and normal pulse. Back:       Right upper arm: Normal.      Left upper arm: Normal.      Right forearm: Normal.      Left forearm: Normal.        Arms:       Right hand: She exhibits decreased range of motion, tenderness and bony tenderness. Left hand: She exhibits decreased range of motion, tenderness and bony tenderness. Right upper leg: Normal.      Left upper leg: Normal.      Right lower leg: Normal.      Left lower leg: Normal.        Legs:       Right foot: Decreased range of motion. Tenderness and bony tenderness present. Left foot: Decreased range of motion. Tenderness and bony tenderness present.       Comments: Tender areas are indicated by numbered spot         Skin:     General: Skin is warm and dry. Coloration: Skin is not pale. Findings: No abrasion, bruising, ecchymosis, erythema, laceration, petechiae or rash. Rash is not macular, pustular or urticarial.      Nails: There is no clubbing. Neurological:      Mental Status: She is alert and oriented to person, place, and time. Cranial Nerves: No cranial nerve deficit. Sensory: Sensory deficit present. Motor: No tremor, atrophy or abnormal muscle tone. Coordination: Coordination normal.      Deep Tendon Reflexes: Reflexes abnormal. Babinski sign absent on the right side. Babinski sign absent on the left side. Reflex Scores:       Achilles reflexes are 0 on the right side and 0 on the left side. Psychiatric:         Attention and Perception: She is attentive. Mood and Affect: Mood is depressed. Mood is not anxious. Affect is not labile, blunt, angry or inappropriate. Speech: She is communicative. Speech is not rapid and pressured, delayed, slurred or tangential.         Behavior: Behavior normal. Behavior is not agitated, slowed, aggressive, withdrawn, hyperactive or combative. Thought Content: Thought content normal. Thought content is not paranoid or delusional. Thought content does not include homicidal or suicidal ideation. Thought content does not include homicidal or suicidal plan. Cognition and Memory: Memory is not impaired. She does not exhibit impaired recent memory or impaired remote memory. Judgment: Judgment normal. Judgment is not impulsive or inappropriate. Ortho Exam  Neurologic Exam     Mental Status   Oriented to person, place, and time. Speech: not slurred     Cranial Nerves     CN III, IV, VI   Pupils are equal, round, and reactive to light.     Gait, Coordination, and Reflexes     Reflexes   Right achilles: 0  Left achilles: 0      After extensive review of the records and above physical exam, I have formulated the followingdiagnoses and plan:      DIAGNOSES:    1. The patient was admitted to the acute rehabilitation unit with the primary rehab diagnoses being severe abnormality of gait and mobility andimpaired self care and ADL's due to acute left thalamic infarct/CVA. Compared to Pre-Admission Assessment, patients medical and functional status remain challengingly complex and patient continues to requireintensive therapeutic intervention from multiple therapies, therefore, initiate acute intensive comprehensive inpatient rehabilitation program including PT/OT to improve balance, ambulation, ADLs, and to improve the P/AROM. Functional and medical status reassessed regarding patients ability to participate in therapies and patient found to be able to participate in acute intensivecomprehensive inpatient rehabilitation program.  Therapeutic modifications regarding activities in therapies, place, amount of time per day and intensity of therapy made daily. Enroll in acute course of therapy program to include 1/2 hours per day of PT 5 to 7days per week and 1 1/2 hours per day of OT 5 to 7 days per week, and   SLP and Rec T 1/2 hour per day 3-5 days per week. The patient is stable medically and physically on previous exam.  When necessary therapy will be spread out over a 7-day window. This patient present with significant new onset decreased mobility andinability to perform activities of daily living skills independently and is at significant risk for prolonged disability  For this reason [facility] Thepatient's current functional and medical status are highly complex but the patient is able to participate in intensive rehabilitation. A comprehensive inpatient rehabilitation program is appropriate.   The patient Janice Barefoot initial evaluation by the rehabilitation team and be discussed at regular treatment team meetings to assess progress, mobility, self care, mood and discharge issues. Physical therapy will be consulted for mobilityand endurance issues and should be performed 1 to 2 times per day, 7 days per week for the length of stay. Occupational therapy will be consulted for activities of daily living and should be performed 1 to 2 times per day,7 days per week for the length of stay. Their capacity to participate at an acute level, decision to be treated in the gym, room or on the unit, their activity goals for the day and the number of minutes of activeparticipation will be reassessed and re-prescribed daily. Because this patient is medically complex, I will check a CBC, BMP, UA and orthostatic blood pressures. They will be reassessed daily regarding their ability toparticipate in an acute level rehabilitation program.  Recreational Therapy will be consulted for community re-entry and adjustment to disability. Communication, cognitive and emotional issues will also be addressed duringthis rehabilitation stay by rehabilitation psychologist or speech therapist as appropriate. I reviewed the patient's old and current charts and discussed other rehabilitation options with the rehabilitation teamincluding the rehab RN and the admission team as well as the patient. I feel that the patient's functional recovery would be best served at an acute inpatient rehabilitation program because the patient needs intense therapy three hours per day, direct RN supervision and daily monitoring by a physician for medical status. This cannot be sufficiently provided by home health care, a skilled nursing facility or in an outpatient setting. I further feel that the patient has the potential to improve functional abilities in an acute intensive rehabilitation program.    Old records were reviewed and summarized.     2.  Other diagnoses which complicate rehabilitation stay include:     Principal Problem:    Abnormality of gait and mobility due to recent infarct left thalamus. [facility] admit 12/28/20. Active Problems:  1. Bipolar 1 disorder,   Anxiety, active chronic depression-emotional support provided daily, vitamin B12, encourage participation in rehabilitation support group and recreational therapy, adjust/add medications (consider anxiolytics review old chart to find out her old psych medications) consult psychiatry and psychology  2. CAD (coronary artery disease), HTN (hypertension)-continue blood signs every shift focusing on heart rate and blood pressure checks, consult hospitalist for backup medical and adjust/add medications ( aspirin, Lipitor, Apresoline, Imdur, Cozaar, Plavix )  3. Multiple sclerosis with recent acute left thalamic CVA-consult neurology manage COPD sleep apnea and autoimmune disease add antiplatelet medications  4. Colitis-monitor stools for blood as patient will be on antiplatelet medications  5. Chronic pain syndrome with history of overusing medication due to dyscontrol due to bipolar disorder  6. Vitamin D and B12 deficiency-add vitamin B12 and high-dose vitamin D  7. Chronic obstructive pulmonary disease/  COPD exacerbation , Sleep apnea-Pulse oximeter checks to shift dose and titrate oxygen and aerosol treatments monitor for nocturnal hypoxemia, monitor vital signs, oxygen prn.   8.   Dizziness, Vertigo, peripheral, bilateral  with Sensorineural hearing loss (SNHL) of both ears and Tinnitus, bilateral-focus on balance and therapy PT and OT as well as adding speech and language pathology  9. Acute left thalamic CVA with aphasia dysphagia and right arm weakness-consult neurology             I am especially concerned about their recent medical complexities. The patient's high risk medication use includes opiates and anxiolytics. The patient is high risk for urinary tract infection, an admission urinalysis has been ordered.   I will have the nurses check post void residual bladder volumes and place acatheter if excessive urine is retained in the bladder after voiding. The patient is risk for deep venous thromosis, complex deep venous thrombosis protocol prophylaxis has been ordered Lovenox. The patient is high risk for orthostasis and a hydration program and orthostatic blood pressure screening have been ordered. I will attempt to get old records from the patient's previous hospital stay. Care everywhere on EPIC wasutilized. 3.  Current and previous medications were reviewed and summarized and compared to old medication lists from home and from the acute floor. 4.  Complex labs and x-rays were reviewed. I will review patient's old EKG and labs. 5.  Will provide emotional support for this patient regarding adjustment to their disability. Cognition and mood will be screened daily and addressed by rehabilitationpsychology and/or speech therapy as appropriate. I have encouraged the patient to attend the Rehab Adjustment to Disability Support Group and recreational therapy. 6.  Estimated length of stay is 2-3 weeks. Discharge to home with help from family and home health PT, OT, RN, and aide. Patient should be independent at discharge. 7.  The patient's medical and rehab prognosis are good. [address] regarding the patient's back up to general medical needs. A welcome letter was presented with an explanation of my services, my specialty and what to expect during the rehabilitation process. As well as introducing myself, I also wrote my name on their bedside marker board with their name as well as the names of the other physicians with an explanation of our individual roles in their care, as well as the rehab process.             Nay Sarmiento D.O., F.A.A.P.M.&MAIKOL

## 2020-12-30 PROBLEM — I63.512 CEREBROVASCULAR ACCIDENT (CVA) DUE TO STENOSIS OF LEFT MIDDLE CEREBRAL ARTERY (HCC): Status: ACTIVE | Noted: 2019-08-06

## 2020-12-30 LAB
BILIRUBIN URINE: NEGATIVE
BLOOD, URINE: NEGATIVE
CLARITY: CLEAR
COLOR: YELLOW
GLUCOSE BLD-MCNC: 142 MG/DL (ref 60–115)
GLUCOSE BLD-MCNC: 147 MG/DL (ref 60–115)
GLUCOSE BLD-MCNC: 164 MG/DL (ref 60–115)
GLUCOSE BLD-MCNC: 245 MG/DL (ref 60–115)
GLUCOSE URINE: NEGATIVE MG/DL
KETONES, URINE: NEGATIVE MG/DL
LEUKOCYTE ESTERASE, URINE: NEGATIVE
NITRITE, URINE: NEGATIVE
PERFORMED ON: ABNORMAL
PH UA: 6 (ref 5–9)
PROTEIN UA: NEGATIVE MG/DL
SPECIFIC GRAVITY UA: 1.01 (ref 1–1.03)
UROBILINOGEN, URINE: 0.2 E.U./DL

## 2020-12-30 PROCEDURE — APPSS15 APP SPLIT SHARED TIME 0-15 MINUTES: Performed by: NURSE PRACTITIONER

## 2020-12-30 PROCEDURE — 1180000000 HC REHAB R&B

## 2020-12-30 PROCEDURE — 97110 THERAPEUTIC EXERCISES: CPT

## 2020-12-30 PROCEDURE — 6360000002 HC RX W HCPCS: Performed by: NURSE PRACTITIONER

## 2020-12-30 PROCEDURE — 97116 GAIT TRAINING THERAPY: CPT

## 2020-12-30 PROCEDURE — 6370000000 HC RX 637 (ALT 250 FOR IP): Performed by: NURSE PRACTITIONER

## 2020-12-30 PROCEDURE — 6370000000 HC RX 637 (ALT 250 FOR IP): Performed by: INTERNAL MEDICINE

## 2020-12-30 PROCEDURE — 99233 SBSQ HOSP IP/OBS HIGH 50: CPT | Performed by: PSYCHIATRY & NEUROLOGY

## 2020-12-30 PROCEDURE — 87086 URINE CULTURE/COLONY COUNT: CPT

## 2020-12-30 PROCEDURE — 6360000002 HC RX W HCPCS: Performed by: PHYSICAL MEDICINE & REHABILITATION

## 2020-12-30 PROCEDURE — 99232 SBSQ HOSP IP/OBS MODERATE 35: CPT | Performed by: PHYSICAL MEDICINE & REHABILITATION

## 2020-12-30 PROCEDURE — 97535 SELF CARE MNGMENT TRAINING: CPT

## 2020-12-30 PROCEDURE — 6360000002 HC RX W HCPCS: Performed by: INTERNAL MEDICINE

## 2020-12-30 PROCEDURE — 81003 URINALYSIS AUTO W/O SCOPE: CPT

## 2020-12-30 PROCEDURE — 97129 THER IVNTJ 1ST 15 MIN: CPT

## 2020-12-30 PROCEDURE — 97112 NEUROMUSCULAR REEDUCATION: CPT

## 2020-12-30 PROCEDURE — 6370000000 HC RX 637 (ALT 250 FOR IP): Performed by: PHYSICAL MEDICINE & REHABILITATION

## 2020-12-30 PROCEDURE — 97530 THERAPEUTIC ACTIVITIES: CPT

## 2020-12-30 RX ORDER — KETOROLAC TROMETHAMINE 30 MG/ML
30 INJECTION, SOLUTION INTRAMUSCULAR; INTRAVENOUS ONCE
Status: COMPLETED | OUTPATIENT
Start: 2020-12-30 | End: 2020-12-30

## 2020-12-30 RX ORDER — UBIDECARENONE 100 MG
100 CAPSULE ORAL DAILY
Status: DISCONTINUED | OUTPATIENT
Start: 2020-12-30 | End: 2021-01-05

## 2020-12-30 RX ORDER — VITAMIN B COMPLEX
2000 TABLET ORAL
Status: DISCONTINUED | OUTPATIENT
Start: 2020-12-30 | End: 2021-01-09 | Stop reason: HOSPADM

## 2020-12-30 RX ORDER — CYANOCOBALAMIN 1000 UG/ML
1000 INJECTION INTRAMUSCULAR; SUBCUTANEOUS WEEKLY
Status: DISCONTINUED | OUTPATIENT
Start: 2020-12-30 | End: 2021-01-05

## 2020-12-30 RX ORDER — LIDOCAINE 4 G/G
3 PATCH TOPICAL DAILY
Status: DISCONTINUED | OUTPATIENT
Start: 2020-12-30 | End: 2021-01-09 | Stop reason: HOSPADM

## 2020-12-30 RX ORDER — OXYCODONE HYDROCHLORIDE 5 MG/1
5 TABLET ORAL EVERY 4 HOURS PRN
Status: DISCONTINUED | OUTPATIENT
Start: 2020-12-30 | End: 2021-01-09 | Stop reason: HOSPADM

## 2020-12-30 RX ADMIN — ALOGLIPTIN 6.25 MG: 6.25 TABLET, FILM COATED ORAL at 09:56

## 2020-12-30 RX ADMIN — ASPIRIN 81 MG: 81 TABLET, COATED ORAL at 09:57

## 2020-12-30 RX ADMIN — ACETAMINOPHEN 650 MG: 325 TABLET, FILM COATED ORAL at 03:47

## 2020-12-30 RX ADMIN — CYANOCOBALAMIN 1000 MCG: 1000 INJECTION, SOLUTION INTRAMUSCULAR; SUBCUTANEOUS at 10:03

## 2020-12-30 RX ADMIN — OXYCODONE 5 MG: 5 TABLET ORAL at 10:03

## 2020-12-30 RX ADMIN — KETOROLAC TROMETHAMINE 30 MG: 30 INJECTION, SOLUTION INTRAMUSCULAR at 16:20

## 2020-12-30 RX ADMIN — BUTALBITAL, ASPIRIN, AND CAFFEINE 1 CAPSULE: 50; 325; 40 CAPSULE ORAL at 03:47

## 2020-12-30 RX ADMIN — ENOXAPARIN SODIUM 40 MG: 100 INJECTION SUBCUTANEOUS at 09:57

## 2020-12-30 RX ADMIN — ATORVASTATIN CALCIUM 20 MG: 20 TABLET, FILM COATED ORAL at 21:37

## 2020-12-30 RX ADMIN — LITHIUM CARBONATE 300 MG: 300 CAPSULE, GELATIN COATED ORAL at 13:48

## 2020-12-30 RX ADMIN — Medication 2000 UNITS: at 16:20

## 2020-12-30 RX ADMIN — CARIPRAZINE 3 MG: 1.5 CAPSULE, GELATIN COATED ORAL at 09:56

## 2020-12-30 RX ADMIN — ISOSORBIDE MONONITRATE 30 MG: 30 TABLET, EXTENDED RELEASE ORAL at 09:57

## 2020-12-30 RX ADMIN — LOSARTAN POTASSIUM 25 MG: 25 TABLET, FILM COATED ORAL at 09:57

## 2020-12-30 RX ADMIN — METFORMIN HYDROCHLORIDE 500 MG: 500 TABLET ORAL at 10:02

## 2020-12-30 RX ADMIN — INSULIN GLARGINE 22 UNITS: 100 INJECTION, SOLUTION SUBCUTANEOUS at 21:40

## 2020-12-30 RX ADMIN — METFORMIN HYDROCHLORIDE 500 MG: 500 TABLET ORAL at 16:20

## 2020-12-30 RX ADMIN — CLOPIDOGREL BISULFATE 75 MG: 75 TABLET ORAL at 09:57

## 2020-12-30 RX ADMIN — LITHIUM CARBONATE 300 MG: 300 CAPSULE, GELATIN COATED ORAL at 10:04

## 2020-12-30 RX ADMIN — LITHIUM CARBONATE 300 MG: 300 CAPSULE, GELATIN COATED ORAL at 16:20

## 2020-12-30 RX ADMIN — Medication 100 MG: at 10:03

## 2020-12-30 ASSESSMENT — ENCOUNTER SYMPTOMS
WHEEZING: 0
COLOR CHANGE: 0
VOMITING: 0
COUGH: 0
SHORTNESS OF BREATH: 0
NAUSEA: 0
CHEST TIGHTNESS: 0
TROUBLE SWALLOWING: 0

## 2020-12-30 ASSESSMENT — PAIN SCALES - GENERAL
PAINLEVEL_OUTOF10: 7
PAINLEVEL_OUTOF10: 6
PAINLEVEL_OUTOF10: 8
PAINLEVEL_OUTOF10: 8

## 2020-12-30 ASSESSMENT — PAIN DESCRIPTION - ORIENTATION: ORIENTATION: RIGHT

## 2020-12-30 ASSESSMENT — PAIN DESCRIPTION - PAIN TYPE: TYPE: ACUTE PAIN

## 2020-12-30 ASSESSMENT — PAIN DESCRIPTION - FREQUENCY
FREQUENCY: INTERMITTENT
FREQUENCY: INTERMITTENT

## 2020-12-30 ASSESSMENT — PAIN DESCRIPTION - LOCATION
LOCATION: SHOULDER;HAND
LOCATION: KNEE

## 2020-12-30 NOTE — PROGRESS NOTES
[facility] Neurology Daily Progress Note  Name: Gilmer Mayer  Age: 52 y.o. Gender: female  CodeStatus: Full Code  Allergies: Latex  Influenza Vaccines  Eggs Or Egg-Derived Products  Gabapentin  Pneumococcal Vaccines  Povidone Iodine  Varicella Virus Vaccine Live    Chief Complaint:No chief complaint on file. Primary Care Provider: Radha Scott MD  InpatientTreatment Team: Treatment Team: Attending Provider: Pao Reyes DO; Consulting Physician: Clara Baptiste MD; Consulting Physician: Freida Jaimes DO; Utilization Reviewer: Kade Rojas RN; Registered Nurse: Rosalina Brand RN; LPN: Maia Torres LPN; Registered Nurse: Gamal Alegria, PARAS; Occupational Therapist: Janet Flowers OT; Patient Care Tech: Tomas [place]  Admission Date: 12/28/2020      HPI   Pt seen and examined on rehab unit for neurology follow-up for acute thalamic CVA with expressive aphasia and headache. Patient currently alert and oriented x3, no acute distress, cooperative. Continues to have expressive aphasia. No new focal deficits. Headache persists although is overall improved. No seizure activity reported. Right weakness  Vitals:    12/30/20 0750   BP: (!) 170/80   Pulse: 60   Resp: 18   Temp: 98 °F (36.7 °C)   SpO2: 93%      Review of Systems   Constitutional: Negative for appetite change, chills, fatigue and fever. HENT: Negative for hearing loss and trouble swallowing. Eyes: Negative for visual disturbance. Respiratory: Negative for cough, chest tightness, shortness of breath and wheezing. Cardiovascular: Negative for chest pain, palpitations and leg swelling. Gastrointestinal: Negative for nausea and vomiting. Musculoskeletal: Negative for gait problem. Skin: Negative for color change and rash. Neurological: Positive for speech difficulty, weakness (RUE in sling) and headaches. Negative for dizziness, tremors, seizures, syncope, facial asymmetry, light-headedness and numbness. Oral Daily    nicotine  1 patch Transdermal Daily     PRN Meds: oxyCODONE, glucose, dextrose, glucagon (rDNA), dextrose, polyethylene glycol, promethazine **OR** ondansetron, acetaminophen, butalbital-aspirin-caffeine, hydrALAZINE    Labs:   Recent Labs     12/28/20  0509   WBC 6.8   HGB 13.1   HCT 38.0        Recent Labs     12/28/20  0509   *   K 3.4   *   CO2 24   BUN 9   CREATININE 0.76   CALCIUM 9.0     Recent Labs     12/28/20  0509   AST 32   ALT 65*   BILITOT 0.4   ALKPHOS 118     Recent Labs     12/27/20  2047   INR 0.9     No results for input(s): Gramajo Scrivener in the last 72 hours. Urinalysis:   Lab Results   Component Value Date    NITRU Negative 12/30/2020    WBCUA 10-20 03/01/2020    BACTERIA RARE 03/01/2020    RBCUA 3-5 03/01/2020    BLOODU Negative 12/30/2020    SPECGRAV 1.009 12/30/2020    GLUCOSEU Negative 12/30/2020       Radiology:   Most recent    EEG No procedure found. MRI of Brain   Results for orders placed during the hospital encounter of 04/15/16   MRI Brain W WO Contrast    Narrative EXAMINATION MRI BRAIN     CLINICAL HISTORY Left-sided weakness and numbness, headache,  dizziness, history of multiple sclerosis     COMPARISONS 8/31/15     FINDINGS Multiplanar, multisequence images were obtained without  contrast followed by IV contrast consisting of 15 cc OptiMARK. Sinuses  and mastoid air cells are clear. Orbital contents are normal. There is  no shift of the midline structures. No abnormal contrast-enhancing  lesions are identified. No intracranial mass is seen. There is no  evidence of acute infarction or hemorrhage. Ventricular system appears  normal. FLAIR images demonstrate a single focus of bright signal  intensity in the right cerebral peduncle similar to the prior study. No  associated contrast enhancement is seen. IMPRESSION NO ACUTE INTRACRANIAL PROCESS.  A SINGLE SUBCENTIMETER FOCUS  OF BRIGHT SIGNAL INTENSITY IN THE RIGHT CEREBRAL PEDUNCLE IS AGAIN  NOTED. THERE IS NO EVIDENCE OF CONTRAST ENHANCEMENT. FINDINGS ARE  SUSPICIOUS FOR A SMALL DEMYELINATING PLAQUE SECONDARY TO MULTIPLE  SCLEROSIS. NO OTHER DEFINITE PLAQUES ARE IDENTIFIED AT THIS TIME. OVERALL APPEARANCE OF THE BRAIN IS UNCHANGED FROM THE PRIOR STUDY. Rachel Clements Ginette Mohr M.D. Released Ginette Mohr M.D. Released Date Time- 04/18/16 8033   This document has been electronically signed. ------------------------------------------------------------------------------     Results for orders placed during the hospital encounter of 12/22/20   MRI BRAIN WO CONTRAST    Narrative EXAMINATION:  MRI BRAIN WO CONTRAST    HISTORY:   new CVA . I63.9 Stroke determined by clinical assessment ([facility] [address] ICD10    TECHNIQUE:  Routine noncontrast MRI protocol including diffusion and gradient echo images. COMPARISON: MRI brain 12/23/2020. RESULT:    Acute Change:   Restricted diffusion involving the left thalamus, with corresponding increased T2/FLAIR signal at this site, measuring approximately 1.6 x 0.8 cm, similar to recent prior MRI. No new areas of restricted diffusion       Hemorrhage:    Small rounded area of susceptibility left occipital lobe, unchanged. Mass Lesion/ Mass Effect:    No evidence of an intracranial mass or extra-axial fluid collection. No significant mass effect. Chronic Change:  Scattered punctate foci of increased T2 and FLAIR signal are noted in the supratentorial white matter which is a nonspecific finding, but likely represents minimal chronic microvascular ischemia. Parenchyma:   No significant volume loss for age. The brain parenchyma is otherwise within normal limits of signal intensity and morphology. Ventricles:     Normal caliber and morphology.     Skull Base:    Hypothalamic and pituitary region are grossly normal.   Craniocervical junction is normal. No significant marrow replacement process. Vasculature:    Major intracranial arterial structures, and dural venous sinuses show typical flow void, suggesting patency by spin echo criteria. Other:  The visualized paranasal sinuses are clear. Mastoid air cells clear. The orbits are unremarkable. Soft tissues are unremarkable. Impression Recent infarction involving the left thalamus, overall unchanged from MRI 12/23/2020. No new infarcts from the prior MRI. MRA of the Head and Neck: No results found for this or any previous visit. No results found for this or any previous visit. Results for orders placed during the hospital encounter of 08/06/19   mra head w/o contrast    Narrative EXAM:     MRI of the brain without contrast    MRA of the brain without contrast    History: TIA. Bilateral arm numbness. Headache. Blurry vision. Weakness of the left arm and leg. Technique: Multiplanar multisequence MRI of the brain was performed without contrast. Axial 3-D time-of-flight images of the brain were obtained without contrast. 3-D volume rendered images were performed for evaluation of the cerebral vasculature. Comparison: CT brain from August 6, 2019 and MRI brain from November 7, 2018    Findings:    MRI brain:    Unchanged nonspecific 3 mm hyperintense T2 focus within the right brachium pontis. Unchanged nonspecific 5 mm hyperintense T2/FLAIR focus within the cortex of the right frontal lobe, not significant changed from prior examination. Otherwise no suspicious   white matter lesions. Brain volume is age-appropriate. Ventricular morphology is within normal limits. No acute hemorrhage, mass, mass effect, midline shift, or abnormal extra-axial fluid collection. The posterior fossa is within normal limits. There is no diffusion restriction. No susceptibility artifact is identified on the gradient echo sequence. Cranial nerves 7/8 complexes appear grossly unremarkable.   The this or any previous visit. Carotid duplex: No results found for this or any previous visit. No results found for this or any previous visit. Results for orders placed during the hospital encounter of 08/06/19   US CAROTID ARTERY BILATERAL    Narrative BILATERAL DUPLEX CAROTID ULTRASOUND    CLINICAL INFORMATION:  SMOKING HISTORY, HYPERTENSION, TIA WITH LEFT ARM NUMBNESS AND TINGLING, FACIAL DROOP, EVALUATE FOR POSSIBLE CAROTID ARTERY STENOSIS IN THE NECK. COMPARISON:  NONE AVAILABLE     FINDINGS:  The bilateral carotid duplex ultrasound study demonstrates the following:                                                                 RIGHT            LEFT    Carotid plaque burden                         Mild               Mild  Proximal common carotid artery           171 cm/s            177 cm/s    Mid common carotid artery                   151 cm/s            152 cm/s    Distal common carotid artery                90 cm/s           118 cm/s  Proximal internal carotid artery             78 cm/s            130 cm/s  Mid internal carotid artery                      80 cm/s           82 cm/s  Distal internal carotid artery                  65 cm/s             85 cm/s  External carotid artery                            172 cm/s           169 cm/s      ICA/CCA ratio                                         0.5                0.9       Vertebral arteries antegrade flow         Yes                     Yes       Impression 1. MILD CAROTID PLAQUE BURDEN IN BOTH CAROTID BIFURCATIONS IN THE NECK. 2. RIGHT INTERNAL CAROTID ARTERY: <50% STENOSIS. 3. LEFT INTERNAL CAROTID ARTERY: <50% STENOSIS. 4. BILATERALLY PATENT VERTEBRAL ARTERIES WITH ANTEGRADE BLOOD FLOW. Validated velocity measurements with angiographic measurements, velocity criteria are extrapolated from diameter data as defined by the Society of Radiologist in [address] [place] [address] Drive Radiology 2003; [phone].          Echo No results found for this or any previous visit. Assessment/Plan:    Left thalamic stroke with expressive aphasia  CTA negative  Continue dual antiplatelet therapy  Risk factor modification needed  Hypercoagulable work-up done.  homocystine level elevated. B12 and folate within normal limits but on the low side. MTHFR pending. CHAO with bubble study negative for cardioembolic source. Patient continues to have headache. Will give one-time dose of Toradol. Collaborating physicians: Dr Francisco Berumen    I have personally performed a face to face diagnostic evaluation on this patient, reviewed all data and investigations, and am the sole provider of all clinical decisions on the neurological status of this patient. pt has some right weakness,  Please see my initial consult on her w/u of MS and we have given out opinion on that. Expect that physician reads my notes       Steven Berumen MD, [id number], American Board of Psychiatry & Neurology  Board Certified in Vascular Neurology  Board Certified in Neuromuscular Medicine  Certified in Neurorehabilitation     Electronically signed by NIKI Batres CNP on 12/30/2020 at 3:10 PM

## 2020-12-30 NOTE — PROGRESS NOTES
Patient resting comfortably in bed. Bed alarm is on, call light is within reach. Patient states that she has a headache. Medication intervention given. Patient offered additional fluids, she denied them. Will continue to monitor.

## 2020-12-30 NOTE — PLAN OF CARE
Problem: Pain:  Goal: Pain level will decrease  Description: Pain level will decrease  12/30/2020 0015 by Carroll Duncan RN  Outcome: Ongoing  12/29/2020 2218 by Mary Love RN  Outcome: Ongoing  Goal: Control of acute pain  Description: Control of acute pain  12/30/2020 0015 by Carroll Duncan RN  Outcome: Ongoing  12/29/2020 2218 by Mary Love RN  Outcome: Ongoing  Goal: Control of chronic pain  Description: Control of chronic pain  12/30/2020 0015 by Carroll Duncan RN  Outcome: Ongoing  12/29/2020 2218 by Mary Love RN  Outcome: Ongoing     Problem: Falls - Risk of:  Goal: Will remain free from falls  Description: Will remain free from falls  12/30/2020 0015 by Carroll Duncan RN  Outcome: Ongoing  12/29/2020 2218 by Mary Love RN  Outcome: Ongoing  Goal: Absence of physical injury  Description: Absence of physical injury  12/30/2020 0015 by Carroll Duncan RN  Outcome: Ongoing  12/29/2020 2218 by Mary Love RN  Outcome: Ongoing     Problem: IP COMMUNICATION/DYSARTHRIA  Goal: LTG - patient will improve expressive language skills to allow for communication of wants and needs in daily activities  12/30/2020 0015 by Carroll Duncan RN  Outcome: Ongoing  12/29/2020 2218 by Mary Love RN  Outcome: Ongoing  12/29/2020 1254 by ROSA MARIA Elizabeth  Outcome: Ongoing     Problem: IP SWALLOWING  Goal: LTG - patient will tolerate the least restrictive diet consistency to allow for safe consumption of daily meals  12/30/2020 0015 by Carroll Duncan RN  Outcome: Ongoing  12/29/2020 2218 by Mary Love RN  Outcome: Ongoing  12/29/2020 1254 by Cleveland De La Garza SLP  Outcome: Ongoing

## 2020-12-30 NOTE — PLAN OF CARE
Problem: Pain:  Goal: Pain level will decrease  Description: Pain level will decrease  Outcome: Ongoing  Goal: Control of acute pain  Description: Control of acute pain  Outcome: Ongoing  Goal: Control of chronic pain  Description: Control of chronic pain  Outcome: Ongoing     Problem: Falls - Risk of:  Goal: Will remain free from falls  Description: Will remain free from falls  Outcome: Ongoing  Goal: Absence of physical injury  Description: Absence of physical injury  Outcome: Ongoing     Problem: IP COMMUNICATION/DYSARTHRIA  Goal: LTG - patient will improve expressive language skills to allow for communication of wants and needs in daily activities  12/29/2020 2218 by Angella Villanueva RN  Outcome: Ongoing  12/29/2020 1254 by ROSA MARIA Downing  Outcome: Ongoing     Problem: IP SWALLOWING  Goal: LTG - patient will tolerate the least restrictive diet consistency to allow for safe consumption of daily meals  12/29/2020 2218 by Angella Villanueva RN  Outcome: Ongoing  12/29/2020 1254 by Emily Marley SLP  Outcome: Ongoing

## 2020-12-30 NOTE — PROGRESS NOTES
Physical Therapy Rehab Treatment Note  Facility/Department: [facility]  [facility]       NAME: Juma Lora  : 1970 (52 y.o.)  MRN: [id number]  CODE STATUS: Full Code    Date of Service: 2020  Chart Reviewed: Yes  Patient assessed for rehabilitation services?: Yes  Family / Caregiver Present: No  Diagnosis: Abnormality of gait and mobility due to recent infarct left thalamus. [facility] admit 20. Restrictions:  Restrictions/Precautions: Fall Risk  Required Braces or Orthoses  Right Upper Extremity Brace/Splint: Sling  Right Upper Extremity Brace/Splint: RUE immobilizer in place - pt reports rotator cuff repair done Dec 1       SUBJECTIVE: Subjective: Everything hurts  Response To Previous Treatment: Patient with no complaints from previous session.   Pain Screening  Patient Currently in Pain: Yes  Pre Treatment Pain Screening  Pain at present: 8  Scale Used: Numeric Score  Intervention List: Patient able to continue with treatment    Post Treatment Pain Screening:  Pain Assessment  Pain Assessment: 0-10  Pain Level: 8  Pain Type: Acute pain  Pain Location: Knee  Pain Orientation: Right;Left    OBJECTIVE:   Follows Commands: Impaired    Bed mobility  Rolling to Left: Modified independent  Rolling to Right: Unable to assess  Supine to Sit: Unable to assess  Sit to Supine: Stand by assistance  Scooting: Stand by assistance    Transfers  Sit to Stand: Contact guard assistance;Stand by assistance  Stand to sit: Contact guard assistance;Stand by assistance  Bed to Chair: Contact guard assistance    Ambulation  Ambulation?: Yes  More Ambulation?: No  Ambulation 1  Surface: carpet  Device: No Device  Assistance: Contact guard assistance  Quality of Gait: WBOS, Lateral excursion  decreased trunk rotation Occasional decreased R foot clearance  Distance: 25'  Comments: Focus on gaze stabilization while negotiating turnss    Stairs/Curb  Stairs?: No    Exercises  Hamstring Sets: x 15 YTB  Hip Flexion: x 15  Hip Abduction: x 15 YTB/ Ball squeezes  Knee Long Arc Quad: x 15     ASSESSMENT/COMMENTS:  Body structures, Functions, Activity limitations: Decreased functional mobility ; Decreased strength;Decreased endurance;Decreased coordination;Decreased ADL status; Decreased safe awareness;Decreased sensation;Decreased cognition;Decreased balance; Increased pain  Assessment: Patient with increased pain this p.m. stating her entire body hurts. Patient continues to progress gait without AD    PLAN OF CARE/Safety:   Safety Devices  Type of devices: Call light within reach; Chair alarm in place; Left in chair      Therapy Time:   Individual   Time In 1530   Time Out 1600   Minutes 30     Minutes: 30      Transfer/Bed mobility training: 10      Gait trainin     Therapeutic ex: [address], PTA, 20 at 3:47 PM

## 2020-12-30 NOTE — PROGRESS NOTES
Occupational Therapy  Facility/Department: [facility]  Daily Treatment Note  NAME: Gilmer Mayer  : 1970  MRN: [id number]    Date of Service: 2020    Discharge Recommendations:  Continue to assess pending progress  OT Equipment Recommendations  Other: Continue to assess    Assessment      OT Education: OT Role;Plan of Care  Barriers to Learning: Memory deficits  REQUIRES OT FOLLOW UP: Yes  Activity Tolerance  Activity Tolerance: Patient Tolerated treatment well  Activity Tolerance: fair+  Safety Devices  Safety Devices in place: Yes  Type of devices: All fall risk precautions in place         Patient Diagnosis(es): There were no encounter diagnoses. has a past medical history of Anxiety, Back pain, Bipolar disorder ([facility] [address] CAD (coronary artery disease), CAFL (chronic airflow limitation) ([facility] Chronic bronchitis ([address] Chronic pain, Colitis, Complicated migraine, DDD (degenerative disc disease), lumbar, Depression, Endometriosis, Excessive caffeine abuse, continuous ([address] Gastritis, GERD (gastroesophageal reflux disease), History of myocardial infarction, HTN (hypertension), benign, Impaired mobility and activities of daily living, Over weight, PTSD (post-traumatic stress disorder), Sensory neuropathy, Somatization disorder, TIA (transient ischemic attack), Tobacco abuse, and Vasospastic angina ([address] has a past surgical history that includes Hysterectomy; Dilation and curettage of uterus; back surgery; Neck surgery; shoulder surgery (Left);  section; Cholecystectomy (13); Upper gastrointestinal endoscopy (2014); cyst removal (3/7/16); Coronary angioplasty; Upper gastrointestinal endoscopy (N/A, 2020); and Colonoscopy (N/A, 2020).     Restrictions  Restrictions/Precautions  Restrictions/Precautions: Fall Risk  Required Braces or Orthoses?: Yes  Required Braces or Orthoses  Right Upper Extremity Brace/Splint: Sling  Right Upper Extremity Brace/Splint: RUE immobilizer in place - pt reports rotator cuff repair done Dec 1  Subjective   General  Chart Reviewed: Yes  Patient assessed for rehabilitation services?: Yes  Pain Assessment  Pain Level: 7  Pain Type: Acute pain  Pain Location: Shoulder;Hand  Pain Orientation: Right  Pain Descriptors: Aching  Pain Frequency: Intermittent  Functional Pain Assessment: Activities are not prevented  Response to Pain Intervention: Patient Satisfied  Pre Treatment Pain Screening  Scale Used: Numeric Score  Intervention List: Patient able to continue with treatment;Patient declined any intervention  Vital Signs  Patient Currently in Pain: Yes   Orientation  Orientation  Orientation Level: Oriented to place;Oriented to person;Oriented to situation  Objective    Pt completed BUE therapeutic activities to increase strength, endurance, and FM control and cognitive re-training to increase problem solving and memory for increased safety and independence in ADL/IADL tasks. Patient donned 1# wrist weight on LUE for duration of treatment session. Patient copied pattern x2 with small pegs x75 using LUE. Patient completed this task at a slow and steady pace with 2 drops throughout. Medication management activity to practice organizing 7 medications for the week into a 7-day pill box with separate compartments for morning, noon, evening, and bed. Patient completed this task at a slow pace with 100% accuracy, using BUE to open containers x5. Patient needed min assistance to open 2/7 containers. Patient matched holiday cards to their months with 2 errors. Patient copied block pattern designs with min difficulty for proper alignment of blocks x5 designs and 2 errors.      Cognition  Cognition Comment: Comp: Supervision, Express: Min A, Social: Supervision, Prob: Supervision, Mem: Supervision        Plan   Plan  Times per week: 5-7x/week  Plan weeks: 1-1 1/2 weeks  Current Treatment Recommendations: Strengthening, Balance Training, Functional Mobility Training,

## 2020-12-30 NOTE — PROGRESS NOTES
Physical Therapy Rehab Treatment Note  Facility/Department: [facility]  Room: R260/R260-01       NAME: Debbie Arauz  : 1970 (52 y.o.)  MRN: [id number]  CODE STATUS: Full Code    Date of Service: 2020  Chart Reviewed: Yes  Patient assessed for rehabilitation services?: Yes  Family / Caregiver Present: No  Diagnosis: Abnormality of gait and mobility due to recent infarct left thalamus. [facility] Re[facility] admit 20. Restrictions:  Restrictions/Precautions: Fall Risk  Required Braces or Orthoses  Right Upper Extremity Brace/Splint: Sling  Right Upper Extremity Brace/Splint: RUE immobilizer in place - pt reports rotator cuff repair done Dec 1       SUBJECTIVE: Subjective: I am pushing through my vertigo  Response To Previous Treatment: Patient with no complaints from previous session. Pain Screening  Patient Currently in Pain: Yes  Pre Treatment Pain Screening  Pain at present: 9  Scale Used: Numeric Score  Intervention List: Patient able to continue with treatment    Post Treatment Pain Screening:  Pain Assessment  Pain Assessment: 0-10  Pain Level: 9  Pain Type: Acute pain  Pain Location: Knee  Pain Orientation: Right;Left    OBJECTIVE:   Follows Commands: Impaired    Neuromuscular Education  PNF: Seated gaze stabilzation exercises in seated VOR/ smooth pursuit.  difficulty tracing with all activities    Bed mobility  Rolling to Left: Modified independent  Rolling to Right: Unable to assess  Supine to Sit: Supervision    Transfers  Sit to Stand: Contact guard assistance;Stand by assistance  Stand to sit: Contact guard assistance;Stand by assistance  Bed to Chair: Contact guard assistance    Ambulation  Ambulation?: Yes  More Ambulation?: No  Ambulation 1  Surface: carpet  Device: No Device  Assistance: Contact guard assistance  Quality of Gait: WBOS, Lateral excursion  decreased trunk rotation  Distance: Multiple short distance trials 10-30 ft without AD  Comments: Focus on gaze stabilization while

## 2020-12-30 NOTE — PROGRESS NOTES
assessed for rehabilitation services?: Yes  Pain Assessment  Pain Assessment: 0-10  Pain Level: 6  Pain Type: Acute pain  Pain Location: Shoulder;Hand  Pain Orientation: Right  Pain Descriptors: Aching;Discomfort  Pain Frequency: Intermittent  Pre Treatment Pain Screening  Pain at present: 5  Scale Used: Numeric Score  Intervention List: Patient able to continue with treatment;Patient declined any intervention  Vital Signs  Patient Currently in Pain: Yes   Orientation  Orientation  Overall Orientation Status: Impaired  Orientation Level: Oriented to place;Oriented to person;Oriented to situation  Objective    Pt completed sponge bath ADL at the levels below. ADL  Feeding: None  Grooming: Stand by assistance  UE Bathing: Minimal assistance  LE Bathing: Minimal assistance  UE Dressing: Stand by assistance  LE Dressing: Minimal assistance(socks)  Toileting: Unable to assess(comment)(pt did not need to go)        Balance  Standing Balance: Stand by assistance  Functional Mobility  Functional - Mobility Device: No device  Activity: To/from bathroom  Assist Level: Stand by assistance  Toilet Transfers  Toilet Transfer: Unable to assess  Toilet Transfers Comments: Pt did not need to go  Shower Transfers  Shower Transfers: Not tested  [employer] Transfers Comments: Pt declined and completed sponge bath ADL  Bed mobility  Supine to Sit: Supervision  Transfers  Sit to stand: Stand by assistance  Stand to sit: Stand by assistance      Pt with 15 minutes of therapy time left after ADL. Therapist transported pt to therapy department via wheelchair. Pt sorted playing cards that were scattered across tabletiop based on suit using the L UE to improve problem solving during ADLs. Pt demonstrates good ability to scan tabletop and retrieve desired cards to sort into 4 different piles. Pt able to sort cards based on suit with no errors.  Pt then sorted cards in numerical order from lowest to highest with no errors and good attention to task.     Cognition  Cognition Comment: Comp: Supervision, Express: Min A, Social: Supervision, Prob: Supervision, Mem: Supervision        Plan   Plan  Times per week: 5-7x/week  Plan weeks: 1- 1/2 weeks  Current Treatment Recommendations: Strengthening, Balance Training, Functional Mobility Training, Endurance Training, Safety Education & Training, Pain Management, Self-Care / ADL, Equipment Evaluation, Education, & procurement, Patient/Caregiver Education & Training, Home Management Training, Cognitive/Perceptual Training, Cognitive Reorientation, Neuromuscular Re-education, Positioning, ROM  Plan Comment: Continue OT per POC  G-Code     OutComes Score                                                  AM-PAC Score             Goals  Patient Goals   Patient goals : \"I can't believe I feel this weak, I want to get stronger\"       Therapy Time   Individual Concurrent Group Co-treatment   Time In 1100         Time Out 1200         Minutes 60           ADL trainin minutes  Cognitive Retraining: 15 minutes     Elda Jimenez OT   Electronically signed by Elda Jimenez OT on 2020 at 1:18 PM

## 2020-12-30 NOTE — PROGRESS NOTES
and     well kempt. HEENT:    PERRLA, hearing intact to loud voice, external inspection of ear     and nose benign. Inspection of lips, tongue and gums severe thrush  Musculoskeletal: No significant change in strength or tone. All joints stable. Inspection and palpation of digits and nails show no clubbing,       cyanosis or inflammatory conditions. Neuro/Psychiatric: Affect: flat but pleasant. Alert and oriented to person, place and     Situation with mod cues. No significant change in deep tendon reflexes or     Sensation-garbled speech right-sided weakness  Lungs:  Diminished, CTA-B. Respiration effort is normal at rest.     Heart:   S1 = S2, RRR. No loud murmurs. Abdomen:  Soft, non-tender, no enlargement of liver or spleen. Extremities:  No significant lower extremity edema or tenderness. Skin:   Intact to general survey, no visualized or palpated problems. Rehabilitation:  Physical therapy:   Bed Mobility: Scooting: Contact guard assistance    Transfers: Sit to Stand: Minimal Assistance, Contact guard assistance(greater instability noted this session)  Stand to sit: Minimal Assistance, Contact guard assistance  Bed to Chair: Contact guard assistance, Stand by assistance, Ambulation 1  Surface: carpet  Device: Single point cane  Other Apparatus: (right shoulder sling)  Assistance: Minimal assistance  Quality of Gait: wide RIDDHI, decreased ability to scan environment, short step length, decreased trunk rotation, decreased foot clearance, reaches for walls for UE support  Distance: 30 feet  Comments: cane did not improve pt balance this session. Pt reported dizziness and double vision increased this date,      FIMS:  ,  , Assessment: Pt demonstrates increased vestibular and balance deficits this session.  Poor tolerance to gaze stability ex noted    Occupational therapy:    ,  , Assessment: Pt is a 52year old woman from home alone who presents to [facility] with the above deficits which impact her ability to perform ADL and IADL tasks. Pt. would benefit from continued OT to maximize independence and safety with transfers and mobility. Speech therapy:        Lab/X-ray studies reviewed, analyzed and discussed with patient and staff:   Recent Results (from the past 24 hour(s))   LITHIUM LEVEL    Collection Time: 12/29/20 12:46 PM   Result Value Ref Range    Lithium Lvl <0.1 (L) 0.6 - 1.2 mEq/L   POCT Glucose    Collection Time: 12/29/20  4:09 PM   Result Value Ref Range    POC Glucose 133 (H) 60 - 115 mg/dl    Performed on ACCU-CHEK    POCT Glucose    Collection Time: 12/29/20  8:10 PM   Result Value Ref Range    POC Glucose 227 (H) 60 - 115 mg/dl    Performed on ACCU-CHEK    URINALYSIS    Collection Time: 12/30/20  2:47 AM   Result Value Ref Range    Color, UA Yellow Straw/Yellow    Clarity, UA Clear Clear    Glucose, Ur Negative Negative mg/dL    Bilirubin Urine Negative Negative    Ketones, Urine Negative Negative mg/dL    Specific Gravity, UA 1.009 1.005 - 1.030    Blood, Urine Negative Negative    pH, UA 6.0 5.0 - 9.0    Protein, UA Negative Negative mg/dL    Urobilinogen, Urine 0.2 <2.0 E.U./dL    Nitrite, Urine Negative Negative    Leukocyte Esterase, Urine Negative Negative   POCT Glucose    Collection Time: 12/30/20  6:23 AM   Result Value Ref Range    POC Glucose 164 (H) 60 - 115 mg/dl    Performed on ACCU-CHEK        Cta Head   12/23/2020  EXAMINATION: CTA HEAD W WO CONTRAST, CTA NECK W WO CONTRAST DATE AND TIME:12/23/2020 12:42 AM CLINICAL HISTORY: Stroke  cva  COMPARISON: None TECHNIQUE:Helical CTA of the head was performed from the vertex to the foramen magnum following the uneventful intravenous administration of 100 cc of nonionic contrast without incident. 2-D images were reconstructed in the sagittal and coronal planes. Three Dimensional Maximum Intensity Projection (3D-MIP) images were created. All images were reviewed and primarily archived to PACS workstation.  All CT scans at this portions of the aortic arch and proximal arch vessels demonstrates no focal stenosis aneurysm or dissection. Carotids: The common carotid arteries, carotid bifurcations, internal and external carotid arteries are normal in course and caliber. Right  Proximal Internal Carotid Stenosis (% by NASCET Criteria): There is no hemodynamically significant stenosis. Left  Proximal Internal Carotid Stenosis (% by NASCET Criteria): There is no hemodynamically significant stenosis. Vertebral Arteries: Patency: The vertebral arteries are well visualized to the level of the basilar artery. There is no focal stenosis aneurysm or dissection. Vertebral arteries are codominant. IMPRESSION: Negative CTA of the neck. Ct Head  : 12/23/2020  CT Brain Contrast medium:  Not utilized. History:  Right facial droop. Altered mental status Comparison:  August 21, 2020 Findings: Extra-axial spaces:  Normal. Intracranial hemorrhage:  None. Ventricular system: In the interval, an ill-defined 4 x 12 mm area decreased attenuation exerting no mass effect is found medially within the left thalamus (series 2, image 16). Basal Cisterns:  Normal. Cerebral Parenchyma:  Normal. Midline Shift:  None. Cerebellum:  Normal. Paranasal sinuses and mastoid air cells:  Normal. Visualized Orbits:  Normal.     Impression: Interval development of acute/subacute left thalamic infarct. Cta Neck  12/23/2020  EXAMINATION: CTA HEAD W WO CONTRAST, CTA NECK W WO CONTRAST DATE AND TIME:12/23/2020 12:42 AM CLINICAL HISTORY: Stroke  cva  COMPARISON: None TECHNIQUE:Helical CTA of the head was performed from the vertex to the foramen magnum following the uneventful intravenous administration of 100 cc of nonionic contrast without incident. 2-D images were reconstructed in the sagittal and coronal planes. Three Dimensional Maximum Intensity Projection (3D-MIP) images were created. All images were reviewed and primarily archived to PACS workstation.  All CT scans at this facility use dose modulation, iterative reconstruction, and/or weight based dosing when appropriate to reduce radiation dose to as low as reasonably achievable. FINDINGS CTA HEAD: Intracranial ICAs: Flow is visualized within the precavernous, cavernous, clinoid and supraclinoid segments of the internal carotid arteries bilaterally    Anterior Cerebral Arteries: The bilaterals  A1 and A2 segments are patent. Middle Cerebral Arteries: Bilateral horizontal, insular, opercular, and cortical segments of the right and left middle cerebral cerebral arteries are patent. Vertebral Arteries And Basilar Artery: There is adequate flow in the intracranial portions of the vertebral arteries and in the basilar artery. Posterior Cerebral Arteries: Bilateral posterior cerebral arteries are patent. Aneurysm No aneurysm or dissection in the anterior or posterior circulations. . Neurocranium The visualized neurocranium is intact. Dural Sinus: As visualized the opacified dural venous sinuses are unremarkable. The major intracranial arterial structures are patent without high-grade stenosis, large vessel cut off, or aneurysm. EXAMINATION: CTA HEAD W WO CONTRAST, CTA NECK W WO CONTRAST DATE AND TIME:12/23/2020 12:42 AM CLINICAL HISTORY: Stroke symptoms   cva  COMPARISON: None TECHNIQUE: Helical CTA of the neck was performed from the aortic arch to the foramen magnum following the uneventful intravenous administration of 100 cc of nonionic contrast without incident. 2-D images were reconstructed in the sagittal and coronal planes. Three Dimensional Maximum Intensity Projection (3D-MIP) images were created. All images were reviewed and primarily archived to PACS workstation. All CT scans at this facility use dose modulation, iterative reconstruction, and/or weight based dosing when appropriate to reduce radiation dose to as low as reasonably achievable. NASCET Criteria were utilized FINDINGS CTA NECK: Aortic Arch:  The visualized portions of the aortic arch and proximal arch vessels demonstrates no focal stenosis aneurysm or dissection. Carotids: The common carotid arteries, carotid bifurcations, internal and external carotid arteries are normal in course and caliber. Right  Proximal Internal Carotid Stenosis (% by NASCET Criteria): There is no hemodynamically significant stenosis. Left  Proximal Internal Carotid Stenosis (% by NASCET Criteria): There is no hemodynamically significant stenosis. Vertebral Arteries: Patency: The vertebral arteries are well visualized to the level of the basilar artery. There is no focal stenosis aneurysm or dissection. Vertebral arteries are codominant. IMPRESSION: Negative CTA of the neck. Mri Brain  12/25/2020  EXAMINATION:  MRI BRAIN WO CONTRAST HISTORY:   new CVA . I63.9 Stroke determined by clinical assessment ([facility] [address] [address] ICD10 TECHNIQUE:  Routine noncontrast MRI protocol including diffusion and gradient echo images. COMPARISON: MRI brain 12/23/2020. RESULT: Acute Change:   Restricted diffusion involving the left thalamus, with corresponding increased T2/FLAIR signal at this site, measuring approximately 1.6 x 0.8 cm, similar to recent prior MRI. No new areas of restricted diffusion   Hemorrhage:    Small rounded area of susceptibility left occipital lobe, unchanged. Mass Lesion/ Mass Effect:    No evidence of an intracranial mass or extra-axial fluid collection. No significant mass effect. Chronic Change:  Scattered punctate foci of increased T2 and FLAIR signal are noted in the supratentorial white matter which is a nonspecific finding, but likely represents minimal chronic microvascular ischemia. Parenchyma:   No significant volume loss for age. The brain parenchyma is otherwise within normal limits of signal intensity and morphology. Ventricles:     Normal caliber and morphology.  Skull Base:    Hypothalamic and pituitary region are grossly normal.   Craniocervical junction is normal. No significant marrow replacement process. Vasculature:    Major intracranial arterial structures, and dural venous sinuses show typical flow void, suggesting patency by spin echo criteria. Other:  The visualized paranasal sinuses are clear. Mastoid air cells clear. The orbits are unremarkable. Soft tissues are unremarkable. Recent infarction involving the left thalamus, overall unchanged from MRI 12/23/2020. No new infarcts from the prior MRI. Mri Brain   12/23/2020  EXAMINATION: MRI BRAIN WO CONTRAST CLINICAL HISTORY:  stroke COMPARISONS: NONE AVAILABLE TECHNIQUE: Multiplanar multisequence images of the brain were obtained without contrast. Diffusion perfusion imaging was obtained. FINDINGS:  There are no extra-axial collections. There is no evidence of hemorrhage. There is specific association with signal dropout on ADC map in the left thalamus and region measuring 7 x15 mm indicating subacute ischemia. There is corresponding signal abnormality on T2/FLAIR series. The susceptibility images do not demonstrate evidence of hemosiderin deposition within the brain parenchyma or the leptomeninges. There is preservation of the gray-white matter differentiation. There are a few scattered foci of T2/FLAIR increased signal in the subcortical and periventricular white matter without associated edema or mass effect. The sulci and ventricles are within normal limits without evidence of hydrocephalus. The midline structures are intact, the corpus callosum is within normal limits. The region of the pineal gland and the sella turcica are unremarkable. There are no space-occupying lesions in the posterior fossa. The basilar cisterns are patent. The craniocervical junction is unremarkable. The visualized portions of the orbits are within normal limits, the globes are intact. The visualized portions of the paranasal sinuses are within normal limits. The calvarium and soft tissues are unremarkable.      There is a 7 x 15 mm area of subacute ischemia in the left thalamus. Previous extensive, complex labs, notes and diagnostics reviewed and analyzed. ALLERGIES:    Allergies as of 12/28/2020 - Review Complete 12/28/2020   Allergen Reaction Noted    Latex Itching 03/25/2018    Influenza vaccines Swelling 10/08/2015    Eggs or egg-derived products  10/08/2015    Gabapentin Nausea Only 10/31/2017    Pneumococcal vaccines Hives 01/28/2016    Povidone iodine Itching 10/08/2015    Varicella virus vaccine live Hives 01/28/2016      (please also verify by checking MAR)      I reviewed her [facility] prescription monitoring service data sheets in hopes of eliminating polypharmacy and weaning to the lowest effective dose of pain medications and eliminating the concomitant use of benzodiazepines. I see no medications of concern. I see no habits of combining sedatives and narcotics. Complex Physical Medicine & Rehab Issues Assess & Plan:   1. Severe abnormality of gait and mobility and impaired self-care and ADL's secondary to acute left thalamic CVA with right-sided weakness thalamic pain aphasia dysphagia. Functional and medical status reassessed regarding patients ability to participate in therapies and patient found to be able to participate in acute intensive comprehensive inpatient rehabilitation program including PT/OT to improve balance, ambulation, ADLs, and to improve the P/AROM. Therapeutic modifications regarding activities in therapies, place, amount of time per day and intensity of therapy made daily. In bed therapies or bedside therapies prn.   2. Bowel and Bladder dysfunction neurogenic bowel and bladder:  frequent toileting, ambulate to bathroom with assistance, check post void residuals. Check for C.difficile x1 if >2 loose stools in 24 hours, continue bowel & bladder program.  Monitor bowel and bladder function. Lactinex 2 PO every AC.   MOM prn, Brown Bomb prn, Glycerin suppository prn, enema prn.  3. Severe acute on chronic osteoarthritis and as well as thalamic pain as well as generalized OA pain: reassess pain every shift and prior to and after each therapy session, give prn Tylenol and resume OxyIR with titration, modalities prn in therapy, masage, Lidoderm, K-pad prn. Consider scheduled AM pain meds. 4. Skin healing and breakdown risk:  continue pressure relief program.  Daily skin exams and reports from nursing. 5. Severe fatigue due to nutritional and hydration deficiency: Add and titrate vitamin B12 vitamin D and CoQ10 continue to monitor I&Os, calorie counts prn, dietary consult prn.  6. Acute episodic insomnia with situational adjustment disorder:  prn Ambien, monitor for day time sedation. 7. Falls risk elevated:  patient to use call light to get nursing assistance to get up, bed and chair alarm. 8. Elevated DVT risk: progressive activities in PT, continue prophylaxis DAPHNE hose, elevation and Lovenox. 9. Complex discharge planning:  Weekly team meeting every  Thursday to assess progress towards goals, discuss and address social, psychological and medical comorbidities and to address difficulties they may be having progressing in therapy. Patient and family education is in progress. The patient is to follow-up with their family physician after discharge. Complex Active General Medical Issues that complicate care Assess & Plan:    1. Bipolar 1 disorder,   Anxiety, active chronic depression-emotional support provided daily, vitamin B12, encourage participation in rehabilitation support group and recreational therapy, adjust/add medications (consider anxiolytics review old chart to find out her old psych medications) consult psychiatry and psychology  2.    CAD (coronary artery disease), HTN (hypertension)-continue blood signs every shift focusing on heart rate and blood pressure checks, consult hospitalist for backup medical and adjust/add medications ( aspirin, Lipitor, Apresoline, Imdur, Cozaar, Plavix )  3. Multiple sclerosis with recent acute left thalamic CVA-consult neurology manage COPD sleep apnea and autoimmune disease add antiplatelet medications  4. Colitis-monitor stools for blood as patient will be on antiplatelet medications  5. Chronic pain syndrome with history of overusing medication due to dyscontrol due to bipolar disorder  6. Vitamin D and B12 deficiency-add vitamin B12 and high-dose vitamin D  7. Chronic obstructive pulmonary disease/  COPD exacerbation , Sleep apnea-Pulse oximeter checks to shift dose and titrate oxygen and aerosol treatments monitor for nocturnal hypoxemia, monitor vital signs, oxygen prn.   8.   Dizziness, Vertigo, peripheral, bilateral  with Sensorineural hearing loss (SNHL) of both ears and Tinnitus, bilateral-focus on balance and therapy PT and OT as well as adding speech and language pathology  9.  Acute left thalamic CVA with aphasia dysphagia and right arm weakness-consult neurology       Electronically signed by Pao Reyes DO on 12/30/20 at 8:55 AM STAR Carrasco D.O., PM&R     Attending    [address]

## 2020-12-31 LAB
GLUCOSE BLD-MCNC: 106 MG/DL (ref 60–115)
GLUCOSE BLD-MCNC: 112 MG/DL (ref 60–115)
GLUCOSE BLD-MCNC: 126 MG/DL (ref 60–115)
GLUCOSE BLD-MCNC: 133 MG/DL (ref 60–115)
PERFORMED ON: ABNORMAL
PERFORMED ON: ABNORMAL
PERFORMED ON: NORMAL
PERFORMED ON: NORMAL
URINE CULTURE, ROUTINE: NORMAL

## 2020-12-31 PROCEDURE — 99233 SBSQ HOSP IP/OBS HIGH 50: CPT | Performed by: PHYSICAL MEDICINE & REHABILITATION

## 2020-12-31 PROCEDURE — 6370000000 HC RX 637 (ALT 250 FOR IP): Performed by: PHYSICAL MEDICINE & REHABILITATION

## 2020-12-31 PROCEDURE — 97110 THERAPEUTIC EXERCISES: CPT

## 2020-12-31 PROCEDURE — 97535 SELF CARE MNGMENT TRAINING: CPT

## 2020-12-31 PROCEDURE — 1180000000 HC REHAB R&B

## 2020-12-31 PROCEDURE — 92526 ORAL FUNCTION THERAPY: CPT

## 2020-12-31 PROCEDURE — 97530 THERAPEUTIC ACTIVITIES: CPT

## 2020-12-31 PROCEDURE — 6370000000 HC RX 637 (ALT 250 FOR IP): Performed by: NURSE PRACTITIONER

## 2020-12-31 PROCEDURE — 6360000002 HC RX W HCPCS: Performed by: INTERNAL MEDICINE

## 2020-12-31 PROCEDURE — 97129 THER IVNTJ 1ST 15 MIN: CPT

## 2020-12-31 PROCEDURE — 6370000000 HC RX 637 (ALT 250 FOR IP): Performed by: INTERNAL MEDICINE

## 2020-12-31 PROCEDURE — 97116 GAIT TRAINING THERAPY: CPT

## 2020-12-31 RX ADMIN — METFORMIN HYDROCHLORIDE 500 MG: 500 TABLET ORAL at 17:25

## 2020-12-31 RX ADMIN — LITHIUM CARBONATE 300 MG: 300 CAPSULE, GELATIN COATED ORAL at 09:02

## 2020-12-31 RX ADMIN — ASPIRIN 81 MG: 81 TABLET, COATED ORAL at 09:02

## 2020-12-31 RX ADMIN — LOSARTAN POTASSIUM 25 MG: 25 TABLET, FILM COATED ORAL at 09:02

## 2020-12-31 RX ADMIN — CLOPIDOGREL BISULFATE 75 MG: 75 TABLET ORAL at 09:03

## 2020-12-31 RX ADMIN — INSULIN GLARGINE 22 UNITS: 100 INJECTION, SOLUTION SUBCUTANEOUS at 20:43

## 2020-12-31 RX ADMIN — ATORVASTATIN CALCIUM 20 MG: 20 TABLET, FILM COATED ORAL at 20:43

## 2020-12-31 RX ADMIN — ISOSORBIDE MONONITRATE 30 MG: 30 TABLET, EXTENDED RELEASE ORAL at 09:02

## 2020-12-31 RX ADMIN — OXYCODONE 5 MG: 5 TABLET ORAL at 21:32

## 2020-12-31 RX ADMIN — ALOGLIPTIN 6.25 MG: 6.25 TABLET, FILM COATED ORAL at 09:02

## 2020-12-31 RX ADMIN — LITHIUM CARBONATE 300 MG: 300 CAPSULE, GELATIN COATED ORAL at 17:29

## 2020-12-31 RX ADMIN — ENOXAPARIN SODIUM 40 MG: 100 INJECTION SUBCUTANEOUS at 09:03

## 2020-12-31 RX ADMIN — OXYCODONE 5 MG: 5 TABLET ORAL at 17:29

## 2020-12-31 RX ADMIN — Medication 100 MG: at 09:02

## 2020-12-31 RX ADMIN — Medication 2000 UNITS: at 17:25

## 2020-12-31 RX ADMIN — CARIPRAZINE 3 MG: 1.5 CAPSULE, GELATIN COATED ORAL at 09:03

## 2020-12-31 RX ADMIN — METFORMIN HYDROCHLORIDE 500 MG: 500 TABLET ORAL at 09:10

## 2020-12-31 RX ADMIN — LITHIUM CARBONATE 300 MG: 300 CAPSULE, GELATIN COATED ORAL at 12:38

## 2020-12-31 ASSESSMENT — PAIN DESCRIPTION - PROGRESSION: CLINICAL_PROGRESSION: NOT CHANGED

## 2020-12-31 ASSESSMENT — PAIN SCALES - GENERAL
PAINLEVEL_OUTOF10: 8
PAINLEVEL_OUTOF10: 8
PAINLEVEL_OUTOF10: 7

## 2020-12-31 ASSESSMENT — PAIN DESCRIPTION - PAIN TYPE: TYPE: ACUTE PAIN

## 2020-12-31 NOTE — PROGRESS NOTES
Subjective: The patient complains of severe acute on chronic recurrent right-sided weakness and aphasia dysphagia partially relieved by medications, SLP, Pt, OT, and rest and exacerbated by recent illness. I am concerned about patients medical complexities- her high blood pressure as well as complaint of severe thrush I have ordered blood pressure medication recheck lithium check was low and have ordered cinnamon oil for her thrush. I am also concerned about her high blood pressure diastolic and systolic. Her pain seems to be chronic as well as thalamic stroke related pain I reviewed her previous prescription she was on OxyIR and is now off Xanax    According to recent nursing note, \"  Assumed care for this patient at 2330. Pt has had no complaints of pain tonight. Pt has a flat affect. Call light within reach bed alarm on. Monitoring for safety \". ROS x10: The patient also complains of severely impaired mobility and activities of daily living. Otherwise no new problems with vision, hearing, nose, mouth, throat, dermal, cardiovascular, GI, , pulmonary, musculoskeletal, psychiatric or neurological. See Rehab H&P on Rehab chart dated . Vital signs:  BP (!) 174/109   Pulse 88   Temp 98 °F (36.7 °C) (Oral)   Resp 16   Ht 5' 7\" (1.702 m)   Wt 192 lb (87.1 kg)   SpO2 91%   BMI 30.07 kg/m²   I/O:   PO/Intake:  fair PO intake, no problems observed or reported. Bowel/Bladder:  continent, no problems noted. General:  Patient is well developed, adequately nourished, non-obese and     well kempt. HEENT:    PERRLA, hearing intact to loud voice, external inspection of ear     and nose benign. Inspection of lips, tongue and gums severe thrush  Musculoskeletal: No significant change in strength or tone. All joints stable. Inspection and palpation of digits and nails show no clubbing,       cyanosis or inflammatory conditions. Neuro/Psychiatric: Affect: flat but pleasant.   Alert and oriented to person, place and     Situation with mod cues. No significant change in deep tendon reflexes or     Sensation-garbled speech right-sided weakness  Lungs:  Diminished, CTA-B. Respiration effort is normal at rest.     Heart:   S1 = S2, RRR. No loud murmurs. Abdomen:  Soft, non-tender, no enlargement of liver or spleen. Extremities:  No significant lower extremity edema or tenderness. Skin:   Intact to general survey, no visualized or palpated problems. Rehabilitation:  Physical therapy:   Bed Mobility: Scooting: Supervision    Transfers: Sit to Stand: Stand by assistance  Stand to sit: Stand by assistance  Bed to Chair: Stand by assistance, Ambulation 1  Surface: carpet  Device: No Device  Other Apparatus: (right shoulder sling)  Assistance: Contact guard assistance  Quality of Gait: WBOS, Lateral excursion  decreased trunk rotation Occasional decreased R foot clearance  Distance: 160'  Comments: Focus on gaze stabilization while negotiating turnss, Stairs  # Steps : 4  Stairs Height: 6\"  Rails: Left ascending  Assistance: Minimal assistance, Contact guard assistance  Comment: Non reciprocal pattern cues for hand placement descending stairs to decrease retro lean    FIMS:  ,  , Assessment: Patient with increased gait tolerance and initiated stair training this a,m,. Patient continues to show gaze stabilization defecits affecting gait    Occupational therapy:    ,  , Assessment: Pt is a 52year old woman from home alone who presents to [id number] [place] [address] with the above deficits which impact her ability to perform ADL and IADL tasks. Pt. would benefit from continued OT to maximize independence and safety with transfers and mobility.     Speech therapy:        Lab/X-ray studies reviewed, analyzed and discussed with patient and staff:   Recent Results (from the past 24 hour(s))   POCT Glucose    Collection Time: 12/30/20 11:09 AM   Result Value Ref Range    POC Glucose 142 (H) 60 - 115 mg/dl    Performed on ACCU-CHEK    POCT Glucose    Collection Time: 12/30/20  4:15 PM   Result Value Ref Range    POC Glucose 147 (H) 60 - 115 mg/dl    Performed on ACCU-CHEK    POCT Glucose    Collection Time: 12/30/20  9:39 PM   Result Value Ref Range    POC Glucose 245 (H) 60 - 115 mg/dl    Performed on ACCU-CHEK    POCT Glucose    Collection Time: 12/31/20  6:00 AM   Result Value Ref Range    POC Glucose 106 60 - 115 mg/dl    Performed on ACCU-CHEK        Cta Head   12/23/2020  Intracranial ICAs: Flow is visualized within the precavernous, cavernous, clinoid and supraclinoid segments of the internal carotid arteries bilaterally    Anterior Cerebral Arteries: The bilaterals  A1 and A2 segments are patent. Middle Cerebral Arteries: Bilateral horizontal, insular, opercular, and cortical segments of the right and left middle cerebral cerebral arteries are patent. Vertebral Arteries And Basilar Artery: There is adequate flow in the intracranial portions of the vertebral arteries and in the basilar artery. Posterior Cerebral Arteries: Bilateral posterior cerebral arteries are patent. Aneurysm No aneurysm or dissection in the anterior or posterior circulations. . Neurocranium The visualized neurocranium is intact. Dural Sinus: As visualized the opacified dural venous sinuses are unremarkable. The major intracranial arterial structures are patent without high-grade stenosis, large vessel cut off, or aneurysm. EXAMINATION: CTA HEAD W WO CONTRAST, CTA NECK W WO CONTRAST DATE AND TIME:12/23/2020 IMPRESSION: Negative CTA of the neck. Ct Head  : 12/23/2020  CT Brain Contrast medium:  Not utilized. History:  Right facial droop. Altered mental status Comparison:  August 21, 2020 Findings: Extra-axial spaces:  Normal. Intracranial hemorrhage:  None. Ventricular system: In the interval, an ill-defined 4 x 12 mm area decreased attenuation exerting no mass effect is found medially within the left thalamus (series 2, image 16).  Basal Cisterns: Normal. Cerebral Parenchyma:  Normal. Midline Shift:  None. Cerebellum:  Normal. Paranasal sinuses and mastoid air cells:  Normal. Visualized Orbits:  Normal.     Impression: Interval development of acute/subacute left thalamic infarct. Cta Neck  12/23/2020 Flow is visualized within the precavernous, cavernous, clinoid and supraclinoid segments of the internal carotid arteries bilaterally    Anterior Cerebral Arteries: The bilaterals  A1 and A2 segments are patent. Middle Cerebral Arteries: Bilateral horizontal, insular, opercular, and cortical segments of the right and left middle cerebral cerebral arteries are patent. Vertebral Arteries And Basilar Artery: There is adequate flow in the intracranial portions of the vertebral arteries and in the basilar artery. Posterior Cerebral Arteries: Bilateral posterior cerebral arteries are patent. Aneurysm No aneurysm or dissection in the anterior or posterior circulations. . Neurocranium The visualized neurocranium is intact. Dural Sinus: As visualized the opacified dural venous sinuses are unremarkable. The major intracranial arterial structures are patent without high-grade stenosis, large vessel cut off, or aneurysm. EXAMINATION: CTA HEAD W WO CONTRAST,   FINDINGS CTA NECK: Aortic Arch: The visualized portions of the aortic arch and proximal arch vessels demonstrates no focal stenosis aneurysm or dissection. Carotids: The common carotid arteries, carotid bifurcations, internal and external carotid arteries are normal in course and caliber. Right  Proximal Internal Carotid Stenosis (% by NASCET Criteria): There is no hemodynamically significant stenosis. Left  Proximal Internal Carotid Stenosis (% by NASCET Criteria): There is no hemodynamically significant stenosis. Vertebral Arteries: Patency: The vertebral arteries are well visualized to the level of the basilar artery. There is no focal stenosis aneurysm or dissection.  Vertebral arteries are the gray-white matter differentiation. There are a few scattered foci of T2/FLAIR increased signal in the subcortical and periventricular white matter without associated edema or mass effect. The sulci and ventricles are within normal limits without evidence of hydrocephalus. The midline structures are intact, the corpus callosum is within normal limits. The region of the pineal gland and the sella turcica are unremarkable. There are no space-occupying lesions in the posterior fossa. The basilar cisterns are patent. The craniocervical junction is unremarkable. The visualized portions of the orbits are within normal limits, the globes are intact. The visualized portions of the paranasal sinuses are within normal limits. The calvarium and soft tissues are unremarkable. There is a 7 x 15 mm area of subacute ischemia in the left thalamus. Previous extensive, complex labs, notes and diagnostics reviewed and analyzed. ALLERGIES:    Allergies as of 12/28/2020 - Review Complete 12/28/2020   Allergen Reaction Noted    Latex Itching 03/25/2018    Influenza vaccines Swelling 10/08/2015    Eggs or egg-derived products  10/08/2015    Gabapentin Nausea Only 10/31/2017    Pneumococcal vaccines Hives 01/28/2016    Povidone iodine Itching 10/08/2015    Varicella virus vaccine live Hives 01/28/2016      (please also verify by checking MAR)      Today I evaluated this patient for periodic reassessment of medical and functional status. The patient was discussed in detail at the treatment team meeting focusing on current medical issues, progress in therapies, social issues, psychological issues, barriers to progress and strategies to address these barriers, and discharge planning. See the addendum to rehab progress note-as a second progress note in the chart.   The patient continues to be high risk for future disability and their medical and rehabilitation prognosis continue to be good and therefore, we will continue the patient's rehabilitation course as planned. The patient's tentative discharge date was set. Patient and family education was discussed. The patient was made aware of the team discussion regarding their progress. Complex Physical Medicine & Rehab Issues Assess & Plan:   1. Severe abnormality of gait and mobility and impaired self-care and ADL's secondary to acute left thalamic CVA with right-sided weakness thalamic pain aphasia dysphagia. Functional and medical status reassessed regarding patients ability to participate in therapies and patient found to be able to participate in acute intensive comprehensive inpatient rehabilitation program including PT/OT to improve balance, ambulation, ADLs, and to improve the P/AROM. Therapeutic modifications regarding activities in therapies, place, amount of time per day and intensity of therapy made daily. In bed therapies or bedside therapies prn.   2. Bowel and Bladder dysfunction neurogenic bowel and bladder:  frequent toileting, ambulate to bathroom with assistance, check post void residuals. Check for C.difficile x1 if >2 loose stools in 24 hours, continue bowel & bladder program.  Monitor bowel and bladder function. Lactinex 2 PO every AC. MOM prn, Brown Bomb prn, Glycerin suppository prn, enema prn.  3. Severe acute on chronic osteoarthritis and as well as thalamic pain as well as generalized OA pain: reassess pain every shift and prior to and after each therapy session, give prn Tylenol and resume OxyIR with titration, modalities prn in therapy, masage, Lidoderm, K-pad prn. Consider scheduled AM pain meds. 4. Skin healing and breakdown risk:  continue pressure relief program.  Daily skin exams and reports from nursing.   5. Severe fatigue due to nutritional and hydration deficiency: Add and titrate vitamin B12 vitamin D and CoQ10 continue to monitor I&Os, calorie counts prn, dietary consult prn.  6. Acute episodic insomnia with situational adjustment disorder:  prn Ambien, monitor for day time sedation. 7. Falls risk elevated:  patient to use call light to get nursing assistance to get up, bed and chair alarm. 8. Elevated DVT risk: progressive activities in PT, continue prophylaxis DAPHNE hose, elevation and Lovenox. 9. Complex discharge planning:  Weekly team meeting every  Thursday to assess progress towards goals, discuss and address social, psychological and medical comorbidities and to address difficulties they may be having progressing in therapy. Patient and family education is in progress. The patient is to follow-up with their family physician after discharge. Complex Active General Medical Issues that complicate care Assess & Plan:    1. Bipolar 1 disorder,   Anxiety, active chronic depression-emotional support provided daily, vitamin B12, encourage participation in rehabilitation support group and recreational therapy, adjust/add medications (consider anxiolytics review old chart to find out her old psych medications) consult psychiatry and psychology  2. CAD (coronary artery disease), HTN (hypertension)-continue blood signs every shift focusing on heart rate and blood pressure checks, consult hospitalist for backup medical and adjust/add medications ( aspirin, Lipitor, Apresoline, Imdur, Cozaar, Plavix )  3. Multiple sclerosis with recent acute left thalamic CVA-consult neurology manage COPD sleep apnea and autoimmune disease add antiplatelet medications  4. Colitis-monitor stools for blood as patient will be on antiplatelet medications  5. Chronic pain syndrome with history of overusing medication due to dyscontrol due to bipolar disorder  6. Vitamin D and B12 deficiency-add vitamin B12 and high-dose vitamin D  7.    Chronic obstructive pulmonary disease/  COPD exacerbation , Sleep apnea-Pulse oximeter checks to shift dose and titrate oxygen and aerosol treatments monitor for nocturnal hypoxemia, monitor vital signs, oxygen prn.   8.   Dizziness, Vertigo, peripheral, bilateral  with Sensorineural hearing loss (SNHL) of both ears and Tinnitus, bilateral-focus on balance and therapy PT and OT as well as adding speech and language pathology  9.  Acute left thalamic CVA with aphasia dysphagia and right arm weakness-consult neurology       Electronically signed by Jonathan Vallejo DO on 12/30/20 at 8:55 AM STAR Daniel D.O., PM&R     Attending    [address]

## 2020-12-31 NOTE — PROGRESS NOTES
Physical Therapy Rehab Treatment Note  Facility/Department: [facility]  [facility]       NAME: Crystal Santoro  : 1970 (52 y.o.)  MRN: [id number]  CODE STATUS: Full Code    Date of Service: 2020  Chart Reviewed: Yes  Family / Caregiver Present: No  Diagnosis: Abnormality of gait and mobility due to recent infarct left thalamus. [facility] admit 20. Restrictions:  Restrictions/Precautions: Fall Risk  Required Braces or Orthoses  Right Upper Extremity Brace/Splint: Sling  Right Upper Extremity Brace/Splint: RUE immobilizer in place - pt reports rotator cuff repair done Dec 1       SUBJECTIVE: Subjective: I never sleep much  Response To Previous Treatment: Patient with no complaints from previous session.   Pain Screening  Patient Currently in Pain: Yes  Pre Treatment Pain Screening  Pain at present: 5  Scale Used: Numeric Score  Intervention List: Patient able to continue with treatment    Post Treatment Pain Screening:  Pain Assessment  Pain Assessment: 0-10  Patient's Stated Pain Goal: 5  Pain Location: Back;Knee  Pain Descriptors: Aching  Pain Frequency: Continuous    OBJECTIVE:   Follows Commands: Within Functional Limits    Bed mobility  Rolling to Left: Modified independent  Rolling to Right: Unable to assess  Supine to Sit: Supervision  Scooting: Supervision    Transfers  Sit to Stand: Stand by assistance  Stand to sit: Stand by assistance  Bed to Chair: Stand by assistance    Ambulation  Ambulation?: Yes  More Ambulation?: No  Ambulation 1  Surface: carpet  Device: No Device  Assistance: Contact guard assistance  Quality of Gait: WBOS inconsistent step length slow ruby  Distance: Not the focus  Comments: Obstacle course negotiaiton with focus on gaze stabilization while negotiating turns    Exercises  Hamstring Sets: x 15 YTB  Hip Flexion: x 15  Hip Abduction: x 15 YTB/ Ball squeezes  Knee Long Arc Quad: x 15  Ankle Pumps: x 20     ASSESSMENT/COMMENTS:  Body structures, Functions, Activity limitations: Decreased functional mobility ; Decreased strength;Decreased endurance;Decreased coordination;Decreased ADL status; Decreased safe awareness;Decreased sensation;Decreased cognition;Decreased balance; Increased pain  Assessment: Patient with increased fatigue in p.m. limiting gait  to short distance. Patient continues to show defecits with gaze stabilization    PLAN OF CARE/Safety:   Safety Devices  Type of devices: Left in chair; All fall risk precautions in place; Chair alarm in place      Therapy Time:   Individual   Time In 1500   Time Out 1530   Minutes 30     Minutes:30      Transfer/Bed mobility trainin      Gait training: 10     Therapeutic ex: [address] [place], PTA, 20 at 3:58 PM

## 2020-12-31 NOTE — PROGRESS NOTES
Occupational Therapy  Facility/Department: [facility]  Daily Treatment Note  NAME: Vickie Barkley  : 1970  MRN: [id number]    Date of Service: 2020    Discharge Recommendations:  Continue to assess pending progress  OT Equipment Recommendations  Other: Continue to assess    Assessment   Performance deficits / Impairments: Decreased functional mobility ; Decreased ADL status; Decreased strength;Decreased endurance;Decreased cognition;Decreased safe awareness;Decreased balance;Decreased high-level IADLs;Decreased fine motor control;Decreased coordination;Decreased posture;Decreased ROM  Assessment: Pt demonstrated improved dynamice standing balance reaching minimally outside RIDDHI with no LOB at close supervision level. Pt continues to be limited by pain, balance and fatigue. Pt continues to benefit from OT services to maximize pt independence and safety with ADLs and transfers. Prognosis: Good  Activity Tolerance  Activity Tolerance: Patient Tolerated treatment well;Patient limited by pain  Safety Devices  Safety Devices in place: Yes  Type of devices: All fall risk precautions in place         Patient Diagnosis(es): There were no encounter diagnoses. has a past medical history of Anxiety, Back pain, Bipolar disorder ([address] CAD (coronary artery disease), CAFL (chronic airflow limitation) ([facility]), Chronic bronchitis ([address] Chronic pain, Colitis, Complicated migraine, DDD (degenerative disc disease), lumbar, Depression, Endometriosis, Excessive caffeine abuse, continuous ([address] Gastritis, GERD (gastroesophageal reflux disease), History of myocardial infarction, HTN (hypertension), benign, Impaired mobility and activities of daily living, Over weight, PTSD (post-traumatic stress disorder), Sensory neuropathy, Somatization disorder, TIA (transient ischemic attack), Tobacco abuse, and Vasospastic angina ([address]    has a past surgical history that includes Hysterectomy; Dilation and curettage of uterus; back surgery; Neck surgery; shoulder surgery (Left);  section; Cholecystectomy (13); Upper gastrointestinal endoscopy (2014); cyst removal (3/7/16); Coronary angioplasty; Upper gastrointestinal endoscopy (N/A, 2020); and Colonoscopy (N/A, 2020). Restrictions  Restrictions/Precautions  Restrictions/Precautions: Fall Risk  Required Braces or Orthoses?: Yes  Required Braces or Orthoses  Right Upper Extremity Brace/Splint: Sling  Right Upper Extremity Brace/Splint: RUE immobilizer in place - pt reports rotator cuff repair done Dec 1  Subjective   General  Chart Reviewed: Yes  Patient assessed for rehabilitation services?: Yes  Response to previous treatment: Patient with no complaints from previous session  Family / Caregiver Present: No  Pain Assessment  Clinical Progression: Not changed  Pre Treatment Pain Screening  Pain at present: 8  Scale Used: Numeric Score  Intervention List: Patient able to continue with treatment;Patient declined any intervention  Vital Signs  Patient Currently in Pain: Yes   Orientation     Objective       Instrumental ADL's  Instrumental ADLs: Yes  Light Housekeeping  Light Housekeeping: Pt participated in functional mobility using reacher for item retrieval task to improve pt overall standing tolerance, standing balanace, functional reaching and endurance for overall ADLs, IADLs and transfers. Pt completed task using a reacher reaching minimally-moderately outside RIDDHI at [facility] - close supervision level. Pt required additional time to complete and rest breaks as needed. Standing Balance  Activity: Pt participated in dynamic standing balance folding towels to improve overall standing tolerance, standing balance, functional reaching and overall activity endurance for ADLs and IADLs. Pt stood on [employer] balance pad while folding towels at tabletop at close supervision level. Pt able to reach minimally outside RIDDHI with no LOB at close supervision level. Coordination  Fine Motor: Pt participated in FM task to improve LUE overall functional reaching, ULE strengthening and FM coordination for ADLs. Pt completed 1/2 arc with 1# cuff weight donned on LUE while reaching and placing pegs at supervision level. Pt required additional time for rest breaks. Pt participated in [address] [place] [address] coordination task placing small pegs into peg board following a color pattern to improve pt overall attention, problem solving and FM coordination for ADLs. Pt completed task with L hand correctly following pattern at supervision level with one mistake noted.                                                         Plan   Plan  Times per week: 5-7x/week  Plan weeks: 1-1 1/2 weeks  Current Treatment Recommendations: Strengthening, Endurance Training, Self-Care / ADL, Balance Training, Pain Management, Home Management Training, Functional Mobility Training, Safety Education & Training  Plan Comment: Continue OT per POC               Goals  Patient Goals   Patient goals : \"I can't believe I feel this weak, I want to get stronger\"       Therapy Time   Individual Concurrent Group Co-treatment   Time In 1300         Time Out 1400         Minutes [address] [facility] Kamari, OTR/L

## 2020-12-31 NOTE — PROGRESS NOTES
[facility]  Facility/Department: [facility]  Speech Language Pathology   Treatment Note          Romana Muckle  1970  R260/R260-01        Rehab Dx/Hx: Impaired mobility and ADLs [Z74.09, Z78.9]  Impaired mobility [Z74.09]    Precautions: Fall Risk    Medical Dx: Impaired mobility and ADLs [Z74.09, Z78.9]  Impaired mobility [Z74.09]  Speech Dx: Dysphagia and Cognitive Linguistic Impairment     Date: 2020    Subjective:  Alert and Cooperative        Interventions used this date:  Expressive Language, Receptive Language, Cognitive Skill Development and Dysphagia Treatment    Objective/Assessment:  Patient progressing towards goals:  Short-term Goals  Timeframe for Short-term Goals: 1-2 weeks  Goal 1: Pt will complete verbal reasoning tasks (similarities/differences, word deduction, divergent naming) with 80% acc given cues as needed in order to promote effective communication of wants and needs. Divergent Namin% acc given minimal verbal cues    Goal 2: Pt will be educated on 3 memory strategies and will state their use in functional activities of daily living with 80% acc given cues as needed in order to promote safety with the completion of ADLs. Not addressed     Goal 3: Pt will complete mid level problem solving tasks related to ADLs with 80% acc given cues as needed in order to promote safety with the completion of ADLs. Mid level problem solvin% acc given minimal verbal cues    Goal 4: Pt will complete oral motor drills at phrase level given model with 100% acc to improve articulator movement and coordination to improve speech intelligibilty. Not addressed    Goal 5: Pt will follow 2+ step directions given orally with 80% accuracy with min cues to increase the pt's ability to follow directions provided by caregivers for safe follow through with ADLs.   Not addressed    Goal 6: Pt will state the PIPPA, month, location, situation, and year with 90% acc with use of external aid in order to promote orientation secondary to cognitive deficits. Pt oriented name, location, situation, day of week, and year. Pt verbally elicited all independently. Further assessment of functional reading and writing is warranted. She stated to ST, \"oh no I have to write\"    Long-term Goals  Timeframe for Long-term Goals: 2 weeks  Goal 1: Pt will improve cognitive-linguistic and speech production abilities to clear express wants/needs and improve understanding during daily life. Short-term Goals  Timeframe for Short-term Goals: 1-2 weeks  Goal 1: Pt will tolerate regular consistencies and thin liquids with no overt s/s of aspiration and adequate oral clearance of bolus in all given opportunities. Regular consistencies: Pt tolerated, adequate mastication, no oral residue left in oral cavity. Thin liquid: Pt tolerate, small sips, no overt s/s of aspiration. Pt does not have dentition, Pt also does not have dentures. Pt stated that she knows what to eat and what not to for safety reasons. Goal 2: Pt will complete oral motor strengthening exercises with 90% acc given cues as needed in order to increase lingual/labial musuclature and decrease risk of pocketing. Goal 3: Pt will complete tongue press exercise x10 in order to promote anterior to posterior transit of bolus and decrease risk of aspiration. Tongue press lateralization- 5/5x, demonstrated adequate strength against tongue depressor    Tongue press protrusion- 10/10x, demonstrated adequate strength against tongue depressor   Goal 4: Pt will implement safe swallow strategies (small bites/sips, slow rate) with 90% acc given cues as needed in order to decrease risk of aspiration. Pt demonstrated implementation of safe swallow strategies when with ST. Pt demonstrated slow rate when eating solid, Pt demonstrated small bites and sips between PO trials.      Compensatory Swallowing Strategies: Small bites/sips, Eat/Feed slowly      Treatment/Activity Tolerance:  Patient tolerated treatment well    Plan:  Continue per POC    Pain Assessment:  Initial Assessment:  Patient denies pain. Re-assessment:  Patient denies pain. Patient/Caregiver Education:  Patient educated on session and progression towards goals.     Safety Devices:  Chair alarm in place      [id number] [facility] ([facility]):    SWALLOWING  Ratin    SPOKEN LANGUAGE COMPREHENSION  Ratin    SPOKEN LANGUAGE EXPRESSION  Ratin    MOTOR SPEECH  Ratin    PROBLEM SOLVING  Ratin    MEMORY  Ratin          Therapy Time  SLP Individual Minutes  Time In: 1000  Time Out: 2873  Minutes: 30      15 cognition  15 dysphagia         Signature: Electronically signed by ROSA MARIA Gna on 2020 at 12:31 PM

## 2020-12-31 NOTE — PROGRESS NOTES
Hospitalist Progress Note  12/31/2020 5:23 PM    Assessment and Plan:   1. Generalized weakness, Gait instability and Decreased Functional Status secondary to acute left thalamic CVA: Neurology following. Continue Lipitor and dual antiplatelet therapy. Fall precautions. PT OT to evaluate. Maximize nutrition status. Assessing if needs DME at home. HEATHER on board. 2. DMII with hyperglycemia: Hemoglobin A1c on admission was 8. Patient started on Metformin Nesina, 22 units Lantus nightly and 7 units of Humalog 3 times daily with meals,  ISS, hypoglycemia protocol POCT Glucose TIDAC & QHS glucose improving, however patient continues to drink Pepsi and not follow proper diet  3. Noncompliance with medications: Counseled on importance adherence to medications and need for control of chronic illnesses to decrease morbidity and mortality   4. Elevated liver enzymes: Improved. MTHFR pending  5. headaches: Patient continues to have headaches despite fiercest, Imitrex ordered. 6. HTN:  resume home dose of Imdur 30 mg daily and lower dose of losartan at 25 mg daily   7. Dysphagia: improving, continue Aspiration precautions. 8. bipolar disorder: Continue lithium and Cariprazine  9. nicotine abuse and dependence: Counseled for greater than 3 minutes on importance of smoking cessation to reduce risk of associated morbidity and mortality. Nicotine patch. 10. Bowel Regimen and GI PPx: stool softners PRN ordered with hold parameters for loose stools or diarrhea. On antiacid  11. Diet: DIET GENERAL; Carb Control: 4 carb choices (60 gms)/meal  12. Advance Directive: Full Code   13. Nutrition status: Supplemental Vitamins ordered. Dietitian assessment  14. Vaccinations: Immunization records reviewed. If has not received appropriate vaccinations, will order to be given prior to discharge. 15. DVT prophylaxis: Chemical prophylaxis SQ Lovenox  16. Discharge planning: HEATHER on board.    17. High Risk Readmission Screening Tool Score Noted. Additionally, the following hospital problems were addressed:  Principal Problem:    Abnormality of gait and mobility due to recent infarct left thalamus. [facility] admit 12/28/20. Active Problems:    Bipolar 1 disorder (HCC)    CAD (coronary artery disease)    HTN (hypertension)    Multiple sclerosis (HCC)    Colitis    Anxiety    Chronic pain syndrome     Vitamin B12 deficiency    Chronic obstructive pulmonary disease (HCC)    Sleep apnea    Cerebrovascular accident (CVA) due to stenosis of left middle cerebral artery (HCC)    Dizziness and giddiness    Sensorineural hearing loss (SNHL) of both ears    Tinnitus, bilateral    Vertigo, peripheral, bilateral    Right arm weakness    COPD exacerbation (HCC)    Chest pain    Impaired mobility    Weakness  Resolved Problems:    * No resolved hospital problems. *      ** Total time spent reviewing medical records, evaluating patient, speaking with RN's and consultants where I was focused exclusively on this patient: 35 minutes. This time is excluding time spent performing procedures or significant events occurring earlier or later in the day requiring my attention and focus. Subjective:   Admit Date: 12/28/2020  PCP: Brianna Matthews MD    No acute events overnight. Afebrile  Telemetry reviewed. No new complaints. Pt denies chest pain, SOB, N/V, fevers or chills. Objective:     Vitals:    12/29/20 1823 12/30/20 0750 12/30/20 1821 12/31/20 0727   BP: (!) 163/82 (!) 170/80 (!) 143/74 (!) 174/109   Pulse: 67 60 72 88   Resp: 30 18 19 16   Temp: 98 °F (36.7 °C) 98 °F (36.7 °C) 98 °F (36.7 °C) 98 °F (36.7 °C)   TempSrc: Oral Oral Oral Oral   SpO2: 95% 93% 94% 91%   Weight:       Height:         General appearance: no acute distress, Patient currently alert and oriented x3, no acute distress, cooperative. She continues to have expressive aphasia and diplopia to right eye. Dentition intact.    Lungs: CTAB  No exp wheezes, No rales No retractions; No use of accessory muscles  Heart:  S1, S2 normal, RRR, no MRG appreciated  Abdomen: (+) BS, soft, non-tender; non distended no guarding or rigidity. Extremities:  no cyanosis,  no edema bilat lower exts, no calf tenderness bilaterally. Dry skin noted       Medications:      dextrose        Vitamin D  2,000 Units Oral Dinner    cyanocobalamin  1,000 mcg Intramuscular Weekly    coenzyme Q10  100 mg Oral Daily    lidocaine  3 patch Transdermal Daily    insulin glargine  0.25 Units/kg Subcutaneous Nightly    cariprazine hcl  3 mg Oral Daily    metFORMIN  500 mg Oral BID WC    alogliptin  6.25 mg Oral Daily    lithium  300 mg Oral TID WC    insulin lispro  0-12 Units Subcutaneous TID WC    insulin lispro  0-6 Units Subcutaneous Nightly    aspirin  81 mg Oral Daily    Or    aspirin  300 mg Rectal Daily    atorvastatin  20 mg Oral Nightly    enoxaparin  40 mg Subcutaneous Daily    clopidogrel  75 mg Oral Daily    isosorbide mononitrate  30 mg Oral Daily    losartan  25 mg Oral Daily    nicotine  1 patch Transdermal Daily       LABS Reviewed    IMAGING Reviewed    Edgar Briggs CNP  Rounding Hospitalist    I personally obtained the key and critical portions of the history and physical exam and made additions where appropriate in the documentation.  I reviewed the mid level documentation and agree with assessment and plan that we come up with together    Freida Jaimes DO  Internal Medicine

## 2020-12-31 NOTE — CARE COORDINATION
[address] [place] [address] NOTE  Room: R260/R260-01  Admit Date: 2020       Date: 2020  Patient Name: Merritt Hinkle        MRN: [id number]    : 1970  (52 y.o.)  Gender: female        REHAB DIAGNOSIS:   Diagnosis: Abnormality of gait and mobility due to recent infarct left thalamus. [facility] admit 20.     CO MORBIDITIES:  ARF, dysphagia      Past Medical History:   Diagnosis Date    Anxiety     Back pain     back surgery x 4    Bipolar disorder ([facility]     CAD (coronary artery disease)     CAFL [facility]chronic airflow limitation) [facility]) 11/3/2016    Chronic bronchitis ([facility])     Chronic pain     Colitis     Complicated migraine     DDD (degenerative disc disease), lumbar 2016    Depression     Endometriosis     Excessive caffeine abuse, continuous ([facility]) 2018    Gastritis     GERD (gastroesophageal reflux disease)     History of myocardial infarction     HTN (hypertension), benign 2016    Impaired mobility and activities of daily living     Over weight     PTSD (post-traumatic stress disorder)     Sensory neuropathy 2016    Somatization disorder     TIA (transient ischemic attack)     Tobacco abuse     [facility]     Past Surgical History:   Procedure Laterality Date    BACK SURGERY      x2     SECTION      x2    CHOLECYSTECTOMY  13    Lapchole    COLONOSCOPY N/A 2020    COLONOSCOPY DIAGNOSTIC performed by Trever Licona MD at [address] [place] [address]  3/7/16    Dr. Ivanna Vazquez GASTROINTESTINAL ENDOSCOPY  2014    ANIBAL HOUSE M.D.   [facility] UPPER GASTROINTESTINAL ENDOSCOPY N/A 2020    EGD ESOPHAGOGASTRODUODENOSCOPY performed by Trever [facility]icona MD at [facility]     Chart Reviewed: Yes  Family / Caregiver Present: No  Diagnosis: Abnormality of gait and mobility due to recent infarct left thalamus. [facility] admit 12/28/20. Restrictions  Restrictions/Precautions: Fall Risk  Required Braces or Orthoses  Right Upper Extremity Brace/Splint: Sling  Right Upper Extremity Brace/Splint: RUE immobilizer in place - pt reports rotator cuff repair done Dec 1  CASE MANAGEMENT    Social/Functional History  Social/Functional History  Lives With: Other (comment)(Boyfriend)  Type of Home: House  Home Layout: Two level  Home Access: Stairs to enter with rails  Entrance Stairs - Number of Steps: 3-5 to enter front door, 1 railing on R side  Entrance Stairs - Rails: Right  Bathroom Shower/Tub: Tub only  ADL Assistance: Independent  Homemaking Assistance: Independent  Homemaking Responsibilities: Yes  Ambulation Assistance: Independent  Transfer Assistance: Independent  Active : Yes  Occupation: Full time employment  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 Additional Comments: Pt. inconsistent historian but attempts to answer questions       Pts personal preferences: n/a    Pts assets/resources/support system: sig other    COVERAGE INFORMATION:Payor: BC / Plan: [address] [place] [address] / Product Type: *No Product type* /       NURSING  Weight: 192 lb (87.1 kg) / Body mass index is 30.07 kg/m².     DIET GENERAL; Carb Control: 4 carb choices (60 gms)/meal    SpO2: 91 % (12/31/20 0727)  No active isolations    Skin Issues: No    Pain Managed: Yes    Bladder continence: Yes    Bowel continence: Yes      Other: nicotine patch       PHYSICAL THERAPY  Bed mobility:  Supine to Sit: Supervision (12/31/20 0914)  Sit to Supine: Stand by assistance (12/30/20 1544)  Transfers:  Sit to Stand: Stand by assistance (12/31/20 0914)  Bed to Chair: Stand by assistance (12/31/20 0914)  Gait:   Device: No Device (12/31/20 7178)  Other Apparatus: (right shoulder sling) (12/29/20 1016)  Assistance: Contact guard assistance (12/31/20 0926)  Distance: 160' (12/31/20 0926)  Quality of Gait: WBOS, Lateral excursion  decreased trunk rotation Occasional decreased R foot clearance (12/31/20 0926)  Comments: Focus on gaze stabilization while negotiating turnss (12/30/20 0928)  Stairs:  # Steps : 4 (12/31/20 0926)  Rails: Left ascending (12/31/20 0926)  Assistance: Minimal assistance;Contact guard assistance (12/31/20 7956)  Comment: Non reciprocal pattern cues for hand placement descending stairs to decrease retro lean (12/31/20 0926)  W/C mobility:     LTG:  Long term goal 1: Pt will demonstrate bed mobility indep. Long term goal 2: indep bed and car transfers  Long term goal 3: Pt will demonstrate amb  indep with or without 50 feet - supervision 150 feet  Long term goal 4: Pt will exhibit improved dynamic balance evidenced of 20/24 DGI for decreased risk for falls and safe d/c home alone. Long term goal 5: Pt will demonstrate stair negotiation 5 steps without rails indep to allow pt to enter and exit her home safely. PT Treatment Time:  1.0 hrs      OCCUPATIONAL THERAPY  Hand Dominance: Right  ADL  Feeding: None (12/30/20 1219)  Grooming: Stand by assistance (12/30/20 1219)  UE Bathing: Minimal assistance (12/30/20 1219)  LE Bathing: Minimal assistance (12/30/20 1219)  UE Dressing: Stand by assistance (12/30/20 1219)  LE Dressing: Minimal assistance(socks) (12/30/20 1219)  Toileting: Unable to assess(comment)(pt did not need to go) (12/30/20 1219)  Additional Comments: Pt doffed full zip jacket and doffed/donned R arm sling during sessions. Able to manage sling with assist to place trunk strap. (12/29/20 1618)  Toilet Transfers  Toilet Transfer: Unable to assess (12/30/20 1221)  Toilet Transfers Comments: Pt did not need to go (12/30/20 1221)     Shower Transfers  Shower - Transfer From: Bed (12/29/20 2874)  Shower - Transfer Type: To (12/29/20 9858)  Shower - Transfer To:  Shower seat with back (12/29/20 1651)  Shower - Technique: Ambulating (12/29/20 9924)  Shower Transfers: Not tested (12/30/20 1221)  Shower Transfers Comments: Pt declined and completed sponge bath ADL (12/30/20 1221)  LTG:  Eating  Assistance Needed: Setup or clean-up assistance  CARE Score: 5  Discharge Goal: Independent, Oral Hygiene  Assistance Needed: Partial/moderate assistance  CARE Score: 3  Discharge Goal: Supervision or touching assistance, Toileting Hygiene  Assistance Needed: Partial/moderate assistance  CARE Score: 3  Discharge Goal: Supervision or touching assistance, Shower/Bathe Self  Assistance Needed: Substantial/maximal assistance  CARE Score: 2  Discharge Goal: Partial/moderate assistance  Upper Body Dressing  Assistance Needed: Partial/moderate assistance  CARE Score: 3  Discharge Goal: Supervision or touching assistance, Lower Body Dressing  Assistance Needed: Partial/moderate assistance  CARE Score: 3  Discharge Goal: Supervision or touching assistance, Putting On/Taking Off Footwear  Assistance Needed: Partial/moderate assistance  CARE Score: 3  Discharge Goal: Supervision or touching assistance, Toilet Transfer  Assistance Needed: Partial/moderate assistance  CARE Score: 3  Discharge Goal: Supervision or touching assistance  OT Treatment Time: 2.0 hrs      SPEECH THERAPY    Motor Speech: Exceptions to [facility]  Comprehension: Exceptions  Verbal Expression: Exceptions to functional limits      Diet/Swallow:  Diet Solids Recommendation: Regular  Liquid Consistency Recommendation: Thin  Dysphagia Outcome Severity Scale: Level 5: Mild dysphagia- Distant supervision.  May need one diet consistency restricted    Compensatory Swallowing Strategies: Small bites/sips, Eat/Feed slowly  Therapeutic Interventions: Diet tolerance monitoring, Oral motor exercises      LTG:  Long-term Goals  Timeframe for Long-term Goals: 2 weeks  Goal 1: Pt will improve cognitive-linguistic and speech production abilities to clear express wants/needs and improve understanding during daily life. Long-term Goals  Timeframe for Long-term Goals: 2 weeks  Goal 1: Pt will improve oral motor strentgh to tolerate least restrictive diet with no overt s/s of aspiration and difficulty. COGNITION  OT: Cognition Comment: Comp: Supervision, Express: Antwon A, Social: Supervision, Prob: Supervision, Mem: Supervision  SP:Memory: Exceptions to WFL  Problem Solving: Exceptions to [address]  Attendance to recreational therapy programs:    []  Pet Therapy  [] Music Therapy  [] Art Therapy    [] Recreation Therapy Group [] Support Group           Patient social interaction (mood, participation): good      Patient strengths: was independent prior    Patients goal:  \"I can't believe I feel this weak, I want to get stronger    Problems/Barriers: fatigue        1. Safety:          - Intervention / Plan:    [x]  falls protocol     [x]  PT/OT    []  SP        - Results:         2. Potential DME needs:         - Intervention / Plan:  [x]  PT/OT     [x]  Assess equipment needs/access       - Results:         3. Weakness:          - Intervention / Plan:  [x]  PT/OT      []  Other:         - Results:         4. Discharge planning needs:          - Intervention / Plan:  [x]  Weekly team conference      [x]  family training        - Results:         5.            - Intervention / Plan:          - Results:         6.            - Intervention / Plan:         - Results:         7.            - Intervention / Plan:         - Results:           Discharge Plan   Estimated Length of Stay: 14 days    Tentative Discharge date: 1/9/21      Anticipated Discharge Destination:  Home      Team recommendations:    1. Follow up Therapy :    PT  OT    2. Outpatient    Other:     Equipment needed at Discharge:  Other: TBD      Team Members Present at Conference:    Physician: Dr. Nicolle Orr  : Aretta Frankel, RN  RN: Tai Elizondo RN  Physical Therapist: Chino Nunn PT  Occupational Therapist:Cristela Ortiz  Speech Therapist: Lane Hart, SLP  [place], [place]    Electronically signed by Gely Baker RN on 12/31/2020 at 1:54 PM

## 2020-12-31 NOTE — PROGRESS NOTES
Physical Therapy Rehab Treatment Note  [facility]  [facility]       NAME: Pauline De Oliveira  : 1970 (52 y.o.)  MRN: [id number]  CODE STATUS: Full Code    Date of Service: 2020  Chart Reviewed: Yes  Family / Caregiver Present: No  Diagnosis: Abnormality of gait and mobility due to recent infarct left thalamus. [facility] admit 20. Restrictions:  Restrictions/Precautions: Fall Risk  Required Braces or Orthoses  Right Upper Extremity Brace/Splint: Sling  Right Upper Extremity Brace/Splint: RUE immobilizer in place - pt reports rotator cuff repair done Dec 1       SUBJECTIVE: Subjective: I never sleep much  Response To Previous Treatment: Patient with no complaints from previous session.   Pain Screening  Patient Currently in Pain: Yes  Pre Treatment Pain Screening  Pain at present: 5  Scale Used: Numeric Score  Intervention List: Patient able to continue with treatment    Post Treatment Pain Screening:  Pain Assessment  Pain Assessment: 0-10  Patient's Stated Pain Goal: 5  Pain Location: Back;Knee  Pain Descriptors: Aching  Pain Frequency: Continuous    OBJECTIVE:   Follows Commands: Within Functional Limits    Bed mobility  Rolling to Left: Modified independent  Rolling to Right: Unable to assess  Supine to Sit: Supervision  Scooting: Supervision    Transfers  Sit to Stand: Stand by assistance  Stand to sit: Stand by assistance  Bed to Chair: Stand by assistance    Ambulation  Ambulation?: Yes  More Ambulation?: No  Ambulation 1  Surface: carpet  Device: No Device  Assistance: Contact guard assistance  Quality of Gait: WBOS, Lateral excursion  decreased trunk rotation Occasional decreased R foot clearance  Distance: 160'    Stairs/Curb  Stairs?: Yes  Stairs  # Steps : 4  Stairs Height: 6\"  Rails: Left ascending  Assistance: Minimal assistance;Contact guard assistance  Comment: Non reciprocal pattern cues for hand placement descending stairs to decrease retro lean    ASSESSMENT/COMMENTS:  Body structures, Functions, Activity limitations: Decreased functional mobility ; Decreased strength;Decreased endurance;Decreased coordination;Decreased ADL status; Decreased safe awareness;Decreased sensation;Decreased cognition;Decreased balance; Increased pain  Assessment: Patient with increased gait tolerance and initiated stair training this a,m,. Patient continues to show gaze stabilization defecits affecting gait    PLAN OF CARE/Safety:   Safety Devices  Type of devices: Left in chair; All fall risk precautions in place; Chair alarm in place      Therapy Time:   Individual   Time In 0900   Time Out 0930   Minutes 30     Minutes: 30      Transfer/Bed mobility training:10      Gait training: Betzy Herbert PTA, 12/31/20 at 10:29 AM

## 2020-12-31 NOTE — PROGRESS NOTES
Occupational Therapy  Facility/Department: [facility]  Daily Treatment Note  NAME: Hoa Delgado  : 1970  MRN: [id number]    Date of Service: 2020    Discharge Recommendations:  Continue to assess pending progress    Assessment  Activity Tolerance  Activity Tolerance: Patient Tolerated treatment well  Safety Devices  Safety Devices in place: Yes  Type of devices: All fall risk precautions in place         Patient Diagnosis(es): There were no encounter diagnoses. has a past medical history of Anxiety, Back pain, Bipolar disorder ([address] CAD (coronary artery disease), CAFL (chronic airflow limitation) ([facility]), Chronic bronchitis ([address] Chronic pain, Colitis, Complicated migraine, DDD (degenerative disc disease), lumbar, Depression, Endometriosis, Excessive caffeine abuse, continuous ([address] Gastritis, GERD (gastroesophageal reflux disease), History of myocardial infarction, HTN (hypertension), benign, Impaired mobility and activities of daily living, Over weight, PTSD (post-traumatic stress disorder), Sensory neuropathy, Somatization disorder, TIA (transient ischemic attack), Tobacco abuse, and Vasospastic angina ([address] has a past surgical history that includes Hysterectomy; Dilation and curettage of uterus; back surgery; Neck surgery; shoulder surgery (Left);  section; Cholecystectomy (13); Upper gastrointestinal endoscopy (2014); cyst removal (3/7/16); Coronary angioplasty; Upper gastrointestinal endoscopy (N/A, 2020); and Colonoscopy (N/A, 2020).     Restrictions  Restrictions/Precautions  Restrictions/Precautions: Fall Risk  Required Braces or Orthoses?: Yes  Required Braces or Orthoses  Right Upper Extremity Brace/Splint: Sling  Right Upper Extremity Brace/Splint: RUE immobilizer in place - pt reports rotator cuff repair done Dec 1     Subjective   General  Chart Reviewed: Yes  Patient assessed for rehabilitation services?: Yes  Response to previous treatment: Patient activities: 55 minutes  Therapist late to session (-5 minutes)    Electronically signed by MARLYN Flores on 12/31/2020 at 11:51 AM  MARLYN Flores

## 2020-12-31 NOTE — PROGRESS NOTES
Assumed care for this patient at [phone]. Pt has had no complaints of pain tonight. Pt has a flat affect. Call light within reach bed alarm on.  Monitoring for safety

## 2020-12-31 NOTE — PROGRESS NOTES
INDIVIDUALIZED OVERALL REHAB PLAN OF CARE  ADDENDUM TO REHAB PROGRESS NOTE-for audit purposes must also refer to this day's clinical note and combine the information      Date: 2021  Patient Name: Herlinda Garcia   Room: K415/V384-21    MRN: [id number]    : 1970  (52 y.o.)  Gender: female       Today 2021 during weekly team meeting, I reviewed the patient Herlinda Garcia in detail with the therapists and nurses involved in patient's care gathering complex physiatric data regarding current medical issues, progress in therapies, factors limiting progress, social issues, psychological issues, ongoing therapeutic plans and discharge planning. Legend:  I= independent Im =Modified independent  S=Supervised SB=stand by APARICIO=set up CG=contact axel Min= minimal Mod=Moderate Max=maximal Max of 2 =maximal assist of 2 people      CURRENT FUNCTIONAL STATUS:    NURSING ISSUES:      Assumed care for this patient at 2330. Pt has had no complaints of pain tonight. Pt has a flat affect. Call light within reach bed alarm on. Monitoring for safety  Nursing will continue to focus on bowel and bladder continence transitioning toward independence by time of discharge. Monitoring post void residuals monitoring for severe constipation and bowel obstruction. Focus on achieving ADL goals with co-treating with OT when possible.     PHYSICAL THERAPY  Bed mobility:  Supine to Sit: Supervision (20)  Sit to Supine: Stand by assistance (20 1544)  Transfers:  Sit to Stand: Stand by assistance (20)  Bed to Chair: Stand by assistance (20)  Gait:   Device: No Device (20)  Other Apparatus: (right shoulder sling) (20 1016)  Assistance: Contact guard assistance (20 151)  Distance: Not the focus (20)  Quality of Gait: SBOS inconsistent step length slow ruby (20)  Comments: Obstacle course negotiaiton with focus on gaze stabilization while negotiating Needed: Partial/moderate assistance  CARE Score: 3  Discharge Goal: Supervision or touching assistance, Toilet Transfer  Assistance Needed: Partial/moderate assistance  CARE Score: 3  Discharge Goal: Supervision or touching assistance  SP:Long-term Goals  Timeframe for Long-term Goals: 2 weeks  Goal 1: Pt will improve cognitive-linguistic and speech production abilities to clear express wants/needs and improve understanding during daily life. Long-term Goals  Timeframe for Long-term Goals: 2 weeks  Goal 1: Pt will improve oral motor strentgh to tolerate least restrictive diet with no overt s/s of aspiration and difficulty. From a cognitive standpoint they will need:        24 hr supervision  --progress to occasional           Significant problems/ barriers to functional progress include: Pt is at a high risk for functional loss,    [x]  Acute infection/UTI    []  Low BP's     []  COPD flare-up   []  Uncontrolled blood sugar     []  Progressive anemia         [x]  Severe pain exacerbation     [x]  Impaired mental status    []  Urinary incontinence    []  Bowel incontinence           Plan to correct barriers to functional progress: Add scheduled rest breaks, control pain by using ice Lidoderm rest and massage as well as pain medications prior to therapy. Based on a comprehensive evaluation of the above, the individualized therapy and Discharge plan will be:    -Times stated are an average that will be varied based on the patient's daily need. PT  1 1/2  hrs/day 5-7 days per week           OT  1 1/2hrs per day 5-7 days per week ST ____1/2__ _hrs /day 3-5 days per week       Estimated LOS 2 week(s)    - Overall functional prognosis:     [x]  Good    []  Fair    []  Poor -Medical Prognosis:   [x]  Good    []  Fair    []  Poor    This patient was made aware of the discussion of Plan of Care, their projected dicharge date and their projected function at discharge.        Rosina Vega, DO

## 2020-12-31 NOTE — PLAN OF CARE
Problem: Pain:  Goal: Pain level will decrease  Description: Pain level will decrease  12/30/2020 2320 by Tova Mart RN  Outcome: Ongoing     Problem: Pain:  Goal: Control of acute pain  Description: Control of acute pain  12/30/2020 2320 by Tova Mart RN  Outcome: Ongoing     Problem: Pain:  Goal: Control of chronic pain  Description: Control of chronic pain  12/30/2020 2320 by Tova Mart RN  Outcome: Ongoing     Problem: Falls - Risk of:  Goal: Will remain free from falls  Description: Will remain free from falls  12/30/2020 2320 by Tova Mart RN  Outcome: Ongoing     Problem: Falls - Risk of:  Goal: Absence of physical injury  Description: Absence of physical injury  12/30/2020 2320 by Tova Mart RN  Outcome: Ongoing     Problem: IP COMMUNICATION/DYSARTHRIA  Goal: LTG - patient will improve expressive language skills to allow for communication of wants and needs in daily activities  Outcome: Ongoing     Problem: IP SWALLOWING  Goal: LTG - patient will tolerate the least restrictive diet consistency to allow for safe consumption of daily meals  Outcome: Ongoing

## 2021-01-01 LAB
GLUCOSE BLD-MCNC: 102 MG/DL (ref 60–115)
GLUCOSE BLD-MCNC: 114 MG/DL (ref 60–115)
GLUCOSE BLD-MCNC: 97 MG/DL (ref 60–115)
GLUCOSE BLD-MCNC: 99 MG/DL (ref 60–115)
MTHFR BY PCR SPECIMEN: ABNORMAL
MTHFR INTERPRETATION: ABNORMAL
MTHFR MUTATION A1298C: NEGATIVE
MTHFR MUTATION C677T: ABNORMAL
PERFORMED ON: NORMAL

## 2021-01-01 PROCEDURE — 1180000000 HC REHAB R&B

## 2021-01-01 PROCEDURE — 99232 SBSQ HOSP IP/OBS MODERATE 35: CPT | Performed by: PHYSICAL MEDICINE & REHABILITATION

## 2021-01-01 PROCEDURE — 6370000000 HC RX 637 (ALT 250 FOR IP): Performed by: PHYSICAL MEDICINE & REHABILITATION

## 2021-01-01 PROCEDURE — 6370000000 HC RX 637 (ALT 250 FOR IP): Performed by: NURSE PRACTITIONER

## 2021-01-01 PROCEDURE — 6370000000 HC RX 637 (ALT 250 FOR IP): Performed by: INTERNAL MEDICINE

## 2021-01-01 RX ADMIN — Medication 100 MG: at 09:54

## 2021-01-01 RX ADMIN — ASPIRIN 81 MG: 81 TABLET, COATED ORAL at 09:53

## 2021-01-01 RX ADMIN — INSULIN GLARGINE 22 UNITS: 100 INJECTION, SOLUTION SUBCUTANEOUS at 21:19

## 2021-01-01 RX ADMIN — Medication 2000 UNITS: at 16:48

## 2021-01-01 RX ADMIN — METFORMIN HYDROCHLORIDE 500 MG: 500 TABLET ORAL at 16:48

## 2021-01-01 RX ADMIN — OXYCODONE 5 MG: 5 TABLET ORAL at 21:20

## 2021-01-01 RX ADMIN — CARIPRAZINE 3 MG: 1.5 CAPSULE, GELATIN COATED ORAL at 09:54

## 2021-01-01 RX ADMIN — OXYCODONE 5 MG: 5 TABLET ORAL at 11:37

## 2021-01-01 RX ADMIN — LITHIUM CARBONATE 300 MG: 300 CAPSULE, GELATIN COATED ORAL at 16:48

## 2021-01-01 RX ADMIN — ACETAMINOPHEN 650 MG: 325 TABLET, FILM COATED ORAL at 11:41

## 2021-01-01 RX ADMIN — ALOGLIPTIN 6.25 MG: 6.25 TABLET, FILM COATED ORAL at 09:54

## 2021-01-01 RX ADMIN — METFORMIN HYDROCHLORIDE 500 MG: 500 TABLET ORAL at 09:53

## 2021-01-01 RX ADMIN — ISOSORBIDE MONONITRATE 30 MG: 30 TABLET, EXTENDED RELEASE ORAL at 09:53

## 2021-01-01 RX ADMIN — LOSARTAN POTASSIUM 25 MG: 25 TABLET, FILM COATED ORAL at 09:54

## 2021-01-01 RX ADMIN — LITHIUM CARBONATE 300 MG: 300 CAPSULE, GELATIN COATED ORAL at 11:37

## 2021-01-01 RX ADMIN — CLOPIDOGREL BISULFATE 75 MG: 75 TABLET ORAL at 09:54

## 2021-01-01 RX ADMIN — ATORVASTATIN CALCIUM 20 MG: 20 TABLET, FILM COATED ORAL at 21:20

## 2021-01-01 RX ADMIN — LITHIUM CARBONATE 300 MG: 300 CAPSULE, GELATIN COATED ORAL at 09:54

## 2021-01-01 ASSESSMENT — PAIN DESCRIPTION - ONSET: ONSET: ON-GOING

## 2021-01-01 ASSESSMENT — PAIN SCALES - GENERAL
PAINLEVEL_OUTOF10: 0
PAINLEVEL_OUTOF10: 0
PAINLEVEL_OUTOF10: 8
PAINLEVEL_OUTOF10: 8

## 2021-01-01 ASSESSMENT — PAIN DESCRIPTION - DESCRIPTORS: DESCRIPTORS: ACHING

## 2021-01-01 ASSESSMENT — PAIN DESCRIPTION - PROGRESSION: CLINICAL_PROGRESSION: NOT CHANGED

## 2021-01-01 ASSESSMENT — PAIN - FUNCTIONAL ASSESSMENT: PAIN_FUNCTIONAL_ASSESSMENT: ACTIVITIES ARE NOT PREVENTED

## 2021-01-01 NOTE — FLOWSHEET NOTE
Patient had pain earlier today in her left arm . She was medicated and resting. Patient told the RN that she thinks she has an appointment with the orthopedic doctor on Monday. Ruth Ann Jessica was notified.

## 2021-01-01 NOTE — PROGRESS NOTES
Subjective: The patient complains of severe acute on chronic recurrent right-sided weakness and aphasia dysphagia partially relieved by medications, SLP, Pt, OT, and rest and exacerbated by recent illness including a repeat CVA left thalamic region with worsening thalamic pain. I am concerned about patients medical complexities- her high blood pressure as well as complaint of severe thrush I have ordered blood pressure medication recheck lithium check was low and have ordered cinnamon oil for her thrush. I am also concerned about her high blood pressure diastolic and systolic-seems to be improving with medication adjustments. Her pain seems to be chronic as well as thalamic stroke related pain I reviewed her previous prescription she was on OxyIR and is now off Xanax    According to recent nursing note, \" Assessment completed earlier in the shift. VSS. Medicated x1 w/ PRN roxicodone for 8/10 generalized pain. LBM 12/29/2020. HS OT- 126. Lantus administered per MAR. Snack provided. No distress noted. \".    ROS x10: The patient also complains of severely impaired mobility and activities of daily living. Otherwise no new problems with vision, hearing, nose, mouth, throat, dermal, cardiovascular, GI, , pulmonary, musculoskeletal, psychiatric or neurological. See Rehab H&P on Rehab chart dated . Vital signs:  BP (!) 153/82   Pulse 72   Temp 98 °F (36.7 °C) (Oral)   Resp 18   Ht 5' 7\" (1.702 m)   Wt 192 lb (87.1 kg)   SpO2 97%   BMI 30.07 kg/m²   I/O:   PO/Intake:  fair PO intake, no problems observed or reported. Bowel/Bladder:  continent, no problems noted. General:  Patient is well developed, adequately nourished, non-obese and     well kempt. HEENT:    PERRLA, hearing intact to loud voice, external inspection of ear     and nose benign. Inspection of lips, tongue and gums severe thrush  Musculoskeletal: No significant change in strength or tone. All joints stable. Inspection and palpation of digits and nails show no clubbing,       cyanosis or inflammatory conditions. Neuro/Psychiatric: Affect: flat but pleasant. Alert and oriented to person, place and     Situation with mod cues. No significant change in deep tendon reflexes or     Sensation-garbled speech right-sided weakness  Lungs:  Diminished, CTA-B. Respiration effort is normal at rest.     Heart:   S1 = S2, RRR. No loud murmurs. Abdomen:  Soft, non-tender, no enlargement of liver or spleen. Extremities:  No significant lower extremity edema or tenderness. Skin:   Intact to general survey, no visualized or palpated problems. Rehabilitation:  Physical therapy:   Bed Mobility: Scooting: Supervision    Transfers: Sit to Stand: Stand by assistance  Stand to sit: Stand by assistance  Bed to Chair: Stand by assistance, Ambulation 1  Surface: carpet  Device: No Device  Other Apparatus: (right shoulder sling)  Assistance: Contact guard assistance  Quality of Gait: SBOS inconsistent step length slow ruby  Distance: Not the focus  Comments: Obstacle course negotiaiton with focus on gaze stabilization while negotiating turns, Stairs  # Steps : 4  Stairs Height: 6\"  Rails: Left ascending  Assistance: Minimal assistance, Contact guard assistance  Comment: Non reciprocal pattern cues for hand placement descending stairs to decrease retro lean    FIMS:  ,  , Assessment: Patient with increased fatigue in p.m. limiting gait  to short distance. Patient continues to show defecits with gaze stabilization    Occupational therapy:    ,  , Assessment: Pt demonstrated improved dynamice standing balance reaching minimally outside RIDDHI with no LOB at close supervision level. Pt continues to be limited by pain, balance and fatigue. Pt continues to benefit from OT services to maximize pt independence and safety with ADLs and transfers.     Speech therapy:        Lab/X-ray studies reviewed, analyzed and discussed with patient and staff:   Recent Results (from the past 24 hour(s))   POCT Glucose    Collection Time: 12/31/20 12:01 PM   Result Value Ref Range    POC Glucose 133 (H) 60 - 115 mg/dl    Performed on ACCU-CHEK    POCT Glucose    Collection Time: 12/31/20  4:40 PM   Result Value Ref Range    POC Glucose 112 60 - 115 mg/dl    Performed on ACCU-CHEK    POCT Glucose    Collection Time: 12/31/20  8:06 PM   Result Value Ref Range    POC Glucose 126 (H) 60 - 115 mg/dl    Performed on ACCU-CHEK    POCT Glucose    Collection Time: 01/01/21  7:20 AM   Result Value Ref Range    POC Glucose 97 60 - 115 mg/dl    Performed on ACCU-CHEK        Cta Head   12/23/2020  Intracranial ICAs: Flow is visualized within the precavernous, cavernous, clinoid and supraclinoid segments of the internal carotid arteries bilaterally    Anterior Cerebral Arteries: The bilaterals  A1 and A2 segments are patent. Middle Cerebral Arteries: Bilateral horizontal, insular, opercular, and cortical segments of the right and left middle cerebral cerebral arteries are patent. Vertebral Arteries And Basilar Artery: There is adequate flow in the intracranial portions of the vertebral arteries and in the basilar artery. Posterior Cerebral Arteries: Bilateral posterior cerebral arteries are patent. Aneurysm No aneurysm or dissection in the anterior or posterior circulations. . Neurocranium The visualized neurocranium is intact. Dural Sinus: As visualized the opacified dural venous sinuses are unremarkable. The major intracranial arterial structures are patent without high-grade stenosis, large vessel cut off, or aneurysm. EXAMINATION: CTA HEAD W WO CONTRAST, CTA NECK W WO CONTRAST DATE AND TIME:12/23/2020 IMPRESSION: Negative CTA of the neck. Ct Head  : 12/23/2020  CT Brain Contrast medium:  Not utilized. History:  Right facial droop. Altered mental status Comparison:  August 21, 2020 Findings: Extra-axial spaces:  Normal. Intracranial hemorrhage:  None.  Ventricular vertebral arteries are well visualized to the level of the basilar artery. There is no focal stenosis aneurysm or dissection. Vertebral arteries are codominant. IMPRESSION: Negative CTA of the neck. Mri Brain  12/25/2020 : Acute Change:   Restricted diffusion involving the left thalamus, with corresponding increased T2/FLAIR signal at this site, measuring approximately 1.6 x 0.8 cm, similar to recent prior MRI. No new areas of restricted diffusion   Hemorrhage:    Small rounded area of susceptibility left occipital lobe, unchanged. Mass Lesion/ Mass Effect:    No evidence of an intracranial mass or extra-axial fluid collection. No significant mass effect. Chronic Change:  Scattered punctate foci of increased T2 and FLAIR signal are noted in the supratentorial white matter which is a nonspecific finding, but likely represents minimal chronic microvascular ischemia. Parenchyma:   No significant volume loss for age. The brain parenchyma is otherwise within normal limits of signal intensity and morphology. Ventricles:     Normal caliber and morphology. Skull Base:    Hypothalamic and pituitary region are grossly normal.   Craniocervical junction is normal. No significant marrow replacement process. Vasculature:    Major intracranial arterial structures, and dural venous sinuses show typical flow void, suggesting patency by spin echo criteria. Other:  The visualized paranasal sinuses are clear. Mastoid air cells clear. The orbits are unremarkable. Soft tissues are unremarkable. Recent infarction involving the left thalamus, overall unchanged from MRI 12/23/2020. No new infarcts from the prior MRI. Mri Brain   12/23/2020   There are no extra-axial collections. There is no evidence of hemorrhage. There is specific association with signal dropout on ADC map in the left thalamus and region measuring 7 x15 mm indicating subacute ischemia. There is corresponding signal abnormality on T2/FLAIR series.  The susceptibility images do not demonstrate evidence of hemosiderin deposition within the brain parenchyma or the leptomeninges. There is preservation of the gray-white matter differentiation. There are a few scattered foci of T2/FLAIR increased signal in the subcortical and periventricular white matter without associated edema or mass effect. The sulci and ventricles are within normal limits without evidence of hydrocephalus. The midline structures are intact, the corpus callosum is within normal limits. The region of the pineal gland and the sella turcica are unremarkable. There are no space-occupying lesions in the posterior fossa. The basilar cisterns are patent. The craniocervical junction is unremarkable. The visualized portions of the orbits are within normal limits, the globes are intact. The visualized portions of the paranasal sinuses are within normal limits. The calvarium and soft tissues are unremarkable. There is a 7 x 15 mm area of subacute ischemia in the left thalamus. Previous extensive, complex labs, notes and diagnostics reviewed and analyzed. ALLERGIES:    Allergies as of 12/28/2020 - Review Complete 12/28/2020   Allergen Reaction Noted    Latex Itching 03/25/2018    Influenza vaccines Swelling 10/08/2015    Eggs or egg-derived products  10/08/2015    Gabapentin Nausea Only 10/31/2017    Pneumococcal vaccines Hives 01/28/2016    Povidone iodine Itching 10/08/2015    Varicella virus vaccine live Hives 01/28/2016      (please also verify by checking MAR)      Yesterday I evaluated this patient for periodic reassessment of medical and functional status. The patient was discussed in detail at the treatment team meeting focusing on current medical issues, progress in therapies, social issues, psychological issues, barriers to progress and strategies to address these barriers, and discharge planning. See the hand written addendum to rehab progress note.   The patient continues to be high risk for future disability and their medical and rehabilitation prognosis continue to be good and therefore, we will continue the patient's rehabilitation course as planned. The patient's tentative discharge date was set. Patient and family education was discussed. The patient was made aware of the team discussion regarding their progress. Discharge plans were discussed along with barriers to progress and strategies to address these barriers, patient encouraged to continue to discuss discharge plans with . Complex Physical Medicine & Rehab Issues Assess & Plan:   1. Severe abnormality of gait and mobility and impaired self-care and ADL's secondary to acute left thalamic CVA with right-sided weakness thalamic pain aphasia dysphagia. Functional and medical status reassessed regarding patients ability to participate in therapies and patient found to be able to participate in acute intensive comprehensive inpatient rehabilitation program including PT/OT to improve balance, ambulation, ADLs, and to improve the P/AROM. Therapeutic modifications regarding activities in therapies, place, amount of time per day and intensity of therapy made daily. In bed therapies or bedside therapies prn.   2. Bowel and Bladder dysfunction neurogenic bowel and bladder:  frequent toileting, ambulate to bathroom with assistance, check post void residuals. Check for C.difficile x1 if >2 loose stools in 24 hours, continue bowel & bladder program.  Monitor bowel and bladder function. Lactinex 2 PO every AC. MOM prn, Brown Bomb prn, Glycerin suppository prn, enema prn.  3. Severe acute on chronic osteoarthritis and as well as thalamic pain as well as generalized OA pain: reassess pain every shift and prior to and after each therapy session, give prn Tylenol and resume OxyIR with titration, modalities prn in therapy, masage, Lidoderm, K-pad prn. Consider scheduled AM pain meds.   4. Skin healing and breakdown antiplatelet medications  5. Chronic pain syndrome with history of overusing medication due to dyscontrol due to bipolar disorder  6. Vitamin D and B12 deficiency-add vitamin B12 and high-dose vitamin D  7. Chronic obstructive pulmonary disease/  COPD exacerbation , Sleep apnea-Pulse oximeter checks to shift dose and titrate oxygen and aerosol treatments monitor for nocturnal hypoxemia, monitor vital signs, oxygen prn.   8.   Dizziness, Vertigo, peripheral, bilateral  with Sensorineural hearing loss (SNHL) of both ears and Tinnitus, bilateral-focus on balance and therapy PT and OT as well as adding speech and language pathology  9.  Acute left thalamic CVA with aphasia dysphagia and right arm weakness-consult neurology       Electronically signed by Pietro No DO on 12/30/20 at 8:55 AM STAR Faulkner D.O., PM&R     Attending    [facility] Francesca Alanis

## 2021-01-01 NOTE — PLAN OF CARE
Problem: Pain:  Goal: Pain level will decrease  Description: Pain level will decrease  Outcome: Ongoing  Goal: Control of acute pain  Description: Control of acute pain  Outcome: Ongoing  Goal: Control of chronic pain  Description: Control of chronic pain  Outcome: Ongoing     Problem: Falls - Risk of:  Goal: Will remain free from falls  Description: Will remain free from falls  Outcome: Ongoing  Goal: Absence of physical injury  Description: Absence of physical injury  Outcome: Ongoing     Problem: IP COMMUNICATION/DYSARTHRIA  Goal: LTG - patient will improve expressive language skills to allow for communication of wants and needs in daily activities  Outcome: Ongoing     Problem: IP SWALLOWING  Goal: LTG - patient will tolerate the least restrictive diet consistency to allow for safe consumption of daily meals  Outcome: Ongoing     Problem: Skin Integrity:  Goal: Will show no infection signs and symptoms  Description: Will show no infection signs and symptoms  Outcome: Ongoing  Goal: Absence of new skin breakdown  Description: Absence of new skin breakdown  Outcome: Ongoing

## 2021-01-01 NOTE — PROGRESS NOTES
Assessment completed earlier in the shift. VSS. Medicated x1 w/ PRN roxicodone for 8/10 generalized pain. LBM 12/29/2020. HS OT- 126. Lantus administered per MAR. Snack provided. No distress noted. Call light within reach and bed alarm activated.   Electronically signed by Jaylin George RN on 1/1/2021 at 4:46 AM

## 2021-01-02 LAB
GLUCOSE BLD-MCNC: 100 MG/DL (ref 60–115)
GLUCOSE BLD-MCNC: 104 MG/DL (ref 60–115)
GLUCOSE BLD-MCNC: 109 MG/DL (ref 60–115)
GLUCOSE BLD-MCNC: 119 MG/DL (ref 60–115)
GLUCOSE BLD-MCNC: 90 MG/DL (ref 60–115)
HOMOCYSTEINE: 20.1 UMOL/L (ref 0–15)
PERFORMED ON: ABNORMAL
PERFORMED ON: NORMAL

## 2021-01-02 PROCEDURE — APPSS30 APP SPLIT SHARED TIME 16-30 MINUTES: Performed by: STUDENT IN AN ORGANIZED HEALTH CARE EDUCATION/TRAINING PROGRAM

## 2021-01-02 PROCEDURE — 83090 ASSAY OF HOMOCYSTEINE: CPT

## 2021-01-02 PROCEDURE — 6370000000 HC RX 637 (ALT 250 FOR IP): Performed by: NURSE PRACTITIONER

## 2021-01-02 PROCEDURE — 97112 NEUROMUSCULAR REEDUCATION: CPT

## 2021-01-02 PROCEDURE — 99233 SBSQ HOSP IP/OBS HIGH 50: CPT | Performed by: PSYCHIATRY & NEUROLOGY

## 2021-01-02 PROCEDURE — 97530 THERAPEUTIC ACTIVITIES: CPT

## 2021-01-02 PROCEDURE — 6370000000 HC RX 637 (ALT 250 FOR IP): Performed by: PHYSICAL MEDICINE & REHABILITATION

## 2021-01-02 PROCEDURE — 6370000000 HC RX 637 (ALT 250 FOR IP): Performed by: INTERNAL MEDICINE

## 2021-01-02 PROCEDURE — 1180000000 HC REHAB R&B

## 2021-01-02 PROCEDURE — 36415 COLL VENOUS BLD VENIPUNCTURE: CPT

## 2021-01-02 PROCEDURE — 97535 SELF CARE MNGMENT TRAINING: CPT

## 2021-01-02 PROCEDURE — 97116 GAIT TRAINING THERAPY: CPT

## 2021-01-02 PROCEDURE — 99232 SBSQ HOSP IP/OBS MODERATE 35: CPT | Performed by: PHYSICAL MEDICINE & REHABILITATION

## 2021-01-02 RX ADMIN — LITHIUM CARBONATE 300 MG: 300 CAPSULE, GELATIN COATED ORAL at 13:25

## 2021-01-02 RX ADMIN — CLOPIDOGREL BISULFATE 75 MG: 75 TABLET ORAL at 10:01

## 2021-01-02 RX ADMIN — Medication 100 MG: at 10:01

## 2021-01-02 RX ADMIN — POLYETHYLENE GLYCOL 3350 17 G: 17 POWDER, FOR SOLUTION ORAL at 20:55

## 2021-01-02 RX ADMIN — ALOGLIPTIN 6.25 MG: 6.25 TABLET, FILM COATED ORAL at 10:01

## 2021-01-02 RX ADMIN — METFORMIN HYDROCHLORIDE 500 MG: 500 TABLET ORAL at 10:06

## 2021-01-02 RX ADMIN — INSULIN GLARGINE 22 UNITS: 100 INJECTION, SOLUTION SUBCUTANEOUS at 20:48

## 2021-01-02 RX ADMIN — ATORVASTATIN CALCIUM 20 MG: 20 TABLET, FILM COATED ORAL at 20:48

## 2021-01-02 RX ADMIN — ISOSORBIDE MONONITRATE 30 MG: 30 TABLET, EXTENDED RELEASE ORAL at 10:01

## 2021-01-02 RX ADMIN — ASPIRIN 81 MG: 81 TABLET, COATED ORAL at 10:01

## 2021-01-02 RX ADMIN — LITHIUM CARBONATE 300 MG: 300 CAPSULE, GELATIN COATED ORAL at 17:15

## 2021-01-02 RX ADMIN — CARIPRAZINE 3 MG: 1.5 CAPSULE, GELATIN COATED ORAL at 10:01

## 2021-01-02 RX ADMIN — OXYCODONE 5 MG: 5 TABLET ORAL at 10:01

## 2021-01-02 RX ADMIN — METFORMIN HYDROCHLORIDE 500 MG: 500 TABLET ORAL at 17:15

## 2021-01-02 RX ADMIN — Medication 2000 UNITS: at 17:15

## 2021-01-02 RX ADMIN — OXYCODONE 5 MG: 5 TABLET ORAL at 17:17

## 2021-01-02 RX ADMIN — LITHIUM CARBONATE 300 MG: 300 CAPSULE, GELATIN COATED ORAL at 10:06

## 2021-01-02 RX ADMIN — LOSARTAN POTASSIUM 25 MG: 25 TABLET, FILM COATED ORAL at 10:01

## 2021-01-02 ASSESSMENT — ENCOUNTER SYMPTOMS
VOMITING: 0
NAUSEA: 0
SHORTNESS OF BREATH: 0
TROUBLE SWALLOWING: 0
DIARRHEA: 0
PHOTOPHOBIA: 0

## 2021-01-02 ASSESSMENT — PAIN DESCRIPTION - FREQUENCY: FREQUENCY: CONTINUOUS

## 2021-01-02 ASSESSMENT — PAIN SCALES - GENERAL: PAINLEVEL_OUTOF10: 5

## 2021-01-02 ASSESSMENT — PAIN DESCRIPTION - PAIN TYPE: TYPE: ACUTE PAIN

## 2021-01-02 ASSESSMENT — PAIN DESCRIPTION - DESCRIPTORS: DESCRIPTORS: ACHING

## 2021-01-02 NOTE — PROGRESS NOTES
[facility] Neurology Daily Progress Note  Name: Ceferino Abdi  Age: 52 y.o. Gender: female  CodeStatus: Full Code  Allergies: Latex  Influenza Vaccines  Eggs Or Egg-Derived Products  Gabapentin  Pneumococcal Vaccines  Povidone Iodine  Varicella Virus Vaccine Live    Chief Complaint:No chief complaint on file. Primary Care Provider: Zach Patterson MD  InpatientTreatment Team: Treatment Team: Attending Provider: Nadia Baumann DO; Consulting Physician: Terri Ma MD; Consulting Physician: Glenn Nj DO; Utilization Reviewer: Marycarmen Escalante, RN; Registered Nurse: Naida Alfaro, PARAS; : Herber Gould RN; Consulting Physician: Orlin Doherty MD  Admission Date: 12/28/2020      HPI   Pt seen and examined on rehab unit for neurology follow-up for acute thalamic CVA with expressive aphasia and headache. Patient currently alert and oriented x3, no acute distress, cooperative. Continues to have expressive aphasia. Reports she continues to have a headache. Diplopia has improved. No new focal deficits. No seizure activity, dysphagia, chest pain, SOB, or focal weakness. Vitals:    01/02/21 0742   BP: (!) 165/84   Pulse: 73   Resp: 18   Temp: 98 °F (36.7 °C)   SpO2: 93%      Review of Systems   Constitutional: Negative for chills and fever. HENT: Negative for congestion and trouble swallowing. Eyes: Negative for photophobia and visual disturbance. Respiratory: Negative for shortness of breath. Gastrointestinal: Negative for diarrhea, nausea and vomiting. Musculoskeletal: Negative. Neurological: Positive for speech difficulty, weakness and headaches. Negative for dizziness, tremors, seizures, syncope, facial asymmetry, light-headedness and numbness. Psychiatric/Behavioral: Negative. Physical Exam  Vitals signs and nursing note reviewed. Constitutional:       Appearance: Normal appearance. HENT:      Head: Normocephalic and atraumatic.    Eyes: Conjunctiva/sclera: Conjunctivae normal.   Cardiovascular:      Rate and Rhythm: Normal rate and regular rhythm. Heart sounds: Normal heart sounds. Pulmonary:      Effort: Pulmonary effort is normal.      Breath sounds: Normal breath sounds. Musculoskeletal:      Comments: Generalized weakness  RUE not tested for strength due to injury   Skin:     General: Skin is warm and dry. Neurological:      General: No focal deficit present. Mental Status: She is alert and oriented to person, place, and time. Psychiatric:         Mood and Affect: Mood normal.         Behavior: Behavior normal.     Continues to complain of headaches and weakness patient though is not complain of any double vision  Neurologic Exam     Mental Status   Oriented to person, place, and time. Pupils are equal  tremors are conjugate there is mild facial symmetry on the right. Right-sided weakness is notable.         Medications:  Reviewed    Infusion Medications:    dextrose       Scheduled Medications:    Vitamin D  2,000 Units Oral Dinner    cyanocobalamin  1,000 mcg Intramuscular Weekly    coenzyme Q10  100 mg Oral Daily    lidocaine  3 patch Transdermal Daily    insulin glargine  0.25 Units/kg Subcutaneous Nightly    cariprazine hcl  3 mg Oral Daily    metFORMIN  500 mg Oral BID WC    alogliptin  6.25 mg Oral Daily    lithium  300 mg Oral TID WC    insulin lispro  0-12 Units Subcutaneous TID WC    insulin lispro  0-6 Units Subcutaneous Nightly    aspirin  81 mg Oral Daily    Or    aspirin  300 mg Rectal Daily    atorvastatin  20 mg Oral Nightly    clopidogrel  75 mg Oral Daily    isosorbide mononitrate  30 mg Oral Daily    losartan  25 mg Oral Daily    nicotine  1 patch Transdermal Daily     PRN Meds: oxyCODONE, glucose, dextrose, glucagon (rDNA), dextrose, polyethylene glycol, promethazine **OR** ondansetron, acetaminophen, butalbital-aspirin-caffeine, hydrALAZINE    Labs:   No results for input(s): WBC, HGB, HCT, PLT in the last 72 hours. No results for input(s): NA, K, CL, CO2, BUN, CREATININE, CALCIUM, PHOS in the last 72 hours. Invalid input(s): MAGNES  No results for input(s): AST, ALT, BILIDIR, BILITOT, ALKPHOS in the last 72 hours. No results for input(s): INR in the last 72 hours. No results for input(s): Perla Smith in the last 72 hours. Urinalysis:   Lab Results   Component Value Date    NITRU Negative 12/30/2020    WBCUA 10-20 03/01/2020    BACTERIA RARE 03/01/2020    RBCUA 3-5 03/01/2020    BLOODU Negative 12/30/2020    SPECGRAV 1.009 12/30/2020    GLUCOSEU Negative 12/30/2020       Radiology:   Most recent    EEG No procedure found. MRI of Brain   Results for orders placed during the hospital encounter of 04/15/16   MRI Brain W WO Contrast    Narrative EXAMINATION MRI BRAIN     CLINICAL HISTORY Left-sided weakness and numbness, headache,  dizziness, history of multiple sclerosis     COMPARISONS 8/31/15     FINDINGS Multiplanar, multisequence images were obtained without  contrast followed by IV contrast consisting of 15 cc OptiMARK. Sinuses  and mastoid air cells are clear. Orbital contents are normal. There is  no shift of the midline structures. No abnormal contrast-enhancing  lesions are identified. No intracranial mass is seen. There is no  evidence of acute infarction or hemorrhage. Ventricular system appears  normal. FLAIR images demonstrate a single focus of bright signal  intensity in the right cerebral peduncle similar to the prior study. No  associated contrast enhancement is seen. IMPRESSION NO ACUTE INTRACRANIAL PROCESS. A SINGLE SUBCENTIMETER FOCUS  OF BRIGHT SIGNAL INTENSITY IN THE RIGHT CEREBRAL PEDUNCLE IS AGAIN  NOTED. THERE IS NO EVIDENCE OF CONTRAST ENHANCEMENT. FINDINGS ARE  SUSPICIOUS FOR A SMALL DEMYELINATING PLAQUE SECONDARY TO MULTIPLE  SCLEROSIS. NO OTHER DEFINITE PLAQUES ARE IDENTIFIED AT THIS TIME.   OVERALL APPEARANCE OF THE BRAIN IS UNCHANGED FROM THE PRIOR Agustín Free       Read Verónica Wayne M.D. Released Verónica Wayne M.D. Released Date Time- 04/18/16 0315   This document has been electronically signed. ------------------------------------------------------------------------------     Results for orders placed during the hospital encounter of 12/22/20   MRI BRAIN WO CONTRAST    Narrative EXAMINATION:  MRI BRAIN WO CONTRAST    HISTORY:   new CVA . I63.9 Stroke determined by clinical assessment ([facility] [address] ICD10    TECHNIQUE:  Routine noncontrast MRI protocol including diffusion and gradient echo images. COMPARISON: MRI brain 12/23/2020. RESULT:    Acute Change:   Restricted diffusion involving the left thalamus, with corresponding increased T2/FLAIR signal at this site, measuring approximately 1.6 x 0.8 cm, similar to recent prior MRI. No new areas of restricted diffusion       Hemorrhage:    Small rounded area of susceptibility left occipital lobe, unchanged. Mass Lesion/ Mass Effect:    No evidence of an intracranial mass or extra-axial fluid collection. No significant mass effect. Chronic Change:  Scattered punctate foci of increased T2 and FLAIR signal are noted in the supratentorial white matter which is a nonspecific finding, but likely represents minimal chronic microvascular ischemia. Parenchyma:   No significant volume loss for age. The brain parenchyma is otherwise within normal limits of signal intensity and morphology. Ventricles:     Normal caliber and morphology. Skull Base:    Hypothalamic and pituitary region are grossly normal.   Craniocervical junction is normal. No significant marrow replacement process. Vasculature:    Major intracranial arterial structures, and dural venous sinuses show typical flow void, suggesting patency by spin echo criteria. Other:  The visualized paranasal sinuses are clear. Mastoid air cells clear. The orbits are unremarkable. Soft tissues are unremarkable. Impression Recent infarction involving the left thalamus, overall unchanged from MRI 12/23/2020. No new infarcts from the prior MRI. MRA of the Head and Neck: No results found for this or any previous visit. No results found for this or any previous visit. Results for orders placed during the hospital encounter of 08/06/19   mra head w/o contrast    Narrative EXAM:     MRI of the brain without contrast    MRA of the brain without contrast    History: TIA. Bilateral arm numbness. Headache. Blurry vision. Weakness of the left arm and leg. Technique: Multiplanar multisequence MRI of the brain was performed without contrast. Axial 3-D time-of-flight images of the brain were obtained without contrast. 3-D volume rendered images were performed for evaluation of the cerebral vasculature. Comparison: CT brain from August 6, 2019 and MRI brain from November 7, 2018    Findings:    MRI brain:    Unchanged nonspecific 3 mm hyperintense T2 focus within the right brachium pontis. Unchanged nonspecific 5 mm hyperintense T2/FLAIR focus within the cortex of the right frontal lobe, not significant changed from prior examination. Otherwise no suspicious   white matter lesions. Brain volume is age-appropriate. Ventricular morphology is within normal limits. No acute hemorrhage, mass, mass effect, midline shift, or abnormal extra-axial fluid collection. The posterior fossa is within normal limits. There is no diffusion restriction. No susceptibility artifact is identified on the gradient echo sequence. Cranial nerves 7/8 complexes appear grossly unremarkable. The visualized paranasal sinuses and bilateral mastoid air cells are clear. MRA brain:    The bilateral distal cervical internal carotid arteries through the skull base are patent.      The bilateral middle cerebral arteries through the trifurcation and opercular branches are CAROTID ULTRASOUND    CLINICAL INFORMATION:  SMOKING HISTORY, HYPERTENSION, TIA WITH LEFT ARM NUMBNESS AND TINGLING, FACIAL DROOP, EVALUATE FOR POSSIBLE CAROTID ARTERY STENOSIS IN THE NECK. COMPARISON:  NONE AVAILABLE     FINDINGS:  The bilateral carotid duplex ultrasound study demonstrates the following:                                                                 RIGHT            LEFT    Carotid plaque burden                         Mild               Mild  Proximal common carotid artery           171 cm/s            177 cm/s    Mid common carotid artery                   151 cm/s            152 cm/s    Distal common carotid artery                90 cm/s           118 cm/s  Proximal internal carotid artery             78 cm/s            130 cm/s  Mid internal carotid artery                      80 cm/s           82 cm/s  Distal internal carotid artery                  65 cm/s             85 cm/s  External carotid artery                            172 cm/s           169 cm/s      ICA/CCA ratio                                         0.5                0.9       Vertebral arteries antegrade flow         Yes                     Yes       Impression 1. MILD CAROTID PLAQUE BURDEN IN BOTH CAROTID BIFURCATIONS IN THE NECK. 2. RIGHT INTERNAL CAROTID ARTERY: <50% STENOSIS. 3. LEFT INTERNAL CAROTID ARTERY: <50% STENOSIS. 4. BILATERALLY PATENT VERTEBRAL ARTERIES WITH ANTEGRADE BLOOD FLOW. Validated velocity measurements with angiographic measurements, velocity criteria are extrapolated from diameter data as defined by the Society of Radiologist in [address] [place] [address] Drive Radiology 2003; 732;100-146. Echo No results found for this or any previous visit. Assessment/Plan:  Left thalamic stroke with expressive aphasia  CTA negative  Continue dual antiplatelet therapy  Risk factor modification needed  CHAO with bubble study negative for cardioembolic source.   Patient continues to have mild headache, but diplopia and photophobia has improved. Hypercoagulable work-up done.  homocystine level elevated and homozygous positive for MTHFR. Will repeat homocysteine level and consider switching from DAPT to anticoagulation. I have personally performed a face to face diagnostic evaluation on this patient, reviewed all data and investigations, and am the sole provider of all clinical decisions on the neurological status of this patient. As noted patient is hypercoagulable and has homozygous MTHFR with elevated homocystine levels in the context of this to all being positive patient should be on anticoagulation recommended we start her on Eliquis 5 mg twice a day with aspirin. Examination shows right-sided weakness and same findings. Steven Horan MD, [id number], American Board of Psychiatry & Neurology  Board Certified in Vascular Neurology  Board Certified in Neuromuscular Medicine  Certified in Neurorehabilitation           Collaborating physicians: Dr Michelle Horan    Collaborating physicians: Dr Michelle Horan    Electronically signed by Pernell Banda PA-C on 1/2/2021 at 12:53 PM

## 2021-01-02 NOTE — PROGRESS NOTES
complaints from previous session  Family / Caregiver Present: No  Pain Assessment  Pain Assessment: 0-10  Pain Level: 7  Patient's Stated Pain Goal: No pain  Pain Type: Acute pain  Pain Location: (Patient states her whole body.)  Pain Descriptors: Aching  Pain Frequency: Continuous  Pain Onset: On-going  Clinical Progression: Not changed  Pre Treatment Pain Screening  Pain at present: 7  Scale Used: Numeric Score  Intervention List: Patient able to continue with treatment;Nurse/Physician notified  Vital Signs  Patient Currently in Pain: Yes   Orientation     Objective       Balance  Standing Balance: Stand by assistance  Standing Balance  Time: 10 minutes  Activity: Rings no rode. Activity noted below. Patient standing with no support to don/doff rubber rings onto their corresponding vertical graded dana. Pt had no difficulty. Repetitive reaching at and above shoulder level to increase BUE strength and endurance for improved transfers. Also, improve standing bal to reach aDLs to projected level of independence. Problem solving incorporated into activity. Patient demonstrated GOOD balance. Wheelchair Bed Transfers  Wheelchair/Bed - Technique: Ambulating  Level of Asssistance: Supervision  Transfers  Sit to stand: Supervision  Stand to sit: Supervision      Pt use BUEs to sort playing cards. Sorted based on suit using the L UE and RUE hand only to assist with manipulation. #1lb wrist weight donned on LUE during activity. This is to help improve LUE strength to ease ability with ADLs. Task completed to improve problem solving during ADLs. Pt demonstrates good ability to sort into 4 different piles. Pt able to sort cards based on suit with no errors. Pt then sorted cards in numerical order from lowest to highest with no errors and good attention to task.       Functional Mobility  Functional - Mobility Device: No device  Activity: (Kitchen mobility.)  Assist Level: Supervision  Functional Mobility Comments: Completing kitchen mobility task. Level noted below. Patient completed functional mobility throughout kitchen. Activity was to collect cones. Cones were placed in various cabinets, drawers and inside kitchen appliances. Activity simulated IADL at home. Facilitated pt. Ability to complete functional reaching/bending during ADLs/IADLs to help regain PLOF. Pt. Performed task with Sup. No LOB or verbal cues required for safety awareness.             Plan   Plan  Times per week: 5-7x/week  Plan weeks: 1-1 1/2 weeks  Current Treatment Recommendations: Strengthening, Endurance Training, Self-Care / ADL, Balance Training, Pain Management, Home Management Training, Functional Mobility Training, Safety Education & Training  Plan Comment: Continue OT per POC    Goals  Patient Goals   Patient goals : \"I can't believe I feel this weak, I want to get stronger\"       Therapy Time   Individual Concurrent Group Co-treatment   Time In 1300         Time Out 1400         Minutes 60              ADL training: 15 minutes  Neuromuscular reeducation: 30 minutes  Therapeutic activities: 15 minutes    [id number]    Electronically signed by [id number] on 1/2/2021 at 2:07 PM

## 2021-01-02 NOTE — PROGRESS NOTES
Physical Therapy Rehab Treatment Note  [facility]       NAME: Refugio Mcgee  : 1970 (52 y.o.)  MRN: [id number]  CODE STATUS: Full Code    Date of Service: 2021  Chart Reviewed: Yes  General Comment  Comments: pt reports continued fatigue    Restrictions:  Required Braces or Orthoses  Right Upper Extremity Brace/Splint: Sling       SUBJECTIVE: Subjective: pt reports she is agreeable to treatment, states she is tired, did not feel well  Response To Previous Treatment: Patient with no complaints from previous session.      Pre Treatment Pain Screening  Intervention List: Patient able to continue with treatment  Comments / Details: pt reports \"all over pain and fatigue\"    Post Treatment Pain Screening:  Pain Assessment  Pain Assessment: 0-10  Pain Level: 5  Patient's Stated Pain Goal: 5    OBJECTIVE:           Neuromuscular Education  Neuromuscular Comments: gait drills to improve step height including toe taps on box, speed ladder, mulptiple direction changes, occasional HHA    Bed mobility  Sit to Supine: Supervision    Transfers  Sit to Stand: Stand by assistance  Stand to sit: Stand by assistance  Stand Pivot Transfers: Stand by assistance    Ambulation  Ambulation?: Yes  Ambulation 1  Surface: carpet;level tile  Device: No Device  Assistance: Contact guard assistance  Quality of Gait: continuous cueing to improve step height, heel strike  Gait Deviations: Decreased step length;Decreased step height;Decreased arm swing  Distance: 150ft  Comments: focus on quality of gait and improved stability/safety    Stairs/Curb  Stairs?: Yes  Stairs  # Steps : 4  Stairs Height: 6\"  Rails: Left ascending  Device: No Device  Comment: reciprocal ascending, nonreciprocal descending     Activity Tolerance  Activity Tolerance: Patient limited by fatigue;Patient Tolerated treatment well  Activity Tolerance: occasional therapeutic RB due to fatigue     ASSESSMENT/COMMENTS:     PLAN OF CARE/Safety:   Safety Devices  Type of devices:  All fall risk precautions in place      Therapy Time:   Individual   Time In 1500   Time Out 1530   Minutes 30     Minutes:      Transfer/Bed mobility training:      Gait training:15      Neuro re education:15     Therapeutic ex:      Benigno Rievra, 01/02/21 at 3:47 PM

## 2021-01-02 NOTE — PROGRESS NOTES
Occupational Therapy  [facility]  Daily Treatment Note  NAME: Dayan Pedroza  : 1970  MRN: [id number]    Date of Service: 2021    Discharge Recommendations:  Continue to assess pending progress       Assessment  Pt tolerated treatment fair. Pt with increased effort and time to manipulate theraputty. Patient Diagnosis(es): There were no encounter diagnoses. has a past medical history of Anxiety, Back pain, Bipolar disorder ([facility] [address] CAD (coronary artery disease), CAFL (chronic airflow limitation) ([facility]), Chronic bronchitis ([address] Chronic pain, Colitis, Complicated migraine, DDD (degenerative disc disease), lumbar, Depression, Endometriosis, Excessive caffeine abuse, continuous ([facility] [address] Gastritis, GERD (gastroesophageal reflux disease), History of myocardial infarction, HTN (hypertension), benign, Impaired mobility and activities of daily living, Over weight, PTSD (post-traumatic stress disorder), Sensory neuropathy, Somatization disorder, TIA (transient ischemic attack), Tobacco abuse, and Vasospastic angina ([facility] [address] has a past surgical history that includes Hysterectomy; Dilation and curettage of uterus; back surgery; Neck surgery; shoulder surgery (Left);  section; Cholecystectomy (13); Upper gastrointestinal endoscopy (2014); cyst removal (3/7/16); Coronary angioplasty; Upper gastrointestinal endoscopy (N/A, 2020); and Colonoscopy (N/A, 2020).     Restrictions  Restrictions/Precautions  Restrictions/Precautions: Fall Risk  Required Braces or Orthoses?: Yes  Required Braces or Orthoses  Right Upper Extremity Brace/Splint: Sling  Right Upper Extremity Brace/Splint: RUE immobilizer in place - pt reports rotator cuff repair done Dec 1  Subjective   General  Chart Reviewed: Yes  Patient assessed for rehabilitation services?: Yes  Response to previous treatment: Patient with no complaints from previous session  Family / Caregiver Present: No     Pt stated pain 6/10 in R shoulder prior to OT treatment. Pt stated 8/10 afterward. RN administered pain medication. Orientation: WFL     Objective        Pt supervised supine to sit to stand. Pt able to secure R sling IND'ly. Pt taken to therapy gym. Pt completed FM activity with L hand for palm to finger translation with pennies. Pt completed without difficulty. Issued green theraputty. Pt completed 10 reps power grasp, tip pinch, finger abduction, pad to pad to rotation, and thumb flexion. Pt with increased time and effort to manipulate putty. Pt reported L thumb hurts, and has been hurting. Stated has stopped informing physicians, \"because they say there is nothing wrong. \" . Pt attempted to use use theraputty in R hand. Pt able to squeeze with increase effort and having pain.      Plan   Plan  Times per week: 5-7x/week  Plan weeks: 1-1 1/2 weeks  Current Treatment Recommendations: Strengthening, Endurance Training, Self-Care / ADL, Balance Training, Pain Management, Home Management Training, Functional Mobility Training, Safety Education & Training  Plan Comment: Continue OT per POC    Goals  Patient Goals   Patient goals : \"I can't believe I feel this weak, I want to get stronger\"       Therapy Time   Individual Concurrent Group Co-treatment   Time In 0900         Time Out 1000         Minutes 60             OT Therapeutic activities 60 minutes for 4 unit(s), CPT 38282     Electronically signed by JODEE Ibanez on 1/2/2021 at 12:48 PM

## 2021-01-02 NOTE — PROGRESS NOTES
Pt c/o pain to RUE, prn Oxycodone 5 mg effective along with sling in place and elevation. LBM 12/29/20. Continent. Walks with SBA no device. Blood sugaar at HS was 99, Lantus 22 units given along with a snack. Rechecked at 2am 119. No coverage needed. Pt slept well.  Electronically signed by Leon Thompson RN on 1/2/2021 at 6:46 AM

## 2021-01-02 NOTE — PLAN OF CARE
Problem: Pain:  Goal: Pain level will decrease  Description: Pain level will decrease  1/1/2021 2350 by Kayley Ashby RN  Outcome: Ongoing     Problem: Pain:  Goal: Control of acute pain  Description: Control of acute pain  1/1/2021 2350 by Kayley Ashby RN  Outcome: Ongoing     Problem: Pain:  Goal: Control of chronic pain  Description: Control of chronic pain  1/1/2021 2350 by Kayley Ashby RN  Outcome: Ongoing     Problem: Falls - Risk of:  Goal: Will remain free from falls  Description: Will remain free from falls  1/1/2021 2350 by Kayley Ashby RN  Outcome: Ongoing     Problem: Falls - Risk of:  Goal: Absence of physical injury  Description: Absence of physical injury  1/1/2021 2350 by Kayley Ashby RN  Outcome: Ongoing

## 2021-01-02 NOTE — PROGRESS NOTES
or     Sensation-garbled speech right-sided weakness  Lungs:  Diminished, CTA-B. Respiration effort is normal at rest.     Heart:   S1 = S2, RRR. No loud murmurs. Abdomen:  Soft, non-tender, no enlargement of liver or spleen. Extremities:  No significant lower extremity edema or tenderness. Skin:   Intact to general survey, no visualized or palpated problems. Rehabilitation:  Physical therapy:   Bed Mobility: Scooting: Supervision    Transfers: Sit to Stand: Stand by assistance  Stand to sit: Stand by assistance  Bed to Chair: Stand by assistance, Ambulation 1  Surface: carpet  Device: No Device  Other Apparatus: (right shoulder sling)  Assistance: Contact guard assistance  Quality of Gait: SBOS inconsistent step length slow ruby  Distance: Not the focus  Comments: Obstacle course negotiaiton with focus on gaze stabilization while negotiating turns, Stairs  # Steps : 4  Stairs Height: 6\"  Rails: Left ascending  Assistance: Minimal assistance, Contact guard assistance  Comment: Non reciprocal pattern cues for hand placement descending stairs to decrease retro lean    FIMS:  ,  , Assessment: Patient with increased fatigue in p.m. limiting gait  to short distance. Patient continues to show defecits with gaze stabilization    Occupational therapy:    ,  , Assessment: Pt demonstrated improved dynamice standing balance reaching minimally outside RIDDHI with no LOB at close supervision level. Pt continues to be limited by pain, balance and fatigue. Pt continues to benefit from OT services to maximize pt independence and safety with ADLs and transfers.     Speech therapy:        Lab/X-ray studies reviewed, analyzed and discussed with patient and staff:   Recent Results (from the past 24 hour(s))   POCT Glucose    Collection Time: 01/01/21 11:23 AM   Result Value Ref Range    POC Glucose 114 60 - 115 mg/dl    Performed on ACCU-CHEK    POCT Glucose    Collection Time: 01/01/21  3:58 PM   Result Value Ref Range    POC Glucose 102 60 - 115 mg/dl    Performed on ACCU-CHEK    POCT Glucose    Collection Time: 01/01/21  8:50 PM   Result Value Ref Range    POC Glucose 99 60 - 115 mg/dl    Performed on ACCU-CHEK    POCT Glucose    Collection Time: 01/02/21  2:24 AM   Result Value Ref Range    POC Glucose 119 (H) 60 - 115 mg/dl    Performed on ACCU-CHEK    POCT Glucose    Collection Time: 01/02/21  6:58 AM   Result Value Ref Range    POC Glucose 104 60 - 115 mg/dl    Performed on ACCU-CHEK        Cta Head   12/23/2020  Intracranial ICAs:   The major intracranial arterial structures are patent without high-grade stenosis, large vessel cut off, or aneurysm. .     Ct Head  : 12/23/2020  CT Brain Contrast medium:  Not utilized. History:  Right facial droop. Altered mental status Comparison:  August 21, 2020 Findings: Extra-axial spaces:  Normal. Intracranial hemorrhage:  None. Ventricular system: In the interval, an ill-defined 4 x 12 mm area decreased attenuation exerting no mass effect is found medially within the left thalamus (series 2, image 16). Basal Cisterns:  Normal. Cerebral Parenchyma:  Normal. Midline Shift:  None. Cerebellum:  Normal. Paranasal sinuses and mastoid air cells:  Normal. Visualized Orbits:  Normal.     Impression: Interval development of acute/subacute left thalamic infarct. Cta Neck  12/23/2020 Flow is visualized within the precavernous, cavernous, clinoid and supraclinoid segments of the internal carotid arteries bilaterally    Anterior Cerebral Arteries: The bilaterals  A1 and A2 segments are patent. Middle Cerebral Arteries: Bilateral horizontal, insular, opercular, and cortical segments of the right and left middle cerebral cerebral arteries are patent. Vertebral Arteries And Basilar Artery: There is adequate flow in the intracranial portions of the vertebral arteries and in the basilar artery. Posterior Cerebral Arteries: Bilateral posterior cerebral arteries are patent.  Aneurysm No aneurysm or dissection in the anterior or posterior circulations. . Neurocranium The visualized neurocranium is intact. Dural Sinus: As visualized the opacified dural venous sinuses are unremarkable. The major intracranial arterial structures are patent without high-grade stenosis, large vessel cut off, or aneurysm. EXAMINATION: CTA HEAD  Aortic Arch: The visualized portions of the aortic arch and proximal arch vessels demonstrates no focal stenosis aneurysm or dissection. Carotids: The common carotid arteries, carotid bifurcations, internal and external carotid arteries are normal in course and caliber. Right  Proximal Internal Carotid Stenosis (% by NASCET Criteria): There is no hemodynamically significant stenosis. Left  Proximal Internal Carotid Stenosis (% by NASCET Criteria): There is no hemodynamically significant stenosis. Vertebral Arteries: Patency: The vertebral arteries are well visualized to the level of the basilar artery. There is no focal stenosis aneurysm or dissection. Vertebral arteries are codominant. IMPRESSION: Negative CTA of the neck. Mri Brain  12/25/2020 : Acute Change:   Restricted diffusion involving the left thalamus, with corresponding increased T2/FLAIR signal at this site, measuring approximately 1.6 x 0.8 cm, similar to recent prior MRI. No new areas of restricted diffusion   Hemorrhage:    Small rounded area of susceptibility left occipital lobe, unchanged. Mass Lesion/ Mass Effect:    No evidence of an intracranial mass or extra-axial fluid collection. No significant mass effect. Chronic Change:  Scattered punctate foci of increased T2 and FLAIR signal are noted in the supratentorial white matter which is a nonspecific finding, but likely represents minimal chronic microvascular ischemia. Parenchyma:   No significant volume loss for age. The brain parenchyma is otherwise within normal limits of signal intensity and morphology.     Ventricles:     Normal caliber and morphology. Skull Base:    Hypothalamic and pituitary region are grossly normal.   Craniocervical junction is normal. No significant marrow replacement process. Vasculature:    Major intracranial arterial structures, and dural venous sinuses show typical flow void, suggesting patency by spin echo criteria. Other:  The visualized paranasal sinuses are clear. Mastoid air cells clear. The orbits are unremarkable. Soft tissues are unremarkable. Recent infarction involving the left thalamus, overall unchanged from MRI 12/23/2020. No new infarcts from the prior MRI. Mri Brain   12/23/2020   There are no extra-axial collections. There is no evidence of hemorrhage. There is specific association with signal dropout on ADC map in the left thalamus and region measuring 7 x15 mm indicating subacute ischemia. There is corresponding signal abnormality on T2/FLAIR series. The susceptibility images do not demonstrate evidence of hemosiderin deposition within the brain parenchyma or the leptomeninges. There is preservation of the gray-white matter differentiation. There are a few scattered foci of T2/FLAIR increased signal in the subcortical and periventricular white matter without associated edema or mass effect. The sulci and ventricles are within normal limits without evidence of hydrocephalus. The midline structures are intact, the corpus callosum is within normal limits. The region of the pineal gland and the sella turcica are unremarkable. There are no space-occupying lesions in the posterior fossa. The basilar cisterns are patent. The craniocervical junction is unremarkable. The visualized portions of the orbits are within normal limits, the globes are intact. The visualized portions of the paranasal sinuses are within normal limits. The calvarium and soft tissues are unremarkable. There is a 7 x 15 mm area of subacute ischemia in the left thalamus.        Previous extensive, complex labs, notes and diagnostics reviewed and analyzed. ALLERGIES:    Allergies as of 12/28/2020 - Review Complete 12/28/2020   Allergen Reaction Noted    Latex Itching 03/25/2018    Influenza vaccines Swelling 10/08/2015    Eggs or egg-derived products  10/08/2015    Gabapentin Nausea Only 10/31/2017    Pneumococcal vaccines Hives 01/28/2016    Povidone iodine Itching 10/08/2015    Varicella virus vaccine live Hives 01/28/2016      (please also verify by checking MAR)      I have encouraged patient to attend their adjustment to rehabilitation support group with rec therapy and rehabilitation psychology in order to improve their adjustments, well-being, and help their spiritual and cognitive recovery. Complex Physical Medicine & Rehab Issues Assess & Plan:   1. Severe abnormality of gait and mobility and impaired self-care and ADL's secondary to acute left thalamic CVA with right-sided weakness thalamic pain aphasia dysphagia. Functional and medical status reassessed regarding patients ability to participate in therapies and patient found to be able to participate in acute intensive comprehensive inpatient rehabilitation program including PT/OT to improve balance, ambulation, ADLs, and to improve the P/AROM. Therapeutic modifications regarding activities in therapies, place, amount of time per day and intensity of therapy made daily. In bed therapies or bedside therapies prn.   2. Bowel and Bladder dysfunction neurogenic bowel and bladder:  frequent toileting, ambulate to bathroom with assistance, check post void residuals. Check for C.difficile x1 if >2 loose stools in 24 hours, continue bowel & bladder program.  Monitor bowel and bladder function. Lactinex 2 PO every AC.   MOM prn, Brown Bomb prn, Glycerin suppository prn, enema prn.  3. Severe acute on chronic osteoarthritis and as well as thalamic pain as well as generalized OA pain: reassess pain every shift and prior to and after each therapy session, give prn Tylenol and resume OxyIR with titration, modalities prn in therapy, masage, Lidoderm, K-pad prn. Consider scheduled AM pain meds. 4. Skin healing and breakdown risk:  continue pressure relief program.  Daily skin exams and reports from nursing. 5. Severe fatigue due to nutritional and hydration deficiency: Add and titrate vitamin B12 vitamin D and CoQ10 continue to monitor I&Os, calorie counts prn, dietary consult prn.  6. Acute episodic insomnia with situational adjustment disorder:  prn Ambien, monitor for day time sedation. 7. Falls risk elevated:  patient to use call light to get nursing assistance to get up, bed and chair alarm. 8. Elevated DVT risk: progressive activities in PT, continue prophylaxis DAPHNE hose, elevation and Lovenox. 9. Complex discharge planning: Discharge January 9, 2021 home with her significant other and home health care. Until then continue weekly team meeting every  Thursday to assess progress towards goals, discuss and address social, psychological and medical comorbidities and to address difficulties they may be having progressing in therapy. Patient and family education is in progress. The patient is to follow-up with their family physician after discharge. Complex Active General Medical Issues that complicate care Assess & Plan:    1. Bipolar 1 disorder,   Anxiety, active chronic depression-emotional support provided daily, vitamin B12, encourage participation in rehabilitation support group and recreational therapy, adjust/add medications (consider anxiolytics review old chart to find out her old psych medications) consult psychiatry and psychology  2. CAD (coronary artery disease), HTN (hypertension)-continue blood signs every shift focusing on heart rate and blood pressure checks, consult hospitalist for backup medical and adjust/add medications ( aspirin, Lipitor, Apresoline, Imdur, Cozaar, Plavix )  3.    Multiple sclerosis with recent acute left thalamic CVA-consult neurology manage COPD sleep apnea and autoimmune disease add antiplatelet medications  4. Colitis-monitor stools for blood as patient will be on antiplatelet medications  5. Chronic pain syndrome with history of overusing medication due to dyscontrol due to bipolar disorder  6. Vitamin D and B12 deficiency-add vitamin B12 and high-dose vitamin D  7. Chronic obstructive pulmonary disease/  COPD exacerbation , Sleep apnea-Pulse oximeter checks to shift dose and titrate oxygen and aerosol treatments monitor for nocturnal hypoxemia, monitor vital signs, oxygen prn.   8.   Dizziness, Vertigo, peripheral, bilateral  with Sensorineural hearing loss (SNHL) of both ears and Tinnitus, bilateral-focus on balance and therapy PT and OT as well as adding speech and language pathology  9.  Acute left thalamic CVA with aphasia dysphagia and right arm weakness-consult neurology       Electronically signed by Desi Dolan DO on 12/30/20 at 8:55 AM STAR Buckner D.O., PM&R     Attending    [address]

## 2021-01-03 LAB
GLUCOSE BLD-MCNC: 102 MG/DL (ref 60–115)
GLUCOSE BLD-MCNC: 109 MG/DL (ref 60–115)
GLUCOSE BLD-MCNC: 113 MG/DL (ref 60–115)
GLUCOSE BLD-MCNC: 132 MG/DL (ref 60–115)
PERFORMED ON: ABNORMAL
PERFORMED ON: NORMAL

## 2021-01-03 PROCEDURE — 6370000000 HC RX 637 (ALT 250 FOR IP): Performed by: INTERNAL MEDICINE

## 2021-01-03 PROCEDURE — 97112 NEUROMUSCULAR REEDUCATION: CPT

## 2021-01-03 PROCEDURE — 99233 SBSQ HOSP IP/OBS HIGH 50: CPT | Performed by: PSYCHIATRY & NEUROLOGY

## 2021-01-03 PROCEDURE — 1180000000 HC REHAB R&B

## 2021-01-03 PROCEDURE — 97530 THERAPEUTIC ACTIVITIES: CPT

## 2021-01-03 PROCEDURE — 6370000000 HC RX 637 (ALT 250 FOR IP): Performed by: STUDENT IN AN ORGANIZED HEALTH CARE EDUCATION/TRAINING PROGRAM

## 2021-01-03 PROCEDURE — 97110 THERAPEUTIC EXERCISES: CPT

## 2021-01-03 PROCEDURE — 99232 SBSQ HOSP IP/OBS MODERATE 35: CPT | Performed by: PHYSICAL MEDICINE & REHABILITATION

## 2021-01-03 PROCEDURE — 6370000000 HC RX 637 (ALT 250 FOR IP): Performed by: NURSE PRACTITIONER

## 2021-01-03 PROCEDURE — 6370000000 HC RX 637 (ALT 250 FOR IP): Performed by: PHYSICAL MEDICINE & REHABILITATION

## 2021-01-03 PROCEDURE — 97116 GAIT TRAINING THERAPY: CPT

## 2021-01-03 RX ORDER — DOCUSATE SODIUM 100 MG/1
200 CAPSULE, LIQUID FILLED ORAL 2 TIMES DAILY
Status: DISCONTINUED | OUTPATIENT
Start: 2021-01-03 | End: 2021-01-03

## 2021-01-03 RX ORDER — DOCUSATE SODIUM 100 MG/1
200 CAPSULE, LIQUID FILLED ORAL 2 TIMES DAILY
Status: DISCONTINUED | OUTPATIENT
Start: 2021-01-03 | End: 2021-01-09 | Stop reason: HOSPADM

## 2021-01-03 RX ORDER — FOLIC ACID 1 MG/1
1 TABLET ORAL DAILY
Status: DISCONTINUED | OUTPATIENT
Start: 2021-01-03 | End: 2021-01-09 | Stop reason: HOSPADM

## 2021-01-03 RX ADMIN — DOCUSATE SODIUM 200 MG: 100 CAPSULE ORAL at 17:00

## 2021-01-03 RX ADMIN — LITHIUM CARBONATE 300 MG: 300 CAPSULE, GELATIN COATED ORAL at 12:16

## 2021-01-03 RX ADMIN — METFORMIN HYDROCHLORIDE 500 MG: 500 TABLET ORAL at 16:05

## 2021-01-03 RX ADMIN — INSULIN GLARGINE 22 UNITS: 100 INJECTION, SOLUTION SUBCUTANEOUS at 21:13

## 2021-01-03 RX ADMIN — APIXABAN 5 MG: 5 TABLET, FILM COATED ORAL at 21:11

## 2021-01-03 RX ADMIN — ISOSORBIDE MONONITRATE 30 MG: 30 TABLET, EXTENDED RELEASE ORAL at 08:47

## 2021-01-03 RX ADMIN — POLYETHYLENE GLYCOL 3350 17 G: 17 POWDER, FOR SOLUTION ORAL at 16:05

## 2021-01-03 RX ADMIN — CARIPRAZINE 3 MG: 1.5 CAPSULE, GELATIN COATED ORAL at 08:47

## 2021-01-03 RX ADMIN — LITHIUM CARBONATE 300 MG: 300 CAPSULE, GELATIN COATED ORAL at 16:05

## 2021-01-03 RX ADMIN — CLOPIDOGREL BISULFATE 75 MG: 75 TABLET ORAL at 08:48

## 2021-01-03 RX ADMIN — OXYCODONE 5 MG: 5 TABLET ORAL at 21:11

## 2021-01-03 RX ADMIN — Medication 2000 UNITS: at 16:05

## 2021-01-03 RX ADMIN — Medication 100 MG: at 08:47

## 2021-01-03 RX ADMIN — OXYCODONE 5 MG: 5 TABLET ORAL at 08:51

## 2021-01-03 RX ADMIN — ASPIRIN 81 MG: 81 TABLET, COATED ORAL at 08:45

## 2021-01-03 RX ADMIN — OXYCODONE 5 MG: 5 TABLET ORAL at 13:10

## 2021-01-03 RX ADMIN — OXYCODONE 5 MG: 5 TABLET ORAL at 17:00

## 2021-01-03 RX ADMIN — FOLIC ACID 1 MG: 1 TABLET ORAL at 12:16

## 2021-01-03 RX ADMIN — METFORMIN HYDROCHLORIDE 500 MG: 500 TABLET ORAL at 08:48

## 2021-01-03 RX ADMIN — LITHIUM CARBONATE 300 MG: 300 CAPSULE, GELATIN COATED ORAL at 08:48

## 2021-01-03 RX ADMIN — LOSARTAN POTASSIUM 25 MG: 25 TABLET, FILM COATED ORAL at 08:47

## 2021-01-03 RX ADMIN — ATORVASTATIN CALCIUM 20 MG: 20 TABLET, FILM COATED ORAL at 21:11

## 2021-01-03 RX ADMIN — ALOGLIPTIN 6.25 MG: 6.25 TABLET, FILM COATED ORAL at 08:48

## 2021-01-03 ASSESSMENT — PAIN DESCRIPTION - ORIENTATION: ORIENTATION: RIGHT

## 2021-01-03 ASSESSMENT — PAIN SCALES - GENERAL: PAINLEVEL_OUTOF10: 8

## 2021-01-03 NOTE — PROGRESS NOTES
Hospitalist Progress Note  1/3/2021 4:41 PM    Assessment and Plan:   1. Generalized weakness, Gait instability and Decreased Functional Status secondary to acute left thalamic CVA: Neurology following. Continue Lipitor and dual antiplatelet therapy. Still has residual expressive aphasia, headache and extremity weakness. Diplopia has improved. No new focal deficits   fall precautions. PT OT to evaluate. Maximize nutrition status. Assessing if needs DME at home. SW on board. 2. DMII with hyperglycemia: Patient now has better control of her glucose level. She is attempted to follow proper diet. Metformin, Nesina, 22 units Lantus nightly and 7 units of Humalog 3 times daily with meals,  ISS, hypoglycemia protocol POCT Glucose TIDAC & QHS   3. Elevated liver enzymes: Improved. homozygous for MTHFR mutation. Neurology met with patient today and discussed laboratory results. 4. headaches: Patient continues to have headaches despite fiercest, Imitrex ordered. Will defer to neurology for further recommendations on treatment  5. Constipation: Patient requests for 200 mg of Colace twice daily. States that she takes this dose at home. Hold for loose stools or diarrhea. Also patient educated as she does have history of colitis. 6. HTN:  resume home dose of Imdur 30 mg daily and lower dose of losartan at 25 mg daily   7. Dysphagia: improving, continue Aspiration precautions. 8. bipolar disorder: Continue lithium and Cariprazine  9. nicotine abuse and dependence: Counseled for greater than 3 minutes on importance of smoking cessation to reduce risk of associated morbidity and mortality. Nicotine patch. 10. Bowel Regimen and GI PPx: stool softners PRN ordered with hold parameters for loose stools or diarrhea. On antiacid  11. Diet: DIET GENERAL; Carb Control: 4 carb choices (60 gms)/meal  12. Advance Directive: Full Code   13. Nutrition status: Supplemental Vitamins ordered.  Dietitian assessment  14. Vaccinations: Immunization records reviewed. If has not received appropriate vaccinations, will order to be given prior to discharge. 15. DVT prophylaxis: Chemical prophylaxis SQ Lovenox  16. Discharge planning: SW on board. 17. High Risk Readmission Screening Tool Score Noted. Additionally, the following hospital problems were addressed:  Principal Problem:    Abnormality of gait and mobility due to recent infarct left thalamus. [facility] admit 12/28/20. Active Problems:    Bipolar 1 disorder (HCC)    CAD (coronary artery disease)    HTN (hypertension)    Multiple sclerosis (HCC)    Colitis    Anxiety    Chronic pain syndrome     Vitamin B12 deficiency    Chronic obstructive pulmonary disease (HCC)    Sleep apnea    Cerebrovascular accident (CVA) due to stenosis of left middle cerebral artery (HCC)    Dizziness and giddiness    Sensorineural hearing loss (SNHL) of both ears    Tinnitus, bilateral    Vertigo, peripheral, bilateral    Right arm weakness    COPD exacerbation (HCC)    Chest pain    Impaired mobility    Weakness  Resolved Problems:    * No resolved hospital problems. *      ** Total time spent reviewing medical records, evaluating patient, speaking with RN's and consultants where I was focused exclusively on this patient: 35 minutes. This time is excluding time spent performing procedures or significant events occurring earlier or later in the day requiring my attention and focus. Subjective:   Admit Date: 12/28/2020  PCP: Kathy Gandara MD    No acute events overnight. Afebrile  Telemetry reviewed. Complains of chronic headaches and constipation. Pt denies chest pain, SOB, N/V, fevers or chills. Patient tearful and overwhelmed due to her deficits. Neurology met with patient to discuss MTHFR mutation. Hyperglycemia improved. Diplopia improved.     Objective:     Vitals:    01/02/21 1833 01/02/21 2047 01/03/21 0653 01/03/21 0705   BP: (!) 149/77  (!) 187/98 (!) 161/80   Pulse: 72  72 71   Resp: 15  15    Temp: 98 °F (36.7 °C)  98 °F (36.7 °C)    TempSrc: Oral      SpO2: (!) 89% 94% 94%    Weight:       Height:         General appearance: no acute distress, Patient currently alert and oriented x3, no acute distress, cooperative. She continues to have expressive aphasia and diplopia to right eye improved. Dentition intact. Lungs: CTAB  No exp wheezes, No rales No retractions; No use of accessory muscles  Heart:  S1, S2 normal, RRR, no MRG appreciated  Abdomen: (+) BS, soft, non-tender; non distended no guarding or rigidity. Extremities:  no cyanosis,  no edema bilat lower exts, no calf tenderness bilaterally. Dry skin noted       Medications:      dextrose        folic acid  1 mg Oral Daily    apixaban  5 mg Oral BID    docusate sodium  200 mg Oral BID    Vitamin D  2,000 Units Oral Dinner    cyanocobalamin  1,000 mcg Intramuscular Weekly    coenzyme Q10  100 mg Oral Daily    lidocaine  3 patch Transdermal Daily    insulin glargine  0.25 Units/kg Subcutaneous Nightly    cariprazine hcl  3 mg Oral Daily    metFORMIN  500 mg Oral BID WC    alogliptin  6.25 mg Oral Daily    lithium  300 mg Oral TID WC    insulin lispro  0-12 Units Subcutaneous TID WC    insulin lispro  0-6 Units Subcutaneous Nightly    aspirin  81 mg Oral Daily    Or    aspirin  300 mg Rectal Daily    atorvastatin  20 mg Oral Nightly    isosorbide mononitrate  30 mg Oral Daily    losartan  25 mg Oral Daily    nicotine  1 patch Transdermal Daily       LABS Reviewed    IMAGING Reviewed    Anita Shea CNP  [facility] Hospitalist    I personally obtained the key and critical portions of the history and physical exam and made additions where appropriate in the documentation.  I reviewed the mid level documentation and agree with assessment and plan that we come up with together    [facility] DO  Internal Medicine

## 2021-01-03 NOTE — PROGRESS NOTES
Subjective: The patient complains of severe acute on chronic recurrent right-sided weakness and aphasia dysphagia partially relieved by medications, SLP, Pt, OT, and rest and exacerbated by recent illness including a repeat CVA left thalamic region with worsening thalamic pain. I am concerned about patients pain- -he is also increasingly frustrated. Emotional support and encouragement given. According to recent nursing note, \" Assessment completed earlier in the shift. VSS. LBM 12/29/2020. Gave Glycolax. Pt reported she was taking a red pill at home for bowels. Assuming it is colace. Pt requesting to \"double up\" on it here. Pt walked the lindsay a few laps. HS OT- 100. Lantus administered. Snack provided\". ROS x10: The patient also complains of severely impaired mobility and activities of daily living. Otherwise no new problems with vision, hearing, nose, mouth, throat, dermal, cardiovascular, GI, , pulmonary, musculoskeletal, psychiatric or neurological. See Rehab H&P on Rehab chart dated . Vital signs:  BP (!) 161/80   Pulse 71   Temp 98 °F (36.7 °C)   Resp 15   Ht 5' 7\" (1.702 m)   Wt 192 lb (87.1 kg)   SpO2 94%   BMI 30.07 kg/m²   I/O:   PO/Intake:  fair PO intake, no problems observed or reported. Bowel/Bladder:  Continent, constipation prescribed GlycoLax  General:  Patient is well developed, adequately nourished, non-obese and     well kempt. HEENT:    PERRLA, hearing intact to loud voice, external inspection of ear     and nose benign. Inspection of lips, tongue and gums severe thrush  Musculoskeletal: No significant change in strength or tone. All joints stable. Inspection and palpation of digits and nails show no clubbing,       cyanosis or inflammatory conditions. Neuro/Psychiatric: Affect: flat but irritable and sleepy. Alert and oriented to person, place and     Situation with mod cues.   No significant change in deep tendon reflexes or     Sensation-garbled speech right-sided weakness  Lungs:  Diminished, CTA-B. Respiration effort is normal at rest.     Heart:   S1 = S2, RRR. No loud murmurs. Abdomen:  Soft, non-tender, no enlargement of liver or spleen. Extremities:  No significant lower extremity edema or tenderness. Skin:   Intact to general survey, no visualized or palpated problems. Rehabilitation:  Physical therapy:   Bed Mobility: Scooting: Supervision    Transfers: Sit to Stand: Stand by assistance  Stand to sit: Stand by assistance  Bed to Chair: Stand by assistance, Ambulation 1  Surface: carpet, level tile  Device: No Device  Other Apparatus: (right shoulder sling)  Assistance: Contact guard assistance  Quality of Gait: continuous cueing to improve step height, heel strike  Gait Deviations: Decreased step length, Decreased step height, Decreased arm swing  Distance: 150ft  Comments: focus on quality of gait and improved stability/safety, Stairs  # Steps : 4  Stairs Height: 6\"  Rails: Left ascending  Device: No Device  Assistance: Minimal assistance, Contact guard assistance  Comment: reciprocal ascending, nonreciprocal descending    FIMS:  ,  , Assessment: Patient with increased fatigue in p.m. limiting gait  to short distance. Patient continues to show defecits with gaze stabilization    Occupational therapy:    ,  , Assessment: Pt demonstrated improved dynamice standing balance reaching minimally outside RIDDHI with no LOB at close supervision level. Pt continues to be limited by pain, balance and fatigue. Pt continues to benefit from OT services to maximize pt independence and safety with ADLs and transfers.     Speech therapy:        Lab/X-ray studies reviewed, analyzed and discussed with patient and staff:   Recent Results (from the past 24 hour(s))   POCT Glucose    Collection Time: 01/02/21 11:53 AM   Result Value Ref Range    POC Glucose 109 60 - 115 mg/dl    Performed on ACCU-CHEK    HOMOCYSTEINE, SERUM    Collection Time: 01/02/21  1:53 PM Result Value Ref Range    Homocysteine 20.1 (H) 0.0 - 15.0 umol/L   POCT Glucose    Collection Time: 01/02/21  4:07 PM   Result Value Ref Range    POC Glucose 90 60 - 115 mg/dl    Performed on ACCU-CHEK    POCT Glucose    Collection Time: 01/02/21  8:10 PM   Result Value Ref Range    POC Glucose 100 60 - 115 mg/dl    Performed on ACCU-CHEK    POCT Glucose    Collection Time: 01/03/21  6:01 AM   Result Value Ref Range    POC Glucose 102 60 - 115 mg/dl    Performed on ACCU-CHEK        Cta Head   12/23/2020  Intracranial ICAs:   The major intracranial arterial structures are patent without high-grade stenosis, large vessel cut off, or aneurysm. .     Ct Head  : 12/23/2020: Extra-axial spaces:  Normal. Intracranial hemorrhage:  None. Ventricular system: In the interval, an ill-defined 4 x 12 mm area decreased attenuation exerting no mass effect is found medially within the left thalamus (series 2, image 16). Basal Cisterns:  Normal. Cerebral Parenchyma:  Normal. Midline Shift:  None. Cerebellum:  Normal. Paranasal sinuses and mastoid air cells:  Normal. Visualized Orbits:  Normal.     Impression: Interval development of acute/subacute left thalamic infarct. Cta Neck  12/23/2020 The major intracranial arterial structures are patent without high-grade stenosis, large vessel cut off, or aneurysm. EXAMINATION: CTA HEAD MPRESSION: Negative CTA of the neck. Mri Brain  12/25/2020 : Acute Change:   Restricted diffusion involving the left thalamus, with corresponding increased T2/FLAIR signal at this site, measuring approximately 1.6 x 0.8 cm, similar to recent prior MRI. No new areas of restricted diffusion   Hemorrhage:    Small rounded area of susceptibility left occipital lobe, unchanged. Mass Lesion/ Mass Effect:    No evidence of an intracranial mass or extra-axial fluid collection. No significant mass effect.  Chronic Change:  Scattered punctate foci of increased T2 and FLAIR signal are noted in the supratentorial white matter which is a nonspecific finding, but likely represents minimal chronic microvascular ischemia. Parenchyma:   No significant volume loss for age. The brain parenchyma is otherwise within normal limits of signal intensity and morphology. Ventricles:     Normal caliber and morphology. Skull Base:    Hypothalamic and pituitary region are grossly normal.   Craniocervical junction is normal. No significant marrow replacement process. Vasculature:    Major intracranial arterial structures, and dural venous sinuses show typical flow void, suggesting patency by spin echo criteria. Other:  The visualized paranasal sinuses are clear. Mastoid air cells clear. The orbits are unremarkable. Soft tissues are unremarkable. Recent infarction involving the left thalamus, overall unchanged from MRI 12/23/2020. No new infarcts from the prior MRI. Mri Brain   12/23/2020   There are no extra-axial collections. There is no evidence of hemorrhage. There is specific association with signal dropout on ADC map in the left thalamus and region measuring 7 x15 mm indicating subacute ischemia. There is corresponding signal abnormality on T2/FLAIR series. The susceptibility images do not demonstrate evidence of hemosiderin deposition within the brain parenchyma or the leptomeninges. There is preservation of the gray-white matter differentiation. There are a few scattered foci of T2/FLAIR increased signal in the subcortical and periventricular white matter without associated edema or mass effect. The sulci and ventricles are within normal limits without evidence of hydrocephalus. The midline structures are intact, the corpus callosum is within normal limits. The region of the pineal gland and the sella turcica are unremarkable. There are no space-occupying lesions in the posterior fossa. The basilar cisterns are patent. The craniocervical junction is unremarkable.  The visualized portions of the orbits are within normal limits, the globes are intact. The visualized portions of the paranasal sinuses are within normal limits. The calvarium and soft tissues are unremarkable. There is a 7 x 15 mm area of subacute ischemia in the left thalamus. Previous extensive, complex labs, notes and diagnostics reviewed and analyzed. ALLERGIES:    Allergies as of 12/28/2020 - Review Complete 12/28/2020   Allergen Reaction Noted    Latex Itching 03/25/2018    Influenza vaccines Swelling 10/08/2015    Eggs or egg-derived products  10/08/2015    Gabapentin Nausea Only 10/31/2017    Pneumococcal vaccines Hives 01/28/2016    Povidone iodine Itching 10/08/2015    Varicella virus vaccine live Hives 01/28/2016      (please also verify by checking [facility])      Complex Physical Medicine & Rehab Issues Assess & Plan:   1. Severe abnormality of gait and mobility and impaired self-care and ADL's secondary to acute left thalamic CVA with right-sided weakness thalamic pain aphasia dysphagia. Functional and medical status reassessed regarding patients ability to participate in therapies and patient found to be able to participate in acute intensive comprehensive inpatient rehabilitation program including PT/OT to improve balance, ambulation, ADLs, and to improve the P/AROM. Therapeutic modifications regarding activities in therapies, place, amount of time per day and intensity of therapy made daily. In bed therapies or bedside therapies prn.   2. Bowel and Bladder dysfunction neurogenic bowel and bladder:  frequent toileting, ambulate to bathroom with assistance, check post void residuals. Check for C.difficile x1 if >2 loose stools in 24 hours, continue bowel & bladder program.  Monitor bowel and bladder function. Lactinex 2 PO every AC.   MOM prn, Brown Bomb prn, Glycerin suppository prn, enema prn.  3. Severe acute thalamic pain syndrome right upper extremity due to left thalamic infarct on chronic osteoarthritis and as well as thalamic pain as well as generalized OA pain: reassess pain every shift and prior to and after each therapy session, give prn Tylenol and resume OxyIR with titration, modalities prn in therapy, masage, Lidoderm, K-pad prn. Consider scheduled AM pain meds. 4. Skin healing and breakdown risk:  continue pressure relief program.  Daily skin exams and reports from nursing. 5. Severe fatigue due to nutritional and hydration deficiency: Add and titrate vitamin B12 vitamin D and CoQ10 continue to monitor I&Os, calorie counts prn, dietary consult prn.  6. Acute episodic insomnia with situational adjustment disorder:  prn Ambien, monitor for day time sedation. 7. Falls risk elevated:  patient to use call light to get nursing assistance to get up, bed and chair alarm. 8. Elevated DVT risk: progressive activities in PT, continue prophylaxis DAPHNE hose, elevation and Lovenox. 9. Complex discharge planning: Discharge January 9, 2021 home with her significant other and home health care. Until then continue weekly team meeting every  Thursday to assess progress towards goals, discuss and address social, psychological and medical comorbidities and to address difficulties they may be having progressing in therapy. Patient and family education is in progress. The patient is to follow-up with their family physician after discharge. Complex Active General Medical Issues that complicate care Assess & Plan:    1. Bipolar 1 disorder,   Anxiety, active chronic depression-emotional support provided daily, vitamin B12, encourage participation in rehabilitation support group and recreational therapy, adjust/add medications (consider anxiolytics review old chart to find out her old psych medications) consult psychiatry and psychology  2.    CAD (coronary artery disease), HTN (hypertension)-continue blood signs every shift focusing on heart rate and blood pressure checks, consult hospitalist for backup medical and adjust/add medications ( aspirin, Lipitor, Apresoline, Imdur, Cozaar, Plavix )  3. Multiple sclerosis with recent acute left thalamic CVA-consult neurology manage COPD sleep apnea and autoimmune disease add antiplatelet medications  4. Colitis-monitor stools for blood as patient will be on antiplatelet medications  5. Chronic pain syndrome with history of overusing medication due to dyscontrol due to bipolar disorder  6. Vitamin D and B12 deficiency-add vitamin B12 and high-dose vitamin D  7. Chronic obstructive pulmonary disease/  COPD exacerbation , Sleep apnea-Pulse oximeter checks to shift dose and titrate oxygen and aerosol treatments monitor for nocturnal hypoxemia, monitor vital signs, oxygen prn.   8.   Dizziness, Vertigo, peripheral, bilateral  with Sensorineural hearing loss (SNHL) of both ears and Tinnitus, bilateral-focus on balance and therapy PT and OT as well as adding speech and language pathology  9. Acute left thalamic CVA with elevated homocystine levels with aphasia dysphagia and right arm weakness-consult neurology-continue PT OT and titrate pain medication for her thalamic pain syndrome--add folic acid.        Electronically signed by Desiree Ortega DO on 12/30/20 at 8:55 AM STAR Barajas D.O., PM&R     Attending    [address]

## 2021-01-03 NOTE — FLOWSHEET NOTE
Discussed with patient that she is homozygous for MTHFR mutation for over 30 minutes. She states understanding regarding the need for anticoagulation. Requests 5 mg twice daily with aspirin 81 mg started today. Discussed that the gene increases homocystine which increases the risk for stroke. Patient was tearful throughout interview, stating that she is overwhelmed by her deficits following stroke. States he needs to go back to work as a  immediately and is not sure if she can. Also states that bills are due which also is causing her stress. Reports she is on too many medications and is overwhelmed by the number that she takes. Expresses desire to cut down on medications. Most of the patient's concerns are social issues. I advised her to discuss with social work as they may be aware of community programs that may help her. Psych already on consult and patient has a history of bipolar 1 and depression. Encouraged patient to follow-up with any questions regarding MTHFR mutation and treatment. Above noted ,  Agree with plan    Steven Laboy MD, J Luis Ibarra, American Board of Psychiatry & Neurology  Board Certified in Vascular Neurology  Board Certified in Neuromuscular Medicine  Certified in . [place] [address]

## 2021-01-03 NOTE — PROGRESS NOTES
Pt is alert and oriented x4. Doing well. Ambulating with steady gait. Pt c/o shoulder pain. Medicating with oxycodone q4h prn. Pt states she is constipated. Last Bm 12/29/20. Miralax given and Colace ordered bid.      Electronically signed by Adelina Luu RN on 1/3/2021 at 5:29 PM

## 2021-01-03 NOTE — PROGRESS NOTES
Occupational Therapy  Facility/Department: [facility]  Daily Treatment Note  NAME: Donaldo Cruz  : 1970  MRN: [id number]    Date of Service: 1/3/2021    Discharge Recommendations:  Continue to assess pending progress       Assessment    Patient tolerated treatment session well, performing tasks at a slow pace with rest breaks provided prn. Activity Tolerance  Activity Tolerance: Patient Tolerated treatment well  Safety Devices  Safety Devices in place: Yes  Type of devices: All fall risk precautions in place         Patient Diagnosis(es): There were no encounter diagnoses. has a past medical history of Anxiety, Back pain, Bipolar disorder ([facility] [address] CAD (coronary artery disease), CAFL (chronic airflow limitation) ([facility]), Chronic bronchitis ([facility] [address] Chronic pain, Colitis, Complicated migraine, DDD (degenerative disc disease), lumbar, Depression, Endometriosis, Excessive caffeine abuse, continuous ([facility] [address] Gastritis, GERD (gastroesophageal reflux disease), History of myocardial infarction, HTN (hypertension), benign, Impaired mobility and activities of daily living, Over weight, PTSD (post-traumatic stress disorder), Sensory neuropathy, Somatization disorder, TIA (transient ischemic attack), Tobacco abuse, and Vasospastic angina ([facility] [address] has a past surgical history that includes Hysterectomy; Dilation and curettage of uterus; back surgery; Neck surgery; shoulder surgery (Left);  section; Cholecystectomy (13); Upper gastrointestinal endoscopy (2014); cyst removal (3/7/16); Coronary angioplasty; Upper gastrointestinal endoscopy (N/A, 2020); and Colonoscopy (N/A, 2020).     Restrictions  Restrictions/Precautions  Restrictions/Precautions: Fall Risk  Required Braces or Orthoses?: Yes  Required Braces or Orthoses  Right Upper Extremity Brace/Splint: Sling  Right Upper Extremity Brace/Splint: RUE immobilizer in place - pt reports rotator cuff repair done Dec 1    Subjective General  Chart Reviewed: Yes  Patient assessed for rehabilitation services?: Yes  Response to previous treatment: Patient with no complaints from previous session  Family / Caregiver Present: No  Pain Assessment  Pain Assessment: 0-10  Pain Level: 8  Pain Type: Acute pain  Pain Location: Shoulder  Pain Orientation: Right  Pain Descriptors: Aching  Pre Treatment Pain Screening  Pain at present: 8  Scale Used: Numeric Score  Intervention List: Patient able to continue with treatment  Vital Signs  Patient Currently in Pain: Yes     Objective    Upper Extremity Function  UE Strengthing: Patient engaged in multiple therapeutic activities to increase left UE strength for ease of functional transfers and ADL tasks. Patient dons left 1# wrist weight while engaging in repetitive reaching activity to fill large peg board. Patient then balances marbles on top of each peg (~100 count). Patient removes marbles from pegs three at a time for increased in-hand manipulation skills. Patient returns all pegs to storage container for additional UE endurance. Patient engages in left hand strengthening activity with resistive clips. Patient places and removes all resistances (1-8#) from horizontal targets x2 trials with good overall tolerance.      Plan   Plan  Times per week: 5-7x/week  Plan weeks: 1-1 1/2 weeks  Current Treatment Recommendations: Strengthening, Endurance Training, Self-Care / ADL, Balance Training, Pain Management, Home Management Training, Functional Mobility Training, Safety Education & Training  Plan Comment: Continue OT per POC    Goals  Patient Goals   Patient goals : \"I can't believe I feel this weak, I want to get stronger\"       Therapy Time   Individual Concurrent Group Co-treatment   Time In 1000         Time Out 1100         Minutes 60               Therapeutic activities: 60 minutes     Electronically signed by EARLINE Johnson/L on 1/3/2021 at 12:52 PM  EARLINE Johnson/ESTEVAN

## 2021-01-03 NOTE — PROGRESS NOTES
Assessment completed earlier in the shift. VSS. LBM 12/29/2020. Gave Glycolax. Pt reported she was taking a red pill at home for bowels. Assuming it is colace. Pt requesting to \"double up\" on it here. Pt walked the lindsay a few laps. HS OT- 100. Lantus administered. Snack provided. No distress noted. Call light within reach and bed alarm activated.

## 2021-01-03 NOTE — PROGRESS NOTES
Occupational Therapy  Facility/Department: [facility]  Daily Treatment Note  NAME: Rony Cunningham  : 1970  MRN: [id number]    Date of Service: 1/3/2021    Discharge Recommendations:  Continue to assess pending progress       Assessment    Patient tolerated treatment session well, working at a slow and steady pace throughout session with rest breaks provided PRN. Activity Tolerance  Activity Tolerance: Patient Tolerated treatment well  Safety Devices  Safety Devices in place: Yes  Type of devices: All fall risk precautions in place         Patient Diagnosis(es): There were no encounter diagnoses. has a past medical history of Anxiety, Back pain, Bipolar disorder ([facility] [address] CAD (coronary artery disease), CAFL (chronic airflow limitation) ([facility]), Chronic bronchitis ([address] Chronic pain, Colitis, Complicated migraine, DDD (degenerative disc disease), lumbar, Depression, Endometriosis, Excessive caffeine abuse, continuous ([address] Gastritis, GERD (gastroesophageal reflux disease), History of myocardial infarction, HTN (hypertension), benign, Impaired mobility and activities of daily living, Over weight, PTSD (post-traumatic stress disorder), Sensory neuropathy, Somatization disorder, TIA (transient ischemic attack), Tobacco abuse, and Vasospastic angina ([facility] [address] has a past surgical history that includes Hysterectomy; Dilation and curettage of uterus; back surgery; Neck surgery; shoulder surgery (Left);  section; Cholecystectomy (13); Upper gastrointestinal endoscopy (2014); cyst removal (3/7/16); Coronary angioplasty; Upper gastrointestinal endoscopy (N/A, 2020); and Colonoscopy (N/A, 2020).     Restrictions  Restrictions/Precautions  Restrictions/Precautions: Fall Risk  Required Braces or Orthoses?: Yes  Required Braces or Orthoses  Right Upper Extremity Brace/Splint: Sling  Right Upper Extremity Brace/Splint: RUE immobilizer in place - pt reports rotator cuff repair done Dec 1 Subjective   General  Chart Reviewed: Yes  Patient assessed for rehabilitation services?: Yes  Response to previous treatment: Patient with no complaints from previous session  Family / Caregiver Present: No  Pain Assessment  Pain Assessment: 0-10  Pain Level: 7  Pain Type: Acute pain  Pain Location: Shoulder  Pain Orientation: Right  Pain Descriptors: Aching  Pre Treatment Pain Screening  Pain at present: 7  Scale Used: Numeric Score  Intervention List: Patient able to continue with treatment  Comments / Details: Nurse delivered pain medication at start of session  Vital Signs  Patient Currently in Pain: Yes     Objective    Coordination  Fine Motor: Patient engages in left hand fine motor coordination activity for increased ease with self-care tasks. Patient follows a pattern of moderate diffiuclty to fill a small peg board independently with good accuracy. Patient then fills in remaining empty spaces with additional pegs for further fine motor coordination. Upper Extremity Function  UE Strengthing: Patient engages in multiple therapeutic activities to increase left UE strength and endurance for ADLs. Patient dons 1# wrist weight while performing repetitive reaching with left UE at and above shoulder level to place shower rings onto horizontal targets. Patient then utilizes a 2-4# resistive clip to remove all rings and return to storage container. Patient performs at a slow pace with rest breaks provided prn. Patient ends session with left hand strengthening activity using green theraputty. Patient rolls putty out into 14\" long rope and then pinches down the length with alternating fingers with good overall tolerance.      Plan   Plan  Times per week: 5-7x/week  Plan weeks: 1-1 1/2 weeks  Current Treatment Recommendations: Strengthening, Endurance Training, Self-Care / ADL, Balance Training, Pain Management, Home Management Training, Functional Mobility Training, Safety Education & Training  Plan Comment: Continue OT per POC    Goals  Patient Goals   Patient goals : \"I can't believe I feel this weak, I want to get stronger\"       Therapy Time   Individual Concurrent Group Co-treatment   Time In 1300         Time Out 1400         Minutes 60         Timed Code Treatment Minutes: 60 Minutes     Therapeutic activities: 60 minutes     Electronically signed by EARLINE Valenzuela/L on 1/3/2021 at 3:57 PM  EARLINE Valenzuela/ESTEVAN

## 2021-01-03 NOTE — PROGRESS NOTES
Physical Therapy Rehab Treatment Note  Facility/Department: [facility]  [facility]       NAME: Bonnie Patel  : 1970 (52 y.o.)  MRN: [id number]  CODE STATUS: Full Code    Date of Service: 1/3/2021       Restrictions:  Restrictions/Precautions: Fall Risk  Required Braces or Orthoses  Right Upper Extremity Brace/Splint: Sling  Right Upper Extremity Brace/Splint: RUE immobilizer in place - pt reports rotator cuff repair done Dec 1       SUBJECTIVE:       Pre Treatment Pain Screening  Pain at present: 7  Scale Used: Numeric Score  Intervention List: Patient able to continue with treatment    Post Treatment Pain Screening:  Pain Assessment  Pain Level: 8    OBJECTIVE:      Neuromuscular Education  PNF: seated smooth pursuits 1 way with double vision with one rep   NDT Treatment: Standing    Ambulation 1  Surface: carpet;level tile  Device: No Device  Other Apparatus: (rt shoulder sling )  Assistance: Contact guard assistance;Stand by assistance  Gait Deviations: Decreased step length;Decreased step height  Distance: 150x2    Stairs  # Steps : 8  Stairs Height: 6\"  Rails: Left ascending  Device: No Device  Assistance: Contact guard assistance  Comment: reciprocal ascending, nonreciprocal descending    Exercises  Hamstring Sets: x 15 YTB  Hip Flexion: satnding march x15  Hip Abduction: x 15 YTB/ Ball squeezes  Knee Long Arc Quad: x 15  Ankle Pumps: x 20  Comments: single steps  Other exercises  Other exercises?: Yes  Other exercises 1: standing toe taps 4 inch step      ASSESSMENT/COMMENTS:  Assessment: Pt with decreased step length and foot clearance with ambulation. Pt with increased unstidnees and laterall sway ecspecially around corners or tight spaces    PLAN OF CARE/Safety:   Safety Devices  Type of devices:  All fall risk precautions in place      Therapy Time:   Individual   Time In 1100   Time Out 1200   Minutes 60     Minutes:      Gait trainin      Neuro re education:30     Therapeutic ex:Golden Coronado PTA, 01/03/21 at 12:15 PM

## 2021-01-04 LAB
GLUCOSE BLD-MCNC: 106 MG/DL (ref 60–115)
GLUCOSE BLD-MCNC: 129 MG/DL (ref 60–115)
GLUCOSE BLD-MCNC: 82 MG/DL (ref 60–115)
GLUCOSE BLD-MCNC: 98 MG/DL (ref 60–115)
PERFORMED ON: ABNORMAL
PERFORMED ON: NORMAL

## 2021-01-04 PROCEDURE — 97130 THER IVNTJ EA ADDL 15 MIN: CPT

## 2021-01-04 PROCEDURE — 1180000000 HC REHAB R&B

## 2021-01-04 PROCEDURE — 97129 THER IVNTJ 1ST 15 MIN: CPT

## 2021-01-04 PROCEDURE — 97535 SELF CARE MNGMENT TRAINING: CPT

## 2021-01-04 PROCEDURE — 90792 PSYCH DIAG EVAL W/MED SRVCS: CPT | Performed by: PSYCHIATRY & NEUROLOGY

## 2021-01-04 PROCEDURE — 99232 SBSQ HOSP IP/OBS MODERATE 35: CPT | Performed by: PHYSICAL MEDICINE & REHABILITATION

## 2021-01-04 PROCEDURE — 97116 GAIT TRAINING THERAPY: CPT

## 2021-01-04 PROCEDURE — 6370000000 HC RX 637 (ALT 250 FOR IP): Performed by: NURSE PRACTITIONER

## 2021-01-04 PROCEDURE — 97530 THERAPEUTIC ACTIVITIES: CPT

## 2021-01-04 PROCEDURE — APPSS15 APP SPLIT SHARED TIME 0-15 MINUTES: Performed by: NURSE PRACTITIONER

## 2021-01-04 PROCEDURE — 92507 TX SP LANG VOICE COMM INDIV: CPT

## 2021-01-04 PROCEDURE — 6370000000 HC RX 637 (ALT 250 FOR IP): Performed by: STUDENT IN AN ORGANIZED HEALTH CARE EDUCATION/TRAINING PROGRAM

## 2021-01-04 PROCEDURE — 99232 SBSQ HOSP IP/OBS MODERATE 35: CPT | Performed by: PSYCHIATRY & NEUROLOGY

## 2021-01-04 PROCEDURE — 6370000000 HC RX 637 (ALT 250 FOR IP): Performed by: INTERNAL MEDICINE

## 2021-01-04 PROCEDURE — 6370000000 HC RX 637 (ALT 250 FOR IP): Performed by: PHYSICAL MEDICINE & REHABILITATION

## 2021-01-04 RX ADMIN — LITHIUM CARBONATE 300 MG: 300 CAPSULE, GELATIN COATED ORAL at 12:35

## 2021-01-04 RX ADMIN — ALOGLIPTIN 6.25 MG: 6.25 TABLET, FILM COATED ORAL at 09:38

## 2021-01-04 RX ADMIN — Medication 100 MG: at 09:38

## 2021-01-04 RX ADMIN — ATORVASTATIN CALCIUM 20 MG: 20 TABLET, FILM COATED ORAL at 21:33

## 2021-01-04 RX ADMIN — LITHIUM CARBONATE 300 MG: 300 CAPSULE, GELATIN COATED ORAL at 09:39

## 2021-01-04 RX ADMIN — METFORMIN HYDROCHLORIDE 500 MG: 500 TABLET ORAL at 17:22

## 2021-01-04 RX ADMIN — ASPIRIN 81 MG: 81 TABLET, COATED ORAL at 09:38

## 2021-01-04 RX ADMIN — APIXABAN 5 MG: 5 TABLET, FILM COATED ORAL at 21:33

## 2021-01-04 RX ADMIN — DOCUSATE SODIUM 200 MG: 100 CAPSULE ORAL at 09:38

## 2021-01-04 RX ADMIN — Medication 2000 UNITS: at 17:22

## 2021-01-04 RX ADMIN — OXYCODONE 5 MG: 5 TABLET ORAL at 21:36

## 2021-01-04 RX ADMIN — APIXABAN 5 MG: 5 TABLET, FILM COATED ORAL at 09:38

## 2021-01-04 RX ADMIN — FOLIC ACID 1 MG: 1 TABLET ORAL at 09:39

## 2021-01-04 RX ADMIN — INSULIN GLARGINE 22 UNITS: 100 INJECTION, SOLUTION SUBCUTANEOUS at 21:33

## 2021-01-04 RX ADMIN — CARIPRAZINE 3 MG: 1.5 CAPSULE, GELATIN COATED ORAL at 09:38

## 2021-01-04 RX ADMIN — OXYCODONE 5 MG: 5 TABLET ORAL at 14:16

## 2021-01-04 RX ADMIN — BUTALBITAL, ASPIRIN, AND CAFFEINE 1 CAPSULE: 50; 325; 40 CAPSULE ORAL at 22:25

## 2021-01-04 RX ADMIN — LITHIUM CARBONATE 300 MG: 300 CAPSULE, GELATIN COATED ORAL at 17:22

## 2021-01-04 RX ADMIN — METFORMIN HYDROCHLORIDE 500 MG: 500 TABLET ORAL at 09:38

## 2021-01-04 RX ADMIN — LOSARTAN POTASSIUM 25 MG: 25 TABLET, FILM COATED ORAL at 09:38

## 2021-01-04 RX ADMIN — ISOSORBIDE MONONITRATE 30 MG: 30 TABLET, EXTENDED RELEASE ORAL at 09:38

## 2021-01-04 RX ADMIN — DOCUSATE SODIUM 200 MG: 100 CAPSULE ORAL at 21:33

## 2021-01-04 ASSESSMENT — ENCOUNTER SYMPTOMS
VOMITING: 0
PHOTOPHOBIA: 0
DIARRHEA: 0
NAUSEA: 0
TROUBLE SWALLOWING: 0
SHORTNESS OF BREATH: 0

## 2021-01-04 ASSESSMENT — PAIN SCALES - GENERAL
PAINLEVEL_OUTOF10: 8
PAINLEVEL_OUTOF10: 10
PAINLEVEL_OUTOF10: 8

## 2021-01-04 ASSESSMENT — PAIN DESCRIPTION - LOCATION: LOCATION: SHOULDER

## 2021-01-04 ASSESSMENT — PAIN DESCRIPTION - DESCRIPTORS
DESCRIPTORS: ACHING
DESCRIPTORS: ACHING

## 2021-01-04 ASSESSMENT — PAIN DESCRIPTION - PAIN TYPE
TYPE: ACUTE PAIN
TYPE: ACUTE PAIN

## 2021-01-04 ASSESSMENT — PAIN DESCRIPTION - PROGRESSION: CLINICAL_PROGRESSION: NOT CHANGED

## 2021-01-04 ASSESSMENT — PAIN DESCRIPTION - ONSET: ONSET: ON-GOING

## 2021-01-04 ASSESSMENT — PAIN DESCRIPTION - FREQUENCY: FREQUENCY: CONTINUOUS

## 2021-01-04 NOTE — PROGRESS NOTES
[facility] Neurology Daily Progress Note  Name: Rehana Aguero  Age: 52 y.o. Gender: female  CodeStatus: Full Code  Allergies: Latex  Influenza Vaccines  Eggs Or Egg-Derived Products  Gabapentin  Pneumococcal Vaccines  Povidone Iodine  Varicella Virus Vaccine Live    Chief Complaint:No chief complaint on file. Primary Care Provider: Geronimo White MD  InpatientTreatment Team: Treatment Team: Attending Provider: Darek Trujillo DO; Consulting Physician: Saundra Dancer, MD; Consulting Physician: Evan Gomez DO; Utilization Reviewer: David Salas RN; Consulting Physician: Yosi Gómez MD; Registered Nurse: Shira Gill RN; Consulting Physician: Elisha Paul MD; Occupational Therapist Assistant: Rolland Ormond, OTA; Registered Nurse: Alli Narayan RN  Admission Date: 12/28/2020      HPI   Pt seen and examined on rehab unit for neurology follow-up for acute thalamic CVA with expressive aphasia and headache. Patient currently alert and oriented x3, no acute distress, cooperative. Continues to have expressive aphasia. Reports she continues to have a headache intermittently. Diplopia has improved. No new focal deficits. No seizure activity, dysphagia, chest pain, SOB, or focal weakness. C/o right weakness   Vitals:    01/04/21 0627   BP: (!) 156/81   Pulse: 73   Resp: 15   Temp: 98 °F (36.7 °C)   SpO2: 95%      Review of Systems   Constitutional: Negative for chills and fever. HENT: Negative for congestion and trouble swallowing. Eyes: Negative for photophobia and visual disturbance. Respiratory: Negative for shortness of breath. Gastrointestinal: Negative for diarrhea, nausea and vomiting. Musculoskeletal: Negative. Neurological: Positive for speech difficulty, weakness and headaches. Negative for dizziness, tremors, seizures, syncope, facial asymmetry, light-headedness and numbness. Psychiatric/Behavioral: Negative.     Physical Exam  Vitals signs and nursing note reviewed. Constitutional:       Appearance: Normal appearance. HENT:      Head: Normocephalic and atraumatic. Eyes:      Conjunctiva/sclera: Conjunctivae normal.   Cardiovascular:      Rate and Rhythm: Normal rate and regular rhythm. Heart sounds: Normal heart sounds. Pulmonary:      Effort: Pulmonary effort is normal.      Breath sounds: Normal breath sounds. Musculoskeletal:      Comments: Generalized weakness  RUE not tested for strength due to injury   Skin:     General: Skin is warm and dry. Neurological:      General: No focal deficit present. Mental Status: She is alert and oriented to person, place, and time. Psychiatric:         Mood and Affect: Mood normal.         Behavior: Behavior normal.       Neurologic Exam     Mental Status   Oriented to person, place, and time.      Perrl  eom intact  Speech normal    Medications:  Reviewed    Infusion Medications:    dextrose       Scheduled Medications:    folic acid  1 mg Oral Daily    apixaban  5 mg Oral BID    docusate sodium  200 mg Oral BID    Vitamin D  2,000 Units Oral Dinner    cyanocobalamin  1,000 mcg Intramuscular Weekly    coenzyme Q10  100 mg Oral Daily    lidocaine  3 patch Transdermal Daily    insulin glargine  0.25 Units/kg Subcutaneous Nightly    cariprazine hcl  3 mg Oral Daily    metFORMIN  500 mg Oral BID WC    alogliptin  6.25 mg Oral Daily    lithium  300 mg Oral TID WC    insulin lispro  0-12 Units Subcutaneous TID WC    insulin lispro  0-6 Units Subcutaneous Nightly    aspirin  81 mg Oral Daily    Or    aspirin  300 mg Rectal Daily    atorvastatin  20 mg Oral Nightly    isosorbide mononitrate  30 mg Oral Daily    losartan  25 mg Oral Daily    nicotine  1 patch Transdermal Daily     PRN Meds: oxyCODONE, glucose, dextrose, glucagon (rDNA), dextrose, polyethylene glycol, promethazine **OR** ondansetron, acetaminophen, butalbital-aspirin-caffeine, hydrALAZINE    Labs:   No results for input(s): WBC, HGB, HCT, PLT in the last 72 hours. No results for input(s): NA, K, CL, CO2, BUN, CREATININE, CALCIUM, PHOS in the last 72 hours. Invalid input(s): MAGNES  No results for input(s): AST, ALT, BILIDIR, BILITOT, ALKPHOS in the last 72 hours. No results for input(s): INR in the last 72 hours. No results for input(s): Towana Silvia in the last 72 hours. Urinalysis:   Lab Results   Component Value Date    NITRU Negative 12/30/2020    WBCUA 10-20 03/01/2020    BACTERIA RARE 03/01/2020    RBCUA 3-5 03/01/2020    BLOODU Negative 12/30/2020    SPECGRAV 1.009 12/30/2020    GLUCOSEU Negative 12/30/2020       Radiology:   Most recent    EEG No procedure found. MRI of Brain   Results for orders placed during the hospital encounter of 04/15/16   MRI Brain W WO Contrast    Narrative EXAMINATION MRI BRAIN     CLINICAL HISTORY Left-sided weakness and numbness, headache,  dizziness, history of multiple sclerosis     COMPARISONS 8/31/15     FINDINGS Multiplanar, multisequence images were obtained without  contrast followed by IV contrast consisting of 15 cc OptiMARK. Sinuses  and mastoid air cells are clear. Orbital contents are normal. There is  no shift of the midline structures. No abnormal contrast-enhancing  lesions are identified. No intracranial mass is seen. There is no  evidence of acute infarction or hemorrhage. Ventricular system appears  normal. FLAIR images demonstrate a single focus of bright signal  intensity in the right cerebral peduncle similar to the prior study. No  associated contrast enhancement is seen. IMPRESSION NO ACUTE INTRACRANIAL PROCESS. A SINGLE SUBCENTIMETER FOCUS  OF BRIGHT SIGNAL INTENSITY IN THE RIGHT CEREBRAL PEDUNCLE IS AGAIN  NOTED. THERE IS NO EVIDENCE OF CONTRAST ENHANCEMENT. FINDINGS ARE  SUSPICIOUS FOR A SMALL DEMYELINATING PLAQUE SECONDARY TO MULTIPLE  SCLEROSIS. NO OTHER DEFINITE PLAQUES ARE IDENTIFIED AT THIS TIME.   OVERALL APPEARANCE OF THE BRAIN IS UNCHANGED FROM THE PRIOR STUDY. Robin Medina M.D. Released Niyah Medina M.D. Released Date Time- 04/18/16 2522   This document has been electronically signed. ------------------------------------------------------------------------------     Results for orders placed during the hospital encounter of 12/22/20   MRI BRAIN WO CONTRAST    Narrative EXAMINATION:  MRI BRAIN WO CONTRAST    HISTORY:   new CVA . I63.9 Stroke determined by clinical assessment ([facility] [address] ICD10    TECHNIQUE:  Routine noncontrast MRI protocol including diffusion and gradient echo images. COMPARISON: MRI brain 12/23/2020. RESULT:    Acute Change:   Restricted diffusion involving the left thalamus, with corresponding increased T2/FLAIR signal at this site, measuring approximately 1.6 x 0.8 cm, similar to recent prior MRI. No new areas of restricted diffusion       Hemorrhage:    Small rounded area of susceptibility left occipital lobe, unchanged. Mass Lesion/ Mass Effect:    No evidence of an intracranial mass or extra-axial fluid collection. No significant mass effect. Chronic Change:  Scattered punctate foci of increased T2 and FLAIR signal are noted in the supratentorial white matter which is a nonspecific finding, but likely represents minimal chronic microvascular ischemia. Parenchyma:   No significant volume loss for age. The brain parenchyma is otherwise within normal limits of signal intensity and morphology. Ventricles:     Normal caliber and morphology. Skull Base:    Hypothalamic and pituitary region are grossly normal.   Craniocervical junction is normal. No significant marrow replacement process. Vasculature:    Major intracranial arterial structures, and dural venous sinuses show typical flow void, suggesting patency by spin echo criteria. Other:  The visualized paranasal sinuses are clear. Mastoid air cells clear.   The orbits are unremarkable. Soft tissues are unremarkable. Impression Recent infarction involving the left thalamus, overall unchanged from MRI 12/23/2020. No new infarcts from the prior MRI. MRA of the Head and Neck: No results found for this or any previous visit. No results found for this or any previous visit. Results for orders placed during the hospital encounter of 08/06/19   mra head w/o contrast    Narrative EXAM:     MRI of the brain without contrast    MRA of the brain without contrast    History: TIA. Bilateral arm numbness. Headache. Blurry vision. Weakness of the left arm and leg. Technique: Multiplanar multisequence MRI of the brain was performed without contrast. Axial 3-D time-of-flight images of the brain were obtained without contrast. 3-D volume rendered images were performed for evaluation of the cerebral vasculature. Comparison: CT brain from August 6, 2019 and MRI brain from November 7, 2018    Findings:    MRI brain:    Unchanged nonspecific 3 mm hyperintense T2 focus within the right brachium pontis. Unchanged nonspecific 5 mm hyperintense T2/FLAIR focus within the cortex of the right frontal lobe, not significant changed from prior examination. Otherwise no suspicious   white matter lesions. Brain volume is age-appropriate. Ventricular morphology is within normal limits. No acute hemorrhage, mass, mass effect, midline shift, or abnormal extra-axial fluid collection. The posterior fossa is within normal limits. There is no diffusion restriction. No susceptibility artifact is identified on the gradient echo sequence. Cranial nerves 7/8 complexes appear grossly unremarkable. The visualized paranasal sinuses and bilateral mastoid air cells are clear. MRA brain:    The bilateral distal cervical internal carotid arteries through the skull base are patent.      The bilateral middle cerebral arteries through the trifurcation and opercular branches are patent. The vertebrobasilar system including the superior cerebellar and posterior cerebral arteries are patent. Posterior communicating arteries are patent. The bilateral anterior cerebral arteries and anterior communicating artery are patent. No aneurysm or high-grade stenosis of the visualized cerebral vasculature. Impression MRI brain:    No acute ischemia, acute intracranial process, or significant interval change when compared to MRI of the brain from November 7, 2018    MRA brain:    No aneurysm or high-grade stenosis of the visualized cerebral vasculature. CT of the Head:   Results for orders placed during the hospital encounter of 12/22/20   CT Head WO Contrast    Narrative CT Brain    Contrast medium:  Not utilized. History:  Right facial droop. Altered mental status    Comparison:  August 21, 2020    Findings:    Extra-axial spaces:  Normal.     Intracranial hemorrhage:  None. Ventricular system: In the interval, an ill-defined 4 x 12 mm area decreased attenuation exerting no mass effect is found medially within the left thalamus (series 2, image 16). Basal Cisterns:  Normal.    Cerebral Parenchyma:  Normal.    Midline Shift:  None. Cerebellum:  Normal.     Paranasal sinuses and mastoid air cells:  Normal.    Visualized Orbits:  Normal.      Impression Impression:    Interval development of acute/subacute left thalamic infarct. All CT scans at this facility use dose modulation, iterative reconstruction, and/or weight based dosing when appropriate to reduce radiation dose to as low as reasonably achievable. No results found for this or any previous visit. No results found for this or any previous visit. Carotid duplex: No results found for this or any previous visit. No results found for this or any previous visit.   Results for orders placed during the hospital encounter of 08/06/19   US CAROTID ARTERY BILATERAL    Narrative BILATERAL DUPLEX CAROTID ULTRASOUND    CLINICAL INFORMATION:  SMOKING HISTORY, HYPERTENSION, TIA WITH LEFT ARM NUMBNESS AND TINGLING, FACIAL DROOP, EVALUATE FOR POSSIBLE CAROTID ARTERY STENOSIS IN THE NECK. COMPARISON:  NONE AVAILABLE     FINDINGS:  The bilateral carotid duplex ultrasound study demonstrates the following:                                                                 RIGHT            LEFT    Carotid plaque burden                         Mild               Mild  Proximal common carotid artery           171 cm/s            177 cm/s    Mid common carotid artery                   151 cm/s            152 cm/s    Distal common carotid artery                90 cm/s           118 cm/s  Proximal internal carotid artery             78 cm/s            130 cm/s  Mid internal carotid artery                      80 cm/s           82 cm/s  Distal internal carotid artery                  65 cm/s             85 cm/s  External carotid artery                            172 cm/s           169 cm/s      ICA/CCA ratio                                         0.5                0.9       Vertebral arteries antegrade flow         Yes                     Yes       Impression 1. MILD CAROTID PLAQUE BURDEN IN BOTH CAROTID BIFURCATIONS IN THE NECK. 2. RIGHT INTERNAL CAROTID ARTERY: <50% STENOSIS. 3. LEFT INTERNAL CAROTID ARTERY: <50% STENOSIS. 4. BILATERALLY PATENT VERTEBRAL ARTERIES WITH ANTEGRADE BLOOD FLOW. Validated velocity measurements with angiographic measurements, velocity criteria are extrapolated from diameter data as defined by the Society of Radiologist in [address] [place] [address] Drive Radiology 2003; [id number]. Echo No results found for this or any previous visit. Assessment/Plan:  Left thalamic stroke with expressive aphasia  CTA negative  Continue dual antiplatelet therapy  Risk factor modification needed  CHAO with bubble study negative for cardioembolic source.   Patient continues to have mild headache, but diplopia and photophobia has improved. Hypercoagulable work-up done.  homocystine level elevated and homozygous positive for MTHFR. Will repeat homocysteine level and consider switching from DAPT to anticoagulation. As noted patient is hypercoagulable and has homozygous MTHFR with elevated homocystine levels in the context of this to all being positive patient should be on anticoagulation recommended we start her on Eliquis 5 mg twice a day with aspirin. Examination shows right-sided weakness and same findings. Events noted,   I have personally performed a face to face diagnostic evaluation on this patient, reviewed all data and investigations, and am the sole provider of all clinical decisions on the neurological status of this patient. weekness left  MTHFR,  On eliquis/asa      Steven R. Zbigniew Hunt MD, Benito Richardson, American Board of Psychiatry & Neurology  Board Certified in Vascular Neurology  Board Certified in Neuromuscular Medicine  Certified in . [address]     Overall doing well.  Cont current POC    Collaborating physicians: Dr Zbigniew Hunt    Electronically signed by NIKI Harris CNP on 1/4/2021 at 1:57 PM

## 2021-01-04 NOTE — CARE COORDINATION
Continued Stay Review Note    21  [facility])  Clinical Case Management Department  Written by: Rambo Blancas RN    Patient Name: Uziel Lord  Attending Provider: Nichol Miller DO      Admit Date: 2020  MRN: [id number]                           : 1970    Patient Visit Status: Inpatient  Level of Care:Patient admitted to acute inpatient rehab on 20    Anticipated Discharge Plan: At this time, patient is scheduled to discharge on 21 home. We feel that patient would benefit from increased time to aide in a safe discharge. Last set of vitals:  Temp: 98 °F (36.7 °C) (21)   Pulse: 73 (21)  Resp: 15 (21)  BP: (!) 156/81 (21)  SpO2: 95 % (21)    Current Treatments:    Results/Findings:     Labs:    Lab Results   Component Value Date     2020    K 3.4 2020     2020    CO2 24 2020    BUN 9 2020    CREATININE 0.76 2020    GLUCOSE 154 2020    CALCIUM 9.0 2020      Lab Results   Component Value Date    WBC 6.8 2020    HGB 13.1 2020    HCT 38.0 2020    MCV 93.5 2020     2020     Lab Results   Component Value Date    CKTOTAL 53 2020    CKMB 1.1 2017    CKMBINDEX 1.7 2017       Other Labs/Urinalysis/Pathology:    Diagnostics/Imaging:  No results found. [facility]  Facility/Department: [facility]  Rehabilitation Initial Assessment: Physical Therapy  NAME: Uziel Lord  : 1970  MRN: [id number]     Date of Service: 2020     Rehab Diagnosis(es):Abnormality of gait and mobility due to recent infarct left thalamus. [facility] admit 20.  Pt reports right rotator cuff repair 20 - shoulder plint in place    Restrictions:  Restrictions/Precautions: Fall Risk  Required Braces or Orthoses  Right Upper Extremity Brace/Splint: Sling  Right Upper Extremity Brace/Splint: RUE immobilizer in place - pt reports rotator cuff repair done Dec 1        SUBJECTIVE:       Pre Treatment Pain Screening  Pain at present: 8  Intervention List: Patient able to continue with treatment  Comments / Details: 8/10 back pain - pain medication requested at end of session     Post Treatment Pain Screening:  Pain Assessment  Pain Level: 9  Pain Location: Back     Prior Level of Function:  Social/Functional History  Lives With: Alone(has a boyfriend that works)  Type of Home: [facility] Layout: Two level, Bed/Bath upstairs  Home Access: Stairs to enter without rails(5 steps)  Bathroom Shower/Tub: Tub/Shower unit  ADL Assistance: [place]: Independent  Homemaking Responsibilities: Yes  Ambulation Assistance: Independent(no AD)  Transfer Assistance: Independent  Active : Yes  Type of occupation: pt stated yes to work; however unable to clarify where- dysarthria     OBJECTIVE:   Vision/Hearing:  Vision: Impaired(pt reports intermittent double vision)  Vision Exceptions: Wears glasses at all times  Hearing: Within functional limits     Cognition:  Follows Commands: Impaired(intermittetnt slow processing of question and directions)     ROM:  RLE PROM: WFL  LLE PROM: WFL     Strength:  Strength RLE  Comment: 4-/5  Strength LLE  Comment: 4/5     Neuro:  Balance  Sitting - Static: Good  Sitting - Dynamic: Good  Standing - Static: Fair(able to stand one minute with no UE support with SBA - increased sway noted)  Standing - Dynamic: Fair;-  Tone RLE  RLE Tone: Normotonic  Tone LLE  LLE Tone: Normotonic  Motor Control  Gross Motor?: (mild decreased control of RLE movement patterns with fatigue)     Bed mobility  Rolling to Left: Modified independent  Rolling to Right: Unable to assess(right shouder repair recent)  Supine to Sit: Stand by assistance(to left and right)  Sit to Supine: Stand by assistance  Comment: reports she has been sleepng on couch since surgery     Transfers  Sit to Stand: Contact guard assistance;Stand by assistance  Stand to sit: Contact guard assistance;Stand by assistance  Bed to Chair: Contact guard assistance;Stand by assistance  Comment: cues for technique and safety required     Ambulation  Ambulation?: Yes  Ambulation 1  Surface: level tile  Device: No Device  Other Apparatus: (right shoulder sling)  Assistance: Minimal assistance  Quality of Gait: wide RIDDHI, decreased ability to scan environment, short step length, decreased trunk rotation, decreased foot clearance, reaches for walls for UE support  Distance: 30 feet     Stairs/Curb  Stairs?: No                  Quality Indicators (IRF-FLASH):  Rolling L and R: Supervision or Touching Assistance - 4  Sit>Supine: Supervision or Touching Assistance - 4  Supine>Sit: Supervision or Touching Assistance - 4  Sit>Stand: Supervision or Touching Assistance - 4  Chair/Bed>Chair Transfer: Supervision or Touching Assistance - 4  Car Transfers: Not attempted due to Medical Condition or Safety Concerns (I.e. unsafe or physician orders) - 80  Walk 10 ft: Supervision or Touching Assistance - 4  Walk 50 ft with two 90 degree turns: Not attempted due to Medical Condition or Safety Concerns (I.e. unsafe or physician orders) - 80  Walk 150 ft in [address]: Not attempted due to Medical Condition or Safety Concerns (I.e. unsafe or physician orders) - 80  Walking 10 ft on Unlevel Surface: Not attempted due to Medical Condition or Safety Concerns (I.e. unsafe or physician orders) - 80  Picking up Objects from Standing Position: Not attempted due to Medical Condition or Safety Concerns (I.e. unsafe or physician orders) - 80  Stairs: No Not attempted due to medical condition or safety concerns (i.e. unsafe or physician order) - 88  WC Mobility: No Not Applicable (pt did not complete item prior to admission) - 9        ASSESSMENT:  Body structures, Functions, Activity limitations: Decreased functional mobility ;Decreased strength;Decreased endurance;Decreased coordination;Decreased ADL status; Decreased safe awareness;Decreased sensation;Decreased cognition;Decreased balance; Increased pain  Decision Making: Medium Complexity  History: high  Exam: medium  Clinical Presentation: medium     Prognosis: Good  PT Education: Goals;PT Role;Transfer Training;Plan of Care;General Safety  Barriers to Learning: none     CLINICAL IMPRESSION: Pt demonstrates deficits as listed. She is requiring assistance for mobility since CVA and has complication of right shoulder surgery 12/1/20 with spint in place. Pt was previously at an indep level of function with splint on right shoulder. Pt would benefit from follow  up PT at this time     PLAN OF CARE:  Frequency: 1-2 treatment sessions per day, 5-7 days per week  Current Treatment Recommendations: Strengthening, Functional Mobility Training, Home Exercise Program, Equipment Evaluation, Education, & procurement, ROM, Transfer Training, Gait Training, Manual Therapy - Soft Tissue Mobilization, Safety Education & Training, Balance Training, Endurance Training, Stair training, Patient/Caregiver Education & Training     Patient's Goal:  to get moving and be able to move right     GOALS:  Long term goals  Long term goal 1: Pt will demonstrate bed mobility indep. Long term goal 2: indep bed and car transfers  Long term goal 3: Pt will demonstrate amb  indep with or without 50 feet - supervision 150 feet  Long term goal 4: Pt will exhibit improved dynamic balance evidenced of 20/24 DGI for decreased risk for falls and safe d/c home alone.   Long term goal 5: Pt will demonstrate stair negotiation 5 steps without rails indep to allow pt to enter and exit her home safely.     ELOS:   Plan weeks: 1 - 1 1 /2          Owen Reagan, PT, 12/29/20 at 11:56 AM                  Occupational Therapy   Occupational Therapy Initial Assessment  Date: 12/29/2020          Patient Name: Zackary Chowdhury  MRN: [id number]     : 1970     Date of Service: 2020  Discharge Recommendations:  Continue to assess pending progress  OT Equipment Recommendations  Other: Continue to assess     Assessment   Performance deficits / Impairments: Decreased functional mobility ; Decreased ADL status; Decreased ROM; Decreased strength;Decreased endurance;Decreased cognition;Decreased safe awareness;Decreased balance;Decreased high-level IADLs;Decreased fine motor control;Decreased coordination;Decreased posture  Assessment: Pt is a 52year old woman from home alone who presents to [address] [place] [address] with the above deficits which impact her ability to perform ADL and IADL tasks. Pt. would benefit from continued OT to maximize independence and safety with transfers and mobility. Prognosis: Good  Decision Making: Medium Complexity  History: Pt's medical history is moderately complex  Exam: Pt. has 12 performance deficits  Assistance / Modification: Pt. requires mod A  OT Education: OT Role;Plan of Care  Patient Education: Educated pt. on role of OT  Barriers to Learning: Memory deficits  REQUIRES OT FOLLOW UP: Yes  Activity Tolerance  Activity Tolerance: Patient limited by fatigue;Treatment limited secondary to decreased cognition  Activity Tolerance: Pt. c/o fatigue during ambulating/transferring  Safety Devices  Safety Devices in place: Yes  Type of devices:  All fall risk precautions in place         Restrictions  Restrictions/Precautions  Restrictions/Precautions: Fall Risk  Required Braces or Orthoses?: Yes  Required Braces or Orthoses  Right Upper Extremity Brace/Splint: Sling  Right Upper Extremity Brace/Splint: RUE immobilizer in place - pt reports rotator cuff repair done Dec 1     Subjective   General  Chart Reviewed: Yes  Patient assessed for rehabilitation services?: Yes  Pain Assessment  Pain Level: 9  Social/Functional History  Social/Functional History  Lives With: Other (comment)(Boyfriend)  Type of Home: House  Home Layout: Two level  Home Access: Stairs to enter with rails  Entrance Stairs - Number of Steps: 3-5 to enter front door, 1 railing on R side  Entrance Stairs - Rails: Right  Bathroom Shower/Tub: Tub only  ADL Assistance: Independent  Homemaking Assistance: Independent  Homemaking Responsibilities: Yes  Ambulation Assistance: Independent  Transfer Assistance: Independent  Active : Yes  Occupation: Full time employment  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 Additional Comments: Pt. inconsistent historian but attempts to answer questions     Objective   Vision: Impaired  Vision Exceptions: Wears glasses at all times  Hearing: Within functional limits    Orientation  Overall Orientation Status: Impaired  Orientation Level: Oriented to place;Oriented to person;Oriented to situation  Observation/Palpation  Posture: Fair  Observation: R shoulder incision healing  Balance  Sitting Balance: Supervision  Standing Balance: Contact guard assistance  Functional Mobility  Activity: To/from bathroom  Assist Level: Minimal assistance  Functional Mobility Comments: Verbal cues for safety, mod increased time, decreased gait quality noted. Toilet Transfers  Toilet Transfer: Minimal assistance  Toilet Transfers Comments: Pt decined need to use restroom. Anticipate Min A  Shower Transfers  Shower - Transfer From: [place] & [place] - Transfer Type: To  Shower - Transfer To: Shower seat with back  Shower - Technique: Ambulating  Shower Transfers: Minimal assistance  Shower Transfers Comments: Verbal cues for safety and sequencing  ADL  Feeding: Setup  Grooming: Minimal assistance  UE Bathing: Maximum assistance  LE Bathing: Moderate assistance  UE Dressing: Moderate assistance  LE Dressing: Minimal assistance  Toileting: Moderate assistance  Additional Comments: Pt. with functional movement of LUE but decreased sequencing and safety awareness. Limited by R shoulder immobilizer.   Tone RUE  RUE Tone: Normotonic  Tone LUE  LUE Tone: Normotonic  Coordination  Movements Are Fluid And Coordinated: No  Coordination and Movement description: Fine motor impairments;Decreased speed;Decreased accuracy; Left UE  Bed mobility  Rolling to Left: Stand by assistance  Supine to Sit: Stand by assistance  Sit to Supine: Minimal assistance  Scooting: Contact guard assistance  Transfers  Sit to stand: Contact guard assistance  Stand to sit: Minimal assistance  Transfer Comments: Impulsive and poor safety awareness  Cognition  Overall Cognitive Status: Exceptions  Arousal/Alertness: Delayed responses to stimuli  Following Commands: Follows multistep commands with increased time; Follows multistep commands with repitition  Attention Span: Difficulty attending to directions; Difficulty dividing attention  Memory: Decreased recall of precautions;Decreased recall of recent events;Decreased short term memory;Decreased long term memory;Decreased recall of biographical Information  Safety Judgement: Decreased awareness of need for assistance;Decreased awareness of need for safety  Problem Solving: Assistance required to generate solutions;Assistance required to identify errors made;Assistance required to correct errors made;Assistance required to implement solutions;Decreased awareness of errors  Insights: Decreased awareness of deficits  Initiation: Requires cues for some  Sequencing: Requires cues for some  Perception  Overall Perceptual Status: WFL      The patient completed the [address] [place] [address] ([facility]) Examination on this date, which is a useful screening tool for detecting mild cognitive impairment and signs of dementia.   Range of impairments based on score are indicated in the chart below:        High school education Scoring Less than High School Education   27-30 Normal 25-30   21-26 Mild Neurocognitive disorder 20-24   1-20 Dementia 1-19      Patient's level of education: high school  Patient scored 10/30 Patient will improve L UE Function (AROM, strength, motor control, tone normalization) to complete ADLs as projected. [x]? Patient will improve functional endurance to tolerate/complete 60 minutes of ADLs.      [x]? Patient will improve L UE strength and endurance to 4+/5 in order to participate in self-care activities as projected.               [x]? Patient will perform kitchen mobility at device level without episodes of LOB and good safety awareness       [x]? Patient will perform basic room mobility at CGA level.         [x]? Patient will access appropriate D/C site with as few architectural barriers as possible.            [x]? Patient and/or caregiver will demonstrate understanding of energy conservation techniques with 2 verbal/tactile cues.          [x]? Patient's cognition will improve to safely perform ADLs:  Comprehension: Sup   Expression: Mod I   Social Interaction: Mod I   Problem Solving: Min A   Memory: Min A                Luis Her OT   Electronically signed by Luis Her OT on 2020 at 12:36 PM                [facility]   Facility/Department: [facility]  Speech Language Pathology  Clinical Bedside Swallow Evaluation     Quentin Ascencio  : 1970 (52 y.o.)   MRN: [id number]  ROOM: [place] [address]  ADMISSION DATE: 2020  PATIENT DIAGNOSIS(ES): Impaired mobility and ADLs [Z74.09, Z78.9]  Impaired mobility [Z74.09]  No chief complaint on file. Date of Evaluation: 2020   Evaluating Therapist: Lorne Dennis, CF-SLP        Recommended Diet and Intervention  Diet Solids Recommendation: Regular  Liquid Consistency Recommendation:  Thin  Recommended Form of Meds: PO  Therapeutic Interventions: Diet tolerance monitoring, Oral motor exercises     Compensatory Swallowing Strategies  Compensatory Swallowing Strategies: Small bites/sips;Eat/Feed slowly     Reason for Referral  Larissa Hummel was referred for a bedside swallow evaluation to assess the efficiency of her swallow function, identify signs and symptoms of aspiration and make recommendations regarding safe dietary consistencies, effective compensatory strategies, and safe eating environment.     General  Chart Reviewed: Yes  Subjective  Subjective: Pt was alert and cooperative for BSE  Behavior/Cognition: Alert; Cooperative  Respiratory Status: Room air  O2 Device: None (Room air)  Communication Observation: Dysarthria  Follows Directions: Simple  Dentition: Poor dental/oral hygeine; Some missing teeth(teeth only on bottom. Pt does not have dentures)  Patient Positioning: Upright in bed  Baseline Vocal Quality: Normal  Prior Dysphagia History: Pt was admitted to hospital in Aug 2019 with diet rec thin and soft diet  Consistencies Administered: Reg solid; Dysphagia Pureed (Dysphagia I); Thin - cup;Dysphagia Soft and Bite-Sized (Dysphagia III)     Current Diet level:  Current Diet : Dysphagia Soft and Bite-Sized (Dysphagia III)  Current Liquid Diet : Thin     Oral Motor Deficits  Oral/Motor  Oral Motor: Exceptions to [facility]  Labial Strength: Reduced  Labial Coordination: Reduced  Lingual Strength: Reduced  Lingual Coordination: Reduced     Oral Phase Dysfunction  Oral Phase  Oral Phase: Exceptions  Oral Phase Dysfunction  Impaired Mastication: Reg Solid  Lingual/Palatal Residue: Reg solid  Oral Phase  Oral Phase - Comment: Pt presents with mild oral phase dysphagia characterized by reduced lingual/labial strength, impaired mastication of regular solid resulting in mild lingual residue. Pt was able to clear residue with use of liquid wash. It should be noted that pt reports that she is not going to eat soft and bite size consistencies secondary to it being \"too mushy\" and not apetizing. Pt stated that she eats whatever she wants at home. Indicators of Pharyngeal Phase Dysfunction   Pharyngeal Phase  Pharyngeal Phase: WFL  Pharyngeal Phase   Pharyngeal: Pharyngeal phase WFL.  No overt s/s of aspiration observed following any of the presented consistencies. Laryngeal elevation was present during evaluation, and noted through observation.     Impression  Dysphagia Diagnosis: Mild oral stage dysphagia  Dysphagia Impression : Mild oral phase dysphagia; Pharyngeal phase WFL  Dysphagia Outcome Severity Scale: Level 5: Mild dysphagia- Distant supervision. May need one diet consistency restricted      Treatment Plan  Requires SLP Intervention: Yes  Duration/Frequency of Treatment: 1-3x/wk  Treatment/Goals  Short-term Goals  Timeframe for Short-term Goals: 1-2 weeks  Goal 1: Pt will tolerate regular consistencies and thin liquids with no overt s/s of aspiration and adequate oral clearance of bolus in all given opportunities. Goal 2: Pt will complete oral motor strengthening exercises with 90% acc given cues as needed in order to increase lingual/labial musuclature and decrease risk of pocketing. Goal 3: Pt will complete tongue press exercise x10 in order to promote anterior to posterior transit of bolus and decrease risk of aspiration. Goal 4: Pt will implement safe swallow strategies (small bites/sips, slow rate) with 90% acc given cues as needed in order to decrease risk of aspiration. Long-term Goals  Timeframe for Long-term Goals: 2 weeks  Goal 1: Pt will improve oral motor strentgh to tolerate least restrictive diet with no overt s/s of aspiration and difficulty.     Prognosis  Prognosis  Prognosis for safe diet advancement: good  Individuals consulted  Consulted and agree with results and recommendations: Patient;RN(RN - Tawny Pena)     Education  Patient Education: results and goal recommendations  Patient Education Response: Verbalizes understanding  Safety Devices in place: Yes  Type of devices: All fall risk precautions in place;Call light within reach; Bed alarm in place     [x]? Tawny Pena - RN notified   [x]?   Dr. Luke Lala notified via voicemail     Pain Assessment:  Initial Assessment:  Patient denies pain.     Re-assessment:  Patient c/o: 9 pain in back and body  RN informed. NATIONAL OUTCOMES MEASUREMENT SYSTEM (NOMS):     SWALLOWING  Ratin       Signature: Electronically signed by HUMZA Taylor on 2020 at 12:53 PM     [facility] [place]   Facility/Department: [facility]  Speech Language Pathology  Initial Speech/Language/Cognitive Assessment     Joel Douglas  : 1970 (52 y.o.)   MRN: [id number]  ROOM: [place] [address]  ADMISSION DATE: 2020  PATIENT DIAGNOSIS(ES): Impaired mobility and ADLs [Z74.09, Z78.9]  Impaired mobility [Z74.09]  No chief complaint on file.   Date of Evaluation: 2020   Evaluating Therapist: HUMZA Taylor     Assessment:      Diagnosis: Pt presents with mild-moderate cognitive-linguistic impairment characterized by decreased STM, problem solving, abstract verbal reasoning, orientation, and decreased ability to follow multistep commands negatively impacting her ability to safely perform ADLs and effectively communicate wants and needs with caregivers upon discharge. Pt also presents with midl dysarthria characterized by imprecise articulation and decreased intelligibility negatively impacting her ability to communicate wants and needs with caregivers upon discharge. It should be noted that pt's  level of participation/motivation could have impacted results of SLE. Pt was quick to state \"I don't know\".     Recommendations:  Requires SLP Intervention: Yes  Duration/Frequency of Treatment: 2-3x/wk for LOS or until all goals met  Goals:  Short-term Goals  Timeframe for Short-term Goals: 1-2 weeks  Goal 1: Pt will complete verbal reasoning tasks (similarities/differences, word deduction, divergent naming) with 80% acc given cues as needed in order to promote effective communication of wants and needs.   Goal 2: Pt will be educated on 3 memory strategies and will state their use in functional activities of daily living with 80% acc given cues as assess     Expression  Primary Mode of Expression: Verbal     Verbal Expression  Verbal Expression: Exceptions to functional limits  Initiation: Mild   Confrontation: (WFL)  Convergent: (WFL)  Divergent: Moderate  Responsive: (DNT)  Conversation: Mild  Effective Techniques: Provide extra time     Written Expression  Written Expression: Unable to assess     Motor Speech  Motor Speech: Exceptions to [facility]  Dysarthria : Mild     Pragmatics/Social Functioning  Pragmatics: Within functional limits     Cognition:   Orientation  Overall Orientation Status: Impaired  Orientation Level: Disoriented to time;Oriented to place;Oriented to situation;Oriented to person  Attention  Attention: Exceptions to [facility]  Selective Attention: [facility])  Sustained Attention: Mild  Memory  Memory: Exceptions to [facility]  Short-term Memory: Moderate(Unable to recall 3 words after 5-minute delay)  Problem Solving  Problem Solving: Exceptions to [facility]  Complex Functional Tasks: Moderate  Managing Finances: To be assessed in therapy  Managing Medications: To be assessed in therapy  Simple Functional Tasks: Moderate  Verbal Reasoning Skills: Mild  Numeric Reasoning  Numeric Reasoning: Unable to assess  Abstract Reasoning  Abstract Reasoning: Exceptions to [facility]  Convergent Thinking: [facility])  Divergent Thinking: Mild  Safety/Judgement  Safety/Judgement: Exceptions to [facility]  Complex Functional Tasks: Moderate  Novel Situations: Mild  Insight: Moderate       Additional Assessments:    Portions of the RIPA-2 [facility] Information Processing Assessment-2nd Edition ) were administered. Scores are as follows:  Subtest: Raw Score Standard Score   I. Immediate Memory 16 7   II. Recent Memory 24 10   III. Temporal Orientation 24 10   VIII.  Problem Solving and          Abstract Reasoning 22 10      Prognosis:  Speech Therapy Prognosis  Prognosis: Good  Prognosis Considerations: Age;Participation Level;Previous Level of Function  Individuals consulted  Consulted and agree with results and recommendations: Patient     Education:  Patient Education: results and goal recommendations  Patient Education Response: Verbalizes understanding  Safety Devices in place: Yes  Type of devices: All fall risk precautions in place;Call light within reach; Bed alarm in place     Pain Assessment:  Initial Assessment:  Patient denies pain.     Re-assessment:  Patient c/o: 9 pain in back and body  RN informed.  [id number] (NOMS):  SPOKEN LANGUAGE COMPREHENSION  Ratin     SPOKEN LANGUAGE EXPRESSION  Ratin     MOTOR SPEECH  Ratin     PROBLEM SOLVING  Ratin     MEMORY  Ratin                          Signature: Electronically signed by MEERA Nolasco-SLP on 2020 at 3:07 PM           [facility]  In-Patient Rehabilitation Physical Therapy Update Note     Luan Spurling  1970  R260/R260-01      Current Level of Function:  Restrictions  Restrictions/Precautions  Restrictions/Precautions: Fall Risk  Required Braces or Orthoses?: Yes  Required Braces or Orthoses  Right Upper Extremity Brace/Splint: Sling  Right Upper Extremity Brace/Splint: RUE immobilizer in place - pt reports rotator cuff repair done Dec 1     Objective     Bed mobility  Rolling to Left: Modified independent  Supine to Sit: Modified independent  Sit to Supine: Modified independent  Scooting: Modified independent  Comment: reports she has been sleepng on couch since surgery     Transfers  Sit to Stand: Supervision  Stand to sit: Supervision  Bed to Chair: Supervision  Stand Pivot Transfers: Stand by assistance  Comment: cues for technique and safety required     Ambulation  Ambulation?: Yes  More Ambulation?: No  Ambulation 1  Surface: level tile, uneven, carpet  Device: No Device  Other Apparatus: (R arm sling)  Assistance: Stand by assistance, Supervision  Quality of Gait: Lateral excursion reciprocal pattern  Gait Deviations: Decreased step length, Decreased step height  Distance: 250'  Comments: focus on quality of gait and improved stability/safety     Stairs/Curb  Stairs?: Yes  Stairs  # Steps : 8  Stairs Height: 6\"  Rails: Left ascending  Device: No Device  Assistance: Stand by assistance  Comment: reciprocal ascending, nonreciprocal descending        Outcomes Measures  [place] Balance Score: 9     Assessment: Pt has demonstrated improved function overall She has made progress towards her goals but has not yet met these goals         Short Term Goals:  Long term goals  Long term goal 1: Pt will demonstrate bed mobility indep. Long term goal 2: indep bed and car transfers  Long term goal 3: Pt will demonstrate amb  indep with or without 50 feet - supervision 150 feet  Long term goal 4: Pt will exhibit improved dynamic balance evidenced of 20/24 DGI for decreased risk for falls and safe d/c home alone. Long term goal 5: Pt will demonstrate stair negotiation 5 steps without rails indep to allow pt to enter and exit her home safely.              Electronically signed by Jalyn Styles PT on 2021 at 1:45 PM                    Occupational Therapy  Occupational Therapy  Facility/Department: [facility]     Date: 2021  Patient Name: Patel Aragon        MRN:   [id number]  Account: [de-identified]   : 1970  (52 y.o.)     Insurance update:     Pt. Current Self Care Status:     ADL  Feeding: None  Grooming: Stand by assistance  UE Bathing: Minimal assistance  LE Bathing: Minimal assistance  UE Dressing: Stand by assistance  LE Dressing: Minimal assistance(socks)  Toileting: Unable to assess(comment)(pt did not need to go)  Additional Comments: Pt doffed full zip jacket and doffed/donned R arm sling during sessions. Able to manage sling with assist to place trunk strap. Toilet Transfers  Toilet Transfer: Unable to assess  Toilet Transfers Comments: Pt did not need to go  [address] - Transfer From: [facility] - Transfer Type:  To  Shower - evaluation  Goal 6: Pt will state the PIPPA, month, location, situation, and year with 90% acc with use of external aid in order to promote orientation secondary to cognitive deficits. Goal met 1/  Long-term Goals  Timeframe for Long-term Goals: 2 weeks  Goal 1: Pt will improve cognitive-linguistic and speech production abilities to clear express wants/needs and improve understanding during daily life. Progress made  Short-term Goals  Timeframe for Short-term Goals: 1-2 weeks  Goal 1: Pt will tolerate regular consistencies and thin liquids with no overt s/s of aspiration and adequate oral clearance of bolus in all given opportunities. Haven't targeted since evaluation  Goal 2: Pt will complete oral motor strengthening exercises with 90% acc given cues as needed in order to increase lingual/labial musuclature and decrease risk of pocketing. Haven't targeted since evaluation  Goal 3: Pt will complete tongue press exercise x10 in order to promote anterior to posterior transit of bolus and decrease risk of aspiration. Haven't targeted since evaluation  Goal 4: Pt will implement safe swallow strategies (small bites/sips, slow rate) with 90% acc given cues as needed in order to decrease risk of aspiration. Compensatory Swallowing Strategies: Small bites/sips, Eat/Feed slowly            Patient's Response to Therapy:  Pt has made mild progress since evaluation. Pt has had 2 treatment sessions since evaluation.  Pt requiring min cues for word finding, speech intelligibility and problem solving.         It is expected that patient would benefit from ongoing speech therapy, as she has responded positively to skilled intervention, thus far.        [id number] [address] (NOMS):  CURRENT NOMS:     SWALLOWING  Ratin     SPOKEN LANGUAGE COMPREHENSION  Ratin     SPOKEN LANGUAGE EXPRESSION  Ratin     MOTOR SPEECH  Ratin     PROBLEM SOLVING  Ratin     MEMORY  Ratin        GOAL NOMS:     SWALLOWING  Ratin     SPOKEN LANGUAGE COMPREHENSION  Ratin     SPOKEN LANGUAGE EXPRESSION  Ratin     MOTOR SPEECH  Ratin     PROBLEM SOLVING  Ratin     MEMORY  Ratin                         Plan of Care:  Continue Plan of Care as stated above.            Electronically signed by Prince Matias CCC-SLP, Date: 2021, Time: 2:13 PM                   Electronically signed by Gely Baker RN on 2021 at 3:17 PM

## 2021-01-04 NOTE — PROGRESS NOTES
Assessment completed earlier in the shift. VSS. Medicated w/ PRN roxicodone for 8/10 generalized pain. LBM continues to be 12/29/2020. Second dose of miralax given 1/3. Colace ordered and then dc'd. .. Pt feels some abdominal cramping and movement as though she will have to move bowels eventually. BS x4 present. No tenderness or distention present. HS OT- 113. Lantus administered per MAR. No distress noted. Call light within reach and bed alarm activated.   Electronically signed by Gail Forde RN on 1/4/2021 at 12:57 AM

## 2021-01-04 NOTE — PROGRESS NOTES
Subjective: The patient complains of severe acute on chronic recurrent right-sided weakness and aphasia dysphagia partially relieved by medications, SLP, Pt, OT, and rest and exacerbated by recent illness including a repeat CVA left thalamic region with worsening thalamic pain. I am concerned about patients pain-she seems less frustrated and she is fairly encouraged by her progress as well as her blood sugar stability. Her thalamic pain seems less of an issue better healing right shoulder pain is still an issue. According to recent nursing note, \" Assessment completed earlier in the shift. VSS. Medicated w/ PRN roxicodone for 8/10 generalized pain. LBM continues to be 12/29/2020. Second dose of miralax given 1/3. Colace ordered and then dc'd. .. Pt feels some abdominal cramping and movement as though she will have to move bowels eventually. BS x4 present. No tenderness or distention present. HS OT- 113. Lantus administered per MAR. No distress noted. Call light within reach and bed alarm activated. \".    ROS x10: The patient also complains of severely impaired mobility and activities of daily living. Otherwise no new problems with vision, hearing, nose, mouth, throat, dermal, cardiovascular, GI, , pulmonary, musculoskeletal, psychiatric or neurological. See Rehab H&P on Rehab chart dated . Vital signs:  BP (!) 156/81   Pulse 73   Temp 98 °F (36.7 °C)   Resp 15   Ht 5' 7\" (1.702 m)   Wt 192 lb (87.1 kg)   SpO2 95%   BMI 30.07 kg/m²   I/O:   PO/Intake:  fair PO intake, no problems observed or reported. Bowel/Bladder:  Continent, constipation prescribed GlycoLax  General:  Patient is well developed, adequately nourished, non-obese and     well kempt. HEENT:    PERRLA, hearing intact to loud voice, external inspection of ear     and nose benign. Inspection of lips, tongue and gums severe thrush  Musculoskeletal: No significant change in strength or tone. All joints stable. Inspection and palpation of digits and nails show no clubbing,       cyanosis or inflammatory conditions. Neuro/Psychiatric: Affect: flat but irritable and sleepy. Alert and oriented to person, place and     Situation with mod cues. No significant change in deep tendon reflexes or     Sensation-garbled speech right-sided weakness  Lungs:  Diminished, CTA-B. Respiration effort is normal at rest.     Heart:   S1 = S2, RRR. No loud murmurs. Abdomen:  Soft, non-tender, no enlargement of liver or spleen. Extremities:  No significant lower extremity edema or tenderness. Healing right shoulder surgery  Skin:   Intact to general survey, right shoulder incisions healing well. Rehabilitation:  Physical therapy:   Bed Mobility: Scooting: Supervision    Transfers: Sit to Stand: Stand by assistance  Stand to sit: Stand by assistance  Bed to Chair: Stand by assistance, Ambulation 1  Surface: carpet, level tile  Device: No Device  Other Apparatus: (rt shoulder sling )  Assistance: Contact guard assistance, Stand by assistance  Quality of Gait: continuous cueing to improve step height, heel strike  Gait Deviations: Decreased step length, Decreased step height  Distance: 150x2  Comments: focus on quality of gait and improved stability/safety, Stairs  # Steps : 8  Stairs Height: 6\"  Rails: Left ascending  Device: No Device  Assistance: Contact guard assistance  Comment: reciprocal ascending, nonreciprocal descending    FIMS:  ,  , Assessment: Pt with decreased step length and foot clearance with ambulation. Pt with increased unstidnees and laterall sway ecspecially around corners or tight spaces    Occupational therapy:    ,  , Assessment: Pt demonstrated improved dynamice standing balance reaching minimally outside RIDDHI with no LOB at close supervision level. Pt continues to be limited by pain, balance and fatigue.  Pt continues to benefit from OT services to maximize pt independence and safety with ADLs and pineal gland and the sella turcica are unremarkable. There are no space-occupying lesions in the posterior fossa. The basilar cisterns are patent. The craniocervical junction is unremarkable. The visualized portions of the orbits are within normal limits, the globes are intact. The visualized portions of the paranasal sinuses are within normal limits. The calvarium and soft tissues are unremarkable. There is a 7 x 15 mm area of subacute ischemia in the left thalamus. Previous extensive, complex labs, notes and diagnostics reviewed and analyzed. ALLERGIES:    Allergies as of 12/28/2020 - Review Complete 12/28/2020   Allergen Reaction Noted    Latex Itching 03/25/2018    Influenza vaccines Swelling 10/08/2015    Eggs or egg-derived products  10/08/2015    Gabapentin Nausea Only 10/31/2017    Pneumococcal vaccines Hives 01/28/2016    Povidone iodine Itching 10/08/2015    Varicella virus vaccine live Hives 01/28/2016      (please also verify by checking [facility])      Complex Physical Medicine & Rehab Issues Assess & Plan:   1. Severe abnormality of gait and mobility and impaired self-care and ADL's secondary to acute left thalamic CVA with right-sided weakness thalamic pain aphasia dysphagia. Functional and medical status reassessed regarding patients ability to participate in therapies and patient found to be able to participate in acute intensive comprehensive inpatient rehabilitation program including PT/OT to improve balance, ambulation, ADLs, and to improve the P/AROM. Therapeutic modifications regarding activities in therapies, place, amount of time per day and intensity of therapy made daily. In bed therapies or bedside therapies prn.   2. Bowel and Bladder dysfunction neurogenic bowel and bladder:  frequent toileting, ambulate to bathroom with assistance, check post void residuals.   Check for C.difficile x1 if >2 loose stools in 24 hours, continue bowel & bladder program.  Monitor bowel and bladder function. Lactinex 2 PO every AC. MOM prn, Brown Bomb prn, Glycerin suppository prn, enema prn.  3. Severe acute thalamic pain syndrome right upper extremity due to left thalamic infarct on chronic osteoarthritis and as well as thalamic pain as well as generalized OA pain: reassess pain every shift and prior to and after each therapy session, give prn Tylenol and resume OxyIR with titration, modalities prn in therapy, masage, Lidoderm, K-pad prn. Consider scheduled AM pain meds. 4. Skin healing and breakdown risk:  continue pressure relief program.  Daily skin exams and reports from nursing. 5. Severe fatigue due to nutritional and hydration deficiency: Add and titrate vitamin B12 vitamin D and CoQ10 continue to monitor I&Os, calorie counts prn, dietary consult prn.  6. Acute episodic insomnia with situational adjustment disorder:  prn Ambien, monitor for day time sedation. 7. Falls risk elevated:  patient to use call light to get nursing assistance to get up, bed and chair alarm. 8. Elevated DVT risk: progressive activities in PT, continue prophylaxis DAPHNE hose, elevation and Lovenox. 9. Complex discharge planning: Discharge January 9, 2021 home with her significant other and home health care. Until then continue weekly team meeting every  Thursday to assess progress towards goals, discuss and address social, psychological and medical comorbidities and to address difficulties they may be having progressing in therapy. Patient and family education is in progress. The patient is to follow-up with their family physician after discharge. Complex Active General Medical Issues that complicate care Assess & Plan:    1.    Bipolar 1 disorder,   Anxiety, active chronic depression-emotional support provided daily, vitamin B12, encourage participation in rehabilitation support group and recreational therapy, adjust/add medications (consider anxiolytics review old chart to find out her old psych medications) consult psychiatry and psychology  2. CAD (coronary artery disease), HTN (hypertension)-continue blood signs every shift focusing on heart rate and blood pressure checks, consult hospitalist for backup medical and adjust/add medications ( aspirin, Lipitor, Apresoline, Imdur, Cozaar, Plavix )  3. Multiple sclerosis with recent acute left thalamic CVA-consult neurology manage COPD sleep apnea and autoimmune disease add antiplatelet medications  4. Colitis-monitor stools for blood as patient will be on antiplatelet medications  5. Chronic pain syndrome with history of overusing medication due to dyscontrol due to bipolar disorder  6. Vitamin D and B12 deficiency-add vitamin B12 and high-dose vitamin D  7. Chronic obstructive pulmonary disease/  COPD exacerbation , Sleep apnea-Pulse oximeter checks to shift dose and titrate oxygen and aerosol treatments monitor for nocturnal hypoxemia, monitor vital signs, oxygen prn.   8.   Dizziness, Vertigo, peripheral, bilateral  with Sensorineural hearing loss (SNHL) of both ears and Tinnitus, bilateral-focus on balance and therapy PT and OT as well as adding speech and language pathology  9. Acute left thalamic CVA with elevated homocystine levels with aphasia dysphagia and right arm weakness-consult neurology-continue PT OT and titrate pain medication for her thalamic pain syndrome--add folic acid. 10. Healing right shoulder surgery-continue shoulder immobilizer follow-up with orthopedics.        Electronically signed by Pao Reyes DO on 12/30/20 at 8:55 AM STAR Carrasco D.O., PM&R     Attending    [address]

## 2021-01-04 NOTE — PROGRESS NOTES
Physical Therapy Missed Treatment   Facility/Department: [facility] R260/R260-01    NAME: Crystal Santoro    : 1970 (52 y.o.)  MRN: [id number]    Account: [de-identified]  Gender: female    Patient declines participating in therapy session due to an increase in generalized pain 9/10.  Therapist attempted in room therapy session and patient continues to decline    Keon Wiley PTA, 21 at 3:56 PM

## 2021-01-04 NOTE — PROGRESS NOTES
Occupational Therapy  Facility/Department: [facility]  Daily Treatment Note  NAME: Juma Lora  : 1970  MRN: [id number]    Date of Service: 2021    Discharge Recommendations:  Continue to assess pending progress       Assessment      REQUIRES OT FOLLOW UP: Yes  Activity Tolerance  Activity Tolerance: Patient Tolerated treatment well  Safety Devices  Safety Devices in place: Yes  Type of devices: All fall risk precautions in place         Patient Diagnosis(es): There were no encounter diagnoses. has a past medical history of Anxiety, Back pain, Bipolar disorder ([address] CAD (coronary artery disease), CAFL (chronic airflow limitation) ([facility]), Chronic bronchitis ([facility] [address] Chronic pain, Colitis, Complicated migraine, DDD (degenerative disc disease), lumbar, Depression, Endometriosis, Excessive caffeine abuse, continuous ([address] Gastritis, GERD (gastroesophageal reflux disease), History of myocardial infarction, HTN (hypertension), benign, Impaired mobility and activities of daily living, Over weight, PTSD (post-traumatic stress disorder), Sensory neuropathy, Somatization disorder, TIA (transient ischemic attack), Tobacco abuse, and Vasospastic angina ([facility] [address] has a past surgical history that includes Hysterectomy; Dilation and curettage of uterus; back surgery; Neck surgery; shoulder surgery (Left);  section; Cholecystectomy (13); Upper gastrointestinal endoscopy (2014); cyst removal (3/7/16); Coronary angioplasty; Upper gastrointestinal endoscopy (N/A, 2020); and Colonoscopy (N/A, 2020).     Restrictions  Restrictions/Precautions  Restrictions/Precautions: Fall Risk  Required Braces or Orthoses?: Yes  Required Braces or Orthoses  Right Upper Extremity Brace/Splint: Sling  Right Upper Extremity Brace/Splint: RUE immobilizer in place - pt reports rotator cuff repair done Dec 1  Subjective   General  Chart Reviewed: Yes  Patient assessed for rehabilitation services?: Yes  Response to previous treatment: Patient with no complaints from previous session  Family / Caregiver Present: No  Pain Assessment  Pain Assessment: 0-10  Pain Level: 8  Pain Type: Acute pain  Pain Location: Shoulder  Pain Orientation: Right  Pain Descriptors: Aching  Pre Treatment Pain Screening  Pain at present: 7  Scale Used: Numeric Score  Intervention List: Patient able to continue with treatment  Vital Signs  Patient Currently in Pain: Yes   Orientation  Orientation  Overall Orientation Status: Within Functional Limits  Objective        While seated at tabletop, pt replicated complex graphic design of colored wooden blocks, nuts, and bolts, using the L UE to improve problem solving during ADLs. Pt demonstrates min difficulty to replicate and requires min verbal cues to identify and correct errors. Pt worked at a slow pace to complete. Pt demonstrates good coordination throughout task. Pt then disassembled all wooden blocks, nuts, and bolts and sorted them back into their designated containers. While seated at tabletop, pt completed ROM arc and moved plastic rings across arc using the L UE and 1lb wrist weight to improve strength and endurance during ADLs and IADLs. Pt completed 4x20 reps with intermittent rest breaks. Pt completed on two lowest heights. Pt completed with fair+ activity tolerance. While seated at tabletop, pt filled weekly pill box based on specific dosages on 7 pill bottles using B UEs to improve independence during IADLs. Pt able to open all pill bottles and pill box lids without difficulty. Pt filled 6/7 pill bottles correctly. Pt requires verbal cues to identify and correct error. Pt completed with good activity tolerance.          Plan   Plan  Times per week: 5-7x/week  Plan weeks: 1-1 1/2 weeks  Current Treatment Recommendations: Strengthening, Endurance Training, Self-Care / ADL, Balance Training, Pain Management, Home Management Training, Functional Mobility Training, Safety Education & Training  Plan Comment: Continue OT per POC  G-Code     OutComes Score                                                  AM-PAC Score             Goals  Patient Goals   Patient goals : \"I can't believe I feel this weak, I want to get stronger\"       Therapy Time   Individual Concurrent Group Co-treatment   Time In 1030         Time Out 1130         Minutes 60           ADL trainin minutes  Therapeutic activities: 10 minutes  Cognitive Retrainin minutes     Ruben Queen OT   Electronically signed by Ruben Queen OT on 2021 at 12:16 PM

## 2021-01-04 NOTE — PROGRESS NOTES
[facility]   [facility]  Rehabilitation  RECREATIONAL THERAPY    Recreation Therapy Assessment    Date:  1/4/2021        Patient Name: Moises Gallardo       MRN: [id number]        YOB: 1970 (52 y.o.)       Gender: female        RESTRICTIONS/PRECAUTIONS:  Restrictions/Precautions: Fall Risk  Vision: Within Functional Limits  Hearing: Within functional limits    Chart reviewed      , Patient interviewed     and Recreation Therapy services discussed with patient. Patient reports pain is a  New current issue     Ability to provide information regarding leisure and recreation interests:   Issues with cognition (memory, confusion), Issues with communication (speech, hearing) and Issues with mood    Patient:    Plans on resuming some of his/her previous leisure and recreational pursuits after hospitalization and Declines Recreation Therapy services    Interests:     Observed/noted emotions:    Cooperative     Patient provided with the following accessible and independently used recreation/leisure resources when in hospital room:   5 days week large print orientation/puzzle activity handout   RT services informational handout    Recommendations:   Recreation Therapy services offered to all [facility] inpatients:  Recommended    Comments:  Pt was sitting in bed upon session. Therapist explains role in treatment team and pt stated understanding. Nursing came into room to complete nursing rounds. Pt stated \"I will let you know about this later\" in regards to RT services. Therapist provided pt with daily large print cognitive/orientation/puzzle handout.      Electronically signed by: LISSETTE Martinez  Date: 1/4/2021

## 2021-01-04 NOTE — PROGRESS NOTES
Device  Assistance: Stand by assistance  Comment: reciprocal ascending, nonreciprocal descending    ASSESSMENT/COMMENTS:  Body structures, Functions, Activity limitations: Decreased functional mobility ; Decreased strength;Decreased endurance;Decreased coordination;Decreased ADL status; Decreased safe awareness;Decreased sensation;Decreased cognition;Decreased balance; Increased pain  Assessment: Patient with improved safety with gait transfers and bed mobility requiring decreased assistance for all functional mobility    PLAN OF CARE/Safety:   Safety Devices  Type of devices:  All fall risk precautions in place      Therapy Time:   Individual   Time In 0900   Time Out 0930   Minutes 30     Minutes: 30      Transfer/Bed mobility training: 10      Gait trainin [place], PTA, 21 at 12:02 PM

## 2021-01-04 NOTE — PROGRESS NOTES
Occupational Therapy  Occupational Therapy  Facility/Department: [facility]    Date: 2021  Patient Name: Merritt Hinkle        MRN: [id number]  Account: [de-identified]   : 1970  (52 y.o.)    Insurance update:    Pt. Current Self Care Status:    ADL  Feeding: None  Grooming: Stand by assistance  UE Bathing: Minimal assistance  LE Bathing: Minimal assistance  UE Dressing: Stand by assistance  LE Dressing: Minimal assistance(socks)  Toileting: Unable to assess(comment)(pt did not need to go)  Additional Comments: Pt doffed full zip jacket and doffed/donned R arm sling during sessions. Able to manage sling with assist to place trunk strap. Toilet Transfers  Toilet Transfer: Unable to assess  Toilet Transfers Comments: Pt did not need to go     [address] - Transfer From: [facility] - Transfer Type: To  Shower - Transfer To: Shower seat with back  Shower - Technique: Ambulating  Shower Transfers: Not tested  [employer] Transfers Comments: Pt declined and completed sponge bath ADL    Within 1 Weeks Pt. will be:    Feeding: Mod I  Grooming: Mod I  Bathing: SBA  UE Dressing: Mod I  LE Dressing: SBA  Toileting: SBA  Toilet Transfers: Mod I  Tub Transfers:  Mod I    Electronically signed by:    JODEE Chrsitina  2021, 11:10 AM

## 2021-01-04 NOTE — PROGRESS NOTES
[facility]  Facility/Department: [facility]  Speech Language Pathology   Treatment Note          Herlinda Garcia  1970  [id number]        Rehab Dx/Hx: Impaired mobility and ADLs [Z74.09, Z78.9]  Impaired mobility [Z74.09]    Precautions: falls and aspirations    Medical Dx: Impaired mobility and ADLs [Z74.09, Z78.9]  Impaired mobility [Z74.09]  Speech Dx: Dysarthria and Cognitive Linguistic Impairment     Date: 1/4/2021    Subjective:  Alert and Cooperative        Interventions used this date:  Expressive Language, Cognitive Skill Development and Instruction in Compensatory Strategies    Objective/Assessment:  Patient progressing towards goals:  Short-term Goals  Timeframe for Short-term Goals: 1-2 weeks  Goal 1: Pt will complete verbal reasoning tasks (similarities/differences, word deduction, divergent naming) with 80% acc given cues as needed in order to promote effective communication of wants and needs. Abstract divergent naming task 60% acc with min-mod verbal cues  [place] divergent naming task 100% acc with min verbal cues  Goal 2: Pt will be educated on 3 memory strategies and will state their use in functional activities of daily living with 80% acc given cues as needed in order to promote safety with the completion of ADLs. Goal 3: Pt will complete mid level problem solving tasks related to ADLs with 80% acc given cues as needed in order to promote safety with the completion of ADLs. Pt completed verbal problem solving ID unsafe situations in pictures and provide solutions with 90% acc standby cues  Goal 4: Pt will complete oral motor drills at phrase level given model with 100% acc to improve articulator movement and coordination to improve speech intelligibilty. Goal 5: Pt will follow 2+ step directions given orally with 80% accuracy with min cues to increase the pt's ability to follow directions provided by caregivers for safe follow through with ADLs.   Goal 6: Pt will state the PIPPA, month, location, situation, and year with 90% acc with use of external aid in order to promote orientation secondary to cognitive deficits. Pt was oriented x3, unable to recall situation. Was oriented to time with standby cues for date. Long-term Goals  Timeframe for Long-term Goals: 2 weeks  Goal 1: Pt will improve cognitive-linguistic and speech production abilities to clear express wants/needs and improve understanding during daily life. Short-term Goals  Timeframe for Short-term Goals: 1-2 weeks  Goal 1: Pt will tolerate regular consistencies and thin liquids with no overt s/s of aspiration and adequate oral clearance of bolus in all given opportunities. Goal 2: Pt will complete oral motor strengthening exercises with 90% acc given cues as needed in order to increase lingual/labial musuclature and decrease risk of pocketing. Goal 3: Pt will complete tongue press exercise x10 in order to promote anterior to posterior transit of bolus and decrease risk of aspiration. Goal 4: Pt will implement safe swallow strategies (small bites/sips, slow rate) with 90% acc given cues as needed in order to decrease risk of aspiration. Compensatory Swallowing Strategies: Small bites/sips, Eat/Feed slowly      Treatment/Activity Tolerance:  Patient tolerated treatment well    Plan:  Continue per POC    Pain Assessment:  Initial Assessment:  Patient c/o: 7/10, body    Re-assessment:  Patient c/o: 7/10 body   RN informed. Patient/Caregiver Education:  Patient educated on session and progression towards goals. Education needs reinforcement.     Safety Devices:  Call light within reach and Chair alarm in place      [id number] [address] ([facility]):    SWALLOWING  Rating:  DNT    SPOKEN LANGUAGE COMPREHENSION  Ratin    SPOKEN LANGUAGE EXPRESSION  Ratin    MOTOR SPEECH  Ratin    PROBLEM SOLVING  Ratin    MEMORY  Rating:  DNT          Therapy Time   Time in: 8208  Time out: 1200  Minutes: 25              Signature: Electronically signed by ROSA MARIA Lizama on 1/4/2021 at 12:04 PM

## 2021-01-04 NOTE — CONSULTS
Liudmila Miles, Department of Psychiatry  Behavioral Health Consult    REASON FOR CONSULT: Depression    CONSULTING PHYSICIAN: Dr. Luke Lala    History obtained from: Patient    HISTORY OF PRESENT ILLNESS:      The patient is a 52 y.o. female with significant past psychiatric history of bipolar depression   Patient is currently in the rehabilitation floor. Patient reported that she has been feeling increasingly overwhelmed and frustrated secondary to the ongoing medical condition. She is feeling depressed rating her mood to be 5 out of 10. Patient denies any active suicidal thoughts but she is being very passive and evasive in her response. She reported feeling helpless but not hopeless or worthless. She wants to get back to her work eventually but she understand its struggle to reach that stage. Patient has been cooperative with the rehabilitation program.  Patient reported taking lithium and Vraylar for the bipolar depression. She sees psychiatrist at the [facility]      The patient is currently receiving care for the above psychiatric illness.       Psychiatric Review of Systems       Depression: yes     Michelle or Hypomania:  no     Panic Attacks:  no     Phobias:  no     Obsessions and Compulsions:  no     PTSD : no     Hallucinations:  no     Delusions:  no      Substance Abuse History:  ETOH: no  Marijuana: no  Opiates: no  Other Drugs: no      Past Psychiatric History:  History of bipolar depression and anxiety disorder    Past Medical History:        Diagnosis Date    Anxiety     Back pain     back surgery x 4    Bipolar disorder ([address]     CAD (coronary artery disease)     CAFL [facility]flow limitation[facility] 11/3/2016    Chronic bronchitis ([facility])     Chronic pain     Colitis     Complicated migraine     DDD (degenerative disc disease), lumbar 12/22/2016    Depression     Endometriosis     Excessive caffeine abuse, continuous ([address] 7/6/2018    Gastritis     GERD (gastroesophageal reflux disease)     History of myocardial infarction     HTN (hypertension), benign 2016    Impaired mobility and activities of daily living     Over weight     PTSD (post-traumatic stress disorder)     Sensory neuropathy 2016    Somatization disorder     TIA (transient ischemic attack)     Tobacco abuse     [facility]ospastic angina ([facility] 2016       Past Surgical History:        Procedure Laterality Date    BACK SURGERY      x2     SECTION      x2    CHOLECYSTECTOMY  13    Lapchole    COLONOSCOPY N/A 2020    COLONOSCOPY DIAGNOSTIC performed by Hardeep Queen MD at [address] [place] [address]  3/7/16    Dr. Willie Shah Left     UPPER GASTROINTESTINAL ENDOSCOPY  2014    ANIBAL HOUSE M.D.   Carrierajesh Kohlero UPPER GASTROINTESTINAL ENDOSCOPY N/A 2020    EGD ESOPHAGOGASTRODUODENOSCOPY performed by Hardeep Quene MD at [facility]       Medications Prior to Admission:   Medications Prior to Admission: prazosin (MINIPRESS) 1 MG capsule, Take 1 mg by mouth nightly  isosorbide mononitrate (IMDUR) 30 MG extended release tablet, Take 30 mg by mouth daily  cariprazine hcl (VRAYLAR) 1.5 MG capsule, Take 3 mg by mouth daily  isosorbide mononitrate (IMDUR) 30 MG extended release tablet, Take 1 tablet by mouth daily  losartan (COZAAR) 100 MG tablet, Take 1 tablet by mouth daily  atorvastatin (LIPITOR) 10 MG tablet, Take 1 tablet by mouth daily  cyclobenzaprine (FLEXERIL) 10 MG tablet, Take 1 tablet by mouth 2 times daily as needed for Muscle spasms  magnesium oxide (MAG-OX) 400 MG tablet, Take 1 tablet by mouth daily  albuterol sulfate HFA (PROVENTIL HFA) 108 (90 Base) MCG/ACT inhaler, Inhale 2 puffs into the lungs every 6 hours as needed for Wheezing  ALPRAZolam (XANAX) 0.5 MG tablet, Take 0.5 mg by mouth 2 times daily.    budesonide (PULMICORT) 0.5 MG/2ML nebulizer suspension, INHALE 1 VIAL VIA NEBULIZER TWICE DAILY  aspirin 81 MG chewable tablet, Take 1 tablet by mouth daily  lithium 300 MG capsule, Take 1 capsule by mouth daily (Patient taking differently: Take 600 mg by mouth 2 times daily (with meals) )  pantoprazole (PROTONIX) 40 MG tablet, Take 1 tablet by mouth every morning (before breakfast)  FREESTYLE LITE strip, USE DAILY AS DIRECTED  ipratropium-albuterol (DUONEB) 0.5-2.5 (3) MG/3ML SOLN nebulizer solution, Inhale 3 mLs into the lungs three times daily  VENTOLIN  (90 Base) MCG/ACT inhaler, Inhale 2 puffs into the lungs every 6 hours as needed for Wheezing  butalbital-aspirin-caffeine (FIORINAL) -40 MG capsule, Take 1 capsule by mouth every 4 hours as needed for Headaches for up to 10 days. nitroGLYCERIN (NITROSTAT) 0.4 MG SL tablet, up to max of 3 total doses. If no relief after 1 dose, call 911. fluticasone (FLONASE) 50 MCG/ACT nasal spray, 1 spray by NOT APPLICABLE route 2 times daily  FREESTYLE LANCETS MISC, USE DAILY AS DIRECTED  BD INTEGRA SYRINGE 25G X 1\" 3 ML MISC, USE AS DIRECTED EVERY MONTH    Allergies:  Latex, Influenza vaccines, Eggs or egg-derived products, Gabapentin, Pneumococcal vaccines, Povidone iodine, and Varicella virus vaccine live    FAMILY/SOCIAL HISTORY:  Family History   Problem Relation Age of Onset    High Blood Pressure Mother     Kidney Disease Mother     Lupus Mother     Coronary Art Dis Mother         Had stents in her 62s   Magalys Bustillos Bipolar Disorder Son      Social History     Socioeconomic History    Marital status:       Spouse name: Not on file    Number of children: Not on file    Years of education: Not on file    Highest education level: Not on file   Occupational History    Occupation: unemployed   Social Needs    Financial resource strain: Not on file    Food insecurity     Worry: Not on file     Inability: Not on file   [employer] needs     Medical: Not on file     Non-medical: Not on file   Tobacco Use    Smoking status: Current Every Day Smoker     Packs/day: 1.00     Years: 17.00     Pack years: 17.00     Types: Cigarettes    Smokeless tobacco: Never Used   Substance and Sexual Activity    Alcohol use: No    Drug use: No    Sexual activity: Not Currently     Partners: Male   Lifestyle    Physical activity     Days per week: 0 days     Minutes per session: 0 min    Stress: To some extent   Relationships    Social connections     Talks on phone: Not on file     Gets together: Not on file     Attends [employer] service: Not on file     Active member of club or organization: Not on file     Attends meetings of clubs or organizations: Not on file     Relationship status: Not on file    Intimate partner violence     Fear of current or ex partner: Not on file     Emotionally abused: Not on file     Physically abused: Not on file     Forced sexual activity: Not on file   Other Topics Concern    Not on file   Social History Narrative    [facility] is a 81-year-old female who is on disability because of pain and bipolar disorder. She's also had a presumed diagnosis of possible multiple sclerosis. She's been seeing Dr. Farzana Mayo for that and she says that the diagnosis is sometimes in question and it's clear neuropathy vitamin B12 deficiency as well as generalized bodyaches from the severe vitamin D deficiency have caused this scenario that looks like multiple sclerosis.      lives With: Significant other    Type of Home: House [address] in [address] to enter with rails  - Number of Steps: 5    Bathroom Shower/Tub: Tub only    ADL Assistance: Independent    Homemaking Assistance: Independent, Homemaking Responsibilities: Yes, Meal Prep Responsibility: Primary    Laundry Responsibility: Primary, Cleaning Responsibility: Primary    Bill Paying/Finance Responsibility: Primary    Shopping Responsibility: Primary, Ambulation Assistance: Independent, Transfer Assistance: Independent    Active : Yes    Occupation: Full time employment--Type of occupation: disabled-  - in charges of [address]    Constitutional: [] fever  [] chills  [] weight loss  []weakness [] Other:  Eyes:  [] photophobia  [] discharge [] acuity change   [] Diplopia   [] Other:  HENT:  [] sore throat  [] ear pain [] Tinnitus   [] Other  Respiratory:  [] Cough  [] Shortness of breath   [] Sputum   [] Other:   Cardiac: []Chest pain   []Palpitations []Edema  []PND  [] Other:  GI:  []Abdominal pain   []Nausea  []Vomiting  []Diarrhea  [] Other:  :  [] Dysuria   []Frequency  []Hematuria  []Discharge  [] Other:  Possible Pregnancy: []Yes   []No   LMP:   Musculoskeletal:  []Back pain  []Neck pain  []Recent Injury   Skin:  []Rash  [] Itching  [] Other:  Neurologic:  [] Headache  [] Focal weakness  [] Sensory changes []Other:  Endocrine:  [] Polyuria  [] Polydipsia  [] Hair Loss  [] Other:  Lymphatic:   [] Swollen glands   Psychiatric:  As per HPI      All other systems negative except as marked or mentioned/indicated in the HPI. Monet Swanson      PHYSICAL EXAM:  Vitals:  BP (!) 156/81   Pulse 73   Temp 98 °F (36.7 °C)   Resp 15   Ht 5' 7\" (1.702 m)   Wt 192 lb (87.1 kg)   SpO2 95%   BMI 30.07 kg/m²      Neuro Exam:   Muscle Strength & Tone: full ROM  Gait: in bed   Involuntary Movements: No    Mental Status Examination:    Level of consciousness:  within normal limits   Appearance:  ill-appearing  Behavior/Motor:  psychomotor retardation  Attitude toward examiner:  cooperative  Speech:  slow   Mood: depressed  Affect:  mood congruent  Thought processes:  coherent   Thought content:  Suicidal Ideation:  denies suicidal ideation  Cognition:  oriented to person, place, and time   Concentration intact  Memory intact  Mini Mental Status not completed because   Insight fair   Judgement fair   Fund of Knowledge adequate      DIAGNOSIS:    Bipolar depression  LARISA      RISK ASSESSMENT:        LABS: REVIEWED TODAY:  No results for input(s): WBC, HGB, PLT in the last 72 hours. No results for input(s): NA, K, CL, CO2, BUN, CREATININE, GLUCOSE in the last 72 hours. No results for input(s): BILITOT, ALKPHOS, AST, ALT in the last 72 hours. Lab Results   Component Value Date    LABAMPH Neg 06/14/2020    BARBSCNU Neg 06/14/2020    LABBENZ Neg 06/14/2020    LABBENZ NotDTCD 01/26/2013    LABMETH Neg 06/14/2020    OPIATESCREENURINE Neg 06/14/2020    PHENCYCLIDINESCREENURINE Neg 06/14/2020    ETOH <10 12/22/2020     Lab Results   Component Value Date    TSH 4.070 04/10/2019     Lab Results   Component Value Date    LITHIUM <0.1 (L) 12/29/2020     No results found for: VALPROATE, CBMZ  Lab Results   Component Value Date    LITHIUM <0.1 12/29/2020       FURTHER LABS ORDERED :      Radiology   Cta Head W Wo Contrast    Result Date: 12/23/2020  EXAMINATION: CTA HEAD W WO CONTRAST, CTA NECK W WO CONTRAST DATE AND TIME:12/23/2020 12:42 AM CLINICAL HISTORY: Stroke  cva  COMPARISON: None TECHNIQUE:Helical CTA of the head was performed from the vertex to the foramen magnum following the uneventful intravenous administration of 100 cc of nonionic contrast without incident. 2-D images were reconstructed in the sagittal and coronal planes. Three Dimensional Maximum Intensity Projection (3D-MIP) images were created. All images were reviewed and primarily archived to PACS workstation. All CT scans at this facility use dose modulation, iterative reconstruction, and/or weight based dosing when appropriate to reduce radiation dose to as low as reasonably achievable. FINDINGS CTA HEAD: Intracranial ICAs: Flow is visualized within the precavernous, cavernous, clinoid and supraclinoid segments of the internal carotid arteries bilaterally    Anterior Cerebral Arteries: The bilaterals  A1 and A2 segments are patent.   Middle Cerebral Arteries: Bilateral horizontal, insular, opercular, and cortical segments of the right and left middle cerebral cerebral arteries are patent. Vertebral Arteries And Basilar Artery: There is adequate flow in the intracranial portions of the vertebral arteries and in the basilar artery. Posterior Cerebral Arteries: Bilateral posterior cerebral arteries are patent. Aneurysm No aneurysm or dissection in the anterior or posterior circulations. . Neurocranium The visualized neurocranium is intact. Dural Sinus: As visualized the opacified dural venous sinuses are unremarkable. The major intracranial arterial structures are patent without high-grade stenosis, large vessel cut off, or aneurysm. EXAMINATION: CTA HEAD W WO CONTRAST, CTA NECK W WO CONTRAST DATE AND TIME:12/23/2020 12:42 AM CLINICAL HISTORY: Stroke symptoms   cva  COMPARISON: None TECHNIQUE: Helical CTA of the neck was performed from the aortic arch to the foramen magnum following the uneventful intravenous administration of 100 cc of nonionic contrast without incident. 2-D images were reconstructed in the sagittal and coronal planes. Three Dimensional Maximum Intensity Projection (3D-MIP) images were created. All images were reviewed and primarily archived to PACS workstation. All CT scans at this facility use dose modulation, iterative reconstruction, and/or weight based dosing when appropriate to reduce radiation dose to as low as reasonably achievable. NASCET Criteria were utilized FINDINGS CTA NECK: Aortic Arch: The visualized portions of the aortic arch and proximal arch vessels demonstrates no focal stenosis aneurysm or dissection. Carotids: The common carotid arteries, carotid bifurcations, internal and external carotid arteries are normal in course and caliber. Right  Proximal Internal Carotid Stenosis (% by NASCET Criteria): There is no hemodynamically significant stenosis. Left  Proximal Internal Carotid Stenosis (% by NASCET Criteria): There is no hemodynamically significant stenosis. Vertebral Arteries: Patency: The vertebral arteries are well visualized to the level of the basilar artery. There is no focal stenosis aneurysm or dissection. Vertebral arteries are codominant. IMPRESSION: Negative CTA of the neck. Ct Head Wo Contrast    Result Date: 12/23/2020  CT Brain Contrast medium:  Not utilized. History:  Right facial droop. Altered mental status Comparison:  August 21, 2020 Findings: Extra-axial spaces:  Normal. Intracranial hemorrhage:  None. Ventricular system: In the interval, an ill-defined 4 x 12 mm area decreased attenuation exerting no mass effect is found medially within the left thalamus (series 2, image 16). Basal Cisterns:  Normal. Cerebral Parenchyma:  Normal. Midline Shift:  None. Cerebellum:  Normal. Paranasal sinuses and mastoid air cells:  Normal. Visualized Orbits:  Normal.     Impression: Interval development of acute/subacute left thalamic infarct. All CT scans at this facility use dose modulation, iterative reconstruction, and/or weight based dosing when appropriate to reduce radiation dose to as low as reasonably achievable. Cta Neck W Wo Contrast    Result Date: 12/23/2020  EXAMINATION: CTA HEAD W WO CONTRAST, CTA NECK W WO CONTRAST DATE AND TIME:12/23/2020 12:42 AM CLINICAL HISTORY: Stroke  cva  COMPARISON: None TECHNIQUE:Helical CTA of the head was performed from the vertex to the foramen magnum following the uneventful intravenous administration of 100 cc of nonionic contrast without incident. 2-D images were reconstructed in the sagittal and coronal planes. Three Dimensional Maximum Intensity Projection (3D-MIP) images were created. All images were reviewed and primarily archived to PACS workstation. All CT scans at this facility use dose modulation, iterative reconstruction, and/or weight based dosing when appropriate to reduce radiation dose to as low as reasonably achievable.  FINDINGS CTA HEAD: Intracranial ICAs: Flow is visualized within the precavernous, cavernous, clinoid and supraclinoid segments of the internal carotid arteries bilaterally    Anterior Cerebral Arteries: The bilaterals  A1 and A2 segments are patent. Middle Cerebral Arteries: Bilateral horizontal, insular, opercular, and cortical segments of the right and left middle cerebral cerebral arteries are patent. Vertebral Arteries And Basilar Artery: There is adequate flow in the intracranial portions of the vertebral arteries and in the basilar artery. Posterior Cerebral Arteries: Bilateral posterior cerebral arteries are patent. Aneurysm No aneurysm or dissection in the anterior or posterior circulations. . Neurocranium The visualized neurocranium is intact. Dural Sinus: As visualized the opacified dural venous sinuses are unremarkable. The major intracranial arterial structures are patent without high-grade stenosis, large vessel cut off, or aneurysm. EXAMINATION: CTA HEAD W WO CONTRAST, CTA NECK W WO CONTRAST DATE AND TIME:12/23/2020 12:42 AM CLINICAL HISTORY: Stroke symptoms   cva  COMPARISON: None TECHNIQUE: Helical CTA of the neck was performed from the aortic arch to the foramen magnum following the uneventful intravenous administration of 100 cc of nonionic contrast without incident. 2-D images were reconstructed in the sagittal and coronal planes. Three Dimensional Maximum Intensity Projection (3D-MIP) images were created. All images were reviewed and primarily archived to PACS workstation. All CT scans at this facility use dose modulation, iterative reconstruction, and/or weight based dosing when appropriate to reduce radiation dose to as low as reasonably achievable. NASCET Criteria were utilized FINDINGS CTA NECK: Aortic Arch: The visualized portions of the aortic arch and proximal arch vessels demonstrates no focal stenosis aneurysm or dissection. Carotids:  The common carotid arteries, carotid bifurcations, internal and external carotid arteries are normal in course and caliber. Right  Proximal Internal Carotid Stenosis (% by NASCET Criteria): There is no hemodynamically significant stenosis. Left  Proximal Internal Carotid Stenosis (% by NASCET Criteria): There is no hemodynamically significant stenosis. Vertebral Arteries: Patency: The vertebral arteries are well visualized to the level of the basilar artery. There is no focal stenosis aneurysm or dissection. Vertebral arteries are codominant. IMPRESSION: Negative CTA of the neck. Mri Brain Wo Contrast    Result Date: 12/25/2020  EXAMINATION:  MRI BRAIN WO CONTRAST HISTORY:   new CVA . I63.9 Stroke determined by clinical assessment ([facility] [address] ICD10 TECHNIQUE:  Routine noncontrast MRI protocol including diffusion and gradient echo images. COMPARISON: MRI brain 12/23/2020. RESULT: Acute Change:   Restricted diffusion involving the left thalamus, with corresponding increased T2/FLAIR signal at this site, measuring approximately 1.6 x 0.8 cm, similar to recent prior MRI. No new areas of restricted diffusion   Hemorrhage:    Small rounded area of susceptibility left occipital lobe, unchanged. Mass Lesion/ Mass Effect:    No evidence of an intracranial mass or extra-axial fluid collection. No significant mass effect. Chronic Change:  Scattered punctate foci of increased T2 and FLAIR signal are noted in the supratentorial white matter which is a nonspecific finding, but likely represents minimal chronic microvascular ischemia. Parenchyma:   No significant volume loss for age. The brain parenchyma is otherwise within normal limits of signal intensity and morphology. Ventricles:     Normal caliber and morphology. Skull Base:    Hypothalamic and pituitary region are grossly normal.   Craniocervical junction is normal. No significant marrow replacement process. Vasculature:    Major intracranial arterial structures, and dural venous sinuses show typical flow void, suggesting patency by spin echo criteria. Other:   The Continue lithium and Vraylar  Will check lithium level tomorrow  We will continue to follow the patient and adjust medication depending upon her mental status  Discussed with the treating physician/ team about the patient and treatment plan  Reviewed the chart    Discussed with the patient risk, benefit, alternative and common side effects for the  proposed medication treatment. Patient is consenting to the treatment. Thanks for the consult. Please call me if needed.     Electronically signed by Marisela March MD on 1/4/2021 at 2:22 PM

## 2021-01-04 NOTE — PROGRESS NOTES
[facility]   Facility/Department: [facility]  Speech Language Pathology  Insurance Progress Note           Debbie Arauz  1970  [id number]      Date: 1/4/2021      SPEECH THERAPY    CURRENT GOALS:  Short-term Goals  Timeframe for Short-term Goals: 1-2 weeks  Goal 1: Pt will complete verbal reasoning tasks (similarities/differences, word deduction, divergent naming) with 80% acc given cues as needed in order to promote effective communication of wants and needs. Progress made, completing concrete tasks with 100% acc min cues and abstract tasks with min-mod cues  Goal 2: Pt will be educated on 3 memory strategies and will state their use in functional activities of daily living with 80% acc given cues as needed in order to promote safety with the completion of ADLs. Progress made, limited therapy sessions since evaluation  Goal 3: Pt will complete mid level problem solving tasks related to ADLs with 80% acc given cues as needed in order to promote safety with the completion of ADLs. Progress made, limited therapy sessions since evaluation  Goal 4: Pt will complete oral motor drills at phrase level given model with 100% acc to improve articulator movement and coordination to improve speech intelligibilty. Not addressed since evaluation  Goal 5: Pt will follow 2+ step directions given orally with 80% accuracy with min cues to increase the pt's ability to follow directions provided by caregivers for safe follow through with ADLs. Not addressed since evaluation  Goal 6: Pt will state the PIPPA, month, location, situation, and year with 90% acc with use of external aid in order to promote orientation secondary to cognitive deficits. Goal met 1/4  Long-term Goals  Timeframe for Long-term Goals: 2 weeks  Goal 1: Pt will improve cognitive-linguistic and speech production abilities to clear express wants/needs and improve understanding during daily life.   Progress made  Short-term Goals  Timeframe for Short-term Goals: 1-2 weeks  Goal 1: Pt will tolerate regular consistencies and thin liquids with no overt s/s of aspiration and adequate oral clearance of bolus in all given opportunities. Haven't targeted since evaluation  Goal 2: Pt will complete oral motor strengthening exercises with 90% acc given cues as needed in order to increase lingual/labial musuclature and decrease risk of pocketing. Haven't targeted since evaluation  Goal 3: Pt will complete tongue press exercise x10 in order to promote anterior to posterior transit of bolus and decrease risk of aspiration. Haven't targeted since evaluation  Goal 4: Pt will implement safe swallow strategies (small bites/sips, slow rate) with 90% acc given cues as needed in order to decrease risk of aspiration. Compensatory Swallowing Strategies: Small bites/sips, Eat/Feed slowly          Patient's Response to Therapy:  Pt has made mild progress since evaluation. Pt has had 2 treatment sessions since evaluation. Pt requiring min cues for word finding, speech intelligibility and problem solving. It is expected that patient would benefit from ongoing speech therapy, as she has responded positively to skilled intervention, thus far. NATIONAL OUTCOMES MEASUREMENT SYSTEM (NOMS):  CURRENT NOMS:    SWALLOWING  Ratin    SPOKEN LANGUAGE COMPREHENSION  Ratin    SPOKEN LANGUAGE EXPRESSION  Ratin    MOTOR SPEECH  Ratin    PROBLEM SOLVING  Ratin    MEMORY  Ratin      GOAL NOMS:    SWALLOWING  Ratin    SPOKEN LANGUAGE COMPREHENSION  Ratin    SPOKEN LANGUAGE EXPRESSION  Ratin    MOTOR SPEECH  Ratin    PROBLEM SOLVING  Ratin    MEMORY  Ratin           Plan of Care:  Continue Plan of Care as stated above.            Electronically signed by Ran Wells CCC-SLP, Date: 2021, Time: 2:13 PM

## 2021-01-04 NOTE — PROGRESS NOTES
[facility]  In-Patient Rehabilitation Physical Therapy Update Note     Carolina Richey  1970  [id number]     Current Level of Function:  Restrictions  Restrictions/Precautions  Restrictions/Precautions: Fall Risk  Required Braces or Orthoses?: Yes  Required Braces or Orthoses  Right Upper Extremity Brace/Splint: Sling  Right Upper Extremity Brace/Splint: RUE immobilizer in place - pt reports rotator cuff repair done Dec 1    Objective    Bed mobility  Rolling to Left: Modified independent  Supine to Sit: Modified independent  Sit to Supine: Modified independent  Scooting: Modified independent  Comment: reports she has been sleepng on couch since surgery    Transfers  Sit to Stand: Supervision  Stand to sit: Supervision  Bed to Chair: Supervision  Stand Pivot Transfers: Stand by assistance  Comment: cues for technique and safety required    Ambulation  Ambulation?: Yes  More Ambulation?: No  Ambulation 1  Surface: level tile, uneven, carpet  Device: No Device  Other Apparatus: (R arm sling)  Assistance: Stand by assistance, Supervision  Quality of Gait: Lateral excursion reciprocal pattern  Gait Deviations: Decreased step length, Decreased step height  Distance: 250'  Comments: focus on quality of gait and improved stability/safety    Stairs/Curb  Stairs?: Yes  Stairs  # Steps : 8  Stairs Height: 6\"  Rails: Left ascending  Device: No Device  Assistance: Stand by assistance  Comment: reciprocal ascending, nonreciprocal descending      Outcomes Measures  Ch[place] Balance Score: 9    Assessment: Pt has demonstrated improved function overall She has made progress towards her goals but has not yet met these goals       Short Term Goals:  Long term goals  Long term goal 1: Pt will demonstrate bed mobility indep.   Long term goal 2: indep bed and car transfers  Long term goal 3: Pt will demonstrate amb  indep with or without 50 feet - supervision 150 feet  Long term goal 4: Pt will exhibit improved dynamic balance evidenced of 20/24 DGI for decreased risk for falls and safe d/c home alone.   Long term goal 5: Pt will demonstrate stair negotiation 5 steps without rails indep to allow pt to enter and exit her home safely.             Electronically signed by Rosa Toro PT on 1/4/2021 at 1:45 PM

## 2021-01-04 NOTE — PROGRESS NOTES
Occupational Therapy  Facility/Department: [facility]  Daily Treatment Note  NAME: Gilmer Mayer  : 1970  MRN: [id number]    Date of Service: 2021    Discharge Recommendations:  Continue to assess pending progress  OT Equipment Recommendations  Other: Continue to assess    Assessment      REQUIRES OT FOLLOW UP: Yes  Activity Tolerance  Activity Tolerance: Patient Tolerated treatment well  Safety Devices  Safety Devices in place: Yes  Type of devices: All fall risk precautions in place         Patient Diagnosis(es): There were no encounter diagnoses. has a past medical history of Anxiety, Back pain, Bipolar disorder ([address] CAD (coronary artery disease), CAFL (chronic airflow limitation) ([facility]), Chronic bronchitis ([address] Chronic pain, Colitis, Complicated migraine, DDD (degenerative disc disease), lumbar, Depression, Endometriosis, Excessive caffeine abuse, continuous ([address] Gastritis, GERD (gastroesophageal reflux disease), History of myocardial infarction, HTN (hypertension), benign, Impaired mobility and activities of daily living, Over weight, PTSD (post-traumatic stress disorder), Sensory neuropathy, Somatization disorder, TIA (transient ischemic attack), Tobacco abuse, and Vasospastic angina ([address] has a past surgical history that includes Hysterectomy; Dilation and curettage of uterus; back surgery; Neck surgery; shoulder surgery (Left);  section; Cholecystectomy (13); Upper gastrointestinal endoscopy (2014); cyst removal (3/7/16); Coronary angioplasty; Upper gastrointestinal endoscopy (N/A, 2020); and Colonoscopy (N/A, 2020).     Restrictions  Restrictions/Precautions  Restrictions/Precautions: Fall Risk  Required Braces or Orthoses?: Yes  Required Braces or Orthoses  Right Upper Extremity Brace/Splint: Sling  Right Upper Extremity Brace/Splint: RUE immobilizer in place - pt reports rotator cuff repair done Dec 1  Subjective   General  Chart Reviewed: Yes  Patient assessed for rehabilitation services?: Yes  Response to previous treatment: Patient with no complaints from previous session  Family / Caregiver Present: No  Pain Assessment  Pain Assessment: 0-10  Pain Level: 8  Pre Treatment Pain Screening  Pain at present: 8  Scale Used: Numeric Score  Intervention List: Patient able to continue with treatment;Nurse/Physician notified  Comments / Details: RN [facility] notified. Orientation     Objective           Pt completed therapeutic activities table top to increase strength/endurance for functional tasks. Pt completed HI-Q activity on incline board using LUE to isolate and remove objects. Pt demonstrated the ability to problem solve and remove apox8 pieces. Pt used LUE to place/remove wooden pegs with corresponding rings on vertical board to increase strength for functional tasks. Pt replicated design from visual model using mushroom pegs to reach across table top level with LUE. Pt declined use of wrist weight with strengthening tasks. Pt placed and removed graded clothespins to improve hand strength for FM tasks.                   Plan   Plan  Times per week: 5-7x/week  Plan weeks: 1-1 1/2 weeks  Current Treatment Recommendations: Strengthening, Endurance Training, Self-Care / ADL, Balance Training, Pain Management, Home Management Training, Functional Mobility Training, Safety Education & Training  Plan Comment: Continue OT per POC    Goals  Patient Goals   Patient goals : \"I can't believe I feel this weak, I want to get stronger\"       Therapy Time   Individual Concurrent Group Co-treatment   Time In 1300         Time Out 1400         Minutes 60              Therapeutic activities: 60 minutes    Stephanie Hernandez OT    Electronically signed by Stephanie Hernandez OT on 1/4/2021 at 4:26 PM

## 2021-01-05 LAB
GLUCOSE BLD-MCNC: 108 MG/DL (ref 60–115)
GLUCOSE BLD-MCNC: 116 MG/DL (ref 60–115)
GLUCOSE BLD-MCNC: 129 MG/DL (ref 60–115)
GLUCOSE BLD-MCNC: 141 MG/DL (ref 60–115)
GLUCOSE BLD-MCNC: 87 MG/DL (ref 60–115)
LITHIUM LEVEL: 0.7 MEQ/L (ref 0.6–1.2)
PERFORMED ON: ABNORMAL
PERFORMED ON: NORMAL
PERFORMED ON: NORMAL

## 2021-01-05 PROCEDURE — 6370000000 HC RX 637 (ALT 250 FOR IP): Performed by: PHYSICAL MEDICINE & REHABILITATION

## 2021-01-05 PROCEDURE — 6370000000 HC RX 637 (ALT 250 FOR IP): Performed by: INTERNAL MEDICINE

## 2021-01-05 PROCEDURE — 97533 SENSORY INTEGRATION: CPT

## 2021-01-05 PROCEDURE — 97530 THERAPEUTIC ACTIVITIES: CPT

## 2021-01-05 PROCEDURE — 99232 SBSQ HOSP IP/OBS MODERATE 35: CPT | Performed by: PHYSICAL MEDICINE & REHABILITATION

## 2021-01-05 PROCEDURE — 97535 SELF CARE MNGMENT TRAINING: CPT

## 2021-01-05 PROCEDURE — 6370000000 HC RX 637 (ALT 250 FOR IP): Performed by: STUDENT IN AN ORGANIZED HEALTH CARE EDUCATION/TRAINING PROGRAM

## 2021-01-05 PROCEDURE — 36415 COLL VENOUS BLD VENIPUNCTURE: CPT

## 2021-01-05 PROCEDURE — 1180000000 HC REHAB R&B

## 2021-01-05 PROCEDURE — 97112 NEUROMUSCULAR REEDUCATION: CPT

## 2021-01-05 PROCEDURE — 6370000000 HC RX 637 (ALT 250 FOR IP): Performed by: NURSE PRACTITIONER

## 2021-01-05 PROCEDURE — 80178 ASSAY OF LITHIUM: CPT

## 2021-01-05 PROCEDURE — 97116 GAIT TRAINING THERAPY: CPT

## 2021-01-05 RX ADMIN — OXYCODONE 5 MG: 5 TABLET ORAL at 20:58

## 2021-01-05 RX ADMIN — Medication 100 MG: at 09:16

## 2021-01-05 RX ADMIN — ACETAMINOPHEN 650 MG: 325 TABLET, FILM COATED ORAL at 20:58

## 2021-01-05 RX ADMIN — DOCUSATE SODIUM 200 MG: 100 CAPSULE ORAL at 09:15

## 2021-01-05 RX ADMIN — OXYCODONE 5 MG: 5 TABLET ORAL at 12:05

## 2021-01-05 RX ADMIN — DOCUSATE SODIUM 200 MG: 100 CAPSULE ORAL at 20:58

## 2021-01-05 RX ADMIN — ASPIRIN 81 MG: 81 TABLET, COATED ORAL at 09:15

## 2021-01-05 RX ADMIN — METFORMIN HYDROCHLORIDE 500 MG: 500 TABLET ORAL at 09:19

## 2021-01-05 RX ADMIN — CARIPRAZINE 3 MG: 1.5 CAPSULE, GELATIN COATED ORAL at 09:15

## 2021-01-05 RX ADMIN — ATORVASTATIN CALCIUM 20 MG: 20 TABLET, FILM COATED ORAL at 21:07

## 2021-01-05 RX ADMIN — ALOGLIPTIN 6.25 MG: 6.25 TABLET, FILM COATED ORAL at 09:15

## 2021-01-05 RX ADMIN — LITHIUM CARBONATE 300 MG: 300 CAPSULE, GELATIN COATED ORAL at 09:16

## 2021-01-05 RX ADMIN — METFORMIN HYDROCHLORIDE 500 MG: 500 TABLET ORAL at 16:58

## 2021-01-05 RX ADMIN — Medication 2000 UNITS: at 16:58

## 2021-01-05 RX ADMIN — APIXABAN 5 MG: 5 TABLET, FILM COATED ORAL at 09:15

## 2021-01-05 RX ADMIN — OXYCODONE 5 MG: 5 TABLET ORAL at 16:58

## 2021-01-05 RX ADMIN — INSULIN GLARGINE 22 UNITS: 100 INJECTION, SOLUTION SUBCUTANEOUS at 20:59

## 2021-01-05 RX ADMIN — FOLIC ACID 1 MG: 1 TABLET ORAL at 09:16

## 2021-01-05 RX ADMIN — LITHIUM CARBONATE 300 MG: 300 CAPSULE, GELATIN COATED ORAL at 12:05

## 2021-01-05 RX ADMIN — LITHIUM CARBONATE 300 MG: 300 CAPSULE, GELATIN COATED ORAL at 16:59

## 2021-01-05 RX ADMIN — LOSARTAN POTASSIUM 25 MG: 25 TABLET, FILM COATED ORAL at 09:19

## 2021-01-05 RX ADMIN — ISOSORBIDE MONONITRATE 30 MG: 30 TABLET, EXTENDED RELEASE ORAL at 09:16

## 2021-01-05 RX ADMIN — APIXABAN 5 MG: 5 TABLET, FILM COATED ORAL at 20:58

## 2021-01-05 ASSESSMENT — PAIN SCALES - GENERAL
PAINLEVEL_OUTOF10: 0
PAINLEVEL_OUTOF10: 5
PAINLEVEL_OUTOF10: 5
PAINLEVEL_OUTOF10: 8
PAINLEVEL_OUTOF10: 8

## 2021-01-05 ASSESSMENT — PAIN DESCRIPTION - ORIENTATION: ORIENTATION: RIGHT

## 2021-01-05 ASSESSMENT — PAIN DESCRIPTION - LOCATION: LOCATION: GENERALIZED

## 2021-01-05 ASSESSMENT — PAIN DESCRIPTION - PAIN TYPE
TYPE: ACUTE PAIN
TYPE: ACUTE PAIN

## 2021-01-05 NOTE — PLAN OF CARE
Problem: Pain:  Goal: Pain level will decrease  Description: Pain level will decrease  1/5/2021 0309 by Laila Baron RN  Outcome: Ongoing     Problem: Pain:  Goal: Control of acute pain  Description: Control of acute pain  1/5/2021 0309 by Laila Baron RN  Outcome: Ongoing     Problem: Pain:  Goal: Control of chronic pain  Description: Control of chronic pain  1/5/2021 0309 by Laila Baron RN  Outcome: Ongoing     Problem: Falls - Risk of:  Goal: Will remain free from falls  Description: Will remain free from falls  1/5/2021 0309 by Laila Baron RN  Outcome: Ongoing     Problem: Falls - Risk of:  Goal: Absence of physical injury  Description: Absence of physical injury  1/5/2021 0309 by Laila Baron RN  Outcome: Ongoing     Problem: IP COMMUNICATION/DYSARTHRIA  Goal: LTG - patient will improve expressive language skills to allow for communication of wants and needs in daily activities  1/5/2021 0309 by Laila Baron RN  Outcome: Ongoing     Problem: IP SWALLOWING  Goal: LTG - patient will tolerate the least restrictive diet consistency to allow for safe consumption of daily meals  1/5/2021 0309 by Laila Baron RN  Outcome: Ongoing     Problem: Skin Integrity:  Goal: Will show no infection signs and symptoms  Description: Will show no infection signs and symptoms  1/5/2021 0309 by Laila Baron RN  Outcome: Ongoing     Problem: Skin Integrity:  Goal: Absence of new skin breakdown  Description: Absence of new skin breakdown  1/5/2021 0309 by Laila Baron RN  Outcome: Ongoing

## 2021-01-05 NOTE — PROGRESS NOTES
Patient was referred for evaluation due to depression. She was seen by Dr. Alexia Dorantes yesterday who reported that patient has been having some difficulties coping with her stroke, and she has been experiencing significant pain. (When I spoke with her, she said that the pain was primarily on her right side of her body. ). She has been treated for bipolar disorder through the [facility] ([place], [place]), but she told me that \"it's been awhile\" since she has seen the psychiatrist. She reported that she does not have a . Patient's attitude was cooperative, and she was mostly forthcoming. As the session progressed, her expressive aphasia became more evident. Her verbal output was notable for paraphasia and word retrieval problems. For example, she said that she \"loved\" her work (as a ), but she didn't think that she could return to work- \"I'm lapse dated\". At times, it was difficult to comprehend what she was referring to. We discussed her living arrangements and supports. She lives with her boyfriend, and he wants his daughter (who is laid off) to stay with [place] during the day while he works. Pooja's judgment seemed questionable. She said, \"I can do everything\", but she continues to need supervision for ambulating with her walker. She also complained of difficulty with her vision. However, she said that she intended to resume driving (see below). We also discussed the death of her  in November, 2013, and the subsequent death of her younger son in January, 2014. She said that she is still grieving. Her older son is supportive. A/P- After the session, I consulted with the OT, and recommended that the OT consider whether she can drive safely, especially given the visual problems. I would recommend that she be referred for neuropsych testing several weeks after her discharge. I would also recommend that she followup with the [facility].  When I suggested that she contact a , she said, \"I have to do that without his (her boyfriend's) knowledge\". It seems that her boyfriend does not support her receiving psychiatric treatment. I would recommend that we have her sign a release of information (MARIUSZ) prior to discharge, so that we can communicate with the [employer].

## 2021-01-05 NOTE — PROGRESS NOTES
Subjective: The patient complains of severe acute on chronic recurrent right-sided weakness and aphasia dysphagia partially relieved by medications, SLP, Pt, OT, and rest and exacerbated by recent illness including a repeat CVA left thalamic region with worsening thalamic pain. I am concerned about patients pain-she seems less frustrated and she is fairly encouraged by her progress as well as her blood sugar stability. Her thalamic pain seems less of an issue better healing right shoulder pain is still an issue. She is only to get the blood sugar checks on twice daily basis. Her blood sugars are under much better control. Her significant other is to come in for family training this week. She is preparing for discharge on the ninth. According to recent nursing note, no new concerns noted. ROS x10: The patient also complains of severely impaired mobility and activities of daily living. Otherwise no new problems with vision, hearing, nose, mouth, throat, dermal, cardiovascular, GI, , pulmonary, musculoskeletal, psychiatric or neurological. See Rehab H&P on Rehab chart dated . Vital signs:  BP (!) 167/93   Pulse 68   Temp 98 °F (36.7 °C) (Oral)   Resp 17   Ht 5' 7\" (1.702 m)   Wt 200 lb (90.7 kg)   SpO2 94%   BMI 31.32 kg/m²   I/O:   PO/Intake:  fair PO intake, no problems observed or reported. Bowel/Bladder:  Continent, constipation prescribed GlycoLax  General:  Patient is well developed, adequately nourished, non-obese and     well kempt. HEENT:    PERRLA, hearing intact to loud voice, external inspection of ear     and nose benign. Inspection of lips, tongue and gums severe thrush  Musculoskeletal: No significant change in strength or tone. All joints stable. Inspection and palpation of digits and nails show no clubbing,       cyanosis or inflammatory conditions. Neuro/Psychiatric: Affect: flat but irritable and sleepy.   Alert and oriented to person, place and     Situation with mod cues. No significant change in deep tendon reflexes or     Sensation-garbled speech right-sided weakness  Lungs:  Diminished, CTA-B. Respiration effort is normal at rest.     Heart:   S1 = S2, RRR. No loud murmurs. Abdomen:  Soft, non-tender, no enlargement of liver or spleen. Extremities:  No significant lower extremity edema or tenderness. Healing right shoulder surgery  Skin:   Intact to general survey, right shoulder incisions healing well. Rehabilitation:  Physical therapy:   Bed Mobility: Scooting: Modified independent    Transfers: Sit to Stand: Supervision, Modified independent  Stand to sit: Supervision, Modified independent  Bed to Chair: Supervision, Ambulation 1  Surface: level tile, uneven, carpet  Device: No Device  Other Apparatus: (R arm sling)  Assistance: Stand by assistance, Supervision  Quality of Gait: Lateral excursion reciprocal pattern  Gait Deviations: Decreased step length, Decreased step height  Distance: 250'  Comments: focus on quality of gait and improved stability/safety, Stairs  # Steps : 8  Stairs Height: 6\"  Rails: Left ascending  Device: No Device  Assistance: Stand by assistance  Comment: reciprocal ascending, nonreciprocal descending    FIMS:  ,  , Assessment: Patient with improved safety with gait transfers and bed mobility requiring decreased assistance for all functional mobility    Occupational therapy:    ,  , Assessment: Pt demonstrated improved dynamice standing balance reaching minimally outside RIDDHI with no LOB at close supervision level. Pt continues to be limited by pain, balance and fatigue. Pt continues to benefit from OT services to maximize pt independence and safety with ADLs and transfers.     Speech therapy:        Lab/X-ray studies reviewed, analyzed and discussed with patient and staff:   Recent Results (from the past 24 hour(s))   POCT Glucose    Collection Time: 01/04/21 12:04 PM   Result Value Ref Range    POC Glucose 82 60 - 115 mg/dl    Performed on ACCU-CHEK    POCT Glucose    Collection Time: 01/04/21  4:35 PM   Result Value Ref Range    POC Glucose 129 (H) 60 - 115 mg/dl    Performed on ACCU-CHEK    POCT Glucose    Collection Time: 01/04/21  9:10 PM   Result Value Ref Range    POC Glucose 106 60 - 115 mg/dl    Performed on ACCU-CHEK    POCT Glucose    Collection Time: 01/05/21  2:05 AM   Result Value Ref Range    POC Glucose 108 60 - 115 mg/dl    Performed on ACCU-CHEK    [place] Level    Collection Time: 01/05/21  5:25 AM   Result Value Ref Range    Lithium Lvl 0.7 0.6 - 1.2 mEq/L   POCT Glucose    Collection Time: 01/05/21  6:51 AM   Result Value Ref Range    POC Glucose 116 (H) 60 - 115 mg/dl    Performed on ACCU-CHEK        Cta Head   12/23/2020  Intracranial ICAs:   The major intracranial arterial structures are patent without high-grade stenosis, large vessel cut off, or aneurysm. .     Ct Head  : 12/23/2020: Extra-axial spaces:  Normal. Intracranial hemorrhage:  None. Ventricular system: In the interval, an ill-defined 4 x 12 mm area decreased attenuation exerting no mass effect is found medially within the left thalamus (series 2, image 16). Basal Cisterns:  Normal. Cerebral Parenchyma:  Normal. Midline Shift:  None. Cerebellum:  Normal. Paranasal sinuses and mastoid air cells:  Normal. Visualized Orbits:  Normal.     Impression: Interval development of acute/subacute left thalamic infarct. Cta Neck  12/23/2020 The major intracranial arterial structures are patent without high-grade stenosis, large vessel cut off, or aneurysm. EXAMINATION: CTA HEAD MPRESSION: Negative CTA of the neck. Mri Brain  12/25/2020 : Acute Change:   Restricted diffusion involving the left thalamus, with corresponding increased T2/FLAIR signal at this site, measuring approximately 1.6 x 0.8 cm, similar to recent prior MRI.  No new areas of restricted diffusion   Hemorrhage:    Small rounded area of susceptibility left occipital lobe, unchanged. Mass Lesion/ Mass Effect:    No evidence of an intracranial mass or extra-axial fluid collection. No significant mass effect. Chronic Change:  Scattered punctate foci of increased T2 and FLAIR signal are noted in the supratentorial white matter which is a nonspecific finding, but likely represents minimal chronic microvascular ischemia. Parenchyma:   No significant volume loss for age. The brain parenchyma is otherwise within normal limits of signal intensity and morphology. Ventricles:     Normal caliber and morphology. Skull Base:    Hypothalamic and pituitary region are grossly normal.   Craniocervical junction is normal. No significant marrow replacement process. Vasculature:    Major intracranial arterial structures, and dural venous sinuses show typical flow void, suggesting patency by spin echo criteria. Other:  The visualized paranasal sinuses are clear. Mastoid air cells clear. The orbits are unremarkable. Soft tissues are unremarkable. Recent infarction involving the left thalamus, overall unchanged from MRI 12/23/2020. No new infarcts from the prior MRI. Mri Brain   12/23/2020   There are no extra-axial collections. There is no evidence of hemorrhage. There is specific association with signal dropout on ADC map in the left thalamus and region measuring 7 x15 mm indicating subacute ischemia. There is corresponding signal abnormality on T2/FLAIR series. The susceptibility images do not demonstrate evidence of hemosiderin deposition within the brain parenchyma or the leptomeninges. There is preservation of the gray-white matter differentiation. There are a few scattered foci of T2/FLAIR increased signal in the subcortical and periventricular white matter without associated edema or mass effect. The sulci and ventricles are within normal limits without evidence of hydrocephalus. The midline structures are intact, the corpus callosum is within normal limits.   The region of the bladder function. Lactinex 2 PO every AC. MOM prn, Brown Bomb prn, Glycerin suppository prn, enema prn.  3. Severe acute thalamic pain syndrome right upper extremity due to left thalamic infarct on chronic osteoarthritis and as well as thalamic pain as well as generalized OA pain: reassess pain every shift and prior to and after each therapy session, give prn Tylenol and resume OxyIR with titration, modalities prn in therapy, masage, Lidoderm, K-pad prn. Consider scheduled AM pain meds. 4. Skin healing and breakdown risk:  continue pressure relief program.  Daily skin exams and reports from nursing. 5. Severe fatigue due to nutritional and hydration deficiency: Add and titrate vitamin B12 vitamin D and CoQ10 continue to monitor I&Os, calorie counts prn, dietary consult prn.  6. Acute episodic insomnia with situational adjustment disorder:  prn Ambien, monitor for day time sedation. 7. Falls risk elevated:  patient to use call light to get nursing assistance to get up, bed and chair alarm. 8. Elevated DVT risk: progressive activities in PT, continue prophylaxis DAPHNE hose, elevation and Lovenox. 9. Complex discharge planning: Begin medication simplification and patient and family education as we prepare for her discharge later this week. Discharge January 9, 2021 home with her significant other and home health care. Until then continue weekly team meeting every  Thursday to assess progress towards goals, discuss and address social, psychological and medical comorbidities and to address difficulties they may be having progressing in therapy. Patient and family education is in progress. The patient is to follow-up with their family physician after discharge. Complex Active General Medical Issues that complicate care Assess & Plan:    1.    Bipolar 1 disorder,   Anxiety, active chronic depression-emotional support provided daily, vitamin B12, encourage participation in rehabilitation support group and recreational therapy, adjust/add medications (consider anxiolytics review old chart to find out her old psych medications) consult psychiatry and psychology  2. CAD (coronary artery disease), HTN (hypertension)-continue blood signs every shift focusing on heart rate and blood pressure checks, consult hospitalist for backup medical and adjust/add medications ( aspirin, Lipitor, Apresoline, Imdur, Cozaar, Plavix )  3. Multiple sclerosis with recent acute left thalamic CVA-consult neurology manage COPD sleep apnea and autoimmune disease add antiplatelet medications  4. Colitis-monitor stools for blood as patient will be on antiplatelet medications  5. Chronic pain syndrome with history of overusing medication due to dyscontrol due to bipolar disorder  6. Vitamin D and B12 deficiency-add vitamin B12 and high-dose vitamin D  7. Chronic obstructive pulmonary disease/  COPD exacerbation , Sleep apnea-Pulse oximeter checks to shift dose and titrate oxygen and aerosol treatments monitor for nocturnal hypoxemia, monitor vital signs, oxygen prn.   8.   Dizziness, Vertigo, peripheral, bilateral  with Sensorineural hearing loss (SNHL) of both ears and Tinnitus, bilateral-focus on balance and therapy PT and OT as well as adding speech and language pathology  9. Acute left thalamic CVA with elevated homocystine levels with aphasia dysphagia and right arm weakness-consult neurology-continue PT OT and titrate pain medication for her thalamic pain syndrome--add folic acid. 10. Healing right shoulder surgery-continue shoulder immobilizer follow-up with orthopedics.        Electronically signed by Dante Triplett DO on 12/30/20 at 8:55 AM STAR Harris D.O., PM&R     Attending    [address]

## 2021-01-05 NOTE — PLAN OF CARE
Problem: Pain:  Goal: Pain level will decrease  Description: Pain level will decrease  1/5/2021 1707 by Brandon Reynolds RN  Outcome: Ongoing  1/5/2021 0309 by Han Marquis RN  Outcome: Ongoing  Goal: Control of acute pain  Description: Control of acute pain  1/5/2021 1707 by Brandon Reynolds RN  Outcome: Ongoing  1/5/2021 0309 by Han Marquis RN  Outcome: Ongoing  Goal: Control of chronic pain  Description: Control of chronic pain  1/5/2021 1707 by Brandon Reynolds RN  Outcome: Ongoing  1/5/2021 0309 by Han Marquis RN  Outcome: Ongoing     Problem: Falls - Risk of:  Goal: Will remain free from falls  Description: Will remain free from falls  1/5/2021 1707 by Brandon Reynolds RN  Outcome: Ongoing  1/5/2021 0309 by Han Marquis RN  Outcome: Ongoing  Goal: Absence of physical injury  Description: Absence of physical injury  1/5/2021 1707 by Brandon Reynolds RN  Outcome: Ongoing  1/5/2021 0309 by Han Marquis RN  Outcome: Ongoing     Problem: IP COMMUNICATION/DYSARTHRIA  Goal: LTG - patient will improve expressive language skills to allow for communication of wants and needs in daily activities  1/5/2021 1707 by Brandon Reynolds RN  Outcome: Ongoing  1/5/2021 0309 by Han Marquis RN  Outcome: Ongoing     Problem: IP SWALLOWING  Goal: LTG - patient will tolerate the least restrictive diet consistency to allow for safe consumption of daily meals  1/5/2021 1707 by Brandon Reynolds RN  Outcome: Ongoing  1/5/2021 0309 by Han Marquis RN  Outcome: Ongoing     Problem: Skin Integrity:  Goal: Will show no infection signs and symptoms  Description: Will show no infection signs and symptoms  1/5/2021 1707 by Brandon Reynolds RN  Outcome: Ongoing  1/5/2021 0309 by Han Marquis RN  Outcome: Ongoing  Goal: Absence of new skin breakdown  Description: Absence of new skin breakdown  1/5/2021 1707 by Brandon Reynolds RN  Outcome: Ongoing  1/5/2021 0309 by Han Marquis RN  Outcome: Ongoing

## 2021-01-05 NOTE — PROGRESS NOTES
Occupational Therapy  Facility/Department: [facility]  Daily Treatment Note  NAME: Rony Cunningham  : 1970  MRN: [id number]    Date of Service: 2021    Discharge Recommendations:  Continue to assess pending progress  OT Equipment Recommendations  Other: Continue to assess    Assessment   Performance deficits / Impairments: Decreased functional mobility ; Decreased ADL status; Decreased strength;Decreased endurance;Decreased cognition;Decreased safe awareness;Decreased balance;Decreased high-level IADLs;Decreased fine motor control;Decreased coordination;Decreased posture;Decreased ROM  Activity Tolerance  Activity Tolerance: Patient Tolerated treatment well  Safety Devices  Safety Devices in place: Yes  Type of devices: All fall risk precautions in place         Patient Diagnosis(es): There were no encounter diagnoses. has a past medical history of Anxiety, Back pain, Bipolar disorder ([address] CAD (coronary artery disease), CAFL (chronic airflow limitation) ([facility]), Chronic bronchitis ([address] Chronic pain, Colitis, Complicated migraine, DDD (degenerative disc disease), lumbar, Depression, Endometriosis, Excessive caffeine abuse, continuous ([address] Gastritis, GERD (gastroesophageal reflux disease), History of myocardial infarction, HTN (hypertension), benign, Impaired mobility and activities of daily living, Over weight, PTSD (post-traumatic stress disorder), Sensory neuropathy, Somatization disorder, TIA (transient ischemic attack), Tobacco abuse, and Vasospastic angina ([address] has a past surgical history that includes Hysterectomy; Dilation and curettage of uterus; back surgery; Neck surgery; shoulder surgery (Left);  section; Cholecystectomy (13); Upper gastrointestinal endoscopy (2014); cyst removal (3/7/16); Coronary angioplasty; Upper gastrointestinal endoscopy (N/A, 2020); and Colonoscopy (N/A, 2020).     Restrictions  Restrictions/Precautions  Restrictions/Precautions: Fall Risk  Required Braces or Orthoses?: Yes  Required Braces or Orthoses  Right Upper Extremity Brace/Splint: Sling  Right Upper Extremity Brace/Splint: RUE immobilizer in place - pt reports rotator cuff repair done Dec 1  Subjective   General  Chart Reviewed: Yes  Patient assessed for rehabilitation services?: Yes  Response to previous treatment: Patient with no complaints from previous session  Family / Caregiver Present: No      Orientation     Objective     Pt completed therapeutic activities to increase strength/endurance of LUE and balance for functional tasks. Pt completed parquetry task x2 replicating design with 100% accuracy. Pt completed visual scanning task completing cancel B activity with paper at midline. Pt scanned paper and identified all B's. Pt stood with Sup using LUE to place/remove blocks on vertical dowels at various heights. Pt required seated rest break to complete task. Lighting adjustments made per pt requests due to bright lights interfering with vision.           Balance  Standing Balance: Supervision  Bed mobility  Sit to Supine: Modified independent  Transfers  Sit to stand: Supervision  Stand to sit: Supervision              Plan   Plan  Times per week: 5-7x/week  Plan weeks: 1-1 1/2 weeks  Current Treatment Recommendations: Strengthening, Endurance Training, Self-Care / ADL, Balance Training, Pain Management, Home Management Training, Functional Mobility Training, Safety Education & Training  Plan Comment: Continue OT per POC    Goals  Patient Goals   Patient goals : \"I can't believe I feel this weak, I want to get stronger\"       Therapy Time   Individual Concurrent Group Co-treatment   Time In 1030         Time Out 1130         Minutes 60              Therapeutic activities: 45 minutes  Visual Perceptual training: 15 minutes    Callum Fernandez OT    Electronically signed by Callum Fernandez OT on 1/5/2021 at 4:10 PM

## 2021-01-05 NOTE — PROGRESS NOTES
Physical Therapy Rehab Treatment Note  Fac[facility]/Department: [facility]  Room: 60R260-01       NAME: Rehana Aguero  : 1970 (52 y.o.)  MRN: [id number]  CODE STATUS: Full Code    Date of Service: 2021  Chart Reviewed: Yes  Patient assessed for rehabilitation services?: Yes  Family / Caregiver Present: No  Diagnosis: Abnormality of gait and mobility due to recent infarct left thalamus. [facility] admit 20. Restrictions:  Restrictions/Precautions: Fall Risk  Required Braces or Orthoses  Right Upper Extremity Brace/Splint: Sling  Right Upper Extremity Brace/Splint: RUE immobilizer in place - pt reports rotator cuff repair done Dec 1       SUBJECTIVE: Subjective: I am  Response To Previous Treatment: Patient with no complaints from previous session. Pain Screening  Patient Currently in Pain: Yes  Pre Treatment Pain Screening  Pain at present: 5  Scale Used: Numeric Score  Intervention List: Patient able to continue with treatment    Post Treatment Pain Screening:  Pain Assessment  Pain Assessment: 0-10  Pain Level: 5  Pain Type: Acute pain  Pain Location: Generalized  Pain Descriptors: Aching  Clinical Progression: Not changed    OBJECTIVE:   Follows Commands: Within Functional Limits    Neuromuscular Education  NDT Treatment: Gait   Neuromuscular Comments: Obstacle course training to simulate home environment and narrow passage to challenge patient ability to maneuver throughout household with R sling.  Patient with good safety noted throughout obstacle courses    Transfers  Sit to Stand: Supervision;Modified independent  Stand to sit: Supervision;Modified independent  Bed to Chair: Supervision    Ambulation  Ambulation?: Yes  More Ambulation?: No  Ambulation 1  Surface: level tile; uneven; carpet; ramp  Device: No Device  Assistance: Supervision  Quality of Gait: Lateral excursion reciprocal pattern  Distance: 150' x 2    Stairs/Curb  Stairs?: No    ASSESSMENT/COMMENTS:  Body structures, Functions, Activity limitations: Decreased functional mobility ; Decreased strength;Decreased endurance;Decreased coordination;Decreased ADL status; Decreased safe awareness;Decreased sensation;Decreased cognition;Decreased balance; Increased pain  Assessment: Patient reports feeling nervous returning home with narrow passages and tight spaces at home. Patient completes obstacle course with good safety    PLAN OF CARE/Safety:   Safety Devices  Type of devices:  All fall risk precautions in place      Therapy Time:   Individual   Time In 1400   Time Out 1430   Minutes 30     Minutes: 30      Transfer/Bed mobility trainin      Gait training: 10      Neuro re education: [address], [place], 21 at 2:56 PM

## 2021-01-05 NOTE — PROGRESS NOTES
Occupational Therapy  Facility/Department: [facility]  Daily Treatment Note  NAME: Duncan Bernal  : 1970  MRN: [id number]    Date of Service: 2021    Discharge Recommendations:  Continue to assess pending progress  OT Equipment Recommendations  Other: Continue to assess    Assessment   Performance deficits / Impairments: Decreased functional mobility ; Decreased ADL status; Decreased strength;Decreased endurance;Decreased cognition;Decreased safe awareness;Decreased balance;Decreased high-level IADLs;Decreased fine motor control;Decreased coordination;Decreased posture;Decreased ROM  Activity Tolerance  Activity Tolerance: Patient Tolerated treatment well  Safety Devices  Safety Devices in place: Yes  Type of devices: All fall risk precautions in place         Patient Diagnosis(es): There were no encounter diagnoses. has a past medical history of Anxiety, Back pain, Bipolar disorder ([address] CAD (coronary artery disease), CAFL (chronic airflow limitation) ([facility]), Chronic bronchitis ([address] Chronic pain, Colitis, Complicated migraine, DDD (degenerative disc disease), lumbar, Depression, Endometriosis, Excessive caffeine abuse, continuous ([address] Gastritis, GERD (gastroesophageal reflux disease), History of myocardial infarction, HTN (hypertension), benign, Impaired mobility and activities of daily living, Over weight, PTSD (post-traumatic stress disorder), Sensory neuropathy, Somatization disorder, TIA (transient ischemic attack), Tobacco abuse, and Vasospastic angina ([address] has a past surgical history that includes Hysterectomy; Dilation and curettage of uterus; back surgery; Neck surgery; shoulder surgery (Left);  section; Cholecystectomy (13); Upper gastrointestinal endoscopy (2014); cyst removal (3/7/16); Coronary angioplasty; Upper gastrointestinal endoscopy (N/A, 2020); and Colonoscopy (N/A, 2020).     Restrictions  Restrictions/Precautions  Restrictions/Precautions: Fall Risk  Required Braces or Orthoses?: Yes  Required Braces or Orthoses  Right Upper Extremity Brace/Splint: Sling  Right Upper Extremity Brace/Splint: RUE immobilizer in place - pt reports rotator cuff repair done Dec 1  Subjective   General  Chart Reviewed: Yes  Patient assessed for rehabilitation services?: Yes  Response to previous treatment: Patient with no complaints from previous session  Family / Caregiver Present: No      Orientation     Objective        Pt completed therapeutic activities to increase independence with functional tasks. Pt completed item retrieval through therapy gym. Pt required verbal cues to locate all objects. Pt required Sup with functional mobility with no device and verbal cues to attend to objects located on R side as pt nearly bumped objects on R side. Pt completed 3D block design x3 assembling with 100% accuracy. Extensive edu on pts home set up. Pt states she has narrow pathways through home. Edu on safety and fall prevention in home setting. Pt verbalized understanding to all edu.      Balance  Standing Balance: Supervision  Transfers  Sit to stand: Supervision  Stand to sit: Supervision                 Plan   Plan  Times per week: 5-7x/week  Plan weeks: 1-1 1/2 weeks  Current Treatment Recommendations: Strengthening, Endurance Training, Self-Care / ADL, Balance Training, Pain Management, Home Management Training, Functional Mobility Training, Safety Education & Training  Plan Comment: Continue OT per POC    Goals  Patient Goals   Patient goals : \"I can't believe I feel this weak, I want to get stronger\"       Therapy Time   Individual Concurrent Group Co-treatment   Time In 1300         Time Out 1400         Minutes 60           ADL training: 15 minutes  Therapeutic activities: 30 minutes  Visual Perceptual training: 15 minutes       Terrie Alejo OT    Electronically signed by Terrie Alejo OT on 1/5/2021 at 4:20 PM

## 2021-01-06 LAB
GLUCOSE BLD-MCNC: 101 MG/DL (ref 60–115)
GLUCOSE BLD-MCNC: 105 MG/DL (ref 60–115)
GLUCOSE BLD-MCNC: 122 MG/DL (ref 60–115)
GLUCOSE BLD-MCNC: 146 MG/DL (ref 60–115)
GLUCOSE BLD-MCNC: 84 MG/DL (ref 60–115)
PERFORMED ON: ABNORMAL
PERFORMED ON: ABNORMAL
PERFORMED ON: NORMAL

## 2021-01-06 PROCEDURE — 6370000000 HC RX 637 (ALT 250 FOR IP): Performed by: PHYSICAL MEDICINE & REHABILITATION

## 2021-01-06 PROCEDURE — 92526 ORAL FUNCTION THERAPY: CPT

## 2021-01-06 PROCEDURE — 6370000000 HC RX 637 (ALT 250 FOR IP): Performed by: STUDENT IN AN ORGANIZED HEALTH CARE EDUCATION/TRAINING PROGRAM

## 2021-01-06 PROCEDURE — 1180000000 HC REHAB R&B

## 2021-01-06 PROCEDURE — 99232 SBSQ HOSP IP/OBS MODERATE 35: CPT | Performed by: PHYSICAL MEDICINE & REHABILITATION

## 2021-01-06 PROCEDURE — APPSS15 APP SPLIT SHARED TIME 0-15 MINUTES: Performed by: NURSE PRACTITIONER

## 2021-01-06 PROCEDURE — 97116 GAIT TRAINING THERAPY: CPT

## 2021-01-06 PROCEDURE — 6370000000 HC RX 637 (ALT 250 FOR IP): Performed by: INTERNAL MEDICINE

## 2021-01-06 PROCEDURE — 99233 SBSQ HOSP IP/OBS HIGH 50: CPT | Performed by: PSYCHIATRY & NEUROLOGY

## 2021-01-06 PROCEDURE — 92507 TX SP LANG VOICE COMM INDIV: CPT

## 2021-01-06 PROCEDURE — 6370000000 HC RX 637 (ALT 250 FOR IP): Performed by: NURSE PRACTITIONER

## 2021-01-06 PROCEDURE — 97129 THER IVNTJ 1ST 15 MIN: CPT

## 2021-01-06 PROCEDURE — 97530 THERAPEUTIC ACTIVITIES: CPT

## 2021-01-06 PROCEDURE — 97112 NEUROMUSCULAR REEDUCATION: CPT

## 2021-01-06 RX ORDER — PSEUDOEPHEDRINE HCL 30 MG
200 TABLET ORAL 2 TIMES DAILY
Qty: 120 CAPSULE | Refills: 2 | Status: SHIPPED | OUTPATIENT
Start: 2021-01-06

## 2021-01-06 RX ORDER — LACTULOSE 10 G/15ML
20 SOLUTION ORAL 2 TIMES DAILY PRN
Status: DISCONTINUED | OUTPATIENT
Start: 2021-01-06 | End: 2021-01-09 | Stop reason: HOSPADM

## 2021-01-06 RX ORDER — OXYCODONE HYDROCHLORIDE 5 MG/1
5 TABLET ORAL EVERY 4 HOURS PRN
Qty: 20 TABLET | Refills: 0 | Status: SHIPPED | OUTPATIENT
Start: 2021-01-06 | End: 2021-01-09

## 2021-01-06 RX ORDER — NICOTINE 21 MG/24HR
1 PATCH, TRANSDERMAL 24 HOURS TRANSDERMAL DAILY
Qty: 30 PATCH | Refills: 3 | Status: SHIPPED | OUTPATIENT
Start: 2021-01-07 | End: 2021-04-06 | Stop reason: ALTCHOICE

## 2021-01-06 RX ORDER — CHOLECALCIFEROL (VITAMIN D3) 50 MCG
2000 TABLET ORAL
Qty: 60 TABLET | Refills: 2 | Status: SHIPPED | OUTPATIENT
Start: 2021-01-06 | End: 2021-10-14

## 2021-01-06 RX ORDER — LOSARTAN POTASSIUM 50 MG/1
50 TABLET ORAL DAILY
Status: DISCONTINUED | OUTPATIENT
Start: 2021-01-07 | End: 2021-01-09 | Stop reason: HOSPADM

## 2021-01-06 RX ORDER — FOLIC ACID 1 MG/1
1 TABLET ORAL DAILY
Qty: 30 TABLET | Refills: 3 | Status: SHIPPED | OUTPATIENT
Start: 2021-01-07 | End: 2021-10-14

## 2021-01-06 RX ORDER — ATORVASTATIN CALCIUM 20 MG/1
20 TABLET, FILM COATED ORAL NIGHTLY
Qty: 30 TABLET | Refills: 3 | Status: SHIPPED | OUTPATIENT
Start: 2021-01-06 | End: 2022-11-01 | Stop reason: SDUPTHER

## 2021-01-06 RX ADMIN — ISOSORBIDE MONONITRATE 30 MG: 30 TABLET, EXTENDED RELEASE ORAL at 08:50

## 2021-01-06 RX ADMIN — CARIPRAZINE 3 MG: 1.5 CAPSULE, GELATIN COATED ORAL at 08:49

## 2021-01-06 RX ADMIN — LOSARTAN POTASSIUM 25 MG: 25 TABLET, FILM COATED ORAL at 08:49

## 2021-01-06 RX ADMIN — LACTULOSE 20 G: 20 SOLUTION ORAL at 09:01

## 2021-01-06 RX ADMIN — FOLIC ACID 1 MG: 1 TABLET ORAL at 08:50

## 2021-01-06 RX ADMIN — ATORVASTATIN CALCIUM 20 MG: 20 TABLET, FILM COATED ORAL at 21:44

## 2021-01-06 RX ADMIN — OXYCODONE 5 MG: 5 TABLET ORAL at 09:01

## 2021-01-06 RX ADMIN — LITHIUM CARBONATE 300 MG: 300 CAPSULE, GELATIN COATED ORAL at 16:38

## 2021-01-06 RX ADMIN — METFORMIN HYDROCHLORIDE 500 MG: 500 TABLET ORAL at 16:38

## 2021-01-06 RX ADMIN — APIXABAN 5 MG: 5 TABLET, FILM COATED ORAL at 08:50

## 2021-01-06 RX ADMIN — OXYCODONE 5 MG: 5 TABLET ORAL at 13:43

## 2021-01-06 RX ADMIN — OXYCODONE 5 MG: 5 TABLET ORAL at 18:19

## 2021-01-06 RX ADMIN — LITHIUM CARBONATE 300 MG: 300 CAPSULE, GELATIN COATED ORAL at 11:08

## 2021-01-06 RX ADMIN — METFORMIN HYDROCHLORIDE 500 MG: 500 TABLET ORAL at 09:01

## 2021-01-06 RX ADMIN — DOCUSATE SODIUM 200 MG: 100 CAPSULE ORAL at 21:44

## 2021-01-06 RX ADMIN — LACTULOSE 20 G: 20 SOLUTION ORAL at 21:47

## 2021-01-06 RX ADMIN — ASPIRIN 81 MG: 81 TABLET, COATED ORAL at 08:49

## 2021-01-06 RX ADMIN — DOCUSATE SODIUM 200 MG: 100 CAPSULE ORAL at 08:49

## 2021-01-06 RX ADMIN — LITHIUM CARBONATE 300 MG: 300 CAPSULE, GELATIN COATED ORAL at 08:49

## 2021-01-06 RX ADMIN — APIXABAN 5 MG: 5 TABLET, FILM COATED ORAL at 21:44

## 2021-01-06 RX ADMIN — INSULIN GLARGINE 22 UNITS: 100 INJECTION, SOLUTION SUBCUTANEOUS at 21:45

## 2021-01-06 RX ADMIN — ACETAMINOPHEN 650 MG: 325 TABLET, FILM COATED ORAL at 11:08

## 2021-01-06 RX ADMIN — ALOGLIPTIN 6.25 MG: 6.25 TABLET, FILM COATED ORAL at 08:49

## 2021-01-06 RX ADMIN — Medication 2000 UNITS: at 16:38

## 2021-01-06 ASSESSMENT — PAIN DESCRIPTION - PROGRESSION: CLINICAL_PROGRESSION: NOT CHANGED

## 2021-01-06 ASSESSMENT — ENCOUNTER SYMPTOMS
VOMITING: 0
COLOR CHANGE: 0
CHEST TIGHTNESS: 0
WHEEZING: 0
SHORTNESS OF BREATH: 0
NAUSEA: 0
COUGH: 0
TROUBLE SWALLOWING: 0

## 2021-01-06 ASSESSMENT — PAIN SCALES - GENERAL
PAINLEVEL_OUTOF10: 7
PAINLEVEL_OUTOF10: 8
PAINLEVEL_OUTOF10: 8
PAINLEVEL_OUTOF10: 0
PAINLEVEL_OUTOF10: 8
PAINLEVEL_OUTOF10: 0
PAINLEVEL_OUTOF10: 9
PAINLEVEL_OUTOF10: 8
PAINLEVEL_OUTOF10: 8

## 2021-01-06 ASSESSMENT — PAIN DESCRIPTION - PAIN TYPE
TYPE: ACUTE PAIN
TYPE: ACUTE PAIN

## 2021-01-06 ASSESSMENT — PAIN DESCRIPTION - ONSET: ONSET: ON-GOING

## 2021-01-06 ASSESSMENT — PAIN DESCRIPTION - LOCATION: LOCATION: SHOULDER

## 2021-01-06 ASSESSMENT — PAIN DESCRIPTION - ORIENTATION: ORIENTATION: RIGHT

## 2021-01-06 ASSESSMENT — PAIN DESCRIPTION - DESCRIPTORS: DESCRIPTORS: ACHING

## 2021-01-06 ASSESSMENT — PAIN - FUNCTIONAL ASSESSMENT: PAIN_FUNCTIONAL_ASSESSMENT: ACTIVITIES ARE NOT PREVENTED

## 2021-01-06 ASSESSMENT — PAIN DESCRIPTION - FREQUENCY: FREQUENCY: CONTINUOUS

## 2021-01-06 NOTE — PROGRESS NOTES
Pt is alert and oriented. C/o right shoulder pain, prn Oxycodone 5 mg effective along with elevation and rest. RUE in sling without causing redness or irritation. Continent B/B. LBM 12/31/20, prn miralax and scheduled colace 200 mg not effective. Offered warm prune juice not effective. Pt had chili for snack last night wants to see if this will help her constipation. Bowel sounds present. Abdomen soft and round. Expressive aphasia and right sided weakness noted. Walks with SBA. BS at HS was 129. No Humalog coverage needed, Lantus 22 units given as scheduled. Pt slept well.  Electronically signed by Beth Lee RN on 1/6/2021 at 6:16 AM

## 2021-01-06 NOTE — PROGRESS NOTES
Occupational Therapy  Facility/Department: [facility]  Daily Treatment Note  NAME: Duncan Bernal  : 1970  MRN: [id number]    Date of Service: 2021    Discharge Recommendations:  Continue to assess pending progress  OT Equipment Recommendations  Other: Continue to assess    Assessment   Performance deficits / Impairments: Decreased functional mobility ; Decreased ADL status; Decreased strength;Decreased endurance;Decreased cognition;Decreased safe awareness;Decreased balance;Decreased high-level IADLs;Decreased fine motor control;Decreased coordination;Decreased posture;Decreased ROM  Activity Tolerance  Activity Tolerance: Treatment limited secondary to medical complications (free text)  Activity Tolerance: pt states she felt dizzy, hot, lightheaded. [address] notified. Nursing took BS 84. BP taken 129/65 SPO2 96% on RA. Safety Devices  Safety Devices in place: Yes  Type of devices: All fall risk precautions in place         Patient Diagnosis(es): The primary encounter diagnosis was Multiple sclerosis ([facility] Diagnoses of Neuromyelopathy due to vitamin B12 deficiency ([facility]) and Tendinitis of right rotator cuff were also pertinent to this visit. has a past medical history of Anxiety, Back pain, Bipolar disorder ([address] CAD (coronary artery disease), CAFL (chronic airflow limitation) ([facility]), Chronic bronchitis ([address] Chronic pain, Colitis, Complicated migraine, DDD (degenerative disc disease), lumbar, Depression, Endometriosis, Excessive caffeine abuse, continuous ([address] Gastritis, GERD (gastroesophageal reflux disease), History of myocardial infarction, HTN (hypertension), benign, Impaired mobility and activities of daily living, Over weight, PTSD (post-traumatic stress disorder), Sensory neuropathy, Somatization disorder, TIA (transient ischemic attack), Tobacco abuse, and Vasospastic angina ([address]    has a past surgical history that includes Hysterectomy; Dilation and curettage of uterus; back surgery; Neck surgery; shoulder surgery (Left);  section; Cholecystectomy (13); Upper gastrointestinal endoscopy (2014); cyst removal (3/7/16); Coronary angioplasty; Upper gastrointestinal endoscopy (N/A, 2020); and Colonoscopy (N/A, 2020). Restrictions  Restrictions/Precautions  Restrictions/Precautions: Fall Risk  Required Braces or Orthoses?: Yes  Required Braces or Orthoses  Right Upper Extremity Brace/Splint: Sling  Right Upper Extremity Brace/Splint: RUE immobilizer in place - pt reports rotator cuff repair done Dec 1  Subjective   General  Chart Reviewed: Yes  Patient assessed for rehabilitation services?: Yes  Response to previous treatment: Patient with no complaints from previous session  Family / Caregiver Present: No  Pain Assessment  Pain Level: 8  Pre Treatment Pain Screening  Pain at present: 8  Scale Used: Numeric Score  Intervention List: Patient able to continue with treatment   Orientation     Objective        Pt completed therapeutic activities table top to increase LUE strength/endurance for functional tasks. Pt completed arm arc with LUE 4x20 at various heights. Pt stood with Sup from wc level placing rings on horizontal dowels at various heights using LUE to improve strength for functional tasks. Pt with complaints of dizziness, lightheadedness unresolved with seated rest break. Pt states she feels sugar is off. [address] contacted. Pt returned to room for Nursing to task BS. BP taken in room levels as noted above. Pt declining rest of OT this date. Pt returned to bed to rest per request completing functional t/f and bed mobility SUP level.                   Plan   Plan  Times per week: 5-7x/week  Plan weeks: 1- 1/2 weeks  Current Treatment Recommendations: Strengthening, Endurance Training, Self-Care / ADL, Balance Training, Pain Management, Home Management Training, Functional Mobility Training, Safety Education & Training  Plan Comment: Continue OT per

## 2021-01-06 NOTE — PROGRESS NOTES
Per LPN, patient returned from therapy with c/o feeling dizzy, weak and stating her speech was \"off\". Blood sugar,  84 and vitals stable. Neuro on floor and updated. Focus assessment complete.  Electronically signed by Nelsy Roque RN on 1/6/21 at 2:37 PM EST

## 2021-01-06 NOTE — PROGRESS NOTES
Physical Therapy Missed Treatment   Facility/Department: [address] [place] [address] R260/R260-01    NAME: Nabor Peres    : 1970 (52 y.o.)  MRN: [id number]    Account: [de-identified]  Gender: female    Patient unable to participate in therapy session this p.m. due to reports of feeling dizzy. Nursing notified and stated BS was taken and vitals are good. Patient declines in bed activities stating she does not want to move.     Siobhan Park PTA, 21 at 2:12 PM

## 2021-01-06 NOTE — PROGRESS NOTES
Hospitalist Progress Note  1/6/2021 10:51 AM    Assessment and Plan:   1. Generalized weakness, Gait instability and Decreased Functional Status secondary to acute left thalamic CVA: Neurology following. Now on eliquis BID and ASA. Still has residual expressive aphasia, headache and extremity weakness. Diplopia has improved. No new focal deficits   fall precautions. PT OT to evaluate. Maximize nutrition status. Assessing if needs DME at home. HEATHER on board. 2. DMII with hyperglycemia: Patient now has better control of her glucose level. She is attempted to follow proper diet. Metformin, Nesina, 22 units Lantus nightly ISS, hypoglycemia protocol POCT Glucose TIDAC & QHS   3. Elevated liver enzymes: Improved. homozygous for MTHFR mutation. 4. Headaches: Neurology managing. Fiorinal q6hr PRN. 5. Constipation: Patient requests for 200 mg of Colace twice daily. States that she takes this dose at home. Hold for loose stools or diarrhea. Also patient educated as she does have history of colitis. 6. HTN:  Has been elevated. On imdur and losartan. Increase losartan today. 7. Dysphagia: improving, continue Aspiration precautions. 8. bipolar disorder: Psych following. Continue lithium and Cariprazine. Is followed outpatient at [facility]. [place] level 1/5/2021 was 0.7   9. nicotine abuse and dependence: Counseled for greater than 3 minutes on importance of smoking cessation to reduce risk of associated morbidity and mortality. Nicotine patch. 10. Bowel Regimen and GI PPx: stool softners PRN ordered with hold parameters for loose stools or diarrhea. On antiacid  11. Diet: DIET GENERAL;  12. Advance Directive: Full Code   13. Nutrition status: Supplemental Vitamins ordered. Dietitian assessment  14. Vaccinations: Immunization records reviewed. If has not received appropriate vaccinations, will order to be given prior to discharge. 15. DVT prophylaxis: eliquis  16. Discharge planning: HEATHER on board. Discharge tentative date 1/9/2021  17. High Risk Readmission Screening Tool Score Noted. Additionally, the following hospital problems were addressed:  Principal Problem:    Abnormality of gait and mobility due to recent infarct left thalamus. [facility] admit 12/28/20. Active Problems:    Bipolar 1 disorder (HCC)    CAD (coronary artery disease)    HTN (hypertension)    Multiple sclerosis (HCC)    Colitis    Anxiety    Chronic pain syndrome     Vitamin B12 deficiency    Chronic obstructive pulmonary disease (HCC)    Sleep apnea    Cerebrovascular accident (CVA) due to stenosis of left middle cerebral artery (HCC)    Dizziness and giddiness    Sensorineural hearing loss (SNHL) of both ears    Tinnitus, bilateral    Vertigo, peripheral, bilateral    Right arm weakness    COPD exacerbation (HCC)    Chest pain    Impaired mobility    Weakness  Resolved Problems:    * No resolved hospital problems. *      ** Total time spent reviewing medical records, evaluating patient, speaking with RN's and consultants where I was focused exclusively on this patient: 35 minutes. This time is excluding time spent performing procedures or significant events occurring earlier or later in the day requiring my attention and focus. Subjective:   Admit Date: 12/28/2020  PCP: Shruthi Maldonado MD    No acute events overnight. Afebrile  Telemetry reviewed. Complains of chronic headaches and constipation. Pt denies chest pain, SOB, N/V, fevers or chills. Patient tearful and overwhelmed due to her deficits. Neurology met with patient to discuss MTHFR mutation. Hyperglycemia improved. Diplopia improved.     Objective:     Vitals:    01/04/21 2053 01/05/21 0920 01/05/21 2050 01/06/21 0649   BP: (!) 167/93 (!) 160/78 (!) 148/79 (!) 147/64   Pulse: 68 71 75 68   Resp: 17 15 17 18   Temp: 98 °F (36.7 °C) 95.7 °F (35.4 °C) 98 °F (36.7 °C) 97 °F (36.1 °C)   TempSrc: Oral Oral Oral Oral   SpO2: 94%  93% 97%   Weight:       Height: General appearance: no acute distress, Patient currently alert and oriented x3, no acute distress, cooperative. She continues to have expressive aphasia and diplopia to right eye improved. Dentition intact. Lungs: CTAB  No exp wheezes, No rales No retractions; No use of accessory muscles  Heart:  S1, S2 normal, RRR, no MRG appreciated  Abdomen: (+) BS, soft, non-tender; non distended no guarding or rigidity. Extremities:  no cyanosis,  no edema bilat lower exts, no calf tenderness bilaterally.  Dry skin note      Medications:      dextrose        folic acid  1 mg Oral Daily    apixaban  5 mg Oral BID    docusate sodium  200 mg Oral BID    Vitamin D  2,000 Units Oral Dinner    lidocaine  3 patch Transdermal Daily    insulin glargine  0.25 Units/kg Subcutaneous Nightly    cariprazine hcl  3 mg Oral Daily    metFORMIN  500 mg Oral BID WC    alogliptin  6.25 mg Oral Daily    lithium  300 mg Oral TID WC    insulin lispro  0-12 Units Subcutaneous TID WC    insulin lispro  0-6 Units Subcutaneous Nightly    aspirin  81 mg Oral Daily    Or    aspirin  300 mg Rectal Daily    atorvastatin  20 mg Oral Nightly    isosorbide mononitrate  30 mg Oral Daily    losartan  25 mg Oral Daily    nicotine  1 patch Transdermal Daily       LABS Reviewed    IMAGING Reviewed    Christiana Lamb CNP  Rounding Hospitalist

## 2021-01-06 NOTE — PLAN OF CARE
Problem: Pain:  Goal: Pain level will decrease  Description: Pain level will decrease  1/6/2021 0110 by Buddy Solitario RN  Outcome: Ongoing     Problem: Pain:  Goal: Control of acute pain  Description: Control of acute pain  1/6/2021 0110 by Buddy Solitario RN  Outcome: Ongoing     Problem: Pain:  Goal: Control of chronic pain  Description: Control of chronic pain  1/6/2021 0110 by Buddy Solitario RN  Outcome: Ongoing     Problem: Falls - Risk of:  Goal: Will remain free from falls  Description: Will remain free from falls  1/6/2021 0110 by Buddy Solitario RN  Outcome: Ongoing     Problem: Falls - Risk of:  Goal: Absence of physical injury  Description: Absence of physical injury  1/6/2021 0110 by Buddy Solitario RN  Outcome: Ongoing

## 2021-01-06 NOTE — PROGRESS NOTES
Subjective: The patient complains of severe acute on chronic recurrent right-sided weakness and aphasia dysphagia partially relieved by medications, SLP, Pt, OT, and rest and exacerbated by recent illness including a repeat CVA left thalamic region with worsening thalamic pain. Approaching her discharge will start her medication reconciliation and try to simplify all of her meds she is doing a good job controlling her impulse to take more pain medications she is definitely using them responsibly this time around. According to recent nursing note , \"Pt is alert and oriented. C/o right shoulder pain, prn Oxycodone 5 mg effective along with elevation and rest. RUE in sling without causing redness or irritation. Continent B/B. LBM 12/31/20, prn miralax and scheduled colace 200 mg not effective. Offered warm prune juice not effective. Pt had chili for snack last night wants to see if this will help her constipation. Bowel sounds present. Abdomen soft and round. Expressive aphasia and right sided weakness noted. Walks with SBA. BS at HS was 129. No Humalog coverage needed, Lantus 22 units given as scheduled. Pt slept well. \"    ROS x10: The patient also complains of severely impaired mobility and activities of daily living. Otherwise no new problems with vision, hearing, nose, mouth, throat, dermal, cardiovascular, GI, , pulmonary, musculoskeletal, psychiatric or neurological. See Rehab H&P on Rehab chart dated . Vital signs:  BP (!) 147/64   Pulse 68   Temp 97 °F (36.1 °C) (Oral)   Resp 18   Ht 5' 7\" (1.702 m)   Wt 200 lb (90.7 kg)   SpO2 97%   BMI 31.32 kg/m²   I/O:   PO/Intake:  fair PO intake, no problems observed or reported. Bowel/Bladder:  Continent, constipation prescribed GlycoLax  General:  Patient is well developed, adequately nourished, non-obese and     well kempt. HEENT:    PERRLA, hearing intact to loud voice, external inspection of ear     and nose benign.   Inspection of services to maximize pt independence and safety with ADLs and transfers. Speech therapy:        Lab/X-ray studies reviewed, analyzed and discussed with patient and staff:   Recent Results (from the past 24 hour(s))   POCT Glucose    Collection Time: 01/05/21 12:09 PM   Result Value Ref Range    POC Glucose 141 (H) 60 - 115 mg/dl    Performed on ACCU-CHEK    POCT Glucose    Collection Time: 01/05/21  4:18 PM   Result Value Ref Range    POC Glucose 87 60 - 115 mg/dl    Performed on ACCU-CHEK    POCT Glucose    Collection Time: 01/05/21  8:51 PM   Result Value Ref Range    POC Glucose 129 (H) 60 - 115 mg/dl    Performed on ACCU-CHEK    POCT Glucose    Collection Time: 01/06/21  6:31 AM   Result Value Ref Range    POC Glucose 105 60 - 115 mg/dl    Performed on ACCU-CHEK        Cta Head   12/23/2020  Intracranial ICAs:   The major intracranial arterial structures are patent without high-grade stenosis, large vessel cut off, or aneurysm. .     Ct Head  : 12/23/2020: Extra-axial spaces:  Normal. Intracranial hemorrhage:  None. Ventricular system: In the interval, an ill-defined 4 x 12 mm area decreased attenuation exerting no mass effect is found medially within the left thalamus (series 2, image 16). Basal Cisterns:  Normal. Cerebral Parenchyma:  Normal. Midline Shift:  None. Cerebellum:  Normal. Paranasal sinuses and mastoid air cells:  Normal. Visualized Orbits:  Normal.     Impression: Interval development of acute/subacute left thalamic infarct. Cta Neck  12/23/2020 The major intracranial arterial structures are patent without high-grade stenosis, large vessel cut off, or aneurysm. EXAMINATION: CTA HEAD MPRESSION: Negative CTA of the neck. Mri Brain  12/25/2020 : Acute Change:   Restricted diffusion involving the left thalamus, with corresponding increased T2/FLAIR signal at this site, measuring approximately 1.6 x 0.8 cm, similar to recent prior MRI.  No new areas of restricted diffusion   Hemorrhage: Small rounded area of susceptibility left occipital lobe, unchanged. Mass Lesion/ Mass Effect:    No evidence of an intracranial mass or extra-axial fluid collection. No significant mass effect. Chronic Change:  Scattered punctate foci of increased T2 and FLAIR signal are noted in the supratentorial white matter which is a nonspecific finding, but likely represents minimal chronic microvascular ischemia. Parenchyma:   No significant volume loss for age. The brain parenchyma is otherwise within normal limits of signal intensity and morphology. Ventricles:     Normal caliber and morphology. Skull Base:    Hypothalamic and pituitary region are grossly normal.   Craniocervical junction is normal. No significant marrow replacement process. Vasculature:    Major intracranial arterial structures, and dural venous sinuses show typical flow void, suggesting patency by spin echo criteria. Other:  The visualized paranasal sinuses are clear. Mastoid air cells clear. The orbits are unremarkable. Soft tissues are unremarkable. Recent infarction involving the left thalamus, overall unchanged from MRI 12/23/2020. No new infarcts from the prior MRI. Mri Brain   12/23/2020   There are no extra-axial collections. There is no evidence of hemorrhage. There is specific association with signal dropout on ADC map in the left thalamus and region measuring 7 x15 mm indicating subacute ischemia. There is corresponding signal abnormality on T2/FLAIR series. The susceptibility images do not demonstrate evidence of hemosiderin deposition within the brain parenchyma or the leptomeninges. There is preservation of the gray-white matter differentiation. There are a few scattered foci of T2/FLAIR increased signal in the subcortical and periventricular white matter without associated edema or mass effect. The sulci and ventricles are within normal limits without evidence of hydrocephalus.  The midline structures are intact, the corpus callosum is within normal limits. The region of the pineal gland and the sella turcica are unremarkable. There are no space-occupying lesions in the posterior fossa. The basilar cisterns are patent. The craniocervical junction is unremarkable. The visualized portions of the orbits are within normal limits, the globes are intact. The visualized portions of the paranasal sinuses are within normal limits. The calvarium and soft tissues are unremarkable. There is a 7 x 15 mm area of subacute ischemia in the left thalamus. Previous extensive, complex labs, notes and diagnostics reviewed and analyzed. ALLERGIES:    Allergies as of 12/28/2020 - Review Complete 12/28/2020   Allergen Reaction Noted    Latex Itching 03/25/2018    Influenza vaccines Swelling 10/08/2015    Eggs or egg-derived products  10/08/2015    Gabapentin Nausea Only 10/31/2017    Pneumococcal vaccines Hives 01/28/2016    Povidone iodine Itching 10/08/2015    Varicella virus vaccine live Hives 01/28/2016      (please also verify by checking MAR)       I reviewed her [facility] prescription monitoring service data sheets in hopes of eliminating polypharmacy and weaning to the lowest effective dose of pain medications and eliminating the concomitant use of benzodiazepines. I see no medications of concern. I see no habits of combining sedatives and narcotics. Complex Physical Medicine & Rehab Issues Assess & Plan:   1. Severe abnormality of gait and mobility and impaired self-care and ADL's secondary to acute left thalamic CVA with right-sided weakness thalamic pain aphasia dysphagia. Functional and medical status reassessed regarding patients ability to participate in therapies and patient found to be able to participate in acute intensive comprehensive inpatient rehabilitation program including PT/OT to improve balance, ambulation, ADLs, and to improve the P/AROM.   Therapeutic modifications regarding activities in therapies, place, amount of time per day and intensity of therapy made daily. In bed therapies or bedside therapies prn.   2. Bowel and Bladder dysfunction neurogenic bowel and bladder:  frequent toileting, ambulate to bathroom with assistance, check post void residuals. Check for C.difficile x1 if >2 loose stools in 24 hours, continue bowel & bladder program.  Monitor bowel and bladder function. Lactinex 2 PO every AC. MOM prn, Brown Bomb prn, Glycerin suppository prn, enema prn.  3. Severe acute thalamic pain syndrome right upper extremity due to left thalamic infarct on chronic osteoarthritis and as well as thalamic pain as well as generalized OA pain: reassess pain every shift and prior to and after each therapy session, give prn Tylenol and resume OxyIR with titration, modalities prn in therapy, masage, Lidoderm, K-pad prn. Consider scheduled AM pain meds. 4. Skin healing and breakdown risk:  continue pressure relief program.  Daily skin exams and reports from nursing. 5. Severe fatigue due to nutritional and hydration deficiency: Add and titrate vitamin B12 vitamin D and CoQ10 continue to monitor I&Os, calorie counts prn, dietary consult prn.  6. Acute episodic insomnia with situational adjustment disorder:  prn Ambien, monitor for day time sedation. 7. Falls risk elevated:  patient to use call light to get nursing assistance to get up, bed and chair alarm. 8. Elevated DVT risk: progressive activities in PT, continue prophylaxis DAPNHE hose, elevation and Lovenox. 9. Complex discharge planning:  medication simplification and patient and family education as we prepare for her discharge later this week. Discharge January 9, 2021 home with her significant other and home health care. Until then continue weekly team meeting every  Thursday to assess progress towards goals, discuss and address social, psychological and medical comorbidities and to address difficulties they may be having progressing in therapy. Patient and family education is in progress. The patient is to follow-up with their family physician after discharge. Complex Active General Medical Issues that complicate care Assess & Plan:    1. Bipolar 1 disorder,   Anxiety, active chronic depression-emotional support provided daily, vitamin B12, encourage participation in rehabilitation support group and recreational therapy, adjust/add medications (consider anxiolytics review old chart to find out her old psych medications) consult psychiatry and psychology  2. CAD (coronary artery disease), HTN (hypertension)-continue blood signs every shift focusing on heart rate and blood pressure checks, consult hospitalist for backup medical and adjust/add medications ( aspirin, Lipitor, Apresoline, Imdur, Cozaar, Plavix )  3. Multiple sclerosis with recent acute left thalamic CVA-consult neurology manage COPD sleep apnea and autoimmune disease add antiplatelet medications  4. Colitis-monitor stools for blood as patient will be on antiplatelet medications  5. Chronic pain syndrome with history of overusing medication due to dyscontrol due to bipolar disorder  6. Vitamin D and B12 deficiency-add vitamin B12 and high-dose vitamin D  7. Chronic obstructive pulmonary disease/  COPD exacerbation , Sleep apnea-Pulse oximeter checks to shift dose and titrate oxygen and aerosol treatments monitor for nocturnal hypoxemia, monitor vital signs, oxygen prn.   8.   Dizziness, Vertigo, peripheral, bilateral  with Sensorineural hearing loss (SNHL) of both ears and Tinnitus, bilateral-focus on balance and therapy PT and OT as well as adding speech and language pathology  9. Acute left thalamic CVA with elevated homocystine levels with aphasia dysphagia and right arm weakness-consult neurology-continue PT OT and titrate pain medication for her thalamic pain syndrome--add folic acid.   10. Healing right shoulder surgery-continue shoulder immobilizer follow-up with orthopedics.        Electronically signed by Kanika Garnica DO on 12/30/20 at 8:55 AM STAR Madrid D.O., PM&R     Attending    [address]

## 2021-01-06 NOTE — PROGRESS NOTES
Physical Therapy Rehab Treatment Note  Facility/Department: [facility]  Room: R260/R260-01       NAME: Winston Ghotra  : 1970 (52 y.o.)  MRN: [id number]  CODE STATUS: Full Code    Date of Service: 2021  Chart Reviewed: Yes  Family / Caregiver Present: No    Restrictions:  Restrictions/Precautions: Fall Risk  Required Braces or Orthoses  Right Upper Extremity Brace/Splint: Sling  Right Upper Extremity Brace/Splint: RUE immobilizer in place - pt reports rotator cuff repair done Dec 1       SUBJECTIVE: Subjective: I ache in my right shoulder and all over this morning. Response To Previous Treatment: Patient with no complaints from previous session. Pain Screening  Patient Currently in Pain: Yes       Post Treatment Pain Screenin/10  Pain Assessment  Pain Assessment: 0-10  Pain Level: 8  Pain Type: Acute pain  Pain Location: Shoulder  Pain Descriptors: Aching    OBJECTIVE:               Neuromuscular Education  NDT Treatment: Gait   Neuromuscular Comments: Ambulation in/around room with pt management of doors and scanning of art work in hallways during ambualtion with no LOB. Transfers  Sit to Stand: Modified independent  Stand to sit: Modified independent  Car Transfer: Modified independent    Ambulation  Ambulation?: Yes  Ambulation 1  Surface: level tile;uneven;carpet;ramp  Device: No Device  Assistance: Supervision  Quality of Gait: Slow reciprocal gait patttern with shuffling feet. Stairs/Curb  Stairs?: Yes  Stairs  # Steps : 13  Stairs Height: 6\"  Rails: Left ascending  Assistance: Stand by assistance  Comment: reciprocal pattern with both ascending and descending of stairs. ASSESSMENT/COMMENTS:  Assessment: Pt completed ambulation in room, hallways and stairs with no LOB at this time. PLAN OF CARE/Safety:   Safety Devices  Type of devices:  All fall risk precautions in place      Therapy Time:   Individual   Time In 0900   Time Out 0930   Minutes 30 Minutes:30        Transfer/Bed mobility trainin      Gait training:15      Neuro re education:10     Therapeutic ex:0      Matthieu Bolden 21 at 12:41 PM

## 2021-01-06 NOTE — PLAN OF CARE
Problem: Pain:  Goal: Pain level will decrease  Description: Pain level will decrease  1/6/2021 1434 by Rob Stearns RN  Outcome: Ongoing  1/6/2021 0110 by Rocky Stanton RN  Outcome: Ongoing  Goal: Control of acute pain  Description: Control of acute pain  1/6/2021 1434 by Rob Stearns RN  Outcome: Ongoing  1/6/2021 0110 by Rocky Stanton RN  Outcome: Ongoing  Goal: Control of chronic pain  Description: Control of chronic pain  1/6/2021 1434 by Rob Stearns RN  Outcome: Ongoing  1/6/2021 0110 by Rocky Stanton RN  Outcome: Ongoing     Problem: Falls - Risk of:  Goal: Will remain free from falls  Description: Will remain free from falls  1/6/2021 1434 by Rob Stearns RN  Outcome: Ongoing  1/6/2021 0110 by Rocky Stanton RN  Outcome: Ongoing  Goal: Absence of physical injury  Description: Absence of physical injury  1/6/2021 1434 by Rob Stearns RN  Outcome: Ongoing  1/6/2021 0110 by Rocky Stanton RN  Outcome: Ongoing     Problem: IP COMMUNICATION/DYSARTHRIA  Goal: LTG - patient will improve expressive language skills to allow for communication of wants and needs in daily activities  1/6/2021 1434 by Rob Stearns RN  Outcome: Ongoing  1/6/2021 0110 by Rocky Stanton RN  Outcome: Ongoing     Problem: IP SWALLOWING  Goal: LTG - patient will tolerate the least restrictive diet consistency to allow for safe consumption of daily meals  1/6/2021 1434 by Rob Stearns RN  Outcome: Ongoing  1/6/2021 0110 by Rocky Stanton RN  Outcome: Ongoing     Problem: Skin Integrity:  Goal: Will show no infection signs and symptoms  Description: Will show no infection signs and symptoms  1/6/2021 1434 by Rob Stearns RN  Outcome: Ongoing  1/6/2021 0110 by Rocky Stanton RN  Outcome: Ongoing  Goal: Absence of new skin breakdown  Description: Absence of new skin breakdown  1/6/2021 1434 by Rob Stearns RN  Outcome: Ongoing  1/6/2021 0110 by Rocky Stanton RN  Outcome: Ongoing

## 2021-01-06 NOTE — PROGRESS NOTES
[facility]  Facility/Department: [facility]  Speech Language Pathology   Treatment Note          Mayank Arana  1970  R260/R260-01        Rehab Dx/Hx: Impaired mobility and ADLs [Z74.09, Z78.9]  Impaired mobility [Z74.09]    Precautions: falls    Medical Dx: Impaired mobility and ADLs [Z74.09, Z78.9]  Impaired mobility [Z74.09]  Speech Dx: Dysphagia, Dysarthria and Cognitive Linguistic Impairment     Date: 1/6/2021    Subjective:  Alert and Cooperative        Interventions used this date:  Speech Production, Cognitive Skill Development, Dysphagia Treatment and Oral Motor Treatment    Objective/Assessment:  Patient progressing towards goals:  Goal 1: Pt will complete verbal reasoning tasks (similarities/differences, word deduction, divergent naming) with 80% acc given cues as needed in order to promote effective communication of wants and needs. Pt explained similarity and difference between 2 objects in pictures with min cues with 90% accuracy. Goal 2: Pt will be educated on 3 memory strategies and will state their use in functional activities of daily living with 80% acc given cues as needed in order to promote safety with the completion of ADLs. Not targeted  Goal 3: Pt will complete mid level problem solving tasks related to ADLs with 80% acc given cues as needed in order to promote safety with the completion of ADLs. Not targeted  Goal 4: Pt will complete oral motor drills at phrase level given model with 100% acc to improve articulator movement and coordination to improve speech intelligibilty. Pt read aloud 5-7 word sentences with 70% accuracy with reading and 70% accuracy with speech accuracy. Goal 5: Pt will follow 2+ step directions given orally with 80% accuracy with min cues to increase the pt's ability to follow directions provided by caregivers for safe follow through with ADLs. Pt followed 1-2 step directions for completion of exercises with 100% accuracy.   Goal 6: Pt will state the PIPPA, month, location, situation, and year with 90% acc with use of external aid in order to promote orientation secondary to cognitive deficits. Pt oriented x 4 independently. Goal 1: Pt will tolerate regular consistencies and thin liquids with no overt s/s of aspiration and adequate oral clearance of bolus in all given opportunities. Goal 2: Pt will complete oral motor strengthening exercises with 90% acc given cues as needed in order to increase lingual/labial musuclature and decrease risk of pocketing. Pt completed labial/lingual/buccal ROM/strengthening exercises 10x/each with min cues and good performance. Goal 3: Pt will complete tongue press exercise x10 in order to promote anterior to posterior transit of bolus and decrease risk of aspiration. Pt completed tongue press and extension/retraction lingual exercises 10x/each with min cues and good performance. Goal 4: Pt will implement safe swallow strategies (small bites/sips, slow rate) with 90% acc given cues as needed in order to decrease risk of aspiration. Educated pt on small bites/sips and slow rate at meals. Compensatory Swallowing Strategies: Small bites/sips, Eat/Feed slowly      Treatment/Activity Tolerance:  Patient tolerated treatment well    Plan:  Continue per POC    Pain Assessment:  Initial Assessment:  Patient c/o: 8/10 pain right side of body, chronic  Patient reported an acceptable level for treatment. Re-assessment:  Patient c/o:8/10 pain right side of body, chronic  Patient reported an acceptable level for treatment. Patient/Caregiver Education:  Patient educated on session and progression towards goals. Patient stated verbal understanding of directions.     Safety Devices:  Chair alarm in place      [id number] [address] ([employer]):    SWALLOWING  Ratin    SPOKEN LANGUAGE COMPREHENSION  Ratin    SPOKEN LANGUAGE EXPRESSION  Ratin    MOTOR SPEECH  Ratin    PROBLEM SOLVING  Rating:

## 2021-01-06 NOTE — PROGRESS NOTES
[facility] Neurology Daily Progress Note  Name: Mayank Arana  Age: 52 y.o. Gender: female  CodeStatus: Full Code  Allergies: Latex  Influenza Vaccines  Eggs Or Egg-Derived Products  Gabapentin  Pneumococcal Vaccines  Povidone Iodine  Varicella Virus Vaccine Live    Chief Complaint:No chief complaint on file. Primary Care Provider: Shruthi Maldonado MD  InpatientTreatment Team: Treatment Team: Attending Provider: Jamilah Espino DO; Consulting Physician: Nate Aguillon MD; Consulting Physician: Deborah FridayDO; Utilization Reviewer: Shaun Joyce RN; Consulting Physician: Solitario Saravia MD; Consulting Physician: Royetta Blizzard, MD; LPN: Arlette Amaya LPN; Registered Nurse: Humera Carias RN; Occupational Therapist Assistant: MARLYN Sam; Occupational Therapist Assistant: MARLYN Zapata; Registered Nurse: Matty Pierson RN  Admission Date: 12/28/2020      HPI Pt seen and examined on rehab unit for neurology follow-up for acute thalamic CVA with expressive aphasia and headache.  Patient currently alert and oriented x3, no acute distress, cooperative.  Continues to have expressive aphasia. Patient had some dizziness and generalized weakness while on therapy today after sitting and standing for long periods of time. Headache is now resolved. Denies diplopia. She continues to have mild dysarthria and some expressive aphasia. No new focal deficits. Vital signs stable. Blood sugar stable. Pt repots some improvement  Vitals:    01/06/21 0649   BP: (!) 147/64   Pulse: 68   Resp: 18   Temp: 97 °F (36.1 °C)   SpO2: 97%      Review of Systems   Constitutional: Negative for appetite change, chills, fatigue and fever. HENT: Negative for hearing loss and trouble swallowing. Eyes: Negative for visual disturbance. Respiratory: Negative for cough, chest tightness, shortness of breath and wheezing. Cardiovascular: Negative for chest pain, palpitations and leg swelling. signal  intensity in the right cerebral peduncle similar to the prior study. No  associated contrast enhancement is seen. IMPRESSION NO ACUTE INTRACRANIAL PROCESS. A SINGLE SUBCENTIMETER FOCUS  OF BRIGHT SIGNAL INTENSITY IN THE RIGHT CEREBRAL PEDUNCLE IS AGAIN  NOTED. THERE IS NO EVIDENCE OF CONTRAST ENHANCEMENT. FINDINGS ARE  SUSPICIOUS FOR A SMALL DEMYELINATING PLAQUE SECONDARY TO MULTIPLE  SCLEROSIS. NO OTHER DEFINITE PLAQUES ARE IDENTIFIED AT THIS TIME. OVERALL APPEARANCE OF THE BRAIN IS UNCHANGED FROM THE PRIOR STUDY. Mili Jaems M.D. Released Sheila James M.D. Released Date Time- 04/18/16 5005   This document has been electronically signed. ------------------------------------------------------------------------------     Results for orders placed during the hospital encounter of 12/22/20   MRI BRAIN WO CONTRAST    Narrative EXAMINATION:  MRI BRAIN WO CONTRAST    HISTORY:   new CVA . I63.9 Stroke determined by clinical assessment ([facility] [address] ICD10    TECHNIQUE:  Routine noncontrast MRI protocol including diffusion and gradient echo images. COMPARISON: MRI brain 12/23/2020. RESULT:    Acute Change:   Restricted diffusion involving the left thalamus, with corresponding increased T2/FLAIR signal at this site, measuring approximately 1.6 x 0.8 cm, similar to recent prior MRI. No new areas of restricted diffusion       Hemorrhage:    Small rounded area of susceptibility left occipital lobe, unchanged. Mass Lesion/ Mass Effect:    No evidence of an intracranial mass or extra-axial fluid collection. No significant mass effect. Chronic Change:  Scattered punctate foci of increased T2 and FLAIR signal are noted in the supratentorial white matter which is a nonspecific finding, but likely represents minimal chronic microvascular ischemia. Parenchyma:   No significant volume loss for age.   The brain parenchyma is otherwise extra-axial fluid collection. The posterior fossa is within normal limits. There is no diffusion restriction. No susceptibility artifact is identified on the gradient echo sequence. Cranial nerves 7/8 complexes appear grossly unremarkable. The visualized paranasal sinuses and bilateral mastoid air cells are clear. MRA brain:    The bilateral distal cervical internal carotid arteries through the skull base are patent. The bilateral middle cerebral arteries through the trifurcation and opercular branches are patent. The vertebrobasilar system including the superior cerebellar and posterior cerebral arteries are patent. Posterior communicating arteries are patent. The bilateral anterior cerebral arteries and anterior communicating artery are patent. No aneurysm or high-grade stenosis of the visualized cerebral vasculature. Impression MRI brain:    No acute ischemia, acute intracranial process, or significant interval change when compared to MRI of the brain from November 7, 2018    MRA brain:    No aneurysm or high-grade stenosis of the visualized cerebral vasculature. CT of the Head:   Results for orders placed during the hospital encounter of 12/22/20   CT Head WO Contrast    Narrative CT Brain    Contrast medium:  Not utilized. History:  Right facial droop. Altered mental status    Comparison:  August 21, 2020    Findings:    Extra-axial spaces:  Normal.     Intracranial hemorrhage:  None. Ventricular system: In the interval, an ill-defined 4 x 12 mm area decreased attenuation exerting no mass effect is found medially within the left thalamus (series 2, image 16). Basal Cisterns:  Normal.    Cerebral Parenchyma:  Normal.    Midline Shift:  None.     Cerebellum:  Normal.     Paranasal sinuses and mastoid air cells:  Normal.    Visualized Orbits:  Normal.      Impression Impression:    Interval development of acute/subacute left thalamic infarct. All CT scans at this facility use dose modulation, iterative reconstruction, and/or weight based dosing when appropriate to reduce radiation dose to as low as reasonably achievable. No results found for this or any previous visit. No results found for this or any previous visit. Carotid duplex: No results found for this or any previous visit. No results found for this or any previous visit. Results for orders placed during the hospital encounter of 08/06/19   US CAROTID ARTERY BILATERAL    Narrative BILATERAL DUPLEX CAROTID ULTRASOUND    CLINICAL INFORMATION:  SMOKING HISTORY, HYPERTENSION, TIA WITH LEFT ARM NUMBNESS AND TINGLING, FACIAL DROOP, EVALUATE FOR POSSIBLE CAROTID ARTERY STENOSIS IN THE NECK. COMPARISON:  NONE AVAILABLE     FINDINGS:  The bilateral carotid duplex ultrasound study demonstrates the following:                                                                 RIGHT            LEFT    Carotid plaque burden                         Mild               Mild  Proximal common carotid artery           171 cm/s            177 cm/s    Mid common carotid artery                   151 cm/s            152 cm/s    Distal common carotid artery                90 cm/s           118 cm/s  Proximal internal carotid artery             78 cm/s            130 cm/s  Mid internal carotid artery                      80 cm/s           82 cm/s  Distal internal carotid artery                  65 cm/s             85 cm/s  External carotid artery                            172 cm/s           169 cm/s      ICA/CCA ratio                                         0.5                0.9       Vertebral arteries antegrade flow         Yes                     Yes       Impression 1. MILD CAROTID PLAQUE BURDEN IN BOTH CAROTID BIFURCATIONS IN THE NECK. 2. RIGHT INTERNAL CAROTID ARTERY: <50% STENOSIS. 3. LEFT INTERNAL CAROTID ARTERY: <50% STENOSIS.   4. BILATERALLY PATENT VERTEBRAL ARTERIES WITH ANTEGRADE BLOOD FLOW.    Validated velocity measurements with angiographic measurements, velocity criteria are extrapolated from diameter data as defined by the Society of Radiologist in [address] [place] [address] [address] [facility] 2003; 089;[phone]. Echo No results found for this or any previous visit. Assessment/Plan:    Left thalamic stroke with expressive aphasia  CTA negative  Continue dual antiplatelet therapy  Risk factor modification needed  CHAO with bubble study negative for cardioembolic source. Patient continues to have mild headache, but diplopia and photophobia has improved.      Hypercoagulable work-up done.  homocystine level elevated and homozygous positive for MTHFR. Will repeat homocysteine level and consider switching from DAPT to anticoagulation. As noted patient is hypercoagulable and has homozygous MTHFR with elevated homocystine levels in the context of this to all being positive patient should be on anticoagulation recommended we start her on Eliquis 5 mg twice a day with aspirin. Examination shows right-sided weakness and same findings. Events noted,     Patient noted to have some dizziness and generalized weakness in therapy today with sitting standing for long periods of time. Vital signs stable. Blood sugar 84 although this is a drop for her. Will assess orthostatic blood pressures. No new focal deficits appreciated currently patient feels better while lying in bed. I have personally performed a face to face diagnostic evaluation on this patient, reviewed all data and investigations, and am the sole provider of all clinical decisions on the neurological status of this patient. pt exam shows some improvement, stll right weakness,  eliquis started      Steven Colorado MD, Dang Mckinney, American Board of Psychiatry & Neurology  Board Certified in Vascular Neurology  Board Certified in Neuromuscular Medicine  Certified in Neurorehabilitation     Collaborating physicians:  Malcolm    Electronically signed by Leanor Cockayne, APRN - CNP on 1/6/2021 at 2:53 PM

## 2021-01-06 NOTE — PROGRESS NOTES
Occupational Therapy  Facility/Department: [facility]  Daily Treatment Note  NAME: Rehana Aguero  : 1970  MRN: [id number]    Date of Service: 2021    Discharge Recommendations:  Continue to assess pending progress       Assessment      Activity Tolerance  Activity Tolerance: Patient limited by fatigue  Safety Devices  Safety Devices in place: Yes  Type of devices: All fall risk precautions in place         Patient Diagnosis(es): The primary encounter diagnosis was Multiple sclerosis ([facility] [address] Diagnoses of Neuromyelopathy due to vitamin B12 deficiency ([facility]) and Tendinitis of right rotator cuff were also pertinent to this visit. has a past medical history of Anxiety, Back pain, Bipolar disorder ([facility] [address] CAD (coronary artery disease), CAFL (chronic airflow limitation) ([facility]), Chronic bronchitis ([facility] [address] Chronic pain, Colitis, Complicated migraine, DDD (degenerative disc disease), lumbar, Depression, Endometriosis, Excessive caffeine abuse, continuous ([facility] [address] Gastritis, GERD (gastroesophageal reflux disease), History of myocardial infarction, HTN (hypertension), benign, Impaired mobility and activities of daily living, Over weight, PTSD (post-traumatic stress disorder), Sensory neuropathy, Somatization disorder, TIA (transient ischemic attack), Tobacco abuse, and Vasospastic angina ([facility] [address] has a past surgical history that includes Hysterectomy; Dilation and curettage of uterus; back surgery; Neck surgery; shoulder surgery (Left);  section; Cholecystectomy (13); Upper gastrointestinal endoscopy (2014); cyst removal (3/7/16); Coronary angioplasty; Upper gastrointestinal endoscopy (N/A, 2020); and Colonoscopy (N/A, 2020).     Restrictions  Restrictions/Precautions  Restrictions/Precautions: Fall Risk  Required Braces or Orthoses?: Yes  Required Braces or Orthoses  Right Upper Extremity Brace/Splint: Sling  Right Upper Extremity Brace/Splint: RUE immobilizer in place - pt reports rotator cuff repair done Dec 1  Subjective   General  Chart Reviewed: Yes  Patient assessed for rehabilitation services?: Yes  Response to previous treatment: Patient with no complaints from previous session  Family / Caregiver Present: No  Pain Assessment  Pain Assessment: 0-10  Pain Level: 9  Pre Treatment Pain Screening  Intervention List: Nurse called to administer meds  Comments / Details: Pt. stated \" yes I have pain all over but it isn't worth talking about. \"  Vital Signs  Patient Currently in Pain: Yes   Orientation     Objective     Pt. arrived therapy pleasant and stating she needed to work on her strengthening to feel more confident for her discharge. Pt. engaged in L UE strengthening and problem solving task with a 1 lb wrist weight on L upper arm and reached to match cards on a large board by number and suit. Pt. was able to match all the cards with no errors and tolerate the weight. Pt. continued with strengthening task with placing large pegs into a pegboard that was located on an incline board. Pt. was able to complete 100 pegs with rest breaks prn. The pegboard was placed on the table top surface and then placed pop beads on top of the pegs. Pt. was encouraged to complete 1 bead at a time to promote repetition for strengthening and endurance. Pt. took frequent short rest breaks. Pt. then requested the weight to be removed while she picked up one bead at a time and placed it in the palm of her hand and then picked up another until she had 5 total. Pt. was encouraged to attempt to put more in the palm of her hand but was only able to keep 5 in her hand at a time. Pt. was able to remove all the beads and then removed the pegs, two at a time with no rest break.       Plan   Plan  Times per week: 5-7x/week  Plan weeks: 1-1 1/2 weeks  Current Treatment Recommendations: Strengthening, Endurance Training, Self-Care / ADL, Balance Training, Pain Management, Home Management Training, Functional Mobility Training, Safety Education & Training  Plan Comment: Continue OT per POC    Goals  Patient Goals   Patient goals : \"I can't believe I feel this weak, I want to get stronger\"       Therapy Time   Individual Concurrent Group Co-treatment   Time In 1000         Time Out 1100         Minutes 60              Therapeutic activities: 40 minutes  Cognitive Retrainin minutes      MARLYN Ferreira Electronically signed by MARLYN Ferreira on 2021 at 11:09 AM

## 2021-01-07 LAB
GLUCOSE BLD-MCNC: 110 MG/DL (ref 60–115)
GLUCOSE BLD-MCNC: 122 MG/DL (ref 60–115)
GLUCOSE BLD-MCNC: 130 MG/DL (ref 60–115)
GLUCOSE BLD-MCNC: 176 MG/DL (ref 60–115)
PERFORMED ON: ABNORMAL
PERFORMED ON: NORMAL

## 2021-01-07 PROCEDURE — 6370000000 HC RX 637 (ALT 250 FOR IP): Performed by: PHYSICAL MEDICINE & REHABILITATION

## 2021-01-07 PROCEDURE — 97533 SENSORY INTEGRATION: CPT

## 2021-01-07 PROCEDURE — 99233 SBSQ HOSP IP/OBS HIGH 50: CPT | Performed by: PHYSICAL MEDICINE & REHABILITATION

## 2021-01-07 PROCEDURE — 6370000000 HC RX 637 (ALT 250 FOR IP): Performed by: NURSE PRACTITIONER

## 2021-01-07 PROCEDURE — 6370000000 HC RX 637 (ALT 250 FOR IP): Performed by: STUDENT IN AN ORGANIZED HEALTH CARE EDUCATION/TRAINING PROGRAM

## 2021-01-07 PROCEDURE — 97110 THERAPEUTIC EXERCISES: CPT

## 2021-01-07 PROCEDURE — 97129 THER IVNTJ 1ST 15 MIN: CPT

## 2021-01-07 PROCEDURE — 92507 TX SP LANG VOICE COMM INDIV: CPT

## 2021-01-07 PROCEDURE — 97530 THERAPEUTIC ACTIVITIES: CPT

## 2021-01-07 PROCEDURE — 97535 SELF CARE MNGMENT TRAINING: CPT

## 2021-01-07 PROCEDURE — 97112 NEUROMUSCULAR REEDUCATION: CPT

## 2021-01-07 PROCEDURE — 1180000000 HC REHAB R&B

## 2021-01-07 PROCEDURE — 6370000000 HC RX 637 (ALT 250 FOR IP): Performed by: INTERNAL MEDICINE

## 2021-01-07 PROCEDURE — 97116 GAIT TRAINING THERAPY: CPT

## 2021-01-07 RX ORDER — SODIUM PHOSPHATE, DIBASIC AND SODIUM PHOSPHATE, MONOBASIC 7; 19 G/133ML; G/133ML
118 ENEMA RECTAL
Status: COMPLETED | OUTPATIENT
Start: 2021-01-07 | End: 2021-01-07

## 2021-01-07 RX ADMIN — METFORMIN HYDROCHLORIDE 500 MG: 500 TABLET ORAL at 09:38

## 2021-01-07 RX ADMIN — METFORMIN HYDROCHLORIDE 500 MG: 500 TABLET ORAL at 16:45

## 2021-01-07 RX ADMIN — OXYCODONE 5 MG: 5 TABLET ORAL at 09:28

## 2021-01-07 RX ADMIN — LITHIUM CARBONATE 300 MG: 300 CAPSULE, GELATIN COATED ORAL at 12:00

## 2021-01-07 RX ADMIN — ALOGLIPTIN 6.25 MG: 6.25 TABLET, FILM COATED ORAL at 09:29

## 2021-01-07 RX ADMIN — Medication 1 ENEMA: at 18:00

## 2021-01-07 RX ADMIN — LITHIUM CARBONATE 300 MG: 300 CAPSULE, GELATIN COATED ORAL at 16:45

## 2021-01-07 RX ADMIN — Medication 2000 UNITS: at 16:45

## 2021-01-07 RX ADMIN — DOCUSATE SODIUM 200 MG: 100 CAPSULE ORAL at 21:06

## 2021-01-07 RX ADMIN — LITHIUM CARBONATE 300 MG: 300 CAPSULE, GELATIN COATED ORAL at 09:38

## 2021-01-07 RX ADMIN — ASPIRIN 81 MG: 81 TABLET, COATED ORAL at 09:28

## 2021-01-07 RX ADMIN — APIXABAN 5 MG: 5 TABLET, FILM COATED ORAL at 09:29

## 2021-01-07 RX ADMIN — DOCUSATE SODIUM 200 MG: 100 CAPSULE ORAL at 09:28

## 2021-01-07 RX ADMIN — INSULIN GLARGINE 22 UNITS: 100 INJECTION, SOLUTION SUBCUTANEOUS at 21:08

## 2021-01-07 RX ADMIN — CARIPRAZINE 3 MG: 1.5 CAPSULE, GELATIN COATED ORAL at 09:29

## 2021-01-07 RX ADMIN — ATORVASTATIN CALCIUM 20 MG: 20 TABLET, FILM COATED ORAL at 21:07

## 2021-01-07 RX ADMIN — FOLIC ACID 1 MG: 1 TABLET ORAL at 09:29

## 2021-01-07 RX ADMIN — APIXABAN 5 MG: 5 TABLET, FILM COATED ORAL at 21:07

## 2021-01-07 RX ADMIN — LOSARTAN POTASSIUM 50 MG: 50 TABLET, FILM COATED ORAL at 09:30

## 2021-01-07 RX ADMIN — ISOSORBIDE MONONITRATE 30 MG: 30 TABLET, EXTENDED RELEASE ORAL at 09:29

## 2021-01-07 ASSESSMENT — PAIN SCALES - GENERAL
PAINLEVEL_OUTOF10: 8
PAINLEVEL_OUTOF10: 6
PAINLEVEL_OUTOF10: 9

## 2021-01-07 ASSESSMENT — PAIN DESCRIPTION - ORIENTATION: ORIENTATION: LEFT

## 2021-01-07 NOTE — PROGRESS NOTES
Pt is alert and oriented. C/o right shoulder pain, prn Oxycodone 5 mg effective along with elevation and rest. RUE in sling without causing redness or irritation. Continent B/B. LBM 12/31/20, prn Lactulose and scheduled colace 200 mg not effective. Offered warm prune juice not effective. Bowel sounds present. Abdomen soft and round. Expressive aphasia and right sided weakness noted. Walks with SBA. BS at HS was 101. No Humalog coverage needed, Lantus 22 units given as scheduled. Snack given. Pt slept well.  Electronically signed by Roselia Espinosa RN on 1/7/2021 at 3:47 AM

## 2021-01-07 NOTE — PROGRESS NOTES
uterus; back surgery; Neck surgery; shoulder surgery (Left);  section; Cholecystectomy (13); Upper gastrointestinal endoscopy (2014); cyst removal (3/7/16); Coronary angioplasty; Upper gastrointestinal endoscopy (N/A, 2020); and Colonoscopy (N/A, 2020). Restrictions  Restrictions/Precautions  Restrictions/Precautions: Fall Risk  Required Braces or Orthoses?: Yes  Required Braces or Orthoses  Right Upper Extremity Brace/Splint: Sling  Right Upper Extremity Brace/Splint: RUE immobilizer in place - pt reports rotator cuff repair done Dec 1  Subjective   General  Chart Reviewed: Yes  Patient assessed for rehabilitation services?: Yes  Response to previous treatment: Patient with no complaints from previous session  Family / Caregiver Present: No  Pain Assessment  Pain Assessment: 0-10  Pain Level: 9  Pain Type: Acute pain  Pain Location: Shoulder  Pain Orientation: Left  Pain Descriptors: Aching  Pain Frequency: Intermittent(when moving)  Pre Treatment Pain Screening  Pain at present: 0  Scale Used: Numeric Score  Intervention List: Patient able to continue with treatment  Comments / Details: Pt able to continue with tx after task was modified  Vital Signs  Patient Currently in Pain: Yes   Orientation     Objective     Pt engaged in functional mobility and transfer training and therapeutic activities to increase IND and safety with ADLs/IADLs. Pt completed L UE strength and endurance task to place golf tees in peg board on inclined surface. Pt retrieved tees with blue resistive clothespin and reached to eye level to place. Able to complete approx 50% of task with 1# wrist weight donned. Weight removed d/t pt report of L shoulder pain. Pt able to complete remaining task with 0 wrist weight.     Balance  Sitting Balance: Modified independent   Standing Balance: Supervision  Functional Mobility  Functional - Mobility Device: No device  Activity: Retrieve items(Kitchen mobility)  Assist Level: Supervision  Functional Mobility Comments: Pt completed kitchen mobility task to retrieve items from oven, fridge/freezer, and high/low cupboards and drawers. Tub Transfers  Tub - Transfer From: Other(No Device)  Tub - Transfer Type: To and From  Tub - Transfer To: Shower seat with back  Tub - Technique: Ambulating(Side step)  Tub Transfers: Supervision;Verbal cues  Tub Transfers Comments: Pt was educated on safe tub t/f technique to side step with use of grab bar/ledge for steadying wit L UE. Pt reverse demos with cues for pace.      Transfers  Sit to stand: Modified independent  Stand to sit: Modified independent      Plan   Plan  Times per week: 5-7x/week  Plan weeks: 1- 1/2 weeks  Current Treatment Recommendations: Strengthening, Endurance Training, Self-Care / ADL, Balance Training, Pain Management, Home Management Training, Functional Mobility Training, Safety Education & Training  Plan Comment: Continue OT per POC    Goals  Patient Goals   Patient goals : \"I can't believe I feel this weak, I want to get stronger\"       Therapy Time   Individual Concurrent Group Co-treatment   Time In 1000         Time Out 1100         Minutes 60              ADL trainin minutes  Therapeutic activities: 30 minutes      Zhen Walters OT    Electronically signed by Zhen Walters OT on 2021 at 4:30 PM

## 2021-01-07 NOTE — PROGRESS NOTES
Physical Therapy Rehab Treatment Note  Facility/Department: [facility]  Room: R260R260-01       NAME: Ceferino Abdi  : 1970 (52 y.o.)  MRN: [id number]  CODE STATUS: Full Code    Date of Service: 2021  Chart Reviewed: Yes  General Comment  Comments: Pt reports pain in right shoulder did not provide pain level. Restrictions:  Restrictions/Precautions: Fall Risk  Required Braces or Orthoses  Right Upper Extremity Brace/Splint: Sling  Right Upper Extremity Brace/Splint: RUE immobilizer in place - pt reports rotator cuff repair done Dec 1       SUBJECTIVE: Subjective: I don't want to do anymore, I need to eat right now, I'm feeling my sugar levels. Response To Previous Treatment: Patient with no complaints from previous session. Pain Screening  Patient Currently in Pain: Yes(Pt reports pain, no number provided for pain level.)       Post Treatment Pain Screenin/10 no pain reported. OBJECTIVE:               Neuromuscular Education  Neuromuscular Comments: DGI gait tasks performed in hallway with head motions of side/side and up/down. Pt reports mild dizziness with head movements and minimal change in speed with slight drift from gait path. Transfers  Sit to Stand: Modified independent  Stand to sit: Modified independent    Ambulation  Ambulation?: Yes  Ambulation 1  Surface: level tile  Device: No Device  Assistance: Supervision  Quality of Gait: slow shuffling gait pattern, mild deviation from straight path. No LOB at this time. Distance: 250ft    Stairs/Curb  Stairs?: No                    ASSESSMENT/COMMENTS:  Assessment: Pt requested to stop tx session as pt reports feeling low blood sugar levels and needing to eat. Pt complete long distance walking and DGI tasks to improve balance and endurance. PLAN OF CARE/Safety:          Therapy Time:   Individual   Time In 0900   Time Out 0920   Minutes 20   Missed Tx Time: 10 min- pt eating.      Minutes:20      Transfer/Bed mobility trainin      Gait training:15      Neuro re education:5     Therapeutic ex:0      Alisha Mills  21 at 9:53 AM

## 2021-01-07 NOTE — PROGRESS NOTES
Physical Therapy Rehab Treatment Note  [facility]Department: [facility]  Room: R260/R260-01       NAME: Buddy Martinez  : 1970 (52 y.o.)  MRN: [id number]  CODE STATUS: Full Code    Date of Service: 2021  Chart Reviewed: Yes  Patient assessed for rehabilitation services?: Yes  Family / Caregiver Present: No  Diagnosis: Abnormality of gait and mobility due to recent infarct left thalamus. [facility] admit 20. Restrictions:  Restrictions/Precautions: Fall Risk  Required Braces or Orthoses  Right Upper Extremity Brace/Splint: Sling  Right Upper Extremity Brace/Splint: RUE immobilizer in place - pt reports rotator cuff repair done Dec 1       SUBJECTIVE: Subjective: I am going home Saturday  Response To Previous Treatment: Patient with no complaints from previous session. Pain Screening  Patient Currently in Pain: Yes  Pre Treatment Pain Screening  Pain at present: 6  Scale Used: Numeric Score  Intervention List: Patient able to continue with treatment    Post Treatment Pain Screening:  Pain Assessment  Pain Assessment: 0-10  Pain Level: 6  Pain Location: Generalized  Clinical Progression: Not changed    OBJECTIVE:   Follows Commands: Within Functional Limits    Transfers  Sit to Stand: Modified independent  Stand to sit: Modified independent    Ambulation  Ambulation?: Yes  More Ambulation?: No  Ambulation 1  Surface: level tile;uneven;carpet  Device: No Device  Assistance: Supervision  Quality of Gait: slow shuffling gait pattern, mild deviation from straight path.  Easily distracted with mild L lateral LOB self corrects  Distance: 250ft    Stairs/Curb  Stairs?: Yes  Stairs  # Steps : 12  Stairs Height: 6\"  Rails: Left ascending  Device: No Device  Assistance: Stand by assistance  Comment: Reciprocal pattern    Exercises  Hamstring Sets: x 20 RTB  Hip Flexion: x 20  Hip Abduction: x 20 RTB/ Ball squeezes  Knee Long Arc Quad: x 20  Ankle Pumps: x 20     ASSESSMENT/COMMENTS:  Body structures, Functions, Activity limitations: Decreased functional mobility ; Decreased strength;Decreased endurance;Decreased coordination;Decreased ADL status; Decreased safe awareness;Decreased sensation;Decreased cognition;Decreased balance; Increased pain  Assessment: Patient continues to show mild LOB with gait, able to self correct. Good safety noted with transfers    PLAN OF CARE/Safety:   Safety Devices  Type of devices: All fall risk precautions in place; Bed alarm in place;Call light within reach; Left in bed      Therapy Time:   Individual   Time In 1400   Time Out 1430   Minutes 30     Minutes: 30      Transfer/Bed mobility trainin      Gait training: 10     Therapeutic ex: [address] [place], PTA, 21 at 3:01 PM

## 2021-01-07 NOTE — PROGRESS NOTES
[facility]  Facility/Department: [facility]  Speech Language Pathology   Treatment Note          [facility]  1970  [id number]/[id number]        Rehab Dx/Hx: Impaired mobility and ADLs [Z74.09, Z78.9]  Impaired mobility [Z74.09]    Precautions: falls    Medical Dx: Impaired mobility and ADLs [Z74.09, Z78.9]  Impaired mobility [Z74.09]  Speech Dx: Dysphagia, Dysarthria and Cognitive Linguistic Impairment     Date: 2021    Subjective:  Alert and Cooperative        Pt arrived late to rehab tx room, pt was receiving medication. Interventions used this date:  Speech Production, Cognitive Skill Development, Dysphagia Treatment and Oral Motor Treatment    Objective/Assessment:  Patient progressing towards goals:  Short-term Goals  Timeframe for Short-term Goals: 1-2 weeks  Goal 1: Pt will complete verbal reasoning tasks (similarities/differences, word deduction, divergent naming) with 80% acc given cues as needed in order to promote effective communication of wants and needs. Divergent namin% acc independently, increasing to 68% acc with min cues. Pt stated she knows the answer but cannot express it. Goal 2: Pt will be educated on 3 memory strategies and will state their use in functional activities of daily living with 80% acc given cues as needed in order to promote safety with the completion of ADLs. Strategy of chunking introduced and practiced x5. Goal 3: Pt will complete mid level problem solving tasks related to ADLs with 80% acc given cues as needed in order to promote safety with the completion of ADLs. Medical situations: 50% acc independently, increasing to 75% acc with min cues. Pt answered most questions with \"call 911\" when not deemed necessary. Goal 4: Pt will complete oral motor drills at phrase level given model with 100% acc to improve articulator movement and coordination to improve speech intelligibilty.   Reading 4-5 word phrases: 50% acc independently, increasing to 72% acc with min cues. Pt continued to interchange and substitute words. Pt c/o blurring lines together. Assess reading comprehension next session as pt c/o of reading difficulties. Goal 5: Pt will follow 2+ step directions given orally with 80% accuracy with min cues to increase the pt's ability to follow directions provided by caregivers for safe follow through with ADLs. Not addressed  Goal 6: Pt will state the PIPPA, month, location, situation, and year with 90% acc with use of external aid in order to promote orientation secondary to cognitive deficits. Not addressed     Long-term Goals  Timeframe for Long-term Goals: 2 weeks  Goal 1: Pt will improve cognitive-linguistic and speech production abilities to clear express wants/needs and improve understanding during daily life. Short-term Goals  Timeframe for Short-term Goals: 1-2 weeks  Goal 1: Pt will tolerate regular consistencies and thin liquids with no overt s/s of aspiration and adequate oral clearance of bolus in all given opportunities. Goal 2: Pt will complete oral motor strengthening exercises with 90% acc given cues as needed in order to increase lingual/labial musuclature and decrease risk of pocketing. Goal 3: Pt will complete tongue press exercise x10 in order to promote anterior to posterior transit of bolus and decrease risk of aspiration. Goal 4: Pt will implement safe swallow strategies (small bites/sips, slow rate) with 90% acc given cues as needed in order to decrease risk of aspiration. Compensatory Swallowing Strategies: Small bites/sips, Eat/Feed slowly      Treatment/Activity Tolerance:  Patient tolerated treatment well    Plan:  Continue per POC    Pain Assessment:  Initial Assessment:  Patient denies pain. Re-assessment:  Patient denies pain. Patient/Caregiver Education:  Patient educated on session and progression towards goals. Safety Devices:   All fall risk precautions in place - SLP transported pt to [address] MEASUREMENT SYSTEM (NOM[facility]):    SWALLOWING  Rating:  DNT    SPOKEN LANGUAGE COMPREHENSION  Ratin    SPOKEN LANGUAGE EXPRESSION  Ratin    MOTOR SPEECH  Ratin    PROBLEM SOLVING  Ratin    MEMORY  Ratin          Therapy Time  SLP Individual Minutes  Time In: 935  Time Out:   Minutes: 25   Cog: 15 mins  Speech: 10 mins           Signature: Electronically signed by ROSA MARIA Bales on 2021 at 11:07 AM

## 2021-01-07 NOTE — PROGRESS NOTES
Occupational Therapy  Facility/Department: [facility]  Daily Treatment Note  NAME: Owen Hernandez  : 1970  MRN: [id number]    Date of Service: 2021    Discharge Recommendations:  Continue to assess pending progress       Assessment      REQUIRES OT FOLLOW UP: Yes  Activity Tolerance  Activity Tolerance: Patient Tolerated treatment well  Safety Devices  Safety Devices in place: Yes  Type of devices: All fall risk precautions in place         Patient Diagnosis(es): The primary encounter diagnosis was Multiple sclerosis ([facility] [address] Diagnoses of Neuromyelopathy due to vitamin B12 deficiency ([facility]) and Tendinitis of right rotator cuff were also pertinent to this visit. has a past medical history of Anxiety, Back pain, Bipolar disorder ([address] CAD (coronary artery disease), CAFL (chronic airflow limitation) ([facility]), Chronic bronchitis ([address] Chronic pain, Colitis, Complicated migraine, DDD (degenerative disc disease), lumbar, Depression, Endometriosis, Excessive caffeine abuse, continuous ([facility] [address] Gastritis, GERD (gastroesophageal reflux disease), History of myocardial infarction, HTN (hypertension), benign, Impaired mobility and activities of daily living, Over weight, PTSD (post-traumatic stress disorder), Sensory neuropathy, Somatization disorder, TIA (transient ischemic attack), Tobacco abuse, and Vasospastic angina ([address] has a past surgical history that includes Hysterectomy; Dilation and curettage of uterus; back surgery; Neck surgery; shoulder surgery (Left);  section; Cholecystectomy (13); Upper gastrointestinal endoscopy (2014); cyst removal (3/7/16); Coronary angioplasty; Upper gastrointestinal endoscopy (N/A, 2020); and Colonoscopy (N/A, 2020).     Restrictions  Restrictions/Precautions  Restrictions/Precautions: Fall Risk  Required Braces or Orthoses?: Yes  Required Braces or Orthoses  Right Upper Extremity Brace/Splint: Litoing  Right Upper Extremity Brace/Splint: RAJENDRA immobilizer in place - pt reports rotator cuff repair done Dec 1  Subjective   General  Chart Reviewed: Yes  Patient assessed for rehabilitation services?: Yes  Response to previous treatment: Patient with no complaints from previous session  Family / Caregiver Present: No  Pain Assessment  Pain Assessment: 0-10  Pain Level: 0  Pre Treatment Pain Screening  Pain at present: 0  Scale Used: Numeric Score  Intervention List: Patient able to continue with treatment  Vital Signs  Patient Currently in Pain: Denies   Orientation  Orientation  Overall Orientation Status: Within Functional Limits  Objective        While seated at tabletop, pt threaded ribbon through holes on small foam peg board and tied bows using B UEs to improve fine motor coordination and manipulation during self care. Pt demonstrates min-mod difficulty to thread ribbon through peg board. Pt worked at a slow and steady pace to complete. Pt requires rest breaks as needed. Pt able to tie total of 10 bows with increased time. Pt then disassembled ribbon from peg board using the L UE only and returned them to the container. Balance  Standing Balance: Modified independent   Standing Balance  Time: 10 minutes  Activity: US puzzle  Comment: Pt completed sit to stand transfer at mod I level and stood at tabletop at mod I to assemble US puzzle using the L UE to improve standing tolerance and balance during ADLs. Pt requires increased time to assemble puzzle together but had no LOB. Pt able to stand total of 10 minutes without requiring rest breaks. Pt completed stand to sit transfer at Mod I level. Transfers  Sit to stand: Modified independent  Stand to sit: Modified independent      While seated at tabletop, pt completed I-SPY activity to improve scanning and visual perception during ADLs. Pt demonstrates min difficulty to scan and locate all 9 items. Pt requires 2 verbal cues to locate 2 items. Pt requires increased time.  Pt mildly distracted during task and requires cues to redirect. Plan   Plan  Times per week: 5-7x/week  Plan weeks: 1-1 1/2 weeks  Current Treatment Recommendations: Strengthening, Endurance Training, Self-Care / ADL, Balance Training, Pain Management, Home Management Training, Functional Mobility Training, Safety Education & Training  Plan Comment: Continue OT per POC  G-Code     OutComes Score                                                  AM-PAC Score             Goals  Patient Goals   Patient goals : \"I can't believe I feel this weak, I want to get stronger\"       Therapy Time   Individual Concurrent Group Co-treatment   Time In 1300         Time Out 1400         Minutes 60           Patient participated in above/following treatment session to complete 15 previously scheduled minutes from 1/6/20.     Neuromuscular reeducation: 12 minutes  Therapeutic activities: 33 minutes  Visual Perceptual training: 15 minutes     Jess Graves OT   Electronically signed by Jess Graves OT on 1/7/2021 at 2:11 PM

## 2021-01-07 NOTE — PROGRESS NOTES
Subjective: The patient complains of severe acute on chronic recurrent right-sided weakness and aphasia dysphagia partially relieved by medications, SLP, Pt, OT, and rest and exacerbated by recent illness including a repeat CVA left thalamic region with worsening thalamic pain. Approaching her discharge will start her medication reconciliation and try to simplify all of her meds she is doing a good job controlling her impulse to take more pain medications she is definitely using them responsibly this time around. According to recent nursing note , \" Pt is alert and oriented. C/o right shoulder pain, prn Oxycodone 5 mg effective along with elevation and rest. RUE in sling without causing redness or irritation. Continent B/B. LBM 12/31/20, prn Lactulose and scheduled colace 200 mg not effective. Offered warm prune juice not effective. Bowel sounds present. Abdomen soft and round. Expressive aphasia and right sided weakness noted. Walks with SBA. BS at HS was 101. No Humalog coverage needed, Lantus 22 units given as scheduled. Snack given. Pt slept well. \"    ROS x10: The patient also complains of severely impaired mobility and activities of daily living. Otherwise no new problems with vision, hearing, nose, mouth, throat, dermal, cardiovascular, GI, , pulmonary, musculoskeletal, psychiatric or neurological. See Rehab H&P on Rehab chart dated . Vital signs:  BP (!) 148/50   Pulse 80   Temp 98 °F (36.7 °C) (Oral)   Resp 17   Ht 5' 7\" (1.702 m)   Wt 200 lb (90.7 kg)   SpO2 93%   BMI 31.32 kg/m²   I/O:   PO/Intake:  fair PO intake, no problems observed or reported. Bowel/Bladder:  Continent, constipation prescribed GlycoLax  General:  Patient is well developed, adequately nourished, non-obese and     well kempt. HEENT:    PERRLA, hearing intact to loud voice, external inspection of ear     and nose benign.   Inspection of lips, tongue and gums severe thrush  Musculoskeletal: No significant change in strength or tone. All joints stable. Inspection and palpation of digits and nails show no clubbing,       cyanosis or inflammatory conditions. Neuro/Psychiatric: Affect: flat but irritable and sleepy. Alert and oriented to person, place and     Situation with mod cues. No significant change in deep tendon reflexes or     Sensation-garbled speech right-sided weakness  Lungs:  Diminished, CTA-B. Respiration effort is normal at rest.     Heart:   S1 = S2, RRR. No loud murmurs. Abdomen:  Soft, non-tender, no enlargement of liver or spleen. Extremities:  No significant lower extremity edema or tenderness. Healing right shoulder surgery  Skin:   Intact to general survey, right shoulder incisions healing well. Rehabilitation:  Physical therapy:   Bed Mobility: Scooting: Modified independent    Transfers: Sit to Stand: Modified independent  Stand to sit: Modified independent  Bed to Chair: Supervision, Ambulation 1  Surface: level tile, uneven, carpet, ramp  Device: No Device  Other Apparatus: (R arm sling)  Assistance: Supervision  Quality of Gait: Slow reciprocal gait patttern with shuffling feet. Gait Deviations: Decreased step length, Decreased step height  Distance: 150'  Comments: focus on quality of gait and improved stability/safety, Stairs  # Steps : 13  Stairs Height: 6\"  Rails: Left ascending  Device: No Device  Assistance: Stand by assistance  Comment: reciprocal pattern with both ascending and descending of stairs. FIMS:  ,  , Assessment: Pt completed ambulation in room, hallways and stairs with no LOB at this time. Occupational therapy:    ,  , Assessment: Pt demonstrated improved dynamice standing balance reaching minimally outside RIDDHI with no LOB at close supervision level. Pt continues to be limited by pain, balance and fatigue. Pt continues to benefit from OT services to maximize pt independence and safety with ADLs and transfers.     Speech therapy:        Lab/X-ray studies reviewed, analyzed and discussed with patient and staff:   Recent Results (from the past 24 hour(s))   POCT Glucose    Collection Time: 01/06/21 11:10 AM   Result Value Ref Range    POC Glucose 146 (H) 60 - 115 mg/dl    Performed on ACCU-CHEK    POCT Glucose    Collection Time: 01/06/21  1:38 PM   Result Value Ref Range    POC Glucose 84 60 - 115 mg/dl    Performed on ACCU-CHEK    POCT Glucose    Collection Time: 01/06/21  4:27 PM   Result Value Ref Range    POC Glucose 122 (H) 60 - 115 mg/dl    Performed on ACCU-CHEK    POCT Glucose    Collection Time: 01/06/21  9:45 PM   Result Value Ref Range    POC Glucose 101 60 - 115 mg/dl    Performed on ACCU-CHEK    POCT Glucose    Collection Time: 01/07/21  6:19 AM   Result Value Ref Range    POC Glucose 110 60 - 115 mg/dl    Performed on ACCU-CHEK        Cta Head   12/23/2020  Intracranial ICAs:   The major intracranial arterial structures are patent without high-grade stenosis, large vessel cut off, or aneurysm. .     Ct Head  : 12/23/2020: Extra-axial spaces:  Normal. Intracranial hemorrhage:  None. Ventricular system: In the interval, an ill-defined 4 x 12 mm area decreased attenuation exerting no mass effect is found medially within the left thalamus (series 2, image 16). Basal Cisterns:  Normal. Cerebral Parenchyma:  Normal. Midline Shift:  None. Cerebellum:  Normal. Paranasal sinuses and mastoid air cells:  Normal. Visualized Orbits:  Normal.     Impression: Interval development of acute/subacute left thalamic infarct. Cta Neck  12/23/2020 The major intracranial arterial structures are patent without high-grade stenosis, large vessel cut off, or aneurysm. EXAMINATION: CTA HEAD MPRESSION: Negative CTA of the neck. Mri Brain  12/25/2020 : Acute Change:   Restricted diffusion involving the left thalamus, with corresponding increased T2/FLAIR signal at this site, measuring approximately 1.6 x 0.8 cm, similar to recent prior MRI.  No new areas of restricted diffusion   Hemorrhage:    Small rounded area of susceptibility left occipital lobe, unchanged. Mass Lesion/ Mass Effect:    No evidence of an intracranial mass or extra-axial fluid collection. No significant mass effect. Chronic Change:  Scattered punctate foci of increased T2 and FLAIR signal are noted in the supratentorial white matter which is a nonspecific finding, but likely represents minimal chronic microvascular ischemia. Parenchyma:   No significant volume loss for age. The brain parenchyma is otherwise within normal limits of signal intensity and morphology. Ventricles:     Normal caliber and morphology. Skull Base:    Hypothalamic and pituitary region are grossly normal.   Craniocervical junction is normal. No significant marrow replacement process. Vasculature:    Major intracranial arterial structures, and dural venous sinuses show typical flow void, suggesting patency by spin echo criteria. Other:  The visualized paranasal sinuses are clear. Mastoid air cells clear. The orbits are unremarkable. Soft tissues are unremarkable. Recent infarction involving the left thalamus, overall unchanged from MRI 12/23/2020. No new infarcts from the prior MRI. Mri Brain   12/23/2020   There are no extra-axial collections. There is no evidence of hemorrhage. There is specific association with signal dropout on ADC map in the left thalamus and region measuring 7 x15 mm indicating subacute ischemia. There is corresponding signal abnormality on T2/FLAIR series. The susceptibility images do not demonstrate evidence of hemosiderin deposition within the brain parenchyma or the leptomeninges. There is preservation of the gray-white matter differentiation. There are a few scattered foci of T2/FLAIR increased signal in the subcortical and periventricular white matter without associated edema or mass effect. The sulci and ventricles are within normal limits without evidence of hydrocephalus.  The activities in PT, continue prophylaxis DAPHNE hose, elevation and Lovenox. 9. Complex discharge planning:  SP medication simplification and patient and family education as we prepare for her discharge later this week. Discharge January 9, 2021 home with her significant other and home health care. Until then continue weekly team meeting every  Thursday to assess progress towards goals, discuss and address social, psychological and medical comorbidities and to address difficulties they may be having progressing in therapy. Patient and family education is in progress. The patient is to follow-up with their family physician after discharge. Complex Active General Medical Issues that complicate care Assess & Plan:    1. Bipolar 1 disorder,   Anxiety, active chronic depression-emotional support provided daily, vitamin B12, encourage participation in rehabilitation support group and recreational therapy, adjust/add medications (consider anxiolytics review old chart to find out her old psych medications) consult psychiatry and psychology  2. CAD (coronary artery disease), HTN (hypertension)-continue blood signs every shift focusing on heart rate and blood pressure checks, consult hospitalist for backup medical and adjust/add medications ( aspirin, Lipitor, Apresoline, Imdur, Cozaar, Plavix )  3. Multiple sclerosis with recent acute left thalamic CVA-consult neurology manage COPD sleep apnea and autoimmune disease add antiplatelet medications  4. Colitis-monitor stools for blood as patient will be on antiplatelet medications  5. Chronic pain syndrome with history of overusing medication due to dyscontrol due to bipolar disorder  6. Vitamin D and B12 deficiency-add vitamin B12 and high-dose vitamin D  7.    Chronic obstructive pulmonary disease/  COPD exacerbation , Sleep apnea-Pulse oximeter checks to shift dose and titrate oxygen and aerosol treatments monitor for nocturnal hypoxemia, monitor vital signs, oxygen prn.   8.   Dizziness, Vertigo, peripheral, bilateral  with Sensorineural hearing loss (SNHL) of both ears and Tinnitus, bilateral-focus on balance and therapy PT and OT as well as adding speech and language pathology  9. Acute left thalamic CVA with elevated homocystine levels with aphasia dysphagia and right arm weakness-consult neurology-continue PT OT and titrate pain medication for her thalamic pain syndrome--add folic acid. 10. Healing right shoulder surgery-continue shoulder immobilizer follow-up with orthopedics.        Electronically signed by Jalil Ochoa DO on 12/30/20 at 8:55 AM STAR Chaudhry D.O., PM&R     Attending    [facility]

## 2021-01-07 NOTE — PLAN OF CARE
Problem: Pain:  Goal: Pain level will decrease  Description: Pain level will decrease  1/6/2021 2348 by Chuck Leiva RN  Outcome: Ongoing     Problem: Pain:  Goal: Control of acute pain  Description: Control of acute pain  1/6/2021 2348 by Chuck Leiva RN  Outcome: Ongoing     Problem: Pain:  Goal: Control of chronic pain  Description: Control of chronic pain  1/6/2021 2348 by Chuck Leiva RN  Outcome: Ongoing     Problem: Falls - Risk of:  Goal: Will remain free from falls  Description: Will remain free from falls  1/6/2021 2348 by Chuck Leiva RN  Outcome: Ongoing     Problem: Falls - Risk of:  Goal: Absence of physical injury  Description: Absence of physical injury  1/6/2021 2348 by Chuck Leiva RN  Outcome: Ongoing

## 2021-01-07 NOTE — PROGRESS NOTES
INDIVIDUALIZED OVERALL REHAB PLAN OF CARE  ADDENDUM TO REHAB PROGRESS NOTE-for audit purposes must also refer to this day's clinical note and combine the information      Date: 2021  Patient Name: Refugio Mcgee   Room: E288/Z063-11    MRN: [id number]    : 1970  (52 y.o.)  Gender: female       Today 2021 during weekly team meeting, I reviewed the patient Refugio Mcgee in detail with the therapists and nurses involved in patient's care gathering complex physiatric data regarding current medical issues, progress in therapies, factors limiting progress, social issues, psychological issues, ongoing therapeutic plans and discharge planning. Legend:  I= independent Im =Modified independent  S=Supervised SB=stand by APARICIO=set up CG=contact axel Min= minimal Mod=Moderate Max=maximal Max of 2 =maximal assist of 2 people      CURRENT FUNCTIONAL STATUS:    NURSING ISSUES:     Pt is alert and oriented. C/o right shoulder pain, prn Oxycodone 5 mg effective along with elevation and rest. RUE in sling without causing redness or irritation. Continent B/B. LBM 20, prn Lactulose and scheduled colace 200 mg not effective. Offered warm prune juice not effective. Bowel sounds present. Abdomen soft and round. Expressive aphasia and right sided weakness noted. Walks with SBA. BS at HS was 101. No Humalog coverage needed, Lantus 22 units given as scheduled. Snack given. Pt slept well monitoring for severe constipation and bowel obstruction. Focus on achieving ADL goals with co-treating with OT when possible.     PHYSICAL THERAPY  Bed mobility:  Supine to Sit: Modified independent (21)  Sit to Supine: Modified independent (21)  Transfers:  Sit to Stand: Modified independent (21)  Bed to Chair: Supervision (21)  Gait:   Device: No Device (21 6372)  Other Apparatus: (R arm sling) (21)  Assistance: Supervision (21)  Distance: 150' (21 4857)  Quality of Gait: Slow reciprocal gait patttern with shuffling feet. (01/06/21 0809)  Comments: focus on quality of gait and improved stability/safety (01/02/21 9784)  Stairs:  # Steps : 13 (01/06/21 0925)  Rails: Left ascending (01/06/21 0925)  Assistance: Stand by assistance (01/06/21 7463)  Comment: reciprocal pattern with both ascending and descending of stairs. (01/06/21 0925)  W/C mobility:         OCCUPATIONAL THERAPY  Hand Dominance: Right  ADL  Feeding: None (12/30/20 1219)  Grooming: Stand by assistance (12/30/20 1219)  UE Bathing: Minimal assistance (12/30/20 1219)  LE Bathing: Minimal assistance (12/30/20 1219)  UE Dressing: Stand by assistance (12/30/20 1219)  LE Dressing: Minimal assistance(socks) (12/30/20 1219)  Toileting: Unable to assess(comment)(pt did not need to go) (12/30/20 1219)  Additional Comments: Pt doffed full zip jacket and doffed/donned R arm sling during sessions. Able to manage sling with assist to place trunk strap. (12/29/20 9908)  Toilet Transfers  Toilet Transfer: Unable to assess (12/30/20 1221)  Toilet Transfers Comments: Pt did not need to go (12/30/20 1221)     Shower Transfers  Shower - Transfer From: Bed (12/29/20 8638)  Shower - Transfer Type: To (12/29/20 0178)  Shower - Transfer To: Shower seat with back (12/29/20 1259)  Shower - Technique: Ambulating (12/29/20 3777)  Shower Transfers: Not tested (12/30/20 1221)  Shower Transfers Comments: Pt declined and completed sponge bath ADL (12/30/20 1221)      SPEECH THERAPY  Motor Speech: Exceptions to WFL(mild dysarthria)  Comprehension: Exceptions(stand by cues for comprehension)  Verbal Expression: Exceptions to functional limits(stand by cues for verbal expression)      Diet/Swallow:  Diet Solids Recommendation: Regular  Liquid Consistency Recommendation: Thin  Dysphagia Outcome Severity Scale: Level 5: Mild dysphagia- Distant supervision.  May need one diet consistency restricted    Compensatory Swallowing Strategies: Small bites/sips, Eat/Feed slowly  Therapeutic Interventions: Diet tolerance monitoring, Oral motor exercises          COGNITION  OT: Cognition Comment: Comp: Supervision, Express: Min A, Social: Supervision, Prob: Supervision, Mem: Supervision  SP:Memory: Exceptions to WFL(min assist for short term recall)  Problem Solving: Exceptions to WFL(stand by cues for problem solving)        THERAPY, MEDICAL AND NURSING COORDINATION:    []  Pain medication before therapies     []  Check orthostatic BP      [x]  Ambulate to the bathroom in room    [x]  Add scheduled rest beaks     []  In room therapies      Discharge date set for:               January 9, 2021      Home with:   Significant other with help from   significant other          And:     [address]:     [x]  PT    [x]  OT    []  ST   [x]  Aide   []  SW    [x]  RN                          Equipment:  [employer]      At D/C their function is goaled at:   PT:Long term goal 1: Pt will demonstrate bed mobility indep. Long term goal 2: indep bed and car transfers  Long term goal 3: Pt will demonstrate amb  indep with or without 50 feet - supervision 150 feet  Long term goal 4: Pt will exhibit improved dynamic balance evidenced of 20/24 DGI for decreased risk for falls and safe d/c home alone. Long term goal 5: Pt will demonstrate stair negotiation 5 steps without rails indep to allow pt to enter and exit her home safely.   OT:Eating  Assistance Needed: Setup or clean-up assistance  CARE Score: 5  Discharge Goal: Independent, Oral Hygiene  Assistance Needed: Partial/moderate assistance  CARE Score: 3  Discharge Goal: Supervision or touching assistance, Toileting Hygiene  Assistance Needed: Partial/moderate assistance  CARE Score: 3  Discharge Goal: Supervision or touching assistance, Shower/Bathe Self  Assistance Needed: Substantial/maximal assistance  CARE Score: 2  Discharge Goal: Partial/moderate assistance  Upper Body Dressing  Assistance Needed: Partial/moderate [x]  Good    []  Fair    []  Poor -Medical Prognosis:   [x]  Good    []  Fair    []  Poor    This patient was made aware of the discussion of Plan of Care, their projected dicharge date and their projected function at discharge.        Corinne Bors, DO

## 2021-01-07 NOTE — CARE COORDINATION
[address] [place] [address] NOTE  Room: R260/R260-01  Admit Date: 2020       Date: 2021  Patient Name: Pauline De Oliveira        MRN: [id number]    : 1970  (52 y.o.)  Gender: female        REHAB DIAGNOSIS:   Diagnosis: Abnormality of gait and mobility due to recent infarct left thalamus. [facility] admit 20.     CO MORBIDITIES:      Past Medical History:   Diagnosis Date    Anxiety     Back pain     back surgery x 4    Bipolar disorder ([facility]     CAD (coronary artery disease)     [facility]FL [facility]chronic [facility]flow limitation) [facility] 11/3/2016    Chronic bronchitis ([facility])     Chronic pain     Colitis     Complicated migraine     DDD (degenerative disc disease), lumbar 2016    Depression     Endometriosis     Excessive caffeine abuse, continuous ([facility]) 2018    Gastritis     GERD (gastroesophageal reflux disease)     History of myocardial infarction     HTN (hypertension), benign 2016    Impaired mobility and activities of daily living     Over weight     PTSD (post-traumatic stress disorder)     Sensory neuropathy 2016    Somatization disorder     TIA (transient ischemic attack)     Tobacco abuse     [facility] 2016     Past Surgical History:   Procedure Laterality Date    BACK SURGERY      x2     SECTION      x2    CHOLECYSTECTOMY  13    Lapchole    COLONOSCOPY N/A 2020    COLONOSCOPY DIAGNOSTIC performed by Brenna Condon MD at [address] [place] [address]  3/7/16    Dr. Madyson Fuchs Left     UPPER GASTROINTESTINAL ENDOSCOPY  2014    JORDAN HAMEED UPPER GASTROINTESTINAL ENDOSCOPY N/A 2020    EGD ESOPHAGOGASTRODUODENOSCOPY performed by Brenna Condon MD at [facility]        Restrictions  Restrictions/Precautions: Fall Risk  Required Braces or Orthoses  Right Upper Extremity Brace/Splint: Sling  Right Upper Extremity Brace/Splint: RUE immobilizer in place - pt reports rotator cuff repair done Dec 1  CASE MANAGEMENT    Social/Functional History  Social/Functional History  Lives With: Other (comment)(Boyfriend)  Type of Home: House  Home Layout: Two level  Home Access: Stairs to enter with rails  Entrance Stairs - Number of Steps: 3-5 to enter front door, 1 railing on R side  Entrance Stairs - Rails: Right  Bathroom Shower/Tub: Tub only  ADL Assistance: Independent  Homemaking Assistance: Independent  Homemaking Responsibilities: Yes  Ambulation Assistance: Independent  Transfer Assistance: Independent  Active : Yes  Occupation: Full time employment  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 Additional Comments: Pt. inconsistent historian but attempts to answer questions       Pts personal preferences: n/a    Pts assets/resources/support system: SO    COVERAGE INFORMATION:Payor: JULIEN / Plan: Annia Moody / Product Type: *No Product type* /       NURSING  Weight: 200 lb (90.7 kg) / Body mass index is 31.32 kg/m². DIET GENERAL;    SpO2: 93 % (01/07/21 0619)  No active isolations    Skin Issues: No    Pain Managed: Yes    Bladder continence: Yes    Bowel continence: Yes      Other:       PHYSICAL THERAPY  Bed mobility:  Supine to Sit: Modified independent (01/05/21 0915)  Sit to Supine: Modified independent (01/05/21 0915)  Transfers:  Sit to Stand: Modified independent (01/06/21 0925)  Bed to Chair: Supervision (01/05/21 0915)  Gait:   Device: No Device (01/06/21 0925)  Other Apparatus: (R arm sling) (01/04/21 0925)  Assistance: Supervision (01/06/21 0925)  Distance: 150' (01/05/21 1207)  Quality of Gait: Slow reciprocal gait patttern with shuffling feet.  (01/06/21 8112)  Comments: focus on quality of gait and improved stability/safety (01/02/21 2235)  Stairs:  # Steps : 13 (01/06/21 9895)  Rails: Left ascending (01/06/21 2875)  Assistance: Stand by assistance (01/06/21 0450)  Comment: reciprocal pattern with both ascending and descending of stairs. (01/06/21 2726)  W/C mobility:     LTG:  Long term goal 1: Pt will demonstrate bed mobility indep. Long term goal 2: indep bed and car transfers  Long term goal 3: Pt will demonstrate amb  indep with or without 50 feet - supervision 150 feet  Long term goal 4: Pt will exhibit improved dynamic balance evidenced of 20/24 DGI for decreased risk for falls and safe d/c home alone. Long term goal 5: Pt will demonstrate stair negotiation 5 steps without rails indep to allow pt to enter and exit her home safely. PT Treatment Time:  1.0 hrs      OCCUPATIONAL THERAPY  Hand Dominance: Right  ADL  Feeding: None (12/30/20 1219)  Grooming: Stand by assistance (12/30/20 1219)  UE Bathing: Minimal assistance (12/30/20 1219)  LE Bathing: Minimal assistance (12/30/20 1219)  UE Dressing: Stand by assistance (12/30/20 1219)  LE Dressing: Minimal assistance(socks) (12/30/20 1219)  Toileting: Unable to assess(comment)(pt did not need to go) (12/30/20 1219)  Additional Comments: Pt doffed full zip jacket and doffed/donned R arm sling during sessions. Able to manage sling with assist to place trunk strap. (12/29/20 1618)  Toilet Transfers  Toilet Transfer: Unable to assess (12/30/20 1221)  Toilet Transfers Comments: Pt did not need to go (12/30/20 1221)     Shower Transfers  Shower - Transfer From: Bed (12/29/20 0776)  Shower - Transfer Type: To (12/29/20 0072)  Shower - Transfer To:  Shower seat with back (12/29/20 5625)  Shower - Technique: Ambulating (12/29/20 1150)  Shower Transfers: Not tested (12/30/20 1221)  Shower Transfers Comments: Pt declined and completed sponge bath ADL (12/30/20 1221)  LTG:  Eating  Assistance Needed: Setup or clean-up assistance  CARE Score: 5  Discharge Goal: Independent, Oral Hygiene  Assistance Needed: Partial/moderate assistance  CARE Score: 3  Discharge Goal: Supervision or touching assistance, Toileting Hygiene  Assistance Needed: Partial/moderate assistance  CARE Score: 3  Discharge Goal: Supervision or touching assistance, Shower/Bathe Self  Assistance Needed: Substantial/maximal assistance  CARE Score: 2  Discharge Goal: Partial/moderate assistance  Upper Body Dressing  Assistance Needed: Partial/moderate assistance  CARE Score: 3  Discharge Goal: Supervision or touching assistance, Lower Body Dressing  Assistance Needed: Partial/moderate assistance  CARE Score: 3  Discharge Goal: Supervision or touching assistance, Putting On/Taking Off Footwear  Assistance Needed: Partial/moderate assistance  CARE Score: 3  Discharge Goal: Supervision or touching assistance, Toilet Transfer  Assistance Needed: Partial/moderate assistance  CARE Score: 3  Discharge Goal: Supervision or touching assistance  OT Treatment Time: 2.0 hrs      SPEECH THERAPY    Motor Speech: Exceptions to [facility]  Comprehension: Exceptions  Verbal Expression: Exceptions to functional limits      Diet/Swallow:  Diet Solids Recommendation: Regular  Liquid Consistency Recommendation: Thin  Dysphagia Outcome Severity Scale: Level 5: Mild dysphagia- Distant supervision. May need one diet consistency restricted    Compensatory Swallowing Strategies: Small bites/sips, Eat/Feed slowly  Therapeutic Interventions: Diet tolerance monitoring, Oral motor exercises      LTG:  Long-term Goals  Timeframe for Long-term Goals: 2 weeks  Goal 1: Pt will improve cognitive-linguistic and speech production abilities to clear express wants/needs and improve understanding during daily life. Long-term Goals  Timeframe for Long-term Goals: 2 weeks  Goal 1: Pt will improve oral motor strentgh to tolerate least restrictive diet with no overt s/s of aspiration and difficulty.           COGNITION  OT: Cognition Comment: Comp: Supervision, Express: Min A, Social: Supervision, Prob: Supervision, Mem: Supervision  SP:Memory: Exceptions to [facility]  Problem Solving: Exceptions to [address]  Attendance to recreational therapy programs:    []  Pet Therapy  [] Music Therapy  [] Art Therapy    [] Recreation Therapy Group [] Support Group           Patient social interaction (mood, participation): good      Patient strengths: was independent prior     Patients goal: \"I can't believe I feel this weak, I want to get stronger\"       Problems/Barriers: poor vision        1. Safety:          - Intervention / Plan:    [x]  falls protocol     [x]  PT/OT    [x]  SP        - Results:         2. Potential DME needs:         - Intervention / Plan:  [x]  PT/OT     [x]  Assess equipment needs/access       - Results:         3. Weakness:          - Intervention / Plan:  [x]  PT/OT      []  Other:         - Results:         4. Discharge planning needs:          - Intervention / Plan:  [x]  Weekly team conference      []  family training        - Results:         5.            - Intervention / Plan:          - Results:         6.            - Intervention / Plan:         - Results:         7.            - Intervention / Plan:         - Results:           Discharge Plan   Estimated Length of Stay: 14 days    Tentative Discharge date: 1/9/21      Anticipated Discharge Destination:  Home      Team recommendations:    1. Follow up Therapy :    PT  OT    2. Outpatient    Other:     Equipment needed at Discharge:  Other: TBD      Team Members Present at Conference:    Physician: Dr. Alejandra Antony  : Leah Conte RN  RN: Bo Hardy RN  Physical Therapist: Tiana Moreno, PT  Occupational Therapist:Cristela Ortiz  Speech Therapist: ROSA MARIA Handy [place], [place]  Nurse Manager: Obey Juarez RN    Electronically signed by Phoebe Chavez RN on 1/7/2021 at 9:51 AM

## 2021-01-08 LAB
ANTIPHOSPHOLIPID AB IGG: 0 GPL (ref 0–14)
ANTIPHOSPHOLIPID AB IGM: 16 MPL (ref 0–14)
GLUCOSE BLD-MCNC: 135 MG/DL (ref 60–115)
GLUCOSE BLD-MCNC: 153 MG/DL (ref 60–115)
GLUCOSE BLD-MCNC: 92 MG/DL (ref 60–115)
GLUCOSE BLD-MCNC: 95 MG/DL (ref 60–115)
PERFORMED ON: ABNORMAL
PERFORMED ON: ABNORMAL
PERFORMED ON: NORMAL
PERFORMED ON: NORMAL

## 2021-01-08 PROCEDURE — 92507 TX SP LANG VOICE COMM INDIV: CPT

## 2021-01-08 PROCEDURE — 97110 THERAPEUTIC EXERCISES: CPT

## 2021-01-08 PROCEDURE — 99232 SBSQ HOSP IP/OBS MODERATE 35: CPT | Performed by: PHYSICAL MEDICINE & REHABILITATION

## 2021-01-08 PROCEDURE — 6370000000 HC RX 637 (ALT 250 FOR IP): Performed by: NURSE PRACTITIONER

## 2021-01-08 PROCEDURE — 99232 SBSQ HOSP IP/OBS MODERATE 35: CPT | Performed by: PSYCHIATRY & NEUROLOGY

## 2021-01-08 PROCEDURE — 97129 THER IVNTJ 1ST 15 MIN: CPT

## 2021-01-08 PROCEDURE — 6370000000 HC RX 637 (ALT 250 FOR IP): Performed by: PHYSICAL MEDICINE & REHABILITATION

## 2021-01-08 PROCEDURE — 97535 SELF CARE MNGMENT TRAINING: CPT

## 2021-01-08 PROCEDURE — 1180000000 HC REHAB R&B

## 2021-01-08 PROCEDURE — 97112 NEUROMUSCULAR REEDUCATION: CPT

## 2021-01-08 PROCEDURE — 97530 THERAPEUTIC ACTIVITIES: CPT

## 2021-01-08 PROCEDURE — 97116 GAIT TRAINING THERAPY: CPT

## 2021-01-08 PROCEDURE — 6370000000 HC RX 637 (ALT 250 FOR IP): Performed by: STUDENT IN AN ORGANIZED HEALTH CARE EDUCATION/TRAINING PROGRAM

## 2021-01-08 PROCEDURE — APPSS15 APP SPLIT SHARED TIME 0-15 MINUTES: Performed by: NURSE PRACTITIONER

## 2021-01-08 PROCEDURE — 6370000000 HC RX 637 (ALT 250 FOR IP): Performed by: INTERNAL MEDICINE

## 2021-01-08 RX ORDER — LOSARTAN POTASSIUM 50 MG/1
50 TABLET ORAL DAILY
Qty: 30 TABLET | Refills: 3 | Status: SHIPPED | OUTPATIENT
Start: 2021-01-09 | End: 2021-05-19

## 2021-01-08 RX ORDER — LITHIUM CARBONATE 300 MG/1
300 CAPSULE ORAL
Qty: 90 CAPSULE | Refills: 3 | Status: SHIPPED | OUTPATIENT
Start: 2021-01-08 | End: 2021-08-18

## 2021-01-08 RX ORDER — INSULIN GLARGINE 100 [IU]/ML
0.25 INJECTION, SOLUTION SUBCUTANEOUS NIGHTLY
Qty: 1 VIAL | Refills: 3 | Status: SHIPPED | OUTPATIENT
Start: 2021-01-08 | End: 2021-10-14

## 2021-01-08 RX ADMIN — DOCUSATE SODIUM 200 MG: 100 CAPSULE ORAL at 08:51

## 2021-01-08 RX ADMIN — ATORVASTATIN CALCIUM 20 MG: 20 TABLET, FILM COATED ORAL at 21:55

## 2021-01-08 RX ADMIN — LITHIUM CARBONATE 300 MG: 300 CAPSULE, GELATIN COATED ORAL at 18:44

## 2021-01-08 RX ADMIN — LITHIUM CARBONATE 300 MG: 300 CAPSULE, GELATIN COATED ORAL at 13:15

## 2021-01-08 RX ADMIN — APIXABAN 5 MG: 5 TABLET, FILM COATED ORAL at 08:50

## 2021-01-08 RX ADMIN — LITHIUM CARBONATE 300 MG: 300 CAPSULE, GELATIN COATED ORAL at 08:50

## 2021-01-08 RX ADMIN — ISOSORBIDE MONONITRATE 30 MG: 30 TABLET, EXTENDED RELEASE ORAL at 08:50

## 2021-01-08 RX ADMIN — METFORMIN HYDROCHLORIDE 500 MG: 500 TABLET ORAL at 18:44

## 2021-01-08 RX ADMIN — CARIPRAZINE 3 MG: 1.5 CAPSULE, GELATIN COATED ORAL at 08:50

## 2021-01-08 RX ADMIN — FOLIC ACID 1 MG: 1 TABLET ORAL at 08:50

## 2021-01-08 RX ADMIN — ALOGLIPTIN 6.25 MG: 6.25 TABLET, FILM COATED ORAL at 08:50

## 2021-01-08 RX ADMIN — APIXABAN 5 MG: 5 TABLET, FILM COATED ORAL at 21:55

## 2021-01-08 RX ADMIN — DOCUSATE SODIUM 200 MG: 100 CAPSULE ORAL at 21:55

## 2021-01-08 RX ADMIN — METFORMIN HYDROCHLORIDE 500 MG: 500 TABLET ORAL at 08:51

## 2021-01-08 RX ADMIN — Medication 2000 UNITS: at 18:44

## 2021-01-08 RX ADMIN — INSULIN GLARGINE 22 UNITS: 100 INJECTION, SOLUTION SUBCUTANEOUS at 21:56

## 2021-01-08 RX ADMIN — ASPIRIN 81 MG: 81 TABLET, COATED ORAL at 08:51

## 2021-01-08 RX ADMIN — LOSARTAN POTASSIUM 50 MG: 50 TABLET, FILM COATED ORAL at 08:50

## 2021-01-08 ASSESSMENT — ENCOUNTER SYMPTOMS
WHEEZING: 0
COLOR CHANGE: 0
SHORTNESS OF BREATH: 0
CHEST TIGHTNESS: 0
TROUBLE SWALLOWING: 0
VOMITING: 0
COUGH: 0
NAUSEA: 0

## 2021-01-08 ASSESSMENT — PAIN DESCRIPTION - PAIN TYPE: TYPE: ACUTE PAIN

## 2021-01-08 ASSESSMENT — PAIN DESCRIPTION - LOCATION: LOCATION: GENERALIZED

## 2021-01-08 ASSESSMENT — PAIN DESCRIPTION - FREQUENCY: FREQUENCY: CONTINUOUS

## 2021-01-08 ASSESSMENT — PAIN DESCRIPTION - DESCRIPTORS: DESCRIPTORS: ACHING

## 2021-01-08 ASSESSMENT — PAIN SCALES - GENERAL: PAINLEVEL_OUTOF10: 0

## 2021-01-08 NOTE — PROGRESS NOTES
Occupational Therapy  Facility/Department: [facility]  Daily Treatment Note  NAME: Beto Ventura  : 1970  MRN: [id number]    Date of Service: 2021    Discharge Recommendations:  Continue to assess pending progress       Assessment   Assessment: Pt continues to demonstrates min vision problems while ambulating around the room. Pt continues to benefit from OT services to maximize independence and safety during ADLs. REQUIRES OT FOLLOW UP: Yes  Activity Tolerance  Activity Tolerance: Patient Tolerated treatment well  Safety Devices  Safety Devices in place: Yes  Type of devices: All fall risk precautions in place         Patient Diagnosis(es): The primary encounter diagnosis was Multiple sclerosis ([facility] Diagnoses of Neuromyelopathy due to vitamin B12 deficiency ([facility]) and Tendinitis of right rotator cuff were also pertinent to this visit. has a past medical history of Anxiety, Back pain, Bipolar disorder ([address] CAD (coronary artery disease), CAFL (chronic airflow limitation) ([facility]), Chronic bronchitis ([address] Chronic pain, Colitis, Complicated migraine, DDD (degenerative disc disease), lumbar, Depression, Endometriosis, Excessive caffeine abuse, continuous ([address] Gastritis, GERD (gastroesophageal reflux disease), History of myocardial infarction, HTN (hypertension), benign, Impaired mobility and activities of daily living, Over weight, PTSD (post-traumatic stress disorder), Sensory neuropathy, Somatization disorder, TIA (transient ischemic attack), Tobacco abuse, and Vasospastic angina ([address] has a past surgical history that includes Hysterectomy; Dilation and curettage of uterus; back surgery; Neck surgery; shoulder surgery (Left);  section; Cholecystectomy (13); Upper gastrointestinal endoscopy (2014); cyst removal (3/7/16); Coronary angioplasty;  Upper gastrointestinal endoscopy (N/A, 2020); and Colonoscopy (N/A, 7/28/2020). Restrictions  Restrictions/Precautions  Restrictions/Precautions: Fall Risk  Required Braces or Orthoses?: Yes  Required Braces or Orthoses  Right Upper Extremity Brace/Splint: Sling  Right Upper Extremity Brace/Splint: RUE immobilizer in place - pt reports rotator cuff repair done Dec 1  Subjective   General  Chart Reviewed: Yes  Patient assessed for rehabilitation services?: Yes  Response to previous treatment: Patient with no complaints from previous session  Family / Caregiver Present: No  Pain Assessment  Pain Assessment: 0-10  Pain Level: 0  Pre Treatment Pain Screening  Pain at present: 0  Scale Used: Numeric Score  Intervention List: Patient able to continue with treatment  Vital Signs  Patient Currently in Pain: Denies   Orientation  Orientation  Overall Orientation Status: Within Functional Limits  Objective    Pt completed tub shower ADL at the levels below. After ADL, pt completed light house keeping tasks and retrieved dirty towels from the shower and floor and placed them into laundry bag using the L UE to improve independence during IADLs. Pt demonstrates fair+ balance while performing task and retrieving items. Pt tolerated well. ADL  Feeding: Modified independent   Grooming: Modified independent   UE Bathing: Modified independent   LE Bathing: Modified independent   UE Dressing: Modified independent   LE Dressing: Minimal assistance(pull up undergarment on R hip)  Toileting: Unable to assess(comment)(pt did not need to go)        Balance  Sitting Balance: Modified independent   Standing Balance: Modified independent   Functional Mobility  Functional - Mobility Device: No device  Activity: To/from bathroom; Retrieve items  Assist Level: Supervision  Functional Mobility Comments: 1 LOB and pt was able to self correct  Toilet Transfers  Toilet Transfer: Unable to assess  Toilet Transfers Comments: Pt did not need to go  Tub Transfers  Tub - Transfer From: Other(no device)  Tub - Transfer Type: To and From  Tub - Transfer To:  Shower seat with back  Tub - Technique: Ambulating  Tub Transfers: Modified independence  Tub Transfers Comments: Pt performed shower in independent living suite which has a tub shower unit     Transfers  Sit to stand: Modified independent  Stand to sit: Modified independent        Cognition  Cognition Comment: Comp: Supervision, Express: Min A, Social: Mod I, Prob: Supervision, Mem: Supervision     Plan   Plan  Times per week: 5-7x/week  Plan weeks: 1- 1/2 weeks  Current Treatment Recommendations: Strengthening, Endurance Training, Self-Care / ADL, Balance Training, Pain Management, Home Management Training, Functional Mobility Training, Safety Education & Training  Plan Comment: Continue OT per POC  G-Code     OutComes Score                                                  AM-PAC Score             Goals  Patient Goals   Patient goals : \"I can't believe I feel this weak, I want to get stronger\"       Therapy Time   Individual Concurrent Group Co-treatment   Time In 930         Time Out 1030         Minutes 60           ADL trainin minutes     Betsy Dahl OT   Electronically signed by Betsy Dahl OT on 2021 at 10:28 AM

## 2021-01-08 NOTE — PROGRESS NOTES
Physical Therapy Rehab Treatment Note  Facility/Department: [facility]  Room: R256/R256-01       NAME: Mayank Arana  : 1970 (52 y.o.)  MRN: [id number]  CODE STATUS: Full Code    Date of Service: 2021  Chart Reviewed: Yes  Patient assessed for rehabilitation services?: Yes  Family / Caregiver Present: No  Diagnosis: Abnormality of gait and mobility due to recent infarct left thalamus. [facility] admit 20. Restrictions:  Restrictions/Precautions: Fall Risk  Required Braces or Orthoses  Right Upper Extremity Brace/Splint: Sling  Right Upper Extremity Brace/Splint: RUE immobilizer in place - pt reports rotator cuff repair done Dec 1       SUBJECTIVE: Subjective: My last physical therapy session  Response To Previous Treatment: Patient with no complaints from previous session. Pain Screening  Patient Currently in Pain: No  Pre Treatment Pain Screening  Pain at present: 0  Scale Used: Numeric Score  Intervention List: Patient able to continue with treatment    Post Treatment Pain Screening:  Pain Assessment  Pain Assessment: 0-10  Pain Level: 0    OBJECTIVE:   Follows Commands: Within Functional Limits         Outcomes Measures:  Dynamic Gait Total Score: 20            Bed mobility  Rolling to Left: Modified independent  Rolling to Right: Unable to assess  Supine to Sit: Modified independent  Sit to Supine: Modified independent  Scooting: Modified independent  Comment: HOB flat without use of bedrails    Transfers  Sit to Stand: Independent  Stand to sit: Independent  Bed to Chair: Independent  Car Transfer: Independent    Ambulation  Ambulation?: Yes  More Ambulation?: Yes  Ambulation 1  Surface: level tile;uneven;carpet;ramp  Device: No Device  Assistance: Supervision  Quality of Gait: Reciprocal pattern decreased foot clearance as patient shuffle  Distance: 150'    Exercises  Comments: Standing HEP given and reviewed qith handout provided. Handout given for seated HEP.  Patient shows a good

## 2021-01-08 NOTE — PROGRESS NOTES
of bolus in all given opportunities. Goal met  Goal 2: Pt will complete oral motor strengthening exercises with 90% acc given cues as needed in order to increase lingual/labial musuclature and decrease risk of pocketing. Goal met  Goal 3: Pt will complete tongue press exercise x10 in order to promote anterior to posterior transit of bolus and decrease risk of aspiration. Goal met  Goal 4: Pt will implement safe swallow strategies (small bites/sips, slow rate) with 90% acc given cues as needed in order to decrease risk of aspiration. Goal met  Compensatory Swallowing Strategies: Small bites/sips, Eat/Feed slowly     STATUS AT DISCHARGE:  DIET: Regular diet with thin liquids    SWALLOWING  Ratin    SPOKEN LANGUAGE COMPREHENSION  Ratin    SPOKEN LANGUAGE EXPRESSION  Ratin    MOTOR SPEECH  Ratin    PROBLEM SOLVING  Ratin    MEMORY  Ratin      Functional Status at time of Discharge:    · Cognition: Patient demonstrates mild cognitive deficits. · Communication: Patient demonstrates no communication deficits. · Language: Patient demonstrates mild language deficits. · Motor Speech: Patient demonstrates mild motor speech deficits. · Swallow: Patient demonstrates no dysphagia. Patient is discharged to Home               [] Recommend continued speech therapy   [x] Speech Therapy is no longer warranted      Signature:  Christopher Westfall, Date: 2021, Time: 2:41 PM

## 2021-01-08 NOTE — PROGRESS NOTES
Occupational Therapy   [facility] OCCUPATIONAL THERAPY DISCHARGE SUMMARY- REHAB     Date: 2021  Patient Name: Moises Gallardo        MRN: [id number]  Account: [de-identified]   : 1970  (52 y.o.)  Room: [place] [address]    Diagnosis:       Past Medical History:   Diagnosis Date    Anxiety     Back pain     back surgery x 4    Bipolar disorder ([facility]     CAD (coronary artery disease)     CAFL (chronic airflow limitation) (HCC) 11/3/2016    Chronic bronchitis (HCC)     Chronic pain     Colitis     Complicated migraine     DDD (degenerative disc disease), lumbar 2016    Depression     Endometriosis     Excessive caffeine abuse, continuous ([facility] 2018    Gastritis     GERD (gastroesophageal reflux disease)     History of myocardial infarction     HTN (hypertension), benign 2016    Impaired mobility and activities of daily living     Over weight     PTSD (post-traumatic stress disorder)     Sensory neuropathy 2016    Somatization disorder     TIA (transient ischemic attack)     Tobacco abuse     Vasospastic angina ([facility] 2016     Past Surgical History:   Procedure Laterality Date    BACK SURGERY      x2     SECTION      x2    CHOLECYSTECTOMY  13    Lapchole    COLONOSCOPY N/A 2020    COLONOSCOPY DIAGNOSTIC performed by George Mendez MD at [address] [place] [address]  3/7/16    Dr. Weir Persons ENDOSCOPY  2014    ANIBAL HOUSE M.D.   Georgianna Olszewski UPPER GASTROINTESTINAL ENDOSCOPY N/A 2020    EGD ESOPHAGOGASTRODUODENOSCOPY performed by George Mendez MD at [facility]       Precautions:   Restrictions/Precautions: Fall Risk  Right Upper Extremity Brace/Splint: RUE immobilizer in place - pt reports rotator cuff repair done Dec 1  Required Braces or Orthoses  Right Upper Extremity Brace/Splint: Sling  Right Upper Extremity Brace/Splint: RUE immobilizer in place - pt reports rotator cuff repair done Dec 1     Social/Functional History:  Social/Functional History  Lives With: Other (comment)(Boyfriend)  Type of Home: House  Home Layout: Two level  Home Access: Stairs to enter with rails  Entrance Stairs - Number of Steps: 3-5 to enter front door, 1 railing on R side  Entrance Stairs - Rails: Right  Bathroom Shower/Tub: Tub only  ADL Assistance: Independent  Homemaking Assistance: Independent  Homemaking Responsibilities: Yes  Ambulation Assistance: Independent  Transfer Assistance: Independent  Active : Yes  Occupation: Full time employment  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 Additional Comments: Pt. inconsistent historian but attempts to answer questions    Current Functional Status:  ADL  Feeding: Modified independent   Grooming: Modified independent   UE Bathing: Modified independent   LE Bathing: Modified independent   UE Dressing: Modified independent   LE Dressing: Minimal assistance  Toileting: Unable to assess(comment)(pt did not need to go)    Toilet Transfers  Toilet Transfer: Unable to assess  Toilet Transfers Comments: Pt did not need to go  Tub Transfers  Tub - Transfer From: Other(no device)  Tub - Transfer Type: To and From  Tub - Transfer To: Shower seat with back  Tub - Technique: Ambulating  Tub Transfers: Modified independence  Tub Transfers Comments: Pt performed shower in independent living suite which has a tub shower unit  Shower Transfers  Shower - Transfer From: Abhishek & Renate - Transfer Type: To  Shower - Transfer To:  Shower seat with back  Shower - Technique: Ambulating  Shower Transfers: Not tested  Shower Transfers Comments: Pt declined and completed sponge bath ADL    Orientation Status:  Orientation  Overall Orientation Status: Within Functional Limits  Orientation Level: Oriented to place, Oriented to Coordinated: No  Coordination and Movement description: Fine motor impairments, Decreased speed, Decreased accuracy, Left UE    D/C Recommendations:  Home health  Equipment Recommendations:   shower chair    OT Follow Up:  OT D/C RECOMMENDATIONS  REQUIRES OT FOLLOW UP: Yes    Home Exercise Program Provided: Yes  If yes, type of HEP: UE strengthening     Electronically signed by:    BRIAN Rodrigez OTR/L  1/8/2021, 4:21 PM

## 2021-01-08 NOTE — PROGRESS NOTES
Subjective: The patient complains of severe acute on chronic recurrent right-sided weakness and aphasia dysphagia partially relieved by medications, SLP, Pt, OT, and rest and exacerbated by recent illness including a repeat CVA left thalamic region with worsening thalamic pain. Approaching her discharge moved her to the independent living apartment and hopes to increase her confidence and adaptability to home setting. According to recent nursing note , \"Assessment completed earlier in the shift. VSS. Denied pain. LBM 1/7/2021 after receiving enema. HS OT-130. Lantus administered per MAR. Snack provided. Sling to RUE in place. No distress noted. Call light within reach \"    ROS x10: The patient also complains of severely impaired mobility and activities of daily living. Otherwise no new problems with vision, hearing, nose, mouth, throat, dermal, cardiovascular, GI, , pulmonary, musculoskeletal, psychiatric or neurological. See Rehab H&P on Rehab chart dated . Vital signs:  BP (!) 163/82   Pulse 75   Temp 98 °F (36.7 °C)   Resp 15   Ht 5' 7\" (1.702 m)   Wt 200 lb (90.7 kg)   SpO2 98%   BMI 31.32 kg/m²   I/O:   PO/Intake:  fair PO intake, no problems observed or reported. Bowel/Bladder:  Continent, constipation prescribed GlycoLax  General:  Patient is well developed, adequately nourished, non-obese and     well kempt. HEENT:    PERRLA, hearing intact to loud voice, external inspection of ear     and nose benign. Inspection of lips, tongue and gums severe thrush  Musculoskeletal: No significant change in strength or tone. All joints stable. Inspection and palpation of digits and nails show no clubbing,       cyanosis or inflammatory conditions. Neuro/Psychiatric: Affect: flat but irritable and sleepy. Alert and oriented to person, place and     Situation with mod cues.   No significant change in deep tendon reflexes or     Sensation-garbled speech right-sided weakness  Lungs:  Diminished, CTA-B. Respiration effort is normal at rest.     Heart:   S1 = S2, RRR. No loud murmurs. Abdomen:  Soft, non-tender, no enlargement of liver or spleen. Extremities:  No significant lower extremity edema or tenderness. Healing right shoulder surgery  Skin:   Intact to general survey, right shoulder incisions healing well. Rehabilitation:  Physical therapy:   Bed Mobility: Scooting: Modified independent    Transfers: Sit to Stand: Independent  Stand to sit: Independent  Bed to Chair: Independent, Ambulation 1  Surface: level tile, uneven, carpet, ramp  Device: No Device  Other Apparatus: (R arm sling)  Assistance: Supervision  Quality of Gait: Reciprocal pattern decreased foot clearance as patient shuffle  Gait Deviations: Decreased step length, Decreased step height  Distance: 250'  Comments: focus on quality of gait and improved stability/safety, Stairs  # Steps : 13  Stairs Height: 6\"  Rails: None  Device: No Device  Assistance: Supervision, Modified independent   Comment: Reciprocal pattern good safety    FIMS:  ,  , Assessment: Patient is meeting functional goals. Perform DGI and HEP in p.m. Occupational therapy:    ,  , Assessment: Pt demonstrated improved dynamice standing balance reaching minimally outside RIDDHI with no LOB at close supervision level. Pt continues to be limited by pain, balance and fatigue. Pt continues to benefit from OT services to maximize pt independence and safety with ADLs and transfers.     Speech therapy:        Lab/X-ray studies reviewed, analyzed and discussed with patient and staff:   Recent Results (from the past 24 hour(s))   POCT Glucose    Collection Time: 01/07/21 11:24 AM   Result Value Ref Range    POC Glucose 176 (H) 60 - 115 mg/dl    Performed on ACCU-CHEK    POCT Glucose    Collection Time: 01/07/21  4:10 PM   Result Value Ref Range    POC Glucose 122 (H) 60 - 115 mg/dl    Performed on ACCU-CHEK    POCT Glucose    Collection Time: 01/07/21  8:31 PM   Result Value Ref Range    POC Glucose 130 (H) 60 - 115 mg/dl    Performed on ACCU-CHEK    POCT Glucose    Collection Time: 01/08/21  6:07 AM   Result Value Ref Range    POC Glucose 153 (H) 60 - 115 mg/dl    Performed on ACCU-CHEK        Cta Head   12/23/2020  Intracranial ICAs:   The major intracranial arterial structures are patent without high-grade stenosis, large vessel cut off, or aneurysm. .     Ct Head  : 12/23/2020: Extra-axial spaces:  Normal. Intracranial hemorrhage:  None. Ventricular system: In the interval, an ill-defined 4 x 12 mm area decreased attenuation exerting no mass effect is found medially within the left thalamus (series 2, image 16). Basal Cisterns:  Normal. Cerebral Parenchyma:  Normal. Midline Shift:  None. Cerebellum:  Normal. Paranasal sinuses and mastoid air cells:  Normal. Visualized Orbits:  Normal.     Impression: Interval development of acute/subacute left thalamic infarct. Cta Neck  12/23/2020 The major intracranial arterial structures are patent without high-grade stenosis, large vessel cut off, or aneurysm. EXAMINATION: CTA HEAD MPRESSION: Negative CTA of the neck. Mri Brain  12/25/2020 : Acute Change:   Restricted diffusion involving the left thalamus, with corresponding increased T2/FLAIR signal at this site, measuring approximately 1.6 x 0.8 cm, similar to recent prior MRI. No new areas of restricted diffusion   Hemorrhage:    Small rounded area of susceptibility left occipital lobe, unchanged. Mass Lesion/ Mass Effect:    No evidence of an intracranial mass or extra-axial fluid collection. No significant mass effect. Chronic Change:  Scattered punctate foci of increased T2 and FLAIR signal are noted in the supratentorial white matter which is a nonspecific finding, but likely represents minimal chronic microvascular ischemia. Parenchyma:   No significant volume loss for age.   The brain parenchyma is otherwise within normal limits of signal intensity and morphology. Ventricles:     Normal caliber and morphology. Skull Base:    Hypothalamic and pituitary region are grossly normal.   Craniocervical junction is normal. No significant marrow replacement process. Vasculature:    Major intracranial arterial structures, and dural venous sinuses show typical flow void, suggesting patency by spin echo criteria. Other:  The visualized paranasal sinuses are clear. Mastoid air cells clear. The orbits are unremarkable. Soft tissues are unremarkable. Recent infarction involving the left thalamus, overall unchanged from MRI 12/23/2020. No new infarcts from the prior MRI. Mri Brain   12/23/2020   There are no extra-axial collections. There is no evidence of hemorrhage. There is specific association with signal dropout on ADC map in the left thalamus and region measuring 7 x15 mm indicating subacute ischemia. There is corresponding signal abnormality on T2/FLAIR series. The susceptibility images do not demonstrate evidence of hemosiderin deposition within the brain parenchyma or the leptomeninges. There is preservation of the gray-white matter differentiation. There are a few scattered foci of T2/FLAIR increased signal in the subcortical and periventricular white matter without associated edema or mass effect. The sulci and ventricles are within normal limits without evidence of hydrocephalus. The midline structures are intact, the corpus callosum is within normal limits. The region of the pineal gland and the sella turcica are unremarkable. There are no space-occupying lesions in the posterior fossa. The basilar cisterns are patent. The craniocervical junction is unremarkable. The visualized portions of the orbits are within normal limits, the globes are intact. The visualized portions of the paranasal sinuses are within normal limits. The calvarium and soft tissues are unremarkable.      There is a 7 x 15 mm area of subacute ischemia in the left thalamus. Previous extensive, complex labs, notes and diagnostics reviewed and analyzed. ALLERGIES:    Allergies as of 12/28/2020 - Review Complete 12/28/2020   Allergen Reaction Noted    Latex Itching 03/25/2018    Influenza vaccines Swelling 10/08/2015    Eggs or egg-derived products  10/08/2015    Gabapentin Nausea Only 10/31/2017    Pneumococcal vaccines Hives 01/28/2016    Povidone iodine Itching 10/08/2015    Varicella virus vaccine live Hives 01/28/2016      (please also verify by checking MAR)       Yesterday I evaluated this patient for periodic reassessment of medical and functional status. The patient was discussed in detail at the treatment team meeting focusing on current medical issues, progress in therapies, social issues, psychological issues, barriers to progress and strategies to address these barriers, and discharge planning. See the hand written addendum to rehab progress note. The patient continues to be high risk for future disability and their medical and rehabilitation prognosis continue to be good and therefore, we will continue the patient's rehabilitation course as planned. The patient's tentative discharge date was set. Patient and family education was discussed. The patient was made aware of the team discussion regarding their progress. Discharge plans were discussed along with barriers to progress and strategies to address these barriers, patient encouraged to continue to discuss discharge plans with . Complex Physical Medicine & Rehab Issues Assess & Plan:   1. Severe abnormality of gait and mobility and impaired self-care and ADL's secondary to acute left thalamic CVA with right-sided weakness thalamic pain aphasia dysphagia.   Functional and medical status reassessed regarding patients ability to participate in therapies and patient found to be able to participate in acute intensive comprehensive inpatient rehabilitation program including for her thalamic pain syndrome--add folic acid. 10. Healing right shoulder surgery-continue shoulder immobilizer follow-up with orthopedics.        Electronically signed by Dahlia Akers DO on 12/30/20 at 8:55 AM STAR West D.O., PM&R     Attending    [address] [place] [address] Court

## 2021-01-08 NOTE — PROGRESS NOTES
Physical Therapy Rehab Treatment Note  Facility/Department: [facility]  Room: R256R256-01       NAME: Vickie Barkley  : 1970 (52 y.o.)  MRN: [id number]  CODE STATUS: Full Code    Date of Service: 2021  Chart Reviewed: Yes  Patient assessed for rehabilitation services?: Yes  Family / Caregiver Present: No  Diagnosis: Abnormality of gait and mobility due to recent infarct left thalamus. [facility] admit 20. Restrictions:  Restrictions/Precautions: Fall Risk  Required Braces or Orthoses  Right Upper Extremity Brace/Splint: Sling  Right Upper Extremity Brace/Splint: RUE immobilizer in place - pt reports rotator cuff repair done Dec 1       SUBJECTIVE: Subjective: I am frustrated  Response To Previous Treatment: Patient with no complaints from previous session. Pain Screening  Patient Currently in Pain: No  Pre Treatment Pain Screening  Pain at present: 0  Scale Used: Numeric Score  Intervention List: Patient able to continue with treatment    Post Treatment Pain Screening:  Pain Assessment  Pain Assessment: 0-10  Pain Level: 0    OBJECTIVE:   Follows Commands: Within Functional Limits    Bed mobility  Rolling to Left: Modified independent  Rolling to Right: Unable to assess  Supine to Sit: Modified independent  Sit to Supine: Modified independent  Scooting: Modified independent  Comment: HOB flat without use of bedrails    Transfers  Sit to Stand: Independent  Stand to sit: Independent  Bed to Chair: Independent  Car Transfer: Independent    Ambulation  Ambulation?: Yes  More Ambulation?: Yes  Ambulation 1  Surface: level tile;uneven;carpet;ramp  Device: No Device  Assistance: Supervision  Quality of Gait: Reciprocal pattern decreased foot clearance as patient shuffle  Distance: 250'    Ambulation 2  Surface - 2: carpet  Device 2: No device  Assistance 2:  Independent  Quality of Gait 2: Good safety with short distance gait  Distance: 50'    Stairs/Curb  Stairs?: Yes  Stairs  # Steps : 13  Stairs Height: 6\"  Rails: None  Assistance: Supervision;Modified independent   Comment: Reciprocal pattern good safety    ASSESSMENT/COMMENTS:  Assessment: Patient is meeting functional goals. Perform DGI and HEP in p.m. PLAN OF CARE/Safety:   Safety Devices  Type of devices: All fall risk precautions in place; Bed alarm in place;Call light within reach; Left in bed      Therapy Time:   Individual   Time In 0900   Time Out 0930   Minutes 30     Minutes:30      Transfer/Bed mobility training: 10      Gait training: Betzy Herbert PTA, 01/08/21 at 9:51 AM

## 2021-01-08 NOTE — PROGRESS NOTES
Leyla Barton [facility] 89. FOLLOW-UP NOTE       1/8/2021     Patient was seen and examined in person, Chart reviewed   Patient's case discussed with staff/team    Chief Complaint: Depression    Interim History:     Patient is feeling better. She rated her mood to be 5 out of 10. She denies any suicidal or homicidal thoughts. She denies any hopeless or worthless feeling but she feels helpless. She lives with her  was been supportive. Patient wants to be discharged today so that she can settle down in her home. She appeared anxious. She has been compliant with all her medication without side effects.   Appetite:   [x] Normal/Unchanged  [] Increased  [] Decreased      Sleep:       [x] Normal/Unchanged  [] Fair       [] Poor              Energy:    [x] Normal/Unchanged  [] Increased  [] Decreased        SI [] Present  [x] Absent    HI  []Present  [x] Absent     Aggression:  [] yes  [] no    Patient is [x] able  [] unable to CONTRACT FOR SAFETY     PAST MEDICAL/PSYCHIATRIC HISTORY:   Past Medical History:   Diagnosis Date    Anxiety     Back pain     back surgery x 4    [facility]     CAD (coronary artery disease)     CAFL [facility]chronic airflow limitation) ([facility]) 11/3/2016    Chronic bronchitis ([facility])     Chronic pain     Colitis     Complicated migraine     DDD (degenerative disc disease), lumbar 12/22/2016    Depression     Endometriosis     Excessive caffeine abuse, continuous ([address] 7/6/2018    Gastritis     GERD (gastroesophageal reflux disease)     History of myocardial infarction     HTN (hypertension), benign 2/19/2016    Impaired mobility and activities of daily living     Over weight     PTSD (post-traumatic stress disorder)     Sensory neuropathy 12/22/2016    Somatization disorder     TIA (transient ischemic attack)     Tobacco abuse     Vasospastic angina ([facility] [address] 11/11/2016       FAMILY/SOCIAL HISTORY:  Family History   Problem Relation Age of 01/07/21 1645    lidocaine 4 % external patch 3 patch, 3 patch, Transdermal, Daily, Talisha Scullin, DO, 1 patch at 01/08/21 1142    glucose (GLUTOSE) 40 % oral gel 15 g, 15 g, Oral, PRN, Jessica Prettyst, APRN - CNP    dextrose 50 % IV solution, 12.5 g, Intravenous, PRN, Jessica Prettyst, APRN - CNP    glucagon (rDNA) injection 1 mg, 1 mg, Intramuscular, PRN, Jessica Luna, APRN - CNP    dextrose 5 % solution, 100 mL/hr, Intravenous, PRN, Jessica Prettyst, APRN - CNP    insulin glargine (LANTUS) injection vial 22 Units, 0.25 Units/kg, Subcutaneous, Nightly, NIKI Rose - CNP, 22 Units at 01/07/21 2108    cariprazine hcl (VRAYLAR) capsule 3 mg, 3 mg, Oral, Daily, Jessica Luna, NIKI - CNP, 3 mg at 01/08/21 0850    metFORMIN (GLUCOPHAGE) tablet 500 mg, 500 mg, Oral, BID , Jessica Luna APRN - CNP, 500 mg at 01/08/21 0851    alogliptin (NESINA) tablet 6.25 mg, 6.25 mg, Oral, Daily, Jessica Luna, APRN - CNP, 6.25 mg at 01/08/21 0850    lithium capsule 300 mg, 300 mg, Oral, TID , Jessica Luna APRN - CNP, 300 mg at 01/08/21 1315    insulin lispro (HUMALOG) injection vial 0-12 Units, 0-12 Units, Subcutaneous, TID , Jessica Luna APRN - CNP, 2 Units at 01/08/21 0900    insulin lispro (HUMALOG) injection vial 0-6 Units, 0-6 Units, Subcutaneous, Nightly, NIKI Rose - CNP, Stopped at 01/06/21 2159    aspirin EC tablet 81 mg, 81 mg, Oral, Daily, 81 mg at 01/08/21 0851 **OR** aspirin suppository 300 mg, 300 mg, Rectal, Daily, Clifm Danette, DO    atorvastatin (LIPITOR) tablet 20 mg, 20 mg, Oral, Nightly, Clifm Danette, DO, 20 mg at 01/07/21 2107    polyethylene glycol (GLYCOLAX) packet 17 g, 17 g, Oral, Daily PRN, Clifm Danette, DO, 17 g at 01/03/21 1605    [DISCONTINUED] promethazine (PHENERGAN) tablet 12.5 mg, 12.5 mg, Oral, Q6H PRN **OR** ondansetron (ZOFRAN) injection 4 mg, 4 mg, Intravenous, Q6H PRN, Clifm Danette, DO    acetaminophen (TYLENOL) tablet 650 mg, 650 mg, Oral, Q4H PRN, [facility] DO, 650 mg at 01/06/21 1108    butalbital-aspirin-caffeine [facility]) capsule 1 capsule, 1 capsule, Oral, Q6H PRN, [facility] DO, 1 capsule at 01/04/21 2225    hydrALAZINE (APRESOLINE) injection 5 mg, 5 mg, Intravenous, Q6H PRN, [facility] DO    isosorbide mononitrate (IMDUR) extended release tablet 30 mg, 30 mg, Oral, Daily, [facility] DO, 30 mg at 01/08/21 0850    nicotine (NICODERM CQ) 14 MG/24HR 1 patch, 1 patch, Transdermal, Daily, [facility] DO, 1 patch at 01/08/21 2212      Examination:  BP (!) 163/82   Pulse 75   Temp 98 °F (36.7 °C)   Resp 15   Ht 5' 7\" (1.702 m)   Wt 200 lb (90.7 kg)   SpO2 98%   BMI 31.32 kg/m²   Gait - steady  Medication side effects(SE): no    Mental Status Examination:    Level of consciousness:  within normal limits   Appearance:  fair grooming and fair hygiene  Behavior/Motor:  no abnormalities noted  Attitude toward examiner:  cooperative  Speech:  normal rate   Mood: anxious and less depressed  Affect:  mood congruent  Thought processes:  linear   Thought content:  Suicidal Ideation:  denies suicidal ideation  Cognition:  oriented to person, place, and time   Concentration intact  Insight fair   Judgement fair     ASSESSMENT:   Patient symptoms are:  [] Well controlled  [x] Improving  [] Worsening  [] No change      Diagnosis:   Principal Problem:    Abnormality of gait and mobility due to recent infarct left thalamus. [facility] admit 12/28/20.   Active Problems:    Bipolar 1 disorder (HCC)    CAD (coronary artery disease)    HTN (hypertension)    Multiple sclerosis (HCC)    Colitis    Anxiety    Chronic pain syndrome     Vitamin B12 deficiency    Chronic obstructive pulmonary disease (HCC)    Sleep apnea    Cerebrovascular accident (CVA) due to stenosis of left middle cerebral artery (HCC)    Dizziness and giddiness    Sensorineural hearing loss (SNHL) of both ears    Tinnitus, bilateral    Vertigo, peripheral, bilateral    Right arm weakness    COPD exacerbation (HCC)    Chest pain    Impaired mobility    Weakness  Resolved Problems:    * No resolved hospital problems. *      LABS:    No results for input(s): WBC, HGB, PLT in the last 72 hours. No results for input(s): NA, K, CL, CO2, BUN, CREATININE, GLUCOSE in the last 72 hours. No results for input(s): BILITOT, ALKPHOS, AST, ALT in the last 72 hours. Lab Results   Component Value Date    LABAMPH Neg 06/14/2020    BARBSCNU Neg 06/14/2020    LABBENZ Neg 06/14/2020    LABBENZ NotDTCD 01/26/2013    LABMETH Neg 06/14/2020    OPIATESCREENURINE Neg 06/14/2020    PHENCYCLIDINESCREENURINE Neg 06/14/2020    ETOH <10 12/22/2020     Lab Results   Component Value Date    TSH 4.070 04/10/2019     Lab Results   Component Value Date    LITHIUM 0.7 01/05/2021     No results found for: VALPROATE, CBMZ      Treatment Plan:  Reviewed current Medications with the patient. Continue current medications  Risks, benefits, side effects, drug-to-drug interactions and alternatives to treatment were discussed.   Follow-up with the [facility] on discharge  Patient can be discharged from psychiatric standpoint        Electronically signed by Karly Whyte MD on 1/8/2021 at 1:46 PM

## 2021-01-08 NOTE — PLAN OF CARE
Problem: Pain:  Goal: Pain level will decrease  Description: Pain level will decrease  1/8/2021 0912 by Margo Morejon RN  Outcome: Ongoing  1/8/2021 0209 by Kranthi Elder RN  Outcome: Ongoing  Goal: Control of acute pain  Description: Control of acute pain  1/8/2021 0912 by Margo Morejon RN  Outcome: Ongoing  1/8/2021 0209 by Kranthi Elder RN  Outcome: Ongoing  Goal: Control of chronic pain  Description: Control of chronic pain  1/8/2021 0912 by Margo Morejon RN  Outcome: Ongoing  1/8/2021 0209 by Kranthi Elder RN  Outcome: Ongoing     Problem: Falls - Risk of:  Goal: Will remain free from falls  Description: Will remain free from falls  1/8/2021 0912 by Margo Morejon RN  Outcome: Ongoing  1/8/2021 0209 by Kranthi Elder RN  Outcome: Ongoing  Goal: Absence of physical injury  Description: Absence of physical injury  1/8/2021 0912 by Margo Morejon RN  Outcome: Ongoing  1/8/2021 0209 by Kranthi Elder RN  Outcome: Ongoing     Problem: IP COMMUNICATION/DYSARTHRIA  Goal: LTG - patient will improve expressive language skills to allow for communication of wants and needs in daily activities  1/8/2021 0209 by Kranthi Elder RN  Outcome: Ongoing     Problem: IP SWALLOWING  Goal: LTG - patient will tolerate the least restrictive diet consistency to allow for safe consumption of daily meals  1/8/2021 0209 by Kranthi Elder RN  Outcome: Ongoing     Problem: Skin Integrity:  Goal: Will show no infection signs and symptoms  Description: Will show no infection signs and symptoms  1/8/2021 0209 by Kranthi Elder RN  Outcome: Ongoing  Goal: Absence of new skin breakdown  Description: Absence of new skin breakdown  1/8/2021 0209 by Kranthi Elder RN  Outcome: Ongoing

## 2021-01-08 NOTE — PROGRESS NOTES
[facility]  Facility[facility]Department: [facility]  Speech Language Pathology   Treatment Note          Ceferino Abdi  1970  [id number]        Rehab Dx/Hx: Impaired mobility and ADLs [Z74.09, Z78.9]  Impaired mobility [Z74.09]    Precautions: falls    Medical Dx: Impaired mobility and ADLs [Z74.09, Z78.9]  Impaired mobility [Z74.09]  Speech Dx: Dysphagia, Dysarthria and Cognitive Linguistic Impairment     Date: 1/8/2021    Subjective:  Alert and Cooperative        Pt arrived late to rehab tx room, pt was receiving medication. Interventions used this date:  Speech Production and Cognitive Skill Development    Objective/Assessment:  Patient progressing towards goals:  Short-term Goals  Timeframe for Short-term Goals: 1-2 weeks  Goal 1: Pt will complete verbal reasoning tasks (similarities/differences, word deduction, divergent naming) with 80% acc given cues as needed in order to promote effective communication of wants and needs. Similarities/differences: 93% accuracy when given common objects (I.e., pen, pencil, car, wheels)    Goal 2: Pt will be educated on 3 memory strategies and will state their use in functional activities of daily living with 80% acc given cues as needed in order to promote safety with the completion of ADLs. Strategy of association introduced and practiced x3. Pt reminded of previously learned memory strategy - chunking. Goal 3: Pt will complete mid level problem solving tasks related to ADLs with 80% acc given cues as needed in order to promote safety with the completion of ADLs. Home situations: 80% acc independently, Pt did answer once with \"call 911\" when not necessary. Pt did state that she would ask for assistance if it was something she knows she is unable to do alone. Goal 4: Pt will complete oral motor drills at phrase level given model with 100% acc to improve articulator movement and coordination to improve speech intelligibilty.   Reading short sentences: 60% acc- when SLP underlined the sentence Pt increased to 70% accuracy. Pt continued to interchange words during last two directions, when SLP broke down the two step direction, Pt was able to read and follow the direction independently. Goal 5: Pt will follow 2+ step directions given orally with 80% accuracy with min cues to increase the pt's ability to follow directions provided by caregivers for safe follow through with ADLs. 2 step orally: 100% acc independently  2 step written directions: 75% accuracy. Pt able to follow two step written directions at the beginning, during the last two directions Pt appeared to have increased difficulty breaking down the directions and reading. SLP underlined each part of the direction and had the Pt read direction again, after breaking down direction Pt was able to independently follow. Goal 6: Pt will state the PIPPA, month, location, situation, and year with 90% acc with use of external aid in order to promote orientation secondary to cognitive deficits. Pt able to state day of the week, month, location, situation, and year independently. Pt able to elicit previous holiday and upcoming holiday independently. Long-term Goals  Timeframe for Long-term Goals: 2 weeks  Goal 1: Pt will improve cognitive-linguistic and speech production abilities to clear express wants/needs and improve understanding during daily life. Short-term Goals  Timeframe for Short-term Goals: 1-2 weeks  Goal 1: Pt will tolerate regular consistencies and thin liquids with no overt s/s of aspiration and adequate oral clearance of bolus in all given opportunities. Not addressed   Goal 2: Pt will complete oral motor strengthening exercises with 90% acc given cues as needed in order to increase lingual/labial musuclature and decrease risk of pocketing.   Not addressed   Goal 3: Pt will complete tongue press exercise x10 in order to promote anterior to posterior transit of bolus and decrease risk of aspiration. Not addressed   Goal 4: Pt will implement safe swallow strategies (small bites/sips, slow rate) with 90% acc given cues as needed in order to decrease risk of aspiration. Not addressed   Compensatory Swallowing Strategies: Small bites/sips, Eat/Feed slowly      Treatment/Activity Tolerance:  Patient tolerated treatment well    Plan:  Continue per POC    Pain Assessment:  Initial Assessment:  Patient denies pain. Re-assessment:  Patient denies pain. Patient/Caregiver Education:  Patient educated on session and progression towards goals. Safety Devices:   All fall risk precautions in place - SLP transported pt to [address] [place] [address] Way ([facility]):    SWALLOWING  Rating:  DNT    SPOKEN LANGUAGE COMPREHENSION  Ratin    SPOKEN LANGUAGE EXPRESSION  Ratin    MOTOR SPEECH  Ratin    PROBLEM SOLVING  Ratin    MEMORY  Ratin          Therapy Time  SLP Individual Minutes  Time In: 9156  Time Out: 1153  Minutes: 28   Co mins  Speech: 10 mins           Signature: Electronically signed by ROSA MARIA Escalera on 2021 at 12:01 PM

## 2021-01-08 NOTE — CARE COORDINATION
Spoke with patient in regards to discharge, patient is requesting OP therapy at [address] [place] [address]. Orders placed.  Electronically signed by Supriya Louis RN on 1/8/2021 at 5:43 PM

## 2021-01-08 NOTE — PROGRESS NOTES
Physical Therapy  Facility/Department: [facility]  Rehabilitation Discharge note    NAME: Beto Ventura  : 1970  MRN: [id number]    Date of discharge: 21    Subjective: Pt reports she feels ready for discharge    Past Medical History:   Diagnosis Date    Anxiety     Back pain     back surgery x 4    [facility]     CAD (coronary artery disease)     [facility] 11/3/2016    Chronic bronchitis (HCC)     Chronic pain     Colitis     Complicated migraine     DDD (degenerative disc disease), lumbar 2016    Depression     Endometriosis     Excessive caffeine abuse, continuous ([facility] [address] [facility] 2018    Gastritis     GERD (gastroesophageal reflux disease)     History of myocardial infarction     HTN (hypertension), benign 2016    Impaired mobility and activities of daily living     Over weight     PTSD (post-traumatic stress disorder)     Sensory neuropathy 2016    Somatization disorder     TIA (transient ischemic attack)     Tobacco abuse     Vasospastic angina ([facility] [address] [facility] 2016     Past Surgical History:   Procedure Laterality Date    BACK SURGERY      x2     SECTION      x2    CHOLECYSTECTOMY  15    Lapchole    COLONOSCOPY N/A 2020    COLONOSCOPY DIAGNOSTIC performed by Tanvi Dietz MD at [address] [place] [address]  3/7/16    Dr. Elliott Marrero Left     UPPER GASTROINTESTINAL ENDOSCOPY  2014    ANIBAL HOUSE M.D.   [facility] UPPER GASTROINTESTINAL ENDOSCOPY N/A 2020    EGD ESOPHAGOGASTRODUODENOSCOPY performed by Tanvi Dietz MD at [facility]       Restrictions  Restrictions/Precautions  Restrictions/Precautions: Fall Risk  Required Braces or Orthoses?: Yes  Required Braces or Orthoses  Right Upper Extremity Brace/Splint: Sling  Right Upper Extremity Brace/Splint: RUE immobilizer in place - pt reports rotator cuff repair done Dec 1    Objective    Bed mobility  Rolling to Left: Modified independent  Rolling to Right: Unable to assess  Supine to Sit: Modified independent  Sit to Supine: Modified independent  Scooting: Modified independent  Comment: HOB flat without use of bedrails    Transfers  Sit to Stand: Independent  Stand to sit: Independent  Bed to Chair: Independent  Car Transfer: Independent  Comment: cues for technique and safety required    Ambulation  Ambulation?: Yes  More Ambulation?: Yes  Ambulation 1  Surface: level tile, uneven, carpet, ramp  Device: No Device  Other Apparatus: (R arm sling)  Assistance: Supervision  Quality of Gait: Reciprocal pattern decreased foot clearance as patient shuffle  Gait Deviations: Decreased step length, Decreased step height  Distance: 250'  Comments: focus on quality of gait and improved stability/safety    Stairs/Curb  Stairs?: Yes  Stairs  # Steps : 13  Stairs Height: 6\"  Rails: None  Device: No Device  Assistance: Supervision, Modified independent   Comment: Reciprocal pattern good safety      Outcomes Measures:  [place] Balance Score: 9  Dynamic Gait Total Score: 20       Pt/ family education/training: Pt demonstrated good follow thru with education provided for good safety. She demonstrates occasional dizziness which does limit her functional status but only for a short time. Pt adjusts at these time to perform less intense tasks. Pt was instructed in HEP and demonstrated indep performance    Assessment: Pt demonstrated good gains during her Rehab stay. She has met goals established except she needed use of rail for stairs for safety        LTG established:  Long term goal 1: Pt will demonstrate bed mobility indep.   Long term goal 2: indep bed and car transfers  Long term goal 3: Pt will demonstrate amb  indep with or without 50 feet - supervision 150 feet  Long term goal 4: Pt will exhibit improved dynamic balance evidenced of 20/24 DGI for decreased risk for falls and safe d/c home alone. Long term goal 5: Pt will demonstrate stair negotiation 5 steps without rails indep to allow pt to enter and exit her home safely.     Discharge Plan: d/c to home with follow up PT recommended        Electronically signed by Jalyn Styles PT on 1/8/2021 at 4:38 PM

## 2021-01-08 NOTE — PROGRESS NOTES
[facility] Neurology Daily Progress Note  Name: Uziel Lord  Age: 52 y.o. Gender: female  CodeStatus: Full Code  Allergies: Latex  Influenza Vaccines  Eggs Or Egg-Derived Products  Gabapentin  Pneumococcal Vaccines  Povidone Iodine  Varicella Virus Vaccine Live    Chief Complaint:No chief complaint on file. Primary Care Provider: Singh Riley MD  InpatientTreatment Team: Treatment Team: Attending Provider: Erik Martínez DO; Consulting Physician: Madhavi Maede MD; Utilization Reviewer: Syeda Swartz RN; Consulting Physician: Elizabeth Kaur MD; Consulting Physician: Red Griggs MD; Registered Nurse: Bevin Gosselin, RN; Patient Care Tech: Vickii Banter; Occupational Therapist Assistant: MARLYN Poe; Occupational Therapist Assistant: MARLYN Faulkner; Registered Nurse: Rambo Blancas RN  Admission Date: 12/28/2020      HPI Pt seen and examined on rehab unit for neurology follow-up for acute thalamic CVA with expressive aphasia and headache.  Patient currently alert and oriented x3, no acute distress, cooperative.  Continues to have expressive aphasia. No new focal deficits. No seizure activity reported. No recurrent episodes of dizziness or dysarthria. Patient doing well and preparing for discharge tomorrow. Vitals:    01/08/21 1345   BP: 138/83   Pulse: 77   Resp:    Temp:    SpO2:       Review of Systems   Constitutional: Negative for appetite change, chills, fatigue and fever. HENT: Negative for hearing loss and trouble swallowing. Eyes: Negative for visual disturbance. Respiratory: Negative for cough, chest tightness, shortness of breath and wheezing. Cardiovascular: Negative for chest pain, palpitations and leg swelling. Gastrointestinal: Negative for nausea and vomiting. Musculoskeletal: Negative for gait problem. Skin: Negative for color change and rash.    Neurological: Negative for dizziness, tremors, seizures, syncope, facial asymmetry, speech difficulty, weakness, light-headedness, numbness and headaches. Psychiatric/Behavioral: Negative for agitation, confusion and hallucinations. The patient is not nervous/anxious. She has weakness on the right side  Physical Exam  Vitals signs and nursing note reviewed. Constitutional:       General: She is not in acute distress. Appearance: She is not diaphoretic. HENT:      Head: Normocephalic and atraumatic. Eyes:      Pupils: Pupils are equal, round, and reactive to light. Cardiovascular:      Rate and Rhythm: Normal rate and regular rhythm. Pulmonary:      Effort: Pulmonary effort is normal. No respiratory distress. Breath sounds: Normal breath sounds. Abdominal:      General: Bowel sounds are normal. There is no distension. Palpations: Abdomen is soft. Tenderness: There is no abdominal tenderness. Skin:     General: Skin is warm and dry. Neurological:      Mental Status: She is alert and oriented to person, place, and time. Cranial Nerves: No cranial nerve deficit. Motor: No weakness, tremor or seizure activity. She has weakness on the right  side.         Medications:  Reviewed    Infusion Medications:    dextrose       Scheduled Medications:    losartan  50 mg Oral Daily    folic acid  1 mg Oral Daily    apixaban  5 mg Oral BID    docusate sodium  200 mg Oral BID    Vitamin D  2,000 Units Oral Dinner    lidocaine  3 patch Transdermal Daily    insulin glargine  0.25 Units/kg Subcutaneous Nightly    cariprazine hcl  3 mg Oral Daily    metFORMIN  500 mg Oral BID WC    alogliptin  6.25 mg Oral Daily    lithium  300 mg Oral TID WC    insulin lispro  0-12 Units Subcutaneous TID WC    insulin lispro  0-6 Units Subcutaneous Nightly    aspirin  81 mg Oral Daily    Or    aspirin  300 mg Rectal Daily    atorvastatin  20 mg Oral Nightly    isosorbide mononitrate  30 mg Oral Daily    nicotine  1 patch Transdermal Daily     PRN Meds: lactulose, oxyCODONE, glucose, dextrose, glucagon (rDNA), dextrose, polyethylene glycol, [DISCONTINUED] promethazine **OR** ondansetron, acetaminophen, butalbital-aspirin-caffeine, hydrALAZINE    Labs:   No results for input(s): WBC, HGB, HCT, PLT in the last 72 hours. No results for input(s): NA, K, CL, CO2, BUN, CREATININE, CALCIUM, PHOS in the last 72 hours. Invalid input(s): MAGNES  No results for input(s): AST, ALT, BILIDIR, BILITOT, ALKPHOS in the last 72 hours. No results for input(s): INR in the last 72 hours. No results for input(s): Jaden Chuckon in the last 72 hours. Urinalysis:   Lab Results   Component Value Date    NITRU Negative 12/30/2020    WBCUA 10-20 03/01/2020    BACTERIA RARE 03/01/2020    RBCUA 3-5 03/01/2020    BLOODU Negative 12/30/2020    SPECGRAV 1.009 12/30/2020    GLUCOSEU Negative 12/30/2020       Radiology:   Most recent    EEG No procedure found. MRI of Brain   Results for orders placed during the hospital encounter of 04/15/16   MRI Brain W WO Contrast    Narrative EXAMINATION MRI BRAIN     CLINICAL HISTORY Left-sided weakness and numbness, headache,  dizziness, history of multiple sclerosis     COMPARISONS 8/31/15     FINDINGS Multiplanar, multisequence images were obtained without  contrast followed by IV contrast consisting of 15 cc OptiMARK. Sinuses  and mastoid air cells are clear. Orbital contents are normal. There is  no shift of the midline structures. No abnormal contrast-enhancing  lesions are identified. No intracranial mass is seen. There is no  evidence of acute infarction or hemorrhage. Ventricular system appears  normal. FLAIR images demonstrate a single focus of bright signal  intensity in the right cerebral peduncle similar to the prior study. No  associated contrast enhancement is seen. IMPRESSION NO ACUTE INTRACRANIAL PROCESS. A SINGLE SUBCENTIMETER FOCUS  OF BRIGHT SIGNAL INTENSITY IN THE RIGHT CEREBRAL PEDUNCLE IS AGAIN  NOTED.  THERE IS NO EVIDENCE OF CONTRAST ENHANCEMENT. FINDINGS ARE  SUSPICIOUS FOR A SMALL DEMYELINATING PLAQUE SECONDARY TO MULTIPLE  SCLEROSIS. NO OTHER DEFINITE PLAQUES ARE IDENTIFIED AT THIS TIME. OVERALL APPEARANCE OF THE BRAIN IS UNCHANGED FROM THE PRIOR STUDY. Doe Gurwinder Morton M.D. Released Dash Morton M.D. Released Date Time- 04/18/16 0555   This document has been electronically signed. ------------------------------------------------------------------------------     Results for orders placed during the hospital encounter of 12/22/20   MRI BRAIN WO CONTRAST    Narrative EXAMINATION:  MRI BRAIN WO CONTRAST    HISTORY:   new CVA . I63.9 Stroke determined by clinical assessment ([facility] [address] ICD10    TECHNIQUE:  Routine noncontrast MRI protocol including diffusion and gradient echo images. COMPARISON: MRI brain 12/23/2020. RESULT:    Acute Change:   Restricted diffusion involving the left thalamus, with corresponding increased T2/FLAIR signal at this site, measuring approximately 1.6 x 0.8 cm, similar to recent prior MRI. No new areas of restricted diffusion       Hemorrhage:    Small rounded area of susceptibility left occipital lobe, unchanged. Mass Lesion/ Mass Effect:    No evidence of an intracranial mass or extra-axial fluid collection. No significant mass effect. Chronic Change:  Scattered punctate foci of increased T2 and FLAIR signal are noted in the supratentorial white matter which is a nonspecific finding, but likely represents minimal chronic microvascular ischemia. Parenchyma:   No significant volume loss for age. The brain parenchyma is otherwise within normal limits of signal intensity and morphology. Ventricles:     Normal caliber and morphology. Skull Base:    Hypothalamic and pituitary region are grossly normal.   Craniocervical junction is normal. No significant marrow replacement process.     Vasculature:    Major intracranial arterial structures, and dural venous sinuses show typical flow void, suggesting patency by spin echo criteria. Other:  The visualized paranasal sinuses are clear. Mastoid air cells clear. The orbits are unremarkable. Soft tissues are unremarkable. Impression Recent infarction involving the left thalamus, overall unchanged from MRI 12/23/2020. No new infarcts from the prior MRI. MRA of the Head and Neck: No results found for this or any previous visit. No results found for this or any previous visit. Results for orders placed during the hospital encounter of 08/06/19   mra head w/o contrast    Narrative EXAM:     MRI of the brain without contrast    MRA of the brain without contrast    History: TIA. Bilateral arm numbness. Headache. Blurry vision. Weakness of the left arm and leg. Technique: Multiplanar multisequence MRI of the brain was performed without contrast. Axial 3-D time-of-flight images of the brain were obtained without contrast. 3-D volume rendered images were performed for evaluation of the cerebral vasculature. Comparison: CT brain from August 6, 2019 and MRI brain from November 7, 2018    Findings:    MRI brain:    Unchanged nonspecific 3 mm hyperintense T2 focus within the right brachium pontis. Unchanged nonspecific 5 mm hyperintense T2/FLAIR focus within the cortex of the right frontal lobe, not significant changed from prior examination. Otherwise no suspicious   white matter lesions. Brain volume is age-appropriate. Ventricular morphology is within normal limits. No acute hemorrhage, mass, mass effect, midline shift, or abnormal extra-axial fluid collection. The posterior fossa is within normal limits. There is no diffusion restriction. No susceptibility artifact is identified on the gradient echo sequence. Cranial nerves 7/8 complexes appear grossly unremarkable.   The visualized paranasal sinuses and bilateral duplex: No results found for this or any previous visit. No results found for this or any previous visit. Results for orders placed during the hospital encounter of 08/06/19   US CAROTID ARTERY BILATERAL    Narrative BILATERAL DUPLEX CAROTID ULTRASOUND    CLINICAL INFORMATION:  SMOKING HISTORY, HYPERTENSION, TIA WITH LEFT ARM NUMBNESS AND TINGLING, FACIAL DROOP, EVALUATE FOR POSSIBLE CAROTID ARTERY STENOSIS IN THE NECK. COMPARISON:  NONE AVAILABLE     FINDINGS:  The bilateral carotid duplex ultrasound study demonstrates the following:                                                                 RIGHT            LEFT    Carotid plaque burden                         Mild               Mild  Proximal common carotid artery           171 cm/s            177 cm/s    Mid common carotid artery                   151 cm/s            152 cm/s    Distal common carotid artery                90 cm/s           118 cm/s  Proximal internal carotid artery             78 cm/s            130 cm/s  Mid internal carotid artery                      80 cm/s           82 cm/s  Distal internal carotid artery                  65 cm/s             85 cm/s  External carotid artery                            172 cm/s           169 cm/s      ICA/CCA ratio                                         0.5                0.9       Vertebral arteries antegrade flow         Yes                     Yes       Impression 1. MILD CAROTID PLAQUE BURDEN IN BOTH CAROTID BIFURCATIONS IN THE NECK. 2. RIGHT INTERNAL CAROTID ARTERY: <50% STENOSIS. 3. LEFT INTERNAL CAROTID ARTERY: <50% STENOSIS. 4. BILATERALLY PATENT VERTEBRAL ARTERIES WITH ANTEGRADE BLOOD FLOW. Validated velocity measurements with angiographic measurements, velocity criteria are extrapolated from diameter data as defined by the Society of Radiologist in [address] [place] [address] Drive Radiology 2003; [phone].          Echo No results found for this or any previous visit. Assessment/Plan:    Left thalamic stroke with expressive aphasia  CTA negative  Continue dual antiplatelet therapy  Risk factor modification needed  CHAO with bubble study negative for cardioembolic source. Patient continues to have mild headache, but diplopia and photophobia has improved.      Hypercoagulable work-up done.  homocystine level elevated and homozygous positive for MTHFR. Will repeat homocysteine level and consider switching from DAPT to anticoagulation.     As noted patient is hypercoagulable and has homozygous MTHFR with elevated homocystine levels in the context of this to all being positive patient should be on anticoagulation recommended we start her on Eliquis 5 mg twice a day with aspirin.  Examination shows right-sided weakness and same findings. Events noted,      Patient noted to have some dizziness and generalized weakness in therapy today with sitting standing for long periods of time. Vital signs stable. Blood sugar 84 although this is a drop for her. Will assess orthostatic blood pressures. No new focal deficits appreciated currently patient feels better while lying in bed. Patient doing much better today. No new focal deficits. She is due to be discharged home tomorrow. Follow-up 4 to 6 weeks. I have personally performed a face to face diagnostic evaluation on this patient, reviewed all data and investigations, and am the sole provider of all clinical decisions on the neurological status of this patient. Patient's examination shows weakness on the right which is improved considerably. She is now coming more ambulatory and will be discharged home tomorrow. We will keep an eye on her anticoagulation given that she is MTHFR positive with homocystine levels. Steven Stiles MD, Alvaro Moore, American Board of Psychiatry & Neurology  Board Certified in Vascular Neurology  Board Certified in Neuromuscular Medicine  Certified in . [place]  Collaborating physicians: Dr Caron Stiles    Electronically signed by Burnie Kehr, APRN - CNP on 1/8/2021 at 2:48 PM

## 2021-01-08 NOTE — PROGRESS NOTES
Hospitalist Progress Note  1/8/2021 11:54 AM    Assessment and Plan:   1. Generalized weakness, Gait instability and Decreased Functional Status secondary to acute left thalamic CVA: Neurology following. Now on eliquis BID and ASA. Still has residual expressive aphasia, headache and extremity weakness. Diplopia has improved. No new focal deficits   fall precautions. PT OT to evaluate. Maximize nutrition status. Assessing if needs DME at home. SW on board. Completed rehab plan. Going home tomorrow. Spent time in the independent living suite. 2. DMII with hyperglycemia: Patient now has better control of her glucose level. She is attempted to follow proper diet. Metformin, Nesina, 22 units Lantus nightly ISS, hypoglycemia protocol POCT Glucose TIDAC & QHS   3. Elevated liver enzymes: Improved. homozygous for MTHFR mutation. 4. Headaches: Neurology managing. Fiorinal q6hr PRN. 5. Constipation: Patient requests for 200 mg of Colace twice daily. States that she takes this dose at home. Hold for loose stools or diarrhea. Also patient educated as she does have history of colitis. 6. HTN:  Has been better but now higher today. Will have RN recheck. Will need f/u PCP outpatient to adjust as needed. On imdur and losartan. 7. Dysphagia: improving, continue Aspiration precautions. 8. bipolar disorder: Psych following. Continue lithium and Cariprazine. Is followed outpatient at [facility]. [place] level 1/5/2021 was 0.7   9. nicotine abuse and dependence: Counseled for greater than 3 minutes on importance of smoking cessation to reduce risk of associated morbidity and mortality. Nicotine patch. 10. Bowel Regimen and GI PPx: stool softners PRN ordered with hold parameters for loose stools or diarrhea. On antiacid  11. Diet: DIET GENERAL;  12. Advance Directive: Full Code   13. Nutrition status: Supplemental Vitamins ordered. Dietitian assessment  14. Vaccinations: Immunization records reviewed.  If has not received appropriate vaccinations, will order to be given prior to discharge. 15. DVT prophylaxis: eliquis  16. Discharge planning: SW on board. Discharge tentative date 1/9/2021 Med recon done. 17. High Risk Readmission Screening Tool Score Noted. Additionally, the following hospital problems were addressed:  Principal Problem:    Abnormality of gait and mobility due to recent infarct left thalamus. [facility] admit 12/28/20. Active Problems:    Bipolar 1 disorder (HCC)    CAD (coronary artery disease)    HTN (hypertension)    Multiple sclerosis (HCC)    Colitis    Anxiety    Chronic pain syndrome     Vitamin B12 deficiency    Chronic obstructive pulmonary disease (HCC)    Sleep apnea    Cerebrovascular accident (CVA) due to stenosis of left middle cerebral artery (HCC)    Dizziness and giddiness    Sensorineural hearing loss (SNHL) of both ears    Tinnitus, bilateral    Vertigo, peripheral, bilateral    Right arm weakness    COPD exacerbation (HCC)    Chest pain    Impaired mobility    Weakness  Resolved Problems:    * No resolved hospital problems. *      ** Total time spent reviewing medical records, evaluating patient, speaking with RN's and consultants where I was focused exclusively on this patient: 35 minutes. This time is excluding time spent performing procedures or significant events occurring earlier or later in the day requiring my attention and focus. Subjective:   Admit Date: 12/28/2020  PCP: Joshua Carpenter MD    No acute events overnight. Afebrile  Telemetry reviewed. Complains of chronic headaches and constipation. Pt denies chest pain, SOB, N/V, fevers or chills. Participating in ADLs.  Knows she is scheduled for discharge tomorrow    Objective:     Vitals:    01/06/21 1831 01/07/21 0619 01/07/21 1845 01/08/21 0650   BP: 136/80 (!) 148/50 (!) 151/80 (!) 163/82   Pulse: 74 80 81 75   Resp: 18 17 18 15   Temp: 98 °F (36.7 °C) 98 °F (36.7 °C) 98 °F (36.7 °C) 98 °F (36.7 °C)   TempSrc: Oral Oral Oral    SpO2: 92% 93% 93% 98%   Weight:       Height:         General appearance: no acute distress, Patient currently alert and oriented x3, no acute distress, cooperative. She continues to have expressive aphasia and diplopia to right eye improved. Dentition intact. Lungs: CTAB  No exp wheezes, No rales No retractions; No use of accessory muscles  Heart:  S1, S2 normal, RRR, no MRG appreciated  Abdomen: (+) BS, soft, non-tender; non distended no guarding or rigidity. Extremities:  no cyanosis,  no edema bilat lower exts, no calf tenderness bilaterally.  Dry skin note      Medications:      dextrose        losartan  50 mg Oral Daily    folic acid  1 mg Oral Daily    apixaban  5 mg Oral BID    docusate sodium  200 mg Oral BID    Vitamin D  2,000 Units Oral Dinner    lidocaine  3 patch Transdermal Daily    insulin glargine  0.25 Units/kg Subcutaneous Nightly    cariprazine hcl  3 mg Oral Daily    metFORMIN  500 mg Oral BID WC    alogliptin  6.25 mg Oral Daily    lithium  300 mg Oral TID WC    insulin lispro  0-12 Units Subcutaneous TID WC    insulin lispro  0-6 Units Subcutaneous Nightly    aspirin  81 mg Oral Daily    Or    aspirin  300 mg Rectal Daily    atorvastatin  20 mg Oral Nightly    isosorbide mononitrate  30 mg Oral Daily    nicotine  1 patch Transdermal Daily       LABS Reviewed    IMAGING Reviewed    Elvia Reed CNP  [facility] Hospitalist

## 2021-01-08 NOTE — PROGRESS NOTES
Assessment completed earlier in the shift. VSS. Denied pain. LBM 1/7/2021 after receiving enema. HS OT-130. Lantus administered per MAR. Snack provided. Sling to RUE in place. No distress noted. Call light within reach.    Electronically signed by Tova Mart RN on 1/8/2021 at 2:41 AM

## 2021-01-09 VITALS
WEIGHT: 200 LBS | BODY MASS INDEX: 31.39 KG/M2 | RESPIRATION RATE: 18 BRPM | DIASTOLIC BLOOD PRESSURE: 77 MMHG | HEIGHT: 67 IN | OXYGEN SATURATION: 94 % | HEART RATE: 70 BPM | TEMPERATURE: 97 F | SYSTOLIC BLOOD PRESSURE: 171 MMHG

## 2021-01-09 LAB
GLUCOSE BLD-MCNC: 112 MG/DL (ref 60–115)
GLUCOSE BLD-MCNC: 123 MG/DL (ref 60–115)
PERFORMED ON: ABNORMAL
PERFORMED ON: NORMAL

## 2021-01-09 PROCEDURE — 6370000000 HC RX 637 (ALT 250 FOR IP): Performed by: PHYSICAL MEDICINE & REHABILITATION

## 2021-01-09 PROCEDURE — 6370000000 HC RX 637 (ALT 250 FOR IP): Performed by: NURSE PRACTITIONER

## 2021-01-09 PROCEDURE — 6370000000 HC RX 637 (ALT 250 FOR IP): Performed by: INTERNAL MEDICINE

## 2021-01-09 PROCEDURE — 6370000000 HC RX 637 (ALT 250 FOR IP): Performed by: STUDENT IN AN ORGANIZED HEALTH CARE EDUCATION/TRAINING PROGRAM

## 2021-01-09 RX ORDER — OXYCODONE HYDROCHLORIDE 5 MG/1
5 TABLET ORAL EVERY 4 HOURS PRN
Qty: 20 TABLET | Refills: 0 | Status: SHIPPED | OUTPATIENT
Start: 2021-01-09 | End: 2021-01-12

## 2021-01-09 RX ADMIN — ALOGLIPTIN 6.25 MG: 6.25 TABLET, FILM COATED ORAL at 08:58

## 2021-01-09 RX ADMIN — LITHIUM CARBONATE 300 MG: 300 CAPSULE, GELATIN COATED ORAL at 08:58

## 2021-01-09 RX ADMIN — FOLIC ACID 1 MG: 1 TABLET ORAL at 08:59

## 2021-01-09 RX ADMIN — LITHIUM CARBONATE 300 MG: 300 CAPSULE, GELATIN COATED ORAL at 11:18

## 2021-01-09 RX ADMIN — LOSARTAN POTASSIUM 50 MG: 50 TABLET, FILM COATED ORAL at 08:58

## 2021-01-09 RX ADMIN — METFORMIN HYDROCHLORIDE 500 MG: 500 TABLET ORAL at 08:59

## 2021-01-09 RX ADMIN — ISOSORBIDE MONONITRATE 30 MG: 30 TABLET, EXTENDED RELEASE ORAL at 08:58

## 2021-01-09 RX ADMIN — ASPIRIN 81 MG: 81 TABLET, COATED ORAL at 08:58

## 2021-01-09 RX ADMIN — DOCUSATE SODIUM 200 MG: 100 CAPSULE ORAL at 08:58

## 2021-01-09 RX ADMIN — APIXABAN 5 MG: 5 TABLET, FILM COATED ORAL at 08:58

## 2021-01-09 RX ADMIN — CARIPRAZINE 3 MG: 1.5 CAPSULE, GELATIN COATED ORAL at 08:58

## 2021-01-09 ASSESSMENT — PAIN SCALES - GENERAL: PAINLEVEL_OUTOF10: 0

## 2021-01-09 NOTE — DISCHARGE SUMMARY
[id number] [facility] Course: The patient was admitted to the Rehabilitation Unit to address ADL and mobility deficits. The patient was enrolled in acute PT, OT program.  Weekly team meetings were held to assess functional progress toward their goals. The patient's medical issues were addressed. The patient progressed in the rehab program and is now ready for discharge. Refer to FIM scores summary report for detailed functional status. Greater than 35 minutes was spent on coordinating patients discharge including follow-up care, medications and patient/family education. Extended time needed because of the potential use of opiate medications are high risk medications and a high risk population individual.  Patient and family were instructed to use lowest effective dose of these medications and slowly titrate off over the next 2 to 4 weeks. They are not to combine opiates with sedatives. I reviewed her [facility] prescription monitoring service data sheets in hopes of eliminating polypharmacy and weaning to the lowest effective dose of pain medications and eliminating the concomitant use of benzodiazepines. I see no medications of concern. I see no habits of combining sedatives and narcotics. Subjective: The patient complains of severe acute on chronic recurrent right-sided weakness and aphasia dysphagia partially relieved by medications, SLP, Pt, OT, and rest and exacerbated by recent illness including a repeat CVA left thalamic region with worsening thalamic pain. ROS x10: The patient also complains of severely impaired mobility and activities of daily living. Otherwise no new problems with vision, hearing, nose, mouth, throat, dermal, cardiovascular, GI, , pulmonary, musculoskeletal, psychiatric or neurological. See Rehab H&P on Rehab chart dated .        Vital signs:  BP (!) 171/77   Pulse 70   Temp 97 °F (36.1 °C)   Resp 18   Ht 5' 7\" (1.702 m)   Wt 200 lb (90.7 kg)   SpO2 94% BMI 31.32 kg/m²   I/O:   PO/Intake:  fair PO intake, no problems observed or reported. Bowel/Bladder:  Continent, constipation prescribed GlycoLax  General:  Patient is well developed, adequately nourished, non-obese and     well kempt. HEENT:    PERRLA, hearing intact to loud voice, external inspection of ear     and nose benign. Inspection of lips, tongue and gums severe thrush  Musculoskeletal: No significant change in strength or tone. All joints stable. Inspection and palpation of digits and nails show no clubbing,       cyanosis or inflammatory conditions. Neuro/Psychiatric: Affect: flat but irritable and sleepy. Alert and oriented to person, place and     Situation with mod cues. No significant change in deep tendon reflexes or     Sensation-garbled speech right-sided weakness  Lungs:  Diminished, CTA-B. Respiration effort is normal at rest.     Heart:   S1 = S2, RRR. No loud murmurs. Abdomen:  Soft, non-tender, no enlargement of liver or spleen. Extremities:  No significant lower extremity edema or tenderness. Healing right shoulder surgery  Skin:   Intact to general survey, right shoulder incisions healing well. Rehabilitation:  Physical therapy:   Bed Mobility: Scooting: Modified independent    Transfers: Sit to Stand: Independent  Stand to sit:  Independent  Bed to Chair: Independent, Ambulation 1  Surface: level tile, uneven, carpet, ramp  Device: No Device  Other Apparatus: (R arm sling)  Assistance: Supervision  Quality of Gait: Reciprocal pattern decreased foot clearance as patient shuffle  Gait Deviations: Decreased step length, Decreased step height  Distance: 250'  Comments: focus on quality of gait and improved stability/safety, Stairs  # Steps : 13  Stairs Height: 6\"  Rails: None  Device: No Device  Assistance: Supervision, Modified independent   Comment: Reciprocal pattern good safety    FIMS:  ,  , Assessment: Patient met DGI goal and shows a good understanding of corresponding increased T2/FLAIR signal at this site, measuring approximately 1.6 x 0.8 cm, similar to recent prior MRI. No new areas of restricted diffusion   Hemorrhage:    Small rounded area of susceptibility left occipital lobe, unchanged. Mass Lesion/ Mass Effect:    No evidence of an intracranial mass or extra-axial fluid collection. No significant mass effect. Chronic Change:  Scattered punctate foci of increased T2 and FLAIR signal are noted in the supratentorial white matter which is a nonspecific finding, but likely represents minimal chronic microvascular ischemia. Parenchyma:   No significant volume loss for age. The brain parenchyma is otherwise within normal limits of signal intensity and morphology. Ventricles:     Normal caliber and morphology. Skull Base:    Hypothalamic and pituitary region are grossly normal.   Craniocervical junction is normal. No significant marrow replacement process. Vasculature:    Major intracranial arterial structures, and dural venous sinuses show typical flow void, suggesting patency by spin echo criteria. Other:  The visualized paranasal sinuses are clear. Mastoid air cells clear. The orbits are unremarkable. Soft tissues are unremarkable. Recent infarction involving the left thalamus, overall unchanged from MRI 12/23/2020. No new infarcts from the prior MRI. Mri Brain   12/23/2020   There are no extra-axial collections. There is no evidence of hemorrhage. There is specific association with signal dropout on ADC map in the left thalamus and region measuring 7 x15 mm indicating subacute ischemia. There is corresponding signal abnormality on T2/FLAIR series. The susceptibility images do not demonstrate evidence of hemosiderin deposition within the brain parenchyma or the leptomeninges. There is preservation of the gray-white matter differentiation.  There are a few scattered foci of T2/FLAIR increased signal in the subcortical and periventricular white matter without associated edema or mass effect. The sulci and ventricles are within normal limits without evidence of hydrocephalus. The midline structures are intact, the corpus callosum is within normal limits. The region of the pineal gland and the sella turcica are unremarkable. There are no space-occupying lesions in the posterior fossa. The basilar cisterns are patent. The craniocervical junction is unremarkable. The visualized portions of the orbits are within normal limits, the globes are intact. The visualized portions of the paranasal sinuses are within normal limits. The calvarium and soft tissues are unremarkable. There is a 7 x 15 mm area of subacute ischemia in the left thalamus. Previous extensive, complex labs, notes and diagnostics reviewed and analyzed. ALLERGIES:    Allergies as of 12/28/2020 - Review Complete 12/28/2020   Allergen Reaction Noted    Latex Itching 03/25/2018    Influenza vaccines Swelling 10/08/2015    Eggs or egg-derived products  10/08/2015    Gabapentin Nausea Only 10/31/2017    Pneumococcal vaccines Hives 01/28/2016    Povidone iodine Itching 10/08/2015    Varicella virus vaccine live Hives 01/28/2016      (please also verify by checking MAR)       Yesterday I evaluated this patient for periodic reassessment of medical and functional status. The patient was discussed in detail at the treatment team meeting focusing on current medical issues, progress in therapies, social issues, psychological issues, barriers to progress and strategies to address these barriers, and discharge planning. See the hand written addendum to rehab progress note. The patient continues to be high risk for future disability and their medical and rehabilitation prognosis continue to be good and therefore, we will continue the patient's rehabilitation course as planned. The patient's tentative discharge date was set. Patient and family education was discussed.   The patient was to bipolar disorder  6. Vitamin D and B12 deficiency-add vitamin B12 and high-dose vitamin D  7. Chronic obstructive pulmonary disease/  COPD exacerbation , Sleep apnea-Pulse oximeter checks to shift dose and titrate oxygen and aerosol treatments monitor for nocturnal hypoxemia, monitor vital signs, oxygen prn.   8.   Dizziness, Vertigo, peripheral, bilateral  with Sensorineural hearing loss (SNHL) of both ears and Tinnitus, bilateral-focus on balance and therapy PT and OT as well as adding speech and language pathology  9. Acute left thalamic CVA with elevated homocystine levels with aphasia dysphagia and right arm weakness-consult neurology-continue PT OT and titrate pain medication for her thalamic pain syndrome--add folic acid. 10. Healing right shoulder surgery-continue shoulder immobilizer follow-up with orthopedics.        Danniel Records MD Corinne Bors, D.O., PM&R     Attending    [address]

## 2021-01-09 NOTE — PLAN OF CARE
Problem: Pain:  Goal: Pain level will decrease  Description: Pain level will decrease  1/8/2021 1941 by Jessi Christie RN  Outcome: Ongoing  1/8/2021 0912 by Jessi Christie RN  Outcome: Ongoing  Goal: Control of acute pain  Description: Control of acute pain  1/8/2021 1941 by Jessi Christie RN  Outcome: Ongoing  1/8/2021 0912 by Jessi Christie RN  Outcome: Ongoing  Goal: Control of chronic pain  Description: Control of chronic pain  1/8/2021 1941 by Jessi Christie RN  Outcome: Ongoing  1/8/2021 0912 by Jessi Christie RN  Outcome: Ongoing     Problem: Falls - Risk of:  Goal: Will remain free from falls  Description: Will remain free from falls  1/8/2021 1941 by Jessi Christie RN  Outcome: Ongoing  1/8/2021 0912 by Jessi Christie RN  Outcome: Ongoing  Goal: Absence of physical injury  Description: Absence of physical injury  1/8/2021 1941 by Jessi Christie RN  Outcome: Ongoing  1/8/2021 0912 by Jessi Christie RN  Outcome: Ongoing     Problem: Skin Integrity:  Goal: Will show no infection signs and symptoms  Description: Will show no infection signs and symptoms  Outcome: Ongoing  Goal: Absence of new skin breakdown  Description: Absence of new skin breakdown  Outcome: Ongoing

## 2021-01-09 NOTE — PLAN OF CARE
Problem: Pain:  Goal: Pain level will decrease  Description: Pain level will decrease  1/8/2021 1941 by Daly Irby RN  Outcome: Ongoing  1/8/2021 0912 by Daly Irby RN  Outcome: Ongoing  Goal: Control of acute pain  Description: Control of acute pain  1/8/2021 1941 by Daly Irby RN  Outcome: Ongoing  1/8/2021 0912 by Daly Irby RN  Outcome: Ongoing  Goal: Control of chronic pain  Description: Control of chronic pain  1/8/2021 1941 by Daly Irby RN  Outcome: Ongoing  1/8/2021 0912 by Daly Irby RN  Outcome: Ongoing     Problem: Falls - Risk of:  Goal: Will remain free from falls  Description: Will remain free from falls  1/8/2021 1941 by Daly Irby RN  Outcome: Ongoing  1/8/2021 0912 by Daly Irby RN  Outcome: Ongoing  Goal: Absence of physical injury  Description: Absence of physical injury  1/8/2021 1941 by Daly Irby RN  Outcome: Ongoing  1/8/2021 0912 by Daly Irby RN  Outcome: Ongoing     Problem: IP COMMUNICATION/DYSARTHRIA  Goal: LTG - patient will improve expressive language skills to allow for communication of wants and needs in daily activities  Outcome: Ongoing     Problem: IP SWALLOWING  Goal: LTG - patient will tolerate the least restrictive diet consistency to allow for safe consumption of daily meals  Outcome: Ongoing     Problem: Skin Integrity:  Goal: Will show no infection signs and symptoms  Description: Will show no infection signs and symptoms  1/8/2021 1951 by Daly Irby RN  Outcome: Ongoing  1/8/2021 1941 by Daly Irby RN  Outcome: Ongoing  Goal: Absence of new skin breakdown  Description: Absence of new skin breakdown  1/8/2021 1951 by Daly Irby RN  Outcome: Ongoing  1/8/2021 1941 by Daly Irby RN  Outcome: Ongoing

## 2021-01-09 NOTE — PLAN OF CARE
Problem: Pain:  Goal: Pain level will decrease  Description: Pain level will decrease  1/9/2021 0317 by Freddy Davey RN  Outcome: Ongoing     Problem: Pain:  Goal: Control of acute pain  Description: Control of acute pain  1/9/2021 0317 by Freddy Davey RN  Outcome: Ongoing     Problem: Pain:  Goal: Control of chronic pain  Description: Control of chronic pain  1/9/2021 0317 by Freddy Davey RN  Outcome: Ongoing     Problem: Falls - Risk of:  Goal: Will remain free from falls  Description: Will remain free from falls  1/9/2021 0317 by Freddy Davey RN  Outcome: Ongoing     Problem: Falls - Risk of:  Goal: Absence of physical injury  Description: Absence of physical injury  1/9/2021 0317 by Freddy Davey RN  Outcome: Ongoing     Problem: IP COMMUNICATION/DYSARTHRIA  Goal: LTG - patient will improve expressive language skills to allow for communication of wants and needs in daily activities  1/9/2021 0317 by Freddy Davey RN  Outcome: Ongoing     Problem: IP SWALLOWING  Goal: LTG - patient will tolerate the least restrictive diet consistency to allow for safe consumption of daily meals  1/9/2021 0317 by Freddy Davey RN  Outcome: Ongoing     Problem: Skin Integrity:  Goal: Will show no infection signs and symptoms  Description: Will show no infection signs and symptoms  1/9/2021 0317 by Freddy Davey RN  Outcome: Ongoing     Problem: Skin Integrity:  Goal: Absence of new skin breakdown  Description: Absence of new skin breakdown  1/9/2021 0317 by Freddy Davye RN  Outcome: Ongoing

## 2021-01-09 NOTE — PLAN OF CARE
Problem: Pain:  Goal: Pain level will decrease  Description: Pain level will decrease  1/9/2021 1108 by Markel Cuevas RN  Outcome: Ongoing  1/9/2021 0317 by Sandro Navarro RN  Outcome: Ongoing  Goal: Control of acute pain  Description: Control of acute pain  1/9/2021 1108 by Markel Cuevas RN  Outcome: Ongoing  1/9/2021 0317 by Sandro Navarro RN  Outcome: Ongoing  Goal: Control of chronic pain  Description: Control of chronic pain  1/9/2021 1108 by Markel Cuevas RN  Outcome: Ongoing  1/9/2021 0317 by Sandro Navarro RN  Outcome: Ongoing     Problem: Falls - Risk of:  Goal: Will remain free from falls  Description: Will remain free from falls  1/9/2021 1108 by Markel Cuevas RN  Outcome: Ongoing  1/9/2021 0317 by Sandro Navarro RN  Outcome: Ongoing  Goal: Absence of physical injury  Description: Absence of physical injury  1/9/2021 1108 by Markel Cuevas RN  Outcome: Ongoing  1/9/2021 0317 by Sandro Navarro RN  Outcome: Ongoing     Problem: IP COMMUNICATION/DYSARTHRIA  Goal: LTG - patient will improve expressive language skills to allow for communication of wants and needs in daily activities  1/9/2021 1108 by Markel Cuevas RN  Outcome: Ongoing  1/9/2021 0317 by Sandro Navarro RN  Outcome: Ongoing     Problem: IP SWALLOWING  Goal: LTG - patient will tolerate the least restrictive diet consistency to allow for safe consumption of daily meals  1/9/2021 1108 by Markel Cuevas RN  Outcome: Ongoing  1/9/2021 0317 by Sandro Navarro RN  Outcome: Ongoing     Problem: Skin Integrity:  Goal: Will show no infection signs and symptoms  Description: Will show no infection signs and symptoms  1/9/2021 1108 by Markel Cuevas RN  Outcome: Ongoing  1/9/2021 0317 by Sandro Navarro RN  Outcome: Ongoing  Goal: Absence of new skin breakdown  Description: Absence of new skin breakdown  1/9/2021 1108 by Markel Cuevas RN  Outcome: Ongoing  1/9/2021 0317 by Sandro Navarro RN  Outcome: Ongoing

## 2021-01-09 NOTE — PROGRESS NOTES
Pt and significant have been very admit about leaving, pt significant other came to nurses station and demanded the pt to leave. Dr. Anderson Santana at pt side at nurses station. D/C instructions were given, pt declined education or reviewing paperwork. Pt was given therapy orders, D/C instructions & prescription. Pt sitting at nurses station waiting for transport. Pt is at baseline and no S/S of distress at this time.  Electronically signed by Magdaleno Reynolds RN on 1/9/2021 at 2:52 PM

## 2021-01-11 ENCOUNTER — VIRTUAL VISIT (OUTPATIENT)
Dept: GASTROENTEROLOGY | Age: 51
End: 2021-01-11
Payer: COMMERCIAL

## 2021-01-11 DIAGNOSIS — R79.89 ABNORMAL LFTS: Primary | ICD-10-CM

## 2021-01-11 DIAGNOSIS — Z86.010 HISTORY OF COLON POLYPS: ICD-10-CM

## 2021-01-11 PROCEDURE — 99442 PR PHYS/QHP TELEPHONE EVALUATION 11-20 MIN: CPT | Performed by: SPECIALIST

## 2021-01-11 ASSESSMENT — ENCOUNTER SYMPTOMS
CONSTIPATION: 0
GASTROINTESTINAL NEGATIVE: 1
ANAL BLEEDING: 0
BLOOD IN STOOL: 0
ABDOMINAL PAIN: 0
RESPIRATORY NEGATIVE: 1
RECTAL PAIN: 0
EYES NEGATIVE: 1
ABDOMINAL DISTENTION: 0
DIARRHEA: 0
VOMITING: 0
NAUSEA: 0

## 2021-01-11 NOTE — PROGRESS NOTES
Gastroenterology Clinic Follow up Visit    Donaldo Cruz  [id number]  Chief Complaint   Patient presents with    Follow-up     Labs f/u. HPI and A/P at last visit summarized below: This was a telephone encounter and patient was at home and I was in my office. She was told it is a billable service and she had agreed for that and this was a patient initiated telephone encounter. Patient had a polyp of the ileocecal valve which was not completely removed, she was scheduled for repeat colonoscopy but in the meantime patient had a stroke and also had shoulder surgery and because of this reason colonoscopy was canceled, currently on a blood thinner. Patient also had abnormal LFTs and she was investigated for viral autoimmune and metabolic liver disease they were all negative, Mani LFT done on December 28 shows normal AST and ALT is minimally elevated. Unclear whether this could have been a drug-induced liver injury. Patient reports no abdominal pain nausea or vomiting, no symptoms of any decompensated liver disease, duration of phone call was 15 minutes    Review of Systems   Constitutional: Negative. HENT: Negative. Eyes: Negative. Respiratory: Negative. Cardiovascular: Negative. Gastrointestinal: Negative. Negative for abdominal distention, abdominal pain, anal bleeding, blood in stool, constipation, diarrhea, nausea, rectal pain and vomiting. No active GI issues   Endocrine: Negative. Genitourinary: Negative. Musculoskeletal: Negative. Skin: Negative. Allergic/Immunologic: Negative for food allergies. Neurological: Negative. Hematological: Negative. Psychiatric/Behavioral: Negative. Past medical history, past surgical history, medication list, social and familyhistory reviewed    not currently breastfeeding.     Physical Exam    Laboratory, Pathology, Radiology reviewed in detail with relevantimportant investigations summarized below:    Recent Labs 12/28/20  0509 12/27/20  0536 12/26/20  0512   WBC 6.8 7.4 6.9   HGB 13.1 13.6 13.8   HCT 38.0 39.3 40.1   MCV 93.5 94.3 95.2    133 140     Lab Results   Component Value Date    ALT 65 (H) 12/28/2020    AST 32 12/28/2020    ALKPHOS 118 12/28/2020    BILITOT 0.4 12/28/2020     Echo Keegan With Bubble Study    Result Date: 1/4/2021 Transesophageal Echocardiography Report (CHAO)  Demographics  Patient Name    Ronda Valentino  Gender               Female                  J  Patient Number  [id number]        Race                                                   Ethnicity  Visit Number    [id number]       Room Number          T469  Corporate ID                    Date of Study        12/28/2020  Accession       [id number]      Referring Physician  Breanne Woods MD  Number  Date of Birth   1970      Sonographer          Chin Lemus ROSS  Age             52 year(s)      Interpreting         Holiday Anand SAGASTUME DO                                  Physician Procedure Type of Study  CHAO procedure:ECHOCARDIOGRAM CHAO WITH BUBBLE STUDY. Procedure Date Date: 12/28/2020 Start: 04:07 PM Study Location: Cath Lab Technical Quality: Adequate visualization Indications:CVA. Patient Status: Routine Contrast Medium: Bubble Study. Amount - 18 ml Height: 67 inches Weight: 192 pounds BSA: 1.99 m^2 BMI: 30.07 kg/m^2 BP: 176/91 mmHg  Procedure Description (Airway Assessment Findings)  The posterior oral pharynx was anesthetized with Exactacain . The probe was placed with without difficulty. Echocardiographic images  were obtained in the upper, middle and lower esophagus. The probe was advanced into the stomach. Transgastric and deep gastric  views were obtained. The aorta was evaluated while removing the probe. The patient was  monitored till the Aldrette score was above 8. There were no obvious complications during the procedure. Throughout sedation, oxygen saturation remained > 93%. Consent: Indication, risks and benefits explained to patient. Patient  agrees to proceed. . Allergies   - Iodine.   - Eggs. - Other allergy:(Flu vaccine, Pneumovax). Conclusions  Summary  Mild (1+) mitral regurgitation is present. No evidence of mitral valve stenosis. Mildly dilated left atrium. No evidence of patent foramen ovale.   Miscellaneous normal findings were EXAMINATION: CTA HEAD W WO CONTRAST, CTA NECK W WO CONTRAST DATE AND TIME:12/23/2020 12:42 AM CLINICAL HISTORY: Stroke  cva  COMPARISON: None TECHNIQUE:Helical CTA of the head was performed from the vertex to the foramen magnum following the uneventful intravenous administration of 100 cc of nonionic contrast without incident. 2-D images were reconstructed in the sagittal and coronal planes. Three Dimensional Maximum Intensity Projection (3D-MIP) images were created. All images were reviewed and primarily archived to PACS workstation. All CT scans at this facility use dose modulation, iterative reconstruction, and/or weight based dosing when appropriate to reduce radiation dose to as low as reasonably achievable. FINDINGS CTA HEAD: Intracranial ICAs: Flow is visualized within the precavernous, cavernous, clinoid and supraclinoid segments of the internal carotid arteries bilaterally    Anterior Cerebral Arteries: The bilaterals  A1 and A2 segments are patent. Middle Cerebral Arteries: Bilateral horizontal, insular, opercular, and cortical segments of the right and left middle cerebral cerebral arteries are patent. Vertebral Arteries And Basilar Artery: There is adequate flow in the intracranial portions of the vertebral arteries and in the basilar artery. Posterior Cerebral Arteries: Bilateral posterior cerebral arteries are patent. Aneurysm No aneurysm or dissection in the anterior or posterior circulations. . Neurocranium The visualized neurocranium is intact. Dural Sinus: As visualized the opacified dural venous sinuses are unremarkable. The major intracranial arterial structures are patent without high-grade stenosis, large vessel cut off, or aneurysm. EXAMINATION: CTA HEAD W WO CONTRAST, CTA NECK W WO CONTRAST DATE AND TIME:12/23/2020 12:42 AM CLINICAL HISTORY: Stroke symptoms   cva  COMPARISON: None TECHNIQUE: Helical CTA of the neck was performed from the aortic arch to the foramen magnum following the uneventful intravenous administration of 100 cc of nonionic contrast without incident. 2-D images were reconstructed in the sagittal and coronal planes. Three Dimensional Maximum Intensity Projection (3D-MIP) images were created. All images were reviewed and primarily archived to PACS workstation. All CT scans at this facility use dose modulation, iterative reconstruction, and/or weight based dosing when appropriate to reduce radiation dose to as low as reasonably achievable. NASCET Criteria were utilized FINDINGS CTA NECK: Aortic Arch: The visualized portions of the aortic arch and proximal arch vessels demonstrates no focal stenosis aneurysm or dissection. Carotids: The common carotid arteries, carotid bifurcations, internal and external carotid arteries are normal in course and caliber. Right  Proximal Internal Carotid Stenosis (% by NASCET Criteria): There is no hemodynamically significant stenosis. Left  Proximal Internal Carotid Stenosis (% by NASCET Criteria): There is no hemodynamically significant stenosis. Vertebral Arteries: Patency: The vertebral arteries are well visualized to the level of the basilar artery. There is no focal stenosis aneurysm or dissection. Vertebral arteries are codominant. IMPRESSION: Negative CTA of the neck.      Ct Head Wo Contrast    Result Date: 12/23/2020 CT Brain Contrast medium:  Not utilized. History:  Right facial droop. Altered mental status Comparison:  August 21, 2020 Findings: Extra-axial spaces:  Normal. Intracranial hemorrhage:  None. Ventricular system: In the interval, an ill-defined 4 x 12 mm area decreased attenuation exerting no mass effect is found medially within the left thalamus (series 2, image 16). Basal Cisterns:  Normal. Cerebral Parenchyma:  Normal. Midline Shift:  None. Cerebellum:  Normal. Paranasal sinuses and mastoid air cells:  Normal. Visualized Orbits:  Normal.     Impression: Interval development of acute/subacute left thalamic infarct. All CT scans at this facility use dose modulation, iterative reconstruction, and/or weight based dosing when appropriate to reduce radiation dose to as low as reasonably achievable.     Cta Neck W Wo Contrast    Result Date: 12/23/2020 The major intracranial arterial structures are patent without high-grade stenosis, large vessel cut off, or aneurysm. EXAMINATION: CTA HEAD W WO CONTRAST, CTA NECK W WO CONTRAST DATE AND TIME:12/23/2020 12:42 AM CLINICAL HISTORY: Stroke symptoms   cva  COMPARISON: None TECHNIQUE: Helical CTA of the neck was performed from the aortic arch to the foramen magnum following the uneventful intravenous administration of 100 cc of nonionic contrast without incident. 2-D images were reconstructed in the sagittal and coronal planes. Three Dimensional Maximum Intensity Projection (3D-MIP) images were created. All images were reviewed and primarily archived to PACS workstation. All CT scans at this facility use dose modulation, iterative reconstruction, and/or weight based dosing when appropriate to reduce radiation dose to as low as reasonably achievable. NASCET Criteria were utilized FINDINGS CTA NECK: Aortic Arch: The visualized portions of the aortic arch and proximal arch vessels demonstrates no focal stenosis aneurysm or dissection. Carotids: The common carotid arteries, carotid bifurcations, internal and external carotid arteries are normal in course and caliber. Right  Proximal Internal Carotid Stenosis (% by NASCET Criteria): There is no hemodynamically significant stenosis. Left  Proximal Internal Carotid Stenosis (% by NASCET Criteria): There is no hemodynamically significant stenosis. Vertebral Arteries: Patency: The vertebral arteries are well visualized to the level of the basilar artery. There is no focal stenosis aneurysm or dissection. Vertebral arteries are codominant. IMPRESSION: Negative CTA of the neck.      Mri Brain Wo Contrast    Result Date: 12/25/2020 EXAMINATION:  MRI BRAIN WO CONTRAST HISTORY:   new CVA . I63.9 Stroke determined by clinical assessment ([facility] [address] ICD10 TECHNIQUE:  Routine noncontrast MRI protocol including diffusion and gradient echo images. COMPARISON: MRI brain 12/23/2020. RESULT: Acute Change:   Restricted diffusion involving the left thalamus, with corresponding increased T2/FLAIR signal at this site, measuring approximately 1.6 x 0.8 cm, similar to recent prior MRI. No new areas of restricted diffusion   Hemorrhage:    Small rounded area of susceptibility left occipital lobe, unchanged. Mass Lesion/ Mass Effect:    No evidence of an intracranial mass or extra-axial fluid collection. No significant mass effect. Chronic Change:  Scattered punctate foci of increased T2 and FLAIR signal are noted in the supratentorial white matter which is a nonspecific finding, but likely represents minimal chronic microvascular ischemia. Parenchyma:   No significant volume loss for age. The brain parenchyma is otherwise within normal limits of signal intensity and morphology. Ventricles:     Normal caliber and morphology. Skull Base:    Hypothalamic and pituitary region are grossly normal.   Craniocervical junction is normal. No significant marrow replacement process. Vasculature:    Major intracranial arterial structures, and dural venous sinuses show typical flow void, suggesting patency by spin echo criteria. Other:  The visualized paranasal sinuses are clear. Mastoid air cells clear. The orbits are unremarkable. Soft tissues are unremarkable. Recent infarction involving the left thalamus, overall unchanged from MRI 12/23/2020. No new infarcts from the prior MRI.      Mri Brain Wo Contrast    Result Date: 12/23/2020 EXAMINATION: MRI BRAIN WO CONTRAST CLINICAL HISTORY:  stroke COMPARISONS: NONE AVAILABLE TECHNIQUE: Multiplanar multisequence images of the brain were obtained without contrast. Diffusion perfusion imaging was obtained. FINDINGS:  There are no extra-axial collections. There is no evidence of hemorrhage. There is specific association with signal dropout on ADC map in the left thalamus and region measuring 7 x15 mm indicating subacute ischemia. There is corresponding signal abnormality on T2/FLAIR series. The susceptibility images do not demonstrate evidence of hemosiderin deposition within the brain parenchyma or the leptomeninges. There is preservation of the gray-white matter differentiation. There are a few scattered foci of T2/FLAIR increased signal in the subcortical and periventricular white matter without associated edema or mass effect. The sulci and ventricles are within normal limits without evidence of hydrocephalus. The midline structures are intact, the corpus callosum is within normal limits. The region of the pineal gland and the sella turcica are unremarkable. There are no space-occupying lesions in the posterior fossa. The basilar cisterns are patent. The craniocervical junction is unremarkable. The visualized portions of the orbits are within normal limits, the globes are intact. The visualized portions of the paranasal sinuses are within normal limits. The calvarium and soft tissues are unremarkable. There is a 7 x 15 mm area of subacute ischemia in the left thalamus. Endoscopic investigations:     Assessment and Plan:  Maris Sandhoff 52 y.o. female for follow up. Abnormal transaminases which appears to be improving, will repeat LFT, history of colon polyp and patient would call us when she is ready to go through the procedure , and she is cleared from her recent shoulder surgery and also when she can temporarily hold her antiplatelet agents.    Diagnosis Orders 1. Abnormal LFTs  Hepatic Function Panel   2. History of colon polyps         No follow-ups on file. Ольга Joy MD   StaffGastroenterologist  [facility]    Please note this report has been partially produced using speech recognitionsoftware  and may cause contain errors related to that system including grammar, punctuation and spelling as well as words andphrases that may seem inappropriate. If there are questions or concerns please feel free to contact me to clarify.

## 2021-01-12 ENCOUNTER — TELEPHONE (OUTPATIENT)
Dept: NEUROLOGY | Age: 51
End: 2021-01-12

## 2021-01-12 NOTE — TELEPHONE ENCOUNTER
eliquis is to expensive , is there something else patient can go on ? She is only on aspirin now and was d/c from [facility] on 1/9/20 for stroke.      Please advise    Thanks sha

## 2021-01-13 RX ORDER — WARFARIN SODIUM 5 MG/1
5 TABLET ORAL DAILY
Qty: 30 TABLET | Refills: 3 | Status: SHIPPED | OUTPATIENT
Start: 2021-01-13 | End: 2021-04-09

## 2021-01-14 ENCOUNTER — TELEPHONE (OUTPATIENT)
Dept: NEUROLOGY | Age: 51
End: 2021-01-14

## 2021-01-14 DIAGNOSIS — I63.9 CEREBROVASCULAR ACCIDENT (CVA), UNSPECIFIED MECHANISM (HCC): Primary | ICD-10-CM

## 2021-01-14 NOTE — TELEPHONE ENCOUNTER
Please sign, I will let patient know that she will need to have INR done on 1/21/2021 since the coumadin clinic can not get her in on time .

## 2021-01-20 ENCOUNTER — HOSPITAL ENCOUNTER (OUTPATIENT)
Dept: PHYSICAL THERAPY | Age: 51
Setting detail: THERAPIES SERIES
Discharge: HOME | End: 2021-01-20
Payer: COMMERCIAL

## 2021-01-20 ENCOUNTER — HOSPITAL ENCOUNTER (OUTPATIENT)
Dept: OCCUPATIONAL THERAPY | Age: 51
Setting detail: THERAPIES SERIES
Discharge: HOME | End: 2021-01-20
Payer: COMMERCIAL

## 2021-01-25 RX ORDER — ISOSORBIDE MONONITRATE 30 MG/1
30 TABLET, EXTENDED RELEASE ORAL DAILY
Qty: 30 TABLET | Refills: 3 | Status: SHIPPED | OUTPATIENT
Start: 2021-01-25 | End: 2021-03-01 | Stop reason: SDUPTHER

## 2021-01-26 ENCOUNTER — TELEPHONE (OUTPATIENT)
Dept: NEUROLOGY | Age: 51
End: 2021-01-26

## 2021-01-26 NOTE — TELEPHONE ENCOUNTER
[facility]  Coumadin [facility] called to inform us that pt did not show up for her appt today. Carolina Reagan also stated that pt still has not had labs done that where ordered by you.      Thank You

## 2021-01-29 ENCOUNTER — HOSPITAL ENCOUNTER (OUTPATIENT)
Dept: PHYSICAL THERAPY | Age: 51
Setting detail: THERAPIES SERIES
Discharge: HOME | End: 2021-01-29
Payer: COMMERCIAL

## 2021-01-29 ENCOUNTER — HOSPITAL ENCOUNTER (OUTPATIENT)
Dept: OCCUPATIONAL THERAPY | Age: 51
Setting detail: THERAPIES SERIES
Discharge: HOME | End: 2021-01-29
Payer: COMMERCIAL

## 2021-01-29 PROCEDURE — 97165 OT EVAL LOW COMPLEX 30 MIN: CPT

## 2021-01-29 PROCEDURE — 97162 PT EVAL MOD COMPLEX 30 MIN: CPT

## 2021-01-29 ASSESSMENT — PAIN SCALES - GENERAL: PAINLEVEL_OUTOF10: 7

## 2021-01-29 NOTE — PROGRESS NOTES
[x] [address]:       [address] [place] [address].  Maria G Hector  Ph: [phone]   Fax: [phone] [] [address] [facility] [address]:  [address] 1401 [facility], [address]   Ph: [phone]  Fax: [phone]       OCCUPATIONAL THERAPY EVALUATION     Evaluation Date:  1/29/2021            OT Eval low complexity 60 minutes for 1 unit, CPT 16307     Patient Name:Pooja Oseguera   Gender: female   YOB: 1970         MRN: [id number]     Physician:  Chayito Garrido  Diagnosis:   Decreased ADL's post CVA  Treating diagnosis: R29.898 Other symptoms and signs involving the musculoskeletal systems (decreased ADL function)                 Referral Date:  1-22-21        Onset Date:  Pt could not remember date of CVA    PMH:  Patient Active Problem List   Diagnosis    H/O cardiac arrest    Bipolar 1 disorder ([address]    Tobacco abuse    SAMAYOA (dyspnea on exertion)    Abnormal ECG    CAD (coronary artery disease)    Displacement of lumbar intervertebral disc without myelopathy    Acid reflux    HTN (hypertension)    Multiple sclerosis ([address]    Colitis    Over weight    Anxiety    Chronic pain syndrome     Vitamin B12 deficiency    Grief at loss of child    Smoking    Neuromyelopathy due to vitamin B12 deficiency ([address]    Chronic obstructive pulmonary disease (HCC)    Acute bronchitis    Sleep apnea    Obesity (BMI 30-39. 9)    Cerebrovascular accident (CVA) due to stenosis of left middle cerebral artery (HCC)    Abnormal auditory perception of left ear    Dizziness and giddiness    Sensorineural hearing loss (SNHL) of both ears    Tinnitus, bilateral    Vertigo, peripheral, bilateral    Right arm weakness    COPD exacerbation (HCC)    Chest pain    Adenomatous polyp of colon    Adenomatous polyp of sigmoid colon    Melena    Tendinitis of right rotator cuff  Abnormality of gait and mobility due to recent infarct left thalamus. [facility] admit 20.     Impaired mobility    Weakness     Past Medical History:   Diagnosis Date    Anxiety     Back pain     back surgery x 4    Bipolar disorder ([facility]     CAD (coronary artery disease)     CAFL (chronic airflow limitation) ([facility]) 11/3/2016    Chronic bronchitis ([facility])     Chronic pain     Colitis     Complicated migraine     DDD (degenerative disc disease), lumbar 2016    Depression     Endometriosis     Excessive caffeine abuse, continuous ([facility]) 2018    Gastritis     GERD (gastroesophageal reflux disease)     History of myocardial infarction     HTN (hypertension), benign 2016    Impaired mobility and activities of daily living     Over weight     PTSD (post-traumatic stress disorder)     Sensory neuropathy 2016    Somatization disorder     TIA (transient ischemic attack)     Tobacco abuse     Vasospastic angina ([facility] 2016     Past Surgical History:   Procedure Laterality Date    BACK SURGERY      x2     SECTION      x2    CHOLECYSTECTOMY  13    Lapchole    COLONOSCOPY N/A 2020    COLONOSCOPY DIAGNOSTIC performed by Ortiz Diaz MD at [address] [place] [address]  3/7/16    Dr. Rosalia Garcia Left     UPPER GASTROINTESTINAL ENDOSCOPY  2014    ANIBAL HOUSE M.D.   [facility] UPPER GASTROINTESTINAL ENDOSCOPY N/A 2020    EGD ESOPHAGOGASTRODUODENOSCOPY performed by Ortiz Diaz MD at [facility]     Allergies   Allergen Reactions    Latex Itching     Pt reports itching on hands after using rubber gloves at work    Influenza Vaccines Swelling     Pt reports her entire arm was red and edematous post vaccine    Eggs Or Egg-Derived Products      Egg based products only    Gabapentin Nausea Only  Pneumococcal Vaccines Hives    Povidone Iodine Itching    Varicella Virus Vaccine Live Hives       Diagnostic imaging: no images    Medications:    Current Outpatient Medications:     isosorbide mononitrate (IMDUR) 30 MG extended release tablet, TAKE 1 TABLET BY MOUTH DAILY, Disp: 30 tablet, Rfl: 3    warfarin (COUMADIN) 5 MG tablet, Take 1 tablet by mouth daily, Disp: 30 tablet, Rfl: 3    apixaban (ELIQUIS) 5 MG TABS tablet, Take 1 tablet by mouth 2 times daily, Disp: 30 tablet, Rfl: 0    SITagliptin (JANUVIA) 25 MG tablet, Take 1 tablet by mouth daily, Disp: 30 tablet, Rfl: 0    insulin glargine (LANTUS) 100 UNIT/ML injection vial, Inject 22 Units into the skin nightly, Disp: 1 vial, Rfl: 3    metFORMIN (GLUCOPHAGE) 500 MG tablet, Take 1 tablet by mouth 2 times daily (with meals), Disp: 60 tablet, Rfl: 3    losartan (COZAAR) 50 MG tablet, Take 1 tablet by mouth daily, Disp: 30 tablet, Rfl: 3    cariprazine hcl (VRAYLAR) 3 MG CAPS capsule, Take 1 capsule by mouth daily, Disp: 30 capsule, Rfl: 0    lithium 300 MG capsule, Take 1 capsule by mouth 3 times daily (with meals), Disp: 90 capsule, Rfl: 3    atorvastatin (LIPITOR) 20 MG tablet, Take 1 tablet by mouth nightly, Disp: 30 tablet, Rfl: 3    folic acid (FOLVITE) 1 MG tablet, Take 1 tablet by mouth daily, Disp: 30 tablet, Rfl: 3    docusate sodium (COLACE, DULCOLAX) 100 MG CAPS, Take 200 mg by mouth 2 times daily, Disp: 120 capsule, Rfl: 2    nicotine (NICODERM CQ) 14 MG/24HR, Place 1 patch onto the skin daily, Disp: 30 patch, Rfl: 3    Vitamin D (CHOLECALCIFEROL) 50 MCG (2000 UT) TABS tablet, Take 1 tablet by mouth Daily with supper, Disp: 60 tablet, Rfl: 2    prazosin (MINIPRESS) 1 MG capsule, Take 1 mg by mouth nightly, Disp: , Rfl:     butalbital-aspirin-caffeine (FIORINAL) -40 MG capsule, Take 1 capsule by mouth every 4 hours as needed for Headaches for up to 10 days. , Disp: 20 capsule, Rfl: 0 Pain:                   Pain Location  Description Initial Rating  Current Rating  Improved by Worsened by   R shoulder  6/10 6/10 Sleeping Movement   :\"Pain all over\"  7/10 7/10 Sleep is the only thing improves it Up and moving                   Max pain at home during activities:   Lowest pain at home during activities/rest:   Pt had chronic pain prior to CVA , Pt also associates pain with MS diagnosis  Action for pain:   Patient reports pain is at acceptable level for treatment. Prior Level of Functioning:    Patient receiving assistance for ADL and IADL activities. and Comments: Pt reports bathing in tub 1x per week and minimal washing up on other day . 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 Work requirements are Pt was looking at [employer] as main part of job working nights  Is this a work related injury: no   Is this a [facility] claim: no      Driving:no      Hobbies, Leisure, social activities: None at this time     Previous OT treatments for this condition: yes.    Inpatient rehab for approx 1 week    History of Present Illness or Pain/ Chief complaint:  CVA  Current Functional Limitations Per Patient Report:   Orientation: Oriented x 2  Communication: severe hearing loss in L ear   Hearing: Pt reports L hearing loss of 89-90% hearing loss but no hearing aid \" can't afford it\"  Perception: No concerns    Vision:  formerly [facility] Pt has appointment to assess vision post CVA for changes in vision needs              Feeding: No concerns  and Comments: all ADL's Pt is more comfortable with supervision from BF due to dizziness    Sleep: \"I can't sleep\" I have CPAP machine but I'm not using it\"    Medication management: No concerns     Patient goal for therapy: Pt' primary reasons for struggles are poor memory, inability to access R UE due to limitations of Orthopedic surgeon             OBJECTIVE FINDINGS Hand Dominance: right       Upper Extremity Strength and Range of Motion   R UE unable to test due to being post surgical and no permission from surgical Physician   L UE is WNL        Hand Range of Motion B UEs WNL  Comments:          Opposition  Right Hand: WNL  Left Hand: WNL             & Pinch Strength  Average of 3 tries Right Norm Left Norm    (lb) NT  50 Female age 39-53: 52 lbs    Levi Pinch (lb) NT  15 Female age 39-53: 11.5 lbs    Lateral Pinch (lb) NT  15 Female age 39-53: 11.5 lbs    Comments:No permission to use resistance on R hand    Coordination & Dexterity   Right Norm Left Norm   Nine Hole Peg Test  (seconds) NT  18 Female age 39-53: 24.2 s           Comments:   R and not tested as Pt does not have permission to remove shoulder splint  Skin Integrity  WFL    Cognition:    Memory Impaired  Processing speed Impaired     Sensation:     WFL    Tone:   WFL     Joint Mobility  Comments: Cannot assess R UE, LUE was WNL    Palpation/Tenderness  Cannot test R UE  Lis WNL    Education/Barriers to learning:     Barriers:cognitive    Education on this date: OT role and POC     ASSESSMENT    Pt is a 52 y.o. female who is limited by orthopedic issues of R shoulder that Orthopedic physician has not yet given permission for therapy. Pt has issues with dizziness that are the primary reason for her need for supervision during ADL's. Pt has very poor memory which she is aware of that influences her daily function and prevents her from social interaction and per her report \"having a life\"    Problems: Pt does not have OT needs at this time, however she certianly needs a ST referral to address cognitive and memory deficits .    Complexity:         [x] Low Complexity:   ¨ History: Brief history including review of medical records relating to the problem  ¨ Exam: 1-3 performance Deficits Throwing a ball: No Difficulty  Carrying a small suitcase with your affected limb: A Little Bit of Difficulty  UEFS Score: 68.75  UEFS Disability Index: 1-19%  UEFS CMS Modifier: CI    Pt's primary challenge is th inability to to R UE  Due to previous orthopedic surgery vs CVA      PLAN OF CARE Pt does not require OT services at this time             JODEE Castro 1/29/2021 2:27 PM   Electronically signed by JODEE Castro on 1/29/2021 at 5:01 PM  Falls Risk Assessment     Age: 0-59 = 0          60-69= 1            > 70= 2 History of Falls:   0  Falls  last 6 mo = 0    1  fall  Last  6 mo = 1   1-3 falls last 6 mo = 2 Medical History:   Parkinsons, CVA,HTN, vertigo, >4 meds, use of assistive device (1pt.for each)  Mental Status:  A & O x 3 = 0  Disoriented to person, place, or time = 2     [x]  INITIAL ASSESSMENT:                                                      Date: 1/29/2021                                                  Age:   0                                                       Falls: 0                                                         PMH: 2                                                          Mental: 0                                                       Total:  2                                                        *Patient 4 or younger:   Vestibular:     Signature: JODEE Castro                                                               The following patient has been evaluated for occupational therapy services. Pt does not require OT but would highly recommend ST due to severe cognitive and memory deficits. Thank you for this referral.

## 2021-01-29 NOTE — PROGRESS NOTES
Danita lares [address]     Ph: [phone]  Fax: [phone]    [x] Certification  [] Recertification []  Plan of Care  [] Progress Note [] Discharge      To:  Dr. Ziyad Bethea      From:  Bishop Lima, PT  Patient: Juma Lora     : 1970  Diagnosis: vertigo, abnormaily of gait and mobility due to recent infarct Lt thalamus     Date: 2021  Treatment Diagnosis: Vertigo, Abnormality of gait       Progress Report Period from:  2021  to 2021    Total # of Visits to Date: 1   No Show: 0    Canceled Appointment: 0     OBJECTIVE:   Long Term Goals - Time Frame for Long term goals : 4-5 weeks  Goals Current/ Discharge status Met   Long term goal 1: Pt will report >/= 50% reduction in dizziness symptoms. TBD [] yes  [] no   Long term goal 2: Chisholm >/= 53/56 and DGI = 24/24 to reduce risk for falls and improve overall safety. Chisholm Balance Score: 51  Dynamic Gait Total Score: 21 [] yes  [] no   Long term goal 3: Pt will ambulate >/= 200' on even and uneven ground with good foot clearance and minimal deviations S/I. Ambulation 1  Surface: carpet  Device: No Device  Assistance: Independent  Quality of Gait: Occasionally steps on her own shoe -states she has always walked that way  Distance: throughout clinic [] yes  [] no   Long term goal 4: Pt will be independent with HEP. ongoing [] yes  [] no   Long term goal 5: VOR WFL with minimal to no symptoms.  Increased symptoms with VOR [] yes  [] no        Body structures, Functions, Activity limitations: Decreased functional mobility , Decreased balance, Vestibular Impairment Assessment: Pt presents after recent hospitalization for a CVA that occurred in December 2020. Pt with difficulty giving a complete history due to decreased memory of event. Pt demonstrates good soledad LE strength seated. Unable to have pt lie down on sides or in prone due to Rt RTC surgery. Pt with mild balance deficits as seen with Chisholm and DGI scores. Pt challenged with DGI tasks due to dizziness with head turns. Pt with normal smooth pursuits and saccades. Pt reports increased symptoms with completing VOR despite (-) soledad Head Thrust test.  Pt would benefit from further skilled PT to improve her balance and reduce her dizziness to improve her QOL. Prognosis: Good  Discharge Recommendations: Continue to assess pending progress      PT Education: Goals;PT Role;Plan of Care    PLAN: [x] Evaluate and Treat  Frequency/Duration:  Plan  Times per week: 1  Plan weeks: 4-5  Current Treatment Recommendations: Strengthening, Balance Training, Functional Mobility Training, Transfer Training, Gait Training, Neuromuscular Re-education, Manual Therapy - Soft Tissue Mobilization, Home Exercise Program, Safety Education & Training, Patient/Caregiver Education & Training, Equipment Evaluation, Education, & procurement, Modalities, Vestibular Rehab      Patient Status:[x] Continue/ Initiate plan of Care    [] Discharge PT. Recommend pt continue with HEP. [] Additional visits requested, Please re-certify for additional visits:          Signature: Electronically signed by Batool Gonzalez PT on 1/29/21 at 5:32 PM EST      If you have any questions or concerns, please don't hesitate to call.   Thank you for your referral.

## 2021-01-29 NOTE — PROGRESS NOTES
[address]  PHYSICAL THERAPY EVALUATION    Date: 2021  Patient Name: Nabor Peres       MRN: [id number]   Account: [de-identified]   : 1970  (52 y.o.)   Gender: female   Referring Practitioner: Dr. Prem Lewis                 Diagnosis: vertigo, abnormaily of gait and mobility due to recent infarct Lt thalamus  Treatment Diagnosis: Vertigo, Abnormality of gait  Additional Pertinent Hx: anxiety, bipolar, CAD, DDD, depression, HTN, PTSD, back pain, MI, MS, COPD             Past Medical History:  has a past medical history of Anxiety, Back pain, Bipolar disorder ([address] CAD (coronary artery disease), CAFL (chronic airflow limitation) ([facility] Chronic bronchitis ([address] Chronic pain, Colitis, Complicated migraine, DDD (degenerative disc disease), lumbar, Depression, Endometriosis, Excessive caffeine abuse, continuous ([address] Gastritis, GERD (gastroesophageal reflux disease), History of myocardial infarction, HTN (hypertension), benign, Impaired mobility and activities of daily living, Over weight, PTSD (post-traumatic stress disorder), Sensory neuropathy, Somatization disorder, TIA (transient ischemic attack), Tobacco abuse, and Vasospastic angina ([address] Past Surgical History:   has a past surgical history that includes Hysterectomy; Dilation and curettage of uterus; back surgery; Neck surgery; shoulder surgery (Left);  section; Cholecystectomy (13); Upper gastrointestinal endoscopy (2014); cyst removal (3/7/16); Coronary angioplasty; Upper gastrointestinal endoscopy (N/A, 2020); and Colonoscopy (N/A, 2020).     Vital Signs  Patient Currently in Pain: Yes   Pain Screening  Patient Currently in Pain: Yes  Pain Assessment  Pain Assessment: 0-10  Pain Level: 7  Pain Location: Shoulder  Pain Orientation: Right     Lives With: Significant other  Type of Home: House  Home Layout: Two level  Home Access: Stairs to enter without rails Entrance Stairs - Number of Steps: 6  ADL Assistance: Independent(increased time and effort)  Homemaking Assistance: Needs assistance  Ambulation Assistance: Independent  Transfer Assistance: Independent  Active : No  Occupation: Other(comment)(Not working due to shoulder surgery)        Subjective:  Subjective: Had a CVA on 12/22/20 and in the hospital for about a month. Went to inpatient rehab and was D/C'd on 1/9/21. Unsure of what all happened there and why she was there. Reports cold weather seems to affect walking and balance. Had Rt RTC surgery in October 13th and December 1st - in a sling. Head is off - feels bigger than it's supposed to be/  Reports head being off makes her balance off. Gets of balance with quick turns. Objective:   Sensation  Overall Sensation Status: WFL    Balance  Comments: Chisholm = 51/56, DGI = 21/24    Ambulation 1  Surface: carpet  Device: No Device  Assistance: Independent  Quality of Gait: Occasionally steps on her own shoe -states she has always walked that way  Distance: throughout clinic  Stairs  # Steps : 4  Stairs Height: 6\"  Rails: None  Assistance: Supervision, Independent  Comment: recip    Strength RLE  Strength RLE: WFL  Strength LLE  Strength LLE: WFL    Exercises:   Exercises  Exercise 1: VOR x1 - sitting and standing*  Exercise 2: Foam*  Exercise 3: Single stepping onto uneven surface*  Exercise 4: DGI tasks*  Exercise 5: Dual tasking*  Exercise 6: Obstacle course*  Exercise 7: Amb over hurdles*  Exercise 8: 90/180/360 deg turns focusing on spotting*  Exercise 20: HEP: VOR x1  *Indicates exercise,modality, or manual techniques to be initiated when appropriate    Assessment:   Body structures, Functions, Activity limitations: Decreased functional mobility , Decreased balance, Vestibular Impairment Assessment: Pt presents after recent hospitalization for a CVA that occurred in December 2020. Pt with difficulty giving a complete history due to decreased memory of event. Pt demonstrates good soledad LE strength seated. Unable to have pt lie down on sides or in prone due to Rt RTC surgery. Pt with mild balance deficits as seen with Chisholm and DGI scores. Pt challenged with DGI tasks due to dizziness with head turns. Pt with normal smooth pursuits and saccades. Pt reports increased symptoms with completing VOR despite (-) soledad Head Thrust test.  Pt would benefit from further skilled PT to improve her balance and reduce her dizziness to improve her QOL. Prognosis: Good  Discharge Recommendations: Continue to assess pending progress  Activity Tolerance: Patient Tolerated treatment well     Decision Making: Medium Complexity  History: PMH: anxiety, bipolar, CAD, DDD, depression, HTN, PTSD, back pain, MI, MS, COPD  Exam: Decreased balance with dizziness impacting mobility and safety. Clinical Presentation: Evolving        Plan  Frequency/Duration:  Plan  Times per week: 1  Plan weeks: 4-5  Current Treatment Recommendations: Strengthening, Balance Training, Functional Mobility Training, Transfer Training, Gait Training, Neuromuscular Re-education, Manual Therapy - Soft Tissue Mobilization, Home Exercise Program, Safety Education & Training, Patient/Caregiver Education & Training, Equipment Evaluation, Education, & procurement, Modalities, Vestibular Rehab         Patient Education  New Education Provided: PT Education: Goals;PT Role;Plan of Care    POST-PAIN     Pain Rating (0-10 pain scale):   7/10  Location and pain description same as pre-treatment unless indicated. Action: [] NA  [] Call Physician  [] Perform HEP  [x] Meds as prescribed    Evaluation and patient rights have been reviewed and patient agrees with plan of care.   Yes  [x]  No  []   Explain:       Massimo Fall Risk Assessment Risk Factor Scale  Score   History of Falls [] Yes  [x] No 25  0 0   Secondary Diagnosis [] Yes  [x] No 15  0 0   Ambulatory Aid [] Furniture  [] Crutches/cane/walker  [x] None/bedrest/wheelchair/nurse 30  15  0 0   IV/Heparin Lock [] Yes  [x] No 20  0 0   Gait/Transferring [] Impaired  [x] Weak  [] Normal/bedrest/immobile 20  10  0 10   Mental Status [] Forgets limitations  [x] Oriented to own ability 15  0 0      Total:10     Based on the Assessment score: check the appropriate box. [x]  No intervention needed   Low =   Score of 0-24  []  Use standard prevention interventions Moderate =  Score of 24-44   [] Discuss fall prevention strategies   [] Indicate moderate falls risk on eval  []  Use high risk prevention interventions High = Score of 45 and higher   [] Discuss fall prevention strategies   [] Provide supervision during treatment time    Goals  Long term goals  Time Frame for Long term goals : 4-5 weeks  Long term goal 1: Pt will report >/= 50% reduction in dizziness symptoms. Long term goal 2: Chisholm >/= 53/56 and DGI = 24/24 to reduce risk for falls and improve overall safety. Long term goal 3: Pt will ambulate >/= 200' on even and uneven ground with good foot clearance and minimal deviations S/I. Long term goal 4: Pt will be independent with HEP. Long term goal 5: VOR WFL with minimal to no symptoms.          PT Individual Minutes  Time In: 7700  Time Out: 1350  Minutes: 34     Procedure Minutes:34'         Electronically signed by Juliana Mendenhall PT on 1/29/21 at 5:31 PM EST

## 2021-02-04 ASSESSMENT — ENCOUNTER SYMPTOMS
PHOTOPHOBIA: 0
APNEA: 0
EYE DISCHARGE: 0
VOICE CHANGE: 0
ABDOMINAL DISTENTION: 0
ANAL BLEEDING: 0

## 2021-02-17 ENCOUNTER — VIRTUAL VISIT (OUTPATIENT)
Dept: NEUROLOGY | Age: 51
End: 2021-02-17
Payer: COMMERCIAL

## 2021-02-17 DIAGNOSIS — R53.83 FATIGUE, UNSPECIFIED TYPE: ICD-10-CM

## 2021-02-17 DIAGNOSIS — Z91.199 NONCOMPLIANCE: ICD-10-CM

## 2021-02-17 DIAGNOSIS — D68.59 HYPERCOAGULABLE STATE (HCC): ICD-10-CM

## 2021-02-17 DIAGNOSIS — R26.0 ATAXIC GAIT: ICD-10-CM

## 2021-02-17 DIAGNOSIS — Z09 HOSPITAL DISCHARGE FOLLOW-UP: ICD-10-CM

## 2021-02-17 DIAGNOSIS — I63.81 CEREBROVASCULAR ACCIDENT (CVA) OF LEFT THALAMUS (HCC): Primary | ICD-10-CM

## 2021-02-17 PROBLEM — G45.8 OTHER TRANSIENT CEREBRAL ISCHEMIC ATTACKS AND RELATED SYNDROMES: Status: ACTIVE | Noted: 2021-02-17

## 2021-02-17 PROBLEM — G43.111 INTRACTABLE MIGRAINE WITH AURA WITH STATUS MIGRAINOSUS: Status: ACTIVE | Noted: 2021-02-17

## 2021-02-17 PROBLEM — I63.9 CEREBRAL INFARCTION (HCC): Status: ACTIVE | Noted: 2021-02-17

## 2021-02-17 PROCEDURE — 99214 OFFICE O/P EST MOD 30 MIN: CPT | Performed by: NURSE PRACTITIONER

## 2021-02-17 RX ORDER — PANTOPRAZOLE SODIUM 40 MG/1
TABLET, DELAYED RELEASE ORAL
COMMUNITY
Start: 2021-01-16 | End: 2021-05-19

## 2021-02-17 RX ORDER — FLUTICASONE FUROATE, UMECLIDINIUM BROMIDE AND VILANTEROL TRIFENATATE 200; 62.5; 25 UG/1; UG/1; UG/1
1 POWDER RESPIRATORY (INHALATION) DAILY
COMMUNITY
Start: 2021-01-21

## 2021-02-17 RX ORDER — WARFARIN SODIUM 5 MG/1
5 TABLET ORAL DAILY
Qty: 30 TABLET | Refills: 3 | Status: CANCELLED | OUTPATIENT
Start: 2021-02-17

## 2021-02-17 RX ORDER — OXYCODONE HYDROCHLORIDE 5 MG/1
TABLET ORAL
COMMUNITY
Start: 2021-01-06 | End: 2021-04-06 | Stop reason: ALTCHOICE

## 2021-02-17 ASSESSMENT — ENCOUNTER SYMPTOMS
TROUBLE SWALLOWING: 0
SHORTNESS OF BREATH: 0
VOMITING: 0
CONSTIPATION: 0
DIARRHEA: 0
PHOTOPHOBIA: 0
CHEST TIGHTNESS: 0
COLOR CHANGE: 0
WHEEZING: 0
COUGH: 0
ABDOMINAL DISTENTION: 0
ABDOMINAL PAIN: 0
EYE PAIN: 0
NAUSEA: 0

## 2021-02-17 NOTE — PROGRESS NOTES
Subjective:      Patient ID: Beto Ventura is a 52 y.o. female who presents today for:  Chief Complaint   Patient presents with    Cerebrovascular Accident     Pt states stroke happened December 22nd. She states she is still trying to come out of it. She says she is just a little sideways.         HPI  COLONOSCOPY N/A 7/28/2020    COLONOSCOPY DIAGNOSTIC performed by Tanvi Dietz MD at [address]  3/7/16    Dr. Elliott Marrero Left     UPPER GASTROINTESTINAL ENDOSCOPY  11/24/2014    ANIBAL HOUSE M.D.   [facility] UPPER GASTROINTESTINAL ENDOSCOPY N/A 7/28/2020    EGD ESOPHAGOGASTRODUODENOSCOPY performed by Tanvi Dietz MD at [facility] Marital status:      Spouse name: Not on file    Number of children: Not on file    Years of education: Not on file    Highest education level: Not on file   Occupational History    Occupation: unemployed   Social Needs    Financial resource strain: Not on file    Food insecurity     Worry: Not on file     Inability: Not on file   [place] needs     Medical: Not on file     Non-medical: Not on file   Tobacco Use    Smoking status: Current Every Day Smoker     Packs/day: 1.00     Years: 17.00     Pack years: 17.00     Types: Cigarettes    Smokeless tobacco: Never Used   Substance and Sexual Activity    Alcohol use: No    Drug use: No    Sexual activity: Not Currently     Partners: Male   Lifestyle    Physical activity     Days per week: 0 days     Minutes per session: 0 min    Stress:  To some extent   Relationships    Social connections     Talks on phone: Not on file     Gets together: Not on file     Attends Bahai service: Not on file     Active member of club or organization: Not on file     Attends meetings of clubs or organizations: Not on file     Relationship status: Not on file    Intimate partner violence     Fear of current or ex partner: Not on file     Emotionally abused: Not on file     Physically abused: Not on file     Forced sexual activity: Not on file   Other Topics Concern    Not on file   Social History Narrative Medication Sig Dispense Refill    Fluticasone-Umeclidin-Vilant (TRELEGY ELLIPTA) 200-62.5-25 MCG/INH AEPB Inhale 1 puff into the lungs daily      NOVOFINE PLUS 32G X 4 MM MISC       oxyCODONE (ROXICODONE) 5 MG immediate release tablet Take 1 tablet by mouth every 4 hours as needed for Pain for up to 3 days. Reduce doses taken as pain becomes manageable      pantoprazole (PROTONIX) 40 MG tablet       isosorbide mononitrate (IMDUR) 30 MG extended release tablet TAKE 1 TABLET BY MOUTH DAILY 30 tablet 3    warfarin (COUMADIN) 5 MG tablet Take 1 tablet by mouth daily 30 tablet 3    SITagliptin (JANUVIA) 25 MG tablet Take 1 tablet by mouth daily 30 tablet 0    insulin glargine (LANTUS) 100 UNIT/ML injection vial Inject 22 Units into the skin nightly 1 vial 3    metFORMIN (GLUCOPHAGE) 500 MG tablet Take 1 tablet by mouth 2 times daily (with meals) 60 tablet 3    losartan (COZAAR) 50 MG tablet Take 1 tablet by mouth daily 30 tablet 3    cariprazine hcl (VRAYLAR) 3 MG CAPS capsule Take 1 capsule by mouth daily 30 capsule 0    lithium 300 MG capsule Take 1 capsule by mouth 3 times daily (with meals) 90 capsule 3    atorvastatin (LIPITOR) 20 MG tablet Take 1 tablet by mouth nightly 30 tablet 3    folic acid (FOLVITE) 1 MG tablet Take 1 tablet by mouth daily 30 tablet 3    docusate sodium (COLACE, DULCOLAX) 100 MG CAPS Take 200 mg by mouth 2 times daily 120 capsule 2    Vitamin D (CHOLECALCIFEROL) 50 MCG (2000 UT) TABS tablet Take 1 tablet by mouth Daily with supper 60 tablet 2    prazosin (MINIPRESS) 1 MG capsule Take 1 mg by mouth nightly      butalbital-aspirin-caffeine (FIORINAL) -40 MG capsule Take 1 capsule by mouth every 4 hours as needed for Headaches for up to 10 days. 20 capsule 0    nitroGLYCERIN (NITROSTAT) 0.4 MG SL tablet up to max of 3 total doses.  If no relief after 1 dose, call 911. 25 tablet 3    magnesium oxide (MAG-OX) 400 MG tablet Take 1 tablet by mouth daily  5  albuterol sulfate HFA (PROVENTIL HFA) 108 (90 Base) MCG/ACT inhaler Inhale 2 puffs into the lungs every 6 hours as needed for Wheezing 1 Inhaler 3    FREESTYLE LANCETS MISC USE DAILY AS DIRECTED 100 each 10    budesonide (PULMICORT) 0.5 MG/2ML nebulizer suspension INHALE 1 VIAL VIA NEBULIZER TWICE DAILY 120 mL 5    BD INTEGRA SYRINGE 25G X 1\" 3 ML MISC USE AS DIRECTED EVERY MONTH 25 each 5    aspirin 81 MG chewable tablet Take 1 tablet by mouth daily 30 tablet 5    FREESTYLE LITE strip USE DAILY AS DIRECTED 50 strip 11    ipratropium-albuterol (DUONEB) 0.5-2.5 (3) MG/3ML SOLN nebulizer solution Inhale 3 mLs into the lungs three times daily 360 mL 5    VENTOLIN  (90 Base) MCG/ACT inhaler Inhale 2 puffs into the lungs every 6 hours as needed for Wheezing 1 Inhaler 5    nicotine (NICODERM CQ) 14 MG/24HR Place 1 patch onto the skin daily (Patient not taking: Reported on 2/17/2021) 30 patch 3     No current facility-administered medications on file prior to visit. Review of Systems   Constitutional: Positive for fatigue. Negative for appetite change, chills and fever. HENT: Negative for hearing loss and trouble swallowing. Eyes: Positive for visual disturbance. Negative for photophobia and pain. Respiratory: Negative for cough, chest tightness, shortness of breath and wheezing. Cardiovascular: Negative for chest pain, palpitations and leg swelling. Gastrointestinal: Negative for abdominal distention, abdominal pain, constipation, diarrhea, nausea and vomiting. Musculoskeletal: Positive for gait problem. Skin: Negative for color change and rash. Neurological: Positive for headaches. Negative for dizziness, tremors, seizures, syncope, facial asymmetry, speech difficulty, weakness, light-headedness and numbness. Psychiatric/Behavioral: Negative for agitation, confusion and hallucinations. The patient is not nervous/anxious.         Objective: There were no vitals taken for this visit. Physical Exam  Deferred due to telephone encounter  No results found. Lab Results   Component Value Date    WBC 6.8 12/28/2020    RBC 4.07 12/28/2020    HGB 13.1 12/28/2020    HCT 38.0 12/28/2020    MCV 93.5 12/28/2020    MCH 32.1 12/28/2020    MCHC 34.4 12/28/2020    RDW 13.0 12/28/2020     12/28/2020    MPV 9.6 09/20/2015     Lab Results   Component Value Date     12/28/2020    K 3.4 12/28/2020     12/28/2020    CO2 24 12/28/2020    BUN 9 12/28/2020    CREATININE 0.76 12/28/2020    GFRAA >60.0 12/28/2020    LABGLOM >60.0 12/28/2020    GLUCOSE 154 12/28/2020    PROT 5.4 12/28/2020    LABALBU 3.5 12/28/2020    CALCIUM 9.0 12/28/2020    BILITOT 0.4 12/28/2020    ALKPHOS 118 12/28/2020    AST 32 12/28/2020    ALT 65 12/28/2020     Lab Results   Component Value Date    PROTIME 12.4 12/27/2020    INR 0.9 12/27/2020     Lab Results   Component Value Date    TSH 4.070 04/10/2019    VWFYTTKI60 305 12/26/2020    FOLATE 8.1 12/26/2020    FERRITIN 282.0 12/11/2020    IRON 51 12/11/2020    TIBC 272 12/11/2020     Lab Results   Component Value Date    TRIG 251 12/24/2020    HDL 27 12/24/2020    LDLCALC 86 12/24/2020     Lab Results   Component Value Date    LABAMPH Neg 06/14/2020    BARBSCNU Neg 06/14/2020    LABBENZ Neg 06/14/2020    LABBENZ NotDTCD 01/26/2013    LABMETH Neg 06/14/2020    OPIATESCREENURINE Neg 06/14/2020    PHENCYCLIDINESCREENURINE Neg 06/14/2020    ETOH <10 12/22/2020     Lab Results   Component Value Date    LITHIUM 0.7 01/05/2021       Assessment and Plan:      1. Cerebrovascular accident (CVA) of left thalamus ([facility] [address]  2.  Hypercoagulable state ([facility] [address] -Patient was advised she will need to come in at next appointment so we can evaluate this in person. She will need to continue with physical and occupational therapy. 5. Noncompliance  -Patient continues to be noncompliant with medications and follow-up. As documented above she has not yet established with Coumadin clinic or had any INR testing done. She has not followed up with [facility] nor had her lithium levels checked. She is not taking her blood pressure medication on a daily basis as recommended. She is not following up with therapies as recommended. Much education was given to patient regarding this. Patient was advised that noncompliance and nonadherence to medications can be life-threatening    6. Hospital discharge follow-up    Total time spent on telephone encounter 28 minutes  Return in about 3 months (around 5/17/2021), or if symptoms worsen or fail to improve.     NIKI Turner - CNP     Collaborating Physician Dr Paola Wood

## 2021-02-22 DIAGNOSIS — I63.81 CEREBROVASCULAR ACCIDENT (CVA) OF LEFT THALAMUS (HCC): ICD-10-CM

## 2021-02-22 DIAGNOSIS — I63.9 CEREBROVASCULAR ACCIDENT (CVA), UNSPECIFIED MECHANISM (HCC): ICD-10-CM

## 2021-02-22 DIAGNOSIS — R53.83 FATIGUE, UNSPECIFIED TYPE: ICD-10-CM

## 2021-02-22 LAB
ALBUMIN SERPL-MCNC: 4.1 G/DL (ref 3.5–4.6)
ALP BLD-CCNC: 165 U/L (ref 40–130)
ALT SERPL-CCNC: 35 U/L (ref 0–33)
ANION GAP SERPL CALCULATED.3IONS-SCNC: 11 MEQ/L (ref 9–15)
AST SERPL-CCNC: 20 U/L (ref 0–35)
BILIRUB SERPL-MCNC: 0.4 MG/DL (ref 0.2–0.7)
BUN BLDV-MCNC: 8 MG/DL (ref 6–20)
CALCIUM SERPL-MCNC: 9.5 MG/DL (ref 8.5–9.9)
CHLORIDE BLD-SCNC: 106 MEQ/L (ref 95–107)
CO2: 26 MEQ/L (ref 20–31)
CREAT SERPL-MCNC: 0.87 MG/DL (ref 0.5–0.9)
GFR AFRICAN AMERICAN: >60
GFR NON-AFRICAN AMERICAN: >60
GLOBULIN: 2.7 G/DL (ref 2.3–3.5)
GLUCOSE BLD-MCNC: 162 MG/DL (ref 70–99)
HCT VFR BLD CALC: 44.3 % (ref 37–47)
HEMOGLOBIN: 15.1 G/DL (ref 12–16)
INR BLD: 1.9
MCH RBC QN AUTO: 31.5 PG (ref 27–31.3)
MCHC RBC AUTO-ENTMCNC: 34.2 % (ref 33–37)
MCV RBC AUTO: 92.1 FL (ref 82–100)
PDW BLD-RTO: 12.9 % (ref 11.5–14.5)
PLATELET # BLD: 163 K/UL (ref 130–400)
POTASSIUM SERPL-SCNC: 3.7 MEQ/L (ref 3.4–4.9)
PROTHROMBIN TIME: 21.6 SEC (ref 12.3–14.9)
RBC # BLD: 4.81 M/UL (ref 4.2–5.4)
SODIUM BLD-SCNC: 143 MEQ/L (ref 135–144)
TOTAL PROTEIN: 6.8 G/DL (ref 6.3–8)
TSH REFLEX: 1.89 UIU/ML (ref 0.44–3.86)
WBC # BLD: 7.1 K/UL (ref 4.8–10.8)

## 2021-02-23 ENCOUNTER — TELEPHONE (OUTPATIENT)
Dept: NEUROLOGY | Age: 51
End: 2021-02-23

## 2021-02-23 NOTE — TELEPHONE ENCOUNTER
Per verbal with dr. Ebony Baptiste .  Patient to continue 5 mg of coumadin and follow up with coumadin clinic on 3/5/2021    patient advised

## 2021-02-23 NOTE — TELEPHONE ENCOUNTER
At the time of our virtual visit on 2/17/2021 patient was reporting that she still did not feel back to her baseline. She reported that her gait was off balance and that she was leaning to the left. She had been noncompliant with recommendations and follow-up and medications. She was having excessive daily fatigue. She was continuing to have headaches. I cannot release her back to work until I examine her in person. Thank you!

## 2021-02-23 NOTE — TELEPHONE ENCOUNTER
Patient had [employer] on the 17th and she is wanting to go back to work .  She works as a  5 days a week from 11pm to 7am. Please advise    Thanks [employer]

## 2021-03-01 DIAGNOSIS — I25.10 CORONARY ARTERY DISEASE INVOLVING NATIVE CORONARY ARTERY OF NATIVE HEART WITHOUT ANGINA PECTORIS: Primary | ICD-10-CM

## 2021-03-01 RX ORDER — ISOSORBIDE MONONITRATE 30 MG/1
30 TABLET, EXTENDED RELEASE ORAL DAILY
Qty: 30 TABLET | Refills: 3 | Status: SHIPPED | OUTPATIENT
Start: 2021-03-01 | End: 2021-05-10 | Stop reason: SDUPTHER

## 2021-03-05 ENCOUNTER — HOSPITAL ENCOUNTER (OUTPATIENT)
Dept: PHARMACY | Age: 51
Setting detail: THERAPIES SERIES
Discharge: HOME | End: 2021-03-05
Payer: COMMERCIAL

## 2021-03-05 VITALS — TEMPERATURE: 97.4 F

## 2021-03-05 DIAGNOSIS — D68.59 HYPERCOAGULABLE STATE (HCC): ICD-10-CM

## 2021-03-05 DIAGNOSIS — I63.81 CEREBROVASCULAR ACCIDENT (CVA) OF LEFT THALAMUS (HCC): ICD-10-CM

## 2021-03-05 DIAGNOSIS — G45.8 OTHER TRANSIENT CEREBRAL ISCHEMIC ATTACKS AND RELATED SYNDROMES: ICD-10-CM

## 2021-03-05 LAB — INTERNATIONAL NORMALIZATION RATIO, POC: 1.9

## 2021-03-05 PROCEDURE — 99211 OFF/OP EST MAY X REQ PHY/QHP: CPT | Performed by: PHARMACIST

## 2021-03-05 PROCEDURE — 85610 PROTHROMBIN TIME: CPT | Performed by: PHARMACIST

## 2021-03-05 NOTE — PROGRESS NOTES
Oral Anticoagulation Patient Education    Counseling provided to: Tennis Rhymes  Anticoagulant: Warfarin   Handouts provided to patient include: medication, medications that increase risk of bleeding, nosebleeds    Counseling points included:  1. Indication for anticoagulation: CVA  2. Explanation of medication  3. Education on A. Fib and stroke; PE/DVT  4. Missed doses and timing of medication (contact healthcare provider if missed multiple doses)  5. Side effects: bruising and bleeding with emphasis on bleeding  6. Food or drug interactions that can increase risk of bleeding  7. OTC pain relief options: Tylenol vs NSAIDs  8. Contact provider if:   A. Changes made to medications   B. Uncontrolled bleeding/ Signs and symptoms of recurrent VTE   C. Procedures   D. Trauma and/or fall    E. Pregnancy (for women of child bearing age)       INR  1.9 is sl subtherapetuic for this patient (goal range 2-3) and is reflective of 35 mg TWD  Patient verifies current dosing regimen, patient able to verbally recall dose  Patient reports no missed doses in the last week  Patient denies s/sx clotting and/or stroke  Patient denies hematuria, epistaxis, rectal bleeding  Patient denies changes in diet, alcohol, or tobacco use  Reviewed medication list and drug allergies with patient, updated any medication additions or modifications accordingly  Patient also denies any pending medical or dental procedures scheduled at this time  Patient was instructed to increase TWD to 37.5mg TWD and RTC 2 weeks    CLINICAL PHARMACY CONSULT: MED RECONCILIATION/REVIEW [address]    PHSO: No  Total # of Interventions Recommended: 1  - Increased Dose #: 1  - Maintenance Safety Lab Monitoring #: 1  Total Interventions Accepted: 1  Time Spent (min): 45    JAMIR Solano. Ph. 3/5/2021 3:22 PM

## 2021-03-19 ENCOUNTER — HOSPITAL ENCOUNTER (OUTPATIENT)
Dept: PHARMACY | Age: 51
Setting detail: THERAPIES SERIES
Discharge: HOME | End: 2021-03-19
Payer: COMMERCIAL

## 2021-03-19 DIAGNOSIS — G45.8 OTHER TRANSIENT CEREBRAL ISCHEMIC ATTACKS AND RELATED SYNDROMES: ICD-10-CM

## 2021-03-19 DIAGNOSIS — I63.81 CEREBROVASCULAR ACCIDENT (CVA) OF LEFT THALAMUS (HCC): ICD-10-CM

## 2021-03-19 DIAGNOSIS — D68.59 HYPERCOAGULABLE STATE (HCC): ICD-10-CM

## 2021-03-19 LAB — INTERNATIONAL NORMALIZATION RATIO, POC: 1.9

## 2021-03-19 PROCEDURE — 99211 OFF/OP EST MAY X REQ PHY/QHP: CPT | Performed by: PHARMACIST

## 2021-03-19 PROCEDURE — 85610 PROTHROMBIN TIME: CPT | Performed by: PHARMACIST

## 2021-03-19 NOTE — PROGRESS NOTES
Ms. Hoa Delgado is a 52 y.o. y/o female with history of CVA who presents today for anticoagulation monitoring and adjustment. INR 1.9 is slightly sub-therapeutic for this patient (goal range 2-3) and is reflective of 35 mg TWD  Patient verifies current dosing regimen, patient able to verbally recall dose  Patient reports NO  missed doses since last INR   Patient denies s/sx clotting and/or stroke  Patient denies hematuria, epistaxis, rectal bleeding  Patient denies changes in diet, alcohol, or tobacco use  Reviewed medication list and drug allergies with patient, updated any medication additions or modifications accordingly  Patient also denies any pending medical or dental procedures scheduled at this time  Patient was instructed to begin 37.5 mg TWD as instructed at previous appointment and RTC 2 weeks    Patient reports she was diagnosed as diabetic. Has not been taking medication due to lack of money to purchase test strips.     CLINICAL PHARMACY CONSULT: MED RECONCILIATION/REVIEW ADDENDUM    For Pharmacy Admin Tracking Only    PHSO: No  Total # of Interventions Recommended: 2  - Increased Dose #: 1  - Maintenance Safety Lab Monitoring #: 1  Total Interventions Accepted: 2  Time Spent (min): [address]

## 2021-03-22 ENCOUNTER — CLINICAL DOCUMENTATION (OUTPATIENT)
Dept: PHYSICAL THERAPY | Age: 51
End: 2021-03-22

## 2021-04-05 ENCOUNTER — TELEPHONE (OUTPATIENT)
Dept: PHARMACY | Age: 51
End: 2021-04-05

## 2021-04-05 DIAGNOSIS — G45.8 OTHER TRANSIENT CEREBRAL ISCHEMIC ATTACKS AND RELATED SYNDROMES: ICD-10-CM

## 2021-04-05 DIAGNOSIS — D68.59 HYPERCOAGULABLE STATE (HCC): ICD-10-CM

## 2021-04-05 DIAGNOSIS — I63.81 CEREBROVASCULAR ACCIDENT (CVA) OF LEFT THALAMUS (HCC): ICD-10-CM

## 2021-04-05 NOTE — TELEPHONE ENCOUNTER
first attempt to reach patient for no call/no show 04/02/21 -- left voicemail, but went straight to patient voicemail when I called     Updated POC INR tracker for one week from today, when SECOND attempt to reach patient should be made     Leona Stevens, PharmD   4/5/2021 9:21 AM

## 2021-04-06 ENCOUNTER — OFFICE VISIT (OUTPATIENT)
Dept: ENDOCRINOLOGY | Age: 51
End: 2021-04-06
Payer: COMMERCIAL

## 2021-04-06 VITALS
DIASTOLIC BLOOD PRESSURE: 74 MMHG | HEART RATE: 76 BPM | SYSTOLIC BLOOD PRESSURE: 166 MMHG | BODY MASS INDEX: 30.61 KG/M2 | HEIGHT: 67 IN | WEIGHT: 195 LBS

## 2021-04-06 DIAGNOSIS — Z79.4 TYPE 2 DIABETES MELLITUS WITH OTHER SPECIFIED COMPLICATION, WITH LONG-TERM CURRENT USE OF INSULIN (HCC): Primary | ICD-10-CM

## 2021-04-06 DIAGNOSIS — E11.69 TYPE 2 DIABETES MELLITUS WITH OTHER SPECIFIED COMPLICATION, WITH LONG-TERM CURRENT USE OF INSULIN (HCC): Primary | ICD-10-CM

## 2021-04-06 LAB
CHP ED QC CHECK: NORMAL
GLUCOSE BLD-MCNC: 105 MG/DL
HBA1C MFR BLD: 5.7 %

## 2021-04-06 PROCEDURE — 83036 HEMOGLOBIN GLYCOSYLATED A1C: CPT | Performed by: PHYSICIAN ASSISTANT

## 2021-04-06 PROCEDURE — 99213 OFFICE O/P EST LOW 20 MIN: CPT | Performed by: PHYSICIAN ASSISTANT

## 2021-04-06 PROCEDURE — 82962 GLUCOSE BLOOD TEST: CPT | Performed by: PHYSICIAN ASSISTANT

## 2021-04-06 ASSESSMENT — ENCOUNTER SYMPTOMS
SINUS PRESSURE: 0
SORE THROAT: 0
SHORTNESS OF BREATH: 0
COUGH: 0
EYE REDNESS: 0
RHINORRHEA: 0
NAUSEA: 0
EYE PAIN: 0
DIARRHEA: 0
WHEEZING: 0
VOMITING: 0
ABDOMINAL PAIN: 0

## 2021-04-06 NOTE — PATIENT INSTRUCTIONS
Endocrinology-diabetes    1. Check your blood sugars 4 times a day, before meals and at night  2. Document these numbers and a blood glucose log and bring them with you to your follow-up appointment. 3. Call our office if you have blood sugars less than 80 or greater then 250 on two or more occasions  4. Call our office if you have any questions regarding your blood sugars or insulin dosing regiment  5. Signs of low blood sugar include sweating , heart racing, dizziness and weakness. Check your blood sugar if you have any of these symptoms. Medications-  6. Continue Januvia 25 mg daily   7. Stop Lantus 22 units injected nightly   8. Continue Metformin 500 mg twice daily  9. Monitor glucose mornings   10. Follow up with your PCP every 6 months for ongoing diabetes management  11.   Diabetic education ordered

## 2021-04-06 NOTE — PROGRESS NOTES
4/6/2021    Assessment:       Diagnosis Orders   1. Type 2 diabetes mellitus with other specified complication, with long-term current use of insulin ([facility])  POCT Glucose    POCT glycosylated hemoglobin (Hb A1C)    Hemoglobin A1C    Comprehensive Metabolic Panel    Microalbumin / Creatinine Urine Ratio     DIABETES FOOT EXAM         PLAN:     1. Continue Januvia 25 mg daily   2. Stop Lantus 22 units injected nightly   3. Continue Metformin 500 mg twice daily  4. Monitor glucose mornings   5. Follow up with your PCP every 6 months for ongoing diabetes management  6. Diabetic education ordered       Orders Placed This Encounter   Procedures    POCT Glucose    POCT glycosylated hemoglobin (Hb A1C)     No orders of the defined types were placed in this encounter. Return in about 4 weeks (around 5/4/2021) for Diabetes. Subjective:     Chief Complaint   Patient presents with    Diabetes     Vitals:    04/06/21 1000 04/06/21 1010   BP: (!) 160/96 (!) 166/74   Site: Left Upper Arm    Position: Sitting    Cuff Size: Large Adult    Pulse: 76    Weight: 195 lb (88.5 kg)    Height: 5' 7\" (1.702 m)      Wt Readings from Last 3 Encounters:   04/06/21 195 lb (88.5 kg)   01/04/21 200 lb (90.7 kg)   12/22/20 192 lb (87.1 kg)     BP Readings from Last 3 Encounters:   04/06/21 (!) 166/74   01/09/21 (!) 171/77   12/28/20 (!) 142/69     [facility] is a 27-year-old type II diabetic female history of CVA, recent discharge 1/2021. A1c was slightly elevated 8% and she was hyperglycemic during the admission. She was sent home on Lantus, Januvia and Metformin with markedly improved glycemic control. I am discontinuing the Lantus, continue the Januvia Metformin and follow-up with PCP for ongoing diabetic management. Diabetes  She presents for her initial diabetic visit. She has type 2 diabetes mellitus. The initial diagnosis of diabetes was made 2 years ago. Her disease course has been worsening.  Pertinent negatives for hypoglycemia include no dizziness or headaches. Pertinent negatives for diabetes include no chest pain, no fatigue, no polydipsia and no polyuria. Risk factors for coronary artery disease include diabetes mellitus. Current diabetic treatment includes oral agent (dual therapy). She is compliant with treatment all of the time. Her weight is stable. She is following a generally healthy diet. Meal planning includes avoidance of concentrated sweets. She has had a previous visit with a dietitian (Diabetic education ordered today). She participates in exercise intermittently. Her overall blood glucose range is 110-130 mg/dl. An ACE inhibitor/angiotensin II receptor blocker is being taken. She does not see a podiatrist.Eye exam is current.      Past Medical History:   Diagnosis Date    Anxiety     Back pain     back surgery x 4    [facility]     CAD (coronary artery disease)     CAFL (chronic airflow limitation) ([facility]) 11/3/2016    Chronic bronchitis ([facility])     Chronic pain     Colitis     Complicated migraine     DDD (degenerative disc disease), lumbar 2016    Depression     Endometriosis     Excessive caffeine abuse, continuous ([facility]) 2018    Gastritis     GERD (gastroesophageal reflux disease)     History of myocardial infarction     HTN (hypertension), benign 2016    Impaired mobility and activities of daily living     Over weight     PTSD (post-traumatic stress disorder)     Sensory neuropathy 2016    Somatization disorder     TIA (transient ischemic attack)     Tobacco abuse     Vasospastic angina ([facility] 2016     Past Surgical History:   Procedure Laterality Date    BACK SURGERY      x2     SECTION      x2    CHOLECYSTECTOMY  13    Lapchole    COLONOSCOPY N/A 2020    COLONOSCOPY DIAGNOSTIC performed by Sander Robertson MD [address]  3/7/16    Dr. Sharla Baez diagnosis is sometimes in question and it's clear neuropathy vitamin B12 deficiency as well as generalized bodyaches from the severe vitamin D deficiency have caused this scenario that looks like multiple sclerosis. lives With: Significant other    Type of Home: House [address] in [address] to enter with rails  - Number of Steps: 5    Bathroom Shower/Tub: Tub only    ADL Assistance: Independent    Homemaking Assistance: Independent, Homemaking Responsibilities: Yes, Meal Prep Responsibility: Primary    Laundry Responsibility: Primary, Cleaning Responsibility: Primary    Bill Paying/Finance Responsibility: Primary    Shopping Responsibility: Primary, Ambulation Assistance: Independent, Transfer Assistance: Independent    Active : Yes    Occupation: Full time employment--Type of occupation: disabled-  - 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  Work requirements are Pt was looking at [employer] as main part of job working nights     Family History   Problem Relation Age of Onset    High Blood Pressure Mother     Kidney Disease Mother     Lupus Mother     Coronary Art Dis Mother         Had stents in her 62s   [employer] Bipolar Disorder Son      Allergies   Allergen Reactions    Latex Itching     Pt reports itching on hands after using rubber gloves at work    Influenza Vaccines Swelling     Pt reports her entire arm was red and edematous post vaccine    Eggs Or Egg-Derived [employer] based products only    Gabapentin Nausea Only    Pneumococcal Vaccines Hives    Povidone Iodine Itching    Varicella Virus Vaccine Live Hives       Current Outpatient Medications:     isosorbide mononitrate (IMDUR) 30 MG extended release tablet, Take 1 tablet by mouth daily, Disp: 30 tablet, Rfl: 3    Fluticasone-Umeclidin-Vilant (TRELEGY ELLIPTA) 200-62.5-25 MCG/INH AEPB, Inhale 1 puff into the lungs daily, Disp: , Rfl:     pantoprazole (PROTONIX) 40 MG tablet, , Disp: , Rfl:     warfarin (COUMADIN) 5 MG tablet, Take 1 tablet by mouth daily, Disp: 30 tablet, Rfl: 3    SITagliptin (JANUVIA) 25 MG tablet, Take 1 tablet by mouth daily, Disp: 30 tablet, Rfl: 0    insulin glargine (LANTUS) 100 UNIT/ML injection vial, Inject 22 Units into the skin nightly, Disp: 1 vial, Rfl: 3    metFORMIN (GLUCOPHAGE) 500 MG tablet, Take 1 tablet by mouth 2 times daily (with meals), Disp: 60 tablet, Rfl: 3    losartan (COZAAR) 50 MG tablet, Take 1 tablet by mouth daily, Disp: 30 tablet, Rfl: 3    cariprazine hcl (VRAYLAR) 3 MG CAPS capsule, Take 1 capsule by mouth daily, Disp: 30 capsule, Rfl: 0    lithium 300 MG capsule, Take 1 capsule by mouth 3 times daily (with meals), Disp: 90 capsule, Rfl: 3    atorvastatin (LIPITOR) 20 MG tablet, Take 1 tablet by mouth nightly, Disp: 30 tablet, Rfl: 3    folic acid (FOLVITE) 1 MG tablet, Take 1 tablet by mouth daily, Disp: 30 tablet, Rfl: 3    docusate sodium (COLACE, DULCOLAX) 100 MG CAPS, Take 200 mg by mouth 2 times daily, Disp: 120 capsule, Rfl: 2    Vitamin D (CHOLECALCIFEROL) 50 MCG (2000 UT) TABS tablet, Take 1 tablet by mouth Daily with supper, Disp: 60 tablet, Rfl: 2    nitroGLYCERIN (NITROSTAT) 0.4 MG SL tablet, up to max of 3 total doses.  If no relief after 1 dose, call 911., Disp: 25 tablet, Rfl: 3    albuterol sulfate HFA (PROVENTIL HFA) 108 (90 Base) MCG/ACT inhaler, Inhale 2 puffs into the lungs every 6 hours as needed for Wheezing, Disp: 1 Inhaler, Rfl: 3    FREESTYLE LANCETS MISC, USE DAILY AS DIRECTED, Disp: 100 each, Rfl: 10    budesonide (PULMICORT) 0.5 MG/2ML nebulizer suspension, INHALE 1 VIAL VIA NEBULIZER TWICE DAILY, Disp: 120 mL, Rfl: 5    BD INTEGRA SYRINGE 25G X 1\" 3 ML MISC, USE AS DIRECTED EVERY MONTH, Disp: 25 each, Rfl: 5    aspirin 81 MG chewable tablet, Take 1 tablet by mouth daily, Disp: 30 tablet, Rfl: 5    FREESTYLE LITE strip, USE DAILY AS DIRECTED, Disp: 50 strip, Rfl: 11    ipratropium-albuterol (DUONEB) 0.5-2.5 (3) MG/3ML SOLN nebulizer solution, Inhale 3 mLs into the lungs three times daily, Disp: 360 mL, Rfl: 5    VENTOLIN  (90 Base) MCG/ACT inhaler, Inhale 2 puffs into the lungs every 6 hours as needed for Wheezing, Disp: 1 Inhaler, Rfl: 5    butalbital-aspirin-caffeine (FIORINAL) -40 MG capsule, Take 1 capsule by mouth every 4 hours as needed for Headaches for up to 10 days. , Disp: 20 capsule, Rfl: 0  Lab Results   Component Value Date     02/22/2021    K 3.7 02/22/2021     02/22/2021    CO2 26 02/22/2021    BUN 8 02/22/2021    CREATININE 0.87 02/22/2021    GLUCOSE 105 04/06/2021    CALCIUM 9.5 02/22/2021    PROT 6.8 02/22/2021    LABALBU 4.1 02/22/2021    BILITOT 0.4 02/22/2021    ALKPHOS 165 (H) 02/22/2021    AST 20 02/22/2021    ALT 35 (H) 02/22/2021    LABGLOM >60.0 02/22/2021    GFRAA >60.0 02/22/2021    GLOB 2.7 02/22/2021     Lab Results   Component Value Date    WBC 7.1 02/22/2021    HGB 15.1 02/22/2021    HCT 44.3 02/22/2021    MCV 92.1 02/22/2021     02/22/2021     Lab Results   Component Value Date    LABA1C 5.7 04/06/2021    LABA1C 8.0 (H) 12/24/2020    LABA1C 5.9 08/07/2019     Lab Results   Component Value Date    HDL 27 (L) 12/24/2020    HDL 27 (L) 08/07/2019    HDL 31 (L) 04/10/2019    LDLCALC 86 12/24/2020    LDLCALC 72 08/07/2019    LDLCALC 68 04/10/2019    CHOL 163 12/24/2020    CHOL 139 08/07/2019    CHOL 148 04/10/2019    TRIG 251 (H) 12/24/2020    TRIG 199 (H) 08/07/2019    TRIG 245 (H) 04/10/2019     No results found for: TESTM  Lab Results   Component Value Date    TSH 4.070 (H) 04/10/2019    TSH 2.080 09/27/2018    TSH 2.960 11/15/2017    TSHREFLEX 1.890 02/22/2021    TSHREFLEX 2.820 08/08/2019     No results found for: TPOABS    Review of Systems   Constitutional: Negative for chills, fatigue and fever.    HENT: Negative for congestion, ear pain, postnasal drip, rhinorrhea, sinus pressure and sore throat. Eyes: Negative for pain and redness. Respiratory: Negative for cough, shortness of breath and wheezing. Cardiovascular: Negative for chest pain, palpitations and leg swelling. Gastrointestinal: Negative for abdominal pain, diarrhea, nausea and vomiting. Endocrine: Negative for polydipsia and polyuria. Genitourinary: Negative for difficulty urinating. Musculoskeletal: Negative for arthralgias. Skin: Negative for rash. Allergic/Immunologic: Negative for environmental allergies. Neurological: Negative for dizziness and headaches. Hematological: Negative for adenopathy. Psychiatric/Behavioral: Negative for dysphoric mood. Objective:   Physical Exam  Vitals signs reviewed. Constitutional:       Appearance: She is well-developed. HENT:      Head: Normocephalic and atraumatic. Nose: No congestion. Eyes:      Conjunctiva/sclera: Conjunctivae normal.   Neck:      Musculoskeletal: Normal range of motion and neck supple. Cardiovascular:      Rate and Rhythm: Normal rate and regular rhythm. Heart sounds: Normal heart sounds. Pulmonary:      Effort: Pulmonary effort is normal.      Breath sounds: Normal breath sounds. Abdominal:      General: Bowel sounds are normal.      Palpations: Abdomen is soft. Musculoskeletal: Normal range of motion. Skin:     General: Skin is warm and dry. Neurological:      Mental Status: She is alert and oriented to person, place, and time.    Psychiatric:         Mood and Affect: Mood normal.

## 2021-04-09 ENCOUNTER — HOSPITAL ENCOUNTER (OUTPATIENT)
Dept: PHARMACY | Age: 51
Setting detail: THERAPIES SERIES
Discharge: HOME | End: 2021-04-09
Payer: COMMERCIAL

## 2021-04-09 VITALS — TEMPERATURE: 97.7 F

## 2021-04-09 DIAGNOSIS — I63.81 CEREBROVASCULAR ACCIDENT (CVA) OF LEFT THALAMUS (HCC): ICD-10-CM

## 2021-04-09 DIAGNOSIS — G45.8 OTHER TRANSIENT CEREBRAL ISCHEMIC ATTACKS AND RELATED SYNDROMES: ICD-10-CM

## 2021-04-09 DIAGNOSIS — D68.59 HYPERCOAGULABLE STATE (HCC): ICD-10-CM

## 2021-04-09 LAB — INTERNATIONAL NORMALIZATION RATIO, POC: 1.5

## 2021-04-09 PROCEDURE — 99211 OFF/OP EST MAY X REQ PHY/QHP: CPT

## 2021-04-09 PROCEDURE — 85610 PROTHROMBIN TIME: CPT

## 2021-04-09 RX ORDER — WARFARIN SODIUM 5 MG/1
5 TABLET ORAL DAILY
Qty: 45 TABLET | Refills: 1 | Status: SHIPPED | OUTPATIENT
Start: 2021-04-09 | End: 2021-07-01 | Stop reason: SDUPTHER

## 2021-04-09 NOTE — PROGRESS NOTES
Ms. Franca Dailey is a 52 y.o. y/o female with history of CVA, hypercoaguable state, TIA  who presents today for anticoagulation monitoring and adjustment. INR 1.5 is subtherapeutic for this patient (goal range 2-3) and is reflective of 37.5 mg TWD  Patient verifies current dosing regimen, patient able to verbally recall dose  Patient reports 1 missed dose of an extra 2.5 mg since last INR   Patient denies s/sx clotting and/or stroke  Patient denies hematuria, epistaxis, rectal bleeding  Patient denies changes in diet, alcohol, or tobacco use  Reviewed medication list and drug allergies with patient, updated any medication additions or modifications accordingly  Patient also denies any pending medical or dental procedures scheduled at this time  Patient was instructed to increase TWD to 40 mg (6.7%) and RTC 2 weeks    Patient states she occasionally forgets to take the extra half doses. Patient states her appetite has not been great the last few months and she was referred to nutritionalist for her newly diagnosed diabetes.     CLINICAL PHARMACY CONSULT: MED RECONCILIATION/REVIEW ADDENDUM    For Pharmacy Admin Tracking Only    PHSO: No  Total # of Interventions Recommended: 1  - Increased Dose #: 1  - Maintenance Safety Lab Monitoring #: 1  Total Interventions Accepted: 1  Time Spent (min): Sandhya 2048, Chris 219

## 2021-04-15 ENCOUNTER — HOSPITAL ENCOUNTER (OUTPATIENT)
Dept: DIABETES SERVICES | Age: 51
Setting detail: THERAPIES SERIES
Discharge: HOME | End: 2021-04-15

## 2021-04-15 VITALS — WEIGHT: 192 LBS | BODY MASS INDEX: 30.07 KG/M2

## 2021-04-15 PROCEDURE — G0108 DIAB MANAGE TRN  PER INDIV: HCPCS

## 2021-04-15 SDOH — ECONOMIC STABILITY: FOOD INSECURITY: ADDITIONAL INFORMATION: YES

## 2021-04-15 ASSESSMENT — PATIENT HEALTH QUESTIONNAIRE - PHQ9
SUM OF ALL RESPONSES TO PHQ QUESTIONS 1-9: 19
2. FEELING DOWN, DEPRESSED OR HOPELESS: 3

## 2021-04-15 ASSESSMENT — PROBLEM AREAS IN DIABETES QUESTIONNAIRE (PAID)
FEELING THAT DIABETES IS TAKING UP TOO MUCH OF YOUR MENTAL AND PHYSICAL ENERGY EVERY DAY: 0
COPING WITH COMPLICATIONS OF DIABETES: 0
FEELING SCARED WHEN YOU THINK ABOUT LIVING WITH DIABETES: 4
WORRYING ABOUT THE FUTURE AND THE POSSIBILITY OF SERIOUS COMPLICATIONS: 4

## 2021-04-15 NOTE — PROGRESS NOTES
Diabetes Self- Management Education Program Assessment and Education Record-   Also see Diabetic Screening    Patient, Moncho Serna,  here for diabetes self-management education  visit/ assessment. Today's visit was in an individual setting. Pt was scheduled for next week but came early so seen today.      Diet History :  Diet Questionnaire and typical meal /portion sheet completed  []Yes  [x] NO    Goals setting:  Initial Goal Set with Patient  [x]Yes  [] NO  (SEE  EDUCATION AND GOALS)    MEDICAL HISTORY:  Past Medical History:   Diagnosis Date    Anxiety     Back pain     back surgery x 4    Bipolar disorder ([address]     CAD (coronary artery disease)     [facility]FL [facility] limitation) ([facility]) 11/3/2016    Chronic bronchitis ([facility])     Chronic pain     Colitis     Complicated migraine     DDD (degenerative disc disease), lumbar 12/22/2016    Depression     Endometriosis     Excessive caffeine abuse, continuous ([facility]) 7/6/2018    Gastritis     GERD (gastroesophageal reflux disease)     History of myocardial infarction     HTN (hypertension), benign 2/19/2016    Impaired mobility and activities of daily living     Over weight     PTSD (post-traumatic stress disorder)     Sensory neuropathy 12/22/2016    Somatization disorder     TIA (transient ischemic attack)     Tobacco abuse     Vasospastic angina ([address] 11/11/2016     Family History   Problem Relation Age of Onset    High Blood Pressure Mother     Kidney Disease Mother     Lupus Mother     Coronary Art Dis Mother         Had stents in her 62s   [facility] Bipolar Disorder Son      Latex, Influenza vaccines, Eggs or egg-derived products, Gabapentin, Pneumococcal vaccines, Povidone iodine, and Varicella virus vaccine live   Immunization History   Administered Date(s) Administered    Influenza Vaccine, unspecified formulation 11/18/2011    Pneumococcal Polysaccharide ([id number]) 11/18/2011       Current Medications  Current Outpatient INTEGRA SYRINGE 25G X 1\" 3 ML MISC USE AS DIRECTED EVERY MONTH 25 each 5    aspirin 81 MG chewable tablet Take 1 tablet by mouth daily 30 tablet 5    FREESTYLE LITE strip USE DAILY AS DIRECTED 50 strip 11    ipratropium-albuterol (DUONEB) 0.5-2.5 (3) MG/3ML SOLN nebulizer solution Inhale 3 mLs into the lungs three times daily 360 mL 5    VENTOLIN  (90 Base) MCG/ACT inhaler Inhale 2 puffs into the lungs every 6 hours as needed for Wheezing 1 Inhaler 5     No current facility-administered medications for this encounter.    :     Comments:  Allergies: Allergies   Allergen Reactions    Latex Itching     Pt reports itching on hands after using rubber gloves at work    Influenza Vaccines Swelling     Pt reports her entire arm was red and edematous post vaccine    Eggs Or Egg-Derived [employer] based products only    Gabapentin Nausea Only    Pneumococcal Vaccines Hives    Povidone Iodine Itching    Varicella Virus Vaccine Live Hives       Diabetes 5  / Health Status    A1C blood level - at goal < 7%   Lab Results   Component Value Date    LABA1C 5.7 04/06/2021    LABA1C 8.0 (H) 12/24/2020    LABA1C 5.9 08/07/2019     Lab Results   Component Value Date    GLUF 106 05/14/2012    LABMICR <1.20 12/18/2018    LDLCALC 86 12/24/2020    CREATININE 0.87 02/22/2021       Blood pressure (140/90)  Or less  BP Readings from Last 3 Encounters:   04/06/21 (!) 166/74   01/09/21 (!) 171/77   12/28/20 (!) 142/69        Cholesterol ( LDL under  100)   Lab Results   Component Value Date    LDLCALC 86 12/24/2020       4 . Smoking ? []Yes   [x]No    5. Taking an Asprin daily?   []Yes   []No            Diabetes Self- Management Education Record    Participant Name: Kalpana Barber  Referring Provider: Nicolle Peña MD   Assessment/Evaluation Ratings:  1=Needs Instruction   4=Demonstrates Understanding/Competency  2=Needs Review   NC=Not Covered    3=Comprehends Key Points  N/A=Not affecting blood glucose; Importance of logging blood glucose levels for pattern recognition; ketone testing; safe lancet disposal.   04/15/21  Magalys Parada RN  1      04/15/21  Testing once a day and varying times. Magalys Parada RN     Prevention, detection & treatment of acute complications:  Identify symptoms of hyper & hypoglycemia, and prevention & treatment strategies. 04/15/21  Magalys Parada RN  1      04/15/21  Introduced hypo/hyperglycemia symptoms and treatments. Handouts given. Magalys Parada RN     Describe sick day guidelines & indications for physician notification. Identify short term consequences of poor control. 04/15/21  Magalys Parada RN  1      NC   Prevention, detection & treatment of chronic complications:  Define the natural course of diabetes & describe the relationship of blood glucose levels to long term complications of diabetes. Identify preventative measures & standards of care. 04/15/21  Magalys Parada RN  1      NC   Developing strategies to address psychosocial issues:  Describe feelings about living with diabetes; Describe how stress, depression & anxiety affect blood glucose; Identify coping strategies; Identify support needed & support network available. 04/15/21  Magalys Parada RN  1      04/15/21  Pt states she is overwhelmed with health problems. Has no support system. Advised to seek help when needed. Magalys Parada RN     Developing strategies to promote health/change behavior: Identify 7 self-care behaviors; Personal health risk factors; Benefits, challenges & strategies for behavioral change;    04/15/21  Magalys Parada RN  1      04/15/21  Introduced Activity, medications and healthy eating today Magalys Parada RN       Individualized goal selection. My goal , to help me improve my health, I will: 1. This week note what foods are carbs and what are not.   04/15/21 Magalys Parada RN       Plan  Follow-up Appointments planned in group

## 2021-04-22 ENCOUNTER — OFFICE VISIT (OUTPATIENT)
Dept: CARDIOLOGY CLINIC | Age: 51
End: 2021-04-22
Payer: COMMERCIAL

## 2021-04-22 VITALS
SYSTOLIC BLOOD PRESSURE: 126 MMHG | BODY MASS INDEX: 30.38 KG/M2 | HEART RATE: 93 BPM | OXYGEN SATURATION: 94 % | DIASTOLIC BLOOD PRESSURE: 80 MMHG | WEIGHT: 194 LBS

## 2021-04-22 DIAGNOSIS — F17.200 SMOKING: ICD-10-CM

## 2021-04-22 DIAGNOSIS — I10 ESSENTIAL HYPERTENSION: ICD-10-CM

## 2021-04-22 DIAGNOSIS — I25.10 CORONARY ARTERY DISEASE INVOLVING NATIVE CORONARY ARTERY OF NATIVE HEART WITHOUT ANGINA PECTORIS: Primary | ICD-10-CM

## 2021-04-22 PROCEDURE — 99213 OFFICE O/P EST LOW 20 MIN: CPT | Performed by: INTERNAL MEDICINE

## 2021-04-22 RX ORDER — BLOOD PRESSURE TEST KIT
KIT MISCELLANEOUS
Qty: 1 KIT | Refills: 0 | Status: SHIPPED | OUTPATIENT
Start: 2021-04-22

## 2021-04-22 ASSESSMENT — ENCOUNTER SYMPTOMS
WHEEZING: 0
ABDOMINAL PAIN: 0
GASTROINTESTINAL NEGATIVE: 1
EYES NEGATIVE: 1
VOMITING: 0
SHORTNESS OF BREATH: 0
NAUSEA: 0
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
## 2021-04-23 ENCOUNTER — HOSPITAL ENCOUNTER (OUTPATIENT)
Dept: PHARMACY | Age: 51
Setting detail: THERAPIES SERIES
Discharge: HOME | End: 2021-04-23
Payer: COMMERCIAL

## 2021-04-23 DIAGNOSIS — G45.8 OTHER TRANSIENT CEREBRAL ISCHEMIC ATTACKS AND RELATED SYNDROMES: ICD-10-CM

## 2021-04-23 DIAGNOSIS — D68.59 HYPERCOAGULABLE STATE (HCC): ICD-10-CM

## 2021-04-23 DIAGNOSIS — I63.81 CEREBROVASCULAR ACCIDENT (CVA) OF LEFT THALAMUS (HCC): ICD-10-CM

## 2021-04-23 LAB — INTERNATIONAL NORMALIZATION RATIO, POC: 1.6

## 2021-04-23 PROCEDURE — 99211 OFF/OP EST MAY X REQ PHY/QHP: CPT

## 2021-04-23 PROCEDURE — 85610 PROTHROMBIN TIME: CPT

## 2021-04-23 NOTE — PROGRESS NOTES
Ms. Erick Sears is a 52 y.o. y/o female with history of CVA , hypercoagulable state, TIA who presents today for anticoagulation monitoring and adjustment.   INR 1.6 is sub therapeutic for this patient (goal range 2-3) and is reflective of 40 mg TWD  Patient verifies current dosing regimen, patient able to verbally recall dose, unsure if following maintenance plan or if taking 5 mg daily (35 mg TWD)  Patient reports 0  missed doses since last INR   Patient denies s/sx clotting and/or stroke  Patient denies hematuria, epistaxis, rectal bleeding  Patient denies changes in diet, alcohol, or tobacco use  Reviewed medication list and drug allergies with patient, updated any medication additions or modifications accordingly  Patient also denies any pending medical or dental procedures scheduled at this time  Patient was instructed to boost dose on 4/24 (already taken 4/24) then take 40 mg TWD (reviewed dose and connie, confirmed patient has supply) and RTC 2 weeks    For Pharmacy Admin Tracking Only     Intervention Detail: Dose Adjustment: 1: reason: Needs Additional Medication Therapy   Total # of Interventions Recommended: 2   Total # of Interventions Accepted: 2   Time Spent (min): 15

## 2021-04-26 ENCOUNTER — HOSPITAL ENCOUNTER (EMERGENCY)
Age: 51
Discharge: HOME | End: 2021-04-26

## 2021-04-26 ENCOUNTER — APPOINTMENT (OUTPATIENT)
Dept: GENERAL RADIOLOGY | Age: 51
End: 2021-04-26

## 2021-04-26 VITALS
WEIGHT: 188 LBS | HEIGHT: 67 IN | BODY MASS INDEX: 29.51 KG/M2 | OXYGEN SATURATION: 96 % | TEMPERATURE: 98.3 F | SYSTOLIC BLOOD PRESSURE: 188 MMHG | HEART RATE: 84 BPM | DIASTOLIC BLOOD PRESSURE: 90 MMHG | RESPIRATION RATE: 16 BRPM

## 2021-04-26 DIAGNOSIS — R59.1 LYMPHADENOPATHY OF HEAD AND NECK: Primary | ICD-10-CM

## 2021-04-26 DIAGNOSIS — J44.1 COPD EXACERBATION (HCC): ICD-10-CM

## 2021-04-26 PROCEDURE — 99284 EMERGENCY DEPT VISIT MOD MDM: CPT

## 2021-04-26 PROCEDURE — 71046 X-RAY EXAM CHEST 2 VIEWS: CPT

## 2021-04-26 PROCEDURE — 6370000000 HC RX 637 (ALT 250 FOR IP): Performed by: NURSE PRACTITIONER

## 2021-04-26 RX ORDER — AMOXICILLIN AND CLAVULANATE POTASSIUM 875; 125 MG/1; MG/1
1 TABLET, FILM COATED ORAL 2 TIMES DAILY
Qty: 14 TABLET | Refills: 0 | Status: SHIPPED | OUTPATIENT
Start: 2021-04-26 | End: 2021-05-03

## 2021-04-26 RX ORDER — AMOXICILLIN AND CLAVULANATE POTASSIUM 875; 125 MG/1; MG/1
1 TABLET, FILM COATED ORAL ONCE
Status: COMPLETED | OUTPATIENT
Start: 2021-04-26 | End: 2021-04-26

## 2021-04-26 RX ADMIN — AMOXICILLIN AND CLAVULANATE POTASSIUM 1 TABLET: 875; 125 TABLET, FILM COATED ORAL at 17:22

## 2021-04-26 ASSESSMENT — PAIN SCALES - GENERAL: PAINLEVEL_OUTOF10: 8

## 2021-04-26 ASSESSMENT — ENCOUNTER SYMPTOMS
BACK PAIN: 0
SINUS PRESSURE: 0
ABDOMINAL DISTENTION: 0
CHEST TIGHTNESS: 0
CONSTIPATION: 0
SINUS PAIN: 0
RHINORRHEA: 1
COUGH: 1
TROUBLE SWALLOWING: 0
WHEEZING: 0
VOMITING: 0
COLOR CHANGE: 0
SHORTNESS OF BREATH: 1
NAUSEA: 0
DIARRHEA: 0
ABDOMINAL PAIN: 0
SORE THROAT: 0

## 2021-04-26 ASSESSMENT — PAIN DESCRIPTION - PAIN TYPE: TYPE: ACUTE PAIN

## 2021-04-26 ASSESSMENT — PAIN DESCRIPTION - DESCRIPTORS: DESCRIPTORS: BURNING;PRESSURE

## 2021-04-26 NOTE — ED NOTES
To xray ambulatory with RN. Edward non-productive, barky cough. No SOB.       Joo Baron RN  04/26/21 8412

## 2021-04-26 NOTE — ED PROVIDER NOTES
throat and trouble swallowing. Eyes: Negative for visual disturbance. Respiratory: Positive for cough and shortness of breath. Negative for chest tightness and wheezing. Cardiovascular: Negative for chest pain and palpitations. Gastrointestinal: Negative for abdominal distention, abdominal pain, constipation, diarrhea, nausea and vomiting. Genitourinary: Negative for difficulty urinating, dysuria, flank pain, frequency and urgency. Musculoskeletal: Negative for arthralgias, back pain, gait problem, joint swelling, myalgias, neck pain and neck stiffness. Skin: Negative for color change, rash and wound. Neurological: Negative for dizziness, tremors, seizures, syncope, speech difficulty, weakness, light-headedness, numbness and headaches. Hematological: Positive for adenopathy (left side of neck). Except as noted above the remainder of the review of systems was reviewed and negative.        PAST MEDICAL HISTORY     Past Medical History:   Diagnosis Date    Anxiety     Back pain     back surgery x 4    Bipolar disorder ([address]     CAD (coronary artery disease)     CAFL (chronic airflow limitation) [facility]) 11/3/2016    Chronic bronchitis ([facility])     Chronic pain     Colitis     Complicated migraine     DDD (degenerative disc disease), lumbar 2016    Depression     Endometriosis     Excessive caffeine abuse, continuous ([facility]) 2018    Gastritis     GERD (gastroesophageal reflux disease)     History of myocardial infarction     HTN (hypertension), benign 2016    Impaired mobility and activities of daily living     Over weight     PTSD (post-traumatic stress disorder)     Sensory neuropathy 2016    Somatization disorder     TIA (transient ischemic attack)     Tobacco abuse     Vasospastic angina ([address] 2016     Past Surgical History:   Procedure Laterality Date    BACK SURGERY      x2     SECTION      x2    CHOLECYSTECTOMY  15    Dinah Doe COLONOSCOPY N/A 7/28/2020    COLONOSCOPY DIAGNOSTIC performed by Chiki Squires MD at [address]  3/7/16    Dr. Geo Fuentes Left     UPPER GASTROINTESTINAL ENDOSCOPY  11/24/2014    ANIBAL HOUSE M.D.   Felisa Michael UPPER GASTROINTESTINAL ENDOSCOPY N/A 7/28/2020    EGD ESOPHAGOGASTRODUODENOSCOPY performed by Chiki Squires MD at [facility] status:      Spouse name: None    Number of children: None    Years of education: None    Highest education level: None   Occupational History    Occupation: unemployed   Social Needs    Financial resource strain: None    Food insecurity     Worry: None     Inability: None    Transportation needs     Medical: None     Non-medical: None   Tobacco Use    Smoking status: Current Every Day Smoker     Packs/day: 1.00     Years: 17.00     Pack years: 17.00     Types: Cigarettes    Smokeless tobacco: Never Used   Substance and Sexual Activity    Alcohol use: No    Drug use: No    Sexual activity: Not Currently     Partners: Male   Lifestyle    Physical activity     Days per week: 0 days     Minutes per session: 0 min    Stress: To some extent   Relationships    Social connections     Talks on phone: None     Gets together: None     Attends Gnosticist service: None     Active member of club or organization: None     Attends meetings of clubs or organizations: None     Relationship status: None    Intimate partner violence     Fear of current or ex partner: None     Emotionally abused: None     Physically abused: None     Forced sexual activity: None   Other Topics Concern    None   Social History Narrative    Niranjan Anthony is a 77-year-old female who is on disability because of pain and bipolar disorder. She's also had a presumed diagnosis of possible multiple sclerosis. She's been seeing Dr. Fred Yanes for that and she says that the diagnosis is sometimes in question and it's clear neuropathy vitamin B12 deficiency as well as generalized bodyaches from the severe vitamin D deficiency have caused this scenario that looks like multiple sclerosis. lives With: Significant other    Type of Home: House [address] [place] in [address] to enter with rails  - Number of Steps: 5    Bathroom Shower/Tub: Tub only    ADL Assistance: Independent    Homemaking Assistance: Independent, Homemaking Responsibilities: Yes, Meal Prep Responsibility: Primary    Laundry Responsibility: Primary, Cleaning Responsibility: Primary    Bill Paying/Finance Responsibility: Primary    Shopping Responsibility: Primary, Ambulation Assistance: Independent, Transfer Assistance: Independent    Active : Yes    Occupation: Full time employment--Type of occupation: disabled-  - 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 Work requirements are Pt was looking at [employer] as main part of job working nights       [facility]    (up to 7 for level 4, 8 or more for level 5)     ED Triage Vitals [04/26/21 1618]   BP Temp Temp Source Pulse Resp SpO2 Height Weight   (!) 188/90 98.3 °F (36.8 °C) Oral 84 16 96 % 5' 7\" (1.702 m) 188 lb (85.3 kg)       Physical Exam  Constitutional:       General: She is not in acute distress. Appearance: Normal appearance. She is normal weight. She is not ill-appearing, toxic-appearing or diaphoretic. HENT:      Head: Normocephalic and atraumatic. Right Ear: Hearing, tympanic membrane, ear canal and external ear normal.      Left Ear: Hearing, tympanic membrane, ear canal and external ear normal.      Nose: Mucosal edema, congestion and rhinorrhea present. No nasal tenderness. Mouth/Throat:      Lips: Pink. Pharynx: Oropharynx is clear. Uvula midline.    Eyes: General:         Right eye: No discharge. Left eye: No discharge. Conjunctiva/sclera: Conjunctivae normal.      Pupils: Pupils are equal, round, and reactive to light. Neck:      Musculoskeletal: Normal range of motion and neck supple. No neck rigidity, injury, pain with movement or muscular tenderness. Trachea: Trachea and phonation normal.     Cardiovascular:      Rate and Rhythm: Normal rate and regular rhythm. Pulses: Normal pulses. Pulmonary:      Effort: Pulmonary effort is normal.      Breath sounds: No wheezing. Abdominal:      General: There is no distension. Tenderness: There is no abdominal tenderness. Musculoskeletal: Normal range of motion. General: No tenderness or signs of injury. Lymphadenopathy:      Cervical: Cervical adenopathy present. Right cervical: No superficial, deep or posterior cervical adenopathy. Left cervical: Superficial cervical adenopathy present. No deep or posterior cervical adenopathy. Skin:     General: Skin is warm. Capillary Refill: Capillary refill takes less than 2 seconds. Neurological:      General: No focal deficit present. Mental Status: She is alert and oriented to person, place, and time. Mental status is at baseline. Cranial Nerves: No cranial nerve deficit. Sensory: No sensory deficit. Motor: No weakness.       Coordination: Coordination normal.         RESULTS     EKG: All EKG's are interpreted by the Emergency Department Physician who either signs or Co-signsthis chart in the absence of a cardiologist.        RADIOLOGY:   Bedias Loffler such as CT, Ultrasound and MRI are read by the radiologist. Plain radiographic images are visualized and preliminarily interpreted by the emergency physician with the below findings:    2 view chest x-ray negative for acute process no focal infiltrates or effusions    Interpretation per the Radiologist below, if available at the time ofthis note:    XR CHEST (2 VW)    (Results Pending)         ED BEDSIDE ULTRASOUND:   Performed by ED Physician - none    LABS:  Labs Reviewed - No data to display    All other labs were within normal range or not returned as of this dictation. EMERGENCY DEPARTMENT COURSE and DIFFERENTIAL DIAGNOSIS/MDM:   Vitals:    Vitals:    04/26/21 1618   BP: (!) 188/90   Pulse: 84   Resp: 16   Temp: 98.3 °F (36.8 °C)   TempSrc: Oral   SpO2: 96%   Weight: 188 lb (85.3 kg)   Height: 5' 7\" (1.702 m)     Blood pressure noted to be elevated on arrival however patient was coughing frequently during blood pressure evaluation and does have discomfort and tenderness of the lump in the neck. She does not have any headache dizziness blurred vision or chest pain. MDM patient is afebrile nontoxic no acute distress. There is a notable mass in the left side of the neck this does not appear to be a simple abscess. This is following along these lymph node chain concern for enlarged lymphadenitis. Patient does have upper respiratory symptoms and does appear to have a mild exacerbation of her COPD. Chest x-ray is negative. She does have inspiratory expiratory wheezing. Due to the concern for lymphadenitis as well as for coverage for sinus congestion and respiratory should be provided with Augmentin first dose given in the ER. Directed follow-up closely with her primary care provider as possible for the evaluation. Return to the ER if any onset of new concerns and worsening condition. Patient verbalized understandable given instruction education. CRITICAL CARE TIME       CONSULTS:  None    PROCEDURES:  Unless otherwise noted below, none     Procedures    FINAL IMPRESSION      1. Lymphadenopathy of head and neck    2.  COPD exacerbation [facility])          DISPOSITION/PLAN   DISPOSITION Decision To Discharge 04/26/2021 05:50:34 PM      PATIENT REFERRED TO:  Terri Guardado MD  [facility] [place] [address] [id number]  [phone]    Call in 1 day        DISCHARGE MEDICATIONS:  Discharge Medication List as of 4/26/2021  5:39 PM      START taking these medications    Details   amoxicillin-clavulanate (AUGMENTIN) 875-125 MG per tablet Take 1 tablet by mouth 2 times daily for 7 days, Disp-14 tablet, R-0Print                (Please notethat portions of this note were completed with a voice recognition program.  Efforts were made to edit the dictations but occasionally words are mis-transcribed.)    NIKI Garza CNP (electronically signed)  Attending Emergency Physician          NIKI Garza CNP  04/26/21 8075

## 2021-04-27 ENCOUNTER — HOSPITAL ENCOUNTER (OUTPATIENT)
Dept: DIABETES SERVICES | Age: 51
Setting detail: THERAPIES SERIES
Discharge: HOME | End: 2021-04-27

## 2021-04-27 PROCEDURE — G0109 DIAB MANAGE TRN IND/GROUP: HCPCS

## 2021-04-28 ENCOUNTER — HOSPITAL ENCOUNTER (OUTPATIENT)
Dept: DIABETES SERVICES | Age: 51
Setting detail: THERAPIES SERIES
Discharge: HOME | End: 2021-04-28

## 2021-04-28 ENCOUNTER — TELEPHONE (OUTPATIENT)
Dept: CARDIOLOGY CLINIC | Age: 51
End: 2021-04-28

## 2021-04-28 PROCEDURE — G0109 DIAB MANAGE TRN IND/GROUP: HCPCS

## 2021-04-28 NOTE — PROGRESS NOTES
Diabetes Self- Management Education Program Assessment and Education Record-   Also see Diabetic Screening    Patient, Kalpana Barber,  here for diabetes self-management education  Class #1. Today's visit was in an group setting.       Diet History :  Diet Questionnaire and typical meal /portion sheet completed  []Yes  [x] NO    Goals setting:  Initial Goal Set with Patient  [x]Yes  [] NO  (SEE  EDUCATION AND GOALS)    MEDICAL HISTORY:  Past Medical History:   Diagnosis Date    Anxiety     Back pain     back surgery x 4    Bipolar disorder ([facility]     CAD (coronary artery disease)     CAFL (chronic airflow limitation) ([facility]) 11/3/2016    Chronic bronchitis ([facility])     Chronic pain     Colitis     Complicated migraine     DDD (degenerative disc disease), lumbar 12/22/2016    Depression     Endometriosis     Excessive caffeine abuse, continuous ([facility]) 7/6/2018    Gastritis     GERD (gastroesophageal reflux disease)     History of myocardial infarction     HTN (hypertension), benign 2/19/2016    Impaired mobility and activities of daily living     Over weight     PTSD (post-traumatic stress disorder)     Sensory neuropathy 12/22/2016    Somatization disorder     TIA (transient ischemic attack)     Tobacco abuse     [facility] 11/11/2016     Family History   Problem Relation Age of Onset    High Blood Pressure Mother     Kidney Disease Mother     Lupus Mother     Coronary Art Dis Mother         Had stents in her 62s   [facility] Bipolar Disorder Son      Latex, Influenza vaccines, Eggs or egg-derived products, Gabapentin, Pneumococcal vaccines, Povidone iodine, and Varicella virus vaccine live   Immunization History   Administered Date(s) Administered    Influenza Vaccine, unspecified formulation 11/18/2011    Pneumococcal Polysaccharide ([id number]) 11/18/2011       Current Medications  Current Outpatient Medications   Medication Sig Dispense Refill    warfarin (COUMADIN) 5 MG tablet Take 1 tablet by mouth daily Quantity equals 30 days supply 45 tablet 1    isosorbide mononitrate (IMDUR) 30 MG extended release tablet Take 1 tablet by mouth daily 30 tablet 3    Fluticasone-Umeclidin-Vilant (TRELEGY ELLIPTA) 200-62.5-25 MCG/INH AEPB Inhale 1 puff into the lungs daily      pantoprazole (PROTONIX) 40 MG tablet       SITagliptin (JANUVIA) 25 MG tablet Take 1 tablet by mouth daily 30 tablet 0    insulin glargine (LANTUS) 100 UNIT/ML injection vial Inject 22 Units into the skin nightly 1 vial 3    metFORMIN (GLUCOPHAGE) 500 MG tablet Take 1 tablet by mouth 2 times daily (with meals) 60 tablet 3    losartan (COZAAR) 50 MG tablet Take 1 tablet by mouth daily 30 tablet 3    cariprazine hcl (VRAYLAR) 3 MG CAPS capsule Take 1 capsule by mouth daily 30 capsule 0    lithium 300 MG capsule Take 1 capsule by mouth 3 times daily (with meals) 90 capsule 3    atorvastatin (LIPITOR) 20 MG tablet Take 1 tablet by mouth nightly 30 tablet 3    folic acid (FOLVITE) 1 MG tablet Take 1 tablet by mouth daily 30 tablet 3    docusate sodium (COLACE, DULCOLAX) 100 MG CAPS Take 200 mg by mouth 2 times daily 120 capsule 2    Vitamin D (CHOLECALCIFEROL) 50 MCG (2000 UT) TABS tablet Take 1 tablet by mouth Daily with supper 60 tablet 2    butalbital-aspirin-caffeine (FIORINAL) -40 MG capsule Take 1 capsule by mouth every 4 hours as needed for Headaches for up to 10 days. 20 capsule 0    nitroGLYCERIN (NITROSTAT) 0.4 MG SL tablet up to max of 3 total doses.  If no relief after 1 dose, call 911. 25 tablet 3    albuterol sulfate HFA (PROVENTIL HFA) 108 (90 Base) MCG/ACT inhaler Inhale 2 puffs into the lungs every 6 hours as needed for Wheezing 1 Inhaler 3    FREESTYLE LANCETS MISC USE DAILY AS DIRECTED 100 each 10    budesonide (PULMICORT) 0.5 MG/2ML nebulizer suspension INHALE 1 VIAL VIA NEBULIZER TWICE DAILY 120 mL 5    BD INTEGRA SYRINGE 25G X 1\" 3 ML MISC USE AS DIRECTED EVERY MONTH 25 each 5    session Eval Comments   Diabetes disease process & Treatment process: Define diabetes & pre-diabetes; Identify own type of diabetes; role of the pancreas; signs/symptoms; diagnostic criteria; prevention & treatment options; contributing factors. 04/15/21  Natalia Grady RN  1   04/27/21  Natalia Grady RN     04/15/21  Pt new to diabetes. History of stroke in Dec 2020 and not back to work yet. Hoping to go back in May. Lives with boyfriend. Aware of diabetes as her mother had. States she went to a education class in 2006 with her mother. Has difficulty processing information quickly since stroke. Gave basic information about diabetes and patient tracked well. Assessment reveals that patient is \"bipolar\" and not taking her medications as her boyfriend feels she is a meaner person when on meds. States she has not told her psychiatrist that she is not taking meds. Advised to contact her to discuss. Pt scored high on her depression scales. Is not suicidal or homicidal. Again, advised to contact her psychiatrist or family provider. States she will consider. Natalia Grady RN  04/27/21 Discussed basic pathophysiology of DM with the group including the pancreas, insulin, insulin resistance and livers involvement. Reviewed the different types of DM, risk factors, diagnosis, symptoms and the treatment options. Natalia Grady RN         Incorporating nutritional management into lifestyle: Describe effect of type, amount & timing of food on blood glucose; Describe basic meal planning techniques & current nutrition guideline   04/15/21  Natalia Grady RN  1      04/15/21  Introduced effects of carbs on BG. Pt states she doesn't know what foods are carbs. Reviewed carbs in Carb counting book with her. Gave recommended number of carbs guidelines. Pt will start slow as she is overwhelmed with stroke and now diabetes. Support given. Pt has other health concerns that limit her food.   Stroke has caused her to have no taste buds. States everything tastes like cardboard. Recommended one on one with dietician. Will refer. Domenic Lr RN        What to eat - Food groups, When to eat - timing of meals and snacks, and How much to eat - portions control. calories/ day   CHO choices/ meal   CHO choices/  day   grams of protein /day   gram of fat /day     Correctly read food labels & demonstrate CHO counting & portion control with personalized meal plan. Identify dining out strategies, & dietary sick day guidelines. 04/15/21  Domenic Lr RN  1      04/15/21  Not reading labels. Advised how to look at labels for serving size, servings per container and amount of carbs in serving size. Advised of sick/stress causing increased BG. Domenic Lr RN     Incorporating physical activity into lifestyle:   Verbalize effect of exercise on blood glucose levels; benefits of regular exercise; safety considerations; contraindications; maintenance of activity. 04/15/21  Domenic Lr RN  1   04/27/21  Domenic Lr RN   04/15/21  Pt not physically active due to stroke. Has good days and bad. Advised of effects of exercise on BG. Domenic Lr RN  04/27/21 - Reviewed with the group the importance of exercise, recommendations by the ADA, effects on BG, weight loss and precautions. Discussed what patient is doing to stay active with the group. Domenic Lr RN         Using medications safely:  Identify effects of diabetes medicines on blood glucose levels; List diabetes medication taken, action & side effects;    04/15/21  Domenic Lr RN  1      04/15/21  Pt taking Januvia, and metformin consistently. Pt not taking her lantus consistently. Since she doesn't eat frequently, she will skip a dose if not eating. Advised of actions of lantus not having to do with her food, works on liver output of sugar. Pt will start taking everynight and check her BG the next few mornings to make sure she is not going low.   If she is, she will contact physician to reduce dose of lantus. Antionette Gordon RN     Insulin / Injectable - Appropriate injection sites; proper storage; supplies needed; proper technique; safe needle disposal guidelines. 04/15/21  Antionette Gordon RN  1         Monitoring blood glucose, interpreting and using results:  Identify recommended & personal blood glucose targets; importance of testing; testing supplies; HgbA1C target levels; Factors affecting blood glucose; Importance of logging blood glucose levels for pattern recognition; ketone testing; safe lancet disposal.   04/15/21  Antionette Gordon RN  1   04/27/21  Antionette Gordon RN   04/15/21  Testing once a day and varying times. Antionette Gordon RN  4/27/21 - Discussed timing of monitoring and goals for BG. Aware of goal A1C and current A1C. BG guidelines reviewed and rule of 15 to treat low BG. Reviewed the importance of monitoring at different times and occasionally post prandial. Encouraged to keep a log book. Antionette Gordon RN       Prevention, detection & treatment of acute complications:  Identify symptoms of hyper & hypoglycemia, and prevention & treatment strategies. 04/15/21  Antionette Gordon RN  1   04/27/21  Antionette Gordon RN   04/15/21  Introduced hypo/hyperglycemia symptoms and treatments. Handouts given. Antionette Gordon RN  04/27/21 - Patient able to recognize signs and symptoms of high and low BG. Reviewed treatment of both high and low BG. Discussed with the group causes of both high and low BG. Reviewed BG guidelines and troubleshooting unexpected numbers. Antionette Gordon RN         Describe sick day guidelines & indications for physician notification. Identify short term consequences of poor control. 04/15/21  Antionette Gordon RN  1      [place]   Prevention, detection & treatment of chronic complications:  Define the natural course of diabetes & describe the relationship of blood glucose levels to long term complications of diabetes.  Identify from Diabetes Department    [] Self-Management  Class 2 - Meal Plan,  Choose your Foods - Food Lists for [employer] and Nutrition in the [employer] - fast facts about fast food    [] Self-Management  Class 3 -  Diabetes ID card,  foot care tips sheet,  Continuing Your Journey with Diabetes, Individualized Diabetes report card, Sick Day Rules, Diabetes Cookbooks, Magazines and Pedometers when available    []Glucose Meter     []Insulin Kit     []Other      Encounter Type Date Start Time End Time Comments No Show Dates   Assessment 04/15/21   [phone] Very pleasant person who is overwhelmed with health issues. Recent stroke has limited her processing of new information. Very willing to learn. Cleared up some misconceptions about meds so hope for better compliance. Struggling financially as she is unable to work yet. Hopes to go back in May. Javy Shaw, RN      Class 1 - Understanding diabetes 04/27/21  Javy Shaw, RN   [phone] Active participation in class, asked questions, engaged with class.  Javy Shaw, RN      Class 2- Nutrition and diabetes          Class 3 - Preventing Complications         Individual MNT         3 Month Follow-up      []In Person  []Telephone    Meter Instrx        Insulin Instrx      []Pen  []Vial & Syringe      DSMS Support Plan:  Follow-up plan: Classes in one week     [x] MNT referral request / Appointment     [] Annual update referral request and appointment after      []ADA  Where do I Begin, Living with Type 2 Diabetes ADA home support program     [] [address] [place] [address] [phone]     [] Fit Walks : FREE [facility] or [facility]    []  Diabetes support Group      [x]   Emotional Support - advised to contact office if any questions or support needed     [] Buck on phone      []  Internet web sites - ADA and D- life    []  Journals/Magazines    []  Other ___________________________      Post Education Referrals:      [] [address] information sheet and [address] [facility] , [address] [facility]      [] Dental care    [] Podiatrist     []  Opthamologist      []Other    Maureen Sandifer, RN

## 2021-04-29 ENCOUNTER — HOSPITAL ENCOUNTER (OUTPATIENT)
Dept: DIABETES SERVICES | Age: 51
Setting detail: THERAPIES SERIES
Discharge: HOME | End: 2021-04-29

## 2021-04-29 PROCEDURE — G0109 DIAB MANAGE TRN IND/GROUP: HCPCS

## 2021-04-29 NOTE — PROGRESS NOTES
Diabetes Self- Management Education Program Assessment and Education Record-   Also see Diabetic Screening    Patient, Franca Dailey,  here for diabetes self-management education  Class #2. Today's visit was in an group setting.       Diet History :  Diet Questionnaire and typical meal /portion sheet completed  []Yes  [x] NO    Goals setting:  Initial Goal Set with Patient  [x]Yes  [] NO  (SEE  EDUCATION AND GOALS)    MEDICAL HISTORY:  Past Medical History:   Diagnosis Date    Anxiety     Back pain     back surgery x 4    Bipolar disorder ([facility]     CAD (coronary artery disease)     CAFL [facility]chronic airflow limitation) [facility] 11/3/2016    Chronic bronchitis ([facility])     Chronic pain     Colitis     Complicated migraine     DDD (degenerative disc disease), lumbar 12/22/2016    Depression     Endometriosis     Excessive caffeine abuse, continuous ([facility]) 7/6/2018    Gastritis     GERD (gastroesophageal reflux disease)     History of myocardial infarction     HTN (hypertension), benign 2/19/2016    Impaired mobility and activities of daily living     Over weight     PTSD (post-traumatic stress disorder)     Sensory neuropathy 12/22/2016    Somatization disorder     TIA (transient ischemic attack)     Tobacco abuse     [facility] 11/11/2016     Family History   Problem Relation Age of Onset    High Blood Pressure Mother     Kidney Disease Mother     Lupus Mother     Coronary Art Dis Mother         Had stents in her 62s   [place] Latin Bipolar Disorder Son      Latex, Influenza vaccines, Eggs or egg-derived products, Gabapentin, Pneumococcal vaccines, Povidone iodine, and Varicella virus vaccine live   Immunization History   Administered Date(s) Administered    Influenza Vaccine, unspecified formulation 11/18/2011    Pneumococcal Polysaccharide ([id number]) 11/18/2011       Current Medications  Current Outpatient Medications   Medication Sig Dispense Refill    warfarin (COUMADIN) 5 MG tablet Take 1 tablet by mouth daily Quantity equals 30 days supply 45 tablet 1    isosorbide mononitrate (IMDUR) 30 MG extended release tablet Take 1 tablet by mouth daily 30 tablet 3    Fluticasone-Umeclidin-Vilant (TRELEGY ELLIPTA) 200-62.5-25 MCG/INH AEPB Inhale 1 puff into the lungs daily      pantoprazole (PROTONIX) 40 MG tablet       SITagliptin (JANUVIA) 25 MG tablet Take 1 tablet by mouth daily 30 tablet 0    insulin glargine (LANTUS) 100 UNIT/ML injection vial Inject 22 Units into the skin nightly 1 vial 3    metFORMIN (GLUCOPHAGE) 500 MG tablet Take 1 tablet by mouth 2 times daily (with meals) 60 tablet 3    losartan (COZAAR) 50 MG tablet Take 1 tablet by mouth daily 30 tablet 3    cariprazine hcl (VRAYLAR) 3 MG CAPS capsule Take 1 capsule by mouth daily 30 capsule 0    lithium 300 MG capsule Take 1 capsule by mouth 3 times daily (with meals) 90 capsule 3    atorvastatin (LIPITOR) 20 MG tablet Take 1 tablet by mouth nightly 30 tablet 3    folic acid (FOLVITE) 1 MG tablet Take 1 tablet by mouth daily 30 tablet 3    docusate sodium (COLACE, DULCOLAX) 100 MG CAPS Take 200 mg by mouth 2 times daily 120 capsule 2    Vitamin D (CHOLECALCIFEROL) 50 MCG (2000 UT) TABS tablet Take 1 tablet by mouth Daily with supper 60 tablet 2    butalbital-aspirin-caffeine (FIORINAL) -40 MG capsule Take 1 capsule by mouth every 4 hours as needed for Headaches for up to 10 days. 20 capsule 0    nitroGLYCERIN (NITROSTAT) 0.4 MG SL tablet up to max of 3 total doses.  If no relief after 1 dose, call 911. 25 tablet 3    albuterol sulfate HFA (PROVENTIL HFA) 108 (90 Base) MCG/ACT inhaler Inhale 2 puffs into the lungs every 6 hours as needed for Wheezing 1 Inhaler 3    FREESTYLE LANCETS MISC USE DAILY AS DIRECTED 100 each 10    budesonide (PULMICORT) 0.5 MG/2ML nebulizer suspension INHALE 1 VIAL VIA NEBULIZER TWICE DAILY 120 mL 5    BD INTEGRA SYRINGE 25G X 1\" 3 ML MISC USE AS DIRECTED EVERY MONTH 25 each 5    aspirin 81 MG chewable tablet Take 1 tablet by mouth daily 30 tablet 5    FREESTYLE LITE strip USE DAILY AS DIRECTED 50 strip 11    ipratropium-albuterol (DUONEB) 0.5-2.5 (3) MG/3ML SOLN nebulizer solution Inhale 3 mLs into the lungs three times daily 360 mL 5    VENTOLIN  (90 Base) MCG/ACT inhaler Inhale 2 puffs into the lungs every 6 hours as needed for Wheezing 1 Inhaler 5     No current facility-administered medications for this encounter.    :     Comments:  Allergies: Allergies   Allergen Reactions    Latex Itching     Pt reports itching on hands after using rubber gloves at work    Influenza Vaccines Swelling     Pt reports her entire arm was red and edematous post vaccine    Eggs Or Egg-Derived [employer] based products only    Gabapentin Nausea Only    Pneumococcal Vaccines Hives    Povidone Iodine Itching    Varicella Virus Vaccine Live Hives       Diabetes 5  / Health Status    A1C blood level - at goal < 7%   Lab Results   Component Value Date    LABA1C 5.7 04/06/2021    LABA1C 8.0 (H) 12/24/2020    LABA1C 5.9 08/07/2019     Lab Results   Component Value Date    GLUF 106 05/14/2012    LABMICR <1.20 12/18/2018    LDLCALC 86 12/24/2020    CREATININE 0.87 02/22/2021       Blood pressure (140/90)  Or less  BP Readings from Last 3 Encounters:   04/06/21 (!) 166/74   01/09/21 (!) 171/77   12/28/20 (!) 142/69        Cholesterol ( LDL under  100)   Lab Results   Component Value Date    LDLCALC 86 12/24/2020       4 . Smoking ? []Yes   [x]No    5. Taking an Asprin daily? []Yes   []No            Diabetes Self- Management Education Record    Participant Name: Riki Rowley  Referring Provider: Chase Sidhu MD   Assessment/Evaluation Ratings:  1=Needs Instruction   4=Demonstrates Understanding/Competency  2=Needs Review   NC=Not Covered    3=Comprehends Key Points  N/A=Not Applicable    Topics/Learning Objectives Initial Assessment Date:   Instr.  Date Reinforce Date Post- session Eval Comments   Diabetes disease process & Treatment process: Define diabetes & pre-diabetes; Identify own type of diabetes; role of the pancreas; signs/symptoms; diagnostic criteria; prevention & treatment options; contributing factors. 04/15/21  Prince Sterling RN  1   04/27/21  Prince Sterling RN     04/15/21  Pt new to diabetes. History of stroke in Dec 2020 and not back to work yet. Hoping to go back in May. Lives with boyfriend. Aware of diabetes as her mother had. States she went to a education class in 2006 with her mother. Has difficulty processing information quickly since stroke. Gave basic information about diabetes and patient tracked well. Assessment reveals that patient is \"bipolar\" and not taking her medications as her boyfriend feels she is a meaner person when on meds. States she has not told her psychiatrist that she is not taking meds. Advised to contact her to discuss. Pt scored high on her depression scales. Is not suicidal or homicidal. Again, advised to contact her psychiatrist or family provider. States she will consider. Prince Sterling RN  04/27/21 Discussed basic pathophysiology of DM with the group including the pancreas, insulin, insulin resistance and livers involvement. Reviewed the different types of DM, risk factors, diagnosis, symptoms and the treatment options. Prince Sterling RN         Incorporating nutritional management into lifestyle: Describe effect of type, amount & timing of food on blood glucose; Describe basic meal planning techniques & current nutrition guideline   04/15/21  Prince Sterling RN  1   04/28/21  Donya Yanes RDN 2   04/15/21  Introduced effects of carbs on BG. Pt states she doesn't know what foods are carbs. Reviewed carbs in Carb counting book with her. Gave recommended number of carbs guidelines. Pt will start slow as she is overwhelmed with stroke and now diabetes. Support given. Pt has other health concerns that limit her food.   Stroke has caused her to have no taste buds. States everything tastes like cardboard. Recommended one on one with dietician. Will refer. Domenic Lr RN    04/28/21 Was able to identify foods that are carbohydrates only WITH help. What to eat - Food groups, When to eat - timing of meals and snacks, and How much to eat - portions control. 1800 calories/ day  3 CHO choices/ meal  11 CHO choices/  day  75 grams of protein /day      Correctly read food labels & demonstrate CHO counting & portion control with personalized meal plan. Identify dining out strategies, & dietary sick day guidelines. 04/15/21  Domenic Lr RN  1   04/28/21  Ning Herr RDN 1   04/15/21  Not reading labels. Advised how to look at labels for serving size, servings per container and amount of carbs in serving size. Advised of sick/stress causing increased BG. Domenic Lr RN    04/28/21  Continues to need help with carb counting, label reading and meal planning. Will review with her during MNT. Encouraged to keep using glucerna and eat protein rich foods in the meantime. Ning Herr RDN, LD     Incorporating physical activity into lifestyle:   Verbalize effect of exercise on blood glucose levels; benefits of regular exercise; safety considerations; contraindications; maintenance of activity. 04/15/21  Domenic Lr RN  1   04/27/21  Domenic Lr RN   04/15/21  Pt not physically active due to stroke. Has good days and bad. Advised of effects of exercise on BG. Domenic Lr RN  04/27/21 - Reviewed with the group the importance of exercise, recommendations by the ADA, effects on BG, weight loss and precautions. Discussed what patient is doing to stay active with the group. Domenic Lr RN         Using medications safely:  Identify effects of diabetes medicines on blood glucose levels;  List diabetes medication taken, action & side effects;    04/15/21  Domenic Lr RN  1      04/15/21  Pt taking Januvia, and metformin consistently. Pt not taking her lantus consistently. Since she doesn't eat frequently, she will skip a dose if not eating. Advised of actions of lantus not having to do with her food, works on liver output of sugar. Pt will start taking everynight and check her BG the next few mornings to make sure she is not going low. If she is, she will contact physician to reduce dose of lantus. Linus Minaya RN     Insulin / Injectable - Appropriate injection sites; proper storage; supplies needed; proper technique; safe needle disposal guidelines. 04/15/21  Linus Minaya RN  1         Monitoring blood glucose, interpreting and using results:  Identify recommended & personal blood glucose targets; importance of testing; testing supplies; HgbA1C target levels; Factors affecting blood glucose; Importance of logging blood glucose levels for pattern recognition; ketone testing; safe lancet disposal.   04/15/21  Linus Minaya RN  1   04/27/21  Linus Minaya RN   04/15/21  Testing once a day and varying times. Linus Minaya RN  4/27/21 - Discussed timing of monitoring and goals for BG. Aware of goal A1C and current A1C. BG guidelines reviewed and rule of 15 to treat low BG. Reviewed the importance of monitoring at different times and occasionally post prandial. Encouraged to keep a log book. Linus Minaya RN       Prevention, detection & treatment of acute complications:  Identify symptoms of hyper & hypoglycemia, and prevention & treatment strategies. 04/15/21  Linus Minaya RN  1   04/27/21  Linus Minaya RN   04/15/21  Introduced hypo/hyperglycemia symptoms and treatments. Handouts given. Linus Minaya RN  04/27/21 - Patient able to recognize signs and symptoms of high and low BG. Reviewed treatment of both high and low BG. Discussed with the group causes of both high and low BG. Reviewed BG guidelines and troubleshooting unexpected numbers.  Linus Minaya RN         Describe sick day Method: [x]Lecture/Discussion  []Power Point Presentation  [x]Handouts  []Return Demonstration      Education Materials/Equipment Provided:      [x]Self-Management  - Initial Assessment - Where do I Begin booklet and Counting Carbs from the ADA. [x]Self-Management  Class 1 - On the Road to better managing your diabetes - Packet of Handouts from Diabetes Department    [x] Self-Management  Class 2 - Meal Plan,  Choose your Foods - Food Lists for [employer] and Nutrition in the [employer] Resources - fast facts about fast food    [] Self-Management  Class 3 -  Diabetes ID card,  foot care tips sheet,  Continuing Your Journey with Diabetes, Individualized Diabetes report card, Sick Day Rules, Diabetes Cookbooks, Magazines and Pedometers when available    []Glucose Meter     []Insulin Kit     []Other      Encounter Type Date Start Time End Time Comments No Show Dates   Assessment 04/15/21   [phone] Very pleasant person who is overwhelmed with health issues. Recent stroke has limited her processing of new information. Very willing to learn. Cleared up some misconceptions about meds so hope for better compliance. Struggling financially as she is unable to work yet. Hopes to go back in May. Sydnee Kolb, RN      Class 1 - Understanding diabetes 04/27/21  Sydnee Kolb, RN   [phone] Active participation in class, asked questions, engaged with class. Sydnee Kolb RN      Class 2- Nutrition and diabetes   04/28/21  Neela Penaloza RDN, LD [id number] Did not like nutrition class and upset her to talk about food because of her inability to taste food after stroke. Did not seem to be paying attention for most of the class. She stayed after class and talked about personal concerns with her significant other and arguments they get into about food. Scheduled 1:1 MNT with her to discuss individual needs.      Class 3 - Preventing Complications         Individual MNT         3 Month Follow-up      []In Person  []Telephone    Meter Instrx        Insulin Instrx      []Pen  []Vial & Syringe      DSMS Support Plan:  Follow-up plan: Classes in one week     [x] MNT referral request / Appointment     [] Annual update referral request and appointment after      []ADA  Where do I Begin, Living with Type 2 Diabetes ADA home support program     [] [address] [place] [address] [phone]     [] Fit Walks : [facility] or [facility]    []  Diabetes support Group      [x]   Emotional Support - advised to contact office if any questions or support needed     [] Buck on phone      []  Internet web sites - ADA and D- life    []  Journals/Magazines    []  Other ___________________________      Post Education Referrals:      [] [facility] information sheet and [address] [facility] , [address] [phone]      [] Dental care    [] Podiatrist     []  Opthamologist      []Other    Elvin Navarrete, RN  Marco Antonio Berumen, [place]

## 2021-05-03 ENCOUNTER — NURSE ONLY (OUTPATIENT)
Dept: CARDIOLOGY CLINIC | Age: 51
End: 2021-05-03

## 2021-05-03 ENCOUNTER — TELEPHONE (OUTPATIENT)
Dept: CARDIOLOGY CLINIC | Age: 51
End: 2021-05-03

## 2021-05-03 VITALS — DIASTOLIC BLOOD PRESSURE: 84 MMHG | SYSTOLIC BLOOD PRESSURE: 158 MMHG

## 2021-05-07 ENCOUNTER — HOSPITAL ENCOUNTER (OUTPATIENT)
Dept: PHARMACY | Age: 51
Setting detail: THERAPIES SERIES
Discharge: HOME | End: 2021-05-07
Payer: COMMERCIAL

## 2021-05-07 ENCOUNTER — PATIENT MESSAGE (OUTPATIENT)
Dept: CARDIOLOGY CLINIC | Age: 51
End: 2021-05-07

## 2021-05-07 DIAGNOSIS — I63.81 CEREBROVASCULAR ACCIDENT (CVA) OF LEFT THALAMUS (HCC): ICD-10-CM

## 2021-05-07 DIAGNOSIS — D68.59 HYPERCOAGULABLE STATE (HCC): ICD-10-CM

## 2021-05-07 DIAGNOSIS — G45.8 OTHER TRANSIENT CEREBRAL ISCHEMIC ATTACKS AND RELATED SYNDROMES: ICD-10-CM

## 2021-05-07 LAB — INTERNATIONAL NORMALIZATION RATIO, POC: 2.7

## 2021-05-07 PROCEDURE — 99211 OFF/OP EST MAY X REQ PHY/QHP: CPT

## 2021-05-07 PROCEDURE — 85610 PROTHROMBIN TIME: CPT

## 2021-05-10 DIAGNOSIS — I25.10 CORONARY ARTERY DISEASE INVOLVING NATIVE CORONARY ARTERY OF NATIVE HEART WITHOUT ANGINA PECTORIS: ICD-10-CM

## 2021-05-10 RX ORDER — ISOSORBIDE MONONITRATE 30 MG/1
60 TABLET, EXTENDED RELEASE ORAL DAILY
Qty: 60 TABLET | Refills: 3 | Status: SHIPPED | OUTPATIENT
Start: 2021-05-10 | End: 2021-09-21

## 2021-05-11 PROBLEM — J44.9 COPD (CHRONIC OBSTRUCTIVE PULMONARY DISEASE) (HCC): Status: ACTIVE | Noted: 2019-11-18

## 2021-05-11 PROBLEM — G43.909 MIGRAINE: Status: ACTIVE | Noted: 2021-05-11

## 2021-05-19 ENCOUNTER — OFFICE VISIT (OUTPATIENT)
Dept: NEUROLOGY | Age: 51
End: 2021-05-19
Payer: COMMERCIAL

## 2021-05-19 VITALS
SYSTOLIC BLOOD PRESSURE: 146 MMHG | HEART RATE: 86 BPM | DIASTOLIC BLOOD PRESSURE: 88 MMHG | BODY MASS INDEX: 29.4 KG/M2 | WEIGHT: 187.7 LBS

## 2021-05-19 DIAGNOSIS — E11.65 TYPE 2 DIABETES MELLITUS WITH HYPERGLYCEMIA, WITHOUT LONG-TERM CURRENT USE OF INSULIN (HCC): ICD-10-CM

## 2021-05-19 DIAGNOSIS — Z91.199 NONCOMPLIANCE: ICD-10-CM

## 2021-05-19 DIAGNOSIS — I61.9 LEFT-SIDED NONTRAUMATIC INTRACEREBRAL HEMORRHAGE, UNSPECIFIED CEREBRAL LOCATION (HCC): ICD-10-CM

## 2021-05-19 DIAGNOSIS — I63.81 CEREBROVASCULAR ACCIDENT (CVA) OF LEFT THALAMUS (HCC): Primary | ICD-10-CM

## 2021-05-19 DIAGNOSIS — F17.200 TOBACCO USE DISORDER: ICD-10-CM

## 2021-05-19 DIAGNOSIS — D68.59 HYPERCOAGULABLE STATE (HCC): ICD-10-CM

## 2021-05-19 DIAGNOSIS — I10 HYPERTENSION, UNSPECIFIED TYPE: ICD-10-CM

## 2021-05-19 PROCEDURE — 99214 OFFICE O/P EST MOD 30 MIN: CPT | Performed by: NURSE PRACTITIONER

## 2021-05-19 RX ORDER — AMLODIPINE BESYLATE 5 MG/1
TABLET ORAL
COMMUNITY
Start: 2021-04-29 | End: 2022-11-01 | Stop reason: SDUPTHER

## 2021-05-19 RX ORDER — LOSARTAN POTASSIUM 100 MG/1
TABLET ORAL
COMMUNITY
Start: 2021-05-10 | End: 2022-11-01 | Stop reason: SDUPTHER

## 2021-05-19 RX ORDER — TRAMADOL HYDROCHLORIDE 50 MG/1
TABLET ORAL
COMMUNITY
Start: 2021-04-29 | End: 2021-10-14

## 2021-05-19 ASSESSMENT — ENCOUNTER SYMPTOMS
SHORTNESS OF BREATH: 0
WHEEZING: 0
NAUSEA: 0
CHEST TIGHTNESS: 0
COLOR CHANGE: 0
BACK PAIN: 0
COUGH: 0
ABDOMINAL PAIN: 0
TROUBLE SWALLOWING: 0
VOMITING: 0

## 2021-05-19 NOTE — PROGRESS NOTES
Subjective:      Patient ID: Maria Esther Agudelo is a 52 y.o. female who presents today for:  Chief Complaint   Patient presents with    Follow-up     PT states that sometimes her eyes will give her issues, she says she will sometimes feel disoriented like angeli totering. PT states that she needs to go back to work. She says she is still having some probelms with her thinking like it takes her a while to gets the words out or come up with what to say. HPI  Pt seen and examined in the office for follow up    5/19/2021:  Patient seen and examined for 3-month follow-up for left thalamic CVA that occurred in December 2020 that resulted in expressive aphasia, diplopia to right eye and right-sided weakness. She is currently alert and oriented x3, no acute distress, cooperative. Her last visit was a virtual on 2/17/2021. At that time there was a lot of concern regarding patient's noncompliance. She had not establish at the Coumadin clinic or had any INR testing done to follow her Coumadin. She was not taking her blood pressure medication. She was still smoking. She had not followed up with therapies. Since then it appears that patient has taken charge of her health care. She has established with Coumadin clinic and is getting INR checked routinely. She has maintained follow-up with endocrinology and cardiology. She reports that cardiology has made some adjustments to her blood pressure medications to help with her continued hypertension. She remains slightly hypertensive today and states that this is actually better than what she normally runs. She has had medication adjustments regarding her diabetes. She states her blood sugars run in the high 100s. She does have episodes where they run in the 200s and even the 300s. She continues to smoke. She reports daily compliance with Coumadin and aspirin and statin therapy. Clinically she reports that she is doing much better.   Right-sided weakness has with Coumadin clinic. INR orders were placed but it does not appear that patient has gone and had this tested at all since initiating Coumadin. She denies any unusual signs or symptoms of bleeding. No new or recurrent focal neuro deficits. She has not established with Coumadin clinic yet. She reports daily compliance with Coumadin. She states that she still feels \"sideways\". She states that her gait feels off balance and sometimes she feels as if she is leaning to the left. No falls reported. She reports that physical therapy has been completed but she is supposed to be continuing with occupational therapy but has not done that recently. She reports excessive daily fatigue. She reports that she has occasional headaches. No associated nausea or vomiting, photophobia, phonophobia. Diplopia has improved to right eye. She has appointment with ophthalmologist this Friday. She reports that her blood pressure remains elevated and she is only taking her blood pressure medication as needed rather than every day as prescribed. Patient is also on lithium and has been ordered to have lithium level drawn but has not followed up with [facility] to have this done either.   Past Medical History:   Diagnosis Date    Anxiety     Back pain     back surgery x 4    B[facility] disorder [facility] [address]     CAD (coronary artery disease)     CAFL [facility]chronic airflow limitation) [facility] 11/3/2016    Chronic bronchitis ([facility])     Chronic pain     Colitis     Complicated migraine     DDD (degenerative disc disease), lumbar 12/22/2016    Depression     Endometriosis     Excessive caffeine abuse, continuous ([facility]) 7/6/2018    Gastritis     GERD (gastroesophageal reflux disease)     History of myocardial infarction     HTN (hypertension), benign 2/19/2016    Impaired mobility and activities of daily living     Over weight     PTSD (post-traumatic stress disorder)     Sensory neuropathy 12/22/2016    Somatization disorder     TIA (transient ischemic attack)     Tobacco abuse     Vasospastic angina ([facility] [address] 2016     Past Surgical History:   Procedure Laterality Date    BACK SURGERY      x2     SECTION      x2    CHOLECYSTECTOMY  13    Lapchole    COLONOSCOPY N/A 2020    COLONOSCOPY DIAGNOSTIC performed by Eleanor Lesches, MD at [address] [place]  3/7/16    Dr. Nay Adams GASTROINTESTINAL ENDOSCOPY  2014    ANIBAL HOUSE M.D.   [facility] UPPER GASTROINTESTINAL ENDOSCOPY N/A 2020    EGD ESOPHAGOGASTRODUODENOSCOPY performed by Eleanor Lesches, MD at [facility] Marital status:      Spouse name: Not on file    Number of children: Not on file    Years of education: Not on file    Highest education level: Not on file   Occupational History    Occupation: unemployed   Tobacco Use    Smoking status: Current Every Day Smoker     Packs/day: 1.00     Years: 17.00     Pack years: 17.00     Types: Cigarettes    Smokeless tobacco: Never Used   Vaping Use    Vaping Use: Never used   Substance and Sexual Activity    Alcohol use: No    Drug use: No    Sexual activity: Not Currently     Partners: Male   Other Topics Concern    Not on file   Social History Narrative    Victorino is a 51-year-old female who is on disability because of pain and bipolar disorder. She's also had a presumed diagnosis of possible multiple sclerosis. She's been seeing Dr. Buddy Mack for that and she says that the diagnosis is sometimes in question and it's clear neuropathy vitamin B12 deficiency as well as generalized bodyaches from the severe vitamin D deficiency have caused this scenario that looks like multiple sclerosis.      lives With: Significant other    Type of Home: House [facility] in [address] [place] [address] Avenue: Stairs to enter with rails  - Number of Steps: 5    Bathroom Shower/Tub: Tub only    ADL Assistance: Independent    Homemaking Assistance: Independent, Homemaking Responsibilities: Yes, Meal Prep Responsibility: Primary    Laundry Responsibility: Primary, Cleaning Responsibility: Primary    Bill Paying/Finance Responsibility: Primary    Shopping Responsibility: Primary, Ambulation Assistance: Independent, Transfer Assistance: Independent    Active : Yes    Occupation: Full time employment--Type of occupation: disabled-  - 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 Work requirements are Pt was looking at [employer] as main part of job working nights     Social Determinants of Health     Financial Resource Strain:     Difficulty of Paying Living Expenses:    Food Insecurity:     Worried About [employer] in the Last Year:    Vel Mann in the Last Year:    Transportation Needs:     Lack of Transportation (Medical):  Lack of Transportation (Non-Medical):    Physical Activity: Inactive    Days of Exercise per Week: 0 days    Minutes of Exercise per Session: 0 min   Stress: Stress Concern Present    Feeling of Stress :  To some extent   Social Connections:     Frequency of Communication with Friends and Family:     Frequency of Social Gatherings with Friends and Family:     Attends Oriental orthodox Services:     Active Member of Clubs or Organizations:     Attends Club or Organization Meetings:     Marital Status:    Intimate Partner Violence:     Fear of Current or Ex-Partner:     Emotionally Abused:     Physically Abused:     Sexually Abused:      Family History   Problem Relation Age of Onset    High Blood Pressure Mother     Kidney Disease Mother     Lupus Mother     Coronary Art Dis Mother         Had stents in her 62s   Moose Escobedo Bipolar Disorder Son      Allergies   Allergen Reactions    Latex Itching     Pt reports itching on hands after using rubber gloves at work    Influenza Vaccines Swelling     Pt reports her entire arm was red and edematous post vaccine    Eggs Or Egg-Derived [employer] based products only    Gabapentin Nausea Only    Pneumococcal Vaccines Hives    Povidone Iodine Itching    Varicella Virus Vaccine Live Hives     Current Outpatient Medications on File Prior to Visit   Medication Sig Dispense Refill    amLODIPine (NORVASC) 5 MG tablet Take 1 tablet by mouth once daily.  traMADol (ULTRAM) 50 MG tablet Take 1 tablet by mouth twice daily as needed.  losartan (COZAAR) 100 MG tablet Take 1 tablet by mouth once daily.  isosorbide mononitrate (IMDUR) 30 MG extended release tablet Take 2 tablets by mouth daily 60 tablet 3    Blood Pressure KIT Blood pressure cuff.  1 kit 0    warfarin (COUMADIN) 5 MG tablet Take 1 tablet by mouth daily Quantity equals 30 days supply 45 tablet 1    Fluticasone-Umeclidin-Vilant (TRELEGY ELLIPTA) 200-62.5-25 MCG/INH AEPB Inhale 1 puff into the lungs daily      SITagliptin (JANUVIA) 25 MG tablet Take 1 tablet by mouth daily 30 tablet 0    insulin glargine (LANTUS) 100 UNIT/ML injection vial Inject 22 Units into the skin nightly 1 vial 3    metFORMIN (GLUCOPHAGE) 500 MG tablet Take 1 tablet by mouth 2 times daily (with meals) 60 tablet 3    cariprazine hcl (VRAYLAR) 3 MG CAPS capsule Take 1 capsule by mouth daily 30 capsule 0    lithium 300 MG capsule Take 1 capsule by mouth 3 times daily (with meals) 90 capsule 3    atorvastatin (LIPITOR) 20 MG tablet Take 1 tablet by mouth nightly 30 tablet 3    folic acid (FOLVITE) 1 MG tablet Take 1 tablet by mouth daily 30 tablet 3    docusate sodium (COLACE, DULCOLAX) 100 MG CAPS Take 200 mg by mouth 2 times daily 120 capsule 2    Vitamin D (CHOLECALCIFEROL) 50 MCG (2000 UT) TABS tablet Take 1 tablet by mouth Daily with supper 60 tablet 2    butalbital-aspirin-caffeine (FIORINAL) -40 MG capsule Take 1 capsule by mouth every 4 hours as needed for Headaches for up to 10 days. 20 capsule 0    nitroGLYCERIN (NITROSTAT) 0.4 MG SL tablet up to max of 3 total doses. If no relief after 1 dose, call 911. 25 tablet 3    albuterol sulfate HFA (PROVENTIL HFA) 108 (90 Base) MCG/ACT inhaler Inhale 2 puffs into the lungs every 6 hours as needed for Wheezing 1 Inhaler 3    FREESTYLE LANCETS MISC USE DAILY AS DIRECTED 100 each 10    budesonide (PULMICORT) 0.5 MG/2ML nebulizer suspension INHALE 1 VIAL VIA NEBULIZER TWICE DAILY 120 mL 5    BD INTEGRA SYRINGE 25G X 1\" 3 ML MISC USE AS DIRECTED EVERY MONTH 25 each 5    aspirin 81 MG chewable tablet Take 1 tablet by mouth daily 30 tablet 5    FREESTYLE LITE strip USE DAILY AS DIRECTED 50 strip 11    ipratropium-albuterol (DUONEB) 0.5-2.5 (3) MG/3ML SOLN nebulizer solution Inhale 3 mLs into the lungs three times daily 360 mL 5    VENTOLIN  (90 Base) MCG/ACT inhaler Inhale 2 puffs into the lungs every 6 hours as needed for Wheezing 1 Inhaler 5     No current facility-administered medications on file prior to visit. Review of Systems   Constitutional: Negative for appetite change, chills, fatigue and fever. HENT: Negative for hearing loss and trouble swallowing. Eyes: Positive for visual disturbance. Respiratory: Negative for cough, chest tightness, shortness of breath and wheezing. Cardiovascular: Negative for chest pain, palpitations and leg swelling. Gastrointestinal: Negative for abdominal pain, nausea and vomiting. Genitourinary: Negative for difficulty urinating. Musculoskeletal: Positive for gait problem. Negative for back pain, neck pain and neck stiffness. Skin: Negative for color change and rash. Neurological: Negative for dizziness, tremors, seizures, syncope, facial asymmetry, speech difficulty, weakness, light-headedness, numbness and headaches. Psychiatric/Behavioral: Negative for agitation, confusion and hallucinations.  The patient is not nervous/anxious. Objective:   BP (!) 146/88 (Site: Left Upper Arm, Position: Sitting, Cuff Size: Medium Adult)   Pulse 86   Wt 187 lb 11.2 oz (85.1 kg)   BMI 29.40 kg/m²     Physical Exam  Vitals reviewed. Constitutional:       General: She is not in acute distress. Appearance: She is not ill-appearing or diaphoretic. HENT:      Head: Normocephalic and atraumatic. Eyes:      General: Visual field deficit present. Extraocular Movements: Extraocular movements intact. Pupils: Pupils are equal, round, and reactive to light. Cardiovascular:      Rate and Rhythm: Normal rate and regular rhythm. Pulmonary:      Effort: Pulmonary effort is normal. No respiratory distress. Breath sounds: Normal breath sounds. Abdominal:      General: Bowel sounds are normal.      Palpations: Abdomen is soft. Skin:     General: Skin is warm and dry. Neurological:      Mental Status: She is alert and oriented to person, place, and time. Cranial Nerves: Cranial nerve deficit present. No dysarthria or facial asymmetry. Motor: Weakness present. No tremor, atrophy, abnormal muscle tone, seizure activity or pronator drift. Coordination: Finger-Nose-Finger Test normal.      Gait: Gait abnormal.      Comments: Patient with some reported gait ataxia that persist.  I did assess her gait today in the office and she did well without the use of assistive devices. She does have some minor deficit in right eye bilateral lower quadrant of her vision on visual field testing. Right-sided weakness is almost completely resolved. No results found.     Lab Results   Component Value Date    WBC 7.1 02/22/2021    RBC 4.81 02/22/2021    HGB 15.1 02/22/2021    HCT 44.3 02/22/2021    MCV 92.1 02/22/2021    MCH 31.5 02/22/2021    MCHC 34.2 02/22/2021    RDW 12.9 02/22/2021     02/22/2021    MPV 9.6 09/20/2015     Lab Results   Component Value Date     02/22/2021    K 3.7 02/22/2021    K 3.4 12/28/2020     02/22/2021    CO2 26 02/22/2021    BUN 8 02/22/2021    CREATININE 0.87 02/22/2021    GFRAA >60.0 02/22/2021    LABGLOM >60.0 02/22/2021    GLUCOSE 105 04/06/2021    PROT 6.8 02/22/2021    LABALBU 4.1 02/22/2021    CALCIUM 9.5 02/22/2021    BILITOT 0.4 02/22/2021    ALKPHOS 165 02/22/2021    AST 20 02/22/2021    ALT 35 02/22/2021     Lab Results   Component Value Date    PROTIME 21.6 02/22/2021    INR 2.7 05/07/2021    INR 1.9 02/22/2021     Lab Results   Component Value Date    TSH 4.070 04/10/2019    TLVEMWAO18 305 12/26/2020    FOLATE 8.1 12/26/2020    FERRITIN 282.0 12/11/2020    IRON 51 12/11/2020    TIBC 272 12/11/2020     Lab Results   Component Value Date    TRIG 251 12/24/2020    HDL 27 12/24/2020    LDLCALC 86 12/24/2020     Lab Results   Component Value Date    LABAMPH Neg 06/14/2020    BARBSCNU Neg 06/14/2020    LABBENZ Neg 06/14/2020    LABBENZ NotDTCD 01/26/2013    LABMETH Neg 06/14/2020    OPIATESCREENURINE Neg 06/14/2020    PHENCYCLIDINESCREENURINE Neg 06/14/2020    ETOH <10 12/22/2020     Lab Results   Component Value Date    LITHIUM 0.7 01/05/2021       Assessment and Plan:      1. Cerebrovascular accident (CVA) of left thalamus [facility])  - Patient was admitted to [facility] from 12/22/2020 until 12/28/2020 for ischemic left thalamic CVA with encephalopathy, expressive aphasia, right-sided weakness and diplopia to right eye. Patient was noncompliant with medications prior to admission. She had multiple risk factors for CVD including diabetes, hypertension, tobacco abuse. CTA of the head and neck were negative. CHAO was negative for cardioembolic source. Patient was initially started on dual antiplatelet therapy. LDL was 86, HDL was low at 27. Hemoglobin A1c was 8.0.   Hypercoagulable work-up was done and revealed an elevated homocysteine level with positive homozygous MTHFR mutation and therefore patient was transitioned to oral anticoagulation while on the rehab unit. Patient was initially started on Eliquis but due to cost she was transitioned over to Coumadin on 1/12/2021.  -At this time patient will continue on Coumadin, aspirin and statin therapy. Last hemoglobin A1c was 5.7 on 4/6/2021. Will repeat lipid panel at next visit. We will repeat homocysteine levels today. Education given on importance of need for ongoing attention to risk factor modification to help reduce risk of future CVA. - Handicap Placard MISC; by Does not apply route Duration 2 years  Dispense: 1 each; Refill: 0    2. Hypercoagulable state ([facility] [address]  - Hypercoagulable work-up was done and revealed an elevated homocysteine level with positive homozygous MTHFR mutation and therefore patient was placed on oral anticoagulation with warfarin. She will continue to follow-up at the Coumadin clinic.  - Homocysteine, Serum; Future    3. Tobacco use disorder  -Tobacco cessation was strongly encouraged    4. Type 2 diabetes mellitus with hyperglycemia, without long-term current use of insulin ([facility] [address]  -Patient reports she is now following with PCP for management of diabetes. 5. Noncompliance  -Noncompliance has improved and patient is currently compliant with follow-up visits with healthcare providers and medication recommendations. 6. Left-sided nontraumatic intracerebral hemorrhage, unspecified cerebral location ([facility] [address]  -There was mention on MRI of the brain that there was a small area of hemorrhage in the left occipital lobe. Will obtain CT scan of the head for follow-up given her ongoing use of Coumadin  - CT HEAD WO CONTRAST; Future    7. Hypertension, unspecified type  -Patient reports she is working with cardiology to improve blood pressure control and medication changes have been made. Return in about 3 months (around 8/19/2021), or if symptoms worsen or fail to improve.     NIKI Castellano - CNP     Collaborating Physician Dr Juan Patel

## 2021-05-21 ENCOUNTER — TELEPHONE (OUTPATIENT)
Dept: ENDOCRINOLOGY | Age: 51
End: 2021-05-21

## 2021-05-21 ENCOUNTER — HOSPITAL ENCOUNTER (OUTPATIENT)
Dept: PHARMACY | Age: 51
Setting detail: THERAPIES SERIES
Discharge: HOME | End: 2021-05-21
Payer: COMMERCIAL

## 2021-05-21 DIAGNOSIS — I63.81 CEREBROVASCULAR ACCIDENT (CVA) OF LEFT THALAMUS (HCC): Primary | ICD-10-CM

## 2021-05-21 DIAGNOSIS — G45.8 OTHER TRANSIENT CEREBRAL ISCHEMIC ATTACKS AND RELATED SYNDROMES: ICD-10-CM

## 2021-05-21 DIAGNOSIS — D68.59 HYPERCOAGULABLE STATE (HCC): ICD-10-CM

## 2021-05-21 LAB
HOMOCYSTEINE: 17.4 UMOL/L (ref 0–15)
INTERNATIONAL NORMALIZATION RATIO, POC: 2.1

## 2021-05-21 PROCEDURE — 85610 PROTHROMBIN TIME: CPT

## 2021-05-21 PROCEDURE — 99211 OFF/OP EST MAY X REQ PHY/QHP: CPT

## 2021-05-21 RX ORDER — FLASH GLUCOSE SENSOR
1 KIT MISCELLANEOUS
Qty: 2 EACH | Refills: 3 | Status: SHIPPED | OUTPATIENT
Start: 2021-05-21 | End: 2021-12-22 | Stop reason: SDUPTHER

## 2021-05-21 RX ORDER — FLASH GLUCOSE SCANNING READER
1 EACH MISCELLANEOUS
Qty: 1 EACH | Refills: 0 | Status: SHIPPED | OUTPATIENT
Start: 2021-05-21

## 2021-05-21 NOTE — TELEPHONE ENCOUNTER
Patient is requesting a prescription for the freestyle rasheed 2. She would like to try this instead of pricking her finger to check her blood sugar. She states that this this too hard because she is on coumadin and it is causing infections in her fingers. Please advise. Thanks!

## 2021-05-21 NOTE — PROGRESS NOTES
Ms. Hal Nolan is a 52 y.o. y/o female with history of TIA who presents today for anticoagulation monitoring and adjustment.   INR 2.1 is therapeutic for this patient (goal range 2-3) and is reflective of 40 mg TWD  Patient verifies current dosing regimen, patient able to verbally recall dose  Patient reports 0  missed doses since last INR   Patient denies s/sx clotting and/or stroke  Patient denies hematuria, epistaxis, rectal bleeding  Patient denies changes in diet, alcohol, or tobacco use  Reviewed medication list and drug allergies with patient, updated any medication additions or modifications accordingly  Patient received second COVID vaccine 21  Patient also denies any pending medical or dental procedures scheduled at this time  Patient was instructed to continue 40 mg TWD and RTC 2 weeks  For Pharmacy Admin Tracking Only     Intervention Detail: Adherence Monitorin   Total # of Interventions Recommended: 1   Total # of Interventions Accepted: 1   Time Spent (min): 15

## 2021-06-04 ENCOUNTER — HOSPITAL ENCOUNTER (OUTPATIENT)
Dept: PHARMACY | Age: 51
Setting detail: THERAPIES SERIES
Discharge: HOME | End: 2021-06-04
Payer: COMMERCIAL

## 2021-06-04 DIAGNOSIS — I63.81 CEREBROVASCULAR ACCIDENT (CVA) OF LEFT THALAMUS (HCC): Primary | ICD-10-CM

## 2021-06-04 DIAGNOSIS — G45.8 OTHER TRANSIENT CEREBRAL ISCHEMIC ATTACKS AND RELATED SYNDROMES: ICD-10-CM

## 2021-06-04 DIAGNOSIS — D68.59 HYPERCOAGULABLE STATE (HCC): ICD-10-CM

## 2021-06-04 LAB — INTERNATIONAL NORMALIZATION RATIO, POC: 1.6

## 2021-06-04 PROCEDURE — 99211 OFF/OP EST MAY X REQ PHY/QHP: CPT | Performed by: PHARMACIST

## 2021-06-04 PROCEDURE — 85610 PROTHROMBIN TIME: CPT | Performed by: PHARMACIST

## 2021-06-04 NOTE — PROGRESS NOTES
Ms. Earl Sosa is a 52 y.o. female with history of CVA who presents today for anticoagulation monitoring and adjustment. Face to face visit completed, procedural mask and face shield worn by myself as instructed: Mask worn by patient, room cleaned pre and post visit with Virex Plus spray  INR 1.6 is subtherapeutic for this patient (goal range 2-3) and is reflective of 37.5 mg TWD  Patient verifies current dosing regimen, patient able to verbally recall dose  Patient reports 1 missed doses in the last seven days   Patient denies s/sx clotting and/or stroke  Patient denies hematuria, epistaxis, rectal bleeding  Patient denies changes in diet, alcohol, or tobacco use  Reviewed medication list and drug allergies with patient, updated any medication additions or modifications accordingly  Patient also denies any pending medical or dental procedures scheduled at this time  Patient was instructed to take an extra 2.5mg warfarin today (10mg total) then continue 40mg TWD and RTC in 2 weeks    For Pharmacy Admin Tracking Only     Intervention Detail: Adherence Monitorin   Total # of Interventions Recommended: 1   Total # of Interventions Accepted: 1   Time Spent (min): 20      JAMIR Solano. Ph. 2021 2:25 PM

## 2021-06-18 ENCOUNTER — HOSPITAL ENCOUNTER (OUTPATIENT)
Dept: PHARMACY | Age: 51
Setting detail: THERAPIES SERIES
Discharge: HOME | End: 2021-06-18
Payer: COMMERCIAL

## 2021-06-18 DIAGNOSIS — I63.81 CEREBROVASCULAR ACCIDENT (CVA) OF LEFT THALAMUS (HCC): Primary | ICD-10-CM

## 2021-06-18 DIAGNOSIS — D68.59 HYPERCOAGULABLE STATE (HCC): ICD-10-CM

## 2021-06-18 DIAGNOSIS — G45.8 OTHER TRANSIENT CEREBRAL ISCHEMIC ATTACKS AND RELATED SYNDROMES: ICD-10-CM

## 2021-06-18 LAB — INTERNATIONAL NORMALIZATION RATIO, POC: 2.1

## 2021-06-18 PROCEDURE — 99211 OFF/OP EST MAY X REQ PHY/QHP: CPT

## 2021-06-18 PROCEDURE — 85610 PROTHROMBIN TIME: CPT

## 2021-06-18 NOTE — PROGRESS NOTES
Ms. Rossi El is a 52 y.o. y/o female with history of CVA who presents today for anticoagulation monitoring and adjustment.   INR 2.1 is therapeutic for this patient (goal range 2-3) and is reflective of 40 mg TWD  Patient verifies current dosing regimen, patient able to verbally recall dose  Patient reports 0  missed doses since last INR   Patient denies s/sx clotting and/or stroke  Patient denies hematuria, epistaxis, rectal bleeding  Patient denies changes in diet, alcohol, or tobacco use  Reviewed medication list and drug allergies with patient, updated any medication additions or modifications accordingly  Patient also denies any pending medical or dental procedures scheduled at this time  Patient was instructed to continue with current dosing and RTC 3 weeks      For Pharmacy Admin Tracking Only     Intervention Detail: Lab(s) Ordered   Total # of Interventions Recommended: 1   Total # of Interventions Accepted: 1   Time Spent (min): 10

## 2021-07-01 RX ORDER — WARFARIN SODIUM 5 MG/1
5 TABLET ORAL SEE ADMIN INSTRUCTIONS
Qty: 110 TABLET | Refills: 1 | Status: SHIPPED | OUTPATIENT
Start: 2021-07-01 | End: 2021-12-28 | Stop reason: SDUPTHER

## 2021-07-09 ENCOUNTER — HOSPITAL ENCOUNTER (OUTPATIENT)
Dept: PHARMACY | Age: 51
Setting detail: THERAPIES SERIES
Discharge: HOME | End: 2021-07-09
Payer: COMMERCIAL

## 2021-07-09 DIAGNOSIS — G45.8 OTHER TRANSIENT CEREBRAL ISCHEMIC ATTACKS AND RELATED SYNDROMES: ICD-10-CM

## 2021-07-09 DIAGNOSIS — I63.81 CEREBROVASCULAR ACCIDENT (CVA) OF LEFT THALAMUS (HCC): Primary | ICD-10-CM

## 2021-07-09 DIAGNOSIS — D68.59 HYPERCOAGULABLE STATE (HCC): ICD-10-CM

## 2021-07-09 LAB — INTERNATIONAL NORMALIZATION RATIO, POC: 2.2

## 2021-07-09 PROCEDURE — 85610 PROTHROMBIN TIME: CPT | Performed by: PHARMACIST

## 2021-07-09 PROCEDURE — 99211 OFF/OP EST MAY X REQ PHY/QHP: CPT | Performed by: PHARMACIST

## 2021-07-09 NOTE — PROGRESS NOTES
Ms. Harpreet Rodriguez is a 54 y.o. y/o female with history of CVA who presents today for anticoagulation monitoring and adjustment.   INR 2.2 is therapeutic for this patient (goal range 2-3) and is reflective of 40 mg TWD  Patient verifies current dosing regimen, patient able to verbally recall dose  Patient reports no  missed doses since last INR   Patient denies s/sx clotting and/or stroke  Patient denies hematuria, epistaxis, rectal bleeding  Patient denies changes in diet, alcohol, or tobacco use  Reviewed medication list and drug allergies with patient, updated any medication additions or modifications accordingly  Patient also denies any pending medical or dental procedures scheduled at this time  Patient was instructed to continue 40mg TWD and RTC 4 weeks    For Pharmacy Admin Tracking Only     Intervention Detail: Adherence Monitorin   Total # of Interventions Recommended: 2   Total # of Interventions Accepted: 2   Time Spent (min): 30

## 2021-07-26 ENCOUNTER — APPOINTMENT (OUTPATIENT)
Dept: CT IMAGING | Age: 51
End: 2021-07-26
Payer: COMMERCIAL

## 2021-07-26 ENCOUNTER — HOSPITAL ENCOUNTER (EMERGENCY)
Age: 51
Discharge: HOME | End: 2021-07-26
Attending: EMERGENCY MEDICINE
Payer: COMMERCIAL

## 2021-07-26 VITALS
OXYGEN SATURATION: 98 % | WEIGHT: 185 LBS | RESPIRATION RATE: 16 BRPM | HEART RATE: 85 BPM | BODY MASS INDEX: 29.03 KG/M2 | TEMPERATURE: 97.2 F | HEIGHT: 67 IN | DIASTOLIC BLOOD PRESSURE: 88 MMHG | SYSTOLIC BLOOD PRESSURE: 152 MMHG

## 2021-07-26 DIAGNOSIS — R21 RASH AND OTHER NONSPECIFIC SKIN ERUPTION: ICD-10-CM

## 2021-07-26 DIAGNOSIS — M25.50 ARTHRALGIA, UNSPECIFIED JOINT: ICD-10-CM

## 2021-07-26 DIAGNOSIS — R51.9 ACUTE NONINTRACTABLE HEADACHE, UNSPECIFIED HEADACHE TYPE: Primary | ICD-10-CM

## 2021-07-26 LAB
ALBUMIN SERPL-MCNC: 4.5 G/DL (ref 3.5–4.6)
ALP BLD-CCNC: 176 U/L (ref 40–130)
ALT SERPL-CCNC: 25 U/L (ref 0–33)
ANION GAP SERPL CALCULATED.3IONS-SCNC: 10 MEQ/L (ref 9–15)
ANISOCYTOSIS: ABNORMAL
AST SERPL-CCNC: 18 U/L (ref 0–35)
ATYPICAL LYMPHOCYTE RELATIVE PERCENT: 1 %
BASOPHILS ABSOLUTE: 0.1 K/UL (ref 0–0.2)
BASOPHILS RELATIVE PERCENT: 1 %
BILIRUB SERPL-MCNC: 0.4 MG/DL (ref 0.2–0.7)
BUN BLDV-MCNC: 10 MG/DL (ref 6–20)
C-REACTIVE PROTEIN: 12.5 MG/L (ref 0–5)
CALCIUM SERPL-MCNC: 10 MG/DL (ref 8.5–9.9)
CHLORIDE BLD-SCNC: 102 MEQ/L (ref 95–107)
CO2: 29 MEQ/L (ref 20–31)
CREAT SERPL-MCNC: 0.8 MG/DL (ref 0.5–0.9)
EOSINOPHILS ABSOLUTE: 0.2 K/UL (ref 0–0.7)
EOSINOPHILS RELATIVE PERCENT: 2 %
GFR AFRICAN AMERICAN: >60
GFR NON-AFRICAN AMERICAN: >60
GLOBULIN: 2.6 G/DL (ref 2.3–3.5)
GLUCOSE BLD-MCNC: 150 MG/DL (ref 70–99)
HCT VFR BLD CALC: 45.3 % (ref 37–47)
HEMOGLOBIN: 15.6 G/DL (ref 12–16)
LYMPHOCYTES ABSOLUTE: 3.2 K/UL (ref 1–4.8)
LYMPHOCYTES RELATIVE PERCENT: 26 %
MCH RBC QN AUTO: 30.6 PG (ref 27–31.3)
MCHC RBC AUTO-ENTMCNC: 34.5 % (ref 33–37)
MCV RBC AUTO: 88.7 FL (ref 82–100)
MICROCYTES: ABNORMAL
MONOCYTES ABSOLUTE: 0.7 K/UL (ref 0.2–0.8)
MONOCYTES RELATIVE PERCENT: 5.7 %
NEUTROPHILS ABSOLUTE: 7.8 K/UL (ref 1.4–6.5)
NEUTROPHILS RELATIVE PERCENT: 65 %
PDW BLD-RTO: 14.2 % (ref 11.5–14.5)
PLATELET # BLD: 187 K/UL (ref 130–400)
PLATELET SLIDE REVIEW: NORMAL
POTASSIUM SERPL-SCNC: 3.6 MEQ/L (ref 3.4–4.9)
RBC # BLD: 5.1 M/UL (ref 4.2–5.4)
SARS-COV-2, NAAT: NOT DETECTED
SEDIMENTATION RATE, ERYTHROCYTE: 13 MM (ref 0–20)
SODIUM BLD-SCNC: 141 MEQ/L (ref 135–144)
TOTAL PROTEIN: 7.1 G/DL (ref 6.3–8)
TOXIC GRANULATION: ABNORMAL
VACUOLATED NEUTROPHILS: ABNORMAL
WBC # BLD: 12 K/UL (ref 4.8–10.8)

## 2021-07-26 PROCEDURE — 80053 COMPREHEN METABOLIC PANEL: CPT

## 2021-07-26 PROCEDURE — 85025 COMPLETE CBC W/AUTO DIFF WBC: CPT

## 2021-07-26 PROCEDURE — 96375 TX/PRO/DX INJ NEW DRUG ADDON: CPT

## 2021-07-26 PROCEDURE — 70450 CT HEAD/BRAIN W/O DYE: CPT

## 2021-07-26 PROCEDURE — 86140 C-REACTIVE PROTEIN: CPT

## 2021-07-26 PROCEDURE — 96374 THER/PROPH/DIAG INJ IV PUSH: CPT

## 2021-07-26 PROCEDURE — 99283 EMERGENCY DEPT VISIT LOW MDM: CPT

## 2021-07-26 PROCEDURE — 2580000003 HC RX 258: Performed by: EMERGENCY MEDICINE

## 2021-07-26 PROCEDURE — 6370000000 HC RX 637 (ALT 250 FOR IP): Performed by: EMERGENCY MEDICINE

## 2021-07-26 PROCEDURE — 87635 SARS-COV-2 COVID-19 AMP PRB: CPT

## 2021-07-26 PROCEDURE — 85652 RBC SED RATE AUTOMATED: CPT

## 2021-07-26 PROCEDURE — 6360000002 HC RX W HCPCS: Performed by: EMERGENCY MEDICINE

## 2021-07-26 PROCEDURE — 36415 COLL VENOUS BLD VENIPUNCTURE: CPT

## 2021-07-26 RX ORDER — METOCLOPRAMIDE 10 MG/1
10 TABLET ORAL 2 TIMES DAILY PRN
Qty: 60 TABLET | Refills: 0 | Status: SHIPPED | OUTPATIENT
Start: 2021-07-26 | End: 2021-11-08 | Stop reason: ALTCHOICE

## 2021-07-26 RX ORDER — METOCLOPRAMIDE HYDROCHLORIDE 5 MG/ML
10 INJECTION INTRAMUSCULAR; INTRAVENOUS ONCE
Status: COMPLETED | OUTPATIENT
Start: 2021-07-26 | End: 2021-07-26

## 2021-07-26 RX ORDER — OXYCODONE HYDROCHLORIDE AND ACETAMINOPHEN 5; 325 MG/1; MG/1
1 TABLET ORAL EVERY 6 HOURS PRN
Qty: 12 TABLET | Refills: 0 | Status: SHIPPED | OUTPATIENT
Start: 2021-07-26 | End: 2021-07-29

## 2021-07-26 RX ORDER — KETOROLAC TROMETHAMINE 30 MG/ML
30 INJECTION, SOLUTION INTRAMUSCULAR; INTRAVENOUS ONCE
Status: COMPLETED | OUTPATIENT
Start: 2021-07-26 | End: 2021-07-26

## 2021-07-26 RX ORDER — TRIAMCINOLONE ACETONIDE 0.25 MG/G
OINTMENT TOPICAL
Qty: 1 TUBE | Refills: 1 | Status: SHIPPED | OUTPATIENT
Start: 2021-07-26 | End: 2021-08-02

## 2021-07-26 RX ORDER — DEXAMETHASONE SODIUM PHOSPHATE 10 MG/ML
8 INJECTION INTRAMUSCULAR; INTRAVENOUS ONCE
Status: COMPLETED | OUTPATIENT
Start: 2021-07-26 | End: 2021-07-26

## 2021-07-26 RX ORDER — 0.9 % SODIUM CHLORIDE 0.9 %
1000 INTRAVENOUS SOLUTION INTRAVENOUS ONCE
Status: COMPLETED | OUTPATIENT
Start: 2021-07-26 | End: 2021-07-26

## 2021-07-26 RX ORDER — ACETAMINOPHEN 500 MG
1000 TABLET ORAL ONCE
Status: COMPLETED | OUTPATIENT
Start: 2021-07-26 | End: 2021-07-26

## 2021-07-26 RX ORDER — ONDANSETRON 2 MG/ML
4 INJECTION INTRAMUSCULAR; INTRAVENOUS ONCE
Status: COMPLETED | OUTPATIENT
Start: 2021-07-26 | End: 2021-07-26

## 2021-07-26 RX ORDER — DIPHENHYDRAMINE HYDROCHLORIDE 50 MG/ML
25 INJECTION INTRAMUSCULAR; INTRAVENOUS ONCE
Status: COMPLETED | OUTPATIENT
Start: 2021-07-26 | End: 2021-07-26

## 2021-07-26 RX ADMIN — ONDANSETRON 4 MG: 2 INJECTION INTRAMUSCULAR; INTRAVENOUS at 18:43

## 2021-07-26 RX ADMIN — DIPHENHYDRAMINE HYDROCHLORIDE 25 MG: 50 INJECTION, SOLUTION INTRAMUSCULAR; INTRAVENOUS at 18:16

## 2021-07-26 RX ADMIN — METOCLOPRAMIDE HYDROCHLORIDE 10 MG: 5 INJECTION INTRAMUSCULAR; INTRAVENOUS at 18:19

## 2021-07-26 RX ADMIN — KETOROLAC TROMETHAMINE 30 MG: 30 INJECTION, SOLUTION INTRAMUSCULAR; INTRAVENOUS at 18:17

## 2021-07-26 RX ADMIN — ACETAMINOPHEN 1000 MG: 500 TABLET ORAL at 18:15

## 2021-07-26 RX ADMIN — DEXAMETHASONE SODIUM PHOSPHATE 8 MG: 10 INJECTION INTRAMUSCULAR; INTRAVENOUS at 18:45

## 2021-07-26 RX ADMIN — SODIUM CHLORIDE 1000 ML: 9 INJECTION, SOLUTION INTRAVENOUS at 18:18

## 2021-07-26 RX ADMIN — HYDROMORPHONE HYDROCHLORIDE 1 MG: 1 INJECTION, SOLUTION INTRAMUSCULAR; INTRAVENOUS; SUBCUTANEOUS at 18:44

## 2021-07-26 ASSESSMENT — PAIN DESCRIPTION - PAIN TYPE: TYPE: ACUTE PAIN

## 2021-07-26 ASSESSMENT — PAIN DESCRIPTION - LOCATION: LOCATION: HEAD

## 2021-07-26 ASSESSMENT — PAIN SCALES - GENERAL
PAINLEVEL_OUTOF10: 8

## 2021-07-26 ASSESSMENT — ENCOUNTER SYMPTOMS
COUGH: 0
DIARRHEA: 0
NAUSEA: 0
VOMITING: 0
SORE THROAT: 0
SHORTNESS OF BREATH: 0
BACK PAIN: 0
ABDOMINAL PAIN: 0

## 2021-07-26 NOTE — ED PROVIDER NOTES
[facility] ED  eMERGENCYdEPARTMENT eNCOUnter      Pt Name: Dietrich Rinne  MRN: [id number]  [place] 1970  Date of evaluation: 7/26/2021  Jhonatan Gary MD    CHIEF COMPLAINT           HPI  Dietrich Rinne is a 54 y.o. female per chart review has a h/o bipolar CAD, GERD, HTN, hpl, low back pain presents to the ED with headache, rash, arthalgias. Pt notes gradual onset, moderate, constant, aching, posterior headache x 1 week. Pt also notes diffuse arthalgias. Pt denies fever, n/v, cp, sob, ab pain, dysuria, diarrhea. Pt notes a rash on her L flank x 1 week. ROS  Review of Systems   Constitutional: Negative for activity change, chills and fever. HENT: Negative for ear pain and sore throat. Eyes: Negative for visual disturbance. Respiratory: Negative for cough and shortness of breath. Cardiovascular: Negative for chest pain, palpitations and leg swelling. Gastrointestinal: Negative for abdominal pain, diarrhea, nausea and vomiting. Genitourinary: Negative for dysuria. Musculoskeletal: Positive for arthralgias. Negative for back pain. Skin: Positive for rash. Neurological: Positive for headaches. Negative for dizziness and weakness. Except as noted above the remainder of the review of systems was reviewed and negative.        PAST MEDICAL HISTORY     Past Medical History:   Diagnosis Date    Anxiety     Back pain     back surgery x 4    Bipolar disorder ([facility]     CAD (coronary artery disease)     CAFL (chronic airflow limitation) ([facility] 11/3/2016    Cerebral artery occlusion with cerebral infarction (HCC)     Chronic bronchitis (HCC)     Chronic pain     Colitis     Complicated migraine     DDD (degenerative disc disease), lumbar 12/22/2016    Depression     Endometriosis     Excessive caffeine abuse, continuous (HCC) 7/6/2018    Gastritis     GERD (gastroesophageal reflux disease)     History of myocardial infarction     HTN (hypertension), benign 2016    Impaired mobility and activities of daily living     Over weight     PTSD (post-traumatic stress disorder)     Sensory neuropathy 2016    Somatization disorder     TIA (transient ischemic attack)     Tobacco abuse     Vasospastic angina ([facility] 2016         SURGICAL HISTORY       Past Surgical History:   Procedure Laterality Date    BACK SURGERY      x2     SECTION      x2    CHOLECYSTECTOMY  13    Lapchole    COLONOSCOPY N/A 2020    COLONOSCOPY DIAGNOSTIC performed by Ricci Kolb MD at [address] [facility]  3/7/16    Dr. Siri Velazquez Left     UPPER GASTROINTESTINAL ENDOSCOPY  2014    ANIBAL HOUSE M.D.   [employer] UPPER GASTROINTESTINAL ENDOSCOPY N/A 2020    EGD ESOPHAGOGASTRODUODENOSCOPY performed by Ricci Kolb MD at [facility]       Previous Medications    ALBUTEROL SULFATE HFA (PROVENTIL HFA) 108 (90 BASE) MCG/ACT INHALER    Inhale 2 puffs into the lungs every 6 hours as needed for Wheezing    AMLODIPINE (NORVASC) 5 MG TABLET    Take 1 tablet by mouth once daily. ASPIRIN 81 MG CHEWABLE TABLET    Take 1 tablet by mouth daily    ATORVASTATIN (LIPITOR) 20 MG TABLET    Take 1 tablet by mouth nightly    BD INTEGRA SYRINGE 25G X 1\" 3 ML MISC    USE AS DIRECTED EVERY MONTH    BLOOD PRESSURE KIT    Blood pressure cuff. BUDESONIDE (PULMICORT) 0.5 MG/2ML NEBULIZER SUSPENSION    INHALE 1 VIAL VIA NEBULIZER TWICE DAILY    BUTALBITAL-ASPIRIN-CAFFEINE (FIORINAL) -40 MG CAPSULE    Take 1 capsule by mouth every 4 hours as needed for Headaches for up to 10 days.     CARIPRAZINE HCL (VRAYLAR) 3 MG CAPS CAPSULE    Take 1 capsule by mouth daily    CONTINUOUS BLOOD GLUC  (FREESTYLE JOSE 2 READER) ANITHA    1 Device by Does not apply route 4 times daily (before meals and nightly) CONTINUOUS BLOOD GLUC SENSOR (FREESTYLE JOSE 2 SENSOR) MISC    1 Device by Does not apply route every 14 days    DOCUSATE SODIUM (COLACE, DULCOLAX) 100 MG CAPS    Take 200 mg by mouth 2 times daily    FLUTICASONE-UMECLIDIN-VILANT (TRELEGY ELLIPTA) 200-62.5-25 MCG/INH AEPB    Inhale 1 puff into the lungs daily    FOLIC ACID (FOLVITE) 1 MG TABLET    Take 1 tablet by mouth daily    FREESTYLE LANCETS MISC    USE DAILY AS DIRECTED    FREESTYLE LITE STRIP    USE DAILY AS DIRECTED    HANDICAP PLACARD MISC    by Does not apply route Duration 2 years    INSULIN GLARGINE (LANTUS) 100 UNIT/ML INJECTION VIAL    Inject 22 Units into the skin nightly    IPRATROPIUM-ALBUTEROL (DUONEB) 0.5-2.5 (3) MG/3ML SOLN NEBULIZER SOLUTION    Inhale 3 mLs into the lungs three times daily    ISOSORBIDE MONONITRATE (IMDUR) 30 MG EXTENDED RELEASE TABLET    Take 2 tablets by mouth daily    LITHIUM 300 MG CAPSULE    Take 1 capsule by mouth 3 times daily (with meals)    LOSARTAN (COZAAR) 100 MG TABLET    Take 1 tablet by mouth once daily. METFORMIN (GLUCOPHAGE) 500 MG TABLET    Take 1 tablet by mouth 2 times daily (with meals)    NITROGLYCERIN (NITROSTAT) 0.4 MG SL TABLET    up to max of 3 total doses. If no relief after 1 dose, call 911. SITAGLIPTIN (JANUVIA) 25 MG TABLET    Take 1 tablet by mouth daily    TRAMADOL (ULTRAM) 50 MG TABLET    Take 1 tablet by mouth twice daily as needed. VENTOLIN  (90 BASE) MCG/ACT INHALER    Inhale 2 puffs into the lungs every 6 hours as needed for Wheezing    VITAMIN D (CHOLECALCIFEROL) 50 MCG (2000 UT) TABS TABLET    Take 1 tablet by mouth Daily with supper    WARFARIN (COUMADIN) 5 MG TABLET    Take 1 tablet by mouth See Admin Instructions Take as directed by [facility] anticoagulation clinic. Quantity is 90 day supply.        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 Work requirements are Pt was looking at [employer] as main part of job working nights     Social Determinants of Health     Financial Resource Strain:     Difficulty of Paying Living Expenses:    Food Insecurity:     Worried About [address] in the Last Year:    [address] in the Last Year:    Transportation Needs:     Lack of Transportation (Medical):  Lack of Transportation (Non-Medical):    Physical Activity: Inactive    Days of Exercise per Week: 0 days    Minutes of Exercise per Session: 0 min   Stress: Stress Concern Present    Feeling of Stress : To some extent   Social Connections:     Frequency of Communication with Friends and Family:     Frequency of Social Gatherings with Friends and Family:     Attends Gnosticism Services:     Active Member of Clubs or Organizations:     Attends Club or Organization Meetings:     Marital Status:    Intimate Partner Violence:     Fear of Current or Ex-Partner:     Emotionally Abused:     Physically Abused:     Sexually Abused:          PHYSICAL EXAM       ED Triage Vitals [07/26/21 1639]   BP Temp Temp Source Pulse Resp SpO2 Height Weight   (!) 152/88 97.2 °F (36.2 °C) Temporal 85 16 98 % 5' 7\" (1.702 m) 185 lb (83.9 kg)       Physical Exam  Vitals and nursing note reviewed. Constitutional:       Appearance: She is well-developed. HENT:      Head: Normocephalic. Right Ear: External ear normal.      Left Ear: External ear normal.   Eyes:      Conjunctiva/sclera: Conjunctivae normal.      Pupils: Pupils are equal, round, and reactive to light. Neck:      Comments: No nuchal rigidity  Cardiovascular:      Rate and Rhythm: Normal rate and regular rhythm. Heart sounds: Normal heart sounds. Pulmonary:      Effort: Pulmonary effort is normal.      Breath sounds: Normal breath sounds.    Abdominal:      General: Bowel sounds are normal. There is no distension. Palpations: Abdomen is soft. Tenderness: There is no abdominal tenderness. Musculoskeletal:         General: Normal range of motion. Cervical back: Normal range of motion and neck supple. Skin:     General: Skin is warm and dry. Findings: Rash present. Neurological:      Mental Status: She is alert and oriented to person, place, and time. Psychiatric:         Mood and Affect: Mood normal.           MDM  53 yo female presents to the ED with headache, arthalgias, rash. Pt is afebrile, hemodynamically stable. Very low suspicion for SAH, meningitis. Pt given 1 L NS, IV reglan, IV benadryl, IV toradol, PO tylenol in the ED. Labs remarkable for glucose 150, WBC 12, CRP 12.5. CT head negative. Pt reassessed and still in pain. Pt given IV dilaudid, IV decadron, IV zofran in the ED with complete relief. Pt able to tolerate PO. Pt educated about headache, arthalgias. Pt's mother has a h/o of lupus. Pt given prescription for triamcinolone for rash, percocet, reglan. Pt will f/u with pcp for possible lupus, RA testing. Pt understands plan. FINAL IMPRESSION      1. Acute nonintractable headache, unspecified headache type    2. Arthralgia, unspecified joint    3.  Rash and other nonspecific skin eruption          DISPOSITION/PLAN   DISPOSITION Decision To Discharge 07/26/2021 07:12:17 PM        DISCHARGE MEDICATIONS:  [unfilled]         Leeanne Solomon MD(electronically signed)  Attending Emergency Physician            Leeanne Solomon MD  07/26/21 [id number]

## 2021-08-06 ENCOUNTER — HOSPITAL ENCOUNTER (OUTPATIENT)
Dept: PHARMACY | Age: 51
Setting detail: THERAPIES SERIES
Discharge: HOME | End: 2021-08-06
Payer: COMMERCIAL

## 2021-08-06 DIAGNOSIS — I63.81 CEREBROVASCULAR ACCIDENT (CVA) OF LEFT THALAMUS (HCC): Primary | ICD-10-CM

## 2021-08-06 DIAGNOSIS — D68.59 HYPERCOAGULABLE STATE (HCC): ICD-10-CM

## 2021-08-06 DIAGNOSIS — G45.8 OTHER TRANSIENT CEREBRAL ISCHEMIC ATTACKS AND RELATED SYNDROMES: ICD-10-CM

## 2021-08-06 LAB — INTERNATIONAL NORMALIZATION RATIO, POC: 2.8

## 2021-08-06 PROCEDURE — 99211 OFF/OP EST MAY X REQ PHY/QHP: CPT | Performed by: PHARMACIST

## 2021-08-06 PROCEDURE — 85610 PROTHROMBIN TIME: CPT | Performed by: PHARMACIST

## 2021-08-06 NOTE — PROGRESS NOTES
Ms. Jefry Fonseca is a 54 y.o. y/o female with history of CVA who presents today for anticoagulation monitoring and adjustment.   INR 2.8 is therapeutic for this patient (goal range 2-3) and is reflective of 40 mg TWD  Patient verifies current dosing regimen, patient able to verbally recall dose  Patient reports 0  missed doses since last INR   Patient denies s/sx clotting and/or stroke  Patient reports unprovoked bruising on the feet, she was counseled that is the bruise size increases to contact her doctor  Patient denies hematuria, epistaxis, rectal bleeding  Patient denies changes in diet, alcohol, or tobacco use  Reviewed medication list and drug allergies with patient, updated any medication additions or modifications accordingly  Patient also denies any pending medical or dental procedures scheduled at this time  Patient was instructed to continue current 40 mg TWD and RTC 4 weeks      For Pharmacy Admin Tracking Only     Intervention Detail: Adherence Monitorin   Total # of Interventions Recommended: 1   Total # of Interventions Accepted: 1   Time Spent (min): 20

## 2021-08-18 ENCOUNTER — OFFICE VISIT (OUTPATIENT)
Dept: NEUROLOGY | Age: 51
End: 2021-08-18
Payer: COMMERCIAL

## 2021-08-18 VITALS — WEIGHT: 185 LBS | DIASTOLIC BLOOD PRESSURE: 84 MMHG | SYSTOLIC BLOOD PRESSURE: 136 MMHG | BODY MASS INDEX: 28.98 KG/M2

## 2021-08-18 DIAGNOSIS — E11.65 TYPE 2 DIABETES MELLITUS WITH HYPERGLYCEMIA, WITHOUT LONG-TERM CURRENT USE OF INSULIN (HCC): ICD-10-CM

## 2021-08-18 DIAGNOSIS — F31.9 BIPOLAR 1 DISORDER (HCC): ICD-10-CM

## 2021-08-18 DIAGNOSIS — I63.512 CEREBROVASCULAR ACCIDENT (CVA) DUE TO STENOSIS OF LEFT MIDDLE CEREBRAL ARTERY (HCC): Primary | ICD-10-CM

## 2021-08-18 DIAGNOSIS — I10 HYPERTENSION, UNSPECIFIED TYPE: ICD-10-CM

## 2021-08-18 DIAGNOSIS — F17.200 TOBACCO USE DISORDER: ICD-10-CM

## 2021-08-18 DIAGNOSIS — Z15.89 MTHFR MUTATION: ICD-10-CM

## 2021-08-18 PROCEDURE — 99214 OFFICE O/P EST MOD 30 MIN: CPT | Performed by: NURSE PRACTITIONER

## 2021-08-18 RX ORDER — CYCLOBENZAPRINE HCL 10 MG
TABLET ORAL
COMMUNITY
Start: 2021-08-15

## 2021-08-18 ASSESSMENT — ENCOUNTER SYMPTOMS
ABDOMINAL DISTENTION: 0
CHEST TIGHTNESS: 0
COUGH: 0
TROUBLE SWALLOWING: 0
WHEEZING: 0
VOMITING: 0
ABDOMINAL PAIN: 0
SHORTNESS OF BREATH: 0
NAUSEA: 0
COLOR CHANGE: 0

## 2021-08-18 NOTE — PROGRESS NOTES
Subjective:      Patient ID: Caty Banerjee is a 54 y.o. female who presents today for:  Chief Complaint   Patient presents with    Follow-up     Pt states that she has been feeling very worn out, she says she just cant function normally. She says she is also off balance, and just her being able to function normally. HPI  8/18/2021:  Patient seen and examined for 3-month follow-up for left thalamic CVA that occurred in December 2020 that resulted in expressive aphasia, diplopia to the right eye and right-sided weakness. Patient with MTHFR mutation currently on Coumadin. Currently alert and oriented x3, no acute distress, cooperative. In terms of her stroke patient is doing well overall. Expressive aphasia is resolved. Vision changes resolved. Right-sided weakness greatly improved. Patient's only complaint today is of fatigue. Patient does report some depression. She was followed at the [facility] but is not happy with her services. She has maintained follow-up with Coumadin clinic and INR is currently therapeutic. Patient continues to smoke. Last lipid panel done 4/29/2021 with total cholesterol 29, triglycerides 123, HDL 31, LDL 43. Patient continues on atorvastatin 20 mg nightly. Patient has returned to work and states that she is handling her job duties okay. 5/19/2021:  Patient seen and examined for 3-month follow-up for left thalamic CVA that occurred in December 2020 that resulted in expressive aphasia, diplopia to right eye and right-sided weakness. She is currently alert and oriented x3, no acute distress, cooperative. Her last visit was a virtual on 2/17/2021. At that time there was a lot of concern regarding patient's noncompliance. She had not establish at the Coumadin clinic or had any INR testing done to follow her Coumadin. She was not taking her blood pressure medication. She was still smoking. She had not followed up with therapies.   Since then it appears that patient has taken charge of her health care. She has established with Coumadin clinic and is getting INR checked routinely. She has maintained follow-up with endocrinology and cardiology. She reports that cardiology has made some adjustments to her blood pressure medications to help with her continued hypertension. She remains slightly hypertensive today and states that this is actually better than what she normally runs. She has had medication adjustments regarding her diabetes. She states her blood sugars run in the high 100s. She does have episodes where they run in the 200s and even the 300s. She continues to smoke. She reports daily compliance with Coumadin and aspirin and statin therapy. Clinically she reports that she is doing much better. Right-sided weakness has improved. She is ambulating without the use of assistive devices but still will feel off balance at times. No falls reported. Expressive aphasia is greatly improved but persists some. Double vision to her right eye has resolved. She does report some right eye vision field deficit in the lower bilateral quadrants. There have been no new focal neuro deficits. No TIA episodes. No unusual signs and symptoms of bleeding or bruising. No myalgias. Last hemoglobin A1c on 4/6/2021 was 5.7. Patient is requesting to go back to work today.   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      2/17/2021:  Pt contacted via telephone for follow-up after hospital discharge. Bisi Larson was at home and I was in my office. Bisi Larson is a 57-year-old  female with past medical history of MI, chronic pain, bipolar disorder, gastritis, CAD, depression, GERD, hypertension, obesity, colitis, anxiety, PTSD, TIA, tobacco abuse, degenerative disc disease, Somatization disorder, complicated migraine, noncompliance, Neuropathy.  Patient was admitted to [facility] from 12/22/2020 until [facility] to have this done either. Past Medical History:   Diagnosis Date    Anxiety     Back pain     back surgery x 4    Bipolar disorder ([facility] [facility]     CAD (coronary artery disease)     [facility]FL [facility]flow limitation) [facility] 11/3/2016    Cerebral artery occlusion with cerebral infarction ([facility])     Chronic bronchitis (HCC)     Chronic pain     Colitis     Complicated migraine     DDD (degenerative disc disease), lumbar 2016    Depression     Endometriosis     Excessive caffeine abuse, continuous ([facility]) 2018    Gastritis     GERD (gastroesophageal reflux disease)     History of myocardial infarction     HTN (hypertension), benign 2016    Impaired mobility and activities of daily living     Over weight     PTSD (post-traumatic stress disorder)     Sensory neuropathy 2016    Somatization disorder     TIA (transient ischemic attack)     Tobacco abuse     Vasospastic angina ([facility] [address] 2016     Past Surgical History:   Procedure Laterality Date    BACK SURGERY      x2     SECTION      x2    CHOLECYSTECTOMY  13    Lapchole    COLONOSCOPY N/A 2020    COLONOSCOPY DIAGNOSTIC performed by Vinay lPunkett MD at [address] [place] [address]  3/7/16    Dr. Venkatesh Lund GASTROINTESTINAL ENDOSCOPY  2014    ANIBAL HOUSE M.D.   Creston Sicard UPPER GASTROINTESTINAL ENDOSCOPY N/A 2020    EGD ESOPHAGOGASTRODUODENOSCOPY performed by Vinay Plunkett MD at [facility] Marital status:       Spouse name: Not on file    Number of children: Not on file    Years of education: Not on file    Highest education level: Not on file   Occupational History    Occupation: unemployed   Tobacco Use    Smoking status: Current Every Day Smoker     Packs/day: 1.00     Years: 17.00 Pack years: 17.00     Types: Cigarettes    Smokeless tobacco: Never Used   Vaping Use    Vaping Use: Never used   Substance and Sexual Activity    Alcohol use: No    Drug use: No    Sexual activity: Not Currently     Partners: Male   Other Topics Concern    Not on file   Social History Narrative    Gianna Wallis is a 46-year-old female who is on disability because of pain and bipolar disorder. She's also had a presumed diagnosis of possible multiple sclerosis. She's been seeing Dr. Wiggins Knows for that and she says that the diagnosis is sometimes in question and it's clear neuropathy vitamin B12 deficiency as well as generalized bodyaches from the severe vitamin D deficiency have caused this scenario that looks like multiple sclerosis. lives With: Significant other    Type of Home: House [place] in [address] to enter with rails  - Number of Steps: 5    Bathroom Shower/Tub: Tub only    ADL Assistance: Independent    Homemaking Assistance: Independent, Homemaking Responsibilities: Yes, Meal Prep Responsibility: Primary    Laundry Responsibility: Primary, Cleaning Responsibility: Primary    Bill Paying/Finance Responsibility: Primary    Shopping Responsibility: Primary, Ambulation Assistance: Independent, Transfer Assistance: Independent    Active : Yes    Occupation: Full time employment--Type of occupation: disabled-  - 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 Work requirements are Pt was looking at [employer] as main part of job working nights     Social Determinants of Health     Financial Resource Strain:     Difficulty of Paying Living Expenses:    Food Insecurity:     Worried About [address] in the Last Year:    [address] in the Last Year:    Transportation Needs:     Lack of Transportation (Medical):      Lack of Transportation (Non-Medical):    Physical Activity: Inactive    Days of Exercise per Week: 0 days    Minutes of Exercise per Session: 0 min   Stress: Stress Concern Present    Feeling of Stress : To some extent   Social Connections:     Frequency of Communication with Friends and Family:     Frequency of Social Gatherings with Friends and Family:     Attends Anabaptist Services:     Active Member of Clubs or Organizations:     Attends Club or Organization Meetings:     Marital Status:    Intimate Partner Violence:     Fear of Current or Ex-Partner:     Emotionally Abused:     Physically Abused:     Sexually Abused:      Family History   Problem Relation Age of Onset    High Blood Pressure Mother     Kidney Disease Mother     Lupus Mother     Coronary Art Dis Mother         Had stents in her 62s   Gabriele Loser Bipolar Disorder Son      Allergies   Allergen Reactions    Latex Itching     Pt reports itching on hands after using rubber gloves at work    Influenza Vaccines Swelling     Pt reports her entire arm was red and edematous post vaccine    Eggs Or Egg-Derived [employer] based products only    Gabapentin Nausea Only    Pneumococcal Vaccines Hives    Povidone Iodine Itching    Varicella Virus Vaccine Live Hives     Current Outpatient Medications on File Prior to Visit   Medication Sig Dispense Refill    cyclobenzaprine (FLEXERIL) 10 MG tablet       metoclopramide (REGLAN) 10 MG tablet Take 1 tablet by mouth 2 times daily as needed (Headache) 60 tablet 0    warfarin (COUMADIN) 5 MG tablet Take 1 tablet by mouth See Admin Instructions Take as directed by [facility] anticoagulation clinic. Quantity is 90 day supply. 110 tablet 1    Continuous Blood Gluc Sensor (FREESTYLE JOSE 2 SENSOR) MISC 1 Device by Does not apply route every 14 days 2 each 3    Continuous Blood Gluc  (FREESTYLE JOSE 2 READER) ANITHA 1 Device by Does not apply route 4 times daily (before meals and nightly) 1 each 0    amLODIPine (NORVASC) 5 MG tablet Take 1 tablet by mouth once daily.  traMADol (ULTRAM) 50 MG tablet Take 1 tablet by mouth twice daily as needed.  losartan (COZAAR) 100 MG tablet Take 1 tablet by mouth once daily.  Handicap Placard MISC by Does not apply route Duration 2 years 1 each 0    isosorbide mononitrate (IMDUR) 30 MG extended release tablet Take 2 tablets by mouth daily 60 tablet 3    Blood Pressure KIT Blood pressure cuff. 1 kit 0    Fluticasone-Umeclidin-Vilant (TRELEGY ELLIPTA) 200-62.5-25 MCG/INH AEPB Inhale 1 puff into the lungs daily      SITagliptin (JANUVIA) 25 MG tablet Take 1 tablet by mouth daily 30 tablet 0    insulin glargine (LANTUS) 100 UNIT/ML injection vial Inject 22 Units into the skin nightly 1 vial 3    metFORMIN (GLUCOPHAGE) 500 MG tablet Take 1 tablet by mouth 2 times daily (with meals) 60 tablet 3    cariprazine hcl (VRAYLAR) 3 MG CAPS capsule Take 1 capsule by mouth daily 30 capsule 0    atorvastatin (LIPITOR) 20 MG tablet Take 1 tablet by mouth nightly 30 tablet 3    folic acid (FOLVITE) 1 MG tablet Take 1 tablet by mouth daily 30 tablet 3    docusate sodium (COLACE, DULCOLAX) 100 MG CAPS Take 200 mg by mouth 2 times daily 120 capsule 2    Vitamin D (CHOLECALCIFEROL) 50 MCG (2000 UT) TABS tablet Take 1 tablet by mouth Daily with supper 60 tablet 2    butalbital-aspirin-caffeine (FIORINAL) -40 MG capsule Take 1 capsule by mouth every 4 hours as needed for Headaches for up to 10 days. 20 capsule 0    nitroGLYCERIN (NITROSTAT) 0.4 MG SL tablet up to max of 3 total doses.  If no relief after 1 dose, call 911. 25 tablet 3    albuterol sulfate HFA (PROVENTIL HFA) 108 (90 Base) MCG/ACT inhaler Inhale 2 puffs into the lungs every 6 hours as needed for Wheezing 1 Inhaler 3    FREESTYLE LANCETS MISC USE DAILY AS DIRECTED 100 each 10    budesonide (PULMICORT) 0.5 MG/2ML nebulizer suspension INHALE 1 VIAL VIA NEBULIZER TWICE DAILY 120 mL 5    BD INTEGRA SYRINGE 25G X 1\" 3 ML MISC USE AS DIRECTED EVERY MONTH 25 each 5    aspirin 81 MG chewable tablet Take 1 tablet by mouth daily 30 tablet 5    FREESTYLE LITE strip USE DAILY AS DIRECTED 50 strip 11    ipratropium-albuterol (DUONEB) 0.5-2.5 (3) MG/3ML SOLN nebulizer solution Inhale 3 mLs into the lungs three times daily 360 mL 5    VENTOLIN  (90 Base) MCG/ACT inhaler Inhale 2 puffs into the lungs every 6 hours as needed for Wheezing 1 Inhaler 5     No current facility-administered medications on file prior to visit. Review of Systems   Constitutional: Positive for fatigue. Negative for appetite change, chills and fever. HENT: Negative for hearing loss and trouble swallowing. Eyes: Negative for visual disturbance. Respiratory: Negative for cough, chest tightness, shortness of breath and wheezing. Cardiovascular: Negative for chest pain, palpitations and leg swelling. Gastrointestinal: Negative for abdominal distention, abdominal pain, nausea and vomiting. Musculoskeletal: Negative for gait problem. Skin: Negative for color change and rash. Neurological: Negative for dizziness, tremors, seizures, syncope, facial asymmetry, speech difficulty, weakness, light-headedness, numbness and headaches. Psychiatric/Behavioral: Negative for agitation, confusion and hallucinations. The patient is not nervous/anxious. Objective:   /84 (Site: Left Upper Arm, Position: Sitting, Cuff Size: Large Adult)   Wt 185 lb (83.9 kg)   BMI 28.98 kg/m²     Physical Exam  Vitals reviewed. Constitutional:       General: She is not in acute distress. Appearance: She is not ill-appearing or diaphoretic. HENT:      Head: Normocephalic and atraumatic. Eyes:      General: No visual field deficit. Extraocular Movements: Extraocular movements intact. Pupils: Pupils are equal, round, and reactive to light. Cardiovascular:      Rate and Rhythm: Normal rate and regular rhythm.    Pulmonary:      Effort: Pulmonary effort is normal. No respiratory OPIATESCREENURINE Neg 06/14/2020    PHENCYCLIDINESCREENURINE Neg 06/14/2020    ETOH <10 12/22/2020     Lab Results   Component Value Date    LITHIUM 0.7 01/05/2021       Assessment and Plan:      1. Cerebrovascular accident (CVA) due to stenosis of left middle cerebral artery [facility])  - Patient was admitted to [facility] from 12/22/2020 until 12/28/2020 for ischemic left thalamic CVA with encephalopathy, expressive aphasia, right-sided weakness and diplopia to right eye.  Patient was noncompliant with medications prior to admission.  She had multiple risk factors for CVD including diabetes, hypertension, tobacco abuse.  CTA of the head and neck were negative.  CHAO was negative for cardioembolic source.  Patient was initially started on dual antiplatelet therapy.  LDL was 86, HDL was low at 27.  Hemoglobin A1c was 8.0.  Hypercoagulable work-up was done and revealed an elevated homocysteine level with positive homozygous MTHFR mutation and therefore patient was transitioned to oral anticoagulation while on the rehab unit.  Patient was initially started on Eliquis but due to cost she was transitioned over to Coumadin on 1/12/2021  -To continue on Coumadin, aspirin and statin therapy. Last lipid panel on 4/29/2021 was good with LDL of 43. Hemoglobin A1c was 5.9 on 4/29/2021.  -Mani CT of the head was ordered at patient's last visit on 5/19/2021 due to MRI of the brain mentioning a small area of hemorrhage in the left occipital lobe. Patient did not have this done due to high co-pay. She did end up going back to the emergency room on 7/26/2021 due to headache and CT the head was done at that time and without mention of hemorrhage. 2. MTHFR mutation  -Continue Coumadin  - Vitamin B12 & Folate; Future    3. Tobacco use disorder  -Smoking cessation recommended    4.  Type 2 diabetes mellitus with hyperglycemia, without long-term current use of insulin (HCC)  -Maintain close follow-up for optimization of blood sugar control    5. Hypertension, unspecified type  -Stable today    6. Bipolar 1 disorder ([address]  -She with history of bipolar disorder and is not currently on any medications. She was previously followed at the [facility] center and is not satisfied with her care. We will refer her to psychiatry. [facility] psychiatry not accepting new patients at this time therefore patient will have to find someone outside of the [facility]bee system. - Amb External Referral To Psychiatry      Return in about 6 months (around 2/18/2022), or if symptoms worsen or fail to improve.     NIKI Salazar - CNP     Collaborating Physician Dr David Varela

## 2021-09-07 ENCOUNTER — TELEPHONE (OUTPATIENT)
Dept: PHARMACY | Age: 51
End: 2021-09-07

## 2021-09-07 NOTE — TELEPHONE ENCOUNTER
first attempt to reach patient for no call/no show 09/03/21     Spoke with patient who stated she is able to come tomorrow morning for her POC INR; updated tracker accordingly     Steph Baron, PharmD   9/7/2021 10:50 AM

## 2021-09-08 ENCOUNTER — HOSPITAL ENCOUNTER (OUTPATIENT)
Dept: PHARMACY | Age: 51
Setting detail: THERAPIES SERIES
Discharge: HOME | End: 2021-09-08
Payer: COMMERCIAL

## 2021-09-08 DIAGNOSIS — I63.81 CEREBROVASCULAR ACCIDENT (CVA) OF LEFT THALAMUS (HCC): Primary | ICD-10-CM

## 2021-09-08 DIAGNOSIS — G45.8 OTHER TRANSIENT CEREBRAL ISCHEMIC ATTACKS AND RELATED SYNDROMES: ICD-10-CM

## 2021-09-08 DIAGNOSIS — D68.59 HYPERCOAGULABLE STATE (HCC): ICD-10-CM

## 2021-09-08 LAB — INTERNATIONAL NORMALIZATION RATIO, POC: 1.1

## 2021-09-08 PROCEDURE — 85610 PROTHROMBIN TIME: CPT | Performed by: PHARMACIST

## 2021-09-08 PROCEDURE — 99211 OFF/OP EST MAY X REQ PHY/QHP: CPT | Performed by: PHARMACIST

## 2021-09-08 NOTE — PROGRESS NOTES
Ms. Ximena Castro is a 54 y.o. y/o female with history of CVA who presents today for anticoagulation monitoring and adjustment. INR 1.1 is subtherapeutic for this patient (goal range 2-3) and is reflective of 40 mg ? TWD. The patient has been noncompliant with warfarin dosing since last visit. Patient verifies current dosing regimen, patient able to verbally recall dose  Patient reports multiple  missed doses since last INR . The patient states she has missed \"some\" of the 1/2 tablets on her 7.5mg dose days. The patient stated she also had taken 10mg dose this AM BEFORE her visit today (not per usual dose instructions). Patient denies s/sx clotting and/or stroke  Patient denies hematuria, epistaxis, rectal bleeding  Patient denies changes in alcohol, or tobacco use. Patient states she is eating more salads than usual (can lower INR). Reviewed medication list and drug allergies with patient, updated any medication additions or modifications accordingly  Patient also denies any pending medical or dental procedures scheduled at this time. Patient was instructed to take 10mg today (already taken) , 9/9 and 9/10/21, then continue usual 40mg TWDand RTC 6 days . Patient also advised to go to ER for s/s clot or stroke.          For Pharmacy Admin Tracking Only     Intervention Detail: Dose Adjustment: 1, reason: Therapy Optimization   Total # of Interventions Recommended: 2   Total # of Interventions Accepted: 2   Time Spent (min): 30

## 2021-09-14 ENCOUNTER — HOSPITAL ENCOUNTER (OUTPATIENT)
Dept: PHARMACY | Age: 51
Setting detail: THERAPIES SERIES
Discharge: HOME | End: 2021-09-14
Payer: COMMERCIAL

## 2021-09-14 LAB — INTERNATIONAL NORMALIZATION RATIO, POC: 3

## 2021-09-14 PROCEDURE — 99211 OFF/OP EST MAY X REQ PHY/QHP: CPT

## 2021-09-14 PROCEDURE — 85610 PROTHROMBIN TIME: CPT

## 2021-09-14 NOTE — PROGRESS NOTES
Ms. Grabiel Burt is a 54 y.o. y/o female with history of CVA who presents today for anticoagulation monitoring and adjustment. INR 3.0 is at the high end of therapeutic for this patient (goal range 2-3) and is reflective of 52.5 mg TWD (total dose from this past week since last appointment; usual TWD is 40 mg)  Patient verifies current dosing regimen, patient able to verbally recall dose  Patient reports zero missed doses since last INR   Patient denies s/sx clotting and/or stroke  Patient denies hematuria, epistaxis, rectal bleeding  Patient denies changes in diet, alcohol, or tobacco use  Reviewed medication list and drug allergies with patient, updated any medication additions or modifications accordingly  Patient also denies any pending medical or dental procedures scheduled at this time  Patient was instructed to continue current dosing regimen of warfarin 40 mg TWD and RTC 3 weeks    Patient says she hasn't been feeling well lately; she's been extremely fatigued and has been constipated for about 3 weeks. She has an appointment scheduled with Dr. Jack Lr this week, on 21.     For Pharmacy Admin Tracking Only     Intervention Detail: Adherence Monitorin   Total # of Interventions Recommended: 1   Total # of Interventions Accepted: 1   Time Spent (min): [address], PharmD   2021 2:23 PM

## 2021-09-17 ENCOUNTER — OFFICE VISIT (OUTPATIENT)
Dept: GASTROENTEROLOGY | Age: 51
End: 2021-09-17
Payer: COMMERCIAL

## 2021-09-17 VITALS
HEIGHT: 67 IN | TEMPERATURE: 97.7 F | SYSTOLIC BLOOD PRESSURE: 134 MMHG | WEIGHT: 183.6 LBS | HEART RATE: 83 BPM | RESPIRATION RATE: 18 BRPM | OXYGEN SATURATION: 97 % | BODY MASS INDEX: 28.82 KG/M2 | DIASTOLIC BLOOD PRESSURE: 70 MMHG

## 2021-09-17 DIAGNOSIS — D12.6 ADENOMATOUS POLYP OF COLON, UNSPECIFIED PART OF COLON: Primary | ICD-10-CM

## 2021-09-17 DIAGNOSIS — R79.89 ABNORMAL LFTS: ICD-10-CM

## 2021-09-17 PROCEDURE — 99213 OFFICE O/P EST LOW 20 MIN: CPT | Performed by: SPECIALIST

## 2021-09-17 RX ORDER — SODIUM, POTASSIUM,MAG SULFATES 17.5-3.13G
SOLUTION, RECONSTITUTED, ORAL ORAL
Qty: 354 ML | Refills: 0 | Status: SHIPPED | OUTPATIENT
Start: 2021-09-17

## 2021-09-17 ASSESSMENT — ENCOUNTER SYMPTOMS
ANAL BLEEDING: 0
DIARRHEA: 0
VOMITING: 0
ABDOMINAL DISTENTION: 0
ABDOMINAL PAIN: 0
BLOOD IN STOOL: 0
RESPIRATORY NEGATIVE: 1
EYES NEGATIVE: 1
CONSTIPATION: 1
NAUSEA: 0
RECTAL PAIN: 0

## 2021-09-17 NOTE — PROGRESS NOTES
Gastroenterology Clinic Follow up Visit    Mary Huggins  [id number]  Chief Complaint   Patient presents with    Constipation     Patient presents today with ocnstipation, took her 4 days to have a bowel movement, states she does not have the strength to push her stool out, states she had a stoke in december she still recovering from. some abdominal pain, states she constantly goes days without having a bowel movement, this time she went almost 3 weeks before having a bowel movement        HPI and A/P at last visit summarized below:  Patient is here to schedule a follow-up colonoscopy. Previous colonoscopy showed a polyp in the ileocecal valve area which I was not able to remove because of the location. In December 2020 patient had a stroke with weakness of the right side and has been on warfarin since then. Has residual weakness of the right side. Patient also noticed constipation since her stroke and has been on stool softeners. Has discomfort in the lower abdomen. No nausea no vomiting    Review of Systems   Constitutional: Negative. HENT: Negative. Eyes: Negative. Respiratory: Negative. Cardiovascular: Negative. Gastrointestinal: Positive for constipation. Negative for abdominal distention, abdominal pain, anal bleeding, blood in stool, diarrhea, nausea, rectal pain and vomiting. Endocrine: Negative. Genitourinary: Negative. Musculoskeletal: Negative. Skin: Negative. Allergic/Immunologic: Negative for food allergies. Neurological: Negative. Status post CVA   Hematological: Negative. Psychiatric/Behavioral: Negative. Past medical history, past surgical history, medication list, social and familyhistory reviewed    Blood pressure 134/70, pulse 83, temperature 97.7 °F (36.5 °C), resp. rate 18, height 5' 7\" (1.702 m), weight 183 lb 9.6 oz (83.3 kg), SpO2 97 %, not currently breastfeeding.     Physical Exam  Constitutional:       Appearance: She is well-developed. HENT:      Head: Normocephalic and atraumatic. Eyes:      Conjunctiva/sclera: Conjunctivae normal.      Pupils: Pupils are equal, round, and reactive to light. Cardiovascular:      Rate and Rhythm: Normal rate. Pulmonary:      Effort: Pulmonary effort is normal.   Abdominal:      General: Bowel sounds are normal.      Palpations: Abdomen is soft. Comments: Soft nontender no palpable mass   Musculoskeletal:         General: Normal range of motion. Cervical back: Normal range of motion. Skin:     General: Skin is warm. Comments: Tattoos. Neurological:      Mental Status: She is alert. Laboratory, Pathology, Radiology reviewed in detail with relevantimportant investigations summarized below:    No results for input(s): WBC, HGB, HCT, MCV, PLT in the last 720 hours. Lab Results   Component Value Date    ALT 25 07/26/2021    AST 18 07/26/2021    ALKPHOS 176 (H) 07/26/2021    BILITOT 0.4 07/26/2021     No results found. Endoscopic investigations:     Assessment and Plan:  Harpreetdonato Rodriguez 54 y.o. female for follow up. History of colon polyp. Abnormal LFT cholestatic pattern, normal liver enzymes with elevated alk phos. Possibility  include medications,  PBC, or other granulomatous hepatitis. We'll schedule colonoscopy and check liver ultrasound and order AMA, serum IgM level, ACE level and also check GGTP, patient to hold Coumadin 4 days prior to colonoscopy and will verify this with her neurologist.   Diagnosis Orders   1. Adenomatous polyp of colon, unspecified part of colon  Endoscopy, colon, diagnostic   2. Abnormal LFTs  Hepatic Function Panel    MITOCHONDRIAL ANTIBODY W/REFLEX TITER    Angiotensin Converting Enzyme    Gamma Gt       Return in about 2 months (around 11/17/2021).     Krysta Pierre MD   StaffGastroenterologist  [facility]    Please note this report has been partially produced using speech recognitionsoftware  and may cause contain errors related to that system including grammar, punctuation and spelling as well as words andphrases that may seem inappropriate. If there are questions or concerns please feel free to contact me to clarify.

## 2021-09-20 DIAGNOSIS — I25.10 CORONARY ARTERY DISEASE INVOLVING NATIVE CORONARY ARTERY OF NATIVE HEART WITHOUT ANGINA PECTORIS: ICD-10-CM

## 2021-09-21 NOTE — TELEPHONE ENCOUNTER
Requesting medication refill. Please approve or deny this request.    Rx requested:  Requested Prescriptions     Pending Prescriptions Disp Refills    isosorbide mononitrate (IMDUR) 30 MG extended release tablet [Pharmacy Med Name: isosorbide mononitrate ER 30 mg tablet,extended release 24 hr] 60 tablet 5     Sig: Take 2 tablets by mouth daily         Last Office Visit:   4/22/2021      Next Visit Date:  Future Appointments   Date Time Provider Vangie Nuñez   10/5/2021  2:30 PM [address]   11/8/2021  4:15 PM Lurdes Paul MD [address]   1/21/2022 12:30 PM Anand Pantoja  [facility]   2/16/2022 11:00 AM Marquez Matthew, [place] [facility]               Last refill 5/10/21 Please approve or deny.

## 2021-09-22 RX ORDER — ISOSORBIDE MONONITRATE 30 MG/1
60 TABLET, EXTENDED RELEASE ORAL DAILY
Qty: 60 TABLET | Refills: 5 | Status: SHIPPED | OUTPATIENT
Start: 2021-09-22 | End: 2022-03-11

## 2021-10-05 ENCOUNTER — HOSPITAL ENCOUNTER (OUTPATIENT)
Dept: PHARMACY | Age: 51
Setting detail: THERAPIES SERIES
Discharge: HOME | End: 2021-10-05
Payer: COMMERCIAL

## 2021-10-05 DIAGNOSIS — D68.59 HYPERCOAGULABLE STATE (HCC): ICD-10-CM

## 2021-10-05 DIAGNOSIS — G45.8 OTHER TRANSIENT CEREBRAL ISCHEMIC ATTACKS AND RELATED SYNDROMES: ICD-10-CM

## 2021-10-05 DIAGNOSIS — I63.81 CEREBROVASCULAR ACCIDENT (CVA) OF LEFT THALAMUS (HCC): Primary | ICD-10-CM

## 2021-10-05 LAB — INTERNATIONAL NORMALIZATION RATIO, POC: 2.1

## 2021-10-05 PROCEDURE — 85610 PROTHROMBIN TIME: CPT

## 2021-10-05 PROCEDURE — 99211 OFF/OP EST MAY X REQ PHY/QHP: CPT

## 2021-10-12 PROBLEM — Z15.89 MTHFR MUTATION: Status: ACTIVE | Noted: 2021-10-12

## 2021-10-14 ENCOUNTER — OFFICE VISIT (OUTPATIENT)
Dept: NEUROLOGY | Age: 51
End: 2021-10-14
Payer: COMMERCIAL

## 2021-10-14 VITALS
DIASTOLIC BLOOD PRESSURE: 68 MMHG | SYSTOLIC BLOOD PRESSURE: 132 MMHG | BODY MASS INDEX: 29.91 KG/M2 | HEART RATE: 72 BPM | WEIGHT: 191 LBS

## 2021-10-14 DIAGNOSIS — I63.512 CEREBROVASCULAR ACCIDENT (CVA) DUE TO STENOSIS OF LEFT MIDDLE CEREBRAL ARTERY (HCC): Primary | ICD-10-CM

## 2021-10-14 DIAGNOSIS — R26.0 ATAXIC GAIT: ICD-10-CM

## 2021-10-14 DIAGNOSIS — F32.A DEPRESSION, UNSPECIFIED DEPRESSION TYPE: ICD-10-CM

## 2021-10-14 DIAGNOSIS — Z15.89 MTHFR MUTATION: ICD-10-CM

## 2021-10-14 DIAGNOSIS — R47.01 EXPRESSIVE APHASIA: ICD-10-CM

## 2021-10-14 DIAGNOSIS — F41.9 ANXIETY: ICD-10-CM

## 2021-10-14 PROCEDURE — 99214 OFFICE O/P EST MOD 30 MIN: CPT | Performed by: NURSE PRACTITIONER

## 2021-10-14 RX ORDER — ESCITALOPRAM OXALATE 5 MG/1
TABLET ORAL
Qty: 60 TABLET | Refills: 5 | Status: SHIPPED | OUTPATIENT
Start: 2021-10-14 | End: 2021-10-14

## 2021-10-14 ASSESSMENT — ENCOUNTER SYMPTOMS
VOMITING: 0
NAUSEA: 0
TROUBLE SWALLOWING: 0
ABDOMINAL DISTENTION: 0
ABDOMINAL PAIN: 0
COUGH: 0
WHEEZING: 0
CHEST TIGHTNESS: 0
COLOR CHANGE: 0
SHORTNESS OF BREATH: 0

## 2021-10-14 NOTE — PROGRESS NOTES
Subjective:      Patient ID: Kyle Wall is a 54 y.o. female who presents today for:  Chief Complaint   Patient presents with    Follow-up     Pt states that her balance is off more than before, and that her thinking and mental state is like slow. Pt did not have blood work done yet. SHe says that she just wants to be told whether or not what is wrong with her is going to be premanent or not. HPI  10/14/21:  Patient seen and examined for follow-up. She called to the office requesting a sooner appointment. We currently see her for a left thalamic CVA that occurred in December 2020 that resulted in expressive aphasia, diplopia to the right and right-sided weakness. Patient does have MTHFR mutation currently on Coumadin with therapeutic INR which was last assessed on 10/5/2021. Patient is currently alert and oriented x3, no acute distress, cooperative. She reports that she has had problems with processing information and word finding issues. She feels it is gotten somewhat worse. She also reports increasing balance issues worse over the last 2 weeks. She will sometimes veer off to the right side. She had difficulty expressing and putting her symptoms into words at times. She reports \"everything seems hollow\". Patient very tearful during our visit. She reports a lot of anxiety and frustration regarding her ongoing residual symptoms of her CVA. She reports that she feels stressed due to the fact that she is not functioning like she used to. She is trying to work and feels she is pushing herself very hard. She reports lack of family and social support. She reports that her boyfriend does not understand the residual symptoms of her CVA and reports that \"you should be over this by now\". Patient reports this is causing her a lot of stress and frustration. She is currently seeking counseling through the [facility] center and has history of bipolar disorder.   She does report depression but denies suicidal ideation. 8/18/2021:  Patient seen and examined for 3-month follow-up for left thalamic CVA that occurred in December 2020 that resulted in expressive aphasia, diplopia to the right eye and right-sided weakness. Patient with MTHFR mutation currently on Coumadin. Currently alert and oriented x3, no acute distress, cooperative. In terms of her stroke patient is doing well overall. Expressive aphasia is resolved. Vision changes resolved. Right-sided weakness greatly improved. Patient's only complaint today is of fatigue. Patient does report some depression. She was followed at the [facility] but is not happy with her services. She has maintained follow-up with Coumadin clinic and INR is currently therapeutic. Patient continues to smoke. Last lipid panel done 4/29/2021 with total cholesterol 29, triglycerides 123, HDL 31, LDL 43. Patient continues on atorvastatin 20 mg nightly. Patient has returned to work and states that she is handling her job duties okay.        5/19/2021:  Patient seen and examined for 3-month follow-up for left thalamic CVA that occurred in December 2020 that resulted in expressive aphasia, diplopia to right eye and right-sided weakness. Susu Olson is currently alert and oriented x3, no acute distress, cooperative. Reginasoledad Gu last visit was a virtual on 2/17/2021.  At that time there was a lot of concern regarding patient's noncompliance.  She had not establish at the Coumadin clinic or had any INR testing done to follow her Coumadin.  She was not taking her blood pressure medication.  She was still smoking.  She had not followed up with therapies.  Since then it appears that patient has taken charge of her health care. Susu Olson has established with Coumadin clinic and is getting INR checked routinely. Susu Olson has maintained follow-up with endocrinology and cardiology. Susu Olson reports that cardiology has made some adjustments to her blood pressure medications to help with her continued hypertension.  She remains slightly hypertensive today and states that this is actually better than what she normally runs. Yana Veliz has had medication adjustments regarding her diabetes.  She states her blood sugars run in the high 100s.  She does have episodes where they run in the 200s and even the 300s.  She continues to smoke.  She reports daily compliance with Coumadin and aspirin and statin therapy.  Clinically she reports that she is doing much better.  Right-sided weakness has improved.  She is ambulating without the use of assistive devices but still will feel off balance at times.  No falls reported.  Expressive aphasia is greatly improved but persists some.  Double vision to her right eye has resolved.  She does report some right eye vision field deficit in the lower bilateral quadrants.  There have been no new focal neuro deficits.  No TIA episodes.  No unusual signs and symptoms of bleeding or bruising.  No myalgias.  Last hemoglobin A1c on 4/6/2021 was 5.7. Idalia Stevens is requesting to go back to work today. 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      2/17/2021:  Pt contacted via telephone for follow-up after hospital discharge. Idalia Stevens was at home and I was in my office. Idalia Stevens is a 72-year-old  female with past medical history of MI, chronic pain, bipolar disorder, gastritis, CAD, depression, GERD, hypertension, obesity, colitis, anxiety, PTSD, TIA, tobacco abuse, degenerative disc disease, Somatization disorder, complicated migraine, noncompliance, Neuropathy.  Patient was admitted to [facility] from 12/22/2020 until 12/28/2020 for left thalamic CVA with encephalopathy, expressive aphasia and diplopia to right eye.  Patient was noncompliant with medications prior to admission.  She had multiple risk factors for CVD including diabetes, hypertension, tobacco abuse.   CTA of the head and neck were negative.  CHAO was negative for cardioembolic source.  Patient was initially started on dual antiplatelet therapy.  LDL was 86, HDL was low at 27.  Hemoglobin A1c was 8.0.  Hypercoagulable work-up was done and revealed an elevated homocysteine level with positive homozygous MTHFR mutation and therefore patient was transitioned to oral anticoagulation while on the rehab unit.  Patient was initially started on Eliquis but due to cost she was transitioned over to Coumadin on 1/12/2021. Nigel Rashid was to follow-up with Coumadin clinic.  INR orders were placed but it does not appear that patient has gone and had this tested at all since initiating Coumadin.  She denies any unusual signs or symptoms of bleeding.  No new or recurrent focal neuro deficits.  She has not established with Coumadin clinic yet.  She reports daily compliance with Coumadin.  She states that she still feels \"sideways\".  She states that her gait feels off balance and sometimes she feels as if she is leaning to the left.  No falls reported. Saba Winters reports that physical therapy has been completed but she is supposed to be continuing with occupational therapy but has not done that recently. Saba Winters reports excessive daily fatigue.  She reports that she has occasional headaches.  No associated nausea or vomiting, photophobia, phonophobia.  Diplopia has improved to right eye.  She has appointment with ophthalmologist this Friday.  She reports that her blood pressure remains elevated and she is only taking her blood pressure medication as needed rather than every day as prescribed.  Patient is also on lithium and has been ordered to have lithium level drawn but has not followed up with [facility] to have this done either.     Past Medical History:   Diagnosis Date    Anxiety     Back pain     back surgery x 4    Bipolar disorder ([facility]     CAD (coronary artery disease)     CAFL (chronic airflow limitation) ([facility] 11/3/2016    Cerebral artery occlusion with cerebral infarction ([facility]     Chronic bronchitis ([facility]     Chronic pain     Colitis     Complicated migraine     DDD (degenerative disc disease), lumbar 2016    Depression     Endometriosis     Excessive caffeine abuse, continuous (HCC) 2018    Gastritis     GERD (gastroesophageal reflux disease)     History of myocardial infarction     HTN (hypertension), benign 2016    Impaired mobility and activities of daily living     Over weight     PTSD (post-traumatic stress disorder)     Sensory neuropathy 2016    Somatization disorder     TIA (transient ischemic attack)     Tobacco abuse     Vasospastic angina ([facility] 2016     Past Surgical History:   Procedure Laterality Date    BACK SURGERY      x2     SECTION      x2    CHOLECYSTECTOMY  15    Lapchole    COLONOSCOPY N/A 2020    COLONOSCOPY DIAGNOSTIC performed by Deangelo Rodas MD at [address] [place] [address]  3/7/16    Dr. Pam Shaver GASTROINTESTINAL ENDOSCOPY  2014    ANIBAL HOUSE M.D.   [facility] UPPER GASTROINTESTINAL ENDOSCOPY N/A 2020    EGD ESOPHAGOGASTRODUODENOSCOPY performed by Deangelo Rodas MD at [facility] Marital status:       Spouse name: Not on file    Number of children: Not on file    Years of education: Not on file    Highest education level: Not on file   Occupational History    Occupation: unemployed   Tobacco Use    Smoking status: Current Every Day Smoker     Packs/day: 1.00     Years: 17.00     Pack years: 17.00     Types: Cigarettes    Smokeless tobacco: Never Used   Vaping Use    Vaping Use: Never used   Substance and Sexual Activity    Alcohol use: No    Drug use: No    Sexual activity: Not Currently     Partners: Male   Other Topics Concern    Not on file   Social History Narrative    Patrizia Powell is a 43-year-old female who is on disability because of pain and bipolar disorder. She's also had a presumed diagnosis of possible multiple sclerosis. She's been seeing Dr. Manas Lopez for that and she says that the diagnosis is sometimes in question and it's clear neuropathy vitamin B12 deficiency as well as generalized bodyaches from the severe vitamin D deficiency have caused this scenario that looks like multiple sclerosis. lives With: Significant other    Type of Home: House Corey Amaro Has in [address] to enter with rails  - Number of Steps: 5    Bathroom Shower/Tub: Tub only    ADL Assistance: Independent    Homemaking Assistance: Independent, Homemaking Responsibilities: Yes, Meal Prep Responsibility: Primary    Laundry Responsibility: Primary, Cleaning Responsibility: Primary    Bill Paying/Finance Responsibility: Primary    Shopping Responsibility: Primary, Ambulation Assistance: Independent, Transfer Assistance: Independent    Active : Yes    Occupation: Full time employment--Type of occupation: disabled-  - 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 Work requirements are Pt was looking at [employer] as main part of job working nights     Social Determinants of Health     Financial Resource Strain:     Difficulty of Paying Living Expenses:    Food Insecurity:     Worried About [address] [employer] in the Last Year:    [address] in the Last Year:    Transportation Needs:     Lack of Transportation (Medical):  Lack of Transportation (Non-Medical):    Physical Activity: Inactive    Days of Exercise per Week: 0 days    Minutes of Exercise per Session: 0 min   Stress: Stress Concern Present    Feeling of Stress :  To some extent   Social Connections:     Frequency of Communication with Friends and Family:     Frequency of Social Gatherings with Friends and Family:     Attends Mormonism Services:     Active Member of Clubs or Organizations:     Attends Club or Organization Meetings:     Marital Status:    Intimate Partner Violence:     Fear of Current or Ex-Partner:     Emotionally Abused:     Physically Abused:     Sexually Abused:      Family History   Problem Relation Age of Onset    High Blood Pressure Mother     Kidney Disease Mother     Lupus Mother     Coronary Art Dis Mother         Had stents in her 62s   [facility] Bipolar Disorder Son      Allergies   Allergen Reactions    Latex Itching     Pt reports itching on hands after using rubber gloves at work    Influenza Vaccines Swelling     Pt reports her entire arm was red and edematous post vaccine    Albumen, Egg Other (See Comments)     Flu shot -  Localized reaction from flu vaccine. Pt eats eggs with no issues.  Eggs Or Egg-Derived [employer] based products only    Gabapentin Nausea Only    Pneumococcal Vaccines Hives    Povidone Iodine Itching    Varicella Virus Vaccine Live Hives     Current Outpatient Medications on File Prior to Visit   Medication Sig Dispense Refill    isosorbide mononitrate (IMDUR) 30 MG extended release tablet Take 2 tablets by mouth daily 60 tablet 5    Na Sulfate-K Sulfate-Mg Sulf 17.5-3.13-1.6 GM/177ML SOLN As directed 354 mL 0    cyclobenzaprine (FLEXERIL) 10 MG tablet       metoclopramide (REGLAN) 10 MG tablet Take 1 tablet by mouth 2 times daily as needed (Headache) 60 tablet 0    warfarin (COUMADIN) 5 MG tablet Take 1 tablet by mouth See Admin Instructions Take as directed by [facility] anticoagulation clinic. Quantity is 90 day supply.  110 tablet 1    Continuous Blood Gluc Sensor (FREESTYLE JOSE 2 SENSOR) MISC 1 Device by Does not apply route every 14 days 2 each 3    Continuous Blood Gluc  (FREESTYLE JOSE 2 READER) ANITHA 1 Device by Does not apply route 4 times daily (before meals and nightly) 1 each 0    amLODIPine (NORVASC) 5 MG tablet Take 1 tablet by mouth once daily.  losartan (COZAAR) 100 MG tablet Take 1 tablet by mouth once daily.  Handicap Placard MISC by Does not apply route Duration 2 years 1 each 0    Blood Pressure KIT Blood pressure cuff. 1 kit 0    Fluticasone-Umeclidin-Vilant (TRELEGY ELLIPTA) 200-62.5-25 MCG/INH AEPB Inhale 1 puff into the lungs daily      SITagliptin (JANUVIA) 25 MG tablet Take 1 tablet by mouth daily 30 tablet 0    metFORMIN (GLUCOPHAGE) 500 MG tablet Take 1 tablet by mouth 2 times daily (with meals) 60 tablet 3    cariprazine hcl (VRAYLAR) 3 MG CAPS capsule Take 1 capsule by mouth daily 30 capsule 0    atorvastatin (LIPITOR) 20 MG tablet Take 1 tablet by mouth nightly 30 tablet 3    docusate sodium (COLACE, DULCOLAX) 100 MG CAPS Take 200 mg by mouth 2 times daily 120 capsule 2    butalbital-aspirin-caffeine (FIORINAL) -40 MG capsule Take 1 capsule by mouth every 4 hours as needed for Headaches for up to 10 days. 20 capsule 0    nitroGLYCERIN (NITROSTAT) 0.4 MG SL tablet up to max of 3 total doses. If no relief after 1 dose, call 911. 25 tablet 3    albuterol sulfate HFA (PROVENTIL HFA) 108 (90 Base) MCG/ACT inhaler Inhale 2 puffs into the lungs every 6 hours as needed for Wheezing 1 Inhaler 3    FREESTYLE LANCETS MISC USE DAILY AS DIRECTED 100 each 10    budesonide (PULMICORT) 0.5 MG/2ML nebulizer suspension INHALE 1 VIAL VIA NEBULIZER TWICE DAILY 120 mL 5    BD INTEGRA SYRINGE 25G X 1\" 3 ML MISC USE AS DIRECTED EVERY MONTH 25 each 5    aspirin 81 MG chewable tablet Take 1 tablet by mouth daily 30 tablet 5    FREESTYLE LITE strip USE DAILY AS DIRECTED 50 strip 11    ipratropium-albuterol (DUONEB) 0.5-2.5 (3) MG/3ML SOLN nebulizer solution Inhale 3 mLs into the lungs three times daily 360 mL 5    VENTOLIN  (90 Base) MCG/ACT inhaler Inhale 2 puffs into the lungs every 6 hours as needed for Wheezing 1 Inhaler 5     No current facility-administered medications on file prior to visit. Review of Systems   Constitutional: Positive for activity change. Negative for appetite change, chills, fatigue and fever. HENT: Negative for hearing loss and trouble swallowing. Eyes: Negative for visual disturbance. Respiratory: Negative for cough, chest tightness, shortness of breath and wheezing. Cardiovascular: Negative for chest pain, palpitations and leg swelling. Gastrointestinal: Negative for abdominal distention, abdominal pain, nausea and vomiting. Genitourinary: Negative for difficulty urinating. Musculoskeletal: Positive for gait problem. Skin: Negative for color change and rash. Neurological: Positive for speech difficulty and weakness. Negative for dizziness, tremors, seizures, syncope, facial asymmetry, light-headedness, numbness and headaches. Psychiatric/Behavioral: Positive for dysphoric mood and sleep disturbance. Negative for agitation, confusion and hallucinations. The patient is nervous/anxious. Objective:   /68 (Site: Left Upper Arm, Position: Standing, Cuff Size: Large Adult)   Pulse 72   Wt 191 lb (86.6 kg)   BMI 29.91 kg/m²     Physical Exam  Vitals reviewed. Constitutional:       General: She is not in acute distress. Appearance: She is not ill-appearing or diaphoretic. HENT:      Head: Normocephalic and atraumatic. Eyes:      General: No visual field deficit. Extraocular Movements: Extraocular movements intact. Pupils: Pupils are equal, round, and reactive to light. Cardiovascular:      Rate and Rhythm: Normal rate and regular rhythm. Pulmonary:      Effort: Pulmonary effort is normal. No respiratory distress. Breath sounds: Normal breath sounds. Abdominal:      General: Bowel sounds are normal.      Palpations: Abdomen is soft. Skin:     General: Skin is warm and dry. Neurological:      Mental Status: She is alert and oriented to person, place, and time.       Cranial Nerves: No cranial nerve deficit, dysarthria or facial asymmetry. Motor: Weakness (right 4/5) present. No tremor, atrophy, abnormal muscle tone or seizure activity. Coordination: Coordination abnormal (right). Finger-Nose-Finger Test abnormal (right). Gait: Gait abnormal.         No results found. Lab Results   Component Value Date    WBC 12.0 07/26/2021    RBC 5.10 07/26/2021    HGB 15.6 07/26/2021    HCT 45.3 07/26/2021    MCV 88.7 07/26/2021    MCH 30.6 07/26/2021    MCHC 34.5 07/26/2021    RDW 14.2 07/26/2021     07/26/2021    MPV 9.6 09/20/2015     Lab Results   Component Value Date     07/26/2021    K 3.6 07/26/2021    K 3.4 12/28/2020     07/26/2021    CO2 29 07/26/2021    BUN 10 07/26/2021    CREATININE 0.80 07/26/2021    GFRAA >60.0 07/26/2021    LABGLOM >60.0 07/26/2021    GLUCOSE 150 07/26/2021    PROT 7.1 07/26/2021    LABALBU 4.5 07/26/2021    CALCIUM 10.0 07/26/2021    BILITOT 0.4 07/26/2021    ALKPHOS 176 07/26/2021    AST 18 07/26/2021    ALT 25 07/26/2021     Lab Results   Component Value Date    PROTIME 21.6 02/22/2021    INR 2.1 10/05/2021    INR 1.9 02/22/2021     Lab Results   Component Value Date    TSH 4.070 04/10/2019    RRHHPOMW18 305 12/26/2020    FOLATE 8.1 12/26/2020    FERRITIN 282.0 12/11/2020    IRON 51 12/11/2020    TIBC 272 12/11/2020     Lab Results   Component Value Date    TRIG 251 12/24/2020    HDL 27 12/24/2020    LDLCALC 86 12/24/2020     Lab Results   Component Value Date    LABAMPH Neg 06/14/2020    BARBSCNU Neg 06/14/2020    LABBENZ Neg 06/14/2020    LABBENZ NotDTCD 01/26/2013    LABMETH Neg 06/14/2020    OPIATESCREENURINE Neg 06/14/2020    PHENCYCLIDINESCREENURINE Neg 06/14/2020    ETOH <10 12/22/2020     Lab Results   Component Value Date    LITHIUM 0.7 01/05/2021       Assessment and Plan:      1.  Cerebrovascular accident (CVA) due to stenosis of left middle cerebral artery ([facility]  - [facility] Physical Therapy - [place]  - Ambulatory referral to Speech Therapy  - CT HEAD WO CONTRAST; Future    2. MTHFR mutation  - CT HEAD WO CONTRAST; Future    3. Ataxic gait  - [facility] Physical Therapy - [place]  - CT HEAD WO CONTRAST; Future    4. Expressive aphasia  - Ambulatory referral to Speech Therapy  - CT HEAD WO CONTRAST; Future    -Patient with history of ischemic left thalamic CVA in December 2020 that resulted in expressive aphasia, right-sided weakness and diplopia to the right eye. Patient called requesting an earlier appointment due to the fact that she has been having increasing word finding issues and gait ataxia with veering off to the right. She has MTHFR mutation and is on Coumadin. Last INR done on 10/5/2021 was therapeutic at 2.1. At this time we will obtain repeat CT of the head. Will refer patient back to physical therapy and speech therapy. Objectively I feel like patient is about where she was at our last appointment in regards to her neuro exam.  Did discuss that residual effects of CVA can be permanent. [address], Aby Mckinney MD, [address] [facility]; Future  - Comprehensive Metabolic Panel; Future  - TSH with Reflex; Future    6. Depression, unspecified depression type  - William Patten MD, [place]  - Vitamin D 25 Hydroxy; Future  - TSH with Reflex; Future    -Patient voices a lot of concern over anxiety and depression that is ongoing given the residual symptoms of her CVA and how it is impacting her life both at work and at home. She was very tearful when discussing this. She states that her boyfriend and family do not provide her with a lot of support and expect that her symptoms should be completely resolved given that it has been almost a year since her stroke. This is very difficult for her as they often will minimize what is going on with her. She is currently receiving counseling through the [facility] but states that she does not feel this is helping and she is not open and honest with them.   We discussed prescription treatment but given her underlying history of bipolar disorder I feel this would be best recommended by psychiatry. At this time I will refer her to psychiatry here at [facility] for further evaluation and recommendations. Patient does not have suicidal ideation at this time and she was advised that if she does she is to call 911. I advised the patient that I would be happy to discuss with her family and/or boyfriend the residual effects of CVA so they can better understand what is going on with her from an outside source. Return in about 3 months (around 1/14/2022), or if symptoms worsen or fail to improve.     NIKI Metz - CNP     Collaborating Physician Dr Birch Erps

## 2021-10-21 ENCOUNTER — HOSPITAL ENCOUNTER (EMERGENCY)
Age: 51
Discharge: HOME | End: 2021-10-21
Payer: COMMERCIAL

## 2021-10-21 VITALS
BODY MASS INDEX: 29.03 KG/M2 | SYSTOLIC BLOOD PRESSURE: 127 MMHG | TEMPERATURE: 97.7 F | DIASTOLIC BLOOD PRESSURE: 62 MMHG | HEIGHT: 67 IN | RESPIRATION RATE: 16 BRPM | OXYGEN SATURATION: 97 % | HEART RATE: 85 BPM | WEIGHT: 185 LBS

## 2021-10-21 DIAGNOSIS — H00.025 HORDEOLUM INTERNUM OF LEFT LOWER EYELID: Primary | ICD-10-CM

## 2021-10-21 PROCEDURE — 99283 EMERGENCY DEPT VISIT LOW MDM: CPT

## 2021-10-21 RX ORDER — TOBRAMYCIN 3 MG/ML
1 SOLUTION/ DROPS OPHTHALMIC EVERY 4 HOURS
Qty: 5 ML | Refills: 0 | Status: SHIPPED | OUTPATIENT
Start: 2021-10-21 | End: 2021-10-31

## 2021-10-21 ASSESSMENT — ENCOUNTER SYMPTOMS
CONSTIPATION: 0
RHINORRHEA: 0
COLOR CHANGE: 0
EYE DISCHARGE: 1
ABDOMINAL PAIN: 0
ABDOMINAL DISTENTION: 0
SHORTNESS OF BREATH: 0
SORE THROAT: 0

## 2021-10-21 ASSESSMENT — PAIN DESCRIPTION - LOCATION: LOCATION: EYE

## 2021-10-21 ASSESSMENT — PAIN SCALES - GENERAL: PAINLEVEL_OUTOF10: 6

## 2021-10-21 ASSESSMENT — PAIN DESCRIPTION - ORIENTATION: ORIENTATION: LEFT

## 2021-10-21 NOTE — Clinical Note
Leydi Daley was seen and treated in our emergency department on 10/21/2021. She may return to work on 10/22/2021. If you have any questions or concerns, please don't hesitate to call.       Tonia James PA-C

## 2021-10-21 NOTE — ED PROVIDER NOTES
[facility] ED  eMERGENCY dEPARTMENT eNCOUnter      Pt Name: Rishi Cannon  MRN: [id number]  [place] 1970  Date of evaluation: 10/21/2021  Provider: Abdulaziz Dumont PA-C    CHIEF COMPLAINT       Chief Complaint   Patient presents with    Facial Swelling         HISTORY OF PRESENT ILLNESS   (Location/Symptom, Timing/Onset,Context/Setting, Quality, Duration, Modifying Factors, Severity)  Note limiting factors. Rishi Cannon is a 54 y.o. female who presents to the emergency department with complaint of left eye irritation, drainage, and edema, which patient states been ongoing for last 3 to 4 days. No visual changes. No fevers no rashes. Past medical history per chart review, degenerative disc disease, hypertension, anxiety, chronic back pain, bipolar, coronary disease, CVA, colitis, depression, gastritis, PTSD, tobacco abuse. HPI    NursingNotes were reviewed. REVIEW OF SYSTEMS    (2-9 systems for level 4, 10 or more for level 5)     Review of Systems   Constitutional: Negative for activity change and appetite change. HENT: Negative for congestion, ear discharge, ear pain, nosebleeds, rhinorrhea and sore throat. Eyes: Positive for discharge. Left eye drainage, and edema   Respiratory: Negative for shortness of breath. Cardiovascular: Negative for chest pain, palpitations and leg swelling. Gastrointestinal: Negative for abdominal distention, abdominal pain and constipation. Genitourinary: Negative for difficulty urinating and dysuria. Musculoskeletal: Negative for arthralgias. Skin: Negative for color change. Neurological: Negative for dizziness, syncope, numbness and headaches. Psychiatric/Behavioral: Negative for agitation and confusion. Except as noted above the remainder of the review of systems was reviewed and negative.        PAST MEDICAL HISTORY     Past Medical History:   Diagnosis Date    Anxiety     Back pain     back surgery x 4    [facility]     CAD (coronary artery disease)     CAFL (chronic airflow limitation) ([facility] 11/3/2016    Cerebral artery occlusion with cerebral infarction (HCC)     Chronic bronchitis (HCC)     Chronic pain     Colitis     Complicated migraine     DDD (degenerative disc disease), lumbar 2016    Depression     Endometriosis     Excessive caffeine abuse, continuous (HCC) 2018    Gastritis     GERD (gastroesophageal reflux disease)     History of myocardial infarction     HTN (hypertension), benign 2016    Impaired mobility and activities of daily living     Over weight     PTSD (post-traumatic stress disorder)     Sensory neuropathy 2016    Somatization disorder     TIA (transient ischemic attack)     Tobacco abuse     Vasospastic angina ([facility] 2016         SURGICALHISTORY       Past Surgical History:   Procedure Laterality Date    BACK SURGERY      x2     SECTION      x2    CHOLECYSTECTOMY  13    Lapchole    COLONOSCOPY N/A 2020    COLONOSCOPY DIAGNOSTIC performed by Cecille Perea MD at [address]  3/7/16    Dr. Tommy Morales Left     UPPER GASTROINTESTINAL ENDOSCOPY  2014    JORDAN HAMEED UPPER GASTROINTESTINAL ENDOSCOPY N/A 2020    EGD ESOPHAGOGASTRODUODENOSCOPY performed by Cecille Perea MD at [address] [place] [address]       Previous Medications    ALBUTEROL SULFATE HFA (PROVENTIL HFA) 108 (90 BASE) MCG/ACT INHALER    Inhale 2 puffs into the lungs every 6 hours as needed for Wheezing    AMLODIPINE (NORVASC) 5 MG TABLET    Take 1 tablet by mouth once daily.     ASPIRIN 81 MG CHEWABLE TABLET    Take 1 tablet by mouth daily    ATORVASTATIN (LIPITOR) 20 MG TABLET    Take 1 tablet by mouth nightly    BD INTEGRA SYRINGE 25G X 1\" 3 ML MISC    USE AS DIRECTED EVERY MONTH    BLOOD PRESSURE KIT    Blood pressure cuff. BUDESONIDE (PULMICORT) 0.5 MG/2ML NEBULIZER SUSPENSION    INHALE 1 VIAL VIA NEBULIZER TWICE DAILY    BUTALBITAL-ASPIRIN-CAFFEINE (FIORINAL) -40 MG CAPSULE    Take 1 capsule by mouth every 4 hours as needed for Headaches for up to 10 days. CARIPRAZINE HCL (VRAYLAR) 3 MG CAPS CAPSULE    Take 1 capsule by mouth daily    CONTINUOUS BLOOD GLUC  (FREESTYLE JOSE 2 READER) ANITHA    1 Device by Does not apply route 4 times daily (before meals and nightly)    CONTINUOUS BLOOD GLUC SENSOR (FREESTYLE JOSE 2 SENSOR) MISC    1 Device by Does not apply route every 14 days    CYCLOBENZAPRINE (FLEXERIL) 10 MG TABLET        DOCUSATE SODIUM (COLACE, DULCOLAX) 100 MG CAPS    Take 200 mg by mouth 2 times daily    FLUTICASONE-UMECLIDIN-VILANT (TRELEGY ELLIPTA) 200-62.5-25 MCG/INH AEPB    Inhale 1 puff into the lungs daily    FREESTYLE LANCETS MISC    USE DAILY AS DIRECTED    FREESTYLE LITE STRIP    USE DAILY AS DIRECTED    HANDICAP PLACARD MISC    by Does not apply route Duration 2 years    IPRATROPIUM-ALBUTEROL (DUONEB) 0.5-2.5 (3) MG/3ML SOLN NEBULIZER SOLUTION    Inhale 3 mLs into the lungs three times daily    ISOSORBIDE MONONITRATE (IMDUR) 30 MG EXTENDED RELEASE TABLET    Take 2 tablets by mouth daily    LOSARTAN (COZAAR) 100 MG TABLET    Take 1 tablet by mouth once daily. METFORMIN (GLUCOPHAGE) 500 MG TABLET    Take 1 tablet by mouth 2 times daily (with meals)    METOCLOPRAMIDE (REGLAN) 10 MG TABLET    Take 1 tablet by mouth 2 times daily as needed (Headache)    NA SULFATE-K SULFATE-MG SULF 17.5-3.13-1.6 GM/177ML SOLN    As directed    NITROGLYCERIN (NITROSTAT) 0.4 MG SL TABLET    up to max of 3 total doses. If no relief after 1 dose, call 911.     SITAGLIPTIN (JANUVIA) 25 MG TABLET    Take 1 tablet by mouth daily    VENTOLIN  (90 BASE) MCG/ACT INHALER    Inhale 2 puffs into the lungs every 6 hours as needed for Wheezing    WARFARIN (COUMADIN) 5 MG TABLET    Take 1 tablet by mouth See Admin Instructions Take as directed by [facility] anticoagulation clinic. Quantity is 90 day supply. ALLERGIES     Latex; Influenza vaccines; Albumen, egg; Eggs or egg-derived products; Gabapentin; Pneumococcal vaccines; Povidone iodine; and Varicella virus vaccine live    FAMILY HISTORY       Family History   Problem Relation Age of Onset    High Blood Pressure Mother     Kidney Disease Mother     Lupus Mother     Coronary Art Dis Mother         Had stents in her 62s   Miguel Stager Bipolar Disorder Son           SOCIAL HISTORY       Social History     Socioeconomic History    Marital status:      Spouse name: None    Number of children: None    Years of education: None    Highest education level: None   Occupational History    Occupation: unemployed   Tobacco Use    Smoking status: Current Every Day Smoker     Packs/day: 1.00     Years: 17.00     Pack years: 17.00     Types: Cigarettes    Smokeless tobacco: Never Used   Vaping Use    Vaping Use: Never used   Substance and Sexual Activity    Alcohol use: No    Drug use: No    Sexual activity: Not Currently     Partners: Male   Other Topics Concern    None   Social History Narrative    Ana Tapia is a 56-year-old female who is on disability because of pain and bipolar disorder. She's also had a presumed diagnosis of possible multiple sclerosis. She's been seeing Dr. Wing Nicely for that and she says that the diagnosis is sometimes in question and it's clear neuropathy vitamin B12 deficiency as well as generalized bodyaches from the severe vitamin D deficiency have caused this scenario that looks like multiple sclerosis.      lives With: Significant other    Type of Home: [facility] in [address] to enter with rails  - Number of Steps: 5    Bathroom Shower/Tub: Tub only    ADL Assistance: Independent    Homemaking Assistance: Independent, Homemaking Responsibilities: Yes, Meal Prep Responsibility: Primary    Laundry Responsibility: Primary, Cleaning Responsibility: Primary    Bill Paying/Finance Responsibility: Primary    Shopping Responsibility: Primary, Ambulation Assistance: Independent, Transfer Assistance: Independent    Active : Yes    Occupation: Full time employment--Type of occupation: disabled-  - in charges of [address] 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 Work requirements are Pt was looking at [employer] as main part of job working nights     Social Determinants of Health     Financial Resource Strain:     Difficulty of Paying Living Expenses:    Food Insecurity:     Worried About [address] in the Last Year:    [address] in the Last Year:    Transportation Needs:     Lack of Transportation (Medical):  Lack of Transportation (Non-Medical):    Physical Activity: Inactive    Days of Exercise per Week: 0 days    Minutes of Exercise per Session: 0 min   Stress: Stress Concern Present    Feeling of Stress : To some extent   Social Connections:     Frequency of Communication with Friends and Family:     Frequency of Social Gatherings with Friends and Family:     Attends Jainism Services:     Active Member of Clubs or Organizations:     Attends Club or Organization Meetings:     Marital Status:    Intimate Partner Violence:     Fear of Current or Ex-Partner:     Emotionally Abused:     Physically Abused:     Sexually Abused:        SCREENINGS      @FLOW([id number])@      PHYSICAL EXAM    (up to 7 for level 4, 8 or more for level 5)     ED Triage Vitals [10/21/21 0653]   BP Temp Temp src Pulse Resp SpO2 Height Weight   127/62 97.7 °F (36.5 °C) -- 85 16 97 % 5' 7\" (1.702 m) 185 lb (83.9 kg)       Physical Exam  Vitals and nursing note reviewed. Constitutional:       General: She is not in acute distress. Appearance: She is well-developed. She is not ill-appearing, toxic-appearing or diaphoretic. HENT:      Head: Normocephalic. Nose: No congestion. Mouth/Throat:      Mouth: Mucous membranes are moist.      Pharynx: No oropharyngeal exudate or posterior oropharyngeal erythema. Eyes:      Extraocular Movements: Extraocular movements intact. Conjunctiva/sclera: Conjunctivae normal.      Pupils: Pupils are equal, round, and reactive to light. Comments: Patient's left eye, shows a stye in the lower portion of the mid lid, there is mild edema noted to the lower portion of eyelid, and cheek. No signs for cellulitis or significant infection. No scleral injection, no visualized foreign bodies. Pupils are equal round reactive to light, no nystagmus. Neck:      Vascular: No JVD. Trachea: No tracheal deviation. Cardiovascular:      Rate and Rhythm: Normal rate. Pulses: Normal pulses. Heart sounds: Normal heart sounds. No murmur heard. No friction rub. No gallop. Pulmonary:      Effort: Pulmonary effort is normal. No tachypnea, accessory muscle usage, respiratory distress or retractions. Breath sounds: No stridor. No wheezing, rhonchi or rales. Chest:      Chest wall: No tenderness. Abdominal:      General: Abdomen is flat. Bowel sounds are normal. There is no distension or abdominal bruit. Palpations: There is no shifting dullness, fluid wave, hepatomegaly, splenomegaly, mass or pulsatile mass. Tenderness: There is no abdominal tenderness. There is no right CVA tenderness, left CVA tenderness, guarding or rebound. Negative signs include Womack's sign, Rovsing's sign and McBurney's sign. Musculoskeletal:         General: No deformity. Normal range of motion. Cervical back: Normal range of motion and neck supple. No rigidity. Skin:     General: Skin is warm and dry. Capillary Refill: Capillary refill takes less than 2 seconds. Coloration: Skin is not jaundiced. Neurological:      General: No focal deficit present.       Mental Status: She is alert and oriented to person, place, and time. Mental status is at baseline. Cranial Nerves: No cranial nerve deficit. Sensory: No sensory deficit. Motor: No weakness. Coordination: Coordination normal.   Psychiatric:         Mood and Affect: Mood normal.         DIAGNOSTIC RESULTS     EKG: All EKG's are interpreted by the Emergency Department Physician who either signs or Co-signsthis chart in the absence of a cardiologist.          RADIOLOGY:   [place] such as CT, Ultrasound and MRI are read by the radiologist. Plain radiographic images are visualized and preliminarily interpreted by the emergency physician with the below findings:          Interpretation per the Radiologist below, if available at the time ofthis note:    No orders to display         ED BEDSIDE ULTRASOUND:   Performed by ED Physician - none    LABS:  Labs Reviewed - No data to display    All other labs were within normal range or not returned as of this dictation. EMERGENCY DEPARTMENT COURSE and DIFFERENTIAL DIAGNOSIS/MDM:   Vitals:    Vitals:    10/21/21 0653   BP: 127/62   Pulse: 85   Resp: 16   Temp: 97.7 °F (36.5 °C)   SpO2: 97%   Weight: 185 lb (83.9 kg)   Height: 5' 7\" (1.702 m)              MDM  Number of Diagnoses or Management Options  Hordeolum internum of left lower eyelid  Diagnosis management comments: Patient with complaint of left eye irritation over the last 3 days, she states increasing irritation, drainage or discharge more so in the evening than during the day. No visual changes. Patient has a stye to lower left eyelid, patient was given a prescription for Tobrex, advised to use warm compresses. She was advised also if there is no improvement in the next 48 hours, she should follow-up with ophthalmology for reevaluation. She was also advised if she has any worsening or change symptoms return to the ER for reevaluation.       CRITICAL CARE TIME   Total Critical Care time was 0 minutes, excluding separately reportableprocedures. There was a high probability of clinicallysignificant/life threatening deterioration in the patient's condition which required my urgent intervention. CONSULTS:  None    PROCEDURES:  Unless otherwise noted below, none     Procedures    FINAL IMPRESSION      1.  Hordeolum internum of left lower eyelid          DISPOSITION/PLAN   DISPOSITION Decision To Discharge 10/21/2021 07:06:13 AM      PATIENT REFERRED TO:  Gerald Britt MD  [facility] [address] [id number] [phone]    In 3 days      [facility]  [id number] [place] [id number]  In 2 days        DISCHARGE MEDICATIONS:  New Prescriptions    TOBRAMYCIN (TOBREX) 0.3 % OPHTHALMIC SOLUTION    Place 1 drop into the left eye every 4 hours for 10 days          (Please note that portions of this note were completed with a voice recognition program.  Efforts were made to edit the dictations but occasionally words are mis-transcribed.)    Dana Villa PA-C (electronically signed)  Attending Emergency Physician         Dana Villa PA-C  10/21/21 [id number] [place] ANAHY Gray  10/21/21 0701

## 2021-10-21 NOTE — ED NOTES
Reviewed dc instructions with pt, she verbalized understanding. Juan Vo, [facility]  10/21/21 5365

## 2021-10-21 NOTE — ED TRIAGE NOTES
States over the last 2-3 days has had eye swelling. Last night the left eye began to have drainage that is clear to white in color. Denies any known injury. +blurry vision and eye pain.

## 2021-10-22 DIAGNOSIS — F32.A DEPRESSION, UNSPECIFIED DEPRESSION TYPE: ICD-10-CM

## 2021-10-22 DIAGNOSIS — R79.89 ABNORMAL LFTS: ICD-10-CM

## 2021-10-22 DIAGNOSIS — Z15.89 MTHFR MUTATION: ICD-10-CM

## 2021-10-22 DIAGNOSIS — F41.9 ANXIETY: ICD-10-CM

## 2021-10-22 LAB
ALBUMIN SERPL-MCNC: 4.4 G/DL (ref 3.5–4.6)
ALBUMIN SERPL-MCNC: 4.5 G/DL (ref 3.5–4.6)
ALP BLD-CCNC: 163 U/L (ref 40–130)
ALP BLD-CCNC: 171 U/L (ref 40–130)
ALT SERPL-CCNC: 31 U/L (ref 0–33)
ALT SERPL-CCNC: 31 U/L (ref 0–33)
ANION GAP SERPL CALCULATED.3IONS-SCNC: 9 MEQ/L (ref 9–15)
AST SERPL-CCNC: 19 U/L (ref 0–35)
AST SERPL-CCNC: 20 U/L (ref 0–35)
BILIRUB SERPL-MCNC: 0.4 MG/DL (ref 0.2–0.7)
BILIRUB SERPL-MCNC: 0.4 MG/DL (ref 0.2–0.7)
BILIRUBIN DIRECT: <0.2 MG/DL (ref 0–0.4)
BILIRUBIN, INDIRECT: ABNORMAL MG/DL (ref 0–0.6)
BUN BLDV-MCNC: 10 MG/DL (ref 6–20)
CALCIUM SERPL-MCNC: 9.2 MG/DL (ref 8.5–9.9)
CHLORIDE BLD-SCNC: 104 MEQ/L (ref 95–107)
CO2: 27 MEQ/L (ref 20–31)
CREAT SERPL-MCNC: 0.87 MG/DL (ref 0.5–0.9)
FOLATE: 3.3 NG/ML (ref 7.3–26.1)
GAMMA GLUTAMYL TRANSFERASE: 15 U/L (ref 0–40)
GFR AFRICAN AMERICAN: >60
GFR NON-AFRICAN AMERICAN: >60
GLOBULIN: 2.1 G/DL (ref 2.3–3.5)
GLUCOSE BLD-MCNC: 139 MG/DL (ref 70–99)
HCT VFR BLD CALC: 43.2 % (ref 37–47)
HEMOGLOBIN: 14.8 G/DL (ref 12–16)
MCH RBC QN AUTO: 32 PG (ref 27–31.3)
MCHC RBC AUTO-ENTMCNC: 34.3 % (ref 33–37)
MCV RBC AUTO: 93.1 FL (ref 82–100)
PDW BLD-RTO: 14.1 % (ref 11.5–14.5)
PLATELET # BLD: 156 K/UL (ref 130–400)
POTASSIUM SERPL-SCNC: 4.3 MEQ/L (ref 3.4–4.9)
RBC # BLD: 4.64 M/UL (ref 4.2–5.4)
SODIUM BLD-SCNC: 140 MEQ/L (ref 135–144)
TOTAL PROTEIN: 6.5 G/DL (ref 6.3–8)
TOTAL PROTEIN: 6.5 G/DL (ref 6.3–8)
TSH REFLEX: 2.48 UIU/ML (ref 0.44–3.86)
VITAMIN B-12: 215 PG/ML (ref 232–1245)
VITAMIN D 25-HYDROXY: 34.5 NG/ML (ref 30–100)
WBC # BLD: 7 K/UL (ref 4.8–10.8)

## 2021-10-24 LAB — ANGIOTENSIN CONVERTING ENZYME: 55 U/L (ref 9–67)

## 2021-10-25 ENCOUNTER — ANESTHESIA EVENT (OUTPATIENT)
Dept: ENDOSCOPY | Age: 51
End: 2021-10-25
Payer: COMMERCIAL

## 2021-10-25 ENCOUNTER — ANESTHESIA (OUTPATIENT)
Dept: ENDOSCOPY | Age: 51
End: 2021-10-25
Payer: COMMERCIAL

## 2021-10-25 ENCOUNTER — HOSPITAL ENCOUNTER (OUTPATIENT)
Age: 51
Setting detail: OUTPATIENT SURGERY
Discharge: HOME | End: 2021-10-25
Attending: SPECIALIST | Admitting: SPECIALIST
Payer: COMMERCIAL

## 2021-10-25 ENCOUNTER — ANCILLARY PROCEDURE (OUTPATIENT)
Dept: ENDOSCOPY | Age: 51
End: 2021-10-25
Attending: SPECIALIST
Payer: COMMERCIAL

## 2021-10-25 VITALS
SYSTOLIC BLOOD PRESSURE: 113 MMHG | OXYGEN SATURATION: 93 % | WEIGHT: 185 LBS | HEART RATE: 66 BPM | BODY MASS INDEX: 29.03 KG/M2 | HEIGHT: 67 IN | DIASTOLIC BLOOD PRESSURE: 55 MMHG | TEMPERATURE: 97.6 F | RESPIRATION RATE: 16 BRPM

## 2021-10-25 VITALS
DIASTOLIC BLOOD PRESSURE: 70 MMHG | OXYGEN SATURATION: 99 % | RESPIRATION RATE: 6 BRPM | SYSTOLIC BLOOD PRESSURE: 133 MMHG

## 2021-10-25 PROCEDURE — 3609027000 HC COLONOSCOPY: Performed by: SPECIALIST

## 2021-10-25 PROCEDURE — 3700000000 HC ANESTHESIA ATTENDED CARE: Performed by: SPECIALIST

## 2021-10-25 PROCEDURE — 6360000002 HC RX W HCPCS: Performed by: NURSE ANESTHETIST, CERTIFIED REGISTERED

## 2021-10-25 PROCEDURE — 2580000003 HC RX 258: Performed by: SPECIALIST

## 2021-10-25 PROCEDURE — 2580000003 HC RX 258: Performed by: NURSE ANESTHETIST, CERTIFIED REGISTERED

## 2021-10-25 PROCEDURE — 45385 COLONOSCOPY W/LESION REMOVAL: CPT | Performed by: SPECIALIST

## 2021-10-25 PROCEDURE — 7100000010 HC PHASE II RECOVERY - FIRST 15 MIN: Performed by: SPECIALIST

## 2021-10-25 PROCEDURE — 7100000011 HC PHASE II RECOVERY - ADDTL 15 MIN: Performed by: SPECIALIST

## 2021-10-25 PROCEDURE — 45380 COLONOSCOPY AND BIOPSY: CPT | Performed by: SPECIALIST

## 2021-10-25 PROCEDURE — 88305 TISSUE EXAM BY PATHOLOGIST: CPT

## 2021-10-25 PROCEDURE — 2500000003 HC RX 250 WO HCPCS: Performed by: NURSE ANESTHETIST, CERTIFIED REGISTERED

## 2021-10-25 PROCEDURE — 2580000003 HC RX 258

## 2021-10-25 PROCEDURE — 2709999900 HC NON-CHARGEABLE SUPPLY: Performed by: SPECIALIST

## 2021-10-25 PROCEDURE — 3700000001 HC ADD 15 MINUTES (ANESTHESIA): Performed by: SPECIALIST

## 2021-10-25 RX ORDER — SODIUM CHLORIDE 9 MG/ML
INJECTION, SOLUTION INTRAVENOUS
Status: COMPLETED
Start: 2021-10-25 | End: 2021-10-25

## 2021-10-25 RX ORDER — PROPOFOL 10 MG/ML
INJECTION, EMULSION INTRAVENOUS PRN
Status: DISCONTINUED | OUTPATIENT
Start: 2021-10-25 | End: 2021-10-25 | Stop reason: SDUPTHER

## 2021-10-25 RX ORDER — LIDOCAINE HYDROCHLORIDE 20 MG/ML
INJECTION, SOLUTION INFILTRATION; PERINEURAL PRN
Status: DISCONTINUED | OUTPATIENT
Start: 2021-10-25 | End: 2021-10-25 | Stop reason: SDUPTHER

## 2021-10-25 RX ORDER — MAGNESIUM HYDROXIDE 1200 MG/15ML
LIQUID ORAL PRN
Status: DISCONTINUED | OUTPATIENT
Start: 2021-10-25 | End: 2021-10-25 | Stop reason: ALTCHOICE

## 2021-10-25 RX ORDER — SODIUM CHLORIDE 9 MG/ML
INJECTION, SOLUTION INTRAVENOUS CONTINUOUS
Status: CANCELLED | OUTPATIENT
Start: 2021-10-25

## 2021-10-25 RX ORDER — SODIUM CHLORIDE 9 MG/ML
INJECTION, SOLUTION INTRAVENOUS CONTINUOUS PRN
Status: DISCONTINUED | OUTPATIENT
Start: 2021-10-25 | End: 2021-10-25 | Stop reason: SDUPTHER

## 2021-10-25 RX ORDER — GLYCOPYRROLATE 1 MG/5 ML
SYRINGE (ML) INTRAVENOUS PRN
Status: DISCONTINUED | OUTPATIENT
Start: 2021-10-25 | End: 2021-10-25 | Stop reason: SDUPTHER

## 2021-10-25 RX ADMIN — LIDOCAINE HYDROCHLORIDE 60 MG: 20 INJECTION, SOLUTION INFILTRATION; PERINEURAL at 11:15

## 2021-10-25 RX ADMIN — SODIUM CHLORIDE 500 ML: 9 INJECTION, SOLUTION INTRAVENOUS at 11:04

## 2021-10-25 RX ADMIN — SODIUM CHLORIDE: 9 INJECTION, SOLUTION INTRAVENOUS at 11:09

## 2021-10-25 RX ADMIN — Medication 0.2 MG: at 11:12

## 2021-10-25 RX ADMIN — PROPOFOL 450 MG: 10 INJECTION, EMULSION INTRAVENOUS at 11:15

## 2021-10-25 ASSESSMENT — ENCOUNTER SYMPTOMS: SHORTNESS OF BREATH: 1

## 2021-10-25 ASSESSMENT — LIFESTYLE VARIABLES: SMOKING_STATUS: 1

## 2021-10-25 NOTE — ANESTHESIA PRE PROCEDURE
Department of Anesthesiology  Preprocedure Note       Name:  Juan Ramon Ventura   Age:  54 y.o.  :  1970                                          MRN:  [id number]         Date:  10/25/2021      Surgeon: Philippe Landis):  Rubin Arnold MD    Procedure: Procedure(s):  COLONOSCOPY DIAGNOSTIC    Medications prior to admission:   Prior to Admission medications    Medication Sig Start Date End Date Taking? Authorizing Provider   tobramycin (TOBREX) 0.3 % ophthalmic solution Place 1 drop into the left eye every 4 hours for 10 days 10/21/21 10/31/21  Elridge Crigler Eusebio Dinning, PA-C   isosorbide mononitrate (IMDUR) 30 MG extended release tablet Take 2 tablets by mouth daily 21   Anand Sherman DO   Na Sulfate-K Sulfate-Mg Sulf 17.5-3.13-1.6 GM/177ML SOLN As directed 21   Rubin Arnold MD   cyclobenzaprine (FLEXERIL) 10 MG tablet  8/15/21   Historical Provider, MD   metoclopramide (REGLAN) 10 MG tablet Take 1 tablet by mouth 2 times daily as needed (Headache) 7/26/21 10/14/21  Mica Aviles MD   warfarin (COUMADIN) 5 MG tablet Take 1 tablet by mouth See Admin Instructions Take as directed by [facility] anticoagulation [facility]. Quantity is 90 day supply. 21   Torey Rey MD   Continuous Blood Gluc Sensor (FREESTYLE JOSE 2 SENSOR) MISC 1 Device by Does not apply route every 14 days 21   Yaya Vigil PA   Continuous Blood Gluc  (FREESTYLE JOSE 2 READER) ANITHA 1 Device by Does not apply route 4 times daily (before meals and nightly) 21   Yaya Vigil PA   amLODIPine (NORVASC) 5 MG tablet Take 1 tablet by mouth once daily. 21   Historical Provider, MD   losartan (COZAAR) 100 MG tablet Take 1 tablet by mouth once daily.  5/10/21   Historical Provider, MD   Handicap Placard MISC by Does not apply route Duration 2 years 21   NIKI Robles - CNP   Blood Pressure KIT Blood pressure cuff. 21   Anand Sherman DO   Fluticasone-Umeclidin-Vilant (TRELEGY ELLIPTA) 200-62.5-25 MCG/INH AEPB Inhale 1 puff into the lungs daily 1/21/21   Historical Provider, MD   SITagliptin (JANUVIA) 25 MG tablet Take 1 tablet by mouth daily 1/8/21   NIKI Vera NP   metFORMIN (GLUCOPHAGE) 500 MG tablet Take 1 tablet by mouth 2 times daily (with meals) 1/8/21   NIKI Vera NP   cariprazine hcl (VRAYLAR) 3 MG CAPS capsule Take 1 capsule by mouth daily 1/9/21   NIKI Vera NP   atorvastatin (LIPITOR) 20 MG tablet Take 1 tablet by mouth nightly 1/6/21   Talisha Schneider DO   docusate sodium (COLACE, DULCOLAX) 100 MG CAPS Take 200 mg by mouth 2 times daily 1/6/21   Talisha Schneider DO   butalbital-aspirin-caffeine [facility]) -40 MG capsule Take 1 capsule by mouth every 4 hours as needed for Headaches for up to 10 days. 8/21/20 10/14/21  NORBERTO Fried   nitroGLYCERIN (NITROSTAT) 0.4 MG SL tablet up to max of 3 total doses.  If no relief after 1 dose, call 911. 1/9/20   NORBERTO Garcia   albuterol sulfate HFA (PROVENTIL HFA) 108 (90 Base) MCG/ACT inhaler Inhale 2 puffs into the lungs every 6 hours as needed for Wheezing 10/23/19   MD KENDELL Orosco LANCETS MISC USE DAILY AS DIRECTED 8/21/19   Deborah William MD   budesonide (PULMICORT) 0.5 MG/2ML nebulizer suspension INHALE 1 VIAL VIA NEBULIZER TWICE DAILY 6/16/19   Deborah William MD   BD INTEGRA SYRINGE 25G X 1\" 3 ML MISC USE AS DIRECTED EVERY MONTH 5/28/19   Deborah William MD   aspirin 81 MG chewable tablet Take 1 tablet by mouth daily 4/10/19   NIKI Don CNP   FREESTYLE LITE strip USE DAILY AS DIRECTED 2/14/19   Deborah William MD   ipratropium-albuterol (DUONEB) 0.5-2.5 (3) MG/3ML SOLN nebulizer solution Inhale 3 mLs into the lungs three times daily 12/18/18   Deborah William MD   VENTOLIN  (05 Base) MCG/ACT inhaler Inhale 2 puffs into the lungs every 6 hours as needed for Wheezing 12/18/18   Deborah William MD       Current medications:    No current facility-administered medications for this Headaches for up to 10 days. 20 capsule 0    nitroGLYCERIN (NITROSTAT) 0.4 MG SL tablet up to max of 3 total doses. If no relief after 1 dose, call 911. 25 tablet 3    albuterol sulfate HFA (PROVENTIL HFA) 108 (90 Base) MCG/ACT inhaler Inhale 2 puffs into the lungs every 6 hours as needed for Wheezing 1 Inhaler 3    FREESTYLE LANCETS MISC USE DAILY AS DIRECTED 100 each 10    budesonide (PULMICORT) 0.5 MG/2ML nebulizer suspension INHALE 1 VIAL VIA NEBULIZER TWICE DAILY 120 mL 5    BD INTEGRA SYRINGE 25G X 1\" 3 ML MISC USE AS DIRECTED EVERY MONTH 25 each 5    aspirin 81 MG chewable tablet Take 1 tablet by mouth daily 30 tablet 5    FREESTYLE LITE strip USE DAILY AS DIRECTED 50 strip 11    ipratropium-albuterol (DUONEB) 0.5-2.5 (3) MG/3ML SOLN nebulizer solution Inhale 3 mLs into the lungs three times daily 360 mL 5    VENTOLIN  (90 Base) MCG/ACT inhaler Inhale 2 puffs into the lungs every 6 hours as needed for Wheezing 1 Inhaler 5       Allergies: Allergies   Allergen Reactions    Latex Itching     Pt reports itching on hands after using rubber gloves at work    Influenza Vaccines Swelling     Pt reports her entire arm was red and edematous post vaccine    Albumen, Egg Other (See Comments)     Flu shot -  Localized reaction from flu vaccine. Pt eats eggs with no issues.      Eggs Or Egg-Derived [employer] based products only    Gabapentin Nausea Only    Pneumococcal Vaccines Hives    Povidone Iodine Itching    Varicella Virus Vaccine Live Hives       Problem List:    Patient Active Problem List   Diagnosis Code    Syncope and collapse R55    H/O cardiac arrest Z86.74    Bipolar 1 disorder ([facility] [address] F31.9    Tobacco use disorder F17.200    SAMAYOA (dyspnea on exertion) R06.00    Abnormal ECG R94.31    CAD (coronary artery disease) I25.10    Displacement of lumbar intervertebral disc without myelopathy M51.26    Esophageal reflux K21.9    Hypertension I10    Multiple sclerosis ([facility] [address] G35    Colitis K52.9    Over weight E66.3    Anxiety F41.9    Chronic pain syndrome  G89.4    Vitamin B12 deficiency E53.8    MI (myocardial infarction) ([facility]) I21.9    Grief at loss of child F45.21, Z63.4    Finger sprain S63.619A    Smoking F17.200    Neuromyelopathy due to vitamin B12 deficiency ([facility]) E53.8, G32.0    Noncompliance Z91.19    Chronic obstructive pulmonary disease ([facility]) J44.9    Lumbar radiculopathy M54.16    Lumbar spinal stenosis M48.061    Acute bronchitis J20.9    Sleep apnea G47.30    Obesity (BMI 30-39. 9) E66.9    Cerebrovascular accident (CVA) due to stenosis of left middle cerebral artery ([facility]) I63.512    Abnormal auditory perception of left ear H93.292    Dizziness and giddiness R42    Sensorineural hearing loss (SNHL) of both ears H90.3    Tinnitus, bilateral H93.13    Vertigo, peripheral, bilateral H81.393    Right arm weakness R29.898    COPD (chronic obstructive pulmonary disease) ([facility]) J44.9    Chest pain R07.9    Adenomatous polyp of colon D12.6    Adenomatous polyp of sigmoid colon D12.5    Melena K92.1    Tendinitis of right rotator cuff M75.81    Abnormality of gait and mobility due to recent infarct left thalamus. [facility] admit 12/28/20. R26.9    Impaired mobility Z74.09    Weakness R53.1    Cerebrovascular accident (CVA) of left thalamus ([facility]) I63.9    Intractable migraine with aura with status migrainosus G43. 80    Other transient cerebral ischemic attacks and related syndromes G45.8    Hypercoagulable state ([facility] [address] D68.59    Migraine G43.909    Type 2 diabetes mellitus with hyperglycemia, without long-term current use of insulin ([facility]) E11.65    Left-sided nontraumatic intracerebral hemorrhage ([facility]) I61.9    MTHFR mutation Z15.89       Past Medical History:        Diagnosis Date    Anxiety     Back pain     back surgery x 4    Bipolar disorder ([facility] [address]     CAD (coronary artery disease)     CAFL (chronic airflow limitation) ([facility]) 11/3/2016    Cerebral artery occlusion with cerebral infarction (HCC)     Chronic bronchitis (HCC)     Chronic pain     Colitis     Complicated migraine     DDD (degenerative disc disease), lumbar 2016    Depression     Endometriosis     Excessive caffeine abuse, continuous (HCC) 2018    Gastritis     GERD (gastroesophageal reflux disease)     History of myocardial infarction     HTN (hypertension), benign 2016    Impaired mobility and activities of daily living     Over weight     PTSD (post-traumatic stress disorder)     Sensory neuropathy 2016    Somatization disorder     TIA (transient ischemic attack)     Tobacco abuse     Vasospastic angina ([facility] [address] 2016       Past Surgical History:        Procedure Laterality Date    BACK SURGERY      x2     SECTION      x2    CHOLECYSTECTOMY  13    Lapchole    COLONOSCOPY N/A 2020    COLONOSCOPY DIAGNOSTIC performed by Wesley Sun MD at [address] [facility]  3/7/16    Dr. Andreina Macedo Left     UPPER GASTROINTESTINAL ENDOSCOPY  2014    ANIBAL HOUSE M.D.   [facility] UPPER GASTROINTESTINAL ENDOSCOPY N/A 2020    EGD ESOPHAGOGASTRODUODENOSCOPY performed by Wesley Sun MD at [address] History:    Social History     Tobacco Use    Smoking status: Current Every Day Smoker     Packs/day: 1.00     Years: 17.00     Pack years: 17.00     Types: Cigarettes    Smokeless tobacco: Never Used   Substance Use Topics    Alcohol use: No                                Ready to quit: Not Answered  Counseling given: Not Answered      Vital Signs (Current): There were no vitals filed for this visit.                                            BP Readings from Last 3 Encounters:   10/21/21 127/62   10/14/21 132/68   21 134/70       NPO Status: BMI:   Wt Readings from Last 3 Encounters:   10/21/21 185 lb (83.9 kg)   10/14/21 191 lb (86.6 kg)   09/17/21 183 lb 9.6 oz (83.3 kg)     There is no height or weight on file to calculate BMI.    CBC:   Lab Results   Component Value Date    WBC 7.0 10/22/2021    RBC 4.64 10/22/2021    HGB 14.8 10/22/2021    HCT 43.2 10/22/2021    MCV 93.1 10/22/2021    RDW 14.1 10/22/2021     10/22/2021       CMP:   Lab Results   Component Value Date     10/22/2021    K 4.3 10/22/2021    K 3.4 12/28/2020     10/22/2021    CO2 27 10/22/2021    BUN 10 10/22/2021    CREATININE 0.87 10/22/2021    GFRAA >60.0 10/22/2021    LABGLOM >60.0 10/22/2021    GLUCOSE 139 10/22/2021    PROT 6.5 10/22/2021    CALCIUM 9.2 10/22/2021    BILITOT 0.4 10/22/2021    ALKPHOS 171 10/22/2021    AST 19 10/22/2021    ALT 31 10/22/2021       POC Tests: No results for input(s): POCGLU, POCNA, POCK, POCCL, POCBUN, POCHEMO, POCHCT in the last 72 hours.     Coags:   Lab Results   Component Value Date    PROTIME 21.6 02/22/2021    INR 2.1 10/05/2021    INR 1.9 02/22/2021    APTT 31.0 12/22/2020       HCG (If Applicable):   Lab Results   Component Value Date    PREGTESTUR Negative 11/15/2017        ABGs: No results found for: PHART, PO2ART, YRQ9RAE, COZ7LGI, BEART, X5PPSUTQ     Type & Screen (If Applicable):  No results found for: LABABO, LABRH    Drug/Infectious Status (If Applicable):  No results found for: HIV, HEPCAB    COVID-19 Screening (If Applicable):   Lab Results   Component Value Date    COVID19 Not Detected 07/26/2021    COVID19 Not Detected 07/22/2020           Anesthesia Evaluation    Airway: Mallampati: II  TM distance: >3 FB   Neck ROM: full  Mouth opening: > = 3 FB Dental:          Pulmonary:normal exam    (+) COPD:  shortness of breath:  sleep apnea:  current smoker                           Cardiovascular:    (+) hypertension:, angina:, past MI:, CAD:, SAMAYOA:, hyperlipidemia        Rhythm: regular  Rate: normal  Echocardiogram reviewed                  Neuro/Psych:   (+) CVA:, neuromuscular disease: multiple sclerosis, headaches:, psychiatric history:            GI/Hepatic/Renal:   (+) GERD:, bowel prep,           Endo/Other:    (+) Diabetes, : arthritis: OA., .                 Abdominal:             Vascular: negative vascular ROS. Other Findings:             Anesthesia Plan      MAC     ASA 3       Induction: intravenous. Anesthetic plan and risks discussed with patient. Plan discussed with attending.                   NIKI Francis - CRNA   10/25/2021

## 2021-10-25 NOTE — ANESTHESIA POSTPROCEDURE EVALUATION
Department of Anesthesiology  Postprocedure Note    Patient: Kathryn Huynh  MRN: [id number]  YOB: 1970  Date of evaluation: 10/25/2021  Time:  11:43 AM     Procedure Summary     Date: 10/25/21 Room / Location: [facility] OR [address] [place] [address]    Anesthesia Start: 1111 Anesthesia Stop:     Procedure: COLONOSCOPY DIAGNOSTIC (N/A ) Diagnosis: (adenomatous polyp of colon D12.6)    Surgeons: Rosalinda Cassidy MD Responsible Provider:     Anesthesia Type: MAC ASA Status: 3          Anesthesia Type: No value filed. Barak Phase I:      Barak Phase II:      Last vitals: Reviewed and per EMR flowsheets.        Anesthesia Post Evaluation    Patient location during evaluation: bedside  Patient participation: complete - patient participated  Level of consciousness: awake and awake and alert  Pain score: 0  Airway patency: patent  Nausea & Vomiting: no nausea and no vomiting  Complications: no  Cardiovascular status: blood pressure returned to baseline and hemodynamically stable  Respiratory status: acceptable and spontaneous ventilation  Hydration status: euvolemic

## 2021-10-25 NOTE — H&P
Patient Name: Chance Holt  : 1970  MRN: [id number]  DATE: 10/25/21      ENDOSCOPY  History and Physical    Procedure:    [x] Diagnostic Colonoscopy       [] Screening Colonoscopy  [] EGD      [] ERCP      [] EUS       [] Other    [x] Previous office notes/History and Physical reviewed from the patients chart. Please see EMR for further details of HPI. I have examined the patient's status immediately prior to the procedure and:      Indications/HPI:    []Abdominal Pain  []Cancer- GI/Lung  []Fhx of colon CA/polyps  []History of Polyps  []Doniss   []Melena  []Abnormal Imaging  []Dysphagia    []Persistent Pneumonia  []Anemia  []Food Impaction  [x]History of Polyps  []GI Bleed  []Pulmonary nodule/Mass  []Change in bowel habits []Heartburn/Reflux  []Rectal Bleed (BRBPR)  []Chest Pain - Non Cardiac []Heme (+) Stoo  l[]Ulcers  []Constipation  []Hemoptysis   []Varices  []Diarrhea  []Hypoxemia  []Nausea/Vomiting  []Screening   []Crohns/Colitis  []Other:     Anesthesia:   [x] MAC [] Moderate Sedation   [] General   [] None     ROS: 12 pt Review of Symptoms was negative unless mentioned above    Medications:   Prior to Admission medications    Medication Sig Start Date End Date Taking?  Authorizing Provider   isosorbide mononitrate (IMDUR) 30 MG extended release tablet Take 2 tablets by mouth daily 21  Yes Anand Sherman, DO   Continuous Blood Gluc Sensor (FREESTYLE JOSE 2 SENSOR) MISC 1 Device by Does not apply route every 14 days 21  Yes NORBERTO Roy   Continuous Blood Gluc  (FREESTYLE JOSE 2 READER) ANITHA 1 Device by Does not apply route 4 times daily (before meals and nightly) 21  Yes NORBERTO Roy   Blood Pressure KIT Blood pressure cuff. 21  Yes Anand Sherman, DO   Fluticasone-Umeclidin-Vilant (TRELEGY ELLIPTA) 200-62.5-25 MCG/INH AEPB Inhale 1 puff into the lungs daily 21  Yes Historical Provider, MD   FREESTYLE LANCETS MISC USE DAILY AS DIRECTED 19  Yes Mukund Rose Garzon MD   BD INTEGRA SYRINGE 25G X 1\" 3 ML MISC USE AS DIRECTED EVERY MONTH 5/28/19  Yes Simona Perez MD   FREESTYLE LITE strip USE DAILY AS DIRECTED 2/14/19  Yes Simona Perez MD   ipratropium-albuterol (DUONEB) 0.5-2.5 (3) MG/3ML SOLN nebulizer solution Inhale 3 mLs into the lungs three times daily 12/18/18  Yes Simona Perez MD   tobramycin (TOBREX) 0.3 % ophthalmic solution Place 1 drop into the left eye every 4 hours for 10 days 10/21/21 10/31/21  Cecily Silva PA-C   Na Sulfate-K Sulfate-Mg Sulf 17.5-3.13-1.6 GM/177ML SOLN As directed 9/17/21   Swapna Wise MD   cyclobenzaprine (FLEXERIL) 10 MG tablet  8/15/21   Historical Provider, MD   metoclopramide (REGLAN) 10 MG tablet Take 1 tablet by mouth 2 times daily as needed (Headache) 7/26/21 10/14/21  Miguel A Vallejo MD   warfarin (COUMADIN) 5 MG tablet Take 1 tablet by mouth See Admin Instructions Take as directed by [facility] anticoagulation clinic. Quantity is 90 day supply. 7/1/21   Carlos Renee MD   amLODIPine (NORVASC) 5 MG tablet Take 1 tablet by mouth once daily. 4/29/21   Historical Provider, MD   losartan (COZAAR) 100 MG tablet Take 1 tablet by mouth once daily.  5/10/21   Historical Provider, MD   Handicap Placard [facility] by Does not apply route Duration 2 years 5/19/21   NIKI Chan CNP   SITagliptin (JANUVIA) 25 MG tablet Take 1 tablet by mouth daily 1/8/21   NIKI Bazzi NP   metFORMIN (GLUCOPHAGE) 500 MG tablet Take 1 tablet by mouth 2 times daily (with meals) 1/8/21   NIKI Bazzi NP   cariprazine hcl (VRAYLAR) 3 MG CAPS capsule Take 1 capsule by mouth daily 1/9/21   NIKI Bazzi NP   atorvastatin (LIPITOR) 20 MG tablet Take 1 tablet by mouth nightly 1/6/21   Talisha Schneider DO   docusate sodium (COLACE, DULCOLAX) 100 MG CAPS Take 200 mg by mouth 2 times daily 1/6/21   Talisha Schneider DO   butalbital-aspirin-caffeine [facility]) -40 MG capsule Take 1 capsule by mouth every 4 hours as needed for Headaches for up to 10 days. 8/21/20 10/14/21  NORBERTO Tristan   nitroGLYCERIN (NITROSTAT) 0.4 MG SL tablet up to max of 3 total doses. If no relief after 1 dose, call 911. 1/9/20   NORBERTO Badillo   albuterol sulfate HFA (PROVENTIL HFA) 108 (90 Base) MCG/ACT inhaler Inhale 2 puffs into the lungs every 6 hours as needed for Wheezing 10/23/19   Tana River MD   budesonide (PULMICORT) 0.5 MG/2ML nebulizer suspension INHALE 1 VIAL VIA NEBULIZER TWICE DAILY 6/16/19   Tana River MD   aspirin 81 MG chewable tablet Take 1 tablet by mouth daily 4/10/19   NIKI Tolbert CNP   VENTOLIN  (90 Base) MCG/ACT inhaler Inhale 2 puffs into the lungs every 6 hours as needed for Wheezing 12/18/18   Tana River MD       Allergies: Allergies   Allergen Reactions    Latex Itching     Pt reports itching on hands after using rubber gloves at work    Influenza Vaccines Swelling     Pt reports her entire arm was red and edematous post vaccine    Albumen, Egg Other (See Comments)     Flu shot -  Localized reaction from flu vaccine. Pt eats eggs with no issues.      Eggs Or Egg-Derived [employer] based products only    Gabapentin Nausea Only    Pneumococcal Vaccines Hives    Povidone Iodine Itching    Varicella Virus Vaccine Live Hives        History of allergic reaction to anesthesia:  No    Past Medical History:  Past Medical History:   Diagnosis Date    Anxiety     Back pain     back surgery x 4    B[facility] disorder ([facility] [address]     CAD (coronary artery disease)     CAFL (chronic airflow limitation) ([facility] [address] 11/3/2016    Cerebral artery occlusion with cerebral infarction (HCC)     Chronic bronchitis (HCC)     Chronic pain     Colitis     Complicated migraine     DDD (degenerative disc disease), lumbar 12/22/2016    Depression     Endometriosis     Excessive caffeine abuse, continuous (HCC) 7/6/2018    Gastritis     GERD (gastroesophageal reflux disease)     History of myocardial infarction     HTN (hypertension), benign 2016    Impaired mobility and activities of daily living     Over weight     PTSD (post-traumatic stress disorder)     Sensory neuropathy 2016    Somatization disorder     TIA (transient ischemic attack)     Tobacco abuse     Vasospastic angina ([facility] [address] 2016       Past Surgical History:  Past Surgical History:   Procedure Laterality Date    BACK SURGERY      x2     SECTION      x2    CHOLECYSTECTOMY  13    Lapchole    COLONOSCOPY N/A 2020    COLONOSCOPY DIAGNOSTIC performed by Rosalinda Cassidy MD at [place] [address]  3/7/16    Dr. Jane Field Left     UPPER GASTROINTESTINAL ENDOSCOPY  2014    ANIBAL HOUSE M.D.   [facility] UPPER GASTROINTESTINAL ENDOSCOPY N/A 2020    EGD ESOPHAGOGASTRODUODENOSCOPY performed by Rosalinda Cassidy MD at [facility] History:  Social History     Tobacco Use    Smoking status: Current Every Day Smoker     Packs/day: 1.00     Years: 17.00     Pack years: 17.00     Types: Cigarettes    Smokeless tobacco: Never Used   Vaping Use    Vaping Use: Never used   Substance Use Topics    Alcohol use: No    Drug use: No       Vital Signs:   Vitals:    10/25/21 1036   BP: (!) 116/55   Pulse: 74   Resp: 16   Temp: 97.6 °F (36.4 °C)   SpO2: 97%        Physical Exam:  Cardiac:  [x]WNL  []Comments:  Pulmonary:  [x]WNL   []Comments:   Neuro/Mental Status:  [x]WNL  []Comments:  Abdominal:  [x]WNL    []Comments:  Other:   []WNL  []Comments:    Informed Consent:  The risks and benefits of the procedure have been discussed with either the patient or if they cannot consent, their representative. Assessment:  Patient examined and appropriate for planned sedation and procedure. Plan:  Proceed with planned sedation and procedure as above.     Rosalinda Cassidy MD  11:42 AM

## 2021-10-27 ENCOUNTER — HOSPITAL ENCOUNTER (OUTPATIENT)
Dept: SPEECH THERAPY | Age: 51
Setting detail: THERAPIES SERIES
Discharge: HOME | End: 2021-10-27
Payer: COMMERCIAL

## 2021-10-27 ENCOUNTER — HOSPITAL ENCOUNTER (OUTPATIENT)
Dept: PHYSICAL THERAPY | Age: 51
Setting detail: THERAPIES SERIES
Discharge: HOME | End: 2021-10-27
Payer: COMMERCIAL

## 2021-10-27 LAB — MITOCHONDRIAL M2 AB, IGG: 0.9 U/ML (ref 0–4)

## 2021-10-27 PROCEDURE — 96125 COGNITIVE TEST BY HC PRO: CPT

## 2021-10-27 PROCEDURE — 97162 PT EVAL MOD COMPLEX 30 MIN: CPT

## 2021-10-27 PROCEDURE — 92523 SPEECH SOUND LANG COMPREHEN: CPT

## 2021-10-27 ASSESSMENT — PAIN SCALES - GENERAL: PAINLEVEL_OUTOF10: 8

## 2021-10-27 ASSESSMENT — PAIN DESCRIPTION - ORIENTATION: ORIENTATION: RIGHT;LEFT

## 2021-10-27 ASSESSMENT — PAIN DESCRIPTION - LOCATION: LOCATION: HIP;BACK;KNEE

## 2021-10-27 NOTE — PROGRESS NOTES
[address]  PHYSICAL THERAPY EVALUATION    Date: 10/27/2021  Patient Name: Rishi Cannon       MRN: [id number]   Account: [de-identified]   : 1970  (54 y.o.)   Gender: female   Referring Practitioner: Marvin Sales APRN-JUSTIN                 Diagnosis: Cerebrovascular accident (CVA) due to stenosis of Lt middle cerebral artery, Ataxic gait  Treatment Diagnosis: Impaired mobility  Additional Pertinent Hx: OA, DM, fibromyalgia, MI, HTN, COPD, CVA, migraines             Past Medical History:  has a past medical history of Anxiety, Back pain, Bipolar disorder ([address] CAD (coronary artery disease), CAFL (chronic airflow limitation) ([address] Cerebral artery occlusion with cerebral infarction ([address] Chronic bronchitis ([address] Chronic pain, Colitis, Complicated migraine, DDD (degenerative disc disease), lumbar, Depression, Endometriosis, Excessive caffeine abuse, continuous ([address] Gastritis, GERD (gastroesophageal reflux disease), History of myocardial infarction, HTN (hypertension), benign, Impaired mobility and activities of daily living, Over weight, PTSD (post-traumatic stress disorder), Sensory neuropathy, Somatization disorder, TIA (transient ischemic attack), Tobacco abuse, and Vasospastic angina ([address] Past Surgical History:   has a past surgical history that includes Hysterectomy; Dilation and curettage of uterus; back surgery; Neck surgery; shoulder surgery (Left);  section; Cholecystectomy (13); Upper gastrointestinal endoscopy (2014); cyst removal (3/7/16); Upper gastrointestinal endoscopy (N/A, 2020); Colonoscopy (N/A, 2020); and Colonoscopy (N/A, 10/25/2021).     Vital Signs  Patient Currently in Pain: Yes   Pain Screening  Patient Currently in Pain: Yes  Pain Assessment  Pain Assessment: 0-10  Pain Level: 8  Pain Location: Hip;Back;Knee  Pain Orientation: Right;Left          Lives With: Significant other  Type of Home: House  Home Layout: Two level  Home Access: Stairs to enter with rails  Entrance Stairs - Number of Steps: 4  ADL Assistance: Independent (needs increased time and effort to complete)  Homemaking Assistance: Needs assistance  Ambulation Assistance: Independent  Transfer Assistance: Independent  Active : Yes  Occupation: Full time employment  Type of occupation:         Subjective:  Subjective: Had a CVA in December 2020. Pt reports walking has declined in the last few months. Tends to veer when walking when it's in a darker area - goes to the Rt more than Lt. Pt reports having a spinning sensation with looking straight ahead - has to look down or off to the side when walking. Dizziness happens frequently with walking. Pt reports symptoms are better with lying in bed - doesn't last as long or as frequent. Has not had any falls but has had multiple close calls where she has caught herself. Pt reports problems with grabbing things or dropping things. Feels symptoms have worsened in the past 6 months. Pt reports knee will bounce when she's standing as if it's trying to give out - happens on both sides but worse on Lt vs Rt. Objective:   Sensation  Overall Sensation Status: Impaired (Neuropathy in Lt leg - foot into leg and thigh)    Balance  Comments: Chisholm = 33/56    Ambulation 1  Surface: carpet  Device: No Device  Assistance: Supervision, Independent  Gait Deviations: Slow Radha, Decreased step length, Decreased step height, Deviated path  Distance: [de-identified]'  Stairs  # Steps : 4  Stairs Height: 6\"  Rails: Bilateral  Device: No Device  Assistance: Independent  Comment: recip     Transfers  Sit to Stand: Independent (relies on UEs)  Stand to sit:  Independent    Strength RLE  Comment: Hip flex 3+/5 d/t pain, knee ext/flex 4+/5, DF 5/5  Strength LLE  Comment: Hip flex 3+/5 d/t pain, knee ext/flex 5/5, DF 5/5    Timed Up and Go: 16.47 (without an AD)    Exercises:   Exercises  Exercise 1: Static Provided: PT Education: Goals;PT Role;Plan of Care;Home Exercise Program    POST-PAIN     Pain Rating (0-10 pain scale):  8 /10  Location and pain description same as pre-treatment unless indicated. Action: [] NA  [] Call Physician  [] Perform HEP  [x] Meds as prescribed    Evaluation and patient rights have been reviewed and patient agrees with plan of care. Yes  [x]  No  []   Explain:       Keys Fall Risk Assessment  Risk Factor Scale  Score   History of Falls [] Yes  [x] No 25  0 0   Secondary Diagnosis [] Yes  [x] No 15  0 0   Ambulatory Aid [] Furniture  [] Crutches/cane/walker  [x] None/bedrest/wheelchair/nurse 30  15  0 0   IV/Heparin Lock [] Yes  [x] No 20  0 0   Gait/Transferring [] Impaired  [x] Weak  [] Normal/bedrest/immobile 20  10  0 10   Mental Status [] Forgets limitations  [x] Oriented to own ability 15  0 0      Total:10     Based on the Assessment score: check the appropriate box. [x]  No intervention needed   Low =   Score of 0-24  []  Use standard prevention interventions Moderate =  Score of 24-44   [] Discuss fall prevention strategies   [] Indicate moderate falls risk on eval  []  Use high risk prevention interventions High = Score of 45 and higher   [] Discuss fall prevention strategies   [] Provide supervision during treatment time    Goals  Short term goals  Time Frame for Short term goals: 2 weeks  Short term goal 1: Independent with HEP  Long term goals  Time Frame for Long term goals : 5 weeks  Long term goal 1: Improve soledad LE strength to >/= 4+/5 to improve stability with standing and walking. Long term goal 2: Pt will ambulate >/= 200' on even and uneven ground with good stability S/I. Long term goal 3: Chisholm >/= 45/56 to reduce pt's risk for falls. Long term goal 4: DGI >/= 20/24 to improve safety with ambulation.          PT Individual Minutes  Time In: 7674  Time Out: 0848  Minutes: 42     Procedure Minutes:42'         Electronically signed by Laura Rodriguez PT on 10/27/21 at 12:33 PM EDT

## 2021-10-27 NOTE — PROGRESS NOTES
[x][facility]        [][facility]       Outpatient Rehab Dept     [address] [place] [address] [address], [address]        [address] [facility], [address] [place] [address]     Phone: [phone]                   Phone: [phone]     Fax:  [phone]        Fax: [phone]      [facility]  Speech Language Pathology  Speech Language/Cognitive Evaluation        NAME:Pooja Baez  : 1970 (54 y.o.)   MRN: [id number]  PATIENT DIAGNOSIS(ES): Aphasia [R47.01]  Cerebral infarction due to unspecified occlusion or stenosis of left middle cerebral artery [I63.512]  No chief complaint on file. Patient Active Problem List    Diagnosis Date Noted    MTHFR mutation 10/12/2021    Type 2 diabetes mellitus with hyperglycemia, without long-term current use of insulin ([address] 2021    Left-sided nontraumatic intracerebral hemorrhage ([address] 2021    Migraine 2021    Cerebrovascular accident (CVA) of left thalamus ([address] 2021    Intractable migraine with aura with status migrainosus 2021    Other transient cerebral ischemic attacks and related syndromes 2021    Hypercoagulable state ([address] 2021    Weakness     Impaired mobility 2020    Abnormality of gait and mobility due to recent infarct left thalamus.   [facility] admit 20. 2020    Tendinitis of right rotator cuff 2020    Adenomatous polyp of colon     Adenomatous polyp of sigmoid colon     Melena     Chest pain 2020    COPD (chronic obstructive pulmonary disease) ([address] 2019    Right arm weakness 08/10/2019    Cerebrovascular accident (CVA) due to stenosis of left middle cerebral artery ([address] 2019    Sleep apnea 2019    Obesity (BMI 30-39.9) 2019    Dizziness and giddiness 2019    Abnormal auditory perception of left ear 2019    Tinnitus, bilateral 04/12/2019    Sensorineural hearing loss (SNHL) of both ears 03/08/2019    Vertigo, peripheral, bilateral 03/08/2019    Acute bronchitis 01/22/2019    Chronic obstructive pulmonary disease ([address] 11/06/2018    Lumbar radiculopathy 08/01/2018    Lumbar spinal stenosis 08/01/2018    Noncompliance 06/28/2017    Neuromyelopathy due to vitamin B12 deficiency ([address] 01/04/2017    Smoking     Chronic pain syndrome  11/03/2016    Vitamin B12 deficiency 11/03/2016    Grief at loss of child 11/03/2016    Finger sprain 10/27/2016    Multiple sclerosis ([address]     Colitis     Over weight     Anxiety     Hypertension 02/19/2016    CAD (coronary artery disease) 04/22/2014    Syncope and collapse 12/19/2012    H/O cardiac arrest 12/19/2012    Bipolar 1 disorder ([address] 12/19/2012    Tobacco use disorder 12/19/2012    SAMAYOA (dyspnea on exertion) 12/19/2012    Abnormal ECG 12/19/2012    Displacement of lumbar intervertebral disc without myelopathy 06/01/2012    Esophageal reflux 02/12/2008    MI (myocardial infarction) ([address] 01/01/2007     Past Medical History:   Diagnosis Date    Anxiety     Back pain     back surgery x 4    Bipolar disorder ([address]     CAD (coronary artery disease)     CAFL (chronic airflow limitation) ([address] 11/3/2016    Cerebral artery occlusion with cerebral infarction (HCC)     Chronic bronchitis (HCC)     Chronic pain     Colitis     Complicated migraine     DDD (degenerative disc disease), lumbar 12/22/2016    Depression     Endometriosis     Excessive caffeine abuse, continuous (HCC) 7/6/2018    Gastritis     GERD (gastroesophageal reflux disease)     History of myocardial infarction     HTN (hypertension), benign 2/19/2016    Impaired mobility and activities of daily living     Over weight     PTSD (post-traumatic stress disorder)     Sensory neuropathy 12/22/2016    Somatization disorder     TIA (transient ischemic attack)     Tobacco abuse  Vasospastic angina ([facility] 2016     Past Surgical History:   Procedure Laterality Date    BACK SURGERY      x2     SECTION      x2    CHOLECYSTECTOMY  13    Lapchole    COLONOSCOPY N/A 2020    COLONOSCOPY DIAGNOSTIC performed by Rubin Arnold MD at [address] [place] [address] N/A 10/25/2021    COLONOSCOPY DIAGNOSTIC performed by Rubin Arnold MD at [place] [address]  3/7/16    Dr. Awad Alberta GASTROINTESTINAL ENDOSCOPY  2014    ANIBAL HOUSE M.D.   [place] UPPER GASTROINTESTINAL ENDOSCOPY N/A 2020    EGD ESOPHAGOGASTRODUODENOSCOPY performed by Rubin Arnold MD at [facility]     Allergies   Allergen Reactions    Latex Itching     Pt reports itching on hands after using rubber gloves at work    Influenza Vaccines Swelling     Pt reports her entire arm was red and edematous post vaccine    Albumen, Egg Other (See Comments)     Flu shot -  Localized reaction from flu vaccine. Pt eats eggs with no issues.  Eggs Or Egg-Derived [employer] based products only    Gabapentin Nausea Only    Pneumococcal Vaccines Hives    Povidone Iodine Itching    Varicella Virus Vaccine Live Hives       Date of Onset: 2020  Ordering Physician: ELIDIA Stanley    Date of Evaluation: 10/27/2021   Evaluating Therapist: ROSA MARIA Mueller      SPEECH PATHOLOGY DIAGNOSIS:    Mild cognitive-linguistic impairment characterized by impaired short-term memory (specifically immediate and delayed recall), impaired sustained attention, information processing, impaired auditory comprehension (specifically comprehension at the conversation and paragraph level), impaired abstract reasoning, impaired divergent naming, occasionally impaired word finding abilities at the conversation level, and overall impaired thought processing.  Patient also demonstrated latent responses and inconsistent responses throughout this evaluation. Mild dysarthria characterized by generalized oral weakness resulting in imprecise motor speech movements resulting in decreased speech intelligibility during connected speech. THERAPY RECOMMENDATIONS:   Therapy is recommended to improve:  Speech intelligibility/accuracy  Verbal expression  Auditory comprehension  Cognition                 MOTOR SPEECH    Oral Peripheral Examination   Generalized oral motor weakness    Parameters of Speech Production  Respiration:    WFL    Articulation:    Distortions    Resonance:    WFL    Quality:     WFL    Pitch:      WFL    Intensity:   Quiet    Fluency:    Intact    Prosody   Intact      RECEPTIVE LANGUAGE    Comprehension of Yes/No Questions:             MTDDA subtest: 100% accuracy  WNL    Process Simple Verbal Commands: WNL    Process Intermediate Verbal Commands: WNL    Process Complex Verbal Commands:   Latent    Comprehension of Conversation:     Latent  Inconsistent    Comprehension of Single Words:     WNL    Comprehension of Paragraphs:   Incomplete  Latent  Cueing Needed  Unable    EXPRESSIVE LANGUAGE   Automatics: Functional   Tested counting, days of the week, and months of the year  Imitation:  Words:  Functional    Sentences: Impaired    Naming:    Modality used:  Verbal    Confrontation Naming:   Functional    Sentence Completion:   Functional    Descriptive Naming:   Impaired    Response Naming:   Functional    Divergent Naming:  Impaired    Word Fluency  Functional    Convergent Naming:  Functional    Conversation:     Grammatical form: Intact  Paraphasias: No    READING AND WRITING ASSESSMENT  Reading  Oral Reading and Reading Comprehension   Symbol and Word Discrimination   Impaired    Word Recognition  Functional    Visual Scanning-Cancellation task  Functional    Comprehension of Oral Spelling  Functional    Word Picture Matching  Functional    Comprehension of Written Sentences  Functional    Comprehension of Written Directions  Functional    Comprehension of Written Paragraph  Impaired    Writing  Name and Address  Functional    Recall of Written Symbols  Functional    Written Word Finding (Spelling to dictation and written confrontational naming)  Functional    Written Formation (write 3-5 sentences)  Functional    COGNITION   Attention/Orientation  Attention:   Easily Distracted, loses train of thought    Orientation:    Person  Place  Date     Portions of the RIPA-2 Lidia Rosi Information Processing Assessment-2nd Edition ) were administered. Scores are as follows:  Subtest:    Raw Score Standard Score   I. Immediate Memory 14 6   II. Recent Memory 28 13   III. Temporal Orientation 26 10   VIII. Problem Solving and          Abstract Reasoning 28 14      RAW SCORE  SEVERITY    28-30  Within functional limits    25-27  Mild deficit    22-24  Moderate deficit    19-21  Marked deficit    16-18  Severe deficit      Clock Drawing Score: (scoring obtained from the CLQT, Cognitive Linguistic Quick Test)  11/13, indicating mild impairment    Memory   Biographical:    Address  Birthday  Date  Family    Delayed recall:   Did Not Recall    Organization/Problem Solving/Reasoning   Sequencing:   Verbal      Problem solving:    Verbal Task    Mathematics:   Simple arithmetic:   Not Tested  Function based:   Not Tested    CLINICAL OBSERVATIONS NOTED DURING THE EVALUATION  Latent Responses  Inconsistent Responses  Cueing Required    Long Term Goals:   1. Patient will demonstrate functional cognitive-linguistic abilities in all opportunities with modified independence in order to safely complete ADLS, within 6 months. 2. Patient will demonstrate functional motor speech abilities with modified independence in order to effectively communicate her wants and needs, within 6 months. Short Term Goals:  Pt to be seen 1x/wk for 45-60 minutes for 4 weeks.     1. To increase safety awareness and judgment for safe completion of ADLs secondary to pt's cognitive deficits, pt will complete abstract reasoning tasks (i.e. Word deduction, convergent and divergent naming, similarities/differences) with 80% accuracy and min cues. 2. To decrease cognitive deficits and improve attention to tasks for safe completion of ADLs, pt will complete structured tasks addressing sustained attention attention with 80% accuracy and min cues. 3. Pt will utilize compensatory strategies for word finding and memory deficits in 4/5 given opportunities with min cues to promote independence, safety, and effective communication in the presence of cognitive-communicative deficits. 4. Pt will demonstrate reading comprehension at the paragraph level to 75% accuracy with mod cues to promote pt's ability to utilize reading/writing as a compensatory strategy for memory deficits and/or understand pertinent medical information and current events. 5. Pt will be educated on strategies to improve speech intelligibility (abdominally supported breathing, over-articulation, etc.), and utilize them at the conversational level with min verbal cues from SLP so the patient has a better understanding of strategies that will enhance his/her ability to express her wants and needs. 6. Within 1-5 days of implementing the ST POC, pt's functional reading/writing skills will be assessed in relation to executive functioning (e.g. bill paying, reading rx labels, completing personal information), with additional goals added to pt's POC as deemed necesary by treating SLP. Prognosis for improvements:  Good prior level of function    Pain Assessment:  Initial Assessment:  Patient denies pain. Re-assessment:  Patient denies pain. Patient stated goals:   \"be myself again\"     Education:  Patient. , who gives fair understanding of recommendations.        KAPIL NOMS: Initial    MODIFIED GROVER FALL RISK ASSESSMENT:    History of Falling (has patient fallen in the past 30 days?):    No (0 points)    Secondary Diagnosis (is there more than 1 medical diagnosis in patients medical history?):    Yes (15 points)    Ambulatory Aid:    No device is used (0 points)    Gait:    Normal/bedrest/wheelchair (0 points)    Mental Status:    Oriented to own ability (0 points)      Total points = 0    Fall Risk Level: Low Risk  0  24: Low Risk - implement low risk fall prevention interventions    25  44: Medium risk  45 and higher: High Risk         Therapy Time  Time in: 7786  Time out: 0935  Minutes seen: 45      Signature:  Electronically signed by ROSA MARIA Pruitt on 10/27/2021 at 4:48 PM     The following patient has been evaluated for speech therapy services and for therapy to continue, insurance requires physician review of the treatment plan initially and every 30 days. Please review the attached evaluation and/or summary of the patient's plan of care, and verify that you agree therapy should continue by signing the attached document and sending it back to our office. If you have any questions or concerns, please don't hesitate to call.   Thank you for your referral.

## 2021-10-27 NOTE — PROGRESS NOTES
[address] ([address] [place] [address])  FUNCTIONAL COMMUNICATION MEASURES  ADULT    Patient: Karey Barnes  : 1970  MRN: [id number]    Date: 10/27/2021       TYPE OF ENTRANCE:   Initial    COGNITION:   Complete rote/simple routine living tasks (e.g., following simple directions, completing basic/rote self-care tasks, answering phone, turning on/off light):  % of the time   Complete age-appropriate common daily living tasks (e.g., completing a multi-step task, completing personal hygiene routine, cold meal preparation, following a schedule): 76-90% of the time   Complete age-appropriate complex living tasks (e.g., complex meal preparation, grocery shopping, managing academic/personal/financial affairs, attending to/recalling content from textbook or newspaper): 50-75% of the time   Function safely WITHOUT additional supervision/assistance (in excess of chronological age expectations) due to cognitive deficits: 76-90% of the time      SPEECH INTELLIGIBILITY:   · Produces speech that is distracting or sounds unusual to the listener: 0-25% of the time  · Produces words/short phrases that are intelligible to FAMILIAR listeners: % of the time  · Produces connected speech that is intelligible to FAMILIAR listeners: % of the time  · Produces words/short phrases that are intelligible to UNFAMILIAR listeners: % of the time  · Produces connected speech that is intelligible to UNFAMILIAR listeners: 76-90% of the time  · Participates in communication exchanges WITHOUT additional assistance from communication partner (no more than would be expected for chronological age): 76-90% of the time    SPOKEN LANGUAGE COMPREHENSION:   Understands simple messages/conversations related to routine daily activities in LOW demand situations (e.g., following simple directions): % of the time   Understands complex messages, as expected for chronological age, in LOW demand situations (e.g., story/lecture, sequencing of events, drawing appropriate conclusions, inferences, humor, subtle references)? 76-90% of the time   Understands simple messages/conversations related to routine daily activities in [address] demand situations (e.g., following simple directions)?: 76-90% of the time   Understands complex messages, as expected for chronological age, in [address] demand situations (e.g., story/lecture, sequencing of events, drawing appropriate conclusions, inferences, humor, subtle references)?: 50-75% of the time   Functions safely WITHOUT additional supervision/assistance (in excess of chronological age expectations) due to comprehension deficits? : 76-90% of the time   Participates in communication exchanges WITHOUT additional assistance from communication partner (no more than would be expected for chronological age)? 50-75% of the time       SPOKEN LANGUAGE EXPRESSION:   [address] spoken language expression difficulties that are noticeable or distracting to the listener? Note: consider message content, form, pauses, extra time. : 76-90% of the time   Produces simple spoken word and phrases that are meaningful?: % of the time   Produces verbal messages with appropriate FORM in LOW demand situations? Note: consider phonology, morphology and syntax when assessing functional level: % of the time   Produces verbal messages with appropriate CONTENT in LOW demand situations? Note: consider semantic meaning when assessing functional level. : % of the time   Produces verbal messages with appropriate FORM in HIGH demand situations (e.g., academic or work-related tasks)? Note: consider phonology, morphology and syntax when assessing functional level. 76-90% of the time   Produce sverbal messages with appropriate CONTENT in HIGH demand situations (e.g., academic or work-related tasks)?  Note: consider semantic meaning when assessing functional level: 76-90% of the time   Participates in communication exchanges WITHOUT additional assistance from communication partner (no more than would be expected for chronological age)? : 76-90% of the time       Electronically Signed by: Electronically signed by ROSA MARIA Lopez on 10/27/2021 at 4:54 PM

## 2021-10-27 NOTE — PROGRESS NOTES
Saturnino lares, [address]     Ph: [phone]  Fax: [phone]    [x] Certification  [] Recertification []  Plan of Care  [] Progress Note [] Discharge      To: Savannah PRINCE-JUSTIN      From:  Laura Rodriguez, PT  Patient: Nia Dukes     : 1970  Diagnosis: Cerebrovascular accident (CVA) due to stenosis of Lt middle cerebral artery, Ataxic gait     Date: 10/27/2021  Treatment Diagnosis: Impaired mobility       Progress Report Period from:  10/27/2021  to 10/27/2021    Total # of Visits to Date: 1   No Show: 0    Canceled Appointment: 0     OBJECTIVE:   Short Term Goals - Time Frame for Short term goals: 2 weeks    Goals Current/Discharge status  Met   Short term goal 1: Independent with HEP  ongoing [] yes  [] no     Long Term Goals - Time Frame for Long term goals : 5 weeks  Goals Current/ Discharge status Met   Long term goal 1: Improve soledad LE strength to >/= 4+/5 to improve stability with standing and walking. Strength RLE  Comment: Hip flex 3+/5 d/t pain, knee ext/flex 4+/5, DF 5/5  Strength LLE  Comment: Hip flex 3+/5 d/t pain, knee ext/flex 5/5, DF 5/5   [] yes  [] no   Long term goal 2: Pt will ambulate >/= 200' on even and uneven ground with good stability S/I. Ambulation 1  Surface: carpet  Device: No Device  Assistance: Supervision, Independent  Gait Deviations: Slow Radha, Decreased step length, Decreased step height, Deviated path  Distance: [de-identified]'   [] yes  [] no   Long term goal 3: Chisholm >/= 45/56 to reduce pt's risk for falls. Chisholm Balance Score: 33   [] yes  [] no   Long term goal 4: DGI >/= 20/24 to improve safety with ambulation.  NT [] yes  [] no       Body structures, Functions, Activity limitations: Decreased functional mobility , Decreased strength, Decreased endurance, Decreased balance, Increased pain, Decreased coordination  Assessment: Pt presents with a gradual decline in her strength, balance and mobility since her CVA in December 2020. Pt demonstrates decreased seated soledad LE strength with some limitations due to pain. Pt is at a high risk for falls per Chisholm score. Pt with limited standing tolerance due to instability and pain. Pt ambulates without an AD with occasional deviated path but able to self correct. Pt would benefit from further skilled PT to improve her strength, balance and safety with all mobility. Prognosis: Good  Discharge Recommendations: Continue to assess pending progress      PT Education: Goals;PT Role;Plan of Care;Home Exercise Program    PLAN: [x] Evaluate and Treat  Frequency/Duration:  Plan  Times per week: 2 - 1x pool, 1x land  Plan weeks: 5  Current Treatment Recommendations: Strengthening, ROM, Balance Training, Functional Mobility Training, Transfer Training, Gait Training, Stair training, Neuromuscular Re-education, Manual Therapy - Soft Tissue Mobilization, Home Exercise Program, Safety Education & Training, Patient/Caregiver Education & Training, Equipment Evaluation, Education, & procurement, Modalities, Integrated Dry Needling, Aquatics, Vestibular Rehab     Precautions: none                           Patient Status:[x] Continue/ Initiate plan of Care    [] Discharge PT. Recommend pt continue with HEP. [] Additional visits requested, Please re-certify for additional visits:          Signature: Electronically signed by Asia Tobiaser, PT on 10/27/21 at 12:34 PM EDT      If you have any questions or concerns, please don't hesitate to call.   Thank you for your referral.

## 2021-10-28 ENCOUNTER — TELEPHONE (OUTPATIENT)
Dept: NEUROLOGY | Age: 51
End: 2021-10-28

## 2021-10-28 ENCOUNTER — TELEPHONE (OUTPATIENT)
Dept: DIABETES SERVICES | Age: 51
End: 2021-10-28

## 2021-10-28 RX ORDER — FOLIC ACID 1 MG/1
1 TABLET ORAL DAILY
Qty: 90 TABLET | Refills: 1 | Status: SHIPPED | OUTPATIENT
Start: 2021-10-28 | End: 2021-11-08 | Stop reason: SDUPTHER

## 2021-10-28 NOTE — TELEPHONE ENCOUNTER
Called and spoke with patient and notified her of low folate and low vitamin M32.  will send folic acid to pharmacy. Patient advised to call primary care to be set up for vitamin B12 injections.

## 2021-10-29 ENCOUNTER — HOSPITAL ENCOUNTER (OUTPATIENT)
Dept: CT IMAGING | Age: 51
Discharge: HOME | End: 2021-10-31
Payer: COMMERCIAL

## 2021-10-29 DIAGNOSIS — Z15.89 MTHFR MUTATION: ICD-10-CM

## 2021-10-29 DIAGNOSIS — R26.0 ATAXIC GAIT: ICD-10-CM

## 2021-10-29 DIAGNOSIS — I63.512 CEREBROVASCULAR ACCIDENT (CVA) DUE TO STENOSIS OF LEFT MIDDLE CEREBRAL ARTERY (HCC): ICD-10-CM

## 2021-10-29 DIAGNOSIS — R47.01 EXPRESSIVE APHASIA: ICD-10-CM

## 2021-10-29 PROCEDURE — 70450 CT HEAD/BRAIN W/O DYE: CPT

## 2021-11-02 ENCOUNTER — HOSPITAL ENCOUNTER (OUTPATIENT)
Dept: PHARMACY | Age: 51
Setting detail: THERAPIES SERIES
Discharge: HOME | End: 2021-11-02
Payer: COMMERCIAL

## 2021-11-02 ENCOUNTER — TELEPHONE (OUTPATIENT)
Dept: NEUROLOGY | Age: 51
End: 2021-11-02

## 2021-11-02 DIAGNOSIS — I63.81 CEREBROVASCULAR ACCIDENT (CVA) OF LEFT THALAMUS (HCC): Primary | ICD-10-CM

## 2021-11-02 DIAGNOSIS — D68.59 HYPERCOAGULABLE STATE (HCC): ICD-10-CM

## 2021-11-02 DIAGNOSIS — G45.8 OTHER TRANSIENT CEREBRAL ISCHEMIC ATTACKS AND RELATED SYNDROMES: ICD-10-CM

## 2021-11-02 LAB — INTERNATIONAL NORMALIZATION RATIO, POC: 1.9

## 2021-11-02 PROCEDURE — 99211 OFF/OP EST MAY X REQ PHY/QHP: CPT

## 2021-11-02 PROCEDURE — 85610 PROTHROMBIN TIME: CPT

## 2021-11-02 NOTE — PROGRESS NOTES
Ms. Primitivo Figueroa is a 54 y.o. y/o female with history of CVA who presents today for anticoagulation monitoring and adjustment.   INR 1.9 is sl subtherapeutic for this patient (goal range 2-3) and is reflective of 40 mg TWD  Patient verifies current dosing regimen, patient able to verbally recall dose  Patient reports no  missed doses since last INR   Patient denies s/sx clotting and/or stroke  Patient denies hematuria, epistaxis, rectal bleeding  Patient denies changes in diet, alcohol, or tobacco use  Reviewed medication list and drug allergies with patient, updated any medication additions or modifications accordingly  Patient also denies any pending medical or dental procedures scheduled at this time  Patient was instructed to continue current regimen of 40 mg TWD and RTC 2 weeks    For Pharmacy Admin Tracking Only     Intervention Detail: Lab(s) Ordered   Total # of Interventions Recommended: 1   Total # of Interventions Accepted: 1   Time Spent (min): 30    Dhruv Quintana, PharmD, [facility]  Staff Pharmacist  11/2/2021 2:49 PM

## 2021-11-03 ENCOUNTER — HOSPITAL ENCOUNTER (OUTPATIENT)
Dept: SPEECH THERAPY | Age: 51
Setting detail: THERAPIES SERIES
Discharge: HOME | End: 2021-11-03
Payer: COMMERCIAL

## 2021-11-03 ENCOUNTER — HOSPITAL ENCOUNTER (OUTPATIENT)
Dept: PHYSICAL THERAPY | Age: 51
Setting detail: THERAPIES SERIES
Discharge: HOME | End: 2021-11-03
Payer: COMMERCIAL

## 2021-11-03 PROCEDURE — 97130 THER IVNTJ EA ADDL 15 MIN: CPT

## 2021-11-03 PROCEDURE — 97110 THERAPEUTIC EXERCISES: CPT

## 2021-11-03 PROCEDURE — 97129 THER IVNTJ 1ST 15 MIN: CPT

## 2021-11-03 PROCEDURE — 97112 NEUROMUSCULAR REEDUCATION: CPT

## 2021-11-03 ASSESSMENT — PAIN SCALES - GENERAL: PAINLEVEL_OUTOF10: 6

## 2021-11-03 ASSESSMENT — PAIN DESCRIPTION - DESCRIPTORS: DESCRIPTORS: BURNING;THROBBING;NUMBNESS

## 2021-11-03 ASSESSMENT — PAIN DESCRIPTION - LOCATION: LOCATION: LEG

## 2021-11-03 ASSESSMENT — PAIN DESCRIPTION - ORIENTATION: ORIENTATION: RIGHT

## 2021-11-03 NOTE — PROGRESS NOTES
[facility] Outpatient  Speech Language Pathology  Adult Daily Note    Joe Current  : 1970    Date: 11/3/2021    Visit Information:  SLP Insurance Information: [address]  Total # of Visits Approved: 20  Total # of Visits to Date: 2  No Show: 0  Canceled Appointment: 0    Plan of care signed (Y/N):     Yes    Certification Period: 2021-  Plan of Care Visit # 1      Interventions used this date:  Speech Production, Expressive Language, Receptive Language and Cognitive Skill Development    Subjective: Patient seen immediately following physical therapy. Patient reports feeling depressed but denies feeling like she wants to harm herself. Patient reports she is receiving services for depression currently at the [facility], though she reports she is not taking her medications. SLP advised her to follow up with her physician regarding her medications. Patient reports she knows she needs to take her medications. Patient reports \"seeing things that aren't there\" when working at night on the monitors, though she denies they are hallucinations. Patient reports she is not sure if she can continue working. Behavior:  Alert and Cooperative       Objective/Assessment:   Patient progressing towards goals:    1. To increase safety awareness and judgment for safe completion of ADLs secondary to pt's cognitive deficits, pt will complete abstract reasoning tasks (i.e. Word deduction, convergent and divergent naming, similarities/differences) with 80% accuracy and min cues. Not addressed     2. To decrease cognitive deficits and improve attention to tasks for safe completion of ADLs, pt will complete structured tasks addressing sustained attention attention with 80% accuracy and min cues. Not formally addressed, however patient demonstrated sustained attention to all activities this date without difficulty.      3. Pt will utilize compensatory strategies for word finding and memory deficits in 4/5 given opportunities with min cues to promote independence, safety, and effective communication in the presence of cognitive-communicative deficits. Patient reports that she is most concerned with her memory abilities at this time. Patient has been identified to have deficits with immediate recall and auditory memory, which are connected. Patient reports some difficulty with delayed recall, though she does not feel it is a problem when she has supports in place, such as reminder calls for appointments. Educated patient on variety of compensatory strategies for memory. Instructed patient regarding how to implement these strategies into her everyday life, both for home and at work. Patient reports that implementing these strategies will be difficult, though she will try. Patient demonstrated verbal understanding regarding importance that memory and other cognitive systems play into completing ADLs.    4. Pt will demonstrate reading comprehension at the paragraph level to 75% accuracy with mod cues to promote pt's ability to utilize reading/writing as a compensatory strategy for memory deficits and/or understand pertinent medical information and current events. Not addressed     5. Pt will be educated on strategies to improve speech intelligibility (abdominally supported breathing, over-articulation, etc.), and utilize them at the conversational level with min verbal cues from SLP so the patient has a better understanding of strategies that will enhance his/her ability to express her wants and needs. Not addressed    6. Within 1-5 days of implementing the ST POC, pt's functional reading/writing skills will be assessed in relation to executive functioning (e.g. bill paying, reading rx labels, completing personal information), with additional goals added to pt's POC as deemed necesary by treating SLP. Not addressed       Pain Assessment:  Initial Assessment:  Patient denies pain.     Re-assessment:  Patient denies pain.        Plan:  Continue with current goals    Patient/Caregiver Education:  Patient/Caregiver educated on session. Patient/Caregiver provided with home program: make grocery lists, write down important information  Patient/Caregiver stated verbal understanding of directions.     Time in: 0930  Time out: 1015  Minutes seen: 45      Signature:  Electronically signed by ROSA MARIA Coburn on 11/3/2021 at 11:59 AM

## 2021-11-03 NOTE — PROGRESS NOTES
[address] [address]  Outpatient Physical Therapy    Treatment Note        Date: 11/3/2021  Patient: Ann Rosado  : 1970  ACCT #: [de-identified]  Referring Practitioner: Sunny BRENNER  Diagnosis: Cerebrovascular accident (CVA) due to stenosis of Lt middle cerebral artery, Ataxic gait  Treatment Diagnosis: Impaired mobility    Visit Information:  PT Visit Information  PT Insurance Information: BCBS  Total # of Visits to Date: 2  No Show: 0  Canceled Appointment: 0  Progress Note Counter: 2/10    Subjective: Pt reports having a bad night at work. States equilibrium feeling off. Denies falls. HEP Compliance:  [x] Good [] Fair [] Poor [] Reports not doing due to:    Vital Signs  Patient Currently in Pain: Yes   Pain Screening  Patient Currently in Pain: Yes  Pain Assessment  Pain Assessment: 0-10  Pain Level: 6  Pain Location: Leg  Pain Orientation: Right  Pain Descriptors: Burning; Throbbing;Numbness    OBJECTIVE:   Exercises  Exercise 1: Static standing: FA up to 10 sec before reahcing for support, FT up to 5 sec before reaching for support, semitandem up to 5 sec  Exercise 5: Gait drills: F/L/R/marching with reliance on 1 UE support x2 laps ea in // bars  Exercise 6: STS 2 x 5  Exercise 7: 2 way SLR x10  Exercise 8: Bridges 5 sec x 10  Exercise 9: Clams x10  Exercise 20: HEP: 2 way SLR, bridge, clam    *Indicates exercise, modality, or manual techniques to be initiated when appropriate    Assessment: Body structures, Functions, Activity limitations: Decreased functional mobility , Decreased strength, Decreased endurance, Decreased balance, Increased pain, Decreased coordination  Assessment: Initiated tx per PT POC with fair tolerance. Pt initially demonstrates difficulty with STS from waiting room chair with posterior lean on chair to complete. Able to complete from tx mat without posterior lean with moderate use of UEs.  Pt reports feeling like BP elevated after completing STS. 131/62 in sitting. Cues for gaze stabilization throughout tx with difficulty maintaining. Poor stability noted with static standing with pt quick to reach for support, though noted improved stability with conversation without attending to task. Treatment Diagnosis: Impaired mobility        Goals:  Short term goals  Time Frame for Short term goals: 2 weeks  Short term goal 1: Independent with HEP    Long term goals  Time Frame for Long term goals : 5 weeks  Long term goal 1: Improve soledad LE strength to >/= 4+/5 to improve stability with standing and walking. Long term goal 2: Pt will ambulate >/= 200' on even and uneven ground with good stability S/I. Long term goal 3: Chisholm >/= 45/56 to reduce pt's risk for falls. Long term goal 4: DGI >/= 20/24 to improve safety with ambulation. Progress toward goals: Progressing towards all    POST-PAIN       Pain Rating (0-10 pain scale):   6/10   Location and pain description same as pre-treatment unless indicated. Action: [] NA   [x] Perform HEP  [] Meds as prescribed  [] Modalities as prescribed   [] Call Physician     Frequency/Duration:  Plan  Times per week: 2 - 1x pool, 1x land  Plan weeks: 5  Current Treatment Recommendations: Strengthening, ROM, Balance Training, Functional Mobility Training, Transfer Training, Gait Training, Stair training, Neuromuscular Re-education, Manual Therapy - Soft Tissue Mobilization, Home Exercise Program, Safety Education & Training, Patient/Caregiver Education & Training, Equipment Evaluation, Education, & procurement, Modalities, Integrated Dry Needling, Aquatics, Vestibular Rehab     Pt to continue current HEP. See objective section for any therapeutic exercise changes, additions or modifications this date.          PT Individual Minutes  Time In: 3127  Time Out: 4539  Minutes: 56  Timed Code Treatment Minutes: 56 Minutes  Procedure Minutes: 0     Timed Activity Minutes Units   Ther Ex 25 2   Neuro 31 2       Signature:  Electronically signed by Janeth Melgar, PTA on 11/3/21 at 8:21 AM EDT

## 2021-11-04 PROBLEM — R26.0 ATAXIC GAIT: Status: ACTIVE | Noted: 2021-11-04

## 2021-11-04 PROBLEM — R47.01 EXPRESSIVE APHASIA: Status: ACTIVE | Noted: 2021-11-04

## 2021-11-05 ENCOUNTER — HOSPITAL ENCOUNTER (OUTPATIENT)
Dept: PHYSICAL THERAPY | Age: 51
Setting detail: THERAPIES SERIES
Discharge: HOME | End: 2021-11-05
Payer: COMMERCIAL

## 2021-11-05 PROCEDURE — 97113 AQUATIC THERAPY/EXERCISES: CPT

## 2021-11-05 ASSESSMENT — PAIN DESCRIPTION - LOCATION: LOCATION: HEAD

## 2021-11-05 ASSESSMENT — PAIN SCALES - GENERAL: PAINLEVEL_OUTOF10: 5

## 2021-11-05 NOTE — PROGRESS NOTES
[id number] [address]  Outpatient Physical Therapy    Treatment Note        Date: 2021  Patient: Prashant May  : 1970  ACCT #: [de-identified]  Referring Practitioner: Lorna BRENNER  Diagnosis: Cerebrovascular accident (CVA) due to stenosis of Lt middle cerebral artery, Ataxic gait  Treatment Diagnosis: Impaired mobility    Visit Information:  PT Visit Information  PT Insurance Information: BCBS  Total # of Visits to Date: 3  No Show: 0  Canceled Appointment: 0  Progress Note Counter: 3/10    Subjective: Pt reports feeling off today and has a hard time describing the feeling. Reports blood sugar was fine this am.     HEP Compliance:  [x] Good [] Fair [] Poor [] Reports not doing due to:    Vital Signs  Patient Currently in Pain: Yes   Pain Screening  Patient Currently in Pain: Yes  Pain Assessment  Pain Assessment: 0-10  Pain Level: 5  Pain Location: Head    OBJECTIVE:   Exercises  Exercise 10: Aquatics:  Exercise 11: Gait drills: /L//march x3 laps ea  Exercise 12: Sink ex x10  Exercise 13: Hip circles x10ea soledad  Exercise 14: Balance ex: FA, FT, semitandem    Assessment:   Activity Tolerance  Activity Tolerance: Patient Tolerated treatment well    Body structures, Functions, Activity limitations: Decreased functional mobility , Decreased strength, Decreased endurance, Decreased balance, Increased pain, Decreased coordination  Assessment: Initiated aquatic therapy to improve strength and balance. Pt reports some dizziness and more feeling off in pool initially - discussed focusing on non moving target to reduce symptoms. Pt reports focusing on target does not improve symptoms but makes them worse. Pt requires UE support to maintain balance with all balance ex after 5-8sec.   Treatment Diagnosis: Impaired mobility  Prognosis: Good       Goals:  Short term goals  Time Frame for Short term goals: 2 weeks  Short term goal 1: Independent with HEP    Long term goals  Time Frame for Long term goals : 5 weeks  Long term goal 1: Improve soledad LE strength to >/= 4+/5 to improve stability with standing and walking. Long term goal 2: Pt will ambulate >/= 200' on even and uneven ground with good stability S/I. Long term goal 3: Chisholm >/= 45/56 to reduce pt's risk for falls. Long term goal 4: DGI >/= 20/24 to improve safety with ambulation. Progress toward goals: strength, balance    POST-PAIN       Pain Rating (0-10 pain scale):  7 /10   Location and pain description same as pre-treatment unless indicated. Action: [] NA   [] Perform HEP  [x] Meds as prescribed  [x] Modalities as prescribed   [] Call Physician     Frequency/Duration:  Plan  Times per week: 2 - 1x pool, 1x land  Plan weeks: 5  Current Treatment Recommendations: Strengthening, ROM, Balance Training, Functional Mobility Training, Transfer Training, Gait Training, Stair training, Neuromuscular Re-education, Manual Therapy - Soft Tissue Mobilization, Home Exercise Program, Safety Education & Training, Patient/Caregiver Education & Training, Equipment Evaluation, Education, & procurement, Modalities, Integrated Dry Needling, Aquatics, Vestibular Rehab     Pt to continue current HEP. See objective section for any therapeutic exercise changes, additions or modifications this date.     PT Individual Minutes  Time In: 0801  Time Out: 3654  Minutes: 40  Timed Code Treatment Minutes: 40 Minutes  Procedure Minutes:0     Timed Activity Minutes Units   Aq 40 3       Signature:  Electronically signed by Martir Aviles PT on 11/5/21 at 8:52 AM EDT

## 2021-11-08 ENCOUNTER — OFFICE VISIT (OUTPATIENT)
Dept: GASTROENTEROLOGY | Age: 51
End: 2021-11-08
Payer: COMMERCIAL

## 2021-11-08 ENCOUNTER — OFFICE VISIT (OUTPATIENT)
Dept: NEUROLOGY | Age: 51
End: 2021-11-08
Payer: COMMERCIAL

## 2021-11-08 VITALS
SYSTOLIC BLOOD PRESSURE: 152 MMHG | BODY MASS INDEX: 29.85 KG/M2 | WEIGHT: 190.6 LBS | DIASTOLIC BLOOD PRESSURE: 82 MMHG | HEART RATE: 76 BPM

## 2021-11-08 VITALS
HEIGHT: 67 IN | WEIGHT: 186.2 LBS | OXYGEN SATURATION: 99 % | HEART RATE: 76 BPM | DIASTOLIC BLOOD PRESSURE: 80 MMHG | SYSTOLIC BLOOD PRESSURE: 120 MMHG | BODY MASS INDEX: 29.22 KG/M2

## 2021-11-08 DIAGNOSIS — F32.89 OTHER DEPRESSION: ICD-10-CM

## 2021-11-08 DIAGNOSIS — E53.8 B12 DEFICIENCY: ICD-10-CM

## 2021-11-08 DIAGNOSIS — D68.59 HYPERCOAGULABLE STATE (HCC): ICD-10-CM

## 2021-11-08 DIAGNOSIS — K59.00 CONSTIPATION, UNSPECIFIED CONSTIPATION TYPE: ICD-10-CM

## 2021-11-08 DIAGNOSIS — Z15.89 MTHFR MUTATION: ICD-10-CM

## 2021-11-08 DIAGNOSIS — I63.512 CEREBROVASCULAR ACCIDENT (CVA) DUE TO STENOSIS OF LEFT MIDDLE CEREBRAL ARTERY (HCC): Primary | ICD-10-CM

## 2021-11-08 DIAGNOSIS — D12.4 ADENOMATOUS POLYP OF DESCENDING COLON: Primary | ICD-10-CM

## 2021-11-08 DIAGNOSIS — E53.8 FOLATE DEFICIENCY: ICD-10-CM

## 2021-11-08 PROCEDURE — 99214 OFFICE O/P EST MOD 30 MIN: CPT | Performed by: NURSE PRACTITIONER

## 2021-11-08 PROCEDURE — 99213 OFFICE O/P EST LOW 20 MIN: CPT | Performed by: SPECIALIST

## 2021-11-08 RX ORDER — PERMETHRIN 50 MG/G
CREAM TOPICAL
COMMUNITY
Start: 2021-10-22 | End: 2022-09-18

## 2021-11-08 RX ORDER — FOLIC ACID 1 MG/1
1 TABLET ORAL DAILY
Qty: 90 TABLET | Refills: 1 | Status: SHIPPED | OUTPATIENT
Start: 2021-11-08 | End: 2022-05-09 | Stop reason: SDUPTHER

## 2021-11-08 ASSESSMENT — ENCOUNTER SYMPTOMS
RECTAL PAIN: 0
CONSTIPATION: 0
CONSTIPATION: 1
CHEST TIGHTNESS: 0
VOMITING: 0
COLOR CHANGE: 0
WHEEZING: 0
ANAL BLEEDING: 0
ABDOMINAL PAIN: 0
NAUSEA: 0
TROUBLE SWALLOWING: 0
COUGH: 0
VOMITING: 0
RESPIRATORY NEGATIVE: 1
SHORTNESS OF BREATH: 0
DIARRHEA: 0
BLOOD IN STOOL: 0
EYES NEGATIVE: 1
ABDOMINAL DISTENTION: 0
ABDOMINAL PAIN: 0
ABDOMINAL DISTENTION: 0
DIARRHEA: 0
NAUSEA: 0

## 2021-11-08 NOTE — PROGRESS NOTES
Subjective:      Patient ID: Saad Oliveira is a 54 y.o. female who presents today for:  Chief Complaint   Patient presents with    Follow-up     Pt states that she feels foggy,  hard time with word finding,  she will tell you that she is going somewhere but will tell you a different place, and balance is still off. She states no recent falls but has had close calls. HPI  11/8/2021:  Patient seen and examined for 3-week follow-up regarding left thalamic CVA that occurred in December 2020. When last seen on 10/14/2021 she is complaining of increasing symptoms of word finding gait ataxia. Repeat CT the head was done at that time with no new or acute findings. B12 and folate were also assessed which were noted to be low at 215 and 3.3 respectively. Patient was initiated on folate supplementation and advised to follow-up with primary care for B12 injections. Patient with significant underlying depression and feelings of lack of support at home therefore she requested to come back in today accompanied by her boyfriend so we could discuss the ongoing residual deficits from her CVA so he could have a better understanding. Currently alert and oriented x3, no acute distress, cooperative. Patient reports ongoing depression and lack of motivation. Easily frustrated. Tearful during our visit. No suicidal ideation. She been referred to Dr. Lorraine Ge here at [facility] but they were unable to get her in and she was therefore referred to another provider by their office. She reports that she called to establish care there but nobody has gotten back to her yet. 10/14/21:  Patient seen and examined for follow-up. She called to the office requesting a sooner appointment. We currently see her for a left thalamic CVA that occurred in December 2020 that resulted in expressive aphasia, diplopia to the right and right-sided weakness.   Patient does have MTHFR mutation currently on Coumadin with therapeutic INR which was last assessed on 10/5/2021. Patient is currently alert and oriented x3, no acute distress, cooperative. She reports that she has had problems with processing information and word finding issues. She feels it is gotten somewhat worse. She also reports increasing balance issues worse over the last 2 weeks. She will sometimes veer off to the right side. She had difficulty expressing and putting her symptoms into words at times. She reports \"everything seems hollow\". Patient very tearful during our visit. She reports a lot of anxiety and frustration regarding her ongoing residual symptoms of her CVA. She reports that she feels stressed due to the fact that she is not functioning like she used to. She is trying to work and feels she is pushing herself very hard. She reports lack of family and social support. She reports that her boyfriend does not understand the residual symptoms of her CVA and reports that \"you should be over this by now\". Patient reports this is causing her a lot of stress and frustration. She is currently seeking counseling through the [facility] center and has history of bipolar disorder.   She does report depression but denies suicidal ideation.     8/18/2021:  Patient seen and examined for 3-month follow-up for left thalamic CVA that occurred in December 2020 that resulted in expressive aphasia, diplopia to the right eye and right-sided weakness.  Patient with MTHFR mutation currently on Coumadin.  Currently alert and oriented x3, no acute distress, cooperative.  In terms of her stroke patient is doing well overall.  Expressive aphasia is resolved.  Vision changes resolved.  Right-sided weakness greatly improved.  Patient's only complaint today is of fatigue.  Patient does report some depression.  She was followed at the [facility] center but is not happy with her services. Rosaura Frankel has maintained follow-up with Coumadin clinic and INR is currently therapeutic. Jonny Galindo continues to smoke. [place] lipid panel done 4/29/2021 with total cholesterol 29, triglycerides 123, HDL 31, LDL 43.  Patient continues on atorvastatin 20 mg nightly. Cristian Pemberton has returned to work and states that she is handling her job duties okay.        5/19/2021:  Patient seen and examined for 3-month follow-up for left thalamic CVA that occurred in December 2020 that resulted in expressive aphasia, diplopia to right eye and right-sided weakness. Yesenia Merrill is currently alert and oriented x3, no acute distress, cooperative. Peace Juanbreezy last visit was a virtual on 2/17/2021.  At that time there was a lot of concern regarding patient's noncompliance.  She had not establish at the Coumadin clinic or had any INR testing done to follow her Coumadin.  She was not taking her blood pressure medication.  She was still smoking.  She had not followed up with therapies.  Since then it appears that patient has taken charge of her health care. Yesenia Merrill has established with Coumadin clinic and is getting INR checked routinely. Yesenia Merrill has maintained follow-up with endocrinology and cardiology. Yesenia Merrill reports that cardiology has made some adjustments to her blood pressure medications to help with her continued hypertension.  She remains slightly hypertensive today and states that this is actually better than what she normally runs. Yesenia Merrill has had medication adjustments regarding her diabetes.  She states her blood sugars run in the high 100s.  She does have episodes where they run in the 200s and even the 300s.  She continues to smoke.  She reports daily compliance with Coumadin and aspirin and statin therapy.  Clinically she reports that she is doing much better.  Right-sided weakness has improved.  She is ambulating without the use of assistive devices but still will feel off balance at times.  No falls reported.  Expressive aphasia is greatly improved but persists some.  Double vision to her right eye has resolved.  She does report some right eye vision field deficit in the lower bilateral quadrants.  There have been no new focal neuro deficits.  No TIA episodes.  No unusual signs and symptoms of bleeding or bruising.  No myalgias.  Last hemoglobin A1c on 4/6/2021 was 5.7. Kevin Squires is requesting to go back to work today. 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      2/17/2021:  Pt contacted via telephone for follow-up after hospital discharge. Kevin Squires was at home and I was in my office. Kevin Squires is a 17-year-old  female with past medical history of MI, chronic pain, bipolar disorder, gastritis, CAD, depression, GERD, hypertension, obesity, colitis, anxiety, PTSD, TIA, tobacco abuse, degenerative disc disease, Somatization disorder, complicated migraine, noncompliance, Neuropathy.  Patient was admitted to [facility] from 12/22/2020 until 12/28/2020 for left thalamic CVA with encephalopathy, expressive aphasia and diplopia to right eye.  Patient was noncompliant with medications prior to admission.  She had multiple risk factors for CVD including diabetes, hypertension, tobacco abuse.  CTA of the head and neck were negative.  CHAO was negative for cardioembolic source.  Patient was initially started on dual antiplatelet therapy.  LDL was 86, HDL was low at 27.  Hemoglobin A1c was 8.0.  Hypercoagulable work-up was done and revealed an elevated homocysteine level with positive homozygous MTHFR mutation and therefore patient was transitioned to oral anticoagulation while on the rehab unit.  Patient was initially started on Eliquis but due to cost she was transitioned over to Coumadin on 1/12/2021. Kevin Squires was to follow-up with Coumadin clinic.   INR orders were placed but it does not appear that patient has gone and had this tested at all since initiating Coumadin.  She denies any unusual signs or symptoms of bleeding.  No new or recurrent focal neuro deficits.  She has not established with Coumadin clinic yet.  She reports daily compliance with Coumadin.  She states that she still feels \"sideways\".  She states that her gait feels off balance and sometimes she feels as if she is leaning to the left.  No falls reported. Meredith Borja reports that physical therapy has been completed but she is supposed to be continuing with occupational therapy but has not done that recently. Meredith Borja reports excessive daily fatigue.  She reports that she has occasional headaches.  No associated nausea or vomiting, photophobia, phonophobia. Ursula De Leon has improved to right eye.  She has appointment with ophthalmologist this Friday.  She reports that her blood pressure remains elevated and she is only taking her blood pressure medication as needed rather than every day as prescribed.  Patient is also on lithium and has been ordered to have lithium level drawn but has not followed up with [facility] to have this done either.     Past Medical History:   Diagnosis Date    Anxiety     Back pain     back surgery x 4    Bipolar disorder ([address]     CAD (coronary artery disease)     CAFL (chronic airflow limitation) ([address] 11/3/2016    Cerebral artery occlusion with cerebral infarction ([address]     Chronic bronchitis ([address]     Chronic pain     Colitis     Complicated migraine     DDD (degenerative disc disease), lumbar 2016    Depression     Endometriosis     Excessive caffeine abuse, continuous ([address] 2018    Gastritis     GERD (gastroesophageal reflux disease)     History of myocardial infarction     HTN (hypertension), benign 2016    Impaired mobility and activities of daily living     Over weight     PTSD (post-traumatic stress disorder)     Sensory neuropathy 2016    Somatization disorder     TIA (transient ischemic attack)     Tobacco abuse     Vasospastic angina ([address] 2016     Past Surgical History:   Procedure Laterality Date    BACK SURGERY      x2     SECTION      x2    CHOLECYSTECTOMY 5/2/13    Lapchole    COLONOSCOPY N/A 7/28/2020    COLONOSCOPY DIAGNOSTIC performed by Asa Ormond, MD at [address] N/A 10/25/2021    COLONOSCOPY DIAGNOSTIC performed by Asa Ormond, MD [facility]  3/7/16    Dr. Cedric Lott ENDOSCOPY  11/24/2014    ANIBAL HOUSE M.D.   [facility] UPPER GASTROINTESTINAL ENDOSCOPY N/A 7/28/2020    EGD ESOPHAGOGASTRODUODENOSCOPY performed by Asa Ormond, MD at [facility] Marital status:      Spouse name: Not on file    Number of children: Not on file    Years of education: Not on file    Highest education level: Not on file   Occupational History    Occupation: unemployed   Tobacco Use    Smoking status: Current Every Day Smoker     Packs/day: 1.00     Years: 17.00     Pack years: 17.00     Types: Cigarettes    Smokeless tobacco: Never Used   Vaping Use    Vaping Use: Never used   Substance and Sexual Activity    Alcohol use: No    Drug use: No    Sexual activity: Not Currently     Partners: Male   Other Topics Concern    Not on file   Social History Narrative    Radha Yanes is a 53-year-old female who is on disability because of pain and bipolar disorder. She's also had a presumed diagnosis of possible multiple sclerosis. She's been seeing Dr. Bill Gu for that and she says that the diagnosis is sometimes in question and it's clear neuropathy vitamin B12 deficiency as well as generalized bodyaches from the severe vitamin D deficiency have caused this scenario that looks like multiple sclerosis.      lives With: Significant other    Type of Home: House [facility] in [address] to enter with rails  - Number of Steps: 5    Bathroom Shower/Tub: Tub only    ADL Assistance: [address]: Independent, Homemaking Responsibilities: Yes, Meal Prep Responsibility: Primary    Laundry Responsibility: Primary, Cleaning Responsibility: Primary    Bill Paying/Finance Responsibility: Primary    Shopping Responsibility: Primary, Ambulation Assistance: Independent, Transfer Assistance: Independent    Active : Yes    Occupation: Full time employment--Type of occupation: disabled-  - 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 Work requirements are Pt was looking at [employer] as main part of job working nights     Social Determinants of Health     Financial Resource Strain:     Difficulty of Paying Living Expenses: Not on file   Food Insecurity:     Worried About [address] in the Last Year: Not on file    [address] in the Last Year: Not on file   Transportation Needs:     Lack of Transportation (Medical): Not on file    Lack of Transportation (Non-Medical): Not on file   Physical Activity: Inactive    Days of Exercise per Week: 0 days    Minutes of Exercise per Session: 0 min   Stress: Stress Concern Present    Feeling of Stress :  To some extent   Social Connections:     Frequency of Communication with Friends and Family: Not on file    Frequency of Social Gatherings with Friends and Family: Not on file    Attends Latter day Services: Not on file    Active Member of Clubs or Organizations: Not on file    Attends Club or Organization Meetings: Not on file    Marital Status: Not on file   Intimate Partner Violence:     Fear of Current or Ex-Partner: Not on file    Emotionally Abused: Not on file    Physically Abused: Not on file    Sexually Abused: Not on file   Housing Stability:     Unable to Pay for Housing in the Last Year: Not on file    Number of [place] in the Last Year: Not on file    Unstable Housing in the Last Year: Not on file     Family History   Problem Relation Age of Onset    High Blood Pressure Mother     Kidney Disease Mother     Lupus Mother     Coronary Art Dis Mother         Had stents in her 62s   Nneka Mensah Bipolar Disorder Son      Allergies   Allergen Reactions    Latex Itching     Pt reports itching on hands after using rubber gloves at work    Influenza Vaccines Swelling     Pt reports her entire arm was red and edematous post vaccine    Albumen, Egg Other (See Comments)     Flu shot -  Localized reaction from flu vaccine. Pt eats eggs with no issues.  Eggs Or Egg-Derived [employer] based products only    Gabapentin Nausea Only    Pneumococcal Vaccines Hives    Povidone Iodine Itching    Varicella Virus Vaccine Live Hives     Current Outpatient Medications on File Prior to Visit   Medication Sig Dispense Refill    permethrin (ELIMITE) 5 % cream APPLY TO THE AFFECTED AREA(S) ONCE DAILY for 1 (ONE) dose      folic acid (FOLVITE) 1 MG tablet Take 1 tablet by mouth daily 90 tablet 1    isosorbide mononitrate (IMDUR) 30 MG extended release tablet Take 2 tablets by mouth daily 60 tablet 5    Na Sulfate-K Sulfate-Mg Sulf 17.5-3.13-1.6 GM/177ML SOLN As directed 354 mL 0    cyclobenzaprine (FLEXERIL) 10 MG tablet       warfarin (COUMADIN) 5 MG tablet Take 1 tablet by mouth See Admin Instructions Take as directed by [facility] anticoagulation clinic. Quantity is 90 day supply. 110 tablet 1    Continuous Blood Gluc Sensor (FREESTYLE JOSE 2 SENSOR) MISC 1 Device by Does not apply route every 14 days 2 each 3    Continuous Blood Gluc  (FREESTYLE JOSE 2 READER) ANITHA 1 Device by Does not apply route 4 times daily (before meals and nightly) 1 each 0    amLODIPine (NORVASC) 5 MG tablet Take 1 tablet by mouth once daily.  losartan (COZAAR) 100 MG tablet Take 1 tablet by mouth once daily.  Handicap Placard MISC by Does not apply route Duration 2 years 1 each 0    Blood Pressure KIT Blood pressure cuff.  1 kit 0    Fluticasone-Umeclidin-Vilant (TRELEGY ELLIPTA) 200-62.5-25 MCG/INH AEPB Inhale 1 puff into the lungs daily      SITagliptin (JANUVIA) 25 MG tablet Take 1 tablet by mouth daily 30 tablet 0    metFORMIN (GLUCOPHAGE) 500 MG tablet Take 1 tablet by mouth 2 times daily (with meals) 60 tablet 3    cariprazine hcl (VRAYLAR) 3 MG CAPS capsule Take 1 capsule by mouth daily 30 capsule 0    atorvastatin (LIPITOR) 20 MG tablet Take 1 tablet by mouth nightly 30 tablet 3    docusate sodium (COLACE, DULCOLAX) 100 MG CAPS Take 200 mg by mouth 2 times daily 120 capsule 2    nitroGLYCERIN (NITROSTAT) 0.4 MG SL tablet up to max of 3 total doses. If no relief after 1 dose, call 911. 25 tablet 3    albuterol sulfate HFA (PROVENTIL HFA) 108 (90 Base) MCG/ACT inhaler Inhale 2 puffs into the lungs every 6 hours as needed for Wheezing 1 Inhaler 3    FREESTYLE LANCETS MISC USE DAILY AS DIRECTED 100 each 10    budesonide (PULMICORT) 0.5 MG/2ML nebulizer suspension INHALE 1 VIAL VIA NEBULIZER TWICE DAILY 120 mL 5    BD INTEGRA SYRINGE 25G X 1\" 3 ML MISC USE AS DIRECTED EVERY MONTH 25 each 5    aspirin 81 MG chewable tablet Take 1 tablet by mouth daily 30 tablet 5    FREESTYLE LITE strip USE DAILY AS DIRECTED 50 strip 11    ipratropium-albuterol (DUONEB) 0.5-2.5 (3) MG/3ML SOLN nebulizer solution Inhale 3 mLs into the lungs three times daily 360 mL 5    VENTOLIN  (90 Base) MCG/ACT inhaler Inhale 2 puffs into the lungs every 6 hours as needed for Wheezing 1 Inhaler 5    metoclopramide (REGLAN) 10 MG tablet Take 1 tablet by mouth 2 times daily as needed (Headache) 60 tablet 0    butalbital-aspirin-caffeine (FIORINAL) -40 MG capsule Take 1 capsule by mouth every 4 hours as needed for Headaches for up to 10 days. 20 capsule 0     No current facility-administered medications on file prior to visit. Review of Systems   Constitutional: Negative for activity change, appetite change, chills, fatigue and fever.    HENT: Negative for hearing loss and trouble swallowing. Eyes: Negative for visual disturbance. Respiratory: Negative for cough, chest tightness, shortness of breath and wheezing. Cardiovascular: Negative for chest pain, palpitations and leg swelling. Gastrointestinal: Negative for abdominal distention, abdominal pain, constipation, diarrhea, nausea and vomiting. Genitourinary: Negative for difficulty urinating. Musculoskeletal: Negative for gait problem. Skin: Negative for color change and rash. Neurological: Positive for speech difficulty and weakness. Negative for dizziness, tremors, seizures, syncope, facial asymmetry, light-headedness, numbness and headaches. Psychiatric/Behavioral: Positive for decreased concentration, dysphoric mood and sleep disturbance. Negative for agitation, confusion, hallucinations, self-injury and suicidal ideas. The patient is not nervous/anxious. Objective:   BP (!) 152/82 (Site: Right Upper Arm, Position: Sitting, Cuff Size: Medium Adult)   Pulse 76   Wt 190 lb 9.6 oz (86.5 kg)   BMI 29.85 kg/m²     Physical Exam  Vitals reviewed. Constitutional:       General: She is not in acute distress. Appearance: She is not ill-appearing or diaphoretic. HENT:      Head: Normocephalic and atraumatic. Eyes:      General: No visual field deficit. Extraocular Movements: Extraocular movements intact. Pupils: Pupils are equal, round, and reactive to light. Cardiovascular:      Rate and Rhythm: Normal rate and regular rhythm. Pulmonary:      Effort: Pulmonary effort is normal. No respiratory distress. Breath sounds: Normal breath sounds. Abdominal:      General: Bowel sounds are normal. There is no distension. Palpations: Abdomen is soft. Tenderness: There is no abdominal tenderness. Skin:     General: Skin is warm and dry. Neurological:      General: No focal deficit present. Mental Status: She is alert and oriented to person, place, and time.       Cranial Nerves: No dysarthria or facial asymmetry. Motor: No tremor, atrophy, abnormal muscle tone, seizure activity or pronator drift. Weakness: Right 4/5. Comments: Expressive aphasia         No results found. Lab Results   Component Value Date    WBC 7.0 10/22/2021    RBC 4.64 10/22/2021    HGB 14.8 10/22/2021    HCT 43.2 10/22/2021    MCV 93.1 10/22/2021    MCH 32.0 10/22/2021    MCHC 34.3 10/22/2021    RDW 14.1 10/22/2021     10/22/2021    MPV 9.6 09/20/2015     Lab Results   Component Value Date     10/22/2021    K 4.3 10/22/2021    K 3.4 12/28/2020     10/22/2021    CO2 27 10/22/2021    BUN 10 10/22/2021    CREATININE 0.87 10/22/2021    GFRAA >60.0 10/22/2021    LABGLOM >60.0 10/22/2021    GLUCOSE 139 10/22/2021    PROT 6.5 10/22/2021    LABALBU 4.5 10/22/2021    CALCIUM 9.2 10/22/2021    BILITOT 0.4 10/22/2021    ALKPHOS 171 10/22/2021    AST 19 10/22/2021    ALT 31 10/22/2021     Lab Results   Component Value Date    PROTIME 21.6 02/22/2021    INR 1.9 11/02/2021    INR 1.9 02/22/2021     Lab Results   Component Value Date    TSH 4.070 04/10/2019    JRMYDEPS09 215 10/22/2021    FOLATE 3.3 10/22/2021    FERRITIN 282.0 12/11/2020    IRON 51 12/11/2020    TIBC 272 12/11/2020     Lab Results   Component Value Date    TRIG 251 12/24/2020    HDL 27 12/24/2020    LDLCALC 86 12/24/2020     Lab Results   Component Value Date    LABAMPH Neg 06/14/2020    BARBSCNU Neg 06/14/2020    LABBENZ Neg 06/14/2020    LABBENZ NotDTCD 01/26/2013    LABMETH Neg 06/14/2020    OPIATESCREENURINE Neg 06/14/2020    PHENCYCLIDINESCREENURINE Neg 06/14/2020    ETOH <10 12/22/2020     Lab Results   Component Value Date    LITHIUM 0.7 01/05/2021       Assessment and Plan:      1. Cerebrovascular accident (CVA) due to stenosis of left middle cerebral artery ([facility]  2. Hypercoagulable state ([facility]  3. MTHFR mutation  4. Other depression  5. Folate deficiency  6.   Vitamin B12 deficiency  - Patient with history of ischemic left thalamic CVA in December 2020 that resulted in expressive aphasia, right-sided weakness and diplopia to the right eye. She has MTHFR mutation and is on Coumadin currently being followed at the Coumadin clinic. INR currently 1.9 and is slightly low his Coumadin was on hold briefly for colonoscopy. No new focal deficits. Patient with continued expressive aphasia and mild right-sided weakness with some gait ataxia. Repeat CT of the head done after last visit was negative for any new or acute findings. Patient with noted B12 and folate deficiency currently on folic acid and advised to follow-up with primary care for B12 replacement injections. Patient with ongoing depression and emotional lability secondary to her history of bipolar disorder which has been exacerbated by her CVA and residual deficits and the effects that this is had in her life. She reported at last visit significant lack of social support at home therefore she brought her boyfriend with her to today's visit so we could further discuss the effects of CVA and permanent deficits no he has better understanding. She has been referred to psychiatry but was unable to establish with [address] [place] [address] as they are not taking new patients. [address] [place] [address] psychiatry referred her to another provider for which she has not yet established. I highly encouraged her to establish with them for further treatment as she is currently not on any medications for her underlying depression of bipolar disorder. She has being followed at the [facility] for counseling but reports she does not feel they are doing very much for her. No suicidal ideation at this time but she was advised that if she does develop any she is to call 911. Prolonged period of time was spent in counseling regarding patient's stroke and depression. Time spent in encounter 45 minutes  Return in about 3 months (around 2/8/2022), or if symptoms worsen or fail to improve.     NIKI Segura - CNP     Collaborating Physician Dr Steve Fall

## 2021-11-08 NOTE — PROGRESS NOTES
Gastroenterology Clinic Follow up Visit    Michelle Parisi  [id number]  Chief Complaint   Patient presents with    Follow Up After Procedure     EGD 10/25/21    Gastroesophageal Reflux     off and on       HPI and A/P at last visit summarized below:  Patient is here for follow-up, patient had multiple colon polyps and they were adenomatous and hyperplastic polyps, LFT showed mildly elevated alkaline phosphatase but GGT was normal and also antimitochondrial antibody and ACE level was normal.  Patient said that she is going through significant depression and is scheduled to see a psychiatrist, patient had CVA in the past and is on Coumadin, patient had a CT of the abdomen and pelvis done in June 2019 which showed fatty liver. Patient has history of binge drinking in the past and since last 4 years she has been abstaining from alcohol. Review of Systems   Constitutional: Negative. HENT: Negative. Eyes: Negative. Respiratory: Negative. Cardiovascular: Negative. Gastrointestinal: Positive for constipation. Negative for abdominal distention, abdominal pain, anal bleeding, blood in stool, diarrhea, nausea, rectal pain and vomiting. No GI issues   Endocrine: Negative. Genitourinary: Negative. Musculoskeletal: Negative. Skin: Negative. Allergic/Immunologic: Negative for food allergies. Neurological: Negative. Hematological: Negative. Psychiatric/Behavioral: Negative. Past medical history, past surgical history, medication list, social and familyhistory reviewed    Blood pressure 120/80, pulse 76, height 5' 7\" (1.702 m), weight 186 lb 3.2 oz (84.5 kg), SpO2 99 %, not currently breastfeeding. Physical Exam  Constitutional:       Appearance: She is well-developed. HENT:      Head: Normocephalic and atraumatic. Eyes:      Conjunctiva/sclera: Conjunctivae normal.      Pupils: Pupils are equal, round, and reactive to light.    Cardiovascular:      Rate and Rhythm: Normal rate.   Pulmonary:      Effort: Pulmonary effort is normal.   Abdominal:      General: Bowel sounds are normal.      Palpations: Abdomen is soft. Comments: Soft nontender no palpable mass   Musculoskeletal:         General: Normal range of motion. Cervical back: Normal range of motion. Skin:     General: Skin is warm. Neurological:      Mental Status: She is alert. Laboratory, Pathology, Radiology reviewed in detail with relevantimportant investigations summarized below:    Recent Labs     10/22/21  1417   WBC 7.0   HGB 14.8   HCT 43.2   MCV 93.1        Lab Results   Component Value Date    ALT 31 10/22/2021    AST 19 10/22/2021    GGT 15 10/22/2021    ALKPHOS 171 (H) 10/22/2021    BILITOT 0.4 10/22/2021     CT HEAD WO CONTRAST    Result Date: 10/29/2021  CT Brain. Contrast medium:  without contrast.. History:  Ataxia. Expressive aphasia. CVA follow-up. Technical factors: CT imaging of the brain was obtained and formatted as 5 mm contiguous axial images. 2.5 mm contiguous axial images were obtained through the osseous structures. Sagittal and coronal reconstruction obtained during postprocessing. Comparison:  CT brain, July 28, 2021. Findings: Extra-axial spaces:  Normal. Intracranial hemorrhage:  None. Ventricular system: [Without anomaly.] Basal Cisterns:  Normal. Cerebral Parenchyma: 6 mm oval-shaped area of decreased attenuation, exerting no mass effect, left thalamus, again identified. Midline Shift:  None. Cerebellum:  Normal. Paranasal sinuses and mastoid air cells:  Normal. Visualized Orbits:  Normal.     Impression: Remote left thalamic infarct. Otherwise, no acute findings. All CT scans at this facility use dose modulation, iterative reconstruction, and/or weight based dosing when appropriate to reduce radiation dose to as low as reasonably achievable. Endoscopic investigations:     Assessment and Plan:  Chance Mooner 54 y.o. female for follow up.   History of colon polyp and one of the polyp was a flat polyp. Since this was a flat polyp will need to repeat colonoscopy in 1 year to ensure complete removal of the polyp, has history of chronic constipation and would recommend MiraLAX as needed with high-fiber diet and stool softeners   Diagnosis Orders   1. Adenomatous polyp of descending colon     2. Constipation, unspecified constipation type         No follow-ups on file. Terry Martínez MD   StaffGastroenterologist  [facility]    Please note this report has been partially produced using speech recognitionsoftware  and may cause contain errors related to that system including grammar, punctuation and spelling as well as words andphrases that may seem inappropriate. If there are questions or concerns please feel free to contact me to clarify.

## 2021-11-10 ENCOUNTER — HOSPITAL ENCOUNTER (OUTPATIENT)
Dept: SPEECH THERAPY | Age: 51
Setting detail: THERAPIES SERIES
Discharge: HOME | End: 2021-11-10
Payer: COMMERCIAL

## 2021-11-10 ENCOUNTER — HOSPITAL ENCOUNTER (OUTPATIENT)
Dept: PHYSICAL THERAPY | Age: 51
Setting detail: THERAPIES SERIES
Discharge: HOME | End: 2021-11-10
Payer: COMMERCIAL

## 2021-11-10 PROCEDURE — 97130 THER IVNTJ EA ADDL 15 MIN: CPT

## 2021-11-10 PROCEDURE — 97110 THERAPEUTIC EXERCISES: CPT

## 2021-11-10 PROCEDURE — 97112 NEUROMUSCULAR REEDUCATION: CPT

## 2021-11-10 PROCEDURE — 97129 THER IVNTJ 1ST 15 MIN: CPT

## 2021-11-10 ASSESSMENT — PAIN SCALES - GENERAL: PAINLEVEL_OUTOF10: 5

## 2021-11-10 ASSESSMENT — PAIN DESCRIPTION - ORIENTATION: ORIENTATION: LOWER;LEFT;RIGHT

## 2021-11-10 ASSESSMENT — PAIN DESCRIPTION - PAIN TYPE: TYPE: CHRONIC PAIN

## 2021-11-10 ASSESSMENT — PAIN DESCRIPTION - LOCATION: LOCATION: BACK;LEG

## 2021-11-10 ASSESSMENT — PAIN DESCRIPTION - DESCRIPTORS: DESCRIPTORS: BURNING;THROBBING

## 2021-11-10 ASSESSMENT — PAIN DESCRIPTION - FREQUENCY: FREQUENCY: CONTINUOUS

## 2021-11-10 NOTE — PROGRESS NOTES
[facility] Outpatient  Speech Language Pathology  Adult Daily Note    Kathryn Huynh  : 1970    Date: 11/10/2021    Visit Information:  SLP Insurance Information: [address]  Total # of Visits Approved: 20  Total # of Visits to Date: 3  No Show: 0  Canceled Appointment: 0    Plan of care signed (Y/N):     Yes    Certification Period: 2021-2021** adjusted due to error  Plan of Care Visit # 2      Interventions used this date:  Speech Production, Expressive Language, Receptive Language and Cognitive Skill Development    Subjective: Patient stated that overall physically she had a bad night at work last night and reports it has never been this bad before. She reports new onset of leg spasms. Patient reports that she saw her neurologist this week who is concerned about the patient's depression. The neurologist is connecting the patient with a new psychiatrist as the [facility] has not been helping her. Her appointment this evening is with her [facility] psychiatrist. Patient reports she did not complete her home program the previous week. Behavior:  Alert and Cooperative       Objective/Assessment:   Patient progressing towards goals:    1. To increase safety awareness and judgment for safe completion of ADLs secondary to pt's cognitive deficits, pt will complete abstract reasoning tasks (i.e. Word deduction, convergent and divergent naming, similarities/differences) with 80% accuracy and min cues. Not addressed     2. To decrease cognitive deficits and improve attention to tasks for safe completion of ADLs, pt will complete structured tasks addressing sustained attention attention with 80% accuracy and min cues. Not formally addressed, however patient demonstrated sustained attention to all activities this date without difficulty.      3. Pt will utilize compensatory strategies for word finding and memory deficits in 4/5 given opportunities with min cues to promote independence, safety, and effective communication in the presence of cognitive-communicative deficits. Memory book implemented this date for patient to keep therapy information to promote carryover and retention of learning strategies/progress in all disciplines. 4. Pt will demonstrate reading comprehension at the paragraph level to 75% accuracy with mod cues to promote pt's ability to utilize reading/writing as a compensatory strategy for memory deficits and/or understand pertinent medical information and current events. Not addressed     5. Pt will be educated on strategies to improve speech intelligibility (abdominally supported breathing, over-articulation, etc.), and utilize them at the conversational level with min verbal cues from SLP so the patient has a better understanding of strategies that will enhance his/her ability to express her wants and needs. Not addressed    6. Within 1-5 days of implementing the ST POC, pt's functional reading/writing skills will be assessed in relation to executive functioning (e.g. bill paying, reading rx labels, completing personal information), with additional goals added to pt's POC as deemed necesary by treating SLP. Patient completed functional bill paying task with 87% accuracy independently and increased to 100% accuracy with min verbal cues. Patient reports the only bill she is responsible for paying on her own is her car payment, while he boyfriend has taken over paying all other bills. Patient reports he will occasionally have her pay them, which she will do over the phone. Patient does not currently have a system in place for saving bills or documenting that they were paid. Patient who like to word toward paying all bills again herself. Patient implements highlighting strategy for important information on bills.          Pain Assessment:  Initial Assessment:  Patient reported acceptable level for treatment.    8/10 pain    Re-assessment:  Patient reported acceptable level for treatment. 8/10 pain        Plan:  Continue with current goals    Patient/Caregiver Education:  Patient/Caregiver educated on session. Patient/Caregiver provided with home program: make grocery list on phone, write down important information  Patient/Caregiver stated verbal understanding of directions.     Time in: 0930  Time out: 1015  Minutes seen: 45      Signature:  Electronically signed by ROSA MARIA Moreno on 11/10/2021 at 10:09 AM

## 2021-11-10 NOTE — PROGRESS NOTES
[id number] [address]  Outpatient Physical Therapy    Treatment Note        Date: 11/10/2021  Patient: Joe Current  : 1970  ACCT #: [de-identified]  Referring Practitioner: Valente BRENNER  Diagnosis: Cerebrovascular accident (CVA) due to stenosis of Lt middle cerebral artery, Ataxic gait       Visit Information:  PT Visit Information  PT Insurance Information: BCBS  Total # of Visits to Date: 4  No Show: 0  Canceled Appointment: 0  Progress Note Counter: 4/10    Subjective: Patient c/o bilateral LE muscle spasms that started yesterday. States she has not had them before. Feels they may be related to previous low back surgeries. Planning to call neurologist if symptoms persist.     HEP Compliance:  [x] Good [] Fair [] Poor [] Reports not doing due to:    Vital Signs  Patient Currently in Pain: Yes   Pain Screening  Patient Currently in Pain: Yes  Pain Assessment  Pain Assessment: 0-10  Pain Level: 5  Pain Type: Chronic pain  Pain Location: Back; Leg  Pain Orientation: Lower; Left; Right  Pain Descriptors: Burning; Throbbing  Pain Frequency: Continuous    OBJECTIVE:   Exercises  Exercise 2: Single stepping- forward over st cane x10 B  Exercise 4: Foam: static standing- WBOS/NBOS  Exercise 5: Gait drills: F/L/R/marching/hurdles with reliance on 1 UE support x3 laps ea  Exercise 6: STS 2 x 5  Exercise 7: 2 way SLR x10  Exercise 8: Bridges 5 sec x 10  Exercise 9: Clams/reverse clams  x10  Exercise 20: HEP: 2 way SLR, bridge, clam  *Indicates exercise, modality, or manual techniques to be initiated when appropriate    Assessment: Body structures, Functions, Activity limitations: Decreased functional mobility , Decreased strength, Decreased endurance, Decreased balance, Increased pain, Decreased coordination  Assessment: Continued current POC for bilateral LE strength and balance to improve safety during work duties. Patient's tolerance to session is limited by bilateral LE muscle spasms and dizziness. Demonstrates significant decrease in gait speed. Continued difficulty maintaining static standing balance. Several posterior LOB observed during STS. Goals:  Short term goals  Time Frame for Short term goals: 2 weeks  Short term goal 1: Independent with HEP    Long term goals  Time Frame for Long term goals : 5 weeks  Long term goal 1: Improve soledad LE strength to >/= 4+/5 to improve stability with standing and walking. Long term goal 2: Pt will ambulate >/= 200' on even and uneven ground with good stability S/I. Long term goal 3: [place] >/= 45/56 to reduce pt's risk for falls. Long term goal 4: DGI >/= 20/24 to improve safety with ambulation. Progress toward goals:continue towards all     POST-PAIN       Pain Rating (0-10 pain scale):  5 /10   Location and pain description same as pre-treatment unless indicated. Action: [] NA   [] Perform HEP  [] Meds as prescribed  [] Modalities as prescribed   [] Call Physician     Frequency/Duration:  Plan  Times per week: 2 - 1x pool, 1x land  Plan weeks: 5  Current Treatment Recommendations: Strengthening, ROM, Balance Training, Functional Mobility Training, Transfer Training, Gait Training, Stair training, Neuromuscular Re-education, Manual Therapy - Soft Tissue Mobilization, Home Exercise Program, Safety Education & Training, Patient/Caregiver Education & Training, Equipment Evaluation, Education, & procurement, Modalities, Integrated Dry Needling, Aquatics, Vestibular Rehab     Pt to continue current HEP. See objective section for any therapeutic exercise changes, additions or modifications this date.   PT Individual Minutes  Time In: 6986  Time Out: 7243  Minutes: 54  Timed Code Treatment Minutes: 54 Minutes  Procedure Minutes:0     Timed Activity Minutes Units   Ther Ex 24 2   Neuro  30 2       Signature:  Electronically signed by [facility] IncDIANELYS on 11/10/21 at 10:55 AM EST

## 2021-11-12 ENCOUNTER — HOSPITAL ENCOUNTER (OUTPATIENT)
Dept: PHYSICAL THERAPY | Age: 51
Setting detail: THERAPIES SERIES
Discharge: HOME | End: 2021-11-12
Payer: COMMERCIAL

## 2021-11-12 NOTE — PROGRESS NOTES
[address]       Physical Therapy  Cancellation/No-show Note  Patient Name:  Mauricio Hare  :  1970   Date:  2021  Referring Practitioner: Otis BRENNER  Diagnosis: Cerebrovascular accident (CVA) due to stenosis of Lt middle cerebral artery, Ataxic gait    Visit Information:  PT Visit Information  PT Insurance Information: BCBS  Total # of Visits to Date: 4  No Show: 0  Canceled Appointment: 1  Progress Note Counter: 4/10 Cx 21    For today's appointment patient:  [x]  Cancelled  []  Rescheduled appointment  []  No-show   []  Called pt to remind of next appointment     Reason given by patient:  [x]  Patient ill  []  Conflicting appointment  []  No transportation    []  Conflict with work  []  No reason given  []  Other:       Comments:       Signature: Electronically signed by Patricio Magana PTA on 21 at 7:34 AM EST

## 2021-11-16 ENCOUNTER — HOSPITAL ENCOUNTER (OUTPATIENT)
Dept: PHARMACY | Age: 51
Setting detail: THERAPIES SERIES
Discharge: HOME | End: 2021-11-16
Payer: COMMERCIAL

## 2021-11-16 DIAGNOSIS — D68.59 HYPERCOAGULABLE STATE (HCC): ICD-10-CM

## 2021-11-16 DIAGNOSIS — I63.81 CEREBROVASCULAR ACCIDENT (CVA) OF LEFT THALAMUS (HCC): Primary | ICD-10-CM

## 2021-11-16 DIAGNOSIS — G45.8 OTHER TRANSIENT CEREBRAL ISCHEMIC ATTACKS AND RELATED SYNDROMES: ICD-10-CM

## 2021-11-16 LAB — INTERNATIONAL NORMALIZATION RATIO, POC: 2.4

## 2021-11-16 PROCEDURE — 99211 OFF/OP EST MAY X REQ PHY/QHP: CPT

## 2021-11-16 PROCEDURE — 85610 PROTHROMBIN TIME: CPT

## 2021-11-16 RX ORDER — LITHIUM CARBONATE 150 MG/1
150 CAPSULE ORAL
COMMUNITY

## 2021-11-16 RX ORDER — LISDEXAMFETAMINE DIMESYLATE 10 MG/1
CAPSULE ORAL DAILY
COMMUNITY

## 2021-11-16 NOTE — PROGRESS NOTES
Therapy                            Cancellation/No-show Note      Date:  2021  Patient Name:  Pavan Bobby  :  1970   MRN:  [id number]    Visit Information:  SLP Insurance Information: [address]  Total # of Visits Approved: 20  Total # of Visits to Date: 3  No Show: 0  Canceled Appointment: 0    For today's appointment patient:  Cancelled    Reason given by patient:  Other: SLP out of office    Follow-up needed:  Pt has future appointments scheduled, no follow up needed    Comments:  Cx does not count against patient     Signature: Electronically signed by ROSA MARIA Chapman on 21 at 3:28 PM EST

## 2021-11-17 ENCOUNTER — HOSPITAL ENCOUNTER (OUTPATIENT)
Dept: SPEECH THERAPY | Age: 51
Setting detail: THERAPIES SERIES
Discharge: HOME | End: 2021-11-17
Payer: COMMERCIAL

## 2021-11-17 ENCOUNTER — HOSPITAL ENCOUNTER (OUTPATIENT)
Dept: PHYSICAL THERAPY | Age: 51
Setting detail: THERAPIES SERIES
Discharge: HOME | End: 2021-11-17
Payer: COMMERCIAL

## 2021-11-17 NOTE — PROGRESS NOTES
Therapy                            Cancellation/No-show Note      Date:  2021  Patient Name:  Daron Wilson  :  1970   MRN:  [id number]  Referring Practitioner: Karthikeyan BRENNER  Diagnosis: Cerebrovascular accident (CVA) due to stenosis of Lt middle cerebral artery, Ataxic gait    Visit Information:  PT Visit Information  PT Insurance Information: BCBS  Total # of Visits to Date: 4  No Show: 0  Canceled Appointment: 2  Progress Note Counter: 4/10 Cx 21    For today's appointment patient:  [x]  Cancelled  []  Rescheduled appointment  []  No-show   []  Called pt to remind of next appointment     Reason given by patient:  []  Patient ill  []  Conflicting appointment  []  No transportation    []  Conflict with work  []  No reason given  [x]  Other:   ST was canceled and pt would like to cancel PT this date as well    [x] Pt has future appointments scheduled, no follow up needed  [] Pt requests to be on hold.     Reason:   If > 2 weeks please discuss with therapist.  [] Therapist to call pt for follow up     Comments:       Signature: Electronically signed by Sonia Urena PT on 21 at 9:46 AM EST

## 2021-11-18 ENCOUNTER — APPOINTMENT (OUTPATIENT)
Dept: GENERAL RADIOLOGY | Age: 51
End: 2021-11-18
Payer: COMMERCIAL

## 2021-11-18 ENCOUNTER — HOSPITAL ENCOUNTER (EMERGENCY)
Age: 51
Discharge: HOME | End: 2021-11-18
Payer: COMMERCIAL

## 2021-11-18 ENCOUNTER — TELEPHONE (OUTPATIENT)
Dept: NEUROLOGY | Age: 51
End: 2021-11-18

## 2021-11-18 VITALS
WEIGHT: 186 LBS | RESPIRATION RATE: 22 BRPM | SYSTOLIC BLOOD PRESSURE: 125 MMHG | BODY MASS INDEX: 29.13 KG/M2 | DIASTOLIC BLOOD PRESSURE: 70 MMHG | OXYGEN SATURATION: 97 % | HEART RATE: 82 BPM

## 2021-11-18 DIAGNOSIS — R07.9 CHEST PAIN, UNSPECIFIED TYPE: ICD-10-CM

## 2021-11-18 DIAGNOSIS — J44.1 CHRONIC OBSTRUCTIVE PULMONARY DISEASE WITH ACUTE EXACERBATION (HCC): Primary | ICD-10-CM

## 2021-11-18 LAB
ALBUMIN SERPL-MCNC: 4.6 G/DL (ref 3.5–4.6)
ALP BLD-CCNC: 152 U/L (ref 40–130)
ALT SERPL-CCNC: 28 U/L (ref 0–33)
AMPHETAMINE SCREEN, URINE: ABNORMAL
ANION GAP SERPL CALCULATED.3IONS-SCNC: 13 MEQ/L (ref 9–15)
AST SERPL-CCNC: 21 U/L (ref 0–35)
BACTERIA: ABNORMAL /HPF
BARBITURATE SCREEN URINE: ABNORMAL
BASOPHILS ABSOLUTE: 0 K/UL (ref 0–0.2)
BASOPHILS RELATIVE PERCENT: 0.3 %
BENZODIAZEPINE SCREEN, URINE: ABNORMAL
BILIRUB SERPL-MCNC: 0.3 MG/DL (ref 0.2–0.7)
BILIRUBIN URINE: NEGATIVE
BLOOD, URINE: NEGATIVE
BUN BLDV-MCNC: 12 MG/DL (ref 6–20)
CALCIUM SERPL-MCNC: 9.5 MG/DL (ref 8.5–9.9)
CANNABINOID SCREEN URINE: ABNORMAL
CHLORIDE BLD-SCNC: 104 MEQ/L (ref 95–107)
CLARITY: CLEAR
CO2: 24 MEQ/L (ref 20–31)
COCAINE METABOLITE SCREEN URINE: ABNORMAL
COLOR: YELLOW
CREAT SERPL-MCNC: 1.06 MG/DL (ref 0.5–0.9)
EKG ATRIAL RATE: 75 BPM
EKG P AXIS: 69 DEGREES
EKG P-R INTERVAL: 176 MS
EKG Q-T INTERVAL: 400 MS
EKG QRS DURATION: 102 MS
EKG QTC CALCULATION (BAZETT): 446 MS
EKG R AXIS: 66 DEGREES
EKG T AXIS: 91 DEGREES
EKG VENTRICULAR RATE: 75 BPM
EOSINOPHILS ABSOLUTE: 0.2 K/UL (ref 0–0.7)
EOSINOPHILS RELATIVE PERCENT: 1.6 %
EPITHELIAL CELLS, UA: ABNORMAL /HPF (ref 0–5)
GFR AFRICAN AMERICAN: >60
GFR NON-AFRICAN AMERICAN: 55
GLOBULIN: 2.7 G/DL (ref 2.3–3.5)
GLUCOSE BLD-MCNC: 165 MG/DL (ref 70–99)
GLUCOSE URINE: NEGATIVE MG/DL
HCT VFR BLD CALC: 47.8 % (ref 37–47)
HEMOGLOBIN: 16.3 G/DL (ref 12–16)
HYALINE CASTS: ABNORMAL /HPF (ref 0–5)
KETONES, URINE: NEGATIVE MG/DL
LEUKOCYTE ESTERASE, URINE: ABNORMAL
LYMPHOCYTES ABSOLUTE: 3.1 K/UL (ref 1–4.8)
LYMPHOCYTES RELATIVE PERCENT: 30.6 %
Lab: ABNORMAL
MCH RBC QN AUTO: 31 PG (ref 27–31.3)
MCHC RBC AUTO-ENTMCNC: 34 % (ref 33–37)
MCV RBC AUTO: 91.3 FL (ref 82–100)
METHADONE SCREEN, URINE: ABNORMAL
MONOCYTES ABSOLUTE: 0.8 K/UL (ref 0.2–0.8)
MONOCYTES RELATIVE PERCENT: 7.3 %
NEUTROPHILS ABSOLUTE: 6.2 K/UL (ref 1.4–6.5)
NEUTROPHILS RELATIVE PERCENT: 60.2 %
NITRITE, URINE: NEGATIVE
OPIATE SCREEN URINE: POSITIVE
OXYCODONE URINE: ABNORMAL
PDW BLD-RTO: 14 % (ref 11.5–14.5)
PH UA: 5.5 (ref 5–9)
PHENCYCLIDINE SCREEN URINE: ABNORMAL
PLATELET # BLD: 177 K/UL (ref 130–400)
POTASSIUM SERPL-SCNC: 4 MEQ/L (ref 3.4–4.9)
PROPOXYPHENE SCREEN: ABNORMAL
PROTEIN UA: NEGATIVE MG/DL
RBC # BLD: 5.24 M/UL (ref 4.2–5.4)
RBC UA: ABNORMAL /HPF (ref 0–5)
SARS-COV-2, NAAT: NOT DETECTED
SODIUM BLD-SCNC: 141 MEQ/L (ref 135–144)
SPECIFIC GRAVITY UA: 1.01 (ref 1–1.03)
TOTAL CK: 58 U/L (ref 0–170)
TOTAL PROTEIN: 7.3 G/DL (ref 6.3–8)
TROPONIN: <0.01 NG/ML (ref 0–0.01)
URINE REFLEX TO CULTURE: ABNORMAL
UROBILINOGEN, URINE: 0.2 E.U./DL
WBC # BLD: 10.3 K/UL (ref 4.8–10.8)
WBC UA: ABNORMAL /HPF (ref 0–5)

## 2021-11-18 PROCEDURE — 71045 X-RAY EXAM CHEST 1 VIEW: CPT

## 2021-11-18 PROCEDURE — 80053 COMPREHEN METABOLIC PANEL: CPT

## 2021-11-18 PROCEDURE — 93005 ELECTROCARDIOGRAM TRACING: CPT | Performed by: EMERGENCY MEDICINE

## 2021-11-18 PROCEDURE — 6370000000 HC RX 637 (ALT 250 FOR IP): Performed by: PHYSICIAN ASSISTANT

## 2021-11-18 PROCEDURE — 87635 SARS-COV-2 COVID-19 AMP PRB: CPT

## 2021-11-18 PROCEDURE — 96375 TX/PRO/DX INJ NEW DRUG ADDON: CPT

## 2021-11-18 PROCEDURE — 85025 COMPLETE CBC W/AUTO DIFF WBC: CPT

## 2021-11-18 PROCEDURE — 81001 URINALYSIS AUTO W/SCOPE: CPT

## 2021-11-18 PROCEDURE — 6360000002 HC RX W HCPCS: Performed by: PHYSICIAN ASSISTANT

## 2021-11-18 PROCEDURE — 96374 THER/PROPH/DIAG INJ IV PUSH: CPT

## 2021-11-18 PROCEDURE — 84484 ASSAY OF TROPONIN QUANT: CPT

## 2021-11-18 PROCEDURE — 80307 DRUG TEST PRSMV CHEM ANLYZR: CPT

## 2021-11-18 PROCEDURE — 99285 EMERGENCY DEPT VISIT HI MDM: CPT

## 2021-11-18 PROCEDURE — 82550 ASSAY OF CK (CPK): CPT

## 2021-11-18 PROCEDURE — 93010 ELECTROCARDIOGRAM REPORT: CPT | Performed by: INTERNAL MEDICINE

## 2021-11-18 RX ORDER — NITROGLYCERIN 0.4 MG/1
0.4 TABLET SUBLINGUAL EVERY 5 MIN PRN
Status: DISCONTINUED | OUTPATIENT
Start: 2021-11-18 | End: 2021-11-18 | Stop reason: HOSPADM

## 2021-11-18 RX ORDER — ALBUTEROL SULFATE 2.5 MG/3ML
2.5 SOLUTION RESPIRATORY (INHALATION) EVERY 6 HOURS PRN
Qty: 1 EACH | Refills: 1 | Status: SHIPPED | OUTPATIENT
Start: 2021-11-18

## 2021-11-18 RX ORDER — MORPHINE SULFATE 4 MG/ML
4 INJECTION, SOLUTION INTRAMUSCULAR; INTRAVENOUS ONCE
Status: COMPLETED | OUTPATIENT
Start: 2021-11-18 | End: 2021-11-18

## 2021-11-18 RX ORDER — AZITHROMYCIN 250 MG/1
TABLET, FILM COATED ORAL
Qty: 1 PACKET | Refills: 0 | Status: SHIPPED | OUTPATIENT
Start: 2021-11-18 | End: 2021-11-22

## 2021-11-18 RX ORDER — ONDANSETRON 2 MG/ML
4 INJECTION INTRAMUSCULAR; INTRAVENOUS ONCE
Status: COMPLETED | OUTPATIENT
Start: 2021-11-18 | End: 2021-11-18

## 2021-11-18 RX ORDER — METHYLPREDNISOLONE SODIUM SUCCINATE 125 MG/2ML
125 INJECTION, POWDER, LYOPHILIZED, FOR SOLUTION INTRAMUSCULAR; INTRAVENOUS ONCE
Status: COMPLETED | OUTPATIENT
Start: 2021-11-18 | End: 2021-11-18

## 2021-11-18 RX ORDER — ASPIRIN 81 MG/1
324 TABLET, CHEWABLE ORAL ONCE
Status: COMPLETED | OUTPATIENT
Start: 2021-11-18 | End: 2021-11-18

## 2021-11-18 RX ORDER — PREDNISONE 20 MG/1
40 TABLET ORAL DAILY
Qty: 10 TABLET | Refills: 0 | Status: SHIPPED | OUTPATIENT
Start: 2021-11-18 | End: 2021-11-23

## 2021-11-18 RX ADMIN — NITROGLYCERIN 0.4 MG: 0.4 TABLET, ORALLY DISINTEGRATING SUBLINGUAL at 07:06

## 2021-11-18 RX ADMIN — ONDANSETRON 4 MG: 2 INJECTION INTRAMUSCULAR; INTRAVENOUS at 07:53

## 2021-11-18 RX ADMIN — MORPHINE SULFATE 4 MG: 4 INJECTION INTRAVENOUS at 07:54

## 2021-11-18 RX ADMIN — METHYLPREDNISOLONE SODIUM SUCCINATE 125 MG: 125 INJECTION, POWDER, FOR SOLUTION INTRAMUSCULAR; INTRAVENOUS at 09:03

## 2021-11-18 RX ADMIN — ASPIRIN 324 MG: 81 TABLET, CHEWABLE ORAL at 07:05

## 2021-11-18 ASSESSMENT — ENCOUNTER SYMPTOMS
EYE DISCHARGE: 0
ABDOMINAL PAIN: 0
NAUSEA: 0
SORE THROAT: 0
CONSTIPATION: 0
COLOR CHANGE: 0
DIARRHEA: 0
ABDOMINAL DISTENTION: 0
RHINORRHEA: 0
VOMITING: 0
SHORTNESS OF BREATH: 1

## 2021-11-18 ASSESSMENT — HEART SCORE: ECG: 0

## 2021-11-18 ASSESSMENT — PAIN DESCRIPTION - PAIN TYPE
TYPE: ACUTE PAIN
TYPE: ACUTE PAIN

## 2021-11-18 ASSESSMENT — PAIN SCALES - GENERAL
PAINLEVEL_OUTOF10: 4
PAINLEVEL_OUTOF10: 7
PAINLEVEL_OUTOF10: 6

## 2021-11-18 ASSESSMENT — PAIN DESCRIPTION - LOCATION: LOCATION: CHEST

## 2021-11-18 ASSESSMENT — PAIN DESCRIPTION - DESCRIPTORS: DESCRIPTORS: PRESSURE;SHARP;HEAVINESS

## 2021-11-18 NOTE — ED NOTES
Received report from previous nurse. Agree with assessment an triage. No distress noted. Nurse will continue to monitor.         Claudell Starr, RN  11/18/21 [id number]

## 2021-11-18 NOTE — ED NOTES
Patient resting in bed with lights on and call light within reach. Equal rise and fall of chest. No distress noted. Nurse will continue to monitor.         Javon Gonzalez RN  11/18/21 2842

## 2021-11-18 NOTE — ED PROVIDER NOTES
[address] [facility] ED  eMERGENCY dEPARTMENT eNCOUnter      Pt Name: Consuella Claude  MRN: [id number]  Pamgfjustin 1970  Date of evaluation: 11/18/2021  Provider: Luis Romero PA-C    CHIEF COMPLAINT       Chief Complaint   Patient presents with    Chest Pain         HISTORY OF PRESENT ILLNESS   (Location/Symptom, Timing/Onset,Context/Setting, Quality, Duration, Modifying Factors, Severity)  Note limiting factors. Consuella Claude is a 54 y.o. female who presents to the emergency department with complaint of chest pain which patient states started approximately 30 minutes ago while she was at work. Patient states she was not doing any thing physical, she states she sits, and watches monitors at her job. She states she felt tightness in her chest, and felt like it was hard to take a big breath, she states it felt as if her heart was having a hard time expanding. She states she got short of breath, diaphoretic, had pain radiation to her left arm. She was transported to the ER by EMS, she states she was not given any medications while in route to the hospital, her pain initially rated as an 8 out of 10, is currently a 5 out of 10. Patient states past history of coronary artery disease, her cardiologist Dr. Edita Moon. Past medical history significant for vasospastic angina, anxiety, bipolar, coronary disease, CVA, colitis, depression, endometriosis, gastric reflux PTSD, tobacco abuse. HPI    NursingNotes were reviewed. REVIEW OF SYSTEMS    (2-9 systems for level 4, 10 or more for level 5)     Review of Systems   Constitutional: Negative for activity change and appetite change. HENT: Negative for congestion, ear discharge, ear pain, nosebleeds, rhinorrhea and sore throat. Eyes: Negative for discharge. Respiratory: Positive for shortness of breath. Cardiovascular: Positive for chest pain. Negative for palpitations and leg swelling.    Gastrointestinal: Negative for abdominal distention, abdominal pain, constipation, diarrhea, nausea and vomiting. Genitourinary: Negative for difficulty urinating and dysuria. Musculoskeletal: Negative for arthralgias. Skin: Negative for color change, pallor, rash and wound. Neurological: Negative for dizziness, tremors, syncope, weakness, numbness and headaches. Psychiatric/Behavioral: Negative for agitation and confusion. Except as noted above the remainder of the review of systems was reviewed and negative.        PAST MEDICAL HISTORY     Past Medical History:   Diagnosis Date    Anxiety     Back pain     back surgery x 4    Bipolar disorder ([facility]     CAD (coronary artery disease)     CAFL (chronic airflow limitation) ([facility] 11/3/2016    Cerebral artery occlusion with cerebral infarction (HCC)     Chronic bronchitis (HCC)     Chronic pain     Colitis     Complicated migraine     DDD (degenerative disc disease), lumbar 2016    Depression     Endometriosis     Excessive caffeine abuse, continuous ([facility]) 2018    Gastritis     GERD (gastroesophageal reflux disease)     History of myocardial infarction     HTN (hypertension), benign 2016    Impaired mobility and activities of daily living     Over weight     PTSD (post-traumatic stress disorder)     Sensory neuropathy 2016    Somatization disorder     TIA (transient ischemic attack)     Tobacco abuse     Vasospastic angina ([facility] 2016         SURGICALHISTORY       Past Surgical History:   Procedure Laterality Date    BACK SURGERY      x2     SECTION      x2    CHOLECYSTECTOMY  13    Lapchole    COLONOSCOPY N/A 2020    COLONOSCOPY DIAGNOSTIC performed by iMcki Borrego MD at [address] [place] [address] N/A 10/25/2021    COLONOSCOPY DIAGNOSTIC performed by Micki Borrego MD at [place] [address]  3/7/16    Dr. Rina Aschoff Left     UPPER GASTROINTESTINAL ENDOSCOPY  11/24/2014    ANIBAL HOUSE M.D.   [facility]PP[facility]OSCOPY N/A 7/28/2020    EGD ESOPHAGOGASTRODUODENOSCOPY performed by Rubin Arnold MD at [address] [place] [address]       Previous Medications    ALBUTEROL SULFATE HFA (PROVENTIL HFA) 108 (90 BASE) MCG/ACT INHALER    Inhale 2 puffs into the lungs every 6 hours as needed for Wheezing    AMLODIPINE (NORVASC) 5 MG TABLET    Take 1 tablet by mouth once daily. ASPIRIN 81 MG CHEWABLE TABLET    Take 1 tablet by mouth daily    ATORVASTATIN (LIPITOR) 20 MG TABLET    Take 1 tablet by mouth nightly    BD INTEGRA SYRINGE 25G X 1\" 3 ML MISC    USE AS DIRECTED EVERY MONTH    BLOOD PRESSURE KIT    Blood pressure cuff. BUDESONIDE (PULMICORT) 0.5 MG/2ML NEBULIZER SUSPENSION    INHALE 1 VIAL VIA NEBULIZER TWICE DAILY    BUTALBITAL-ASPIRIN-CAFFEINE (FIORINAL) -40 MG CAPSULE    Take 1 capsule by mouth every 4 hours as needed for Headaches for up to 10 days.     CARIPRAZINE HCL (VRAYLAR) 3 MG CAPS CAPSULE    Take 1 capsule by mouth daily    CONTINUOUS BLOOD GLUC  (FREESTYLE JOSE 2 READER) ANITHA    1 Device by Does not apply route 4 times daily (before meals and nightly)    CONTINUOUS BLOOD GLUC SENSOR (FREESTYLE JOSE 2 SENSOR) MISC    1 Device by Does not apply route every 14 days    CYCLOBENZAPRINE (FLEXERIL) 10 MG TABLET        DOCUSATE SODIUM (COLACE, DULCOLAX) 100 MG CAPS    Take 200 mg by mouth 2 times daily    FLUTICASONE-UMECLIDIN-VILANT (TRELEGY ELLIPTA) 200-62.5-25 MCG/INH AEPB    Inhale 1 puff into the lungs daily    FOLIC ACID (FOLVITE) 1 MG TABLET    Take 1 tablet by mouth daily    FREESTYLE LANCETS MISC    USE DAILY AS DIRECTED    FREESTYLE LITE STRIP    USE DAILY AS DIRECTED    HANDICAP PLACARD MISC    by Does not apply route Duration 2 years    IPRATROPIUM-ALBUTEROL (DUONEB) 0.5-2.5 (3) MG/3ML SOLN NEBULIZER SOLUTION    Inhale 3 mLs into the lungs three times daily Sexual Activity    Alcohol use: No    Drug use: No    Sexual activity: Not Currently     Partners: Male   Other Topics Concern    None   Social History Narrative    Blanco Lewis is a 79-year-old female who is on disability because of pain and bipolar disorder. She's also had a presumed diagnosis of possible multiple sclerosis. She's been seeing Dr. Nu Carcamo for that and she says that the diagnosis is sometimes in question and it's clear neuropathy vitamin B12 deficiency as well as generalized bodyaches from the severe vitamin D deficiency have caused this scenario that looks like multiple sclerosis. lives With: Significant other    Type of Home: House [address] [place] in [address] to enter with rails  - Number of Steps: 5    Bathroom Shower/Tub: Tub only    ADL Assistance: Independent    Homemaking Assistance: Independent, Homemaking Responsibilities: Yes, Meal Prep Responsibility: Primary    Laundry Responsibility: Primary, Cleaning Responsibility: Primary    Bill Paying/Finance Responsibility: Primary    Shopping Responsibility: Primary, Ambulation Assistance: Independent, Transfer Assistance: Independent    Active : Yes    Occupation: Full time employment--Type of occupation: disabled-  - 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 Work requirements are Pt was looking at [employer] as main part of job working nights     Social Determinants of Health     Financial Resource Strain:     Difficulty of Paying Living Expenses: Not on file   Food Insecurity:     Worried About [address] in the Last Year: Not on file    [address] in the Last Year: Not on file   Transportation Needs:     Lack of Transportation (Medical): Not on file    Lack of Transportation (Non-Medical):  Not on file   Physical Activity: Inactive    Days of Exercise per Week: 0 days    Minutes of Exercise per Session: 0 min   Stress: Stress Concern Present    Feeling of Stress : To some extent   Social Connections:     Frequency of Communication with Friends and Family: Not on file    Frequency of Social Gatherings with Friends and Family: Not on file    Attends [employer] Services: Not on file    Active Member of Clubs or Organizations: Not on file    Attends Club or Organization Meetings: Not on file    Marital Status: Not on file   Intimate Partner Violence:     Fear of Current or Ex-Partner: Not on file    Emotionally Abused: Not on file    Physically Abused: Not on file    Sexually Abused: Not on file   Housing Stability:     Unable to Pay for Housing in the Last Year: Not on file    Number of [place] in the Last Year: Not on file    Unstable Housing in the Last Year: Not on file       SCREENINGS    [place] Coma Scale  Eye Opening: Spontaneous  Best Verbal Response: Oriented  Best Motor Response: Obeys commands  Kacie Coma Scale Score: 15 @FLOW([id number])@      PHYSICAL EXAM    (up to 7 for level 4, 8 or more for level 5)     ED Triage Vitals [11/18/21 0642]   BP Temp Temp src Pulse Resp SpO2 Height Weight   (!) 142/83 -- -- 82 16 98 % -- 186 lb (84.4 kg)       Physical Exam  Vitals and nursing note reviewed. Constitutional:       General: She is not in acute distress. Appearance: She is well-developed. She is not ill-appearing, toxic-appearing or diaphoretic. HENT:      Head: Normocephalic. Nose: No congestion. Mouth/Throat:      Mouth: Mucous membranes are moist.      Pharynx: No oropharyngeal exudate or posterior oropharyngeal erythema. Eyes:      Extraocular Movements: Extraocular movements intact. Conjunctiva/sclera: Conjunctivae normal.      Pupils: Pupils are equal, round, and reactive to light. Neck:      Vascular: No JVD. Trachea: No tracheal deviation. Cardiovascular:      Rate and Rhythm: Normal rate. Pulses: Normal pulses. Heart sounds: Normal heart sounds.  No murmur heard.  No friction rub. No gallop. Pulmonary:      Effort: Pulmonary effort is normal. No tachypnea, accessory muscle usage, respiratory distress or retractions. Breath sounds: No stridor. Wheezing present. No rhonchi or rales. Comments: Mild expiratory wheezes are noted throughout all lung fields, no rales or rhonchi, no excess muscle use, retractions. Patient does have increase in chest pain with deep inspiration, or movement. Chest:      Chest wall: No tenderness. Abdominal:      General: Abdomen is flat. Bowel sounds are normal. There is no distension or abdominal bruit. Palpations: There is no shifting dullness, fluid wave, hepatomegaly, splenomegaly, mass or pulsatile mass. Tenderness: There is no abdominal tenderness. There is no right CVA tenderness, left CVA tenderness, guarding or rebound. Negative signs include Womack's sign, Rovsing's sign and McBurney's sign. Musculoskeletal:         General: No deformity. Cervical back: Normal range of motion and neck supple. No rigidity. Right lower leg: No edema. Left lower leg: No edema. Skin:     General: Skin is warm and dry. Capillary Refill: Capillary refill takes less than 2 seconds. Coloration: Skin is not jaundiced. Neurological:      General: No focal deficit present. Mental Status: She is alert and oriented to person, place, and time. Mental status is at baseline. Cranial Nerves: No cranial nerve deficit. Sensory: No sensory deficit. Motor: No weakness.       Coordination: Coordination normal.   Psychiatric:         Mood and Affect: Mood normal.         DIAGNOSTIC RESULTS     EKG: All EKG's are interpreted by the Emergency Department Physician who either signs or Co-signsthis chart in the absence of a cardiologist.    EKG shows normal sinus rhythm at 75 bpm there is no acute ST segment abnormality no ventricular ectopy QTC is 446 ms    RADIOLOGY:   Non-plain filmimages such as CT, Ultrasound and MRI are read by the radiologist. Plain radiographic images are visualized and preliminarily interpreted by the emergency physician with the below findings:        Interpretation per the Radiologist below, if available at the time ofthis note:    XR CHEST PORTABLE   Final Result   NO [address] ED BEDSIDE ULTRASOUND:   Performed by ED Physician - none    LABS:  Labs Reviewed   COMPREHENSIVE METABOLIC PANEL - Abnormal; Notable for the following components:       Result Value    Glucose 165 (*)     CREATININE 1.06 (*)     GFR Non- 55.0 (*)     Alkaline Phosphatase 152 (*)     All other components within normal limits   URINE DRUG SCREEN - Abnormal; Notable for the following components:    Opiate Scrn, Ur POSITIVE (*)     All other components within normal limits   CBC WITH AUTO DIFFERENTIAL - Abnormal; Notable for the following components:    Hemoglobin 16.3 (*)     Hematocrit 47.8 (*)     All other components within normal limits   URINE RT REFLEX TO CULTURE - Abnormal; Notable for the following components:    Leukocyte Esterase, Urine SMALL (*)     All other components within normal limits   COVID-19, RAPID   TROPONIN   CK   MICROSCOPIC URINALYSIS       All other labs were within normal range or not returned as of this dictation. EMERGENCY DEPARTMENT COURSE and DIFFERENTIAL DIAGNOSIS/MDM:   Vitals:    Vitals:    11/18/21 0642 11/18/21 0700 11/18/21 0800   BP: (!) 142/83 (!) 145/69 132/62   Pulse: 82 77 82   Resp: 16 20 20   SpO2: 98% 97%    Weight: 186 lb (84.4 kg)          MDM  Number of Diagnoses or Management Options  Chest pain, unspecified type  Chronic obstructive pulmonary disease with acute exacerbation (HCC)  Diagnosis management comments: Patient presented ED with complaint of chest pain which started while she was at work, she is states tightness in her chest whenever she took a big breath and felt as if her heart could not fully expand. She does have diminished lung sounds, with expiratory wheezes on arrival, she does have past history of COPD. Chest x-ray shows no acute pulmonary process, EKG shows no acute abnormality, cardiac enzymes are negative. She was given Solu-Medrol in the ER, is feeling much better. Findings are more suspicious for that of COPD exacerbation, she was given a prescription for Zithromax, prednisone, albuterol solution for her nebulizer machine at home. She was advised if she has any worsening or change symptoms return to the ER for reevaluation, otherwise she advised close follow-up with her regular family physician as well as cardiologist.      [facility]   Total Critical Care time was 0 minutes, excluding separately reportableprocedures. There was a high probability of clinicallysignificant/life threatening deterioration in the patient's condition which required my urgent intervention. CONSULTS:  None    PROCEDURES:  Unless otherwise noted below, none     Procedures    FINAL IMPRESSION      1. Chronic obstructive pulmonary disease with acute exacerbation (HCC)    2. Chest pain, unspecified type          DISPOSITION/PLAN   DISPOSITION Decision To Discharge 11/18/2021 08:57:21 AM      PATIENT REFERRED TO:  Devora Lenz MD  [address]    In 3 days      Mushtaq Joshi DO  [address] [place] [address] 205  [address] [place] [address]  [phone]    In 3 days        DISCHARGE MEDICATIONS:  New Prescriptions    ALBUTEROL (PROVENTIL) (2.5 MG/3ML) 0.083% NEBULIZER SOLUTION    Take 3 mLs by nebulization every 6 hours as needed for Wheezing    AZITHROMYCIN (ZITHROMAX Z-SANDIP) 250 MG TABLET    Take 2 tablets (500 mg) on Day 1, and then take 1 tablet (250 mg) on days 2 through 5.     PREDNISONE (DELTASONE) 20 MG TABLET    Take 2 tablets by mouth daily for 5 doses          (Please note that portions of this note were completed with a voice recognition program.  Efforts were made to edit the dictations but occasionally words are mis-transcribed.)    Jay Redding PA-C (electronically signed)  Attending Emergency Physician         Jay Redding PA-C  11/18/21 2959

## 2021-11-18 NOTE — ED NOTES
Patient medicated per MAR. All questions answered and allergies reviewed. Will continue to monitor.         Bob Hernandez RN  11/18/21 9737

## 2021-11-18 NOTE — ED NOTES
Discharge instructions reviewed with patient. Medications reviewed and explained to patient. Patient denies any further questions at this time. Pt encouraged to make follow up appointments with PCP and any speciality referrals.         Mary Kate Patino RN  11/18/21 5250

## 2021-11-18 NOTE — TELEPHONE ENCOUNTER
Called patient to make changes with schedule and she wanted to advise that she has been having really bad headaches that are becoming unbearable.  She was last seen Monday 11/8

## 2021-11-18 NOTE — Clinical Note
Amos Pryor was seen and treated in our emergency department on 11/18/2021. She may return to work on 11/19/2021. If you have any questions or concerns, please don't hesitate to call.       Liban Cortez PA-C

## 2021-11-18 NOTE — ED NOTES
Patient medicated per MAR. All questions answered and allergies reviewed. Will continue to monitor.       Bob Hernandez RN  11/18/21 5335

## 2021-11-19 ENCOUNTER — HOSPITAL ENCOUNTER (OUTPATIENT)
Dept: PHYSICAL THERAPY | Age: 51
Setting detail: THERAPIES SERIES
Discharge: HOME | End: 2021-11-19
Payer: COMMERCIAL

## 2021-11-19 RX ORDER — BUTALBITAL, ACETAMINOPHEN AND CAFFEINE 50; 325; 40 MG/1; MG/1; MG/1
1 TABLET ORAL EVERY 6 HOURS PRN
Qty: 40 TABLET | Refills: 3 | Status: SHIPPED | OUTPATIENT
Start: 2021-11-19 | End: 2022-06-01

## 2021-11-19 NOTE — PROGRESS NOTES
[address]       Physical Therapy  Cancellation/No-show Note  Patient Name:  Candance Malm  :  1970   Date:  2021  Referring Practitioner: Ivin Apley APRN-CNP  Diagnosis: Cerebrovascular accident (CVA) due to stenosis of Lt middle cerebral artery, Ataxic gait    Visit Information:  PT Visit Information  PT Insurance Information: BCBS  Total # of Visits to Date: 4  No Show: 0  Canceled Appointment: 3  Progress Note Counter: 4/10 Cx by therapist 21    For today's appointment patient:  [x]  Cancelled  []  Rescheduled appointment  []  No-show   []  Called pt to remind of next appointment     Reason given by patient:  []  Patient ill  []  Conflicting appointment  []  No transportation    []  Conflict with work  []  No reason given  [x]  Other: Pt states was taken to ER yesterday via ambulance for chest pain. States MD wanted to admit her, though she declined d/t fear of cost of hospital bill. Denies chest pain currently. Was instructed by ER staff to f/u with cardiologist. Therapist encouraged pt to call cardiologist office for further testing/clearance. Pt agreeable. Pt also stating issues with depression d/t recent medical issues. Reports is currently seeing a counselor, though is trying to find a new one. Pt reports plans to call new counselor this date to try to speed up process.      Comments:       Signature: Electronically signed by Heydi Romeo PTA on 21 at 8:11 AM EST

## 2021-11-19 NOTE — TELEPHONE ENCOUNTER
Called and spoke with patient who reports that she had ongoing headache for several days. She was actually seen and evaluated in the emergency room yesterday for chest pain and discharged home. She reports she did mention the headache but overall they are more concerned with her chest pain. Patient is on Coumadin for MTHFR mutation. She reports her headache is consistent with her known existing migraine headaches. There are no new features or no increasing severity. No concerning TIA-like symptoms or focal neuro deficits. Patient cannot have triptans given her recent ischemic CVA. Will initiate her on Fioricet as needed. She was advised that if headaches do not improve or get worse or any new symptoms develop she is to go to the ER for further evaluation given the fact that she is on oral anticoagulation.

## 2021-11-24 ENCOUNTER — HOSPITAL ENCOUNTER (OUTPATIENT)
Dept: PHYSICAL THERAPY | Age: 51
Setting detail: THERAPIES SERIES
Discharge: HOME | End: 2021-11-24
Payer: COMMERCIAL

## 2021-11-24 ENCOUNTER — HOSPITAL ENCOUNTER (OUTPATIENT)
Dept: SPEECH THERAPY | Age: 51
Setting detail: THERAPIES SERIES
Discharge: HOME | End: 2021-11-24
Payer: COMMERCIAL

## 2021-11-24 NOTE — PROGRESS NOTES
[x][facility]    [][facility] of [address] [facility]     [address] [place], [address]        [address] [facility], [address] [place] [address].      Phone: [phone]     Phone: [phone]      Fax: [phone]     Fax: [phone]    ______________________________________________________________________                [facility] Outpatient  Speech Language Pathology  Adult Progress Note                          Physician: NIKI Varela-CNP  From: Christiana Brooks, SLP, MA,CCC-SLP   Patient: Declan Current      : 1970  Diagnosis: ICD10 G81.072 Cerebral infarction due to unspecified occlusion or stenosis of left middle cerebral artery      Date: 2021  Treatment Diagnosis: ICD 10 R41.841 Cognitive-Communication Deficit  Date of Evaluation: 10/27/2021      Plan of Care/Treatment to date: Expressive Language Therapy, Receptive Language Therapy and Cognitive Skill Development    Date range from 11/3/2021 to 2021    Subjective:   Patient has attended 2/4 visits during this progress period. Patient frequently discusses her depression and her desire to find a new therapist as she feels the one she currently sees at the [facility] is not helping her. Patient reports not completing her home programming. Progress toward Short-Term Goals:  1. To increase safety awareness and judgment for safe completion of ADLs secondary to pt's cognitive deficits, pt will complete abstract reasoning tasks (i.e. Word deduction, convergent and divergent naming, similarities/differences) with 80% accuracy and min cues. Progress Made     2. To decrease cognitive deficits and improve attention to tasks for safe completion of ADLs, pt will complete structured tasks addressing sustained attention attention with 80% accuracy and min cues. Progress Made    3.  Pt will utilize compensatory strategies for word finding and memory deficits in 4/5 given opportunities with min cues to promote independence, safety, and effective communication in the presence of cognitive-communicative deficits. Progress Made     4. Pt will demonstrate reading comprehension at the paragraph level to 75% accuracy with mod cues to promote pt's ability to utilize reading/writing as a compensatory strategy for memory deficits and/or understand pertinent medical information and current events. Not addressed during this progress period    5. Pt will be educated on strategies to improve speech intelligibility (abdominally supported breathing, over-articulation, etc.), and utilize them at the conversational level with min verbal cues from SLP so the patient has a better understanding of strategies that will enhance his/her ability to express her wants and needs. Not addressed during this progress period     6. Within 1-5 days of implementing the ST POC, pt's functional reading/writing skills will be assessed in relation to executive functioning (e.g. bill paying, reading rx labels, completing personal information), with additional goals added to pt's POC as deemed necesary by treating SLP. Goal Met    Updated Short-Term Goals:  1. To increase safety awareness and judgment for safe completion of ADLs secondary to pt's cognitive deficits, pt will complete abstract reasoning tasks (i.e. Word deduction, convergent and divergent naming, similarities/differences) with 80% accuracy and min cues.     2. To decrease cognitive deficits and improve attention to tasks for safe completion of ADLs, pt will complete structured tasks addressing sustained attention attention with 80% accuracy and min cues. 3. Pt will utilize compensatory strategies for word finding and memory deficits in 4/5 given opportunities with min cues to promote independence, safety, and effective communication in the presence of cognitive-communicative deficits.        4. Pt will demonstrate reading comprehension at the paragraph level to 75% accuracy with mod cues to promote pt's ability to utilize reading/writing as a compensatory strategy for memory deficits and/or understand pertinent medical information and current events. 5. Pt will be educated on strategies to improve speech intelligibility (abdominally supported breathing, over-articulation, etc.), and utilize them at the conversational level with min verbal cues from SLP so the patient has a better understanding of strategies that will enhance his/her ability to express her wants and needs. Long-Term Goals:   1. Patient will demonstrate functional cognitive-linguistic abilities in all opportunities with modified independence in order to safely complete ADLS, within 6 months. 2. Patient will demonstrate functional motor speech abilities with modified independence in order to effectively communicate her wants and needs, within 6 months. Frequency/Duration of Treatment:   Days: 1 day/week  Length of Session:  30 minutes  Weeks: 12 Weeks    Rehab Potential: Good    Prognostic Factors: Motivation  Family/community support  Participation level  Previous level of function  Cognitive deficits  Time post onset  Behavior     Goal Status: Partially Achieved      Patient Status: Continue per initial Plan of Care    Discharge Plan: TBD pending progress          This patients condition is expected to improve within the treatment timeframe.     MODIFIED GROVER FALL RISK ASSESSMENT:    History of Falling (has patient fallen in the past 30 days?):    No (0 points)    Secondary Diagnosis (is there more than 1 medical diagnosis in patients medical history?):    Yes (15 points)    Ambulatory Aid:    No device is used (0 points)    Gait:    Normal/bedrest/wheelchair (0 points)    Mental Status:    Oriented to own ability (0 points)      Total points = 15    Fall Risk Level: Low Risk    0  24: Low Risk - implement low risk fall prevention interventions    25  44: Medium risk  45 and higher: High Risk    KAPIL NOMS: Completed 10/27/2021. Electronically signed by: Electronically signed by ROSA MARIA Lima on 11/24/2021 at 8:49 AM       If you have any questions or concerns, please don't hesitate to call.   Thank you for your referral.

## 2021-11-24 NOTE — PROGRESS NOTES
Therapy                            Cancellation/No-show Note      Date:  2021  Patient Name:  Gisel Ricci  :  1970   MRN:  [id number]  Referring Practitioner: Collette Osler APRN-CNP  Diagnosis: Cerebrovascular accident (CVA) due to stenosis of Lt middle cerebral artery, Ataxic gait    Visit Information:  PT Visit Information  PT Insurance Information: BCBS  Total # of Visits to Date: 4  No Show: 0  Canceled Appointment: 4  Progress Note Counter: 4/10 (cx 21)    For today's appointment patient:  [x]  Cancelled  []  Rescheduled appointment  []  No-show   []  Called pt to remind of next appointment     Reason given by patient:  []  Patient ill  []  Conflicting appointment  []  No transportation    [x]  Conflict with work  []  No reason given  []  Other:      [x] Pt has future appointments scheduled, no follow up needed  [] Pt requests to be on hold.     Reason:   If > 2 weeks please discuss with therapist.  [] Therapist to call pt for follow up     Comments:       Signature: Electronically signed by Alvino De La Cruz PT on 21 at 8:28 AM EST

## 2021-11-24 NOTE — PROGRESS NOTES
Therapy                            Cancellation/No-show Note    Date:  2021  Patient Name:  Eder Baxter  :  1970   MRN:  [id number]    Visit Information:  SLP Insurance Information: [address]  Total # of Visits Approved: 20  Total # of Visits to Date: 3  No Show: 0  Canceled Appointment: 1    For today's appointment patient:  Cancelled    Reason given by patient:  Conflict with work    Follow-up needed:  Pt has future appointments scheduled, no follow up needed      Signature: Electronically signed by ROSA MARIA Wagner on 21 at 7:54 AM EST no known precautions/limitations

## 2021-12-03 ENCOUNTER — HOSPITAL ENCOUNTER (OUTPATIENT)
Dept: PHARMACY | Age: 51
Setting detail: THERAPIES SERIES
Discharge: HOME | End: 2021-12-03
Payer: COMMERCIAL

## 2021-12-03 DIAGNOSIS — I63.81 CEREBROVASCULAR ACCIDENT (CVA) OF LEFT THALAMUS (HCC): Primary | ICD-10-CM

## 2021-12-03 DIAGNOSIS — D68.59 HYPERCOAGULABLE STATE (HCC): ICD-10-CM

## 2021-12-03 DIAGNOSIS — G45.8 OTHER TRANSIENT CEREBRAL ISCHEMIC ATTACKS AND RELATED SYNDROMES: ICD-10-CM

## 2021-12-03 LAB — INTERNATIONAL NORMALIZATION RATIO, POC: 1.2

## 2021-12-03 PROCEDURE — 85610 PROTHROMBIN TIME: CPT

## 2021-12-03 PROCEDURE — 99211 OFF/OP EST MAY X REQ PHY/QHP: CPT

## 2021-12-03 NOTE — PROGRESS NOTES
Ms. José Calderon is a 54 y.o. y/o female with history of CVA, hypercoaguable state who presents today for anticoagulation monitoring and adjustment.   INR 1.2 is sub therapeutic for this patient (goal range 2-3) and is reflective of 35 mg TWD (maintenance plan 40 mg TWD)  Patient verifies current dosing regimen, patient able to verbally recall dose  Patient reports 1  missed doses (possibly more) since last INR   Discussed with patient trying weekly pill divider for only warfarin to improve compliance  Patient denies s/sx clotting and/or stroke  Patient denies hematuria, epistaxis, rectal bleeding  Patient denies changes in diet, alcohol, or tobacco use  Patient states diet is variable due to decreased taste, discussed vitamin k in foods and affect on INR and gave handout  Reviewed medication list and drug allergies with patient, updated any medication additions or modifications accordingly  Patient also denies any pending medical or dental procedures scheduled at this time  Patient was instructed to boost today to 10 mg (usual 7.5 mg ), then increase to 42.5 mg TWD (6.2%) and RTC 2 weeks  For Pharmacy Admin Tracking Only     Intervention Detail: Dose Adjustment: 1, reason: Therapy Optimization   Total # of Interventions Recommended: 2   Total # of Interventions Accepted: 2   Time Spent (min): 15

## 2021-12-08 ENCOUNTER — HOSPITAL ENCOUNTER (OUTPATIENT)
Dept: SPEECH THERAPY | Age: 51
Setting detail: THERAPIES SERIES
Discharge: HOME | End: 2021-12-08
Payer: COMMERCIAL

## 2021-12-08 ENCOUNTER — HOSPITAL ENCOUNTER (OUTPATIENT)
Dept: PHYSICAL THERAPY | Age: 51
Setting detail: THERAPIES SERIES
Discharge: HOME | End: 2021-12-08
Payer: COMMERCIAL

## 2021-12-08 PROCEDURE — 97112 NEUROMUSCULAR REEDUCATION: CPT

## 2021-12-08 PROCEDURE — 97129 THER IVNTJ 1ST 15 MIN: CPT

## 2021-12-08 PROCEDURE — 97130 THER IVNTJ EA ADDL 15 MIN: CPT

## 2021-12-08 NOTE — PROGRESS NOTES
[address] [address]  Outpatient Physical Therapy    Treatment Note        Date: 2021  Patient: Amy Kaplan  : 1970  ACCT #: [de-identified]  Referring Practitioner: Julio César BRENNER  Diagnosis: Cerebrovascular accident (CVA) due to stenosis of Lt middle cerebral artery, Ataxic gait  Treatment Diagnosis: Impaired mobility    Visit Information:  PT Visit Information  PT Insurance Information: BCBS  Total # of Visits to Date: 5  No Show: 2  Canceled Appointment: 4  Progress Note Counter: 5/10    Subjective: Pt reports feeling very tired today and her balance is off. Feels things have improved a little but still is having a lot of problems. HEP Compliance:  [x] Good [] Fair [] Poor [] Reports not doing due to:    Vital Signs  Patient Currently in Pain: No   Pain Screening  Patient Currently in Pain: No    OBJECTIVE:   Exercises  Exercise 1: Standing FA with head turns with spotting  Exercise 3: 90 deg turns with spotting  Exercise 4: Foam: FA, wt shifts, marching  Exercise 5: Gait drills: march/cross crawls  Exercise 20: HEP: 90deg turns, standing with head turns    Balance  Comments: Chisholm = 37/56, DGI =     Ambulation 1  Surface: carpet  Device: No Device  Assistance: Supervision, Independent  Gait Deviations: Slow Radha  Distance: 200'    Assessment:   Activity Tolerance  Activity Tolerance: Patient Tolerated treatment well, Patient limited by fatigue    Body structures, Functions, Activity limitations: Decreased functional mobility , Decreased strength, Decreased endurance, Decreased balance, Increased pain, Decreased coordination  Assessment: Pt with some improvement in static standing balance as seen with 4 point improvement in Chisholm score. Initiated DGI with pt challenged with dynamic tasks. Instructed pt in spotting to help with head turns and 90 deg turns. Pt reports mild decreased dizziness and imbalance. Pt requests to end early due to fatigue.   Treatment Diagnosis:

## 2021-12-08 NOTE — PROGRESS NOTES
[facility] Outpatient  Speech Language Pathology  Adult Daily Note    Michelle Pencil  : 1970    Date: 2021    Visit Information:  SLP Insurance Information: [address]  Total # of Visits Approved: 20  Total # of Visits to Date: 4  No Show: 1  Canceled Appointment: 1    Plan of care signed (Y/N):     Yes    Certification Period: 2021-2021  Plan of Care Visit # 1      Interventions used this date:  Speech Production, Expressive Language, Receptive Language and Cognitive Skill Development    Subjective: Patient reports that she feels she is in a haze for the past couple weeks. She states that she has not been attending therapy because the PTA she last saw for pool therapy stated that she needed to see the doctor before returning to therapy because she was seen in the ER. Patient stated that she has not been able to get an appointment. Patient reports continued difficulty with auditory comprehension. Patient provided multiple excuses as to why she would be unable to implement memory or communication strategies at home, most excuses involved her live-in boyfriend. Behavior:  Alert and Cooperative       Objective/Assessment:   Patient progressing towards goals:    1. To increase safety awareness and judgment for safe completion of ADLs secondary to pt's cognitive deficits, pt will complete abstract reasoning tasks (i.e. Word deduction, convergent and divergent naming, similarities/differences) with 80% accuracy and min cues. Patient completed comparing/contrasting task with 50% accuracy independently, increased to 75% accuracy with mod verbal cues.      2. To decrease cognitive deficits and improve attention to tasks for safe completion of ADLs, pt will complete structured tasks addressing sustained attention attention with 80% accuracy and min cues.   Patient demonstrated ability to sustain attention for entirety of session with no re-direction needed.      3. Pt will utilize compensatory strategies for word finding and memory deficits in 4/5 given opportunities with min cues to promote independence, safety, and effective communication in the presence of cognitive-communicative deficits.   Patient educated on circumlocution word finding strategy. Patient states that she does not currently use this strategy. Patient was able to implement this strategy during structured task x4 with max verbal prompting. Patient states her boyfriend would not be patient enough for her to implement this strategy at home. Patient also benefitted from pausing strategy, though again, she stated her boyfriend would not be patient enough for her to implement this strategy at home.      4. Pt will demonstrate reading comprehension at the paragraph level to 75% accuracy with mod cues to promote pt's ability to utilize reading/writing as a compensatory strategy for memory deficits and/or understand pertinent medical information and current events. Not addressed     5. Pt will be educated on strategies to improve speech intelligibility (abdominally supported breathing, over-articulation, etc.), and utilize them at the conversational level with min verbal cues from SLP so the patient has a better understanding of strategies that will enhance his/her ability to express her wants and needs. Not addressed        Pain Assessment:  Initial Assessment:  Patient reported acceptable level for treatment. 8/10 pain    Re-assessment:  Patient reported acceptable level for treatment. 8/10 pain      Plan:  Continue with current goals    Patient/Caregiver Education:  Patient/Caregiver educated on session. Patient/Caregiver provided with home program: start a calendar  Patient/Caregiver stated verbal understanding of directions.     Time in: 0830  Time out: 0915  Minutes seen: 45      Signature:  Electronically signed by ROSA MARIA Loyola on 12/8/2021 at 10:22 AM

## 2021-12-10 ENCOUNTER — HOSPITAL ENCOUNTER (OUTPATIENT)
Dept: PHYSICAL THERAPY | Age: 51
Setting detail: THERAPIES SERIES
Discharge: HOME | End: 2021-12-10
Payer: COMMERCIAL

## 2021-12-10 PROCEDURE — 97113 AQUATIC THERAPY/EXERCISES: CPT

## 2021-12-10 ASSESSMENT — PAIN SCALES - GENERAL: PAINLEVEL_OUTOF10: 8

## 2021-12-10 ASSESSMENT — PAIN DESCRIPTION - DESCRIPTORS: DESCRIPTORS: SHARP;THROBBING

## 2021-12-10 ASSESSMENT — PAIN DESCRIPTION - LOCATION: LOCATION: GENERALIZED

## 2021-12-10 NOTE — PROGRESS NOTES
[address] [address]  Outpatient Physical Therapy    Treatment Note        Date: 12/10/2021  Patient: Declan Current  : 1970  ACCT #: [de-identified]  Referring Practitioner: Samir BRENNER  Diagnosis: Cerebrovascular accident (CVA) due to stenosis of Lt middle cerebral artery, Ataxic gait  Treatment Diagnosis: Impaired mobility    Visit Information:  PT Visit Information  PT Insurance Information: BCBS  Total # of Visits to Date: 6  No Show: 2  Canceled Appointment: 4  Progress Note Counter: 6/10    Subjective: Nothing new since LV     HEP Compliance:  [] Good [x] Fair [] Poor [] Reports not doing due to:    Vital Signs  Patient Currently in Pain: No   Pain Screening  Patient Currently in Pain: No  Pain Assessment  Pain Assessment: 0-10  Pain Level: 8  Pain Location: Generalized  Pain Descriptors: Patel Grad; Throbbing    OBJECTIVE:   Exercises  Exercise 10: Aquatics:  Exercise 11: Gait drills: F/L/R/march/tandem( F/R) x3 laps ea  Exercise 12: Sink ex x12  Exercise 13: Hip circles x10ea soledad  Exercise 14: Balance ex: semitandem, 15-30s  Exercise 15: DGI/ Chisholm tasks: head turns, alt taps F/L w/o ue supp       *Indicates exercise, modality, or manual techniques to be initiated when appropriate    Assessment: Body structures, Functions, Activity limitations: Decreased functional mobility , Decreased strength, Decreased endurance, Decreased balance, Increased pain, Decreased coordination  Assessment: Pt in pool when therapist arrived and had already perforrmed walking to warm up. Pt states increased back pain with tandem walking. Pt states during toe raises she was told she has Rt foot drop and notices pain along medial and lateral borders of foot. Pt with increased pain after session.   Treatment Diagnosis: Impaired mobility          Goals:  Short term goals  Time Frame for Short term goals: 2 weeks  Short term goal 1: Independent with HEP    Long term goals  Time Frame for Long term goals : 5 weeks  Long term goal 1: Improve soledad LE strength to >/= 4+/5 to improve stability with standing and walking. Long term goal 2: Pt will ambulate >/= 200' on even and uneven ground with good stability S/I. Long term goal 3: Chisholm >/= 45/56 to reduce pt's risk for falls. Long term goal 4: DGI >/= 20/24 to improve safety with ambulation. Progress toward goals:balance, strength  POST-PAIN       Pain Rating (0-10 pain scale):  10 /10   Location and pain description same as pre-treatment unless indicated. Action: [] NA   [] Perform HEP  [x] Meds as prescribed  [] Modalities as prescribed   [] Call Physician     Frequency/Duration:  Plan  Times per week: 2 - 1x pool, 1x land  Plan weeks: 5  Current Treatment Recommendations: Strengthening, ROM, Balance Training, Functional Mobility Training, Transfer Training, Gait Training, Stair training, Neuromuscular Re-education, Manual Therapy - Soft Tissue Mobilization, Home Exercise Program, Safety Education & Training, Patient/Caregiver Education & Training, Equipment Evaluation, Education, & procurement, Modalities, Integrated Dry Needling, Aquatics, Vestibular Rehab     Pt to continue current HEP. See objective section for any therapeutic exercise changes, additions or modifications this date.          PT Individual Minutes  Time In: 3795  Time Out: 8525  Minutes: 40  Timed Code Treatment Minutes: 40 Minutes  Procedure Minutes:0     Timed Activity Minutes Units   Aquatics 40 3     Signature:  Electronically signed by Julian Laughlin PTA on 12/10/21 at 8:38 AM EST

## 2021-12-17 ENCOUNTER — HOSPITAL ENCOUNTER (OUTPATIENT)
Dept: PHARMACY | Age: 51
Setting detail: THERAPIES SERIES
Discharge: HOME | End: 2021-12-17
Payer: COMMERCIAL

## 2021-12-17 DIAGNOSIS — G45.8 OTHER TRANSIENT CEREBRAL ISCHEMIC ATTACKS AND RELATED SYNDROMES: ICD-10-CM

## 2021-12-17 DIAGNOSIS — D68.59 HYPERCOAGULABLE STATE (HCC): ICD-10-CM

## 2021-12-17 DIAGNOSIS — I63.81 CEREBROVASCULAR ACCIDENT (CVA) OF LEFT THALAMUS (HCC): Primary | ICD-10-CM

## 2021-12-17 LAB — INTERNATIONAL NORMALIZATION RATIO, POC: 1.5

## 2021-12-17 PROCEDURE — 99211 OFF/OP EST MAY X REQ PHY/QHP: CPT | Performed by: PHARMACIST

## 2021-12-17 PROCEDURE — 85610 PROTHROMBIN TIME: CPT | Performed by: PHARMACIST

## 2021-12-17 NOTE — PROGRESS NOTES
Ms. Latonia Simmons is a 54 y.o. female with history of CVA who presents today for anticoagulation monitoring and adjustment. Face to face visit completed, procedural mask and face shield worn by myself as instructed: Mask worn by patient, room cleaned pre and post visit with Virex Plus spray  INR 1.5 is subtherapeutic for this patient (goal range 2-3) and is reflective of 42.5 mg TWD  Patient verifies current dosing regimen, patient able to verbally recall dose  Patient reports no missed doses in the last seven days  Patient states she now uses a separate pill box for her warfarin to help prevent missed doses   Patient denies s/sx clotting and/or stroke  Patient denies hematuria, epistaxis, rectal bleeding  Patient denies changes in diet, alcohol, or tobacco use  Reviewed medication list and drug allergies with patient, updated any medication additions or modifications accordingly  Patient also denies any pending medical or dental procedures scheduled at this time  Patient was instructed to take an extra 2.5mg today then increase TWD to 47.5mg and RTC in 13 days. For Pharmacy Admin Tracking Only     Intervention Detail: Adherence Monitorin   Total # of Interventions Recommended: 1   Total # of Interventions Accepted: 1   Time Spent (min): 30      Crescencio Agustin, JAMIR. Ph. 2021 2:57 PM

## 2021-12-22 RX ORDER — FLASH GLUCOSE SENSOR
1 KIT MISCELLANEOUS
Qty: 2 EACH | Refills: 3 | Status: SHIPPED | OUTPATIENT
Start: 2021-12-22 | End: 2022-03-14

## 2021-12-22 NOTE — TELEPHONE ENCOUNTER
Patient requesting medication refill.  Please approve or deny this request.    Rx requested:  Requested Prescriptions     Pending Prescriptions Disp Refills    Continuous Blood Gluc Sensor (FREESTYLE JOSE 2 SENSOR) MISC 2 each 3     Si Device by Does not apply route every 14 days         Last Office Visit:   2021      Next Visit Date:  Future Appointments   Date Time Provider Vangie Nuñez   2021  8:30 AM Mickey Aleena, [address] AM 1 [facility],[address] [facility]   2021  9:30 AM Stanly Lesches, [facility] [address]   2021  2:30 PM [address]   [facility]   [facility]15[facility]2022  4:00 PM [facility]

## 2021-12-28 RX ORDER — WARFARIN SODIUM 5 MG/1
5 TABLET ORAL SEE ADMIN INSTRUCTIONS
Qty: 130 TABLET | Refills: 1 | Status: SHIPPED | OUTPATIENT
Start: 2021-12-28 | End: 2022-02-18 | Stop reason: SDUPTHER

## 2021-12-29 ENCOUNTER — TELEPHONE (OUTPATIENT)
Dept: NEUROLOGY | Age: 51
End: 2021-12-29

## 2021-12-29 ENCOUNTER — APPOINTMENT (OUTPATIENT)
Dept: PHYSICAL THERAPY | Age: 51
End: 2021-12-29
Payer: COMMERCIAL

## 2021-12-29 DIAGNOSIS — R53.83 FATIGUE, UNSPECIFIED TYPE: Primary | ICD-10-CM

## 2021-12-29 DIAGNOSIS — I63.9 CEREBROVASCULAR ACCIDENT (CVA), UNSPECIFIED MECHANISM (HCC): ICD-10-CM

## 2021-12-29 NOTE — TELEPHONE ENCOUNTER
Patient called and said that she is having a hard time getting organized in her head and that she is fatigue and when she moves her legs it takes a awhile like her brain isnt connecting that she has to walk . [place] Please call patient at [phone].     Ps she is scheduled to see dr tapia 2/15/2022

## 2021-12-30 ENCOUNTER — TELEPHONE (OUTPATIENT)
Dept: PHARMACY | Age: 51
End: 2021-12-30

## 2021-12-30 NOTE — TELEPHONE ENCOUNTER
Called the patient who states that generally she is just feeling increasing fatigue increasing weakness and difficulty concentrating. She feels like her brain is not connected to the rest of her body. Patient voices a lot of stress over her home situation and no support system. She is working long hours at work. She is following with psychiatry at the [facility] center but voices unhappiness with her services. She was referred to a different psychiatry office but has yet to establish with them. Last 2 INRs have been subtherapeutic. Patient does have history of CVA and MTHFR mutation. She is currently on Coumadin. Placed orders for CBC, CMP, lithium levels and CT of the head today given her above complaints and concerns. Patient was referred to physical therapy but was discharged as she was a frequent no-show. Advised her to call psychiatry and gave her the number to [facility] office as that is who we referred her to. If she is unable to contact them she is to call the [facility] center. Patient would benefit from further counseling and medication adjustments for her ongoing depression.

## 2022-01-04 ENCOUNTER — HOSPITAL ENCOUNTER (OUTPATIENT)
Dept: PHARMACY | Age: 52
Setting detail: THERAPIES SERIES
Discharge: HOME | End: 2022-01-04
Payer: COMMERCIAL

## 2022-01-04 DIAGNOSIS — R53.83 FATIGUE, UNSPECIFIED TYPE: ICD-10-CM

## 2022-01-04 DIAGNOSIS — I63.81 CEREBROVASCULAR ACCIDENT (CVA) OF LEFT THALAMUS (HCC): Primary | ICD-10-CM

## 2022-01-04 DIAGNOSIS — D68.59 HYPERCOAGULABLE STATE (HCC): ICD-10-CM

## 2022-01-04 DIAGNOSIS — G45.8 OTHER TRANSIENT CEREBRAL ISCHEMIC ATTACKS AND RELATED SYNDROMES: ICD-10-CM

## 2022-01-04 LAB
ALBUMIN SERPL-MCNC: 4.4 G/DL (ref 3.5–4.6)
ALP BLD-CCNC: 137 U/L (ref 40–130)
ALT SERPL-CCNC: 29 U/L (ref 0–33)
ANION GAP SERPL CALCULATED.3IONS-SCNC: 12 MEQ/L (ref 9–15)
AST SERPL-CCNC: 17 U/L (ref 0–35)
BILIRUB SERPL-MCNC: <0.2 MG/DL (ref 0.2–0.7)
BUN BLDV-MCNC: 14 MG/DL (ref 6–20)
CALCIUM SERPL-MCNC: 9.6 MG/DL (ref 8.5–9.9)
CHLORIDE BLD-SCNC: 109 MEQ/L (ref 95–107)
CO2: 24 MEQ/L (ref 20–31)
CREAT SERPL-MCNC: 1.02 MG/DL (ref 0.5–0.9)
GFR AFRICAN AMERICAN: >60
GFR NON-AFRICAN AMERICAN: 57.5
GLOBULIN: 2.4 G/DL (ref 2.3–3.5)
GLUCOSE BLD-MCNC: 89 MG/DL (ref 70–99)
HCT VFR BLD CALC: 38.7 % (ref 37–47)
HEMOGLOBIN: 13 G/DL (ref 12–16)
INTERNATIONAL NORMALIZATION RATIO, POC: 1.4
LITHIUM LEVEL: 0.4 MEQ/L (ref 0.6–1.2)
MCH RBC QN AUTO: 31.4 PG (ref 27–31.3)
MCHC RBC AUTO-ENTMCNC: 33.6 % (ref 33–37)
MCV RBC AUTO: 93.5 FL (ref 82–100)
PDW BLD-RTO: 14.2 % (ref 11.5–14.5)
PLATELET # BLD: 161 K/UL (ref 130–400)
POTASSIUM SERPL-SCNC: 4.3 MEQ/L (ref 3.4–4.9)
RBC # BLD: 4.14 M/UL (ref 4.2–5.4)
SODIUM BLD-SCNC: 145 MEQ/L (ref 135–144)
TOTAL PROTEIN: 6.8 G/DL (ref 6.3–8)
WBC # BLD: 8.3 K/UL (ref 4.8–10.8)

## 2022-01-04 PROCEDURE — 85610 PROTHROMBIN TIME: CPT | Performed by: PHARMACIST

## 2022-01-04 PROCEDURE — 99211 OFF/OP EST MAY X REQ PHY/QHP: CPT | Performed by: PHARMACIST

## 2022-01-04 NOTE — PROGRESS NOTES
Ms. Marta Oliveros is a 54 y.o. y/o female with history of CVA who presents today for anticoagulation monitoring and adjustment. INR 1.4 is subtherapeutic for this patient (goal range 2-3) and is reflective of 47.5 mg TWD. Patient past due for INR appt, per Epic notes was to have appt last week. Patient verifies current dosing regimen, patient able to verbally recall dose  Patient reports no  missed doses since last INR   Patient denies s/sx clotting and/or stroke  Patient denies hematuria, epistaxis, rectal bleeding  Patient denies changes in diet, alcohol, or tobacco use  Reviewed medication list and drug allergies with patient, updated any medication additions or modifications accordingly  Patient also denies any pending medical or dental procedures scheduled at this time. Patient has separate pill reminder box for warfarin. Verified per [facility] drug ID- warfarin 5 mg tabs. Patient was instructed to take 10mg today and also increase TWD to 52.5mg. RTC in 10 days. Increase in warfarin is 16% for this week only, and 11% for TWD. Pt states she can only RTC on Fridays.       For Pharmacy Admin Tracking Only     Intervention Detail: Dose Adjustment: 1, reason: Therapy Optimization   Total # of Interventions Recommended: 2   Total # of Interventions Accepted: 2   Time Spent (min): 30

## 2022-01-06 ENCOUNTER — TELEPHONE (OUTPATIENT)
Dept: DIABETES SERVICES | Age: 52
End: 2022-01-06

## 2022-01-14 ENCOUNTER — HOSPITAL ENCOUNTER (OUTPATIENT)
Dept: PHARMACY | Age: 52
Setting detail: THERAPIES SERIES
Discharge: HOME | End: 2022-01-14
Payer: COMMERCIAL

## 2022-01-14 DIAGNOSIS — I63.81 CEREBROVASCULAR ACCIDENT (CVA) OF LEFT THALAMUS (HCC): Primary | ICD-10-CM

## 2022-01-14 DIAGNOSIS — D68.59 HYPERCOAGULABLE STATE (HCC): ICD-10-CM

## 2022-01-14 DIAGNOSIS — G45.8 OTHER TRANSIENT CEREBRAL ISCHEMIC ATTACKS AND RELATED SYNDROMES: ICD-10-CM

## 2022-01-14 LAB — INTERNATIONAL NORMALIZATION RATIO, POC: 2.2

## 2022-01-14 PROCEDURE — 85610 PROTHROMBIN TIME: CPT | Performed by: PHARMACIST

## 2022-01-14 PROCEDURE — 99211 OFF/OP EST MAY X REQ PHY/QHP: CPT | Performed by: PHARMACIST

## 2022-01-14 NOTE — PROGRESS NOTES
Ms. Dominic Lynch is a 54 y.o. female with history of CVA who presents today for anticoagulation monitoring and adjustment. Face to face visit completed, procedural mask and face shield worn by myself as instructed: Mask worn by patient, room cleaned pre and post visit with Virex Plus spray    INR 2.2 is therapeutic for this patient (goal range 2-3) and is reflective of 52.5 mg TWD  Patient verifies current dosing regimen, patient able to verbally recall dose  Patient reports no missed doses in the last seven days     Patient denies s/sx clotting and/or stroke  Patient denies hematuria, epistaxis, rectal bleeding  Patient denies changes in diet  Patient denies changes in alcohol use  Patient denies changes in tobacco use    Reviewed medication list and drug allergies with patient, updated any medication additions or modifications accordingly    Patient denies any pending medical procedures scheduled at this time  Patient denies any pending dental procedures scheduled at this time    Patient was instructed to continue 52.5mg TWD and RTC in 2 weeks    For Pharmacy Admin Tracking Only     Intervention Detail: Adherence Monitorin   Total # of Interventions Recommended: 1   Total # of Interventions Accepted: 1   Time Spent (min): 20      JAMIR Solano. Ph. 2022 2:46 PM

## 2022-01-21 ENCOUNTER — OFFICE VISIT (OUTPATIENT)
Dept: CARDIOLOGY CLINIC | Age: 52
End: 2022-01-21
Payer: COMMERCIAL

## 2022-01-21 VITALS
RESPIRATION RATE: 16 BRPM | WEIGHT: 195.6 LBS | OXYGEN SATURATION: 99 % | HEART RATE: 84 BPM | BODY MASS INDEX: 30.64 KG/M2 | SYSTOLIC BLOOD PRESSURE: 136 MMHG | DIASTOLIC BLOOD PRESSURE: 72 MMHG

## 2022-01-21 DIAGNOSIS — F17.200 SMOKING: ICD-10-CM

## 2022-01-21 DIAGNOSIS — F17.200 TOBACCO USE DISORDER: ICD-10-CM

## 2022-01-21 DIAGNOSIS — D68.59 HYPERCOAGULABLE STATE (HCC): ICD-10-CM

## 2022-01-21 DIAGNOSIS — I25.10 CORONARY ARTERY DISEASE INVOLVING NATIVE CORONARY ARTERY OF NATIVE HEART WITHOUT ANGINA PECTORIS: Primary | ICD-10-CM

## 2022-01-21 DIAGNOSIS — I10 HYPERTENSION, UNSPECIFIED TYPE: ICD-10-CM

## 2022-01-21 PROCEDURE — 99213 OFFICE O/P EST LOW 20 MIN: CPT | Performed by: INTERNAL MEDICINE

## 2022-01-21 ASSESSMENT — ENCOUNTER SYMPTOMS
WHEEZING: 0
EYES NEGATIVE: 1
NAUSEA: 0
VOMITING: 0
ABDOMINAL PAIN: 0
GASTROINTESTINAL NEGATIVE: 1
SHORTNESS OF BREATH: 0
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
## 2022-01-28 ENCOUNTER — HOSPITAL ENCOUNTER (OUTPATIENT)
Dept: PHARMACY | Age: 52
Setting detail: THERAPIES SERIES
Discharge: HOME | End: 2022-01-28
Payer: COMMERCIAL

## 2022-01-28 DIAGNOSIS — G45.8 OTHER TRANSIENT CEREBRAL ISCHEMIC ATTACKS AND RELATED SYNDROMES: ICD-10-CM

## 2022-01-28 DIAGNOSIS — D68.59 HYPERCOAGULABLE STATE (HCC): ICD-10-CM

## 2022-01-28 DIAGNOSIS — I63.81 CEREBROVASCULAR ACCIDENT (CVA) OF LEFT THALAMUS (HCC): Primary | ICD-10-CM

## 2022-01-28 LAB — INTERNATIONAL NORMALIZATION RATIO, POC: 1.5

## 2022-01-28 PROCEDURE — 85610 PROTHROMBIN TIME: CPT | Performed by: PHARMACIST

## 2022-01-28 PROCEDURE — 99211 OFF/OP EST MAY X REQ PHY/QHP: CPT | Performed by: PHARMACIST

## 2022-02-10 PROBLEM — R29.898 RIGHT HAND WEAKNESS: Status: ACTIVE | Noted: 2022-01-14

## 2022-02-10 PROBLEM — G56.21 NEURITIS OF RIGHT ULNAR NERVE: Status: ACTIVE | Noted: 2022-01-14

## 2022-02-10 PROBLEM — M77.02 MEDIAL EPICONDYLITIS OF BOTH ELBOWS: Status: ACTIVE | Noted: 2022-01-14

## 2022-02-10 PROBLEM — M77.01 MEDIAL EPICONDYLITIS OF BOTH ELBOWS: Status: ACTIVE | Noted: 2022-01-14

## 2022-02-14 ENCOUNTER — TELEPHONE (OUTPATIENT)
Dept: PHARMACY | Age: 52
End: 2022-02-14

## 2022-02-14 DIAGNOSIS — G45.8 OTHER TRANSIENT CEREBRAL ISCHEMIC ATTACKS AND RELATED SYNDROMES: ICD-10-CM

## 2022-02-14 DIAGNOSIS — D68.59 HYPERCOAGULABLE STATE (HCC): ICD-10-CM

## 2022-02-14 DIAGNOSIS — I63.81 CEREBROVASCULAR ACCIDENT (CVA) OF LEFT THALAMUS (HCC): Primary | ICD-10-CM

## 2022-02-14 NOTE — TELEPHONE ENCOUNTER
Called and left message to reschedule appt    [address] [facility]  Staff Pharmacist, [facility]  2/14/2022  11:27 AM

## 2022-02-18 ENCOUNTER — HOSPITAL ENCOUNTER (OUTPATIENT)
Dept: PHARMACY | Age: 52
Setting detail: THERAPIES SERIES
Discharge: HOME | End: 2022-02-18
Payer: COMMERCIAL

## 2022-02-18 DIAGNOSIS — D68.59 HYPERCOAGULABLE STATE (HCC): ICD-10-CM

## 2022-02-18 DIAGNOSIS — G45.8 OTHER TRANSIENT CEREBRAL ISCHEMIC ATTACKS AND RELATED SYNDROMES: ICD-10-CM

## 2022-02-18 DIAGNOSIS — I63.81 CEREBROVASCULAR ACCIDENT (CVA) OF LEFT THALAMUS (HCC): Primary | ICD-10-CM

## 2022-02-18 LAB — INTERNATIONAL NORMALIZATION RATIO, POC: 2

## 2022-02-18 PROCEDURE — 85610 PROTHROMBIN TIME: CPT

## 2022-02-18 PROCEDURE — 99211 OFF/OP EST MAY X REQ PHY/QHP: CPT

## 2022-02-18 RX ORDER — WARFARIN SODIUM 5 MG/1
5 TABLET ORAL SEE ADMIN INSTRUCTIONS
Qty: 130 TABLET | Refills: 1 | Status: SHIPPED | OUTPATIENT
Start: 2022-02-18 | End: 2022-03-17 | Stop reason: SDUPTHER

## 2022-02-18 NOTE — PROGRESS NOTES
Ms. Pilo Peña is a 54 y.o. y/o female with history of CVA who presents today for anticoagulation monitoring and adjustment.   INR 2.0 is therapeutic for this patient (goal range 2-3) and is reflective of 52.5 mg TWD  Patient verifies current dosing regimen, patient able to verbally recall dose  Patient reports 0  missed doses since last INR   Patient denies s/sx clotting and/or stroke  Patient denies hematuria, epistaxis, rectal bleeding  Patient denies changes in diet, alcohol, or tobacco use  Reviewed medication list and drug allergies with patient, updated any medication additions or modifications accordingly  Patient also denies any pending medical or dental procedures scheduled at this time  Patient was instructed to continue current dose and RTC 3 weeks      MIKE Castillo KALEIGH [facility] PharmD

## 2022-03-11 DIAGNOSIS — I25.10 CORONARY ARTERY DISEASE INVOLVING NATIVE CORONARY ARTERY OF NATIVE HEART WITHOUT ANGINA PECTORIS: ICD-10-CM

## 2022-03-11 RX ORDER — ISOSORBIDE MONONITRATE 30 MG/1
60 TABLET, EXTENDED RELEASE ORAL DAILY
Qty: 60 TABLET | Refills: 11 | Status: SHIPPED | OUTPATIENT
Start: 2022-03-11 | End: 2022-11-01 | Stop reason: SDUPTHER

## 2022-03-14 ENCOUNTER — TELEPHONE (OUTPATIENT)
Dept: PHARMACY | Age: 52
End: 2022-03-14

## 2022-03-14 RX ORDER — FLASH GLUCOSE SENSOR
KIT MISCELLANEOUS
Qty: 6 EACH | Refills: 3 | Status: SHIPPED | OUTPATIENT
Start: 2022-03-14

## 2022-03-17 ENCOUNTER — HOSPITAL ENCOUNTER (OUTPATIENT)
Dept: PHARMACY | Age: 52
Setting detail: THERAPIES SERIES
Discharge: HOME | End: 2022-03-17
Payer: COMMERCIAL

## 2022-03-17 DIAGNOSIS — D68.59 HYPERCOAGULABLE STATE (HCC): ICD-10-CM

## 2022-03-17 DIAGNOSIS — I63.81 CEREBROVASCULAR ACCIDENT (CVA) OF LEFT THALAMUS (HCC): Primary | ICD-10-CM

## 2022-03-17 DIAGNOSIS — G45.8 OTHER TRANSIENT CEREBRAL ISCHEMIC ATTACKS AND RELATED SYNDROMES: ICD-10-CM

## 2022-03-17 LAB — INTERNATIONAL NORMALIZATION RATIO, POC: 3.8

## 2022-03-17 PROCEDURE — 99211 OFF/OP EST MAY X REQ PHY/QHP: CPT | Performed by: PHARMACIST

## 2022-03-17 PROCEDURE — 85610 PROTHROMBIN TIME: CPT | Performed by: PHARMACIST

## 2022-03-17 RX ORDER — WARFARIN SODIUM 5 MG/1
TABLET ORAL
Qty: 150 TABLET | Refills: 1 | Status: SHIPPED | OUTPATIENT
Start: 2022-03-17 | End: 2022-05-02 | Stop reason: SDUPTHER

## 2022-03-17 RX ORDER — FLUOXETINE 10 MG/1
10 CAPSULE ORAL DAILY
COMMUNITY
End: 2022-06-01

## 2022-03-17 NOTE — PROGRESS NOTES
Ms. Roya Mo is a 54 y.o. y/o female with history of CVA who presents today for anticoagulation monitoring and adjustment. INR 3.8 is supratherapeutic for this patient (goal range 2-3) and is reflective of 55 mg TWD  Patient verifies current dosing regimen, patient able to verbally recall dose  Patient reports no  missed doses since last INR   Patient denies s/sx clotting and/or stroke  Patient denies hematuria, epistaxis, rectal bleeding  Patient denies changes in diet, alcohol, or tobacco use  Reviewed medication list and drug allergies with patient, updated any medication additions or modifications accordingly  Patient also denies any pending medical or dental procedures scheduled at this time. Patient states she started Prozac \"lowest dose\" from [facility] last week (DDI - may increase INR).   Patient was instructed to hold dose today and then decrease TWD 5% to 52.5mg and RTC 2 weeks       For Pharmacy Admin Tracking Only     Intervention Detail: Dose Adjustment: 1, reason: Therapy De-escalation   Total # of Interventions Recommended: 2   Total # of Interventions Accepted: 2   Time Spent (min): 30

## 2022-04-01 ENCOUNTER — HOSPITAL ENCOUNTER (OUTPATIENT)
Dept: PHARMACY | Age: 52
Setting detail: THERAPIES SERIES
Discharge: HOME | End: 2022-04-01
Payer: COMMERCIAL

## 2022-04-01 DIAGNOSIS — G45.8 OTHER TRANSIENT CEREBRAL ISCHEMIC ATTACKS AND RELATED SYNDROMES: ICD-10-CM

## 2022-04-01 DIAGNOSIS — D68.59 HYPERCOAGULABLE STATE (HCC): ICD-10-CM

## 2022-04-01 DIAGNOSIS — I63.81 CEREBROVASCULAR ACCIDENT (CVA) OF LEFT THALAMUS (HCC): Primary | ICD-10-CM

## 2022-04-01 LAB — INTERNATIONAL NORMALIZATION RATIO, POC: 2

## 2022-04-01 PROCEDURE — 99211 OFF/OP EST MAY X REQ PHY/QHP: CPT | Performed by: PHARMACIST

## 2022-04-01 PROCEDURE — 85610 PROTHROMBIN TIME: CPT | Performed by: PHARMACIST

## 2022-04-15 ENCOUNTER — HOSPITAL ENCOUNTER (OUTPATIENT)
Dept: PHARMACY | Age: 52
Setting detail: THERAPIES SERIES
Discharge: HOME | End: 2022-04-15
Payer: COMMERCIAL

## 2022-04-15 DIAGNOSIS — G45.8 OTHER TRANSIENT CEREBRAL ISCHEMIC ATTACKS AND RELATED SYNDROMES: ICD-10-CM

## 2022-04-15 DIAGNOSIS — D68.59 HYPERCOAGULABLE STATE (HCC): ICD-10-CM

## 2022-04-15 DIAGNOSIS — I63.81 CEREBROVASCULAR ACCIDENT (CVA) OF LEFT THALAMUS (HCC): Primary | ICD-10-CM

## 2022-04-15 LAB — INTERNATIONAL NORMALIZATION RATIO, POC: 3.2

## 2022-04-15 PROCEDURE — 85610 PROTHROMBIN TIME: CPT | Performed by: PHARMACIST

## 2022-04-15 PROCEDURE — 99211 OFF/OP EST MAY X REQ PHY/QHP: CPT | Performed by: PHARMACIST

## 2022-04-15 NOTE — PROGRESS NOTES
Ms. Mansi Rodriguez is a 54 y.o. y/o female with history of CVA who presents today for anticoagulation monitoring and adjustment. INR 3.2 is supratherapeutic for this patient (goal range 2-3) and is reflective of 55 mg TWD  Patient verifies current dosing regimen, patient able to verbally recall dose  Patient reports no  missed doses since last INR   Patient denies s/sx clotting and/or stroke  Patient denies hematuria, epistaxis, rectal bleeding  Patient denies changes in diet, alcohol, or tobacco use  Reviewed medication list and drug allergies with patient, updated any medication additions or modifications accordingly  Patient also denies any pending medical or dental procedures scheduled at this time. The patient has already taken today's dose.   Patient was instructed to take only 5mg tomorrow (5% less this week only) then continue 55mg TWD and RTC 2 weeks      For Pharmacy Admin Tracking Only     Intervention Detail: Dose Adjustment: 1, reason: Therapy De-escalation   Total # of Interventions Recommended: 2   Total # of Interventions Accepted: 2   Time Spent (min): 30

## 2022-04-29 ENCOUNTER — HOSPITAL ENCOUNTER (OUTPATIENT)
Dept: PHARMACY | Age: 52
Setting detail: THERAPIES SERIES
Discharge: HOME | End: 2022-04-29
Payer: COMMERCIAL

## 2022-04-29 DIAGNOSIS — G45.8 OTHER TRANSIENT CEREBRAL ISCHEMIC ATTACKS AND RELATED SYNDROMES: ICD-10-CM

## 2022-04-29 DIAGNOSIS — D68.59 HYPERCOAGULABLE STATE (HCC): ICD-10-CM

## 2022-04-29 DIAGNOSIS — I63.81 CEREBROVASCULAR ACCIDENT (CVA) OF LEFT THALAMUS (HCC): Primary | ICD-10-CM

## 2022-04-29 LAB — INTERNATIONAL NORMALIZATION RATIO, POC: 3.4

## 2022-04-29 PROCEDURE — 85610 PROTHROMBIN TIME: CPT

## 2022-04-29 PROCEDURE — 99211 OFF/OP EST MAY X REQ PHY/QHP: CPT

## 2022-04-29 NOTE — PROGRESS NOTES
Ms. Mary Huggins is a 54 y.o. y/o female with history of CVA who presents today for anticoagulation monitoring and adjustment. INR 3.4 is supratherapeutic for this patient (goal range 2-3) and is reflective of 55 mg TWD  Patient verifies current dosing regimen, patient able to verbally recall dose  Patient reports no  missed doses since last INR   Patient denies s/sx clotting and/or stroke  Patient denies hematuria, epistaxis, rectal bleeding  Patient denies changes in diet, alcohol, or tobacco use  Reviewed medication list and drug allergies with patient, updated any medication additions or modifications accordingly    Patient may have multiple teeth pulled but needs clearance from multiple physicians.  Not scheduled at this time    Patient also denies any pending medical or dental procedures scheduled at this time  Patient was instructed to hold today then decrease TWD to 52.5 mg and RTC 2 weeks    For Pharmacy Admin Tracking Only     Intervention Detail: Dose Adjustment: 1, reason: Therapy Optimization and Lab(s) Ordered   Total # of Interventions Recommended: 2   Total # of Interventions Accepted: 2   Time Spent (min): 30    Thao Silva, PharmD, [facility]  Staff Pharmacist  4/29/2022 2:50 PM

## 2022-05-02 RX ORDER — WARFARIN SODIUM 5 MG/1
TABLET ORAL
Qty: 150 TABLET | Refills: 1 | Status: SHIPPED | OUTPATIENT
Start: 2022-05-02 | End: 2022-08-19 | Stop reason: SDUPTHER

## 2022-05-09 RX ORDER — FOLIC ACID 1 MG/1
1 TABLET ORAL DAILY
Qty: 90 TABLET | Refills: 1 | Status: SHIPPED | OUTPATIENT
Start: 2022-05-09

## 2022-05-13 ENCOUNTER — HOSPITAL ENCOUNTER (OUTPATIENT)
Dept: PHARMACY | Age: 52
Setting detail: THERAPIES SERIES
Discharge: HOME | End: 2022-05-13
Payer: COMMERCIAL

## 2022-05-13 DIAGNOSIS — G45.8 OTHER TRANSIENT CEREBRAL ISCHEMIC ATTACKS AND RELATED SYNDROMES: ICD-10-CM

## 2022-05-13 DIAGNOSIS — D68.59 HYPERCOAGULABLE STATE (HCC): ICD-10-CM

## 2022-05-13 DIAGNOSIS — I63.81 CEREBROVASCULAR ACCIDENT (CVA) OF LEFT THALAMUS (HCC): Primary | ICD-10-CM

## 2022-05-13 LAB — INTERNATIONAL NORMALIZATION RATIO, POC: 3.9

## 2022-05-13 PROCEDURE — 99211 OFF/OP EST MAY X REQ PHY/QHP: CPT

## 2022-05-13 PROCEDURE — 85610 PROTHROMBIN TIME: CPT

## 2022-05-13 NOTE — PROGRESS NOTES
Ms. Brice Tobin is a 54 y.o. y/o female with history of CVA who presents today for anticoagulation monitoring and adjustment. INR 3.9 is supratherapeutic for this patient (goal range 2.0-3.0) and is reflective of 52.5 mg TWD  Patient verifies current dosing regimen, patient able to verbally recall dose  Patient reports 0  missed doses since last INR   Patient denies s/sx clotting and/or stroke  Patient denies hematuria, epistaxis, rectal bleeding  Patient denies changes in diet, alcohol, or tobacco use  Reviewed medication list and drug allergies with patient, updated any medication additions or modifications accordingly  Patient also denies any pending medical or dental procedures scheduled at this time  INR supra-therapeutic the previous two visits (prior to that one therapeutic and one supra-therapeutic). Dosing regimen was decreased by about ~5% last time. INR remains supra-therapeutic again today at 3.9. Will need to reduce TWD again. Patient was instructed to NOT take dose today and begin taking 5mg on Monday, 7.5mg all other days (~5% TWD reduction).  RTC in 2 weeks    For Pharmacy Admin Tracking Only     Intervention Detail: Adherence Monitorin, Dose Adjustment: 1, reason: Therapy Optimization and Lab(s) Ordered   Total # of Interventions Recommended: 2   Total # of Interventions Accepted: 2   Time Spent (min): 15      Lawson Pollard PharmD   2022 3:11 PM

## 2022-05-31 ENCOUNTER — TELEPHONE (OUTPATIENT)
Dept: PHARMACY | Age: 52
End: 2022-05-31

## 2022-06-01 ENCOUNTER — OFFICE VISIT (OUTPATIENT)
Dept: NEUROLOGY | Age: 52
End: 2022-06-01
Payer: COMMERCIAL

## 2022-06-01 VITALS
WEIGHT: 198 LBS | DIASTOLIC BLOOD PRESSURE: 82 MMHG | HEIGHT: 67 IN | SYSTOLIC BLOOD PRESSURE: 122 MMHG | HEART RATE: 79 BPM | BODY MASS INDEX: 31.08 KG/M2

## 2022-06-01 DIAGNOSIS — D68.59 HYPERCOAGULABLE STATE (HCC): ICD-10-CM

## 2022-06-01 DIAGNOSIS — I63.512 CEREBROVASCULAR ACCIDENT (CVA) DUE TO STENOSIS OF LEFT MIDDLE CEREBRAL ARTERY (HCC): Primary | ICD-10-CM

## 2022-06-01 DIAGNOSIS — Z15.89 MTHFR GENE MUTATION: ICD-10-CM

## 2022-06-01 DIAGNOSIS — F32.89 OTHER DEPRESSION: ICD-10-CM

## 2022-06-01 PROCEDURE — 99214 OFFICE O/P EST MOD 30 MIN: CPT | Performed by: PSYCHIATRY & NEUROLOGY

## 2022-06-01 RX ORDER — LIDOCAINE 4 G/G
1 PATCH TOPICAL DAILY
COMMUNITY
Start: 2022-06-01 | End: 2022-06-06

## 2022-06-01 RX ORDER — FLUOXETINE HYDROCHLORIDE 20 MG/1
CAPSULE ORAL
COMMUNITY
Start: 2022-05-03

## 2022-06-01 ASSESSMENT — ENCOUNTER SYMPTOMS
COLOR CHANGE: 0
PHOTOPHOBIA: 0
VOMITING: 0
NAUSEA: 0
SHORTNESS OF BREATH: 0
TROUBLE SWALLOWING: 0
CHOKING: 0
BACK PAIN: 0

## 2022-06-01 NOTE — PROGRESS NOTES
Subjective:      Patient ID: Phoebe Pascal is a 54 y.o. 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 Patient was last seen here in November 2021. November she had a thalamic stroke. She had complete evaluation of the same with a low B12 levels. He was seen by psychiatry for suicidal ideation as well. In the interim she was investigated we are aware that she has an MTHFR mutation and is on Coumadin. Reports that she is not doing well. On close questioning she reports that she cannot put everything together in her thought process. She does not any other physical symptoms she still works. She has significant depression and is followed by psychiatry I do not see anything new added or changed.     Past Medical History:   Diagnosis Date    Anxiety     Back pain     back surgery x 4    Bipolar disorder ([facility]     CAD (coronary artery disease)     CAFL (chronic airflow limitation) ([facility]) 11/3/2016    Cerebral artery occlusion with cerebral infarction ([facility])     Chronic bronchitis ([facility])     Chronic pain     Colitis     Complicated migraine     DDD (degenerative disc disease), lumbar 12/22/2016    Depression     Endometriosis     Excessive caffeine abuse, continuous ([facility]) 7/6/2018    Gastritis     GERD (gastroesophageal reflux disease)     History of myocardial infarction     HTN (hypertension), benign 2/19/2016    Impaired mobility and activities of daily living     Over weight     PTSD (post-traumatic stress disorder)     Sensory neuropathy 12/22/2016    Somatization disorder     TIA (transient ischemic attack)     Tobacco abuse     Vasospastic angina ([address] 11/11/2016     Past Surgical History:   Procedure Laterality Date    BACK SURGERY      x2     SECTION      x2    CHOLECYSTECTOMY  13    Lap[place]ole    COLONOSCOPY N/A 2020    COLONOSCOPY DIAGNOSTIC performed by Luis Azevedo MD at [address] [place] [address] N/A 10/25/2021    COLONOSCOPY DIAGNOSTIC performed by Luis Azevedo MD at [place] [address]  3/7/16    Dr. Maldonado Mater ENDOSCOPY  2014    ANIBAL HOUSE M.D.   [facility] N/A 2020    EGD ESOPHAGOGASTRODUODENOSCOPY performed by Luis [facility]edo MD at [facility] Marital status:      Spouse name: Not on file    Number of children: Not on file    Years of education: Not on file    Highest education level: Not on file   Occupational History    Occupation: unemployed   Tobacco Use    Smoking status: Current Every Day Smoker     Packs/day: 1.00     Years: 17.00     Pack years: 17.00     Types: Cigarettes    Smokeless tobacco: Never Used   Vaping Use    Vaping Use: Never used   Substance and Sexual Activity    Alcohol use: No    Drug use: No    Sexual activity: Not Currently     Partners: Male   Other Topics Concern    Not on file   Social History Narrative    Nikole Grande is a 82-year-old female who is on disability because of pain and bipolar disorder. She's also had a presumed diagnosis of possible multiple sclerosis. She's been seeing Dr. Nury Lopez for that and she says that the diagnosis is sometimes in question and it's clear neuropathy vitamin B12 deficiency as well as generalized bodyaches from the severe vitamin D deficiency have caused this scenario that looks like multiple sclerosis.      lives With: Significant other    Type of Home: House [facility] in  [address] to enter with rails  - Number of Steps: 5 Bathroom Shower/Tub: Tub only    ADL Assistance: Independent    Homemaking Assistance: Independent, Homemaking Responsibilities: Yes, Meal Prep Responsibility: Primary    Laundry Responsibility: Primary, Cleaning Responsibility: Primary    Bill Paying/Finance Responsibility: Primary    Shopping Responsibility: Primary, Ambulation Assistance: Independent, Transfer Assistance: Independent    Active : Yes    Occupation: Full time employment--Type of occupation: disabled-  - in charges of [address] 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 Work requirements are Pt was looking at [employer] as main part of job working nights     Social Determinants of Health     Financial Resource Strain:     Difficulty of Paying Living Expenses: Not on file   Food Insecurity:     Worried About [address] in the Last Year: Not on file    [address] in the Last Year: Not on file   Transportation Needs:     Lack of Transportation (Medical): Not on file    Lack of Transportation (Non-Medical):  Not on file   Physical Activity:     Days of Exercise per Week: Not on file    Minutes of Exercise per Session: Not on file   Stress:     Feeling of Stress : Not on file   Social Connections:     Frequency of Communication with Friends and Family: Not on file    Frequency of Social Gatherings with Friends and Family: Not on file    Attends Sikh Services: Not on file    Active Member of [address] [place] [address] or Organizations: Not on file    Attends Club or Organization Meetings: Not on file    Marital Status: Not on file   Intimate Partner Violence:     Fear of Current or Ex-Partner: Not on file    Emotionally Abused: Not on file    Physically Abused: Not on file    Sexually Abused: Not on file   Housing Stability:     Unable to Pay for Housing in the Last Year: Not on file    Number of [place] in the Last Year: Not on file    Unstable Housing in the Last Year: Not on file Family History   Problem Relation Age of Onset    High Blood Pressure Mother     Kidney Disease Mother     Lupus Mother     Coronary Art Dis Mother         Had stents in her 62s   [facility] Bipolar Disorder Son      Allergies   Allergen Reactions    Latex Itching     Pt reports itching on hands after using rubber gloves at work    Influenza Vaccines Swelling     Pt reports her entire arm was red and edematous post vaccine    Albumen, Egg Other (See Comments)     Flu shot -  Localized reaction from flu vaccine. Pt eats eggs with no issues.  Eggs Or Egg-Derived [employer] based products only    Gabapentin Nausea Only    Pneumococcal Vaccines Hives    Povidone Iodine Itching    Varicella Virus Vaccine Live Hives       Current Outpatient Medications   Medication Sig Dispense Refill    lidocaine 4 % external patch Place 1 patch onto the skin daily      FLUoxetine (PROZAC) 20 MG capsule       folic acid (FOLVITE) 1 MG tablet Take 1 tablet by mouth daily 90 tablet 1    warfarin (COUMADIN) 5 MG tablet Take as directed by [facility] anticoagulation clinic. Quantity is 90 day supply. 150 tablet 1    Continuous Blood Gluc Sensor (FREESTYLE JOSE 2 SENSOR) MISC apply sensor every 14 day 6 each 3    isosorbide mononitrate (IMDUR) 30 MG extended release tablet Take 2 tablets by mouth daily 60 tablet 11    butalbital-acetaminophen-caffeine (FIORICET, ESGIC) -40 MG per tablet Take 1 tablet by mouth every 6 hours as needed for Headaches 40 tablet 3    albuterol (PROVENTIL) (2.5 MG/3ML) 0.083% nebulizer solution Take 3 mLs by nebulization every 6 hours as needed for Wheezing 1 each 1    lithium 150 MG capsule Take 150 mg by mouth 3 times daily (with meals)      Lisdexamfetamine Dimesylate (VYVANSE) 10 MG CAPS Take by mouth daily.       permethrin (ELIMITE) 5 % cream APPLY TO THE AFFECTED AREA(S) ONCE DAILY for 1 (ONE) dose      Na Sulfate-K Sulfate-Mg Sulf 17.5-3.13-1.6 GM/177ML SOLN As directed 354 mL 0    cyclobenzaprine (FLEXERIL) 10 MG tablet       Continuous Blood Gluc  (FREESTYLE JOSE 2 READER) ANITHA 1 Device by Does not apply route 4 times daily (before meals and nightly) 1 each 0    amLODIPine (NORVASC) 5 MG tablet Take 1 tablet by mouth once daily.  losartan (COZAAR) 100 MG tablet Take 1 tablet by mouth once daily.  Handicap Placard MISC by Does not apply route Duration 2 years 1 each 0    Blood Pressure KIT Blood pressure cuff. 1 kit 0    Fluticasone-Umeclidin-Vilant (TRELEGY ELLIPTA) 200-62.5-25 MCG/INH AEPB Inhale 1 puff into the lungs daily      SITagliptin (JANUVIA) 25 MG tablet Take 1 tablet by mouth daily 30 tablet 0    metFORMIN (GLUCOPHAGE) 500 MG tablet Take 1 tablet by mouth 2 times daily (with meals) 60 tablet 3    cariprazine hcl (VRAYLAR) 3 MG CAPS capsule Take 1 capsule by mouth daily 30 capsule 0    atorvastatin (LIPITOR) 20 MG tablet Take 1 tablet by mouth nightly 30 tablet 3    docusate sodium (COLACE, DULCOLAX) 100 MG CAPS Take 200 mg by mouth 2 times daily 120 capsule 2    butalbital-aspirin-caffeine (FIORINAL) -40 MG capsule Take 1 capsule by mouth every 4 hours as needed for Headaches for up to 10 days. 20 capsule 0    nitroGLYCERIN (NITROSTAT) 0.4 MG SL tablet up to max of 3 total doses.  If no relief after 1 dose, call 911. 25 tablet 3    albuterol sulfate HFA (PROVENTIL HFA) 108 (90 Base) MCG/ACT inhaler Inhale 2 puffs into the lungs every 6 hours as needed for Wheezing 1 Inhaler 3    FREESTYLE LANCETS MISC USE DAILY AS DIRECTED 100 each 10    budesonide (PULMICORT) 0.5 MG/2ML nebulizer suspension INHALE 1 VIAL VIA NEBULIZER TWICE DAILY 120 mL 5    BD INTEGRA SYRINGE 25G X 1\" 3 ML MISC USE AS DIRECTED EVERY MONTH 25 each 5    aspirin 81 MG chewable tablet Take 1 tablet by mouth daily 30 tablet 5    FREESTYLE LITE strip USE DAILY AS DIRECTED 50 strip 11    ipratropium-albuterol (DUONEB) 0.5-2.5 (3) MG/3ML SOLN nebulizer solution Inhale 3 mLs into the lungs three times daily 360 mL 5    VENTOLIN  (90 Base) MCG/ACT inhaler Inhale 2 puffs into the lungs every 6 hours as needed for Wheezing 1 Inhaler 5     No current facility-administered medications for this visit. Review of Systems   Constitutional: Negative for fever. HENT: Negative for ear pain, tinnitus and trouble swallowing. Eyes: Negative for photophobia and visual disturbance. Respiratory: Negative for choking and shortness of breath. Cardiovascular: Negative for chest pain and palpitations. Gastrointestinal: Negative for nausea and vomiting. Musculoskeletal: Negative for back pain, gait problem, joint swelling, myalgias, neck pain and neck stiffness. Skin: Negative for color change. Allergic/Immunologic: Negative for food allergies. Neurological: Negative for dizziness, tremors, seizures, syncope, facial asymmetry, speech difficulty, weakness, light-headedness, numbness and headaches. Psychiatric/Behavioral: Negative for behavioral problems, confusion, hallucinations and sleep disturbance. Objective:   /82 (Site: Left Upper Arm, Position: Sitting, Cuff Size: Medium Adult)   Pulse 79   Ht 5' 7\" (1.702 m)   Wt 198 lb (89.8 kg)   BMI 31.01 kg/m²     Physical Exam  Vitals reviewed. Eyes:      Pupils: Pupils are equal, round, and reactive to light. Cardiovascular:      Rate and Rhythm: Normal rate and regular rhythm. Heart sounds: No murmur heard. Pulmonary:      Effort: Pulmonary effort is normal.      Breath sounds: Normal breath sounds. Abdominal:      General: Bowel sounds are normal.   Musculoskeletal:         General: Normal range of motion. Cervical back: Normal range of motion. Skin:     General: Skin is warm. Neurological:      Mental Status: She is alert and oriented to person, place, and time. Cranial Nerves: No cranial nerve deficit. Sensory: No sensory deficit. Motor: No abnormal muscle tone. Coordination: Coordination normal.      Deep Tendon Reflexes: Reflexes are normal and symmetric. Babinski sign absent on the right side. Babinski sign absent on the left side. Psychiatric:         Mood and Affect: Mood normal.         No results found. Lab Results   Component Value Date    WBC 8.3 01/04/2022    RBC 4.14 01/04/2022    HGB 13.0 01/04/2022    HCT 38.7 01/04/2022    MCV 93.5 01/04/2022    MCH 31.4 01/04/2022    MCHC 33.6 01/04/2022    RDW 14.2 01/04/2022     01/04/2022    MPV 9.6 09/20/2015     Lab Results   Component Value Date     01/04/2022    K 4.3 01/04/2022    K 3.4 12/28/2020     01/04/2022    CO2 24 01/04/2022    BUN 14 01/04/2022    CREATININE 1.02 01/04/2022    GFRAA >60.0 01/04/2022    LABGLOM 57.5 01/04/2022    GLUCOSE 89 01/04/2022    PROT 6.8 01/04/2022    LABALBU 4.4 01/04/2022    CALCIUM 9.6 01/04/2022    BILITOT <0.2 01/04/2022    ALKPHOS 137 01/04/2022    AST 17 01/04/2022    ALT 29 01/04/2022     Lab Results   Component Value Date    PROTIME 21.6 02/22/2021    INR 3.9 05/13/2022    INR 1.9 02/22/2021     Lab Results   Component Value Date    TSH 4.070 04/10/2019    GCOKDZXK87 215 10/22/2021    FOLATE 3.3 10/22/2021    FERRITIN 282.0 12/11/2020    IRON 51 12/11/2020    TIBC 272 12/11/2020     Lab Results   Component Value Date    TRIG 251 12/24/2020    HDL 27 12/24/2020    LDLCALC 86 12/24/2020     Lab Results   Component Value Date    LABAMPH Neg 11/18/2021    BARBSCNU Neg 11/18/2021    LABBENZ Neg 11/18/2021    LABBENZ NotDTCD 01/26/2013    LABMETH Neg 11/18/2021    OPIATESCREENURINE POSITIVE 11/18/2021    PHENCYCLIDINESCREENURINE Neg 11/18/2021    ETOH <10 12/22/2020     Lab Results   Component Value Date    LITHIUM 0.4 01/04/2022       Assessment:       Diagnosis Orders   1. Cerebrovascular accident (CVA) due to stenosis of left middle cerebral artery ([facility]     2. Other depression     3. Hypercoagulable state ([facility] [address] [facility]     4.  MTHFR gene mutation     Left

## 2022-06-06 ENCOUNTER — TELEPHONE (OUTPATIENT)
Dept: PHARMACY | Age: 52
End: 2022-06-06

## 2022-06-10 ENCOUNTER — HOSPITAL ENCOUNTER (OUTPATIENT)
Dept: PHARMACY | Age: 52
Setting detail: THERAPIES SERIES
Discharge: HOME | End: 2022-06-10
Payer: COMMERCIAL

## 2022-06-10 ENCOUNTER — TELEPHONE (OUTPATIENT)
Dept: NEUROLOGY | Age: 52
End: 2022-06-10

## 2022-06-10 DIAGNOSIS — G45.8 OTHER TRANSIENT CEREBRAL ISCHEMIC ATTACKS AND RELATED SYNDROMES: ICD-10-CM

## 2022-06-10 DIAGNOSIS — I63.81 CEREBROVASCULAR ACCIDENT (CVA) OF LEFT THALAMUS (HCC): Primary | ICD-10-CM

## 2022-06-10 DIAGNOSIS — D68.59 HYPERCOAGULABLE STATE (HCC): ICD-10-CM

## 2022-06-10 LAB — INTERNATIONAL NORMALIZATION RATIO, POC: 7.1

## 2022-06-10 PROCEDURE — 99211 OFF/OP EST MAY X REQ PHY/QHP: CPT | Performed by: PHARMACIST

## 2022-06-10 PROCEDURE — 85610 PROTHROMBIN TIME: CPT | Performed by: PHARMACIST

## 2022-06-10 NOTE — PROGRESS NOTES
Ms. Jaleesa Coburn is a 54 y.o. female with history of CVA who presents today for anticoagulation monitoring and adjustment. Face to face visit completed, procedural mask and face shield worn by myself as instructed: Mask worn by patient, room cleaned pre and post visit with Virex Plus spray    INR 7.1 is supratherapeutic for this patient (goal range 2-3) and is reflective of 60 mg TWD. Patient's scheduled TWD is 50mg but patient did not follow the schedule and ending up taking 60mg. ( Did not reduce dosage as recommended at last visit (52.5 to 50mg) and took an extra 7.5mg by mistake)    Patient reports one extra dose of 7.5mg in the last seven days     Patient denies s/sx clotting and/or stroke  Patient denies hematuria, epistaxis, rectal bleeding  Patient denies changes in diet  Patient denies changes in alcohol use  Patient denies changes in tobacco use    Reviewed medication list and drug allergies with patient, updated any medication additions or modifications accordingly  Patient's chart indicates that patient was in the ED last week for back pain and was given an RX for prednisone which she should have finished 5 days ago. Patient denies any pending medical procedures scheduled at this time  Patient denies any pending dental procedures scheduled at this time    Consult with Dr. Aguilar Carcamo: Spoke with Katelyn Payne from Dr. Wesley Uriarte office. He is in agreement with [facility] suggestions. Patient was instructed to hold for 3 days then restart warfarin at 50mg TWD and RTC in 1 week    For Pharmacy Admin Tracking Only     Intervention Detail: Adherence Monitorin   Total # of Interventions Recommended: 1   Total # of Interventions Accepted: 1   Time Spent (min): Prabhjot Gu. MAIKOL Agustin Ph. 6/10/2022 11:45 AM

## 2022-06-10 NOTE — TELEPHONE ENCOUNTER
Pernell Myrick from the coumadin clinic came up stating that the patients INR was 7.1 today. She was to be on 52.5 mg per week, but she took an extra dose. Because she was not sure if she forgot to take it the night before she was also on prednisone . States that she advised to HOLD for 3 days (Friday, Saturday, Sunday) and resume Monday at 5 mg back to 7.5 mg Tuesday, Wednesday and Thursday and recheck on Friday. If you are in agreement they said that is ok, or if you have any other suggestions to let the coumadin clinic know.

## 2022-06-17 ENCOUNTER — HOSPITAL ENCOUNTER (OUTPATIENT)
Dept: PHARMACY | Age: 52
Setting detail: THERAPIES SERIES
Discharge: HOME | End: 2022-06-17
Payer: COMMERCIAL

## 2022-06-17 DIAGNOSIS — I63.81 CEREBROVASCULAR ACCIDENT (CVA) OF LEFT THALAMUS (HCC): Primary | ICD-10-CM

## 2022-06-17 DIAGNOSIS — G45.8 OTHER TRANSIENT CEREBRAL ISCHEMIC ATTACKS AND RELATED SYNDROMES: ICD-10-CM

## 2022-06-17 DIAGNOSIS — D68.59 HYPERCOAGULABLE STATE (HCC): ICD-10-CM

## 2022-06-17 LAB — INTERNATIONAL NORMALIZATION RATIO, POC: 2.4

## 2022-06-17 PROCEDURE — 85610 PROTHROMBIN TIME: CPT | Performed by: PHARMACIST

## 2022-06-17 PROCEDURE — 99211 OFF/OP EST MAY X REQ PHY/QHP: CPT | Performed by: PHARMACIST

## 2022-06-17 NOTE — PROGRESS NOTES
Ms. Marlyn Lanes is a 54 y.o. female with history of CVA who presents today for anticoagulation monitoring and adjustment. Face to face visit completed, procedural mask worn by myself as instructed: Mask worn by patient, room cleaned pre and post visit with Virex Plus spray    INR 2.4 is therapeutic for this patient (goal range 2-3) and is reflective of 27.5 mg TWD  Patient's current dosing schedule is 47.5 TWD  Patient verifies current dosing regimen, patient able to verbally recall dose    Patient reports no missed doses in the last seven days but she did hold for 3 days due to an INR of 7.1 at last visit. Patient denies s/sx clotting and/or stroke  Patient denies hematuria, epistaxis, rectal bleeding  Patient denies changes in diet  Patient denies changes in alcohol use  Patient denies changes in tobacco use    Discussed current medications and drug allergies with patient, updated any medication additions or modifications accordingly  Patient is no longer on prednisone. Patient denies any pending medical procedures scheduled at this time  Patient denies any pending dental procedures scheduled at this time    Patient was instructed to continue 47.5mg TWD and RTC in 2 weeks    For Pharmacy [address] [place] [address] Intervention Detail: Adherence Monitorin   Total # of Interventions Recommended: 1   Total # of Interventions Accepted: 1   Time Spent (min): 30      MAIKOL Solano Ph. 2022 2:52 PM

## 2022-07-07 ENCOUNTER — TELEPHONE (OUTPATIENT)
Dept: PHARMACY | Age: 52
End: 2022-07-07

## 2022-07-07 DIAGNOSIS — I63.81 CEREBROVASCULAR ACCIDENT (CVA) OF LEFT THALAMUS (HCC): Primary | ICD-10-CM

## 2022-07-07 DIAGNOSIS — D68.59 HYPERCOAGULABLE STATE (HCC): ICD-10-CM

## 2022-07-07 DIAGNOSIS — G45.8 OTHER TRANSIENT CEREBRAL ISCHEMIC ATTACKS AND RELATED SYNDROMES: ICD-10-CM

## 2022-07-14 ENCOUNTER — TELEPHONE (OUTPATIENT)
Dept: PHARMACY | Age: 52
End: 2022-07-14

## 2022-07-14 DIAGNOSIS — G45.8 OTHER TRANSIENT CEREBRAL ISCHEMIC ATTACKS AND RELATED SYNDROMES: ICD-10-CM

## 2022-07-14 DIAGNOSIS — D68.59 HYPERCOAGULABLE STATE (HCC): ICD-10-CM

## 2022-07-14 DIAGNOSIS — I63.81 CEREBROVASCULAR ACCIDENT (CVA) OF LEFT THALAMUS (HCC): Primary | ICD-10-CM

## 2022-07-18 ENCOUNTER — TELEPHONE (OUTPATIENT)
Dept: PHARMACY | Age: 52
End: 2022-07-18

## 2022-07-18 DIAGNOSIS — D68.59 HYPERCOAGULABLE STATE (HCC): ICD-10-CM

## 2022-07-18 DIAGNOSIS — G45.8 OTHER TRANSIENT CEREBRAL ISCHEMIC ATTACKS AND RELATED SYNDROMES: ICD-10-CM

## 2022-07-18 DIAGNOSIS — I63.81 CEREBROVASCULAR ACCIDENT (CVA) OF LEFT THALAMUS (HCC): Primary | ICD-10-CM

## 2022-07-21 ENCOUNTER — HOSPITAL ENCOUNTER (OUTPATIENT)
Dept: PHARMACY | Age: 52
Setting detail: THERAPIES SERIES
Discharge: HOME | End: 2022-07-21
Payer: COMMERCIAL

## 2022-07-21 DIAGNOSIS — I63.81 CEREBROVASCULAR ACCIDENT (CVA) OF LEFT THALAMUS (HCC): Primary | ICD-10-CM

## 2022-07-21 DIAGNOSIS — D68.59 HYPERCOAGULABLE STATE (HCC): ICD-10-CM

## 2022-07-21 DIAGNOSIS — G45.8 OTHER TRANSIENT CEREBRAL ISCHEMIC ATTACKS AND RELATED SYNDROMES: ICD-10-CM

## 2022-07-21 LAB — INTERNATIONAL NORMALIZATION RATIO, POC: 3.5

## 2022-07-21 PROCEDURE — 85610 PROTHROMBIN TIME: CPT

## 2022-07-21 PROCEDURE — 99211 OFF/OP EST MAY X REQ PHY/QHP: CPT

## 2022-07-21 NOTE — PROGRESS NOTES
Ms. Loree Edwards is a 50 y.o. y/o female with history of CVA who presents today for anticoagulation monitoring and adjustment. INR 3.5 is supra-therapeutic for this patient (goal range 2-3) and is reflective of 47.5 mg TWD  Patient verifies current dosing regimen, patient able to verbally recall dose  Patient reports 0  missed doses since last INR   Patient denies s/sx clotting and/or stroke  Patient denies hematuria, epistaxis, rectal bleeding  Patient denies changes in diet, alcohol, or tobacco use  Reviewed medication list and drug allergies with patient, updated any medication additions or modifications accordingly. Pt to have teeth pulled in near future and will need to stop warfarin prior, but does not know date yet.   Patient also denies any pending medical or dental procedures scheduled at this time  Patient was instructed to decrease to 45 mg TWD and RTC 2 weeks        For Pharmacy Admin Tracking Only    Intervention Detail: Dose Adjustment: 1, reason: Therapy De-escalation  Total # of Interventions Recommended: 1  Total # of Interventions Accepted: 1  Time Spent (min): 15

## 2022-08-05 ENCOUNTER — HOSPITAL ENCOUNTER (OUTPATIENT)
Dept: PHARMACY | Age: 52
Setting detail: THERAPIES SERIES
Discharge: HOME | End: 2022-08-05
Payer: COMMERCIAL

## 2022-08-05 DIAGNOSIS — I63.81 CEREBROVASCULAR ACCIDENT (CVA) OF LEFT THALAMUS (HCC): Primary | ICD-10-CM

## 2022-08-05 DIAGNOSIS — G45.8 OTHER TRANSIENT CEREBRAL ISCHEMIC ATTACKS AND RELATED SYNDROMES: ICD-10-CM

## 2022-08-05 DIAGNOSIS — D68.59 HYPERCOAGULABLE STATE (HCC): ICD-10-CM

## 2022-08-05 LAB — INTERNATIONAL NORMALIZATION RATIO, POC: 3.1

## 2022-08-05 PROCEDURE — 85610 PROTHROMBIN TIME: CPT | Performed by: PHARMACIST

## 2022-08-05 PROCEDURE — 99211 OFF/OP EST MAY X REQ PHY/QHP: CPT | Performed by: PHARMACIST

## 2022-08-05 NOTE — PROGRESS NOTES
Ms. Gisel Ricci is a 50 y.o. y/o female with history of CVA who presents today for anticoagulation monitoring and adjustment.   INR 3.1 is slightly supratherapeutic for this patient (goal range 2-3) and is reflective of 45 mg TWD  Patient verifies current dosing regimen, patient able to verbally recall dose  Patient reports no  missed doses since last INR   Patient denies s/sx clotting and/or stroke  Patient denies hematuria, epistaxis, rectal bleeding  Patient denies changes in diet, alcohol, or tobacco use  Reviewed medication list and drug allergies with patient, updated any medication additions or modifications accordingly  Patient also denies any pending medical or dental procedures scheduled at this time  Patient was instructed to continue 45mg TWD and RTC 2 weeks        For Pharmacy Admin Tracking Only    Intervention Detail: Adherence Monitorin  Total # of Interventions Recommended: 1  Total # of Interventions Accepted: 1  Time Spent (min): 30

## 2022-08-08 ENCOUNTER — HOSPITAL ENCOUNTER (EMERGENCY)
Age: 52
Discharge: HOME | End: 2022-08-08
Payer: COMMERCIAL

## 2022-08-08 VITALS
WEIGHT: 200 LBS | HEART RATE: 77 BPM | SYSTOLIC BLOOD PRESSURE: 127 MMHG | TEMPERATURE: 97.8 F | BODY MASS INDEX: 31.39 KG/M2 | RESPIRATION RATE: 16 BRPM | OXYGEN SATURATION: 97 % | DIASTOLIC BLOOD PRESSURE: 70 MMHG | HEIGHT: 67 IN

## 2022-08-08 DIAGNOSIS — R23.3 PETECHIAE: Primary | ICD-10-CM

## 2022-08-08 LAB
APTT: 52.4 SEC (ref 24.4–36.8)
BASOPHILS ABSOLUTE: 0.1 K/UL (ref 0–0.2)
BASOPHILS RELATIVE PERCENT: 0.7 %
EOSINOPHILS ABSOLUTE: 0.1 K/UL (ref 0–0.7)
EOSINOPHILS RELATIVE PERCENT: 1.5 %
HCT VFR BLD CALC: 43.4 % (ref 37–47)
HEMOGLOBIN: 14.6 G/DL (ref 12–16)
INR BLD: 3.1
LYMPHOCYTES ABSOLUTE: 2.3 K/UL (ref 1–4.8)
LYMPHOCYTES RELATIVE PERCENT: 34 %
MCH RBC QN AUTO: 30.8 PG (ref 27–31.3)
MCHC RBC AUTO-ENTMCNC: 33.7 % (ref 33–37)
MCV RBC AUTO: 91.4 FL (ref 82–100)
MONOCYTES ABSOLUTE: 0.4 K/UL (ref 0.2–0.8)
MONOCYTES RELATIVE PERCENT: 6.2 %
NEUTROPHILS ABSOLUTE: 4 K/UL (ref 1.4–6.5)
NEUTROPHILS RELATIVE PERCENT: 57.6 %
PDW BLD-RTO: 14.3 % (ref 11.5–14.5)
PLATELET # BLD: 145 K/UL (ref 130–400)
PROTHROMBIN TIME: 31.1 SEC (ref 12.3–14.9)
RBC # BLD: 4.75 M/UL (ref 4.2–5.4)
WBC # BLD: 6.9 K/UL (ref 4.8–10.8)

## 2022-08-08 PROCEDURE — 36415 COLL VENOUS BLD VENIPUNCTURE: CPT

## 2022-08-08 PROCEDURE — 85025 COMPLETE CBC W/AUTO DIFF WBC: CPT

## 2022-08-08 PROCEDURE — 85610 PROTHROMBIN TIME: CPT

## 2022-08-08 PROCEDURE — 85730 THROMBOPLASTIN TIME PARTIAL: CPT

## 2022-08-08 PROCEDURE — 99283 EMERGENCY DEPT VISIT LOW MDM: CPT

## 2022-08-08 ASSESSMENT — ENCOUNTER SYMPTOMS
ABDOMINAL PAIN: 0
BACK PAIN: 0
COUGH: 0
SHORTNESS OF BREATH: 0

## 2022-08-08 NOTE — ED TRIAGE NOTES
Pt arrived to triage via private vehicle. Pt c/o red dots to left arm for the past 3 days. Pt denies any pain or itching. Pt states her job saw it and was worried and wanted her to get checked out.

## 2022-08-08 NOTE — ED PROVIDER NOTES
[address] [facility] ED  eMERGENCY dEPARTMENT eNCOUnter      Pt Name: Eder Baxter  MRN: [id number]  [place] 1970  Date of evaluation: 8/8/2022  Provider: NIKI Varner CNP      HISTORY OF PRESENT ILLNESS    Eder Baxter is a 50 y.o. female who presents to the Emergency Department with bruising to L arm she noticed 3 days ago. Patient denies any injury or trauma. She does take coumadin for hx of CVA. She had her last INR on Friday and states it was 3.1. She denies any nose bleeding, tarry stools or emesis. Brusing is not itchy or painful. REVIEW OF SYSTEMS       Review of Systems   Constitutional:  Negative for fever. HENT:  Negative for congestion. Respiratory:  Negative for cough and shortness of breath. Cardiovascular:  Negative for chest pain. Gastrointestinal:  Negative for abdominal pain. Genitourinary:  Negative for dysuria. Musculoskeletal:  Negative for arthralgias and back pain. Skin:         Bruising to L FA   All other systems reviewed and are negative.       PAST MEDICAL HISTORY     Past Medical History:   Diagnosis Date    Anxiety     Back pain     back surgery x 4    Bipolar disorder ([address]     CAD (coronary artery disease)     CAFL (chronic airflow limitation) (HCC) 11/3/2016    Cerebral artery occlusion with cerebral infarction (HCC)     Chronic bronchitis (HCC)     Chronic pain     Colitis     Complicated migraine     DDD (degenerative disc disease), lumbar 12/22/2016    Depression     Endometriosis     Excessive caffeine abuse, continuous ([address] 7/6/2018    Gastritis     GERD (gastroesophageal reflux disease)     History of myocardial infarction     HTN (hypertension), benign 2/19/2016    Impaired mobility and activities of daily living     Over weight     PTSD (post-traumatic stress disorder)     Sensory neuropathy 12/22/2016    Somatization disorder     TIA (transient ischemic attack)     Tobacco abuse     Vasospastic angina ([address] 11/11/2016 SURGICAL HISTORY       Past Surgical History:   Procedure Laterality Date    BACK SURGERY      x2     SECTION      x2    CHOLECYSTECTOMY  15    Lapchole    COLONOSCOPY N/A 2020    COLONOSCOPY DIAGNOSTIC performed by Nano No MD at [facility]    COLONOSCOPY N/A 10/25/2021    COLONOSCOPY DIAGNOSTIC performed by Nano No MD at [address]  3/7/16    Dr. Lillie Cordova (CERVIX STATUS UNKNOWN)      NECK SURGERY      SHOULDER SURGERY Left     UPPER GASTROINTESTINAL ENDOSCOPY  2014    ANIBAL HOUSE M.D. UPPER GASTROINTESTINAL ENDOSCOPY N/A 2020    EGD ESOPHAGOGASTRODUODENOSCOPY performed by Nano No MD at [address] [place] [address]       Previous Medications    ALBUTEROL (PROVENTIL) (2.5 MG/3ML) 0.083% NEBULIZER SOLUTION    Take 3 mLs by nebulization every 6 hours as needed for Wheezing    ALBUTEROL SULFATE HFA (PROVENTIL HFA) 108 (90 BASE) MCG/ACT INHALER    Inhale 2 puffs into the lungs every 6 hours as needed for Wheezing    AMLODIPINE (NORVASC) 5 MG TABLET    Take 1 tablet by mouth once daily. ASPIRIN 81 MG CHEWABLE TABLET    Take 1 tablet by mouth daily    ATORVASTATIN (LIPITOR) 20 MG TABLET    Take 1 tablet by mouth nightly    BD INTEGRA SYRINGE 25G X 1\" 3 ML MISC    USE AS DIRECTED EVERY MONTH    BLOOD PRESSURE KIT    Blood pressure cuff. BUDESONIDE (PULMICORT) 0.5 MG/2ML NEBULIZER SUSPENSION    INHALE 1 VIAL VIA NEBULIZER TWICE DAILY    BUTALBITAL-ACETAMINOPHEN-CAFFEINE (FIORICET, ESGIC) -40 MG PER TABLET    Take 1 tablet by mouth every 6 hours as needed for Headaches    BUTALBITAL-ASPIRIN-CAFFEINE (FIORINAL) -40 MG CAPSULE    Take 1 capsule by mouth every 4 hours as needed for Headaches for up to 10 days.     CARIPRAZINE HCL (VRAYLAR) 3 MG CAPS CAPSULE    Take 1 capsule by mouth daily    CONTINUOUS BLOOD GLUC  (FREESTYLE JOSE 2 READER) ANITHA    1 Device by Does not apply route 4 times daily (before meals and nightly)    CONTINUOUS BLOOD GLUC SENSOR (FREESTYLE JOSE 2 SENSOR) MISC    apply sensor every 14 day    CYCLOBENZAPRINE (FLEXERIL) 10 MG TABLET        DOCUSATE SODIUM (COLACE, DULCOLAX) 100 MG CAPS    Take 200 mg by mouth 2 times daily    FLUOXETINE (PROZAC) 20 MG CAPSULE        FLUTICASONE-UMECLIDIN-VILANT (TRELEGY ELLIPTA) 200-62.5-25 MCG/INH AEPB    Inhale 1 puff into the lungs daily    FOLIC ACID (FOLVITE) 1 MG TABLET    Take 1 tablet by mouth daily    FREESTYLE LANCETS MISC    USE DAILY AS DIRECTED    FREESTYLE LITE STRIP    USE DAILY AS DIRECTED    HANDICAP PLACARD MISC    by Does not apply route Duration 2 years    IPRATROPIUM-ALBUTEROL (DUONEB) 0.5-2.5 (3) MG/3ML SOLN NEBULIZER SOLUTION    Inhale 3 mLs into the lungs three times daily    ISOSORBIDE MONONITRATE (IMDUR) 30 MG EXTENDED RELEASE TABLET    Take 2 tablets by mouth daily    LISDEXAMFETAMINE DIMESYLATE (VYVANSE) 10 MG CAPS    Take by mouth daily. LITHIUM 150 MG CAPSULE    Take 150 mg by mouth 3 times daily (with meals)    LOSARTAN (COZAAR) 100 MG TABLET    Take 1 tablet by mouth once daily. METFORMIN (GLUCOPHAGE) 500 MG TABLET    Take 1 tablet by mouth 2 times daily (with meals)    NA SULFATE-K SULFATE-MG SULF 17.5-3.13-1.6 GM/177ML SOLN    As directed    NITROGLYCERIN (NITROSTAT) 0.4 MG SL TABLET    up to max of 3 total doses. If no relief after 1 dose, call 911. PERMETHRIN (ELIMITE) 5 % CREAM    APPLY TO THE AFFECTED AREA(S) ONCE DAILY for 1 (ONE) dose    SITAGLIPTIN (JANUVIA) 25 MG TABLET    Take 1 tablet by mouth daily    VENTOLIN  (90 BASE) MCG/ACT INHALER    Inhale 2 puffs into the lungs every 6 hours as needed for Wheezing    WARFARIN (COUMADIN) 5 MG TABLET    Take as directed by [facility] anticoagulation clinic. Quantity is 90 day supply. ALLERGIES     Latex; Influenza vaccines;  Albumen, egg; Eggs or egg-derived products; Gabapentin; Pneumococcal vaccines; Povidone iodine; and Varicella virus vaccine live    FAMILY HISTORY       Family History   Problem Relation Age of Onset    High Blood Pressure Mother     Kidney Disease Mother     Lupus Mother     Coronary Art Dis Mother         Had stents in her 62s    Bipolar Disorder Son           SOCIAL HISTORY       Social History     Socioeconomic History    Marital status:      Spouse name: None    Number of children: None    Years of education: None    Highest education level: None   Occupational History    Occupation: unemployed   Tobacco Use    Smoking status: Every Day     Packs/day: 0.50     Years: 17.00     Pack years: 8.50     Types: Cigarettes    Smokeless tobacco: Never   Vaping Use    Vaping Use: Never used   Substance and Sexual Activity    Alcohol use: No    Drug use: No    Sexual activity: Not Currently     Partners: Male   Social History Narrative    Willian Renee is a 73-year-old female who is on disability because of pain and bipolar disorder. She's also had a presumed diagnosis of possible multiple sclerosis. She's been seeing Dr. Darrel Chou for that and she says that the diagnosis is sometimes in question and it's clear neuropathy vitamin B12 deficiency as well as generalized bodyaches from the severe vitamin D deficiency have caused this scenario that looks like multiple sclerosis.      lives With: Significant other    Type of Home: House Two [address] Cur in [address] to enter with rails  - Number of Steps: 5    Bathroom Shower/Tub: Tub only    ADL Assistance: Independent    Homemaking Assistance: Independent, Homemaking Responsibilities: Yes, Meal Prep Responsibility: Primary    Laundry Responsibility: Primary, Cleaning Responsibility: Primary    Bill Paying/Finance Responsibility: Primary    Shopping Responsibility: Primary, Ambulation Assistance: Independent, Transfer Assistance: Independent    Active : Yes    Occupation: Full time employment--Type of occupation: disabled-  - in charges of [employer]-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 Work requirements are Pt was looking at [employer] as main part of job working nights       [address] Opening: Spontaneous  Best Verbal Response: Oriented  Best Motor Response: Obeys commands  [place] Coma Scale Score: 15 @FLOW([id number])@      PHYSICAL EXAM    (up to 7 for level 4, 8 or more for level 5)     ED Triage Vitals [08/08/22 1320]   BP Temp Temp Source Heart Rate Resp SpO2 Height Weight   127/70 97.8 °F (36.6 °C) Oral 77 16 97 % 5' 7\" (1.702 m) 200 lb (90.7 kg)       Physical Exam  Vitals and nursing note reviewed. Constitutional:       Appearance: She is well-developed. HENT:      Head: Normocephalic and atraumatic. Right Ear: Hearing, tympanic membrane, ear canal and external ear normal.      Left Ear: Hearing, tympanic membrane, ear canal and external ear normal.      Nose: Nose normal.      Mouth/Throat:      Lips: Pink. Mouth: Mucous membranes are moist.   Eyes:      Conjunctiva/sclera: Conjunctivae normal.      Pupils: Pupils are equal, round, and reactive to light. Cardiovascular:      Rate and Rhythm: Normal rate and regular rhythm. Heart sounds: Normal heart sounds. Pulmonary:      Effort: Pulmonary effort is normal. No accessory muscle usage or respiratory distress. Breath sounds: Normal breath sounds. No decreased air movement. No decreased breath sounds, wheezing or rhonchi. Abdominal:      General: Bowel sounds are normal. There is no distension. Palpations: Abdomen is soft. Tenderness: no abdominal tenderness   Musculoskeletal:         General: Normal range of motion. Cervical back: Normal range of motion and neck supple. Skin:     General: Skin is warm and dry. Findings: Petechiae and rash present. Rash is macular. Rash is not papular.           Neurological:      General: No focal deficit present. Mental Status: She is alert and oriented to person, place, and time. GCS: GCS eye subscore is 4. GCS verbal subscore is 5. GCS motor subscore is 6. Deep Tendon Reflexes: Reflexes are normal and symmetric. Psychiatric:         Judgment: Judgment normal.         All other labs were within normal range or not returned as of this dictation. EMERGENCY DEPARTMENT COURSE and DIFFERENTIALDIAGNOSIS/MDM:   Vitals:    Vitals:    08/08/22 1320   BP: 127/70   Pulse: 77   Resp: 16   Temp: 97.8 °F (36.6 °C)   TempSrc: Oral   SpO2: 97%   Weight: 200 lb (90.7 kg)   Height: 5' 7\" (1.702 m)            50 yr old female with petechial rash. Patient to F/U with PCP in 2-3 days. INR slightly above therapeutic range ( 2-3). Patient verbalizes understanding. PROCEDURES:  Unless otherwise noted below, none     Procedures      FINAL IMPRESSION      1.  Petechiae          DISPOSITION/PLAN   DISPOSITION Decision To Discharge 08/08/2022 02:38:35 NIKI Cortes CNP (electronically signed)  Attending Emergency Physician      NIKI Reed CNP  08/08/22 0001

## 2022-08-19 ENCOUNTER — HOSPITAL ENCOUNTER (OUTPATIENT)
Dept: PHARMACY | Age: 52
Setting detail: THERAPIES SERIES
Discharge: HOME | End: 2022-08-19
Payer: COMMERCIAL

## 2022-08-19 LAB — INTERNATIONAL NORMALIZATION RATIO, POC: 2.1

## 2022-08-19 PROCEDURE — 99211 OFF/OP EST MAY X REQ PHY/QHP: CPT

## 2022-08-19 PROCEDURE — 85610 PROTHROMBIN TIME: CPT

## 2022-08-19 RX ORDER — WARFARIN SODIUM 5 MG/1
TABLET ORAL
Qty: 130 TABLET | Refills: 1 | Status: SHIPPED | OUTPATIENT
Start: 2022-08-19 | End: 2022-10-07 | Stop reason: SDUPTHER

## 2022-08-19 NOTE — PROGRESS NOTES
Ms. Sukhi Sanders is a 50 y.o. y/o female with history of CVA who presents today for anticoagulation monitoring and adjustment.     INR 2.1 is therapeutic for this patient (goal range 2-3) and is reflective of 40 mg TWD  Patient verifies current dosing regimen, patient able to verbally recall dose  Patient reports 1 missed doses since last INR - missed 5 mg dose four days ago  Patient denies s/sx clotting and/or stroke  Patient denies hematuria, epistaxis, rectal bleeding  Patient denies changes in diet, alcohol, or tobacco use  Reviewed medication list and drug allergies with patient, updated any medication additions or modifications accordingly  Patient also denies any pending medical or dental procedures scheduled at this time    Patient was instructed to continue 45 mg TWD and RTC 3 weeks  Refill sent to [address] [place] [address] per patient request    Esperanza Friedman, PharmD  2022 2:19 PM      For Pharmacy Admin Tracking Only    Intervention Detail: Adherence Monitorin  Total # of Interventions Recommended: 1  Total # of Interventions Accepted: 1  Time Spent (min): 10

## 2022-08-25 NOTE — ED NOTES
Dr Estefania White in to see pt      Dwain Antoine [facility], Replaced by [facility]  11/15/17 6717 Cartilage Graft Text: The defect edges were debeveled with a #15 scalpel blade.  Given the location of the defect, shape of the defect, the fact the defect involved a full thickness cartilage defect a cartilage graft was deemed most appropriate.  An appropriate donor site was identified, cleansed, and anesthetized. The cartilage graft was then harvested and transferred to the recipient site, oriented appropriately and then sutured into place.  The secondary defect was then repaired using a primary closure.

## 2022-09-09 ENCOUNTER — HOSPITAL ENCOUNTER (OUTPATIENT)
Dept: PHARMACY | Age: 52
Setting detail: THERAPIES SERIES
Discharge: HOME | End: 2022-09-09
Payer: COMMERCIAL

## 2022-09-09 DIAGNOSIS — I63.81 CEREBROVASCULAR ACCIDENT (CVA) OF LEFT THALAMUS (HCC): Primary | ICD-10-CM

## 2022-09-09 DIAGNOSIS — G45.8 OTHER TRANSIENT CEREBRAL ISCHEMIC ATTACKS AND RELATED SYNDROMES: ICD-10-CM

## 2022-09-09 DIAGNOSIS — D68.59 HYPERCOAGULABLE STATE (HCC): ICD-10-CM

## 2022-09-09 LAB — INTERNATIONAL NORMALIZATION RATIO, POC: 2.2

## 2022-09-09 PROCEDURE — 85610 PROTHROMBIN TIME: CPT

## 2022-09-09 PROCEDURE — 99211 OFF/OP EST MAY X REQ PHY/QHP: CPT

## 2022-09-18 ENCOUNTER — HOSPITAL ENCOUNTER (EMERGENCY)
Age: 52
Discharge: HOME | End: 2022-09-18
Payer: COMMERCIAL

## 2022-09-18 VITALS
BODY MASS INDEX: 32.96 KG/M2 | WEIGHT: 210 LBS | TEMPERATURE: 98.4 F | HEART RATE: 76 BPM | HEIGHT: 67 IN | OXYGEN SATURATION: 96 % | RESPIRATION RATE: 18 BRPM | DIASTOLIC BLOOD PRESSURE: 76 MMHG | SYSTOLIC BLOOD PRESSURE: 147 MMHG

## 2022-09-18 DIAGNOSIS — R21 RASH AND OTHER NONSPECIFIC SKIN ERUPTION: Primary | ICD-10-CM

## 2022-09-18 PROCEDURE — 99283 EMERGENCY DEPT VISIT LOW MDM: CPT

## 2022-09-18 RX ORDER — FAMOTIDINE 20 MG/1
20 TABLET, FILM COATED ORAL 2 TIMES DAILY
Qty: 12 TABLET | Refills: 0 | Status: SHIPPED | OUTPATIENT
Start: 2022-09-18

## 2022-09-18 RX ORDER — METHYLPREDNISOLONE 4 MG/1
TABLET ORAL
Qty: 1 KIT | Refills: 0 | Status: SHIPPED | OUTPATIENT
Start: 2022-09-18 | End: 2022-09-24

## 2022-09-18 RX ORDER — PERMETHRIN 50 MG/G
CREAM TOPICAL
Qty: 60 G | Refills: 1 | Status: SHIPPED | OUTPATIENT
Start: 2022-09-18

## 2022-09-18 ASSESSMENT — PAIN SCALES - GENERAL: PAINLEVEL_OUTOF10: 8

## 2022-09-18 ASSESSMENT — ENCOUNTER SYMPTOMS
VOMITING: 0
ABDOMINAL PAIN: 0
NAUSEA: 0
SHORTNESS OF BREATH: 0

## 2022-09-18 ASSESSMENT — PAIN DESCRIPTION - DESCRIPTORS: DESCRIPTORS: ITCHING;BURNING

## 2022-09-18 ASSESSMENT — PAIN DESCRIPTION - LOCATION: LOCATION: GENERALIZED

## 2022-09-18 ASSESSMENT — PAIN - FUNCTIONAL ASSESSMENT: PAIN_FUNCTIONAL_ASSESSMENT: 0-10

## 2022-09-18 ASSESSMENT — PAIN DESCRIPTION - PAIN TYPE: TYPE: ACUTE PAIN

## 2022-09-18 NOTE — ED PROVIDER NOTES
[address] [facility] ED  eMERGENCY dEPARTMENT eNCOUnter      Pt Name: Daron Wilson  MRN: [id number]  [place] 1970  Date of evaluation: 9/18/2022  Provider: Christian Montgomery PA-C        HISTORY OF PRESENT ILLNESS    Daron Wilson is a 50 y.o. female per chart review has ah/o anxiety, CAD, depression; presenting to the ED for acute rash that began several days ago. Rash is pruritic. Hx of scabies in the past. Recently bought a used couch from someone and believes she has scabies. No one else at home has sxs. Denies sob, wheezing, cp, abdominal pain, fever, n/v/d, difficulty swallowing. REVIEW OF SYSTEMS       Review of Systems   Constitutional:  Negative for fever. Respiratory:  Negative for shortness of breath. Cardiovascular:  Negative for leg swelling. Gastrointestinal:  Negative for abdominal pain, nausea and vomiting. Genitourinary: Negative. Musculoskeletal: Negative. Skin:  Positive for rash. Neurological: Negative. All other systems reviewed and are negative. Except as noted above the remainder of the review of systems was reviewed and negative.        PAST MEDICAL HISTORY     Past Medical History:   Diagnosis Date    Anxiety     Back pain     back surgery x 4    Bipolar disorder ([facility]     CAD (coronary artery disease)     CAFL (chronic airflow limitation) (HCC) 11/3/2016    Cerebral artery occlusion with cerebral infarction (HCC)     Chronic bronchitis (HCC)     Chronic pain     Colitis     Complicated migraine     DDD (degenerative disc disease), lumbar 12/22/2016    Depression     Endometriosis     Excessive caffeine abuse, continuous ([facility] 7/6/2018    Gastritis     GERD (gastroesophageal reflux disease)     History of myocardial infarction     HTN (hypertension), benign 2/19/2016    Impaired mobility and activities of daily living     Over weight     PTSD (post-traumatic stress disorder)     Sensory neuropathy 12/22/2016    Somatization disorder     TIA (transient vaccines; Albumen, egg; Eggs or egg-derived products; Gabapentin; Pneumococcal vaccines; Povidone iodine; and Varicella virus vaccine live    FAMILY HISTORY       Family History   Problem Relation Age of Onset    High Blood Pressure Mother     Kidney Disease Mother     Lupus Mother     Coronary Art Dis Mother         Had stents in her 62s    Bipolar Disorder Son           SOCIAL HISTORY       Social History     Socioeconomic History    Marital status:      Spouse name: None    Number of children: None    Years of education: None    Highest education level: None   Occupational History    Occupation: unemployed   Tobacco Use    Smoking status: Every Day     Packs/day: 0.50     Years: 17.00     Pack years: 8.50     Types: Cigarettes    Smokeless tobacco: Never   Vaping Use    Vaping Use: Never used   Substance and Sexual Activity    Alcohol use: No    Drug use: No    Sexual activity: Not Currently     Partners: Male   Social History Narrative    Anika Mauricio is a 29-year-old female who is on disability because of pain and bipolar disorder. She's also had a presumed diagnosis of possible multiple sclerosis. She's been seeing Dr. Whit Kovacs for that and she says that the diagnosis is sometimes in question and it's clear neuropathy vitamin B12 deficiency as well as generalized bodyaches from the severe vitamin D deficiency have caused this scenario that looks like multiple sclerosis.      lives With: Significant other    Type of Home: House Two Ana Blood in [address] to enter with rails  - Number of Steps: 5    Bathroom Shower/Tub: Tub only    ADL Assistance: Independent    Homemaking Assistance: Independent, Homemaking Responsibilities: Yes, Meal Prep Responsibility: Primary    Laundry Responsibility: Primary, Cleaning Responsibility: Primary    Bill Paying/Finance Responsibility: Primary    Shopping Responsibility: Primary, Ambulation Assistance: Independent, Transfer Assistance: Independent Active : Yes    Occupation: Full time employment--Type of occupation: disabled-  - 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 Work requirements are Pt was looking at [employer] as main part of job working nights         [address]        ED Triage Vitals [09/18/22 1556]   BP Temp Temp Source Heart Rate Resp SpO2 Height Weight   (!) 147/76 98.4 °F (36.9 °C) Temporal 76 18 96 % 5' 7\" (1.702 m) 210 lb (95.3 kg)       Physical Exam  Vitals and nursing note reviewed. Constitutional:       General: She is not in acute distress. Appearance: Normal appearance. She is well-developed. She is not ill-appearing, toxic-appearing or diaphoretic. HENT:      Head: Normocephalic and atraumatic. Right Ear: External ear normal.      Left Ear: External ear normal.      Nose: Nose normal.      Mouth/Throat:      Mouth: Mucous membranes are moist.      Pharynx: Oropharynx is clear. No oropharyngeal exudate. Eyes:      General: No scleral icterus. Right eye: No discharge. Left eye: No discharge. Conjunctiva/sclera: Conjunctivae normal.      Pupils: Pupils are equal, round, and reactive to light. Cardiovascular:      Rate and Rhythm: Normal rate and regular rhythm. Heart sounds: Normal heart sounds. No murmur heard. Pulmonary:      Effort: Pulmonary effort is normal. No respiratory distress. Breath sounds: Normal breath sounds. No wheezing or rales. Chest:      Chest wall: No tenderness. Abdominal:      General: Bowel sounds are normal. There is no distension. Palpations: Abdomen is soft. There is no mass. Tenderness: There is no abdominal tenderness. There is no guarding or rebound. Musculoskeletal:         General: No tenderness or deformity. Normal range of motion. Cervical back: Normal range of motion and neck supple. Skin:     General: Skin is warm. Coloration: Skin is not pale. Findings: Rash (abdomen, legs, arms) present. No erythema. Neurological:      Mental Status: She is alert and oriented to person, place, and time. Cranial Nerves: No cranial nerve deficit. Coordination: Coordination normal.   Psychiatric:         Mood and Affect: Mood normal.         Behavior: Behavior normal.         Thought Content: Thought content normal.         Judgment: Judgment normal.         LABS:  Labs Reviewed - No data to display      MDM:   Vitals:    Vitals:    09/18/22 1556   BP: (!) 147/76   Pulse: 76   Resp: 18   Temp: 98.4 °F (36.9 °C)   TempSrc: Temporal   SpO2: 96%   Weight: 210 lb (95.3 kg)   Height: 5' 7\" (1.702 m)           PROCEDURES:  Unlessotherwise noted below, none      Procedures      FINAL IMPRESSION      1. Rash and other nonspecific skin eruption          DISPOSITION/PLAN   DISPOSITION    Nursing notes, medical records and triage notes reviewed. Vital signs reviewed. This is a 50year old female per chart review has ah/o anxiety, CAD, depression; presenting to the ED for acute rash that began several days ago.         The patient and/or family  -had the results of all tests and diagnosis explained to them  -Given both verbal and written discharge instructions  -Were instructed of the importance of close follow-up  -Were told that close follow-up is essential for good health and good outcomes    Soniya Marquez PA-C (electronically signed)  Emergency Physician Assistant         Soniya Marquez PA-C  09/18/22 9809

## 2022-09-18 NOTE — Clinical Note
Jimbo Weeks was seen and treated in our emergency department on 9/18/2022. She may return to work on 09/21/2022. If you have any questions or concerns, please don't hesitate to call.       Chaparrita Rodriguez PA-C

## 2022-10-07 ENCOUNTER — HOSPITAL ENCOUNTER (OUTPATIENT)
Dept: PHARMACY | Age: 52
Setting detail: THERAPIES SERIES
Discharge: HOME | End: 2022-10-07
Payer: COMMERCIAL

## 2022-10-07 DIAGNOSIS — I63.81 CEREBROVASCULAR ACCIDENT (CVA) OF LEFT THALAMUS (HCC): Primary | ICD-10-CM

## 2022-10-07 DIAGNOSIS — G45.8 OTHER TRANSIENT CEREBRAL ISCHEMIC ATTACKS AND RELATED SYNDROMES: ICD-10-CM

## 2022-10-07 DIAGNOSIS — D68.59 HYPERCOAGULABLE STATE (HCC): ICD-10-CM

## 2022-10-07 LAB — INTERNATIONAL NORMALIZATION RATIO, POC: 1.5

## 2022-10-07 PROCEDURE — 85610 PROTHROMBIN TIME: CPT

## 2022-10-07 PROCEDURE — 99211 OFF/OP EST MAY X REQ PHY/QHP: CPT

## 2022-10-07 RX ORDER — WARFARIN SODIUM 5 MG/1
TABLET ORAL
Qty: 130 TABLET | Refills: 1 | Status: SHIPPED | OUTPATIENT
Start: 2022-10-07

## 2022-10-07 NOTE — PROGRESS NOTES
Ms. Jose Alfredo Garduno is a 50 y.o. y/o female with history of CVA, Hypercoagulable state, and TIA who presents today for anticoagulation monitoring and adjustment. INR 1.5 is sub-therapeutic for this patient (goal range 2-3) and is reflective of 37-40 mg TWD?- Patient reports she may have missed 1-2 doses but is unsure of what days. Patient verifies current dosing regimen, patient able to verbally recall dose  Current scheduled TWD is 45 mg    Patient reports 1-2 missed doses since last INR. Patient denies s/sx clotting and/or stroke  Patient denies hematuria, epistaxis, rectal bleeding  Patient denies changes in diet, alcohol, or tobacco use  Reviewed medication list and drug allergies with patient, updated any medication additions or modifications accordingly  Patient also denies any pending medical or dental procedures scheduled at this time    Patient was instructed to increase today's dose to 10 mg (normal 5 mg) and continue current TWD 45 mg and RTC 3 weeks    Patient requested refill- sending to [facility].      Alexia Gillis, PharmD  PGY1 Pharmacy Resident  10/7/2022 9:11 AM    For Pharmacy Admin Tracking Only    Intervention Detail: Adherence Monitorin, Dose Adjustment: 2, reason: Therapy Optimization, and Refill(s) Provided  Total # of Interventions Recommended: 2  Total # of Interventions Accepted: 2  Time Spent (min): 30

## 2022-10-28 ENCOUNTER — HOSPITAL ENCOUNTER (OUTPATIENT)
Dept: PHARMACY | Age: 52
Setting detail: THERAPIES SERIES
Discharge: HOME | End: 2022-10-28
Payer: COMMERCIAL

## 2022-10-28 DIAGNOSIS — I63.81 CEREBROVASCULAR ACCIDENT (CVA) OF LEFT THALAMUS (HCC): Primary | ICD-10-CM

## 2022-10-28 DIAGNOSIS — D68.59 HYPERCOAGULABLE STATE (HCC): ICD-10-CM

## 2022-10-28 DIAGNOSIS — G45.8 OTHER TRANSIENT CEREBRAL ISCHEMIC ATTACKS AND RELATED SYNDROMES: ICD-10-CM

## 2022-10-28 LAB — INTERNATIONAL NORMALIZATION RATIO, POC: 2

## 2022-10-28 PROCEDURE — 99211 OFF/OP EST MAY X REQ PHY/QHP: CPT

## 2022-10-28 PROCEDURE — 85610 PROTHROMBIN TIME: CPT

## 2022-10-28 RX ORDER — SITAGLIPTIN 25 MG/1
TABLET, FILM COATED ORAL
Qty: 90 TABLET | Refills: 0 | Status: SHIPPED | OUTPATIENT
Start: 2022-10-28

## 2022-10-28 NOTE — PROGRESS NOTES
Ms. Jose Alfredo Garduno is a 50 y.o. y/o female with history of CVA, TIA, Hypercoagulable state who presents today for anticoagulation monitoring and adjustment. INR 2.0 is therapeutic for this patient (goal range 2-3) and is reflective of 45 mg TWD  Patient verifies current dosing regimen, patient able to verbally recall dose  Currently scheduled TWD is 45 mg. Patient reports 0 missed doses since last INR     Patient denies s/sx clotting and/or stroke  Patient denies hematuria, epistaxis, rectal bleeding  Patient denies changes in diet, alcohol, or tobacco use  Reviewed medication list and drug allergies with patient, updated any medication additions or modifications accordingly  Patient also denies any pending medical or dental procedures scheduled at this time    Patient reports eating brussels sprouts the last two days. No other changes reported. Patient has upcoming procedure but not scheduled.      Patient was instructed to continue with TWD 45 mg (MWF 5 mg and 7.5 mg all other days) and RTC 5 weeks    Rosa M PalD  PGY1 Pharmacy Resident  10/28/2022 3:07 PM        For Pharmacy Admin Tracking Only    Intervention Detail: Adherence Monitorin  Total # of Interventions Recommended: 1  Total # of Interventions Accepted: 1  Time Spent (min): 20

## 2022-11-01 ENCOUNTER — OFFICE VISIT (OUTPATIENT)
Dept: CARDIOLOGY CLINIC | Age: 52
End: 2022-11-01
Payer: COMMERCIAL

## 2022-11-01 VITALS
SYSTOLIC BLOOD PRESSURE: 110 MMHG | HEART RATE: 64 BPM | DIASTOLIC BLOOD PRESSURE: 70 MMHG | WEIGHT: 213 LBS | OXYGEN SATURATION: 97 % | BODY MASS INDEX: 33.36 KG/M2

## 2022-11-01 DIAGNOSIS — I10 HYPERTENSION, UNSPECIFIED TYPE: ICD-10-CM

## 2022-11-01 DIAGNOSIS — I25.10 CORONARY ARTERY DISEASE INVOLVING NATIVE CORONARY ARTERY OF NATIVE HEART WITHOUT ANGINA PECTORIS: ICD-10-CM

## 2022-11-01 DIAGNOSIS — R60.9 PERIPHERAL EDEMA: Primary | ICD-10-CM

## 2022-11-01 PROBLEM — R60.0 PERIPHERAL EDEMA: Status: ACTIVE | Noted: 2022-11-01

## 2022-11-01 PROCEDURE — 99214 OFFICE O/P EST MOD 30 MIN: CPT | Performed by: NURSE PRACTITIONER

## 2022-11-01 PROCEDURE — 3074F SYST BP LT 130 MM HG: CPT | Performed by: NURSE PRACTITIONER

## 2022-11-01 PROCEDURE — 3078F DIAST BP <80 MM HG: CPT | Performed by: NURSE PRACTITIONER

## 2022-11-01 RX ORDER — FUROSEMIDE 20 MG/1
20 TABLET ORAL 2 TIMES DAILY
Qty: 60 TABLET | Refills: 3 | Status: SHIPPED | OUTPATIENT
Start: 2022-11-01

## 2022-11-01 RX ORDER — AMLODIPINE BESYLATE 5 MG/1
TABLET ORAL
Qty: 30 TABLET | Refills: 3 | Status: SHIPPED | OUTPATIENT
Start: 2022-11-01

## 2022-11-01 RX ORDER — ISOSORBIDE MONONITRATE 30 MG/1
60 TABLET, EXTENDED RELEASE ORAL DAILY
Qty: 60 TABLET | Refills: 11 | Status: SHIPPED | OUTPATIENT
Start: 2022-11-01

## 2022-11-01 RX ORDER — LOSARTAN POTASSIUM 100 MG/1
TABLET ORAL
Qty: 30 TABLET | Refills: 3 | Status: SHIPPED | OUTPATIENT
Start: 2022-11-01

## 2022-11-01 RX ORDER — ATORVASTATIN CALCIUM 20 MG/1
20 TABLET, FILM COATED ORAL NIGHTLY
Qty: 30 TABLET | Refills: 3 | Status: SHIPPED | OUTPATIENT
Start: 2022-11-01

## 2022-11-01 ASSESSMENT — ENCOUNTER SYMPTOMS
NAUSEA: 0
WHEEZING: 0
ABDOMINAL PAIN: 0
VOMITING: 0
SHORTNESS OF BREATH: 0
EYES NEGATIVE: 1
GASTROINTESTINAL NEGATIVE: 1

## 2022-11-01 NOTE — PATIENT INSTRUCTIONS
Limiting Sodium With Heart Failure: Care Instructions  Your Care Instructions     Sodium causes your body to hold on to extra water. This may cause your heart failure symptoms to get worse. Limiting sodium may help you feel better. People get most of their sodium from processed foods. Fast food and restaurant meals also tend to be very high in sodium. Your doctor may suggest that you limit sodium. Your doctor can tell you how much sodium is right for you. An example is less than 3,000 mg a day. This includes all the salt you eat in cooked or packaged foods. Follow-up care is a key part of your treatment and safety. Be sure to make and go to all appointments, and call your doctor if you are having problems. It's also a good idea to know your test results and keep a list of the medicines you take. How can you care for yourself at home? Read food labels  Read food labels on cans and food packages. The labels tell you how much sodium is in each serving. Make sure that you look at the serving size. If you eat more than the serving size, you have eaten more sodium than is listed for one serving. Food labels also tell you the Percent Daily Value for sodium. Choose products with low Percent Daily Values for sodium. Be aware that sodium can come in forms other than salt, including monosodium glutamate (MSG), sodium citrate, and sodium bicarbonate (baking soda). MSG is often added to Asian food. You can sometimes ask for food without MSG or salt. Buy low-sodium foods  Buy foods that are labeled \"unsalted\" (no salt added), \"sodium-free\" (less than 5 mg of sodium per serving), or \"low-sodium\" (140 mg or less of sodium per serving). A food labeled \"light sodium\" has less than half of the full-sodium version of that food. Foods labeled \"reduced-sodium\" may still have too much sodium. Buy fresh vegetables or plain, frozen vegetables. Buy low-sodium versions of canned vegetables, soups, and other canned goods.   Prepare low-sodium meals  Use less salt each day when cooking. Reducing salt in this way will help you adjust to the taste. Do not add salt after cooking. Take the salt shaker off the table. Flavor your food with garlic, lemon juice, onion, vinegar, herbs, and spices instead of salt. Do not use soy sauce, steak sauce, onion salt, garlic salt, or ketchup on your food. Make your own salad dressings, sauces, and ketchup without adding salt. Use less salt (or none) when recipes call for it. You can often use half the salt a recipe calls for without losing flavor. Other dishes like rice, pasta, and grains do not need added salt. Rinse canned vegetables. This removes some--but not all--of the salt. Avoid water that has a naturally high sodium content or that has been treated with water softeners, which add sodium. If you buy bottled water, read the label and choose a sodium-free brand. Avoid high-sodium foods, such as:  Smoked, cured, salted, and canned meat, fish, and poultry. Ham, tamayo, hot dogs, and luncheon meats. Regular, hard, and processed cheese and regular peanut butter. Crackers with salted tops. Frozen prepared meals. Canned and dried soups, broths, and bouillon, unless labeled sodium-free or low-sodium. Canned vegetables, unless labeled sodium-free or low-sodium. Salted snack foods such as chips and pretzels. [place] fries, pizza, tacos, and other fast foods. Pickles, olives, ketchup, and other condiments, especially soy sauce, unless labeled sodium-free or low-sodium. If you cannot cook for yourself  Have family members or friends help you, or have someone cook low-sodium meals. Check with your local senior nutrition program to find out where meals are served and whether they offer a low-sodium option. You can often find these programs through your local health department or hospital.  Have meals delivered to your home.  Most [facility] have a Meals on TANIKA Leos. These programs provide one hot meal a day for older adults, delivered to their homes. Ask whether these meals are low-sodium. Let them know that you are on a low-sodium diet. Where can you learn more? Go to https://ViralGainspemaria elenaewLayerGloss.Parametric. org and sign in to your Clickand[employer] account. Enter A166 in the [facility] box to learn more about \"Limiting Sodium With Heart Failure: Care Instructions. \"     If you do not have an account, please click on the \"Sign Up Now\" link. Current as of: April 29, 2021               Content Version: 13.1  © 6110-6310 [employer]. Care instructions adapted under license by [facility] ([facility]). If you have questions about a medical condition or this instruction, always ask your healthcare professional. [address] any warranty or liability for your use of this information. Learning About Low-Sodium Foods  What foods are low in sodium? The foods you eat contain nutrients, such as vitamins and minerals. Sodium is a nutrient. Your body needs the right amount to stay healthy and work as it should. You can use the list below to help you make choices about which foods to eat.   Fruits  Fresh, frozen, canned, or dried fruit  Vegetables  Fresh or frozen vegetables, with no added salt  Canned vegetables, low-sodium or with no added salt  Grains  Bagels without salted tops  Cereal with no added salt  Corn tortillas  Crackers with no added salt  Oatmeal, cooked without salt  Popcorn with no salt  Pasta and noodles, cooked without salt  Rice, cooked without salt  Unsalted pretzels  Dairy and dairy alternatives  Butter, unsalted  Cream cheese  Ice cream  Milk  Soy milk  Meats and other protein foods  Beans and peas, canned with no salt  Eggs  Fresh fish (not smoked)  Fresh meats (not smoked or cured)  Nuts and nut butter, prepared without salt  Poultry, not packaged with sodium solution  Tofu, unseasoned  Tuna, canned without salt  Seasonings  Garlic  Herbs and spices  Lemon juice  Mustard  Olive oil  Salt-free seasoning mixes  Vinegar  Work with your doctor to find out how much of this nutrient you need. Depending on your health, you may need more or less of it in your diet. Where can you learn more? Go to [email or web address]://barbara.[email or web address] org and sign in to your [employer] account. Enter Y718 in the [facility] box to learn more about \"Learning About Low-Sodium Foods. \"     If you do not have an account, please click on the \"Sign Up Now\" link. Current as of: September 8, 2021               Content Version: 13.1  © 2006-2021 [employer]. Care instructions adapted under license by [facility] ([facility]). If you have questions about a medical condition or this instruction, always ask your healthcare professional. [address] any warranty or liability for your use of this information. How to Read a Food Label to Limit Sodium: Care Instructions  Overview  Limiting sodium can be an important part of managing some health problems. Processed foods, fast food, and restaurant foods are the major sources of dietary sodium. The most common name for sodium is salt. Most packaged foods have a Nutrition Facts label. This will tell you how much sodium is in one serving of food. Follow-up care is a key part of your treatment and safety. Be sure to make and go to all appointments, and call your doctor if you are having problems. It's also a good idea to know your test results and keep a list of the medicines you take. How can you care for yourself at home? Read ingredient lists on food labels  Read the list of ingredients on food labels to help you find how much sodium is in a food. The label lists the ingredients in a food in descending order (from the most to the least). If salt or sodium is high on the list, there may be a lot of sodium in the food. Know that sodium has different names.  Sodium is also called monosodium glutamate (MSG), sodium citrate, sodium alginate, and sodium phosphate. Read Nutrition Facts labels  On most foods, there is a Nutrition Facts label. This will tell you how much sodium is in one serving of food. Look at both the serving size and the sodium amount. The serving size is located at the top of the label, usually right under the \"Nutrition Facts\" title. The amount of sodium is given in the list under the title. It is given in milligrams (mg). Check the serving size carefully. A single serving is often very small, and you may eat more than one serving. If this is the case, you will eat more sodium than listed on the label. For example, if the serving size for a canned soup is 1 cup and the sodium amount is 470 mg, if you have 2 cups you will eat 940 mg of sodium. The nutrition facts for fresh fruits and vegetables are not listed on the food. They may be listed somewhere in the store. These foods usually have no sodium or low sodium. The Nutrition Facts label also gives you the Percent Daily Value for sodium. This is how much of the recommended amount of sodium a serving contains. The daily value for sodium is 2,300 mg. So if the Percent Daily Value says 50%, this means one serving is giving you half of this, or 1,150 mg. Buy low-sodium foods  Look for foods that are made with less sodium. Watch for the following words on the label. \"Unsalted\" means there is no sodium added to the food. But there may be sodium already in the food naturally. \"Sodium-free\" means a serving has less than 5 mg of sodium. \"Very low sodium\" means a serving has 35 mg or less of sodium. \"Low-sodium\" means a serving has 140 mg or less of sodium. \"Reduced-sodium\" means that there is 25% less sodium than what the food normally has. This is still usually too much sodium. Buy fresh vegetables, or frozen vegetables without added sauces. Buy low-sodium versions of canned vegetables, soups, and other canned goods. Where can you learn more?   Go to https://chpepiceweb.healthMarketMuse. org and sign in to your NewHive account. Enter [id number] in the [facility] box to learn more about \"How to Read a Food Label to Limit Sodium: Care Instructions. \"     If you do not have an account, please click on the \"Sign Up Now\" link. Current as of: September 8, 2021               Content Version: 13.1  © 9135-4714 [employer]. Care instructions adapted under license by [facility] ([facility]). If you have questions about a medical condition or this instruction, always ask your healthcare professional. [address] any warranty or liability for your use of this information.
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
## 2022-11-23 ENCOUNTER — HOSPITAL ENCOUNTER (OUTPATIENT)
Dept: PHARMACY | Age: 52
Setting detail: THERAPIES SERIES
Discharge: HOME | End: 2022-11-23
Payer: COMMERCIAL

## 2022-11-23 DIAGNOSIS — I63.81 CEREBROVASCULAR ACCIDENT (CVA) OF LEFT THALAMUS (HCC): Primary | ICD-10-CM

## 2022-11-23 DIAGNOSIS — D68.59 HYPERCOAGULABLE STATE (HCC): ICD-10-CM

## 2022-11-23 DIAGNOSIS — G45.8 OTHER TRANSIENT CEREBRAL ISCHEMIC ATTACKS AND RELATED SYNDROMES: ICD-10-CM

## 2022-11-23 LAB — INTERNATIONAL NORMALIZATION RATIO, POC: 2.5

## 2022-11-23 PROCEDURE — 99211 OFF/OP EST MAY X REQ PHY/QHP: CPT | Performed by: PHARMACIST

## 2022-11-23 PROCEDURE — 85610 PROTHROMBIN TIME: CPT | Performed by: PHARMACIST

## 2022-11-23 NOTE — PROGRESS NOTES
Ms. Naren Flores is a 50 y.o. female with history of CVA who presents today for anticoagulation monitoring and adjustment. Face to face visit completed, procedural mask worn by myself as instructed: Room cleaned pre and post visit with Virex Plus spray    INR 2.5 is therapeutic for this patient (goal range 2-3) and is reflective of 45 mg TWD  Patient's current dosing schedule is 45 TWD  Patient verifies current dosing regimen, patient able to verbally recall dose    Patient reports no missed doses in the last seven days     Patient denies s/sx clotting and/or stroke  Patient denies hematuria, epistaxis, rectal bleeding  Patient denies changes in diet  Patient denies changes in alcohol use  Patient denies changes in tobacco use    Discussed current medications and drug allergies with patient, updated any medication additions or modifications accordingly    Patient denies any pending medical procedures scheduled at this time  Patient denies any pending dental procedures scheduled at this time    Patient was instructed to continue 45mg TWD and RTC in 4 weeks    For Pharmacy Admin Tracking Only    Intervention Detail: Adherence Monitorin  Total # of Interventions Recommended: 1  Total # of Interventions Accepted: 1  Time Spent (min): 30    Crescencio Agustin, JAMIR. Ph. 2022 3:17 PM

## 2022-11-30 ENCOUNTER — HOSPITAL ENCOUNTER (OUTPATIENT)
Dept: NON INVASIVE DIAGNOSTICS | Age: 52
Discharge: HOME | End: 2022-11-30
Payer: COMMERCIAL

## 2022-11-30 ENCOUNTER — OFFICE VISIT (OUTPATIENT)
Dept: NEUROLOGY | Age: 52
End: 2022-11-30
Payer: COMMERCIAL

## 2022-11-30 VITALS
SYSTOLIC BLOOD PRESSURE: 120 MMHG | HEART RATE: 83 BPM | WEIGHT: 210.6 LBS | BODY MASS INDEX: 32.98 KG/M2 | DIASTOLIC BLOOD PRESSURE: 75 MMHG

## 2022-11-30 DIAGNOSIS — E72.11 MTHFR (METHYLENE THF REDUCTASE) DEFICIENCY AND HOMOCYSTINURIA (HCC): ICD-10-CM

## 2022-11-30 DIAGNOSIS — R60.9 PERIPHERAL EDEMA: ICD-10-CM

## 2022-11-30 DIAGNOSIS — I63.81 CEREBROVASCULAR ACCIDENT (CVA) OF LEFT THALAMUS (HCC): ICD-10-CM

## 2022-11-30 DIAGNOSIS — G43.719 INTRACTABLE CHRONIC MIGRAINE WITHOUT AURA AND WITHOUT STATUS MIGRAINOSUS: Primary | ICD-10-CM

## 2022-11-30 DIAGNOSIS — R42 DIZZINESS: ICD-10-CM

## 2022-11-30 DIAGNOSIS — M54.16 LUMBAR RADICULOPATHY: ICD-10-CM

## 2022-11-30 DIAGNOSIS — D68.59 HYPERCOAGULABLE STATE (HCC): ICD-10-CM

## 2022-11-30 DIAGNOSIS — E72.12 MTHFR (METHYLENE THF REDUCTASE) DEFICIENCY AND HOMOCYSTINURIA (HCC): ICD-10-CM

## 2022-11-30 DIAGNOSIS — F98.8 ATTENTION DEFICIT DISORDER (ADD) WITHOUT HYPERACTIVITY: ICD-10-CM

## 2022-11-30 PROCEDURE — 3074F SYST BP LT 130 MM HG: CPT | Performed by: PSYCHIATRY & NEUROLOGY

## 2022-11-30 PROCEDURE — 93306 TTE W/DOPPLER COMPLETE: CPT

## 2022-11-30 PROCEDURE — 3078F DIAST BP <80 MM HG: CPT | Performed by: PSYCHIATRY & NEUROLOGY

## 2022-11-30 PROCEDURE — 99214 OFFICE O/P EST MOD 30 MIN: CPT | Performed by: PSYCHIATRY & NEUROLOGY

## 2022-11-30 RX ORDER — FLUOXETINE HYDROCHLORIDE 40 MG/1
CAPSULE ORAL
COMMUNITY
Start: 2022-11-03 | End: 2022-11-30 | Stop reason: SDUPTHER

## 2022-11-30 RX ORDER — HYDROXYZINE HYDROCHLORIDE 25 MG/1
25 TABLET, FILM COATED ORAL 3 TIMES DAILY PRN
COMMUNITY
Start: 2022-09-24 | End: 2022-11-30 | Stop reason: SDUPTHER

## 2022-11-30 RX ORDER — HYDROXYZINE HYDROCHLORIDE 25 MG/1
TABLET, FILM COATED ORAL
COMMUNITY
Start: 2022-09-24 | End: 2022-11-30 | Stop reason: SDUPTHER

## 2022-11-30 RX ORDER — DEXTROAMPHETAMINE SACCHARATE, AMPHETAMINE ASPARTATE MONOHYDRATE, DEXTROAMPHETAMINE SULFATE AND AMPHETAMINE SULFATE 5; 5; 5; 5 MG/1; MG/1; MG/1; MG/1
20 CAPSULE, EXTENDED RELEASE ORAL EVERY MORNING
Qty: 30 CAPSULE | Refills: 0 | Status: SHIPPED | OUTPATIENT
Start: 2022-11-30 | End: 2022-12-30

## 2022-11-30 RX ORDER — MECLIZINE HYDROCHLORIDE 25 MG/1
25 TABLET ORAL 2 TIMES DAILY
Qty: 60 TABLET | Refills: 0 | Status: SHIPPED | OUTPATIENT
Start: 2022-11-30 | End: 2022-12-30

## 2022-11-30 ASSESSMENT — ENCOUNTER SYMPTOMS
COLOR CHANGE: 0
VOMITING: 0
TROUBLE SWALLOWING: 0
CHOKING: 0
PHOTOPHOBIA: 0
BACK PAIN: 0
NAUSEA: 0
SHORTNESS OF BREATH: 0

## 2022-11-30 NOTE — PROGRESS NOTES
Subjective:      Patient ID: Berhane Alan is a 50 y.o. female who presents today for:  Chief Complaint   Patient presents with    6 Month Follow-Up     Pt states she had a recent fall a few nights ago. Pt states she is dizzy more frequently. Pt states since she had her stroke, when she stretches or wakes up he is dizzy. HPI 51-year-old right-handed female with a history of stroke with depression with hypercoagulable state. Patient has MTHFR gene and therefore we continue her anticoagulation. She is on Coumadin.,  States that her recent fall occurred few nights ago she did not injure self and there is no head injury. He still has dizzy spells on and off. Patient's main issues appears to be still difficulty completing task and focusing. We therefore obtained ADD scores which are quite positive for ADD. She has been to the psychiatrist as well. She is having difficulty completing her tasks.     Past Medical History:   Diagnosis Date    Anxiety     Back pain     back surgery x 4    Bipolar disorder ([address]     CAD (coronary artery disease)     CAFL (chronic airflow limitation) ([address] 11/3/2016    Cerebral artery occlusion with cerebral infarction (HCC)     Chronic bronchitis (HCC)     Chronic pain     Colitis     Complicated migraine     DDD (degenerative disc disease), lumbar 2016    Depression     Endometriosis     Excessive caffeine abuse, continuous ([address] 2018    Gastritis     GERD (gastroesophageal reflux disease)     History of myocardial infarction     HTN (hypertension), benign 2016    Impaired mobility and activities of daily living     Over weight     PTSD (post-traumatic stress disorder)     Sensory neuropathy 2016    Somatization disorder     TIA (transient ischemic attack)     Tobacco abuse     Vasospastic angina ([address] 2016     Past Surgical History:   Procedure Laterality Date    BACK SURGERY      x2     SECTION      x2    CHOLECYSTECTOMY  [address]    COLONOSCOPY N/A 7/28/2020    COLONOSCOPY DIAGNOSTIC performed by Silas Westfall MD at [facility]    COLONOSCOPY N/A 10/25/2021    COLONOSCOPY DIAGNOSTIC performed by Silas Westfall MD at [address]  3/7/16    Dr. Cesar Mckeon (CERVIX STATUS UNKNOWN)      NECK SURGERY      SHOULDER SURGERY Left     UPPER GASTROINTESTINAL ENDOSCOPY  11/24/2014    ANIBAL HOUSE M.D. UPPER GASTROINTESTINAL ENDOSCOPY N/A 7/28/2020    EGD ESOPHAGOGASTRODUODENOSCOPY performed by Silas Westfall MD at [facility] History    Marital status:      Spouse name: Not on file    Number of children: Not on file    Years of education: Not on file    Highest education level: Not on file   Occupational History    Occupation: unemployed   Tobacco Use    Smoking status: Every Day     Packs/day: 0.50     Years: 17.00     Pack years: 8.50     Types: Cigarettes    Smokeless tobacco: Never   Vaping Use    Vaping Use: Never used   Substance and Sexual Activity    Alcohol use: No    Drug use: No    Sexual activity: Not Currently     Partners: Male   Other Topics Concern    Not on file   Social History Narrative    Jatin Cordova is a 53-year-old female who is on disability because of pain and bipolar disorder. She's also had a presumed diagnosis of possible multiple sclerosis. She's been seeing Dr. Idania Garrett for that and she says that the diagnosis is sometimes in question and it's clear neuropathy vitamin B12 deficiency as well as generalized bodyaches from the severe vitamin D deficiency have caused this scenario that looks like multiple sclerosis.      lives With: Significant other    Type of Home: House [facility] in [address] to enter with rails  - Number of Steps: 5    Bathroom Shower/Tub: Tub only    ADL Assistance: Independent    Homemaking Assistance: Karolina Bernard Responsibilities: Yes, Meal Prep Responsibility: Primary    Laundry Responsibility: Primary, Cleaning Responsibility: Primary    Bill Paying/Finance Responsibility: Primary    Shopping Responsibility: Primary, Ambulation Assistance: Independent, Transfer Assistance: Independent    Active : Yes    Occupation: Full time employment--Type of occupation: disabled-  - 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 Work requirements are Pt was looking at [employer] as main part of job working nights     Social Determinants of Health     Financial Resource Strain: Not on file   Food Insecurity: Not on file   Transportation Needs: Not on file   Physical Activity: Not on file   Stress: Not on file   Social Connections: Not on file   Intimate Partner Violence: Not on file   Housing Stability: Not on file     Family History   Problem Relation Age of Onset    High Blood Pressure Mother     Kidney Disease Mother     Lupus Mother     Coronary Art Dis Mother         Had stents in her 62s    Bipolar Disorder Son      Allergies   Allergen Reactions    Latex Itching     Pt reports itching on hands after using rubber gloves at work    Influenza Vaccines Swelling     Pt reports her entire arm was red and edematous post vaccine    Albumen, Egg Other (See Comments)     Flu shot -  Localized reaction from flu vaccine. Pt eats eggs with no issues. Eggs Or Egg-Derived Products      Egg based products only    Gabapentin Nausea Only    Pneumococcal Vaccines Hives    Povidone Iodine Itching    Varicella Virus Vaccine Live Hives       Current Outpatient Medications   Medication Sig Dispense Refill    amphetamine-dextroamphetamine (ADDERALL XR) 20 MG extended release capsule Take 1 capsule by mouth every morning for 30 days.  30 capsule 0    meclizine (ANTIVERT) 25 MG tablet Take 1 tablet by mouth 2 times daily 60 tablet 0    furosemide (LASIX) 20 MG tablet Take 1 tablet by mouth 2 times daily 60 tablet 3    atorvastatin (LIPITOR) 20 MG tablet Take 1 tablet by mouth nightly 30 tablet 3    isosorbide mononitrate (IMDUR) 30 MG extended release tablet Take 2 tablets by mouth daily 60 tablet 11    amLODIPine (NORVASC) 5 MG tablet Take 1 tablet by mouth once daily. 30 tablet 3    losartan (COZAAR) 100 MG tablet Take 1 tablet by mouth once daily. 30 tablet 3    JANUVIA 25 MG tablet Take 1 tablet by mouth once daily. 90 tablet 0    warfarin (COUMADIN) 5 MG tablet Take as directed by [facility] anticoagulation clinic. Quantity is 90 day supply. 130 tablet 1    permethrin (ELIMITE) 5 % cream Use head to toe at night, wash off in 8-10 hours. Repeat in 1 week. 60 g 1    folic acid (FOLVITE) 1 MG tablet Take 1 tablet by mouth daily 90 tablet 1    Continuous Blood Gluc Sensor (FREESTYLE JOSE 2 SENSOR) MISC apply sensor every 14 day 6 each 3    butalbital-acetaminophen-caffeine (FIORICET, ESGIC) -40 MG per tablet Take 1 tablet by mouth every 6 hours as needed for Headaches 40 tablet 3    albuterol (PROVENTIL) (2.5 MG/3ML) 0.083% nebulizer solution Take 3 mLs by nebulization every 6 hours as needed for Wheezing 1 each 1    Lisdexamfetamine Dimesylate (VYVANSE) 10 MG CAPS Take by mouth daily. Na Sulfate-K Sulfate-Mg Sulf 17.5-3.13-1.6 GM/177ML SOLN As directed 354 mL 0    cyclobenzaprine (FLEXERIL) 10 MG tablet       Continuous Blood Gluc  (FREESTYLE JOSE 2 READER) ANITHA 1 Device by Does not apply route 4 times daily (before meals and nightly) 1 each 0    Handicap Placard MISC by Does not apply route Duration 2 years 1 each 0    Blood Pressure KIT Blood pressure cuff.  1 kit 0    Fluticasone-Umeclidin-Vilant (TRELEGY ELLIPTA) 200-62.5-25 MCG/INH AEPB Inhale 1 puff into the lungs daily      metFORMIN (GLUCOPHAGE) 500 MG tablet Take 1 tablet by mouth 2 times daily (with meals) 60 tablet 3    cariprazine hcl (VRAYLAR) 3 MG CAPS capsule Take 1 capsule by mouth daily 30 capsule 0    docusate sodium (COLACE, DULCOLAX) 100 MG CAPS Take 200 mg by mouth 2 times daily 120 capsule 2    butalbital-aspirin-caffeine (FIORINAL) -40 MG capsule Take 1 capsule by mouth every 4 hours as needed for Headaches for up to 10 days. 20 capsule 0    nitroGLYCERIN (NITROSTAT) 0.4 MG SL tablet up to max of 3 total doses. If no relief after 1 dose, call 911. 25 tablet 3    albuterol sulfate HFA (PROVENTIL HFA) 108 (90 Base) MCG/ACT inhaler Inhale 2 puffs into the lungs every 6 hours as needed for Wheezing 1 Inhaler 3    FREESTYLE LANCETS MISC USE DAILY AS DIRECTED 100 each 10    budesonide (PULMICORT) 0.5 MG/2ML nebulizer suspension INHALE 1 VIAL VIA NEBULIZER TWICE DAILY 120 mL 5    BD INTEGRA SYRINGE 25G X 1\" 3 ML MISC USE AS DIRECTED EVERY MONTH 25 each 5    aspirin 81 MG chewable tablet Take 1 tablet by mouth daily 30 tablet 5    FREESTYLE LITE strip USE DAILY AS DIRECTED 50 strip 11    ipratropium-albuterol (DUONEB) 0.5-2.5 (3) MG/3ML SOLN nebulizer solution Inhale 3 mLs into the lungs three times daily 360 mL 5    VENTOLIN  (90 Base) MCG/ACT inhaler Inhale 2 puffs into the lungs every 6 hours as needed for Wheezing 1 Inhaler 5    famotidine (PEPCID) 20 MG tablet Take 1 tablet by mouth 2 times daily (Patient not taking: Reported on 11/30/2022) 12 tablet 0     No current facility-administered medications for this visit. Review of Systems   Constitutional:  Negative for fever. HENT:  Negative for ear pain, tinnitus and trouble swallowing. Eyes:  Negative for photophobia and visual disturbance. Respiratory:  Negative for choking and shortness of breath. Cardiovascular:  Negative for chest pain and palpitations. Gastrointestinal:  Negative for nausea and vomiting. Musculoskeletal:  Negative for back pain, gait problem, joint swelling, myalgias, neck pain and neck stiffness. Skin:  Negative for color change. Allergic/Immunologic: Negative for food allergies.    Neurological:  Positive for dizziness. Negative for tremors, seizures, syncope, facial asymmetry, speech difficulty, weakness, light-headedness, numbness and headaches. Psychiatric/Behavioral:  Negative for behavioral problems, confusion, hallucinations and sleep disturbance. Objective:   /75 (Site: Right Upper Arm, Position: Sitting, Cuff Size: Medium Adult)   Pulse 83   Wt 210 lb 9.6 oz (95.5 kg)   BMI 32.98 kg/m²     Physical Exam  Vitals reviewed. Eyes:      Pupils: Pupils are equal, round, and reactive to light. Cardiovascular:      Rate and Rhythm: Normal rate and regular rhythm. Heart sounds: No murmur heard. Pulmonary:      Effort: Pulmonary effort is normal.      Breath sounds: Normal breath sounds. Abdominal:      General: Bowel sounds are normal.   Musculoskeletal:         General: Normal range of motion. Cervical back: Normal range of motion. Skin:     General: Skin is warm. Neurological:      Mental Status: She is alert and oriented to person, place, and time. Cranial Nerves: No cranial nerve deficit. Sensory: No sensory deficit. Motor: No abnormal muscle tone. Coordination: Coordination normal.      Deep Tendon Reflexes: Reflexes are normal and symmetric. Babinski sign absent on the right side. Babinski sign absent on the left side. Psychiatric:         Mood and Affect: Mood normal.       No results found.     Lab Results   Component Value Date/Time    WBC 6.9 08/08/2022 01:45 PM    RBC 4.75 08/08/2022 01:45 PM    HGB 14.6 08/08/2022 01:45 PM    HCT 43.4 08/08/2022 01:45 PM    MCV 91.4 08/08/2022 01:45 PM    MCH 30.8 08/08/2022 01:45 PM    MCHC 33.7 08/08/2022 01:45 PM    RDW 14.3 08/08/2022 01:45 PM     08/08/2022 01:45 PM    MPV 9.6 09/20/2015 02:16 PM     Lab Results   Component Value Date/Time     01/04/2022 02:55 PM    K 4.3 01/04/2022 02:55 PM    K 3.4 12/28/2020 05:09 AM     01/04/2022 02:55 PM    CO2 24 01/04/2022 02:55 PM    BUN 14 01/04/2022 02:55 PM    CREATININE 1.02 01/04/2022 02:55 PM    GFRAA >60.0 01/04/2022 02:55 PM    LABGLOM 57.5 01/04/2022 02:55 PM    GLUCOSE 89 01/04/2022 02:55 PM    PROT 6.8 01/04/2022 02:55 PM    LABALBU 4.4 01/04/2022 02:55 PM    CALCIUM 9.6 01/04/2022 02:55 PM    BILITOT <0.2 01/04/2022 02:55 PM    ALKPHOS 137 01/04/2022 02:55 PM    AST 17 01/04/2022 02:55 PM    ALT 29 01/04/2022 02:55 PM     Lab Results   Component Value Date/Time    PROTIME 31.1 08/08/2022 01:45 PM    INR 2.5 11/23/2022 03:00 PM    INR 3.1 08/08/2022 01:45 PM     Lab Results   Component Value Date/Time    TSH 4.070 04/10/2019 12:13 PM    ETJTHRLY76 215 10/22/2021 02:17 PM    FOLATE 3.3 10/22/2021 02:17 PM    FERRITIN 282.0 12/11/2020 04:29 PM    IRON 51 12/11/2020 04:29 PM    TIBC 272 12/11/2020 04:29 PM     Lab Results   Component Value Date/Time    TRIG 251 12/24/2020 05:31 AM    HDL 27 12/24/2020 05:31 AM    LDLCALC 86 12/24/2020 05:31 AM     Lab Results   Component Value Date/Time    LABAMPH Neg 11/18/2021 06:45 AM    BARBSCNU Neg 11/18/2021 06:45 AM    LABBENZ Neg 11/18/2021 06:45 AM    LABBENZ NotDTCD 01/26/2013 01:23 AM    LABMETH Neg 11/18/2021 06:45 AM    OPIATESCREENURINE POSITIVE 11/18/2021 06:45 AM    PHENCYCLIDINESCREENURINE Neg 11/18/2021 06:45 AM    ETOH <10 12/22/2020 11:00 PM     Lab Results   Component Value Date/Time    LITHIUM 0.4 01/04/2022 02:55 PM       Assessment:       Diagnosis Orders   1. Intractable chronic migraine without aura and without status migrainosus        2. Attention deficit disorder (ADD) without hyperactivity  amphetamine-dextroamphetamine (ADDERALL XR) 20 MG extended release capsule      3. Lumbar radiculopathy        4. Dizziness        5. Cerebrovascular accident (CVA) of left thalamus ([address]        Cerebral stroke from which patient had recovered. The patient though developed headaches and she continues on preset for the same. She is not a candidate for triptans.   She had multiple headache types and we have tried her on multiple medications. Patient had cardioembolic strokes and she now continues on anticoagulation. Patient was evaluated and she has hypercoagulable state with MTHFR mutation and therefore the Coumadin. She is also on vitamin B12. This for her main issues though appears to be attention deficit which had developed after the stroke and she has been seen by psychiatry as well I see that she was tried on Vyvanse which did not help. When last seen we were contemplating evaluating this in her T-scores may suggest the same. A trial of Adderall XR is recommended we will start her on XR 20 mg. Side effects and expectations are discussed and patient will monitor for the same due to the controlled drug it is. She is still having dizziness which is intermittent. Started on Antivert 25 mg twice a day to see if this would help. Steven Mckinney MD, [place], American Board of Psychiatry & Neurology  Board Certified in Vascular Neurology  Board Certified in Neuromuscular Medicine  Certified in [facility]:      No orders of the defined types were placed in this encounter. Orders Placed This Encounter   Medications    amphetamine-dextroamphetamine (ADDERALL XR) 20 MG extended release capsule     Sig: Take 1 capsule by mouth every morning for 30 days. Dispense:  30 capsule     Refill:  0    meclizine (ANTIVERT) 25 MG tablet     Sig: Take 1 tablet by mouth 2 times daily     Dispense:  60 tablet     Refill:  0       No follow-ups on file.       Renea Adams MD

## 2022-12-24 ENCOUNTER — TELEPHONE (OUTPATIENT)
Dept: PHARMACY | Age: 52
End: 2022-12-24

## 2023-01-03 RX ORDER — SITAGLIPTIN 25 MG/1
TABLET, FILM COATED ORAL
Qty: 90 TABLET | Refills: 0 | OUTPATIENT
Start: 2023-01-03

## 2023-01-05 ENCOUNTER — TELEPHONE (OUTPATIENT)
Dept: NEUROLOGY | Age: 53
End: 2023-01-05

## 2023-01-05 ENCOUNTER — TELEPHONE (OUTPATIENT)
Dept: PHARMACY | Age: 53
End: 2023-01-05

## 2023-01-05 DIAGNOSIS — I63.81 CEREBROVASCULAR ACCIDENT (CVA) OF LEFT THALAMUS (HCC): Primary | ICD-10-CM

## 2023-01-05 DIAGNOSIS — D68.59 HYPERCOAGULABLE STATE (HCC): ICD-10-CM

## 2023-01-05 DIAGNOSIS — G45.8 OTHER TRANSIENT CEREBRAL ISCHEMIC ATTACKS AND RELATED SYNDROMES: ICD-10-CM

## 2023-01-05 NOTE — TELEPHONE ENCOUNTER
Patient called, states that the adderall xr 20 mg works for a few hours and wears off and she would like to know if her dose can be increased? Please advise.

## 2023-01-06 DIAGNOSIS — F98.8 ATTENTION DEFICIT DISORDER (ADD) WITHOUT HYPERACTIVITY: Primary | ICD-10-CM

## 2023-01-06 RX ORDER — DEXTROAMPHETAMINE SACCHARATE, AMPHETAMINE ASPARTATE, DEXTROAMPHETAMINE SULFATE AND AMPHETAMINE SULFATE 2.5; 2.5; 2.5; 2.5 MG/1; MG/1; MG/1; MG/1
10 TABLET ORAL DAILY
Qty: 30 TABLET | Refills: 0 | Status: SHIPPED | OUTPATIENT
Start: 2023-01-06 | End: 2023-02-05

## 2023-01-16 ENCOUNTER — TELEPHONE (OUTPATIENT)
Dept: PHARMACY | Age: 53
End: 2023-01-16

## 2023-01-16 DIAGNOSIS — D68.59 HYPERCOAGULABLE STATE (HCC): ICD-10-CM

## 2023-01-16 DIAGNOSIS — G45.8 OTHER TRANSIENT CEREBRAL ISCHEMIC ATTACKS AND RELATED SYNDROMES: ICD-10-CM

## 2023-01-16 DIAGNOSIS — I63.81 CEREBROVASCULAR ACCIDENT (CVA) OF LEFT THALAMUS (HCC): Primary | ICD-10-CM

## 2023-01-23 ENCOUNTER — TELEPHONE (OUTPATIENT)
Dept: PHARMACY | Age: 53
End: 2023-01-23

## 2023-01-23 DIAGNOSIS — G45.8 OTHER TRANSIENT CEREBRAL ISCHEMIC ATTACKS AND RELATED SYNDROMES: ICD-10-CM

## 2023-01-23 DIAGNOSIS — I63.81 CEREBROVASCULAR ACCIDENT (CVA) OF LEFT THALAMUS (HCC): Primary | ICD-10-CM

## 2023-01-23 DIAGNOSIS — D68.59 HYPERCOAGULABLE STATE (HCC): ICD-10-CM

## 2023-01-30 ENCOUNTER — TELEPHONE (OUTPATIENT)
Dept: PHARMACY | Age: 53
End: 2023-01-30

## 2023-01-30 DIAGNOSIS — I63.81 CEREBROVASCULAR ACCIDENT (CVA) OF LEFT THALAMUS (HCC): Primary | ICD-10-CM

## 2023-01-30 DIAGNOSIS — D68.59 HYPERCOAGULABLE STATE (HCC): ICD-10-CM

## 2023-01-30 DIAGNOSIS — G45.8 OTHER TRANSIENT CEREBRAL ISCHEMIC ATTACKS AND RELATED SYNDROMES: ICD-10-CM

## 2023-01-30 RX ORDER — SITAGLIPTIN 25 MG/1
TABLET, FILM COATED ORAL
Qty: 90 TABLET | Refills: 0 | OUTPATIENT
Start: 2023-01-30

## 2023-02-06 ENCOUNTER — HOSPITAL ENCOUNTER (OUTPATIENT)
Dept: PHARMACY | Age: 53
Setting detail: THERAPIES SERIES
Discharge: HOME | End: 2023-02-06
Payer: COMMERCIAL

## 2023-02-06 DIAGNOSIS — I63.81 CEREBROVASCULAR ACCIDENT (CVA) OF LEFT THALAMUS (HCC): Primary | ICD-10-CM

## 2023-02-06 DIAGNOSIS — D68.59 HYPERCOAGULABLE STATE (HCC): ICD-10-CM

## 2023-02-06 DIAGNOSIS — G45.8 OTHER TRANSIENT CEREBRAL ISCHEMIC ATTACKS AND RELATED SYNDROMES: ICD-10-CM

## 2023-02-06 LAB — INTERNATIONAL NORMALIZATION RATIO, POC: 1.6

## 2023-02-06 PROCEDURE — 99211 OFF/OP EST MAY X REQ PHY/QHP: CPT | Performed by: PHARMACIST

## 2023-02-06 PROCEDURE — 85610 PROTHROMBIN TIME: CPT | Performed by: PHARMACIST

## 2023-02-06 NOTE — PROGRESS NOTES
Ms. Zuleika Vazquez is a 50 y.o. y/o female with history of CVA who presents today for anticoagulation monitoring and adjustment. INR 1.6 is subtherapeutic for this patient (goal range 2-3) and is reflective of 45 mg TWD  Patient verifies current dosing regimen, patient able to verbally recall dose  Patient reports unknown  missed doses since last INR . Pt is overdue for INR check. Pt states she \"probably missed some doses \"  but is unsure .   Patient denies s/sx clotting and/or stroke  Patient denies hematuria, epistaxis, rectal bleeding  Patient denies changes in diet, alcohol, or tobacco use  Reviewed medication list and drug allergies with patient, updated any medication additions or modifications accordingly  Patient also denies any pending medical or dental procedures scheduled at this time  Patient was instructed to take extra 5mg today (extra 11% this week only) then continue 45mg TWD and RTC 2 weeks    For Pharmacy Admin Tracking Only    Intervention Detail: Dose Adjustment: 1, reason: Therapy Optimization  Total # of Interventions Recommended: 1  Total # of Interventions Accepted: 1  Time Spent (min): 30

## 2023-02-17 ENCOUNTER — TELEPHONE (OUTPATIENT)
Dept: NEUROLOGY | Age: 53
End: 2023-02-17

## 2023-02-17 DIAGNOSIS — F98.8 ATTENTION DEFICIT DISORDER (ADD) WITHOUT HYPERACTIVITY: ICD-10-CM

## 2023-02-17 RX ORDER — DEXTROAMPHETAMINE SACCHARATE, AMPHETAMINE ASPARTATE, DEXTROAMPHETAMINE SULFATE AND AMPHETAMINE SULFATE 2.5; 2.5; 2.5; 2.5 MG/1; MG/1; MG/1; MG/1
10 TABLET ORAL DAILY
Qty: 30 TABLET | Refills: 0 | Status: SHIPPED | OUTPATIENT
Start: 2023-02-17 | End: 2023-03-19

## 2023-02-17 NOTE — TELEPHONE ENCOUNTER
Patient called, stated she is having cataract surgery Tuesday 02/21/23 and needs to know when to stop coumadin. Per conversation with Dr. Judy Valentino, she is to take today's dose, not take any on Saturday, Sunday, Monday or Tuesday. If eye surgeon agrees she can take again Wednesday, Thursday and Friday next week then stop again on Saturday before she has the other eye done on 02/28/23.   Patient was advised and understood

## 2023-02-17 NOTE — TELEPHONE ENCOUNTER
Patient called requesting renewal of her handicap placard. Was originally given to her by Lewis Perry for CVA.   Please advise

## 2023-02-20 RX ORDER — WARFARIN SODIUM 5 MG/1
TABLET ORAL
Qty: 150 TABLET | Refills: 3 | Status: SHIPPED | OUTPATIENT
Start: 2023-02-20

## 2023-02-20 RX ORDER — FOLIC ACID 1 MG/1
TABLET ORAL
Qty: 90 TABLET | Refills: 3 | Status: SHIPPED | OUTPATIENT
Start: 2023-02-20

## 2023-02-20 NOTE — TELEPHONE ENCOUNTER
Pharmacy is requesting medication refill.  Please approve or deny this request.    Rx requested:  Requested Prescriptions     Pending Prescriptions Disp Refills    folic acid (FOLVITE) 1 MG tablet [Pharmacy Med Name: FOLIC ACID TABS 1MG] 90 tablet 3     Sig: TAKE 1 TABLET DAILY    warfarin (COUMADIN) 5 MG tablet [Pharmacy Med Name: WARFARIN TABS 5MG] 150 tablet 3     Sig: TAKE AS DIRECTED BY Manoj         Last Office Visit:   11/30/2022      Next Visit Date:  Future Appointments   Date Time Provider Vangie Nuñez   2/24/2023  2:40 PM [address]   4/3/2023  3:00 PM MD Cristopher Khan [facility] -

## 2023-02-24 DIAGNOSIS — I63.81 CEREBROVASCULAR ACCIDENT (CVA) OF LEFT THALAMUS (HCC): ICD-10-CM

## 2023-02-27 ENCOUNTER — TELEPHONE (OUTPATIENT)
Dept: PHARMACY | Age: 53
End: 2023-02-27

## 2023-02-27 DIAGNOSIS — I63.81 CEREBROVASCULAR ACCIDENT (CVA) OF LEFT THALAMUS (HCC): Primary | ICD-10-CM

## 2023-02-27 DIAGNOSIS — G45.8 OTHER TRANSIENT CEREBRAL ISCHEMIC ATTACKS AND RELATED SYNDROMES: ICD-10-CM

## 2023-02-27 DIAGNOSIS — D68.59 HYPERCOAGULABLE STATE (HCC): ICD-10-CM

## 2023-03-03 ENCOUNTER — HOSPITAL ENCOUNTER (OUTPATIENT)
Dept: PHARMACY | Age: 53
Setting detail: THERAPIES SERIES
Discharge: HOME | End: 2023-03-03
Payer: COMMERCIAL

## 2023-03-03 DIAGNOSIS — I63.81 CEREBROVASCULAR ACCIDENT (CVA) OF LEFT THALAMUS (HCC): Primary | ICD-10-CM

## 2023-03-03 DIAGNOSIS — G45.8 OTHER TRANSIENT CEREBRAL ISCHEMIC ATTACKS AND RELATED SYNDROMES: ICD-10-CM

## 2023-03-03 DIAGNOSIS — D68.59 HYPERCOAGULABLE STATE (HCC): ICD-10-CM

## 2023-03-03 LAB — INTERNATIONAL NORMALIZATION RATIO, POC: 2

## 2023-03-03 PROCEDURE — 85610 PROTHROMBIN TIME: CPT | Performed by: PHARMACIST

## 2023-03-03 PROCEDURE — 99211 OFF/OP EST MAY X REQ PHY/QHP: CPT | Performed by: PHARMACIST

## 2023-03-03 NOTE — PROGRESS NOTES
Ms. Jordan Saini is a 50 y.o. y/o female with history of CVA who presents today for anticoagulation monitoring and adjustment. INR 2.0 is therapeutic for this patient (goal range 2-3) and is reflective of 45 mg TWD  Patient verifies current dosing regimen, patient able to verbally recall dose  Patient reports NO  missed doses since last INR   Patient denies s/sx clotting and/or stroke  Patient denies hematuria, epistaxis, rectal bleeding  Patient denies changes in diet, alcohol, or tobacco use  Reviewed medication list and drug allergies with patient, updated any medication additions or modifications accordingly  Patient also denies any pending medical or dental procedures scheduled at this time  Patient's cataract surgery was cancelled for . Patient was instructed to continue 45 mg TWD and RTC 3 weeks    MAIKOL Mcknight Ph.  3/3/2023  3:02 PM    For Pharmacy Admin Tracking Only    Intervention Detail: Adherence Monitorin  Total # of Interventions Recommended: 0  Total # of Interventions Accepted: 0  Time Spent (min): 15

## 2023-03-21 DIAGNOSIS — F98.8 ATTENTION DEFICIT DISORDER (ADD) WITHOUT HYPERACTIVITY: ICD-10-CM

## 2023-03-21 RX ORDER — DEXTROAMPHETAMINE SACCHARATE, AMPHETAMINE ASPARTATE MONOHYDRATE, DEXTROAMPHETAMINE SULFATE AND AMPHETAMINE SULFATE 5; 5; 5; 5 MG/1; MG/1; MG/1; MG/1
20 CAPSULE, EXTENDED RELEASE ORAL EVERY MORNING
Qty: 30 CAPSULE | Refills: 0 | Status: SHIPPED | OUTPATIENT
Start: 2023-03-21 | End: 2023-04-21 | Stop reason: SDUPTHER

## 2023-03-24 ENCOUNTER — HOSPITAL ENCOUNTER (OUTPATIENT)
Dept: PHARMACY | Age: 53
Setting detail: THERAPIES SERIES
Discharge: HOME | End: 2023-03-24
Payer: COMMERCIAL

## 2023-03-24 DIAGNOSIS — D68.59 HYPERCOAGULABLE STATE (HCC): ICD-10-CM

## 2023-03-24 DIAGNOSIS — I63.81 CEREBROVASCULAR ACCIDENT (CVA) OF LEFT THALAMUS (HCC): Primary | ICD-10-CM

## 2023-03-24 DIAGNOSIS — G45.8 OTHER TRANSIENT CEREBRAL ISCHEMIC ATTACKS AND RELATED SYNDROMES: ICD-10-CM

## 2023-03-24 LAB — INTERNATIONAL NORMALIZATION RATIO, POC: 2.1

## 2023-03-24 PROCEDURE — 99211 OFF/OP EST MAY X REQ PHY/QHP: CPT

## 2023-03-24 PROCEDURE — 85610 PROTHROMBIN TIME: CPT

## 2023-03-24 NOTE — PROGRESS NOTES
Ms. Fercho Bolaños is a 50 y.o. y/o female with history of CVA who presents today for anticoagulation monitoring and adjustment.     INR 2.1 is therapeutic for this patient (goal range 2-3) and is reflective of 45 mg TWD    Patient verifies current dosing regimen, patient able to verbally recall dose  Patient reports 0 missed doses since last INR   Patient denies s/sx clotting and/or stroke  Patient denies hematuria, epistaxis, rectal bleeding  Patient denies changes in diet, alcohol, or tobacco use  Reviewed medication list and drug allergies with patient, updated any medication additions or modifications accordingly  Patient also denies any pending medical or dental procedures scheduled at this time    Patient was instructed to continue 45 mg TWD and RTC 3 weeks    Dennise Romo PharmD  3/24/2023 10:51 AM      For Pharmacy Admin Tracking Only    Intervention Detail: Adherence Monitorin  Total # of Interventions Recommended: 1  Total # of Interventions Accepted: 1  Time Spent (min): 15

## 2023-04-03 ENCOUNTER — OFFICE VISIT (OUTPATIENT)
Dept: NEUROLOGY | Age: 53
End: 2023-04-03
Payer: COMMERCIAL

## 2023-04-03 VITALS
BODY MASS INDEX: 31.81 KG/M2 | HEART RATE: 79 BPM | SYSTOLIC BLOOD PRESSURE: 122 MMHG | DIASTOLIC BLOOD PRESSURE: 80 MMHG | WEIGHT: 203.1 LBS

## 2023-04-03 DIAGNOSIS — F98.8 ATTENTION DEFICIT DISORDER (ADD) WITHOUT HYPERACTIVITY: ICD-10-CM

## 2023-04-03 DIAGNOSIS — H81.13 BENIGN PAROXYSMAL POSITIONAL VERTIGO DUE TO BILATERAL VESTIBULAR DISORDER: ICD-10-CM

## 2023-04-03 DIAGNOSIS — R26.89 BALANCE DISORDER: ICD-10-CM

## 2023-04-03 DIAGNOSIS — E72.11 MTHFR (METHYLENE THF REDUCTASE) DEFICIENCY AND HOMOCYSTINURIA (HCC): ICD-10-CM

## 2023-04-03 DIAGNOSIS — E72.12 MTHFR (METHYLENE THF REDUCTASE) DEFICIENCY AND HOMOCYSTINURIA (HCC): ICD-10-CM

## 2023-04-03 DIAGNOSIS — I63.81 CEREBROVASCULAR ACCIDENT (CVA) OF LEFT THALAMUS (HCC): ICD-10-CM

## 2023-04-03 DIAGNOSIS — G43.111 INTRACTABLE MIGRAINE WITH AURA WITH STATUS MIGRAINOSUS: ICD-10-CM

## 2023-04-03 DIAGNOSIS — I63.512 CEREBROVASCULAR ACCIDENT (CVA) DUE TO STENOSIS OF LEFT MIDDLE CEREBRAL ARTERY (HCC): Primary | ICD-10-CM

## 2023-04-03 PROCEDURE — 3078F DIAST BP <80 MM HG: CPT | Performed by: PSYCHIATRY & NEUROLOGY

## 2023-04-03 PROCEDURE — 3074F SYST BP LT 130 MM HG: CPT | Performed by: PSYCHIATRY & NEUROLOGY

## 2023-04-03 PROCEDURE — 99214 OFFICE O/P EST MOD 30 MIN: CPT | Performed by: PSYCHIATRY & NEUROLOGY

## 2023-04-03 RX ORDER — FLUOXETINE HYDROCHLORIDE 20 MG/1
CAPSULE ORAL
COMMUNITY
Start: 2023-03-29

## 2023-04-03 RX ORDER — DEXTROAMPHETAMINE SACCHARATE, AMPHETAMINE ASPARTATE, DEXTROAMPHETAMINE SULFATE AND AMPHETAMINE SULFATE 2.5; 2.5; 2.5; 2.5 MG/1; MG/1; MG/1; MG/1
10 TABLET ORAL DAILY
Qty: 30 TABLET | Refills: 0 | Status: SHIPPED | OUTPATIENT
Start: 2023-04-03 | End: 2023-05-03

## 2023-04-03 NOTE — PROGRESS NOTES
Blood Gluc  (FREESTYLE JOSE 2 READER) ANITHA 1 Device by Does not apply route 4 times daily (before meals and nightly) 1 each 0    Blood Pressure KIT Blood pressure cuff. 1 kit 0    Fluticasone-Umeclidin-Vilant (TRELEGY ELLIPTA) 200-62.5-25 MCG/INH AEPB Inhale 1 puff into the lungs daily      metFORMIN (GLUCOPHAGE) 500 MG tablet Take 1 tablet by mouth 2 times daily (with meals) 60 tablet 3    cariprazine hcl (VRAYLAR) 3 MG CAPS capsule Take 1 capsule by mouth daily 30 capsule 0    butalbital-aspirin-caffeine (FIORINAL) -40 MG capsule Take 1 capsule by mouth every 4 hours as needed for Headaches for up to 10 days. 20 capsule 0    nitroGLYCERIN (NITROSTAT) 0.4 MG SL tablet up to max of 3 total doses.  If no relief after 1 dose, call 911. 25 tablet 3    albuterol sulfate HFA (PROVENTIL HFA) 108 (90 Base) MCG/ACT inhaler Inhale 2 puffs into the lungs every 6 hours as needed for Wheezing 1 Inhaler 3    FREESTYLE LANCETS MISC USE DAILY AS DIRECTED 100 each 10    budesonide (PULMICORT) 0.5 MG/2ML nebulizer suspension INHALE 1 VIAL VIA NEBULIZER TWICE DAILY 120 mL 5    BD INTEGRA SYRINGE 25G X 1\" 3 ML MISC USE AS DIRECTED EVERY MONTH 25 each 5    aspirin 81 MG chewable tablet Take 1 tablet by mouth daily 30 tablet 5    FREESTYLE LITE strip USE DAILY AS DIRECTED 50 strip 11    ipratropium-albuterol (DUONEB) 0.5-2.5 (3) MG/3ML SOLN nebulizer solution Inhale 3 mLs into the lungs three times daily 360 mL 5    VENTOLIN  (90 Base) MCG/ACT inhaler Inhale 2 puffs into the lungs every 6 hours as needed for Wheezing 1 Inhaler 5    famotidine (PEPCID) 20 MG tablet Take 1 tablet by mouth 2 times daily (Patient not taking: Reported on 11/30/2022) 12 tablet 0    butalbital-acetaminophen-caffeine (FIORICET, ESGIC) -40 MG per tablet Take 1 tablet by mouth every 6 hours as needed for Headaches (Patient not taking: Reported on 4/3/2023) 40 tablet 3    docusate sodium (COLACE, DULCOLAX) 100 MG CAPS Take 200 mg by

## 2023-04-06 LAB
6MAM UR QL: NOT DETECTED
7-AMINOCLONAZEPAM: NOT DETECTED
ALPHA-OH-ALPRAZOLAM: NOT DETECTED
ALPHA-OH-MIDAZOLAM, URINE: NOT DETECTED
ALPRAZOLAM: NOT DETECTED
AMPHET UR QL SCN: PRESENT
BARBITURATES: NOT DETECTED
BENZOYLECGONINE: NOT DETECTED
BUPRENORPHINE: NOT DETECTED
CARISOPRODOL UR QL: NOT DETECTED
CLONAZEPAM UR QL: NOT DETECTED
CODEINE: NOT DETECTED
CREAT UR-MCNC: 73.3 MG/DL (ref 20–400)
DIAZEPAM: NOT DETECTED
ETHYL GLUCURONIDE: NOT DETECTED
FENTANYL UR QL: NOT DETECTED
GABAPENTIN: NOT DETECTED
HYDROCODONE UR QL: NOT DETECTED
HYDROMORPHONE: NOT DETECTED
LORAZEPAM UR QL: NOT DETECTED
MARIJUANA METABOLITE: NOT DETECTED
MDA: NOT DETECTED
MDEA: NOT DETECTED
MDMA UR QL: NOT DETECTED
MEPERIDINE: NOT DETECTED
METHADONE: NOT DETECTED
METHAMPHETAMINE: NOT DETECTED
METHYLPHENIDATE: NOT DETECTED
MIDAZOLAM UR QL SCN: NOT DETECTED
MORPHINE: NOT DETECTED
NALOXONE: NOT DETECTED
NORBUPRENORPHINE, FREE: NOT DETECTED
NORDIAZEPAM: NOT DETECTED
NORFENTANYL: NOT DETECTED
NORHYDROCODONE, URINE: NOT DETECTED
NOROXYCODONE: NOT DETECTED
NOROXYMORPHONE, URINE: NOT DETECTED
OXAZEPAM UR QL: NOT DETECTED
OXYCODONE UR QL: NOT DETECTED
OXYMORPHONE UR QL: NOT DETECTED
PAIN MANAGEMENT DRUG PANEL: NORMAL
PATHOLOGY STUDY: NORMAL
PCP: NOT DETECTED
PHENTERMINE: NOT DETECTED
PREGABALIN: NOT DETECTED
TAPENTADOL, URINE: NOT DETECTED
TAPENTADOL-O-SULFATE, URINE: NOT DETECTED
TEMAZEPAM: NOT DETECTED
TRAMADOL: PRESENT
ZOLPIDEM: NOT DETECTED

## 2023-04-18 ENCOUNTER — HOSPITAL ENCOUNTER (OUTPATIENT)
Dept: PHARMACY | Age: 53
Setting detail: THERAPIES SERIES
Discharge: HOME | End: 2023-04-18
Payer: COMMERCIAL

## 2023-04-18 DIAGNOSIS — D68.59 HYPERCOAGULABLE STATE (HCC): ICD-10-CM

## 2023-04-18 DIAGNOSIS — I63.81 CEREBROVASCULAR ACCIDENT (CVA) OF LEFT THALAMUS (HCC): Primary | ICD-10-CM

## 2023-04-18 DIAGNOSIS — G45.8 OTHER TRANSIENT CEREBRAL ISCHEMIC ATTACKS AND RELATED SYNDROMES: ICD-10-CM

## 2023-04-18 LAB
INTERNATIONAL NORMALIZATION RATIO, POC: 4
INTERNATIONAL NORMALIZATION RATIO, POC: 4

## 2023-04-18 PROCEDURE — 85610 PROTHROMBIN TIME: CPT | Performed by: PHARMACIST

## 2023-04-18 PROCEDURE — 99211 OFF/OP EST MAY X REQ PHY/QHP: CPT | Performed by: PHARMACIST

## 2023-04-18 NOTE — PROGRESS NOTES
Ms. René Beltre is a 50 y.o. y/o female with history of CVA who presents today for anticoagulation monitoring and adjustment. INR 4.0 is supra-therapeutic for this patient (goal range 2-3) and is reflective of 45 mg TWD  Patient verifies current dosing regimen, patient able to verbally recall dose  Patient reports NO  missed doses since last INR   Patient thinks she may have taken an extra dose this weekend (7.5 mg)  Patient denies s/sx clotting and/or stroke  Patient denies hematuria, epistaxis, rectal bleeding  Patient denies changes in diet, alcohol, or tobacco use  Patient ate large quantity of real blueberry muffins   Reviewed medication list and drug allergies with patient, updated any medication additions or modifications accordingly  Patient also denies any pending medical or dental procedures scheduled at this time  Patient was instructed to HOLD for two days then continue 45 mg TWD and RTC 3 weeks    MAIKOL Hanna Ph.  2023  2:22 PM    For Pharmacy Admin Tracking Only    Intervention Detail: Adherence Monitorin and Dose Adjustment: 2, reason: Improve Adherence, Therapy De-escalation  Total # of Interventions Recommended: 2  Total # of Interventions Accepted: 2  Time Spent (min): 15

## 2023-04-21 DIAGNOSIS — F98.8 ATTENTION DEFICIT DISORDER (ADD) WITHOUT HYPERACTIVITY: ICD-10-CM

## 2023-04-21 RX ORDER — DEXTROAMPHETAMINE SACCHARATE, AMPHETAMINE ASPARTATE MONOHYDRATE, DEXTROAMPHETAMINE SULFATE AND AMPHETAMINE SULFATE 5; 5; 5; 5 MG/1; MG/1; MG/1; MG/1
20 CAPSULE, EXTENDED RELEASE ORAL EVERY MORNING
Qty: 30 CAPSULE | Refills: 0 | Status: SHIPPED | OUTPATIENT
Start: 2023-04-21 | End: 2023-05-21

## 2023-04-26 RX ORDER — SITAGLIPTIN 25 MG/1
TABLET, FILM COATED ORAL
Qty: 90 TABLET | Refills: 0 | OUTPATIENT
Start: 2023-04-26

## 2023-04-26 RX ORDER — LOSARTAN POTASSIUM 100 MG/1
TABLET ORAL
Qty: 30 TABLET | Refills: 3 | Status: SHIPPED | OUTPATIENT
Start: 2023-04-26

## 2023-04-26 RX ORDER — FUROSEMIDE 20 MG/1
20 TABLET ORAL 2 TIMES DAILY
Qty: 60 TABLET | Refills: 3 | Status: SHIPPED | OUTPATIENT
Start: 2023-04-26

## 2023-04-26 RX ORDER — AMLODIPINE BESYLATE 5 MG/1
TABLET ORAL
Qty: 30 TABLET | Refills: 3 | Status: SHIPPED | OUTPATIENT
Start: 2023-04-26

## 2023-04-26 RX ORDER — ATORVASTATIN CALCIUM 20 MG/1
20 TABLET, FILM COATED ORAL NIGHTLY
Qty: 30 TABLET | Refills: 3 | Status: SHIPPED | OUTPATIENT
Start: 2023-04-26

## 2023-04-26 NOTE — TELEPHONE ENCOUNTER
Requesting medication refill. Please approve or deny this request.    Rx requested:  Requested Prescriptions     Pending Prescriptions Disp Refills    atorvastatin (LIPITOR) 20 MG tablet [Pharmacy Med Name: atorvastatin 20 mg tablet] 30 tablet 3     Sig: TAKE 1 TABLET BY MOUTH NIGHTLY    losartan (COZAAR) 100 MG tablet [Pharmacy Med Name: losartan 100 mg tablet] 30 tablet 3     Sig: TAKE 1 TABLET BY MOUTH ONCE DAILY    furosemide (LASIX) 20 MG tablet [Pharmacy Med Name: furosemide 20 mg tablet] 60 tablet 3     Sig: Take 1 tablet by mouth 2 times daily    amLODIPine (NORVASC) 5 MG tablet [Pharmacy Med Name: amlodipine 5 mg tablet] 30 tablet 3     Sig: Take 1 tablet by mouth once daily. Last Office Visit:   11/1/2022      Next Visit Date:  Future Appointments   Date Time Provider Vangie Nuñez   5/12/2023  2:30 PM [address]   10/2/2023  2:00 PM Steven Llamas MD [facility] -               Please approve or deny.

## 2023-05-12 ENCOUNTER — HOSPITAL ENCOUNTER (OUTPATIENT)
Dept: PHARMACY | Age: 53
Setting detail: THERAPIES SERIES
Discharge: HOME | End: 2023-05-12
Payer: COMMERCIAL

## 2023-05-12 DIAGNOSIS — G45.8 OTHER TRANSIENT CEREBRAL ISCHEMIC ATTACKS AND RELATED SYNDROMES: ICD-10-CM

## 2023-05-12 DIAGNOSIS — I63.81 CEREBROVASCULAR ACCIDENT (CVA) OF LEFT THALAMUS (HCC): Primary | ICD-10-CM

## 2023-05-12 DIAGNOSIS — D68.59 HYPERCOAGULABLE STATE (HCC): ICD-10-CM

## 2023-05-12 LAB — INTERNATIONAL NORMALIZATION RATIO, POC: 2.6

## 2023-05-12 PROCEDURE — 99211 OFF/OP EST MAY X REQ PHY/QHP: CPT

## 2023-05-12 PROCEDURE — 85610 PROTHROMBIN TIME: CPT

## 2023-05-12 RX ORDER — WARFARIN SODIUM 5 MG/1
TABLET ORAL
Qty: 150 TABLET | Refills: 3 | Status: SHIPPED | OUTPATIENT
Start: 2023-05-12

## 2023-05-12 NOTE — PROGRESS NOTES
Ms. Erica Spence is a 50 y.o. y/o female with history of CVA who presents today for anticoagulation monitoring and adjustment.   INR 2.6 is therapeutic for this patient (goal range 2-3) and is reflective of 45 mg TWD  Patient verifies current dosing regimen, patient able to verbally recall dose  Patient reports no missed doses since last INR   Patient denies s/sx clotting and/or stroke  Patient denies hematuria, epistaxis, rectal bleeding  Patient denies changes in diet, alcohol, or tobacco use  Reviewed medication list and drug allergies with patient, updated any medication additions or modifications accordingly  Patient also denies any pending medical or dental procedures scheduled at this time  Patient was instructed to continue 45 mg TWD and RTC 4 weeks          For Pharmacy Admin Tracking Only    Intervention Detail: Adherence Monitorin  Total # of Interventions Recommended: 1  Total # of Interventions Accepted: 1  Time Spent (min): 10

## 2023-05-22 DIAGNOSIS — F98.8 ATTENTION DEFICIT DISORDER (ADD) WITHOUT HYPERACTIVITY: ICD-10-CM

## 2023-05-24 RX ORDER — DEXTROAMPHETAMINE SACCHARATE, AMPHETAMINE ASPARTATE, DEXTROAMPHETAMINE SULFATE AND AMPHETAMINE SULFATE 2.5; 2.5; 2.5; 2.5 MG/1; MG/1; MG/1; MG/1
TABLET ORAL
Qty: 30 TABLET | Refills: 0 | Status: SHIPPED | OUTPATIENT
Start: 2023-05-24 | End: 2023-06-22

## 2023-05-24 RX ORDER — DEXTROAMPHETAMINE SACCHARATE, AMPHETAMINE ASPARTATE MONOHYDRATE, DEXTROAMPHETAMINE SULFATE AND AMPHETAMINE SULFATE 5; 5; 5; 5 MG/1; MG/1; MG/1; MG/1
20 CAPSULE, EXTENDED RELEASE ORAL EVERY MORNING
Qty: 30 CAPSULE | Refills: 0 | Status: SHIPPED | OUTPATIENT
Start: 2023-05-24 | End: 2023-06-23

## 2023-06-05 ENCOUNTER — HOSPITAL ENCOUNTER (EMERGENCY)
Age: 53
Discharge: HOME | End: 2023-06-05
Payer: COMMERCIAL

## 2023-06-05 ENCOUNTER — APPOINTMENT (OUTPATIENT)
Dept: GENERAL RADIOLOGY | Age: 53
End: 2023-06-05
Payer: COMMERCIAL

## 2023-06-05 VITALS
WEIGHT: 200 LBS | TEMPERATURE: 97.6 F | SYSTOLIC BLOOD PRESSURE: 128 MMHG | DIASTOLIC BLOOD PRESSURE: 74 MMHG | OXYGEN SATURATION: 97 % | BODY MASS INDEX: 31.39 KG/M2 | HEART RATE: 72 BPM | RESPIRATION RATE: 23 BRPM | HEIGHT: 67 IN

## 2023-06-05 DIAGNOSIS — E11.65 HYPERGLYCEMIA DUE TO DIABETES MELLITUS (HCC): Primary | ICD-10-CM

## 2023-06-05 DIAGNOSIS — Z91.199 H/O NONCOMPLIANCE WITH MEDICAL TREATMENT, PRESENTING HAZARDS TO HEALTH: ICD-10-CM

## 2023-06-05 LAB
ALBUMIN SERPL-MCNC: 4.5 G/DL (ref 3.5–4.6)
ALP SERPL-CCNC: 230 U/L (ref 40–130)
ALT SERPL-CCNC: 41 U/L (ref 0–33)
AMPHET UR QL SCN: POSITIVE
ANION GAP SERPL CALCULATED.3IONS-SCNC: 11 MEQ/L (ref 9–15)
AST SERPL-CCNC: 26 U/L (ref 0–35)
B-OH-BUTYR SERPL-SCNC: 0.8 MG/DL (ref 0.2–2.8)
BACTERIA URNS QL MICRO: ABNORMAL /HPF
BARBITURATES UR QL SCN: ABNORMAL
BASE EXCESS VENOUS: 1 (ref -3–3)
BASOPHILS # BLD: 0.1 K/UL (ref 0–0.2)
BASOPHILS NFR BLD: 1.1 %
BENZODIAZ UR QL SCN: ABNORMAL
BILIRUB SERPL-MCNC: 0.3 MG/DL (ref 0.2–0.7)
BILIRUB UR QL STRIP: NEGATIVE
BUN SERPL-MCNC: 13 MG/DL (ref 6–20)
CALCIUM IONIZED: 1.18 MMOL/L (ref 1.12–1.32)
CALCIUM SERPL-MCNC: 10 MG/DL (ref 8.5–9.9)
CANNABINOIDS UR QL SCN: ABNORMAL
CHLORIDE SERPL-SCNC: 101 MEQ/L (ref 95–107)
CHP ED QC CHECK: 398
CLARITY UR: CLEAR
CO2 SERPL-SCNC: 25 MEQ/L (ref 20–31)
COCAINE UR QL SCN: ABNORMAL
COLOR UR: YELLOW
CREAT SERPL-MCNC: 0.83 MG/DL (ref 0.5–0.9)
DRUG SCREEN COMMENT UR-IMP: ABNORMAL
EOSINOPHIL # BLD: 0.1 K/UL (ref 0–0.7)
EOSINOPHIL NFR BLD: 1.3 %
EPI CELLS #/AREA URNS AUTO: ABNORMAL /HPF (ref 0–5)
ERYTHROCYTE [DISTWIDTH] IN BLOOD BY AUTOMATED COUNT: 13.9 % (ref 11.5–14.5)
FENTANYL SCREEN, URINE: ABNORMAL
GLOBULIN SER CALC-MCNC: 2.7 G/DL (ref 2.3–3.5)
GLUCOSE BLD-MCNC: 205 MG/DL (ref 70–99)
GLUCOSE BLD-MCNC: 305 MG/DL (ref 70–99)
GLUCOSE BLD-MCNC: 360 MG/DL (ref 70–99)
GLUCOSE BLD-MCNC: 398 MG/DL (ref 70–99)
GLUCOSE SERPL-MCNC: 353 MG/DL (ref 70–99)
GLUCOSE UR STRIP-MCNC: >=1000 MG/DL
HBA1C MFR BLD: 8.8 % (ref 4.8–5.9)
HCO3 VENOUS: 25.2 MMOL/L (ref 23–29)
HCT VFR BLD AUTO: 42 % (ref 37–47)
HCT VFR BLD AUTO: 47 % (ref 36–48)
HGB BLD CALC-MCNC: 15.8 GM/DL (ref 12–16)
HGB BLD-MCNC: 14.7 G/DL (ref 12–16)
HGB UR QL STRIP: ABNORMAL
HYALINE CASTS #/AREA URNS AUTO: ABNORMAL /HPF (ref 0–5)
KETONES UR STRIP-MCNC: NEGATIVE MG/DL
LACTATE: 1.75 MMOL/L (ref 0.4–2)
LEUKOCYTE ESTERASE UR QL STRIP: NEGATIVE
LYMPHOCYTES # BLD: 2.1 K/UL (ref 1–4.8)
LYMPHOCYTES NFR BLD: 26.7 %
MCH RBC QN AUTO: 31.8 PG (ref 27–31.3)
MCHC RBC AUTO-ENTMCNC: 35 % (ref 33–37)
MCV RBC AUTO: 90.9 FL (ref 79.4–94.8)
METHADONE UR QL SCN: ABNORMAL
MONOCYTES # BLD: 0.6 K/UL (ref 0.2–0.8)
MONOCYTES NFR BLD: 7 %
NEUTROPHILS # BLD: 5.1 K/UL (ref 1.4–6.5)
NEUTS SEG NFR BLD: 63.9 %
NITRITE UR QL STRIP: NEGATIVE
O2 SAT, VEN: 86 %
OPIATES UR QL SCN: ABNORMAL
OXYCODONE UR QL SCN: ABNORMAL
PCO2, VEN: 37.8 MM HG (ref 40–50)
PCP UR QL SCN: ABNORMAL
PERFORMED ON: ABNORMAL
PH UR STRIP: 5.5 [PH] (ref 5–9)
PH VENOUS: 7.43 (ref 7.32–7.42)
PLATELET # BLD AUTO: 150 K/UL (ref 130–400)
PO2, VEN: 50 MM HG
POC CHLORIDE: 103 MEQ/L (ref 99–110)
POC CREATININE: 0.6 MG/DL (ref 0.6–1.2)
POC SAMPLE TYPE: ABNORMAL
POTASSIUM SERPL-SCNC: 3.9 MEQ/L (ref 3.4–4.9)
POTASSIUM SERPL-SCNC: 3.9 MEQ/L (ref 3.5–5.1)
PROPOXYPH UR QL SCN: ABNORMAL
PROT SERPL-MCNC: 7.2 G/DL (ref 6.3–8)
PROT UR STRIP-MCNC: NEGATIVE MG/DL
RBC # BLD AUTO: 4.63 M/UL (ref 4.2–5.4)
RBC #/AREA URNS AUTO: ABNORMAL /HPF (ref 0–5)
SODIUM BLD-SCNC: 139 MEQ/L (ref 136–145)
SODIUM SERPL-SCNC: 137 MEQ/L (ref 135–144)
SP GR UR STRIP: 1.04 (ref 1–1.03)
TCO2 CALC VENOUS: 26 MMOL/L
URINE REFLEX TO CULTURE: YES
UROBILINOGEN UR STRIP-ACNC: 0.2 E.U./DL
WBC # BLD AUTO: 7.9 K/UL (ref 4.8–10.8)
WBC #/AREA URNS AUTO: ABNORMAL /HPF (ref 0–5)

## 2023-06-05 PROCEDURE — 82435 ASSAY OF BLOOD CHLORIDE: CPT

## 2023-06-05 PROCEDURE — 80307 DRUG TEST PRSMV CHEM ANLYZR: CPT

## 2023-06-05 PROCEDURE — 87086 URINE CULTURE/COLONY COUNT: CPT

## 2023-06-05 PROCEDURE — 96372 THER/PROPH/DIAG INJ SC/IM: CPT

## 2023-06-05 PROCEDURE — 2580000003 HC RX 258: Performed by: PHYSICIAN ASSISTANT

## 2023-06-05 PROCEDURE — 99284 EMERGENCY DEPT VISIT MOD MDM: CPT

## 2023-06-05 PROCEDURE — 85025 COMPLETE CBC W/AUTO DIFF WBC: CPT

## 2023-06-05 PROCEDURE — 6370000000 HC RX 637 (ALT 250 FOR IP): Performed by: PHYSICIAN ASSISTANT

## 2023-06-05 PROCEDURE — 82010 KETONE BODYS QUAN: CPT

## 2023-06-05 PROCEDURE — 80053 COMPREHEN METABOLIC PANEL: CPT

## 2023-06-05 PROCEDURE — 84132 ASSAY OF SERUM POTASSIUM: CPT

## 2023-06-05 PROCEDURE — 82803 BLOOD GASES ANY COMBINATION: CPT

## 2023-06-05 PROCEDURE — 36600 WITHDRAWAL OF ARTERIAL BLOOD: CPT

## 2023-06-05 PROCEDURE — 81001 URINALYSIS AUTO W/SCOPE: CPT

## 2023-06-05 PROCEDURE — 71045 X-RAY EXAM CHEST 1 VIEW: CPT

## 2023-06-05 PROCEDURE — 83605 ASSAY OF LACTIC ACID: CPT

## 2023-06-05 PROCEDURE — 83036 HEMOGLOBIN GLYCOSYLATED A1C: CPT

## 2023-06-05 PROCEDURE — 82565 ASSAY OF CREATININE: CPT

## 2023-06-05 PROCEDURE — 82330 ASSAY OF CALCIUM: CPT

## 2023-06-05 PROCEDURE — 85014 HEMATOCRIT: CPT

## 2023-06-05 PROCEDURE — 36415 COLL VENOUS BLD VENIPUNCTURE: CPT

## 2023-06-05 PROCEDURE — 84295 ASSAY OF SERUM SODIUM: CPT

## 2023-06-05 RX ORDER — 0.9 % SODIUM CHLORIDE 0.9 %
1000 INTRAVENOUS SOLUTION INTRAVENOUS ONCE
Status: COMPLETED | OUTPATIENT
Start: 2023-06-05 | End: 2023-06-05

## 2023-06-05 RX ORDER — INSULIN LISPRO 100 [IU]/ML
0-4 INJECTION, SOLUTION INTRAVENOUS; SUBCUTANEOUS NIGHTLY
Status: DISCONTINUED | OUTPATIENT
Start: 2023-06-05 | End: 2023-06-05

## 2023-06-05 RX ORDER — INSULIN LISPRO 100 [IU]/ML
12 INJECTION, SOLUTION INTRAVENOUS; SUBCUTANEOUS ONCE
Status: COMPLETED | OUTPATIENT
Start: 2023-06-05 | End: 2023-06-05

## 2023-06-05 RX ORDER — INSULIN LISPRO 100 [IU]/ML
0-16 INJECTION, SOLUTION INTRAVENOUS; SUBCUTANEOUS
Status: DISCONTINUED | OUTPATIENT
Start: 2023-06-05 | End: 2023-06-05

## 2023-06-05 RX ADMIN — SODIUM CHLORIDE 1000 ML: 9 INJECTION, SOLUTION INTRAVENOUS at 08:30

## 2023-06-05 RX ADMIN — INSULIN LISPRO 12 UNITS: 100 INJECTION, SOLUTION INTRAVENOUS; SUBCUTANEOUS at 09:00

## 2023-06-05 RX ADMIN — SODIUM CHLORIDE 1000 ML: 9 INJECTION, SOLUTION INTRAVENOUS at 10:50

## 2023-06-05 ASSESSMENT — ENCOUNTER SYMPTOMS
SORE THROAT: 0
COLOR CHANGE: 0
DIARRHEA: 0
VOMITING: 0
ABDOMINAL PAIN: 0
NAUSEA: 0
RHINORRHEA: 0
CONSTIPATION: 0
EYE DISCHARGE: 0
SHORTNESS OF BREATH: 0
ABDOMINAL DISTENTION: 0

## 2023-06-05 ASSESSMENT — PAIN - FUNCTIONAL ASSESSMENT: PAIN_FUNCTIONAL_ASSESSMENT: NONE - DENIES PAIN

## 2023-06-05 NOTE — ED NOTES
Pt in room at this time with extra sweet tea.    Pt educated that this will raise Bennie Cruz, PARAS  06/05/23 6946

## 2023-06-05 NOTE — ED NOTES
Pt discharge at this time. Pt states understanding of medications, and is educated on reaction to monitor for. Pt educated follow up with Dr. Chasity Enriquez and PCP as needed/as directed. Pt states understanding of returning ot ER if needed for worsening symptoms. Pt ambulates with slow steady gait at discharge. Pt is alert and oriented times 4 at this times. Pt denies any increase of symptoms at this time. Pt educated on Diabetic education that may be available through endocrinology. Pt educated on information in discharge instructions. Pt states understanding and states will follow up with dietician for food ideas.         Dionte Sanchez RN  06/05/23 5525

## 2023-06-05 NOTE — ED PROVIDER NOTES
of 11.  No ketones within urine. Patient was given subcu insulin, IV fluids, her blood glucose levels have improved, most recent was 205 milligrams per deciliter, at this time patient will be discharged home, she was advised to modify her diet, continue with her medications for diabetes, and contact her regular family provider for follow-up. Should she have any worsening or change symptoms, she was advised to return to the ED. Amount and/or Complexity of Data Reviewed  Labs: ordered. Radiology: ordered. Risk  Prescription drug management. Coding     CONSULTS:  None    PROCEDURES:  Unless otherwise noted below, none     Procedures    FINAL IMPRESSION      1. Hyperglycemia due to diabetes mellitus ([facility] [address]    2. H/O noncompliance with medical treatment, presenting hazards to health          DISPOSITION/PLAN   DISPOSITION Decision To Discharge 06/05/2023 11:32:16 AM      PATIENT REFERRED TO:  MD LAURYN Luna [address]   [address] [place] [address]  [phone]    In 3 days        DISCHARGE MEDICATIONS:  New Prescriptions    No medications on file          (Please note that portions of this note were completed with a voice recognition program.  Efforts were made to edit the dictations but occasionally words are mis-transcribed.)    Misha Valladares PA-C (electronically signed)  Attending Emergency Physician         Misha Valladares PA-C  06/05/23 [address] Jacky Ibanez PA-C  06/05/23 1140

## 2023-06-05 NOTE — ED TRIAGE NOTES
Pt c/o feeling weak, sob and blurry vision. Pt states she checked her glucose and it said high. Pt is A&Ox4, skin intact, afebrile, breaths are equal and unlabored.

## 2023-06-07 LAB — BACTERIA UR CULT: NORMAL

## 2023-06-26 ENCOUNTER — TELEPHONE (OUTPATIENT)
Dept: PHARMACY | Age: 53
End: 2023-06-26

## 2023-06-28 ENCOUNTER — HOSPITAL ENCOUNTER (OUTPATIENT)
Dept: PHARMACY | Age: 53
Setting detail: THERAPIES SERIES
Discharge: HOME | End: 2023-06-28
Payer: COMMERCIAL

## 2023-06-28 DIAGNOSIS — G45.8 OTHER TRANSIENT CEREBRAL ISCHEMIC ATTACKS AND RELATED SYNDROMES: ICD-10-CM

## 2023-06-28 DIAGNOSIS — I63.81 CEREBROVASCULAR ACCIDENT (CVA) OF LEFT THALAMUS (HCC): Primary | ICD-10-CM

## 2023-06-28 DIAGNOSIS — D68.59 HYPERCOAGULABLE STATE (HCC): ICD-10-CM

## 2023-06-28 LAB — INTERNATIONAL NORMALIZATION RATIO, POC: 1.2 (ref 2–3)

## 2023-06-28 PROCEDURE — 99211 OFF/OP EST MAY X REQ PHY/QHP: CPT | Performed by: PHARMACIST

## 2023-06-28 PROCEDURE — 85610 PROTHROMBIN TIME: CPT | Performed by: PHARMACIST

## 2023-07-10 ENCOUNTER — HOSPITAL ENCOUNTER (OUTPATIENT)
Dept: PHARMACY | Age: 53
Setting detail: THERAPIES SERIES
Discharge: HOME | End: 2023-07-10
Payer: COMMERCIAL

## 2023-07-10 DIAGNOSIS — D68.59 HYPERCOAGULABLE STATE (HCC): ICD-10-CM

## 2023-07-10 DIAGNOSIS — I63.81 CEREBROVASCULAR ACCIDENT (CVA) OF LEFT THALAMUS (HCC): Primary | ICD-10-CM

## 2023-07-10 DIAGNOSIS — G45.8 OTHER TRANSIENT CEREBRAL ISCHEMIC ATTACKS AND RELATED SYNDROMES: ICD-10-CM

## 2023-07-10 LAB — INTERNATIONAL NORMALIZATION RATIO, POC: 1.3

## 2023-07-10 PROCEDURE — 85610 PROTHROMBIN TIME: CPT

## 2023-07-10 PROCEDURE — 99211 OFF/OP EST MAY X REQ PHY/QHP: CPT

## 2023-07-10 NOTE — PROGRESS NOTES
Ms. Daniel Lowry is a 48 y.o. y/o female with history of CVA who presents today for anticoagulation monitoring and adjustment. INR 1.3 is subtherapeutic for this patient (goal range 2-3) and is reflective of 45 mg TWD. Patient verifies current dosing regimen, patient able to verbally recall dose. Patient reports 0 missed doses since last INR. Patient denies s/sx clotting and/or stroke. Patient denies hematuria, epistaxis, rectal bleeding  Patient denies changes in diet, alcohol, or tobacco use  Reviewed medication list and drug allergies with patient, updated any medication additions or modifications accordingly  Patient also denies any pending medical or dental procedures scheduled at this time. Patient had a recent ED visit where she was bridged with Lovenox. Patient states that she recently started Ozempic and has lost about 20 pounds. Patient also complains of frequent diarrhea due to starting metformin about a month ago. Patient is also a current smoker. Patient reports no recent change in smoking habits. Subtherapeutic INR possibly due to excessive diarrhea/weight changes. Patient was instructed to receive a boosted dose today 7/10 and tomorrow  of 10 mg. Patient was also instructed to increase dose to 50 mg TWD (11.1% increase) and RTC 1 week.     Андрей Castellanos, [place]  7/10/2023 2:30 PM      For Pharmacy Admin Tracking Only    Intervention Detail: Adherence Monitorin and Dose Adjustment: 2, reason: Therapy Optimization  Total # of Interventions Recommended: 2  Total # of Interventions Accepted: 2  Time Spent (min): 20

## 2023-07-17 ENCOUNTER — TELEPHONE (OUTPATIENT)
Dept: PHARMACY | Age: 53
End: 2023-07-17

## 2023-07-17 ENCOUNTER — TELEPHONE (OUTPATIENT)
Dept: NEUROLOGY | Age: 53
End: 2023-07-17

## 2023-07-17 ENCOUNTER — APPOINTMENT (OUTPATIENT)
Dept: PHARMACY | Age: 53
End: 2023-07-17
Payer: COMMERCIAL

## 2023-07-17 DIAGNOSIS — G45.8 OTHER TRANSIENT CEREBRAL ISCHEMIC ATTACKS AND RELATED SYNDROMES: ICD-10-CM

## 2023-07-17 DIAGNOSIS — I63.81 CEREBROVASCULAR ACCIDENT (CVA) OF LEFT THALAMUS (HCC): Primary | ICD-10-CM

## 2023-07-17 DIAGNOSIS — D68.59 HYPERCOAGULABLE STATE (HCC): ICD-10-CM

## 2023-07-17 LAB — INTERNATIONAL NORMALIZATION RATIO, POC: 6.4

## 2023-07-17 PROCEDURE — 99211 OFF/OP EST MAY X REQ PHY/QHP: CPT

## 2023-07-17 PROCEDURE — 85610 PROTHROMBIN TIME: CPT

## 2023-07-17 NOTE — TELEPHONE ENCOUNTER
Okay.  If patient has any bleeding, headache, neurological changes, abdominal pain, dark stools she is to call 911 or go to the emergency room immediately.

## 2023-07-17 NOTE — TELEPHONE ENCOUNTER
[facility] called, states that patients INR was 6.4. She currently is taking 5mg on wednesdays and 7.5mg on the other days. She was advised by the coumadin clinic to hold dosing for today, tomorrow and Wednesday and will be coming in 7/20 for a recheck. For any concerns call [phone] thank you!

## 2023-07-17 NOTE — PROGRESS NOTES
Ms. Nancy Munoz is a 48 y.o. y/o female with history of CVA who presents today for anticoagulation monitoring and adjustment. INR 6.4 is supratherapeutic for this patient (goal range 2-3) and is reflective of 50 mg TWD. Patient verifies current dosing regimen, patient able to verbally recall dose  Patient reports 0 missed doses since last INR   Patient denies s/sx clotting and/or stroke  Patient denies hematuria, epistaxis, rectal bleeding  Patient denies changes in diet, alcohol, or tobacco use  Reviewed medication list and drug allergies with patient, updated any medication additions or modifications accordingly. Patient also denies any pending medical or dental procedures scheduled at this time. Patient states diarrhea from metformin has been better, possibly causing the supra therapeutic INR. Patient also has decreased her smoking habits which may have contributed as well. Patient was instructed to hold today - and RTC .      For Pharmacy Admin Tracking Only    Intervention Detail: Adherence Monitorin and Dose Adjustment: 1, reason: Therapy Optimization  Total # of Interventions Recommended: 2  Total # of Interventions Accepted: 2  Time Spent (min): 20

## 2023-07-17 NOTE — TELEPHONE ENCOUNTER
Called dr. Rachel Jimenes office regarding supra-therapeutic INR. Discussed patient instructions to hold x 3 days and return to clinic on Thursday 7/20    Leaving message for dr. Rachel Jimenes and will return call.

## 2023-07-18 ENCOUNTER — TELEPHONE (OUTPATIENT)
Dept: PHARMACY | Age: 53
End: 2023-07-18

## 2023-07-18 NOTE — TELEPHONE ENCOUNTER
Clara Painter from coumadin clinic called back and I made her aware of what response was and she is going to inform patient. home w/ home PT

## 2023-07-18 NOTE — TELEPHONE ENCOUNTER
Dr Callum Huang office called to add to patient instructions to call 911 for any of the following symptoms: bleeding, headache, neurological, abdominal pain, or dark stools. Called patient who confirmed she understood and is coming to appointment on Thursday. Patient noted pain in left arm, advised her to seek urgent medical attention, patient said she would wait for ride, advised to call 911.

## 2023-07-20 ENCOUNTER — HOSPITAL ENCOUNTER (OUTPATIENT)
Dept: PHARMACY | Age: 53
Setting detail: THERAPIES SERIES
Discharge: HOME | End: 2023-07-20
Payer: COMMERCIAL

## 2023-07-20 DIAGNOSIS — I63.81 CEREBROVASCULAR ACCIDENT (CVA) OF LEFT THALAMUS (HCC): Primary | ICD-10-CM

## 2023-07-20 DIAGNOSIS — G45.8 OTHER TRANSIENT CEREBRAL ISCHEMIC ATTACKS AND RELATED SYNDROMES: ICD-10-CM

## 2023-07-20 DIAGNOSIS — D68.59 HYPERCOAGULABLE STATE (HCC): ICD-10-CM

## 2023-07-20 LAB — INTERNATIONAL NORMALIZATION RATIO, POC: 2.5

## 2023-07-20 PROCEDURE — 85610 PROTHROMBIN TIME: CPT

## 2023-07-20 PROCEDURE — 99211 OFF/OP EST MAY X REQ PHY/QHP: CPT

## 2023-07-20 NOTE — PROGRESS NOTES
Ms. Katia Florian is a 48 y.o. y/o female with history of CVA who presents today for anticoagulation monitoring and adjustment. INR 2.5 is therapeutic for this patient (goal range 2.0-3.0)   Patient verifies current dosing regimen, patient able to verbally recall dose  Patient reports 0  missed doses since last INR   Patient denies s/sx clotting and/or stroke  Patient denies hematuria, epistaxis, rectal bleeding  Patient denies changes in diet, alcohol, or tobacco use  Reviewed medication list and drug allergies with patient, updated any medication additions or modifications accordingly  Patient also denies any pending medical or dental procedures scheduled at this time  Patient was last seen 3 days ago. INR was 6.4 at that time, patient has been holding warfarin since then. Now therapeutic today. TWD was 50mg for the 6.4 INR level. Prior to that visit, INR was 1.3 on TWD 45mg. Will begin new regimen of 47.5mg TWD.  Patient was instructed to begin new regimen of 5mg MoWe, 7.5mg all other days and RTC 1 week    For Pharmacy Admin Tracking Only    Intervention Detail: Adherence Monitorin, Dose Adjustment: 1, reason: Therapy Optimization, and Lab(s) Ordered  Total # of Interventions Recommended: 2  Total # of Interventions Accepted: 2  Time Spent (min): Rosa M ParedesD, BCPS  2023 1:21 PM

## 2023-07-27 ENCOUNTER — HOSPITAL ENCOUNTER (OUTPATIENT)
Dept: PHARMACY | Age: 53
Setting detail: THERAPIES SERIES
Discharge: HOME | End: 2023-07-27
Payer: COMMERCIAL

## 2023-07-27 DIAGNOSIS — D68.59 HYPERCOAGULABLE STATE (HCC): ICD-10-CM

## 2023-07-27 DIAGNOSIS — I63.81 CEREBROVASCULAR ACCIDENT (CVA) OF LEFT THALAMUS (HCC): Primary | ICD-10-CM

## 2023-07-27 DIAGNOSIS — G45.8 OTHER TRANSIENT CEREBRAL ISCHEMIC ATTACKS AND RELATED SYNDROMES: ICD-10-CM

## 2023-07-27 LAB — INTERNATIONAL NORMALIZATION RATIO, POC: 2.5

## 2023-07-27 PROCEDURE — 99211 OFF/OP EST MAY X REQ PHY/QHP: CPT

## 2023-07-27 PROCEDURE — 85610 PROTHROMBIN TIME: CPT

## 2023-07-27 NOTE — PROGRESS NOTES
Ms. Nancy Munoz is a 48 y.o. y/o female with history of CVA who presents today for anticoagulation monitoring and adjustment. INR 2.5 is therapeutic for this patient (goal range 2-3) and is reflective of 47.5 mg TWD    Patient verifies current dosing regimen, patient able to verbally recall dose  Patient reports 0 missed doses since last INR   Patient denies s/sx clotting and/or stroke  Patient denies hematuria, epistaxis, rectal bleeding  Patient denies changes in diet, alcohol, or tobacco use  Reviewed medication list and drug allergies with patient, updated any medication additions or modifications accordingly. Patient also denies any pending medical or dental procedures scheduled at this time    Patient states she has steroids she may have to take per her physician? Patient was instructed to take them if she needs to and to let us know if she does. Patient was instructed to continue 47.5 mg TWD and RTC 2 weeks.       For Pharmacy Admin Tracking Only    Intervention Detail: Adherence Monitorin  Total # of Interventions Recommended: 1  Total # of Interventions Accepted: 1  Time Spent (min): 15

## 2023-08-11 ENCOUNTER — HOSPITAL ENCOUNTER (OUTPATIENT)
Dept: PHARMACY | Age: 53
Setting detail: THERAPIES SERIES
Discharge: HOME | End: 2023-08-11
Payer: COMMERCIAL

## 2023-08-11 DIAGNOSIS — D68.59 HYPERCOAGULABLE STATE (HCC): ICD-10-CM

## 2023-08-11 DIAGNOSIS — G45.8 OTHER TRANSIENT CEREBRAL ISCHEMIC ATTACKS AND RELATED SYNDROMES: ICD-10-CM

## 2023-08-11 DIAGNOSIS — I63.81 CEREBROVASCULAR ACCIDENT (CVA) OF LEFT THALAMUS (HCC): Primary | ICD-10-CM

## 2023-08-11 LAB — INTERNATIONAL NORMALIZATION RATIO, POC: 2.5

## 2023-08-11 PROCEDURE — 99211 OFF/OP EST MAY X REQ PHY/QHP: CPT

## 2023-08-11 PROCEDURE — 85610 PROTHROMBIN TIME: CPT

## 2023-08-11 NOTE — PROGRESS NOTES
Ms. Lexy Peterson is a 48 y.o. y/o female with history of CVA who presents today for anticoagulation monitoring and adjustment.     INR 2.5 is therapeutic for this patient (goal range 2-3) and is reflective of 45 mg TWD    Patient verifies current dosing regimen, patient able to verbally recall dose  Patient reports 0 missed doses since last INR   Patient denies s/sx clotting and/or stroke  Patient denies hematuria, epistaxis, rectal bleeding  Patient denies changes in diet, alcohol, or tobacco use  Reviewed medication list and drug allergies with patient, updated any medication additions or modifications accordingly  Patient also denies any pending medical or dental procedures scheduled at this time    Patient has been taking her previous regimen of 45 mg TWD, instead of the currently scheduled 47.5 mg TWD - she is unsure how long she has been doing this    Patient started Cymbalta and Lyrica for nerve pain - may potentially have surgery but has a follow up appt next week with pain management    Patient was instructed to continue 45 mg TWD and RTC 3 weeks - maintenance plan modified to reflect patient's current regimen     Rafat Kendall, [place]  2023 2:29 PM      For Pharmacy Admin Tracking Only    Intervention Detail: Adherence Monitorin  Total # of Interventions Recommended: 2  Total # of Interventions Accepted: 2  Time Spent (min): 15

## 2023-09-05 ENCOUNTER — TELEPHONE (OUTPATIENT)
Dept: PHARMACY | Age: 53
End: 2023-09-05

## 2023-09-05 DIAGNOSIS — G45.8 OTHER TRANSIENT CEREBRAL ISCHEMIC ATTACKS AND RELATED SYNDROMES: ICD-10-CM

## 2023-09-05 DIAGNOSIS — D68.59 HYPERCOAGULABLE STATE (HCC): ICD-10-CM

## 2023-09-05 DIAGNOSIS — I63.81 CEREBROVASCULAR ACCIDENT (CVA) OF LEFT THALAMUS (HCC): Primary | ICD-10-CM

## 2023-09-12 ENCOUNTER — HOSPITAL ENCOUNTER (OUTPATIENT)
Dept: PHARMACY | Age: 53
Setting detail: THERAPIES SERIES
Discharge: HOME | End: 2023-09-12
Payer: COMMERCIAL

## 2023-09-12 DIAGNOSIS — I63.81 CEREBROVASCULAR ACCIDENT (CVA) OF LEFT THALAMUS (HCC): Primary | ICD-10-CM

## 2023-09-12 DIAGNOSIS — G45.8 OTHER TRANSIENT CEREBRAL ISCHEMIC ATTACKS AND RELATED SYNDROMES: ICD-10-CM

## 2023-09-12 DIAGNOSIS — D68.59 HYPERCOAGULABLE STATE (HCC): ICD-10-CM

## 2023-09-12 LAB — INTERNATIONAL NORMALIZATION RATIO, POC: 2.3

## 2023-09-12 PROCEDURE — 99211 OFF/OP EST MAY X REQ PHY/QHP: CPT

## 2023-09-12 PROCEDURE — 85610 PROTHROMBIN TIME: CPT

## 2023-09-12 NOTE — PROGRESS NOTES
Ms. David Rodriguez is a 48 y.o. y/o female with history of CVA who presents today for anticoagulation monitoring and adjustment. INR 2.3 is therapeutic for this patient (goal range 2-3) and is reflective of 45 mg TWD    Patient verifies current dosing regimen, patient able to verbally recall dose  Patient reports 0 missed doses since last INR   Patient denies s/sx clotting and/or stroke  Patient denies hematuria, epistaxis, rectal bleeding  Patient denies changes in diet, alcohol, or tobacco use  Reviewed medication list and drug allergies with patient, updated any medication additions or modifications accordingly  Patient also denies any pending medical or dental procedures scheduled at this time    Patient reports that she has had considerable nerve pain in her back and neck and this has been extremely stressful on her.  Patient will be following up in the next couple weeks with [facility] pain management    Patient was instructed to continue 45 mg TWD and RTC 3 weeks    Berenice Kemp PharmD  2023 2:36 PM      For Pharmacy Admin Tracking Only    Intervention Detail: Adherence Monitorin  Total # of Interventions Recommended: 1  Total # of Interventions Accepted: 1  Time Spent (min): 15

## 2023-09-16 ENCOUNTER — HOSPITAL ENCOUNTER (EMERGENCY)
Age: 53
Discharge: HOME | End: 2023-09-16
Payer: COMMERCIAL

## 2023-09-16 VITALS
RESPIRATION RATE: 18 BRPM | OXYGEN SATURATION: 98 % | TEMPERATURE: 98.1 F | SYSTOLIC BLOOD PRESSURE: 135 MMHG | DIASTOLIC BLOOD PRESSURE: 80 MMHG | WEIGHT: 200 LBS | BODY MASS INDEX: 31.32 KG/M2 | HEART RATE: 80 BPM

## 2023-09-16 DIAGNOSIS — N30.01 ACUTE CYSTITIS WITH HEMATURIA: Primary | ICD-10-CM

## 2023-09-16 LAB
BACTERIA URNS QL MICRO: ABNORMAL /HPF
BILIRUB UR QL STRIP: NEGATIVE
CLARITY UR: ABNORMAL
COLOR UR: YELLOW
EPI CELLS #/AREA URNS AUTO: ABNORMAL /HPF (ref 0–5)
GLUCOSE UR STRIP-MCNC: NEGATIVE MG/DL
HGB UR QL STRIP: ABNORMAL
HYALINE CASTS #/AREA URNS AUTO: ABNORMAL /HPF (ref 0–5)
KETONES UR STRIP-MCNC: NEGATIVE MG/DL
LEUKOCYTE ESTERASE UR QL STRIP: ABNORMAL
NITRITE UR QL STRIP: NEGATIVE
PH UR STRIP: 6.5 [PH] (ref 5–9)
PROT UR STRIP-MCNC: 100 MG/DL
SP GR UR STRIP: 1.02 (ref 1–1.03)
URINE REFLEX TO CULTURE: YES
UROBILINOGEN UR STRIP-ACNC: 0.2 E.U./DL
WBC #/AREA URNS AUTO: >100 /HPF (ref 0–5)
YEAST URNS QL MICRO: PRESENT /HPF

## 2023-09-16 PROCEDURE — 87186 SC STD MICRODIL/AGAR DIL: CPT

## 2023-09-16 PROCEDURE — 87088 URINE BACTERIA CULTURE: CPT

## 2023-09-16 PROCEDURE — 87086 URINE CULTURE/COLONY COUNT: CPT

## 2023-09-16 PROCEDURE — 99283 EMERGENCY DEPT VISIT LOW MDM: CPT

## 2023-09-16 PROCEDURE — 81001 URINALYSIS AUTO W/SCOPE: CPT

## 2023-09-16 RX ORDER — PHENAZOPYRIDINE HYDROCHLORIDE 100 MG/1
100 TABLET, FILM COATED ORAL 3 TIMES DAILY PRN
Qty: 6 TABLET | Refills: 0 | Status: SHIPPED | OUTPATIENT
Start: 2023-09-16 | End: 2023-09-19

## 2023-09-16 RX ORDER — SULFAMETHOXAZOLE AND TRIMETHOPRIM 800; 160 MG/1; MG/1
1 TABLET ORAL 2 TIMES DAILY
Qty: 20 TABLET | Refills: 0 | Status: SHIPPED | OUTPATIENT
Start: 2023-09-16 | End: 2023-09-26

## 2023-09-16 ASSESSMENT — PAIN DESCRIPTION - LOCATION: LOCATION: VAGINA

## 2023-09-16 ASSESSMENT — ENCOUNTER SYMPTOMS
SHORTNESS OF BREATH: 0
SORE THROAT: 0
COUGH: 0
DIARRHEA: 0
VOMITING: 0
TROUBLE SWALLOWING: 0
ABDOMINAL PAIN: 0
NAUSEA: 0
BACK PAIN: 0

## 2023-09-16 ASSESSMENT — LIFESTYLE VARIABLES
HOW MANY STANDARD DRINKS CONTAINING ALCOHOL DO YOU HAVE ON A TYPICAL DAY: PATIENT DOES NOT DRINK
HOW OFTEN DO YOU HAVE A DRINK CONTAINING ALCOHOL: NEVER

## 2023-09-16 ASSESSMENT — PAIN - FUNCTIONAL ASSESSMENT: PAIN_FUNCTIONAL_ASSESSMENT: 0-10

## 2023-09-16 ASSESSMENT — PAIN SCALES - GENERAL: PAINLEVEL_OUTOF10: 8

## 2023-09-16 ASSESSMENT — PAIN DESCRIPTION - DESCRIPTORS: DESCRIPTORS: BURNING

## 2023-09-18 LAB
BACTERIA UR CULT: ABNORMAL
BACTERIA UR CULT: ABNORMAL
ORGANISM: ABNORMAL

## 2023-09-29 PROBLEM — M48.02 FORAMINAL STENOSIS OF CERVICAL REGION: Status: ACTIVE | Noted: 2023-08-16

## 2023-09-29 PROBLEM — R20.8 ALLODYNIA: Status: ACTIVE | Noted: 2023-08-16

## 2023-09-29 PROBLEM — I69.30 HISTORY OF STROKE WITH RESIDUAL EFFECTS: Status: ACTIVE | Noted: 2023-06-08

## 2023-09-29 PROBLEM — M25.512 ACUTE PAIN OF LEFT SHOULDER: Status: ACTIVE | Noted: 2023-08-16

## 2023-09-29 PROBLEM — S46.002A ROTATOR CUFF INJURY, LEFT, INITIAL ENCOUNTER: Status: ACTIVE | Noted: 2023-08-16

## 2023-10-03 ENCOUNTER — HOSPITAL ENCOUNTER (OUTPATIENT)
Dept: PHARMACY | Age: 53
Setting detail: THERAPIES SERIES
Discharge: HOME | End: 2023-10-03
Payer: COMMERCIAL

## 2023-10-03 DIAGNOSIS — G45.8 OTHER TRANSIENT CEREBRAL ISCHEMIC ATTACKS AND RELATED SYNDROMES: ICD-10-CM

## 2023-10-03 DIAGNOSIS — D68.59 HYPERCOAGULABLE STATE (HCC): ICD-10-CM

## 2023-10-03 DIAGNOSIS — I63.81 CEREBROVASCULAR ACCIDENT (CVA) OF LEFT THALAMUS (HCC): Primary | ICD-10-CM

## 2023-10-03 LAB — INTERNATIONAL NORMALIZATION RATIO, POC: 1.9

## 2023-10-03 PROCEDURE — 85610 PROTHROMBIN TIME: CPT

## 2023-10-03 PROCEDURE — 99211 OFF/OP EST MAY X REQ PHY/QHP: CPT

## 2023-10-03 NOTE — PROGRESS NOTES
Ms. Roque Hampton is a 48 y.o. y/o female with history of CVA who presents today for anticoagulation monitoring and adjustment. INR 1.9 is slightly subtherapeutic for this patient (goal range 2-3) and is reflective of 45 mg TWD    Patient verifies current dosing regimen, patient able to verbally recall dose  Patient reports 0 missed doses since last INR   Patient denies s/sx clotting and/or stroke  Patient denies hematuria, epistaxis, rectal bleeding  Patient denies changes in diet, alcohol, or tobacco use  Reviewed medication list and drug allergies with patient, updated any medication additions or modifications accordingly  Patient also denies any pending medical or dental procedures scheduled at this time    Patient has not been feeling well the past 3-4 days, with nausea and vomiting.  She was concerned that she was not getting enough warfarin due to the vomiting    Patient was instructed to continue 45 mg TWD and RTC 3 weeks    Rachell Ji, PharmD  10/3/2023 3:05 PM      For Pharmacy Admin Tracking Only    Intervention Detail: Adherence Monitorin  Total # of Interventions Recommended: 1  Total # of Interventions Accepted: 1  Time Spent (min): 15

## 2023-10-05 DIAGNOSIS — I25.10 CORONARY ARTERY DISEASE INVOLVING NATIVE CORONARY ARTERY OF NATIVE HEART WITHOUT ANGINA PECTORIS: ICD-10-CM

## 2023-10-05 RX ORDER — ISOSORBIDE MONONITRATE 30 MG/1
60 TABLET, EXTENDED RELEASE ORAL DAILY
Qty: 60 TABLET | Refills: 0 | Status: SHIPPED | OUTPATIENT
Start: 2023-10-05

## 2023-10-05 NOTE — TELEPHONE ENCOUNTER
Please approve or deny this refill request. The order is pended. Thank you.     LOV 11/1/2022    LMOM to set up an OV - 30d pended     Next Visit Date:  Future Appointments   Date Time Provider [address]   10/24/2023  2:30 PM [facility]

## 2023-10-24 ENCOUNTER — HOSPITAL ENCOUNTER (OUTPATIENT)
Dept: PHARMACY | Age: 53
Setting detail: THERAPIES SERIES
Discharge: HOME | End: 2023-10-24
Payer: COMMERCIAL

## 2023-10-24 DIAGNOSIS — D68.59 HYPERCOAGULABLE STATE (HCC): ICD-10-CM

## 2023-10-24 DIAGNOSIS — G45.8 OTHER TRANSIENT CEREBRAL ISCHEMIC ATTACKS AND RELATED SYNDROMES: ICD-10-CM

## 2023-10-24 DIAGNOSIS — I63.81 CEREBROVASCULAR ACCIDENT (CVA) OF LEFT THALAMUS (HCC): Primary | ICD-10-CM

## 2023-10-24 LAB — INTERNATIONAL NORMALIZATION RATIO, POC: 2

## 2023-10-24 PROCEDURE — 99211 OFF/OP EST MAY X REQ PHY/QHP: CPT

## 2023-10-24 PROCEDURE — 85610 PROTHROMBIN TIME: CPT

## 2023-10-24 NOTE — PROGRESS NOTES
Ms. Kanika Marshall is a 48 y.o. y/o female with history of CVA who presents today for anticoagulation monitoring and adjustment. INR 2.0 is therapeutic for this patient (goal range 2-3) and is reflective of 45 mg TWD  Patient verifies current dosing regimen, patient able to verbally recall dose  Patient reports 2 missed doses since last INR   Patient denies s/sx clotting and/or stroke  Patient denies hematuria, epistaxis, rectal bleeding  Patient denies changes in diet, alcohol, or tobacco use  Reviewed medication list and drug allergies with patient, updated any medication additions or modifications accordingly. Pt is starting new med for headaches, but has not picked it up yet. Will call [facility] with name of medication.  Pt reports not eating well due to constant nausea  Patient also denies any pending medical or dental procedures scheduled at this time  Patient was instructed to continue 45 mg TWD and RTC 4 weeks          For Pharmacy Admin Tracking Only    Intervention Detail: Adherence Monitorin  Total # of Interventions Recommended: 1  Total # of Interventions Accepted: 1  Time Spent (min): 15

## 2023-11-04 ENCOUNTER — HOSPITAL ENCOUNTER (INPATIENT)
Age: 53
LOS: 2 days | Discharge: HOME | DRG: 287 | End: 2023-11-06
Attending: EMERGENCY MEDICINE | Admitting: INTERNAL MEDICINE
Payer: COMMERCIAL

## 2023-11-04 ENCOUNTER — APPOINTMENT (OUTPATIENT)
Dept: GENERAL RADIOLOGY | Age: 53
DRG: 287 | End: 2023-11-04
Payer: COMMERCIAL

## 2023-11-04 DIAGNOSIS — R79.89 ELEVATED TROPONIN: ICD-10-CM

## 2023-11-04 DIAGNOSIS — R07.9 CHEST PAIN, UNSPECIFIED TYPE: Primary | ICD-10-CM

## 2023-11-04 LAB
ALBUMIN SERPL-MCNC: 4.4 G/DL (ref 3.5–4.6)
ALP SERPL-CCNC: 170 U/L (ref 40–130)
ALT SERPL-CCNC: 28 U/L (ref 0–33)
ANION GAP SERPL CALCULATED.3IONS-SCNC: 13 MEQ/L (ref 9–15)
AST SERPL-CCNC: 27 U/L (ref 0–35)
BASOPHILS # BLD: 0 K/UL (ref 0–0.2)
BASOPHILS NFR BLD: 0.4 %
BILIRUB SERPL-MCNC: 0.7 MG/DL (ref 0.2–0.7)
BUN SERPL-MCNC: 10 MG/DL (ref 6–20)
CALCIUM SERPL-MCNC: 10.2 MG/DL (ref 8.5–9.9)
CHLORIDE SERPL-SCNC: 102 MEQ/L (ref 95–107)
CO2 SERPL-SCNC: 25 MEQ/L (ref 20–31)
CREAT SERPL-MCNC: 0.95 MG/DL (ref 0.5–0.9)
EOSINOPHIL # BLD: 0.1 K/UL (ref 0–0.7)
EOSINOPHIL NFR BLD: 0.5 %
ERYTHROCYTE [DISTWIDTH] IN BLOOD BY AUTOMATED COUNT: 13.2 % (ref 11.5–14.5)
GLOBULIN SER CALC-MCNC: 2.9 G/DL (ref 2.3–3.5)
GLUCOSE SERPL-MCNC: 146 MG/DL (ref 70–99)
HCT VFR BLD AUTO: 49.3 % (ref 37–47)
HGB BLD-MCNC: 16.5 G/DL (ref 12–16)
INR PPP: 1.6
LYMPHOCYTES # BLD: 3.4 K/UL (ref 1–4.8)
LYMPHOCYTES NFR BLD: 32.1 %
MCH RBC QN AUTO: 29.7 PG (ref 27–31.3)
MCHC RBC AUTO-ENTMCNC: 33.5 % (ref 33–37)
MCV RBC AUTO: 88.8 FL (ref 79.4–94.8)
MONOCYTES # BLD: 0.7 K/UL (ref 0.2–0.8)
MONOCYTES NFR BLD: 6.1 %
NEUTROPHILS # BLD: 6.5 K/UL (ref 1.4–6.5)
NEUTS SEG NFR BLD: 60.7 %
PLATELET # BLD AUTO: 193 K/UL (ref 130–400)
POTASSIUM SERPL-SCNC: 3.9 MEQ/L (ref 3.4–4.9)
PROT SERPL-MCNC: 7.3 G/DL (ref 6.3–8)
PROTHROMBIN TIME: 19.1 SEC (ref 12.3–14.9)
RBC # BLD AUTO: 5.55 M/UL (ref 4.2–5.4)
SODIUM SERPL-SCNC: 140 MEQ/L (ref 135–144)
TROPONIN, HIGH SENSITIVITY: 74 NG/L (ref 0–19)
TROPONIN, HIGH SENSITIVITY: 97 NG/L (ref 0–19)
WBC # BLD AUTO: 10.7 K/UL (ref 4.8–10.8)

## 2023-11-04 PROCEDURE — 84484 ASSAY OF TROPONIN QUANT: CPT

## 2023-11-04 PROCEDURE — 80053 COMPREHEN METABOLIC PANEL: CPT

## 2023-11-04 PROCEDURE — 71045 X-RAY EXAM CHEST 1 VIEW: CPT

## 2023-11-04 PROCEDURE — 93005 ELECTROCARDIOGRAM TRACING: CPT | Performed by: EMERGENCY MEDICINE

## 2023-11-04 PROCEDURE — 85025 COMPLETE CBC W/AUTO DIFF WBC: CPT

## 2023-11-04 PROCEDURE — 85610 PROTHROMBIN TIME: CPT

## 2023-11-04 PROCEDURE — 6370000000 HC RX 637 (ALT 250 FOR IP): Performed by: EMERGENCY MEDICINE

## 2023-11-04 PROCEDURE — 2060000000 HC ICU INTERMEDIATE R&B

## 2023-11-04 PROCEDURE — 99285 EMERGENCY DEPT VISIT HI MDM: CPT

## 2023-11-04 PROCEDURE — 36415 COLL VENOUS BLD VENIPUNCTURE: CPT

## 2023-11-04 RX ORDER — ASPIRIN 81 MG/1
324 TABLET, CHEWABLE ORAL ONCE
Status: COMPLETED | OUTPATIENT
Start: 2023-11-04 | End: 2023-11-04

## 2023-11-04 RX ORDER — NITROGLYCERIN 0.4 MG/1
0.4 TABLET SUBLINGUAL EVERY 5 MIN PRN
Status: DISCONTINUED | OUTPATIENT
Start: 2023-11-04 | End: 2023-11-06 | Stop reason: HOSPADM

## 2023-11-04 RX ADMIN — NITROGLYCERIN 0.4 MG: 0.4 TABLET, ORALLY DISINTEGRATING SUBLINGUAL at 20:22

## 2023-11-04 RX ADMIN — ASPIRIN 324 MG: 81 TABLET, CHEWABLE ORAL at 20:23

## 2023-11-04 RX ADMIN — NITROGLYCERIN 0.5 INCH: 20 OINTMENT TOPICAL at 23:01

## 2023-11-04 ASSESSMENT — ENCOUNTER SYMPTOMS
VOMITING: 1
ABDOMINAL PAIN: 0
COUGH: 0
PHOTOPHOBIA: 0
SORE THROAT: 0
WHEEZING: 0
DIARRHEA: 0
EYE DISCHARGE: 0
SHORTNESS OF BREATH: 0
ABDOMINAL DISTENTION: 0
CHEST TIGHTNESS: 0

## 2023-11-04 ASSESSMENT — PATIENT HEALTH QUESTIONNAIRE - PHQ9
SUM OF ALL RESPONSES TO PHQ QUESTIONS 1-9: 0
1. LITTLE INTEREST OR PLEASURE IN DOING THINGS: 0
SUM OF ALL RESPONSES TO PHQ QUESTIONS 1-9: 0
2. FEELING DOWN, DEPRESSED OR HOPELESS: 0
SUM OF ALL RESPONSES TO PHQ QUESTIONS 1-9: 0
SUM OF ALL RESPONSES TO PHQ QUESTIONS 1-9: 0
SUM OF ALL RESPONSES TO PHQ9 QUESTIONS 1 & 2: 0

## 2023-11-04 NOTE — ED TRIAGE NOTES
Pt presents c/o CP onset this afternoon. Pt reports Hx of MI in 2007. Pt reports sweating and vomiting as well, but does not feel nauseous. Pt is ambulatory, A&Ox4, ABCs intact, GCS15.

## 2023-11-05 LAB
GLUCOSE BLD-MCNC: 133 MG/DL (ref 70–99)
GLUCOSE BLD-MCNC: 164 MG/DL (ref 70–99)
GLUCOSE BLD-MCNC: 174 MG/DL (ref 70–99)
GLUCOSE BLD-MCNC: 97 MG/DL (ref 70–99)
INR PPP: 1.5
PERFORMED ON: ABNORMAL
PERFORMED ON: NORMAL
PROTHROMBIN TIME: 18.1 SEC (ref 12.3–14.9)

## 2023-11-05 PROCEDURE — 6370000000 HC RX 637 (ALT 250 FOR IP): Performed by: INTERNAL MEDICINE

## 2023-11-05 PROCEDURE — 93005 ELECTROCARDIOGRAM TRACING: CPT | Performed by: NURSE PRACTITIONER

## 2023-11-05 PROCEDURE — 6370000000 HC RX 637 (ALT 250 FOR IP): Performed by: NURSE PRACTITIONER

## 2023-11-05 PROCEDURE — 94640 AIRWAY INHALATION TREATMENT: CPT

## 2023-11-05 PROCEDURE — 2580000003 HC RX 258: Performed by: INTERNAL MEDICINE

## 2023-11-05 PROCEDURE — 2580000003 HC RX 258: Performed by: NURSE PRACTITIONER

## 2023-11-05 PROCEDURE — 99223 1ST HOSP IP/OBS HIGH 75: CPT | Performed by: INTERNAL MEDICINE

## 2023-11-05 PROCEDURE — 6360000002 HC RX W HCPCS: Performed by: NURSE PRACTITIONER

## 2023-11-05 PROCEDURE — 85610 PROTHROMBIN TIME: CPT

## 2023-11-05 PROCEDURE — 36415 COLL VENOUS BLD VENIPUNCTURE: CPT

## 2023-11-05 PROCEDURE — 2060000000 HC ICU INTERMEDIATE R&B

## 2023-11-05 RX ORDER — WARFARIN SODIUM 5 MG/1
10 TABLET ORAL
Status: DISCONTINUED | OUTPATIENT
Start: 2023-11-05 | End: 2023-11-05

## 2023-11-05 RX ORDER — SODIUM CHLORIDE 0.9 % (FLUSH) 0.9 %
5-40 SYRINGE (ML) INJECTION EVERY 12 HOURS SCHEDULED
Status: DISCONTINUED | OUTPATIENT
Start: 2023-11-05 | End: 2023-11-06 | Stop reason: HOSPADM

## 2023-11-05 RX ORDER — DEXTROAMPHETAMINE SACCHARATE, AMPHETAMINE ASPARTATE MONOHYDRATE, DEXTROAMPHETAMINE SULFATE AND AMPHETAMINE SULFATE 2.5; 2.5; 2.5; 2.5 MG/1; MG/1; MG/1; MG/1
20 CAPSULE, EXTENDED RELEASE ORAL EVERY MORNING
Status: DISCONTINUED | OUTPATIENT
Start: 2023-11-05 | End: 2023-11-06 | Stop reason: HOSPADM

## 2023-11-05 RX ORDER — ONDANSETRON 2 MG/ML
4 INJECTION INTRAMUSCULAR; INTRAVENOUS EVERY 6 HOURS PRN
Status: DISCONTINUED | OUTPATIENT
Start: 2023-11-05 | End: 2023-11-06 | Stop reason: HOSPADM

## 2023-11-05 RX ORDER — SODIUM CHLORIDE 0.9 % (FLUSH) 0.9 %
5-40 SYRINGE (ML) INJECTION PRN
Status: DISCONTINUED | OUTPATIENT
Start: 2023-11-05 | End: 2023-11-06 | Stop reason: HOSPADM

## 2023-11-05 RX ORDER — FOLIC ACID 1 MG/1
1000 TABLET ORAL DAILY
Status: DISCONTINUED | OUTPATIENT
Start: 2023-11-05 | End: 2023-11-06 | Stop reason: HOSPADM

## 2023-11-05 RX ORDER — LOSARTAN POTASSIUM 100 MG/1
100 TABLET ORAL DAILY
Status: DISCONTINUED | OUTPATIENT
Start: 2023-11-05 | End: 2023-11-06 | Stop reason: HOSPADM

## 2023-11-05 RX ORDER — ACETAMINOPHEN 325 MG/1
650 TABLET ORAL EVERY 6 HOURS PRN
Status: DISCONTINUED | OUTPATIENT
Start: 2023-11-05 | End: 2023-11-06 | Stop reason: ALTCHOICE

## 2023-11-05 RX ORDER — ATORVASTATIN CALCIUM 80 MG/1
80 TABLET, FILM COATED ORAL NIGHTLY
Status: DISCONTINUED | OUTPATIENT
Start: 2023-11-05 | End: 2023-11-06 | Stop reason: HOSPADM

## 2023-11-05 RX ORDER — POTASSIUM CHLORIDE 20 MEQ/1
40 TABLET, EXTENDED RELEASE ORAL PRN
Status: DISCONTINUED | OUTPATIENT
Start: 2023-11-05 | End: 2023-11-06 | Stop reason: HOSPADM

## 2023-11-05 RX ORDER — AMLODIPINE BESYLATE 5 MG/1
5 TABLET ORAL DAILY
Status: DISCONTINUED | OUTPATIENT
Start: 2023-11-05 | End: 2023-11-06 | Stop reason: HOSPADM

## 2023-11-05 RX ORDER — DIPHENHYDRAMINE HYDROCHLORIDE 50 MG/ML
50 INJECTION INTRAMUSCULAR; INTRAVENOUS ONCE
Status: DISCONTINUED | OUTPATIENT
Start: 2023-11-05 | End: 2023-11-06 | Stop reason: HOSPADM

## 2023-11-05 RX ORDER — ACETAMINOPHEN 650 MG/1
650 SUPPOSITORY RECTAL EVERY 6 HOURS PRN
Status: DISCONTINUED | OUTPATIENT
Start: 2023-11-05 | End: 2023-11-06 | Stop reason: ALTCHOICE

## 2023-11-05 RX ORDER — ISOSORBIDE MONONITRATE 60 MG/1
60 TABLET, EXTENDED RELEASE ORAL DAILY
Status: DISCONTINUED | OUTPATIENT
Start: 2023-11-05 | End: 2023-11-06 | Stop reason: HOSPADM

## 2023-11-05 RX ORDER — DULOXETIN HYDROCHLORIDE 20 MG/1
20 CAPSULE, DELAYED RELEASE ORAL DAILY
Status: ON HOLD | COMMUNITY
Start: 2023-08-01 | End: 2023-11-05

## 2023-11-05 RX ORDER — CARVEDILOL 6.25 MG/1
6.25 TABLET ORAL 2 TIMES DAILY
Status: DISCONTINUED | OUTPATIENT
Start: 2023-11-05 | End: 2023-11-06 | Stop reason: HOSPADM

## 2023-11-05 RX ORDER — ATORVASTATIN CALCIUM 20 MG/1
20 TABLET, FILM COATED ORAL NIGHTLY
Status: DISCONTINUED | OUTPATIENT
Start: 2023-11-05 | End: 2023-11-05

## 2023-11-05 RX ORDER — FLUTICASONE PROPIONATE 50 MCG
1 SPRAY, SUSPENSION (ML) NASAL DAILY
COMMUNITY
Start: 2023-07-17

## 2023-11-05 RX ORDER — MAGNESIUM SULFATE IN WATER 40 MG/ML
2000 INJECTION, SOLUTION INTRAVENOUS PRN
Status: DISCONTINUED | OUTPATIENT
Start: 2023-11-05 | End: 2023-11-06 | Stop reason: HOSPADM

## 2023-11-05 RX ORDER — TOPIRAMATE 25 MG/1
25 TABLET ORAL
COMMUNITY
Start: 2023-10-24

## 2023-11-05 RX ORDER — SODIUM CHLORIDE 450 MG/100ML
75 INJECTION, SOLUTION INTRAVENOUS CONTINUOUS
Status: DISCONTINUED | OUTPATIENT
Start: 2023-11-05 | End: 2023-11-06 | Stop reason: HOSPADM

## 2023-11-05 RX ORDER — POLYETHYLENE GLYCOL 3350 17 G/17G
17 POWDER, FOR SOLUTION ORAL DAILY PRN
Status: DISCONTINUED | OUTPATIENT
Start: 2023-11-05 | End: 2023-11-06 | Stop reason: HOSPADM

## 2023-11-05 RX ORDER — SODIUM CHLORIDE 9 MG/ML
INJECTION, SOLUTION INTRAVENOUS PRN
Status: DISCONTINUED | OUTPATIENT
Start: 2023-11-05 | End: 2023-11-06 | Stop reason: HOSPADM

## 2023-11-05 RX ORDER — DEXTROSE MONOHYDRATE 100 MG/ML
INJECTION, SOLUTION INTRAVENOUS CONTINUOUS PRN
Status: DISCONTINUED | OUTPATIENT
Start: 2023-11-05 | End: 2023-11-06 | Stop reason: HOSPADM

## 2023-11-05 RX ORDER — FUROSEMIDE 20 MG/1
20 TABLET ORAL 2 TIMES DAILY
Status: DISCONTINUED | OUTPATIENT
Start: 2023-11-05 | End: 2023-11-06 | Stop reason: HOSPADM

## 2023-11-05 RX ORDER — ASPIRIN 81 MG/1
81 TABLET, CHEWABLE ORAL DAILY
Status: DISCONTINUED | OUTPATIENT
Start: 2023-11-05 | End: 2023-11-06 | Stop reason: HOSPADM

## 2023-11-05 RX ORDER — POTASSIUM CHLORIDE 7.45 MG/ML
10 INJECTION INTRAVENOUS PRN
Status: DISCONTINUED | OUTPATIENT
Start: 2023-11-05 | End: 2023-11-06 | Stop reason: HOSPADM

## 2023-11-05 RX ORDER — OXYCODONE HYDROCHLORIDE AND ACETAMINOPHEN 5; 325 MG/1; MG/1
1 TABLET ORAL EVERY 6 HOURS PRN
Status: DISCONTINUED | OUTPATIENT
Start: 2023-11-05 | End: 2023-11-06 | Stop reason: HOSPADM

## 2023-11-05 RX ORDER — ONDANSETRON 4 MG/1
4 TABLET, ORALLY DISINTEGRATING ORAL EVERY 8 HOURS PRN
Status: DISCONTINUED | OUTPATIENT
Start: 2023-11-05 | End: 2023-11-06 | Stop reason: HOSPADM

## 2023-11-05 RX ORDER — BUDESONIDE 0.5 MG/2ML
0.5 INHALANT ORAL
Status: DISCONTINUED | OUTPATIENT
Start: 2023-11-05 | End: 2023-11-06 | Stop reason: HOSPADM

## 2023-11-05 RX ORDER — INSULIN LISPRO 100 [IU]/ML
0-4 INJECTION, SOLUTION INTRAVENOUS; SUBCUTANEOUS NIGHTLY
Status: DISCONTINUED | OUTPATIENT
Start: 2023-11-05 | End: 2023-11-06 | Stop reason: HOSPADM

## 2023-11-05 RX ORDER — OXYCODONE HYDROCHLORIDE AND ACETAMINOPHEN 5; 325 MG/1; MG/1
2 TABLET ORAL EVERY 6 HOURS PRN
Status: DISCONTINUED | OUTPATIENT
Start: 2023-11-05 | End: 2023-11-06 | Stop reason: HOSPADM

## 2023-11-05 RX ORDER — INSULIN LISPRO 100 [IU]/ML
0-8 INJECTION, SOLUTION INTRAVENOUS; SUBCUTANEOUS
Status: DISCONTINUED | OUTPATIENT
Start: 2023-11-05 | End: 2023-11-06 | Stop reason: HOSPADM

## 2023-11-05 RX ORDER — NICOTINE 21 MG/24HR
1 PATCH, TRANSDERMAL 24 HOURS TRANSDERMAL DAILY
Status: DISCONTINUED | OUTPATIENT
Start: 2023-11-05 | End: 2023-11-06 | Stop reason: HOSPADM

## 2023-11-05 RX ORDER — NITROGLYCERIN 0.4 MG/1
0.4 TABLET SUBLINGUAL EVERY 5 MIN PRN
Status: DISCONTINUED | OUTPATIENT
Start: 2023-11-05 | End: 2023-11-06 | Stop reason: HOSPADM

## 2023-11-05 RX ORDER — METFORMIN HYDROCHLORIDE 500 MG/1
1000 TABLET, EXTENDED RELEASE ORAL 2 TIMES DAILY WITH MEALS
COMMUNITY
Start: 2023-10-30

## 2023-11-05 RX ORDER — GLUCAGON 1 MG/ML
1 KIT INJECTION PRN
Status: DISCONTINUED | OUTPATIENT
Start: 2023-11-05 | End: 2023-11-06 | Stop reason: HOSPADM

## 2023-11-05 RX ORDER — ALBUTEROL SULFATE 90 UG/1
2 AEROSOL, METERED RESPIRATORY (INHALATION) EVERY 6 HOURS PRN
Status: DISCONTINUED | OUTPATIENT
Start: 2023-11-05 | End: 2023-11-06 | Stop reason: HOSPADM

## 2023-11-05 RX ORDER — ERGOCALCIFEROL 1.25 MG/1
CAPSULE ORAL
COMMUNITY
Start: 2023-06-09

## 2023-11-05 RX ORDER — TOPIRAMATE 25 MG/1
25 TABLET ORAL
Status: DISCONTINUED | OUTPATIENT
Start: 2023-11-05 | End: 2023-11-06 | Stop reason: HOSPADM

## 2023-11-05 RX ORDER — FLUOXETINE HYDROCHLORIDE 20 MG/1
20 CAPSULE ORAL DAILY
Status: DISCONTINUED | OUTPATIENT
Start: 2023-11-05 | End: 2023-11-06 | Stop reason: HOSPADM

## 2023-11-05 RX ADMIN — ISOSORBIDE MONONITRATE 60 MG: 60 TABLET, EXTENDED RELEASE ORAL at 08:42

## 2023-11-05 RX ADMIN — FUROSEMIDE 20 MG: 20 TABLET ORAL at 17:00

## 2023-11-05 RX ADMIN — Medication 10 ML: at 23:03

## 2023-11-05 RX ADMIN — ATORVASTATIN CALCIUM 80 MG: 80 TABLET, FILM COATED ORAL at 21:34

## 2023-11-05 RX ADMIN — Medication 10 ML: at 08:42

## 2023-11-05 RX ADMIN — TOPIRAMATE 25 MG: 25 TABLET, FILM COATED ORAL at 02:15

## 2023-11-05 RX ADMIN — BUDESONIDE 500 MCG: 0.5 SUSPENSION RESPIRATORY (INHALATION) at 07:14

## 2023-11-05 RX ADMIN — OXYCODONE AND ACETAMINOPHEN 1 TABLET: 5; 325 TABLET ORAL at 13:07

## 2023-11-05 RX ADMIN — DEXTROAMPHETAMINE SACCHARATE, AMPHETAMINE ASPARTATE MONOHYDRATE, DEXTROAMPHETAMINE SULFATE, AND AMPHETAMINE SULFATE 20 MG: 2.5; 2.5; 2.5; 2.5 CAPSULE, EXTENDED RELEASE ORAL at 08:42

## 2023-11-05 RX ADMIN — TOPIRAMATE 25 MG: 25 TABLET, FILM COATED ORAL at 21:34

## 2023-11-05 RX ADMIN — CARIPRAZINE 3 MG: 1.5 CAPSULE, GELATIN COATED ORAL at 08:42

## 2023-11-05 RX ADMIN — FUROSEMIDE 20 MG: 20 TABLET ORAL at 08:41

## 2023-11-05 RX ADMIN — AMLODIPINE BESYLATE 5 MG: 5 TABLET ORAL at 08:41

## 2023-11-05 RX ADMIN — FOLIC ACID 1000 MCG: 1 TABLET ORAL at 08:41

## 2023-11-05 RX ADMIN — ASPIRIN 81 MG 81 MG: 81 TABLET ORAL at 08:42

## 2023-11-05 RX ADMIN — CARVEDILOL 6.25 MG: 6.25 TABLET, FILM COATED ORAL at 21:34

## 2023-11-05 RX ADMIN — CARVEDILOL 6.25 MG: 6.25 TABLET, FILM COATED ORAL at 08:41

## 2023-11-05 RX ADMIN — BUDESONIDE 500 MCG: 0.5 SUSPENSION RESPIRATORY (INHALATION) at 20:59

## 2023-11-05 RX ADMIN — FLUOXETINE 20 MG: 20 CAPSULE ORAL at 08:41

## 2023-11-05 RX ADMIN — Medication 10 ML: at 23:04

## 2023-11-05 ASSESSMENT — ENCOUNTER SYMPTOMS
COUGH: 0
SHORTNESS OF BREATH: 1
WHEEZING: 0
NAUSEA: 0
ABDOMINAL PAIN: 0
STRIDOR: 0
RHINORRHEA: 0
CHEST TIGHTNESS: 0
VOMITING: 0
BLOOD IN STOOL: 0
SHORTNESS OF BREATH: 0
GASTROINTESTINAL NEGATIVE: 1
ALLERGIC/IMMUNOLOGIC NEGATIVE: 1
BACK PAIN: 0
SORE THROAT: 0
PHOTOPHOBIA: 0
RESPIRATORY NEGATIVE: 1
EYES NEGATIVE: 1

## 2023-11-05 ASSESSMENT — PAIN SCALES - GENERAL
PAINLEVEL_OUTOF10: 7
PAINLEVEL_OUTOF10: 2

## 2023-11-05 ASSESSMENT — PAIN DESCRIPTION - LOCATION: LOCATION: BACK;CHEST;NECK;SHOULDER

## 2023-11-05 ASSESSMENT — PAIN DESCRIPTION - ORIENTATION: ORIENTATION: RIGHT;LEFT

## 2023-11-05 NOTE — PROGRESS NOTES
Warfarin Dosing - Pharmacy Consult Note  Consulting Provider: Blanquita Davila APRN-CNP    Indication:   MTHFR gene mutation  Warfarin Dose prior to admission:  5 mg every Mon, Wed, Fri; 7.5 mg all other days  Concurrent anticoagulants/antiplatelets: none  Significant Drug Interactions: No obvious interactions  Recent Labs     11/04/23 2027 11/05/23  0508   INR 1.6 1.5   HGB 16.5*  --      --    LABALBU 4.4  --      Recent warfarin administrations        No warfarin orders with administrations found. Orders not given:            warfarin placeholder: dosing by pharmacy                   Date   INR    Dose  11/4/23  1.6       dose at home  11/5/23  1.5    Assessment/Plan  (Goal INR: 2 - 3)  INR sub-therapeutic for goal. Will boost home dose and give warfarin 10mg x today    Active problem list reviewed. INR orders are placed. Chart reviewed for pertinent labs, drug/diet interactions, and past doses. Documentation of patient's clinical condition was reviewed. Pharmacy Dosing:  Pharmacy will continue to follow.      Nga Castelan [facility] PharmD

## 2023-11-05 NOTE — FLOWSHEET NOTE
0030-  Admission assessment complete at this time. Pt arrived to unit bed 175 via cart and ambulated to the bed, tolerated very well with no signs of Dyspnea or chest pain. Pt resting in bed, A/O x4, calm/ cooperative, PERRLA and YUNG x4. WOB normal, Lungs clear bilat with O2 sat of 98% on RA and RR-18. NSR per tele with HR- 76, PPP, cap refill <3 sec on all extremities and pink warm skin. No peripheral edema noted. Pt reports normal elimination pattern free of pain or blood. LBM 11/3 ( per patient), urine light yellow and clear with no foul odors. Pt denies pain, SOB or chest pain at this time. No acute distress noted at this time. Pt currently wearing [employer] App blood glucose monitoring device reading 101, 1W accu-check reading 97 at this time. Nourishment requested and provided at this time. 120 cc apple juice, ham samich with astorga and crackers and PB cup. Med req completed at this time with the patient being the primary historian, Dr Caty Fonseca notified. Pt Denies having any further needs/ wants at this time. Non skid foot wear provided, bed low/ locked and call light within reach.        SE  Electronically signed by Denise Wick RN on 11/5/2023 at 3:40 AM

## 2023-11-05 NOTE — H&P
[facility] ([facility]    HISTORY AND PHYSICAL EXAM    PATIENT NAME:  Jeffrey Matrin    MRN:  [id number]  SERVICE DATE:  11/4/2023   SERVICE TIME:  11:09 PM    Primary Care Physician: Amber Chun MD         SUBJECTIVE  CHIEF COMPLAINT:  Chest Pain       HPI: Patient admitted for chest pain. Patient is an alert and oriented x1 81-year-old  female. Patient states that around 3 PM while resting in her bed she felt pressure starting on the left side of her chest and radiating towards the right. Patient reports it seems to increase and decrease at rest but nothing seems to contribute to the pain. However, pain is reproducible on palpation during examination. Patient also reports that she had some shortness of breath with the pain but was also very anxious when she calm down and then the pain seemed to go away as well as the shortness of breath. She also reported 1 episode of vomiting this afternoon. Otherwise, patient denies any fever, cough, abdominal pain, dysuria, or diarrhea. Patient's past medical history includes CAD, [place] [facility] TIA, DM 2, bipolar depression, COPD, NERY, ADHD, and chronic back pain. Patient smokes half pack a day tobacco, denies illicit drug use including marijuana, and denies alcohol use.     PAST MEDICAL HISTORY:    Past Medical History:   Diagnosis Date    Anxiety     Back pain     back surgery x 4    Bipolar disorder ([address])     CAD (coronary artery disease)     CAFL (chronic airflow limitation) ([facility]) 11/3/2016    Cerebral artery occlusion with cerebral infarction ([facility])     Chronic bronchitis ([facility])     Chronic pain     Colitis     Complicated migraine     DDD (degenerative disc disease), lumbar 12/22/2016    Depression     Endometriosis     Excessive caffeine abuse, continuous ([address]) 7/6/2018    Gastritis     GERD (gastroesophageal reflux disease)     History of myocardial infarction     HTN (hypertension), benign 2/19/2016    Impaired mobility and Pulses: Normal pulses. Heart sounds: Normal heart sounds. Pulmonary:      Effort: Pulmonary effort is normal. No respiratory distress. Breath sounds: Normal breath sounds. No wheezing, rhonchi or rales. Chest:      Chest wall: Tenderness (Chest wall tenderness on palpation) present. Abdominal:      General: Bowel sounds are normal. There is no distension. Palpations: Abdomen is soft. There is no mass. Tenderness: There is no abdominal tenderness. There is no guarding or rebound. Hernia: No hernia is present. Musculoskeletal:         General: Normal range of motion. Cervical back: Normal range of motion. Right lower leg: No edema. Skin:     General: Skin is warm and dry. Capillary Refill: Capillary refill takes less than 2 seconds. Neurological:      Mental Status: She is alert and oriented to person, place, and time. Psychiatric:         Mood and Affect: Mood normal.           DATA:     Diagnostic tests reviewed for today's visit:    Most recent labs and imaging results reviewed.      LABS:    Recent Results (from the past 24 hour(s))   Troponin Now and Q 1 Hour x 2    Collection Time: 11/04/23  8:27 PM   Result Value Ref Range    Troponin, High Sensitivity 74 (HH) 0 - 19 ng/L   CBC with Auto Differential    Collection Time: 11/04/23  8:27 PM   Result Value Ref Range    WBC 10.7 4.8 - 10.8 K/uL    RBC 5.55 (H) 4.20 - 5.40 M/uL    Hemoglobin 16.5 (H) 12.0 - 16.0 g/dL    Hematocrit 49.3 (H) 37.0 - 47.0 %    MCV 88.8 79.4 - 94.8 fL    MCH 29.7 27.0 - 31.3 pg    MCHC 33.5 33.0 - 37.0 %    RDW 13.2 11.5 - 14.5 %    Platelets 613 358 - 881 K/uL    Neutrophils % 60.7 %    Lymphocytes % 32.1 %    Monocytes % 6.1 %    Eosinophils % 0.5 %    Basophils % 0.4 %    Neutrophils Absolute 6.5 1.4 - 6.5 K/uL    Lymphocytes Absolute 3.4 1.0 - 4.8 K/uL    Monocytes Absolute 0.7 0.2 - 0.8 K/uL    Eosinophils Absolute 0.1 0.0 - 0.7 K/uL    Basophils Absolute 0.0 0.0 - 0.2 K/uL

## 2023-11-05 NOTE — PROGRESS NOTES
11/05/23 0556   RT Protocol   History Pulmonary Disease 0   Respiratory pattern 0   Breath sounds 0   Cough 0   Indications for Bronchodilator Therapy On home bronchodilators   Bronchodilator Assessment Score 0

## 2023-11-05 NOTE — FLOWSHEET NOTE
1713- Patient has only voided 100 ml of cloudy yellow urine so far today. DR. Dalia Wood made aware. Patient also not drinking much at all. Electronically signed by Aaron Nieto on 11/5/2023 at 5:14 PM

## 2023-11-06 VITALS
DIASTOLIC BLOOD PRESSURE: 59 MMHG | WEIGHT: 169.3 LBS | RESPIRATION RATE: 20 BRPM | TEMPERATURE: 98.4 F | BODY MASS INDEX: 26.57 KG/M2 | HEIGHT: 67 IN | SYSTOLIC BLOOD PRESSURE: 123 MMHG | OXYGEN SATURATION: 99 % | HEART RATE: 80 BPM

## 2023-11-06 LAB
ECHO BSA: 1.89 M2
EKG ATRIAL RATE: 70 BPM
EKG P AXIS: 59 DEGREES
EKG P-R INTERVAL: 162 MS
EKG Q-T INTERVAL: 452 MS
EKG QRS DURATION: 98 MS
EKG QTC CALCULATION (BAZETT): 488 MS
EKG R AXIS: 68 DEGREES
EKG T AXIS: 31 DEGREES
EKG VENTRICULAR RATE: 70 BPM
GLUCOSE BLD-MCNC: 135 MG/DL (ref 70–99)
GLUCOSE BLD-MCNC: 209 MG/DL (ref 70–99)
INR PPP: 1.2
PERFORMED ON: ABNORMAL
PERFORMED ON: ABNORMAL
PROTHROMBIN TIME: 15.8 SEC (ref 12.3–14.9)

## 2023-11-06 PROCEDURE — 6360000002 HC RX W HCPCS: Performed by: INTERNAL MEDICINE

## 2023-11-06 PROCEDURE — 36415 COLL VENOUS BLD VENIPUNCTURE: CPT

## 2023-11-06 PROCEDURE — 4A023N7 MEASUREMENT OF CARDIAC SAMPLING AND PRESSURE, LEFT HEART, PERCUTANEOUS APPROACH: ICD-10-PCS | Performed by: INTERNAL MEDICINE

## 2023-11-06 PROCEDURE — 2709999900 HC NON-CHARGEABLE SUPPLY: Performed by: INTERNAL MEDICINE

## 2023-11-06 PROCEDURE — B2151ZZ FLUOROSCOPY OF LEFT HEART USING LOW OSMOLAR CONTRAST: ICD-10-PCS | Performed by: INTERNAL MEDICINE

## 2023-11-06 PROCEDURE — 6370000000 HC RX 637 (ALT 250 FOR IP): Performed by: NURSE PRACTITIONER

## 2023-11-06 PROCEDURE — C1769 GUIDE WIRE: HCPCS | Performed by: INTERNAL MEDICINE

## 2023-11-06 PROCEDURE — 2580000003 HC RX 258: Performed by: NURSE PRACTITIONER

## 2023-11-06 PROCEDURE — 7100000010 HC PHASE II RECOVERY - FIRST 15 MIN: Performed by: INTERNAL MEDICINE

## 2023-11-06 PROCEDURE — 2580000003 HC RX 258: Performed by: INTERNAL MEDICINE

## 2023-11-06 PROCEDURE — B2111ZZ FLUOROSCOPY OF MULTIPLE CORONARY ARTERIES USING LOW OSMOLAR CONTRAST: ICD-10-PCS | Performed by: INTERNAL MEDICINE

## 2023-11-06 PROCEDURE — 6370000000 HC RX 637 (ALT 250 FOR IP): Performed by: INTERNAL MEDICINE

## 2023-11-06 PROCEDURE — 6360000004 HC RX CONTRAST MEDICATION: Performed by: INTERNAL MEDICINE

## 2023-11-06 PROCEDURE — 99152 MOD SED SAME PHYS/QHP 5/>YRS: CPT | Performed by: INTERNAL MEDICINE

## 2023-11-06 PROCEDURE — 93458 L HRT ARTERY/VENTRICLE ANGIO: CPT | Performed by: INTERNAL MEDICINE

## 2023-11-06 PROCEDURE — C1894 INTRO/SHEATH, NON-LASER: HCPCS | Performed by: INTERNAL MEDICINE

## 2023-11-06 PROCEDURE — 93010 ELECTROCARDIOGRAM REPORT: CPT | Performed by: INTERNAL MEDICINE

## 2023-11-06 PROCEDURE — 7100000011 HC PHASE II RECOVERY - ADDTL 15 MIN: Performed by: INTERNAL MEDICINE

## 2023-11-06 PROCEDURE — 2500000003 HC RX 250 WO HCPCS: Performed by: INTERNAL MEDICINE

## 2023-11-06 PROCEDURE — 85610 PROTHROMBIN TIME: CPT

## 2023-11-06 RX ORDER — NICARDIPINE HYDROCHLORIDE 2.5 MG/ML
INJECTION INTRAVENOUS PRN
Status: DISCONTINUED | OUTPATIENT
Start: 2023-11-06 | End: 2023-11-06 | Stop reason: HOSPADM

## 2023-11-06 RX ORDER — FENTANYL CITRATE 50 UG/ML
INJECTION, SOLUTION INTRAMUSCULAR; INTRAVENOUS PRN
Status: DISCONTINUED | OUTPATIENT
Start: 2023-11-06 | End: 2023-11-06 | Stop reason: HOSPADM

## 2023-11-06 RX ORDER — SODIUM CHLORIDE 0.9 % (FLUSH) 0.9 %
5-40 SYRINGE (ML) INJECTION EVERY 12 HOURS SCHEDULED
Status: DISCONTINUED | OUTPATIENT
Start: 2023-11-06 | End: 2023-11-06 | Stop reason: HOSPADM

## 2023-11-06 RX ORDER — MORPHINE SULFATE 2 MG/ML
2 INJECTION, SOLUTION INTRAMUSCULAR; INTRAVENOUS
Status: DISCONTINUED | OUTPATIENT
Start: 2023-11-06 | End: 2023-11-06 | Stop reason: HOSPADM

## 2023-11-06 RX ORDER — SODIUM CHLORIDE 0.9 % (FLUSH) 0.9 %
5-40 SYRINGE (ML) INJECTION PRN
Status: DISCONTINUED | OUTPATIENT
Start: 2023-11-06 | End: 2023-11-06 | Stop reason: HOSPADM

## 2023-11-06 RX ORDER — MIDAZOLAM HYDROCHLORIDE 2 MG/2ML
2 INJECTION, SOLUTION INTRAMUSCULAR; INTRAVENOUS
Status: DISCONTINUED | OUTPATIENT
Start: 2023-11-06 | End: 2023-11-06 | Stop reason: HOSPADM

## 2023-11-06 RX ORDER — ACETAMINOPHEN 325 MG/1
650 TABLET ORAL EVERY 4 HOURS PRN
Status: DISCONTINUED | OUTPATIENT
Start: 2023-11-06 | End: 2023-11-06 | Stop reason: HOSPADM

## 2023-11-06 RX ORDER — HEPARIN SODIUM 1000 [USP'U]/ML
INJECTION, SOLUTION INTRAVENOUS; SUBCUTANEOUS PRN
Status: DISCONTINUED | OUTPATIENT
Start: 2023-11-06 | End: 2023-11-06 | Stop reason: HOSPADM

## 2023-11-06 RX ORDER — FENTANYL CITRATE 0.05 MG/ML
25 INJECTION, SOLUTION INTRAMUSCULAR; INTRAVENOUS
Status: DISCONTINUED | OUTPATIENT
Start: 2023-11-06 | End: 2023-11-06 | Stop reason: HOSPADM

## 2023-11-06 RX ORDER — CARVEDILOL 6.25 MG/1
6.25 TABLET ORAL 2 TIMES DAILY
Qty: 60 TABLET | Refills: 3 | Status: SHIPPED | OUTPATIENT
Start: 2023-11-06

## 2023-11-06 RX ORDER — MIDAZOLAM HYDROCHLORIDE 1 MG/ML
INJECTION INTRAMUSCULAR; INTRAVENOUS PRN
Status: DISCONTINUED | OUTPATIENT
Start: 2023-11-06 | End: 2023-11-06 | Stop reason: HOSPADM

## 2023-11-06 RX ORDER — SODIUM CHLORIDE 9 MG/ML
INJECTION, SOLUTION INTRAVENOUS PRN
Status: DISCONTINUED | OUTPATIENT
Start: 2023-11-06 | End: 2023-11-06 | Stop reason: HOSPADM

## 2023-11-06 RX ORDER — NITROGLYCERIN 20 MG/100ML
INJECTION INTRAVENOUS CONTINUOUS PRN
Status: COMPLETED | OUTPATIENT
Start: 2023-11-06 | End: 2023-11-06

## 2023-11-06 RX ORDER — SODIUM CHLORIDE 9 MG/ML
INJECTION, SOLUTION INTRAVENOUS CONTINUOUS
Status: DISCONTINUED | OUTPATIENT
Start: 2023-11-06 | End: 2023-11-06 | Stop reason: HOSPADM

## 2023-11-06 RX ADMIN — ASPIRIN 81 MG 81 MG: 81 TABLET ORAL at 06:57

## 2023-11-06 RX ADMIN — ISOSORBIDE MONONITRATE 60 MG: 60 TABLET, EXTENDED RELEASE ORAL at 13:58

## 2023-11-06 RX ADMIN — CARVEDILOL 6.25 MG: 6.25 TABLET, FILM COATED ORAL at 14:32

## 2023-11-06 RX ADMIN — SODIUM CHLORIDE 75 ML/HR: 4.5 INJECTION, SOLUTION INTRAVENOUS at 00:08

## 2023-11-06 RX ADMIN — FOLIC ACID 1000 MCG: 1 TABLET ORAL at 13:57

## 2023-11-06 RX ADMIN — FLUOXETINE 20 MG: 20 CAPSULE ORAL at 13:58

## 2023-11-06 RX ADMIN — AMLODIPINE BESYLATE 5 MG: 5 TABLET ORAL at 13:58

## 2023-11-06 RX ADMIN — CARIPRAZINE 3 MG: 1.5 CAPSULE, GELATIN COATED ORAL at 13:58

## 2023-11-06 RX ADMIN — LOSARTAN POTASSIUM 100 MG: 100 TABLET, FILM COATED ORAL at 13:57

## 2023-11-06 RX ADMIN — Medication 10 ML: at 14:00

## 2023-11-06 RX ADMIN — DEXTROAMPHETAMINE SACCHARATE, AMPHETAMINE ASPARTATE MONOHYDRATE, DEXTROAMPHETAMINE SULFATE, AND AMPHETAMINE SULFATE 20 MG: 2.5; 2.5; 2.5; 2.5 CAPSULE, EXTENDED RELEASE ORAL at 15:22

## 2023-11-06 RX ADMIN — Medication 10 ML: at 14:49

## 2023-11-06 RX ADMIN — FUROSEMIDE 20 MG: 20 TABLET ORAL at 14:32

## 2023-11-06 NOTE — BRIEF OP NOTE
Section of Cardiology  Adult Brief Cardiac Cath Procedure Note        Procedure(s):  LHC, b/l coronary angio    Pre-operative Diagnosis:  chest pain, angina    H&P Status: Completed and reviewed. Post-operative Diagnosis:      LV EF of 50%  LM normal   LAD mid 40-50% focal.  CX mild disease. Distal 30%. Om2 small caliver, 60%  RCA mod caliber, mild disease. Findings:  See full report    Complications:  none    Primary Proceduralist:   Dr.Wes Sherman DO    Plan    Max med rx  Rfm  Ok to dc home.      Full procedure note to follow

## 2023-11-06 NOTE — CARE COORDINATION
PT FOR CATH TODAY, DC PLAN REMAINS HOME ONCE CLEARED BY DRS.
needed at discharge: N/A            Potential DME:    Patient expects to discharge to: [address] [facility] for transportation at discharge:      Financial    Payor: Greer Kumar / Plan: Natalie Cabrera PPO / Product Type: *No Product type* /     Does insurance require precert for SNF: Yes    Potential assistance Purchasing Medications:    Meds-to-Beds request:        [facility] [place], [address] [place]  [address] [facility] [address]  Phone: [phone] Fax: [phone], [address] [phone] - [phone]  [address] [address]  Phone: [phone] Fax: [phone]      Notes:    Factors facilitating achievement of predicted outcomes: Motivated, Cooperative, Pleasant, Sense of humor, and Good insight into deficits    Barriers to discharge: Medical complications    Additional Case Management Notes: Pt is alert and oriented. Pt lives at home with BF and Son. Independent at baseline; No Home O2; No dialysis; no DME; discuss DC plan with pt. Pt agree with DC plan home with BF and son. Denies any needs. The Plan for Transition of Care is related to the following treatment goals of Chest pain [R07.9]  Elevated troponin [R79.89]  Chest pain, unspecified type [H77.4]    IF APPLICABLE: The Patient and/or patient representative Toña Keyes and her family were provided with a choice of provider and agrees with the discharge plan. Freedom of choice list with basic dialogue that supports the patient's individualized plan of care/goals and shares the quality data associated with the providers was provided to:     Patient Representative Name:       The Patient and/or Patient Representative Agree with the Discharge Plan?       Juan Antonio Cedeño, [facility]  Case Management Department  Ph: [phone] Fax: [phone]

## 2023-11-06 NOTE — FLOWSHEET NOTE
0530-   Pt HCG bath and prep shave complete. SBAR report called to Augustin Castro in cath lab. 0802-  Pt transported to cath lab via [facility] at this time.        SE  Electronically signed by Santy Donaldson RN on 11/6/2023 at 6:45 AM

## 2023-11-06 NOTE — PROGRESS NOTES
Remaining air removed from right radial band. Radial band removed. Quik clot and tegaderm placed at right radial puncture site. No bleeding or hematoma noted.  Report called to [facility] on [address]

## 2023-11-06 NOTE — DISCHARGE SUMMARY
release capsule  Take 1 capsule by mouth every morning for 30 days. Qty: 30 capsule Refills: 0  Associated Diagnoses:Attention deficit disorder (ADD) without hyperactivity    amphetamine-dextroamphetamine (ADDERALL, 10MG,) 10 MG tablet  Take one tablet at 1 pm  Qty: 30 tablet Refills: 0  Associated Diagnoses:Attention deficit disorder (ADD) without hyperactivity    warfarin (COUMADIN) 5 MG tablet  TAKE AS DIRECTED BY [facility]  Qty: 150 tablet Refills: 3    FLUoxetine (PROZAC) 20 MG capsule  TAKE 3 CAPSULES BY MOUTH EVERY MORNING    Handicap Placard MISC  by Does not apply route Exp: 02/2028  Qty: 1 each Refills: 0  Associated Diagnoses:Cerebrovascular accident (CVA) of left thalamus (HCC)    folic acid (FOLVITE) 1 MG tablet  TAKE 1 TABLET DAILY  Qty: 90 tablet Refills: 3    JANUVIA 25 MG tablet  Take 1 tablet by mouth once daily. Qty: 90 tablet Refills: 0  Comments: Patient needs an appointment for any future refills. Last seen 04/2021. Continuous Blood Gluc Sensor (FREESTYLE JOSE 2 SENSOR) [facility]  apply sensor every 14 day  Qty: 6 each Refills: 3    albuterol (PROVENTIL) (2.5 MG/3ML) 0.083% nebulizer solution  Take 3 mLs by nebulization every 6 hours as needed for Wheezing  Qty: 1 each Refills: 1    Na Sulfate-K Sulfate-Mg Sulf 17.5-3.13-1.6 GM/177ML SOLN  As directed  Qty: 354 mL Refills: 0    Continuous Blood Gluc  (FREESTYLE JOSE 2 READER) ANITHA  1 Device by Does not apply route 4 times daily (before meals and nightly)  Qty: 1 each Refills: 0    Blood Pressure KIT  Blood pressure cuff.   Qty: 1 kit Refills: 0    Fluticasone-Umeclidin-Vilant (TRELEGY ELLIPTA) 200-62.5-25 MCG/INH AEPB  Inhale 1 puff into the lungs daily    metFORMIN (GLUCOPHAGE) 500 MG tablet  Take 1 tablet by mouth 2 times daily (with meals)  Qty: 60 tablet Refills: 3    cariprazine hcl (VRAYLAR) 3 MG CAPS capsule  Take 1 capsule by mouth daily  Qty: 30 capsule Refills: 0    docusate sodium (COLACE, DULCOLAX) 100 MG CAPS  Take 200 mg by mouth 2 times daily  Qty: 120 capsule Refills: 2    butalbital-aspirin-caffeine (FIORINAL) -40 MG capsule  Take 1 capsule by mouth every 4 hours as needed for Headaches for up to 10 days. Qty: 20 capsule Refills: 0  Associated Diagnoses:Acute nonintractable headache, unspecified headache type    nitroGLYCERIN (NITROSTAT) 0.4 MG SL tablet  up to max of 3 total doses. If no relief after 1 dose, call 911. Qty: 25 tablet Refills: 3    !! albuterol sulfate HFA (PROVENTIL HFA) 108 (90 Base) MCG/ACT inhaler  Inhale 2 puffs into the lungs every 6 hours as needed for Wheezing  Qty: 1 Inhaler Refills: 3    FREESTYLE LANCETS MISC  USE DAILY AS DIRECTED  Qty: 100 each Refills: 10  Associated Diagnoses:Hyperglycemia    budesonide (PULMICORT) 0.5 MG/2ML nebulizer suspension  INHALE 1 VIAL VIA NEBULIZER TWICE DAILY  Qty: 120 mL Refills: 5  Associated Diagnoses:Simple chronic bronchitis (HCC)    aspirin 81 MG chewable tablet  Take 1 tablet by mouth daily  Qty: 30 tablet Refills: 5  Associated Diagnoses:Preventative health care    FREESTYLE LITE strip  USE DAILY AS DIRECTED  Qty: 50 strip Refills: 11    ipratropium-albuterol (DUONEB) 0.5-2.5 (3) MG/3ML SOLN nebulizer solution  Inhale 3 mLs into the lungs three times daily  Qty: 360 mL Refills: 5  Associated Diagnoses:Simple chronic bronchitis (HCC)    ! ! VENTOLIN  (90 Base) MCG/ACT inhaler  Inhale 2 puffs into the lungs every 6 hours as needed for Wheezing  Qty: 1 Inhaler Refills: 5  Associated Diagnoses:Simple chronic bronchitis ([address])    ! ! - Potential duplicate medications found. Please discuss with provider.           Current Discharge Medication List    STOP taking these medications    DULoxetine (CYMBALTA) 20 MG extended release capsule  Comments:  Reason for Stopping:    famotidine (PEPCID) 20 MG tablet  Comments:  Reason for Stopping:    permethrin (ELIMITE) 5 % cream  Comments:  Reason for Stopping:    cyclobenzaprine (FLEXERIL) 10 MG

## 2023-11-06 NOTE — PROGRESS NOTES
Phone report received from [employer] from nursing unit. Patient arrived to Pre/Post cath via wheelchair. Peripheral IV's flushed. Informed consent obtained. No questions or concerns at this time. Patient provided with ordered Aspirin. Patient prepped for procedure.

## 2023-11-06 NOTE — PROGRESS NOTES
Patient adequate for discharge. IV's and telemetry removed. Significant other at bedside. Pt. And significant other educated on discharge instructions, medications and follow ups. Pt and significant other verbalized understanding. No questions at this time. Pt. Ambulatory leaving facility at this time escorted by significant other.

## 2023-11-06 NOTE — PROGRESS NOTES
Patient returned post procedure. Patient alert and oriented times 4. TR band applied to right wrist wrist. No bleeding or hematoma noted at this time.  Report received from [facility]

## 2023-11-07 ENCOUNTER — TELEPHONE (OUTPATIENT)
Dept: CARDIOLOGY CLINIC | Age: 53
End: 2023-11-07

## 2023-11-07 NOTE — LETTER
[facility]  [address] [facility] [address] [address]  Phone: [phone]  Fax: [phone] NIKI Orellana CNP        May 19, 2021     Patient: Maria Esther Adler   YOB: 1970   Date of Visit: 5/19/2021       To Whom It May Concern: It is my medical opinion that Monica Hoffman may return to work on 5/24/21 with the following restrictions: lifting/carrying not to exceed 5 lbs. , pushing/pulling not to exceed 5 lbs., no ladders, no overhead work, not to operate moving machinery . If you have any questions or concerns, please don't hesitate to call.     Sincerely,        NIKI Rivera CNP PT am note: Pt was previously up to a chair with spouse earlier this am. Pt returned back to bed for rest requesting no PT today. Per Cardiac notes coronary angiography is indicated. PT will hold today and progress as medical progress allows.

## 2023-11-07 NOTE — TELEPHONE ENCOUNTER
Called patient back regarding her message about getting a work release. Per patient, she was discharged from the hospital yesterday and also had a LHC. Patient works as a .  Patient scheduled a hospital follow up appointment with Dr Gabriele Pimentel for Friday at 1045AM.

## 2023-11-07 NOTE — TELEPHONE ENCOUNTER
Pt called to ask if she can be released to go back to work? If not right now, when might he release her?       Pt # [phone]

## 2023-11-08 LAB
EKG ATRIAL RATE: 79 BPM
EKG P AXIS: 71 DEGREES
EKG P-R INTERVAL: 170 MS
EKG Q-T INTERVAL: 434 MS
EKG QRS DURATION: 102 MS
EKG QTC CALCULATION (BAZETT): 497 MS
EKG R AXIS: 72 DEGREES
EKG T AXIS: -35 DEGREES
EKG VENTRICULAR RATE: 79 BPM

## 2023-11-08 PROCEDURE — 93010 ELECTROCARDIOGRAM REPORT: CPT | Performed by: INTERNAL MEDICINE

## 2023-11-10 ENCOUNTER — TELEPHONE (OUTPATIENT)
Dept: CARDIOLOGY CLINIC | Age: 53
End: 2023-11-10

## 2023-11-10 ENCOUNTER — OFFICE VISIT (OUTPATIENT)
Dept: CARDIOLOGY CLINIC | Age: 53
End: 2023-11-10
Payer: COMMERCIAL

## 2023-11-10 VITALS
HEART RATE: 83 BPM | SYSTOLIC BLOOD PRESSURE: 132 MMHG | WEIGHT: 170 LBS | DIASTOLIC BLOOD PRESSURE: 80 MMHG | HEIGHT: 67 IN | OXYGEN SATURATION: 98 % | RESPIRATION RATE: 15 BRPM | BODY MASS INDEX: 26.68 KG/M2

## 2023-11-10 DIAGNOSIS — Z72.0 TOBACCO ABUSE: ICD-10-CM

## 2023-11-10 DIAGNOSIS — R07.9 CHEST PAIN, UNSPECIFIED TYPE: Primary | ICD-10-CM

## 2023-11-10 DIAGNOSIS — I10 HYPERTENSION, UNSPECIFIED TYPE: ICD-10-CM

## 2023-11-10 DIAGNOSIS — I25.10 CORONARY ARTERY DISEASE INVOLVING NATIVE CORONARY ARTERY OF NATIVE HEART WITHOUT ANGINA PECTORIS: ICD-10-CM

## 2023-11-10 PROCEDURE — 3079F DIAST BP 80-89 MM HG: CPT | Performed by: INTERNAL MEDICINE

## 2023-11-10 PROCEDURE — 99213 OFFICE O/P EST LOW 20 MIN: CPT | Performed by: INTERNAL MEDICINE

## 2023-11-10 PROCEDURE — 3075F SYST BP GE 130 - 139MM HG: CPT | Performed by: INTERNAL MEDICINE

## 2023-11-10 ASSESSMENT — ENCOUNTER SYMPTOMS
ABDOMINAL PAIN: 0
NAUSEA: 0
GASTROINTESTINAL NEGATIVE: 1
VOMITING: 0
SHORTNESS OF BREATH: 0
WHEEZING: 0
EYES NEGATIVE: 1

## 2023-11-10 NOTE — TELEPHONE ENCOUNTER
Pt calling to find out if she should go back to work? If so, she needs a signed release form. States that she forgot to ask Dr Sergio Gray at today's office visit.       Pt # [phone]

## 2023-11-10 NOTE — PROGRESS NOTES
11/10/2023        HPI:    11-11-16: Patient presents for initial medical evaluation. Patient is followed on a regular basis by Dr. Blossom Aiken MD. States she had an MI a month ago at [facility]. S/p LHC in 3/2016 with Dr. Sheryl Woodard with normal coronaries and normal LVF EF of 65%. Pt denies dyspnea, dyspnea on exertion, change in exercise capacity, fatigue,  nausea, vomiting, diarrhea, constipation, motor weakness, insomnia, weight loss, syncope, dizziness, lightheadedness, palpitations, PND, orthopnea. Continues to have chest pressure, like something is stuck there, nausea, like an airpocket sitting in her mid epigastric area, she doesn't feel good, feels numbness in her left arm. Does get SOB very easily, she continues to smoke. Sees dr. Reg Rojas. S/p suman vargas in 2013.   7-6-18: has been experiencing rapid heart beats over the past month or so. On her fit bit has gone up to 204 bpm.   Feels a pounding in her chest, gets nauseous and Lh/dizz. ECHO in 11/17 with normal LVF, mild to mod AI. Pt denies  nausea, vomiting, diarrhea, constipation, motor weakness, insomnia, weight loss, syncope, palpitations, PND, orthopnea, or claudication. Does use excessive caffeine and continues to smoke. 5-7 tall boys pepsi a day. Does caffeine shots with coffee as well. Hx of negative [facility] in 2016. . BP and hr are good. CAD is stable. No LE discoloration or ulcers. No LE edema. No CHF type symptoms. Lipid profile is normal. EKG with NSR.      8-3-18: on chantix. States she feels like crap.having SOB and palpitations for past couple of weeks. S/p 48 hour holter with + PVC's, bigeminy and 5 beat run of NSVT. She is on lopressor 50mg BID. Pt denies chest pain,  change in exercise capacity, fatigue,  nausea, vomiting, diarrhea, constipation, motor weakness, insomnia, weight loss, syncope, dizziness, lightheadedness,  PND, orthopnea, or claudication. No nitro use. BP and hr are good. CAD is stable. No LE discoloration or ulcers.  No

## 2023-11-15 LAB — ECHO BSA: 1.89 M2

## 2023-11-21 ENCOUNTER — HOSPITAL ENCOUNTER (OUTPATIENT)
Dept: PHARMACY | Age: 53
Setting detail: THERAPIES SERIES
Discharge: HOME | End: 2023-11-21
Payer: COMMERCIAL

## 2023-11-21 DIAGNOSIS — D68.59 HYPERCOAGULABLE STATE (HCC): ICD-10-CM

## 2023-11-21 DIAGNOSIS — G45.8 OTHER TRANSIENT CEREBRAL ISCHEMIC ATTACKS AND RELATED SYNDROMES: ICD-10-CM

## 2023-11-21 DIAGNOSIS — I63.81 CEREBROVASCULAR ACCIDENT (CVA) OF LEFT THALAMUS (HCC): Primary | ICD-10-CM

## 2023-11-21 LAB — INTERNATIONAL NORMALIZATION RATIO, POC: 1.7 (ref 2–3)

## 2023-11-21 PROCEDURE — 99211 OFF/OP EST MAY X REQ PHY/QHP: CPT | Performed by: PHARMACIST

## 2023-11-21 PROCEDURE — 85610 PROTHROMBIN TIME: CPT | Performed by: PHARMACIST

## 2023-11-21 NOTE — PROGRESS NOTES
Ms. Wendy Mcgarry is a 48 y.o. y/o female with history of CVA who presents today for anticoagulation monitoring and adjustment. INR 1.7 is sub-therapeutic for this patient (goal range 2-3) and is reflective of 45 mg TWD  Patient verifies current dosing regimen, patient able to verbally recall dose  Patient reports TWO  missed doses since last INR   Patient denies s/sx clotting and/or stroke  Patient denies hematuria, epistaxis, rectal bleeding  Patient denies changes in diet, alcohol, or tobacco use  Reviewed medication list and drug allergies with patient, updated any medication additions or modifications accordingly  Patient also denies any pending medical or dental procedures scheduled at this time  Patient states recent hospitalization for MI  Patient was instructed to boost with extra half tab today and tomorrow then resume 45 mg TWD and RTC 2 weeks    MAIKOL Dockery Ph.  2023  2:31 PM    For Pharmacy Admin Tracking Only    Intervention Detail: Adherence Monitorin and Dose Adjustment: 2, reason: Improve Adherence, Therapy Optimization  Total # of Interventions Recommended: 2  Total # of Interventions Accepted: 2  Time Spent (min): 15

## 2023-12-05 ENCOUNTER — HOSPITAL ENCOUNTER (OUTPATIENT)
Dept: PHARMACY | Age: 53
Setting detail: THERAPIES SERIES
Discharge: HOME | End: 2023-12-05
Payer: COMMERCIAL

## 2023-12-05 DIAGNOSIS — I63.81 CEREBROVASCULAR ACCIDENT (CVA) OF LEFT THALAMUS (HCC): Primary | ICD-10-CM

## 2023-12-05 DIAGNOSIS — D68.59 HYPERCOAGULABLE STATE (HCC): ICD-10-CM

## 2023-12-05 DIAGNOSIS — G45.8 OTHER TRANSIENT CEREBRAL ISCHEMIC ATTACKS AND RELATED SYNDROMES: ICD-10-CM

## 2023-12-05 LAB — INTERNATIONAL NORMALIZATION RATIO, POC: 2

## 2023-12-05 PROCEDURE — 99211 OFF/OP EST MAY X REQ PHY/QHP: CPT | Performed by: PHARMACIST

## 2023-12-05 PROCEDURE — 85610 PROTHROMBIN TIME: CPT | Performed by: PHARMACIST

## 2023-12-05 NOTE — PROGRESS NOTES
Ms. Maricel Meneses is a 48 y.o. y/o female with history of CVA who presents today for anticoagulation monitoring and adjustment. INR 2.0 is therapeutic for this patient (goal range 2.0-3.0) and is reflective of 45 mg TWD    Patient verifies current dosing regimen, patient able to verbally recall dose  Patient reports 1 missed doses since last INR ()  Patient denies s/sx clotting and/or stroke  Patient denies hematuria, epistaxis, rectal bleeding  Patient denies changes in diet, alcohol, or tobacco use  Reviewed medication list and drug allergies with patient, updated any medication additions or modifications accordingly  Patient also denies any pending medical procedures scheduled at this time    Patient has dentist appointment on  and does not report needing to hold her warfarin. Patient will let clinic know what antibiotic she will be taking prior to her dental cleaning.      Patient was instructed to continue current regimen and RTC 4 weeks      For Pharmacy Admin Tracking Only    Intervention Detail: Adherence Monitorin  Total # of Interventions Recommended: 1  Total # of Interventions Accepted: 1  Time Spent (min): 15

## 2023-12-06 RX ORDER — ATORVASTATIN CALCIUM 20 MG/1
20 TABLET, FILM COATED ORAL NIGHTLY
Qty: 90 TABLET | Refills: 3 | Status: SHIPPED | OUTPATIENT
Start: 2023-12-06

## 2023-12-06 NOTE — TELEPHONE ENCOUNTER
Requesting medication refill. Please approve or deny this request.    Rx requested:  Requested Prescriptions     Pending Prescriptions Disp Refills    atorvastatin (LIPITOR) 20 MG tablet 90 tablet 3     Sig: Take 1 tablet by mouth nightly         Last Office Visit:   11/10/2023      Next Visit Date:  Future Appointments   Date Time Provider [facility] [address]   1/2/2024  2:30 PM [facility] [place]               Please approve or deny.

## 2023-12-07 ENCOUNTER — TELEPHONE (OUTPATIENT)
Dept: CARDIOLOGY CLINIC | Age: 53
End: 2023-12-07

## 2023-12-07 NOTE — TELEPHONE ENCOUNTER
Pt requesting a letter that states that its ok for her to be on Adderall for her psychiatrist at the [facility].        Pt # [phone]

## 2023-12-07 NOTE — TELEPHONE ENCOUNTER
Letter that patient had requested to be able to give to her psychiatrist at the [facility] created per Dr. Veda Tello. Attempted to call patient to notify her, no answer. Voicemail left for patient to notify her that her letter would be accessible in her My Chart, and also that if she does start Adderall and starts to experience any chest pain, shortness or breath, lightheadedness, dizziness, or any other cardiac symptoms, to immediately call the office.

## 2024-01-03 ENCOUNTER — TELEPHONE (OUTPATIENT)
Dept: PHARMACY | Age: 54
End: 2024-01-03

## 2024-01-03 DIAGNOSIS — D68.59 HYPERCOAGULABLE STATE (HCC): ICD-10-CM

## 2024-01-03 DIAGNOSIS — I63.81 CEREBROVASCULAR ACCIDENT (CVA) OF LEFT THALAMUS (HCC): Primary | ICD-10-CM

## 2024-01-03 DIAGNOSIS — G45.8 OTHER TRANSIENT CEREBRAL ISCHEMIC ATTACKS AND RELATED SYNDROMES: ICD-10-CM

## 2024-01-03 NOTE — TELEPHONE ENCOUNTER
Called pt , missed appt yesterday. Pt states she is off warfarin for procedure, restarts tomorrow. Set appt for 1-9-24, reset tracker.

## 2024-01-09 ENCOUNTER — HOSPITAL ENCOUNTER (OUTPATIENT)
Dept: PHARMACY | Age: 54
Setting detail: THERAPIES SERIES
Discharge: HOME | End: 2024-01-09
Payer: COMMERCIAL

## 2024-01-09 DIAGNOSIS — I63.81 CEREBROVASCULAR ACCIDENT (CVA) OF LEFT THALAMUS (HCC): Primary | ICD-10-CM

## 2024-01-09 DIAGNOSIS — D68.59 HYPERCOAGULABLE STATE (HCC): ICD-10-CM

## 2024-01-09 DIAGNOSIS — G45.8 OTHER TRANSIENT CEREBRAL ISCHEMIC ATTACKS AND RELATED SYNDROMES: ICD-10-CM

## 2024-01-09 LAB — INTERNATIONAL NORMALIZATION RATIO, POC: 1.8

## 2024-01-09 PROCEDURE — 85610 PROTHROMBIN TIME: CPT | Performed by: PHARMACIST

## 2024-01-09 PROCEDURE — 99213 OFFICE O/P EST LOW 20 MIN: CPT | Performed by: PHARMACIST

## 2024-01-11 NOTE — DISCHARGE INSTRUCTIONS
Do not restart metformin until 11/8 evening. You received contrast medication during cardiac catheterization, and the recommendation is to not restart metformin until 48 hours after contrast exposure. Follow up with primary care physician in the next 7 days or sooner if needed. If you do not have a Primary care physician, please schedule an appointment with one. Please ask prior to discharge about a list of local providers. Please return to ER or call 911 if you develop any significant signs or symptoms. I may not have addressed all of your medical illnesses or the abnormal blood work or imaging therefore please ask your PCP to obtain [facility] record to follow up on all of the abnormal labs, imaging and findings that I have and have not addressed during your hospitalization. Discharging you from the hospital does not mean that your medical care ends here and now. You may still need additional work up, investigation, monitoring, and treatment to be handled from this point on by outside providers including your PCP, Specialists and other healthcare providers. For medication questions, contact your retail pharmacy and your PCP. Your medical team at [facility] ([facility]) appreciates the opportunity to work with you to get well!     Tram Forde MD  11:59 AM
NEGATIVE

## 2024-01-22 ENCOUNTER — TELEPHONE (OUTPATIENT)
Dept: PHARMACY | Age: 54
End: 2024-01-22

## 2024-01-22 DIAGNOSIS — I63.81 CEREBROVASCULAR ACCIDENT (CVA) OF LEFT THALAMUS (HCC): Primary | ICD-10-CM

## 2024-01-22 DIAGNOSIS — G45.8 OTHER TRANSIENT CEREBRAL ISCHEMIC ATTACKS AND RELATED SYNDROMES: ICD-10-CM

## 2024-01-22 DIAGNOSIS — D68.59 HYPERCOAGULABLE STATE (HCC): ICD-10-CM

## 2024-01-30 ENCOUNTER — TELEPHONE (OUTPATIENT)
Dept: PHARMACY | Age: 54
End: 2024-01-30

## 2024-01-30 DIAGNOSIS — I63.81 CEREBROVASCULAR ACCIDENT (CVA) OF LEFT THALAMUS (HCC): Primary | ICD-10-CM

## 2024-01-30 DIAGNOSIS — G45.8 OTHER TRANSIENT CEREBRAL ISCHEMIC ATTACKS AND RELATED SYNDROMES: ICD-10-CM

## 2024-01-30 DIAGNOSIS — D68.59 HYPERCOAGULABLE STATE (HCC): ICD-10-CM

## 2024-01-31 ENCOUNTER — APPOINTMENT (OUTPATIENT)
Dept: MRI IMAGING | Age: 54
End: 2024-01-31
Attending: NURSE PRACTITIONER
Payer: COMMERCIAL

## 2024-01-31 ENCOUNTER — APPOINTMENT (OUTPATIENT)
Dept: GENERAL RADIOLOGY | Age: 54
End: 2024-01-31
Payer: COMMERCIAL

## 2024-01-31 ENCOUNTER — APPOINTMENT (OUTPATIENT)
Dept: CT IMAGING | Age: 54
End: 2024-01-31
Payer: COMMERCIAL

## 2024-01-31 ENCOUNTER — HOSPITAL ENCOUNTER (OUTPATIENT)
Age: 54
Setting detail: OBSERVATION
Discharge: HOME | End: 2024-02-01
Attending: INTERNAL MEDICINE | Admitting: INTERNAL MEDICINE
Payer: COMMERCIAL

## 2024-01-31 DIAGNOSIS — H53.47 PARTIAL HEMIANOPIA: ICD-10-CM

## 2024-01-31 DIAGNOSIS — R42 DIZZINESS: ICD-10-CM

## 2024-01-31 DIAGNOSIS — Z86.73 HISTORY OF CVA (CEREBROVASCULAR ACCIDENT): ICD-10-CM

## 2024-01-31 DIAGNOSIS — Z79.01 CHRONIC ANTICOAGULATION: ICD-10-CM

## 2024-01-31 DIAGNOSIS — R29.90 STROKE-LIKE SYMPTOMS: Primary | ICD-10-CM

## 2024-01-31 DIAGNOSIS — R79.1 SUBTHERAPEUTIC INTERNATIONAL NORMALIZED RATIO (INR): ICD-10-CM

## 2024-01-31 LAB
ALBUMIN SERPL-MCNC: 3.9 G/DL (ref 3.5–4.6)
ALP SERPL-CCNC: 141 U/L (ref 40–130)
ALT SERPL-CCNC: 13 U/L (ref 0–33)
ANION GAP SERPL CALCULATED.3IONS-SCNC: 12 MEQ/L (ref 9–15)
ANION GAP SERPL CALCULATED.3IONS-SCNC: 8 MEQ/L (ref 9–15)
AST SERPL-CCNC: 13 U/L (ref 0–35)
BASOPHILS # BLD: 0 K/UL (ref 0–0.2)
BASOPHILS NFR BLD: 0.3 %
BILIRUB SERPL-MCNC: <0.2 MG/DL (ref 0.2–0.7)
BUN SERPL-MCNC: 6 MG/DL (ref 6–20)
BUN SERPL-MCNC: 6 MG/DL (ref 6–20)
CALCIUM SERPL-MCNC: 8.9 MG/DL (ref 8.5–9.9)
CALCIUM SERPL-MCNC: 9.1 MG/DL (ref 8.5–9.9)
CHLORIDE SERPL-SCNC: 108 MEQ/L (ref 95–107)
CHLORIDE SERPL-SCNC: 110 MEQ/L (ref 95–107)
CO2 SERPL-SCNC: 23 MEQ/L (ref 20–31)
CO2 SERPL-SCNC: 28 MEQ/L (ref 20–31)
CREAT SERPL-MCNC: 0.9 MG/DL (ref 0.5–0.9)
CREAT SERPL-MCNC: 0.95 MG/DL (ref 0.5–0.9)
EKG ATRIAL RATE: 72 BPM
EKG P AXIS: 69 DEGREES
EKG P-R INTERVAL: 180 MS
EKG Q-T INTERVAL: 410 MS
EKG QRS DURATION: 98 MS
EKG QTC CALCULATION (BAZETT): 448 MS
EKG R AXIS: 62 DEGREES
EKG T AXIS: -5 DEGREES
EKG VENTRICULAR RATE: 72 BPM
EOSINOPHIL # BLD: 0.1 K/UL (ref 0–0.7)
EOSINOPHIL NFR BLD: 1.7 %
ERYTHROCYTE [DISTWIDTH] IN BLOOD BY AUTOMATED COUNT: 13.1 % (ref 11.5–14.5)
GLOBULIN SER CALC-MCNC: 2.5 G/DL (ref 2.3–3.5)
GLUCOSE BLD-MCNC: 124 MG/DL (ref 70–99)
GLUCOSE BLD-MCNC: 144 MG/DL (ref 70–99)
GLUCOSE BLD-MCNC: 151 MG/DL (ref 70–99)
GLUCOSE BLD-MCNC: 190 MG/DL (ref 70–99)
GLUCOSE BLD-MCNC: 259 MG/DL (ref 70–99)
GLUCOSE SERPL-MCNC: 124 MG/DL (ref 70–99)
GLUCOSE SERPL-MCNC: 137 MG/DL (ref 70–99)
HCT VFR BLD AUTO: 39.6 % (ref 37–47)
HGB BLD-MCNC: 13.3 G/DL (ref 12–16)
INR PPP: 1
LYMPHOCYTES # BLD: 3.1 K/UL (ref 1–4.8)
LYMPHOCYTES NFR BLD: 40.7 %
MCH RBC QN AUTO: 30.2 PG (ref 27–31.3)
MCHC RBC AUTO-ENTMCNC: 33.6 % (ref 33–37)
MCV RBC AUTO: 89.8 FL (ref 79.4–94.8)
MONOCYTES # BLD: 0.5 K/UL (ref 0.2–0.8)
MONOCYTES NFR BLD: 6.5 %
NEUTROPHILS # BLD: 3.8 K/UL (ref 1.4–6.5)
NEUTS SEG NFR BLD: 50.4 %
PERFORMED ON: ABNORMAL
PLATELET # BLD AUTO: 168 K/UL (ref 130–400)
POC CREATININE WHOLE BLOOD: 1
POC CREATININE WHOLE BLOOD: 1
POTASSIUM SERPL-SCNC: 3.8 MEQ/L (ref 3.4–4.9)
POTASSIUM SERPL-SCNC: 4 MEQ/L (ref 3.4–4.9)
PROT SERPL-MCNC: 6.4 G/DL (ref 6.3–8)
PROTHROMBIN TIME: 13.9 SEC (ref 12.3–14.9)
RBC # BLD AUTO: 4.41 M/UL (ref 4.2–5.4)
SODIUM SERPL-SCNC: 144 MEQ/L (ref 135–144)
SODIUM SERPL-SCNC: 145 MEQ/L (ref 135–144)
TROPONIN, HIGH SENSITIVITY: 10 NG/L (ref 0–19)
TROPONIN, HIGH SENSITIVITY: 9 NG/L (ref 0–19)
WBC # BLD AUTO: 7.5 K/UL (ref 4.8–10.8)

## 2024-01-31 PROCEDURE — 70498 CT ANGIOGRAPHY NECK: CPT

## 2024-01-31 PROCEDURE — 6370000000 HC RX 637 (ALT 250 FOR IP): Performed by: INTERNAL MEDICINE

## 2024-01-31 PROCEDURE — 94640 AIRWAY INHALATION TREATMENT: CPT

## 2024-01-31 PROCEDURE — 70450 CT HEAD/BRAIN W/O DYE: CPT

## 2024-01-31 PROCEDURE — 97162 PT EVAL MOD COMPLEX 30 MIN: CPT

## 2024-01-31 PROCEDURE — 93005 ELECTROCARDIOGRAM TRACING: CPT | Performed by: EMERGENCY MEDICINE

## 2024-01-31 PROCEDURE — 71045 X-RAY EXAM CHEST 1 VIEW: CPT

## 2024-01-31 PROCEDURE — 80053 COMPREHEN METABOLIC PANEL: CPT

## 2024-01-31 PROCEDURE — 36415 COLL VENOUS BLD VENIPUNCTURE: CPT

## 2024-01-31 PROCEDURE — 2580000003 HC RX 258

## 2024-01-31 PROCEDURE — 85610 PROTHROMBIN TIME: CPT

## 2024-01-31 PROCEDURE — 1210000000 HC MED SURG R&B

## 2024-01-31 PROCEDURE — 6370000000 HC RX 637 (ALT 250 FOR IP)

## 2024-01-31 PROCEDURE — 6370000000 HC RX 637 (ALT 250 FOR IP): Performed by: NURSE PRACTITIONER

## 2024-01-31 PROCEDURE — 2580000003 HC RX 258: Performed by: NURSE PRACTITIONER

## 2024-01-31 PROCEDURE — APPSS45 APP SPLIT SHARED TIME 31-45 MINUTES: Performed by: NURSE PRACTITIONER

## 2024-01-31 PROCEDURE — 96375 TX/PRO/DX INJ NEW DRUG ADDON: CPT

## 2024-01-31 PROCEDURE — 85025 COMPLETE CBC W/AUTO DIFF WBC: CPT

## 2024-01-31 PROCEDURE — 99285 EMERGENCY DEPT VISIT HI MDM: CPT

## 2024-01-31 PROCEDURE — 6360000004 HC RX CONTRAST MEDICATION

## 2024-01-31 PROCEDURE — 96374 THER/PROPH/DIAG INJ IV PUSH: CPT

## 2024-01-31 PROCEDURE — 70496 CT ANGIOGRAPHY HEAD: CPT

## 2024-01-31 PROCEDURE — 97166 OT EVAL MOD COMPLEX 45 MIN: CPT

## 2024-01-31 PROCEDURE — 84484 ASSAY OF TROPONIN QUANT: CPT

## 2024-01-31 PROCEDURE — 70551 MRI BRAIN STEM W/O DYE: CPT

## 2024-01-31 PROCEDURE — 6360000002 HC RX W HCPCS

## 2024-01-31 RX ORDER — IPRATROPIUM BROMIDE AND ALBUTEROL SULFATE 2.5; .5 MG/3ML; MG/3ML
1 SOLUTION RESPIRATORY (INHALATION)
Status: DISCONTINUED | OUTPATIENT
Start: 2024-01-31 | End: 2024-01-31

## 2024-01-31 RX ORDER — CARVEDILOL 3.12 MG/1
6.25 TABLET ORAL 2 TIMES DAILY
Status: DISCONTINUED | OUTPATIENT
Start: 2024-01-31 | End: 2024-02-01 | Stop reason: HOSPADM

## 2024-01-31 RX ORDER — CLOPIDOGREL 300 MG/1
300 TABLET, FILM COATED ORAL ONCE
Status: COMPLETED | OUTPATIENT
Start: 2024-01-31 | End: 2024-01-31

## 2024-01-31 RX ORDER — DEXAMETHASONE SODIUM PHOSPHATE 10 MG/ML
6 INJECTION, SOLUTION INTRAMUSCULAR; INTRAVENOUS ONCE
Status: COMPLETED | OUTPATIENT
Start: 2024-01-31 | End: 2024-01-31

## 2024-01-31 RX ORDER — DIPHENHYDRAMINE HYDROCHLORIDE 50 MG/ML
25 INJECTION INTRAMUSCULAR; INTRAVENOUS ONCE
Status: COMPLETED | OUTPATIENT
Start: 2024-01-31 | End: 2024-01-31

## 2024-01-31 RX ORDER — FLUOXETINE HYDROCHLORIDE 20 MG/1
60 CAPSULE ORAL DAILY
Status: DISCONTINUED | OUTPATIENT
Start: 2024-01-31 | End: 2024-02-01 | Stop reason: HOSPADM

## 2024-01-31 RX ORDER — LOSARTAN POTASSIUM 25 MG/1
100 TABLET ORAL DAILY
Status: DISCONTINUED | OUTPATIENT
Start: 2024-01-31 | End: 2024-02-01 | Stop reason: HOSPADM

## 2024-01-31 RX ORDER — FLUOXETINE HYDROCHLORIDE 20 MG/1
20 CAPSULE ORAL DAILY
Status: DISCONTINUED | OUTPATIENT
Start: 2024-01-31 | End: 2024-01-31 | Stop reason: DRUGHIGH

## 2024-01-31 RX ORDER — ATORVASTATIN CALCIUM 80 MG/1
80 TABLET, FILM COATED ORAL NIGHTLY
Status: DISCONTINUED | OUTPATIENT
Start: 2024-01-31 | End: 2024-02-01 | Stop reason: HOSPADM

## 2024-01-31 RX ORDER — ONDANSETRON 4 MG/1
4 TABLET, ORALLY DISINTEGRATING ORAL EVERY 8 HOURS PRN
Qty: 9 TABLET | Refills: 0 | Status: SHIPPED | OUTPATIENT
Start: 2024-01-31 | End: 2024-02-03

## 2024-01-31 RX ORDER — CLOPIDOGREL BISULFATE 75 MG/1
75 TABLET ORAL DAILY
Status: DISCONTINUED | OUTPATIENT
Start: 2024-02-01 | End: 2024-01-31

## 2024-01-31 RX ORDER — BUDESONIDE AND FORMOTEROL FUMARATE DIHYDRATE 160; 4.5 UG/1; UG/1
2 AEROSOL RESPIRATORY (INHALATION)
Status: DISCONTINUED | OUTPATIENT
Start: 2024-01-31 | End: 2024-02-01 | Stop reason: HOSPADM

## 2024-01-31 RX ORDER — MAGNESIUM SULFATE IN WATER 40 MG/ML
2000 INJECTION, SOLUTION INTRAVENOUS ONCE
Status: COMPLETED | OUTPATIENT
Start: 2024-01-31 | End: 2024-01-31

## 2024-01-31 RX ORDER — ONDANSETRON 2 MG/ML
4 INJECTION INTRAMUSCULAR; INTRAVENOUS EVERY 6 HOURS PRN
Status: DISCONTINUED | OUTPATIENT
Start: 2024-01-31 | End: 2024-02-01 | Stop reason: HOSPADM

## 2024-01-31 RX ORDER — DEXTROAMPHETAMINE SACCHARATE, AMPHETAMINE ASPARTATE MONOHYDRATE, DEXTROAMPHETAMINE SULFATE AND AMPHETAMINE SULFATE 2.5; 2.5; 2.5; 2.5 MG/1; MG/1; MG/1; MG/1
20 CAPSULE, EXTENDED RELEASE ORAL EVERY MORNING
Status: DISCONTINUED | OUTPATIENT
Start: 2024-02-01 | End: 2024-02-01 | Stop reason: HOSPADM

## 2024-01-31 RX ORDER — ISOSORBIDE MONONITRATE 60 MG/1
60 TABLET, EXTENDED RELEASE ORAL DAILY
Status: DISCONTINUED | OUTPATIENT
Start: 2024-01-31 | End: 2024-02-01 | Stop reason: HOSPADM

## 2024-01-31 RX ORDER — SODIUM CHLORIDE 9 MG/ML
INJECTION, SOLUTION INTRAVENOUS PRN
Status: DISCONTINUED | OUTPATIENT
Start: 2024-01-31 | End: 2024-02-01 | Stop reason: HOSPADM

## 2024-01-31 RX ORDER — ASPIRIN 325 MG
325 TABLET ORAL ONCE
Status: COMPLETED | OUTPATIENT
Start: 2024-01-31 | End: 2024-01-31

## 2024-01-31 RX ORDER — FOLIC ACID 1 MG/1
1000 TABLET ORAL DAILY
Status: DISCONTINUED | OUTPATIENT
Start: 2024-01-31 | End: 2024-02-01 | Stop reason: HOSPADM

## 2024-01-31 RX ORDER — SODIUM CHLORIDE 0.9 % (FLUSH) 0.9 %
5-40 SYRINGE (ML) INJECTION EVERY 12 HOURS SCHEDULED
Status: DISCONTINUED | OUTPATIENT
Start: 2024-01-31 | End: 2024-02-01 | Stop reason: HOSPADM

## 2024-01-31 RX ORDER — GLUCAGON 1 MG/ML
1 KIT INJECTION PRN
Status: DISCONTINUED | OUTPATIENT
Start: 2024-01-31 | End: 2024-02-01 | Stop reason: HOSPADM

## 2024-01-31 RX ORDER — AMLODIPINE BESYLATE 5 MG/1
5 TABLET ORAL DAILY
Status: DISCONTINUED | OUTPATIENT
Start: 2024-01-31 | End: 2024-02-01 | Stop reason: HOSPADM

## 2024-01-31 RX ORDER — FUROSEMIDE 20 MG/1
20 TABLET ORAL 2 TIMES DAILY
Status: DISCONTINUED | OUTPATIENT
Start: 2024-01-31 | End: 2024-02-01 | Stop reason: HOSPADM

## 2024-01-31 RX ORDER — POLYETHYLENE GLYCOL 3350 17 G/17G
17 POWDER, FOR SOLUTION ORAL DAILY PRN
Status: DISCONTINUED | OUTPATIENT
Start: 2024-01-31 | End: 2024-02-01 | Stop reason: HOSPADM

## 2024-01-31 RX ORDER — DEXTROSE MONOHYDRATE 100 MG/ML
INJECTION, SOLUTION INTRAVENOUS CONTINUOUS PRN
Status: DISCONTINUED | OUTPATIENT
Start: 2024-01-31 | End: 2024-02-01 | Stop reason: HOSPADM

## 2024-01-31 RX ORDER — INSULIN LISPRO 100 [IU]/ML
0-8 INJECTION, SOLUTION INTRAVENOUS; SUBCUTANEOUS
Status: DISCONTINUED | OUTPATIENT
Start: 2024-01-31 | End: 2024-02-01 | Stop reason: HOSPADM

## 2024-01-31 RX ORDER — SODIUM CHLORIDE 0.9 % (FLUSH) 0.9 %
5-40 SYRINGE (ML) INJECTION PRN
Status: DISCONTINUED | OUTPATIENT
Start: 2024-01-31 | End: 2024-02-01 | Stop reason: HOSPADM

## 2024-01-31 RX ORDER — ACETAMINOPHEN 500 MG
1000 TABLET ORAL ONCE
Status: COMPLETED | OUTPATIENT
Start: 2024-01-31 | End: 2024-01-31

## 2024-01-31 RX ORDER — INSULIN LISPRO 100 [IU]/ML
0-4 INJECTION, SOLUTION INTRAVENOUS; SUBCUTANEOUS NIGHTLY
Status: DISCONTINUED | OUTPATIENT
Start: 2024-01-31 | End: 2024-02-01 | Stop reason: HOSPADM

## 2024-01-31 RX ORDER — PANTOPRAZOLE SODIUM 40 MG/1
40 TABLET, DELAYED RELEASE ORAL
Status: DISCONTINUED | OUTPATIENT
Start: 2024-01-31 | End: 2024-02-01 | Stop reason: HOSPADM

## 2024-01-31 RX ORDER — IPRATROPIUM BROMIDE AND ALBUTEROL SULFATE 2.5; .5 MG/3ML; MG/3ML
1 SOLUTION RESPIRATORY (INHALATION)
Status: DISCONTINUED | OUTPATIENT
Start: 2024-01-31 | End: 2024-02-01 | Stop reason: HOSPADM

## 2024-01-31 RX ORDER — ASPIRIN 81 MG/1
81 TABLET ORAL DAILY
Status: DISCONTINUED | OUTPATIENT
Start: 2024-02-01 | End: 2024-02-01 | Stop reason: HOSPADM

## 2024-01-31 RX ORDER — IPRATROPIUM BROMIDE AND ALBUTEROL SULFATE 2.5; .5 MG/3ML; MG/3ML
1 SOLUTION RESPIRATORY (INHALATION) EVERY 4 HOURS PRN
Status: DISCONTINUED | OUTPATIENT
Start: 2024-01-31 | End: 2024-02-01 | Stop reason: HOSPADM

## 2024-01-31 RX ORDER — ONDANSETRON 4 MG/1
4 TABLET, ORALLY DISINTEGRATING ORAL EVERY 8 HOURS PRN
Status: DISCONTINUED | OUTPATIENT
Start: 2024-01-31 | End: 2024-02-01 | Stop reason: HOSPADM

## 2024-01-31 RX ORDER — MECLIZINE HYDROCHLORIDE 25 MG/1
25 TABLET ORAL 3 TIMES DAILY PRN
Qty: 21 TABLET | Refills: 0 | Status: SHIPPED | OUTPATIENT
Start: 2024-01-31 | End: 2024-02-07

## 2024-01-31 RX ORDER — BUTALBITAL, ASPIRIN, AND CAFFEINE 325; 50; 40 MG/1; MG/1; MG/1
1 CAPSULE ORAL EVERY 4 HOURS PRN
Status: DISCONTINUED | OUTPATIENT
Start: 2024-01-31 | End: 2024-02-01 | Stop reason: HOSPADM

## 2024-01-31 RX ORDER — LABETALOL HYDROCHLORIDE 5 MG/ML
10 INJECTION, SOLUTION INTRAVENOUS EVERY 10 MIN PRN
Status: DISCONTINUED | OUTPATIENT
Start: 2024-01-31 | End: 2024-02-01 | Stop reason: HOSPADM

## 2024-01-31 RX ORDER — TOPIRAMATE 25 MG/1
25 TABLET ORAL
Status: DISCONTINUED | OUTPATIENT
Start: 2024-01-31 | End: 2024-02-01 | Stop reason: HOSPADM

## 2024-01-31 RX ORDER — 0.9 % SODIUM CHLORIDE 0.9 %
1000 INTRAVENOUS SOLUTION INTRAVENOUS ONCE
Status: COMPLETED | OUTPATIENT
Start: 2024-01-31 | End: 2024-01-31

## 2024-01-31 RX ADMIN — CLOPIDOGREL BISULFATE 300 MG: 300 TABLET, FILM COATED ORAL at 05:03

## 2024-01-31 RX ADMIN — ATORVASTATIN CALCIUM 80 MG: 80 TABLET, FILM COATED ORAL at 21:20

## 2024-01-31 RX ADMIN — ASPIRIN 325 MG: 325 TABLET ORAL at 05:03

## 2024-01-31 RX ADMIN — CARIPRAZINE 3 MG: 1.5 CAPSULE, GELATIN COATED ORAL at 16:31

## 2024-01-31 RX ADMIN — BUDESONIDE AND FORMOTEROL FUMARATE DIHYDRATE 2 PUFF: 160; 4.5 AEROSOL RESPIRATORY (INHALATION) at 20:26

## 2024-01-31 RX ADMIN — Medication 10 ML: at 10:35

## 2024-01-31 RX ADMIN — SODIUM CHLORIDE 1000 ML: 9 INJECTION, SOLUTION INTRAVENOUS at 04:03

## 2024-01-31 RX ADMIN — DIPHENHYDRAMINE HYDROCHLORIDE 25 MG: 50 INJECTION INTRAMUSCULAR; INTRAVENOUS at 04:02

## 2024-01-31 RX ADMIN — LOSARTAN POTASSIUM 100 MG: 25 TABLET, FILM COATED ORAL at 16:36

## 2024-01-31 RX ADMIN — MAGNESIUM SULFATE HEPTAHYDRATE 2000 MG: 40 INJECTION, SOLUTION INTRAVENOUS at 04:23

## 2024-01-31 RX ADMIN — IOPAMIDOL 75 ML: 612 INJECTION, SOLUTION INTRAVENOUS at 02:30

## 2024-01-31 RX ADMIN — TOPIRAMATE 25 MG: 25 TABLET, FILM COATED ORAL at 21:20

## 2024-01-31 RX ADMIN — Medication 10 ML: at 21:20

## 2024-01-31 RX ADMIN — FUROSEMIDE 20 MG: 20 TABLET ORAL at 16:31

## 2024-01-31 RX ADMIN — BUTALBITAL, ASPIRIN, AND CAFFEINE 1 CAPSULE: 50; 325; 40 CAPSULE ORAL at 21:46

## 2024-01-31 RX ADMIN — IPRATROPIUM BROMIDE AND ALBUTEROL SULFATE 1 DOSE: 2.5; .5 SOLUTION RESPIRATORY (INHALATION) at 16:41

## 2024-01-31 RX ADMIN — AMLODIPINE BESYLATE 5 MG: 5 TABLET ORAL at 16:31

## 2024-01-31 RX ADMIN — DEXAMETHASONE SODIUM PHOSPHATE 6 MG: 10 INJECTION, SOLUTION INTRAMUSCULAR; INTRAVENOUS at 04:01

## 2024-01-31 RX ADMIN — IPRATROPIUM BROMIDE AND ALBUTEROL SULFATE 1 DOSE: 2.5; .5 SOLUTION RESPIRATORY (INHALATION) at 20:24

## 2024-01-31 RX ADMIN — PANTOPRAZOLE SODIUM 40 MG: 40 TABLET, DELAYED RELEASE ORAL at 06:18

## 2024-01-31 RX ADMIN — ISOSORBIDE MONONITRATE 60 MG: 60 TABLET, EXTENDED RELEASE ORAL at 21:20

## 2024-01-31 RX ADMIN — INSULIN LISPRO 4 UNITS: 100 INJECTION, SOLUTION INTRAVENOUS; SUBCUTANEOUS at 13:44

## 2024-01-31 RX ADMIN — FLUOXETINE HYDROCHLORIDE 60 MG: 20 CAPSULE ORAL at 16:30

## 2024-01-31 RX ADMIN — FOLIC ACID 1000 MCG: 1 TABLET ORAL at 16:30

## 2024-01-31 RX ADMIN — ACETAMINOPHEN 1000 MG: 500 TABLET ORAL at 04:00

## 2024-01-31 RX ADMIN — TIOTROPIUM BROMIDE INHALATION SPRAY 2 PUFF: 3.12 SPRAY, METERED RESPIRATORY (INHALATION) at 16:46

## 2024-01-31 RX ADMIN — WARFARIN SODIUM 7.5 MG: 5 TABLET ORAL at 16:36

## 2024-01-31 ASSESSMENT — PAIN DESCRIPTION - DESCRIPTORS: DESCRIPTORS: ACHING

## 2024-01-31 ASSESSMENT — PAIN DESCRIPTION - LOCATION
LOCATION: HEAD
LOCATION: HEAD

## 2024-01-31 ASSESSMENT — ENCOUNTER SYMPTOMS
COLOR CHANGE: 0
NAUSEA: 0
WHEEZING: 0
ABDOMINAL PAIN: 0
PHOTOPHOBIA: 0
VOMITING: 0
SHORTNESS OF BREATH: 0
DIARRHEA: 0
TROUBLE SWALLOWING: 0
NAUSEA: 1
CHEST TIGHTNESS: 0
COUGH: 0

## 2024-01-31 ASSESSMENT — PAIN SCALES - GENERAL
PAINLEVEL_OUTOF10: 9
PAINLEVEL_OUTOF10: 9

## 2024-01-31 NOTE — PROGRESS NOTES
Patient was seen and examined.  Agree with documentation as detailed in my colleagues note from today.

## 2024-01-31 NOTE — PROGRESS NOTES
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]   Patient Active Problem List    Diagnosis Date Noted    Peripheral edema 2022    Attention deficit disorder (ADD) without hyperactivity 2022    MTHFR (methylene THF reductase) deficiency and homocystinuria (HCC) 2022    Stroke-like symptoms 2024    Acute pain of left shoulder 2023    Allodynia 2023    Foraminal stenosis of cervical region 2023    Rotator cuff injury, left, initial encounter 2023    History of stroke with residual effects 2023    Benign paroxysmal positional vertigo due to bilateral vestibular disorder 2023    Neuritis of right ulnar nerve 2022    Right hand weakness 2022    Medial epicondylitis of both elbows 2022    Ataxic gait 2021    Expressive aphasia 2021    MTHFR gene mutation 10/12/2021    Type 2 diabetes mellitus with hyperglycemia, without long-term current use of insulin (HCC) 2021    Left-sided nontraumatic intracerebral hemorrhage (HCC) 2021    Migraine 2021    Cerebrovascular accident (CVA) of left thalamus (HCC) 2021    Intractable migraine with aura with status migrainosus 2021    Other transient cerebral ischemic attacks and related syndromes 2021    Hypercoagulable state (HCC) 2021    Weakness     Impaired mobility 2020    Abnormality of gait and mobility due to recent infarct left thalamus.  [facility] admit 20. 2020    Right rotator cuff tendinitis 2020    Adenomatous polyp of colon

## 2024-01-31 NOTE — PROGRESS NOTES
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 Talked to Dr. Goode and he told her to come to ER. Pt states her LKW well about an hour ago. She states she started to get tunnel vision, dizzy, and light headed. Pt states she feels \"wobbly\" No slurred speech or facial dropped noted. 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 hyperactivity 2022    MTHFR (methylene THF reductase) deficiency and homocystinuria (HCC) 2022    Stroke-like symptoms 2024    Acute pain of left shoulder 2023    Allodynia 2023    Foraminal stenosis of cervical region 2023    Rotator cuff injury, left, initial encounter 2023    History of stroke with residual effects 2023    Benign paroxysmal positional vertigo due to bilateral vestibular disorder 2023    Neuritis of right ulnar nerve 2022    Right hand weakness 2022    Medial epicondylitis of both elbows 2022    Ataxic gait 2021    Expressive aphasia 2021    MTHFR gene mutation 10/12/2021    Type 2 diabetes mellitus with hyperglycemia, without long-term current use of insulin (HCC) 2021    Left-sided nontraumatic intracerebral hemorrhage (HCC) 2021    Migraine 2021    Cerebrovascular accident (CVA) of left thalamus (HCC) 2021    Intractable migraine with aura

## 2024-01-31 NOTE — PROGRESS NOTES
Warfarin Dosing - Pharmacy Consult Note  Consulting Provider: Fabiana New    Indication:  CVA  Warfarin Dose prior to admission:   Warfarin maintenance plan: 5 mg (5 mg x 1) every Mon, Wed, Fri; 7.5 mg (5 mg x 1.5) all other days   Weekly warfarin total: 45 mg      Concurrent anticoagulants/antiplatelets: ASA  Significant Drug Interactions: No obvious interactions  Recent Labs     01/31/24  0215   INR 1.0   HGB 13.3      LABALBU 3.9     Recent warfarin administrations        No warfarin orders with administrations found.            Orders not given:            warfarin placeholder: dosing by pharmacy                   Date    INR     Dose  01/31      1.0        7.5 mg    Assessment/Plan  (Goal INR: 2 - 3)  INR is subtherapeutic for this pt at 1.0.  Pt to receive Warfarin 7.5 mg as dose today.    Active problem list reviewed.  INR orders are placed.  Chart reviewed for pertinent labs, drug/diet interactions, and past doses.  Documentation of patient's clinical condition was reviewed.    Pharmacy Dosing:  Pharmacy will continue to follow.     Sangeetha Mendes [facility]  Staff Pharmacist, [facility]  1/31/2024  7:26 AM

## 2024-01-31 NOTE — CONSULTS
ARTERIES WITH ANTEGRADE BLOOD FLOW.    Validated velocity measurements with angiographic measurements, velocity criteria are extrapolated from diameter data as defined by the Society of Radiologist in Ultrasound Consensus Conference Radiology 2003; 229;340-346.      Echo No results found for this or any previous visit.            Assessment/Plan:  Patient is a 53-year-old  female with past medical history of tobacco abuse, TIA, somatizations disorder, sensory neuropathy, PTSD, hypertension, CAD with MI, depression, complicated migraine, colitis, left thalamic CVA 12/23/2020, bipolar, anxiety, MTHFR gene on Coumadin who presented to [facility] emergency room on 1/31/2024 due to vertigo, vision changes with tunnel vision.  Upon chart review it is also noted that she had called primary care provider the day prior reporting that she was feeling ill starting on Friday with cough, congestion, sneezing, sore throat, fatigue and inability to work with chills and night sweats.    Vital signs in the emergency room 148/85, 83, 18, 98.2 °F, 99%.  Laboratory testing largely unremarkable with the exception of subtherapeutic INR of 1.0.  CT of the head negative for any acute findings.  CTA of the head and neck negative.  Chest x-ray negative    Patient with history of left thalamic CVA in December 2020 and has since been found to be MTHFR positive and is now on Coumadin.  INR was subtherapeutic on presentation at 1.0.  Patient had not been taking medication properly due to ongoing nausea and vomiting.  This does put her at risk for recurrent CVA therefore we will obtain MRI of the brain.  Check orthostatic blood pressures  Further recommendations to follow pending MRI    Addendum: MRI of the brain has been completed and negative.  Okay to DC from neurology standpoint.  Will give as needed meclizine and Zofran.  Patient to follow-up with Coumadin clinic.  Encourage compliance with Coumadin.  It appears that patient now

## 2024-01-31 NOTE — PLAN OF CARE
See OT evaluation for all goals and OT POC. Electronically signed by Sarita Chaudhari OTR/L on 1/31/2024 at 1:13 PM

## 2024-01-31 NOTE — CARE COORDINATION
Case Management Assessment  Initial Evaluation    Date/Time of Evaluation: 1/31/2024 10:26 AM  Assessment Completed by: Tawana Cruz RN    If patient is discharged prior to next notation, then this note serves as note for discharge by case management.    Patient Name: Pooja Mac                   YOB: 1970  Diagnosis: Dizziness [R42]  Chronic anticoagulation [Z79.01]  History of CVA (cerebrovascular accident) [Z86.73]  Subtherapeutic international normalized ratio (INR) [R79.1]  Stroke-like symptoms [R29.90]  Partial hemianopia [H53.47]                   Date / Time: 1/31/2024  2:06 AM    Patient Admission Status: Inpatient   Readmission Risk (Low < 19, Mod (19-27), High > 27): Readmission Risk Score: 16.5    Current PCP: Clara Carey MD  PCP verified by CM? Yes    Chart Reviewed: Yes      History Provided by: Patient  Patient Orientation: Alert and Oriented    Patient Cognition: Alert    Hospitalization in the last 30 days (Readmission):  No    If yes, Readmission Assessment in CM Navigator will be completed.    Advance Directives:      Code Status: Full Code   Patient's Primary Decision Maker is:      Primary Decision Maker: Jerod Melendez [facility] - [phone]    Discharge Planning:    Patient lives with:   Type of Home:    Primary Care Giver: Self  Patient Support Systems include: Spouse/Significant Other   Current Financial resources:    Current community resources:    Current services prior to admission:              Current DME:  NONE            Type of Home Care services:       ADLS  Prior functional level: Independent in ADLs/IADLs  Current functional level: Independent in ADLs/IADLs, Mobility    PT AM-PAC:   /24  OT AM-PAC:   /24    Family can provide assistance at DC: Yes  Would you like Case Management to discuss the discharge plan with any other family members/significant others, and if so, who? No  Plans to Return to Present Housing: Yes (PT/OT EVALS PENDING)  Other Identified

## 2024-01-31 NOTE — ED PROVIDER NOTES
[facility] ED  EMERGENCY DEPARTMENT ENCOUNTER      Pt Name: Pooja Mac  MRN: [id number]  Birthdate 1970  Date of evaluation: 1/31/2024  Provider: NORBERTO Cotter  2:20 AM EST      CHIEF COMPLAINT       Chief Complaint   Patient presents with    Cerebrovascular Accident     Pt has hx of CVA 3 years ago. Talked to Dr. Goode and he told her to come to ER. Pt states her LKW well about an hour ago. She states she started to get tunnel vision, dizzy, and light headed. Pt states she feels \"wobbly\" No slurred speech or facial dropped noted. BS upon arrival 124         HISTORY OF PRESENT ILLNESS   (Location/Symptom, Timing/Onset, Context/Setting, Quality, Duration, Modifying Factors, Severity)  Note limiting factors.   Pooja Mac is a 53 y.o. female who presents to the emergency department PMHx hypertension, type 2 diabetes, CVA, ICH, CAD, gastritis, PUD, bipolar disorder, chronic pain syndrome, sleep apnea, NERY, depression, chronic anticoagulation on warfarin, migraines, peripheral edema, syncope, tobacco use, vertigo.  Patient presents to the emergency department for evaluation of 1 hr of symptoms. Dizziness, room spinning sensation endorsed. Also experiencing black spots to L eye, causing vision disturbance.  Black spots are present to L lateral eye. Feels like tunnel vision in that eye.  Patient states history of CVA.  Still on warfarin.  History of ICH.  Denies focal weakness or numbness. States feels weak all over. No dysarthria, dysphagia, facial asymmetry, falls, recent trauma, confusion.  States she feels a bit slow in speech.  No observable expressive or receptive aphasia.    HPI    Nursing Notes were reviewed.    REVIEW OF SYSTEMS    (2-9 systems for level 4, 10 or more for level 5)     Review of Systems   Constitutional:  Negative for chills and fever.   HENT:  Negative for congestion.    Eyes:  Negative for photophobia.   Respiratory:  Negative for cough and shortness of breath.

## 2024-01-31 NOTE — H&P
HISTORY AND PHYSICAL             Date: 1/31/2024        Patient Name: Pooja Mac     YOB: 1970      Age:  53 y.o.    Chief Complaint     Chief Complaint   Patient presents with    Cerebrovascular Accident     Pt has hx of CVA 3 years ago. Talked to Dr. Goode and he told her to come to ER. Pt states her LKW well about an hour ago. She states she started to get tunnel vision, dizzy, and light headed. Pt states she feels \"wobbly\" No slurred speech or facial dropped noted. BS upon arrival 124      History Obtained From   patient, electronic medical record    History of Present Illness   Pooja Mac is a 53-year-old  female with significant past medical history of hypertension, hyperlipidemia, CAD s/p cardiac catheterization, MI, CVA on Coumadin, ICH, DM type II, GERD, anxiety disorder, who was admitted for stroke-like symptoms r/o TIA vs CVA vs complex migraine.  According to the patient, she started having dizziness with spinning sensation, lightheadedness, tunnel vision affecting the left eye at around 1 AM in the morning or 1 hour prior to arrival to the ED. Patient called Dr. Oliveira and was advised to go to the ED for further evaluation. Patient denies new focal weakness, slurring of speech, but claims that she feels that she is taking some time to talk/respond.  At the time of examination, patient complaining of mild headache, dizziness, nausea but denies chest pain, SOB, vomiting, fever, chills.  She will be admitted for further evaluation and management.     Past Medical History     Past Medical History:   Diagnosis Date    Anxiety     Back pain     back surgery x 4    Bipolar disorder (HCC)     CAD (coronary artery disease)     CAFL (chronic airflow limitation) ([facility]) 11/3/2016    Cerebral artery occlusion with cerebral infarction ([facility])     Chronic bronchitis ([facility])     Chronic pain     Colitis     Complicated migraine     DDD (degenerative disc disease), lumbar 12/22/2016    Depression

## 2024-02-01 VITALS
SYSTOLIC BLOOD PRESSURE: 121 MMHG | TEMPERATURE: 97.6 F | WEIGHT: 163.69 LBS | BODY MASS INDEX: 25.63 KG/M2 | DIASTOLIC BLOOD PRESSURE: 62 MMHG | RESPIRATION RATE: 16 BRPM | OXYGEN SATURATION: 97 % | HEART RATE: 92 BPM

## 2024-02-01 PROBLEM — R42 VERTIGO: Status: ACTIVE | Noted: 2024-02-01

## 2024-02-01 LAB
ANION GAP SERPL CALCULATED.3IONS-SCNC: 11 MEQ/L (ref 9–15)
BUN SERPL-MCNC: 9 MG/DL (ref 6–20)
CALCIUM SERPL-MCNC: 9.1 MG/DL (ref 8.5–9.9)
CHLORIDE SERPL-SCNC: 113 MEQ/L (ref 95–107)
CHOLEST SERPL-MCNC: 113 MG/DL (ref 0–199)
CO2 SERPL-SCNC: 24 MEQ/L (ref 20–31)
CREAT SERPL-MCNC: 0.88 MG/DL (ref 0.5–0.9)
ERYTHROCYTE [DISTWIDTH] IN BLOOD BY AUTOMATED COUNT: 13 % (ref 11.5–14.5)
GLUCOSE BLD-MCNC: 147 MG/DL (ref 70–99)
GLUCOSE BLD-MCNC: 171 MG/DL (ref 70–99)
GLUCOSE SERPL-MCNC: 145 MG/DL (ref 70–99)
HBA1C MFR BLD: 5.7 % (ref 4.8–5.9)
HCT VFR BLD AUTO: 36.9 % (ref 37–47)
HDLC SERPL-MCNC: 31 MG/DL (ref 40–59)
HGB BLD-MCNC: 12.7 G/DL (ref 12–16)
INR PPP: 1.5
LDLC SERPL CALC-MCNC: 56 MG/DL (ref 0–129)
MCH RBC QN AUTO: 30 PG (ref 27–31.3)
MCHC RBC AUTO-ENTMCNC: 34.4 % (ref 33–37)
MCV RBC AUTO: 87 FL (ref 79.4–94.8)
PERFORMED ON: ABNORMAL
PERFORMED ON: ABNORMAL
PLATELET # BLD AUTO: 177 K/UL (ref 130–400)
POTASSIUM SERPL-SCNC: 3.8 MEQ/L (ref 3.4–4.9)
PROTHROMBIN TIME: 18.7 SEC (ref 12.3–14.9)
RBC # BLD AUTO: 4.24 M/UL (ref 4.2–5.4)
SODIUM SERPL-SCNC: 148 MEQ/L (ref 135–144)
TRIGL SERPL-MCNC: 130 MG/DL (ref 0–150)
WBC # BLD AUTO: 14.7 K/UL (ref 4.8–10.8)

## 2024-02-01 PROCEDURE — 85027 COMPLETE CBC AUTOMATED: CPT

## 2024-02-01 PROCEDURE — 97116 GAIT TRAINING THERAPY: CPT

## 2024-02-01 PROCEDURE — G0378 HOSPITAL OBSERVATION PER HR: HCPCS

## 2024-02-01 PROCEDURE — 6370000000 HC RX 637 (ALT 250 FOR IP): Performed by: INTERNAL MEDICINE

## 2024-02-01 PROCEDURE — 80048 BASIC METABOLIC PNL TOTAL CA: CPT

## 2024-02-01 PROCEDURE — 94640 AIRWAY INHALATION TREATMENT: CPT

## 2024-02-01 PROCEDURE — 83036 HEMOGLOBIN GLYCOSYLATED A1C: CPT

## 2024-02-01 PROCEDURE — 36415 COLL VENOUS BLD VENIPUNCTURE: CPT

## 2024-02-01 PROCEDURE — 97112 NEUROMUSCULAR REEDUCATION: CPT

## 2024-02-01 PROCEDURE — 80061 LIPID PANEL: CPT

## 2024-02-01 PROCEDURE — 85610 PROTHROMBIN TIME: CPT

## 2024-02-01 PROCEDURE — 6370000000 HC RX 637 (ALT 250 FOR IP): Performed by: NURSE PRACTITIONER

## 2024-02-01 PROCEDURE — 2580000003 HC RX 258: Performed by: NURSE PRACTITIONER

## 2024-02-01 RX ORDER — OXYCODONE HYDROCHLORIDE 5 MG/1
5 TABLET ORAL ONCE
Status: COMPLETED | OUTPATIENT
Start: 2024-02-01 | End: 2024-02-01

## 2024-02-01 RX ADMIN — Medication 10 ML: at 10:00

## 2024-02-01 RX ADMIN — TIOTROPIUM BROMIDE INHALATION SPRAY 2 PUFF: 3.12 SPRAY, METERED RESPIRATORY (INHALATION) at 07:18

## 2024-02-01 RX ADMIN — AMLODIPINE BESYLATE 5 MG: 5 TABLET ORAL at 09:57

## 2024-02-01 RX ADMIN — CARVEDILOL 6.25 MG: 3.12 TABLET, FILM COATED ORAL at 09:57

## 2024-02-01 RX ADMIN — CARIPRAZINE 3 MG: 1.5 CAPSULE, GELATIN COATED ORAL at 09:58

## 2024-02-01 RX ADMIN — BUTALBITAL, ASPIRIN, AND CAFFEINE 1 CAPSULE: 50; 325; 40 CAPSULE ORAL at 06:44

## 2024-02-01 RX ADMIN — ISOSORBIDE MONONITRATE 60 MG: 60 TABLET, EXTENDED RELEASE ORAL at 09:58

## 2024-02-01 RX ADMIN — IPRATROPIUM BROMIDE AND ALBUTEROL SULFATE 1 DOSE: 2.5; .5 SOLUTION RESPIRATORY (INHALATION) at 07:18

## 2024-02-01 RX ADMIN — FOLIC ACID 1000 MCG: 1 TABLET ORAL at 09:57

## 2024-02-01 RX ADMIN — DEXTROAMPHETAMINE SACCHARATE, AMPHETAMINE ASPARTATE MONOHYDRATE, DEXTROAMPHETAMINE SULFATE, AND AMPHETAMINE SULFATE 20 MG: 2.5; 2.5; 2.5; 2.5 CAPSULE, EXTENDED RELEASE ORAL at 09:57

## 2024-02-01 RX ADMIN — FLUOXETINE HYDROCHLORIDE 60 MG: 20 CAPSULE ORAL at 09:58

## 2024-02-01 RX ADMIN — LOSARTAN POTASSIUM 100 MG: 25 TABLET, FILM COATED ORAL at 09:57

## 2024-02-01 RX ADMIN — FUROSEMIDE 20 MG: 20 TABLET ORAL at 09:58

## 2024-02-01 RX ADMIN — OXYCODONE 5 MG: 5 TABLET ORAL at 10:09

## 2024-02-01 RX ADMIN — IPRATROPIUM BROMIDE AND ALBUTEROL SULFATE 1 DOSE: 2.5; .5 SOLUTION RESPIRATORY (INHALATION) at 14:50

## 2024-02-01 RX ADMIN — BUDESONIDE AND FORMOTEROL FUMARATE DIHYDRATE 2 PUFF: 160; 4.5 AEROSOL RESPIRATORY (INHALATION) at 07:18

## 2024-02-01 RX ADMIN — IPRATROPIUM BROMIDE AND ALBUTEROL SULFATE 1 DOSE: 2.5; .5 SOLUTION RESPIRATORY (INHALATION) at 10:59

## 2024-02-01 RX ADMIN — ASPIRIN 81 MG: 81 TABLET, COATED ORAL at 09:57

## 2024-02-01 RX ADMIN — PANTOPRAZOLE SODIUM 40 MG: 40 TABLET, DELAYED RELEASE ORAL at 05:13

## 2024-02-01 ASSESSMENT — PAIN DESCRIPTION - LOCATION
LOCATION: HEAD
LOCATION: HEAD

## 2024-02-01 ASSESSMENT — PAIN SCALES - GENERAL
PAINLEVEL_OUTOF10: 8
PAINLEVEL_OUTOF10: 9

## 2024-02-01 ASSESSMENT — PAIN DESCRIPTION - DESCRIPTORS: DESCRIPTORS: ACHING

## 2024-02-01 NOTE — PROGRESS NOTES
Physical Therapy Rehab Treatment Note  Facility/Department: [address] ORTHO TELE  Room: [place] [id number]       NAME: Pooja Mac  : 1970 (53 y.o.)  MRN: [id number]  CODE STATUS: Full Code    Date of Service: 2024       Restrictions:  Restrictions/Precautions: Fall Risk (Per clinical judgement)       SUBJECTIVE:   Subjective: Pt reports her dizziness and headache doesn't really go away    Pain  Pain: 8/10 headache , pt reports receiving medication, agreeable to continue with session / post 9/10      OBJECTIVE:     Bed Mobility Training  Bed Mobility Training: Yes  Rolling: Independent  Supine to Sit: Independent  Sit to Supine: Independent  Scooting: Independent    Transfer Training  Transfer Training: Yes  Interventions: Verbal cues;Safety awareness training  Sit to Stand: Supervision  Stand to Sit: Supervision    Gait Training: Yes  Overall Level of Assistance: Stand-by assistance  Distance (ft): 100 Feet (25' x 4)  Assistive Device: Other (comment);Walker, rolling (no AD)  Interventions: Verbal cues  Base of Support: Narrowed  Speed/Radha: Slow  Step Length: Right shortened;Left shortened  Gait Abnormalities: Antalgic;Decreased step clearance;Trunk sway increased    Neuromuscular Education  Facilitation techniques: static standing without UE support with lateral weight shifts, reaching OOBOS, and performing horizontal head turns - sway noted no significant LOB      ASSESSMENT/PROGRESS TOWARDS GOALS:   Assessment  Assessment: Pt exhibits unsteady gait without AD with increase risk of falls with reaching out for objects. Pt also exhibits very slow paced gait d/t reaching and dizziness. Improved stability and pace demonstrated with ww this session. Education on benefits of utilizing ww at this time to decrease fall risk with reaching as well as overall stability to improve strength. Increased time required to complete gait training and trsfs secondary to dizziness.    Goals:  Long Term Goals  Long Term Goal

## 2024-02-01 NOTE — FLOWSHEET NOTE
Walked in to pt room and asked herif she wanted to wash up today and she said maybe later.Electronically signed by Kimi Jackson LPN on 2/1/2024 at 9:51 AM

## 2024-02-01 NOTE — PROGRESS NOTES
01/31/24 2000   RT Protocol   History Pulmonary Disease 2   Respiratory pattern 0   Breath sounds 0   Cough 0   Indications for Bronchodilator Therapy On home bronchodilators   Bronchodilator Assessment Score 2

## 2024-02-01 NOTE — DISCHARGE SUMMARY
Hospital Medicine Discharge Summary    Pooja Mac  :  1970  MRN:  [id number]    Admit date:  2024  Discharge date:  2024    Admitting Physician:  Goldie Pagan DO  Primary Care Physician:  Clara Carey MD      Discharge Diagnoses:      Dizziness due to benign vertigo  History of stroke  Anxiety    Chief Complaint   Patient presents with    Cerebrovascular Accident     Pt has hx of CVA 3 years ago. Talked to Dr. Goode and he told her to come to ER. Pt states her LKW well about an hour ago. She states she started to get tunnel vision, dizzy, and light headed. Pt states she feels \"wobbly\" No slurred speech or facial dropped noted. BS upon arrival 124     Hospital Course:       Patient is a 53-year-old female who presented to hospital with concerns for dizziness and to rule out CVA.  Patient felt out of sorts so she came to the emergency room after she called Dr. Oliveira's office and was directed to go to the ER.  Patient had stroke workup that was negative including CT head, CTA of the head and neck and MRI of the brain.  She was managed with as needed meclizine.  She was seen by neurologist.  She was cleared to go home.  She was discharged in a stable medical condition.    Exam on discharge:   BP (!) 138/56   Pulse 98   Temp 97.7 °F (36.5 °C) (Oral)   Resp 18   Wt 74.2 kg (163 lb 11.1 oz)   SpO2 98%   BMI 25.63 kg/m²   General appearance: No apparent distress, appears stated age and cooperative.  HEENT: Pupils equal, round, and reactive to light. Conjunctivae/corneas clear.  Neck: Supple, with full range of motion. No jugular venous distention. Trachea midline.  Respiratory:  Normal respiratory effort. Clear to auscultation, bilaterally without Rales/Wheezes/Rhonchi.  Cardiovascular: Regular rate and rhythm with normal S1/S2 without murmurs, rubs or gallops.  Abdomen: Soft, non-tender, non-distended with normal bowel sounds.  Musculoskeletal: No clubbing, cyanosis or edema bilaterally.  Full

## 2024-02-01 NOTE — PROGRESS NOTES
Warfarin Dosing - Pharmacy Consult Note  Consulting Provider: Fabiana New    Indication:  CVA  Warfarin Dose prior to admission:   Warfarin maintenance plan: 5 mg (5 mg x 1) every Mon, Wed, Fri; 7.5 mg (5 mg x 1.5) all other days   Weekly warfarin total: 45 mg      Concurrent anticoagulants/antiplatelets: ASA  Significant Drug Interactions: No obvious interactions  Recent Labs     01/31/24  0215 02/01/24  0457   INR 1.0 1.5   HGB 13.3 12.7    177   LABALBU 3.9  --      Recent warfarin administrations                     warfarin (COUMADIN) tablet 7.5 mg (mg) 7.5 mg Given 01/31/24 1636                   Date    INR     Dose  01/31      1.0        7.5 mg  02/01      1.5        7.5 mg    Assessment/Plan  (Goal INR: 2 - 3)  INR is 1.5 which is subtherapeutic for goal range. INR increased by 0.5 from 1/31, but still sub therapeutic. Will dose Warfarin 7.5 mg (home dose) today.    Active problem list reviewed.  INR orders are placed.  Chart reviewed for pertinent labs, drug/diet interactions, and past doses.  Documentation of patient's clinical condition was reviewed.    Pharmacy Dosing:  Pharmacy will continue to follow.

## 2024-02-01 NOTE — PROGRESS NOTES
1510: Patient discharged home via private car. Patient ambulatory with steady gait on discharge. Rx, DC instructions, and follow up discussed. Patient agrees to DC plan, denies questions. No distress noted on discharge.

## 2024-02-01 NOTE — DISCHARGE INSTRUCTIONS
Follow up with primary care physician in the next 7 days or sooner if needed. If you do not have a Primary care physician, please schedule an appointment with one. Please ask prior to discharge about a list of local providers.     Please return to ER or call 911 if you develop any significant signs or symptoms.    I may not have addressed all of your medical illnesses or the abnormal blood work or imaging therefore please ask your PCP to obtain [facility] record to follow up on all of the abnormal labs, imaging and findings that I have and have not addressed during your hospitalization.     Discharging you from the hospital does not mean that your medical care ends here and now. You may still need additional work up, investigation, monitoring, and treatment to be handled from this point on by outside providers including your PCP, Specialists and other healthcare providers.     For medication questions, contact your retail pharmacy and your PCP.    Your medical team at [facility] appreciates the opportunity to work with you to get well!    Nikolai Green DO  7:32 AM

## 2024-02-01 NOTE — CARE COORDINATION
Met with pt at bedside to discuss discharge planning and therapy. Pt states she plans to go back to work soon and would be agreeable to outpatient therapy. Requested OP orders from Physician.

## 2024-02-01 NOTE — PROGRESS NOTES
02/01/24 0700   RT Protocol   History Pulmonary Disease 2   Respiratory pattern 0   Breath sounds 0   Cough 0   Indications for Bronchodilator Therapy On home bronchodilators   Bronchodilator Assessment Score 2

## 2024-02-02 ENCOUNTER — APPOINTMENT (OUTPATIENT)
Dept: PHARMACY | Age: 54
End: 2024-02-02
Payer: COMMERCIAL

## 2024-02-02 ENCOUNTER — TELEPHONE (OUTPATIENT)
Dept: PHARMACY | Age: 54
End: 2024-02-02

## 2024-02-02 DIAGNOSIS — D68.59 HYPERCOAGULABLE STATE (HCC): ICD-10-CM

## 2024-02-02 DIAGNOSIS — G45.8 OTHER TRANSIENT CEREBRAL ISCHEMIC ATTACKS AND RELATED SYNDROMES: ICD-10-CM

## 2024-02-02 DIAGNOSIS — I63.81 CEREBROVASCULAR ACCIDENT (CVA) OF LEFT THALAMUS (HCC): Primary | ICD-10-CM

## 2024-02-02 LAB
PERFORMED ON: NORMAL
POC CREATININE: 1 MG/DL (ref 0.6–1.2)
POC SAMPLE TYPE: NORMAL

## 2024-02-02 NOTE — TELEPHONE ENCOUNTER
Third phone to pt, no answer left VM. Pt discharged from hospital 01/31/24, reset tracker for 1 week for follow up/letter

## 2024-02-12 ENCOUNTER — HOSPITAL ENCOUNTER (OUTPATIENT)
Dept: PHARMACY | Age: 54
Setting detail: THERAPIES SERIES
Discharge: HOME | End: 2024-02-12
Payer: COMMERCIAL

## 2024-02-12 DIAGNOSIS — D68.59 HYPERCOAGULABLE STATE (HCC): ICD-10-CM

## 2024-02-12 DIAGNOSIS — G45.8 OTHER TRANSIENT CEREBRAL ISCHEMIC ATTACKS AND RELATED SYNDROMES: ICD-10-CM

## 2024-02-12 DIAGNOSIS — I63.81 CEREBROVASCULAR ACCIDENT (CVA) OF LEFT THALAMUS (HCC): Primary | ICD-10-CM

## 2024-02-12 LAB — INTERNATIONAL NORMALIZATION RATIO, POC: 1.2

## 2024-02-12 PROCEDURE — 85610 PROTHROMBIN TIME: CPT | Performed by: PHARMACIST

## 2024-02-12 PROCEDURE — 99213 OFFICE O/P EST LOW 20 MIN: CPT | Performed by: PHARMACIST

## 2024-02-12 RX ORDER — MECLIZINE HYDROCHLORIDE 25 MG/1
25 TABLET ORAL DAILY
COMMUNITY

## 2024-02-12 NOTE — PROGRESS NOTES
Ms. Pooja Mac is a 53 y.o. y/o female with history of CVA who presents today for anticoagulation monitoring and adjustment.    INR 1.2 is sub-therapeutic for this patient (goal range 2.0-3.0) and is reflective of 45 mg TWD    Patient verifies current dosing regimen, patient able to verbally recall dose    Patient reports 2 missed doses since last INR - 2/10 and     Patient denies s/sx clotting and/or stroke  Patient denies hematuria, epistaxis, rectal bleeding  Patient denies changes in diet, alcohol, or tobacco use  Reviewed medication list and drug allergies with patient, updated any medication additions or modifications accordingly  Patient also denies any pending medical or dental procedures scheduled at this time    Patient was discharged from hospital on 24. Patient states     Patient was instructed to boost today and take 2 tablets or 10 mg and then increase weekly dose by 5.6% for a total weekly dose of 47.5 mg  and RTC 2 weeks      For Pharmacy Admin Tracking Only    Intervention Detail: Adherence Monitorin and Dose Adjustment: 2, reason: Improve Adherence, Therapy Optimization  Total # of Interventions Recommended: 3  Total # of Interventions Accepted: 3  Time Spent (min): 20

## 2024-02-15 RX ORDER — FOLIC ACID 1 MG/1
TABLET ORAL
Qty: 90 TABLET | Refills: 1 | Status: SHIPPED | OUTPATIENT
Start: 2024-02-15

## 2024-02-15 RX ORDER — WARFARIN SODIUM 5 MG/1
TABLET ORAL
Qty: 150 TABLET | Refills: 2 | Status: SHIPPED | OUTPATIENT
Start: 2024-02-15

## 2024-02-15 NOTE — TELEPHONE ENCOUNTER
Pharmacy is requesting medication refill. Please approve or deny this request.    Rx requested:  Requested Prescriptions     Pending Prescriptions Disp Refills    warfarin (COUMADIN) 5 MG tablet [Pharmacy Med Name: WARFARIN TABS 5MG] 150 tablet 2     Sig: TAKE AS DIRECTED BY [facility]    folic acid (FOLVITE) 1 MG tablet [Pharmacy Med Name: FOLIC ACID TABS 1MG] 90 tablet 1     Sig: TAKE 1 TABLET DAILY         Last Office Visit:   4/3/2023      Next Visit Date:  Future Appointments   Date Time Provider Department Center   2/26/2024  1:30 PM [facility] MANAG[facility]   5/21/2024  3:15 PM Steven Fowler MD [facility]  NEUR Neurology -

## 2024-02-26 ENCOUNTER — HOSPITAL ENCOUNTER (OUTPATIENT)
Dept: PHARMACY | Age: 54
Setting detail: THERAPIES SERIES
Discharge: HOME | End: 2024-02-26
Payer: COMMERCIAL

## 2024-02-26 DIAGNOSIS — G45.8 OTHER TRANSIENT CEREBRAL ISCHEMIC ATTACKS AND RELATED SYNDROMES: ICD-10-CM

## 2024-02-26 DIAGNOSIS — D68.59 HYPERCOAGULABLE STATE (HCC): ICD-10-CM

## 2024-02-26 DIAGNOSIS — I63.81 CEREBROVASCULAR ACCIDENT (CVA) OF LEFT THALAMUS (HCC): Primary | ICD-10-CM

## 2024-02-26 LAB — INTERNATIONAL NORMALIZATION RATIO, POC: 1.3

## 2024-02-26 PROCEDURE — 99213 OFFICE O/P EST LOW 20 MIN: CPT | Performed by: PHARMACIST

## 2024-02-26 PROCEDURE — 85610 PROTHROMBIN TIME: CPT | Performed by: PHARMACIST

## 2024-02-26 NOTE — PROGRESS NOTES
Ms. Pooja Mac is a 53 y.o. y/o female with history of CVA who presents today for anticoagulation monitoring and adjustment.  INR 1.3 is subtherapeutic for this patient (goal range 2-3) and is reflective of 47.5 mg TWD  Patient verifies current dosing regimen, patient able to verbally recall dose but is noncompliant.  Patient reports unknown  missed doses since last INR .  The patient states she did not follow dose calendar the first week after last appt.  Patient denies s/sx clotting and/or stroke  Patient denies hematuria, epistaxis, rectal bleeding  Patient denies changes in diet, alcohol, or tobacco use  Reviewed medication list and drug allergies with patient, updated any medication additions or modifications accordingly  Patient also denies any pending medical or dental procedures scheduled at this time  Patient was instructed to take 10mg warfarin today and tomorrow (16% extra this week only) then follow 47.5mg TWD and RTC 8 days.  Advised patient to follow dose calendar carefully.  Go to ER for s/s clot or stroke.    For Pharmacy Admin Tracking Only    Intervention Detail: Dose Adjustment: 1, reason: Therapy Optimization  Total # of Interventions Recommended: 1  Total # of Interventions Accepted: 1  Time Spent (min): 20

## 2024-02-28 LAB — ECHO BSA: 1.89 M2

## 2024-03-06 ENCOUNTER — TELEPHONE (OUTPATIENT)
Dept: PHARMACY | Age: 54
End: 2024-03-06

## 2024-03-06 DIAGNOSIS — I63.81 CEREBROVASCULAR ACCIDENT (CVA) OF LEFT THALAMUS (HCC): Primary | ICD-10-CM

## 2024-03-06 DIAGNOSIS — G45.8 OTHER TRANSIENT CEREBRAL ISCHEMIC ATTACKS AND RELATED SYNDROMES: ICD-10-CM

## 2024-03-06 DIAGNOSIS — D68.59 HYPERCOAGULABLE STATE (HCC): ICD-10-CM

## 2024-03-12 ENCOUNTER — TELEPHONE (OUTPATIENT)
Dept: PHARMACY | Age: 54
End: 2024-03-12

## 2024-03-12 DIAGNOSIS — I63.81 CEREBROVASCULAR ACCIDENT (CVA) OF LEFT THALAMUS (HCC): Primary | ICD-10-CM

## 2024-03-12 DIAGNOSIS — G45.8 OTHER TRANSIENT CEREBRAL ISCHEMIC ATTACKS AND RELATED SYNDROMES: ICD-10-CM

## 2024-03-12 DIAGNOSIS — D68.59 HYPERCOAGULABLE STATE (HCC): ICD-10-CM

## 2024-03-18 ENCOUNTER — HOSPITAL ENCOUNTER (OUTPATIENT)
Dept: PHARMACY | Age: 54
Setting detail: THERAPIES SERIES
Discharge: HOME | End: 2024-03-18
Payer: COMMERCIAL

## 2024-03-18 DIAGNOSIS — I63.81 CEREBROVASCULAR ACCIDENT (CVA) OF LEFT THALAMUS (HCC): Primary | ICD-10-CM

## 2024-03-18 DIAGNOSIS — G45.8 OTHER TRANSIENT CEREBRAL ISCHEMIC ATTACKS AND RELATED SYNDROMES: ICD-10-CM

## 2024-03-18 DIAGNOSIS — D68.59 HYPERCOAGULABLE STATE (HCC): ICD-10-CM

## 2024-03-18 LAB — INTERNATIONAL NORMALIZATION RATIO, POC: 2.6 (ref 2–3)

## 2024-03-18 PROCEDURE — 85610 PROTHROMBIN TIME: CPT | Performed by: PHARMACIST

## 2024-03-18 PROCEDURE — 99211 OFF/OP EST MAY X REQ PHY/QHP: CPT | Performed by: PHARMACIST

## 2024-03-18 NOTE — PROGRESS NOTES
Ms. Pooja Mac is a 53 y.o. y/o female with history of CVA who presents today for anticoagulation monitoring and adjustment.  INR 2.6 is therapeutic for this patient (goal range 2-3) and is reflective of 47.5 mg TWD  Patient verifies current dosing regimen, patient able to verbally recall dose  Patient reports NO  missed doses since last INR   Patient unsure if missed any doses  Patient denies s/sx clotting and/or stroke  Patient denies hematuria, epistaxis, rectal bleeding  Patient denies changes in diet, alcohol, or tobacco use  Reviewed medication list and drug allergies with patient, updated any medication additions or modifications accordingly  Patient also denies any pending medical or dental procedures scheduled at this time  Patient was instructed to continue 47.5 mg TWD and RTC 2 weeks  Patient switched to morning dosing to make it easier to remember to take warfarin  Patient has new work schedule 4 am until noon    Speedy Batista R.Ph.  3/18/2024  2:17 PM    For Pharmacy Admin Tracking Only    Intervention Detail: Adherence Monitorin  Total # of Interventions Recommended: 0  Total # of Interventions Accepted: 0  Time Spent (min): 15

## 2024-04-02 ENCOUNTER — HOSPITAL ENCOUNTER (OUTPATIENT)
Dept: PHARMACY | Age: 54
Setting detail: THERAPIES SERIES
Discharge: HOME | End: 2024-04-02
Payer: COMMERCIAL

## 2024-04-02 DIAGNOSIS — G45.8 OTHER TRANSIENT CEREBRAL ISCHEMIC ATTACKS AND RELATED SYNDROMES: ICD-10-CM

## 2024-04-02 DIAGNOSIS — D68.59 HYPERCOAGULABLE STATE (HCC): ICD-10-CM

## 2024-04-02 DIAGNOSIS — I63.81 CEREBROVASCULAR ACCIDENT (CVA) OF LEFT THALAMUS (HCC): Primary | ICD-10-CM

## 2024-04-02 LAB — INTERNATIONAL NORMALIZATION RATIO, POC: 5

## 2024-04-02 PROCEDURE — 85610 PROTHROMBIN TIME: CPT | Performed by: PHARMACIST

## 2024-04-02 PROCEDURE — 99213 OFFICE O/P EST LOW 20 MIN: CPT | Performed by: PHARMACIST

## 2024-04-02 NOTE — PROGRESS NOTES
Ms. Pooja Mac is a 53 y.o. y/o female with history of CVA who presents today for anticoagulation monitoring and adjustment.  INR 5.0 is supratherapeutic for this patient (goal range 2-3) and is reflective of 47.5 mg TWD  Patient verifies current dosing regimen, patient able to verbally recall dose  Patient reports no  missed doses since last INR . In previous recent visits the patient stated she was unsure if doses were missed.  Patient denies s/sx clotting and/or stroke  Patient denies hematuria, epistaxis, rectal bleeding  Patient denies changes in diet, alcohol, or tobacco use  Reviewed medication list and drug allergies with patient, updated any medication additions or modifications accordingly  Patient also denies any pending medical or dental procedures scheduled at this time  Patient was instructed to hold dose tomorrow (has already taken dose this AM) then also reduce TWD 11% to 42.5mg and RTC 2 weeks. For this week only dose reduced by 21%.      Spoke w/ Naty at Dr Fowler office now  to inform of INR 5.0 and plan above. Office will call if the doctor orders a change to plan.    For Pharmacy Admin Tracking Only    Intervention Detail: Dose Adjustment: 1, reason: Therapy De-escalation  Total # of Interventions Recommended: 1  Total # of Interventions Accepted: 1  Time Spent (min): 20

## 2024-04-16 ENCOUNTER — APPOINTMENT (OUTPATIENT)
Dept: PHARMACY | Age: 54
End: 2024-04-16
Payer: COMMERCIAL

## 2024-04-18 ENCOUNTER — TELEPHONE (OUTPATIENT)
Dept: PHARMACY | Age: 54
End: 2024-04-18

## 2024-04-18 DIAGNOSIS — I63.81 CEREBROVASCULAR ACCIDENT (CVA) OF LEFT THALAMUS (HCC): Primary | ICD-10-CM

## 2024-04-18 DIAGNOSIS — G45.8 OTHER TRANSIENT CEREBRAL ISCHEMIC ATTACKS AND RELATED SYNDROMES: ICD-10-CM

## 2024-04-18 DIAGNOSIS — D68.59 HYPERCOAGULABLE STATE (HCC): ICD-10-CM

## 2024-04-21 ENCOUNTER — HOSPITAL ENCOUNTER (EMERGENCY)
Age: 54
Discharge: HOME | End: 2024-04-21
Attending: STUDENT IN AN ORGANIZED HEALTH CARE EDUCATION/TRAINING PROGRAM
Payer: COMMERCIAL

## 2024-04-21 ENCOUNTER — APPOINTMENT (OUTPATIENT)
Dept: CT IMAGING | Age: 54
End: 2024-04-21
Payer: COMMERCIAL

## 2024-04-21 ENCOUNTER — APPOINTMENT (OUTPATIENT)
Dept: GENERAL RADIOLOGY | Age: 54
End: 2024-04-21
Payer: COMMERCIAL

## 2024-04-21 VITALS
RESPIRATION RATE: 18 BRPM | SYSTOLIC BLOOD PRESSURE: 160 MMHG | DIASTOLIC BLOOD PRESSURE: 92 MMHG | OXYGEN SATURATION: 99 % | TEMPERATURE: 97.8 F | HEART RATE: 80 BPM

## 2024-04-21 DIAGNOSIS — N20.0 KIDNEY STONE: Primary | ICD-10-CM

## 2024-04-21 DIAGNOSIS — N17.9 AKI (ACUTE KIDNEY INJURY) (HCC): ICD-10-CM

## 2024-04-21 DIAGNOSIS — R79.1 SUPRATHERAPEUTIC INR: ICD-10-CM

## 2024-04-21 LAB
ALBUMIN SERPL-MCNC: 3.7 G/DL (ref 3.5–4.6)
ALP SERPL-CCNC: 134 U/L (ref 40–130)
ALT SERPL-CCNC: 10 U/L (ref 0–33)
ANION GAP SERPL CALCULATED.3IONS-SCNC: 13 MEQ/L (ref 9–15)
APTT PPP: 51.8 SEC (ref 24.4–36.8)
AST SERPL-CCNC: 13 U/L (ref 0–35)
BACTERIA URNS QL MICRO: NEGATIVE /HPF
BASOPHILS # BLD: 0 K/UL (ref 0–0.2)
BASOPHILS NFR BLD: 0.3 %
BILIRUB SERPL-MCNC: 0.4 MG/DL (ref 0.2–0.7)
BILIRUB UR QL STRIP: NEGATIVE
BUN SERPL-MCNC: 12 MG/DL (ref 6–20)
CALCIUM SERPL-MCNC: 9.5 MG/DL (ref 8.5–9.9)
CHLORIDE SERPL-SCNC: 105 MEQ/L (ref 95–107)
CK SERPL-CCNC: 69 U/L (ref 0–170)
CLARITY UR: CLEAR
CO2 SERPL-SCNC: 26 MEQ/L (ref 20–31)
COLOR UR: ABNORMAL
CREAT SERPL-MCNC: 1.29 MG/DL (ref 0.5–0.9)
EOSINOPHIL # BLD: 0.2 K/UL (ref 0–0.7)
EOSINOPHIL NFR BLD: 1.9 %
EPI CELLS #/AREA URNS AUTO: ABNORMAL /HPF (ref 0–5)
ERYTHROCYTE [DISTWIDTH] IN BLOOD BY AUTOMATED COUNT: 13.2 % (ref 11.5–14.5)
GLOBULIN SER CALC-MCNC: 2.6 G/DL (ref 2.3–3.5)
GLUCOSE SERPL-MCNC: 98 MG/DL (ref 70–99)
GLUCOSE UR STRIP-MCNC: NEGATIVE MG/DL
HCT VFR BLD AUTO: 36.2 % (ref 37–47)
HGB BLD-MCNC: 12.4 G/DL (ref 12–16)
HGB UR QL STRIP: ABNORMAL
HYALINE CASTS #/AREA URNS AUTO: ABNORMAL /HPF (ref 0–5)
INR PPP: 3.5
KETONES UR STRIP-MCNC: NEGATIVE MG/DL
LEUKOCYTE ESTERASE UR QL STRIP: ABNORMAL
LYMPHOCYTES # BLD: 3 K/UL (ref 1–4.8)
LYMPHOCYTES NFR BLD: 38.3 %
MCH RBC QN AUTO: 30.4 PG (ref 27–31.3)
MCHC RBC AUTO-ENTMCNC: 34.3 % (ref 33–37)
MCV RBC AUTO: 88.7 FL (ref 79.4–94.8)
MONOCYTES # BLD: 0.4 K/UL (ref 0.2–0.8)
MONOCYTES NFR BLD: 5.3 %
NEUTROPHILS # BLD: 4.2 K/UL (ref 1.4–6.5)
NEUTS SEG NFR BLD: 54.1 %
NITRITE UR QL STRIP: NEGATIVE
PH UR STRIP: 6 [PH] (ref 5–9)
PLATELET # BLD AUTO: 150 K/UL (ref 130–400)
POTASSIUM SERPL-SCNC: 3.5 MEQ/L (ref 3.4–4.9)
PROT SERPL-MCNC: 6.3 G/DL (ref 6.3–8)
PROT UR STRIP-MCNC: 100 MG/DL
PROTHROMBIN TIME: 34.7 SEC (ref 12.3–14.9)
RBC # BLD AUTO: 4.08 M/UL (ref 4.2–5.4)
RBC #/AREA URNS HPF: ABNORMAL /HPF (ref 0–2)
SODIUM SERPL-SCNC: 144 MEQ/L (ref 135–144)
SP GR UR STRIP: 1 (ref 1–1.03)
URINE REFLEX TO CULTURE: ABNORMAL
UROBILINOGEN UR STRIP-ACNC: 0.2 E.U./DL
WBC # BLD AUTO: 7.8 K/UL (ref 4.8–10.8)
WBC #/AREA URNS AUTO: ABNORMAL /HPF (ref 0–5)

## 2024-04-21 PROCEDURE — 70450 CT HEAD/BRAIN W/O DYE: CPT

## 2024-04-21 PROCEDURE — 81001 URINALYSIS AUTO W/SCOPE: CPT

## 2024-04-21 PROCEDURE — 96360 HYDRATION IV INFUSION INIT: CPT

## 2024-04-21 PROCEDURE — 93005 ELECTROCARDIOGRAM TRACING: CPT | Performed by: STUDENT IN AN ORGANIZED HEALTH CARE EDUCATION/TRAINING PROGRAM

## 2024-04-21 PROCEDURE — 85730 THROMBOPLASTIN TIME PARTIAL: CPT

## 2024-04-21 PROCEDURE — 2580000003 HC RX 258: Performed by: STUDENT IN AN ORGANIZED HEALTH CARE EDUCATION/TRAINING PROGRAM

## 2024-04-21 PROCEDURE — 82550 ASSAY OF CK (CPK): CPT

## 2024-04-21 PROCEDURE — 96361 HYDRATE IV INFUSION ADD-ON: CPT

## 2024-04-21 PROCEDURE — 6370000000 HC RX 637 (ALT 250 FOR IP): Performed by: STUDENT IN AN ORGANIZED HEALTH CARE EDUCATION/TRAINING PROGRAM

## 2024-04-21 PROCEDURE — 36415 COLL VENOUS BLD VENIPUNCTURE: CPT

## 2024-04-21 PROCEDURE — 71045 X-RAY EXAM CHEST 1 VIEW: CPT

## 2024-04-21 PROCEDURE — 74176 CT ABD & PELVIS W/O CONTRAST: CPT

## 2024-04-21 PROCEDURE — 85610 PROTHROMBIN TIME: CPT

## 2024-04-21 PROCEDURE — 85025 COMPLETE CBC W/AUTO DIFF WBC: CPT

## 2024-04-21 PROCEDURE — 80053 COMPREHEN METABOLIC PANEL: CPT

## 2024-04-21 PROCEDURE — 99285 EMERGENCY DEPT VISIT HI MDM: CPT

## 2024-04-21 RX ORDER — ONDANSETRON 4 MG/1
4 TABLET, FILM COATED ORAL 3 TIMES DAILY PRN
Qty: 6 TABLET | Refills: 0 | Status: SHIPPED | OUTPATIENT
Start: 2024-04-21

## 2024-04-21 RX ORDER — 0.9 % SODIUM CHLORIDE 0.9 %
1000 INTRAVENOUS SOLUTION INTRAVENOUS ONCE
Status: COMPLETED | OUTPATIENT
Start: 2024-04-21 | End: 2024-04-21

## 2024-04-21 RX ORDER — ACETAMINOPHEN 500 MG
1000 TABLET ORAL ONCE
Status: COMPLETED | OUTPATIENT
Start: 2024-04-21 | End: 2024-04-21

## 2024-04-21 RX ADMIN — ACETAMINOPHEN 1000 MG: 500 TABLET ORAL at 21:00

## 2024-04-21 RX ADMIN — SODIUM CHLORIDE 1000 ML: 9 INJECTION, SOLUTION INTRAVENOUS at 21:18

## 2024-04-21 ASSESSMENT — PAIN - FUNCTIONAL ASSESSMENT
PAIN_FUNCTIONAL_ASSESSMENT: NONE - DENIES PAIN
PAIN_FUNCTIONAL_ASSESSMENT: NONE - DENIES PAIN

## 2024-04-22 LAB
EKG ATRIAL RATE: 70 BPM
EKG P AXIS: 97 DEGREES
EKG P-R INTERVAL: 176 MS
EKG Q-T INTERVAL: 448 MS
EKG QRS DURATION: 98 MS
EKG QTC CALCULATION (BAZETT): 483 MS
EKG R AXIS: 64 DEGREES
EKG T AXIS: 34 DEGREES
EKG VENTRICULAR RATE: 70 BPM

## 2024-04-22 NOTE — ED PROVIDER NOTES
EKG 12 Lead   Result Value Ref Range    Ventricular Rate 70 BPM    Atrial Rate 70 BPM    P-R Interval 176 ms    QRS Duration 98 ms    Q-T Interval 448 ms    QTc Calculation (Bazett) 483 ms    P Axis 97 degrees    R Axis 64 degrees    T Axis 34 degrees         IMPRESSION/MDM/ED COURSE:  53 y.o. female presented with acute hematuria, acute on chronic dizziness  Differential: Supratherapeutic INR, intracranial hemorrhage, not stroke, kidney stone, ALIX, UTI, not pyelonephritis, not dehydration, lecture abnormality, l rhabdomyolysis  VS: /92 otherwise normal    EKG my interpretation: Normal sinus rhythm, QTc 483 otherwise normal intervals, normal axis, no significant ST segment or T wave abnormalities.    Labs my interpretation: INR 3.5 improved from last check according to patient, CMP shows mild ALIX creatinine 1.29 GFR 50, gross blood in the urine no obvious infection, CBC largely unremarkable, CK normal  CT head my interpretation: No acute findings  CT abdomen pelvis my interpretation: Small stone in bladder, no perinephric fat stranding    ED Course as of 04/21/24 2315   Sun Apr 21, 2024   2314 Patient reassessed she is feeling much better.  She would like something for nausea at home.  Advised to continue good fluids which she says she has not had water in 30 years but she drinks tea.  She has an INR check tomorrow advised her to continue with this appointment and follow-up with her PCP.  Feels comfortable going home. [SF]      ED Course User Index  [SF] Bud Solorzano DO       Patient/Guardian requesting discharge. Patient/Guardian was given written and verbal instructions prior to discharge. Patient/Guardian understood and agreed. Patient/Guardian had no further questions.       RADIOLOGY:  XR CHEST PORTABLE   Final Result   No acute process.         CT Head W/O Contrast   Final Result   1. No acute intracranial hemorrhage or edema.   2. Stable chronic lacunar stroke involving medial left thalamus.

## 2024-04-22 NOTE — DISCHARGE INSTRUCTIONS
Please call your primary doctor tomorrow  Keep your appointment to recheck your INR  Drink plenty of fluids to help prevent getting repeat stones    Return if you develop worsening blood in your urine, abdominal pain, nausea vomiting, fevers, chest pain shortness of breath, headache, visual changes, confusion or other symptoms or concerns    For pain use acetaminophen (Tylenol) or ibuprofen (Motrin / Advil), unless prescribed medications that have acetaminophen or ibuprofen (or similar medications) in it.  You can take over the counter acetaminophen tablets (1 - 2 tablets of the 500-mg strength every 6 hours) or ibuprofen tablets (2 tablets every 4 hours).    Drink plenty of water.  Strain your urine for any stones (collect the stones and take them with you to the urologist    PLEASE RETURN TO THE EMERGENCY DEPARTMENT IMMEDIATELY for worsening symptoms, inability to urinate, worsening of blood in your urine, or if you develop any concerning symptoms such as: high fever not relieved by acetaminophen (Tylenol) and/or ibuprofen (Motrin / Advil), chills, shortness of breath, chest pain, feeling of your heart fluttering or racing, persistent nausea and/or vomiting, vomiting up blood, blood in your stool, numbness, loss of consciousness, weakness or tingling in the arms or legs or change in color of the extremities, changes in mental status, persistent headache, blurry vision, loss of bladder / bowel control, unable to follow up with your physician, or other any other care or concern.

## 2024-04-22 NOTE — ED NOTES
Pt states that she has been feeling dizzy upon standing since yesterday. Pt also states that she only came to the ED because she noticed blood in her urine.     Pt is alert and oriented x4.   Skin is warm, dry and pink.  Respirations are even and unlabored with a small cough present.   Pt states that she has been having blood in her urine and slight burning urination.   Pt denies N/V/D.  Pt is ambulatory at this time without the use of assistive devices.

## 2024-04-22 NOTE — ED NOTES
Pt is resting in bed.   Pt was up out of bed and ambulated independently to the restroom. Gait wa steady and even.     Pt states that she is concerned as her blood pressure \"keeps going up.\" Pt states, \" Its giving me anxiety.\"  Pt states that she currently does not take anything for HTN and already had an appointment scheduled with her cardiologist.

## 2024-04-23 ENCOUNTER — HOSPITAL ENCOUNTER (OUTPATIENT)
Dept: PHARMACY | Age: 54
Setting detail: THERAPIES SERIES
Discharge: HOME | End: 2024-04-23
Payer: COMMERCIAL

## 2024-04-23 DIAGNOSIS — G45.8 OTHER TRANSIENT CEREBRAL ISCHEMIC ATTACKS AND RELATED SYNDROMES: ICD-10-CM

## 2024-04-23 DIAGNOSIS — I63.81 CEREBROVASCULAR ACCIDENT (CVA) OF LEFT THALAMUS (HCC): Primary | ICD-10-CM

## 2024-04-23 DIAGNOSIS — D68.59 HYPERCOAGULABLE STATE (HCC): ICD-10-CM

## 2024-04-23 LAB — INTERNATIONAL NORMALIZATION RATIO, POC: 3.2

## 2024-04-23 PROCEDURE — 85610 PROTHROMBIN TIME: CPT

## 2024-04-23 PROCEDURE — 99213 OFFICE O/P EST LOW 20 MIN: CPT

## 2024-04-23 NOTE — PROGRESS NOTES
Ms. Pooja Mac is a 53 y.o. y/o female with history of CVA who presents today for anticoagulation monitoring and adjustment.  INR 3.2 is supratherapeutic for this patient (goal range 2-3) and is reflective of 37.5 mg TWD  Patient verifies current dosing regimen, patient able to verbally recall dose  Patient reports 1 missed dose since last INR (due to elevated INR of 3.5 in ED)  Patient denies s/sx clotting and/or stroke  Patient denies epistaxis, rectal bleeding  Patient reports hematuria   Patient denies changes in diet, alcohol, or tobacco use  Reviewed medication list and drug allergies with patient, updated any medication additions or modifications accordingly  Patient also denies any pending medical or dental procedures scheduled at this time    Patient presented to ED for hematuria on 04/21. Per ED notes, stone in bladder found and indicators of recently passed ureteral stone. She is still experiencing hematuria but does have an appt with her PCP today to discuss. INR in ED was found to be 3.5 (had dose reduction at previous visit where INR was 5) provider instructed her to skip a dose which she did, but did take today's dose already.    Patient was instructed to hold tomorrow's dose, then reduce TWD to 40 mg (5.9% reduction) and RTC 2 weeks      For Pharmacy Admin Tracking Only    Intervention Detail: Dose Adjustment: 1, reason: Therapy De-escalation  Total # of Interventions Recommended: 1  Total # of Interventions Accepted: 1  Time Spent (min): 15

## 2024-04-26 ENCOUNTER — OFFICE VISIT (OUTPATIENT)
Dept: CARDIOLOGY CLINIC | Age: 54
End: 2024-04-26
Payer: COMMERCIAL

## 2024-04-26 VITALS
OXYGEN SATURATION: 96 % | DIASTOLIC BLOOD PRESSURE: 86 MMHG | BODY MASS INDEX: 26.62 KG/M2 | WEIGHT: 170 LBS | HEART RATE: 85 BPM | SYSTOLIC BLOOD PRESSURE: 136 MMHG

## 2024-04-26 DIAGNOSIS — R06.09 DOE (DYSPNEA ON EXERTION): ICD-10-CM

## 2024-04-26 DIAGNOSIS — I10 HYPERTENSION, UNSPECIFIED TYPE: ICD-10-CM

## 2024-04-26 DIAGNOSIS — I25.10 CORONARY ARTERY DISEASE INVOLVING NATIVE CORONARY ARTERY OF NATIVE HEART WITHOUT ANGINA PECTORIS: Primary | ICD-10-CM

## 2024-04-26 DIAGNOSIS — Z72.0 TOBACCO ABUSE: ICD-10-CM

## 2024-04-26 PROBLEM — R42 DIZZINESS: Status: RESOLVED | Noted: 2019-04-30 | Resolved: 2024-04-26

## 2024-04-26 PROBLEM — R29.90 STROKE-LIKE SYMPTOMS: Status: RESOLVED | Noted: 2024-01-31 | Resolved: 2024-04-26

## 2024-04-26 PROBLEM — E66.9 OBESITY (BMI 30-39.9): Status: RESOLVED | Noted: 2019-05-23 | Resolved: 2024-04-26

## 2024-04-26 PROBLEM — R60.0 PERIPHERAL EDEMA: Status: RESOLVED | Noted: 2022-11-01 | Resolved: 2024-04-26

## 2024-04-26 PROBLEM — R07.9 CHEST PAIN: Status: RESOLVED | Noted: 2020-01-09 | Resolved: 2024-04-26

## 2024-04-26 PROCEDURE — 3079F DIAST BP 80-89 MM HG: CPT | Performed by: INTERNAL MEDICINE

## 2024-04-26 PROCEDURE — 99214 OFFICE O/P EST MOD 30 MIN: CPT | Performed by: INTERNAL MEDICINE

## 2024-04-26 PROCEDURE — 3075F SYST BP GE 130 - 139MM HG: CPT | Performed by: INTERNAL MEDICINE

## 2024-04-26 RX ORDER — BLOOD PRESSURE TEST KIT
1 KIT MISCELLANEOUS 2 TIMES DAILY
Qty: 1 KIT | Refills: 0 | Status: SHIPPED | OUTPATIENT
Start: 2024-04-26

## 2024-04-26 RX ORDER — AMLODIPINE BESYLATE 5 MG/1
5 TABLET ORAL DAILY
Qty: 90 TABLET | Refills: 3 | Status: SHIPPED | OUTPATIENT
Start: 2024-04-26

## 2024-04-26 ASSESSMENT — ENCOUNTER SYMPTOMS
GASTROINTESTINAL NEGATIVE: 1
ABDOMINAL PAIN: 0
WHEEZING: 0
VOMITING: 0
SHORTNESS OF BREATH: 0
NAUSEA: 0
EYES NEGATIVE: 1
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
## 2024-05-08 ENCOUNTER — TELEPHONE (OUTPATIENT)
Dept: PHARMACY | Age: 54
End: 2024-05-08

## 2024-05-08 DIAGNOSIS — G45.8 OTHER TRANSIENT CEREBRAL ISCHEMIC ATTACKS AND RELATED SYNDROMES: ICD-10-CM

## 2024-05-08 DIAGNOSIS — I63.81 CEREBROVASCULAR ACCIDENT (CVA) OF LEFT THALAMUS (HCC): Primary | ICD-10-CM

## 2024-05-08 DIAGNOSIS — D68.59 HYPERCOAGULABLE STATE (HCC): ICD-10-CM

## 2024-05-14 ENCOUNTER — TELEPHONE (OUTPATIENT)
Dept: PHARMACY | Age: 54
End: 2024-05-14

## 2024-05-14 DIAGNOSIS — I63.81 CEREBROVASCULAR ACCIDENT (CVA) OF LEFT THALAMUS (HCC): Primary | ICD-10-CM

## 2024-05-14 DIAGNOSIS — G45.8 OTHER TRANSIENT CEREBRAL ISCHEMIC ATTACKS AND RELATED SYNDROMES: ICD-10-CM

## 2024-05-14 DIAGNOSIS — D68.59 HYPERCOAGULABLE STATE (HCC): ICD-10-CM

## 2024-05-14 NOTE — TELEPHONE ENCOUNTER
Spoke with patient to reschedule.  She is having transportation issues, but will try to come on 5/16/24 @1430.    Rescheduled and reset tracker    Raegan Villalba [facility]  5/14/2024  2:36 PM

## 2024-05-17 ENCOUNTER — TELEPHONE (OUTPATIENT)
Dept: PHARMACY | Age: 54
End: 2024-05-17

## 2024-05-17 DIAGNOSIS — D68.59 HYPERCOAGULABLE STATE (HCC): ICD-10-CM

## 2024-05-17 DIAGNOSIS — I63.81 CEREBROVASCULAR ACCIDENT (CVA) OF LEFT THALAMUS (HCC): Primary | ICD-10-CM

## 2024-05-17 DIAGNOSIS — G45.8 OTHER TRANSIENT CEREBRAL ISCHEMIC ATTACKS AND RELATED SYNDROMES: ICD-10-CM

## 2024-05-24 ENCOUNTER — TELEPHONE (OUTPATIENT)
Dept: PHARMACY | Age: 54
End: 2024-05-24

## 2024-05-24 ENCOUNTER — ANTI-COAG VISIT (OUTPATIENT)
Dept: PHARMACY | Age: 54
End: 2024-05-24

## 2024-05-24 DIAGNOSIS — I63.81 CEREBROVASCULAR ACCIDENT (CVA) OF LEFT THALAMUS (HCC): Primary | ICD-10-CM

## 2024-05-24 DIAGNOSIS — D68.59 HYPERCOAGULABLE STATE (HCC): ICD-10-CM

## 2024-05-24 DIAGNOSIS — G45.8 OTHER TRANSIENT CEREBRAL ISCHEMIC ATTACKS AND RELATED SYNDROMES: ICD-10-CM

## 2024-05-30 ENCOUNTER — HOSPITAL ENCOUNTER (OUTPATIENT)
Dept: PHARMACY | Age: 54
Setting detail: THERAPIES SERIES
Discharge: HOME | End: 2024-05-30
Payer: COMMERCIAL

## 2024-05-30 DIAGNOSIS — G45.8 OTHER TRANSIENT CEREBRAL ISCHEMIC ATTACKS AND RELATED SYNDROMES: ICD-10-CM

## 2024-05-30 DIAGNOSIS — I63.81 CEREBROVASCULAR ACCIDENT (CVA) OF LEFT THALAMUS (HCC): Primary | ICD-10-CM

## 2024-05-30 DIAGNOSIS — D68.59 HYPERCOAGULABLE STATE (HCC): ICD-10-CM

## 2024-05-30 LAB — INTERNATIONAL NORMALIZATION RATIO, POC: 2.3

## 2024-05-30 PROCEDURE — 99211 OFF/OP EST MAY X REQ PHY/QHP: CPT

## 2024-05-30 PROCEDURE — 85610 PROTHROMBIN TIME: CPT

## 2024-05-30 NOTE — PROGRESS NOTES
Ms. Pooja Mac is a 53 y.o. y/o female with history of CVA who presents today for anticoagulation monitoring and adjustment.  INR 2.3 is therapeutic for this patient (goal range 2-3) and is reflective of 40 mg TWD  Patient verifies current dosing regimen, patient able to verbally recall dose  Patient reports 0 missed doses since last INR   Patient denies s/sx clotting and/or stroke  Patient denies hematuria, epistaxis, rectal bleeding  Patient denies changes in diet, alcohol, or tobacco use  Reviewed medication list and drug allergies with patient, updated any medication additions or modifications accordingly  Patient also denies any pending medical or dental procedures scheduled at this time  Patient was instructed to continue 40 mg TWD and RTC 3 weeks    For Pharmacy Admin Tracking Only    Intervention Detail: Adherence Monitorin  Total # of Interventions Recommended: 1  Total # of Interventions Accepted: 1  Time Spent (min): 15

## 2024-06-24 ENCOUNTER — HOSPITAL ENCOUNTER (OUTPATIENT)
Dept: PHARMACY | Age: 54
Setting detail: THERAPIES SERIES
Discharge: HOME | End: 2024-06-24
Payer: COMMERCIAL

## 2024-06-24 DIAGNOSIS — I63.81 CEREBROVASCULAR ACCIDENT (CVA) OF LEFT THALAMUS (HCC): Primary | ICD-10-CM

## 2024-06-24 DIAGNOSIS — D68.59 HYPERCOAGULABLE STATE (HCC): ICD-10-CM

## 2024-06-24 DIAGNOSIS — G45.8 OTHER TRANSIENT CEREBRAL ISCHEMIC ATTACKS AND RELATED SYNDROMES: ICD-10-CM

## 2024-06-24 LAB — INTERNATIONAL NORMALIZATION RATIO, POC: 2.5

## 2024-06-24 PROCEDURE — 85610 PROTHROMBIN TIME: CPT | Performed by: PHARMACIST

## 2024-06-24 PROCEDURE — 99211 OFF/OP EST MAY X REQ PHY/QHP: CPT | Performed by: PHARMACIST

## 2024-06-24 NOTE — PROGRESS NOTES
Ms. Pooja Mac is a 53 y.o. y/o female with history of CVA who presents today for anticoagulation monitoring and adjustment.  INR 2.5  is therapeutic for this patient (goal range 2-3) and is reflective of 40 mg TWD  Patient verifies current dosing regimen, patient able to verbally recall dose  Patient reports   missed doses since last INR   Patient denies s/sx clotting and/or stroke  Patient denies hematuria, epistaxis, rectal bleeding  Patient denies changes in diet, alcohol, or tobacco use  Reviewed medication list and drug allergies with patient, updated any medication additions or modifications accordingly  Patient also denies any pending medical or dental procedures scheduled at this time  Patient was instructed to continue 40mg TWD and RTC 4 weeks    For Pharmacy Admin Tracking Only    Intervention Detail: Adherence Monitorin  Total # of Interventions Recommended: 1  Total # of Interventions Accepted: 1  Time Spent (min): 15

## 2024-07-23 ENCOUNTER — TELEPHONE (OUTPATIENT)
Dept: PHARMACY | Age: 54
End: 2024-07-23

## 2024-07-23 DIAGNOSIS — G45.8 OTHER TRANSIENT CEREBRAL ISCHEMIC ATTACKS AND RELATED SYNDROMES: ICD-10-CM

## 2024-07-23 DIAGNOSIS — I63.81 CEREBROVASCULAR ACCIDENT (CVA) OF LEFT THALAMUS (HCC): Primary | ICD-10-CM

## 2024-07-23 DIAGNOSIS — D68.59 HYPERCOAGULABLE STATE (HCC): ICD-10-CM

## 2024-07-30 ENCOUNTER — TELEPHONE (OUTPATIENT)
Dept: PHARMACY | Age: 54
End: 2024-07-30

## 2024-07-30 NOTE — TELEPHONE ENCOUNTER
TC to patient to re-schedule appointment.  States she has not been on her warfarin for 7 days due to a scheduled neck injection (which was canceled) and having teeth pulled today.    I explained she may be at higher risk of forming clots since she has been off warfarin and counseled regarding signs/symptoms of clots.  I counseled to re-start warfarin at 1.5 tabs (7.5mg) this evening and tomorrow (7/31/24), then return to her typical dosing schedule.    Patient to RTC on Friday, 8/2/24 for INR check    Raegan Villalba [facility]  7/30/2024  9:35 AM'      
full weight-bearing

## 2024-08-02 ENCOUNTER — ANTI-COAG VISIT (OUTPATIENT)
Age: 54
End: 2024-08-02
Payer: COMMERCIAL

## 2024-08-02 DIAGNOSIS — G45.8 OTHER TRANSIENT CEREBRAL ISCHEMIC ATTACKS AND RELATED SYNDROMES: ICD-10-CM

## 2024-08-02 DIAGNOSIS — I63.81 CEREBROVASCULAR ACCIDENT (CVA) OF LEFT THALAMUS (HCC): Primary | ICD-10-CM

## 2024-08-02 DIAGNOSIS — D68.59 HYPERCOAGULABLE STATE (HCC): ICD-10-CM

## 2024-08-02 LAB
INTERNATIONAL NORMALIZATION RATIO, POC: 1.1 (ref 2–3)
PROTHROMBIN TIME, POC: ABNORMAL

## 2024-08-02 PROCEDURE — 99212 OFFICE O/P EST SF 10 MIN: CPT | Performed by: PHARMACIST

## 2024-08-02 PROCEDURE — 85610 PROTHROMBIN TIME: CPT | Performed by: PHARMACIST

## 2024-08-02 NOTE — PROGRESS NOTES
Ms. Pooja Mac is a 54 y.o. y/o female with history of CVA who presents today for anticoagulation monitoring and adjustment.  INR 1.1 is SUB-therapeutic for this patient (goal range 2-3) and is reflective of 17.5 mg TWD  Patient verifies current dosing regimen, patient able to verbally recall dose  Patient reports 7 held doses since last INR   Patient denies s/sx clotting and/or stroke  Patient denies hematuria, epistaxis, rectal bleeding  Patient denies changes in diet, alcohol, or tobacco use  Patient reports suppose to begin liquid diet until dentures in January.  Reviewed medication list and drug allergies with patient, updated any medication additions or modifications accordingly  Patient also denies any pending medical or dental procedures scheduled at this time  Patient to have injections in neck with an expected 3 day hold. Patient to schedule later today and will inform us of her appointment next Friday.  Patient was instructed to take 5 mg booster today and tomorrow and 2.5 mg booster  then continue 40 mg TWD and RTC 1 week    MAIKOL HutchinsonPh.  2024  11:14 AM    For Pharmacy Admin Tracking Only    Intervention Detail: Adherence Monitorin and Dose Adjustment: 3, reason: Improve Adherence, Therapy Optimization  Total # of Interventions Recommended: 3  Total # of Interventions Accepted: 3  Time Spent (min): 15

## 2024-08-09 ENCOUNTER — ANTI-COAG VISIT (OUTPATIENT)
Age: 54
End: 2024-08-09
Payer: COMMERCIAL

## 2024-08-09 DIAGNOSIS — I63.81 CEREBROVASCULAR ACCIDENT (CVA) OF LEFT THALAMUS (HCC): Primary | ICD-10-CM

## 2024-08-09 DIAGNOSIS — D68.59 HYPERCOAGULABLE STATE (HCC): ICD-10-CM

## 2024-08-09 DIAGNOSIS — G45.8 OTHER TRANSIENT CEREBRAL ISCHEMIC ATTACKS AND RELATED SYNDROMES: ICD-10-CM

## 2024-08-09 LAB
INTERNATIONAL NORMALIZATION RATIO, POC: 1.9 (ref 2–3)
PROTHROMBIN TIME, POC: ABNORMAL

## 2024-08-09 PROCEDURE — 99213 OFFICE O/P EST LOW 20 MIN: CPT

## 2024-08-09 PROCEDURE — 85610 PROTHROMBIN TIME: CPT

## 2024-08-09 NOTE — PROGRESS NOTES
Ms. Pooja Mac is a 54 y.o. y/o female with history of CVA who presents today for anticoagulation monitoring and adjustment.    INR 1.9 is sub-therapeutic for this patient (goal range 2-3) and is reflective of 50 mg TWD, with boosted dose from last appointment.    Patient verifies current dosing regimen, patient able to verbally recall dose  Patient reports 0  missed doses since last INR   Patient denies s/sx clotting and/or stroke  Patient denies hematuria, epistaxis, rectal bleeding  Patient reports changes in diet but no alcohol and tobacco use  - When we assessed about pt's new liquid diet, pt reports she is eating anything soft that is tolerable to her implant frame. She has been eating soups and thinking of eating softened cauliflowers. We mentioned that eating more vegetables may decrease the INR. Pt is aware but given she will be on a soft diet until next January and limited dietary choices d/t her DM, she is trying to figure out what kind of diet is working out best for her.   Reviewed medication list and drug allergies with patient, pt reports no medication additions or modifications accordingly  Patient also denies any pending medical or dental procedures scheduled at this time    Pt is willing to come to the clinic to have her INR stabilized with her new diet. Given hx of stroke and possibility of eating more greens in the diet, pt was instructed to take 7.5mg on MWF, and 5mg all other days. Pt will RTC 1 week    Nba Kapoor, PharmD  PGY-1 Pharmacy Resident  [facility]      2024 11:21 AM      For Pharmacy Admin Tracking Only    Intervention Detail: Adherence Monitorin and Dose Adjustment: 1, reason: Therapy Optimization  Total # of Interventions Recommended: 3  Total # of Interventions Accepted: 3  Time Spent (min): 30

## 2024-08-15 DIAGNOSIS — I25.10 CORONARY ARTERY DISEASE INVOLVING NATIVE CORONARY ARTERY OF NATIVE HEART WITHOUT ANGINA PECTORIS: ICD-10-CM

## 2024-08-15 RX ORDER — ISOSORBIDE MONONITRATE 30 MG/1
60 TABLET, EXTENDED RELEASE ORAL DAILY
Qty: 180 TABLET | Refills: 3 | Status: SHIPPED | OUTPATIENT
Start: 2024-08-15

## 2024-08-15 NOTE — TELEPHONE ENCOUNTER
Requesting medication refill. Please approve or deny this request.    Rx requested:  Requested Prescriptions     Pending Prescriptions Disp Refills    isosorbide mononitrate (IMDUR) 30 MG extended release tablet 60 tablet 0     Sig: Take 2 tablets by mouth daily         Last Office Visit:   4/26/2024      Next Visit Date:  Future Appointments   Date Time Provider Department Center   8/19/2024  3:00 PM [facility]   9/16/2024  3:15 PM Steven Fowler MD [facility] -   5/1/2025  3:30 PM Anand Sherman DO [facility]

## 2024-08-19 ENCOUNTER — ANTI-COAG VISIT (OUTPATIENT)
Age: 54
End: 2024-08-19
Payer: COMMERCIAL

## 2024-08-19 DIAGNOSIS — D68.59 HYPERCOAGULABLE STATE (HCC): ICD-10-CM

## 2024-08-19 DIAGNOSIS — G45.8 OTHER TRANSIENT CEREBRAL ISCHEMIC ATTACKS AND RELATED SYNDROMES: ICD-10-CM

## 2024-08-19 DIAGNOSIS — I63.81 CEREBROVASCULAR ACCIDENT (CVA) OF LEFT THALAMUS (HCC): Primary | ICD-10-CM

## 2024-08-19 LAB
INTERNATIONAL NORMALIZATION RATIO, POC: 2.4
PROTHROMBIN TIME, POC: 0

## 2024-08-19 PROCEDURE — 99211 OFF/OP EST MAY X REQ PHY/QHP: CPT

## 2024-08-19 PROCEDURE — 85610 PROTHROMBIN TIME: CPT

## 2024-08-19 NOTE — PROGRESS NOTES
Ms. Pooja Mac is a 54 y.o. y/o female with history of CVA, hypercoagulable state  who presents today for anticoagulation monitoring and adjustment.    INR 2.4 is therapeutic for this patient (goal range 2.0-3.0) and is reflective of 42.5 mg TWD    Patient verifies current dosing regimen, patient able to verbally recall dose  Patient reports 0 missed doses since last INR  Patient denies s/sx clotting and/or stroke  Patient denies hematuria, epistaxis, rectal bleeding  Patient denies changes in diet, alcohol, or tobacco use  - patient remains on mostly liquid diet for denture procedure in January   Reviewed medication list and drug allergies with patient, updated any medication additions or modifications accordingly  Patient also denies any pending medical or dental procedures scheduled at this time    Patient has not been feeling well for the last few days and has been vomiting shortly after eating or taking meds, so patient unsure if she has been absorbing warfarin    Patient was instructed to continue 42.5 mg TWD and RTC 1 week - patient preference     Lexii Segovia, PharmD, BCPS  2024 2:56 PM      For Pharmacy Admin Tracking Only    Intervention Detail: Adherence Monitorin  Total # of Interventions Recommended: 1  Total # of Interventions Accepted: 1  Time Spent (min): 15

## 2024-08-20 RX ORDER — NITROGLYCERIN 0.4 MG/1
TABLET SUBLINGUAL
Qty: 25 TABLET | Refills: 3 | Status: SHIPPED | OUTPATIENT
Start: 2024-08-20

## 2024-08-20 NOTE — TELEPHONE ENCOUNTER
Pharmacy via fax is requesting medication refill. Please approve or deny this request.    Rx requested:  Requested Prescriptions     Pending Prescriptions Disp Refills    nitroGLYCERIN (NITROSTAT) 0.4 MG SL tablet 25 tablet 3     Sig: up to max of 3 total doses. If no relief after 1 dose, call 911.         Last Office Visit:   4/26/2024      Next Visit Date:  Future Appointments   Date Time Provider Department Center   8/27/2024  1:00 PM [facility]   9/16/2024  3:15 PM Steven Fowler MD [facility] -   5/1/2025  3:30 PM Holiday, DO [facility]

## 2024-08-27 ENCOUNTER — ANTI-COAG VISIT (OUTPATIENT)
Age: 54
End: 2024-08-27
Payer: COMMERCIAL

## 2024-08-27 DIAGNOSIS — D68.59 HYPERCOAGULABLE STATE (HCC): ICD-10-CM

## 2024-08-27 DIAGNOSIS — I63.81 CEREBROVASCULAR ACCIDENT (CVA) OF LEFT THALAMUS (HCC): Primary | ICD-10-CM

## 2024-08-27 DIAGNOSIS — G45.8 OTHER TRANSIENT CEREBRAL ISCHEMIC ATTACKS AND RELATED SYNDROMES: ICD-10-CM

## 2024-08-27 LAB
INTERNATIONAL NORMALIZATION RATIO, POC: 2.4
PROTHROMBIN TIME, POC: 0

## 2024-08-27 PROCEDURE — 99212 OFFICE O/P EST SF 10 MIN: CPT

## 2024-08-27 PROCEDURE — 85610 PROTHROMBIN TIME: CPT

## 2024-08-27 RX ORDER — WARFARIN SODIUM 5 MG/1
TABLET ORAL
Qty: 130 TABLET | Refills: 1 | Status: SHIPPED | OUTPATIENT
Start: 2024-08-27

## 2024-08-27 NOTE — PROGRESS NOTES
Ms. Pooja Mac is a 54 y.o. y/o female with history of  CVA, hypercoagulable state  who presents today for anticoagulation monitoring and adjustment.    INR 2.4 is therapeutic for this patient (goal range 2.0-3.0) and is reflective of 42.5 mg TWD    Patient verifies current dosing regimen, patient able to verbally recall dose  Patient reports 0 missed doses since last INR   Patient denies s/sx clotting and/or stroke  Patient denies hematuria, epistaxis, rectal bleeding  Patient denies changes in diet, alcohol, or tobacco use  - patient remains on mostly liquid diet for denture procedure in January   Reviewed medication list and drug allergies with patient, updated any medication additions or modifications accordingly  Patient also denies any pending medical or dental procedures scheduled at this time    Refill sent to [facility]    Patient was instructed to continue 42.5 mg TWD and RTC 2 weeks    Lexii Segovia, Rosa MD, BCPS  2024 12:41 PM      For Pharmacy Admin Tracking Only    Intervention Detail: Adherence Monitorin and Refill(s) Provided  Total # of Interventions Recommended: 1  Total # of Interventions Accepted: 1  Time Spent (min): 15

## 2024-09-13 ENCOUNTER — ANTI-COAG VISIT (OUTPATIENT)
Age: 54
End: 2024-09-13
Payer: COMMERCIAL

## 2024-09-13 DIAGNOSIS — D68.59 HYPERCOAGULABLE STATE (HCC): ICD-10-CM

## 2024-09-13 DIAGNOSIS — I63.81 CEREBROVASCULAR ACCIDENT (CVA) OF LEFT THALAMUS (HCC): Primary | ICD-10-CM

## 2024-09-13 DIAGNOSIS — G45.8 OTHER TRANSIENT CEREBRAL ISCHEMIC ATTACKS AND RELATED SYNDROMES: ICD-10-CM

## 2024-09-13 LAB
INTERNATIONAL NORMALIZATION RATIO, POC: 1.5
PROTHROMBIN TIME, POC: 0

## 2024-09-13 PROCEDURE — 99213 OFFICE O/P EST LOW 20 MIN: CPT | Performed by: PHARMACIST

## 2024-09-13 PROCEDURE — 85610 PROTHROMBIN TIME: CPT | Performed by: PHARMACIST

## 2024-09-16 ENCOUNTER — OFFICE VISIT (OUTPATIENT)
Dept: NEUROLOGY | Age: 54
End: 2024-09-16
Payer: COMMERCIAL

## 2024-09-16 VITALS
HEART RATE: 88 BPM | BODY MASS INDEX: 23.8 KG/M2 | DIASTOLIC BLOOD PRESSURE: 70 MMHG | WEIGHT: 152 LBS | SYSTOLIC BLOOD PRESSURE: 122 MMHG

## 2024-09-16 DIAGNOSIS — F90.0 ATTENTION DEFICIT HYPERACTIVITY DISORDER (ADHD), PREDOMINANTLY INATTENTIVE TYPE: ICD-10-CM

## 2024-09-16 DIAGNOSIS — E53.8 VITAMIN B12 DEFICIENCY: ICD-10-CM

## 2024-09-16 DIAGNOSIS — D68.59 HYPERCOAGULABLE STATE (HCC): ICD-10-CM

## 2024-09-16 DIAGNOSIS — I63.512 CEREBROVASCULAR ACCIDENT (CVA) DUE TO STENOSIS OF LEFT MIDDLE CEREBRAL ARTERY (HCC): Primary | ICD-10-CM

## 2024-09-16 DIAGNOSIS — Z15.89 MTHFR GENE MUTATION: ICD-10-CM

## 2024-09-16 PROCEDURE — 3078F DIAST BP <80 MM HG: CPT | Performed by: PSYCHIATRY & NEUROLOGY

## 2024-09-16 PROCEDURE — 99214 OFFICE O/P EST MOD 30 MIN: CPT | Performed by: PSYCHIATRY & NEUROLOGY

## 2024-09-16 PROCEDURE — 3074F SYST BP LT 130 MM HG: CPT | Performed by: PSYCHIATRY & NEUROLOGY

## 2024-09-30 ENCOUNTER — TELEPHONE (OUTPATIENT)
Age: 54
End: 2024-09-30

## 2024-09-30 DIAGNOSIS — G45.8 OTHER TRANSIENT CEREBRAL ISCHEMIC ATTACKS AND RELATED SYNDROMES: ICD-10-CM

## 2024-09-30 DIAGNOSIS — I63.81 CEREBROVASCULAR ACCIDENT (CVA) OF LEFT THALAMUS (HCC): Primary | ICD-10-CM

## 2024-09-30 DIAGNOSIS — D68.59 HYPERCOAGULABLE STATE (HCC): ICD-10-CM

## 2024-10-04 ENCOUNTER — TELEPHONE (OUTPATIENT)
Age: 54
End: 2024-10-04

## 2024-10-04 DIAGNOSIS — D68.59 HYPERCOAGULABLE STATE (HCC): ICD-10-CM

## 2024-10-04 DIAGNOSIS — G45.8 OTHER TRANSIENT CEREBRAL ISCHEMIC ATTACKS AND RELATED SYNDROMES: ICD-10-CM

## 2024-10-04 DIAGNOSIS — I63.81 CEREBROVASCULAR ACCIDENT (CVA) OF LEFT THALAMUS (HCC): Primary | ICD-10-CM

## 2024-10-08 DIAGNOSIS — D68.59 HYPERCOAGULABLE STATE (HCC): ICD-10-CM

## 2024-10-08 DIAGNOSIS — I63.512 CEREBROVASCULAR ACCIDENT (CVA) DUE TO STENOSIS OF LEFT MIDDLE CEREBRAL ARTERY (HCC): Primary | ICD-10-CM

## 2024-10-11 ENCOUNTER — ANTI-COAG VISIT (OUTPATIENT)
Age: 54
End: 2024-10-11
Payer: COMMERCIAL

## 2024-10-11 DIAGNOSIS — G45.8 OTHER TRANSIENT CEREBRAL ISCHEMIC ATTACKS AND RELATED SYNDROMES: ICD-10-CM

## 2024-10-11 DIAGNOSIS — I63.81 CEREBROVASCULAR ACCIDENT (CVA) OF LEFT THALAMUS (HCC): Primary | ICD-10-CM

## 2024-10-11 DIAGNOSIS — D68.59 HYPERCOAGULABLE STATE (HCC): ICD-10-CM

## 2024-10-11 LAB
INTERNATIONAL NORMALIZATION RATIO, POC: 1.3 (ref 2–3)
PROTHROMBIN TIME, POC: 0

## 2024-10-11 PROCEDURE — 99213 OFFICE O/P EST LOW 20 MIN: CPT

## 2024-10-11 PROCEDURE — 85610 PROTHROMBIN TIME: CPT

## 2024-10-11 RX ORDER — WARFARIN SODIUM 5 MG/1
TABLET ORAL
Qty: 130 TABLET | Refills: 1 | Status: SHIPPED | OUTPATIENT
Start: 2024-10-11

## 2024-10-11 NOTE — PROGRESS NOTES
Ms. Pooja Mac is a 54 y.o. y/o female with history of CVA, hypercoagulable state  who presents today for anticoagulation monitoring and adjustment.    INR 1.3 is subtherapeutic for this patient (goal range 2.0-3.0) and is reflective of 35 mg TWD    Patient verifies current dosing regimen, patient able to verbally recall dose  Patient reports 1 missed dose since last INR   Patient denies s/sx clotting and/or stroke  Patient denies hematuria, epistaxis, rectal bleeding  Patient denies changes in diet, alcohol, or tobacco use  Reviewed medication list and drug allergies with patient, updated any medication additions or modifications accordingly  Patient also denies any pending medical or dental procedures scheduled at this time    Patient has been subtherapeutic the last two appts, though has missed doses for both appts. Patient is concerned about her INR being low given her history of CVA, so discussed a slight increase in dose. Adherence to medication was emphasized as well, given recent missed doses    Refill sent to [facility]    Patient was instructed to boost with 10 mg tomorrow (already took today's dose) and then increase to 45 mg TWD (5.9% increase) and RTC 2 weeks    Lexii Segovia, PharmD, BCPS  10/11/2024 1:00 PM      For Pharmacy Admin Tracking Only    Intervention Detail: Adherence Monitorin, Dose Adjustment: 1, reason: Therapy Optimization, and Refill(s) Provided  Total # of Interventions Recommended: 3  Total # of Interventions Accepted: 3  Time Spent (min): 20

## 2024-10-28 ENCOUNTER — TELEPHONE (OUTPATIENT)
Age: 54
End: 2024-10-28

## 2024-11-01 NOTE — ED NOTES
Pt states she is pain free if she is lying down. Pt is refusing a second NTG. VS obtained. Pt awaiting results and dispo.       Tyler Luna RN  11/04/23 2031
yes...

## 2024-11-04 ENCOUNTER — TELEPHONE (OUTPATIENT)
Age: 54
End: 2024-11-04

## 2024-11-04 DIAGNOSIS — G45.8 OTHER TRANSIENT CEREBRAL ISCHEMIC ATTACKS AND RELATED SYNDROMES: ICD-10-CM

## 2024-11-04 DIAGNOSIS — D68.59 HYPERCOAGULABLE STATE (HCC): ICD-10-CM

## 2024-11-04 DIAGNOSIS — I63.81 CEREBROVASCULAR ACCIDENT (CVA) OF LEFT THALAMUS (HCC): Primary | ICD-10-CM

## 2024-11-06 ENCOUNTER — TELEPHONE (OUTPATIENT)
Age: 54
End: 2024-11-06

## 2024-11-06 DIAGNOSIS — I63.81 CEREBROVASCULAR ACCIDENT (CVA) OF LEFT THALAMUS (HCC): Primary | ICD-10-CM

## 2024-11-06 DIAGNOSIS — G45.8 OTHER TRANSIENT CEREBRAL ISCHEMIC ATTACKS AND RELATED SYNDROMES: ICD-10-CM

## 2024-11-06 DIAGNOSIS — D68.59 HYPERCOAGULABLE STATE (HCC): ICD-10-CM

## 2024-11-08 ENCOUNTER — TELEPHONE (OUTPATIENT)
Age: 54
End: 2024-11-08

## 2024-11-08 DIAGNOSIS — G45.8 OTHER TRANSIENT CEREBRAL ISCHEMIC ATTACKS AND RELATED SYNDROMES: ICD-10-CM

## 2024-11-08 DIAGNOSIS — I63.81 CEREBROVASCULAR ACCIDENT (CVA) OF LEFT THALAMUS (HCC): Primary | ICD-10-CM

## 2024-11-08 DIAGNOSIS — D68.59 HYPERCOAGULABLE STATE (HCC): ICD-10-CM

## 2024-11-08 NOTE — TELEPHONE ENCOUNTER
Patient missed appointment yesterday. First attempted phone call. Left a voice mail message. Tracker updated for 1 week

## 2024-11-10 RX ORDER — NITROGLYCERIN 0.4 MG/1
TABLET SUBLINGUAL
Qty: 25 TABLET | Refills: 3 | Status: SHIPPED | OUTPATIENT
Start: 2024-11-10

## 2024-11-19 NOTE — PROGRESS NOTES
Occupational Therapy  Facility/Department: [facility]  Daily Treatment Note  NAME: Kade Silva  : 1970  MRN: [id number]    Date of Service: 2021    Discharge Recommendations:  Continue to assess pending progress       Assessment   REQUIRES OT FOLLOW UP: Yes  Activity Tolerance  Activity Tolerance: Patient Tolerated treatment well  Safety Devices  Safety Devices in place: Not Applicable(Pt is in IND living suite)  Type of devices: All fall risk precautions in place         Patient Diagnosis(es): The primary encounter diagnosis was Multiple sclerosis ([facility] [address] Diagnoses of Neuromyelopathy due to vitamin B12 deficiency ([facility]) and Tendinitis of right rotator cuff were also pertinent to this visit. has a past medical history of Anxiety, Back pain, Bipolar disorder ([address] CAD (coronary artery disease), CAFL (chronic airflow limitation) ([facility]), Chronic bronchitis ([facility] [address] Chronic pain, Colitis, Complicated migraine, DDD (degenerative disc disease), lumbar, Depression, Endometriosis, Excessive caffeine abuse, continuous ([facility] [address] Gastritis, GERD (gastroesophageal reflux disease), History of myocardial infarction, HTN (hypertension), benign, Impaired mobility and activities of daily living, Over weight, PTSD (post-traumatic stress disorder), Sensory neuropathy, Somatization disorder, TIA (transient ischemic attack), Tobacco abuse, and Vasospastic angina ([address] has a past surgical history that includes Hysterectomy; Dilation and curettage of uterus; back surgery; Neck surgery; shoulder surgery (Left);  section; Cholecystectomy (13); Upper gastrointestinal endoscopy (2014); cyst removal (3/7/16); Coronary angioplasty; Upper gastrointestinal endoscopy (N/A, 2020); and Colonoscopy (N/A, 2020).     Restrictions  Restrictions/Precautions  Restrictions/Precautions: Fall Risk  Required Braces or Orthoses?: Yes  Required Braces or Orthoses  Right Upper Extremity Brace/Splint: Sling  Right Upper Extremity Brace/Splint: RUE immobilizer in place - pt reports rotator cuff repair done Dec 1  Subjective   Pain Assessment  Pain Assessment: 0-10  Pain Level: 7  Pain Type: Acute pain  Pain Location: Generalized  Pain Descriptors: Aching  Pain Frequency: Continuous  Pre Treatment Pain Screening  Pain at present: 7  Scale Used: Numeric Score  Intervention List: Patient able to continue with treatment  Vital Signs  Patient Currently in Pain: Yes   Orientation     Objective    Pt was provided with HEP for R UE strengthening with therapist demo including shoulder flexion, shoulder abduction, and shoulder horizontal abd/add, elbow flexion, and elbow extension. Pt completed 2x10 reps of each with a 2# DB to demonstrate good understanding of positioning and technique. Pt with min fatigue in R arm. Moderate RBs between sets. Pt was given printed handout of exercises for home. Pt doffed L UE brace to perform AROM of L elbow and wrist/hand. Pt had mod difficulty with movement of elbow but able to complete with encouragement and initial active assist. Pt performed 10 reps of elbow flex/ext with a 10 sec hold in full extension to provide a good stretch to the bicep. Pt's L UE movements are slow.       Balance  Sitting Balance: Modified independent   Standing Balance: Modified independent   Functional Mobility  Functional - Mobility Device: No device  Activity: Other(in room)  Assist Level: Modified independent      Transfers  Sit to stand: Modified independent  Stand to sit: Modified independent     Cognition  Cognition Comment: Comp: Supervision, Express: Min A, Social: Mod I, Prob: Supervision, Mem: Supervision     Plan   Plan  Times per week: 5-7x/week  Plan weeks: 1-1 1/2 weeks  Current Treatment Recommendations: Strengthening, Endurance Training, Self-Care / ADL, Balance Training, Pain Management, Home Management Training, Functional Mobility Training, Safety Education & Training  Plan Comment: Continue OT per POC Goals  Patient Goals   Patient goals : \"I can't believe I feel this weak, I want to get stronger\"       Therapy Time   Individual Concurrent Group Co-treatment   Time In 1430         Time Out 1530         Minutes 60              Therapeutic activities: 60 minutes      Huan Grover OT    Electronically signed by Huan Grover OT on 1/8/2021 at 4:25 PM Time-based billing (NON-critical care)

## 2024-11-20 ENCOUNTER — ANTI-COAG VISIT (OUTPATIENT)
Age: 54
End: 2024-11-20
Payer: COMMERCIAL

## 2024-11-20 DIAGNOSIS — G45.8 OTHER TRANSIENT CEREBRAL ISCHEMIC ATTACKS AND RELATED SYNDROMES: ICD-10-CM

## 2024-11-20 DIAGNOSIS — I63.81 CEREBROVASCULAR ACCIDENT (CVA) OF LEFT THALAMUS (HCC): Primary | ICD-10-CM

## 2024-11-20 DIAGNOSIS — D68.59 HYPERCOAGULABLE STATE (HCC): ICD-10-CM

## 2024-11-20 LAB
INTERNATIONAL NORMALIZATION RATIO, POC: 4.4
PROTHROMBIN TIME, POC: 0

## 2024-11-20 PROCEDURE — 85610 PROTHROMBIN TIME: CPT | Performed by: PHARMACIST

## 2024-11-20 PROCEDURE — 99213 OFFICE O/P EST LOW 20 MIN: CPT | Performed by: PHARMACIST

## 2024-11-20 NOTE — PROGRESS NOTES
Ms. Pooja Mac is a 54 y.o. y/o female with history of CVA who presents today for anticoagulation monitoring and adjustment.  INR 4.4 is supratherapeutic for this patient (goal range 2-3) and is reflective of 42.5 mg TWD.  Pt did not follow 45 mgTWD noted from last visit instructions.  Patient verifies current dosing regimen, patient able to verbally recall dose  Patient reports unknown  missed doses since last INR   Patient denies s/sx clotting and/or stroke  Patient denies hematuria, epistaxis, rectal bleeding  Patient denies changes in diet, alcohol, or tobacco use  Reviewed medication list and drug allergies with patient, updated any medication additions or modifications accordingly  Patient also denies any pending medical or dental procedures scheduled at this time.  Pt  eats very few vit k foods (states is no change from last couple months)   Patient was instructed to hold dose tomorrow (has already taken dose this AM) then reduce to 40mg TWD and RTC 2 weeks      For Pharmacy Admin Tracking Only    Intervention Detail: Dose Adjustment: 1, reason: Therapy De-escalation  Total # of Interventions Recommended: 1  Total # of Interventions Accepted: 1  Time Spent (min): 20

## 2024-12-09 ENCOUNTER — TELEPHONE (OUTPATIENT)
Age: 54
End: 2024-12-09

## 2024-12-09 DIAGNOSIS — I63.81 CEREBROVASCULAR ACCIDENT (CVA) OF LEFT THALAMUS (HCC): Primary | ICD-10-CM

## 2024-12-09 DIAGNOSIS — D68.59 HYPERCOAGULABLE STATE (HCC): ICD-10-CM

## 2024-12-09 DIAGNOSIS — G45.8 OTHER TRANSIENT CEREBRAL ISCHEMIC ATTACKS AND RELATED SYNDROMES: ICD-10-CM

## 2024-12-09 NOTE — TELEPHONE ENCOUNTER
Called patient regarding no show on 12/6. Stated she is having OR (ears) on 12/16 and will be holding warfarin effective 12/11.Patient stated she still wanted to come in today to check prior to holding (even though I instructed her it was not needed at this time)- set appointment for 230pm today.  Patient no show for 230pm appointment.  Called patient- will continue with hold as above with resumption of warfarin on 12/17per pt. Scheduled appointment for 12/23 at 130pm to check INR post restart.

## 2024-12-23 ENCOUNTER — ANTI-COAG VISIT (OUTPATIENT)
Age: 54
End: 2024-12-23
Payer: COMMERCIAL

## 2024-12-23 DIAGNOSIS — D68.59 HYPERCOAGULABLE STATE (HCC): ICD-10-CM

## 2024-12-23 DIAGNOSIS — G45.8 OTHER TRANSIENT CEREBRAL ISCHEMIC ATTACKS AND RELATED SYNDROMES: ICD-10-CM

## 2024-12-23 DIAGNOSIS — I63.81 CEREBROVASCULAR ACCIDENT (CVA) OF LEFT THALAMUS (HCC): Primary | ICD-10-CM

## 2024-12-23 LAB
INTERNATIONAL NORMALIZATION RATIO, POC: 2.3
PROTHROMBIN TIME, POC: 0

## 2024-12-23 PROCEDURE — 85610 PROTHROMBIN TIME: CPT

## 2024-12-23 PROCEDURE — 99211 OFF/OP EST MAY X REQ PHY/QHP: CPT

## 2024-12-23 NOTE — PROGRESS NOTES
Ms. Pooja Mac is a 54 y.o. y/o female with history of CVA who presents today for anticoagulation monitoring and adjustment.  INR 2.3 is therapeutic for this patient (goal range 2.0-3.0) and is reflective of 40 mg TWD  Patient verifies current dosing regimen, patient able to verbally recall dose  Patient reports 5 missed doses for procedure on  since last INR. Patient self-increased dose post-hold for cochlear device implant procedure   Patient denies s/sx clotting and/or stroke  Patient denies hematuria, epistaxis, rectal bleeding  Patient denies changes in diet, alcohol, or tobacco use  Reviewed medication list and drug allergies with patient, updated any medication additions or modifications accordingly   Patient also denies any pending medical or dental procedures scheduled at this time  Patient was instructed to continue current regimen 5 mg daily except 7.5 mg Mon/Fri (40 mg TWD) and RTC 2 weeks. Of note, patient has a preference for Friday afternoon appointment     Rosa M GrandaD   For Pharmacy Admin Tracking Only    Intervention Detail: Adherence Monitorin  Total # of Interventions Recommended: 1  Total # of Interventions Accepted: 1  Time Spent (min): 15

## 2024-12-24 NOTE — TELEPHONE ENCOUNTER
Requesting medication refill. Please approve or deny this request.    Rx requested:  Requested Prescriptions     Pending Prescriptions Disp Refills    atorvastatin (LIPITOR) 20 MG tablet [Pharmacy Med Name: atorvastatin 20 mg tablet] 90 tablet 3     Sig: Take 1 tablet by mouth nightly         Last Office Visit:   4/26/2024      Next Visit Date:  Future Appointments   Date Time Provider Department Center   1/10/2025  2:00 PM [facility]   3/17/2025  3:45 PM Steven Fowler MD [facility] -   5/1/2025  3:30 PM Anand Sherman DO [facility]

## 2024-12-26 RX ORDER — ATORVASTATIN CALCIUM 20 MG/1
20 TABLET, FILM COATED ORAL NIGHTLY
Qty: 90 TABLET | Refills: 3 | Status: SHIPPED | OUTPATIENT
Start: 2024-12-26

## 2025-01-13 ENCOUNTER — TELEPHONE (OUTPATIENT)
Age: 55
End: 2025-01-13

## 2025-01-13 NOTE — TELEPHONE ENCOUNTER
First phone call from overdue list. Updated INR reminder date to reflect when second call due  Left VM to reschedule

## 2025-01-20 ENCOUNTER — TELEPHONE (OUTPATIENT)
Age: 55
End: 2025-01-20

## 2025-01-30 ENCOUNTER — TELEPHONE (OUTPATIENT)
Age: 55
End: 2025-01-30

## 2025-01-30 DIAGNOSIS — I63.81 CEREBROVASCULAR ACCIDENT (CVA) OF LEFT THALAMUS (HCC): Primary | ICD-10-CM

## 2025-01-30 DIAGNOSIS — G45.8 OTHER TRANSIENT CEREBRAL ISCHEMIC ATTACKS AND RELATED SYNDROMES: ICD-10-CM

## 2025-01-30 DIAGNOSIS — D68.59 HYPERCOAGULABLE STATE (HCC): ICD-10-CM

## 2025-02-06 ENCOUNTER — TELEPHONE (OUTPATIENT)
Age: 55
End: 2025-02-06

## 2025-02-06 DIAGNOSIS — I63.81 CEREBROVASCULAR ACCIDENT (CVA) OF LEFT THALAMUS (HCC): Primary | ICD-10-CM

## 2025-02-06 DIAGNOSIS — G45.8 OTHER TRANSIENT CEREBRAL ISCHEMIC ATTACKS AND RELATED SYNDROMES: ICD-10-CM

## 2025-02-06 DIAGNOSIS — D68.59 HYPERCOAGULABLE STATE (HCC): ICD-10-CM

## 2025-02-17 ENCOUNTER — TELEPHONE (OUTPATIENT)
Age: 55
End: 2025-02-17

## 2025-02-17 DIAGNOSIS — I63.81 CEREBROVASCULAR ACCIDENT (CVA) OF LEFT THALAMUS (HCC): Primary | ICD-10-CM

## 2025-02-17 DIAGNOSIS — G45.8 OTHER TRANSIENT CEREBRAL ISCHEMIC ATTACKS AND RELATED SYNDROMES: ICD-10-CM

## 2025-02-17 DIAGNOSIS — D68.59 HYPERCOAGULABLE STATE: ICD-10-CM

## 2025-02-20 ENCOUNTER — ANTI-COAG VISIT (OUTPATIENT)
Age: 55
End: 2025-02-20
Payer: COMMERCIAL

## 2025-02-20 DIAGNOSIS — I63.81 CEREBROVASCULAR ACCIDENT (CVA) OF LEFT THALAMUS (HCC): Primary | ICD-10-CM

## 2025-02-20 DIAGNOSIS — D68.59 HYPERCOAGULABLE STATE: ICD-10-CM

## 2025-02-20 DIAGNOSIS — G45.8 OTHER TRANSIENT CEREBRAL ISCHEMIC ATTACKS AND RELATED SYNDROMES: ICD-10-CM

## 2025-02-20 LAB
INTERNATIONAL NORMALIZATION RATIO, POC: 3.1
PROTHROMBIN TIME, POC: 0

## 2025-02-20 PROCEDURE — 85610 PROTHROMBIN TIME: CPT | Performed by: PHARMACIST

## 2025-02-20 PROCEDURE — 99212 OFFICE O/P EST SF 10 MIN: CPT | Performed by: PHARMACIST

## 2025-02-20 NOTE — PROGRESS NOTES
Ms. Pooja Mac is a 54 y.o. y/o female with history of CVA who presents today for anticoagulation monitoring and adjustment.  INR 3.1 is sl supra-therapeutic for this patient (goal range 2-3) and is reflective of 40 mg TWD  Patient verifies current dosing regimen, patient able to verbally recall dose  Patient reports 0 missed doses since last INR   Patient denies s/sx clotting and/or stroke  Patient denies hematuria, epistaxis, rectal bleeding  Patient denies changes in diet, alcohol, or tobacco use  Reviewed medication list and drug allergies with patient, updated any medication additions or modifications accordingly  Patient also denies any pending medical or dental procedures scheduled at this time  Patient was instructed to continue 40mg TWD and RTC 4 weeks    For Pharmacy Admin Tracking Only    Intervention Detail: Adherence Monitorin  Total # of Interventions Recommended: 1  Total # of Interventions Accepted: 1  Time Spent (min): 15

## 2025-03-14 ENCOUNTER — OFFICE VISIT (OUTPATIENT)
Dept: CARDIOLOGY CLINIC | Age: 55
End: 2025-03-14
Payer: COMMERCIAL

## 2025-03-14 VITALS
SYSTOLIC BLOOD PRESSURE: 102 MMHG | OXYGEN SATURATION: 98 % | RESPIRATION RATE: 16 BRPM | WEIGHT: 167.8 LBS | BODY MASS INDEX: 26.27 KG/M2 | HEART RATE: 86 BPM | DIASTOLIC BLOOD PRESSURE: 66 MMHG

## 2025-03-14 DIAGNOSIS — I25.10 CORONARY ARTERY DISEASE INVOLVING NATIVE CORONARY ARTERY OF NATIVE HEART WITHOUT ANGINA PECTORIS: Primary | ICD-10-CM

## 2025-03-14 DIAGNOSIS — Z15.89 MTHFR GENE MUTATION: ICD-10-CM

## 2025-03-14 DIAGNOSIS — Z86.74 H/O CARDIAC ARREST: ICD-10-CM

## 2025-03-14 DIAGNOSIS — I10 HYPERTENSION, UNSPECIFIED TYPE: ICD-10-CM

## 2025-03-14 DIAGNOSIS — R06.09 DOE (DYSPNEA ON EXERTION): ICD-10-CM

## 2025-03-14 DIAGNOSIS — Z72.0 TOBACCO ABUSE: ICD-10-CM

## 2025-03-14 DIAGNOSIS — E72.11 MTHFR (METHYLENE THF REDUCTASE) DEFICIENCY AND HOMOCYSTINURIA: ICD-10-CM

## 2025-03-14 DIAGNOSIS — E72.12 MTHFR (METHYLENE THF REDUCTASE) DEFICIENCY AND HOMOCYSTINURIA: ICD-10-CM

## 2025-03-14 PROCEDURE — 93000 ELECTROCARDIOGRAM COMPLETE: CPT | Performed by: INTERNAL MEDICINE

## 2025-03-14 PROCEDURE — 3074F SYST BP LT 130 MM HG: CPT | Performed by: INTERNAL MEDICINE

## 2025-03-14 PROCEDURE — 99214 OFFICE O/P EST MOD 30 MIN: CPT | Performed by: INTERNAL MEDICINE

## 2025-03-14 PROCEDURE — 3078F DIAST BP <80 MM HG: CPT | Performed by: INTERNAL MEDICINE

## 2025-03-14 RX ORDER — DEXTROAMPHETAMINE SACCHARATE, AMPHETAMINE ASPARTATE, DEXTROAMPHETAMINE SULFATE AND AMPHETAMINE SULFATE 7.5; 7.5; 7.5; 7.5 MG/1; MG/1; MG/1; MG/1
30 TABLET ORAL 2 TIMES DAILY
COMMUNITY

## 2025-03-14 ASSESSMENT — ENCOUNTER SYMPTOMS
EYES NEGATIVE: 1
GASTROINTESTINAL NEGATIVE: 1
WHEEZING: 0
VOMITING: 0
ABDOMINAL PAIN: 0
NAUSEA: 0
SHORTNESS OF BREATH: 0

## 2025-03-14 NOTE — PROGRESS NOTES
involving native coronary artery of native heart without angina pectoris  EKG 12 lead      2. H/O cardiac arrest        3. MTHFR gene mutation        4. MTHFR (methylene THF reductase) deficiency and homocystinuria        5. Hypertension, unspecified type        6. Tobacco abuse        7. SAMAYOA (dyspnea on exertion)            PLAN:         Smoking cessation was strongly recommended    Coumadin for life given CVA. Follow up with coumadin clinic.      Patient was advised and encouraged to check blood pressure at home or at a pharmacy, maintain a logbook, and also call us back if blood pressure are above the target ranges or if it is low. Patient clearly understands and agrees to the instructions.     We will need to continue to monitor muscle and liver enzymes, BUN, CR, and electrolytes.    No caffeine    Check EKG    No follow-ups on file.    An  electronic signature was used to authenticate this note.    --Anand Sherman, DO on 3/14/2025 at 12:59 PM  9}

## 2025-03-17 ENCOUNTER — OFFICE VISIT (OUTPATIENT)
Dept: NEUROLOGY | Age: 55
End: 2025-03-17
Payer: COMMERCIAL

## 2025-03-17 ENCOUNTER — ANTI-COAG VISIT (OUTPATIENT)
Age: 55
End: 2025-03-17
Payer: COMMERCIAL

## 2025-03-17 VITALS
WEIGHT: 169 LBS | BODY MASS INDEX: 26.46 KG/M2 | DIASTOLIC BLOOD PRESSURE: 62 MMHG | SYSTOLIC BLOOD PRESSURE: 120 MMHG | HEART RATE: 80 BPM

## 2025-03-17 DIAGNOSIS — Z15.89 MTHFR GENE MUTATION: ICD-10-CM

## 2025-03-17 DIAGNOSIS — G45.8 OTHER TRANSIENT CEREBRAL ISCHEMIC ATTACKS AND RELATED SYNDROMES: ICD-10-CM

## 2025-03-17 DIAGNOSIS — I63.512 CEREBROVASCULAR ACCIDENT (CVA) DUE TO STENOSIS OF LEFT MIDDLE CEREBRAL ARTERY (HCC): Primary | ICD-10-CM

## 2025-03-17 DIAGNOSIS — H81.393 VERTIGO, PERIPHERAL, BILATERAL: ICD-10-CM

## 2025-03-17 DIAGNOSIS — D68.59 HYPERCOAGULABLE STATE: ICD-10-CM

## 2025-03-17 DIAGNOSIS — H93.13 TINNITUS, BILATERAL: ICD-10-CM

## 2025-03-17 DIAGNOSIS — I63.81 CEREBROVASCULAR ACCIDENT (CVA) OF LEFT THALAMUS (HCC): Primary | ICD-10-CM

## 2025-03-17 DIAGNOSIS — E53.8 VITAMIN B12 DEFICIENCY: ICD-10-CM

## 2025-03-17 LAB
INTERNATIONAL NORMALIZATION RATIO, POC: 4
PROTHROMBIN TIME, POC: 0

## 2025-03-17 PROCEDURE — 85610 PROTHROMBIN TIME: CPT

## 2025-03-17 PROCEDURE — 99214 OFFICE O/P EST MOD 30 MIN: CPT | Performed by: PSYCHIATRY & NEUROLOGY

## 2025-03-17 PROCEDURE — 99213 OFFICE O/P EST LOW 20 MIN: CPT

## 2025-03-17 PROCEDURE — 3078F DIAST BP <80 MM HG: CPT | Performed by: PSYCHIATRY & NEUROLOGY

## 2025-03-17 PROCEDURE — 3074F SYST BP LT 130 MM HG: CPT | Performed by: PSYCHIATRY & NEUROLOGY

## 2025-03-17 NOTE — PROGRESS NOTES
Ms. Pooja Mac is a 54 y.o. y/o female with history of CVA who presents today for anticoagulation monitoring and adjustment.  INR 4.0 is supra-therapeutic for this patient (goal range 2.0-3.0) and is reflective of 40 mg TWD  Patient verifies current dosing regimen, patient able to verbally recall dose  Patient reports 0 missed doses since last INR   Patient denies s/sx clotting and/or stroke  Patient denies hematuria, epistaxis, rectal bleeding  Patient denies changes in diet, alcohol, or tobacco use  Reviewed medication list and drug allergies with patient, updated any medication additions or modifications accordingly. Patient states she has had more chronic pain lately but has not been taking anything OTC like acetaminophen for the pain  Patient also denies any pending medical or dental procedures scheduled at this time  Patient was instructed to hold warfarin today then take reduced dose 2.5 mg tomorrow then start reduced regimen 5 mg daily except 7.5 mg on  (6.2% decrease) and RTC 3 weeks    Olive Galindo, PharmD   For Pharmacy Admin Tracking Only    Intervention Detail: Adherence Monitorin and Dose Adjustment: 2, reason: Therapy De-escalation  Total # of Interventions Recommended: 3  Total # of Interventions Accepted: 3  Time Spent (min): 15

## 2025-03-17 NOTE — PROGRESS NOTES
Subjective:      Patient ID: Pooja Mac is a 54 y.o. female who presents today for:  Chief Complaint   Patient presents with    6 Month Follow-Up     Pt states things are good. No questions or concern at this time        HPI 54 right-handed female with history of stroke.  Patient also has a MTHFR gene mutation with hypercoagulable state and vitamin B12 deficiency with the same.  She continues on Coumadin.  Patient also has homocystinuria.  We see her for ADD and she is on Adderall 20 mg in the morning and 10 mg in the afternoon.  She further does not have any history is a history of vertigo or tunnel vision.  Patient doing well amphetamines.    Patient has not any bleeding or bruising we prescribed the Coumadin and follow the Coumadin clinic evaluation  Past Medical History:   Diagnosis Date    Anxiety     Back pain     back surgery x 4    Bipolar disorder ([facility])     CAD (coronary artery disease)     CAFL (chronic airflow limitation) ([facility]) 11/3/2016    Cerebral artery occlusion with cerebral infarction ([facility])     Chronic bronchitis ([facility])     Chronic pain     Colitis     Complicated migraine     DDD (degenerative disc disease), lumbar 2016    Depression     Endometriosis     Excessive caffeine abuse, continuous ([facility]) 2018    Gastritis     GERD (gastroesophageal reflux disease)     History of myocardial infarction     HTN (hypertension), benign 2016    Impaired mobility and activities of daily living     Over weight     PTSD (post-traumatic stress disorder)     Sensory neuropathy 2016    Somatization disorder     TIA (transient ischemic attack)     Tobacco abuse     Vasospastic angina 2016     Past Surgical History:   Procedure Laterality Date    BACK SURGERY      x2    CARDIAC PROCEDURE N/A 2023    Left heart cath / coronary angiography possible percutaneous coronary intervention room 175 [place] Anand Sherman [place] [facility] [place] [facility]     SECTION      x2    
(1) Other Diagnosis

## 2025-03-19 RX ORDER — NITROGLYCERIN 0.4 MG/1
TABLET SUBLINGUAL
Qty: 25 TABLET | Refills: 3 | Status: SHIPPED | OUTPATIENT
Start: 2025-03-19

## 2025-03-19 NOTE — TELEPHONE ENCOUNTER
Requesting medication refill. Please approve or deny this request.    Rx requested:  Requested Prescriptions     Pending Prescriptions Disp Refills    nitroGLYCERIN (NITROSTAT) 0.4 MG SL tablet [Pharmacy Med Name: nitroglycerin 0.4 mg sublingual tablet] 25 tablet 3     Sig: DISSOLVE 1 TABLET UNDER THE TONGUE AS NEEDED FOR CHEST PAIN- MAY REPEAT EVERY 5 MINUTES IF NEEDED (MAX 3 DOSES.- IF NO RELIEF CALL 911)         Last Office Visit:   3/14/2025      Next Visit Date:  Future Appointments   Date Time Provider Department Center   4/7/2025  2:00 PM [facility]   3/16/2026  1:00 PM Steven Fowler MD [place] [facility] -

## 2025-04-08 ENCOUNTER — TELEPHONE (OUTPATIENT)
Age: 55
End: 2025-04-08

## 2025-04-08 NOTE — TELEPHONE ENCOUNTER
1st overdue call  Left VM to call and reschedule missed appointment from 4/7/25  Reset tracker for 1 week.

## 2025-04-14 RX ORDER — WARFARIN SODIUM 5 MG/1
TABLET ORAL
Qty: 130 TABLET | Refills: 1 | Status: SHIPPED | OUTPATIENT
Start: 2025-04-14

## 2025-04-15 ENCOUNTER — TELEPHONE (OUTPATIENT)
Age: 55
End: 2025-04-15

## 2025-04-15 DIAGNOSIS — G45.8 OTHER TRANSIENT CEREBRAL ISCHEMIC ATTACKS AND RELATED SYNDROMES: ICD-10-CM

## 2025-04-15 DIAGNOSIS — D68.59 HYPERCOAGULABLE STATE: ICD-10-CM

## 2025-04-15 DIAGNOSIS — I63.81 CEREBROVASCULAR ACCIDENT (CVA) OF LEFT THALAMUS (HCC): Primary | ICD-10-CM

## 2025-04-22 ENCOUNTER — TELEPHONE (OUTPATIENT)
Age: 55
End: 2025-04-22

## 2025-04-22 DIAGNOSIS — G45.8 OTHER TRANSIENT CEREBRAL ISCHEMIC ATTACKS AND RELATED SYNDROMES: ICD-10-CM

## 2025-04-22 DIAGNOSIS — I63.81 CEREBROVASCULAR ACCIDENT (CVA) OF LEFT THALAMUS (HCC): Primary | ICD-10-CM

## 2025-04-22 DIAGNOSIS — D68.59 HYPERCOAGULABLE STATE: ICD-10-CM

## 2025-05-04 RX ORDER — AMLODIPINE BESYLATE 5 MG/1
5 TABLET ORAL DAILY
Qty: 90 TABLET | Refills: 3 | Status: SHIPPED | OUTPATIENT
Start: 2025-05-04

## 2025-05-08 ENCOUNTER — ANTI-COAG VISIT (OUTPATIENT)
Age: 55
End: 2025-05-08
Payer: COMMERCIAL

## 2025-05-08 DIAGNOSIS — D68.59 HYPERCOAGULABLE STATE: ICD-10-CM

## 2025-05-08 DIAGNOSIS — I63.81 CEREBROVASCULAR ACCIDENT (CVA) OF LEFT THALAMUS (HCC): Primary | ICD-10-CM

## 2025-05-08 DIAGNOSIS — G45.8 OTHER TRANSIENT CEREBRAL ISCHEMIC ATTACKS AND RELATED SYNDROMES: ICD-10-CM

## 2025-05-08 LAB
INTERNATIONAL NORMALIZATION RATIO, POC: 2.8
PROTHROMBIN TIME, POC: 0

## 2025-05-08 PROCEDURE — 99211 OFF/OP EST MAY X REQ PHY/QHP: CPT

## 2025-05-08 PROCEDURE — 85610 PROTHROMBIN TIME: CPT

## 2025-05-08 NOTE — PROGRESS NOTES
Ms. Pooja Mac is a 54 y.o. y/o female with history of CVA who presents today for anticoagulation monitoring and adjustment.  INR 2.8 is therapeutic for this patient (goal range 2.0-3.0) and is reflective of 37.5 mg TWD  Patient verifies current dosing regimen, patient able to verbally recall dose  Patient reports 0 missed doses since last INR   Patient denies s/sx clotting and/or stroke  Patient denies hematuria, epistaxis, rectal bleeding  Patient denies changes in diet, alcohol, or tobacco use  Reviewed medication list and drug allergies with patient, updated any medication additions or modifications accordingly. Patient recently paused taking her amlodipine per provider instruction due to recent low blood pressures.   Patient also denies any pending medical or dental procedures scheduled at this time  Patient was instructed to continue warfarin 5 mg daily except 7.5 mg on Friday (37.5 mg TWD) and RTC 5 weeks    Olive Galindo, PharmD   For Pharmacy Admin Tracking Only    Intervention Detail: Adherence Monitorin  Total # of Interventions Recommended: 1  Total # of Interventions Accepted: 1  Time Spent (min): 15

## 2025-06-12 ENCOUNTER — TELEPHONE (OUTPATIENT)
Age: 55
End: 2025-06-12

## 2025-06-24 ENCOUNTER — TELEPHONE (OUTPATIENT)
Age: 55
End: 2025-06-24

## 2025-06-24 DIAGNOSIS — D68.59 HYPERCOAGULABLE STATE: ICD-10-CM

## 2025-06-24 DIAGNOSIS — G45.8 OTHER TRANSIENT CEREBRAL ISCHEMIC ATTACKS AND RELATED SYNDROMES: ICD-10-CM

## 2025-06-24 DIAGNOSIS — I63.81 CEREBROVASCULAR ACCIDENT (CVA) OF LEFT THALAMUS (HCC): Primary | ICD-10-CM

## 2025-06-24 NOTE — TELEPHONE ENCOUNTER
TC to patient to re-schedule \"No Show\" appointment from 6/20/25. Left message to return my call  This is considered a 2nd call for a missed appointment on 4/7/25  Last date patient was actually seen in clinic: 2/20/25  Reset tracker for one week    Raegan Villalba [facility]  6/24/2025  9:40 AM

## 2025-07-01 ENCOUNTER — TELEPHONE (OUTPATIENT)
Age: 55
End: 2025-07-01

## 2025-07-01 DIAGNOSIS — D68.59 HYPERCOAGULABLE STATE: ICD-10-CM

## 2025-07-01 DIAGNOSIS — I63.81 CEREBROVASCULAR ACCIDENT (CVA) OF LEFT THALAMUS (HCC): Primary | ICD-10-CM

## 2025-07-01 DIAGNOSIS — G45.8 OTHER TRANSIENT CEREBRAL ISCHEMIC ATTACKS AND RELATED SYNDROMES: ICD-10-CM

## 2025-07-01 NOTE — TELEPHONE ENCOUNTER
Patient last seen 5/8/25    Second overdue phone call today 7/1/25    First overdue call 6/24/25      Follow up in 2 weeks with 1st letter if no reply.

## 2025-07-15 ENCOUNTER — TELEPHONE (OUTPATIENT)
Age: 55
End: 2025-07-15

## 2025-07-15 DIAGNOSIS — D68.59 HYPERCOAGULABLE STATE: ICD-10-CM

## 2025-07-15 DIAGNOSIS — G45.8 OTHER TRANSIENT CEREBRAL ISCHEMIC ATTACKS AND RELATED SYNDROMES: ICD-10-CM

## 2025-07-15 DIAGNOSIS — I63.81 CEREBROVASCULAR ACCIDENT (CVA) OF LEFT THALAMUS (HCC): Primary | ICD-10-CM

## 2025-07-29 ENCOUNTER — TELEPHONE (OUTPATIENT)
Age: 55
End: 2025-07-29

## 2025-07-29 DIAGNOSIS — I63.81 CEREBROVASCULAR ACCIDENT (CVA) OF LEFT THALAMUS (HCC): Primary | ICD-10-CM

## 2025-07-29 DIAGNOSIS — G45.8 OTHER TRANSIENT CEREBRAL ISCHEMIC ATTACKS AND RELATED SYNDROMES: ICD-10-CM

## 2025-07-29 DIAGNOSIS — D68.59 HYPERCOAGULABLE STATE: ICD-10-CM

## 2025-08-04 ENCOUNTER — TELEPHONE (OUTPATIENT)
Age: 55
End: 2025-08-04

## 2025-08-04 DIAGNOSIS — G45.8 OTHER TRANSIENT CEREBRAL ISCHEMIC ATTACKS AND RELATED SYNDROMES: ICD-10-CM

## 2025-08-04 DIAGNOSIS — D68.59 HYPERCOAGULABLE STATE: ICD-10-CM

## 2025-08-04 DIAGNOSIS — I63.81 CEREBROVASCULAR ACCIDENT (CVA) OF LEFT THALAMUS (HCC): Primary | ICD-10-CM

## 2025-08-05 ENCOUNTER — ANTI-COAG VISIT (OUTPATIENT)
Age: 55
End: 2025-08-05
Payer: COMMERCIAL

## 2025-08-05 DIAGNOSIS — D68.59 HYPERCOAGULABLE STATE: ICD-10-CM

## 2025-08-05 DIAGNOSIS — G45.8 OTHER TRANSIENT CEREBRAL ISCHEMIC ATTACKS AND RELATED SYNDROMES: ICD-10-CM

## 2025-08-05 DIAGNOSIS — I63.81 CEREBROVASCULAR ACCIDENT (CVA) OF LEFT THALAMUS (HCC): Primary | ICD-10-CM

## 2025-08-05 LAB
INTERNATIONAL NORMALIZATION RATIO, POC: 2.6 (ref 2–3)
PROTHROMBIN TIME, POC: 0

## 2025-08-05 PROCEDURE — 99211 OFF/OP EST MAY X REQ PHY/QHP: CPT

## 2025-08-05 PROCEDURE — 85610 PROTHROMBIN TIME: CPT

## 2025-08-11 DIAGNOSIS — I25.10 CORONARY ARTERY DISEASE INVOLVING NATIVE CORONARY ARTERY OF NATIVE HEART WITHOUT ANGINA PECTORIS: ICD-10-CM

## 2025-08-12 RX ORDER — NITROGLYCERIN 0.4 MG/1
TABLET SUBLINGUAL
Qty: 25 TABLET | Refills: 3 | Status: SHIPPED | OUTPATIENT
Start: 2025-08-12

## 2025-08-12 RX ORDER — ISOSORBIDE MONONITRATE 30 MG/1
60 TABLET, EXTENDED RELEASE ORAL DAILY
Qty: 180 TABLET | Refills: 1 | Status: SHIPPED | OUTPATIENT
Start: 2025-08-12

## 2025-08-14 ENCOUNTER — OFFICE VISIT (OUTPATIENT)
Age: 55
End: 2025-08-14
Payer: COMMERCIAL

## 2025-08-14 VITALS
DIASTOLIC BLOOD PRESSURE: 84 MMHG | OXYGEN SATURATION: 97 % | WEIGHT: 181.4 LBS | HEART RATE: 92 BPM | BODY MASS INDEX: 28.4 KG/M2 | SYSTOLIC BLOOD PRESSURE: 138 MMHG

## 2025-08-14 DIAGNOSIS — I10 HYPERTENSION, UNSPECIFIED TYPE: ICD-10-CM

## 2025-08-14 DIAGNOSIS — I25.10 CORONARY ARTERY DISEASE INVOLVING NATIVE CORONARY ARTERY OF NATIVE HEART WITHOUT ANGINA PECTORIS: Primary | ICD-10-CM

## 2025-08-14 PROCEDURE — 3075F SYST BP GE 130 - 139MM HG: CPT

## 2025-08-14 PROCEDURE — 3079F DIAST BP 80-89 MM HG: CPT

## 2025-08-14 PROCEDURE — 99214 OFFICE O/P EST MOD 30 MIN: CPT

## 2025-08-14 RX ORDER — CARVEDILOL 12.5 MG/1
12.5 TABLET ORAL 2 TIMES DAILY
Qty: 180 TABLET | Refills: 0 | Status: SHIPPED | OUTPATIENT
Start: 2025-08-14

## 2025-08-14 RX ORDER — ACETAMINOPHEN 325 MG/1
TABLET ORAL
COMMUNITY
Start: 2025-07-11

## 2025-08-14 RX ORDER — FLUOXETINE HYDROCHLORIDE 40 MG/1
CAPSULE ORAL
COMMUNITY
Start: 2025-08-06

## 2025-08-14 RX ORDER — TIRZEPATIDE 2.5 MG/.5ML
INJECTION, SOLUTION SUBCUTANEOUS
COMMUNITY

## 2025-08-14 RX ORDER — LIDOCAINE PAIN RELIEF 40 MG/1000MG
PATCH TOPICAL
COMMUNITY
Start: 2025-07-11

## 2025-08-14 RX ORDER — PANTOPRAZOLE SODIUM 40 MG/1
TABLET, DELAYED RELEASE ORAL DAILY
COMMUNITY
Start: 2025-08-11

## 2025-08-14 ASSESSMENT — ENCOUNTER SYMPTOMS
WHEEZING: 0
GASTROINTESTINAL NEGATIVE: 1
SHORTNESS OF BREATH: 0
ABDOMINAL PAIN: 0
NAUSEA: 0
EYES NEGATIVE: 1
VOMITING: 0

## 2025-08-19 ENCOUNTER — TELEPHONE (OUTPATIENT)
Age: 55
End: 2025-08-19

## 2025-09-02 ENCOUNTER — TELEPHONE (OUTPATIENT)
Age: 55
End: 2025-09-02

## 2025-09-02 DIAGNOSIS — D68.59 HYPERCOAGULABLE STATE: ICD-10-CM

## 2025-09-02 DIAGNOSIS — G45.8 OTHER TRANSIENT CEREBRAL ISCHEMIC ATTACKS AND RELATED SYNDROMES: ICD-10-CM

## 2025-09-02 DIAGNOSIS — I63.81 CEREBROVASCULAR ACCIDENT (CVA) OF LEFT THALAMUS (HCC): Primary | ICD-10-CM

## 2025-09-03 ENCOUNTER — OFFICE VISIT (OUTPATIENT)
Age: 55
End: 2025-09-03
Payer: COMMERCIAL

## 2025-09-03 VITALS
SYSTOLIC BLOOD PRESSURE: 134 MMHG | HEART RATE: 86 BPM | WEIGHT: 185 LBS | OXYGEN SATURATION: 98 % | BODY MASS INDEX: 28.97 KG/M2 | DIASTOLIC BLOOD PRESSURE: 86 MMHG

## 2025-09-03 DIAGNOSIS — I10 HYPERTENSION, UNSPECIFIED TYPE: ICD-10-CM

## 2025-09-03 DIAGNOSIS — I69.30 HISTORY OF STROKE WITH RESIDUAL EFFECTS: Primary | ICD-10-CM

## 2025-09-03 PROCEDURE — 3079F DIAST BP 80-89 MM HG: CPT

## 2025-09-03 PROCEDURE — 3075F SYST BP GE 130 - 139MM HG: CPT

## 2025-09-03 PROCEDURE — 99214 OFFICE O/P EST MOD 30 MIN: CPT

## 2025-09-03 RX ORDER — CARVEDILOL 25 MG/1
25 TABLET ORAL 2 TIMES DAILY
Qty: 60 TABLET | Refills: 0 | Status: SHIPPED | OUTPATIENT
Start: 2025-09-03

## 2025-09-03 ASSESSMENT — ENCOUNTER SYMPTOMS
WHEEZING: 0
ABDOMINAL PAIN: 0
VOMITING: 0
SHORTNESS OF BREATH: 0
EYES NEGATIVE: 1
NAUSEA: 0
GASTROINTESTINAL NEGATIVE: 1

## (undated) PROCEDURE — 4A023N7 MEASUREMENT OF CARDIAC SAMPLING AND PRESSURE, LEFT HEART, PERCUTANEOUS APPROACH: ICD-10-PCS

## (undated) PROCEDURE — B2111ZZ FLUOROSCOPY OF MULTIPLE CORONARY ARTERIES USING LOW OSMOLAR CONTRAST: ICD-10-PCS

## (undated) DEVICE — SNARE ENDOSCP AD L240CM LOOP W10MM SHTH DIA2.4MM RND INSUL

## (undated) DEVICE — TUBING, SUCTION, 1/4" X 10', STRAIGHT: Brand: MEDLINE

## (undated) DEVICE — CATHETER SCLERO L240CM NDL 25GA L4MM SHTH DIA2.3MM CNTRST

## (undated) DEVICE — TUBE SET 96 MM 64 MM H2O PERISTALTIC STD AUX CHANNEL

## (undated) DEVICE — Device: Brand: ENDO SMARTCAP

## (undated) DEVICE — CONMED SCOPE SAVER BITE BLOCK, 20X27 MM: Brand: SCOPE SAVER

## (undated) DEVICE — ENDO CARRY-ON PROCEDURE KIT: Brand: ENDO CARRY-ON PROCEDURE KIT

## (undated) DEVICE — GLOVE SURG SZ 85 STD WHT LTX SYN POLYMER BEAD REINF ANTI RL

## (undated) DEVICE — ADAPTER FLSH PMP FLD MGMT GI IRRIG OFP 2 DISPOSABLE

## (undated) DEVICE — PATIENT RETURN ELECTRODE, SINGLE-USE, NON CONTACT QUALITY MONITORING, ADULT, WITH 9 FT (2.7 M) CORD, FOR PATIENTS WEIGHING OVER 33LBS. (15KG): Brand: MEGADYNE

## (undated) DEVICE — 4-PORT MANIFOLD: Brand: NEPTUNE 2

## (undated) DEVICE — GLOVE ORTHO 8   MSG9480

## (undated) DEVICE — TRAP POLYP BALEEN

## (undated) DEVICE — FORCEPS BX L240CM JAW DIA2.4MM ORNG L CAP W/ NDL DISP RAD

## (undated) DEVICE — BRUSH ENDO CLN L90.5IN SHTH DIA1.7MM BRIST DIA5-7MM 2-6MM

## (undated) DEVICE — SINGLE PORT MANIFOLD: Brand: NEPTUNE 2

## (undated) DEVICE — GLOVE ORANGE PI 8 1/2   MSG9085